# Patient Record
Sex: MALE | Race: WHITE | Employment: OTHER | URBAN - METROPOLITAN AREA
[De-identification: names, ages, dates, MRNs, and addresses within clinical notes are randomized per-mention and may not be internally consistent; named-entity substitution may affect disease eponyms.]

---

## 2017-07-18 PROBLEM — R06.02 SOB (SHORTNESS OF BREATH): Status: ACTIVE | Noted: 2017-03-09

## 2018-02-01 ENCOUNTER — HOSPITAL ENCOUNTER (INPATIENT)
Age: 83
LOS: 4 days | Discharge: NURSING, REHAB OR LONG-TERM CARE | DRG: 481 | End: 2018-02-05
Attending: STUDENT IN AN ORGANIZED HEALTH CARE EDUCATION/TRAINING PROGRAM | Admitting: FAMILY MEDICINE
Payer: MEDICARE

## 2018-02-01 ENCOUNTER — APPOINTMENT (OUTPATIENT)
Dept: GENERAL RADIOLOGY | Age: 83
DRG: 481 | End: 2018-02-01
Attending: STUDENT IN AN ORGANIZED HEALTH CARE EDUCATION/TRAINING PROGRAM
Payer: MEDICARE

## 2018-02-01 ENCOUNTER — APPOINTMENT (OUTPATIENT)
Dept: CT IMAGING | Age: 83
DRG: 481 | End: 2018-02-01
Attending: STUDENT IN AN ORGANIZED HEALTH CARE EDUCATION/TRAINING PROGRAM
Payer: MEDICARE

## 2018-02-01 DIAGNOSIS — S72.002A CLOSED FRACTURE OF LEFT HIP, INITIAL ENCOUNTER (HCC): Primary | ICD-10-CM

## 2018-02-01 PROBLEM — S72.009A HIP FRACTURE (HCC): Status: ACTIVE | Noted: 2018-02-01

## 2018-02-01 PROBLEM — R06.02 SOB (SHORTNESS OF BREATH): Status: RESOLVED | Noted: 2017-03-09 | Resolved: 2018-02-01

## 2018-02-01 PROBLEM — J81.1 PULMONARY EDEMA: Status: ACTIVE | Noted: 2018-02-01

## 2018-02-01 LAB
ALBUMIN SERPL-MCNC: 3.7 G/DL (ref 3.2–4.6)
ALBUMIN/GLOB SERPL: 1.2 {RATIO} (ref 1.2–3.5)
ALP SERPL-CCNC: 72 U/L (ref 50–136)
ALT SERPL-CCNC: 27 U/L (ref 12–65)
ANION GAP SERPL CALC-SCNC: 10 MMOL/L (ref 7–16)
AST SERPL-CCNC: 27 U/L (ref 15–37)
BASOPHILS # BLD: 0 K/UL (ref 0–0.2)
BASOPHILS NFR BLD: 0 % (ref 0–2)
BILIRUB SERPL-MCNC: 0.7 MG/DL (ref 0.2–1.1)
BUN SERPL-MCNC: 19 MG/DL (ref 8–23)
CALCIUM SERPL-MCNC: 7.9 MG/DL (ref 8.3–10.4)
CALCIUM SERPL-MCNC: 9.4 MG/DL (ref 8.3–10.4)
CHLORIDE SERPL-SCNC: 103 MMOL/L (ref 98–107)
CO2 SERPL-SCNC: 25 MMOL/L (ref 21–32)
CREAT SERPL-MCNC: 0.78 MG/DL (ref 0.8–1.5)
DIFFERENTIAL METHOD BLD: ABNORMAL
EOSINOPHIL # BLD: 0.2 K/UL (ref 0–0.8)
EOSINOPHIL NFR BLD: 2 % (ref 0.5–7.8)
ERYTHROCYTE [DISTWIDTH] IN BLOOD BY AUTOMATED COUNT: 13.1 % (ref 11.9–14.6)
GLOBULIN SER CALC-MCNC: 3 G/DL (ref 2.3–3.5)
GLUCOSE SERPL-MCNC: 97 MG/DL (ref 65–100)
HCT VFR BLD AUTO: 42.8 % (ref 41.1–50.3)
HGB BLD-MCNC: 14 G/DL (ref 13.6–17.2)
IMM GRANULOCYTES # BLD: 0 K/UL (ref 0–0.5)
IMM GRANULOCYTES NFR BLD AUTO: 0 % (ref 0–5)
LYMPHOCYTES # BLD: 1.6 K/UL (ref 0.5–4.6)
LYMPHOCYTES NFR BLD: 17 % (ref 13–44)
MCH RBC QN AUTO: 32.9 PG (ref 26.1–32.9)
MCHC RBC AUTO-ENTMCNC: 32.7 G/DL (ref 31.4–35)
MCV RBC AUTO: 100.5 FL (ref 79.6–97.8)
MONOCYTES # BLD: 0.7 K/UL (ref 0.1–1.3)
MONOCYTES NFR BLD: 7 % (ref 4–12)
NEUTS SEG # BLD: 6.6 K/UL (ref 1.7–8.2)
NEUTS SEG NFR BLD: 74 % (ref 43–78)
PLATELET # BLD AUTO: 140 K/UL (ref 150–450)
PMV BLD AUTO: 10.6 FL (ref 10.8–14.1)
POTASSIUM SERPL-SCNC: 4.1 MMOL/L (ref 3.5–5.1)
PREALB SERPL-MCNC: 19.2 MG/DL (ref 18–35.7)
PROT SERPL-MCNC: 6.7 G/DL (ref 6.3–8.2)
PTH-INTACT SERPL-MCNC: 35 PG/ML (ref 14–72)
RBC # BLD AUTO: 4.26 M/UL (ref 4.23–5.67)
SODIUM SERPL-SCNC: 138 MMOL/L (ref 136–145)
WBC # BLD AUTO: 9.1 K/UL (ref 4.3–11.1)

## 2018-02-01 PROCEDURE — 80053 COMPREHEN METABOLIC PANEL: CPT | Performed by: STUDENT IN AN ORGANIZED HEALTH CARE EDUCATION/TRAINING PROGRAM

## 2018-02-01 PROCEDURE — 70450 CT HEAD/BRAIN W/O DYE: CPT

## 2018-02-01 PROCEDURE — 83970 ASSAY OF PARATHORMONE: CPT | Performed by: NURSE PRACTITIONER

## 2018-02-01 PROCEDURE — 84134 ASSAY OF PREALBUMIN: CPT | Performed by: STUDENT IN AN ORGANIZED HEALTH CARE EDUCATION/TRAINING PROGRAM

## 2018-02-01 PROCEDURE — 74011250636 HC RX REV CODE- 250/636: Performed by: NURSE PRACTITIONER

## 2018-02-01 PROCEDURE — 87641 MR-STAPH DNA AMP PROBE: CPT | Performed by: NURSE PRACTITIONER

## 2018-02-01 PROCEDURE — 82306 VITAMIN D 25 HYDROXY: CPT | Performed by: STUDENT IN AN ORGANIZED HEALTH CARE EDUCATION/TRAINING PROGRAM

## 2018-02-01 PROCEDURE — 65270000029 HC RM PRIVATE

## 2018-02-01 PROCEDURE — 86900 BLOOD TYPING SEROLOGIC ABO: CPT | Performed by: STUDENT IN AN ORGANIZED HEALTH CARE EDUCATION/TRAINING PROGRAM

## 2018-02-01 PROCEDURE — 93005 ELECTROCARDIOGRAM TRACING: CPT | Performed by: STUDENT IN AN ORGANIZED HEALTH CARE EDUCATION/TRAINING PROGRAM

## 2018-02-01 PROCEDURE — 85025 COMPLETE CBC W/AUTO DIFF WBC: CPT | Performed by: STUDENT IN AN ORGANIZED HEALTH CARE EDUCATION/TRAINING PROGRAM

## 2018-02-01 PROCEDURE — 71045 X-RAY EXAM CHEST 1 VIEW: CPT

## 2018-02-01 PROCEDURE — 74011250636 HC RX REV CODE- 250/636: Performed by: FAMILY MEDICINE

## 2018-02-01 PROCEDURE — 72125 CT NECK SPINE W/O DYE: CPT

## 2018-02-01 PROCEDURE — 73502 X-RAY EXAM HIP UNI 2-3 VIEWS: CPT

## 2018-02-01 PROCEDURE — 81003 URINALYSIS AUTO W/O SCOPE: CPT | Performed by: ORTHOPAEDIC SURGERY

## 2018-02-01 PROCEDURE — 99284 EMERGENCY DEPT VISIT MOD MDM: CPT | Performed by: STUDENT IN AN ORGANIZED HEALTH CARE EDUCATION/TRAINING PROGRAM

## 2018-02-01 PROCEDURE — 73552 X-RAY EXAM OF FEMUR 2/>: CPT

## 2018-02-01 RX ORDER — MORPHINE SULFATE 4 MG/ML
1 INJECTION, SOLUTION INTRAMUSCULAR; INTRAVENOUS
Status: DISCONTINUED | OUTPATIENT
Start: 2018-02-01 | End: 2018-02-03 | Stop reason: ALTCHOICE

## 2018-02-01 RX ORDER — OXYCODONE HYDROCHLORIDE 5 MG/1
5 TABLET ORAL
Status: DISCONTINUED | OUTPATIENT
Start: 2018-02-01 | End: 2018-02-02 | Stop reason: SDUPTHER

## 2018-02-01 RX ORDER — GUAIFENESIN 600 MG/1
1200 TABLET, EXTENDED RELEASE ORAL DAILY
Status: DISCONTINUED | OUTPATIENT
Start: 2018-02-02 | End: 2018-02-05 | Stop reason: HOSPADM

## 2018-02-01 RX ORDER — HYDROCODONE BITARTRATE AND ACETAMINOPHEN 5; 325 MG/1; MG/1
1 TABLET ORAL
Status: DISCONTINUED | OUTPATIENT
Start: 2018-02-01 | End: 2018-02-03

## 2018-02-01 RX ORDER — TAMSULOSIN HYDROCHLORIDE 0.4 MG/1
0.4 CAPSULE ORAL DAILY
Status: DISCONTINUED | OUTPATIENT
Start: 2018-02-02 | End: 2018-02-05 | Stop reason: HOSPADM

## 2018-02-01 RX ORDER — ONDANSETRON 2 MG/ML
4 INJECTION INTRAMUSCULAR; INTRAVENOUS
Status: DISCONTINUED | OUTPATIENT
Start: 2018-02-01 | End: 2018-02-02 | Stop reason: SDUPTHER

## 2018-02-01 RX ORDER — SODIUM CHLORIDE 0.9 % (FLUSH) 0.9 %
5-10 SYRINGE (ML) INJECTION EVERY 8 HOURS
Status: DISCONTINUED | OUTPATIENT
Start: 2018-02-01 | End: 2018-02-02 | Stop reason: SDUPTHER

## 2018-02-01 RX ORDER — ACETAMINOPHEN 325 MG/1
650 TABLET ORAL EVERY 8 HOURS
Status: DISCONTINUED | OUTPATIENT
Start: 2018-02-01 | End: 2018-02-01

## 2018-02-01 RX ORDER — ENOXAPARIN SODIUM 100 MG/ML
40 INJECTION SUBCUTANEOUS EVERY 24 HOURS
Status: DISCONTINUED | OUTPATIENT
Start: 2018-02-01 | End: 2018-02-01 | Stop reason: SDUPTHER

## 2018-02-01 RX ORDER — SODIUM CHLORIDE 0.9 % (FLUSH) 0.9 %
5-10 SYRINGE (ML) INJECTION AS NEEDED
Status: DISCONTINUED | OUTPATIENT
Start: 2018-02-01 | End: 2018-02-02 | Stop reason: SDUPTHER

## 2018-02-01 RX ORDER — MELATONIN
1000 DAILY
Status: DISCONTINUED | OUTPATIENT
Start: 2018-02-02 | End: 2018-02-05 | Stop reason: HOSPADM

## 2018-02-01 RX ORDER — CEFAZOLIN SODIUM/WATER 2 G/20 ML
2 SYRINGE (ML) INTRAVENOUS
Status: COMPLETED | OUTPATIENT
Start: 2018-02-02 | End: 2018-02-02

## 2018-02-01 RX ORDER — LISINOPRIL 5 MG/1
5 TABLET ORAL DAILY
Status: DISCONTINUED | OUTPATIENT
Start: 2018-02-02 | End: 2018-02-05 | Stop reason: HOSPADM

## 2018-02-01 RX ORDER — ACETAMINOPHEN 325 MG/1
650 TABLET ORAL
Status: DISCONTINUED | OUTPATIENT
Start: 2018-02-01 | End: 2018-02-05 | Stop reason: HOSPADM

## 2018-02-01 RX ORDER — ASPIRIN 81 MG/1
81 TABLET ORAL DAILY
Status: DISCONTINUED | OUTPATIENT
Start: 2018-02-02 | End: 2018-02-05 | Stop reason: HOSPADM

## 2018-02-01 RX ORDER — SODIUM CHLORIDE, SODIUM LACTATE, POTASSIUM CHLORIDE, CALCIUM CHLORIDE 600; 310; 30; 20 MG/100ML; MG/100ML; MG/100ML; MG/100ML
75 INJECTION, SOLUTION INTRAVENOUS
Status: DISPENSED | OUTPATIENT
Start: 2018-02-01 | End: 2018-02-02

## 2018-02-01 RX ORDER — CALCIUM CARBONATE/VITAMIN D3 250-3.125
2 TABLET ORAL
Status: DISCONTINUED | OUTPATIENT
Start: 2018-02-02 | End: 2018-02-02 | Stop reason: SDUPTHER

## 2018-02-01 RX ORDER — MONTELUKAST SODIUM 10 MG/1
10 TABLET ORAL EVERY EVENING
Status: DISCONTINUED | OUTPATIENT
Start: 2018-02-01 | End: 2018-02-05 | Stop reason: HOSPADM

## 2018-02-01 RX ORDER — TRAMADOL HYDROCHLORIDE 50 MG/1
50 TABLET ORAL
Status: DISCONTINUED | OUTPATIENT
Start: 2018-02-01 | End: 2018-02-05 | Stop reason: HOSPADM

## 2018-02-01 RX ORDER — HYDROMORPHONE HYDROCHLORIDE 2 MG/ML
0.5 INJECTION, SOLUTION INTRAMUSCULAR; INTRAVENOUS; SUBCUTANEOUS
Status: DISCONTINUED | OUTPATIENT
Start: 2018-02-01 | End: 2018-02-03

## 2018-02-01 RX ORDER — ADHESIVE BANDAGE
30 BANDAGE TOPICAL DAILY PRN
Status: DISCONTINUED | OUTPATIENT
Start: 2018-02-01 | End: 2018-02-05 | Stop reason: HOSPADM

## 2018-02-01 RX ORDER — SODIUM CHLORIDE 9 MG/ML
2000 INJECTION, SOLUTION INTRAVENOUS CONTINUOUS
Status: DISCONTINUED | OUTPATIENT
Start: 2018-02-01 | End: 2018-02-04

## 2018-02-01 RX ORDER — ENOXAPARIN SODIUM 100 MG/ML
40 INJECTION SUBCUTANEOUS EVERY 24 HOURS
Status: DISCONTINUED | OUTPATIENT
Start: 2018-02-01 | End: 2018-02-02 | Stop reason: SDUPTHER

## 2018-02-01 RX ORDER — FLUTICASONE PROPIONATE 50 MCG
2 SPRAY, SUSPENSION (ML) NASAL DAILY
Status: DISCONTINUED | OUTPATIENT
Start: 2018-02-02 | End: 2018-02-05 | Stop reason: HOSPADM

## 2018-02-01 RX ADMIN — ONDANSETRON 4 MG: 2 INJECTION INTRAMUSCULAR; INTRAVENOUS at 21:48

## 2018-02-01 RX ADMIN — HYDROMORPHONE HYDROCHLORIDE 0.5 MG: 2 INJECTION, SOLUTION INTRAMUSCULAR; INTRAVENOUS; SUBCUTANEOUS at 21:48

## 2018-02-01 NOTE — ED TRIAGE NOTES
Pt arrives to ER via EMS after tripping on a shopping cart at [address] and landing on left hip. Pt will not straighten out leg, unable to tell if shortened or rotated  /100 then 156/80, HR 80, O2 91% room air, 4L Nc up to 95%. Pt given 4 of zofran and 5 of morphine. Pt denies LOC, pt did hit the back of his head but is not complaining of head pain at this time.  Pt denies taking any blood thinners

## 2018-02-02 ENCOUNTER — ANESTHESIA EVENT (OUTPATIENT)
Dept: SURGERY | Age: 83
DRG: 481 | End: 2018-02-02
Payer: MEDICARE

## 2018-02-02 ENCOUNTER — APPOINTMENT (OUTPATIENT)
Dept: GENERAL RADIOLOGY | Age: 83
DRG: 481 | End: 2018-02-02
Attending: ORTHOPAEDIC SURGERY
Payer: MEDICARE

## 2018-02-02 ENCOUNTER — ANESTHESIA (OUTPATIENT)
Dept: SURGERY | Age: 83
DRG: 481 | End: 2018-02-02
Payer: MEDICARE

## 2018-02-02 LAB
ABO + RH BLD: NORMAL
ANION GAP SERPL CALC-SCNC: 8 MMOL/L (ref 7–16)
APPEARANCE UR: CLEAR
APTT PPP: 37.5 SEC (ref 23.2–35.3)
ATRIAL RATE: 75 BPM
BACTERIA SPEC CULT: ABNORMAL
BACTERIA URNS QL MICRO: 0 /HPF
BASOPHILS # BLD: 0 K/UL (ref 0–0.2)
BASOPHILS NFR BLD: 0 % (ref 0–2)
BILIRUB UR QL: NEGATIVE
BLOOD GROUP ANTIBODIES SERPL: NORMAL
BNP SERPL-MCNC: 173 PG/ML
BUN SERPL-MCNC: 18 MG/DL (ref 8–23)
CALCIUM SERPL-MCNC: 8.3 MG/DL (ref 8.3–10.4)
CALCULATED P AXIS, ECG09: 72 DEGREES
CALCULATED R AXIS, ECG10: 28 DEGREES
CALCULATED T AXIS, ECG11: 69 DEGREES
CHLORIDE SERPL-SCNC: 103 MMOL/L (ref 98–107)
CO2 SERPL-SCNC: 28 MMOL/L (ref 21–32)
COLOR UR: YELLOW
CREAT SERPL-MCNC: 0.8 MG/DL (ref 0.8–1.5)
DIAGNOSIS, 93000: NORMAL
DIFFERENTIAL METHOD BLD: ABNORMAL
EOSINOPHIL # BLD: 0.1 K/UL (ref 0–0.8)
EOSINOPHIL NFR BLD: 1 % (ref 0.5–7.8)
EPI CELLS #/AREA URNS HPF: ABNORMAL /HPF
ERYTHROCYTE [DISTWIDTH] IN BLOOD BY AUTOMATED COUNT: 13.2 % (ref 11.9–14.6)
GLUCOSE SERPL-MCNC: 115 MG/DL (ref 65–100)
GLUCOSE UR STRIP.AUTO-MCNC: NEGATIVE MG/DL
HCT VFR BLD AUTO: 38.9 % (ref 41.1–50.3)
HGB BLD-MCNC: 12.4 G/DL (ref 13.6–17.2)
HGB UR QL STRIP: ABNORMAL
IMM GRANULOCYTES # BLD: 0 K/UL (ref 0–0.5)
IMM GRANULOCYTES NFR BLD AUTO: 0 % (ref 0–5)
INR PPP: 1.2
KETONES UR QL STRIP.AUTO: ABNORMAL MG/DL
LEUKOCYTE ESTERASE UR QL STRIP.AUTO: NEGATIVE
LYMPHOCYTES # BLD: 1.7 K/UL (ref 0.5–4.6)
LYMPHOCYTES NFR BLD: 21 % (ref 13–44)
MCH RBC QN AUTO: 31.7 PG (ref 26.1–32.9)
MCHC RBC AUTO-ENTMCNC: 31.9 G/DL (ref 31.4–35)
MCV RBC AUTO: 99.5 FL (ref 79.6–97.8)
MONOCYTES # BLD: 0.6 K/UL (ref 0.1–1.3)
MONOCYTES NFR BLD: 7 % (ref 4–12)
NEUTS SEG # BLD: 5.8 K/UL (ref 1.7–8.2)
NEUTS SEG NFR BLD: 71 % (ref 43–78)
NITRITE UR QL STRIP.AUTO: NEGATIVE
P-R INTERVAL, ECG05: 178 MS
PH UR STRIP: 6.5 [PH] (ref 5–9)
PLATELET # BLD AUTO: 124 K/UL (ref 150–450)
PMV BLD AUTO: 10.8 FL (ref 10.8–14.1)
POTASSIUM SERPL-SCNC: 4.3 MMOL/L (ref 3.5–5.1)
PROT UR STRIP-MCNC: NEGATIVE MG/DL
PROTHROMBIN TIME: 14.8 SEC (ref 11.5–14.5)
Q-T INTERVAL, ECG07: 374 MS
QRS DURATION, ECG06: 88 MS
QTC CALCULATION (BEZET), ECG08: 417 MS
RBC # BLD AUTO: 3.91 M/UL (ref 4.23–5.67)
RBC #/AREA URNS HPF: ABNORMAL /HPF
SERVICE CMNT-IMP: ABNORMAL
SODIUM SERPL-SCNC: 139 MMOL/L (ref 136–145)
SP GR UR REFRACTOMETRY: 1.02 (ref 1–1.02)
SPECIMEN EXP DATE BLD: NORMAL
UROBILINOGEN UR QL STRIP.AUTO: 1 EU/DL (ref 0.2–1)
VENTRICULAR RATE, ECG03: 75 BPM
WBC # BLD AUTO: 8.3 K/UL (ref 4.3–11.1)
WBC URNS QL MICRO: ABNORMAL /HPF

## 2018-02-02 PROCEDURE — 77030020143 HC AIRWY LARYN INTUB CGAS -A: Performed by: ANESTHESIOLOGY

## 2018-02-02 PROCEDURE — 85610 PROTHROMBIN TIME: CPT | Performed by: ORTHOPAEDIC SURGERY

## 2018-02-02 PROCEDURE — 74011000250 HC RX REV CODE- 250: Performed by: ORTHOPAEDIC SURGERY

## 2018-02-02 PROCEDURE — 80048 BASIC METABOLIC PNL TOTAL CA: CPT | Performed by: FAMILY MEDICINE

## 2018-02-02 PROCEDURE — 77030014405 HC GD ROD RMR SYNT -C: Performed by: ORTHOPAEDIC SURGERY

## 2018-02-02 PROCEDURE — 85025 COMPLETE CBC W/AUTO DIFF WBC: CPT | Performed by: FAMILY MEDICINE

## 2018-02-02 PROCEDURE — 76210000017 HC OR PH I REC 1.5 TO 2 HR: Performed by: ORTHOPAEDIC SURGERY

## 2018-02-02 PROCEDURE — 83880 ASSAY OF NATRIURETIC PEPTIDE: CPT | Performed by: NURSE PRACTITIONER

## 2018-02-02 PROCEDURE — 74011250637 HC RX REV CODE- 250/637: Performed by: FAMILY MEDICINE

## 2018-02-02 PROCEDURE — 77030008467 HC STPLR SKN COVD -B: Performed by: ORTHOPAEDIC SURGERY

## 2018-02-02 PROCEDURE — 65660000000 HC RM CCU STEPDOWN

## 2018-02-02 PROCEDURE — 73552 X-RAY EXAM OF FEMUR 2/>: CPT

## 2018-02-02 PROCEDURE — 36415 COLL VENOUS BLD VENIPUNCTURE: CPT | Performed by: FAMILY MEDICINE

## 2018-02-02 PROCEDURE — C1769 GUIDE WIRE: HCPCS | Performed by: ORTHOPAEDIC SURGERY

## 2018-02-02 PROCEDURE — 77030035168: Performed by: ORTHOPAEDIC SURGERY

## 2018-02-02 PROCEDURE — 0QS736Z REPOSITION LEFT UPPER FEMUR WITH INTRAMEDULLARY INTERNAL FIXATION DEVICE, PERCUTANEOUS APPROACH: ICD-10-PCS | Performed by: ORTHOPAEDIC SURGERY

## 2018-02-02 PROCEDURE — 74011250637 HC RX REV CODE- 250/637: Performed by: ORTHOPAEDIC SURGERY

## 2018-02-02 PROCEDURE — 74011250636 HC RX REV CODE- 250/636

## 2018-02-02 PROCEDURE — 85730 THROMBOPLASTIN TIME PARTIAL: CPT | Performed by: ORTHOPAEDIC SURGERY

## 2018-02-02 PROCEDURE — 74011000302 HC RX REV CODE- 302: Performed by: ORTHOPAEDIC SURGERY

## 2018-02-02 PROCEDURE — 74011250636 HC RX REV CODE- 250/636: Performed by: NURSE PRACTITIONER

## 2018-02-02 PROCEDURE — 74011250636 HC RX REV CODE- 250/636: Performed by: ANESTHESIOLOGY

## 2018-02-02 PROCEDURE — 74011000250 HC RX REV CODE- 250: Performed by: FAMILY MEDICINE

## 2018-02-02 PROCEDURE — 86580 TB INTRADERMAL TEST: CPT | Performed by: ORTHOPAEDIC SURGERY

## 2018-02-02 PROCEDURE — 74011250637 HC RX REV CODE- 250/637: Performed by: NURSE PRACTITIONER

## 2018-02-02 PROCEDURE — 74011000250 HC RX REV CODE- 250

## 2018-02-02 PROCEDURE — 76060000032 HC ANESTHESIA 0.5 TO 1 HR: Performed by: ORTHOPAEDIC SURGERY

## 2018-02-02 PROCEDURE — 74011250636 HC RX REV CODE- 250/636: Performed by: FAMILY MEDICINE

## 2018-02-02 PROCEDURE — 77030018836 HC SOL IRR NACL ICUM -A: Performed by: ORTHOPAEDIC SURGERY

## 2018-02-02 PROCEDURE — 76010000160 HC OR TIME 0.5 TO 1 HR INTENSV-TIER 1: Performed by: ORTHOPAEDIC SURGERY

## 2018-02-02 PROCEDURE — 77030011640 HC PAD GRND REM COVD -A: Performed by: ORTHOPAEDIC SURGERY

## 2018-02-02 PROCEDURE — 74011250636 HC RX REV CODE- 250/636: Performed by: ORTHOPAEDIC SURGERY

## 2018-02-02 PROCEDURE — C1713 ANCHOR/SCREW BN/BN,TIS/BN: HCPCS | Performed by: ORTHOPAEDIC SURGERY

## 2018-02-02 PROCEDURE — 77030002933 HC SUT MCRYL J&J -A: Performed by: ORTHOPAEDIC SURGERY

## 2018-02-02 PROCEDURE — C8929 TTE W OR WO FOL WCON,DOPPLER: HCPCS

## 2018-02-02 DEVICE — IMPLANTABLE DEVICE: Type: IMPLANTABLE DEVICE | Site: FEMUR | Status: FUNCTIONAL

## 2018-02-02 RX ORDER — SODIUM CHLORIDE 9 MG/ML
75 INJECTION, SOLUTION INTRAVENOUS CONTINUOUS
Status: DISCONTINUED | OUTPATIENT
Start: 2018-02-02 | End: 2018-02-04

## 2018-02-02 RX ORDER — SODIUM CHLORIDE, SODIUM LACTATE, POTASSIUM CHLORIDE, CALCIUM CHLORIDE 600; 310; 30; 20 MG/100ML; MG/100ML; MG/100ML; MG/100ML
75 INJECTION, SOLUTION INTRAVENOUS CONTINUOUS
Status: DISCONTINUED | OUTPATIENT
Start: 2018-02-02 | End: 2018-02-04

## 2018-02-02 RX ORDER — ENOXAPARIN SODIUM 100 MG/ML
30 INJECTION SUBCUTANEOUS EVERY 24 HOURS
Status: DISCONTINUED | OUTPATIENT
Start: 2018-02-03 | End: 2018-02-05 | Stop reason: HOSPADM

## 2018-02-02 RX ORDER — MUPIROCIN 20 MG/G
OINTMENT TOPICAL 2 TIMES DAILY
Status: DISCONTINUED | OUTPATIENT
Start: 2018-02-02 | End: 2018-02-05 | Stop reason: HOSPADM

## 2018-02-02 RX ORDER — LABETALOL HYDROCHLORIDE 5 MG/ML
INJECTION, SOLUTION INTRAVENOUS AS NEEDED
Status: DISCONTINUED | OUTPATIENT
Start: 2018-02-02 | End: 2018-02-02 | Stop reason: HOSPADM

## 2018-02-02 RX ORDER — SODIUM CHLORIDE 0.9 % (FLUSH) 0.9 %
5-10 SYRINGE (ML) INJECTION AS NEEDED
Status: DISCONTINUED | OUTPATIENT
Start: 2018-02-02 | End: 2018-02-05 | Stop reason: HOSPADM

## 2018-02-02 RX ORDER — MAG HYDROX/ALUMINUM HYD/SIMETH 200-200-20
30 SUSPENSION, ORAL (FINAL DOSE FORM) ORAL
Status: DISCONTINUED | OUTPATIENT
Start: 2018-02-02 | End: 2018-02-05 | Stop reason: HOSPADM

## 2018-02-02 RX ORDER — SODIUM CHLORIDE, SODIUM LACTATE, POTASSIUM CHLORIDE, CALCIUM CHLORIDE 600; 310; 30; 20 MG/100ML; MG/100ML; MG/100ML; MG/100ML
75 INJECTION, SOLUTION INTRAVENOUS CONTINUOUS
Status: DISCONTINUED | OUTPATIENT
Start: 2018-02-02 | End: 2018-02-02 | Stop reason: HOSPADM

## 2018-02-02 RX ORDER — PROPOFOL 10 MG/ML
INJECTION, EMULSION INTRAVENOUS AS NEEDED
Status: DISCONTINUED | OUTPATIENT
Start: 2018-02-02 | End: 2018-02-02 | Stop reason: HOSPADM

## 2018-02-02 RX ORDER — SODIUM CHLORIDE 0.9 % (FLUSH) 0.9 %
5-10 SYRINGE (ML) INJECTION EVERY 8 HOURS
Status: DISCONTINUED | OUTPATIENT
Start: 2018-02-02 | End: 2018-02-05 | Stop reason: HOSPADM

## 2018-02-02 RX ORDER — ONDANSETRON 2 MG/ML
INJECTION INTRAMUSCULAR; INTRAVENOUS AS NEEDED
Status: DISCONTINUED | OUTPATIENT
Start: 2018-02-02 | End: 2018-02-02 | Stop reason: HOSPADM

## 2018-02-02 RX ORDER — LIDOCAINE HYDROCHLORIDE 20 MG/ML
INJECTION, SOLUTION EPIDURAL; INFILTRATION; INTRACAUDAL; PERINEURAL AS NEEDED
Status: DISCONTINUED | OUTPATIENT
Start: 2018-02-02 | End: 2018-02-02 | Stop reason: HOSPADM

## 2018-02-02 RX ORDER — DEXAMETHASONE SODIUM PHOSPHATE 4 MG/ML
INJECTION, SOLUTION INTRA-ARTICULAR; INTRALESIONAL; INTRAMUSCULAR; INTRAVENOUS; SOFT TISSUE AS NEEDED
Status: DISCONTINUED | OUTPATIENT
Start: 2018-02-02 | End: 2018-02-02 | Stop reason: HOSPADM

## 2018-02-02 RX ORDER — OXYCODONE HYDROCHLORIDE 5 MG/1
5 TABLET ORAL
Status: DISCONTINUED | OUTPATIENT
Start: 2018-02-02 | End: 2018-02-05 | Stop reason: HOSPADM

## 2018-02-02 RX ORDER — ONDANSETRON 2 MG/ML
4 INJECTION INTRAMUSCULAR; INTRAVENOUS
Status: DISCONTINUED | OUTPATIENT
Start: 2018-02-02 | End: 2018-02-05 | Stop reason: HOSPADM

## 2018-02-02 RX ORDER — FERROUS SULFATE, DRIED 160(50) MG
1 TABLET, EXTENDED RELEASE ORAL
Status: DISCONTINUED | OUTPATIENT
Start: 2018-02-02 | End: 2018-02-05 | Stop reason: HOSPADM

## 2018-02-02 RX ORDER — ACETAMINOPHEN 325 MG/1
650 TABLET ORAL EVERY 8 HOURS
Status: DISCONTINUED | OUTPATIENT
Start: 2018-02-02 | End: 2018-02-05 | Stop reason: HOSPADM

## 2018-02-02 RX ADMIN — Medication 2 G: at 14:14

## 2018-02-02 RX ADMIN — SODIUM CHLORIDE 75 ML/HR: 900 INJECTION, SOLUTION INTRAVENOUS at 01:00

## 2018-02-02 RX ADMIN — TRAMADOL HYDROCHLORIDE 50 MG: 50 TABLET, FILM COATED ORAL at 00:06

## 2018-02-02 RX ADMIN — MONTELUKAST SODIUM 10 MG: 10 TABLET, FILM COATED ORAL at 00:06

## 2018-02-02 RX ADMIN — ACETAMINOPHEN 650 MG: 325 TABLET, FILM COATED ORAL at 17:24

## 2018-02-02 RX ADMIN — MONTELUKAST SODIUM 10 MG: 10 TABLET, FILM COATED ORAL at 17:24

## 2018-02-02 RX ADMIN — WATER 1 G: 1 INJECTION INTRAMUSCULAR; INTRAVENOUS; SUBCUTANEOUS at 21:26

## 2018-02-02 RX ADMIN — SODIUM CHLORIDE, SODIUM LACTATE, POTASSIUM CHLORIDE, AND CALCIUM CHLORIDE 75 ML/HR: 600; 310; 30; 20 INJECTION, SOLUTION INTRAVENOUS at 12:03

## 2018-02-02 RX ADMIN — TRAMADOL HYDROCHLORIDE 50 MG: 50 TABLET, FILM COATED ORAL at 08:01

## 2018-02-02 RX ADMIN — GUAIFENESIN 1200 MG: 600 TABLET, EXTENDED RELEASE ORAL at 08:01

## 2018-02-02 RX ADMIN — ASPIRIN 81 MG: 81 TABLET, COATED ORAL at 08:01

## 2018-02-02 RX ADMIN — LIDOCAINE HYDROCHLORIDE 100 MG: 20 INJECTION, SOLUTION EPIDURAL; INFILTRATION; INTRACAUDAL; PERINEURAL at 14:11

## 2018-02-02 RX ADMIN — CALCIUM CARBONATE-CHOLECALCIFEROL TAB 250 MG-125 UNIT 2 TABLET: 250-125 TAB at 08:01

## 2018-02-02 RX ADMIN — TUBERCULIN PURIFIED PROTEIN DERIVATIVE 5 UNITS: 5 INJECTION INTRADERMAL at 02:57

## 2018-02-02 RX ADMIN — CHOLECALCIFEROL TAB 25 MCG (1000 UNIT) 1000 UNITS: 25 TAB at 08:01

## 2018-02-02 RX ADMIN — TAMSULOSIN HYDROCHLORIDE 0.4 MG: 0.4 CAPSULE ORAL at 08:01

## 2018-02-02 RX ADMIN — FLUTICASONE PROPIONATE 2 SPRAY: 50 SPRAY, METERED NASAL at 08:03

## 2018-02-02 RX ADMIN — CALCIUM CARBONATE 500 MG (1,250 MG)-VITAMIN D3 200 UNIT TABLET 1 TABLET: at 17:24

## 2018-02-02 RX ADMIN — DEXAMETHASONE SODIUM PHOSPHATE 10 MG: 4 INJECTION, SOLUTION INTRA-ARTICULAR; INTRALESIONAL; INTRAMUSCULAR; INTRAVENOUS; SOFT TISSUE at 14:16

## 2018-02-02 RX ADMIN — ACETAMINOPHEN 650 MG: 325 TABLET, FILM COATED ORAL at 21:25

## 2018-02-02 RX ADMIN — MUPIROCIN: 20 OINTMENT TOPICAL at 17:25

## 2018-02-02 RX ADMIN — PROPOFOL 200 MG: 10 INJECTION, EMULSION INTRAVENOUS at 14:11

## 2018-02-02 RX ADMIN — PERFLUTREN 1 ML: 6.52 INJECTION, SUSPENSION INTRAVENOUS at 10:47

## 2018-02-02 RX ADMIN — ONDANSETRON 4 MG: 2 INJECTION INTRAMUSCULAR; INTRAVENOUS at 14:40

## 2018-02-02 RX ADMIN — OXYCODONE HYDROCHLORIDE 5 MG: 5 TABLET ORAL at 21:26

## 2018-02-02 RX ADMIN — TRAMADOL HYDROCHLORIDE 50 MG: 50 TABLET, FILM COATED ORAL at 17:24

## 2018-02-02 RX ADMIN — Medication 5 ML: at 21:26

## 2018-02-02 RX ADMIN — MUPIROCIN: 20 OINTMENT TOPICAL at 08:03

## 2018-02-02 RX ADMIN — LABETALOL HYDROCHLORIDE 7.5 MG: 5 INJECTION, SOLUTION INTRAVENOUS at 14:19

## 2018-02-02 RX ADMIN — SODIUM CHLORIDE, SODIUM LACTATE, POTASSIUM CHLORIDE, AND CALCIUM CHLORIDE 75 ML/HR: 600; 310; 30; 20 INJECTION, SOLUTION INTRAVENOUS at 16:37

## 2018-02-02 NOTE — PROGRESS NOTES
TRANSFER - IN REPORT:    Verbal report received from [facility]) [facility]  being received from ED(unit) for routine progression of care      Report consisted of patients Situation, Background, Assessment and   Recommendations(SBAR). Information from the following report(s) ED Summary, MAR and Recent Results was reviewed with the receiving nurse. Opportunity for questions and clarification was provided. Assessment completed upon patients arrival to unit(rm726) and care assumed.

## 2018-02-02 NOTE — PROGRESS NOTES
Hospitalist Progress Note    2018  Admit Date: 2018  6:57 PM   NAME: Joaquim Armendariz   :  1932   MRN:  [id number]   Attending: Alaina Sultana MD  PCP:  Elle Hernanedz MD    SUBJECTIVE:     Pt is a 81 yo male with pmh COPD on 2 L NC at night, HTN who was admitted  after nonsyncopal fall with left hip pain. X Ray confirms left hip fx. Pt reports he has been \"in good health\". He participates in pulmonary rehab at [facility], follows with [facility] pulm and was actually taken off his Spiriva 2017. He rarely uses his PRN Albuterol. Chest x ray in ER with question of pulm edema. He was given Lasix x 1 with 1600 cc urine output overnight. He has no edema and again denies SOB and functional limitations. He does endorse some PATE but this is chronic, not changed from his baseline. EKG with PVCs with hx of PVCs at [facility]. He has no cardiac history except AAA (Non ruptured, not repaired). This morning pt is sitting up in bed. Pain well controlled. Denies SOB, CP. Hemodynamically stable. Review of Systems negative with exception of pertinent positives noted above  PHYSICAL EXAM     Visit Vitals    /73 (BP 1 Location: Left arm, BP Patient Position: Supine)    Pulse 65    Temp 98.3 °F (36.8 °C)    Resp 18    Ht 5' 9\" (1.753 m)    Wt 82.1 kg (181 lb)    SpO2 90%    BMI 26.73 kg/m2      Temp (24hrs), Av.2 °F (36.8 °C), Min:98.1 °F (36.7 °C), Max:98.4 °F (36.9 °C)    Oxygen Therapy  O2 Sat (%): 90 % (18 0718)  Pulse via Oximetry: 72 beats per minute (18 185)  O2 Device: Nasal cannula (18)  O2 Flow Rate (L/min): 3 l/min (18)    Intake/Output Summary (Last 24 hours) at 18 0848  Last data filed at 18 2149   Gross per 24 hour   Intake                0 ml   Output             1600 ml   Net            -1600 ml      General: No acute distress    Lungs:  CTA Bilaterally.    Heart:  Regular rate and rhythm,  No murmur, rub, or gallop  Abdomen: Soft, Non distended, Non tender, Positive bowel sounds  Extremities: No cyanosis, clubbing or edema  Neurologic:  No focal deficits    ASSESSMENT      Active Hospital Problems    Diagnosis Date Noted    Hip fracture ([facility] 02/01/2018    Pulmonary edema 02/01/2018    Chronic respiratory failure with hypoxia ([facility] 03/29/2016     Last Assessment & Plan:   Stable. 1. 6mwt ordered and reviewed  2. Requires 2L home O2 with ambulation and with exercise. Last Assessment & Plan:   Stable. 1. 6mwt ordered and reviewed  2. Requires 2L home O2 with ambulation and with exercise.  COPD (chronic obstructive pulmonary disease) ([facility] 12/10/2015    Essential hypertension, benign 12/10/2015     A/P:    Left closed hip fx- Ortho with plans for repair today. Pt moderate risk due to age and hx COPD. He has been stable clinically (no functional limitations, no c/o CP or SOB). Hip fx protocol. Pain management and DVT prophylaxis per ortho. Questionable pulm edema- Per imaging. Pt stable clinically (no SOB, no recent functional limitations, change in inhaler or 02 use). Given one dose Lasix overnight with 1600 cc urine output. No BNP obtained last night. Caution for fluid overload during surgery. Obtain echo but this does not necessary need to be done pre op. Place on remote tele. COPD- Clinically stable. 2 L 02 at night    DC planning- SW following for STR placement. Ppd placed.      DVT Prophylaxis: Lovenox post op     Signed By: Lorenzo Cohen NP     February 2, 2018

## 2018-02-02 NOTE — ANESTHESIA PREPROCEDURE EVALUATION
Anesthetic History   No history of anesthetic complications            Review of Systems / Medical History  Patient summary reviewed and pertinent labs reviewed    Pulmonary    COPD (O2 qhs): moderate               Neuro/Psych              Cardiovascular    Hypertension              Exercise tolerance[de-identified] Around 4 METS     GI/Hepatic/Renal                Endo/Other        Arthritis     Other Findings              Physical Exam    Airway  Mallampati: II  TM Distance: 4 - 6 cm  Neck ROM: normal range of motion   Mouth opening: Normal     Cardiovascular    Rhythm: regular  Rate: normal         Dental         Pulmonary  Breath sounds clear to auscultation               Abdominal         Other Findings            Anesthetic Plan    ASA: 3  Anesthesia type: general          Induction: Intravenous  Anesthetic plan and risks discussed with: Patient and Family

## 2018-02-02 NOTE — PERIOP NOTES
TRANSFER - OUT REPORT:    Verbal report given to JANINA Israel on Vaishnavi Jiang  being transferred to [facility] for routine post - op       Report consisted of patients Situation, Background, Assessment and   Recommendations(SBAR). Information from the following report(s) Procedure Summary and Intake/Output was reviewed with the receiving nurse. Lines:   Peripheral IV 02/01/18 Right Hand (Active)   Site Assessment Clean, dry, & intact 2/2/2018  3:44 PM   Phlebitis Assessment 0 2/2/2018  3:44 PM   Infiltration Assessment 0 2/2/2018  3:44 PM   Dressing Status Clean, dry, & intact 2/2/2018  3:44 PM   Dressing Type Tape;Transparent 2/2/2018  3:44 PM   Hub Color/Line Status Green; Infusing 2/2/2018  3:44 PM       Peripheral IV 02/01/18 Right Antecubital (Active)   Site Assessment Dry; Intact;Drainage (comment) 2/2/2018  3:44 PM   Phlebitis Assessment 0 2/2/2018  3:44 PM   Infiltration Assessment 0 2/2/2018  3:44 PM   Dressing Status Dry; Intact 2/2/2018  3:44 PM   Dressing Type Tape;Trach dressing 2/2/2018  3:44 PM   Hub Color/Line Status Green;Capped 2/2/2018  3:44 PM        Opportunity for questions and clarification was provided. Patient transported with:   O2 @ 3 liters  Tech    VTE prophylaxis orders have been written for Vaishnavi Jiang. Patient and family given floor number and nurses name. Family updated re: pt status after security code verified.

## 2018-02-02 NOTE — ED NOTES
Pt given pain medication and nausea medication before leaving ED. Rojas drained before leaving ED as well. ~1600 ccs urine output. Charted in I&Os.

## 2018-02-02 NOTE — PROGRESS NOTES
Dual skin assessment performed by primary RN and Costa Aguero. Skin warm, dry, intact. Scar to face and chest.  Sacrum clear.

## 2018-02-02 NOTE — PROGRESS NOTES
TRANSFER - IN REPORT:    Verbal report received from [facility] on [facility]  being received from PACU for routine progression of care      Report consisted of patients Situation, Background, Assessment and   Recommendations(SBAR). Information from the following report(s) SBAR, Kardex, OR Summary, Intake/Output, MAR and Recent Results was reviewed with the receiving nurse. Opportunity for questions and clarification was provided. Assessment completed upon patients arrival to unit and care assumed.

## 2018-02-02 NOTE — PROGRESS NOTES
TRANSFER - OUT REPORT:    Verbal report given to [employer] on [facility]  being transferred to [facility]  for routine progression of care       Report consisted of patients Situation, Background, Assessment and   Recommendations(SBAR). Information from the following report(s) SBAR, Kardex, Intake/Output and MAR was reviewed with the receiving nurse. Lines:   Peripheral IV 02/01/18 Right Hand (Active)   Site Assessment Clean, dry, & intact 2/2/2018  7:58 AM   Phlebitis Assessment 0 2/2/2018  7:58 AM   Infiltration Assessment 0 2/2/2018  7:58 AM   Dressing Status Clean, dry, & intact 2/2/2018  7:58 AM   Dressing Type Tape;Transparent 2/2/2018  7:58 AM   Hub Color/Line Status Capped;Flushed 2/2/2018  7:58 AM       Peripheral IV 02/01/18 Right Antecubital (Active)   Site Assessment Clean, dry, & intact 2/2/2018  7:58 AM   Phlebitis Assessment 0 2/2/2018  7:58 AM   Infiltration Assessment 0 2/2/2018  7:58 AM   Dressing Status Clean, dry, & intact 2/2/2018  7:58 AM   Dressing Type Transparent 2/2/2018  7:58 AM   Hub Color/Line Status Infusing 2/2/2018  7:58 AM        Opportunity for questions and clarification was provided.       Patient transported with:   O2 @ 2 liters

## 2018-02-02 NOTE — ANESTHESIA POSTPROCEDURE EVALUATION
Post-Anesthesia Evaluation and Assessment    Patient: Rik Song MRN: [id number]  SSN: [id number]    YOB: 1932  Age: 80 y.o. Sex: male       Cardiovascular Function/Vital Signs  Visit Vitals    /78    Pulse 62    Temp 36.6 °C (97.9 °F)    Resp 14    Ht 5' 9\" (1.753 m)    Wt 82.1 kg (181 lb)    SpO2 92%    BMI 26.73 kg/m2       Patient is status post general anesthesia for Procedure(s):  LEFT FEMUR INSERTION INTRA MEDULLARY NAIL. Nausea/Vomiting: None    Postoperative hydration reviewed and adequate. Pain:  Pain Scale 1: Numeric (0 - 10) (02/02/18 1544)  Pain Intensity 1: 0 (02/02/18 1544)   Managed    Neurological Status:   Neuro (WDL): Within Defined Limits (02/02/18 1544)  Neuro  Neurologic State: Eyes open to voice;Drowsy (02/02/18 1457)  Orientation Level: Oriented to person;Oriented to place (02/02/18 1457)  Cognition: Follows commands (02/02/18 1457)  Speech: Clear (02/02/18 1457)  LUE Motor Response: Purposeful (02/02/18 1457)  LLE Motor Response: Purposeful (02/02/18 1457)  RUE Motor Response: Purposeful (02/02/18 1457)  RLE Motor Response: Purposeful (02/02/18 1457)   At baseline    Mental Status and Level of Consciousness: Arousable    Pulmonary Status:   O2 Device: Nasal cannula (02/02/18 1606)   Adequate oxygenation and airway patent    Complications related to anesthesia: None    Post-anesthesia assessment completed.  No concerns    Signed By: Husam Martins MD     February 2, 2018

## 2018-02-02 NOTE — ED NOTES
TRANSFER - OUT REPORT:    Verbal report given to [address] [place] [address] (name) on Cal Shaw  being transferred to 7th floor(unit) for routine progression of care       Report consisted of patients Situation, Background, Assessment and   Recommendations(SBAR). Information from the following report(s) ED Summary was reviewed with the receiving nurse. Lines:   Peripheral IV 02/01/18 Right Hand (Active)   Site Assessment Clean, dry, & intact 2/1/2018  7:04 PM   Phlebitis Assessment 0 2/1/2018  7:04 PM   Infiltration Assessment 0 2/1/2018  7:04 PM   Dressing Status Clean, dry, & intact 2/1/2018  7:04 PM       Peripheral IV 02/01/18 Right Antecubital (Active)   Site Assessment Clean, dry, & intact 2/1/2018  9:18 PM   Phlebitis Assessment 0 2/1/2018  9:18 PM   Infiltration Assessment 0 2/1/2018  9:18 PM   Dressing Status Clean, dry, & intact 2/1/2018  9:18 PM        Opportunity for questions and clarification was provided.       Patient transported with:   O2 @ 3 liters, paperwork, belongings, family member

## 2018-02-02 NOTE — ED PROVIDER NOTES
HPI Comments: 80-year-old male patient presents via EMS with reports of left-sided hip pain after a mechanical fall while at grocery store earlier today. Patient states his foot became tangled in a shopping car causing him to fall backwards onto his left hip. He states he hit his head during the event. He states he remembers the entire sequence of events and does not believe he lost consciousness. He denies any significant headache at this time. Patient had severe pain in the left hip on scene per EMS he was given 5 mg of morphine in route. His pain is improved at this time but is unable to move the left hip and upper leg. Patient denies any other symptoms at this time. Patient is a 80 y.o. male presenting with hip pain. The history is provided by the patient. Hip Pain    This is a new problem. The current episode started 1 to 2 hours ago. The problem occurs constantly. The problem has not changed since onset. The pain is present in the left hip. The quality of the pain is described as aching. The pain is moderate. Associated symptoms include limited range of motion. Pertinent negatives include no numbness, no stiffness, no tingling, no itching, no back pain and no neck pain. The symptoms are aggravated by movement, contact and palpation. Treatments tried: morphine 5 mg. The treatment provided significant relief. There has been a history of trauma.         Past Medical History:   Diagnosis Date    Abdominal aortic aneurysm ([address] 12/10/2015    In 2005    Abdominal aortic aneurysm without rupture ([address]     Allergic rhinitis 12/10/2015    Asthma     Benign neoplasm     Benign prostatic hyperplasia 12/10/2015    BPH without urinary obstruction     Cardiovascular disease 12/10/2015    Chronic obstructive asthma with exacerbation ([address] 12/10/2015    COPD (chronic obstructive pulmonary disease) ([address] 12/10/2015    Cough with hemoptysis     Erectile dysfunction 12/10/2015    Essential hypertension, benign 12/10/2015    Fracture 10/2014    of left hand and ribs after fall on concrete    History of colon polyps     Low testosterone 12/10/2015    Lumbago     Memory loss     Overflow incontinence     Raynaud's syndrome 12/10/2015    Rotator cuff tendinitis     Thrombocytopenia (HCC)     Urinary frequency        Past Surgical History:   Procedure Laterality Date    HX CATARACT REMOVAL  6/2016-right    HX COLONOSCOPY      HX FRACTURE TX  10/2014    of left hand and ribs are fall on concrete    HX HERNIA REPAIR  1980         Family History:   Problem Relation Age of Onset    Dementia Mother     Cancer Father      lung cancer    Heart Attack Father        Social History     Social History    Marital status:      Spouse name: N/A    Number of children: N/A    Years of education: N/A     Occupational History    Not on file. Social History Main Topics    Smoking status: Former Smoker    Smokeless tobacco: Never Used    Alcohol use Yes      Comment: occasional    Drug use: Not on file    Sexual activity: Not on file     Other Topics Concern    Not on file     Social History Narrative         ALLERGIES: Review of patient's allergies indicates no known allergies. Review of Systems   Constitutional: Negative for chills, diaphoresis and fever. HENT: Negative for congestion, sneezing and sore throat. Eyes: Negative for visual disturbance. Respiratory: Negative for cough, chest tightness, shortness of breath and wheezing. Cardiovascular: Negative for chest pain and leg swelling. Gastrointestinal: Negative for abdominal pain, blood in stool, diarrhea, nausea and vomiting. Endocrine: Negative for polyuria. Genitourinary: Negative for difficulty urinating, dysuria, flank pain, hematuria and urgency. Musculoskeletal: Negative for back pain, myalgias, neck pain, neck stiffness and stiffness. Skin: Negative for color change, itching and rash.    Neurological: Negative for dizziness, tingling, syncope, speech difficulty, weakness, light-headedness, numbness and headaches. Psychiatric/Behavioral: Negative for behavioral problems. All other systems reviewed and are negative. Vitals:    02/01/18 1900   BP: 161/70   Pulse: 72   Resp: 18   Temp: 98.4 °F (36.9 °C)   SpO2: (!) 77%   Weight: 82.1 kg (181 lb)   Height: 5' 9\" (1.753 m)            Physical Exam   Constitutional: He is oriented to person, place, and time. He appears well-developed and well-nourished. No distress. Alert and oriented to person, place and time. No acute distress. Speaks in clear, fluent sentences. HENT:   Head: Normocephalic and atraumatic. Nose: Nose normal.   Eyes: Conjunctivae and EOM are normal. Pupils are equal, round, and reactive to light. Neck: Normal range of motion. Neck supple. No JVD present. No tracheal deviation present. Cardiovascular: Normal rate, regular rhythm, S1 normal, S2 normal, normal heart sounds and intact distal pulses. Exam reveals no gallop, no distant heart sounds and no friction rub. No murmur heard. Pulmonary/Chest: Effort normal and breath sounds normal. No accessory muscle usage or stridor. No tachypnea and no bradypnea. No respiratory distress. He has no decreased breath sounds. He has no wheezes. He has no rhonchi. He has no rales. He exhibits no tenderness. Abdominal: Soft. Normal appearance. He exhibits no distension and no mass. There is no hepatosplenomegaly, splenomegaly or hepatomegaly. There is no tenderness. There is no rigidity, no rebound, no guarding, no CVA tenderness, no tenderness at McBurney's point and negative Cochran's sign. No acute findings   Musculoskeletal: He exhibits no edema, tenderness or deformity. Patient is supine on the stretcher with his hips flexed bilaterally in a pillow under his  Knees. There is pain with any movement of his left lower extremity. This is most pronounced over the proximal thigh.    No significant discomfort with lateral compression of the pelvis. Neurological: He is alert and oriented to person, place, and time. No cranial nerve deficit. Skin: Skin is warm and dry. No rash noted. He is not diaphoretic. Psychiatric: He has a normal mood and affect. His behavior is normal.   Nursing note and vitals reviewed.        [facility]      ED Course       Procedures

## 2018-02-02 NOTE — PROGRESS NOTES
Problem: Falls - Risk of  Goal: *Absence of Falls  Document Senthil Fall Risk and appropriate interventions in the flowsheet.    Outcome: Progressing Towards Goal  Fall Risk Interventions:  Mobility Interventions: Bed/chair exit alarm, Communicate number of staff needed for ambulation/transfer, PT Consult for mobility concerns         Medication Interventions: Bed/chair exit alarm, Evaluate medications/consider consulting pharmacy, Patient to call before getting OOB    Elimination Interventions: Bed/chair exit alarm, Call light in reach, Toileting schedule/hourly rounds    History of Falls Interventions: Bed/chair exit alarm, Consult care management for discharge planning, Door open when patient unattended

## 2018-02-02 NOTE — PROGRESS NOTES
ORTHO:    PATIENT TO BE ADMITTED BY HOSPITALIST FOR LEFT HIP FRACTURE. SURGERY PLANNED FOR TOMORROW WITH DR. SOMMERS. PLEASE KEEP NPO AFTER MIDNIGHT.

## 2018-02-02 NOTE — H&P
HOSPITALIST INITIAL HISTORY AND PHYSICAL    NAME:  Kle Garnett   Age:  80 y.o.  :   1932   MRN:   [id number]  PCP: Martha Zarate MD  Consulting MD:  Treatment Team: Attending Provider: Silvia Laguerre DO; Primary Nurse: Angelia Foote RN    CHIEF COMPLAINT: left hip pain    HISTORY OF PRESENT ILLNESS:   Kel Garnett is a 80y.o. year-old male with a past medical history of HTN and COPD who presents to ER after a fall earlier today in which he tripped over the wheel of a shopping cart and landed on his side and head with immediate pain in his left hip. He denies any lightheadedness, dizziness, shortness of breath, chest pain, nausea, vomiting, diarrhea, constipation before or after his fall. REVIEW OF SYSTEMS: Comprehensive ROS performed and negative except as stated in HPI.     Past Medical History:   Diagnosis Date    Abdominal aortic aneurysm ([address] 12/10/2015    In     Abdominal aortic aneurysm without rupture ([address]     Allergic rhinitis 12/10/2015    Asthma     Benign neoplasm     Benign prostatic hyperplasia 12/10/2015    BPH without urinary obstruction     Cardiovascular disease 12/10/2015    Chronic obstructive asthma with exacerbation ([address] 12/10/2015    COPD (chronic obstructive pulmonary disease) ([address] 12/10/2015    Cough with hemoptysis     Erectile dysfunction 12/10/2015    Essential hypertension, benign 12/10/2015    Fracture 10/2014    of left hand and ribs after fall on concrete    History of colon polyps     Low testosterone 12/10/2015    Lumbago     Memory loss     Overflow incontinence     Raynaud's syndrome 12/10/2015    Rotator cuff tendinitis     Thrombocytopenia ([address]     Urinary frequency         Past Surgical History:   Procedure Laterality Date    HX CATARACT REMOVAL  2016-right    HX COLONOSCOPY      HX FRACTURE TX  10/2014    of left hand and ribs are fall on concrete    [facility]       Prior to Admission Medications   Prescriptions Last Dose Informant Patient Reported? Taking? Biotin 2,500 mcg cap   Yes No   Sig: Take  by mouth. aspirin delayed-release 81 mg tablet   Yes No   Sig: Take  by mouth daily. calcium citrate-vitamin d3 (CITRACAL+D) 315-200 mg-unit tab   Yes No   Sig: Take 2 Tabs by mouth daily (with breakfast). cholecalciferol (VITAMIN D3) 1,000 unit cap   Yes No   Sig: Take  by mouth. fluticasone (FLONASE) 50 mcg/actuation nasal spray   No No   Si Sprays by Both Nostrils route daily. guaiFENesin ER (MUCINEX) 600 mg ER tablet   Yes No   Sig: Take 1,200 mg by mouth daily. lisinopril (PRINIVIL, ZESTRIL) 5 mg tablet   Yes No   Sig: Take 5 mg by mouth daily. montelukast (SINGULAIR) 10 mg tablet   No No   Sig: Take 1 Tab by mouth daily. tamsulosin (FLOMAX) 0.4 mg capsule   No No   Sig: Take 1 Cap by mouth daily. Facility-Administered Medications: None       No Known Allergies    FAMILY HISTORY: Reviewed. Negative except   Family History   Problem Relation Age of Onset    Dementia Mother     Cancer Father      lung cancer    Heart Attack Father        Social History   Substance Use Topics    Smoking status: Former Smoker    Smokeless tobacco: Never Used    Alcohol use Yes      Comment: occasional         Objective:     Visit Vitals    /70 (BP 1 Location: Left arm, BP Patient Position: At rest)    Pulse 72    Temp 98.4 °F (36.9 °C)    Resp 18    Ht 5' 9\" (1.753 m)    Wt 82.1 kg (181 lb)    SpO2 (!) 77%    BMI 26.73 kg/m2      Temp (24hrs), Av.4 °F (36.9 °C), Min:98.4 °F (36.9 °C), Max:98.4 °F (36.9 °C)    Oxygen Therapy  O2 Sat (%): (!) 77 % (18)  Pulse via Oximetry: 72 beats per minute (18)  O2 Device: Room air (18)  Physical Exam:  General:    The patient is a very pleasant elderly male in no acute distress. Head:   Normocephalic/atraumatic. Eyes:  No palpebral pallor or scleral icterus.   ENT:  External auricular and nasal exam within normal limits. Mucous membranes are moist.  Neck:  Supple, non-tender, no JVD. Lungs:   Clear to auscultation bilaterally without wheezes or crackles. No respiratory distress or accessory muscle use. Heart:   Regular rate and rhythm, without murmurs, rubs, or gallops. Abdomen:   Soft, non-tender, non-distended with normoactive bowel sounds. Genitourinary: No tenderness over the bladder or bilateral CVAs. Extremities: Without clubbing, cyanosis, or edema. Unable to move LLE 2/2 pain. Skin:     Normal color, texture, and turgor. No rashes, lesions, or jaundice. Pulses: Radial and dorsalis pedis pulses present 2+ bilaterally. Capillary refill <2s. Neurologic: CN II-XII grossly intact and symmetrical.     Moving all four extremities well with no focal deficits. Psychiatric: Pleasant demeanor, appropriate affect. Alert and oriented x 3    Data Review:   No results found for this or any previous visit (from the past 24 hour(s)). Imaging Jacqlyn Nataliocy /Studies:  Xr Chest Sngl V    Result Date: 2/1/2018  IMPRESSION:  FINDINGS SUSPICIOUS FOR MILD CONGESTIVE HEART FAILURE (OR LESS LIKELY ATYPICAL PNEUMONIA) SUPERIMPOSED UPON EMPHYSEMA. CLINICAL CORRELATION IS RECOMMENDED. Xr Hip Lt W Or Wo Pelv 2-3 Vws    Result Date: 2/1/2018  IMPRESSION:  Intertrochanteric fracture of the left hip. Xr Femur Lt 2 V    Result Date: 2/1/2018  IMPRESSION:  Intertrochanteric fracture of the left hip. Ct Head Wo Cont    Result Date: 2/1/2018  IMPRESSION:     1.  NO ACUTE INTRACRANIAL ABNORMALITY OR CALVARIAL FRACTURE IDENTIFIED. 2.  EXTENSIVE SINUSITIS WITH NASAL SEPTAL DEVIATION. Ct Spine Cerv Wo Cont    Result Date: 2/1/2018  IMPRESSION:  Mild dextroconvex torticollis and multilevel spondylosis with no acute bony abnormality identified. Assessment and Plan:      Active Hospital Problems    Diagnosis Date Noted    Hip fracture ([address] 02/01/2018    Pulmonary edema 02/01/2018    Chronic respiratory failure with hypoxia ([facility] 03/29/2016         COPD (chronic obstructive pulmonary disease) ([facility] 12/10/2015    Essential hypertension, benign 12/10/2015       - Left intertrochanteric fracture. Orthopedic surgery to manage. - ? Pulmonary edema. No crackles present on exam, questionable findings on CXR. Will give dose of Lasix IV 40 and check echocardiogram.    - COPD. Stable. - Chronic hypoxic respiratory failure. Uses O2 at night. Stable. - HTN. Stable. Continue home meds. - DVT Prophylaxis: Lovenox. - Code Status: FULL CODE    - Disposition: Admit to surgical floor for evaluation and treatment as per above.     - Anticipated discharge: 2-3 days     Signed By: Michael Jimenez MD     February 1, 2018

## 2018-02-03 LAB
25(OH)D3+25(OH)D2 SERPL-MCNC: 34.9 NG/ML (ref 30–100)
ANION GAP SERPL CALC-SCNC: 6 MMOL/L (ref 7–16)
BASOPHILS # BLD: 0 K/UL (ref 0–0.2)
BASOPHILS NFR BLD: 0 % (ref 0–2)
BUN SERPL-MCNC: 13 MG/DL (ref 8–23)
CALCIUM SERPL-MCNC: 8.6 MG/DL (ref 8.3–10.4)
CHLORIDE SERPL-SCNC: 105 MMOL/L (ref 98–107)
CO2 SERPL-SCNC: 28 MMOL/L (ref 21–32)
CREAT SERPL-MCNC: 0.86 MG/DL (ref 0.8–1.5)
DIFFERENTIAL METHOD BLD: ABNORMAL
EOSINOPHIL # BLD: 0.1 K/UL (ref 0–0.8)
EOSINOPHIL NFR BLD: 1 % (ref 0.5–7.8)
ERYTHROCYTE [DISTWIDTH] IN BLOOD BY AUTOMATED COUNT: 13.1 % (ref 11.9–14.6)
GLUCOSE SERPL-MCNC: 115 MG/DL (ref 65–100)
HCT VFR BLD AUTO: 37.8 % (ref 41.1–50.3)
HGB BLD-MCNC: 12.4 G/DL (ref 13.6–17.2)
IMM GRANULOCYTES # BLD: 0 K/UL (ref 0–0.5)
IMM GRANULOCYTES NFR BLD AUTO: 0 % (ref 0–5)
LYMPHOCYTES # BLD: 1.3 K/UL (ref 0.5–4.6)
LYMPHOCYTES NFR BLD: 11 % (ref 13–44)
MCH RBC QN AUTO: 32.6 PG (ref 26.1–32.9)
MCHC RBC AUTO-ENTMCNC: 32.8 G/DL (ref 31.4–35)
MCV RBC AUTO: 99.5 FL (ref 79.6–97.8)
MM INDURATION POC: 0 MM (ref 0–5)
MONOCYTES # BLD: 1.2 K/UL (ref 0.1–1.3)
MONOCYTES NFR BLD: 10 % (ref 4–12)
NEUTS SEG # BLD: 9.5 K/UL (ref 1.7–8.2)
NEUTS SEG NFR BLD: 78 % (ref 43–78)
PLATELET # BLD AUTO: 111 K/UL (ref 150–450)
PMV BLD AUTO: 10.7 FL (ref 10.8–14.1)
POTASSIUM SERPL-SCNC: 4.1 MMOL/L (ref 3.5–5.1)
PPD POC: NEGATIVE NEGATIVE
RBC # BLD AUTO: 3.8 M/UL (ref 4.23–5.67)
SODIUM SERPL-SCNC: 139 MMOL/L (ref 136–145)
WBC # BLD AUTO: 12.2 K/UL (ref 4.3–11.1)

## 2018-02-03 PROCEDURE — 65660000000 HC RM CCU STEPDOWN

## 2018-02-03 PROCEDURE — 74011250637 HC RX REV CODE- 250/637: Performed by: NURSE PRACTITIONER

## 2018-02-03 PROCEDURE — 74011250637 HC RX REV CODE- 250/637: Performed by: FAMILY MEDICINE

## 2018-02-03 PROCEDURE — 74011250637 HC RX REV CODE- 250/637: Performed by: ORTHOPAEDIC SURGERY

## 2018-02-03 PROCEDURE — 36415 COLL VENOUS BLD VENIPUNCTURE: CPT | Performed by: FAMILY MEDICINE

## 2018-02-03 PROCEDURE — 97161 PT EVAL LOW COMPLEX 20 MIN: CPT

## 2018-02-03 PROCEDURE — 51798 US URINE CAPACITY MEASURE: CPT

## 2018-02-03 PROCEDURE — 80048 BASIC METABOLIC PNL TOTAL CA: CPT | Performed by: FAMILY MEDICINE

## 2018-02-03 PROCEDURE — 74011000250 HC RX REV CODE- 250: Performed by: ORTHOPAEDIC SURGERY

## 2018-02-03 PROCEDURE — 85025 COMPLETE CBC W/AUTO DIFF WBC: CPT | Performed by: FAMILY MEDICINE

## 2018-02-03 PROCEDURE — 97165 OT EVAL LOW COMPLEX 30 MIN: CPT

## 2018-02-03 PROCEDURE — 74011250636 HC RX REV CODE- 250/636: Performed by: ORTHOPAEDIC SURGERY

## 2018-02-03 PROCEDURE — 97530 THERAPEUTIC ACTIVITIES: CPT

## 2018-02-03 RX ADMIN — ENOXAPARIN SODIUM 30 MG: 30 INJECTION SUBCUTANEOUS at 18:17

## 2018-02-03 RX ADMIN — ASPIRIN 81 MG: 81 TABLET, COATED ORAL at 09:48

## 2018-02-03 RX ADMIN — CALCIUM CARBONATE 500 MG (1,250 MG)-VITAMIN D3 200 UNIT TABLET 1 TABLET: at 09:48

## 2018-02-03 RX ADMIN — TRAMADOL HYDROCHLORIDE 50 MG: 50 TABLET, FILM COATED ORAL at 05:19

## 2018-02-03 RX ADMIN — CHOLECALCIFEROL TAB 25 MCG (1000 UNIT) 1000 UNITS: 25 TAB at 09:48

## 2018-02-03 RX ADMIN — MONTELUKAST SODIUM 10 MG: 10 TABLET, FILM COATED ORAL at 18:16

## 2018-02-03 RX ADMIN — OXYCODONE HYDROCHLORIDE 5 MG: 5 TABLET ORAL at 12:42

## 2018-02-03 RX ADMIN — WATER 1 G: 1 INJECTION INTRAMUSCULAR; INTRAVENOUS; SUBCUTANEOUS at 21:18

## 2018-02-03 RX ADMIN — TAMSULOSIN HYDROCHLORIDE 0.4 MG: 0.4 CAPSULE ORAL at 09:48

## 2018-02-03 RX ADMIN — ACETAMINOPHEN 650 MG: 325 TABLET, FILM COATED ORAL at 21:18

## 2018-02-03 RX ADMIN — OXYCODONE HYDROCHLORIDE 5 MG: 5 TABLET ORAL at 21:18

## 2018-02-03 RX ADMIN — Medication 10 ML: at 21:19

## 2018-02-03 RX ADMIN — GUAIFENESIN 1200 MG: 600 TABLET, EXTENDED RELEASE ORAL at 09:48

## 2018-02-03 RX ADMIN — CALCIUM CARBONATE 500 MG (1,250 MG)-VITAMIN D3 200 UNIT TABLET 1 TABLET: at 18:16

## 2018-02-03 RX ADMIN — Medication 10 ML: at 05:19

## 2018-02-03 RX ADMIN — ACETAMINOPHEN 650 MG: 325 TABLET, FILM COATED ORAL at 05:19

## 2018-02-03 RX ADMIN — Medication 5 ML: at 18:29

## 2018-02-03 RX ADMIN — WATER 1 G: 1 INJECTION INTRAMUSCULAR; INTRAVENOUS; SUBCUTANEOUS at 18:17

## 2018-02-03 RX ADMIN — MUPIROCIN: 20 OINTMENT TOPICAL at 09:00

## 2018-02-03 RX ADMIN — LISINOPRIL 5 MG: 5 TABLET ORAL at 09:48

## 2018-02-03 RX ADMIN — ACETAMINOPHEN 650 MG: 325 TABLET, FILM COATED ORAL at 18:16

## 2018-02-03 RX ADMIN — MUPIROCIN: 20 OINTMENT TOPICAL at 18:00

## 2018-02-03 RX ADMIN — FLUTICASONE PROPIONATE 2 SPRAY: 50 SPRAY, METERED NASAL at 09:00

## 2018-02-03 RX ADMIN — WATER 1 G: 1 INJECTION INTRAMUSCULAR; INTRAVENOUS; SUBCUTANEOUS at 05:19

## 2018-02-03 NOTE — PROGRESS NOTES
Problem: Self Care Deficits Care Plan (Adult)  Goal: *Acute Goals and Plan of Care (Insert Text)  1. Patient will complete functional transfers with minimal assistance while maintaining WBAT status in LLE and with adaptive equipment as needed. 2. Patient will complete lower body bathing and dressing with minimal assistance and adaptive equipment as needed. 3. Patient will complete toileting and toilet transfer with minimal assistance. 4. Patient will tolerate 20 minutes of OT treatment with no rest breaks to increase activity tolerance for ADLs. 5. Patient will participate in at least 20 minutes of BUE therapeutic exercises to strengthen BUE for functional transfers. Timeframe: 7 visits       OCCUPATIONAL THERAPY: Initial Assessment 2/3/2018  INPATIENT: Hospital Day: 3  [place] / [place] / Product Type: Medicare /      NAME/AGE/GENDER: Marianela Patient is a 80 y.o. male   PRIMARY DIAGNOSIS:  Hip fracture ([facility]  left hip fracture Hip fracture (HCC) Hip fracture (HCC)  Procedure(s) (LRB):  LEFT FEMUR INSERTION INTRA MEDULLARY NAIL (Left)  1 Day Post-Op  ICD-10: Treatment Diagnosis:    · Pain in left hip (M25.552)  · Stiffness of Left Hip, Not elsewhere classified (M25.652)  · History of falling (Z91.81)   Precautions/Allergies:    WBAT LLE   Fall Precautions   Review of patient's allergies indicates no known allergies. ASSESSMENT:     Mr. Annamaria Lyn is an 80year old male admitted after fall at [facility], now s/p above procedure and WBAT in LLE. Patient lives with wife at baseline. Typically independent with all ADLs, ambulation, and driving. Patient agreeable to OT evaluation. Alert and oriented. Reports pain 6/10 in hip area (RN aware). On 4L O2 via NC. Demonstrates ability to complete bed mobility with minimal assistance and additional time. Required moderate assistance to come to standing. He is fearful of bearing weight through his LLE due to pain.  Educated patient on WBAT status. He is currently functioning below his independent baseline and would benefit from continued occupational therapy to increase independence and safety. Will follow. This section established at most recent assessment   PROBLEM LIST (Impairments causing functional limitations):  1. Decreased ADL/Functional Activities  2. Decreased Transfer Abilities  3. Decreased Ambulation Ability/Technique  4. Decreased Balance  5. Increased Pain  6. Decreased Activity Tolerance  7. Decreased Flexibility/Joint Mobility  8. Decreased Knowledge of Precautions   INTERVENTIONS PLANNED: (Benefits and precautions of occupational therapy have been discussed with the patient.)  1. Activities of daily living training  2. Adaptive equipment training  3. Donning&doffing training  4. Group therapy  5. Therapeutic activity  6. Therapeutic exercise     TREATMENT PLAN: Frequency/Duration: Follow patient 6x/ week to address above goals. Rehabilitation Potential For Stated Goals: Good     RECOMMENDED REHABILITATION/EQUIPMENT: (at time of discharge pending progress): Due to the probability of continued deficits (see above) this patient will likely need continued skilled occupational therapy after discharge. Equipment:    to be determined              OCCUPATIONAL PROFILE AND HISTORY:   History of Present Injury/Illness (Reason for Referral):  Per H&P, \"Octavio Harry is a 80y.o. year-old male with a past medical history of HTN and COPD who presents to ER after a fall earlier today in which he tripped over the wheel of a shopping cart and landed on his side and head with immediate pain in his left hip. He denies any lightheadedness, dizziness, shortness of breath, chest pain, nausea, vomiting, diarrhea, constipation before or after his fall. \"  Past Medical History/Comorbidities:   Mr. Mary Jackson  has a past medical history of Abdominal aortic aneurysm ([facility] (12/10/2015); Abdominal aortic aneurysm without rupture ([address]  Allergic rhinitis (12/10/2015); Asthma; Benign neoplasm; Benign prostatic hyperplasia (12/10/2015); BPH without urinary obstruction; Cardiovascular disease (12/10/2015); Chronic obstructive asthma with exacerbation ([facility] (12/10/2015); COPD (chronic obstructive pulmonary disease) ([facility] (12/10/2015); Cough with hemoptysis; Erectile dysfunction (12/10/2015); Essential hypertension, benign (12/10/2015); Fracture (10/2014); History of colon polyps; Low testosterone (12/10/2015); Lumbago; Memory loss; Overflow incontinence; Raynaud's syndrome (12/10/2015); Rotator cuff tendinitis; Thrombocytopenia ([facility] and Urinary frequency. Mr. Stefany Chowdhury  has a past surgical history that includes hx hernia repair (1980); hx colonoscopy; hx fracture tx (10/2014); and hx cataract removal (6/2016-right). Social History/Living Environment:   Home Environment: Private residence  # Steps to Enter: 0  Rails to Enter: No  Wheelchair Ramp: Yes  One/Two Story Residence: One story  Living Alone: No  Support Systems: Spouse/Significant Other/Partner  Patient Expects to be Discharged to[de-identified] Rehabilitation facility  Current DME Used/Available at Home: None  Tub or Shower Type: Shower  Prior Level of Function/Work/Activity:  Patient lives with wife. Typically independent with ADLs and driving. No recent falls. Does not use the cane taht he has. Number of Personal Factors/Comorbidities that affect the Plan of Care: Brief history (0):  LOW COMPLEXITY   ASSESSMENT OF OCCUPATIONAL PERFORMANCE[de-identified]   Activities of Daily Living:           Basic ADLs (From Assessment) Complex ADLs (From Assessment)   Basic ADL  Feeding: Setup  Oral Facial Hygiene/Grooming: Setup  Bathing: Maximum assistance  Upper Body Dressing: Setup  Lower Body Dressing: Maximum assistance  Toileting: Maximum assistance Instrumental ADL  Meal Preparation: Total assistance  Homemaking:  Total assistance  Medication Management: Independent  Financial Management: Independent   Grooming/Bathing/Dressing Activities of Daily Living     Cognitive Retraining  Safety/Judgement: Awareness of environment                       Bed/Mat Mobility  Supine to Sit: Minimum assistance  Sit to Stand: Minimum assistance  Bed to Chair: Moderate assistance;Assist x2  Scooting: Minimum assistance       Most Recent Physical Functioning:   Gross Assessment:  Strength: Within functional limits (BUE)  Coordination: Within functional limits (BUE)               Posture:  Posture (WDL): Exceptions to WDL  Posture Assessment: Forward head, Rounded shoulders  Balance:  Sitting: Impaired  Sitting - Static: Good (unsupported)  Sitting - Dynamic: Fair (occasional)  Standing: Impaired  Standing - Static: Fair (with rolling walker)  Standing - Dynamic : Fair (with rolling walker) Bed Mobility:  Supine to Sit: Minimum assistance  Scooting: Minimum assistance  Interventions: Safety awareness training;Verbal cues  Wheelchair Mobility:     Transfers:  Sit to Stand: Minimum assistance  Stand to Sit: Minimum assistance  Bed to Chair: Moderate assistance;Assist x2  Interventions: Safety awareness training;Verbal cues                Patient Vitals for the past 6 hrs:   BP BP Patient Position SpO2 Pulse   18 0735 159/70 At rest 98 % 81   18 1120 160/81 Sitting 94 % 64       Mental Status  Neurologic State: Alert  Orientation Level: Oriented X4  Cognition: Follows commands  Perception: Appears intact  Perseveration: No perseveration noted  Safety/Judgement: Awareness of environment                          Physical Skills Involved:  1. Range of Motion  2. Balance  3. Strength  4. Activity Tolerance  5. Pain (acute) Cognitive Skills Affected (resulting in the inability to perform in a timely and safe manner):  1. None Psychosocial Skills Affected:  1. Habits/Routines  2.  Environmental Adaptation   Number of elements that affect the Plan of Care: 5+:  HIGH COMPLEXITY   CLINICAL DECISION MAKIN [facility] [address] [address] AM-PAC 6 Clicks   Daily Activity Inpatient Short Form  How much help from another person does the patient currently need. .. Total A Lot A Little None   1. Putting on and taking off regular lower body clothing? [] 1   [x] 2   [] 3   [] 4   2. Bathing (including washing, rinsing, drying)? [] 1   [x] 2   [] 3   [] 4   3. Toileting, which includes using toilet, bedpan or urinal?   [] 1   [x] 2   [] 3   [] 4   4. Putting on and taking off regular upper body clothing? [] 1   [] 2   [x] 3   [] 4   5. Taking care of personal grooming such as brushing teeth? [] 1   [] 2   [x] 3   [] 4   6. Eating meals? [] 1   [] 2   [x] 3   [] 4   © 2007, Trustees of [address] [place] [id number], under license to [employer]. All rights reserved      Score:  Initial: 15 Most Recent: X (Date: -- )    Interpretation of Tool:  Represents activities that are increasingly more difficult (i.e. Bed mobility, Transfers, Gait). Score 24 23 22-20 19-15 14-10 9-7 6     Modifier CH CI CJ CK CL CM CN      ? Self Care:     - CURRENT STATUS: CK - 40%-59% impaired, limited or restricted    - GOAL STATUS: CJ - 20%-39% impaired, limited or restricted    - D/C STATUS:  ---------------To be determined---------------  [place] / [place] / Product Type: Medicare /      Medical Necessity:     · Patient demonstrates good rehab potential due to higher previous functional level. Reason for Services/Other Comments:  · Patient continues to require present interventions due to patient's inability to care for self at baseline level of function.    Use of outcome tool(s) and clinical judgement create a POC that gives a: MODERATE COMPLEXITY         TREATMENT:   (In addition to Assessment/Re-Assessment sessions the following treatments were rendered)     Pre-treatment Symptoms/Complaints:  None   Pain: Initial:   Pain Intensity 1: 6  Pain Intervention(s) 1: Nurse notified  Post Session:  same     Assessment/Reassessment only, no treatment provided today    Braces/Orthotics/Lines/Etc:   · IV  · O2 Device: Nasal cannula  Treatment/Session Assessment:    · Response to Treatment:  Tolerated well   · Interdisciplinary Collaboration:   o Occupational Therapist  o Registered Nurse  · After treatment position/precautions:   o left wtih son and PT to begin PT evaluation   · Compliance with Program/Exercises: compliant all of the time. · Recommendations/Intent for next treatment session: \"Next visit will focus on advancements to more challenging activities and reduction in assistance provided\".   Total Treatment Duration:  OT Patient Time In/Time Out  Time In: 0855  Time Out: 0900     DAKOTAH Farah/L

## 2018-02-03 NOTE — PROGRESS NOTES
Hospitalist Progress Note    Subjective:   Daily Progress Note: 2/3/2018 11:24 AM    Mr. Iván Kline is an 81 yo WM, with a pmh of chronic respiratory failure due to 2 liter NC dependent COPD, who presented 2/1 after a fall where he sustained a left hip fracture. He is POD 1. No nausea but has pain in his groin. Rojas removed this morning and hasn't urinated as of yet. Hg stable from yesterday. Multiple family members at bedside.     Current Facility-Administered Medications   Medication Dose Route Frequency    0.9% sodium chloride infusion  75 mL/hr IntraVENous CONTINUOUS    mupirocin (BACTROBAN) 2 % ointment   Topical BID    lactated Ringers infusion  75 mL/hr IntraVENous CONTINUOUS    sodium chloride (NS) flush 5-10 mL  5-10 mL IntraVENous Q8H    sodium chloride (NS) flush 5-10 mL  5-10 mL IntraVENous PRN    ceFAZolin (ANCEF) 1 g in sterile water (preservative free) 10 mL IV syringe  1 g IntraVENous Q8H    ondansetron (ZOFRAN) injection 4 mg  4 mg IntraVENous Q4H PRN    alum-mag hydroxide-simeth (MYLANTA) oral suspension 30 mL  30 mL Oral Q4H PRN    calcium-vitamin D (OS-RACHELE) 500 mg-200 unit tablet  1 Tab Oral TID WITH MEALS    acetaminophen (TYLENOL) tablet 650 mg  650 mg Oral Q8H    oxyCODONE IR (ROXICODONE) tablet 5 mg  5 mg Oral Q4H PRN    enoxaparin (LOVENOX) injection 30 mg  30 mg SubCUTAneous Q24H    traMADol (ULTRAM) tablet 50 mg  50 mg Oral Q6H PRN    0.9% sodium chloride infusion 2,000 mL  2,000 mL IntraVENous CONTINUOUS    aspirin delayed-release tablet 81 mg  81 mg Oral DAILY    cholecalciferol (VITAMIN D3) tablet 1,000 Units  1,000 Units Oral DAILY    fluticasone (FLONASE) 50 mcg/actuation nasal spray 2 Spray  2 Spray Both Nostrils DAILY    tamsulosin (FLOMAX) capsule 0.4 mg  0.4 mg Oral DAILY    lisinopril (PRINIVIL, ZESTRIL) tablet 5 mg  5 mg Oral DAILY    montelukast (SINGULAIR) tablet 10 mg  10 mg Oral QPM    guaiFENesin ER (MUCINEX) tablet 1,200 mg  1,200 mg Oral DAILY    acetaminophen (TYLENOL) tablet 650 mg  650 mg Oral Q4H PRN    magnesium hydroxide (MILK OF MAGNESIA) 400 mg/5 mL oral suspension 30 mL  30 mL Oral DAILY PRN        Review of Systems  A comprehensive review of systems was negative except for that written in the HPI.     Objective:     Visit Vitals    /70 (BP 1 Location: Right arm, BP Patient Position: At rest)    Pulse 81    Temp 97.7 °F (36.5 °C)    Resp 17    Ht 5' 9\" (1.753 m)    Wt 82.1 kg (181 lb)    SpO2 98%    BMI 26.73 kg/m2    O2 Flow Rate (L/min): 3 l/min O2 Device: Nasal cannula    Temp (24hrs), Av.1 °F (36.7 °C), Min:97.7 °F (36.5 °C), Max:98.7 °F (37.1 °C)          1901 -  0700  In: 600 [I.V.:600]  Out: 4825 [Urine:4725]    General: awake, alert, oriented  Eyes; non icteric, EOMI  Neck; supple  CV: RRR  Pulm; course, no rhonchi nor wheezing  Ext: palpable pedal pulses    Additional comments:I reviewed the patient's new clinical lab test results. j    Data Review    Recent Results (from the past 24 hour(s))   PLEASE READ & DOCUMENT PPD TEST IN 24 HRS    Collection Time: 18  2:57 AM   Result Value Ref Range    PPD Negative Negative    mm Induration 0 mm   METABOLIC PANEL, BASIC    Collection Time: 18  6:49 AM   Result Value Ref Range    Sodium 139 136 - 145 mmol/L    Potassium 4.1 3.5 - 5.1 mmol/L    Chloride 105 98 - 107 mmol/L    CO2 28 21 - 32 mmol/L    Anion gap 6 (L) 7 - 16 mmol/L    Glucose 115 (H) 65 - 100 mg/dL    BUN 13 8 - 23 MG/DL    Creatinine 0.86 0.8 - 1.5 MG/DL    GFR est AA >60 >60 ml/min/1.73m2    GFR est non-AA >60 >60 ml/min/1.73m2    Calcium 8.6 8.3 - 10.4 MG/DL   CBC WITH AUTOMATED DIFF    Collection Time: 18  6:49 AM   Result Value Ref Range    WBC 12.2 (H) 4.3 - 11.1 K/uL    RBC 3.80 (L) 4.23 - 5.67 M/uL    HGB 12.4 (L) 13.6 - 17.2 g/dL    HCT 37.8 (L) 41.1 - 50.3 %    MCV 99.5 (H) 79.6 - 97.8 FL    MCH 32.6 26.1 - 32.9 PG    MCHC 32.8 31.4 - 35.0 g/dL    RDW 13.1 11.9 - 14.6 % PLATELET 413 (L) 874 - 450 K/uL    MPV 10.7 (L) 10.8 - 14.1 FL    DF AUTOMATED      NEUTROPHILS 78 43 - 78 %    LYMPHOCYTES 11 (L) 13 - 44 %    MONOCYTES 10 4.0 - 12.0 %    EOSINOPHILS 1 0.5 - 7.8 %    BASOPHILS 0 0.0 - 2.0 %    IMMATURE GRANULOCYTES 0 0.0 - 5.0 %    ABS. NEUTROPHILS 9.5 (H) 1.7 - 8.2 K/UL    ABS. LYMPHOCYTES 1.3 0.5 - 4.6 K/UL    ABS. MONOCYTES 1.2 0.1 - 1.3 K/UL    ABS. EOSINOPHILS 0.1 0.0 - 0.8 K/UL    ABS. BASOPHILS 0.0 0.0 - 0.2 K/UL    ABS. IMM. GRANS. 0.0 0.0 - 0.5 K/UL         Assessment/Plan:     Principal Problem:    Hip fracture ([facility] (2/1/2018)    Active Problems:    COPD (chronic obstructive pulmonary disease) ([facility] (12/10/2015)      Essential hypertension, benign (12/10/2015)      Chronic respiratory failure with hypoxia ([facility] (3/29/2016)      Overview: Last Assessment & Plan:       Stable. 1. 6mwt ordered and reviewed      2. Requires 2L home O2 with ambulation and with exercise. Last Assessment & Plan:       Stable. 1. 6mwt ordered and reviewed      2. Requires 2L home O2 with ambulation and with exercise. Pulmonary edema (2/1/2018)      Continue PT/OT  Trend hg. To have bladder scan at 2pm if unable to urinate by then. Hopefully to STR at [facility] tomorrow?     Care Plan discussed with: Patient/Family      Signed By: Emile Mullen MD     February 3, 2018

## 2018-02-03 NOTE — PROGRESS NOTES
Problem: Falls - Risk of  Goal: *Absence of Falls  Document Senthil Fall Risk and appropriate interventions in the flowsheet.    Outcome: Progressing Towards Goal  Fall Risk Interventions:  Mobility Interventions: Communicate number of staff needed for ambulation/transfer, Bed/chair exit alarm, OT consult for ADLs, PT Consult for mobility concerns, PT Consult for assist device competence         Medication Interventions: Bed/chair exit alarm, Evaluate medications/consider consulting pharmacy, Patient to call before getting OOB    Elimination Interventions: Bed/chair exit alarm, Call light in reach, Patient to call for help with toileting needs    History of Falls Interventions: Bed/chair exit alarm, Consult care management for discharge planning, Door open when patient unattended

## 2018-02-03 NOTE — PROGRESS NOTES
Problem: Mobility Impaired (Adult and Pediatric)  Goal: *Acute Goals and Plan of Care (Insert Text)  LTG:  (1.)Mr. Lorena Ndiaye will move from supine to sit and sit to supine , scoot up and down and roll side to side in bed with SUPERVISION within 7 day(s). (2.)Mr. Lorena Ndiaye will transfer from bed to chair and chair to bed with CONTACT GUARD ASSIST using the least restrictive device within 7 day(s). (3.)Mr. Lorena Ndiaye will ambulate with MINIMAL ASSIST for >or=75 feet with the least restrictive device, appropriate gait pattern and good dynamic standing balance within 7 day(s). (4.)Mr. Lorena Ndaiye will perform B LE therapeutic exercises x 20 reps with INDEPENDENCE within 7 days to increase strength for improved safety and independence in transfers and gait.    ________________________________________________________________________________________________      PHYSICAL THERAPY: Treatment Day: Day of Assessment, PM 2/3/2018  INPATIENT: Hospital Day: 3  [place] / [place] / Product Type: Medicare /      NAME/AGE/GENDER: Abdon Zapata is a 80 y.o. male   PRIMARY DIAGNOSIS: Hip fracture ([address]  left hip fracture Hip fracture (HCC) Hip fracture (HCC)  Procedure(s) (LRB):  LEFT FEMUR INSERTION INTRA MEDULLARY NAIL (Left)  1 Day Post-Op  ICD-10: Treatment Diagnosis:   · Generalized Muscle Weakness (M62.81)  · Difficulty in walking, Not elsewhere classified (R26.2)   Precaution/Allergies:  Review of patient's allergies indicates no known allergies. ASSESSMENT:     Mr. Lorena Ndiaye admitted following fall in [employer] parking lot when he tripped over wheel of cart. Pt was independent with all functional mobility prior to admission and lives with wife. Pt now presents standing at edge of bed at conclusion of OT treatment. Pt awake and alert and agreeable to continuing with transfer to chair. Pt educated on WBAT status for L LE and verbalized understanding.  Pt demonstrated fair static standing balance with rolling walker. Pt educated on gait sequence with walker, however had difficulty initiating step sequence as he did not want to put any weight on L LE secondary to pain. Additional time required for transfer to chair and pt required mod assist  X 2 with frequent verbal cues. Pt positioned for comfort in chair with deroyal alarm pad in place. PM Treatment: Pt still up in chair and requesting to return to bed. Pain 2/10 at rest, but increased with movement. Pt remains fearful of weightbearing through L LE. Pt tenses up at just the thought of having to move L LE. Required max verbal cues and encouragement in gait sequence with walker. Pt required mod assist x 2 for transfer chair to bed and max assist x 2 to return to supine. Pt positioned for comfort in bed, bed alarm turned ON and family at bedside. Call bell in reach. Progress is slow. Expect pt will need continued therapy upon discharge. This section established at most recent assessment   PROBLEM LIST (Impairments causing functional limitations):  1. Decreased Strength  2. Decreased ADL/Functional Activities  3. Decreased Transfer Abilities  4. Decreased Ambulation Ability/Technique  5. Decreased Balance  6. Increased Pain  7. Decreased Activity Tolerance  8. Decreased Blessing with Home Exercise Program   INTERVENTIONS PLANNED: (Benefits and precautions of physical therapy have been discussed with the patient.)  1. Balance Exercise  2. Bed Mobility  3. Gait Training  4. Home Exercise Program (HEP)  5. Therapeutic Activites  6. Therapeutic Exercise/Strengthening  7. Transfer Training  8.  Group Therapy     TREATMENT PLAN: Frequency/Duration: twice daily for duration of hospital stay  Rehabilitation Potential For Stated Goals: Good     RECOMMENDED REHABILITATION/EQUIPMENT: (at time of discharge pending progress): Due to the probability of continued deficits (see above) this patient will likely need continued skilled physical therapy after discharge. Equipment:    planning to go to rehab and can be assessed for equipment needs at that time. HISTORY:   History of Present Injury/Illness (Reason for Referral):  Per chart: Abdon Zapata is a 80y.o. year-old male with a past medical history of HTN and COPD who presents to ER after a fall earlier today in which he tripped over the wheel of a shopping cart and landed on his side and head with immediate pain in his left hip. He denies any lightheadedness, dizziness, shortness of breath, chest pain, nausea, vomiting, diarrhea, constipation before or after his fall.     Past Medical History/Comorbidities:   Mr. Lorena Ndiaye  has a past medical history of Abdominal aortic aneurysm ([facility] (12/10/2015); Abdominal aortic aneurysm without rupture ([facility] Allergic rhinitis (12/10/2015); Asthma; Benign neoplasm; Benign prostatic hyperplasia (12/10/2015); BPH without urinary obstruction; Cardiovascular disease (12/10/2015); Chronic obstructive asthma with exacerbation ([facility] (12/10/2015); COPD (chronic obstructive pulmonary disease) ([facility] (12/10/2015); Cough with hemoptysis; Erectile dysfunction (12/10/2015); Essential hypertension, benign (12/10/2015); Fracture (10/2014); History of colon polyps; Low testosterone (12/10/2015); Lumbago; Memory loss; Overflow incontinence; Raynaud's syndrome (12/10/2015); Rotator cuff tendinitis; Thrombocytopenia ([address] and Urinary frequency. Mr. Lorena Ndiaye  has a past surgical history that includes hx hernia repair (1980); hx colonoscopy; hx fracture tx (10/2014); and hx cataract removal (6/2016-right).   Social History/Living Environment:   Home Environment: Private residence  # Steps to Enter: 0  Rails to Enter: No  Wheelchair Ramp: Yes  One/Two Story Residence: One story  Living Alone: No  Support Systems: Spouse/Significant Other/Partner  Patient Expects to be Discharged to[de-identified] Rehabilitation facility  Current DME Used/Available at Home: None  Tub or Shower Type: Shower  Prior Level of Function/Work/Activity:  Pt was independent with all functional mobility and gait without assistive device. No other recent falls. Drives. Lives with wife who is blind but can assist as able. Has a straight cane at home but does not use. No other medical equipment. Son lives close by and can assist.      Number of Personal Factors/Comorbidities that affect the Plan of Care: 1-2: MODERATE COMPLEXITY   EXAMINATION:   Most Recent Physical Functioning:   Gross Assessment:  Strength: Generally decreased, functional  Coordination: Generally decreased, functional  Sensation: Intact               Posture:  Posture (WDL): Exceptions to WDL  Posture Assessment: Forward head, Rounded shoulders  Balance:  Sitting: Impaired  Sitting - Static: Good (unsupported)  Sitting - Dynamic: Fair (occasional)  Standing: Impaired  Standing - Static: Fair (with rolling walker)  Standing - Dynamic : Fair (with rolling walker) Bed Mobility:  Supine to Sit: Minimum assistance  Sit to Supine: Maximum assistance;Assist x2  Scooting: Maximum assistance;Assist x2  Interventions: Safety awareness training;Verbal cues  Wheelchair Mobility:     Transfers:  Sit to Stand: Moderate assistance  Stand to Sit: Minimum assistance  Bed to Chair: Moderate assistance;Assist x2  Interventions: Safety awareness training;Verbal cues  Gait:  Left Side Weight Bearing: As tolerated  Base of Support: Shift to right  Speed/Marifer: Slow  Step Length: Right shortened;Left shortened  Swing Pattern: Left asymmetrical  Stance: Left decreased;Weight shift  Gait Abnormalities: Antalgic;Decreased step clearance  Distance (ft): 3 Feet (ft)  Assistive Device: Walker, rolling  Ambulation - Level of Assistance: Moderate assistance;Assist x2      Body Structures Involved:  1. Bones  2. Joints  3. Muscles Body Functions Affected:  1. Neuromusculoskeletal  2. Movement Related Activities and Participation Affected:  1. General Tasks and Demands  2. Mobility  3.  Self Care Number of elements that affect the Plan of Care: 4+: HIGH COMPLEXITY   CLINICAL PRESENTATION:   Presentation: Evolving clinical presentation with changing clinical characteristics: MODERATE COMPLEXITY   CLINICAL DECISION MAKIN [facility] Mobility Inpatient Short Form  How much difficulty does the patient currently have. .. Unable A Lot A Little None   1. Turning over in bed (including adjusting bedclothes, sheets and blankets)? [] 1   [] 2   [x] 3   [] 4   2. Sitting down on and standing up from a chair with arms ( e.g., wheelchair, bedside commode, etc.)   [] 1   [] 2   [x] 3   [] 4   3. Moving from lying on back to sitting on the side of the bed? [] 1   [] 2   [x] 3   [] 4   How much help from another person does the patient currently need. .. Total A Lot A Little None   4. Moving to and from a bed to a chair (including a wheelchair)? [] 1   [x] 2   [] 3   [] 4   5. Need to walk in hospital room? [] 1   [x] 2   [] 3   [] 4   6. Climbing 3-5 steps with a railing? [x] 1   [] 2   [] 3   [] 4   © , Trustees of [address] [place] [address], under license to [employer]. All rights reserved      Score:  Initial: 14 Most Recent: X (Date: -- )    Interpretation of Tool:  Represents activities that are increasingly more difficult (i.e. Bed mobility, Transfers, Gait). Score 24 23 22-20 19-15 14-10 9-7 6     Modifier CH CI CJ CK CL CM CN      ? Mobility - Walking and Moving Around:     - CURRENT STATUS: CL - 60%-79% impaired, limited or restricted    - GOAL STATUS: CL - 60%-79% impaired, limited or restricted    - D/C STATUS:  ---------------To be determined---------------  [place] / [place] / Product Type: Medicare /      Medical Necessity:     · Patient demonstrates good rehab potential due to higher previous functional level.   Reason for Services/Other Comments:  · Patient continues to require present interventions due to patient's inability to function at baseline. Use of outcome tool(s) and clinical judgement create a POC that gives a: Questionable prediction of patient's progress: MODERATE COMPLEXITY            TREATMENT:   (In addition to Assessment/Re-Assessment sessions the following treatments were rendered)   Pre-treatment Symptoms/Complaints: \"It is my inner thigh that hurts. \"  Pain: Initial:   Pain Intensity 1: 2 (at rest)  Pain Location 1: Hip  Pain Orientation 1: Left  Pain Intervention(s) 1: Nurse notified, Emotional support, Ambulation/Increased Activity, Repositioned  Post Session:  Unchanged 2/10. Therapeutic Activity: (    18 minutes): Therapeutic activities including Bed transfers, Chair transfers and Ambulation on level ground to improve mobility, strength, balance and safety. Required moderate to maximum verbal/tactile cues   to maximize safety and independence. Braces/Orthotics/Lines/Etc:   · O2 Device: Nasal cannula  Treatment/Session Assessment:    · Response to Treatment:  Tolerated with some pain, difficulty weightbearing on L LE. · Interdisciplinary Collaboration:   o Physical Therapist  o Certified Occupational Therapy Assistant  o Registered Nurse  · After treatment position/precautions:   o Supine in bed  o Bed alarm/tab alert on  o Bed/Chair-wheels locked  o Bed in low position  o Call light within reach  o RN notified  o Family at bedside   · Compliance with Program/Exercises: Will assess as treatment progresses. · Recommendations/Intent for next treatment session: \"Next visit will focus on advancements to more challenging activities and reduction in assistance provided\".   Total Treatment Duration:  PT Patient Time In/Time Out  Time In: 1530  Time Out: [address], PT

## 2018-02-03 NOTE — PROGRESS NOTES
ORTHO PROGRESS NOTE    February 3, 2018    Admit Date: 2018  Admit Diagnosis: Hip fracture ([facility]  left hip fracture  Post Op day: 1 Day Post-Op      Subjective:     Samuel Fuchs is a patient who is now 1 Day Post-Op  and has no complaints. Sitting up in chair. Objective:     PT/OT: slow to progress       Vital Signs:    Patient Vitals for the past 8 hrs:   BP Temp Pulse Resp SpO2   18 0735 159/70 97.7 °F (36.5 °C) 81 17 98 %   18 0406 144/73 98.1 °F (36.7 °C) 61 16 90 %     Temp (24hrs), Av.1 °F (36.7 °C), Min:97.7 °F (36.5 °C), Max:98.7 °F (37.1 °C)      LAB:    Recent Labs      18   0649   HGB  12.4*   WBC  12.2*   PLT  111*       I/O:      1901 -  0700  In: 600 [I.V.:600]  Out: 8343 [Urine:4725]    Physical Exam:    Awake and in no acute distress. Mood and affect appropriate. Respirations unlabored and no evidence cyanosis. Calves nontender. Abdomen soft and nontender. Dressing clean/dry  No new neurologic deficit.     Assessment:      Patient Active Problem List   Diagnosis Code    Abdominal aortic aneurysm ([facility]) I71.4    Benign prostatic hyperplasia N40.0    Cardiovascular disease I25.10    COPD (chronic obstructive pulmonary disease) ([facility] J44.9    Low testosterone E34.9    Essential hypertension, benign I10    Allergic rhinitis J30.9    Raynaud's syndrome I73.00    Erectile dysfunction N52.9    Chronic respiratory failure with hypoxia ([facility]) J96.11    Hip fracture ([facility]) S72.009A    Pulmonary edema J81.1       1 Day Post-Op STATUS POST Procedure(s):  LEFT FEMUR INSERTION INTRA MEDULLARY NAIL      Plan:     Continue PT/OT/Rehab  Anticipate discharge to: SNF      Signed By: Jd Arredondo NP

## 2018-02-03 NOTE — PROGRESS NOTES
Problem: Mobility Impaired (Adult and Pediatric)  Goal: *Acute Goals and Plan of Care (Insert Text)  LTG:  (1.)Mr. Mac Bañuelos will move from supine to sit and sit to supine , scoot up and down and roll side to side in bed with SUPERVISION within 7 day(s). (2.)Mr. Mac Bañuelos will transfer from bed to chair and chair to bed with CONTACT GUARD ASSIST using the least restrictive device within 7 day(s). (3.)Mr. Mac Bañuelos will ambulate with MINIMAL ASSIST for >or=75 feet with the least restrictive device, appropriate gait pattern and good dynamic standing balance within 7 day(s). (4.)Mr. Mac Bañuelos will perform B LE therapeutic exercises x 20 reps with INDEPENDENCE within 7 days to increase strength for improved safety and independence in transfers and gait.    ________________________________________________________________________________________________      PHYSICAL THERAPY: Initial Assessment 2/3/2018  INPATIENT: Hospital Day: 3  [place] / [place] / Product Type: Medicare /      NAME/AGE/GENDER: Samuel Fuchs is a 80 y.o. male   PRIMARY DIAGNOSIS: Hip fracture ([address]  left hip fracture Hip fracture (HCC) Hip fracture (HCC)  Procedure(s) (LRB):  LEFT FEMUR INSERTION INTRA MEDULLARY NAIL (Left)  1 Day Post-Op  ICD-10: Treatment Diagnosis:   · Generalized Muscle Weakness (M62.81)  · Difficulty in walking, Not elsewhere classified (R26.2)   Precaution/Allergies:  Review of patient's allergies indicates no known allergies. ASSESSMENT:     Mr. Mac Bañuelos admitted following fall in [employer] parking lot when he tripped over wheel of cart. Pt was independent with all functional mobility prior to admission and lives with wife. Pt now presents standing at edge of bed at conclusion of OT treatment. Pt awake and alert and agreeable to continuing with transfer to chair. Pt educated on WBAT status for L LE and verbalized understanding. Pt demonstrated fair static standing balance with rolling walker.  Pt educated on gait sequence with walker, however had difficulty initiating step sequence as he did not want to put any weight on L LE secondary to pain. Additional time required for transfer to chair and pt required mod assist  X 2 with frequent verbal cues. Pt positioned for comfort in chair with deroyal alarm pad in place. This section established at most recent assessment   PROBLEM LIST (Impairments causing functional limitations):  1. Decreased Strength  2. Decreased ADL/Functional Activities  3. Decreased Transfer Abilities  4. Decreased Ambulation Ability/Technique  5. Decreased Balance  6. Increased Pain  7. Decreased Activity Tolerance  8. Decreased [place] with Home Exercise Program   INTERVENTIONS PLANNED: (Benefits and precautions of physical therapy have been discussed with the patient.)  1. Balance Exercise  2. Bed Mobility  3. Gait Training  4. Home Exercise Program (HEP)  5. Therapeutic Activites  6. Therapeutic Exercise/Strengthening  7. Transfer Training  8. Group Therapy     TREATMENT PLAN: Frequency/Duration: twice daily for duration of hospital stay  Rehabilitation Potential For Stated Goals: Good     RECOMMENDED REHABILITATION/EQUIPMENT: (at time of discharge pending progress): Due to the probability of continued deficits (see above) this patient will likely need continued skilled physical therapy after discharge. Equipment:    planning to go to rehab and can be assessed for equipment needs at that time. HISTORY:   History of Present Injury/Illness (Reason for Referral):  Per chart: Khalif Mancia is a 80y.o. year-old male with a past medical history of HTN and COPD who presents to ER after a fall earlier today in which he tripped over the wheel of a shopping cart and landed on his side and head with immediate pain in his left hip.  He denies any lightheadedness, dizziness, shortness of breath, chest pain, nausea, vomiting, diarrhea, constipation before or after his fall.     Past Medical History/Comorbidities:   Mr. Kole Mcgrath  has a past medical history of Abdominal aortic aneurysm ([facility] (12/10/2015); Abdominal aortic aneurysm without rupture ([facility] Allergic rhinitis (12/10/2015); Asthma; Benign neoplasm; Benign prostatic hyperplasia (12/10/2015); BPH without urinary obstruction; Cardiovascular disease (12/10/2015); Chronic obstructive asthma with exacerbation ([facility] (12/10/2015); COPD (chronic obstructive pulmonary disease) ([facility] (12/10/2015); Cough with hemoptysis; Erectile dysfunction (12/10/2015); Essential hypertension, benign (12/10/2015); Fracture (10/2014); History of colon polyps; Low testosterone (12/10/2015); Lumbago; Memory loss; Overflow incontinence; Raynaud's syndrome (12/10/2015); Rotator cuff tendinitis; Thrombocytopenia ([facility] and Urinary frequency. Mr. Kole Mcgrath  has a past surgical history that includes hx hernia repair (1980); hx colonoscopy; hx fracture tx (10/2014); and hx cataract removal (6/2016-right). Social History/Living Environment:   Home Environment: Private residence  # Steps to Enter: 0  Rails to Enter: No  Wheelchair Ramp: Yes  One/Two Story Residence: One story  Living Alone: No  Support Systems: Spouse/Significant Other/Partner  Patient Expects to be Discharged to[de-identified] Rehabilitation facility  Current DME Used/Available at Home: None  Tub or Shower Type: Shower  Prior Level of Function/Work/Activity:  Pt was independent with all functional mobility and gait without assistive device. No other recent falls. Drives. Lives with wife who is blind but can assist as able. Has a straight cane at home but does not use. No other medical equipment.  Son lives close by and can assist.      Number of Personal Factors/Comorbidities that affect the Plan of Care: 1-2: MODERATE COMPLEXITY   EXAMINATION:   Most Recent Physical Functioning:   Gross Assessment:  Strength: Generally decreased, functional  Coordination: Generally decreased, functional  Sensation: Intact               Posture:  Posture (WDL): Exceptions to WDL  Posture Assessment: Forward head, Rounded shoulders  Balance:  Sitting: Impaired  Sitting - Static: Good (unsupported)  Sitting - Dynamic: Fair (occasional)  Standing: Impaired  Standing - Static: Fair (with rolling walker)  Standing - Dynamic : Fair (with rolling walker) Bed Mobility:  Supine to Sit: Minimum assistance  Scooting: Minimum assistance  Interventions: Safety awareness training;Verbal cues  Wheelchair Mobility:     Transfers:  Sit to Stand: Minimum assistance  Stand to Sit: Minimum assistance  Bed to Chair: Moderate assistance;Assist x2  Interventions: Safety awareness training;Verbal cues  Gait:  Left Side Weight Bearing: As tolerated  Base of Support: Shift to right  Speed/Marifer: Slow  Step Length: Right shortened;Left shortened  Swing Pattern: Left asymmetrical  Stance: Left decreased;Weight shift  Gait Abnormalities: Antalgic;Decreased step clearance  Distance (ft): 3 Feet (ft)  Assistive Device: Walker, rolling  Ambulation - Level of Assistance: Moderate assistance;Assist x2      Body Structures Involved:  1. Bones  2. Joints  3. Muscles Body Functions Affected:  1. Neuromusculoskeletal  2. Movement Related Activities and Participation Affected:  1. General Tasks and Demands  2. Mobility  3. Self Care   Number of elements that affect the Plan of Care: 4+: HIGH COMPLEXITY   CLINICAL PRESENTATION:   Presentation: Evolving clinical presentation with changing clinical characteristics: MODERATE COMPLEXITY   CLINICAL DECISION MAKIN [facility] Inpatient Short Form  How much difficulty does the patient currently have. .. Unable A Lot A Little None   1. Turning over in bed (including adjusting bedclothes, sheets and blankets)? [] 1   [] 2   [x] 3   [] 4   2. Sitting down on and standing up from a chair with arms ( e.g., wheelchair, bedside commode, etc.)   [] 1   [] 2   [x] 3   [] 4   3. Moving from lying on back to sitting on the side of the bed? [] 1   [] 2   [x] 3   [] 4   How much help from another person does the patient currently need. .. Total A Lot A Little None   4. Moving to and from a bed to a chair (including a wheelchair)? [] 1   [x] 2   [] 3   [] 4   5. Need to walk in hospital room? [] 1   [x] 2   [] 3   [] 4   6. Climbing 3-5 steps with a railing? [x] 1   [] 2   [] 3   [] 4   © 2007, Trustees of [address] [place] [address] [address] [address], under license to [employer] All rights reserved      Score:  Initial: 14 Most Recent: X (Date: -- )    Interpretation of Tool:  Represents activities that are increasingly more difficult (i.e. Bed mobility, Transfers, Gait). Score 24 23 22-20 19-15 14-10 9-7 6     Modifier CH CI CJ CK CL CM CN      ? Mobility - Walking and Moving Around:     - CURRENT STATUS: CL - 60%-79% impaired, limited or restricted    - GOAL STATUS: CL - 60%-79% impaired, limited or restricted    - D/C STATUS:  ---------------To be determined---------------  [place] / [place] / Product Type: Medicare /      Medical Necessity:     · Patient demonstrates good rehab potential due to higher previous functional level. Reason for Services/Other Comments:  · Patient continues to require present interventions due to patient's inability to function at baseline. Use of outcome tool(s) and clinical judgement create a POC that gives a: Questionable prediction of patient's progress: MODERATE COMPLEXITY            TREATMENT:   (In addition to Assessment/Re-Assessment sessions the following treatments were rendered)   Pre-treatment Symptoms/Complaints: \"This was much easier than it would have been yesterday morning. \"  Pain: Initial:   Pain Intensity 1: 6  Pain Location 1: Hip  Pain Orientation 1: Left  Pain Intervention(s) 1: Nurse notified, Emotional support, Ambulation/Increased Activity, Repositioned  Post Session:  Unchanged 6/10.  RN notified on pt's request for pain medication. Assessment/Reassessment only, no treatment provided today    Braces/Orthotics/Lines/Etc:   · IV  · O2 Device: Nasal cannula  Treatment/Session Assessment:    · Response to Treatment:  Tolerated with some pain, difficulty weightbearing on L LE. · Interdisciplinary Collaboration:   o Physical Therapist  o Occupational Therapist  o Registered Nurse  · After treatment position/precautions:   o Up in chair  o Bed/Chair-wheels locked  o Bed in low position  o Call light within reach  o RN notified  o Family at bedside  o deroyal chair alarm in place and ON   · Compliance with Program/Exercises: Will assess as treatment progresses. · Recommendations/Intent for next treatment session: \"Next visit will focus on advancements to more challenging activities and reduction in assistance provided\".   Total Treatment Duration:  PT Patient Time In/Time Out  Time In: 0901  Time Out: [address], PT

## 2018-02-04 LAB
ANION GAP SERPL CALC-SCNC: 7 MMOL/L (ref 7–16)
ATRIAL RATE: 83 BPM
BASOPHILS # BLD: 0 K/UL (ref 0–0.2)
BASOPHILS NFR BLD: 0 % (ref 0–2)
BNP SERPL-MCNC: 171 PG/ML
BUN SERPL-MCNC: 16 MG/DL (ref 8–23)
CALCIUM SERPL-MCNC: 8 MG/DL (ref 8.3–10.4)
CALCULATED P AXIS, ECG09: 55 DEGREES
CALCULATED R AXIS, ECG10: 13 DEGREES
CALCULATED T AXIS, ECG11: 68 DEGREES
CHLORIDE SERPL-SCNC: 103 MMOL/L (ref 98–107)
CO2 SERPL-SCNC: 29 MMOL/L (ref 21–32)
CREAT SERPL-MCNC: 0.87 MG/DL (ref 0.8–1.5)
DIAGNOSIS, 93000: NORMAL
DIFFERENTIAL METHOD BLD: ABNORMAL
EOSINOPHIL # BLD: 0.9 K/UL (ref 0–0.8)
EOSINOPHIL NFR BLD: 7 % (ref 0.5–7.8)
ERYTHROCYTE [DISTWIDTH] IN BLOOD BY AUTOMATED COUNT: 13.2 % (ref 11.9–14.6)
ERYTHROCYTE [DISTWIDTH] IN BLOOD BY AUTOMATED COUNT: 13.3 % (ref 11.9–14.6)
GLUCOSE SERPL-MCNC: 108 MG/DL (ref 65–100)
HCT VFR BLD AUTO: 36.5 % (ref 41.1–50.3)
HCT VFR BLD AUTO: 36.9 % (ref 41.1–50.3)
HGB BLD-MCNC: 11.9 G/DL (ref 13.6–17.2)
HGB BLD-MCNC: 12.2 G/DL (ref 13.6–17.2)
IMM GRANULOCYTES # BLD: 0 K/UL (ref 0–0.5)
IMM GRANULOCYTES NFR BLD AUTO: 0 % (ref 0–5)
LYMPHOCYTES # BLD: 1.4 K/UL (ref 0.5–4.6)
LYMPHOCYTES NFR BLD: 12 % (ref 13–44)
MAGNESIUM SERPL-MCNC: 1.9 MG/DL (ref 1.8–2.4)
MCH RBC QN AUTO: 32.3 PG (ref 26.1–32.9)
MCH RBC QN AUTO: 33 PG (ref 26.1–32.9)
MCHC RBC AUTO-ENTMCNC: 32.6 G/DL (ref 31.4–35)
MCHC RBC AUTO-ENTMCNC: 33.1 G/DL (ref 31.4–35)
MCV RBC AUTO: 99.2 FL (ref 79.6–97.8)
MCV RBC AUTO: 99.7 FL (ref 79.6–97.8)
MM INDURATION POC: 0 MM (ref 0–5)
MONOCYTES # BLD: 1.3 K/UL (ref 0.1–1.3)
MONOCYTES NFR BLD: 11 % (ref 4–12)
NEUTS SEG # BLD: 8.4 K/UL (ref 1.7–8.2)
NEUTS SEG NFR BLD: 70 % (ref 43–78)
P-R INTERVAL, ECG05: 168 MS
PLATELET # BLD AUTO: 107 K/UL (ref 150–450)
PLATELET # BLD AUTO: 115 K/UL (ref 150–450)
PMV BLD AUTO: 11 FL (ref 10.8–14.1)
PMV BLD AUTO: 11.1 FL (ref 10.8–14.1)
POTASSIUM SERPL-SCNC: 4.1 MMOL/L (ref 3.5–5.1)
PPD POC: NEGATIVE NEGATIVE
Q-T INTERVAL, ECG07: 396 MS
QRS DURATION, ECG06: 106 MS
QTC CALCULATION (BEZET), ECG08: 465 MS
RBC # BLD AUTO: 3.68 M/UL (ref 4.23–5.67)
RBC # BLD AUTO: 3.7 M/UL (ref 4.23–5.67)
SODIUM SERPL-SCNC: 139 MMOL/L (ref 136–145)
TROPONIN I SERPL-MCNC: 0.05 NG/ML (ref 0.02–0.05)
TSH SERPL DL<=0.005 MIU/L-ACNC: 4.27 UIU/ML (ref 0.36–3.74)
VENTRICULAR RATE, ECG03: 83 BPM
WBC # BLD AUTO: 11.2 K/UL (ref 4.3–11.1)
WBC # BLD AUTO: 12.1 K/UL (ref 4.3–11.1)

## 2018-02-04 PROCEDURE — 74011250637 HC RX REV CODE- 250/637: Performed by: ORTHOPAEDIC SURGERY

## 2018-02-04 PROCEDURE — 97530 THERAPEUTIC ACTIVITIES: CPT

## 2018-02-04 PROCEDURE — 36415 COLL VENOUS BLD VENIPUNCTURE: CPT | Performed by: ORTHOPAEDIC SURGERY

## 2018-02-04 PROCEDURE — 84443 ASSAY THYROID STIM HORMONE: CPT | Performed by: HOSPITALIST

## 2018-02-04 PROCEDURE — 85025 COMPLETE CBC W/AUTO DIFF WBC: CPT | Performed by: ORTHOPAEDIC SURGERY

## 2018-02-04 PROCEDURE — 80048 BASIC METABOLIC PNL TOTAL CA: CPT | Performed by: HOSPITALIST

## 2018-02-04 PROCEDURE — 74011250637 HC RX REV CODE- 250/637: Performed by: NURSE PRACTITIONER

## 2018-02-04 PROCEDURE — 83880 ASSAY OF NATRIURETIC PEPTIDE: CPT | Performed by: HOSPITALIST

## 2018-02-04 PROCEDURE — 83735 ASSAY OF MAGNESIUM: CPT | Performed by: HOSPITALIST

## 2018-02-04 PROCEDURE — 77010033678 HC OXYGEN DAILY

## 2018-02-04 PROCEDURE — 74011000250 HC RX REV CODE- 250: Performed by: ORTHOPAEDIC SURGERY

## 2018-02-04 PROCEDURE — 85027 COMPLETE CBC AUTOMATED: CPT | Performed by: HOSPITALIST

## 2018-02-04 PROCEDURE — 74011250636 HC RX REV CODE- 250/636: Performed by: ORTHOPAEDIC SURGERY

## 2018-02-04 PROCEDURE — 65660000000 HC RM CCU STEPDOWN

## 2018-02-04 PROCEDURE — 94760 N-INVAS EAR/PLS OXIMETRY 1: CPT

## 2018-02-04 PROCEDURE — 84484 ASSAY OF TROPONIN QUANT: CPT | Performed by: HOSPITALIST

## 2018-02-04 PROCEDURE — 74011250637 HC RX REV CODE- 250/637: Performed by: FAMILY MEDICINE

## 2018-02-04 PROCEDURE — 93005 ELECTROCARDIOGRAM TRACING: CPT | Performed by: FAMILY MEDICINE

## 2018-02-04 PROCEDURE — 77030027138 HC INCENT SPIROMETER -A

## 2018-02-04 RX ORDER — IPRATROPIUM BROMIDE AND ALBUTEROL SULFATE 2.5; .5 MG/3ML; MG/3ML
3 SOLUTION RESPIRATORY (INHALATION)
Status: DISCONTINUED | OUTPATIENT
Start: 2018-02-04 | End: 2018-02-05 | Stop reason: HOSPADM

## 2018-02-04 RX ADMIN — Medication 5 ML: at 15:35

## 2018-02-04 RX ADMIN — GUAIFENESIN 1200 MG: 600 TABLET, EXTENDED RELEASE ORAL at 08:58

## 2018-02-04 RX ADMIN — CHOLECALCIFEROL TAB 25 MCG (1000 UNIT) 1000 UNITS: 25 TAB at 08:58

## 2018-02-04 RX ADMIN — WATER 1 G: 1 INJECTION INTRAMUSCULAR; INTRAVENOUS; SUBCUTANEOUS at 05:19

## 2018-02-04 RX ADMIN — TRAMADOL HYDROCHLORIDE 50 MG: 50 TABLET, FILM COATED ORAL at 15:34

## 2018-02-04 RX ADMIN — CALCIUM CARBONATE 500 MG (1,250 MG)-VITAMIN D3 200 UNIT TABLET 1 TABLET: at 15:34

## 2018-02-04 RX ADMIN — ASPIRIN 81 MG: 81 TABLET, COATED ORAL at 08:58

## 2018-02-04 RX ADMIN — ENOXAPARIN SODIUM 30 MG: 30 INJECTION SUBCUTANEOUS at 15:34

## 2018-02-04 RX ADMIN — CALCIUM CARBONATE 500 MG (1,250 MG)-VITAMIN D3 200 UNIT TABLET 1 TABLET: at 18:12

## 2018-02-04 RX ADMIN — MUPIROCIN: 20 OINTMENT TOPICAL at 18:10

## 2018-02-04 RX ADMIN — OXYCODONE HYDROCHLORIDE 5 MG: 5 TABLET ORAL at 08:58

## 2018-02-04 RX ADMIN — CALCIUM CARBONATE 500 MG (1,250 MG)-VITAMIN D3 200 UNIT TABLET 1 TABLET: at 08:58

## 2018-02-04 RX ADMIN — LISINOPRIL 5 MG: 5 TABLET ORAL at 08:58

## 2018-02-04 RX ADMIN — MUPIROCIN: 20 OINTMENT TOPICAL at 09:03

## 2018-02-04 RX ADMIN — Medication 10 ML: at 05:19

## 2018-02-04 RX ADMIN — TAMSULOSIN HYDROCHLORIDE 0.4 MG: 0.4 CAPSULE ORAL at 08:58

## 2018-02-04 RX ADMIN — ACETAMINOPHEN 650 MG: 325 TABLET, FILM COATED ORAL at 05:18

## 2018-02-04 RX ADMIN — Medication 10 ML: at 20:55

## 2018-02-04 RX ADMIN — FLUTICASONE PROPIONATE 2 SPRAY: 50 SPRAY, METERED NASAL at 09:03

## 2018-02-04 RX ADMIN — ACETAMINOPHEN 650 MG: 325 TABLET, FILM COATED ORAL at 20:55

## 2018-02-04 RX ADMIN — MONTELUKAST SODIUM 10 MG: 10 TABLET, FILM COATED ORAL at 18:06

## 2018-02-04 RX ADMIN — ACETAMINOPHEN 650 MG: 325 TABLET, FILM COATED ORAL at 15:34

## 2018-02-04 NOTE — PROGRESS NOTES
Problem: Falls - Risk of  Goal: *Absence of Falls  Document Senthil Fall Risk and appropriate interventions in the flowsheet.    Outcome: Progressing Towards Goal  Fall Risk Interventions:  Mobility Interventions: Bed/chair exit alarm, Communicate number of staff needed for ambulation/transfer, OT consult for ADLs, Patient to call before getting OOB, PT Consult for mobility concerns, PT Consult for assist device competence         Medication Interventions: Bed/chair exit alarm, Evaluate medications/consider consulting pharmacy, Patient to call before getting OOB    Elimination Interventions: Bed/chair exit alarm, Call light in reach, Patient to call for help with toileting needs, Toileting schedule/hourly rounds    History of Falls Interventions: Bed/chair exit alarm, Consult care management for discharge planning, Door open when patient unattended

## 2018-02-04 NOTE — PROGRESS NOTES
Hospitalist Progress Note    Subjective:   Daily Progress Note: 2/4/2018 11:24 AM    Mr. Cass Rashid is an 81 yo WM, with a pmh of chronic respiratory failure due to 2 liter NC dependent COPD, who presented 2/1 after a fall where he sustained a left hip fracture. He is POD 1. No nausea but has pain in his groin. Rojas removed this morning and hasn't urinated as of yet. Hg stable from yesterday. Multiple family members at bedside.     Today, no ac events, no significant pain, no SOB or cough    Current Facility-Administered Medications   Medication Dose Route Frequency    albuterol-ipratropium (DUO-NEB) 2.5 MG-0.5 MG/3 ML  3 mL Nebulization Q4H PRN    mupirocin (BACTROBAN) 2 % ointment   Topical BID    sodium chloride (NS) flush 5-10 mL  5-10 mL IntraVENous Q8H    sodium chloride (NS) flush 5-10 mL  5-10 mL IntraVENous PRN    ondansetron (ZOFRAN) injection 4 mg  4 mg IntraVENous Q4H PRN    alum-mag hydroxide-simeth (MYLANTA) oral suspension 30 mL  30 mL Oral Q4H PRN    calcium-vitamin D (OS-RACHELE) 500 mg-200 unit tablet  1 Tab Oral TID WITH MEALS    acetaminophen (TYLENOL) tablet 650 mg  650 mg Oral Q8H    oxyCODONE IR (ROXICODONE) tablet 5 mg  5 mg Oral Q4H PRN    enoxaparin (LOVENOX) injection 30 mg  30 mg SubCUTAneous Q24H    traMADol (ULTRAM) tablet 50 mg  50 mg Oral Q6H PRN    aspirin delayed-release tablet 81 mg  81 mg Oral DAILY    cholecalciferol (VITAMIN D3) tablet 1,000 Units  1,000 Units Oral DAILY    fluticasone (FLONASE) 50 mcg/actuation nasal spray 2 Spray  2 Spray Both Nostrils DAILY    tamsulosin (FLOMAX) capsule 0.4 mg  0.4 mg Oral DAILY    lisinopril (PRINIVIL, ZESTRIL) tablet 5 mg  5 mg Oral DAILY    montelukast (SINGULAIR) tablet 10 mg  10 mg Oral QPM    guaiFENesin ER (MUCINEX) tablet 1,200 mg  1,200 mg Oral DAILY    acetaminophen (TYLENOL) tablet 650 mg  650 mg Oral Q4H PRN    magnesium hydroxide (MILK OF MAGNESIA) 400 mg/5 mL oral suspension 30 mL  30 mL Oral DAILY PRN Objective:     Visit Vitals    /65 (BP 1 Location: Right arm, BP Patient Position: Sitting)    Pulse 65    Temp 98.1 °F (36.7 °C)    Resp 17    Ht 5' 9\" (1.753 m)    Wt 82.1 kg (181 lb)    SpO2 94%    BMI 26.73 kg/m2    O2 Flow Rate (L/min): 3 l/min O2 Device: Nasal cannula    Temp (24hrs), Av.5 °F (36.9 °C), Min:98.1 °F (36.7 °C), Max:99 °F (37.2 °C)          1901 -  0700  In: -   Out: 2200 [Urine:2200]    General: awake, alert, oriented  Eyes; non icteric, EOMI  Neck; supple  CV: RRR  Pulm; course, no rhonchi nor wheezing  Ext: palpable pedal pulses  Neuro grossly intact    Data Review    Recent Results (from the past 24 hour(s))   EKG, 12 LEAD, INITIAL    Collection Time: 18 12:43 AM   Result Value Ref Range    Ventricular Rate 83 BPM    Atrial Rate 83 BPM    P-R Interval 168 ms    QRS Duration 106 ms    Q-T Interval 396 ms    QTC Calculation (Bezet) 465 ms    Calculated P Axis 55 degrees    Calculated R Axis 13 degrees    Calculated T Axis 68 degrees    Diagnosis       Sinus rhythm with Premature atrial complexes  Otherwise normal ECG  When compared with ECG of 2018 21:08,  Premature ventricular complexes are no longer Present  Premature atrial complexes are now Present  Criteria for Septal infarct are no longer Present  Confirmed by ST SHIRA CAMACHO MD (), GERARDO BURKS ([id number]) on 2018 1:39:74 AM     METABOLIC PANEL, BASIC    Collection Time: 18  1:04 AM   Result Value Ref Range    Sodium 139 136 - 145 mmol/L    Potassium 4.1 3.5 - 5.1 mmol/L    Chloride 103 98 - 107 mmol/L    CO2 29 21 - 32 mmol/L    Anion gap 7 7 - 16 mmol/L    Glucose 108 (H) 65 - 100 mg/dL    BUN 16 8 - 23 MG/DL    Creatinine 0.87 0.8 - 1.5 MG/DL    GFR est AA >60 >60 ml/min/1.73m2    GFR est non-AA >60 >60 ml/min/1.73m2    Calcium 8.0 (L) 8.3 - 10.4 MG/DL   MAGNESIUM    Collection Time: 18  1:04 AM   Result Value Ref Range    Magnesium 1.9 1.8 - 2.4 mg/dL   TROPONIN I    Collection Time: 02/04/18  1:04 AM   Result Value Ref Range    Troponin-I, Qt. 0.05 0.02 - 0.05 NG/ML   BNP    Collection Time: 02/04/18  1:04 AM   Result Value Ref Range     pg/mL   CBC W/O DIFF    Collection Time: 02/04/18  1:04 AM   Result Value Ref Range    WBC 11.2 (H) 4.3 - 11.1 K/uL    RBC 3.70 (L) 4.23 - 5.67 M/uL    HGB 12.2 (L) 13.6 - 17.2 g/dL    HCT 36.9 (L) 41.1 - 50.3 %    MCV 99.7 (H) 79.6 - 97.8 FL    MCH 33.0 (H) 26.1 - 32.9 PG    MCHC 33.1 31.4 - 35.0 g/dL    RDW 13.2 11.9 - 14.6 %    PLATELET 579 (L) 639 - 450 K/uL    MPV 11.1 10.8 - 14.1 FL   TSH 3RD GENERATION    Collection Time: 02/04/18  1:04 AM   Result Value Ref Range    TSH 4.270 (H) 0.358 - 3.740 uIU/mL   PLEASE READ & DOCUMENT PPD TEST IN 48 HRS    Collection Time: 02/04/18  2:57 AM   Result Value Ref Range    PPD negative Negative    mm Induration 0 mm   CBC WITH AUTOMATED DIFF    Collection Time: 02/04/18  6:17 AM   Result Value Ref Range    WBC 12.1 (H) 4.3 - 11.1 K/uL    RBC 3.68 (L) 4.23 - 5.67 M/uL    HGB 11.9 (L) 13.6 - 17.2 g/dL    HCT 36.5 (L) 41.1 - 50.3 %    MCV 99.2 (H) 79.6 - 97.8 FL    MCH 32.3 26.1 - 32.9 PG    MCHC 32.6 31.4 - 35.0 g/dL    RDW 13.3 11.9 - 14.6 %    PLATELET 250 (L) 968 - 450 K/uL    MPV 11.0 10.8 - 14.1 FL    DF AUTOMATED      NEUTROPHILS 70 43 - 78 %    LYMPHOCYTES 12 (L) 13 - 44 %    MONOCYTES 11 4.0 - 12.0 %    EOSINOPHILS 7 0.5 - 7.8 %    BASOPHILS 0 0.0 - 2.0 %    IMMATURE GRANULOCYTES 0 0.0 - 5.0 %    ABS. NEUTROPHILS 8.4 (H) 1.7 - 8.2 K/UL    ABS. LYMPHOCYTES 1.4 0.5 - 4.6 K/UL    ABS. MONOCYTES 1.3 0.1 - 1.3 K/UL    ABS. EOSINOPHILS 0.9 (H) 0.0 - 0.8 K/UL    ABS. BASOPHILS 0.0 0.0 - 0.2 K/UL    ABS. IMM.  GRANS. 0.0 0.0 - 0.5 K/UL         Assessment/Plan:     Principal Problem:    Hip fracture ([facility] (2/1/2018)    Active Problems:    COPD (chronic obstructive pulmonary disease) ([facility] (12/10/2015)      Essential hypertension, benign (12/10/2015)      Chronic respiratory failure with hypoxia ([facility] (3/29/2016) Overview: Last Assessment & Plan:       Stable. 1. 6mwt ordered and reviewed      2. Requires 2L home O2 with ambulation and with exercise. Last Assessment & Plan:       Stable. 1. 6mwt ordered and reviewed      2. Requires 2L home O2 with ambulation and with exercise.       Pulmonary edema (2/1/2018)    cont suportive Rx, PT/OT, transfer to rehab when available    Care Plan discussed with: Patient/Family      Signed By: Maximo Orozco MD     February 4, 2018

## 2018-02-04 NOTE — PROGRESS NOTES
Problem: Mobility Impaired (Adult and Pediatric)  Goal: *Acute Goals and Plan of Care (Insert Text)  LTG:  (1.)Mr. Melo Garrett will move from supine to sit and sit to supine , scoot up and down and roll side to side in bed with SUPERVISION within 7 day(s). (2.)Mr. Melo Garrett will transfer from bed to chair and chair to bed with CONTACT GUARD ASSIST using the least restrictive device within 7 day(s). (3.)Mr. Melo Garrett will ambulate with MINIMAL ASSIST for >or=75 feet with the least restrictive device, appropriate gait pattern and good dynamic standing balance within 7 day(s). (4.)Mr. Melo Garrett will perform B LE therapeutic exercises x 20 reps with INDEPENDENCE within 7 days to increase strength for improved safety and independence in transfers and gait.    ________________________________________________________________________________________________      PHYSICAL THERAPY: Daily Note, Treatment Day: 1st, PM 2/4/2018  INPATIENT: Hospital Day: 4  [place] / [place] / Product Type: Medicare /      NAME/AGE/GENDER: Evon Jackson is a 80 y.o. male   PRIMARY DIAGNOSIS: Hip fracture ([address]  left hip fracture Hip fracture (HCC) Hip fracture (HCC)  Procedure(s) (LRB):  LEFT FEMUR INSERTION INTRA MEDULLARY NAIL (Left)  2 Days Post-Op  ICD-10: Treatment Diagnosis:   · Generalized Muscle Weakness (M62.81)  · Difficulty in walking, Not elsewhere classified (R26.2)   Precaution/Allergies:  Review of patient's allergies indicates no known allergies. ASSESSMENT:     Mr. Melo Garrett admitted following fall in [id number] parking lot when he tripped over wheel of cart. Pt was independent with all functional mobility prior to admission and lives with wife. Pt now presents supine in bed, son at bedside. Pt awake and alert and requesting to use the bedside commode. Pt reminded of WBAT status for L LE and verbalized understanding.  Pt educated on gait sequence with walker, however pt continued to be hesitant to put any weight on L LE secondary to pain/fear. Pt avoiding any weight on L hips in sitting and standing. Assisted pt to bedside commode for voiding and then to bedside chair. Additional time required for transfers and pt required mod assist  X 2 with frequent verbal/tactile cues for weight shifting and gait sequence. Pt positioned for comfort in chair with deroyal alarm pad in place. PM treatment: Pt appeared much more fatigued this afternoon. A little slower to follow commands. Great difficulty advancing R LE even with constant verbal/tactile cues for weight shifting and for proper use of UEs on walker. Attempted transfer x 2 with mod assist x 2. Finally had to perform just a stand pivot transfer as pt was unable to complete any steps and pt was fatiguing quickly. Strongly recommend continuation of PT services following discharge. Slow progress. Pt is working towards goals. Needs to increase gait distance. Also may want to try and coordinate treatment with pain medicine. Pt does not report that anything is actually hurting during treatment, but rates his pain as a 7. Try pre-medicating tomorrow. This section established at most recent assessment   PROBLEM LIST (Impairments causing functional limitations):  1. Decreased Strength  2. Decreased ADL/Functional Activities  3. Decreased Transfer Abilities  4. Decreased Ambulation Ability/Technique  5. Decreased Balance  6. Increased Pain  7. Decreased Activity Tolerance  8. Decreased Jerome with Home Exercise Program   INTERVENTIONS PLANNED: (Benefits and precautions of physical therapy have been discussed with the patient.)  1. Balance Exercise  2. Bed Mobility  3. Gait Training  4. Home Exercise Program (HEP)  5. Therapeutic Activites  6. Therapeutic Exercise/Strengthening  7. Transfer Training  8.  Group Therapy     TREATMENT PLAN: Frequency/Duration: twice daily for duration of hospital stay  Rehabilitation Potential For Stated Goals: Good     RECOMMENDED REHABILITATION/EQUIPMENT: (at time of discharge pending progress): Due to the probability of continued deficits (see above) this patient will likely need continued skilled physical therapy after discharge. Equipment:    planning to go to rehab and can be assessed for equipment needs at that time. HISTORY:   History of Present Injury/Illness (Reason for Referral):  Per chart: Tommy Young is a 80y.o. year-old male with a past medical history of HTN and COPD who presents to ER after a fall earlier today in which he tripped over the wheel of a shopping cart and landed on his side and head with immediate pain in his left hip. He denies any lightheadedness, dizziness, shortness of breath, chest pain, nausea, vomiting, diarrhea, constipation before or after his fall.     Past Medical History/Comorbidities:   Mr. Mitra Bhatia  has a past medical history of Abdominal aortic aneurysm ([address] (12/10/2015); Abdominal aortic aneurysm without rupture ([address] Allergic rhinitis (12/10/2015); Asthma; Benign neoplasm; Benign prostatic hyperplasia (12/10/2015); BPH without urinary obstruction; Cardiovascular disease (12/10/2015); Chronic obstructive asthma with exacerbation ([address] (12/10/2015); COPD (chronic obstructive pulmonary disease) ([address] (12/10/2015); Cough with hemoptysis; Erectile dysfunction (12/10/2015); Essential hypertension, benign (12/10/2015); Fracture (10/2014); History of colon polyps; Low testosterone (12/10/2015); Lumbago; Memory loss; Overflow incontinence; Raynaud's syndrome (12/10/2015); Rotator cuff tendinitis; Thrombocytopenia ([address] and Urinary frequency. Mr. Mitra Bhatia  has a past surgical history that includes hx hernia repair (1980); hx colonoscopy; hx fracture tx (10/2014); and hx cataract removal (6/2016-right).   Social History/Living Environment:   Home Environment: Private residence  # Steps to Enter: 0  Rails to Enter: No  Wheelchair Ramp: Yes  One/Two Story Residence: One story  Living Alone: No  Support Systems: Spouse/Significant Other/Partner  Patient Expects to be Discharged to[de-identified] Rehabilitation facility  Current DME Used/Available at Home: None  Tub or Shower Type: Shower  Prior Level of Function/Work/Activity:  Pt was independent with all functional mobility and gait without assistive device. No other recent falls. Drives. Lives with wife who is blind but can assist as able. Has a straight cane at home but does not use. No other medical equipment. Son lives close by and can assist.      Number of Personal Factors/Comorbidities that affect the Plan of Care: 1-2: MODERATE COMPLEXITY   EXAMINATION:   Most Recent Physical Functioning:   Gross Assessment:  Strength: Generally decreased, functional  Coordination: Generally decreased, functional  Sensation: Intact               Posture:  Posture (WDL): Exceptions to WDL  Posture Assessment: Forward head, Rounded shoulders  Balance:  Sitting: Impaired  Sitting - Static: Fair (occasional)  Sitting - Dynamic: Fair (occasional)  Standing: Impaired  Standing - Static: Fair  Standing - Dynamic : Fair Bed Mobility:  Rolling: Moderate assistance  Supine to Sit: Minimum assistance  Sit to Supine: Maximum assistance;Assist x2  Scooting: Total assistance  Interventions: Safety awareness training;Verbal cues  Wheelchair Mobility:     Transfers:  Sit to Stand: Moderate assistance  Bed to Chair: Moderate assistance;Assist x2  Interventions: Safety awareness training;Verbal cues  Gait:  Left Side Weight Bearing: As tolerated  Base of Support: Shift to right  Speed/Marifer: Slow  Step Length: Right shortened  Swing Pattern: Left asymmetrical  Stance: Left decreased;Weight shift  Gait Abnormalities: Antalgic; Step to gait  Distance (ft): 3 Feet (ft)  Assistive Device: Walker, rolling  Ambulation - Level of Assistance: Moderate assistance;Assist x2      Body Structures Involved:  1. Bones  2. Joints  3. Muscles Body Functions Affected:  1. Neuromusculoskeletal  2.  Movement Related Activities and Participation Affected:  1. General Tasks and Demands  2. Mobility  3. Self Care   Number of elements that affect the Plan of Care: 4+: HIGH COMPLEXITY   CLINICAL PRESENTATION:   Presentation: Evolving clinical presentation with changing clinical characteristics: MODERATE COMPLEXITY   CLINICAL DECISION MAKIN [facility] Mobility Inpatient Short Form  How much difficulty does the patient currently have. .. Unable A Lot A Little None   1. Turning over in bed (including adjusting bedclothes, sheets and blankets)? [] 1   [] 2   [x] 3   [] 4   2. Sitting down on and standing up from a chair with arms ( e.g., wheelchair, bedside commode, etc.)   [] 1   [] 2   [x] 3   [] 4   3. Moving from lying on back to sitting on the side of the bed? [] 1   [] 2   [x] 3   [] 4   How much help from another person does the patient currently need. .. Total A Lot A Little None   4. Moving to and from a bed to a chair (including a wheelchair)? [] 1   [x] 2   [] 3   [] 4   5. Need to walk in hospital room? [] 1   [x] 2   [] 3   [] 4   6. Climbing 3-5 steps with a railing? [x] 1   [] 2   [] 3   [] 4   © , Trustees of [address] [place] [address], under license to [employer]. All rights reserved      Score:  Initial: 14 Most Recent: X (Date: -- )    Interpretation of Tool:  Represents activities that are increasingly more difficult (i.e. Bed mobility, Transfers, Gait). Score 24 23 22-20 19-15 14-10 9-7 6     Modifier CH CI CJ CK CL CM CN      ?  Mobility - Walking and Moving Around:     - CURRENT STATUS: CL - 60%-79% impaired, limited or restricted    - GOAL STATUS: CL - 60%-79% impaired, limited or restricted    - D/C STATUS:  ---------------To be determined---------------  [place] / [place] / Product Type: Medicare /      Medical Necessity:     · Patient demonstrates good rehab potential due to higher previous functional level.  Reason for Services/Other Comments:  · Patient continues to require present interventions due to patient's inability to function at baseline. Use of outcome tool(s) and clinical judgement create a POC that gives a: Questionable prediction of patient's progress: MODERATE COMPLEXITY            TREATMENT:   (In addition to Assessment/Re-Assessment sessions the following treatments were rendered)   Pre-treatment Symptoms/Complaints:  \"I have to go to the bathroom. \"  Pain: Initial:   Pain Intensity 1: 7  Pain Location 1: Hip  Pain Orientation 1: Left  Pain Intervention(s) 1: Ambulation/Increased Activity, Repositioned, Nurse notified  Post Session:  Unchanged 2/10. Therapeutic Activity: (    33 minutes): Therapeutic activities including Bed transfers, Chair transfers, toilet transfers and Ambulation on level ground to improve mobility, strength, balance and safety. Required moderate to maximum verbal/tactile cues   to maximize safety and independence, to assist with weight shifting and LE advancement. Braces/Orthotics/Lines/Etc:   · O2 Device: Nasal cannula  Treatment/Session Assessment:    · Response to Treatment:  Tolerated with some pain, difficulty weightbearing on L LE. · Interdisciplinary Collaboration:   o Physical Therapist  o Registered Nurse  o Certified Nursing Assistant/Patient Care Technician  · After treatment position/precautions:   o Supine in bed  o Bed alarm/tab alert on  o Bed/Chair-wheels locked  o Bed in low position  o Call light within reach  o RN notified  o Family at bedside   · Compliance with Program/Exercises: Will assess as treatment progresses. · Recommendations/Intent for next treatment session: \"Next visit will focus on advancements to more challenging activities and reduction in assistance provided\".   Total Treatment Duration:  PT Patient Time In/Time Out  Time In: 1414  Time Out: 2321 Gregory Marks, PT

## 2018-02-04 NOTE — PROGRESS NOTES
Orthopedic Joint Progress Note    2018  Admit Date: 2018  Admit Diagnosis: Hip fracture ([address]  left hip fracture    2 Days Post-Op    Subjective:     Souleymane Hopkins doing well, c/o of some mild groin pain      Objective:     PT/OT:     PATIENT MOBILITY    Bed Mobility  Supine to Sit: Minimum assistance  Sit to Supine: Maximum assistance, Assist x2  Scooting: Maximum assistance, Assist x2  Transfers  Sit to Stand: Moderate assistance  Stand to Sit: Minimum assistance  Bed to Chair: Moderate assistance, Assist x2      Gait  Base of Support: Shift to right  Speed/Marifer: Slow  Step Length: Right shortened  Swing Pattern: Left asymmetrical  Stance: Left decreased, Weight shift  Gait Abnormalities: Antalgic, Step to gait  Ambulation - Level of Assistance: Moderate assistance, Assist x2  Distance (ft): 3 Feet (ft)  Assistive Device: Walker, rolling   Weight Bearing Status  Left Side Weight Bearing: As tolerated        Vital Signs:    Blood pressure 121/78, pulse 73, temperature 98.1 °F (36.7 °C), resp. rate 16, height 5' 9\" (1.753 m), weight 82.1 kg (181 lb), SpO2 91 %.   Temp (24hrs), Av.7 °F (37.1 °C), Min:98.1 °F (36.7 °C), Max:99 °F (37.2 °C)      Pain Control:   Pain Assessment  Pain Scale 1: FACES  Pain Intensity 1: 4 (with weightbearing)  Pain Onset 1: post op  Pain Location 1: Hip  Pain Orientation 1: Left  Pain Description 1: Aching  Pain Intervention(s) 1: Medication (see MAR)    Meds:  Current Facility-Administered Medications   Medication Dose Route Frequency    albuterol-ipratropium (DUO-NEB) 2.5 MG-0.5 MG/3 ML  3 mL Nebulization Q4H PRN    mupirocin (BACTROBAN) 2 % ointment   Topical BID    sodium chloride (NS) flush 5-10 mL  5-10 mL IntraVENous Q8H    sodium chloride (NS) flush 5-10 mL  5-10 mL IntraVENous PRN    ondansetron (ZOFRAN) injection 4 mg  4 mg IntraVENous Q4H PRN    alum-mag hydroxide-simeth (MYLANTA) oral suspension 30 mL  30 mL Oral Q4H PRN    calcium-vitamin D (OS-RACHELE) 500 mg-200 unit tablet  1 Tab Oral TID WITH MEALS    acetaminophen (TYLENOL) tablet 650 mg  650 mg Oral Q8H    oxyCODONE IR (ROXICODONE) tablet 5 mg  5 mg Oral Q4H PRN    enoxaparin (LOVENOX) injection 30 mg  30 mg SubCUTAneous Q24H    traMADol (ULTRAM) tablet 50 mg  50 mg Oral Q6H PRN    aspirin delayed-release tablet 81 mg  81 mg Oral DAILY    cholecalciferol (VITAMIN D3) tablet 1,000 Units  1,000 Units Oral DAILY    fluticasone (FLONASE) 50 mcg/actuation nasal spray 2 Spray  2 Spray Both Nostrils DAILY    tamsulosin (FLOMAX) capsule 0.4 mg  0.4 mg Oral DAILY    lisinopril (PRINIVIL, ZESTRIL) tablet 5 mg  5 mg Oral DAILY    montelukast (SINGULAIR) tablet 10 mg  10 mg Oral QPM    guaiFENesin ER (MUCINEX) tablet 1,200 mg  1,200 mg Oral DAILY    acetaminophen (TYLENOL) tablet 650 mg  650 mg Oral Q4H PRN    magnesium hydroxide (MILK OF MAGNESIA) 400 mg/5 mL oral suspension 30 mL  30 mL Oral DAILY PRN        LAB:    Lab Results   Component Value Date/Time    INR 1.2 02/02/2018 06:34 AM    INR 1.1 04/13/2009 10:59 AM     Lab Results   Component Value Date/Time    HGB 11.9 02/04/2018 06:17 AM    HGB 12.2 02/04/2018 01:04 AM    HGB 12.4 02/03/2018 06:49 AM       Wound Hip Left (Active)   DRESSING STATUS Clean, dry, and intact 2/3/2018  9:51 PM   DRESSING TYPE 4 x 4;Transparent film 2/3/2018  9:51 PM   Number of days:2             Physical Exam:  No significant changes  nvi distally  Calves supple    Assessment:      Principal Problem:    Hip fracture (HCC) (2/1/2018)    Active Problems:    COPD (chronic obstructive pulmonary disease) (HCC) (12/10/2015)      Essential hypertension, benign (12/10/2015)      Chronic respiratory failure with hypoxia ([facility]) (3/29/2016)      Overview: Last Assessment & Plan:       Stable. 1. 6mwt ordered and reviewed      2. Requires 2L home O2 with ambulation and with exercise. Last Assessment & Plan:       Stable. 1. 6mwt ordered and reviewed      2. Requires 2L home O2 with ambulation and with exercise.       Pulmonary edema (2/1/2018)         Plan:     Continue PT/OT/Rehab  dispo planning    Patient Expects to be Discharged to[de-identified] Rehabilitation facility     Signed By: Lydia Rucker MD

## 2018-02-04 NOTE — PROGRESS NOTES
Visit with patient to build rapport with . Patient is calm. Receptive to  presence. Encouraged and assured patient of our continued care.     Britney Whatley,  Staff   C: [phone]  /  [email or web address]

## 2018-02-04 NOTE — PROGRESS NOTES
Problem: Mobility Impaired (Adult and Pediatric)  Goal: *Acute Goals and Plan of Care (Insert Text)  LTG:  (1.)Mr. Mitra Bhatai will move from supine to sit and sit to supine , scoot up and down and roll side to side in bed with SUPERVISION within 7 day(s). (2.)Mr. Mitra Bhatia will transfer from bed to chair and chair to bed with CONTACT GUARD ASSIST using the least restrictive device within 7 day(s). (3.)Mr. Mitra Bhatia will ambulate with MINIMAL ASSIST for >or=75 feet with the least restrictive device, appropriate gait pattern and good dynamic standing balance within 7 day(s). (4.)Mr. Mitra Bhatia will perform B LE therapeutic exercises x 20 reps with INDEPENDENCE within 7 days to increase strength for improved safety and independence in transfers and gait.    ________________________________________________________________________________________________      PHYSICAL THERAPY: Daily Note, Treatment Day: 1st, AM 2/4/2018  INPATIENT: Hospital Day: 4  [place] / Product Type: Medicare /      NAME/AGE/GENDER: Tommy Young is a 80 y.o. male   PRIMARY DIAGNOSIS: Hip fracture ([address]  left hip fracture Hip fracture (HCC) Hip fracture (HCC)  Procedure(s) (LRB):  LEFT FEMUR INSERTION INTRA MEDULLARY NAIL (Left)  2 Days Post-Op  ICD-10: Treatment Diagnosis:   · Generalized Muscle Weakness (M62.81)  · Difficulty in walking, Not elsewhere classified (R26.2)   Precaution/Allergies:  Review of patient's allergies indicates no known allergies. ASSESSMENT:     Mr. Mitra Bhatia admitted following fall in [employer] parking lot when he tripped over wheel of cart. Pt was independent with all functional mobility prior to admission and lives with wife. Pt now presents supine in bed, son at bedside. Pt awake and alert and requesting to use the bedside commode. Pt reminded of WBAT status for L LE and verbalized understanding.  Pt educated on gait sequence with walker, however pt continued to be hesitant to put any weight on L LE secondary to pain/fear. Pt avoiding any weight on L hips in sitting and standing. Assisted pt to bedside commode for voiding and then to bedside chair. Additional time required for transfers and pt required mod assist  X 2 with frequent verbal/tactile cues for weight shifting and gait sequence. Pt positioned for comfort in chair with deroyal alarm pad in place. This section established at most recent assessment   PROBLEM LIST (Impairments causing functional limitations):  1. Decreased Strength  2. Decreased ADL/Functional Activities  3. Decreased Transfer Abilities  4. Decreased Ambulation Ability/Technique  5. Decreased Balance  6. Increased Pain  7. Decreased Activity Tolerance  8. Decreased [place] with Home Exercise Program   INTERVENTIONS PLANNED: (Benefits and precautions of physical therapy have been discussed with the patient.)  1. Balance Exercise  2. Bed Mobility  3. Gait Training  4. Home Exercise Program (HEP)  5. Therapeutic Activites  6. Therapeutic Exercise/Strengthening  7. Transfer Training  8. Group Therapy     TREATMENT PLAN: Frequency/Duration: twice daily for duration of hospital stay  Rehabilitation Potential For Stated Goals: Good     RECOMMENDED REHABILITATION/EQUIPMENT: (at time of discharge pending progress): Due to the probability of continued deficits (see above) this patient will likely need continued skilled physical therapy after discharge. Equipment:    planning to go to rehab and can be assessed for equipment needs at that time. HISTORY:   History of Present Injury/Illness (Reason for Referral):  Per chart: Gary Mckinley is a 80y.o. year-old male with a past medical history of HTN and COPD who presents to ER after a fall earlier today in which he tripped over the wheel of a shopping cart and landed on his side and head with immediate pain in his left hip.  He denies any lightheadedness, dizziness, shortness of breath, chest pain, nausea, vomiting, diarrhea, constipation before or after his fall.     Past Medical History/Comorbidities:   Mr. Earnestine Anne  has a past medical history of Abdominal aortic aneurysm ([facility] (12/10/2015); Abdominal aortic aneurysm without rupture ([facility] Allergic rhinitis (12/10/2015); Asthma; Benign neoplasm; Benign prostatic hyperplasia (12/10/2015); BPH without urinary obstruction; Cardiovascular disease (12/10/2015); Chronic obstructive asthma with exacerbation ([facility] [address] (12/10/2015); COPD (chronic obstructive pulmonary disease) ([facility] [address] (12/10/2015); Cough with hemoptysis; Erectile dysfunction (12/10/2015); Essential hypertension, benign (12/10/2015); Fracture (10/2014); History of colon polyps; Low testosterone (12/10/2015); Lumbago; Memory loss; Overflow incontinence; Raynaud's syndrome (12/10/2015); Rotator cuff tendinitis; Thrombocytopenia ([facility] [address] and Urinary frequency. Mr. Earnestine Anne  has a past surgical history that includes hx hernia repair (1980); hx colonoscopy; hx fracture tx (10/2014); and hx cataract removal (6/2016-right). Social History/Living Environment:   Home Environment: Private residence  # Steps to Enter: 0  Rails to Enter: No  Wheelchair Ramp: Yes  One/Two Story Residence: One story  Living Alone: No  Support Systems: Spouse/Significant Other/Partner  Patient Expects to be Discharged to[de-identified] Rehabilitation facility  Current DME Used/Available at Home: None  Tub or Shower Type: Shower  Prior Level of Function/Work/Activity:  Pt was independent with all functional mobility and gait without assistive device. No other recent falls. Drives. Lives with wife who is blind but can assist as able. Has a straight cane at home but does not use. No other medical equipment.  Son lives close by and can assist.      Number of Personal Factors/Comorbidities that affect the Plan of Care: 1-2: MODERATE COMPLEXITY   EXAMINATION:   Most Recent Physical Functioning:   Gross Assessment:  Strength: Generally decreased, functional  Coordination: Generally decreased, functional  Sensation: Intact               Posture:  Posture (WDL): Exceptions to WDL  Posture Assessment: Forward head, Rounded shoulders  Balance:  Sitting: Impaired  Sitting - Static: Good (unsupported)  Sitting - Dynamic: Fair (occasional)  Standing: Impaired  Standing - Static: Fair  Standing - Dynamic : Fair Bed Mobility:  Supine to Sit: Minimum assistance  Interventions: Safety awareness training;Verbal cues  Wheelchair Mobility:     Transfers:  Sit to Stand: Moderate assistance  Bed to Chair: Moderate assistance;Assist x2  Interventions: Safety awareness training;Verbal cues  Gait:  Left Side Weight Bearing: As tolerated  Base of Support: Shift to right  Speed/Marifer: Slow  Step Length: Right shortened  Swing Pattern: Left asymmetrical  Stance: Left decreased;Weight shift  Gait Abnormalities: Antalgic; Step to gait  Distance (ft): 3 Feet (ft)  Assistive Device: Walker, rolling  Ambulation - Level of Assistance: Moderate assistance;Assist x2      Body Structures Involved:  1. Bones  2. Joints  3. Muscles Body Functions Affected:  1. Neuromusculoskeletal  2. Movement Related Activities and Participation Affected:  1. General Tasks and Demands  2. Mobility  3. Self Care   Number of elements that affect the Plan of Care: 4+: HIGH COMPLEXITY   CLINICAL PRESENTATION:   Presentation: Evolving clinical presentation with changing clinical characteristics: MODERATE COMPLEXITY   CLINICAL DECISION MAKIN [facility] Inpatient Short Form  How much difficulty does the patient currently have. .. Unable A Lot A Little None   1. Turning over in bed (including adjusting bedclothes, sheets and blankets)? [] 1   [] 2   [x] 3   [] 4   2. Sitting down on and standing up from a chair with arms ( e.g., wheelchair, bedside commode, etc.)   [] 1   [] 2   [x] 3   [] 4   3. Moving from lying on back to sitting on the side of the bed?    [] 1 [] 2   [x] 3   [] 4   How much help from another person does the patient currently need. .. Total A Lot A Little None   4. Moving to and from a bed to a chair (including a wheelchair)? [] 1   [x] 2   [] 3   [] 4   5. Need to walk in hospital room? [] 1   [x] 2   [] 3   [] 4   6. Climbing 3-5 steps with a railing? [x] 1   [] 2   [] 3   [] 4   © 2007, Trustees of [address] [place] [address] [address] [id number], under license to [employer]. All rights reserved      Score:  Initial: 14 Most Recent: X (Date: -- )    Interpretation of Tool:  Represents activities that are increasingly more difficult (i.e. Bed mobility, Transfers, Gait). Score 24 23 22-20 19-15 14-10 9-7 6     Modifier CH CI CJ CK CL CM CN      ? Mobility - Walking and Moving Around:     - CURRENT STATUS: CL - 60%-79% impaired, limited or restricted    - GOAL STATUS: CL - 60%-79% impaired, limited or restricted    - D/C STATUS:  ---------------To be determined---------------  [place] / Product Type: Medicare /      Medical Necessity:     · Patient demonstrates good rehab potential due to higher previous functional level. Reason for Services/Other Comments:  · Patient continues to require present interventions due to patient's inability to function at baseline. Use of outcome tool(s) and clinical judgement create a POC that gives a: Questionable prediction of patient's progress: MODERATE COMPLEXITY            TREATMENT:   (In addition to Assessment/Re-Assessment sessions the following treatments were rendered)   Pre-treatment Symptoms/Complaints:  \"I have to go to the bathroom. \"  Pain: Initial:   Pain Intensity 1: 4 (with weightbearing)  Pain Location 1: Hip  Pain Orientation 1: Left  Pain Intervention(s) 1: Nurse notified, Emotional support, Ambulation/Increased Activity, Repositioned  Post Session:  Unchanged 2/10. Therapeutic Activity: (    27 minutes):   Therapeutic activities including Bed transfers, Chair transfers, toilet transfers and Ambulation on level ground to improve mobility, strength, balance and safety. Required moderate to maximum verbal/tactile cues   to maximize safety and independence, to assist with weight shifting and LE advancement. Braces/Orthotics/Lines/Etc:   · O2 Device: Nasal cannula  Treatment/Session Assessment:    · Response to Treatment:  Tolerated with some pain, difficulty weightbearing on L LE. · Interdisciplinary Collaboration:   o Physical Therapist  o Registered Nurse  · After treatment position/precautions:   o Up in chair  o Bed/Chair-wheels locked  o Bed in low position  o Call light within reach  o RN notified  o Family at bedside  o deroyal chair pad in place and alarm on   · Compliance with Program/Exercises: Will assess as treatment progresses. · Recommendations/Intent for next treatment session: \"Next visit will focus on advancements to more challenging activities and reduction in assistance provided\".   Total Treatment Duration:  PT Patient Time In/Time Out  Time In: 0916  Time Out: Benedict Chappell PT

## 2018-02-05 VITALS
HEART RATE: 52 BPM | HEIGHT: 69 IN | SYSTOLIC BLOOD PRESSURE: 118 MMHG | RESPIRATION RATE: 16 BRPM | BODY MASS INDEX: 26.81 KG/M2 | OXYGEN SATURATION: 93 % | DIASTOLIC BLOOD PRESSURE: 76 MMHG | TEMPERATURE: 97.5 F | WEIGHT: 181 LBS

## 2018-02-05 LAB — HGB BLD-MCNC: 11.5 G/DL (ref 13.6–17.2)

## 2018-02-05 PROCEDURE — 85018 HEMOGLOBIN: CPT | Performed by: HOSPITALIST

## 2018-02-05 PROCEDURE — 97530 THERAPEUTIC ACTIVITIES: CPT

## 2018-02-05 PROCEDURE — 97116 GAIT TRAINING THERAPY: CPT

## 2018-02-05 PROCEDURE — 97150 GROUP THERAPEUTIC PROCEDURES: CPT

## 2018-02-05 PROCEDURE — 74011250637 HC RX REV CODE- 250/637: Performed by: ORTHOPAEDIC SURGERY

## 2018-02-05 PROCEDURE — 36415 COLL VENOUS BLD VENIPUNCTURE: CPT | Performed by: HOSPITALIST

## 2018-02-05 PROCEDURE — 77010033678 HC OXYGEN DAILY

## 2018-02-05 PROCEDURE — 94760 N-INVAS EAR/PLS OXIMETRY 1: CPT

## 2018-02-05 PROCEDURE — 74011250637 HC RX REV CODE- 250/637: Performed by: FAMILY MEDICINE

## 2018-02-05 RX ORDER — ENOXAPARIN SODIUM 100 MG/ML
30 INJECTION SUBCUTANEOUS EVERY 24 HOURS
Qty: 8.4 ML | Refills: 0 | Status: SHIPPED | OUTPATIENT
Start: 2018-02-05 | End: 2018-03-05

## 2018-02-05 RX ORDER — TRAMADOL HYDROCHLORIDE 50 MG/1
50 TABLET ORAL
Qty: 10 TAB | Refills: 0 | Status: SHIPPED | OUTPATIENT
Start: 2018-02-05 | End: 2018-03-16

## 2018-02-05 RX ADMIN — MUPIROCIN: 20 OINTMENT TOPICAL at 09:00

## 2018-02-05 RX ADMIN — OXYCODONE HYDROCHLORIDE 5 MG: 5 TABLET ORAL at 09:17

## 2018-02-05 RX ADMIN — GUAIFENESIN 1200 MG: 600 TABLET, EXTENDED RELEASE ORAL at 09:17

## 2018-02-05 RX ADMIN — CHOLECALCIFEROL TAB 25 MCG (1000 UNIT) 1000 UNITS: 25 TAB at 09:18

## 2018-02-05 RX ADMIN — ASPIRIN 81 MG: 81 TABLET, COATED ORAL at 09:18

## 2018-02-05 RX ADMIN — LISINOPRIL 5 MG: 5 TABLET ORAL at 09:18

## 2018-02-05 RX ADMIN — ACETAMINOPHEN 650 MG: 325 TABLET, FILM COATED ORAL at 05:29

## 2018-02-05 RX ADMIN — CALCIUM CARBONATE 500 MG (1,250 MG)-VITAMIN D3 200 UNIT TABLET 1 TABLET: at 09:18

## 2018-02-05 RX ADMIN — TAMSULOSIN HYDROCHLORIDE 0.4 MG: 0.4 CAPSULE ORAL at 09:18

## 2018-02-05 RX ADMIN — FLUTICASONE PROPIONATE 2 SPRAY: 50 SPRAY, METERED NASAL at 09:00

## 2018-02-05 RX ADMIN — Medication 10 ML: at 05:30

## 2018-02-05 NOTE — PROGRESS NOTES
Problem: Falls - Risk of  Goal: *Absence of Falls  Document Senthil Fall Risk and appropriate interventions in the flowsheet.    Outcome: Progressing Towards Goal  Fall Risk Interventions:  Mobility Interventions: Bed/chair exit alarm         Medication Interventions: Bed/chair exit alarm    Elimination Interventions: Bed/chair exit alarm    History of Falls Interventions: Bed/chair exit alarm

## 2018-02-05 NOTE — PROGRESS NOTES
Problem: Mobility Impaired (Adult and Pediatric)  Goal: *Acute Goals and Plan of Care (Insert Text)  LTG:  (1.)Mr. Veryl Nyhan will move from supine to sit and sit to supine , scoot up and down and roll side to side in bed with SUPERVISION within 7 day(s). (2.)Mr. Veryl Nyhan will transfer from bed to chair and chair to bed with CONTACT GUARD ASSIST using the least restrictive device within 7 day(s). (3.)Mr. Veryl Nyhan will ambulate with MINIMAL ASSIST for >or=75 feet with the least restrictive device, appropriate gait pattern and good dynamic standing balance within 7 day(s). (4.)Mr. Veryl Nyhan will perform B LE therapeutic exercises x 20 reps with INDEPENDENCE within 7 days to increase strength for improved safety and independence in transfers and gait.    ______________________________________________________________________________________________    PHYSICAL THERAPY: Daily Note, Treatment Day: 2nd, PM 2/5/2018  INPATIENT: Hospital Day: 5  [place] / Product Type: Medicare /      NAME/AGE/GENDER: Cyn Saenz is a 80 y.o. male   PRIMARY DIAGNOSIS: Hip fracture ([address]  left hip fracture Hip fracture (HCC) Hip fracture (HCC)  Procedure(s) (LRB):  LEFT FEMUR INSERTION INTRA MEDULLARY NAIL (Left)  3 Days Post-Op  ICD-10: Treatment Diagnosis:   · Generalized Muscle Weakness (M62.81)  · Difficulty in walking, Not elsewhere classified (R26.2)   Precaution/Allergies:  Review of patient's allergies indicates no known allergies. ASSESSMENT:     Mr. Veryl Nyhan was sitting up in recliner chair upon contact and agreeable to PT. Patient able to perform transfer to standing with min assist, additional time, and cues for improved/proper technique. Once standing patient able to perform gait training 8' then an additional 2', with use of rolling walker, CGA-min assist and below cues in gait section. Patient returns to EOB and to supine with mod assist/cues for technique.  Overall slow, steady progress towards physical therapy goals. No goals have been met thus far. Will continue efforts. This section established at most recent assessment   PROBLEM LIST (Impairments causing functional limitations):  1. Decreased Strength  2. Decreased ADL/Functional Activities  3. Decreased Transfer Abilities  4. Decreased Ambulation Ability/Technique  5. Decreased Balance  6. Increased Pain  7. Decreased Activity Tolerance  8. Decreased [place] with Home Exercise Program   INTERVENTIONS PLANNED: (Benefits and precautions of physical therapy have been discussed with the patient.)  1. Balance Exercise  2. Bed Mobility  3. Gait Training  4. Home Exercise Program (HEP)  5. Therapeutic Activites  6. Therapeutic Exercise/Strengthening  7. Transfer Training  8. Group Therapy     TREATMENT PLAN: Frequency/Duration: twice daily for duration of hospital stay  Rehabilitation Potential For Stated Goals: Good     RECOMMENDED REHABILITATION/EQUIPMENT: (at time of discharge pending progress): Due to the probability of continued deficits (see above) this patient will likely need continued skilled physical therapy after discharge. Equipment:    planning to go to rehab and can be assessed for equipment needs at that time. HISTORY:   History of Present Injury/Illness (Reason for Referral):  Per chart: Evon Jackson is a 80y.o. year-old male with a past medical history of HTN and COPD who presents to ER after a fall earlier today in which he tripped over the wheel of a shopping cart and landed on his side and head with immediate pain in his left hip. He denies any lightheadedness, dizziness, shortness of breath, chest pain, nausea, vomiting, diarrhea, constipation before or after his fall.     Past Medical History/Comorbidities:   Mr. Melo Garrett  has a past medical history of Abdominal aortic aneurysm ([address] (12/10/2015); Abdominal aortic aneurysm without rupture ([facility] [address] Allergic rhinitis (12/10/2015);  Asthma; Benign neoplasm; Benign prostatic hyperplasia (12/10/2015); BPH without urinary obstruction; Cardiovascular disease (12/10/2015); Chronic obstructive asthma with exacerbation ([facility] (12/10/2015); COPD (chronic obstructive pulmonary disease) ([facility] [address] [facility] (12/10/2015); Cough with hemoptysis; Erectile dysfunction (12/10/2015); Essential hypertension, benign (12/10/2015); Fracture (10/2014); History of colon polyps; Low testosterone (12/10/2015); Lumbago; Memory loss; Overflow incontinence; Raynaud's syndrome (12/10/2015); Rotator cuff tendinitis; Thrombocytopenia ([facility] [address] and Urinary frequency. Mr. Veryl Nyhan  has a past surgical history that includes hx hernia repair (1980); hx colonoscopy; hx fracture tx (10/2014); and hx cataract removal (6/2016-right). Social History/Living Environment:   Home Environment: Private residence  # Steps to Enter: 0  Rails to Enter: No  Wheelchair Ramp: Yes  One/Two Story Residence: One story  Living Alone: No  Support Systems: Spouse/Significant Other/Partner  Patient Expects to be Discharged to[de-identified] Rehabilitation facility  Current DME Used/Available at Home: None  Tub or Shower Type: Shower  Prior Level of Function/Work/Activity:  Pt was independent with all functional mobility and gait without assistive device. No other recent falls. Drives. Lives with wife who is blind but can assist as able. Has a straight cane at home but does not use. No other medical equipment. Son lives close by and can assist.      Number of Personal Factors/Comorbidities that affect the Plan of Care: 1-2: MODERATE COMPLEXITY   EXAMINATION:   Most Recent Physical Functioning:   Gross Assessment:                  Posture:     Balance:  Sitting: Impaired  Sitting - Static: Good (unsupported)  Sitting - Dynamic: Fair (occasional)  Standing: Impaired  Standing - Static: Fair  Standing - Dynamic : Fair Bed Mobility:  Supine to Sit: Minimum assistance  Sit to Supine:  Moderate assistance  Wheelchair Mobility:     Transfers:  Sit to Stand: Minimum assistance  Stand to Sit: Minimum assistance  Gait:  Left Side Weight Bearing: As tolerated  Base of Support: Shift to right  Speed/Marifer: Slow  Step Length: Right shortened;Left shortened  Gait Abnormalities: Decreased step clearance;Shuffling gait; Step to gait  Distance (ft):  (8' then an additional 2')  Assistive Device: Walker, rolling  Ambulation - Level of Assistance: Minimal assistance;Contact guard assistance  Interventions: Safety awareness training;Verbal cues; Tactile cues      Body Structures Involved:  1. Bones  2. Joints  3. Muscles Body Functions Affected:  1. Neuromusculoskeletal  2. Movement Related Activities and Participation Affected:  1. General Tasks and Demands  2. Mobility  3. Self Care   Number of elements that affect the Plan of Care: 4+: HIGH COMPLEXITY   CLINICAL PRESENTATION:   Presentation: Evolving clinical presentation with changing clinical characteristics: MODERATE COMPLEXITY   CLINICAL DECISION MAKIN [facility] Mobility Inpatient Short Form  How much difficulty does the patient currently have. .. Unable A Lot A Little None   1. Turning over in bed (including adjusting bedclothes, sheets and blankets)? [] 1   [] 2   [x] 3   [] 4   2. Sitting down on and standing up from a chair with arms ( e.g., wheelchair, bedside commode, etc.)   [] 1   [] 2   [x] 3   [] 4   3. Moving from lying on back to sitting on the side of the bed? [] 1   [] 2   [x] 3   [] 4   How much help from another person does the patient currently need. .. Total A Lot A Little None   4. Moving to and from a bed to a chair (including a wheelchair)? [] 1   [x] 2   [] 3   [] 4   5. Need to walk in hospital room? [] 1   [x] 2   [] 3   [] 4   6. Climbing 3-5 steps with a railing? [x] 1   [] 2   [] 3   [] 4   © , Trustees of [address] [place] [address] [address] [address], under license to [employer].  All rights reserved      Score:  Initial: 14 Most Recent: X (Date: -- ) Interpretation of Tool:  Represents activities that are increasingly more difficult (i.e. Bed mobility, Transfers, Gait). Score 24 23 22-20 19-15 14-10 9-7 6     Modifier CH CI CJ CK CL CM CN      ? Mobility - Walking and Moving Around:     - CURRENT STATUS: CL - 60%-79% impaired, limited or restricted    - GOAL STATUS: CL - 60%-79% impaired, limited or restricted    - D/C STATUS:  ---------------To be determined---------------  [place] / Product Type: Medicare /      Medical Necessity:     · Patient demonstrates good rehab potential due to higher previous functional level. Reason for Services/Other Comments:  · Patient continues to require present interventions due to patient's inability to function at baseline. Use of outcome tool(s) and clinical judgement create a POC that gives a: Questionable prediction of patient's progress: MODERATE COMPLEXITY            TREATMENT:   (In addition to Assessment/Re-Assessment sessions the following treatments were rendered)   Pre-treatment Symptoms/Complaints: None  Pain: Initial:   Pain Intensity 1: 0  Post Session:  None     Gait Training (  15 Minutes):  Gait training to improve and/or restore physical functioning as related to mobility, strength and balance. Ambulated  (8' then an additional 2') with Minimal assistance;Contact guard assistance using a Walker, rolling and moderate Safety awareness training;Verbal cues; Tactile cues related to their sequencing, walker manipulation, UE support on rolling walker, step length, posture, and weight shifts to promote proper body alignment, promote proper body posture and promote proper body mechanics. Instruction in performance of the above deficits to correct overall gait quality and functional mobility. Therapeutic Activity: (    10 Minutes):   Therapeutic activities including bed mobility training, transfer training from various surface heights, static/dynamic standing balance activities, posture training, instruction in sequencing with rolling walker, scooting, and patient education  to improve mobility, strength, balance and coordination. Required moderate Safety awareness training;Verbal cues; Tactile cues to promote static and dynamic balance in standing and promote coordination of bilateral, lower extremity(s). Therapeutic Exercise: (   Minutes):  Exercises per grid below to improve mobility, strength, balance and stiffness/soreness. Required moderate visual, verbal, manual and tactile cues to promote proper body alignment, promote proper body posture and promote proper body mechanics. Progressed range, repetitions and complexity of movement as indicated. Date:  2/5/18 Date:   Date:     ACTIVITY/EXERCISE AM PM AM PM AM PM   Ambulation:           Distance  Device  Duration         Seated Heel Raises x15B A        Seated Toe Raises x15B A        Seated Long Arc Quads x15B   AA-L, A-R        Seated Marching x15B  AA-L, A-R        Seated Hip Abduction x15B  AA-L, A-R                 B = bilateral; AA = active assistive; A = active; P = passive          Braces/Orthotics/Lines/Etc:   · O2 Device: Nasal cannula  Treatment/Session Assessment:    · Response to Treatment:  See above  · Interdisciplinary Collaboration:   o Physical Therapy Assistant  o Registered Nurse  · After treatment position/precautions:   o Supine in bed  o Bed alarm/tab alert on  o Bed/Chair-wheels locked  o Bed in low position  o Call light within reach  o RN notified  o Family at bedside   · Compliance with Program/Exercises: Will assess as treatment progresses. · Recommendations/Intent for next treatment session: \"Next visit will focus on advancements to more challenging activities and reduction in assistance provided\".   Total Treatment Duration:  PT Patient Time In/Time Out  Time In: 1303  Time Out: [place], PTA

## 2018-02-05 NOTE — DISCHARGE SUMMARY
Discharge Summary     Patient: Khalif Mancia MRN: [id number]  SSN: [id number]    YOB: 1932  Age: 80 y.o. Sex: male       Admit Date: 2/1/2018    Discharge Date: 2/5/2018      Admission Diagnoses: Hip fracture [facility])  left hip fracture    Discharge Diagnoses:   Problem List as of 2/5/2018  Date Reviewed: 1/23/2018          Codes Class Noted - Resolved    * (Principal)Hip fracture ([facility] ICD-10-CM: S72.009A  ICD-9-CM: 820.8  2/1/2018 - Present        Pulmonary edema ICD-10-CM: J81.1  ICD-9-CM: 279  2/1/2018 - Present        Chronic respiratory failure with hypoxia ([facility] ICD-10-CM: J96.11  ICD-9-CM: 518.83, 799.02  3/29/2016 - Present    Overview Addendum 1/23/2018  9:12 AM by Jazlyn Wiley LPN     Last Assessment & Plan:   Stable. 1. 6mwt ordered and reviewed  2. Requires 2L home O2 with ambulation and with exercise. Last Assessment & Plan:   Stable. 1. 6mwt ordered and reviewed  2. Requires 2L home O2 with ambulation and with exercise.              Abdominal aortic aneurysm [facility]) ICD-10-CM: I71.4  ICD-9-CM: 441.4  12/10/2015 - Present    Overview Signed 12/10/2015  9:33 AM by Momo Aguilar     In 2005             Benign prostatic hyperplasia ICD-10-CM: N40.0  ICD-9-CM: 600.00  12/10/2015 - Present        Cardiovascular disease ICD-10-CM: I25.10  ICD-9-CM: 429.2  12/10/2015 - Present        COPD (chronic obstructive pulmonary disease) ([facility] ICD-10-CM: J44.9  ICD-9-CM: 386  12/10/2015 - Present        Low testosterone ICD-10-CM: E34.9  ICD-9-CM: 259.9  12/10/2015 - Present        Essential hypertension, benign ICD-10-CM: I10  ICD-9-CM: 401.1  12/10/2015 - Present        Allergic rhinitis ICD-10-CM: J30.9  ICD-9-CM: 477.9  12/10/2015 - Present        Raynaud's syndrome ICD-10-CM: I73.00  ICD-9-CM: 443.0  12/10/2015 - Present        Erectile dysfunction ICD-10-CM: N52.9  ICD-9-CM: 607.84  12/10/2015 - Present        RESOLVED: SOB (shortness of breath) ICD-10-CM: R06.02  ICD-9-CM: 786.05  3/9/2017 - 2/1/2018    Overview Signed 7/18/2017  2:17 PM by Hang Almonte LPN     Last Assessment & Plan:   Stable, 6mwt ordered and reviewed. Management per copd, hypoxia. 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 HRCT             RESOLVED: [place] ICD-10-CM: I49.9  ICD-9-CM: 427.89  3/28/2016 - 2/1/2018    Overview Signed 1/23/2018  9:12 AM by Hang Almonte LPN     Overview:   Reported by LIN Heller Billing physician. Last Assessment & Plan:   EKG not done. I placed the patient on telemetry today and is having fairly frequent PVCs. We'll check BMP and magnesium. RESOLVED: Chronic obstructive asthma with exacerbation [facility]) ICD-10-CM: J44.1, J45.901  ICD-9-CM: 493.22  12/10/2015 - 2/1/2018               Discharge Condition: Stable    Hospital Course:   Abdon Zapata is a 80y.o. year-old male with a past medical history of HTN and COPD who presents to ER after a fall in which he tripped over the wheel of a shopping cart and landed on his side and head with immediate pain in his left hip. He was found to have left femur fracture. He had left femur intramedullary nail insertion on 2/2/2018. He recovered well post-operatively. Physical Exam :  General: awake, alert, oriented, sitting in chair, pleasant. Eyes; non icteric, EOMI  Neck; supple, no lymphadenopathy. CV: RRR, no murrmur, no gallop. Pulm; course, no rhonchi nor wheezing  Abdomen : soft, not tender, no guarding. Ext: palpable pedal pulses, some limitation of movement due to pain at the left hip. Neuro: no localized weakness.          Consults: Orthopedics    Significant Diagnostic Studies:   .   Recent Results (from the past 24 hour(s))   HEMOGLOBIN    Collection Time: 02/05/18  5:54 AM   Result Value Ref Range    HGB 11.5 (L) 13.6 - 17.2 g/dL           Disposition: acute rehab facility    Discharge Medications:   Current Discharge Medication List      START taking these medications    Details   enoxaparin (LOVENOX) 30 mg/0.3 mL injection 0.3 mL by SubCUTAneous route every twenty-four (24) hours for 28 days. Indications: PULMONARY THROMBOEMBOLISM PREVENTION, post hip surgery  Qty: 8.4 mL, Refills: 0      traMADol (ULTRAM) 50 mg tablet Take 1 Tab by mouth every six (6) hours as needed. Max Daily Amount: 200 mg. Indications: Pain  Qty: 10 Tab, Refills: 0    Associated Diagnoses: Closed fracture of left hip, initial encounter ([facility]         CONTINUE these medications which have NOT CHANGED    Details   calcium citrate-vitamin d3 (CITRACAL+D) 315-200 mg-unit tab Take 2 Tabs by mouth daily (with breakfast). aspirin delayed-release 81 mg tablet Take  by mouth daily. lisinopril (PRINIVIL, ZESTRIL) 5 mg tablet Take 5 mg by mouth daily. Associated Diagnoses: Essential hypertension, benign      tamsulosin (FLOMAX) 0.4 mg capsule Take 1 Cap by mouth daily. Qty: 90 Cap, Refills: 4      montelukast (SINGULAIR) 10 mg tablet Take 1 Tab by mouth daily. Qty: 90 Tab, Refills: 4    Associated Diagnoses: Mixed simple and mucopurulent chronic bronchitis (HCC)      fluticasone (FLONASE) 50 mcg/actuation nasal spray 2 Sprays by Both Nostrils route daily. Qty: 48 g, Refills: 4    Associated Diagnoses: Mixed simple and mucopurulent chronic bronchitis (HCC)      cholecalciferol (VITAMIN D3) 1,000 unit cap Take  by mouth.      guaiFENesin ER (MUCINEX) 600 mg ER tablet Take 1,200 mg by mouth daily. Biotin 2,500 mcg cap Take  by mouth.          STOP taking these medications       cyanocobalamin (VITAMIN B12) 1,000 mcg/mL injection Comments:   Reason for Stopping:         Oxygen Comments:   Reason for Stopping:         albuterol (PROAIR HFA) 90 mcg/actuation inhaler Comments:   Reason for Stopping:         multivitamin tablet Comments:   Reason for Stopping: Activity: Activity as tolerated  Diet: Cardiac Diet  Wound Care: Keep wound clean and dry    Follow-up Appointments   Procedures    FOLLOW UP VISIT Appointment in: 3 - 5 Days With primary physician. Patient will be evaluated by physician at the rehab facility as well. With primary physician. Patient will be evaluated by physician at the rehab facility as well. Standing Status:   Standing     Number of Occurrences:   1     Order Specific Question:   Appointment in     Answer:   3 - 5 Days     Time spent on discharge is 37 minutes. I have discussed the plan of care with patient and son at the bedside.      Signed By: Rut Figueroa MD     February 5, 2018

## 2018-02-05 NOTE — PROGRESS NOTES
Problem: Mobility Impaired (Adult and Pediatric)  Goal: *Acute Goals and Plan of Care (Insert Text)  LTG:  (1.)Mr. Blanche Cardenas will move from supine to sit and sit to supine , scoot up and down and roll side to side in bed with SUPERVISION within 7 day(s). (2.)Mr. Blanche Cardenas will transfer from bed to chair and chair to bed with CONTACT GUARD ASSIST using the least restrictive device within 7 day(s). (3.)Mr. Blanche Cardenas will ambulate with MINIMAL ASSIST for >or=75 feet with the least restrictive device, appropriate gait pattern and good dynamic standing balance within 7 day(s). (4.)Mr. Blanche Cardenas will perform B LE therapeutic exercises x 20 reps with INDEPENDENCE within 7 days to increase strength for improved safety and independence in transfers and gait.    ______________________________________________________________________________________________    PHYSICAL THERAPY: Daily Note, Treatment Day: 2nd, AM 2/5/2018  INPATIENT: Hospital Day: 5  [place] / Product Type: Medicare /      NAME/AGE/GENDER: Joaquim Armendariz is a 80 y.o. male   PRIMARY DIAGNOSIS: Hip fracture ([facility]  left hip fracture Hip fracture (HCC) Hip fracture (HCC)  Procedure(s) (LRB):  LEFT FEMUR INSERTION INTRA MEDULLARY NAIL (Left)  3 Days Post-Op  ICD-10: Treatment Diagnosis:   · Generalized Muscle Weakness (M62.81)  · Difficulty in walking, Not elsewhere classified (R26.2)   Precaution/Allergies:  Review of patient's allergies indicates no known allergies. ASSESSMENT:     Mr. Blanche Cardenas was supine upon contact and agreeable to PT. Patient able to perform supine to sit and transfer to standing with min assist, additional time, and cues for improved/proper technique. Once standing patient able to perform gait training 4' x 2 (to [facility] then to recliner chair), with use of rolling walker, CGA-min assist and below cues in gait section. Patient demonstrates fair static/dynamic standing balance during standing ADL activity. Patient was later rolled to therapy gym to participate in group therapeutic strengthening exercises to improve functional strength for transfers, gait and overall mobility. Patient requires cues and assistance to perform exercises correctly. Overall slow, steady progress towards physical therapy goals. No goals have been met thus far. Will continue efforts. This section established at most recent assessment   PROBLEM LIST (Impairments causing functional limitations):  1. Decreased Strength  2. Decreased ADL/Functional Activities  3. Decreased Transfer Abilities  4. Decreased Ambulation Ability/Technique  5. Decreased Balance  6. Increased Pain  7. Decreased Activity Tolerance  8. Decreased [place] with Home Exercise Program   INTERVENTIONS PLANNED: (Benefits and precautions of physical therapy have been discussed with the patient.)  1. Balance Exercise  2. Bed Mobility  3. Gait Training  4. Home Exercise Program (HEP)  5. Therapeutic Activites  6. Therapeutic Exercise/Strengthening  7. Transfer Training  8. Group Therapy     TREATMENT PLAN: Frequency/Duration: twice daily for duration of hospital stay  Rehabilitation Potential For Stated Goals: Good     RECOMMENDED REHABILITATION/EQUIPMENT: (at time of discharge pending progress): Due to the probability of continued deficits (see above) this patient will likely need continued skilled physical therapy after discharge. Equipment:    planning to go to rehab and can be assessed for equipment needs at that time. HISTORY:   History of Present Injury/Illness (Reason for Referral):  Per chart: Rik Song is a 80y.o. year-old male with a past medical history of HTN and COPD who presents to ER after a fall earlier today in which he tripped over the wheel of a shopping cart and landed on his side and head with immediate pain in his left hip.  He denies any lightheadedness, dizziness, shortness of breath, chest pain, nausea, vomiting, diarrhea, constipation before or after his fall.     Past Medical History/Comorbidities:   Mr. Iván Kline  has a past medical history of Abdominal aortic aneurysm ([facility] (12/10/2015); Abdominal aortic aneurysm without rupture ([facility] Allergic rhinitis (12/10/2015); Asthma; Benign neoplasm; Benign prostatic hyperplasia (12/10/2015); BPH without urinary obstruction; Cardiovascular disease (12/10/2015); Chronic obstructive asthma with exacerbation ([facility] (12/10/2015); COPD (chronic obstructive pulmonary disease) ([facility] (12/10/2015); Cough with hemoptysis; Erectile dysfunction (12/10/2015); Essential hypertension, benign (12/10/2015); Fracture (10/2014); History of colon polyps; Low testosterone (12/10/2015); Lumbago; Memory loss; Overflow incontinence; Raynaud's syndrome (12/10/2015); Rotator cuff tendinitis; Thrombocytopenia ([facility] and Urinary frequency. Mr. Iván Kline  has a past surgical history that includes hx hernia repair (1980); hx colonoscopy; hx fracture tx (10/2014); and hx cataract removal (6/2016-right). Social History/Living Environment:   Home Environment: Private residence  # Steps to Enter: 0  Rails to Enter: No  Wheelchair Ramp: Yes  One/Two Story Residence: One story  Living Alone: No  Support Systems: Spouse/Significant Other/Partner  Patient Expects to be Discharged to[de-identified] Rehabilitation facility  Current DME Used/Available at Home: None  Tub or Shower Type: Shower  Prior Level of Function/Work/Activity:  Pt was independent with all functional mobility and gait without assistive device. No other recent falls. Drives. Lives with wife who is blind but can assist as able. Has a straight cane at home but does not use. No other medical equipment.  Son lives close by and can assist.      Number of Personal Factors/Comorbidities that affect the Plan of Care: 1-2: MODERATE COMPLEXITY   EXAMINATION:   Most Recent Physical Functioning:   Gross Assessment:                  Posture:     Balance:  Sitting: Impaired  Sitting - Static: Good (unsupported)  Sitting - Dynamic: Fair (occasional)  Standing: Impaired  Standing - Static: Fair  Standing - Dynamic : Fair Bed Mobility:  Supine to Sit: Minimum assistance  Sit to Supine:  (NT)  Wheelchair Mobility:     Transfers:  Sit to Stand: Minimum assistance  Stand to Sit: Minimum assistance  Gait:  Left Side Weight Bearing: As tolerated  Base of Support: Shift to right  Speed/Marifer: Slow  Step Length: Right shortened;Left shortened  Gait Abnormalities: Decreased step clearance;Shuffling gait; Step to gait  Distance (ft):  (4' x 2)  Assistive Device: Walker, rolling  Ambulation - Level of Assistance: Minimal assistance;Contact guard assistance  Interventions: Safety awareness training;Verbal cues; Tactile cues      Body Structures Involved:  1. Bones  2. Joints  3. Muscles Body Functions Affected:  1. Neuromusculoskeletal  2. Movement Related Activities and Participation Affected:  1. General Tasks and Demands  2. Mobility  3. Self Care   Number of elements that affect the Plan of Care: 4+: HIGH COMPLEXITY   CLINICAL PRESENTATION:   Presentation: Evolving clinical presentation with changing clinical characteristics: MODERATE COMPLEXITY   CLINICAL DECISION MAKIN [facility] Inpatient Short Form  How much difficulty does the patient currently have. .. Unable A Lot A Little None   1. Turning over in bed (including adjusting bedclothes, sheets and blankets)? [] 1   [] 2   [x] 3   [] 4   2. Sitting down on and standing up from a chair with arms ( e.g., wheelchair, bedside commode, etc.)   [] 1   [] 2   [x] 3   [] 4   3. Moving from lying on back to sitting on the side of the bed? [] 1   [] 2   [x] 3   [] 4   How much help from another person does the patient currently need. .. Total A Lot A Little None   4. Moving to and from a bed to a chair (including a wheelchair)? [] 1   [x] 2   [] 3   [] 4   5. Need to walk in hospital room?    [] 1   [x] 2   [] 3 [] 4   6. Climbing 3-5 steps with a railing? [x] 1   [] 2   [] 3   [] 4   © 2007, Trustees of [facility], under license to [employer]. All rights reserved      Score:  Initial: 14 Most Recent: X (Date: -- )    Interpretation of Tool:  Represents activities that are increasingly more difficult (i.e. Bed mobility, Transfers, Gait). Score 24 23 22-20 19-15 14-10 9-7 6     Modifier CH CI CJ CK CL CM CN      ? Mobility - Walking and Moving Around:     - CURRENT STATUS: CL - 60%-79% impaired, limited or restricted    - GOAL STATUS: CL - 60%-79% impaired, limited or restricted    - D/C STATUS:  ---------------To be determined---------------  [place] / [place] / Product Type: Medicare /      Medical Necessity:     · Patient demonstrates good rehab potential due to higher previous functional level. Reason for Services/Other Comments:  · Patient continues to require present interventions due to patient's inability to function at baseline. Use of outcome tool(s) and clinical judgement create a POC that gives a: Questionable prediction of patient's progress: MODERATE COMPLEXITY            TREATMENT:   (In addition to Assessment/Re-Assessment sessions the following treatments were rendered)   Pre-treatment Symptoms/Complaints: None  Pain: Initial:   Pain Intensity 1: 0  Post Session:  None     Gait Training (  15 Minutes):  Gait training to improve and/or restore physical functioning as related to mobility, strength and balance. Ambulated  (4' x 2) with Minimal assistance;Contact guard assistance using a Walker, rolling and moderate Safety awareness training;Verbal cues; Tactile cues related to their sequencing, walker manipulation, UE support on rolling walker, step length, posture, and weight shifts to promote proper body alignment, promote proper body posture and promote proper body mechanics.   Instruction in performance of the above deficits to correct overall gait quality and functional mobility. Therapeutic Activity: (    10 Minutes): Therapeutic activities including bed mobility training, transfer training from various surface heights, static/dynamic standing balance activities, posture training, instruction in sequencing with rolling walker, scooting, and patient education  to improve mobility, strength, balance and coordination. Required moderate Safety awareness training;Verbal cues; Tactile cues to promote static and dynamic balance in standing and promote coordination of bilateral, lower extremity(s). Group Therapeutic Exercise: (  10 Minutes):  Exercises per grid below to improve mobility, strength, balance and stiffness/soreness. Required moderate visual, verbal, manual and tactile cues to promote proper body alignment, promote proper body posture and promote proper body mechanics. Progressed range, repetitions and complexity of movement as indicated. Date:  2/5/18 Date:   Date:     ACTIVITY/EXERCISE AM PM AM PM AM PM   Ambulation:           Distance  Device  Duration         Seated Heel Raises x15B A        Seated Toe Raises x15B A        Seated Long Arc Quads x15B   AA-L, A-R        Seated Marching x15B  AA-L, A-R        Seated Hip Abduction x15B  AA-L, A-R                 B = bilateral; AA = active assistive; A = active; P = passive          Braces/Orthotics/Lines/Etc:   · O2 Device: Nasal cannula  Treatment/Session Assessment:    · Response to Treatment:  See above  · Interdisciplinary Collaboration:   o Physical Therapy Assistant  o Registered Nurse  · After treatment position/precautions:   o Up in chair  o Bed alarm/tab alert on  o Bed/Chair-wheels locked  o Call light within reach  o RN notified  o Family at bedside   · Compliance with Program/Exercises: Will assess as treatment progresses. · Recommendations/Intent for next treatment session:   \"Next visit will focus on advancements to more challenging activities and reduction in assistance provided\".   Total Treatment Duration:  PT Patient Time In/Time Out  Time In: 0835 (1135)  Time Out: 0900 (1145)    Dontrell Esposito PTA

## 2018-02-05 NOTE — PROGRESS NOTES
TRANSFER - OUT REPORT:    Verbal report given to Regina(name) on Franco[facility] Winston  [facility]       Report consisted of patients Situation, Background, Assessment and   Recommendations(SBAR). Information from the following report(s) SBAR, MAR and Recent Results was reviewed with the receiving nurse. Lines:       Opportunity for questions and clarification was provided.       Patient transported with:   O2 @ 4 liters

## 2018-02-05 NOTE — BRIEF OP NOTE
BRIEF OPERATIVE NOTE    Date of Procedure: 2/2/2018   Preoperative Diagnosis: left hip fracture  Postoperative Diagnosis: Left intertrochanteric femur fracture    Procedure(s):  LEFT FEMUR INSERTION INTRA MEDULLARY NAIL  Surgeon(s) and Role:     * Beata Paulson MD - Primary         Assistant Staff: None      Surgical Staff:  Circ-1: Jose Vickers RN  Scrub Tech-1: Tilford Cogan Pyhala  Scrub Tech-2: Marnie Decker  Scrub Tech-3: Patrick Mahmood  Event Time In   Incision Start 1426   Incision Close 1447     Anesthesia: Spinal   Estimated Blood Loss: 100 cc  Specimens: * No specimens in log *   Findings: none   Complications: none  Implants:   Implant Name Type Inv.  Item Serial No.  Lot No. LRB No. Used Action   NAIL VINAYAK 130D [id number] LT -- TFNA STRL - [id number]  NAIL VINAYAK 130D [id number] LT -- [employer] STRL  [employer] [place] [id number] Left 1 Implanted   BLADE HELCL ADV TFNA 100MM -- STRL - [id number]   BLADE HELCL ADV TFNA 100MM -- [address] [facility] [id number] Left 1 Implanted

## 2018-02-05 NOTE — OP NOTES
Operative Report     Patient: Miriam Wood MRN: [id number]  SSN: [id number]    YOB: 1932  Age: 80 y.o. Sex: male      Indications: Miriam Wood was admitted to the hospital with a brief history of left intertrochanteric hip fracture. The patient now presents for ORIF. Date of Surgery: 2/2/2018     Preoperative Diagnosis:  left hip fracture    Postoperative Diagnosis: Left intertrochanteric femur fracture    Surgeon(s) and Role:     * Bob Esposito MD - Primary     Anesthesia: Spinal    Procedures: Procedure(s):  LEFT FEMUR INSERTION INTRA MEDULLARY NAIL       Procedure in Detail:  The patient was brought to the operative suite and placed in supine position. After adequate anesthesia was achieved, the patient was placed upon the fracture table. Peroneal post and foot holders were well padded. The patient underwent a closed reduction of the left intertrochanteric hip fracture. This was achieved by longitudinal traction, internal rotation, and adduction. AP and lateral fluoroscopic images confirmed the fracture was now reduced anatomically. left hip was then prepped and draped in the usual sterile fashion. A 3 cm incision was made over the tip of the greater trochanter. Hemostasis was achieved with Bovie cautery. Guide pin for a Synthes trochanteric fixation nail was inserted through the tip of the trochanter, across the fracture site, and into the canal of the femur. Its position was confirmed by fluoroscopy. The proximal reamer was then passed by hand over this guide pin in order to open up a proximal portal.  Guide pin and reamer were removed a ball-tip guide wire was inserted through this portal, across the fracture site, and down to the epiphyseal scar of the knee. The position of the guide wire was confirmed by AP and lateral fluoroscopic images. The 11.5 mm reamer was passed as a sound, and reaming was only performed by power if necessary.   The Synthes trochanteric fixation nail was then passed over the guide wire without difficulty. The guide wire was removed and the targeting guide was inserted through a stab wound in the lateral aspect of the proximal femur. Guide pin was then inserted through the lateral cortex into the neck and head. It stopped just short of the subchondral bone. AP and lateral fluoroscopic images confirmed that the position of the guide pin was centered in the head. Depth gauge was used to determine the appropriate length helical blade. The reamers were passed over the guide pin in order to open up the lateral cortex neck and head. The appropriate length helical blade was then passed over the guide wire without difficulty. The set screw was passed from above with a spring wrench. Traction was removed. The position of the helical blade was confirmed by fluoroscopy. The fracture was then compressed to a stable position, using the proximal targeting guide. At this point, the targeting guides were removed. AP and lateral fluoroscopic images confirmed the fracture was reduced anatomically. Wounds were then irrigated with normal saline and closed in layers. Sterile, compressive dressings were applied. The patient was then removed from the fracture table and transferred to the postanesthesia care unit, alert and oriented, under the care of anesthesia. Estimated Blood Loss: 100 CC    Specimens: * No specimens in log *      Implants:   Implant Name Type Inv.  Item Serial No.  Lot No. LRB No. Used Action   NAIL VINAYAK 130D [id number] LT -- TFNA STRL - [id number]  NAIL VINAYAK 130D [id number] LT -- TFNA STRL  [employer] [place] [id number] Left 1 Implanted   BLADE HELCL ADV TFNA 100MM -- STRL - [id number]   BLADE HELCL ADV TFNA 100MM -- STRL   [employer] [place] [id number] Left 1 Implanted       Tourniquet Time: * No tourniquets in log *     Closure: Primary    Complications: None     Signed By: Alissa Conteh MD     February 5, 2018

## 2018-02-05 NOTE — PROGRESS NOTES
Problem: Self Care Deficits Care Plan (Adult)  Goal: *Acute Goals and Plan of Care (Insert Text)  1. Patient will complete functional transfers with minimal assistance while maintaining WBAT status in LLE and with adaptive equipment as needed. 2. Patient will complete lower body bathing and dressing with minimal assistance and adaptive equipment as needed. 3. Patient will complete toileting and toilet transfer with minimal assistance. 4. Patient will tolerate 20 minutes of OT treatment with no rest breaks to increase activity tolerance for ADLs. 5. Patient will participate in at least 20 minutes of BUE therapeutic exercises to strengthen BUE for functional transfers. Timeframe: 7 visits       OCCUPATIONAL THERAPY: Daily Note, Treatment Day: 1st and AM 2/5/2018  INPATIENT: Hospital Day: 5  [place] / [place] / Product Type: Medicare /      NAME/AGE/GENDER: Kel Garnett is a 80 y.o. male   PRIMARY DIAGNOSIS:  Hip fracture ([address]  left hip fracture Hip fracture (HCC) Hip fracture (HCC)  Procedure(s) (LRB):  LEFT FEMUR INSERTION INTRA MEDULLARY NAIL (Left)  3 Days Post-Op  ICD-10: Treatment Diagnosis:    · Pain in left hip (M25.552)  · Stiffness of Left Hip, Not elsewhere classified (M25.652)  · History of falling (Z91.81)   Precautions/Allergies:    WBAT LLE   Fall Precautions   Review of patient's allergies indicates no known allergies. ASSESSMENT:   Mr. Tomeka Sawyer was admitted for the above diagnosis. Pt was brought to therapy gym to participate in group exercises (in grid below) to increase UE strength and activity tolerance to perform mobility and ADLs. Pt required visual, verbal, and tactile cueing to complete exercises. Pt tolerated session well. Returned to room by recliner. Pt is to be discharged to SNF today. This section established at most recent assessment   PROBLEM LIST (Impairments causing functional limitations):  1.  Decreased ADL/Functional Activities  2. Decreased Transfer Abilities  3. Decreased Ambulation Ability/Technique  4. Decreased Balance  5. Increased Pain  6. Decreased Activity Tolerance  7. Decreased Flexibility/Joint Mobility  8. Decreased Knowledge of Precautions   INTERVENTIONS PLANNED: (Benefits and precautions of occupational therapy have been discussed with the patient.)  1. Activities of daily living training  2. Adaptive equipment training  3. Donning&doffing training  4. Group therapy  5. Therapeutic activity  6. Therapeutic exercise     TREATMENT PLAN: Frequency/Duration: Follow patient 6x/ week to address above goals. Rehabilitation Potential For Stated Goals: Good     RECOMMENDED REHABILITATION/EQUIPMENT: (at time of discharge pending progress): Due to the probability of continued deficits (see above) this patient will likely need continued skilled occupational therapy after discharge. Equipment:    to be determined              OCCUPATIONAL PROFILE AND HISTORY:   History of Present Injury/Illness (Reason for Referral):  Per H&P, \"Octavio Lopez is a 80y.o. year-old male with a past medical history of HTN and COPD who presents to ER after a fall earlier today in which he tripped over the wheel of a shopping cart and landed on his side and head with immediate pain in his left hip. He denies any lightheadedness, dizziness, shortness of breath, chest pain, nausea, vomiting, diarrhea, constipation before or after his fall. \"  Past Medical History/Comorbidities:   Mr. Claudene Pitt  has a past medical history of Abdominal aortic aneurysm ([facility] (12/10/2015); Abdominal aortic aneurysm without rupture ([address] Allergic rhinitis (12/10/2015); Asthma; Benign neoplasm; Benign prostatic hyperplasia (12/10/2015); BPH without urinary obstruction; Cardiovascular disease (12/10/2015); Chronic obstructive asthma with exacerbation ([address] (12/10/2015); COPD (chronic obstructive pulmonary disease) ([address] (12/10/2015); Cough with hemoptysis;  Erectile dysfunction (12/10/2015); Essential hypertension, benign (12/10/2015); Fracture (10/2014); History of colon polyps; Low testosterone (12/10/2015); Lumbago; Memory loss; Overflow incontinence; Raynaud's syndrome (12/10/2015); Rotator cuff tendinitis; Thrombocytopenia ([facility] and Urinary frequency. Mr. Melanie Reyes  has a past surgical history that includes hx hernia repair (1980); hx colonoscopy; hx fracture tx (10/2014); and hx cataract removal (6/2016-right). Social History/Living Environment:   Home Environment: Private residence  # Steps to Enter: 0  Rails to Enter: No  Wheelchair Ramp: Yes  One/Two Story Residence: One story  Living Alone: No  Support Systems: Spouse/Significant Other/Partner  Patient Expects to be Discharged to[de-identified] Rehabilitation facility  Current DME Used/Available at Home: None  Tub or Shower Type: Shower  Prior Level of Function/Work/Activity:  Patient lives with wife. Typically independent with ADLs and driving. No recent falls. Does not use the cane taht he has. Number of Personal Factors/Comorbidities that affect the Plan of Care: Brief history (0):  LOW COMPLEXITY   ASSESSMENT OF OCCUPATIONAL PERFORMANCE[de-identified]   Activities of Daily Living:           Basic ADLs (From Assessment) Complex ADLs (From Assessment)   Basic ADL  Feeding: Setup  Oral Facial Hygiene/Grooming: Setup  Bathing: Maximum assistance  Upper Body Dressing: Setup  Lower Body Dressing: Maximum assistance  Toileting: Maximum assistance Instrumental ADL  Meal Preparation: Total assistance  Homemaking: Total assistance  Medication Management: Independent  Financial Management: Independent   Grooming/Bathing/Dressing Activities of Daily Living     Cognitive Retraining  Safety/Judgement: Awareness of environment                               Most Recent Physical Functioning:   Gross Assessment:                  Posture:  Posture (WDL): Exceptions to WDL  Posture Assessment:  Forward head, Rounded shoulders  Balance:  Sitting: Impaired  Sitting - Static: Good (unsupported)  Sitting - Dynamic: Fair (occasional) Bed Mobility:     Wheelchair Mobility:     Transfers:                   Patient Vitals for the past 6 hrs:   BP BP Patient Position SpO2 O2 Flow Rate (L/min) Pulse   18 0734 136/79 At rest 96 % - 73   18 0746 - - 97 % 3 l/min -   18 1206 118/76 At rest 93 % - (!) 52       Mental Status  Neurologic State: Alert, Appropriate for age  Orientation Level: Oriented X4  Cognition: Follows commands  Perception: Appears intact  Perseveration: No perseveration noted  Safety/Judgement: Awareness of environment                          Physical Skills Involved:  1. Range of Motion  2. Balance  3. Strength  4. Activity Tolerance  5. Pain (acute) Cognitive Skills Affected (resulting in the inability to perform in a timely and safe manner):  1. None Psychosocial Skills Affected:  1. Habits/Routines  2. Environmental Adaptation   Number of elements that affect the Plan of Care: 5+:  HIGH COMPLEXITY   CLINICAL DECISION MAKIN[address] [place] [id number] AM-PAC 6 Clicks   Daily Activity Inpatient Short Form  How much help from another person does the patient currently need. .. Total A Lot A Little None   1. Putting on and taking off regular lower body clothing? [] 1   [x] 2   [] 3   [] 4   2. Bathing (including washing, rinsing, drying)? [] 1   [x] 2   [] 3   [] 4   3. Toileting, which includes using toilet, bedpan or urinal?   [] 1   [x] 2   [] 3   [] 4   4. Putting on and taking off regular upper body clothing? [] 1   [] 2   [x] 3   [] 4   5. Taking care of personal grooming such as brushing teeth? [] 1   [] 2   [x] 3   [] 4   6. Eating meals? [] 1   [] 2   [x] 3   [] 4   © , Trustees of [address] [place] [address], under license to [employer].  All rights reserved      Score:  Initial: 15 Most Recent: X (Date: -- )    Interpretation of Tool:  Represents activities that are increasingly more difficult (i.e. Bed mobility, Transfers, Gait). Score 24 23 22-20 19-15 14-10 9-7 6     Modifier CH CI CJ CK CL CM CN      ? Self Care:     - CURRENT STATUS: CK - 40%-59% impaired, limited or restricted    - GOAL STATUS: CJ - 20%-39% impaired, limited or restricted    - D/C STATUS:  ---------------To be determined---------------  [place] / [place] / Product Type: Medicare /      Medical Necessity:     · Patient demonstrates good rehab potential due to higher previous functional level. Reason for Services/Other Comments:  · Patient continues to require present interventions due to patient's inability to care for self at baseline level of function. Use of outcome tool(s) and clinical judgement create a POC that gives a: MODERATE COMPLEXITY         TREATMENT:   (In addition to Assessment/Re-Assessment sessions the following treatments were rendered)     Pre-treatment Symptoms/Complaints:  None   Pain: Initial:   Pain Intensity 1: 0  Post Session:  same     Group Therapeutic Exercise: (10 minutes):  Exercises per grid below to improve mobility, strength and activity tolerance. Required minimal visual, verbal and tactile cues to promote proper body alignment and promote proper body mechanics. Progressed resistance and repetitions as indicated.     UE Exercises (with red theraband) Date:  2/5/2018 Date:   Date:     Activity/Exercise Parameters Parameters Parameters   Shoulder Abd/Adduction 20 reps with band     Shoulder Flexion 20 reps AROM      Arm Circles (forward and backwards) 10 reps each way     Punches 15 reps with theraband                             Braces/Orthotics/Lines/Etc:   · IV  · O2 Device: Nasal cannula  Treatment/Session Assessment:    · Response to Treatment:  Tolerated well   · Interdisciplinary Collaboration:   o Certified Occupational Therapy Assistant  o Registered Nurse  · After treatment position/precautions:   o Up in chair  o Bed alarm/tab alert on  o Bed/Chair-wheels locked  o Call light within reach  o Family at bedside   · Compliance with Program/Exercises: compliant all of the time. · Recommendations/Intent for next treatment session: \"Next visit will focus on advancements to more challenging activities and reduction in assistance provided\".   Total Treatment Duration:  OT Patient Time In/Time Out  Time In: 1115  Time Out: Federico Valera

## 2018-02-05 NOTE — PROGRESS NOTES
Pt states he \"did fine\" with anesthesia care and has no complaint at this time. Appears calm and comfortable.

## 2018-02-05 NOTE — PROGRESS NOTES
Bed offer accepted to [facility] for today, MD aware, will see pt and possible DC today

## 2018-02-05 NOTE — PROGRESS NOTES
Pt will DC today to [facility] to room 113 D report number [phone], transport time arranged with [facility] for 1430 today

## 2018-02-06 ENCOUNTER — PATIENT OUTREACH (OUTPATIENT)
Dept: CASE MANAGEMENT | Age: 83
End: 2018-02-06

## 2018-02-06 NOTE — PROGRESS NOTES
Please note this patient was IP d/c to [facility] and will be followed by Stef Albarado RN until d/c. Lisa Martins, LPN/ Care Coordinator  [facility] [address]  [email or web address]. [facility] Centerhis note will not be viewable in [address]

## 2018-02-06 NOTE — PROGRESS NOTES
Spoke with wife and patient is currently in rehab facility for several weeks and will call a few days prior to discharge from rehab facility and schedule f/u/TD

## 2018-03-20 ENCOUNTER — HOSPITAL ENCOUNTER (OUTPATIENT)
Dept: PHYSICAL THERAPY | Age: 83
Discharge: HOME | End: 2018-03-20
Payer: MEDICARE

## 2018-03-20 PROCEDURE — 97161 PT EVAL LOW COMPLEX 20 MIN: CPT

## 2018-03-20 PROCEDURE — G8979 MOBILITY GOAL STATUS: HCPCS

## 2018-03-20 PROCEDURE — G8978 MOBILITY CURRENT STATUS: HCPCS

## 2018-03-20 NOTE — THERAPY EVALUATION
Tommy Young  : 1932  [place]  Secondary:  [address]  at [facility] IMMANUEL BALDWIN  [address] [facility], [address], [address] [facility]  [phone]   [phone]        OUTPATIENT PHYSICAL THERAPY:Initial Assessment 3/20/2018    ICD-10: Treatment Diagnosis: Displaced intertrochanteric fracture of left femur, sequela (S72.142S); Unsteadiness on feet (R26.81); Pain in left hip (M25.552)  Precautions/Allergies:   Review of patient's allergies indicates no known allergies. Fall Risk Score: 7 (? 5 = High Risk)  MD Orders: hip abductor, quad strengthening, gait training WBAT MEDICAL/REFERRING DIAGNOSIS:  s/p ORIF LT IT    DATE OF ONSET: Injury 18; surgery 18  REFERRING PHYSICIAN: Allyson Ch MD  RETURN PHYSICIAN APPOINTMENT: 18     INITIAL ASSESSMENT:  Mr. Mitra Bhatia presents s/p ORIF of the left hip after a fall at [facility]. He presents with decreased LE strength, decreased mobility, decreased gait and balance. Prior to the fall he was able to ambulate community distances with no assistive device. He is currently using a rolling walker for ambulation. He is at risk for falls based on TUG time. He will benefit from skilled physical therapy to increase functional mobility and decrease risk for falls. PROBLEM LIST (Impacting functional limitations):  1. Decreased Strength  2. Decreased ADL/Functional Activities  3. Decreased Ambulation Ability/Technique  4. Decreased Balance  5. Increased Pain INTERVENTIONS PLANNED:  1. Gait Training  2. Manual Therapy  3. Therapeutic Activites  4. Therapeutic Exercise/Strengthening   5. Modalities as needed for pain   TREATMENT PLAN:  Effective Dates: 3-20-18 TO 2018 (90 days). Frequency/Duration: 2-3 times a week for 90 Days  GOALS: (Goals have been discussed and agreed upon with patient.)  Short-Term Functional Goals: Time Frame: 4 weeks  1.  Pt will increase L hip abductor strength to 4/5 for improved gait.  2. Pt will improve TUG time to 15 sec for improved mobility. 3. Pt will be independent with HEP. 4. Pt will report pain 2/10 with daily activities. Discharge Goals: Time Frame: 12 weeks  1. Pt will increase L LE strength to 5/5 for improved gait and mobility. 2. Pt will improve TUG time to 12 sec for decreased risk for falls. 3. Pt will report pain 1/10 with daily activities. 4. Pt will be able to balance on L single leg for 5 sec for gait with no assistive device. 5. Pt will be able to ambulate with least restrictive assistive device over various terrain with supervision. Rehabilitation Potential For Stated Goals: Good  Regarding Alban Jung's therapy, I certify that the treatment plan above will be carried out by a therapist or under their direction. Thank you for this referral,    Mehdi Peacock PT         Referring Physician Signature: Miguel Myers MD              Date                      HISTORY:   History of Present Injury/Illness (Reason for Referral): He was pushing a small cart at [address] [place] [address] and his foot caught the wheel when he turned and he feel down. Injury happened feb 1st. They called EMS and took him to [address] [place] [address]. He had hip surgery on 2-2-18. Then he went to [facility] after his hip surgery. He did have home health come out to the house for a couple of visits. He now presents for outpatient therapy. Past Medical History/Comorbidities:   Mr. Blanche Cardenas  has a past medical history of Abdominal aortic aneurysm ([facility] [address] (12/10/2015); Abdominal aortic aneurysm without rupture ([address] Allergic rhinitis (12/10/2015); Asthma; Benign neoplasm; Benign prostatic hyperplasia (12/10/2015); BPH without urinary obstruction; Cardiovascular disease (12/10/2015); Chronic obstructive asthma with exacerbation ([address] (12/10/2015); COPD (chronic obstructive pulmonary disease) ([address] (12/10/2015); Cough with hemoptysis; Erectile dysfunction (12/10/2015);  Essential hypertension, benign (12/10/2015); Fracture (10/2014); History of colon polyps; Low testosterone (12/10/2015); Lumbago; Memory loss; Overflow incontinence; Raynaud's syndrome (12/10/2015); Rotator cuff tendinitis; Thrombocytopenia ([facility] [address] and Urinary frequency. Mr. Julieta Nieto  has a past surgical history that includes hx hernia repair (1980); hx colonoscopy; hx fracture tx (10/2014); hx cataract removal (6/2016-right); and hx orthopaedic. Social History/Living Environment:    Lives in a 1 story house with wife. 1 small step to get inside. They do have a ramp to get inside also. Prior Level of Function/Work/Activity:  Ambulated with on assistive device. He did go out of the house with his wife and perform light shopping. Goal is to be able to walk normal.    Current Medications:       Current Outpatient Prescriptions:     lisinopril (PRINIVIL, ZESTRIL) 5 mg tablet, Take 5 mg by mouth daily. , Disp: , Rfl:     tamsulosin (FLOMAX) 0.4 mg capsule, Take 1 Cap by mouth daily. , Disp: 90 Cap, Rfl: 4    montelukast (SINGULAIR) 10 mg tablet, Take 1 Tab by mouth daily. , Disp: 90 Tab, Rfl: 4    fluticasone (FLONASE) 50 mcg/actuation nasal spray, 2 Sprays by Both Nostrils route daily. , Disp: 48 g, Rfl: 4    calcium citrate-vitamin d3 (CITRACAL+D) 315-200 mg-unit tab, Take 2 Tabs by mouth daily (with breakfast). , Disp: , Rfl:     cholecalciferol (VITAMIN D3) 1,000 unit cap, Take  by mouth., Disp: , Rfl:     guaiFENesin ER (MUCINEX) 600 mg ER tablet, Take 1,200 mg by mouth daily. , Disp: , Rfl:     Biotin 2,500 mcg cap, Take  by mouth., Disp: , Rfl:     aspirin delayed-release 81 mg tablet, Take  by mouth daily. , Disp: , Rfl:    Date Last Reviewed:  3-20-18   Number of Personal Factors/Comorbidities that affect the Plan of Care: 1-2: MODERATE COMPLEXITY   EXAMINATION:   Observation/Orthostatic Postural Assessment:          Pt arrived to physical therapy with rolling walker.  He had slight difficulty getting from sit to stand from a standard chair. Palpation:          No tenderness to palpation around the left hip. ROM:          Hip flexion ROM WNLs. Strength:      LLE Strength  L Hip Flexion: 4+  L Hip ABduction: 3+  L Hip Internal Rotation: 4+  L Hip External Rotation: 4+  L Knee Flexion: 4+  L Knee Extension: 4+    RLE Strength  R Hip Flexion: 5  R Hip Internal Rotation: 5  R Hip External Rotation: 5  R Knee Flexion: 5  R Knee Extension: 5        Special Tests: tightness in hamstrings bilateral  Functional Mobility:         Gait/Ambulation:  Pt ambulates with rolling walker with equal step length with slight trunk flexion posture        Transfers:  Independent with transfers        Bed Mobility:  Min assist with supine to sit with L LE and with rolling from supine to right side   Body Structures Involved:  1. Bones  2. Joints  3. Muscles Body Functions Affected:  1. Neuromusculoskeletal Activities and Participation Affected:  1. Community, Social and [place]   Number of elements (examined above) that affect the Plan of Care: 3: MODERATE COMPLEXITY   CLINICAL PRESENTATION:   Presentation: Stable and uncomplicated: LOW COMPLEXITY   CLINICAL DECISION MAKING:   Outcome Measure: Tool Used: Timed Up and Go (TUG)  Score:  Initial: 19 seconds Most Recent: X seconds (Date: -- )   Interpretation of Score: The test measures, in seconds, the time taken by an individual to stand up from a standard arm chair (seat height 46 cm [18 in], arm height 65 cm [25.6 in]), walk a distance of 3 meters (118 in, approx 10 ft), turn, walk back to the chair and sit down. If the individual takes longer than 14 seconds to complete TUG, this indicates risk for falls. Score 7 7.5-10.5 11-14 14.5-17.5 18-21 21.5-24.5 25+   Modifier CH CI CJ CK CL CM CN     ?  Mobility - Walking and Moving Around:     - CURRENT STATUS: CL - 60%-79% impaired, limited or restricted    - GOAL STATUS: CJ - 20%-39% impaired, limited or restricted    - D/C STATUS:  ---------------To be determined---------------    Medical Necessity:   · Patient is expected to demonstrate progress in strength, range of motion and balance to improve safety during walking and mobility. Reason for Services/Other Comments:  · Patient continues to require skilled intervention due to decreased LE strength, decreased gait and balance. Use of outcome tool(s) and clinical judgement create a POC that gives a: Clear prediction of patient's progress: LOW COMPLEXITY            TREATMENT:   (In addition to Assessment/Re-Assessment sessions the following treatments were rendered)  Pre-treatment Symptoms/Complaints:  Pt reports no pain in his hip and more pain in his low back. He low back didn't start bothering him until he got home from rehab and he thinks he just strained it. Pain: Initial:     back 3-4/10 Post Session:  3-4/10     Therapeutic Exercise: ( 5 minutes): Instructed in HEP with seated long arc quad with green t-band, seated hamstring curls green t-band, hip abduction with knee extended seated green t-band, hip ER with band around knees and seated hamstring stretch. Pt demonstrated each exercise and issued green t-band for home. HEP: issued handout for patient with exercises. Pt verbalizes understanding. Treatment/Session Assessment:    · Response to Treatment:  Good return demonstration of HEP. · Compliance with Program/Exercises: Will assess as treatment progresses. · Recommendations/Intent for next treatment session: \"Next visit will focus on LE strengthening, balance, gait\".   Total Treatment Duration: 45 minutes  PT Patient Time In/Time Out  Time In: 0935  Time Out: [address]

## 2018-03-20 NOTE — PROGRESS NOTES
Ambulatory/Rehab Services H2 Model Falls Risk Assessment    Risk Factor Pts. ·   Confusion/Disorientation/Impulsivity  []    4 ·   Symptomatic Depression  []   2 ·   Altered Elimination  []   1 ·   Dizziness/Vertigo  []   1 ·   Gender (Male)  [x]   1 ·   Any administered antiepileptics (anticonvulsants):  []   2 ·   Any administered benzodiazepines:  []   1 ·   Visual Impairment (specify):  []   1 ·   Portable Oxygen Use  []   1 ·   Orthostatic ? BP  []   1 ·   History of Recent Falls (within 3 mos.)  [x]   5     Ability to Rise from Chair (choose one) Pts. ·   Ability to rise in a single movement  []   0 ·   Pushes up, successful in one attempt  [x]   1 ·   Multiple attempts, but successful  []   3 ·   Unable to rise without assistance  []   4   Total: (5 or greater = High Risk) 7     Falls Prevention Plan:   [x]                Physical Limitations to Exercise (specify): supervision with standing exercises   [x]                Mobility Assistance Device (type): walker   []                Exercise/Equipment Adaptation (specify):    ©2010 [facility] [address] [place] [address] Patent #[id number].  Federal Law prohibits the replication, distribution or use without written permission from [facility] [employer]

## 2018-03-22 ENCOUNTER — HOSPITAL ENCOUNTER (OUTPATIENT)
Dept: PHYSICAL THERAPY | Age: 83
Discharge: HOME | End: 2018-03-22
Payer: MEDICARE

## 2018-03-22 PROCEDURE — 97110 THERAPEUTIC EXERCISES: CPT

## 2018-03-22 NOTE — PROGRESS NOTES
Almaz Li  : 1932  [place]  Secondary:  [address]  at [facility] IMMANUEL BALDWIN  [address] [facility], [address], [place] [address].  [phone]   [phone]        OUTPATIENT PHYSICAL THERAPY:Daily Note 3/22/2018    ICD-10: Treatment Diagnosis: Displaced intertrochanteric fracture of left femur, sequela (S72.142S); Unsteadiness on feet (R26.81); Pain in left hip (M25.552)  Precautions/Allergies:   Review of patient's allergies indicates no known allergies. Fall Risk Score: 7 (? 5 = High Risk)  MD Orders: hip abductor, quad strengthening, gait training WBAT MEDICAL/REFERRING DIAGNOSIS:  s/p ORIF LT IT    DATE OF ONSET: Injury 18; surgery 18  REFERRING PHYSICIAN: Mc Pasacl MD  RETURN PHYSICIAN APPOINTMENT: 18     INITIAL ASSESSMENT:  Mr. Alireza Morales presents s/p ORIF of the left hip after a fall at [facility]. He presents with decreased LE strength, decreased mobility, decreased gait and balance. Prior to the fall he was able to ambulate community distances with no assistive device. He is currently using a rolling walker for ambulation. He is at risk for falls based on TUG time. He will benefit from skilled physical therapy to increase functional mobility and decrease risk for falls. PROBLEM LIST (Impacting functional limitations):  1. Decreased Strength  2. Decreased ADL/Functional Activities  3. Decreased Ambulation Ability/Technique  4. Decreased Balance  5. Increased Pain INTERVENTIONS PLANNED:  1. Gait Training  2. Manual Therapy  3. Therapeutic Activites  4. Therapeutic Exercise/Strengthening   5. Modalities as needed for pain   TREATMENT PLAN:  Effective Dates: 3-20-18 TO 2018 (90 days). Frequency/Duration: 2-3 times a week for 90 Days  GOALS: (Goals have been discussed and agreed upon with patient.)  Short-Term Functional Goals: Time Frame: 4 weeks  1. Pt will increase L hip abductor strength to 4/5 for improved gait.   2. Pt will improve TUG time to 15 sec for improved mobility. 3. Pt will be independent with HEP. 4. Pt will report pain 2/10 with daily activities. Discharge Goals: Time Frame: 12 weeks  1. Pt will increase L LE strength to 5/5 for improved gait and mobility. 2. Pt will improve TUG time to 12 sec for decreased risk for falls. 3. Pt will report pain 1/10 with daily activities. 4. Pt will be able to balance on L single leg for 5 sec for gait with no assistive device. 5. Pt will be able to ambulate with least restrictive assistive device over various terrain with supervision. Rehabilitation Potential For Stated Goals: Good  Regarding Anuja Jung's therapy, I certify that the treatment plan above will be carried out by a therapist or under their direction. Thank you for this referral,    Pepper Cruz, PT                 HISTORY:   History of Present Injury/Illness (Reason for Referral): He was pushing a small cart at [facility] and his foot caught the wheel when he turned and he feel down. Injury happened feb 1st. They called EMS and took him to [facility]. He had hip surgery on 2-2-18. Then he went to [facility] after his hip surgery. He did have home health come out to the house for a couple of visits. He now presents for outpatient therapy. Past Medical History/Comorbidities:   Mr. Dao Terry  has a past medical history of Abdominal aortic aneurysm ([facility] [address] (12/10/2015); Abdominal aortic aneurysm without rupture ([facility] [address] Allergic rhinitis (12/10/2015); Asthma; Benign neoplasm; Benign prostatic hyperplasia (12/10/2015); BPH without urinary obstruction; Cardiovascular disease (12/10/2015); Chronic obstructive asthma with exacerbation ([address] (12/10/2015); COPD (chronic obstructive pulmonary disease) ([address] (12/10/2015); Cough with hemoptysis; Erectile dysfunction (12/10/2015); Essential hypertension, benign (12/10/2015); Fracture (10/2014); History of colon polyps;  Low testosterone (12/10/2015); Lumbago; Memory loss; Overflow incontinence; Raynaud's syndrome (12/10/2015); Rotator cuff tendinitis; Thrombocytopenia ([address] and Urinary frequency. Mr. Lorena Ndiaye  has a past surgical history that includes hx hernia repair (1980); hx colonoscopy; hx fracture tx (10/2014); hx cataract removal (6/2016-right); and hx orthopaedic. Social History/Living Environment:    Lives in a 1 story house with wife. 1 small step to get inside. They do have a ramp to get inside also. Prior Level of Function/Work/Activity:  Ambulated with on assistive device. He did go out of the house with his wife and perform light shopping. Goal is to be able to walk normal.    Current Medications:       Current Outpatient Prescriptions:     lisinopril (PRINIVIL, ZESTRIL) 5 mg tablet, Take 5 mg by mouth daily. , Disp: , Rfl:     tamsulosin (FLOMAX) 0.4 mg capsule, Take 1 Cap by mouth daily. , Disp: 90 Cap, Rfl: 4    montelukast (SINGULAIR) 10 mg tablet, Take 1 Tab by mouth daily. , Disp: 90 Tab, Rfl: 4    fluticasone (FLONASE) 50 mcg/actuation nasal spray, 2 Sprays by Both Nostrils route daily. , Disp: 48 g, Rfl: 4    calcium citrate-vitamin d3 (CITRACAL+D) 315-200 mg-unit tab, Take 2 Tabs by mouth daily (with breakfast). , Disp: , Rfl:     cholecalciferol (VITAMIN D3) 1,000 unit cap, Take  by mouth., Disp: , Rfl:     guaiFENesin ER (MUCINEX) 600 mg ER tablet, Take 1,200 mg by mouth daily. , Disp: , Rfl:     Biotin 2,500 mcg cap, Take  by mouth., Disp: , Rfl:     aspirin delayed-release 81 mg tablet, Take  by mouth daily. , Disp: , Rfl:    Date Last Reviewed:  3-20-18   Number of Personal Factors/Comorbidities that affect the Plan of Care: 1-2: MODERATE COMPLEXITY   EXAMINATION:   Observation/Orthostatic Postural Assessment:          Pt arrived to physical therapy with rolling walker.    Palpation:          n/t  ROM:         n/t  Strength:                    Special Tests: n/t  Functional Mobility: n/t         Body Structures Involved:  1. Bones  2. Joints  3. Muscles Body Functions Affected:  1. Neuromusculoskeletal Activities and Participation Affected:  1. Community, Social and [place]   Number of elements (examined above) that affect the Plan of Care: 3: MODERATE COMPLEXITY   CLINICAL PRESENTATION:   Presentation: Stable and uncomplicated: LOW COMPLEXITY   CLINICAL DECISION MAKING:   Outcome Measure: Tool Used: Timed Up and Go (TUG)  Score:  Initial: 19 seconds Most Recent: X seconds (Date: -- )   Interpretation of Score: The test measures, in seconds, the time taken by an individual to stand up from a standard arm chair (seat height 46 cm [18 in], arm height 65 cm [25.6 in]), walk a distance of 3 meters (118 in, approx 10 ft), turn, walk back to the chair and sit down. If the individual takes longer than 14 seconds to complete TUG, this indicates risk for falls. Score 7 7.5-10.5 11-14 14.5-17.5 18-21 21.5-24.5 25+   Modifier CH CI CJ CK CL CM CN     ? Mobility - Walking and Moving Around:     - CURRENT STATUS: CL - 60%-79% impaired, limited or restricted    - GOAL STATUS: CJ - 20%-39% impaired, limited or restricted    - D/C STATUS:  ---------------To be determined---------------    Medical Necessity:   · Patient is expected to demonstrate progress in strength, range of motion and balance to improve safety during walking and mobility. Reason for Services/Other Comments:  · Patient continues to require skilled intervention due to decreased LE strength, decreased gait and balance. Use of outcome tool(s) and clinical judgement create a POC that gives a: Clear prediction of patient's progress: LOW COMPLEXITY            TREATMENT:   (In addition to Assessment/Re-Assessment sessions the following treatments were rendered)  Pre-treatment Symptoms/Complaints:  Pt reports his back was sore after trying the hamstring stretch but seems better this morning.    Pain: Initial:     back 3-4/10 Post Session:  3-4/10 Therapeutic Exercise: (40 Minutes): Exercises for LE strengthening, endurance, and balance. CGA with standing exercises. Monitored O2% during exercises and ranged from 91-93%. Date:  3-22-18 Date:   Date:     Activity/Exercise Parameters Parameters Parameters   Long arc quad 2# 2x10B     Seated hamstring curls Green 2x10B     SLR 2x10B     Side lying hip abduction Place and hold 5\"x5     Side lying clam 2x10     Sit to stand 2x10     Heel raises 2x10      balance Semi tandem 20\"x3; toe taps on 4\" step 10x     Stepping forward/backward no UE assist 2x5 B     Walking with walker for endurance ~200 ft     Steps ups 4\" 10xB             HEP: issued handout for patient with exercises. Pt verbalizes understanding. Treatment/Session Assessment:    · Response to Treatment:  He had difficulty with putting full weight on L LE to move the R LE with toe taps and stepping forward and backward. · Compliance with Program/Exercises: Will assess as treatment progresses. · Recommendations/Intent for next treatment session: \"Next visit will focus on LE strengthening, balance, gait\".   Total Treatment Duration: 40 minutes  PT Patient Time In/Time Out  Time In: 1015  Time Out: Scarlet Iqbal

## 2018-03-27 ENCOUNTER — HOSPITAL ENCOUNTER (OUTPATIENT)
Dept: PHYSICAL THERAPY | Age: 83
Discharge: HOME | End: 2018-03-27
Payer: MEDICARE

## 2018-03-27 PROCEDURE — 97110 THERAPEUTIC EXERCISES: CPT

## 2018-03-27 NOTE — PROGRESS NOTES
Souleymane Hopkins  : 1932  [place]  Secondary:  [address]  at [facility] IMMANUEL BALDWIN  [address], [address], [place] [address].  [phone]   [phone]        OUTPATIENT PHYSICAL THERAPY:Daily Note 3/27/2018    ICD-10: Treatment Diagnosis: Displaced intertrochanteric fracture of left femur, sequela (S72.142S); Unsteadiness on feet (R26.81); Pain in left hip (M25.552)  Precautions/Allergies:   Review of patient's allergies indicates no known allergies. Fall Risk Score: 7 (? 5 = High Risk)  MD Orders: hip abductor, quad strengthening, gait training WBAT MEDICAL/REFERRING DIAGNOSIS:  s/p ORIF LT IT    DATE OF ONSET: Injury 18; surgery 18  REFERRING PHYSICIAN: Cata Gayle MD  RETURN PHYSICIAN APPOINTMENT: 18     INITIAL ASSESSMENT:  Mr. Elisabeth Beard presents s/p ORIF of the left hip after a fall at [facility]. He presents with decreased LE strength, decreased mobility, decreased gait and balance. Prior to the fall he was able to ambulate community distances with no assistive device. He is currently using a rolling walker for ambulation. He is at risk for falls based on TUG time. He will benefit from skilled physical therapy to increase functional mobility and decrease risk for falls. PROBLEM LIST (Impacting functional limitations):  1. Decreased Strength  2. Decreased ADL/Functional Activities  3. Decreased Ambulation Ability/Technique  4. Decreased Balance  5. Increased Pain INTERVENTIONS PLANNED:  1. Gait Training  2. Manual Therapy  3. Therapeutic Activites  4. Therapeutic Exercise/Strengthening   5. Modalities as needed for pain   TREATMENT PLAN:  Effective Dates: 3-20-18 TO 2018 (90 days). Frequency/Duration: 2-3 times a week for 90 Days  GOALS: (Goals have been discussed and agreed upon with patient.)  Short-Term Functional Goals: Time Frame: 4 weeks  1. Pt will increase L hip abductor strength to 4/5 for improved gait.   2. Pt will improve TUG time to 15 sec for improved mobility. 3. Pt will be independent with HEP. 4. Pt will report pain 2/10 with daily activities. Discharge Goals: Time Frame: 12 weeks  1. Pt will increase L LE strength to 5/5 for improved gait and mobility. 2. Pt will improve TUG time to 12 sec for decreased risk for falls. 3. Pt will report pain 1/10 with daily activities. 4. Pt will be able to balance on L single leg for 5 sec for gait with no assistive device. 5. Pt will be able to ambulate with least restrictive assistive device over various terrain with supervision. Rehabilitation Potential For Stated Goals: Good  Regarding Adolfo Jung's therapy, I certify that the treatment plan above will be carried out by a therapist or under their direction. Thank you for this referral,    Pepper Puga, PT                 HISTORY:   History of Present Injury/Illness (Reason for Referral): He was pushing a small cart at [employer] and his foot caught the wheel when he turned and he feel down. Injury happened feb 1st. They called EMS and took him to [facility]. He had hip surgery on 2-2-18. Then he went to [facility] after his hip surgery. He did have home health come out to the house for a couple of visits. He now presents for outpatient therapy. Past Medical History/Comorbidities:   Mr. Susan Barcenas  has a past medical history of Abdominal aortic aneurysm ([facility] [address] (12/10/2015); Abdominal aortic aneurysm without rupture ([address] Allergic rhinitis (12/10/2015); Asthma; Benign neoplasm; Benign prostatic hyperplasia (12/10/2015); BPH without urinary obstruction; Cardiovascular disease (12/10/2015); Chronic obstructive asthma with exacerbation ([address] (12/10/2015); COPD (chronic obstructive pulmonary disease) ([address] (12/10/2015); Cough with hemoptysis; Erectile dysfunction (12/10/2015); Essential hypertension, benign (12/10/2015); Fracture (10/2014); History of colon polyps;  Low testosterone (12/10/2015); Lumbago; Memory loss; Overflow incontinence; Raynaud's syndrome (12/10/2015); Rotator cuff tendinitis; Thrombocytopenia ([address] and Urinary frequency. Mr. Camron John  has a past surgical history that includes hx hernia repair (1980); hx colonoscopy; hx fracture tx (10/2014); hx cataract removal (6/2016-right); and hx orthopaedic. Social History/Living Environment:    Lives in a 1 story house with wife. 1 small step to get inside. They do have a ramp to get inside also. Prior Level of Function/Work/Activity:  Ambulated with on assistive device. He did go out of the house with his wife and perform light shopping. Goal is to be able to walk normal.    Current Medications:       Current Outpatient Prescriptions:     lisinopril (PRINIVIL, ZESTRIL) 5 mg tablet, Take 5 mg by mouth daily. , Disp: , Rfl:     tamsulosin (FLOMAX) 0.4 mg capsule, Take 1 Cap by mouth daily. , Disp: 90 Cap, Rfl: 4    montelukast (SINGULAIR) 10 mg tablet, Take 1 Tab by mouth daily. , Disp: 90 Tab, Rfl: 4    fluticasone (FLONASE) 50 mcg/actuation nasal spray, 2 Sprays by Both Nostrils route daily. , Disp: 48 g, Rfl: 4    calcium citrate-vitamin d3 (CITRACAL+D) 315-200 mg-unit tab, Take 2 Tabs by mouth daily (with breakfast). , Disp: , Rfl:     cholecalciferol (VITAMIN D3) 1,000 unit cap, Take  by mouth., Disp: , Rfl:     guaiFENesin ER (MUCINEX) 600 mg ER tablet, Take 1,200 mg by mouth daily. , Disp: , Rfl:     Biotin 2,500 mcg cap, Take  by mouth., Disp: , Rfl:     aspirin delayed-release 81 mg tablet, Take  by mouth daily. , Disp: , Rfl:    Date Last Reviewed:  3-27-18   Number of Personal Factors/Comorbidities that affect the Plan of Care: 1-2: MODERATE COMPLEXITY   EXAMINATION:   Observation/Orthostatic Postural Assessment:          Pt arrived to physical therapy with rolling walker.    Palpation:          n/t  ROM:         n/t  Strength:                    Special Tests: n/t  Functional Mobility: n/t         Body Structures Involved:  1. Bones  2. Joints  3. Muscles Body Functions Affected:  1. Neuromusculoskeletal Activities and Participation Affected:  1. Community, Social and [place]   Number of elements (examined above) that affect the Plan of Care: 3: MODERATE COMPLEXITY   CLINICAL PRESENTATION:   Presentation: Stable and uncomplicated: LOW COMPLEXITY   CLINICAL DECISION MAKING:   Outcome Measure: Tool Used: Timed Up and Go (TUG)  Score:  Initial: 19 seconds Most Recent: X seconds (Date: -- )   Interpretation of Score: The test measures, in seconds, the time taken by an individual to stand up from a standard arm chair (seat height 46 cm [18 in], arm height 65 cm [25.6 in]), walk a distance of 3 meters (118 in, approx 10 ft), turn, walk back to the chair and sit down. If the individual takes longer than 14 seconds to complete TUG, this indicates risk for falls. Score 7 7.5-10.5 11-14 14.5-17.5 18-21 21.5-24.5 25+   Modifier CH CI CJ CK CL CM CN     ? Mobility - Walking and Moving Around:     - CURRENT STATUS: CL - 60%-79% impaired, limited or restricted    - GOAL STATUS: CJ - 20%-39% impaired, limited or restricted    - D/C STATUS:  ---------------To be determined---------------    Medical Necessity:   · Patient is expected to demonstrate progress in strength, range of motion and balance to improve safety during walking and mobility. Reason for Services/Other Comments:  · Patient continues to require skilled intervention due to decreased LE strength, decreased gait and balance. Use of outcome tool(s) and clinical judgement create a POC that gives a: Clear prediction of patient's progress: LOW COMPLEXITY            TREATMENT:   (In addition to Assessment/Re-Assessment sessions the following treatments were rendered)  Pre-treatment Symptoms/Complaints:  Pt reports his back was bothering him last night but seems better this morning.    Pain: Initial:     back 3-4/10 Post Session:  3-4/10     Therapeutic Exercise: (40 Minutes): Exercises for LE strengthening, endurance, and balance. CGA with standing exercises. Monitored O2% during exercises and ranged from 91-94%. Date:  3-22-18 Date:  3-27-18 Date:     Activity/Exercise Parameters Parameters Parameters   Long arc quad 2# 2x10B 2# 3x10 B    Seated hamstring curls Green 2x10B Green 2x15    SLR 2x10B 2x10B    Side lying hip abduction Place and hold 5\"x5 AA 2x8    Side lying clam 2x10 3x10    Sit to stand 2x10 2x10    Heel raises 2x10  2x10    balance Semi tandem 20\"x3; toe taps on 4\" step 10x Semi tandem 20\"x3    Stepping forward/backward no UE assist 2x5 B 2x5 B    Walking with walker for endurance ~200 ft ~250 ft     Steps ups 4\" 10xB             HEP: issued handout for patient with exercises. Pt verbalizes understanding. Treatment/Session Assessment:    · Response to Treatment:  His back was more sore with exercises today so discussed HEP and not doing any exercises that make his back hurt. He had back pain with sit to stand without holding on to walker and had less pain with small assist from UEs from sit to stand. · Compliance with Program/Exercises: Will assess as treatment progresses. · Recommendations/Intent for next treatment session: \"Next visit will focus on LE strengthening, balance, gait\".   Total Treatment Duration: 40 minutes  PT Patient Time In/Time Out  Time In: 0945  Time Out: [address] [place], [place]

## 2018-03-29 ENCOUNTER — HOSPITAL ENCOUNTER (OUTPATIENT)
Dept: PHYSICAL THERAPY | Age: 83
Discharge: HOME | End: 2018-03-29
Payer: MEDICARE

## 2018-03-29 PROCEDURE — 97110 THERAPEUTIC EXERCISES: CPT

## 2018-03-29 NOTE — PROGRESS NOTES
Huy Fairfield  : 1932  [place]  Secondary:  [address]  at [facility] IMMANUEL BALDWIN  [address], [address], [address] [facility] [address]  [phone]   [phone]        OUTPATIENT PHYSICAL THERAPY:Daily Note 3/29/2018    ICD-10: Treatment Diagnosis: Displaced intertrochanteric fracture of left femur, sequela (S72.142S); Unsteadiness on feet (R26.81); Pain in left hip (M25.552)  Precautions/Allergies:   Review of patient's allergies indicates no known allergies. Fall Risk Score: 7 (? 5 = High Risk)  MD Orders: hip abductor, quad strengthening, gait training WBAT MEDICAL/REFERRING DIAGNOSIS:  s/p ORIF LT IT    DATE OF ONSET: Injury 18; surgery 18  REFERRING PHYSICIAN: Olivia Crenshaw MD  RETURN PHYSICIAN APPOINTMENT: 18     INITIAL ASSESSMENT:  Mr. Emanuel Dominguez presents s/p ORIF of the left hip after a fall at [facility]. He presents with decreased LE strength, decreased mobility, decreased gait and balance. Prior to the fall he was able to ambulate community distances with no assistive device. He is currently using a rolling walker for ambulation. He is at risk for falls based on TUG time. He will benefit from skilled physical therapy to increase functional mobility and decrease risk for falls. PROBLEM LIST (Impacting functional limitations):  1. Decreased Strength  2. Decreased ADL/Functional Activities  3. Decreased Ambulation Ability/Technique  4. Decreased Balance  5. Increased Pain INTERVENTIONS PLANNED:  1. Gait Training  2. Manual Therapy  3. Therapeutic Activites  4. Therapeutic Exercise/Strengthening   5. Modalities as needed for pain   TREATMENT PLAN:  Effective Dates: 3-20-18 TO 2018 (90 days). Frequency/Duration: 2-3 times a week for 90 Days  GOALS: (Goals have been discussed and agreed upon with patient.)  Short-Term Functional Goals: Time Frame: 4 weeks  1. Pt will increase L hip abductor strength to 4/5 for improved gait.   2. Pt will improve TUG time to 15 sec for improved mobility. 3. Pt will be independent with HEP. 4. Pt will report pain 2/10 with daily activities. Discharge Goals: Time Frame: 12 weeks  1. Pt will increase L LE strength to 5/5 for improved gait and mobility. 2. Pt will improve TUG time to 12 sec for decreased risk for falls. 3. Pt will report pain 1/10 with daily activities. 4. Pt will be able to balance on L single leg for 5 sec for gait with no assistive device. 5. Pt will be able to ambulate with least restrictive assistive device over various terrain with supervision. Rehabilitation Potential For Stated Goals: Good  Regarding Yvon Jung's therapy, I certify that the treatment plan above will be carried out by a therapist or under their direction. Thank you for this referral,    Pepper Hoff, PT                 HISTORY:   History of Present Injury/Illness (Reason for Referral): He was pushing a small cart at [facility] and his foot caught the wheel when he turned and he feel down. Injury happened feb 1st. They called EMS and took him to [facility]. He had hip surgery on 2-2-18. Then he went to [facility] after his hip surgery. He did have home health come out to the house for a couple of visits. He now presents for outpatient therapy. Past Medical History/Comorbidities:   Mr. Kary Skiff  has a past medical history of Abdominal aortic aneurysm ([facility] [address] (12/10/2015); Abdominal aortic aneurysm without rupture ([facility] [address] Allergic rhinitis (12/10/2015); Asthma; Benign neoplasm; Benign prostatic hyperplasia (12/10/2015); BPH without urinary obstruction; Cardiovascular disease (12/10/2015); Chronic obstructive asthma with exacerbation ([address] (12/10/2015); COPD (chronic obstructive pulmonary disease) ([address] (12/10/2015); Cough with hemoptysis; Erectile dysfunction (12/10/2015); Essential hypertension, benign (12/10/2015); Fracture (10/2014); History of colon polyps;  Low testosterone (12/10/2015); Lumbago; Memory loss; Overflow incontinence; Raynaud's syndrome (12/10/2015); Rotator cuff tendinitis; Thrombocytopenia ([address] and Urinary frequency. Mr. Leah Haq  has a past surgical history that includes hx hernia repair (1980); hx colonoscopy; hx fracture tx (10/2014); hx cataract removal (6/2016-right); and hx orthopaedic. Social History/Living Environment:    Lives in a 1 story house with wife. 1 small step to get inside. They do have a ramp to get inside also. Prior Level of Function/Work/Activity:  Ambulated with on assistive device. He did go out of the house with his wife and perform light shopping. Goal is to be able to walk normal.    Current Medications:       Current Outpatient Prescriptions:     lisinopril (PRINIVIL, ZESTRIL) 5 mg tablet, Take 5 mg by mouth daily. , Disp: , Rfl:     tamsulosin (FLOMAX) 0.4 mg capsule, Take 1 Cap by mouth daily. , Disp: 90 Cap, Rfl: 4    montelukast (SINGULAIR) 10 mg tablet, Take 1 Tab by mouth daily. , Disp: 90 Tab, Rfl: 4    fluticasone (FLONASE) 50 mcg/actuation nasal spray, 2 Sprays by Both Nostrils route daily. , Disp: 48 g, Rfl: 4    calcium citrate-vitamin d3 (CITRACAL+D) 315-200 mg-unit tab, Take 2 Tabs by mouth daily (with breakfast). , Disp: , Rfl:     cholecalciferol (VITAMIN D3) 1,000 unit cap, Take  by mouth., Disp: , Rfl:     guaiFENesin ER (MUCINEX) 600 mg ER tablet, Take 1,200 mg by mouth daily. , Disp: , Rfl:     Biotin 2,500 mcg cap, Take  by mouth., Disp: , Rfl:     aspirin delayed-release 81 mg tablet, Take  by mouth daily. , Disp: , Rfl:    Date Last Reviewed:  3-27-18   Number of Personal Factors/Comorbidities that affect the Plan of Care: 1-2: MODERATE COMPLEXITY   EXAMINATION:   Observation/Orthostatic Postural Assessment:          Pt arrived to physical therapy with rolling walker.    Palpation:          n/t  ROM:         n/t  Strength:                    Special Tests: n/t  Functional Mobility: n/t         Body Structures Involved:  1. Bones  2. Joints  3. Muscles Body Functions Affected:  1. Neuromusculoskeletal Activities and Participation Affected:  1. Community, Social and [place]   Number of elements (examined above) that affect the Plan of Care: 3: MODERATE COMPLEXITY   CLINICAL PRESENTATION:   Presentation: Stable and uncomplicated: LOW COMPLEXITY   CLINICAL DECISION MAKING:   Outcome Measure: Tool Used: Timed Up and Go (TUG)  Score:  Initial: 19 seconds Most Recent: X seconds (Date: -- )   Interpretation of Score: The test measures, in seconds, the time taken by an individual to stand up from a standard arm chair (seat height 46 cm [18 in], arm height 65 cm [25.6 in]), walk a distance of 3 meters (118 in, approx 10 ft), turn, walk back to the chair and sit down. If the individual takes longer than 14 seconds to complete TUG, this indicates risk for falls. Score 7 7.5-10.5 11-14 14.5-17.5 18-21 21.5-24.5 25+   Modifier CH CI CJ CK CL CM CN     ? Mobility - Walking and Moving Around:     - CURRENT STATUS: CL - 60%-79% impaired, limited or restricted    - GOAL STATUS: CJ - 20%-39% impaired, limited or restricted    - D/C STATUS:  ---------------To be determined---------------    Medical Necessity:   · Patient is expected to demonstrate progress in strength, range of motion and balance to improve safety during walking and mobility. Reason for Services/Other Comments:  · Patient continues to require skilled intervention due to decreased LE strength, decreased gait and balance. Use of outcome tool(s) and clinical judgement create a POC that gives a: Clear prediction of patient's progress: LOW COMPLEXITY            TREATMENT:   (In addition to Assessment/Re-Assessment sessions the following treatments were rendered)  Pre-treatment Symptoms/Complaints:  Pt reports he thinks his back is a little better.   Pain: Initial:     back 3/10 Post Session:  3/10     Therapeutic Exercise: (40 Minutes): Exercises for LE strengthening, endurance, and balance. CGA with standing exercises. Monitored O2% during exercises and ranged from 90-94%. Date:  3-22-18 Date:  3-27-18 Date:  3-29-18   Activity/Exercise Parameters Parameters Parameters   Long arc quad 2# 2x10B 2# 3x10 B 3#2x10 B   Seated hamstring curls Green 2x10B Green 2x15 Green 2x15   SLR 2x10B 2x10B 2x10 B   Side lying hip abduction Place and hold 5\"x5 AA 2x8 AA 2x10   Side lying clam 2x10 3x10 2x15   Sit to stand 2x10 2x10 2x10   Heel raises 2x10  2x10 2x10   balance Semi tandem 20\"x3; toe taps on 4\" step 10x Semi tandem 20\"x3 Semi tandem 20\"x3; toe taps on 4\" step 10xB   Stepping forward/backward no UE assist 2x5 B 2x5 B -   Walking with walker for endurance ~200 ft ~250 ft  ~250 ft   Steps ups 4\" 10xB  4\" 10xB           HEP: issued handout for patient with exercises. Pt verbalizes understanding. Treatment/Session Assessment:    · Response to Treatment:  Only complaints of back pain with sit to supine today. He is progressing with hip abductor strength with side lying hip abductor exercise. · Compliance with Program/Exercises: Will assess as treatment progresses. · Recommendations/Intent for next treatment session: \"Next visit will focus on LE strengthening, balance, gait\".   Total Treatment Duration: 40 minutes  PT Patient Time In/Time Out  Time In: 1015  Time Out: [address] [facility]

## 2018-04-03 ENCOUNTER — APPOINTMENT (OUTPATIENT)
Dept: PHYSICAL THERAPY | Age: 83
End: 2018-04-03
Payer: MEDICARE

## 2018-04-05 ENCOUNTER — HOSPITAL ENCOUNTER (OUTPATIENT)
Dept: PHYSICAL THERAPY | Age: 83
Discharge: HOME | End: 2018-04-05
Payer: MEDICARE

## 2018-04-05 PROCEDURE — 97110 THERAPEUTIC EXERCISES: CPT

## 2018-04-05 NOTE — PROGRESS NOTES
Carlos Pa  : 1932  [place]  Secondary:  [address]  at [facility] IMMANUEL BALDWIN  [address] [facility], [address], [facility]. .  [phone]   [phone]        OUTPATIENT PHYSICAL THERAPY:Daily Note 2018    ICD-10: Treatment Diagnosis: Displaced intertrochanteric fracture of left femur, sequela (S72.142S); Unsteadiness on feet (R26.81); Pain in left hip (M25.552)  Precautions/Allergies:   Review of patient's allergies indicates no known allergies. Fall Risk Score: 7 (? 5 = High Risk)  MD Orders: hip abductor, quad strengthening, gait training WBAT MEDICAL/REFERRING DIAGNOSIS:  s/p ORIF LT IT    DATE OF ONSET: Injury 18; surgery 18  REFERRING PHYSICIAN: Tiera Alfaro MD  RETURN PHYSICIAN APPOINTMENT: 18     INITIAL ASSESSMENT:  Mr. Jocelyn Horn presents s/p ORIF of the left hip after a fall at [facility]. He presents with decreased LE strength, decreased mobility, decreased gait and balance. Prior to the fall he was able to ambulate community distances with no assistive device. He is currently using a rolling walker for ambulation. He is at risk for falls based on TUG time. He will benefit from skilled physical therapy to increase functional mobility and decrease risk for falls. PROBLEM LIST (Impacting functional limitations):  1. Decreased Strength  2. Decreased ADL/Functional Activities  3. Decreased Ambulation Ability/Technique  4. Decreased Balance  5. Increased Pain INTERVENTIONS PLANNED:  1. Gait Training  2. Manual Therapy  3. Therapeutic Activites  4. Therapeutic Exercise/Strengthening   5. Modalities as needed for pain   TREATMENT PLAN:  Effective Dates: 3-20-18 TO 2018 (90 days). Frequency/Duration: 2-3 times a week for 90 Days  GOALS: (Goals have been discussed and agreed upon with patient.)  Short-Term Functional Goals: Time Frame: 4 weeks  1. Pt will increase L hip abductor strength to 4/5 for improved gait.   2. Pt will improve TUG time to 15 sec for improved mobility. 3. Pt will be independent with HEP. 4. Pt will report pain 2/10 with daily activities. Discharge Goals: Time Frame: 12 weeks  1. Pt will increase L LE strength to 5/5 for improved gait and mobility. 2. Pt will improve TUG time to 12 sec for decreased risk for falls. 3. Pt will report pain 1/10 with daily activities. 4. Pt will be able to balance on L single leg for 5 sec for gait with no assistive device. 5. Pt will be able to ambulate with least restrictive assistive device over various terrain with supervision. Rehabilitation Potential For Stated Goals: Good  Regarding Prince Esposito GABRIELLA AyalaErich's therapy, I certify that the treatment plan above will be carried out by a therapist or under their direction. Thank you for this referral,    Pepper King, PT                 HISTORY:   History of Present Injury/Illness (Reason for Referral): He was pushing a small cart at [employer] and his foot caught the wheel when he turned and he feel down. Injury happened feb 1st. They called EMS and took him to [address] [place] [address]. He had hip surgery on 2-2-18. Then he went to [facility] after his hip surgery. He did have home health come out to the house for a couple of visits. He now presents for outpatient therapy. Past Medical History/Comorbidities:   Mr. Suzanne Baldwin  has a past medical history of Abdominal aortic aneurysm ([facility] [address] (12/10/2015); Abdominal aortic aneurysm without rupture ([facility] [address] Allergic rhinitis (12/10/2015); Asthma; Benign neoplasm; Benign prostatic hyperplasia (12/10/2015); BPH without urinary obstruction; Cardiovascular disease (12/10/2015); Chronic obstructive asthma with exacerbation ([address] (12/10/2015); COPD (chronic obstructive pulmonary disease) ([address] (12/10/2015); Cough with hemoptysis; Erectile dysfunction (12/10/2015); Essential hypertension, benign (12/10/2015); Fracture (10/2014); History of colon polyps;  Low testosterone (12/10/2015); Lumbago; Memory loss; Overflow incontinence; Raynaud's syndrome (12/10/2015); Rotator cuff tendinitis; Thrombocytopenia ([address] and Urinary frequency. Mr. Xena Davalos  has a past surgical history that includes hx hernia repair (1980); hx colonoscopy; hx fracture tx (10/2014); hx cataract removal (6/2016-right); and hx orthopaedic. Social History/Living Environment:    Lives in a 1 story house with wife. 1 small step to get inside. They do have a ramp to get inside also. Prior Level of Function/Work/Activity:  Ambulated with on assistive device. He did go out of the house with his wife and perform light shopping. Goal is to be able to walk normal.    Current Medications:       Current Outpatient Prescriptions:     lisinopril (PRINIVIL, ZESTRIL) 5 mg tablet, Take 5 mg by mouth daily. , Disp: , Rfl:     tamsulosin (FLOMAX) 0.4 mg capsule, Take 1 Cap by mouth daily. , Disp: 90 Cap, Rfl: 4    montelukast (SINGULAIR) 10 mg tablet, Take 1 Tab by mouth daily. , Disp: 90 Tab, Rfl: 4    fluticasone (FLONASE) 50 mcg/actuation nasal spray, 2 Sprays by Both Nostrils route daily. , Disp: 48 g, Rfl: 4    calcium citrate-vitamin d3 (CITRACAL+D) 315-200 mg-unit tab, Take 2 Tabs by mouth daily (with breakfast). , Disp: , Rfl:     cholecalciferol (VITAMIN D3) 1,000 unit cap, Take  by mouth., Disp: , Rfl:     guaiFENesin ER (MUCINEX) 600 mg ER tablet, Take 1,200 mg by mouth daily. , Disp: , Rfl:     Biotin 2,500 mcg cap, Take  by mouth., Disp: , Rfl:     aspirin delayed-release 81 mg tablet, Take  by mouth daily. , Disp: , Rfl:    Date Last Reviewed:  4-5-18   Number of Personal Factors/Comorbidities that affect the Plan of Care: 1-2: MODERATE COMPLEXITY   EXAMINATION:   Observation/Orthostatic Postural Assessment:          Pt arrived to physical therapy with rolling walker.    Palpation:          n/t  ROM:         n/t  Strength:                    Special Tests: n/t  Functional Mobility: n/t         Body Structures Involved:  1. Bones  2. Joints  3. Muscles Body Functions Affected:  1. Neuromusculoskeletal Activities and Participation Affected:  1. Community, Social and [place]   Number of elements (examined above) that affect the Plan of Care: 3: MODERATE COMPLEXITY   CLINICAL PRESENTATION:   Presentation: Stable and uncomplicated: LOW COMPLEXITY   CLINICAL DECISION MAKING:   Outcome Measure: Tool Used: Timed Up and Go (TUG)  Score:  Initial: 19 seconds Most Recent: X seconds (Date: -- )   Interpretation of Score: The test measures, in seconds, the time taken by an individual to stand up from a standard arm chair (seat height 46 cm [18 in], arm height 65 cm [25.6 in]), walk a distance of 3 meters (118 in, approx 10 ft), turn, walk back to the chair and sit down. If the individual takes longer than 14 seconds to complete TUG, this indicates risk for falls. Score 7 7.5-10.5 11-14 14.5-17.5 18-21 21.5-24.5 25+   Modifier CH CI CJ CK CL CM CN     ? Mobility - Walking and Moving Around:     - CURRENT STATUS: CL - 60%-79% impaired, limited or restricted    - GOAL STATUS: CJ - 20%-39% impaired, limited or restricted    - D/C STATUS:  ---------------To be determined---------------    Medical Necessity:   · Patient is expected to demonstrate progress in strength, range of motion and balance to improve safety during walking and mobility. Reason for Services/Other Comments:  · Patient continues to require skilled intervention due to decreased LE strength, decreased gait and balance. Use of outcome tool(s) and clinical judgement create a POC that gives a: Clear prediction of patient's progress: LOW COMPLEXITY            TREATMENT:   (In addition to Assessment/Re-Assessment sessions the following treatments were rendered)  Pre-treatment Symptoms/Complaints:  Pt reports he knows his back is there but does not hurt as bad.    Pain: Initial:     back 3/10 Post Session:  3/10     Therapeutic Exercise: (42 Minutes): Exercises for LE strengthening, endurance, and balance. CGA with standing exercises. Monitored O2% during exercises and ranged from 88-94%. 82% after walking but did sit and rest and came up to 92% afte 1-2 minutes. Date:  3-22-18 Date:  3-27-18 Date:  3-29-18 Date  4-5-18   Activity/Exercise Parameters Parameters Parameters parameters   Long arc quad 2# 2x10B 2# 3x10 B 3#2x10 B 3# 3x10   Seated hamstring curls Green 2x10B Green 2x15 Green 2x15 Green 2x15   SLR 2x10B 2x10B 2x10 B 2x10   Side lying hip abduction Place and hold 5\"x5 AA 2x8 AA 2x10 AA 2x10   Side lying clam 2x10 3x10 2x15 Yellow 2x10   Sit to stand 2x10 2x10 2x10 2x10   Heel raises 2x10  2x10 2x10 2x15   balance Semi tandem 20\"x3; toe taps on 4\" step 10x Semi tandem 20\"x3 Semi tandem 20\"x3; toe taps on 4\" step 10xB Semi tandem 20\"x3; toe taps on 4\" step 10xB   Stepping forward/backward no UE assist 2x5 B 2x5 B -    Walking with walker for endurance ~200 ft ~250 ft  ~250 ft ~265 ft   Steps ups 4\" 10xB  4\" 10xB 4\" 12xB           HEP: issued handout for patient with exercises. Pt verbalizes understanding. Treatment/Session Assessment:    · Response to Treatment:  O2 saturation was a little lower today after standing exercises and he is to monitor more closely at home with walking and standing. · Compliance with Program/Exercises: complaint   · Recommendations/Intent for next treatment session: \"Next visit will focus on LE strengthening, balance, gait\".   Total Treatment Duration: 42 minutes  PT Patient Time In/Time Out  Time In: 1035  Time Out: [address]

## 2018-04-10 ENCOUNTER — HOSPITAL ENCOUNTER (OUTPATIENT)
Dept: PHYSICAL THERAPY | Age: 83
Discharge: HOME | End: 2018-04-10
Payer: MEDICARE

## 2018-04-10 PROCEDURE — 97110 THERAPEUTIC EXERCISES: CPT

## 2018-04-10 NOTE — PROGRESS NOTES
Carlos Pa  : 1932  [place]  Secondary:  [address]  at [facility] IMMANUEL BALDWIN  [address] [facility] [place] [address], [place] [address].  [phone]   [phone]        OUTPATIENT PHYSICAL THERAPY:Daily Note 4/10/2018    ICD-10: Treatment Diagnosis: Displaced intertrochanteric fracture of left femur, sequela (S72.142S); Unsteadiness on feet (R26.81); Pain in left hip (M25.552)  Precautions/Allergies:   Review of patient's allergies indicates no known allergies. Fall Risk Score: 7 (? 5 = High Risk)  MD Orders: hip abductor, quad strengthening, gait training WBAT MEDICAL/REFERRING DIAGNOSIS:  s/p ORIF LT IT    DATE OF ONSET: Injury 18; surgery 18  REFERRING PHYSICIAN: Tiera Alfaro MD  RETURN PHYSICIAN APPOINTMENT: 18     INITIAL ASSESSMENT:  Mr. Jocelyn Horn presents s/p ORIF of the left hip after a fall at [facility]. He presents with decreased LE strength, decreased mobility, decreased gait and balance. Prior to the fall he was able to ambulate community distances with no assistive device. He is currently using a rolling walker for ambulation. He is at risk for falls based on TUG time. He will benefit from skilled physical therapy to increase functional mobility and decrease risk for falls. PROBLEM LIST (Impacting functional limitations):  1. Decreased Strength  2. Decreased ADL/Functional Activities  3. Decreased Ambulation Ability/Technique  4. Decreased Balance  5. Increased Pain INTERVENTIONS PLANNED:  1. Gait Training  2. Manual Therapy  3. Therapeutic Activites  4. Therapeutic Exercise/Strengthening   5. Modalities as needed for pain   TREATMENT PLAN:  Effective Dates: 3-20-18 TO 2018 (90 days). Frequency/Duration: 2-3 times a week for 90 Days  GOALS: (Goals have been discussed and agreed upon with patient.)  Short-Term Functional Goals: Time Frame: 4 weeks  1. Pt will increase L hip abductor strength to 4/5 for improved gait.   2. Pt will improve TUG time to 15 sec for improved mobility. 3. Pt will be independent with HEP. 4. Pt will report pain 2/10 with daily activities. Discharge Goals: Time Frame: 12 weeks  1. Pt will increase L LE strength to 5/5 for improved gait and mobility. 2. Pt will improve TUG time to 12 sec for decreased risk for falls. 3. Pt will report pain 1/10 with daily activities. 4. Pt will be able to balance on L single leg for 5 sec for gait with no assistive device. 5. Pt will be able to ambulate with least restrictive assistive device over various terrain with supervision. Rehabilitation Potential For Stated Goals: Good  Regarding Omar Jung's therapy, I certify that the treatment plan above will be carried out by a therapist or under their direction. Thank you for this referral,    Pepper Holbrook, PT                 HISTORY:   History of Present Injury/Illness (Reason for Referral): He was pushing a small cart at [employer] and his foot caught the wheel when he turned and he feel down. Injury happened feb 1st. They called EMS and took him to [facility]. He had hip surgery on 2-2-18. Then he went to [facility] after his hip surgery. He did have home health come out to the house for a couple of visits. He now presents for outpatient therapy. Past Medical History/Comorbidities:   Mr. Gayle Zarate  has a past medical history of Abdominal aortic aneurysm ([facility] [address] (12/10/2015); Abdominal aortic aneurysm without rupture ([facility] [address] Allergic rhinitis (12/10/2015); Asthma; Benign neoplasm; Benign prostatic hyperplasia (12/10/2015); BPH without urinary obstruction; Cardiovascular disease (12/10/2015); Chronic obstructive asthma with exacerbation ([address] (12/10/2015); COPD (chronic obstructive pulmonary disease) ([address] (12/10/2015); Cough with hemoptysis; Erectile dysfunction (12/10/2015); Essential hypertension, benign (12/10/2015); Fracture (10/2014); History of colon polyps;  Low testosterone (12/10/2015); Lumbago; Memory loss; Overflow incontinence; Raynaud's syndrome (12/10/2015); Rotator cuff tendinitis; Thrombocytopenia ([address] and Urinary frequency. Mr. Lian Stone  has a past surgical history that includes hx hernia repair (1980); hx colonoscopy; hx fracture tx (10/2014); hx cataract removal (6/2016-right); and hx orthopaedic. Social History/Living Environment:    Lives in a 1 story house with wife. 1 small step to get inside. They do have a ramp to get inside also. Prior Level of Function/Work/Activity:  Ambulated with on assistive device. He did go out of the house with his wife and perform light shopping. Goal is to be able to walk normal.    Current Medications:       Current Outpatient Prescriptions:     lisinopril (PRINIVIL, ZESTRIL) 5 mg tablet, Take 5 mg by mouth daily. , Disp: , Rfl:     tamsulosin (FLOMAX) 0.4 mg capsule, Take 1 Cap by mouth daily. , Disp: 90 Cap, Rfl: 4    montelukast (SINGULAIR) 10 mg tablet, Take 1 Tab by mouth daily. , Disp: 90 Tab, Rfl: 4    fluticasone (FLONASE) 50 mcg/actuation nasal spray, 2 Sprays by Both Nostrils route daily. , Disp: 48 g, Rfl: 4    calcium citrate-vitamin d3 (CITRACAL+D) 315-200 mg-unit tab, Take 2 Tabs by mouth daily (with breakfast). , Disp: , Rfl:     cholecalciferol (VITAMIN D3) 1,000 unit cap, Take  by mouth., Disp: , Rfl:     guaiFENesin ER (MUCINEX) 600 mg ER tablet, Take 1,200 mg by mouth daily. , Disp: , Rfl:     Biotin 2,500 mcg cap, Take  by mouth., Disp: , Rfl:     aspirin delayed-release 81 mg tablet, Take  by mouth daily. , Disp: , Rfl:    Date Last Reviewed:  4-10-18   Number of Personal Factors/Comorbidities that affect the Plan of Care: 1-2: MODERATE COMPLEXITY   EXAMINATION:   Observation/Orthostatic Postural Assessment:          Pt arrived to physical therapy with rolling walker.    Palpation:          n/t  ROM:         n/t  Strength:                    Special Tests: n/t  Functional Mobility: n/t         Body Structures Involved:  1. Bones  2. Joints  3. Muscles Body Functions Affected:  1. Neuromusculoskeletal Activities and Participation Affected:  1. Community, Social and [place]   Number of elements (examined above) that affect the Plan of Care: 3: MODERATE COMPLEXITY   CLINICAL PRESENTATION:   Presentation: Stable and uncomplicated: LOW COMPLEXITY   CLINICAL DECISION MAKING:   Outcome Measure: Tool Used: Timed Up and Go (TUG)  Score:  Initial: 19 seconds Most Recent: X seconds (Date: -- )   Interpretation of Score: The test measures, in seconds, the time taken by an individual to stand up from a standard arm chair (seat height 46 cm [18 in], arm height 65 cm [25.6 in]), walk a distance of 3 meters (118 in, approx 10 ft), turn, walk back to the chair and sit down. If the individual takes longer than 14 seconds to complete TUG, this indicates risk for falls. Score 7 7.5-10.5 11-14 14.5-17.5 18-21 21.5-24.5 25+   Modifier CH CI CJ CK CL CM CN     ? Mobility - Walking and Moving Around:     - CURRENT STATUS: CL - 60%-79% impaired, limited or restricted    - GOAL STATUS: CJ - 20%-39% impaired, limited or restricted    - D/C STATUS:  ---------------To be determined---------------    Medical Necessity:   · Patient is expected to demonstrate progress in strength, range of motion and balance to improve safety during walking and mobility. Reason for Services/Other Comments:  · Patient continues to require skilled intervention due to decreased LE strength, decreased gait and balance. Use of outcome tool(s) and clinical judgement create a POC that gives a: Clear prediction of patient's progress: LOW COMPLEXITY            TREATMENT:   (In addition to Assessment/Re-Assessment sessions the following treatments were rendered)  Pre-treatment Symptoms/Complaints:  Pt reports he knows his back is getting better. He does continue to put biofreeze on it to help with pain.    Pain: Initial:     back 3/10 Post Session: 3/10     Therapeutic Exercise: (40 Minutes): Exercises for LE strengthening, endurance, and balance. CGA with standing exercises. Monitored O2% during exercises and ranged from 91-93%. Date:  3-22-18 Date:  3-27-18 Date:  3-29-18 Date  4-5-18 Date  4-10-18   Activity/Exercise Parameters Parameters Parameters parameters parameters   Long arc quad 2# 2x10B 2# 3x10 B 3#2x10 B 3# 3x10 4# 2x10   Seated hamstring curls Green 2x10B Green 2x15 Green 2x15 Green 2x15 Blue 2x10   SLR 2x10B 2x10B 2x10 B 2x10 2x12 B   Side lying hip abduction Place and hold 5\"x5 AA 2x8 AA 2x10 AA 2x10 Active 2x10   Side lying clam 2x10 3x10 2x15 Yellow 2x10 Yellow 2x10   Sit to stand 2x10 2x10 2x10 2x10 2x12   Heel raises 2x10  2x10 2x10 2x15 2x15   balance Semi tandem 20\"x3; toe taps on 4\" step 10x Semi tandem 20\"x3 Semi tandem 20\"x3; toe taps on 4\" step 10xB Semi tandem 20\"x3; toe taps on 4\" step 10xB Semi tandem 30\"x3   Stepping forward/backward no UE assist 2x5 B 2x5 B -     Walking with walker for endurance ~200 ft ~250 ft  ~250 ft ~265 ft    Steps ups 4\" 10xB  4\" 10xB 4\" 12xB 4\" 12xB   Walking with no assistive device - - - - 65 ft x2 with CGA           HEP: issued handout for patient with exercises. Pt verbalizes understanding. Treatment/Session Assessment:    · Response to Treatment:  Progressed exercises today and he did well with no complaints of pain. He was able to walk with no assistive device today with CGA but he is to continue with walker at home. · Compliance with Program/Exercises: complaint   · Recommendations/Intent for next treatment session: \"Next visit will focus on LE strengthening, balance, gait\".   Total Treatment Duration: 40 minutes  PT Patient Time In/Time Out  Time In: 0935  Time Out: [address]

## 2018-04-13 ENCOUNTER — HOSPITAL ENCOUNTER (OUTPATIENT)
Dept: PHYSICAL THERAPY | Age: 83
Discharge: HOME | End: 2018-04-13
Payer: MEDICARE

## 2018-04-13 PROCEDURE — 97110 THERAPEUTIC EXERCISES: CPT

## 2018-04-13 NOTE — PROGRESS NOTES
Nola Luis  : 1932  [place]  Secondary:  [address]  at [facility] IMMANUEL BALDWIN  [address] [facility], [address], [address] [facility]  [phone]   [phone]        OUTPATIENT PHYSICAL THERAPY:Daily Note 2018    ICD-10: Treatment Diagnosis: Displaced intertrochanteric fracture of left femur, sequela (S72.142S); Unsteadiness on feet (R26.81); Pain in left hip (M25.552)  Precautions/Allergies:   Review of patient's allergies indicates no known allergies. Fall Risk Score: 7 (? 5 = High Risk)  MD Orders: hip abductor, quad strengthening, gait training WBAT MEDICAL/REFERRING DIAGNOSIS:  s/p ORIF LT IT    DATE OF ONSET: Injury 18; surgery 18  REFERRING PHYSICIAN: Gerardo Mosqueda MD  RETURN PHYSICIAN APPOINTMENT: 18     INITIAL ASSESSMENT:  Mr. Sameera Bradley presents s/p ORIF of the left hip after a fall at [facility] [place]. He presents with decreased LE strength, decreased mobility, decreased gait and balance. Prior to the fall he was able to ambulate community distances with no assistive device. He is currently using a rolling walker for ambulation. He is at risk for falls based on TUG time. He will benefit from skilled physical therapy to increase functional mobility and decrease risk for falls. PROBLEM LIST (Impacting functional limitations):  1. Decreased Strength  2. Decreased ADL/Functional Activities  3. Decreased Ambulation Ability/Technique  4. Decreased Balance  5. Increased Pain INTERVENTIONS PLANNED:  1. Gait Training  2. Manual Therapy  3. Therapeutic Activites  4. Therapeutic Exercise/Strengthening   5. Modalities as needed for pain   TREATMENT PLAN:  Effective Dates: 3-20-18 TO 2018 (90 days). Frequency/Duration: 2-3 times a week for 90 Days  GOALS: (Goals have been discussed and agreed upon with patient.)  Short-Term Functional Goals: Time Frame: 4 weeks  1. Pt will increase L hip abductor strength to 4/5 for improved gait.   2. Pt will improve TUG time to 15 sec for improved mobility. 3. Pt will be independent with HEP. 4. Pt will report pain 2/10 with daily activities. Discharge Goals: Time Frame: 12 weeks  1. Pt will increase L LE strength to 5/5 for improved gait and mobility. 2. Pt will improve TUG time to 12 sec for decreased risk for falls. 3. Pt will report pain 1/10 with daily activities. 4. Pt will be able to balance on L single leg for 5 sec for gait with no assistive device. 5. Pt will be able to ambulate with least restrictive assistive device over various terrain with supervision. Rehabilitation Potential For Stated Goals: Good  Regarding Anabella Jung's therapy, I certify that the treatment plan above will be carried out by a therapist or under their direction. Thank you for this referral,    Pepper Sue, PT                 HISTORY:   History of Present Injury/Illness (Reason for Referral): He was pushing a small cart at [employer] and his foot caught the wheel when he turned and he feel down. Injury happened feb 1st. They called EMS and took him to [address] [place] [address]. He had hip surgery on 2-2-18. Then he went to [facility] after his hip surgery. He did have home health come out to the house for a couple of visits. He now presents for outpatient therapy. Past Medical History/Comorbidities:   Mr. Yodit Lafleur  has a past medical history of Abdominal aortic aneurysm ([address] (12/10/2015); Abdominal aortic aneurysm without rupture ([address] Allergic rhinitis (12/10/2015); Asthma; Benign neoplasm; Benign prostatic hyperplasia (12/10/2015); BPH without urinary obstruction; Cardiovascular disease (12/10/2015); Chronic obstructive asthma with exacerbation ([address] (12/10/2015); COPD (chronic obstructive pulmonary disease) ([address] (12/10/2015); Cough with hemoptysis; Erectile dysfunction (12/10/2015); Essential hypertension, benign (12/10/2015); Fracture (10/2014); History of colon polyps;  Low testosterone (12/10/2015); Lumbago; Memory loss; Overflow incontinence; Raynaud's syndrome (12/10/2015); Rotator cuff tendinitis; Thrombocytopenia ([address] and Urinary frequency. Mr. Torey Penny  has a past surgical history that includes hx hernia repair (1980); hx colonoscopy; hx fracture tx (10/2014); hx cataract removal (6/2016-right); and hx orthopaedic. Social History/Living Environment:    Lives in a 1 story house with wife. 1 small step to get inside. They do have a ramp to get inside also. Prior Level of Function/Work/Activity:  Ambulated with on assistive device. He did go out of the house with his wife and perform light shopping. Goal is to be able to walk normal.    Current Medications:       Current Outpatient Prescriptions:     lisinopril (PRINIVIL, ZESTRIL) 5 mg tablet, Take 5 mg by mouth daily. , Disp: , Rfl:     tamsulosin (FLOMAX) 0.4 mg capsule, Take 1 Cap by mouth daily. , Disp: 90 Cap, Rfl: 4    montelukast (SINGULAIR) 10 mg tablet, Take 1 Tab by mouth daily. , Disp: 90 Tab, Rfl: 4    fluticasone (FLONASE) 50 mcg/actuation nasal spray, 2 Sprays by Both Nostrils route daily. , Disp: 48 g, Rfl: 4    calcium citrate-vitamin d3 (CITRACAL+D) 315-200 mg-unit tab, Take 2 Tabs by mouth daily (with breakfast). , Disp: , Rfl:     cholecalciferol (VITAMIN D3) 1,000 unit cap, Take  by mouth., Disp: , Rfl:     guaiFENesin ER (MUCINEX) 600 mg ER tablet, Take 1,200 mg by mouth daily. , Disp: , Rfl:     Biotin 2,500 mcg cap, Take  by mouth., Disp: , Rfl:     aspirin delayed-release 81 mg tablet, Take  by mouth daily. , Disp: , Rfl:    Date Last Reviewed:  4-10-18   Number of Personal Factors/Comorbidities that affect the Plan of Care: 1-2: MODERATE COMPLEXITY   EXAMINATION:   Observation/Orthostatic Postural Assessment:          Pt arrived to physical therapy with rolling walker.    Palpation:          n/t  ROM:         n/t  Strength:                    Special Tests: n/t  Functional Mobility: n/t         Body Structures Involved:  1. Bones  2. Joints  3. Muscles Body Functions Affected:  1. Neuromusculoskeletal Activities and Participation Affected:  1. Community, Social and [address] [place]   Number of elements (examined above) that affect the Plan of Care: 3: MODERATE COMPLEXITY   CLINICAL PRESENTATION:   Presentation: Stable and uncomplicated: LOW COMPLEXITY   CLINICAL DECISION MAKING:   Outcome Measure: Tool Used: Timed Up and Go (TUG)  Score:  Initial: 19 seconds Most Recent: X seconds (Date: -- )   Interpretation of Score: The test measures, in seconds, the time taken by an individual to stand up from a standard arm chair (seat height 46 cm [18 in], arm height 65 cm [25.6 in]), walk a distance of 3 meters (118 in, approx 10 ft), turn, walk back to the chair and sit down. If the individual takes longer than 14 seconds to complete TUG, this indicates risk for falls. Score 7 7.5-10.5 11-14 14.5-17.5 18-21 21.5-24.5 25+   Modifier CH CI CJ CK CL CM CN     ? Mobility - Walking and Moving Around:     - CURRENT STATUS: CL - 60%-79% impaired, limited or restricted    - GOAL STATUS: CJ - 20%-39% impaired, limited or restricted    - D/C STATUS:  ---------------To be determined---------------    Medical Necessity:   · Patient is expected to demonstrate progress in strength, range of motion and balance to improve safety during walking and mobility. Reason for Services/Other Comments:  · Patient continues to require skilled intervention due to decreased LE strength, decreased gait and balance. Use of outcome tool(s) and clinical judgement create a POC that gives a: Clear prediction of patient's progress: LOW COMPLEXITY            TREATMENT:   (In addition to Assessment/Re-Assessment sessions the following treatments were rendered)  Pre-treatment Symptoms/Complaints:  Pt reports he back is still sore.    Pain: Initial:     back 3/10 Post Session:  3/10     Therapeutic Exercise: (43 Minutes): Exercises for LE strengthening, endurance, and balance. CGA with standing exercises. Monitored O2% during exercises and ranged from 91-93%. Date:  3-22-18 Date:  3-27-18 Date:  3-29-18 Date  4-5-18 Date  4-10-18 Date  4-13-18   Activity/Exercise Parameters Parameters Parameters parameters parameters parameters   Long arc quad 2# 2x10B 2# 3x10 B 3#2x10 B 3# 3x10 4# 2x10 4# 3x10   Seated hamstring curls Green 2x10B Green 2x15 Green 2x15 Green 2x15 Blue 2x10 Blue 2x15   SLR 2x10B 2x10B 2x10 B 2x10 2x12 B 2x12 B   Side lying hip abduction Place and hold 5\"x5 AA 2x8 AA 2x10 AA 2x10 Active 2x10 Active 3x10   Side lying clam 2x10 3x10 2x15 Yellow 2x10 Yellow 2x10 Yellow 3x10   Sit to stand 2x10 2x10 2x10 2x10 2x12 2x12   Heel raises 2x10  2x10 2x10 2x15 2x15 2x15   balance Semi tandem 20\"x3; toe taps on 4\" step 10x Semi tandem 20\"x3 Semi tandem 20\"x3; toe taps on 4\" step 10xB Semi tandem 20\"x3; toe taps on 4\" step 10xB Semi tandem 30\"x3  semi tandem 30\"x3   Stepping forward/backward no UE assist 2x5 B 2x5 B -   -   Walking with walker for endurance ~200 ft ~250 ft  ~250 ft ~265 ft  -   Steps ups 4\" 10xB  4\" 10xB 4\" 12xB 4\" 12xB 4\" 15x B   Walking with no assistive device - - - - 65 ft x2 with CGA 65ft x3 with CGA           HEP: issued handout for patient with exercises. Pt verbalizes understanding. Treatment/Session Assessment:    · Response to Treatment:  He seems to be progressing with LE strength. · Compliance with Program/Exercises: complaint   · Recommendations/Intent for next treatment session: \"Next visit will focus on LE strengthening, balance, gait\".   Total Treatment Duration: 43 minutes  PT Patient Time In/Time Out  Time In: 0930  Time Out: [address]

## 2018-04-17 ENCOUNTER — HOSPITAL ENCOUNTER (OUTPATIENT)
Dept: PHYSICAL THERAPY | Age: 83
Discharge: HOME | End: 2018-04-17
Payer: MEDICARE

## 2018-04-17 PROCEDURE — 97110 THERAPEUTIC EXERCISES: CPT

## 2018-04-17 NOTE — PROGRESS NOTES
Isiah Barnett  : 1932  [place]  Secondary:  [address]  at [facility] IMMANUEL BALDWIN  [address] [facility], [address], [address] [facility]  [phone]   [phone]        OUTPATIENT PHYSICAL THERAPY:Daily Note 2018    ICD-10: Treatment Diagnosis: Displaced intertrochanteric fracture of left femur, sequela (S72.142S); Unsteadiness on feet (R26.81); Pain in left hip (M25.552)  Precautions/Allergies:   Review of patient's allergies indicates no known allergies. Fall Risk Score: 7 (? 5 = High Risk)  MD Orders: hip abductor, quad strengthening, gait training WBAT MEDICAL/REFERRING DIAGNOSIS:  s/p ORIF LT IT    DATE OF ONSET: Injury 18; surgery 18  REFERRING PHYSICIAN: Jeffry Lane MD  RETURN PHYSICIAN APPOINTMENT: 18     INITIAL ASSESSMENT:  Mr. Stephon Mcgill presents s/p ORIF of the left hip after a fall at [facility]. He presents with decreased LE strength, decreased mobility, decreased gait and balance. Prior to the fall he was able to ambulate community distances with no assistive device. He is currently using a rolling walker for ambulation. He is at risk for falls based on TUG time. He will benefit from skilled physical therapy to increase functional mobility and decrease risk for falls. PROBLEM LIST (Impacting functional limitations):  1. Decreased Strength  2. Decreased ADL/Functional Activities  3. Decreased Ambulation Ability/Technique  4. Decreased Balance  5. Increased Pain INTERVENTIONS PLANNED:  1. Gait Training  2. Manual Therapy  3. Therapeutic Activites  4. Therapeutic Exercise/Strengthening   5. Modalities as needed for pain   TREATMENT PLAN:  Effective Dates: 3-20-18 TO 2018 (90 days). Frequency/Duration: 2-3 times a week for 90 Days  GOALS: (Goals have been discussed and agreed upon with patient.)  Short-Term Functional Goals: Time Frame: 4 weeks  1. Pt will increase L hip abductor strength to 4/5 for improved gait.   2. Pt will improve TUG time to 15 sec for improved mobility. 3. Pt will be independent with HEP. 4. Pt will report pain 2/10 with daily activities. Discharge Goals: Time Frame: 12 weeks  1. Pt will increase L LE strength to 5/5 for improved gait and mobility. 2. Pt will improve TUG time to 12 sec for decreased risk for falls. 3. Pt will report pain 1/10 with daily activities. 4. Pt will be able to balance on L single leg for 5 sec for gait with no assistive device. 5. Pt will be able to ambulate with least restrictive assistive device over various terrain with supervision. Rehabilitation Potential For Stated Goals: Good  Regarding Estelle Jung's therapy, I certify that the treatment plan above will be carried out by a therapist or under their direction. Thank you for this referral,    Pepper Huang, PT                 HISTORY:   History of Present Injury/Illness (Reason for Referral): He was pushing a small cart at [employer] and his foot caught the wheel when he turned and he feel down. Injury happened feb 1st. They called EMS and took him to [address] [place] [address] Dr muhammad. He had hip surgery on 2-2-18. Then he went to [facility] after his hip surgery. He did have home health come out to the house for a couple of visits. He now presents for outpatient therapy. Past Medical History/Comorbidities:   Mr. Anna Braga  has a past medical history of Abdominal aortic aneurysm [address] (12/10/2015); Abdominal aortic aneurysm without rupture ([facility] Allergic rhinitis (12/10/2015); Asthma; Benign neoplasm; Benign prostatic hyperplasia (12/10/2015); BPH without urinary obstruction; Cardiovascular disease (12/10/2015); Chronic obstructive asthma with exacerbation ([address] (12/10/2015); COPD (chronic obstructive pulmonary disease) ([address] (12/10/2015); Cough with hemoptysis; Erectile dysfunction (12/10/2015); Essential hypertension, benign (12/10/2015); Fracture (10/2014); History of colon polyps;  Low testosterone (12/10/2015); Lumbago; Memory loss; Overflow incontinence; Raynaud's syndrome (12/10/2015); Rotator cuff tendinitis; Thrombocytopenia ([address] and Urinary frequency. Mr. Mary Gutierrez  has a past surgical history that includes hx hernia repair (1980); hx colonoscopy; hx fracture tx (10/2014); hx cataract removal (6/2016-right); and hx orthopaedic. Social History/Living Environment:    Lives in a 1 story house with wife. 1 small step to get inside. They do have a ramp to get inside also. Prior Level of Function/Work/Activity:  Ambulated with on assistive device. He did go out of the house with his wife and perform light shopping. Goal is to be able to walk normal.    Current Medications:       Current Outpatient Prescriptions:     lisinopril (PRINIVIL, ZESTRIL) 5 mg tablet, Take 5 mg by mouth daily. , Disp: , Rfl:     tamsulosin (FLOMAX) 0.4 mg capsule, Take 1 Cap by mouth daily. , Disp: 90 Cap, Rfl: 4    montelukast (SINGULAIR) 10 mg tablet, Take 1 Tab by mouth daily. , Disp: 90 Tab, Rfl: 4    fluticasone (FLONASE) 50 mcg/actuation nasal spray, 2 Sprays by Both Nostrils route daily. , Disp: 48 g, Rfl: 4    calcium citrate-vitamin d3 (CITRACAL+D) 315-200 mg-unit tab, Take 2 Tabs by mouth daily (with breakfast). , Disp: , Rfl:     cholecalciferol (VITAMIN D3) 1,000 unit cap, Take  by mouth., Disp: , Rfl:     guaiFENesin ER (MUCINEX) 600 mg ER tablet, Take 1,200 mg by mouth daily. , Disp: , Rfl:     Biotin 2,500 mcg cap, Take  by mouth., Disp: , Rfl:     aspirin delayed-release 81 mg tablet, Take  by mouth daily. , Disp: , Rfl:    Date Last Reviewed:  4-17-18   Number of Personal Factors/Comorbidities that affect the Plan of Care: 1-2: MODERATE COMPLEXITY   EXAMINATION:   Observation/Orthostatic Postural Assessment:          Pt arrived to physical therapy with rolling walker.    Palpation:          n/t  ROM:         n/t  Strength:                    Special Tests: n/t  Functional Mobility: n/t         Body Structures Involved:  1. Bones  2. Joints  3. Muscles Body Functions Affected:  1. Neuromusculoskeletal Activities and Participation Affected:  1. Community, Social and [place]   Number of elements (examined above) that affect the Plan of Care: 3: MODERATE COMPLEXITY   CLINICAL PRESENTATION:   Presentation: Stable and uncomplicated: LOW COMPLEXITY   CLINICAL DECISION MAKING:   Outcome Measure: Tool Used: Timed Up and Go (TUG)  Score:  Initial: 19 seconds Most Recent: X seconds (Date: -- )   Interpretation of Score: The test measures, in seconds, the time taken by an individual to stand up from a standard arm chair (seat height 46 cm [18 in], arm height 65 cm [25.6 in]), walk a distance of 3 meters (118 in, approx 10 ft), turn, walk back to the chair and sit down. If the individual takes longer than 14 seconds to complete TUG, this indicates risk for falls. Score 7 7.5-10.5 11-14 14.5-17.5 18-21 21.5-24.5 25+   Modifier CH CI CJ CK CL CM CN     ? Mobility - Walking and Moving Around:     - CURRENT STATUS: CL - 60%-79% impaired, limited or restricted    - GOAL STATUS: CJ - 20%-39% impaired, limited or restricted    - D/C STATUS:  ---------------To be determined---------------    Medical Necessity:   · Patient is expected to demonstrate progress in strength, range of motion and balance to improve safety during walking and mobility. Reason for Services/Other Comments:  · Patient continues to require skilled intervention due to decreased LE strength, decreased gait and balance. Use of outcome tool(s) and clinical judgement create a POC that gives a: Clear prediction of patient's progress: LOW COMPLEXITY            TREATMENT:   (In addition to Assessment/Re-Assessment sessions the following treatments were rendered)  Pre-treatment Symptoms/Complaints:  Pt reports he feels like he is getting better. He was able to drive himself to therapy today.    Pain: Initial:     back 3/10 Post Session:  3/10 Therapeutic Exercise: (42 Minutes): Exercises for LE strengthening, endurance, and balance. CGA with standing exercises. Monitored O2% during exercises and ranged from 93-95%. Date:  3-22-18 Date:  3-27-18 Date:  3-29-18 Date  4-5-18 Date  4-10-18 Date  4-13-18 Date  4-17-18   Activity/Exercise Parameters Parameters Parameters parameters parameters parameters parameters   Long arc quad 2# 2x10B 2# 3x10 B 3#2x10 B 3# 3x10 4# 2x10 4# 3x10 4# 4x10   Seated hamstring curls Green 2x10B Green 2x15 Green 2x15 Green 2x15 Blue 2x10 Blue 2x15 Blue 2x15   SLR 2x10B 2x10B 2x10 B 2x10 2x12 B 2x12 B 2x12B   Side lying hip abduction Place and hold 5\"x5 AA 2x8 AA 2x10 AA 2x10 Active 2x10 Active 3x10 Active 3x10   Side lying clam 2x10 3x10 2x15 Yellow 2x10 Yellow 2x10 Yellow 3x10 Red 2x10   Sit to stand 2x10 2x10 2x10 2x10 2x12 2x12 2x15   Heel raises 2x10  2x10 2x10 2x15 2x15 2x15 2x15   balance Semi tandem 20\"x3; toe taps on 4\" step 10x Semi tandem 20\"x3 Semi tandem 20\"x3; toe taps on 4\" step 10xB Semi tandem 20\"x3; toe taps on 4\" step 10xB Semi tandem 30\"x3  semi tandem 30\"x3 Semi tandem 30\"x3   Stepping forward/backward no UE assist 2x5 B 2x5 B -   - -   Walking with walker for endurance ~200 ft ~250 ft  ~250 ft ~265 ft  - -   Steps ups 4\" 10xB  4\" 10xB 4\" 12xB 4\" 12xB 4\" 15x B 4\" 15x B   Walking with no assistive device - - - - 65 ft x2 with CGA 65ft x3 with CGA 65 ft x with supervision           HEP: issued handout for patient with exercises. Pt verbalizes understanding. Treatment/Session Assessment:    · Response to Treatment:  Less assistance with walking with no assistive device today. · Compliance with Program/Exercises: complaint   · Recommendations/Intent for next treatment session: \"Next visit will focus on LE strengthening, balance, gait\".   Total Treatment Duration: 42 minutes  PT Patient Time In/Time Out  Time In: 0945  Time Out: [address] [place], PT

## 2018-04-24 ENCOUNTER — HOSPITAL ENCOUNTER (OUTPATIENT)
Dept: PHYSICAL THERAPY | Age: 83
Discharge: HOME | End: 2018-04-24
Payer: MEDICARE

## 2018-04-24 PROCEDURE — 97110 THERAPEUTIC EXERCISES: CPT

## 2018-04-24 PROCEDURE — G8978 MOBILITY CURRENT STATUS: HCPCS

## 2018-04-24 PROCEDURE — G8979 MOBILITY GOAL STATUS: HCPCS

## 2018-04-27 ENCOUNTER — HOSPITAL ENCOUNTER (OUTPATIENT)
Dept: PHYSICAL THERAPY | Age: 83
Discharge: HOME | End: 2018-04-27
Payer: MEDICARE

## 2018-04-27 PROCEDURE — 97110 THERAPEUTIC EXERCISES: CPT

## 2018-04-27 NOTE — PROGRESS NOTES
Carlos Pa  : 1932  [place]  Secondary:  [address]  at [facility] IMMANUEL BALDWIN  [address] [facility], [address], [facility] D[place].  [phone]   [phone]        OUTPATIENT PHYSICAL THERAPY:Daily Note 2018    ICD-10: Treatment Diagnosis: Displaced intertrochanteric fracture of left femur, sequela (S72.142S); Unsteadiness on feet (R26.81); Pain in left hip (M25.552)  Precautions/Allergies:   Review of patient's allergies indicates no known allergies. Fall Risk Score: 7 (? 5 = High Risk)  MD Orders: hip abductor, quad strengthening, gait training WBAT MEDICAL/REFERRING DIAGNOSIS:  s/p ORIF LT IT    DATE OF ONSET: Injury 18; surgery 18  REFERRING PHYSICIAN: Tiera Alfaro MD  RETURN PHYSICIAN APPOINTMENT: 18     ASSESSMENT:  Mr. Jocelyn Horn presents s/p ORIF of the left hip after a fall at [facility]. He has progressed with LE strength, decreased pain, and improved gait. He has improved with his TUG score. He is able to ambulate short distances with no assistive device with supervision. He will benefit from skilled physical therapy to increase functional mobility and decrease risk for falls. PROBLEM LIST (Impacting functional limitations):  1. Decreased Strength  2. Decreased ADL/Functional Activities  3. Decreased Ambulation Ability/Technique  4. Decreased Balance  5. Increased Pain INTERVENTIONS PLANNED:  1. Gait Training  2. Manual Therapy  3. Therapeutic Activites  4. Therapeutic Exercise/Strengthening   5. Modalities as needed for pain   TREATMENT PLAN:  Effective Dates: 3-20-18 TO 2018 (90 days). Frequency/Duration: 2-3 times a week for 90 Days  GOALS: (Goals have been discussed and agreed upon with patient.)  Short-Term Functional Goals: Time Frame: 4 weeks  1. Pt will increase L hip abductor strength to 4/5 for improved gait. GOAL MET  2. Pt will improve TUG time to 15 sec for improved mobility. GOAL MET  3.  Pt will be independent with HEP. GOAL MET  4. Pt will report pain 2/10 with daily activities. GOAL MET  Discharge Goals: Time Frame: 12 weeks progressing towards long term goals  1. Pt will increase L LE strength to 5/5 for improved gait and mobility. 2. Pt will improve TUG time to 12 sec for decreased risk for falls. 3. Pt will report pain 1/10 with daily activities. 4. Pt will be able to balance on L single leg for 5 sec for gait with no assistive device. 5. Pt will be able to ambulate with least restrictive assistive device over various terrain with supervision. Rehabilitation Potential For Stated Goals: Good  Regarding Conception Kalli Jung's therapy, I certify that the treatment plan above will be carried out by a therapist or under their direction. Thank you for this referral,    Pepper Griggs, PT                 HISTORY:   History of Present Injury/Illness (Reason for Referral): He was pushing a small cart at [facility] and his foot caught the wheel when he turned and he feel down. Injury happened feb 1st. They called EMS and took him to [facility]. He had hip surgery on 2-2-18. Then he went to [facility] after his hip surgery. He did have home health come out to the house for a couple of visits. He now presents for outpatient therapy. Past Medical History/Comorbidities:   Mr. Stephon Mcgill  has a past medical history of Abdominal aortic aneurysm ([address] (12/10/2015); Abdominal aortic aneurysm without rupture ([address] Allergic rhinitis (12/10/2015); Asthma; Benign neoplasm; Benign prostatic hyperplasia (12/10/2015); BPH without urinary obstruction; Cardiovascular disease (12/10/2015); Chronic obstructive asthma with exacerbation ([address] (12/10/2015); COPD (chronic obstructive pulmonary disease) ([address] (12/10/2015); Cough with hemoptysis; Erectile dysfunction (12/10/2015); Essential hypertension, benign (12/10/2015); Fracture (10/2014); History of colon polyps; Low testosterone (12/10/2015); Lumbago; Memory loss;  Overflow incontinence; Raynaud's syndrome (12/10/2015); Rotator cuff tendinitis; Thrombocytopenia ([address] and Urinary frequency. Mr. Michelle Glaser  has a past surgical history that includes hx hernia repair (1980); hx colonoscopy; hx fracture tx (10/2014); hx cataract removal (6/2016-right); and hx orthopaedic. Social History/Living Environment:    Lives in a 1 story house with wife. 1 small step to get inside. They do have a ramp to get inside also. Prior Level of Function/Work/Activity:  Ambulated with on assistive device. He did go out of the house with his wife and perform light shopping. Goal is to be able to walk normal.    Current Medications:       Current Outpatient Prescriptions:     lisinopril (PRINIVIL, ZESTRIL) 5 mg tablet, Take 5 mg by mouth daily. , Disp: , Rfl:     tamsulosin (FLOMAX) 0.4 mg capsule, Take 1 Cap by mouth daily. , Disp: 90 Cap, Rfl: 4    montelukast (SINGULAIR) 10 mg tablet, Take 1 Tab by mouth daily. , Disp: 90 Tab, Rfl: 4    fluticasone (FLONASE) 50 mcg/actuation nasal spray, 2 Sprays by Both Nostrils route daily. , Disp: 48 g, Rfl: 4    calcium citrate-vitamin d3 (CITRACAL+D) 315-200 mg-unit tab, Take 2 Tabs by mouth daily (with breakfast). , Disp: , Rfl:     cholecalciferol (VITAMIN D3) 1,000 unit cap, Take  by mouth., Disp: , Rfl:     guaiFENesin ER (MUCINEX) 600 mg ER tablet, Take 1,200 mg by mouth daily. , Disp: , Rfl:     Biotin 2,500 mcg cap, Take  by mouth., Disp: , Rfl:     aspirin delayed-release 81 mg tablet, Take  by mouth daily. , Disp: , Rfl:    Date Last Reviewed:  4-24-18   Number of Personal Factors/Comorbidities that affect the Plan of Care: 1-2: MODERATE COMPLEXITY   EXAMINATION:   Observation/Orthostatic Postural Assessment:          Pt arrived to physical therapy with rolling walker. Palpation:          n/t  ROM:        n/t  Strength:                    Functional Mobility: n/t         Body Structures Involved:  1. Bones  2. Joints  3.  Muscles Body Functions Affected:  1. Neuromusculoskeletal Activities and Participation Affected:  1. Community, Social and [place]   Number of elements (examined above) that affect the Plan of Care: 3: MODERATE COMPLEXITY   CLINICAL PRESENTATION:   Presentation: Stable and uncomplicated: LOW COMPLEXITY   CLINICAL DECISION MAKING:   Outcome Measure: Tool Used: Timed Up and Go (TUG)  Score:  Initial: 19 seconds Most Recent: 16 seconds, 12 sec with walker (Date: 4-24-18 )   Interpretation of Score: The test measures, in seconds, the time taken by an individual to stand up from a standard arm chair (seat height 46 cm [18 in], arm height 65 cm [25.6 in]), walk a distance of 3 meters (118 in, approx 10 ft), turn, walk back to the chair and sit down. If the individual takes longer than 14 seconds to complete TUG, this indicates risk for falls. Score 7 7.5-10.5 11-14 14.5-17.5 18-21 21.5-24.5 25+   Modifier CH CI CJ CK CL CM CN     ? Mobility - Walking and Moving Around:     - CURRENT STATUS: CJ - 20%-39% impaired, limited or restricted    - GOAL STATUS: CJ - 20%-39% impaired, limited or restricted    - D/C STATUS:  ---------------To be determined---------------    Medical Necessity:   · Patient is expected to demonstrate progress in strength, range of motion and balance to improve safety during walking and mobility. Reason for Services/Other Comments:  · Patient continues to require skilled intervention due to decreased LE strength, decreased gait and balance. Use of outcome tool(s) and clinical judgement create a POC that gives a: Clear prediction of patient's progress: LOW COMPLEXITY            TREATMENT:   (In addition to Assessment/Re-Assessment sessions the following treatments were rendered)  Pre-treatment Symptoms/Complaints:  Pt reports he returned to MD yesterday and he ordered more physical therapy.    Pain: Initial:     back 2/10 Post Session:  2/10     Therapeutic Exercise: (40 Minutes): Exercises for LE strengthening, endurance, and balance. CGA with standing exercises. Monitored O2% during exercises. 88% after sit to stand exercise (increased to 97% after 1-2 min of rest .  91-92% after heel raises. Walking with no assistive device with supervision. Walking forward and backwards 15 ft x2 ea. Walking with no assistive device 75 ft with supervision. Date:  3-29-18 Date  4-5-18 Date  4-10-18 Date  4-13-18 Date  4-17-18 Date  4-24-18 Date  4-27-18   Activity/Exercise Parameters parameters parameters parameters parameters parameters parameters   Long arc quad 3#2x10 B 3# 3x10 4# 2x10 4# 3x10 4# 4x10 5# 2x10 5#2x15   Seated hamstring curls Green 2x15 Green 2x15 Blue 2x10 Blue 2x15 Blue 2x15 Blue 2x15 Blue 2x15   SLR 2x10 B 2x10 2x12 B 2x12 B 2x12B 2x15 B 1# 2x8   Side lying hip abduction AA 2x10 AA 2x10 Active 2x10 Active 3x10 Active 3x10 Active 2x15 1#2x8   Side lying clam 2x15 Yellow 2x10 Yellow 2x10 Yellow 3x10 Red 2x10 Red 2x10 -   Sit to stand 2x10 2x10 2x12 2x12 2x15 - 20x   Heel raises 2x10 2x15 2x15 2x15 2x15 2x15 2x15   balance Semi tandem 20\"x3; toe taps on 4\" step 10xB Semi tandem 20\"x3; toe taps on 4\" step 10xB Semi tandem 30\"x3  semi tandem 30\"x3 Semi tandem 30\"x3 Tandem 30\"x3 Tandem 30\"x3   Stepping forward/backward no UE assist -   - -     Walking with walker for endurance ~250 ft ~265 ft  - -     Steps ups 4\" 10xB 4\" 12xB 4\" 12xB 4\" 15x B 4\" 15x B     Walking with no assistive device - - 65 ft x2 with CGA 65ft x3 with CGA 65 ft x with supervision 75# x2 with supervision See above           HEP: issued handout for patient with exercises. Pt verbalizes understanding. Treatment/Session Assessment:    · Response to Treatment: He seemed to get a little more tired today with exercises and required more rest breaks between standing exercises. He does well with gait drills with no assistive device.    · Compliance with Program/Exercises: complaint   · Recommendations/Intent for next treatment session: \"Next visit will focus on LE strengthening, balance, gait\".   Total Treatment Duration: 40 minutes  PT Patient Time In/Time Out  Time In: 0935  Time Out: [address]

## 2018-04-30 ENCOUNTER — HOSPITAL ENCOUNTER (OUTPATIENT)
Dept: PHYSICAL THERAPY | Age: 83
Discharge: HOME | End: 2018-04-30
Payer: MEDICARE

## 2018-04-30 PROCEDURE — 97110 THERAPEUTIC EXERCISES: CPT

## 2018-04-30 NOTE — PROGRESS NOTES
Kayleen Chris  : 1932  [place]  Secondary:  [address]  at [facility] IMMANUEL BALDWIN  [address] [facility], [address], [address] [facility]  [phone]   [phone]        OUTPATIENT PHYSICAL THERAPY:Daily Note 2018    ICD-10: Treatment Diagnosis: Displaced intertrochanteric fracture of left femur, sequela (S72.142S); Unsteadiness on feet (R26.81); Pain in left hip (M25.552)  Precautions/Allergies:   Review of patient's allergies indicates no known allergies. Fall Risk Score: 7 (? 5 = High Risk)  MD Orders: hip abductor, quad strengthening, gait training WBAT MEDICAL/REFERRING DIAGNOSIS:  s/p ORIF LT IT    DATE OF ONSET: Injury 18; surgery 18  REFERRING PHYSICIAN: Yohan Chew MD  RETURN PHYSICIAN APPOINTMENT: 18     ASSESSMENT:  Mr. Xena Davalos presents s/p ORIF of the left hip after a fall at [address] [place] [address]. He has progressed with LE strength, decreased pain, and improved gait. He has improved with his TUG score. He is able to ambulate short distances with no assistive device with supervision. He will benefit from skilled physical therapy to increase functional mobility and decrease risk for falls. PROBLEM LIST (Impacting functional limitations):  1. Decreased Strength  2. Decreased ADL/Functional Activities  3. Decreased Ambulation Ability/Technique  4. Decreased Balance  5. Increased Pain INTERVENTIONS PLANNED:  1. Gait Training  2. Manual Therapy  3. Therapeutic Activites  4. Therapeutic Exercise/Strengthening   5. Modalities as needed for pain   TREATMENT PLAN:  Effective Dates: 3-20-18 TO 2018 (90 days). Frequency/Duration: 2-3 times a week for 90 Days  GOALS: (Goals have been discussed and agreed upon with patient.)  Short-Term Functional Goals: Time Frame: 4 weeks  1. Pt will increase L hip abductor strength to 4/5 for improved gait. GOAL MET  2. Pt will improve TUG time to 15 sec for improved mobility. GOAL MET  3.  Pt will be independent with HEP. GOAL MET  4. Pt will report pain 2/10 with daily activities. GOAL MET  Discharge Goals: Time Frame: 12 weeks progressing towards long term goals  1. Pt will increase L LE strength to 5/5 for improved gait and mobility. 2. Pt will improve TUG time to 12 sec for decreased risk for falls. 3. Pt will report pain 1/10 with daily activities. 4. Pt will be able to balance on L single leg for 5 sec for gait with no assistive device. 5. Pt will be able to ambulate with least restrictive assistive device over various terrain with supervision. Rehabilitation Potential For Stated Goals: Good  Regarding Daina Jung's therapy, I certify that the treatment plan above will be carried out by a therapist or under their direction. Thank you for this referral,    Pepper Hughes, PT                 HISTORY:   History of Present Injury/Illness (Reason for Referral): He was pushing a small cart at [facility] and his foot caught the wheel when he turned and he feel down. Injury happened feb 1st. [facility] called EMS [facility] He had hip surgery on 2-2-18. Then he went to [facility] after his hip surgery. He did have home health come out to the house for a couple of visits. He now presents for outpatient therapy. Past Medical History/Comorbidities:   Mr. Yulissa Hilton  has a past medical history of Abdominal aortic aneurysm [address] [address] (12/10/2015); Abdominal aortic aneurysm without rupture ([facility] [address] Allergic rhinitis (12/10/2015); Asthma; Benign neoplasm; Benign prostatic hyperplasia (12/10/2015); BPH without urinary obstruction; Cardiovascular disease (12/10/2015); Chronic obstructive asthma with exacerbation ([address] (12/10/2015); COPD (chronic obstructive pulmonary disease) ([address] (12/10/2015); Cough with hemoptysis; Erectile dysfunction (12/10/2015); Essential hypertension, benign (12/10/2015); Fracture (10/2014); History of colon polyps; Low testosterone (12/10/2015); Lumbago; Memory loss;  Overflow incontinence; Raynaud's syndrome (12/10/2015); Rotator cuff tendinitis; Thrombocytopenia ([facility] [address] and Urinary frequency. Mr. Lian Stone  has a past surgical history that includes hx hernia repair (1980); hx colonoscopy; hx fracture tx (10/2014); hx cataract removal (6/2016-right); and hx orthopaedic. Social History/Living Environment:    Lives in a 1 story house with wife. 1 small step to get inside. They do have a ramp to get inside also. Prior Level of Function/Work/Activity:  Ambulated with on assistive device. He did go out of the house with his wife and perform light shopping. Goal is to be able to walk normal.    Current Medications:       Current Outpatient Prescriptions:     lisinopril (PRINIVIL, ZESTRIL) 5 mg tablet, Take 5 mg by mouth daily. , Disp: , Rfl:     tamsulosin (FLOMAX) 0.4 mg capsule, Take 1 Cap by mouth daily. , Disp: 90 Cap, Rfl: 4    montelukast (SINGULAIR) 10 mg tablet, Take 1 Tab by mouth daily. , Disp: 90 Tab, Rfl: 4    fluticasone (FLONASE) 50 mcg/actuation nasal spray, 2 Sprays by Both Nostrils route daily. , Disp: 48 g, Rfl: 4    calcium citrate-vitamin d3 (CITRACAL+D) 315-200 mg-unit tab, Take 2 Tabs by mouth daily (with breakfast). , Disp: , Rfl:     cholecalciferol (VITAMIN D3) 1,000 unit cap, Take  by mouth., Disp: , Rfl:     guaiFENesin ER (MUCINEX) 600 mg ER tablet, Take 1,200 mg by mouth daily. , Disp: , Rfl:     Biotin 2,500 mcg cap, Take  by mouth., Disp: , Rfl:     aspirin delayed-release 81 mg tablet, Take  by mouth daily. , Disp: , Rfl:    Date Last Reviewed:  4-30-18   Number of Personal Factors/Comorbidities that affect the Plan of Care: 1-2: MODERATE COMPLEXITY   EXAMINATION:   Observation/Orthostatic Postural Assessment:          Pt arrived to physical therapy with rolling walker. Palpation:          n/t  ROM:        n/t  Strength:                    Functional Mobility: n/t         Body Structures Involved:  1. Bones  2. Joints  3.  Muscles Body Functions Affected:  1. Neuromusculoskeletal Activities and Participation Affected:  1. Community, Social and [place]   Number of elements (examined above) that affect the Plan of Care: 3: MODERATE COMPLEXITY   CLINICAL PRESENTATION:   Presentation: Stable and uncomplicated: LOW COMPLEXITY   CLINICAL DECISION MAKING:   Outcome Measure: Tool Used: Timed Up and Go (TUG)  Score:  Initial: 19 seconds Most Recent: 16 seconds, 12 sec with walker (Date: 4-24-18 )   Interpretation of Score: The test measures, in seconds, the time taken by an individual to stand up from a standard arm chair (seat height 46 cm [18 in], arm height 65 cm [25.6 in]), walk a distance of 3 meters (118 in, approx 10 ft), turn, walk back to the chair and sit down. If the individual takes longer than 14 seconds to complete TUG, this indicates risk for falls. Score 7 7.5-10.5 11-14 14.5-17.5 18-21 21.5-24.5 25+   Modifier CH CI CJ CK CL CM CN     ? Mobility - Walking and Moving Around:     - CURRENT STATUS: CJ - 20%-39% impaired, limited or restricted    - GOAL STATUS: CJ - 20%-39% impaired, limited or restricted    - D/C STATUS:  ---------------To be determined---------------    Medical Necessity:   · Patient is expected to demonstrate progress in strength, range of motion and balance to improve safety during walking and mobility. Reason for Services/Other Comments:  · Patient continues to require skilled intervention due to decreased LE strength, decreased gait and balance. Use of outcome tool(s) and clinical judgement create a POC that gives a: Clear prediction of patient's progress: LOW COMPLEXITY            TREATMENT:   (In addition to Assessment/Re-Assessment sessions the following treatments were rendered)  Pre-treatment Symptoms/Complaints:  Pt reports no pain this morning. Pain: Initial:     0/10 Post Session:  2/10     Therapeutic Exercise: (40 Minutes): Exercises for LE strengthening, endurance, and balance.  CGA with standing exercises. Monitored O2% during exercises. 91-95%    Walking with no assistive device with supervision. Walking forward and backwards 15 ft x2 ea. Side stepping 10 ft x2. Walking with no assistive device 100 ft x2 with supervision. Date  4-13-18 Date  4-17-18 Date  4-24-18 Date  4-27-18 Date  4-30-18   Activity/Exercise parameters parameters parameters parameters parameters   Long arc quad 4# 3x10 4# 4x10 5# 2x10 5#2x15 5# 2x15   Seated hamstring curls Blue 2x15 Blue 2x15 Blue 2x15 Blue 2x15 Blue 2x15   SLR 2x12 B 2x12B 2x15 B 1# 2x8 1# 2x10    Side lying hip abduction Active 3x10 Active 3x10 Active 2x15 1#2x8 1# 2x10   Side lying clam Yellow 3x10 Red 2x10 Red 2x10 - Red 2x15   Sit to stand 2x12 2x15 - 20x 15   Heel raises 2x15 2x15 2x15 2x15 2x15   balance  semi tandem 30\"x3 Semi tandem 30\"x3 Tandem 30\"x3 Tandem 30\"x3    Stepping forward/backward no UE assist - -      Walking with walker for endurance - -      Steps ups 4\" 15x B 4\" 15x B      Walking with no assistive device 65ft x3 with CGA 65 ft x with supervision 75# x2 with supervision See above See above           HEP: issued handout for patient with exercises. Pt verbalizes understanding. Treatment/Session Assessment:    · Response to Treatment: He did well increasing his gait distance with no assistive device with good stability through L LE.   · Compliance with Program/Exercises: complaint   · Recommendations/Intent for next treatment session: \"Next visit will focus on LE strengthening, balance, gait\".   Total Treatment Duration: 40 minutes  PT Patient Time In/Time Out  Time In: 0935  Time Out: [address]

## 2018-05-02 ENCOUNTER — HOSPITAL ENCOUNTER (OUTPATIENT)
Dept: PHYSICAL THERAPY | Age: 83
Discharge: HOME | End: 2018-05-02
Payer: MEDICARE

## 2018-05-02 PROCEDURE — 97110 THERAPEUTIC EXERCISES: CPT

## 2018-05-02 NOTE — PROGRESS NOTES
Huy Avon  : 1932  [place]  Secondary:  [address]  at [facility] IMMANUEL [facility]DWIN  [address] [facility], [address], [facility] [address]  [phone]   [phone]        OUTPATIENT PHYSICAL THERAPY:Daily Note 2018    ICD-10: Treatment Diagnosis: Displaced intertrochanteric fracture of left femur, sequela (S72.142S); Unsteadiness on feet (R26.81); Pain in left hip (M25.552)  Precautions/Allergies:   Review of patient's allergies indicates no known allergies. Fall Risk Score: 7 (? 5 = High Risk)  MD Orders: hip abductor, quad strengthening, gait training WBAT MEDICAL/REFERRING DIAGNOSIS:  s/p ORIF LT IT    DATE OF ONSET: Injury 18; surgery 18  REFERRING PHYSICIAN: Olivia Crenshaw MD  RETURN PHYSICIAN APPOINTMENT: 18     ASSESSMENT:  Mr. Emanuel Dominguez presents s/p ORIF of the left hip after a fall at [facility]. He has progressed with LE strength, decreased pain, and improved gait. He has improved with his TUG score. He is able to ambulate short distances with no assistive device with supervision. He will benefit from skilled physical therapy to increase functional mobility and decrease risk for falls. PROBLEM LIST (Impacting functional limitations):  1. Decreased Strength  2. Decreased ADL/Functional Activities  3. Decreased Ambulation Ability/Technique  4. Decreased Balance  5. Increased Pain INTERVENTIONS PLANNED:  1. Gait Training  2. Manual Therapy  3. Therapeutic Activites  4. Therapeutic Exercise/Strengthening   5. Modalities as needed for pain   TREATMENT PLAN:  Effective Dates: 3-20-18 TO 2018 (90 days). Frequency/Duration: 2-3 times a week for 90 Days  GOALS: (Goals have been discussed and agreed upon with patient.)  Short-Term Functional Goals: Time Frame: 4 weeks  1. Pt will increase L hip abductor strength to 4/5 for improved gait. GOAL MET  2. Pt will improve TUG time to 15 sec for improved mobility. GOAL MET  3.  Pt will be independent with HEP. GOAL MET  4. Pt will report pain 2/10 with daily activities. GOAL MET  Discharge Goals: Time Frame: 12 weeks progressing towards long term goals  1. Pt will increase L LE strength to 5/5 for improved gait and mobility. 2. Pt will improve TUG time to 12 sec for decreased risk for falls. 3. Pt will report pain 1/10 with daily activities. 4. Pt will be able to balance on L single leg for 5 sec for gait with no assistive device. 5. Pt will be able to ambulate with least restrictive assistive device over various terrain with supervision. Rehabilitation Potential For Stated Goals: Good  Regarding Fred Jung's therapy, I certify that the treatment plan above will be carried out by a therapist or under their direction. Thank you for this referral,    Pepper Roman, PT                 HISTORY:   History of Present Injury/Illness (Reason for Referral): He was pushing a small cart at [facility] and his foot caught the wheel when he turned and he feel down. Injury happened feb 1st. They called EMS and took him to [address] [place] [address]. He had hip surgery on 2-2-18. Then he went to [facility] after his hip surgery. He did have home health come out to the house for a couple of visits. He now presents for outpatient therapy. Past Medical History/Comorbidities:   Mr. Jeffy Le  has a past medical history of Abdominal aortic aneurysm ([facility] [address] (12/10/2015); Abdominal aortic aneurysm without rupture ([facility] [address] Allergic rhinitis (12/10/2015); Asthma; Benign neoplasm; Benign prostatic hyperplasia (12/10/2015); BPH without urinary obstruction; Cardiovascular disease (12/10/2015); Chronic obstructive asthma with exacerbation ([address] (12/10/2015); COPD (chronic obstructive pulmonary disease) ([address] (12/10/2015); Cough with hemoptysis; Erectile dysfunction (12/10/2015); Essential hypertension, benign (12/10/2015); Fracture (10/2014); History of colon polyps; Low testosterone (12/10/2015); Lumbago; Memory loss;  Overflow incontinence; Raynaud's syndrome (12/10/2015); Rotator cuff tendinitis; Thrombocytopenia ([facility] [address] and Urinary frequency. Mr. Kyaw Magallanes  has a past surgical history that includes hx hernia repair (1980); hx colonoscopy; hx fracture tx (10/2014); hx cataract removal (6/2016-right); and hx orthopaedic. Social History/Living Environment:    Lives in a 1 story house with wife. 1 small step to get inside. They do have a ramp to get inside also. Prior Level of Function/Work/Activity:  Ambulated with on assistive device. He did go out of the house with his wife and perform light shopping. Goal is to be able to walk normal.    Current Medications:       Current Outpatient Prescriptions:     lisinopril (PRINIVIL, ZESTRIL) 5 mg tablet, Take 5 mg by mouth daily. , Disp: , Rfl:     tamsulosin (FLOMAX) 0.4 mg capsule, Take 1 Cap by mouth daily. , Disp: 90 Cap, Rfl: 4    montelukast (SINGULAIR) 10 mg tablet, Take 1 Tab by mouth daily. , Disp: 90 Tab, Rfl: 4    fluticasone (FLONASE) 50 mcg/actuation nasal spray, 2 Sprays by Both Nostrils route daily. , Disp: 48 g, Rfl: 4    calcium citrate-vitamin d3 (CITRACAL+D) 315-200 mg-unit tab, Take 2 Tabs by mouth daily (with breakfast). , Disp: , Rfl:     cholecalciferol (VITAMIN D3) 1,000 unit cap, Take  by mouth., Disp: , Rfl:     guaiFENesin ER (MUCINEX) 600 mg ER tablet, Take 1,200 mg by mouth daily. , Disp: , Rfl:     Biotin 2,500 mcg cap, Take  by mouth., Disp: , Rfl:     aspirin delayed-release 81 mg tablet, Take  by mouth daily. , Disp: , Rfl:    Date Last Reviewed:  4-30-18   Number of Personal Factors/Comorbidities that affect the Plan of Care: 1-2: MODERATE COMPLEXITY   EXAMINATION:   Observation/Orthostatic Postural Assessment:          Pt arrived to physical therapy with rolling walker. Palpation:          n/t  ROM:        n/t  Strength:                    Functional Mobility: n/t         Body Structures Involved:  1. Bones  2. Joints  3.  Muscles Body Functions Affected:  1. Neuromusculoskeletal Activities and Participation Affected:  1. Community, Social and [place]   Number of elements (examined above) that affect the Plan of Care: 3: MODERATE COMPLEXITY   CLINICAL PRESENTATION:   Presentation: Stable and uncomplicated: LOW COMPLEXITY   CLINICAL DECISION MAKING:   Outcome Measure: Tool Used: Timed Up and Go (TUG)  Score:  Initial: 19 seconds Most Recent: 16 seconds, 12 sec with walker (Date: 4-24-18 )   Interpretation of Score: The test measures, in seconds, the time taken by an individual to stand up from a standard arm chair (seat height 46 cm [18 in], arm height 65 cm [25.6 in]), walk a distance of 3 meters (118 in, approx 10 ft), turn, walk back to the chair and sit down. If the individual takes longer than 14 seconds to complete TUG, this indicates risk for falls. Score 7 7.5-10.5 11-14 14.5-17.5 18-21 21.5-24.5 25+   Modifier CH CI CJ CK CL CM CN     ? Mobility - Walking and Moving Around:     - CURRENT STATUS: CJ - 20%-39% impaired, limited or restricted    - GOAL STATUS: CJ - 20%-39% impaired, limited or restricted    - D/C STATUS:  ---------------To be determined---------------    Medical Necessity:   · Patient is expected to demonstrate progress in strength, range of motion and balance to improve safety during walking and mobility. Reason for Services/Other Comments:  · Patient continues to require skilled intervention due to decreased LE strength, decreased gait and balance. Use of outcome tool(s) and clinical judgement create a POC that gives a: Clear prediction of patient's progress: LOW COMPLEXITY            TREATMENT:   (In addition to Assessment/Re-Assessment sessions the following treatments were rendered)  Pre-treatment Symptoms/Complaints:  Pt reports he is feeling pretty good today. Pain: Initial:     0/10 Post Session:  2/10     Therapeutic Exercise: (40 Minutes): Exercises for LE strengthening, endurance, and balance.  CGA with standing exercises. Monitored O2% during exercises. 91-94%    Walking with no assistive device with supervision. Walking forward and backwards 10 ft x4 ea. Side stepping 15 ft x2 each way. Walking with no assistive device 100 ft x2 with supervision. Walking with looking R and L, walking looking up and down, marching 30 ft ea     Date  4-13-18 Date  4-17-18 Date  4-24-18 Date  4-27-18 Date  4-30-18 Date 5-2-18   Activity/Exercise parameters parameters parameters parameters parameters parameters   Long arc quad 4# 3x10 4# 4x10 5# 2x10 5#2x15 5# 2x15 5#2x15   Seated hamstring curls Blue 2x15 Blue 2x15 Blue 2x15 Blue 2x15 Blue 2x15 Blue 2x15   SLR 2x12 B 2x12B 2x15 B 1# 2x8 1# 2x10     Side lying hip abduction Active 3x10 Active 3x10 Active 2x15 1#2x8 1# 2x10    Side lying clam Yellow 3x10 Red 2x10 Red 2x10 - Red 2x15    Sit to stand 2x12 2x15 - 20x 15 2x15   Heel raises 2x15 2x15 2x15 2x15 2x15 2x15   balance  semi tandem 30\"x3 Semi tandem 30\"x3 Tandem 30\"x3 Tandem 30\"x3   Tandem 30\"x2 SLB 5\"x2   Steps ups 4\" 15x B 4\" 15x B       4\" 15 ea   Walking with no assistive device 65ft x3 with CGA 65 ft x with supervision 75# x2 with supervision See above See above See above           HEP: issued handout for patient with exercises. Pt verbalizes understanding. Treatment/Session Assessment:    · Response to Treatment: Progressed standing exercises today and he did well with dynamic gait drills. · Compliance with Program/Exercises: complaint   · Recommendations/Intent for next treatment session: \"Next visit will focus on LE strengthening, balance, gait\".   Total Treatment Duration: 40 minutes  PT Patient Time In/Time Out  Time In: 0900  Time Out: [address], PT

## 2018-05-08 ENCOUNTER — HOSPITAL ENCOUNTER (OUTPATIENT)
Dept: PHYSICAL THERAPY | Age: 83
Discharge: HOME | End: 2018-05-08
Payer: MEDICARE

## 2018-05-08 PROCEDURE — 97110 THERAPEUTIC EXERCISES: CPT

## 2018-05-08 NOTE — PROGRESS NOTES
Meliton Median  : 1932  [place]  Secondary:  [address]  [facility]  [address] [facility], [address], [facility] [place].  [phone]   [phone]        OUTPATIENT PHYSICAL THERAPY:Daily Note 2018    ICD-10: Treatment Diagnosis: Displaced intertrochanteric fracture of left femur, sequela (S72.142S); Unsteadiness on feet (R26.81); Pain in left hip (M25.552)  Precautions/Allergies:   Review of patient's allergies indicates no known allergies. Fall Risk Score: 7 (? 5 = High Risk)  MD Orders: hip abductor, quad strengthening, gait training WBAT MEDICAL/REFERRING DIAGNOSIS:  s/p ORIF LT IT    DATE OF ONSET: Injury 18; surgery 18  REFERRING PHYSICIAN: Gopi Dallas MD  RETURN PHYSICIAN APPOINTMENT: 18     ASSESSMENT:  Mr. Marc Junior presents s/p ORIF of the left hip after a fall at [facility]. He has progressed with LE strength, decreased pain, and improved gait. He has improved with his TUG score. He is able to ambulate short distances with no assistive device with supervision. He will benefit from skilled physical therapy to increase functional mobility and decrease risk for falls. PROBLEM LIST (Impacting functional limitations):  1. Decreased Strength  2. Decreased ADL/Functional Activities  3. Decreased Ambulation Ability/Technique  4. Decreased Balance  5. Increased Pain INTERVENTIONS PLANNED:  1. Gait Training  2. Manual Therapy  3. Therapeutic Activites  4. Therapeutic Exercise/Strengthening   5. Modalities as needed for pain   TREATMENT PLAN:  Effective Dates: 3-20-18 TO 2018 (90 days). Frequency/Duration: 2-3 times a week for 90 Days  GOALS: (Goals have been discussed and agreed upon with patient.)  Short-Term Functional Goals: Time Frame: 4 weeks  1. Pt will increase L hip abductor strength to 4/5 for improved gait. GOAL MET  2. Pt will improve TUG time to 15 sec for improved mobility. GOAL MET  3.  Pt will be independent with HEP. GOAL MET  4. Pt will report pain 2/10 with daily activities. GOAL MET  Discharge Goals: Time Frame: 12 weeks progressing towards long term goals  1. Pt will increase L LE strength to 5/5 for improved gait and mobility. 2. Pt will improve TUG time to 12 sec for decreased risk for falls. 3. Pt will report pain 1/10 with daily activities. 4. Pt will be able to balance on L single leg for 5 sec for gait with no assistive device. 5. Pt will be able to ambulate with least restrictive assistive device over various terrain with supervision. Rehabilitation Potential For Stated Goals: Good  Regarding Anthony Jung's therapy, I certify that the treatment plan above will be carried out by a therapist or under their direction. Thank you for this referral,    Pepper Gloria, PT                 HISTORY:   History of Present Injury/Illness (Reason for Referral): He was pushing a small cart at [facility] and his foot caught the wheel when he turned and he feel down. Injury happened feb 1st. They called EMS and took him to [address] [place] [address]. He had hip surgery on 2-2-18. Then he went to [facility] after his hip surgery. He did have home health come out to the house for a couple of visits. He now presents for outpatient therapy. Past Medical History/Comorbidities:   Mr. Andrews Hoffman  has a past medical history of Abdominal aortic aneurysm ([facility] [address] (12/10/2015); Abdominal aortic aneurysm without rupture ([facility] [address] Allergic rhinitis (12/10/2015); Asthma; Benign neoplasm; Benign prostatic hyperplasia (12/10/2015); BPH without urinary obstruction; Cardiovascular disease (12/10/2015); Chronic obstructive asthma with exacerbation ([address] (12/10/2015); COPD (chronic obstructive pulmonary disease) ([address] (12/10/2015); Cough with hemoptysis; Erectile dysfunction (12/10/2015); Essential hypertension, benign (12/10/2015); Fracture (10/2014); History of colon polyps; Low testosterone (12/10/2015); Lumbago; Memory loss;  Overflow incontinence; Raynaud's syndrome (12/10/2015); Rotator cuff tendinitis; Thrombocytopenia ([facility] [address] and Urinary frequency. Mr. Rody Barillas  has a past surgical history that includes hx hernia repair (1980); hx colonoscopy; hx fracture tx (10/2014); hx cataract removal (6/2016-right); and hx orthopaedic. Social History/Living Environment:    Lives in a 1 story house with wife. 1 small step to get inside. They do have a ramp to get inside also. Prior Level of Function/Work/Activity:  Ambulated with on assistive device. He did go out of the house with his wife and perform light shopping. Goal is to be able to walk normal.    Current Medications:       Current Outpatient Prescriptions:     lisinopril (PRINIVIL, ZESTRIL) 5 mg tablet, Take 5 mg by mouth daily. , Disp: , Rfl:     tamsulosin (FLOMAX) 0.4 mg capsule, Take 1 Cap by mouth daily. , Disp: 90 Cap, Rfl: 4    montelukast (SINGULAIR) 10 mg tablet, Take 1 Tab by mouth daily. , Disp: 90 Tab, Rfl: 4    fluticasone (FLONASE) 50 mcg/actuation nasal spray, 2 Sprays by Both Nostrils route daily. , Disp: 48 g, Rfl: 4    calcium citrate-vitamin d3 (CITRACAL+D) 315-200 mg-unit tab, Take 2 Tabs by mouth daily (with breakfast). , Disp: , Rfl:     cholecalciferol (VITAMIN D3) 1,000 unit cap, Take  by mouth., Disp: , Rfl:     guaiFENesin ER (MUCINEX) 600 mg ER tablet, Take 1,200 mg by mouth daily. , Disp: , Rfl:     Biotin 2,500 mcg cap, Take  by mouth., Disp: , Rfl:     aspirin delayed-release 81 mg tablet, Take  by mouth daily. , Disp: , Rfl:    Date Last Reviewed:  5-8-18   Number of Personal Factors/Comorbidities that affect the Plan of Care: 1-2: MODERATE COMPLEXITY   EXAMINATION:   Observation/Orthostatic Postural Assessment:          Pt arrived to physical therapy with rolling walker. Palpation:          n/t  ROM:        n/t  Strength:                    Functional Mobility: n/t         Body Structures Involved:  1. Bones  2. Joints  3.  Muscles Body Functions Affected:  1. Neuromusculoskeletal Activities and Participation Affected:  1. Community, Social and [place]   Number of elements (examined above) that affect the Plan of Care: 3: MODERATE COMPLEXITY   CLINICAL PRESENTATION:   Presentation: Stable and uncomplicated: LOW COMPLEXITY   CLINICAL DECISION MAKING:   Outcome Measure: Tool Used: Timed Up and Go (TUG)  Score:  Initial: 19 seconds Most Recent: 16 seconds, 12 sec with walker (Date: 4-24-18 )   Interpretation of Score: The test measures, in seconds, the time taken by an individual to stand up from a standard arm chair (seat height 46 cm [18 in], arm height 65 cm [25.6 in]), walk a distance of 3 meters (118 in, approx 10 ft), turn, walk back to the chair and sit down. If the individual takes longer than 14 seconds to complete TUG, this indicates risk for falls. Score 7 7.5-10.5 11-14 14.5-17.5 18-21 21.5-24.5 25+   Modifier CH CI CJ CK CL CM CN     ? Mobility - Walking and Moving Around:     - CURRENT STATUS: CJ - 20%-39% impaired, limited or restricted    - GOAL STATUS: CJ - 20%-39% impaired, limited or restricted    - D/C STATUS:  ---------------To be determined---------------    Medical Necessity:   · Patient is expected to demonstrate progress in strength, range of motion and balance to improve safety during walking and mobility. Reason for Services/Other Comments:  · Patient continues to require skilled intervention due to decreased LE strength, decreased gait and balance. Use of outcome tool(s) and clinical judgement create a POC that gives a: Clear prediction of patient's progress: LOW COMPLEXITY            TREATMENT:   (In addition to Assessment/Re-Assessment sessions the following treatments were rendered)  Pre-treatment Symptoms/Complaints:  Pt reports he is unable to sleep on the left side for longer than 15 min due to soreness.    Pain: Initial:     0/10 Post Session:  2/10     Therapeutic Exercise: (40 Minutes): Exercises for LE strengthening, endurance, and balance. CGA with standing exercises. Monitored O2% during exercises. 91-96%     Walking with no assistive device with supervision. Walking forward and backwards 10 ft x4 ea. Side stepping 15 ft x2 each way. Walking with no assistive device 150 ft with supervision. Date  4-13-18 Date  4-17-18 Date  4-24-18 Date  4-27-18 Date  4-30-18 Date 5-2-18 Date  5-8-18   Activity/Exercise parameters parameters parameters parameters parameters parameters parameters   Long arc quad 4# 3x10 4# 4x10 5# 2x10 5#2x15 5# 2x15 5#2x15 5#2x20   Seated hamstring curls Blue 2x15 Blue 2x15 Blue 2x15 Blue 2x15 Blue 2x15 Blue 2x15 Black 2x10   SLR 2x12 B 2x12B 2x15 B 1# 2x8 1# 2x10      Side lying hip abduction Active 3x10 Active 3x10 Active 2x15 1#2x8 1# 2x10     Side lying clam Yellow 3x10 Red 2x10 Red 2x10 - Red 2x15     Sit to stand 2x12 2x15 - 20x 15 2x15 2x20   Heel raises 2x15 2x15 2x15 2x15 2x15 2x15 2x20   balance  semi tandem 30\"x3 Semi tandem 30\"x3 Tandem 30\"x3 Tandem 30\"x3   Tandem 30\"x2 SLB 5\"x2 Tandem 30\"x2 SLB 5\"x2   Steps ups 4\" 15x B 4\" 15x B       4\" 15 ea 4\" 15 ea   Walking with no assistive device 65ft x3 with CGA 65 ft x with supervision 75# x2 with supervision See above See above See above See above   Shuttle press - - - - - - 75# 2x10           HEP: issued handout for patient with exercises. Pt verbalizes understanding. Treatment/Session Assessment:    · Response to Treatment: He reported discomfort in the left hip with single leg balance. He is doing well on level surfaces with no assistive device. · Compliance with Program/Exercises: complaint   · Recommendations/Intent for next treatment session: \"Next visit will focus on LE strengthening, balance, gait\".   Total Treatment Duration: 40 minutes  PT Patient Time In/Time Out  Time In: 0900  Time Out: [id number], PT

## 2018-05-10 ENCOUNTER — HOSPITAL ENCOUNTER (OUTPATIENT)
Dept: PHYSICAL THERAPY | Age: 83
Discharge: HOME | End: 2018-05-10
Payer: MEDICARE

## 2018-05-10 PROCEDURE — 97110 THERAPEUTIC EXERCISES: CPT

## 2018-05-10 NOTE — PROGRESS NOTES
Kristina Bernabe  : 1932  [place]  Secondary:  [address]  at [facility] IMMANUEL BALDWIN  [address], [address], [facility] [address] [place].  [phone]   [phone]        OUTPATIENT PHYSICAL THERAPY:Daily Note 5/10/2018    ICD-10: Treatment Diagnosis: Displaced intertrochanteric fracture of left femur, sequela (S72.142S); Unsteadiness on feet (R26.81); Pain in left hip (M25.552)  Precautions/Allergies:   Review of patient's allergies indicates no known allergies. Fall Risk Score: 7 (? 5 = High Risk)  MD Orders: hip abductor, quad strengthening, gait training WBAT MEDICAL/REFERRING DIAGNOSIS:  s/p ORIF LT IT    DATE OF ONSET: Injury 18; surgery 18  REFERRING PHYSICIAN: Bartolo Sharma MD  RETURN PHYSICIAN APPOINTMENT: 18     ASSESSMENT:  Mr. Easton Barahona presents s/p ORIF of the [facility] He has progressed with LE strength, decreased pain, and improved gait. He has improved with his TUG score. He is able to ambulate short distances with no assistive device with supervision. He will benefit from skilled physical therapy to increase functional mobility and decrease risk for falls. PROBLEM LIST (Impacting functional limitations):  1. Decreased Strength  2. Decreased ADL/Functional Activities  3. Decreased Ambulation Ability/Technique  4. Decreased Balance  5. Increased Pain INTERVENTIONS PLANNED:  1. Gait Training  2. Manual Therapy  3. Therapeutic Activites  4. Therapeutic Exercise/Strengthening   5. Modalities as needed for pain   TREATMENT PLAN:  Effective Dates: 3-20-18 TO 2018 (90 days). Frequency/Duration: 2-3 times a week for 90 Days  GOALS: (Goals have been discussed and agreed upon with patient.)  Short-Term Functional Goals: Time Frame: 4 weeks  1. Pt will increase L hip abductor strength to 4/5 for improved gait. GOAL MET  2. Pt will improve TUG time to 15 sec for improved mobility. GOAL MET  3.  Pt will be independent with HEP. GOAL MET  4. Pt will report pain 2/10 with daily activities. GOAL MET  Discharge Goals: Time Frame: 12 weeks progressing towards long term goals  1. Pt will increase L LE strength to 5/5 for improved gait and mobility. 2. Pt will improve TUG time to 12 sec for decreased risk for falls. 3. Pt will report pain 1/10 with daily activities. 4. Pt will be able to balance on L single leg for 5 sec for gait with no assistive device. 5. Pt will be able to ambulate with least restrictive assistive device over various terrain with supervision. Rehabilitation Potential For Stated Goals: Good  Regarding Cyndy Jung's therapy, I certify that the treatment plan above will be carried out by a therapist or under their direction. Thank you for this referral,    Pepper Aaron, PT                 HISTORY:   History of Present Injury/Illness (Reason for Referral): He was pushing a small cart at [employer] and his foot caught the wheel when he turned and he feel down. Injury happened feb 1st. They called EMS and took him to [address] [place] [address]. He had hip surgery on 2-2-18. Then he went to [facility] after his hip surgery. He did have home health come out to the house for a couple of visits. He now presents for outpatient therapy. Past Medical History/Comorbidities:   Mr. Antonio Aguilar  has a past medical history of Abdominal aortic aneurysm ([facility] [address] (12/10/2015); Abdominal aortic aneurysm without rupture ([facility] [address] Allergic rhinitis (12/10/2015); Asthma; Benign neoplasm; Benign prostatic hyperplasia (12/10/2015); BPH without urinary obstruction; Cardiovascular disease (12/10/2015); Chronic obstructive asthma with exacerbation ([address] (12/10/2015); COPD (chronic obstructive pulmonary disease) ([address] (12/10/2015); Cough with hemoptysis; Erectile dysfunction (12/10/2015); Essential hypertension, benign (12/10/2015); Fracture (10/2014); History of colon polyps; Low testosterone (12/10/2015); Lumbago; Memory loss;  Overflow incontinence; Raynaud's syndrome (12/10/2015); Rotator cuff tendinitis; Thrombocytopenia ([facility] [address] and Urinary frequency. Mr. Leah Haq  has a past surgical history that includes hx hernia repair (1980); hx colonoscopy; hx fracture tx (10/2014); hx cataract removal (6/2016-right); and hx orthopaedic. Social History/Living Environment:    Lives in a 1 story house with wife. 1 small step to get inside. They do have a ramp to get inside also. Prior Level of Function/Work/Activity:  Ambulated with on assistive device. He did go out of the house with his wife and perform light shopping. Goal is to be able to walk normal.    Current Medications:       Current Outpatient Prescriptions:     lisinopril (PRINIVIL, ZESTRIL) 5 mg tablet, Take 5 mg by mouth daily. , Disp: , Rfl:     tamsulosin (FLOMAX) 0.4 mg capsule, Take 1 Cap by mouth daily. , Disp: 90 Cap, Rfl: 4    montelukast (SINGULAIR) 10 mg tablet, Take 1 Tab by mouth daily. , Disp: 90 Tab, Rfl: 4    fluticasone (FLONASE) 50 mcg/actuation nasal spray, 2 Sprays by Both Nostrils route daily. , Disp: 48 g, Rfl: 4    calcium citrate-vitamin d3 (CITRACAL+D) 315-200 mg-unit tab, Take 2 Tabs by mouth daily (with breakfast). , Disp: , Rfl:     cholecalciferol (VITAMIN D3) 1,000 unit cap, Take  by mouth., Disp: , Rfl:     guaiFENesin ER (MUCINEX) 600 mg ER tablet, Take 1,200 mg by mouth daily. , Disp: , Rfl:     Biotin 2,500 mcg cap, Take  by mouth., Disp: , Rfl:     aspirin delayed-release 81 mg tablet, Take  by mouth daily. , Disp: , Rfl:    Date Last Reviewed:  5-8-18   Number of Personal Factors/Comorbidities that affect the Plan of Care: 1-2: MODERATE COMPLEXITY   EXAMINATION:   Observation/Orthostatic Postural Assessment:          Pt arrived to physical therapy with rolling walker. Palpation:          n/t  ROM:        n/t  Strength:                    Functional Mobility: n/t         Body Structures Involved:  1. Bones  2. Joints  3.  Muscles Body Functions Affected:  1. Neuromusculoskeletal Activities and Participation Affected:  1. Community, Social and [place]   Number of elements (examined above) that affect the Plan of Care: 3: MODERATE COMPLEXITY   CLINICAL PRESENTATION:   Presentation: Stable and uncomplicated: LOW COMPLEXITY   CLINICAL DECISION MAKING:   Outcome Measure: Tool Used: Timed Up and Go (TUG)  Score:  Initial: 19 seconds Most Recent: 16 seconds, 12 sec with walker (Date: 4-24-18 )   Interpretation of Score: The test measures, in seconds, the time taken by an individual to stand up from a standard arm chair (seat height 46 cm [18 in], arm height 65 cm [25.6 in]), walk a distance of 3 meters (118 in, approx 10 ft), turn, walk back to the chair and sit down. If the individual takes longer than 14 seconds to complete TUG, this indicates risk for falls. Score 7 7.5-10.5 11-14 14.5-17.5 18-21 21.5-24.5 25+   Modifier CH CI CJ CK CL CM CN     ? Mobility - Walking and Moving Around:     - CURRENT STATUS: CJ - 20%-39% impaired, limited or restricted    - GOAL STATUS: CJ - 20%-39% impaired, limited or restricted    - D/C STATUS:  ---------------To be determined---------------    Medical Necessity:   · Patient is expected to demonstrate progress in strength, range of motion and balance to improve safety during walking and mobility. Reason for Services/Other Comments:  · Patient continues to require skilled intervention due to decreased LE strength, decreased gait and balance. Use of outcome tool(s) and clinical judgement create a POC that gives a: Clear prediction of patient's progress: LOW COMPLEXITY            TREATMENT:   (In addition to Assessment/Re-Assessment sessions the following treatments were rendered)  Pre-treatment Symptoms/Complaints:  Pt reports he worked in the yard for 4 hours yesterday so he is tired. He reports no pain, soreness.    Pain: Initial:     0/10 Post Session:  0/10     Therapeutic Exercise: (40 Minutes): Exercises for LE strengthening, endurance, and balance. CGA with standing exercises. Monitored O2% during exercises. 90-95%  88% after step ups but raised to 95% with rest break. Walking with no assistive device with supervision. Walking forward and backwards 10 ft x4 ea. Side stepping 15 ft x2 each way. Date  4-13-18 Date  4-17-18 Date  4-24-18 Date  4-27-18 Date  4-30-18 Date 5-2-18 Date  5-8-18 Date  5-10-18   Activity/Exercise parameters parameters parameters parameters parameters parameters parameters parameters   Long arc quad 4# 3x10 4# 4x10 5# 2x10 5#2x15 5# 2x15 5#2x15 5#2x20 Machine 15# 2x15   Seated hamstring curls Blue 2x15 Blue 2x15 Blue 2x15 Blue 2x15 Blue 2x15 Blue 2x15 Black 2x10 Machine 35# 2x15   SLR 2x12 B 2x12B 2x15 B 1# 2x8 1# 2x10       Side lying hip abduction Active 3x10 Active 3x10 Active 2x15 1#2x8 1# 2x10      Side lying clam Yellow 3x10 Red 2x10 Red 2x10 - Red 2x15      Sit to stand 2x12 2x15 - 20x 15 2x15 2x20 From chair with 1 pillow 2x10   Heel raises 2x15 2x15 2x15 2x15 2x15 2x15 2x20    balance  semi tandem 30\"x3 Semi tandem 30\"x3 Tandem 30\"x3 Tandem 30\"x3   Tandem 30\"x2 SLB 5\"x2 Tandem 30\"x2 SLB 5\"x2 Tandem 30\"x2 SLB 5\"x2   Steps ups 4\" 15x B 4\" 15x B       4\" 15 ea 4\" 15 ea 4\" 2x10 B   Walking with no assistive device 65ft x3 with CGA 65 ft x with supervision 75# x2 with supervision See above See above See above See above See above   Shuttle press - - - - - - 75# 2x10 75# 2x10           HEP: issued handout for patient with exercises. Pt verbalizes understanding. Treatment/Session Assessment:    · Response to Treatment: Pt required more rest breaks today due to lower O2 saturation levels after exercises. · Compliance with Program/Exercises: complaint   · Recommendations/Intent for next treatment session: \"Next visit will focus on LE strengthening, balance, gait\".   Total Treatment Duration: 40 minutes  PT Patient Time In/Time Out  Time In: 3329  Time Out: 20 [employer]

## 2018-05-15 ENCOUNTER — HOSPITAL ENCOUNTER (OUTPATIENT)
Dept: PHYSICAL THERAPY | Age: 83
Discharge: HOME | End: 2018-05-15
Payer: MEDICARE

## 2018-05-15 PROCEDURE — 97110 THERAPEUTIC EXERCISES: CPT

## 2018-05-15 NOTE — PROGRESS NOTES
Bárbara Copeland  : 1932  [place]  Secondary:  [address]  at [facility] IMMANUEL BALDWIN  [address] [facility], [address], [place] [address]  [phone]   [phone]        OUTPATIENT PHYSICAL THERAPY:Daily Note 5/15/2018    ICD-10: Treatment Diagnosis: Displaced intertrochanteric fracture of left femur, sequela (S72.142S); Unsteadiness on feet (R26.81); Pain in left hip (M25.552)  Precautions/Allergies:   Review of patient's allergies indicates no known allergies. Fall Risk Score: 7 (? 5 = High Risk)  MD Orders: hip abductor, quad strengthening, gait training WBAT MEDICAL/REFERRING DIAGNOSIS:  s/p ORIF LT IT    DATE OF ONSET: Injury 18; surgery 18  REFERRING PHYSICIAN: Frank Torres MD  RETURN PHYSICIAN APPOINTMENT: 18     ASSESSMENT:  Mr. Corry Chapa presents s/p ORIF of the left hip after a fall at [facility]. He has progressed with LE strength, decreased pain, and improved gait. He has improved with his TUG score. He is able to ambulate short distances with no assistive device with supervision. He will benefit from skilled physical therapy to increase functional mobility and decrease risk for falls. PROBLEM LIST (Impacting functional limitations):  1. Decreased Strength  2. Decreased ADL/Functional Activities  3. Decreased Ambulation Ability/Technique  4. Decreased Balance  5. Increased Pain INTERVENTIONS PLANNED:  1. Gait Training  2. Manual Therapy  3. Therapeutic Activites  4. Therapeutic Exercise/Strengthening   5. Modalities as needed for pain   TREATMENT PLAN:  Effective Dates: 3-20-18 TO 2018 (90 days). Frequency/Duration: 2-3 times a week for 90 Days  GOALS: (Goals have been discussed and agreed upon with patient.)  Short-Term Functional Goals: Time Frame: 4 weeks  1. Pt will increase L hip abductor strength to 4/5 for improved gait. GOAL MET  2. Pt will improve TUG time to 15 sec for improved mobility. GOAL MET  3.  Pt will be independent with HEP. GOAL MET  4. Pt will report pain 2/10 with daily activities. GOAL MET  Discharge Goals: Time Frame: 12 weeks progressing towards long term goals  1. Pt will increase L LE strength to 5/5 for improved gait and mobility. 2. Pt will improve TUG time to 12 sec for decreased risk for falls. 3. Pt will report pain 1/10 with daily activities. 4. Pt will be able to balance on L single leg for 5 sec for gait with no assistive device. 5. Pt will be able to ambulate with least restrictive assistive device over various terrain with supervision. Rehabilitation Potential For Stated Goals: Good  Regarding Jose Jung's therapy, I certify that the treatment plan above will be carried out by a therapist or under their direction. Thank you for this referral,    Chauncey Mei, PT                 Number of Personal Factors/Comorbidities that affect the Plan of Care: Body Structures Involved:  1. Bones  2. Joints  3. Muscles Body Functions Affected:  1. Neuromusculoskeletal   Number of elements (examined above) that affect the Plan of Care:   Presentation:   Use of outcome tool(s) and clinical judgement create a POC that gives a:            TREATMENT:   (In addition to Assessment/Re-Assessment sessions the following treatments were rendered)  Pre-treatment Symptoms/Complaints:  Pt reports he feels great today. Pain: Initial:     0/10 Post Session:  0/10     Therapeutic Exercise: (40 Minutes): Exercises for LE strengthening, endurance, and balance. CGA with standing exercises. Monitored O2% during exercises. 91% after machines. 93% after sit to stand  Walking with no assistive device with supervision. Walking forward and backwards 10 ft x4 ea. Side stepping 15 ft x2 each way.       Date  4-13-18 Date  4-17-18 Date  4-24-18 Date  4-27-18 Date  4-30-18 Date 5-2-18 Date  5-8-18 Date  5-10-18 Date  5-15-18   Activity/Exercise parameters parameters parameters parameters parameters parameters parameters parameters parameters   Long arc quad 4# 3x10 4# 4x10 5# 2x10 5#2x15 5# 2x15 5#2x15 5#2x20 Machine 15# 2x15 Machine 20# 2x15   Seated hamstring curls Blue 2x15 Blue 2x15 Blue 2x15 Blue 2x15 Blue 2x15 Blue 2x15 Black 2x10 Machine 35# 2x15 Machine 35# 2x15   SLR 2x12 B 2x12B 2x15 B 1# 2x8 1# 2x10        Side lying hip abduction Active 3x10 Active 3x10 Active 2x15 1#2x8 1# 2x10       Side lying clam Yellow 3x10 Red 2x10 Red 2x10 - Red 2x15       Sit to stand 2x12 2x15 - 20x 15 2x15 2x20 From chair with 1 pillow 2x10 Chair with airex 2x8 with no UE support   Heel raises 2x15 2x15 2x15 2x15 2x15 2x15 2x20  30x   balance  semi tandem 30\"x3 Semi tandem 30\"x3 Tandem 30\"x3 Tandem 30\"x3   Tandem 30\"x2 SLB 5\"x2 Tandem 30\"x2 SLB 5\"x2 Tandem 30\"x2 SLB 5\"x2 Tandem 30\"x3 SLB 5\"x3   Steps ups 4\" 15x B 4\" 15x B       4\" 15 ea 4\" 15 ea 4\" 2x10 B 6\" 10x B   Walking with no assistive device 65ft x3 with CGA 65 ft x with supervision 75# x2 with supervision See above See above See above See above See above See above   Shuttle press - - - - - - 75# 2x10 75# 2x10 75# 3x15           HEP: issued handout for patient with exercises. Pt verbalizes understanding. Treatment/Session Assessment:    · Response to Treatment: No complaints of pain with exercises. He seems to be progressing with LE strength. · Compliance with Program/Exercises: complaint   · Recommendations/Intent for next treatment session: \"Next visit will focus on LE strengthening, balance, gait\".   Total Treatment Duration: 40 minutes  PT Patient Time In/Time Out  Time In: 0930  Time Out: 11 [facility] [address]

## 2018-05-17 ENCOUNTER — HOSPITAL ENCOUNTER (OUTPATIENT)
Dept: PHYSICAL THERAPY | Age: 83
Discharge: HOME | End: 2018-05-17
Payer: MEDICARE

## 2018-05-17 PROCEDURE — 97110 THERAPEUTIC EXERCISES: CPT

## 2018-05-17 NOTE — PROGRESS NOTES
Kim Martell  : 1932  [place]  Secondary:  [address]  [facility]  [address], [address], [place] [address].  [phone]   [phone]        OUTPATIENT PHYSICAL THERAPY:Daily Note 2018    ICD-10: Treatment Diagnosis: Displaced intertrochanteric fracture of left femur, sequela (S72.142S); Unsteadiness on feet (R26.81); Pain in left hip (M25.552)  Precautions/Allergies:   Review of patient's allergies indicates no known allergies. Fall Risk Score: 7 (? 5 = High Risk)  MD Orders: hip abductor, quad strengthening, gait training WBAT MEDICAL/REFERRING DIAGNOSIS:  s/p ORIF LT IT    DATE OF ONSET: Injury 18; surgery 18  REFERRING PHYSICIAN: Tenisha Orellana MD  RETURN PHYSICIAN APPOINTMENT: 18     ASSESSMENT:  Mr. Meredith Gaming presents s/p ORIF of the left hip after a fall at [facility]. He has progressed with LE strength, decreased pain, and improved gait. He has improved with his TUG score. He is able to ambulate short distances with no assistive device with supervision. He will benefit from skilled physical therapy to increase functional mobility and decrease risk for falls. PROBLEM LIST (Impacting functional limitations):  1. Decreased Strength  2. Decreased ADL/Functional Activities  3. Decreased Ambulation Ability/Technique  4. Decreased Balance  5. Increased Pain INTERVENTIONS PLANNED:  1. Gait Training  2. Manual Therapy  3. Therapeutic Activites  4. Therapeutic Exercise/Strengthening   5. Modalities as needed for pain   TREATMENT PLAN:  Effective Dates: 3-20-18 TO 2018 (90 days). Frequency/Duration: 2-3 times a week for 90 Days  GOALS: (Goals have been discussed and agreed upon with patient.)  Short-Term Functional Goals: Time Frame: 4 weeks  1. Pt will increase L hip abductor strength to 4/5 for improved gait. GOAL MET  2. Pt will improve TUG time to 15 sec for improved mobility. GOAL MET  3.  Pt will be independent with HEP. GOAL MET  4. Pt will report pain 2/10 with daily activities. GOAL MET  Discharge Goals: Time Frame: 12 weeks progressing towards long term goals  1. Pt will increase L LE strength to 5/5 for improved gait and mobility. 2. Pt will improve TUG time to 12 sec for decreased risk for falls. 3. Pt will report pain 1/10 with daily activities. 4. Pt will be able to balance on L single leg for 5 sec for gait with no assistive device. 5. Pt will be able to ambulate with least restrictive assistive device over various terrain with supervision. Rehabilitation Potential For Stated Goals: Good  Regarding Conception Kalli Jung's therapy, I certify that the treatment plan above will be carried out by a therapist or under their direction. Thank you for this referral,    Pepper Griggs, PT                 HISTORY:   History of Present Injury/Illness (Reason for Referral): He was pushing a small cart at [facility] and his foot caught the wheel when he turned and he feel down. Injury happened feb 1st. They called EMS and took him to [address] [place] [address]. He had hip surgery on 2-2-18. Then he went to [facility] after his hip surgery. He did have home health come out to the house for a couple of visits. He now presents for outpatient therapy. Past Medical History/Comorbidities:   Mr. Stephon Mcgill  has a past medical history of Abdominal aortic aneurysm ([facility] [address] (12/10/2015); Abdominal aortic aneurysm without rupture ([facility] [address] Allergic rhinitis (12/10/2015); Asthma; Benign neoplasm; Benign prostatic hyperplasia (12/10/2015); BPH without urinary obstruction; Cardiovascular disease (12/10/2015); Chronic obstructive asthma with exacerbation ([address] (12/10/2015); COPD (chronic obstructive pulmonary disease) ([address] (12/10/2015); Cough with hemoptysis; Erectile dysfunction (12/10/2015); Essential hypertension, benign (12/10/2015); Fracture (10/2014); History of colon polyps; Low testosterone (12/10/2015); Lumbago; Memory loss;  Overflow incontinence; Raynaud's syndrome (12/10/2015); Rotator cuff tendinitis; Thrombocytopenia ([facility] [address] and Urinary frequency. Mr. Yodit Lafleur  has a past surgical history that includes hx hernia repair (1980); hx colonoscopy; hx fracture tx (10/2014); hx cataract removal (6/2016-right); and hx orthopaedic. Social History/Living Environment:    Lives in a 1 story house with wife. 1 small step to get inside. They do have a ramp to get inside also. Prior Level of Function/Work/Activity:  Ambulated with on assistive device. He did go out of the house with his wife and perform light shopping. Goal is to be able to walk normal.    Current Medications:       Current Outpatient Prescriptions:     lisinopril (PRINIVIL, ZESTRIL) 5 mg tablet, Take 5 mg by mouth daily. , Disp: , Rfl:     tamsulosin (FLOMAX) 0.4 mg capsule, Take 1 Cap by mouth daily. , Disp: 90 Cap, Rfl: 4    montelukast (SINGULAIR) 10 mg tablet, Take 1 Tab by mouth daily. , Disp: 90 Tab, Rfl: 4    fluticasone (FLONASE) 50 mcg/actuation nasal spray, 2 Sprays by Both Nostrils route daily. , Disp: 48 g, Rfl: 4    calcium citrate-vitamin d3 (CITRACAL+D) 315-200 mg-unit tab, Take 2 Tabs by mouth daily (with breakfast). , Disp: , Rfl:     cholecalciferol (VITAMIN D3) 1,000 unit cap, Take  by mouth., Disp: , Rfl:     guaiFENesin ER (MUCINEX) 600 mg ER tablet, Take 1,200 mg by mouth daily. , Disp: , Rfl:     Biotin 2,500 mcg cap, Take  by mouth., Disp: , Rfl:     aspirin delayed-release 81 mg tablet, Take  by mouth daily. , Disp: , Rfl:    Date Last Reviewed:  5-17-18   Number of Personal Factors/Comorbidities that affect the Plan of Care: 1-2: MODERATE COMPLEXITY   EXAMINATION:   Observation/Orthostatic Postural Assessment:          Pt arrived to physical therapy with rolling walker. Palpation:          n/t  ROM:        n/t  Strength:                    Functional Mobility: n/t         Body Structures Involved:  1. Bones  2. Joints  3.  Muscles Body Functions Affected:  1. Neuromusculoskeletal Activities and Participation Affected:  1. Community, Social and [place]   Number of elements (examined above) that affect the Plan of Care: 3: MODERATE COMPLEXITY   CLINICAL PRESENTATION:   Presentation: Stable and uncomplicated: LOW COMPLEXITY   CLINICAL DECISION MAKING:   Outcome Measure: Tool Used: Timed Up and Go (TUG)  Score:  Initial: 19 seconds Most Recent: 16 seconds, 12 sec with walker (Date: 4-24-18 )   Interpretation of Score: The test measures, in seconds, the time taken by an individual to stand up from a standard arm chair (seat height 46 cm [18 in], arm height 65 cm [25.6 in]), walk a distance of 3 meters (118 in, approx 10 ft), turn, walk back to the chair and sit down. If the individual takes longer than 14 seconds to complete TUG, this indicates risk for falls. Score 7 7.5-10.5 11-14 14.5-17.5 18-21 21.5-24.5 25+   Modifier CH CI CJ CK CL CM CN     ? Mobility - Walking and Moving Around:     - CURRENT STATUS: CJ - 20%-39% impaired, limited or restricted    - GOAL STATUS: CJ - 20%-39% impaired, limited or restricted    - D/C STATUS:  ---------------To be determined---------------    Medical Necessity:   · Patient is expected to demonstrate progress in strength, range of motion and balance to improve safety during walking and mobility. Reason for Services/Other Comments:  · Patient continues to require skilled intervention due to decreased LE strength, decreased gait and balance. Use of outcome tool(s) and clinical judgement create a POC that gives a: Clear prediction of patient's progress: LOW COMPLEXITY            TREATMENT:   (In addition to Assessment/Re-Assessment sessions the following treatments were rendered)  Pre-treatment Symptoms/Complaints:  Pt reports soreness in achilles tendon this morning when he woke up.   Pain: Initial:     2/10 Post Session:  z/10     Therapeutic Exercise: (40 Minutes): Exercises for LE strengthening, endurance, and balance. CGA with standing exercises. Monitored O2% during exercises. 90-95%  88% after step ups but raised to 95% with rest break. Walking with no assistive device with supervision. Walking forward and backwards 20 ft x4 ea. Side stepping 20 ft x2 each way. Walking for endurance 200 ft with no assistive device with supervision. Date  4-13-18 Date  4-17-18 Date  4-24-18 Date  4-27-18 Date  4-30-18 Date 5-2-18 Date  5-8-18 Date  5-10-18 Date  5-17-18   Activity/Exercise parameters parameters parameters parameters parameters parameters parameters parameters parameters   Long arc quad 4# 3x10 4# 4x10 5# 2x10 5#2x15 5# 2x15 5#2x15 5#2x20 Machine 15# 2x15 Machine 15# 2x15   Seated hamstring curls Blue 2x15 Blue 2x15 Blue 2x15 Blue 2x15 Blue 2x15 Blue 2x15 Black 2x10 Machine 35# 2x15 Machine 35# 2x15   SLR 2x12 B 2x12B 2x15 B 1# 2x8 1# 2x10        Side lying hip abduction Active 3x10 Active 3x10 Active 2x15 1#2x8 1# 2x10       Side lying clam Yellow 3x10 Red 2x10 Red 2x10 - Red 2x15       Sit to stand 2x12 2x15 - 20x 15 2x15 2x20 From chair with 1 pillow 2x10 From chair with airex 2x10   Heel raises 2x15 2x15 2x15 2x15 2x15 2x15 2x20     balance  semi tandem 30\"x3 Semi tandem 30\"x3 Tandem 30\"x3 Tandem 30\"x3   Tandem 30\"x2 SLB 5\"x2 Tandem 30\"x2 SLB 5\"x2 Tandem 30\"x2 SLB 5\"x2 Tandem 30\"x3; SLB 5\"x3' toe taps on 6\" step 20x   Steps ups 4\" 15x B 4\" 15x B       4\" 15 ea 4\" 15 ea 4\" 2x10 B 6\" 10xB   Walking with no assistive device 65ft x3 with CGA 65 ft x with supervision 75# x2 with supervision See above See above See above See above See above See above   Shuttle press - - - - - - 75# 2x10 75# 2x10 75# 2x15           HEP: issued handout for patient with exercises. Pt verbalizes understanding. Treatment/Session Assessment:    · Response to Treatment: He seems to be progressing with LE strength.    · Compliance with Program/Exercises: complaint   · Recommendations/Intent for next treatment session: \"Next visit will focus on LE strengthening, balance, gait\".   Total Treatment Duration: 40 minutes  PT Patient Time In/Time Out  Time In: 0930  Time Out: Mj 23

## 2018-05-22 ENCOUNTER — HOSPITAL ENCOUNTER (OUTPATIENT)
Dept: PHYSICAL THERAPY | Age: 83
Discharge: HOME | End: 2018-05-22
Payer: MEDICARE

## 2018-05-22 PROCEDURE — 97110 THERAPEUTIC EXERCISES: CPT

## 2018-05-22 NOTE — PROGRESS NOTES
Jean Eduin  : 1932  [place]  Secondary:  [address]  at [facility] IMMANUEL BALDWIN  [address], [address], [facility] .  [phone]   [phone]        OUTPATIENT PHYSICAL THERAPY:Daily Note 2018    ICD-10: Treatment Diagnosis: Displaced intertrochanteric fracture of left femur, sequela (S72.142S); Unsteadiness on feet (R26.81); Pain in left hip (M25.552)  Precautions/Allergies:   Review of patient's allergies indicates no known allergies. Fall Risk Score: 7 (? 5 = High Risk)  MD Orders: hip abductor, quad strengthening, gait training WBAT MEDICAL/REFERRING DIAGNOSIS:  s/p ORIF LT IT    DATE OF ONSET: Injury 18; surgery 18  REFERRING PHYSICIAN: Laura Foley MD  RETURN PHYSICIAN APPOINTMENT: 18     ASSESSMENT:  Mr. La Shine presents s/p ORIF of the left hip after a fall at [facility]. He has progressed with LE strength, decreased pain, and improved gait. He has improved with his TUG score. He is able to ambulate short distances with no assistive device with supervision. He will benefit from skilled physical therapy to increase functional mobility and decrease risk for falls. PROBLEM LIST (Impacting functional limitations):  1. Decreased Strength  2. Decreased ADL/Functional Activities  3. Decreased Ambulation Ability/Technique  4. Decreased Balance  5. Increased Pain INTERVENTIONS PLANNED:  1. Gait Training  2. Manual Therapy  3. Therapeutic Activites  4. Therapeutic Exercise/Strengthening   5. Modalities as needed for pain   TREATMENT PLAN:  Effective Dates: 3-20-18 TO 2018 (90 days). Frequency/Duration: 2-3 times a week for 90 Days  GOALS: (Goals have been discussed and agreed upon with patient.)  Short-Term Functional Goals: Time Frame: 4 weeks  1. Pt will increase L hip abductor strength to 4/5 for improved gait. GOAL MET  2. Pt will improve TUG time to 15 sec for improved mobility. GOAL MET  3.  Pt will be independent with HEP. GOAL MET  4. Pt will report pain 2/10 with daily activities. GOAL MET  Discharge Goals: Time Frame: 12 weeks progressing towards long term goals  1. Pt will increase L LE strength to 5/5 for improved gait and mobility. 2. Pt will improve TUG time to 12 sec for decreased risk for falls. 3. Pt will report pain 1/10 with daily activities. 4. Pt will be able to balance on L single leg for 5 sec for gait with no assistive device. 5. Pt will be able to ambulate with least restrictive assistive device over various terrain with supervision. Rehabilitation Potential For Stated Goals: Good  Regarding Fred Jung's therapy, I certify that the treatment plan above will be carried out by a therapist or under their direction. Thank you for this referral,    Pepper Roman, PT                 HISTORY:   History of Present Injury/Illness (Reason for Referral): He was pushing a small cart at [facility] and his foot caught the wheel when he turned and he feel down. Injury happened feb 1st. They called EMS and took him to [facility]. He had hip surgery on 2-2-18. Then he went to [facility] after his hip surgery. He did have home health come out to the house for a couple of visits. He now presents for outpatient therapy. Past Medical History/Comorbidities:   Mr. Jeffy Le  has a past medical history of Abdominal aortic aneurysm ([facility] [address] (12/10/2015); Abdominal aortic aneurysm without rupture ([facility] [address] Allergic rhinitis (12/10/2015); Asthma; Benign neoplasm; Benign prostatic hyperplasia (12/10/2015); BPH without urinary obstruction; Cardiovascular disease (12/10/2015); Chronic obstructive asthma with exacerbation ([address] (12/10/2015); COPD (chronic obstructive pulmonary disease) ([address] (12/10/2015); Cough with hemoptysis; Erectile dysfunction (12/10/2015); Essential hypertension, benign (12/10/2015); Fracture (10/2014); History of colon polyps; Low testosterone (12/10/2015); Lumbago; Memory loss;  Overflow incontinence; Raynaud's syndrome (12/10/2015); Rotator cuff tendinitis; Thrombocytopenia ([facility] [address] and Urinary frequency. Mr. Sarah Murry  has a past surgical history that includes hx hernia repair (1980); hx colonoscopy; hx fracture tx (10/2014); hx cataract removal (6/2016-right); and hx orthopaedic. Social History/Living Environment:    Lives in a 1 story house with wife. 1 small step to get inside. They do have a ramp to get inside also. Prior Level of Function/Work/Activity:  Ambulated with on assistive device. He did go out of the house with his wife and perform light shopping. Goal is to be able to walk normal.    Current Medications:       Current Outpatient Prescriptions:     lisinopril (PRINIVIL, ZESTRIL) 5 mg tablet, Take 5 mg by mouth daily. , Disp: , Rfl:     tamsulosin (FLOMAX) 0.4 mg capsule, Take 1 Cap by mouth daily. , Disp: 90 Cap, Rfl: 4    montelukast (SINGULAIR) 10 mg tablet, Take 1 Tab by mouth daily. , Disp: 90 Tab, Rfl: 4    fluticasone (FLONASE) 50 mcg/actuation nasal spray, 2 Sprays by Both Nostrils route daily. , Disp: 48 g, Rfl: 4    calcium citrate-vitamin d3 (CITRACAL+D) 315-200 mg-unit tab, Take 2 Tabs by mouth daily (with breakfast). , Disp: , Rfl:     cholecalciferol (VITAMIN D3) 1,000 unit cap, Take  by mouth., Disp: , Rfl:     guaiFENesin ER (MUCINEX) 600 mg ER tablet, Take 1,200 mg by mouth daily. , Disp: , Rfl:     Biotin 2,500 mcg cap, Take  by mouth., Disp: , Rfl:     aspirin delayed-release 81 mg tablet, Take  by mouth daily. , Disp: , Rfl:    Date Last Reviewed:  5-22-18   Number of Personal Factors/Comorbidities that affect the Plan of Care: 1-2: MODERATE COMPLEXITY   EXAMINATION:   Observation/Orthostatic Postural Assessment:          Pt arrived to physical therapy with rolling walker. Palpation:          n/t  ROM:        n/t  Strength:                    Functional Mobility: n/t         Body Structures Involved:  1. Bones  2. Joints  3.  Muscles Body Functions Affected:  1. Neuromusculoskeletal Activities and Participation Affected:  1. Community, Social and [place]   Number of elements (examined above) that affect the Plan of Care: 3: MODERATE COMPLEXITY   CLINICAL PRESENTATION:   Presentation: Stable and uncomplicated: LOW COMPLEXITY   CLINICAL DECISION MAKING:   Outcome Measure: Tool Used: Timed Up and Go (TUG)  Score:  Initial: 19 seconds Most Recent: 16 seconds, 12 sec with walker (Date: 4-24-18 )   Interpretation of Score: The test measures, in seconds, the time taken by an individual to stand up from a standard arm chair (seat height 46 cm [18 in], arm height 65 cm [25.6 in]), walk a distance of 3 meters (118 in, approx 10 ft), turn, walk back to the chair and sit down. If the individual takes longer than 14 seconds to complete TUG, this indicates risk for falls. Score 7 7.5-10.5 11-14 14.5-17.5 18-21 21.5-24.5 25+   Modifier CH CI CJ CK CL CM CN     ? Mobility - Walking and Moving Around:     - CURRENT STATUS: CJ - 20%-39% impaired, limited or restricted    - GOAL STATUS: CJ - 20%-39% impaired, limited or restricted    - D/C STATUS:  ---------------To be determined---------------    Medical Necessity:   · Patient is expected to demonstrate progress in strength, range of motion and balance to improve safety during walking and mobility. Reason for Services/Other Comments:  · Patient continues to require skilled intervention due to decreased LE strength, decreased gait and balance. Use of outcome tool(s) and clinical judgement create a POC that gives a: Clear prediction of patient's progress: LOW COMPLEXITY            TREATMENT:   (In addition to Assessment/Re-Assessment sessions the following treatments were rendered)  Pre-treatment Symptoms/Complaints:  Pt reports he was leaning over in bed on Friday morning and had pain in his ribs but that is better today.    Pain: Initial:     2/10 Post Session:  z/10     Therapeutic Exercise: (40 Minutes): Exercises for LE strengthening, endurance, and balance. CGA with standing exercises. Monitored O2% during exercises. 94% after table exercises. 91-93% after walking. Walking with no assistive device with supervision. Walking forward and backwards 20 ft x5 ea. Side stepping 20 ft x3 each way. Walking for endurance 200 ftx2 with no assistive device with supervision. Date  4-27-18 Date  4-30-18 Date 5-2-18 Date  5-8-18 Date  5-10-18 Date  5-17-18 Date  5-22-18   Activity/Exercise parameters parameters parameters parameters parameters parameters parameters   Long arc quad 5#2x15 5# 2x15 5#2x15 5#2x20 Machine 15# 2x15 Machine 15# 2x15 Machine 20# 2x10   Seated hamstring curls Blue 2x15 Blue 2x15 Blue 2x15 Black 2x10 Machine 35# 2x15 Machine 35# 2x15 Machine 40# 2x10   SLR 1# 2x8 1# 2x10      1# 3x10   Side lying hip abduction 1#2x8 1# 2x10     1# 3x10   Side lying clam - Red 2x15        Sit to stand 20x 15 2x15 2x20 From chair with 1 pillow 2x10 From chair with airex 2x10 2x10 from high low table   Heel raises 2x15 2x15 2x15 2x20      balance Tandem 30\"x3   Tandem 30\"x2 SLB 5\"x2 Tandem 30\"x2 SLB 5\"x2 Tandem 30\"x2 SLB 5\"x2 Tandem 30\"x3; SLB 5\"x3' toe taps on 6\" step 20x Tandem 30\"x2 SLB 5\"x2   Steps ups     4\" 15 ea 4\" 15 ea 4\" 2x10 B 6\" 10xB    Walking with no assistive device See above See above See above See above See above See above See above   Shuttle press - - - 75# 2x10 75# 2x10 75# 2x15 75# 3x10           HEP: issued handout for patient with exercises. Pt verbalizes understanding. Treatment/Session Assessment:    · Response to Treatment: Progressed walking drills distance today and he did well with no loss of balance. · Compliance with Program/Exercises: complaint   · Recommendations/Intent for next treatment session: \"Next visit will focus on LE strengthening, balance, gait\".   Total Treatment Duration: 40 minutes  PT Patient Time In/Time Out  Time In: 0930  Time Out: [id number] [place], PT

## 2018-05-24 ENCOUNTER — HOSPITAL ENCOUNTER (OUTPATIENT)
Dept: PHYSICAL THERAPY | Age: 83
Discharge: HOME | End: 2018-05-24
Payer: MEDICARE

## 2018-05-24 PROCEDURE — 97110 THERAPEUTIC EXERCISES: CPT

## 2018-05-24 NOTE — PROGRESS NOTES
Miky Fuentes  : 1932  [place]  Secondary:  [address]  [address]  [address] [facility], [address], [place] [address].  [phone]   [phone]        OUTPATIENT PHYSICAL THERAPY:Daily Note 2018    ICD-10: Treatment Diagnosis: Displaced intertrochanteric fracture of left femur, sequela (S72.142S); Unsteadiness on feet (R26.81); Pain in left hip (M25.552)  Precautions/Allergies:   Review of patient's allergies indicates no known allergies. Fall Risk Score: 7 (? 5 = High Risk)  MD Orders: hip abductor, quad strengthening, gait training WBAT MEDICAL/REFERRING DIAGNOSIS:  s/p ORIF LT IT    DATE OF ONSET: Injury 18; surgery 18  REFERRING PHYSICIAN: Sofiya Rock MD  RETURN PHYSICIAN APPOINTMENT: 2018     ASSESSMENT:  Mr. Stefan Jeong presents s/p ORIF of the left hip after a fall at [facility]. He has progressed with LE strength, decreased pain, and improved gait. He has improved with his TUG score. He is able to ambulate short distances with no assistive device with supervision. He will benefit from skilled physical therapy to increase functional mobility and decrease risk for falls. PROBLEM LIST (Impacting functional limitations):  1. Decreased Strength  2. Decreased ADL/Functional Activities  3. Decreased Ambulation Ability/Technique  4. Decreased Balance  5. Increased Pain INTERVENTIONS PLANNED:  1. Gait Training  2. Manual Therapy  3. Therapeutic Activites  4. Therapeutic Exercise/Strengthening   5. Modalities as needed for pain   TREATMENT PLAN:  Effective Dates: 3-20-18 TO 2018 (90 days). Frequency/Duration: 2-3 times a week for 90 Days  GOALS: (Goals have been discussed and agreed upon with patient.)  Short-Term Functional Goals: Time Frame: 4 weeks  1. Pt will increase L hip abductor strength to 4/5 for improved gait. GOAL MET  2. Pt will improve TUG time to 15 sec for improved mobility. GOAL MET  3.  Pt will be independent with HEP. GOAL MET  4. Pt will report pain 2/10 with daily activities. GOAL MET  Discharge Goals: Time Frame: 12 weeks progressing towards long term goals  1. Pt will increase L LE strength to 5/5 for improved gait and mobility. 2. Pt will improve TUG time to 12 sec for decreased risk for falls. 3. Pt will report pain 1/10 with daily activities. 4. Pt will be able to balance on L single leg for 5 sec for gait with no assistive device. 5. Pt will be able to ambulate with least restrictive assistive device over various terrain with supervision. Rehabilitation Potential For Stated Goals: Good  Regarding Dary Jung's therapy, I certify that the treatment plan above will be carried out by a therapist or under their direction. Thank you for this referral,    Pepper Pardo, PT                 HISTORY:   History of Present Injury/Illness (Reason for Referral): He was pushing a small cart at [facility] and his foot caught the wheel when he turned and he feel down. Injury happened feb 1st. They called EMS and took him to [facility]. He had hip surgery on 2-2-18. Then he went to [employer] after his hip surgery. He did have home health come out to the house for a couple of visits. He now presents for outpatient therapy. Past Medical History/Comorbidities:   Mr. Haven Marks  has a past medical history of Abdominal aortic aneurysm ([facility] [address] (12/10/2015); Abdominal aortic aneurysm without rupture ([facility] [address] Allergic rhinitis (12/10/2015); Asthma; Benign neoplasm; Benign prostatic hyperplasia (12/10/2015); BPH without urinary obstruction; Cardiovascular disease (12/10/2015); Chronic obstructive asthma with exacerbation ([address] (12/10/2015); COPD (chronic obstructive pulmonary disease) ([facility] [address] (12/10/2015); Cough with hemoptysis; Erectile dysfunction (12/10/2015); Essential hypertension, benign (12/10/2015); Fracture (10/2014); History of colon polyps; Low testosterone (12/10/2015);  Lumbago; Memory loss; Overflow incontinence; Raynaud's syndrome (12/10/2015); Rotator cuff tendinitis; Thrombocytopenia ([facility] [address] and Urinary frequency. Mr. Suzanne Baldwin  has a past surgical history that includes hx hernia repair (1980); hx colonoscopy; hx fracture tx (10/2014); hx cataract removal (6/2016-right); and hx orthopaedic. Social History/Living Environment:    Lives in a 1 story house with wife. 1 small step to get inside. They do have a ramp to get inside also. Prior Level of Function/Work/Activity:  Ambulated with on assistive device. He did go out of the house with his wife and perform light shopping. Goal is to be able to walk normal.    Current Medications:       Current Outpatient Prescriptions:     lisinopril (PRINIVIL, ZESTRIL) 5 mg tablet, Take 5 mg by mouth daily. , Disp: , Rfl:     tamsulosin (FLOMAX) 0.4 mg capsule, Take 1 Cap by mouth daily. , Disp: 90 Cap, Rfl: 4    montelukast (SINGULAIR) 10 mg tablet, Take 1 Tab by mouth daily. , Disp: 90 Tab, Rfl: 4    fluticasone (FLONASE) 50 mcg/actuation nasal spray, 2 Sprays by Both Nostrils route daily. , Disp: 48 g, Rfl: 4    calcium citrate-vitamin d3 (CITRACAL+D) 315-200 mg-unit tab, Take 2 Tabs by mouth daily (with breakfast). , Disp: , Rfl:     cholecalciferol (VITAMIN D3) 1,000 unit cap, Take  by mouth., Disp: , Rfl:     guaiFENesin ER (MUCINEX) 600 mg ER tablet, Take 1,200 mg by mouth daily. , Disp: , Rfl:     Biotin 2,500 mcg cap, Take  by mouth., Disp: , Rfl:     aspirin delayed-release 81 mg tablet, Take  by mouth daily. , Disp: , Rfl:    Date Last Reviewed:  5-22-18   Number of Personal Factors/Comorbidities that affect the Plan of Care: 1-2: MODERATE COMPLEXITY   EXAMINATION:   Observation/Orthostatic Postural Assessment:          Pt arrived to physical therapy with rolling walker. Palpation:          n/t  ROM:        n/t  Strength:                    Functional Mobility: n/t         Body Structures Involved:  1. Bones  2. Joints  3.  Muscles Body Functions Affected:  1. Neuromusculoskeletal Activities and Participation Affected:  1. Community, Social and [place]   Number of elements (examined above) that affect the Plan of Care: 3: MODERATE COMPLEXITY   CLINICAL PRESENTATION:   Presentation: Stable and uncomplicated: LOW COMPLEXITY   CLINICAL DECISION MAKING:   Outcome Measure: Tool Used: Timed Up and Go (TUG)  Score:  Initial: 19 seconds Most Recent: 16 seconds, 12 sec with walker (Date: 4-24-18 )   Interpretation of Score: The test measures, in seconds, the time taken by an individual to stand up from a standard arm chair (seat height 46 cm [18 in], arm height 65 cm [25.6 in]), walk a distance of 3 meters (118 in, approx 10 ft), turn, walk back to the chair and sit down. If the individual takes longer than 14 seconds to complete TUG, this indicates risk for falls. Score 7 7.5-10.5 11-14 14.5-17.5 18-21 21.5-24.5 25+   Modifier CH CI CJ CK CL CM CN     ? Mobility - Walking and Moving Around:     - CURRENT STATUS: CJ - 20%-39% impaired, limited or restricted    - GOAL STATUS: CJ - 20%-39% impaired, limited or restricted    - D/C STATUS:  ---------------To be determined---------------    Medical Necessity:   · Patient is expected to demonstrate progress in strength, range of motion and balance to improve safety during walking and mobility. Reason for Services/Other Comments:  · Patient continues to require skilled intervention due to decreased LE strength, decreased gait and balance. Use of outcome tool(s) and clinical judgement create a POC that gives a: Clear prediction of patient's progress: LOW COMPLEXITY            TREATMENT:   (In addition to Assessment/Re-Assessment sessions the following treatments were rendered)  Pre-treatment Symptoms/Complaints:  Pt reports he doesn't feel ready to be done with physical therapy.    Pain: Initial:     2/10 Post Session:  z/10     Therapeutic Exercise: (40 Minutes): Exercises for LE strengthening, endurance, and balance. CGA with standing exercises. Monitored O2% during exercises. 92% after machines. 90% after sit to stands. 92-93% with step ups. 91-93% after walking. Walking with no assistive device with supervision. Walking forward and backwards 20 ft x5 ea. Side stepping 20 ft x3 each way. Walking for endurance 200 ftx2 with no assistive device with supervision. Walking with marching 20ftx2, walking with head turns R and L 20 ft x2 and walking looking up and down 20 ft x2. Date  4-27-18 Date  4-30-18 Date 5-2-18 Date  5-8-18 Date  5-10-18 Date  5-17-18 Date  5-22-18 Date  5-24-18   Activity/Exercise parameters parameters parameters parameters parameters parameters parameters parameters   Long arc quad 5#2x15 5# 2x15 5#2x15 5#2x20 Machine 15# 2x15 Machine 15# 2x15 Machine 20# 2x10 Machine 20# 2x10 B LE; 10# SL 10x   Seated hamstring curls Blue 2x15 Blue 2x15 Blue 2x15 Black 2x10 Machine 35# 2x15 Machine 35# 2x15 Machine 40# 2x10 Machine 40# 2x10B LE; 25# SL 10x   SLR 1# 2x8 1# 2x10      1# 3x10    Side lying hip abduction 1#2x8 1# 2x10     1# 3x10    Side lying clam - Red 2x15         Sit to stand 20x 15 2x15 2x20 From chair with 1 pillow 2x10 From chair with airex 2x10 2x10 from high low table Chair +1 pillow 2x8   Heel raises 2x15 2x15 2x15 2x20       balance Tandem 30\"x3   Tandem 30\"x2 SLB 5\"x2 Tandem 30\"x2 SLB 5\"x2 Tandem 30\"x2 SLB 5\"x2 Tandem 30\"x3; SLB 5\"x3' toe taps on 6\" step 20x Tandem 30\"x2 SLB 5\"x2 Tandem 30\"x2 SLB 5\"x2   Steps ups     4\" 15 ea 4\" 15 ea 4\" 2x10 B 6\" 10xB  6\" 10xB   Walking with no assistive device See above See above See above See above See above See above See above See above   Shuttle press - - - 75# 2x10 75# 2x10 75# 2x15 75# 3x10 75# 3x10  87# 10x           HEP: issued handout for patient with exercises. Pt verbalizes understanding.      Treatment/Session Assessment:    · Response to Treatment: He has minimal loss of balance with step ups with no UE support but was able to regain balance independently by grabbing onto walker. He had no loss of balance with dynamic gait drills. · Compliance with Program/Exercises: complaint   · Recommendations/Intent for next treatment session: \"Next visit will focus on LE strengthening, balance, gait\".   Total Treatment Duration: 40 minutes  PT Patient Time In/Time Out  Time In: 0930  Time Out: [address] KAM[address]

## 2018-05-29 ENCOUNTER — HOSPITAL ENCOUNTER (OUTPATIENT)
Dept: PHYSICAL THERAPY | Age: 83
Discharge: HOME | End: 2018-05-29
Payer: MEDICARE

## 2018-05-29 PROCEDURE — G8978 MOBILITY CURRENT STATUS: HCPCS

## 2018-05-29 PROCEDURE — G8979 MOBILITY GOAL STATUS: HCPCS

## 2018-05-29 PROCEDURE — 97110 THERAPEUTIC EXERCISES: CPT

## 2018-05-29 NOTE — PROGRESS NOTES
Sandy Olivas  : 1932  [place]  Secondary:  [address]  at [facility] IMMANUEL BALDWIN  [address] [facility], [address], [facility] [place].  [phone]   [phone]        OUTPATIENT PHYSICAL THERAPY:Daily Note and Progress Report 2018    ICD-10: Treatment Diagnosis: Displaced intertrochanteric fracture of left femur, sequela (S72.142S); Unsteadiness on feet (R26.81); Pain in left hip (M25.552)  Precautions/Allergies:   Review of patient's allergies indicates no known allergies. Fall Risk Score: 7 (? 5 = High Risk)  MD Orders: hip abductor, quad strengthening, gait training WBAT MEDICAL/REFERRING DIAGNOSIS:  s/p ORIF LT IT    DATE OF ONSET: Injury 18; surgery 18  REFERRING PHYSICIAN: Ruth Ann Mott MD  RETURN PHYSICIAN APPOINTMENT: 2018     ASSESSMENT:  Mr. Suzanne Baldwin presents s/p ORIF of the left hip after a fall at [facility]. He continues to progress with LE strength, decreased pain, and improved gait. He has improved with his TUG score with walking and with no assistive device. He is able to ambulate short distances with no assistive device with supervision. He will benefit from skilled physical therapy to increase functional mobility and decrease risk for falls. PROBLEM LIST (Impacting functional limitations):  1. Decreased Strength  2. Decreased ADL/Functional Activities  3. Decreased Ambulation Ability/Technique  4. Decreased Balance  5. Increased Pain INTERVENTIONS PLANNED:  1. Gait Training  2. Manual Therapy  3. Therapeutic Activites  4. Therapeutic Exercise/Strengthening   5. Modalities as needed for pain   TREATMENT PLAN:  Effective Dates: 3-20-18 TO 2018 (90 days). Frequency/Duration: 2-3 times a week for 90 Days  GOALS: (Goals have been discussed and agreed upon with patient.)  Short-Term Functional Goals: Time Frame: 4 weeks  1. Pt will increase L hip abductor strength to 4/5 for improved gait. GOAL MET  2.  Pt will improve TUG time to 15 sec for improved mobility. GOAL MET  3. Pt will be independent with HEP. GOAL MET  4. Pt will report pain 2/10 with daily activities. GOAL MET  Discharge Goals: Time Frame: 12 weeks   1. Pt will increase L LE strength to 5/5 for improved gait and mobility. Progressing towards  2. Pt will improve TUG time to 12 sec for decreased risk for falls. GOAL MET  3. Pt will report pain 1/10 with daily activities. GOAL MET  4. Pt will be able to balance on L single leg for 5 sec for gait with no assistive device. GOAL MET  5. Pt will be able to ambulate with least restrictive assistive device over various terrain with supervision. Progressing towards  Rehabilitation Potential For Stated Goals: Good  Regarding Dary Jung's therapy, I certify that the treatment plan above will be carried out by a therapist or under their direction. Thank you for this referral,    Pepper Pardo, PT                 HISTORY:   History of Present Injury/Illness (Reason for Referral): He was pushing a small cart at [facility] and his foot caught the wheel when he turned and he feel down. Injury happened feb 1st. They called EMS and took him to [facility]. He had hip surgery on 2-2-18. Then he went to [facility] after his hip surgery. He did have home health come out to the house for a couple of visits. He now presents for outpatient therapy. Past Medical History/Comorbidities:   Mr. Haven Marks  has a past medical history of Abdominal aortic aneurysm ([facility] [address] (12/10/2015); Abdominal aortic aneurysm without rupture ([address] Allergic rhinitis (12/10/2015); Asthma; Benign neoplasm; Benign prostatic hyperplasia (12/10/2015); BPH without urinary obstruction; Cardiovascular disease (12/10/2015); Chronic obstructive asthma with exacerbation ([address] (12/10/2015); COPD (chronic obstructive pulmonary disease) ([address] (12/10/2015); Cough with hemoptysis; Erectile dysfunction (12/10/2015);  Essential hypertension, benign (12/10/2015); Fracture (10/2014); History of colon polyps; Low testosterone (12/10/2015); Lumbago; Memory loss; Overflow incontinence; Raynaud's syndrome (12/10/2015); Rotator cuff tendinitis; Thrombocytopenia ([address] and Urinary frequency. Mr. Lian Stone  has a past surgical history that includes hx hernia repair (1980); hx colonoscopy; hx fracture tx (10/2014); hx cataract removal (6/2016-right); and hx orthopaedic. Social History/Living Environment:    Lives in a 1 story house with wife. 1 small step to get inside. They do have a ramp to get inside also. Prior Level of Function/Work/Activity:  Ambulated with on assistive device. He did go out of the house with his wife and perform light shopping. Goal is to be able to walk normal.    Current Medications:       Current Outpatient Prescriptions:     lisinopril (PRINIVIL, ZESTRIL) 5 mg tablet, Take 5 mg by mouth daily. , Disp: , Rfl:     tamsulosin (FLOMAX) 0.4 mg capsule, Take 1 Cap by mouth daily. , Disp: 90 Cap, Rfl: 4    montelukast (SINGULAIR) 10 mg tablet, Take 1 Tab by mouth daily. , Disp: 90 Tab, Rfl: 4    fluticasone (FLONASE) 50 mcg/actuation nasal spray, 2 Sprays by Both Nostrils route daily. , Disp: 48 g, Rfl: 4    calcium citrate-vitamin d3 (CITRACAL+D) 315-200 mg-unit tab, Take 2 Tabs by mouth daily (with breakfast). , Disp: , Rfl:     cholecalciferol (VITAMIN D3) 1,000 unit cap, Take  by mouth., Disp: , Rfl:     guaiFENesin ER (MUCINEX) 600 mg ER tablet, Take 1,200 mg by mouth daily. , Disp: , Rfl:     Biotin 2,500 mcg cap, Take  by mouth., Disp: , Rfl:     aspirin delayed-release 81 mg tablet, Take  by mouth daily. , Disp: , Rfl:    Date Last Reviewed:  5-29-18   Number of Personal Factors/Comorbidities that affect the Plan of Care: 1-2: MODERATE COMPLEXITY   EXAMINATION:   Observation/Orthostatic Postural Assessment:          Pt arrived to physical therapy with rolling walker.    Palpation:          n/t  ROM:        n/t  Strength:      LLE Strength  L Hip Flexion: 4+  L Hip ABduction: 4+  L Knee Flexion: 4+  L Knee Extension: 4+             Functional Mobility: Pt ambulates with no assistive device with decreased step length and wide base of support. No loss of balance with walking forward, backward, or side stepping. Balance: single leg balance L LE 5 sec         Body Structures Involved:  1. Bones  2. Joints  3. Muscles Body Functions Affected:  1. Neuromusculoskeletal Activities and Participation Affected:  1. Community, Social and [place]   Number of elements (examined above) that affect the Plan of Care: 3: MODERATE COMPLEXITY   CLINICAL PRESENTATION:   Presentation: Stable and uncomplicated: LOW COMPLEXITY   CLINICAL DECISION MAKING:   Outcome Measure: Tool Used: Timed Up and Go (TUG)  Score:  Initial: 19 seconds 16 seconds, 12 sec with walker (Date: 4-24-18 ) Most Recent: 12 sec with walker, 12 sec with walker, 10 sec with no walker  5-29-18   Interpretation of Score: The test measures, in seconds, the time taken by an individual to stand up from a standard arm chair (seat height 46 cm [18 in], arm height 65 cm [25.6 in]), walk a distance of 3 meters (118 in, approx 10 ft), turn, walk back to the chair and sit down. If the individual takes longer than 14 seconds to complete TUG, this indicates risk for falls. Score 7 7.5-10.5 11-14 14.5-17.5 18-21 21.5-24.5 25+   Modifier CH CI CJ CK CL CM CN     ? Mobility - Walking and Moving Around:     - CURRENT STATUS: CJ - 20%-39% impaired, limited or restricted    - GOAL STATUS: CJ - 20%-39% impaired, limited or restricted    - D/C STATUS:  ---------------To be determined---------------    Medical Necessity:   · Patient is expected to demonstrate progress in strength, range of motion and balance to improve safety during walking and mobility. Reason for Services/Other Comments:  · Patient continues to require skilled intervention due to decreased LE strength, decreased gait and balance.    Use of outcome tool(s) and clinical judgement create a POC that gives a: Clear prediction of patient's progress: LOW COMPLEXITY            TREATMENT:   (In addition to Assessment/Re-Assessment sessions the following treatments were rendered)  Pre-treatment Symptoms/Complaints:  Pt reports he was able [facility] with no assistive device. Unable to sleep on the left side. Pain: Initial:     1/10 Post Session:  1/10     Therapeutic Exercise: (40 Minutes): Exercises for LE strengthening, endurance, and balance. CGA with standing exercises. Monitored O2% during exercises. 93% after machines. 91% after sit to stands. 91% after walking drills. Walking with no assistive device with supervision. Walking forward and backwards 20 ft x2 ea. Side stepping 25 ft x2 each way. Walking for endurance 200 ftx2 with no assistive device with supervision.       Date  4-27-18 Date  4-30-18 Date 5-2-18 Date  5-8-18 Date  5-10-18 Date  5-17-18 Date  5-22-18 Date  5-24-18 Date  5-29-18   Activity/Exercise parameters parameters parameters parameters parameters parameters parameters parameters    Long arc quad 5#2x15 5# 2x15 5#2x15 5#2x20 Machine 15# 2x15 Machine 15# 2x15 Machine 20# 2x10 Machine 20# 2x10 B LE; 10# SL 10x Machine 25# 2x10, SL 15# 10x   Seated hamstring curls Blue 2x15 Blue 2x15 Blue 2x15 Black 2x10 Machine 35# 2x15 Machine 35# 2x15 Machine 40# 2x10 Machine 40# 2x10B LE; 25# SL 10x Machine 50# 2x10 B LE, SL 25# 10x   SLR 1# 2x8 1# 2x10      1# 3x10     Side lying hip abduction 1#2x8 1# 2x10     1# 3x10     Side lying clam - Red 2x15          Sit to stand 20x 15 2x15 2x20 From chair with 1 pillow 2x10 From chair with airex 2x10 2x10 from high low table Chair +1 pillow 2x8 Chair + airex 2x10   Heel raises 2x15 2x15 2x15 2x20        balance Tandem 30\"x3   Tandem 30\"x2 SLB 5\"x2 Tandem 30\"x2 SLB 5\"x2 Tandem 30\"x2 SLB 5\"x2 Tandem 30\"x3; SLB 5\"x3' toe taps on 6\" step 20x Tandem 30\"x2 SLB 5\"x2 Tandem 30\"x2 SLB 5\"x2 Tandem 30\"x3, SLB 5\"x2   Steps ups     4\" 15 ea 4\" 15 ea 4\" 2x10 B 6\" 10xB  6\" 10xB 6\" 10xB   Walking with no assistive device See above See above See above See above See above See above See above See above See above   Shuttle press - - - 75# 2x10 75# 2x10 75# 2x15 75# 3x10 75# 3x10  87# 10x 87# 3x10           HEP: issued handout for patient with exercises. Pt verbalizes understanding. Treatment/Session Assessment:    · Response to Treatment: Improved TUG score. He is progressing with LE strength. · Compliance with Program/Exercises: complaint   · Recommendations/Intent for next treatment session: \"Next visit will focus on LE strengthening, balance, gait\".   Total Treatment Duration: 40 minutes  PT Patient Time In/Time Out  Time In: 0933  Time Out: [address] PAULA Suther[place]

## 2018-05-31 ENCOUNTER — HOSPITAL ENCOUNTER (OUTPATIENT)
Dept: PHYSICAL THERAPY | Age: 83
Discharge: HOME | End: 2018-05-31
Payer: MEDICARE

## 2018-05-31 PROCEDURE — 97110 THERAPEUTIC EXERCISES: CPT

## 2018-05-31 NOTE — PROGRESS NOTES
Myra Lilly  : 1932  [place]  Secondary:  [address]  at [facility] IMMANUEL BALDWIN  [address], [address], [place] [address].  [phone]   [phone]        OUTPATIENT PHYSICAL THERAPY:Daily Note 2018    ICD-10: Treatment Diagnosis: Displaced intertrochanteric fracture of left femur, sequela (S72.142S); Unsteadiness on feet (R26.81); Pain in left hip (M25.552)  Precautions/Allergies:   Review of patient's allergies indicates no known allergies. Fall Risk Score: 7 (? 5 = High Risk)  MD Orders: hip abductor, quad strengthening, gait training WBAT MEDICAL/REFERRING DIAGNOSIS:  s/p ORIF LT IT    DATE OF ONSET: Injury 18; surgery 18  REFERRING PHYSICIAN: Piotr Brandt MD  RETURN PHYSICIAN APPOINTMENT: 2018     ASSESSMENT:  Mr. Cinda Diop presents s/p ORIF of the left hip after a fall at [facility]. He continues to progress with LE strength, decreased pain, and improved gait. He has improved with his TUG score with walking and with no assistive device. He is able to ambulate short distances with no assistive device with supervision. He will benefit from skilled physical therapy to increase functional mobility and decrease risk for falls. PROBLEM LIST (Impacting functional limitations):  1. Decreased Strength  2. Decreased ADL/Functional Activities  3. Decreased Ambulation Ability/Technique  4. Decreased Balance  5. Increased Pain INTERVENTIONS PLANNED:  1. Gait Training  2. Manual Therapy  3. Therapeutic Activites  4. Therapeutic Exercise/Strengthening   5. Modalities as needed for pain   TREATMENT PLAN:  Effective Dates: 3-20-18 TO 2018 (90 days). Frequency/Duration: 2-3 times a week for 90 Days  GOALS: (Goals have been discussed and agreed upon with patient.)  Short-Term Functional Goals: Time Frame: 4 weeks  1. Pt will increase L hip abductor strength to 4/5 for improved gait. GOAL MET  2.  Pt will improve TUG time to 15 sec for improved mobility. GOAL MET  3. Pt will be independent with HEP. GOAL MET  4. Pt will report pain 2/10 with daily activities. GOAL MET  Discharge Goals: Time Frame: 12 weeks   1. Pt will increase L LE strength to 5/5 for improved gait and mobility. Progressing towards  2. Pt will improve TUG time to 12 sec for decreased risk for falls. GOAL MET  3. Pt will report pain 1/10 with daily activities. GOAL MET  4. Pt will be able to balance on L single leg for 5 sec for gait with no assistive device. GOAL MET  5. Pt will be able to ambulate with least restrictive assistive device over various terrain with supervision. Progressing towards  Rehabilitation Potential For Stated Goals: Good  Regarding Fred Jung's therapy, I certify that the treatment plan above will be carried out by a therapist or under their direction. Thank you for this referral,    Pepper Roman, PT                 HISTORY:   History of Present Injury/Illness (Reason for Referral): He was pushing a small cart at [facility] and his foot caught the wheel when he turned and he feel down. Injury happened feb 1st. They called EMS and took him to [facility]. He had hip surgery on 2-2-18. Then he went to [facility] after his hip surgery. He did have home health come out to the house for a couple of visits. He now presents for outpatient therapy. Past Medical History/Comorbidities:   Mr. Jeffy Le  has a past medical history of Abdominal aortic aneurysm ([facility] [address] (12/10/2015); Abdominal aortic aneurysm without rupture ([address] Allergic rhinitis (12/10/2015); Asthma; Benign neoplasm; Benign prostatic hyperplasia (12/10/2015); BPH without urinary obstruction; Cardiovascular disease (12/10/2015); Chronic obstructive asthma with exacerbation ([address] (12/10/2015); COPD (chronic obstructive pulmonary disease) ([address] (12/10/2015); Cough with hemoptysis; Erectile dysfunction (12/10/2015); Essential hypertension, benign (12/10/2015);  Fracture (10/2014); History of colon polyps; Low testosterone (12/10/2015); Lumbago; Memory loss; Overflow incontinence; Raynaud's syndrome (12/10/2015); Rotator cuff tendinitis; Thrombocytopenia ([address] and Urinary frequency. Mr. Leah Haq  has a past surgical history that includes hx hernia repair (1980); hx colonoscopy; hx fracture tx (10/2014); hx cataract removal (6/2016-right); and hx orthopaedic. Social History/Living Environment:    Lives in a 1 story house with wife. 1 small step to get inside. They do have a ramp to get inside also. Prior Level of Function/Work/Activity:  Ambulated with on assistive device. He did go out of the house with his wife and perform light shopping. Goal is to be able to walk normal.    Current Medications:       Current Outpatient Prescriptions:     lisinopril (PRINIVIL, ZESTRIL) 5 mg tablet, Take 5 mg by mouth daily. , Disp: , Rfl:     tamsulosin (FLOMAX) 0.4 mg capsule, Take 1 Cap by mouth daily. , Disp: 90 Cap, Rfl: 4    montelukast (SINGULAIR) 10 mg tablet, Take 1 Tab by mouth daily. , Disp: 90 Tab, Rfl: 4    fluticasone (FLONASE) 50 mcg/actuation nasal spray, 2 Sprays by Both Nostrils route daily. , Disp: 48 g, Rfl: 4    calcium citrate-vitamin d3 (CITRACAL+D) 315-200 mg-unit tab, Take 2 Tabs by mouth daily (with breakfast). , Disp: , Rfl:     cholecalciferol (VITAMIN D3) 1,000 unit cap, Take  by mouth., Disp: , Rfl:     guaiFENesin ER (MUCINEX) 600 mg ER tablet, Take 1,200 mg by mouth daily. , Disp: , Rfl:     Biotin 2,500 mcg cap, Take  by mouth., Disp: , Rfl:     aspirin delayed-release 81 mg tablet, Take  by mouth daily. , Disp: , Rfl:    Date Last Reviewed:  5-29-18   Number of Personal Factors/Comorbidities that affect the Plan of Care: 1-2: MODERATE COMPLEXITY   EXAMINATION:   Observation/Orthostatic Postural Assessment:          Pt arrived to physical therapy with rolling walker.    Palpation:          n/t  ROM:        n/t  Strength:                    Functional Mobility: Pt ambulates with no assistive device with decreased step length and wide base of support. No loss of balance with walking forward, backward, or side stepping. Balance: single leg balance L LE 5 sec         Body Structures Involved:  1. Bones  2. Joints  3. Muscles Body Functions Affected:  1. Neuromusculoskeletal Activities and Participation Affected:  1. Community, Social and [place]   Number of elements (examined above) that affect the Plan of Care: 3: MODERATE COMPLEXITY   CLINICAL PRESENTATION:   Presentation: Stable and uncomplicated: LOW COMPLEXITY   CLINICAL DECISION MAKING:   Outcome Measure: Tool Used: Timed Up and Go (TUG)  Score:  Initial: 19 seconds 16 seconds, 12 sec with walker (Date: 4-24-18 ) Most Recent: 12 sec with walker, 12 sec with walker, 10 sec with no walker  5-29-18   Interpretation of Score: The test measures, in seconds, the time taken by an individual to stand up from a standard arm chair (seat height 46 cm [18 in], arm height 65 cm [25.6 in]), walk a distance of 3 meters (118 in, approx 10 ft), turn, walk back to the chair and sit down. If the individual takes longer than 14 seconds to complete TUG, this indicates risk for falls. Score 7 7.5-10.5 11-14 14.5-17.5 18-21 21.5-24.5 25+   Modifier CH CI CJ CK CL CM CN     ? Mobility - Walking and Moving Around:     - CURRENT STATUS: CJ - 20%-39% impaired, limited or restricted    - GOAL STATUS: CJ - 20%-39% impaired, limited or restricted    - D/C STATUS:  ---------------To be determined---------------    Medical Necessity:   · Patient is expected to demonstrate progress in strength, range of motion and balance to improve safety during walking and mobility. Reason for Services/Other Comments:  · Patient continues to require skilled intervention due to decreased LE strength, decreased gait and balance.    Use of outcome tool(s) and clinical judgement create a POC that gives a: Clear prediction of patient's progress: LOW COMPLEXITY            TREATMENT:   (In addition to Assessment/Re-Assessment sessions the following treatments were rendered)  Pre-treatment Symptoms/Complaints:  Pt reports he [facility]. Pain: Initial:     1/10 Post Session:  1/10     Therapeutic Exercise: (40 Minutes): Exercises for LE strengthening, endurance, and balance. CGA with standing exercises. Monitored O2% during exercises. 91% after supine exercises. 91% after stairs, 90% after walking drills. 94% after sit to stands    Walking with no assistive device with supervision. Walking forward and backwards 20 ft x2 ea. Side stepping 25 ft x2 each way.       Date 5-2-18 Date  5-8-18 Date  5-10-18 Date  5-17-18 Date  5-22-18 Date  5-24-18 Date  5-29-18 Date  5-31-18   Activity/Exercise parameters parameters parameters parameters parameters parameters  parameters   Long arc quad 5#2x15 5#2x20 Machine 15# 2x15 Machine 15# 2x15 Machine 20# 2x10 Machine 20# 2x10 B LE; 10# SL 10x Machine 25# 2x10, SL 15# 10x Machine 30# 2x10, SL 15#15x   Seated hamstring curls Blue 2x15 Black 2x10 Machine 35# 2x15 Machine 35# 2x15 Machine 40# 2x10 Machine 40# 2x10B LE; 25# SL 10x Machine 50# 2x10 B LE, SL 25# 10x Machine 50# 2x10 B LE, SL 25# 2x10   SLR     1# 3x10      Side lying hip abduction     1# 3x10   1# 3x10   Side lying clam        2#3x10   Sit to stand 2x15 2x20 From chair with 1 pillow 2x10 From chair with airex 2x10 2x10 from high low table Chair +1 pillow 2x8 Chair + airex 2x10 15x high lo table    Heel raises 2x15 2x20         balance Tandem 30\"x2 SLB 5\"x2 Tandem 30\"x2 SLB 5\"x2 Tandem 30\"x2 SLB 5\"x2 Tandem 30\"x3; SLB 5\"x3' toe taps on 6\" step 20x Tandem 30\"x2 SLB 5\"x2 Tandem 30\"x2 SLB 5\"x2 Tandem 30\"x3, SLB 5\"x2 Tandem 30\"x3; SLB 5\", tandem walking 10\"   Steps ups 4\" 15 ea 4\" 15 ea 4\" 2x10 B 6\" 10xB  6\" 10xB 6\" 10xB 1 flight of stairs   Walking with no assistive device See above See above See above See above See above See above See above See above   Shuttle press - 75# 2x10 75# 2x10 75# 2x15 75# 3x10 75# 3x10  87# 10x 87# 3x10 87# 3x10           HEP: issued handout for patient with exercises. Pt verbalizes understanding. Treatment/Session Assessment:    · Response to Treatment: He had difficulty with tandem walking. He did really well with negotiating stairs with step over step ascending and descending. · Compliance with Program/Exercises: complaint   · Recommendations/Intent for next treatment session: \"Next visit will focus on LE strengthening, balance, gait\".   Total Treatment Duration: 40 minutes  PT Patient Time In/Time Out  Time In: 0933  Time Out: [address] [facility] PAULA Suther[place]

## 2018-06-06 ENCOUNTER — HOSPITAL ENCOUNTER (OUTPATIENT)
Dept: PHYSICAL THERAPY | Age: 83
Discharge: HOME | End: 2018-06-06
Payer: MEDICARE

## 2018-06-06 PROCEDURE — 97110 THERAPEUTIC EXERCISES: CPT

## 2018-06-06 NOTE — PROGRESS NOTES
Bell Morales  : 1932  [place]  Secondary:  [address]  at [facility] IMMANUEL BALDWIN  [address], [address], [place] [address].  [phone]   [phone]        OUTPATIENT PHYSICAL THERAPY:Daily Note 2018    ICD-10: Treatment Diagnosis: Displaced intertrochanteric fracture of left femur, sequela (S72.142S); Unsteadiness on feet (R26.81); Pain in left hip (M25.552)  Precautions/Allergies:   Review of patient's allergies indicates no known allergies. Fall Risk Score: 7 (? 5 = High Risk)  MD Orders: hip abductor, quad strengthening, gait training WBAT MEDICAL/REFERRING DIAGNOSIS:  s/p ORIF LT IT    DATE OF ONSET: Injury 18; surgery 18  REFERRING PHYSICIAN: Gloria Green MD  RETURN PHYSICIAN APPOINTMENT: 2018     ASSESSMENT:  Mr. Rody Barillas presents s/p ORIF of the left hip after a fall at [place]. He continues to progress with LE strength, decreased pain, and improved gait. He has improved with his TUG score with walking and with no assistive device. He is able to ambulate short distances with no assistive device with supervision. He will benefit from skilled physical therapy to increase functional mobility and decrease risk for falls. PROBLEM LIST (Impacting functional limitations):  1. Decreased Strength  2. Decreased ADL/Functional Activities  3. Decreased Ambulation Ability/Technique  4. Decreased Balance  5. Increased Pain INTERVENTIONS PLANNED:  1. Gait Training  2. Manual Therapy  3. Therapeutic Activites  4. Therapeutic Exercise/Strengthening   5. Modalities as needed for pain   TREATMENT PLAN:  Effective Dates: 3-20-18 TO 2018 (90 days). Frequency/Duration: 2-3 times a week for 90 Days  GOALS: (Goals have been discussed and agreed upon with patient.)  Short-Term Functional Goals: Time Frame: 4 weeks  1. Pt will increase L hip abductor strength to 4/5 for improved gait. GOAL MET  2.  Pt will improve TUG time to 15 sec for improved mobility. GOAL MET  3. Pt will be independent with HEP. GOAL MET  4. Pt will report pain 2/10 with daily activities. GOAL MET  Discharge Goals: Time Frame: 12 weeks   1. Pt will increase L LE strength to 5/5 for improved gait and mobility. Progressing towards  2. Pt will improve TUG time to 12 sec for decreased risk for falls. GOAL MET  3. Pt will report pain 1/10 with daily activities. GOAL MET  4. Pt will be able to balance on L single leg for 5 sec for gait with no assistive device. GOAL MET  5. Pt will be able to ambulate with least restrictive assistive device over various terrain with supervision. Progressing towards  Rehabilitation Potential For Stated Goals: Good  Regarding Jessica QUIROZ Erich's therapy, I certify that the treatment plan above will be carried out by a therapist or under their direction. Thank you for this referral,    Pepper Carmona, PT                 HISTORY:   History of Present Injury/Illness (Reason for Referral): He was pushing a small cart at [facility] and his foot caught the wheel when he turned and he feel down. Injury happened feb 1st. They called EMS and took him to [address] [place] [address]. He had hip surgery on 2-2-18. Then he went to [facility] after his hip surgery. He did have home health come out to the house for a couple of visits. He now presents for outpatient therapy. Past Medical History/Comorbidities:   Mr. Leah Haq  has a past medical history of Abdominal aortic aneurysm ([facility] [address] (12/10/2015); Abdominal aortic aneurysm without rupture ([address] Allergic rhinitis (12/10/2015); Asthma; Benign neoplasm; Benign prostatic hyperplasia (12/10/2015); BPH without urinary obstruction; Cardiovascular disease (12/10/2015); Chronic obstructive asthma with exacerbation ([address] (12/10/2015); COPD (chronic obstructive pulmonary disease) ([address] (12/10/2015); Cough with hemoptysis; Erectile dysfunction (12/10/2015); Essential hypertension, benign (12/10/2015);  Fracture (10/2014); History of colon polyps; Low testosterone (12/10/2015); Lumbago; Memory loss; Overflow incontinence; Raynaud's syndrome (12/10/2015); Rotator cuff tendinitis; Thrombocytopenia ([address] and Urinary frequency. Mr. Kyaw Magallanes  has a past surgical history that includes hx hernia repair (1980); hx colonoscopy; hx fracture tx (10/2014); hx cataract removal (6/2016-right); and hx orthopaedic. Social History/Living Environment:    Lives in a 1 story house with wife. 1 small step to get inside. They do have a ramp to get inside also. Prior Level of Function/Work/Activity:  Ambulated with on assistive device. He did go out of the house with his wife and perform light shopping. Goal is to be able to walk normal.    Current Medications:       Current Outpatient Prescriptions:     lisinopril (PRINIVIL, ZESTRIL) 5 mg tablet, Take 5 mg by mouth daily. , Disp: , Rfl:     tamsulosin (FLOMAX) 0.4 mg capsule, Take 1 Cap by mouth daily. , Disp: 90 Cap, Rfl: 4    montelukast (SINGULAIR) 10 mg tablet, Take 1 Tab by mouth daily. , Disp: 90 Tab, Rfl: 4    fluticasone (FLONASE) 50 mcg/actuation nasal spray, 2 Sprays by Both Nostrils route daily. , Disp: 48 g, Rfl: 4    calcium citrate-vitamin d3 (CITRACAL+D) 315-200 mg-unit tab, Take 2 Tabs by mouth daily (with breakfast). , Disp: , Rfl:     cholecalciferol (VITAMIN D3) 1,000 unit cap, Take  by mouth., Disp: , Rfl:     guaiFENesin ER (MUCINEX) 600 mg ER tablet, Take 1,200 mg by mouth daily. , Disp: , Rfl:     Biotin 2,500 mcg cap, Take  by mouth., Disp: , Rfl:     aspirin delayed-release 81 mg tablet, Take  by mouth daily. , Disp: , Rfl:    Date Last Reviewed:  5-29-18   Number of Personal Factors/Comorbidities that affect the Plan of Care: 1-2: MODERATE COMPLEXITY   EXAMINATION:   Observation/Orthostatic Postural Assessment:          Pt arrived to physical therapy with rolling walker.    Palpation:          n/t  ROM:        n/t  Strength:                    Functional Mobility: Pt ambulates with no assistive device with decreased step length and wide base of support. No loss of balance with walking forward, backward, or side stepping. Balance: single leg balance L LE 5 sec         Body Structures Involved:  1. Bones  2. Joints  3. Muscles Body Functions Affected:  1. Neuromusculoskeletal Activities and Participation Affected:  1. Community, Social and [place]   Number of elements (examined above) that affect the Plan of Care: 3: MODERATE COMPLEXITY   CLINICAL PRESENTATION:   Presentation: Stable and uncomplicated: LOW COMPLEXITY   CLINICAL DECISION MAKING:   Outcome Measure: Tool Used: Timed Up and Go (TUG)  Score:  Initial: 19 seconds 16 seconds, 12 sec with walker (Date: 4-24-18 ) Most Recent: 12 sec with walker, 12 sec with walker, 10 sec with no walker  5-29-18   Interpretation of Score: The test measures, in seconds, the time taken by an individual to stand up from a standard arm chair (seat height 46 cm [18 in], arm height 65 cm [25.6 in]), walk a distance of 3 meters (118 in, approx 10 ft), turn, walk back to the chair and sit down. If the individual takes longer than 14 seconds to complete TUG, this indicates risk for falls. Score 7 7.5-10.5 11-14 14.5-17.5 18-21 21.5-24.5 25+   Modifier CH CI CJ CK CL CM CN     ? Mobility - Walking and Moving Around:     - CURRENT STATUS: CJ - 20%-39% impaired, limited or restricted    - GOAL STATUS: CJ - 20%-39% impaired, limited or restricted    - D/C STATUS:  ---------------To be determined---------------    Medical Necessity:   · Patient is expected to demonstrate progress in strength, range of motion and balance to improve safety during walking and mobility. Reason for Services/Other Comments:  · Patient continues to require skilled intervention due to decreased LE strength, decreased gait and balance.    Use of outcome tool(s) and clinical judgement create a POC that gives a: Clear prediction of patient's progress: LOW COMPLEXITY            TREATMENT:   (In addition to Assessment/Re-Assessment sessions the following treatments were rendered)  Pre-treatment Symptoms/Complaints:  Pt reports he has been working in the yard. Pain: Initial:     1/10 Post Session:  1/10     Therapeutic Exercise: (42 Minutes): Exercises for LE strengthening, endurance, and balance. CGA with standing exercises. Monitored O2% during exercises. 89-90% after walking outside. 95% after sitting exercises. Walking with no assistive device with supervision outside over various terrain. Walking forward and backwards 20 ft x2 ea.       Date 5-2-18 Date  5-8-18 Date  5-10-18 Date  5-17-18 Date  5-22-18 Date  5-24-18 Date  5-29-18 Date  5-31-18 Date  6-6-18   Activity/Exercise parameters parameters parameters parameters parameters parameters  parameters    Long arc quad 5#2x15 5#2x20 Machine 15# 2x15 Machine 15# 2x15 Machine 20# 2x10 Machine 20# 2x10 B LE; 10# SL 10x Machine 25# 2x10, SL 15# 10x Machine 30# 2x10, SL 15#15x Machine 30# 2x10   Seated hamstring curls Blue 2x15 Black 2x10 Machine 35# 2x15 Machine 35# 2x15 Machine 40# 2x10 Machine 40# 2x10B LE; 25# SL 10x Machine 50# 2x10 B LE, SL 25# 10x Machine 50# 2x10 B LE, SL 25# 2x10 Machine 50# 2x10   SLR     1# 3x10       Side lying hip abduction     1# 3x10   1# 3x10 1#3x10   Side lying clam        2#3x10 2# 3x10   Sit to stand 2x15 2x20 From chair with 1 pillow 2x10 From chair with airex 2x10 2x10 from high low table Chair +1 pillow 2x8 Chair + airex 2x10 15x high lo table  2x10 high low table   Heel raises 2x15 2x20          balance Tandem 30\"x2 SLB 5\"x2 Tandem 30\"x2 SLB 5\"x2 Tandem 30\"x2 SLB 5\"x2 Tandem 30\"x3; SLB 5\"x3' toe taps on 6\" step 20x Tandem 30\"x2 SLB 5\"x2 Tandem 30\"x2 SLB 5\"x2 Tandem 30\"x3, SLB 5\"x2 Tandem 30\"x3; SLB 5\", tandem walking 10\" Tandem 30\"x3; feet together on airex 30\"x2; tandem walking   Steps ups 4\" 15 ea 4\" 15 ea 4\" 2x10 B 6\" 10xB  6\" 10xB 6\" 10xB 1 flight of stairs    Walking with no assistive device See above See above See above See above See above See above See above See above See above   Shuttle press - 75# 2x10 75# 2x10 75# 2x15 75# 3x10 75# 3x10  87# 10x 87# 3x10 87# 3x10 87# 3x10           HEP: issued handout for patient with exercises. Pt verbalizes understanding. Treatment/Session Assessment:    · Response to Treatment: He did well ambulating outside with no loss of balance. · Compliance with Program/Exercises: complaint   · Recommendations/Intent for next treatment session: \"Next visit will focus on LE strengthening, balance, gait\".   Total Treatment Duration: 42 minutes  PT Patient Time In/Time Out  Time In: 0945  Time Out: [address] [place], PT

## 2018-06-08 ENCOUNTER — HOSPITAL ENCOUNTER (OUTPATIENT)
Dept: PHYSICAL THERAPY | Age: 83
Discharge: HOME | End: 2018-06-08
Payer: MEDICARE

## 2018-06-08 PROCEDURE — 97110 THERAPEUTIC EXERCISES: CPT

## 2018-06-08 NOTE — PROGRESS NOTES
Luann Alvarez  : 1932  [place]  Secondary:  [address]  at [facility] IMMANUEL BALDWIN  [address] [facility], [address], [address] [facility]  [phone]   [phone]        OUTPATIENT PHYSICAL THERAPY:Daily Note 2018    ICD-10: Treatment Diagnosis: Displaced intertrochanteric fracture of left femur, sequela (S72.142S); Unsteadiness on feet (R26.81); Pain in left hip (M25.552)  Precautions/Allergies:   Review of patient's allergies indicates no known allergies. Fall Risk Score: 7 (? 5 = High Risk)  MD Orders: hip abductor, quad strengthening, gait training WBAT MEDICAL/REFERRING DIAGNOSIS:  s/p ORIF LT IT    DATE OF ONSET: Injury 18; surgery 18  REFERRING PHYSICIAN: Reford Severin, MD  RETURN PHYSICIAN APPOINTMENT: 2018     ASSESSMENT:  Mr. Yodit Lafleur presents s/p ORIF of the left hip after a fall at [facility]. He continues to progress with LE strength, decreased pain, and improved gait. He has improved with his TUG score with walking and with no assistive device. He is able to ambulate short distances with no assistive device with supervision. He will benefit from skilled physical therapy to increase functional mobility and decrease risk for falls. PROBLEM LIST (Impacting functional limitations):  1. Decreased Strength  2. Decreased ADL/Functional Activities  3. Decreased Ambulation Ability/Technique  4. Decreased Balance  5. Increased Pain INTERVENTIONS PLANNED:  1. Gait Training  2. Manual Therapy  3. Therapeutic Activites  4. Therapeutic Exercise/Strengthening   5. Modalities as needed for pain   TREATMENT PLAN:  Effective Dates: 3-20-18 TO 2018 (90 days). Frequency/Duration: 2-3 times a week for 90 Days  GOALS: (Goals have been discussed and agreed upon with patient.)  Short-Term Functional Goals: Time Frame: 4 weeks  1. Pt will increase L hip abductor strength to 4/5 for improved gait. GOAL MET  2.  Pt will improve TUG time to 15 sec for improved mobility. GOAL MET  3. Pt will be independent with HEP. GOAL MET  4. Pt will report pain 2/10 with daily activities. GOAL MET  Discharge Goals: Time Frame: 12 weeks   1. Pt will increase L LE strength to 5/5 for improved gait and mobility. Progressing towards  2. Pt will improve TUG time to 12 sec for decreased risk for falls. GOAL MET  3. Pt will report pain 1/10 with daily activities. GOAL MET  4. Pt will be able to balance on L single leg for 5 sec for gait with no assistive device. GOAL MET  5. Pt will be able to ambulate with least restrictive assistive device over various terrain with supervision. Progressing towards  Rehabilitation Potential For Stated Goals: Good  Regarding Francisco Jung's therapy, I certify that the treatment plan above will be carried out by a therapist or under their direction. Thank you for this referral,    Pepper Limon, PT                 HISTORY:   History of Present Injury/Illness (Reason for Referral): He was pushing a small cart at [facility] and his foot caught the wheel when he turned and he feel down. Injury happened feb 1st. They called EMS and took him to [address] [place] [address]. He had hip surgery on 2-2-18. Then he went to [facility] after his hip surgery. He did have home health come out to the house for a couple of visits. He now presents for outpatient therapy. Past Medical History/Comorbidities:   Mr. Corry Chapa  has a past medical history of Abdominal aortic aneurysm ([facility] [address] (12/10/2015); Abdominal aortic aneurysm without rupture ([address] Allergic rhinitis (12/10/2015); Asthma; Benign neoplasm; Benign prostatic hyperplasia (12/10/2015); BPH without urinary obstruction; Cardiovascular disease (12/10/2015); Chronic obstructive asthma with exacerbation ([address] (12/10/2015); COPD (chronic obstructive pulmonary disease) ([address] (12/10/2015); Cough with hemoptysis; Erectile dysfunction (12/10/2015); Essential hypertension, benign (12/10/2015);  Fracture (10/2014); History of colon polyps; Low testosterone (12/10/2015); Lumbago; Memory loss; Overflow incontinence; Raynaud's syndrome (12/10/2015); Rotator cuff tendinitis; Thrombocytopenia ([address] and Urinary frequency. Mr. Sarah Arrington  has a past surgical history that includes hx hernia repair (1980); hx colonoscopy; hx fracture tx (10/2014); hx cataract removal (6/2016-right); and hx orthopaedic. Social History/Living Environment:    Lives in a 1 story house with wife. 1 small step to get inside. They do have a ramp to get inside also. Prior Level of Function/Work/Activity:  Ambulated with on assistive device. He did go out of the house with his wife and perform light shopping. Goal is to be able to walk normal.    Current Medications:       Current Outpatient Prescriptions:     lisinopril (PRINIVIL, ZESTRIL) 5 mg tablet, Take 5 mg by mouth daily. , Disp: , Rfl:     tamsulosin (FLOMAX) 0.4 mg capsule, Take 1 Cap by mouth daily. , Disp: 90 Cap, Rfl: 4    montelukast (SINGULAIR) 10 mg tablet, Take 1 Tab by mouth daily. , Disp: 90 Tab, Rfl: 4    fluticasone (FLONASE) 50 mcg/actuation nasal spray, 2 Sprays by Both Nostrils route daily. , Disp: 48 g, Rfl: 4    calcium citrate-vitamin d3 (CITRACAL+D) 315-200 mg-unit tab, Take 2 Tabs by mouth daily (with breakfast). , Disp: , Rfl:     cholecalciferol (VITAMIN D3) 1,000 unit cap, Take  by mouth., Disp: , Rfl:     guaiFENesin ER (MUCINEX) 600 mg ER tablet, Take 1,200 mg by mouth daily. , Disp: , Rfl:     Biotin 2,500 mcg cap, Take  by mouth., Disp: , Rfl:     aspirin delayed-release 81 mg tablet, Take  by mouth daily. , Disp: , Rfl:    Date Last Reviewed:  6-8-18   Number of Personal Factors/Comorbidities that affect the Plan of Care: 1-2: MODERATE COMPLEXITY   EXAMINATION:   Observation/Orthostatic Postural Assessment:          Pt arrived to physical therapy with rolling walker.    Palpation:          n/t  ROM:        n/t  Strength:                    Functional Mobility: Pt ambulates with no assistive device with decreased step length and wide base of support. No loss of balance with walking forward, backward, or side stepping. Balance: single leg balance L LE 5 sec         Body Structures Involved:  1. Bones  2. Joints  3. Muscles Body Functions Affected:  1. Neuromusculoskeletal Activities and Participation Affected:  1. Community, Social and [place]   Number of elements (examined above) that affect the Plan of Care: 3: MODERATE COMPLEXITY   CLINICAL PRESENTATION:   Presentation: Stable and uncomplicated: LOW COMPLEXITY   CLINICAL DECISION MAKING:   Outcome Measure: Tool Used: Timed Up and Go (TUG)  Score:  Initial: 19 seconds 16 seconds, 12 sec with walker (Date: 4-24-18 ) Most Recent: 12 sec with walker, 12 sec with walker, 10 sec with no walker  5-29-18   Interpretation of Score: The test measures, in seconds, the time taken by an individual to stand up from a standard arm chair (seat height 46 cm [18 in], arm height 65 cm [25.6 in]), walk a distance of 3 meters (118 in, approx 10 ft), turn, walk back to the chair and sit down. If the individual takes longer than 14 seconds to complete TUG, this indicates risk for falls. Score 7 7.5-10.5 11-14 14.5-17.5 18-21 21.5-24.5 25+   Modifier CH CI CJ CK CL CM CN     ? Mobility - Walking and Moving Around:     - CURRENT STATUS: CJ - 20%-39% impaired, limited or restricted    - GOAL STATUS: CJ - 20%-39% impaired, limited or restricted    - D/C STATUS:  ---------------To be determined---------------    Medical Necessity:   · Patient is expected to demonstrate progress in strength, range of motion and balance to improve safety during walking and mobility. Reason for Services/Other Comments:  · Patient continues to require skilled intervention due to decreased LE strength, decreased gait and balance.    Use of outcome tool(s) and clinical judgement create a POC that gives a: Clear prediction of patient's progress: LOW COMPLEXITY            TREATMENT:   (In addition to Assessment/Re-Assessment sessions the following treatments were rendered)  Pre-treatment Symptoms/Complaints:  Pt reports he almost fell on Wednesday because it foot got caught on a . He was able to stubble and prevent the fall. Pain: Initial:     1/10 Post Session:  1/10     Therapeutic Exercise: (40 Minutes): Exercises for LE strengthening, endurance, and balance. CGA with standing exercises. Monitored O2% during exercises. 91% after walking drills. 95% after supine and sitting exercises. Walking with no assistive device with supervision outside over various terrain. Walking forward and backwards 20 ft x2 ea.       Date  5-17-18 Date  5-22-18 Date  5-24-18 Date  5-29-18 Date  5-31-18 Date  6-6-18 Date  6-8-18   Activity/Exercise parameters parameters parameters  parameters  parameters   Long arc quad Machine 15# 2x15 Machine 20# 2x10 Machine 20# 2x10 B LE; 10# SL 10x Machine 25# 2x10, SL 15# 10x Machine 30# 2x10, SL 15#15x Machine 30# 2x10 Machine 30# 2x10   Seated hamstring curls Machine 35# 2x15 Machine 40# 2x10 Machine 40# 2x10B LE; 25# SL 10x Machine 50# 2x10 B LE, SL 25# 10x Machine 50# 2x10 B LE, SL 25# 2x10 Machine 50# 2x10 Machine 50# 2x10   SLR  1# 3x10        Side lying hip abduction  1# 3x10   1# 3x10 1#3x10 1# 3x10   Side lying clam     2#3x10 2# 3x10 2# 3x10   Sit to stand From chair with airex 2x10 2x10 from high low table Chair +1 pillow 2x8 Chair + airex 2x10 15x high lo table  2x10 high low table 2x10 on airex highlo table   Heel raises          balance Tandem 30\"x3; SLB 5\"x3' toe taps on 6\" step 20x Tandem 30\"x2 SLB 5\"x2 Tandem 30\"x2 SLB 5\"x2 Tandem 30\"x3, SLB 5\"x2 Tandem 30\"x3; SLB 5\", tandem walking 10\" Tandem 30\"x3; feet together on airex 30\"x2; tandem walking Semi tandem on airex 20\"x3; feet apart reaching diagonal 10 ea; standing on airex and putting 10x   Steps ups 6\" 10xB  6\" 10xB 6\" 10xB 1 flight of stairs     Walking with no assistive device See above See above See above See above See above See above See above   Shuttle press 75# 2x15 75# 3x10 75# 3x10  87# 10x 87# 3x10 87# 3x10 87# 3x10 87# 3x10           HEP: issued handout for patient with exercises. Pt verbalizes understanding. Treatment/Session Assessment:    · Response to Treatment: Progressed balance exercises today and he did well with no loss of balance. · Compliance with Program/Exercises: complaint   · Recommendations/Intent for next treatment session: \"Next visit will focus on LE strengthening, balance, gait\".   Total Treatment Duration: 40 minutes  PT Patient Time In/Time Out  Time In: 0945  Time Out: [address] [facility]

## 2018-06-11 PROBLEM — J81.1 PULMONARY EDEMA: Status: RESOLVED | Noted: 2018-02-01 | Resolved: 2018-06-11

## 2018-06-12 ENCOUNTER — HOSPITAL ENCOUNTER (OUTPATIENT)
Dept: PHYSICAL THERAPY | Age: 83
Discharge: HOME | End: 2018-06-12
Payer: MEDICARE

## 2018-06-12 PROCEDURE — 97110 THERAPEUTIC EXERCISES: CPT

## 2018-06-12 NOTE — PROGRESS NOTES
Adriana Olsen  : 1932  [place]  Secondary:  [address]  at [facility]UEL [facility]IN  [address] [facility], [address], [place] [address].  [phone]   [phone]        OUTPATIENT PHYSICAL THERAPY:Daily Note 2018    ICD-10: Treatment Diagnosis: Displaced intertrochanteric fracture of left femur, sequela (S72.142S); Unsteadiness on feet (R26.81); Pain in left hip (M25.552)  Precautions/Allergies:   Review of patient's allergies indicates no known allergies. Fall Risk Score: 7 (? 5 = High Risk)  MD Orders: hip abductor, quad strengthening, gait training WBAT MEDICAL/REFERRING DIAGNOSIS:  s/p ORIF LT IT    DATE OF ONSET: Injury 18; surgery 18  REFERRING PHYSICIAN: Crissy Pfeiffer MD  RETURN PHYSICIAN APPOINTMENT: 2018     ASSESSMENT:  Mr. Jessica Lo presents s/p ORIF of the left hip after a fall at [facility]. He continues to progress with LE strength, decreased pain, and improved gait. He has improved with his TUG score with walking and with no assistive device. He is able to ambulate short distances with no assistive device with supervision. He will benefit from skilled physical therapy to increase functional mobility and decrease risk for falls. PROBLEM LIST (Impacting functional limitations):  1. Decreased Strength  2. Decreased ADL/Functional Activities  3. Decreased Ambulation Ability/Technique  4. Decreased Balance  5. Increased Pain INTERVENTIONS PLANNED:  1. Gait Training  2. Manual Therapy  3. Therapeutic Activites  4. Therapeutic Exercise/Strengthening   5. Modalities as needed for pain   TREATMENT PLAN:  Effective Dates: 3-20-18 TO 2018 (90 days). Frequency/Duration: 2-3 times a week for 90 Days  GOALS: (Goals have been discussed and agreed upon with patient.)  Short-Term Functional Goals: Time Frame: 4 weeks  1. Pt will increase L hip abductor strength to 4/5 for improved gait. GOAL MET  2.  Pt will improve TUG time to 15 sec for improved mobility. GOAL MET  3. Pt will be independent with HEP. GOAL MET  4. Pt will report pain 2/10 with daily activities. GOAL MET  Discharge Goals: Time Frame: 12 weeks   1. Pt will increase L LE strength to 5/5 for improved gait and mobility. Progressing towards  2. Pt will improve TUG time to 12 sec for decreased risk for falls. GOAL MET  3. Pt will report pain 1/10 with daily activities. GOAL MET  4. Pt will be able to balance on L single leg for 5 sec for gait with no assistive device. GOAL MET  5. Pt will be able to ambulate with least restrictive assistive device over various terrain with supervision. Progressing towards  Rehabilitation Potential For Stated Goals: Good  Regarding Chino Jung's therapy, I certify that the treatment plan above will be carried out by a therapist or under their direction. Thank you for this referral,    Pepper Whatley, PT                 HISTORY:   History of Present Injury/Illness (Reason for Referral): He was pushing a small cart at [employer] and his foot caught the wheel when he turned and he feel down. Injury happened feb 1st. They called EMS and took him to [address] [place] [address]. He had hip surgery on 2-2-18. Then he went to [facility] after his hip surgery. He did have home health come out to the house for a couple of visits. He now presents for outpatient therapy. Past Medical History/Comorbidities:   Mr. Torey Penny  has a past medical history of Abdominal aortic aneurysm ([facility] [address] (12/10/2015); Abdominal aortic aneurysm without rupture ([address] Allergic rhinitis (12/10/2015); Asthma; Benign neoplasm; Benign prostatic hyperplasia (12/10/2015); BPH without urinary obstruction; Cardiovascular disease (12/10/2015); Chronic obstructive asthma with exacerbation ([address] (12/10/2015); COPD (chronic obstructive pulmonary disease) ([address] (12/10/2015); Cough with hemoptysis; Erectile dysfunction (12/10/2015); Essential hypertension, benign (12/10/2015);  Fracture (10/2014); History of colon polyps; Low testosterone (12/10/2015); Lumbago; Memory loss; Overflow incontinence; Raynaud's syndrome (12/10/2015); Rotator cuff tendinitis; Thrombocytopenia ([address] and Urinary frequency. Mr. Marc Junior  has a past surgical history that includes hx hernia repair (1980); hx colonoscopy; hx fracture tx (10/2014); hx cataract removal (6/2016-right); and hx orthopaedic. Social History/Living Environment:    Lives in a 1 story house with wife. 1 small step to get inside. They do have a ramp to get inside also. Prior Level of Function/Work/Activity:  Ambulated with on assistive device. He did go out of the house with his wife and perform light shopping. Goal is to be able to walk normal.    Current Medications:       Current Outpatient Prescriptions:     lisinopril (PRINIVIL, ZESTRIL) 5 mg tablet, Take 5 mg by mouth daily. , Disp: , Rfl:     tamsulosin (FLOMAX) 0.4 mg capsule, Take 1 Cap by mouth daily. , Disp: 90 Cap, Rfl: 4    montelukast (SINGULAIR) 10 mg tablet, Take 1 Tab by mouth daily. , Disp: 90 Tab, Rfl: 4    fluticasone (FLONASE) 50 mcg/actuation nasal spray, 2 Sprays by Both Nostrils route daily. , Disp: 48 g, Rfl: 4    calcium citrate-vitamin d3 (CITRACAL+D) 315-200 mg-unit tab, Take 2 Tabs by mouth daily (with breakfast). , Disp: , Rfl:     cholecalciferol (VITAMIN D3) 1,000 unit cap, Take  by mouth., Disp: , Rfl:     guaiFENesin ER (MUCINEX) 600 mg ER tablet, Take 1,200 mg by mouth daily. , Disp: , Rfl:     Biotin 2,500 mcg cap, Take  by mouth., Disp: , Rfl:     aspirin delayed-release 81 mg tablet, Take  by mouth daily. , Disp: , Rfl:    Date Last Reviewed:  6-8-18   Number of Personal Factors/Comorbidities that affect the Plan of Care: 1-2: MODERATE COMPLEXITY   EXAMINATION:   Observation/Orthostatic Postural Assessment:          Pt arrived to physical therapy with rolling walker.    Palpation:          n/t  ROM:        n/t  Strength:                    Functional Mobility: Pt ambulates with no assistive device with decreased step length and wide base of support. No loss of balance with walking forward, backward, or side stepping. Balance: single leg balance L LE 5 sec         Body Structures Involved:  1. Bones  2. Joints  3. Muscles Body Functions Affected:  1. Neuromusculoskeletal Activities and Participation Affected:  1. Community, Social and [place]   Number of elements (examined above) that affect the Plan of Care: 3: MODERATE COMPLEXITY   CLINICAL PRESENTATION:   Presentation: Stable and uncomplicated: LOW COMPLEXITY   CLINICAL DECISION MAKING:   Outcome Measure: Tool Used: Timed Up and Go (TUG)  Score:  Initial: 19 seconds 16 seconds, 12 sec with walker (Date: 4-24-18 ) Most Recent: 12 sec with walker, 12 sec with walker, 10 sec with no walker  5-29-18   Interpretation of Score: The test measures, in seconds, the time taken by an individual to stand up from a standard arm chair (seat height 46 cm [18 in], arm height 65 cm [25.6 in]), walk a distance of 3 meters (118 in, approx 10 ft), turn, walk back to the chair and sit down. If the individual takes longer than 14 seconds to complete TUG, this indicates risk for falls. Score 7 7.5-10.5 11-14 14.5-17.5 18-21 21.5-24.5 25+   Modifier CH CI CJ CK CL CM CN     ? Mobility - Walking and Moving Around:     - CURRENT STATUS: CJ - 20%-39% impaired, limited or restricted    - GOAL STATUS: CJ - 20%-39% impaired, limited or restricted    - D/C STATUS:  ---------------To be determined---------------    Medical Necessity:   · Patient is expected to demonstrate progress in strength, range of motion and balance to improve safety during walking and mobility. Reason for Services/Other Comments:  · Patient continues to require skilled intervention due to decreased LE strength, decreased gait and balance.    Use of outcome tool(s) and clinical judgement create a POC that gives a: Clear prediction of patient's progress: LOW COMPLEXITY            TREATMENT:   (In addition to Assessment/Re-Assessment sessions the following treatments were rendered)  Pre-treatment Symptoms/Complaints:  Pt reports pain in the right hip and he feels like he strained it. He is not sure how he did it. Pain: Initial:     4/10 Post Session:  1/10     Therapeutic Exercise: (40 Minutes): Exercises for LE strengthening, endurance, and balance. CGA with standing exercises. Monitored O2% during exercises. 88% after walking and machines; increased to 95% after resting     Walking forward and backwards 20 ft x2 ea. Date  5-17-18 Date  5-22-18 Date  5-24-18 Date  5-29-18 Date  5-31-18 Date  6-6-18 Date  6-8-18 Date  6-12-18   Activity/Exercise parameters parameters parameters  parameters  parameters parameters   Long arc quad Machine 15# 2x15 Machine 20# 2x10 Machine 20# 2x10 B LE; 10# SL 10x Machine 25# 2x10, SL 15# 10x Machine 30# 2x10, SL 15#15x Machine 30# 2x10 Machine 30# 2x10 Machine 30# 3x10   Seated hamstring curls Machine 35# 2x15 Machine 40# 2x10 Machine 40# 2x10B LE; 25# SL 10x Machine 50# 2x10 B LE, SL 25# 10x Machine 50# 2x10 B LE, SL 25# 2x10 Machine 50# 2x10 Machine 50# 2x10 Machine 50# 3x10   SLR  1# 3x10         Side lying hip abduction  1# 3x10   1# 3x10 1#3x10 1# 3x10 1#3x10   Side lying clam     2#3x10 2# 3x10 2# 3x10 2#3x10   Sit to stand From chair with airex 2x10 2x10 from high low table Chair +1 pillow 2x8 Chair + airex 2x10 15x high lo table  2x10 high low table 2x10 on airex highlo table 2x10 highlo table.     Heel raises           balance Tandem 30\"x3; SLB 5\"x3' toe taps on 6\" step 20x Tandem 30\"x2 SLB 5\"x2 Tandem 30\"x2 SLB 5\"x2 Tandem 30\"x3, SLB 5\"x2 Tandem 30\"x3; SLB 5\", tandem walking 10\" Tandem 30\"x3; feet together on airex 30\"x2; tandem walking Semi tandem on airex 20\"x3; feet apart reaching diagonal 10 ea; standing on airex and putting 10x Tandem 30\"x3;    Steps ups 6\" 10xB  6\" 10xB 6\" 10xB 1 flight of stairs      Walking with no assistive device See above See above See above See above See above See above See above See above   Shuttle press 75# 2x15 75# 3x10 75# 3x10  87# 10x 87# 3x10 87# 3x10 87# 3x10 87# 3x10 87# 3x10           HEP: issued handout for patient with exercises. Pt verbalizes understanding. Treatment/Session Assessment:    · Response to Treatment: He seems to be progressing with LE strength. No complaints of pain with exercises. · Compliance with Program/Exercises: complaint   · Recommendations/Intent for next treatment session: \"Next visit will focus on LE strengthening, balance, gait\".   Total Treatment Duration: 40 minutes  PT Patient Time In/Time Out  Time In: 0950  Time Out: [address]

## 2018-06-14 ENCOUNTER — HOSPITAL ENCOUNTER (OUTPATIENT)
Dept: PHYSICAL THERAPY | Age: 83
Discharge: HOME | End: 2018-06-14
Payer: MEDICARE

## 2018-06-14 PROCEDURE — 97110 THERAPEUTIC EXERCISES: CPT

## 2018-06-14 NOTE — PROGRESS NOTES
Kim Martell  : 1932  [place]  Secondary:  [address]  [facility]  [address] [place] [address] [address], [place] [address].  [phone]   [phone]        OUTPATIENT PHYSICAL THERAPY:Daily Note 2018    ICD-10: Treatment Diagnosis: Displaced intertrochanteric fracture of left femur, sequela (S72.142S); Unsteadiness on feet (R26.81); Pain in left hip (M25.552)  Precautions/Allergies:   Review of patient's allergies indicates no known allergies. Fall Risk Score: 7 (? 5 = High Risk)  MD Orders: hip abductor, quad strengthening, gait training WBAT MEDICAL/REFERRING DIAGNOSIS:  s/p ORIF LT IT    DATE OF ONSET: Injury 18; surgery 18  REFERRING PHYSICIAN: Tenisha Orellana MD  RETURN PHYSICIAN APPOINTMENT: 2018     ASSESSMENT:  Mr. Meredith Gaming presents s/p ORIF of the left hip after a fall at [facility]. He continues to progress with LE strength, decreased pain, and improved gait. He has improved with his TUG score with walking and with no assistive device. He is able to ambulate short distances with no assistive device with supervision. He will benefit from skilled physical therapy to increase functional mobility and decrease risk for falls. PROBLEM LIST (Impacting functional limitations):  1. Decreased Strength  2. Decreased ADL/Functional Activities  3. Decreased Ambulation Ability/Technique  4. Decreased Balance  5. Increased Pain INTERVENTIONS PLANNED:  1. Gait Training  2. Manual Therapy  3. Therapeutic Activites  4. Therapeutic Exercise/Strengthening   5. Modalities as needed for pain   TREATMENT PLAN:  Effective Dates: 3-20-18 TO 2018 (90 days). Frequency/Duration: 2-3 times a week for 90 Days  GOALS: (Goals have been discussed and agreed upon with patient.)  Short-Term Functional Goals: Time Frame: 4 weeks  1. Pt will increase L hip abductor strength to 4/5 for improved gait. GOAL MET  2.  Pt will improve TUG time to 15 sec for improved mobility. GOAL MET  3. Pt will be independent with HEP. GOAL MET  4. Pt will report pain 2/10 with daily activities. GOAL MET  Discharge Goals: Time Frame: 12 weeks   1. Pt will increase L LE strength to 5/5 for improved gait and mobility. Progressing towards  2. Pt will improve TUG time to 12 sec for decreased risk for falls. GOAL MET  3. Pt will report pain 1/10 with daily activities. GOAL MET  4. Pt will be able to balance on L single leg for 5 sec for gait with no assistive device. GOAL MET  5. Pt will be able to ambulate with least restrictive assistive device over various terrain with supervision. Progressing towards  Rehabilitation Potential For Stated Goals: Good  Regarding Cesar Jung's therapy, I certify that the treatment plan above will be carried out by a therapist or under their direction. Thank you for this referral,    Pepper Zendejas, PT                 HISTORY:   History of Present Injury/Illness (Reason for Referral): He was pushing a small cart at [facility] and his foot caught the wheel when he turned and he feel down. Injury happened feb 1st. They called EMS and took him to [address] [place] [address]. He had hip surgery on 2-2-18. Then he went to [facility] after his hip surgery. He did have home health come out to the house for a couple of visits. He now presents for outpatient therapy. Past Medical History/Comorbidities:   Mr. Stefan Jeong  has a past medical history of Abdominal aortic aneurysm ([address] (12/10/2015); Abdominal aortic aneurysm without rupture ([address] Allergic rhinitis (12/10/2015); Asthma; Benign neoplasm; Benign prostatic hyperplasia (12/10/2015); BPH without urinary obstruction; Cardiovascular disease (12/10/2015); Chronic obstructive asthma with exacerbation ([address] (12/10/2015); COPD (chronic obstructive pulmonary disease) ([address] (12/10/2015); Cough with hemoptysis; Erectile dysfunction (12/10/2015); Essential hypertension, benign (12/10/2015);  Fracture (10/2014); History of colon polyps; Low testosterone (12/10/2015); Lumbago; Memory loss; Overflow incontinence; Raynaud's syndrome (12/10/2015); Rotator cuff tendinitis; Thrombocytopenia ([address] and Urinary frequency. Mr. Jeffy Le  has a past surgical history that includes hx hernia repair (1980); hx colonoscopy; hx fracture tx (10/2014); hx cataract removal (6/2016-right); and hx orthopaedic. Social History/Living Environment:    Lives in a 1 story house with wife. 1 small step to get inside. They do have a ramp to get inside also. Prior Level of Function/Work/Activity:  Ambulated with on assistive device. He did go out of the house with his wife and perform light shopping. Goal is to be able to walk normal.    Current Medications:       Current Outpatient Prescriptions:     lisinopril (PRINIVIL, ZESTRIL) 5 mg tablet, Take 5 mg by mouth daily. , Disp: , Rfl:     tamsulosin (FLOMAX) 0.4 mg capsule, Take 1 Cap by mouth daily. , Disp: 90 Cap, Rfl: 4    montelukast (SINGULAIR) 10 mg tablet, Take 1 Tab by mouth daily. , Disp: 90 Tab, Rfl: 4    fluticasone (FLONASE) 50 mcg/actuation nasal spray, 2 Sprays by Both Nostrils route daily. , Disp: 48 g, Rfl: 4    calcium citrate-vitamin d3 (CITRACAL+D) 315-200 mg-unit tab, Take 2 Tabs by mouth daily (with breakfast). , Disp: , Rfl:     cholecalciferol (VITAMIN D3) 1,000 unit cap, Take  by mouth., Disp: , Rfl:     guaiFENesin ER (MUCINEX) 600 mg ER tablet, Take 1,200 mg by mouth daily. , Disp: , Rfl:     Biotin 2,500 mcg cap, Take  by mouth., Disp: , Rfl:     aspirin delayed-release 81 mg tablet, Take  by mouth daily. , Disp: , Rfl:    Date Last Reviewed:  6-8-18   Number of Personal Factors/Comorbidities that affect the Plan of Care: 1-2: MODERATE COMPLEXITY   EXAMINATION:   Observation/Orthostatic Postural Assessment:          Pt arrived to physical therapy with rolling walker.    Palpation:          n/t  ROM:        n/t  Strength:                    Functional Mobility: Pt ambulates with no assistive device with decreased step length and wide base of support. No loss of balance with walking forward, backward, or side stepping. Balance: single leg balance L LE 5 sec         Body Structures Involved:  1. Bones  2. Joints  3. Muscles Body Functions Affected:  1. Neuromusculoskeletal Activities and Participation Affected:  1. Community, Social and [place]   Number of elements (examined above) that affect the Plan of Care: 3: MODERATE COMPLEXITY   CLINICAL PRESENTATION:   Presentation: Stable and uncomplicated: LOW COMPLEXITY   CLINICAL DECISION MAKING:   Outcome Measure: Tool Used: Timed Up and Go (TUG)  Score:  Initial: 19 seconds 16 seconds, 12 sec with walker (Date: 4-24-18 ) Most Recent: 12 sec with walker, 12 sec with walker, 10 sec with no walker  5-29-18   Interpretation of Score: The test measures, in seconds, the time taken by an individual to stand up from a standard arm chair (seat height 46 cm [18 in], arm height 65 cm [25.6 in]), walk a distance of 3 meters (118 in, approx 10 ft), turn, walk back to the chair and sit down. If the individual takes longer than 14 seconds to complete TUG, this indicates risk for falls. Score 7 7.5-10.5 11-14 14.5-17.5 18-21 21.5-24.5 25+   Modifier CH CI CJ CK CL CM CN     ? Mobility - Walking and Moving Around:     - CURRENT STATUS: CJ - 20%-39% impaired, limited or restricted    - GOAL STATUS: CJ - 20%-39% impaired, limited or restricted    - D/C STATUS:  ---------------To be determined---------------    Medical Necessity:   · Patient is expected to demonstrate progress in strength, range of motion and balance to improve safety during walking and mobility. Reason for Services/Other Comments:  · Patient continues to require skilled intervention due to decreased LE strength, decreased gait and balance.    Use of outcome tool(s) and clinical judgement create a POC that gives a: Clear prediction of patient's progress: LOW COMPLEXITY            TREATMENT:   (In addition to Assessment/Re-Assessment sessions the following treatments were rendered)  Pre-treatment Symptoms/Complaints:  Pt reports the right hip is a little better today. Pain: Initial:     4/10 Post Session:  1/10     Therapeutic Exercise: (42 Minutes): Exercises for LE strengthening, endurance, and balance. CGA with standing exercises. Monitored O2% during exercises. 93% after sit to stand, 91% after balance exercises. Walking forward and backwards, walking with head turns R and L and up and down, side stepping 30 ft x2 ea. Date  5-24-18 Date  5-29-18 Date  5-31-18 Date  6-6-18 Date  6-8-18 Date  6-12-18 Date  6-14-18   Activity/Exercise parameters  parameters  parameters parameters parameters   Long arc quad Machine 20# 2x10 B LE; 10# SL 10x Machine 25# 2x10, SL 15# 10x Machine 30# 2x10, SL 15#15x Machine 30# 2x10 Machine 30# 2x10 Machine 30# 3x10 Machine 30# 3x10   Seated hamstring curls Machine 40# 2x10B LE; 25# SL 10x Machine 50# 2x10 B LE, SL 25# 10x Machine 50# 2x10 B LE, SL 25# 2x10 Machine 50# 2x10 Machine 50# 2x10 Machine 50# 3x10 Machine 50# 3x10   SLR          Side lying hip abduction   1# 3x10 1#3x10 1# 3x10 1#3x10 1#3x10   Side lying clam   2#3x10 2# 3x10 2# 3x10 2#3x10 2#3x10   Sit to stand Chair +1 pillow 2x8 Chair + airex 2x10 15x high lo table  2x10 high low table 2x10 on airex highlo table 2x10 highlo table.   2x10 on airex high low table    Heel raises          balance Tandem 30\"x2 SLB 5\"x2 Tandem 30\"x3, SLB 5\"x2 Tandem 30\"x3; SLB 5\", tandem walking 10\" Tandem 30\"x3; feet together on airex 30\"x2; tandem walking Semi tandem on airex 20\"x3; feet apart reaching diagonal 10 ea; standing on airex and putting 10x Tandem 30\"x3;  Stepping up and over 10x B; stepping over sideways 10xB; tandem 30\"x3     Steps ups 6\" 10xB 6\" 10xB 1 flight of stairs       Walking with no assistive device See above See above See above See above See above See above See above   Shuttle press 75# 3x10  87# 10x 87# 3x10 87# 3x10 87# 3x10 87# 3x10 87# 3x10 100# 2x10           HEP: issued handout for patient with exercises. Pt verbalizes understanding. Treatment/Session Assessment:    · Response to Treatment: He seems to be progressing with LE strength and balance. · Compliance with Program/Exercises: complaint   · Recommendations/Intent for next treatment session: \"Next visit will focus on LE strengthening, balance, gait\".   Total Treatment Duration: 42 minutes  PT Patient Time In/Time Out  Time In: 1030  Time Out: [address]

## 2018-06-19 ENCOUNTER — HOSPITAL ENCOUNTER (OUTPATIENT)
Dept: PHYSICAL THERAPY | Age: 83
Discharge: HOME | End: 2018-06-19
Payer: MEDICARE

## 2018-06-19 PROCEDURE — 97110 THERAPEUTIC EXERCISES: CPT

## 2018-06-19 NOTE — PROGRESS NOTES
Meliton Median  : 1932  [place]  Secondary:  [address]  at [facility] IMMANUEL BALDWIN  [address] [facility], [address], [place].  [phone]   [phone]        OUTPATIENT PHYSICAL THERAPY:Daily Note 2018    ICD-10: Treatment Diagnosis: Displaced intertrochanteric fracture of left femur, sequela (S72.142S); Unsteadiness on feet (R26.81); Pain in left hip (M25.552)  Precautions/Allergies:   Review of patient's allergies indicates no known allergies. Fall Risk Score: 7 (? 5 = High Risk)  MD Orders: hip abductor, quad strengthening, gait training WBAT MEDICAL/REFERRING DIAGNOSIS:  s/p ORIF LT IT    DATE OF ONSET: Injury 18; surgery 18  REFERRING PHYSICIAN: Gopi Dallas MD  RETURN PHYSICIAN APPOINTMENT: 2018     ASSESSMENT:  Mr. Marc Junior presents s/p ORIF of the left hip after a fall at [facility]. He continues to progress with LE strength, decreased pain, and improved gait. He has improved with his TUG score with walking and with no assistive device. He is able to ambulate short distances with no assistive device with supervision. He will benefit from skilled physical therapy to increase functional mobility and decrease risk for falls. PROBLEM LIST (Impacting functional limitations):  1. Decreased Strength  2. Decreased ADL/Functional Activities  3. Decreased Ambulation Ability/Technique  4. Decreased Balance  5. Increased Pain INTERVENTIONS PLANNED:  1. Gait Training  2. Manual Therapy  3. Therapeutic Activites  4. Therapeutic Exercise/Strengthening   5. Modalities as needed for pain   TREATMENT PLAN:  Effective Dates: 3-20-18 TO 2018 (90 days). Frequency/Duration: 2-3 times a week for 90 Days  GOALS: (Goals have been discussed and agreed upon with patient.)  Short-Term Functional Goals: Time Frame: 4 weeks  1. Pt will increase L hip abductor strength to 4/5 for improved gait. GOAL MET  2.  Pt will improve TUG time to 15 sec for improved mobility. GOAL MET  3. Pt will be independent with HEP. GOAL MET  4. Pt will report pain 2/10 with daily activities. GOAL MET  Discharge Goals: Time Frame: 12 weeks   1. Pt will increase L LE strength to 5/5 for improved gait and mobility. Progressing towards  2. Pt will improve TUG time to 12 sec for decreased risk for falls. GOAL MET  3. Pt will report pain 1/10 with daily activities. GOAL MET  4. Pt will be able to balance on L single leg for 5 sec for gait with no assistive device. GOAL MET  5. Pt will be able to ambulate with least restrictive assistive device over various terrain with supervision. Progressing towards  Rehabilitation Potential For Stated Goals: Good  Regarding Berny Jung's therapy, I certify that the treatment plan above will be carried out by a therapist or under their direction. Thank you for this referral,    Pepper Carreon, PT                 HISTORY:   History of Present Injury/Illness (Reason for Referral): He was pushing a small cart at [facility] and his foot caught the wheel when he turned and he feel down. Injury happened feb 1st. They called EMS and took him to [address] [place] [address]. He had hip surgery on 2-2-18. Then he went to [facility] after his hip surgery. He did have home health come out to the house for a couple of visits. He now presents for outpatient therapy. Past Medical History/Comorbidities:   Mr. Jocelyn Horn  has a past medical history of Abdominal aortic aneurysm ([facility] (12/10/2015); Abdominal aortic aneurysm without rupture ([address] Allergic rhinitis (12/10/2015); Asthma; Benign neoplasm; Benign prostatic hyperplasia (12/10/2015); BPH without urinary obstruction; Cardiovascular disease (12/10/2015); Chronic obstructive asthma with exacerbation ([address] (12/10/2015); COPD (chronic obstructive pulmonary disease) ([address] (12/10/2015); Cough with hemoptysis; Erectile dysfunction (12/10/2015); Essential hypertension, benign (12/10/2015);  Fracture (10/2014); History of colon polyps; Low testosterone (12/10/2015); Lumbago; Memory loss; Overflow incontinence; Raynaud's syndrome (12/10/2015); Rotator cuff tendinitis; Thrombocytopenia ([address] and Urinary frequency. Mr. Cinda Diop  has a past surgical history that includes hx hernia repair (1980); hx colonoscopy; hx fracture tx (10/2014); hx cataract removal (6/2016-right); and hx orthopaedic. Social History/Living Environment:    Lives in a 1 story house with wife. 1 small step to get inside. They do have a ramp to get inside also. Prior Level of Function/Work/Activity:  Ambulated with on assistive device. He did go out of the house with his wife and perform light shopping. Goal is to be able to walk normal.    Current Medications:       Current Outpatient Prescriptions:     lisinopril (PRINIVIL, ZESTRIL) 5 mg tablet, Take 5 mg by mouth daily. , Disp: , Rfl:     tamsulosin (FLOMAX) 0.4 mg capsule, Take 1 Cap by mouth daily. , Disp: 90 Cap, Rfl: 4    montelukast (SINGULAIR) 10 mg tablet, Take 1 Tab by mouth daily. , Disp: 90 Tab, Rfl: 4    fluticasone (FLONASE) 50 mcg/actuation nasal spray, 2 Sprays by Both Nostrils route daily. , Disp: 48 g, Rfl: 4    calcium citrate-vitamin d3 (CITRACAL+D) 315-200 mg-unit tab, Take 2 Tabs by mouth daily (with breakfast). , Disp: , Rfl:     cholecalciferol (VITAMIN D3) 1,000 unit cap, Take  by mouth., Disp: , Rfl:     guaiFENesin ER (MUCINEX) 600 mg ER tablet, Take 1,200 mg by mouth daily. , Disp: , Rfl:     Biotin 2,500 mcg cap, Take  by mouth., Disp: , Rfl:     aspirin delayed-release 81 mg tablet, Take  by mouth daily. , Disp: , Rfl:    Date Last Reviewed:  6-8-18   Number of Personal Factors/Comorbidities that affect the Plan of Care: 1-2: MODERATE COMPLEXITY   EXAMINATION:   Observation/Orthostatic Postural Assessment:          Pt arrived to physical therapy with rolling walker.    Palpation:          n/t  ROM:        n/t  Strength:                    Functional Mobility: Pt ambulates with no assistive device with decreased step length and wide base of support. No loss of balance with walking forward, backward, or side stepping. Balance: single leg balance L LE 5 sec         Body Structures Involved:  1. Bones  2. Joints  3. Muscles Body Functions Affected:  1. Neuromusculoskeletal Activities and Participation Affected:  1. Community, Social and [place]   Number of elements (examined above) that affect the Plan of Care: 3: MODERATE COMPLEXITY   CLINICAL PRESENTATION:   Presentation: Stable and uncomplicated: LOW COMPLEXITY   CLINICAL DECISION MAKING:   Outcome Measure: Tool Used: Timed Up and Go (TUG)  Score:  Initial: 19 seconds 16 seconds, 12 sec with walker (Date: 4-24-18 ) Most Recent: 12 sec with walker, 12 sec with walker, 10 sec with no walker  5-29-18   Interpretation of Score: The test measures, in seconds, the time taken by an individual to stand up from a standard arm chair (seat height 46 cm [18 in], arm height 65 cm [25.6 in]), walk a distance of 3 meters (118 in, approx 10 ft), turn, walk back to the chair and sit down. If the individual takes longer than 14 seconds to complete TUG, this indicates risk for falls. Score 7 7.5-10.5 11-14 14.5-17.5 18-21 21.5-24.5 25+   Modifier CH CI CJ CK CL CM CN     ? Mobility - Walking and Moving Around:     - CURRENT STATUS: CJ - 20%-39% impaired, limited or restricted    - GOAL STATUS: CJ - 20%-39% impaired, limited or restricted    - D/C STATUS:  ---------------To be determined---------------    Medical Necessity:   · Patient is expected to demonstrate progress in strength, range of motion and balance to improve safety during walking and mobility. Reason for Services/Other Comments:  · Patient continues to require skilled intervention due to decreased LE strength, decreased gait and balance.    Use of outcome tool(s) and clinical judgement create a POC that gives a: Clear prediction of patient's progress: LOW COMPLEXITY            TREATMENT:   (In addition to Assessment/Re-Assessment sessions the following treatments were rendered)  Pre-treatment Symptoms/Complaints:  Pt reports he got out of breath this morning trying to plant some plants outside. Pain: Initial:     4/10 Post Session:  1/10     Therapeutic Exercise: (40 Minutes): Exercises for LE strengthening, endurance, and balance. CGA with standing exercises. Monitored O2% during exercises. 91% after stepping drills and stairs. 93% after machine. Walking forward and backwards, walking with head turns R and L and up and down, side stepping 30 ft x2 ea. Date  5-24-18 Date  5-29-18 Date  5-31-18 Date  6-6-18 Date  6-8-18 Date  6-12-18 Date  6-14-18 Date  6-19-18   Activity/Exercise parameters  parameters  parameters parameters parameters parameters   Long arc quad Machine 20# 2x10 B LE; 10# SL 10x Machine 25# 2x10, SL 15# 10x Machine 30# 2x10, SL 15#15x Machine 30# 2x10 Machine 30# 2x10 Machine 30# 3x10 Machine 30# 3x10 Machine 30# 3x10   Seated hamstring curls Machine 40# 2x10B LE; 25# SL 10x Machine 50# 2x10 B LE, SL 25# 10x Machine 50# 2x10 B LE, SL 25# 2x10 Machine 50# 2x10 Machine 50# 2x10 Machine 50# 3x10 Machine 50# 3x10 Machine 50# 3x10   SLR           Side lying hip abduction   1# 3x10 1#3x10 1# 3x10 1#3x10 1#3x10    Side lying clam   2#3x10 2# 3x10 2# 3x10 2#3x10 2#3x10    Sit to stand Chair +1 pillow 2x8 Chair + airex 2x10 15x high lo table  2x10 high low table 2x10 on airex highlo table 2x10 highlo table.   2x10 on airex high low table  2x10 on airex high low table   Heel raises        3x10   balance Tandem 30\"x2 SLB 5\"x2 Tandem 30\"x3, SLB 5\"x2 Tandem 30\"x3; SLB 5\", tandem walking 10\" Tandem 30\"x3; feet together on airex 30\"x2; tandem walking Semi tandem on airex 20\"x3; feet apart reaching diagonal 10 ea; standing on airex and putting 10x Tandem 30\"x3;  Stepping up and over 10x B; stepping over sideways 10xB; tandem 30\"x3   Stepping up and over 10x B; stepping over sideways 10xB; tandem 30\"x3     Steps ups 6\" 10xB 6\" 10xB 1 flight of stairs     1 flight of stiars   Walking with no assistive device See above See above See above See above See above See above See above See above   Shuttle press 75# 3x10  87# 10x 87# 3x10 87# 3x10 87# 3x10 87# 3x10 87# 3x10 100# 2x10 100# 3x10           HEP: issued handout for patient with exercises. Pt verbalizes understanding. Treatment/Session Assessment:    · Response to Treatment: He did well with dynamic balance activities today. · Compliance with Program/Exercises: complaint   · Recommendations/Intent for next treatment session: \"Next visit will focus on LE strengthening, balance, gait\".   Total Treatment Duration: 40 minutes  PT Patient Time In/Time Out  Time In: 0930  Time Out: [address]

## 2018-06-21 ENCOUNTER — HOSPITAL ENCOUNTER (OUTPATIENT)
Dept: PHYSICAL THERAPY | Age: 83
Discharge: HOME | End: 2018-06-21
Payer: MEDICARE

## 2018-06-21 PROCEDURE — G8979 MOBILITY GOAL STATUS: HCPCS

## 2018-06-21 PROCEDURE — 97110 THERAPEUTIC EXERCISES: CPT

## 2018-06-21 PROCEDURE — G8978 MOBILITY CURRENT STATUS: HCPCS

## 2018-06-21 NOTE — THERAPY RECERTIFICATION
Bárbara Copeland  : 1932  [place]  Secondary:  [address]  at [facility] IMMANUEL BALDWIN  [address] [facility], [address], [place] [address].  [phone]   [phone]        OUTPATIENT PHYSICAL THERAPY:Daily Note and Progress Report 2018    ICD-10: Treatment Diagnosis: Displaced intertrochanteric fracture of left femur, sequela (S72.142S); Unsteadiness on feet (R26.81); Pain in left hip (M25.552)  Precautions/Allergies:   Review of patient's allergies indicates no known allergies. Fall Risk Score: 7 (? 5 = High Risk)  MD Orders: hip abductor, quad strengthening, gait training WBAT MEDICAL/REFERRING DIAGNOSIS:  s/p ORIF LT IT    DATE OF ONSET: Injury 18; surgery 18  REFERRING PHYSICIAN: Frank Torres MD  RETURN PHYSICIAN APPOINTMENT: 2018     ASSESSMENT:  Mr. Corry Chapa presents s/p ORIF of the left hip after a fall at [facility]. He continues to progress with LE strength, decreased pain, and improved gait. He has improved with his TUG score with walking and with no assistive device. He is able to ambulate short to medium distances with no assistive device with supervision. He will benefit from skilled physical therapy to increase functional mobility and decrease risk for falls. PROBLEM LIST (Impacting functional limitations):  1. Decreased Strength  2. Decreased ADL/Functional Activities  3. Decreased Ambulation Ability/Technique  4. Decreased Balance  5. Increased Pain INTERVENTIONS PLANNED:  1. Gait Training  2. Manual Therapy  3. Therapeutic Activites  4. Therapeutic Exercise/Strengthening   5. Modalities as needed for pain   TREATMENT PLAN:  Effective Dates:  18 to 8-3-18 . Frequency/Duration: 2-3 times a week for 4-6 more weeks. GOALS: (Goals have been discussed and agreed upon with patient.)  Short-Term Functional Goals: Time Frame: 4 weeks  1.  Pt will increase L hip abductor strength to 4/5 for improved gait. GOAL MET  2. Pt will improve TUG time to 15 sec for improved mobility. GOAL MET  3. Pt will be independent with HEP. GOAL MET  4. Pt will report pain 2/10 with daily activities. GOAL MET  Discharge Goals: Time Frame: 12 weeks   1. Pt will increase L LE strength to 5/5 for improved gait and mobility. Progressing towards  2. Pt will improve TUG time to 12 sec for decreased risk for falls. GOAL MET  3. Pt will report pain 1/10 with daily activities. GOAL MET  4. Pt will be able to balance on L single leg for 5 sec for gait with no assistive device. GOAL MET  5. Pt will be able to ambulate with least restrictive assistive device over various terrain with supervision. Progressing towards  NEW GOAL:   6. Pt will be able to balance in tandem stance for 30 sec for improved balance with gait. Rehabilitation Potential For Stated Goals: Good  Regarding Cesar Jung's therapy, I certify that the treatment plan above will be carried out by a therapist or under their direction. Thank you for this referral,    Giovanni Nicole, PT       Referring Physician Signature: Sofiya Rock MD          Date                        HISTORY:   History of Present Injury/Illness (Reason for Referral): He was pushing a small cart at [facility] and his foot caught the wheel when he turned and he feel down. Injury happened feb 1st. They called EMS and took him to [address] [place] [address] [place]. He had hip surgery on 2-2-18. Then he went to [facility] after his hip surgery. He did have home health come out to the house for a couple of visits. He now presents for outpatient therapy. Past Medical History/Comorbidities:   Mr. Stefan Jeong  has a past medical history of Abdominal aortic aneurysm ([facility] (12/10/2015); Abdominal aortic aneurysm without rupture ([facility] Allergic rhinitis (12/10/2015); Asthma; Benign neoplasm; Benign prostatic hyperplasia (12/10/2015); BPH without urinary obstruction; Cardiovascular disease (12/10/2015);  Chronic obstructive asthma with exacerbation ([facility] (12/10/2015); COPD (chronic obstructive pulmonary disease) ([facility] (12/10/2015); Cough with hemoptysis; Erectile dysfunction (12/10/2015); Essential hypertension, benign (12/10/2015); Fracture (10/2014); History of colon polyps; Low testosterone (12/10/2015); Lumbago; Memory loss; Overflow incontinence; Raynaud's syndrome (12/10/2015); Rotator cuff tendinitis; Thrombocytopenia ([facility] and Urinary frequency. Mr. Marycruz Jacques  has a past surgical history that includes hx hernia repair (1980); hx colonoscopy; hx fracture tx (10/2014); hx cataract removal (6/2016-right); and hx orthopaedic. Social History/Living Environment:    Lives in a 1 story house with wife. 1 small step to get inside. They do have a ramp to get inside also. Prior Level of Function/Work/Activity:  Ambulated with on assistive device. He did go out of the house with his wife and perform light shopping. Goal is to be able to walk normal.    Current Medications:       Current Outpatient Prescriptions:     lisinopril (PRINIVIL, ZESTRIL) 5 mg tablet, Take 5 mg by mouth daily. , Disp: , Rfl:     tamsulosin (FLOMAX) 0.4 mg capsule, Take 1 Cap by mouth daily. , Disp: 90 Cap, Rfl: 4    montelukast (SINGULAIR) 10 mg tablet, Take 1 Tab by mouth daily. , Disp: 90 Tab, Rfl: 4    fluticasone (FLONASE) 50 mcg/actuation nasal spray, 2 Sprays by Both Nostrils route daily. , Disp: 48 g, Rfl: 4    calcium citrate-vitamin d3 (CITRACAL+D) 315-200 mg-unit tab, Take 2 Tabs by mouth daily (with breakfast). , Disp: , Rfl:     cholecalciferol (VITAMIN D3) 1,000 unit cap, Take  by mouth., Disp: , Rfl:     guaiFENesin ER (MUCINEX) 600 mg ER tablet, Take 1,200 mg by mouth daily. , Disp: , Rfl:     Biotin 2,500 mcg cap, Take  by mouth., Disp: , Rfl:     aspirin delayed-release 81 mg tablet, Take  by mouth daily. , Disp: , Rfl:    Date Last Reviewed:  6-21-18   Number of Personal Factors/Comorbidities that affect the Plan of Care: 1-2: MODERATE COMPLEXITY   EXAMINATION:   Observation/Orthostatic Postural Assessment:          Pt arrived to physical therapy with rolling walker. Palpation:          n/t  ROM:        n/t  Strength:      LLE Strength  L Hip Flexion: 5  L Hip ABduction: 4+  L Knee Flexion: 5  L Knee Extension: 5             Functional Mobility: Pt ambulates with no assistive device with decreased step length and wide base of support. No loss of balance with walking forward, backward, or side stepping. Balance: single leg balance L LE 5 sec; tandem balance 20\"          Body Structures Involved:  1. Bones  2. Joints  3. Muscles Body Functions Affected:  1. Neuromusculoskeletal Activities and Participation Affected:  1. Community, Social and [place]   Number of elements (examined above) that affect the Plan of Care: 3: MODERATE COMPLEXITY   CLINICAL PRESENTATION:   Presentation: Stable and uncomplicated: LOW COMPLEXITY   CLINICAL DECISION MAKING:   Outcome Measure: Tool Used: Timed Up and Go (TUG)  Score:  Initial: 19 seconds 16 seconds, 12 sec with walker (Date: 4-24-18 ) Most Recent: 12 sec with walker, 12 sec with walker, 10 sec with no walker  5-29-18   Interpretation of Score: The test measures, in seconds, the time taken by an individual to stand up from a standard arm chair (seat height 46 cm [18 in], arm height 65 cm [25.6 in]), walk a distance of 3 meters (118 in, approx 10 ft), turn, walk back to the chair and sit down. If the individual takes longer than 14 seconds to complete TUG, this indicates risk for falls. Score 7 7.5-10.5 11-14 14.5-17.5 18-21 21.5-24.5 25+   Modifier CH CI CJ CK CL CM CN     ?  Mobility - Walking and Moving Around:     - CURRENT STATUS: CJ - 20%-39% impaired, limited or restricted    - GOAL STATUS: CJ - 20%-39% impaired, limited or restricted    - D/C STATUS:  ---------------To be determined---------------    Medical Necessity:   · Patient is expected to demonstrate progress in strength, range of motion and balance to improve safety during walking and mobility. Reason for Services/Other Comments:  · Patient continues to require skilled intervention due to decreased LE strength, decreased gait and balance. Use of outcome tool(s) and clinical judgement create a POC that gives a: Clear prediction of patient's progress: LOW COMPLEXITY            TREATMENT:   (In addition to Assessment/Re-Assessment sessions the following treatments were rendered)  Pre-treatment Symptoms/Complaints:  Pt reports he saw his primary doctor on Tuesday and he needs to gain 10 lbs in the next month. Pain: Initial:     2/10 Post Session:  1/10     Therapeutic Exercise: (40 Minutes): Exercises for LE strengthening, endurance, and balance. CGA with standing exercises. Monitored O2% during exercises. 90% after machines. Walking forward and backwards, walking with head turns R and L and up and down, side stepping 30 ft x2 ea. Date  5-31-18 Date  6-6-18 Date  6-8-18 Date  6-12-18 Date  6-14-18 Date  6-19-18 Date  6-21-18   Activity/Exercise parameters  parameters parameters parameters parameters parameters   Long arc quad Machine 30# 2x10, SL 15#15x Machine 30# 2x10 Machine 30# 2x10 Machine 30# 3x10 Machine 30# 3x10 Machine 30# 3x10 Machine 35# 3x10   Seated hamstring curls Machine 50# 2x10 B LE, SL 25# 2x10 Machine 50# 2x10 Machine 50# 2x10 Machine 50# 3x10 Machine 50# 3x10 Machine 50# 3x10 Machine 50# 3x10   SLR          Side lying hip abduction 1# 3x10 1#3x10 1# 3x10 1#3x10 1#3x10     Side lying clam 2#3x10 2# 3x10 2# 3x10 2#3x10 2#3x10     Sit to stand 15x high lo table  2x10 high low table 2x10 on airex highlo table 2x10 highlo table.   2x10 on airex high low table  2x10 on airex high low table 2x15   Heel raises      3x10 3x10    balance Tandem 30\"x3; SLB 5\", tandem walking 10\" Tandem 30\"x3; feet together on airex 30\"x2; tandem walking Semi tandem on airex 20\"x3; feet apart reaching diagonal 10 ea; standing on airex and putting 10x Tandem 30\"x3;  Stepping up and over 10x B; stepping over sideways 10xB; tandem 30\"x3   Stepping up and over 10x B; stepping over sideways 10xB; tandem 30\"x3   Tandem 30\"x3; Stepping up and over 10x B; stepping over sideways 10xB; marching on foam 10x   Steps ups 1 flight of stairs     1 flight of stiars -   Walking with no assistive device See above See above See above See above See above See above See above   Shuttle press 87# 3x10 87# 3x10 87# 3x10 87# 3x10 100# 2x10 100# 3x10 100# 3x10           HEP: issued handout for patient with exercises. Pt verbalizes understanding. Treatment/Session Assessment:    · Response to Treatment: He is progressing with LE strength. · Compliance with Program/Exercises: complaint   · Recommendations/Intent for next treatment session: \"Next visit will focus on LE strengthening, balance, gait\".   Total Treatment Duration: 40 minutes  PT Patient Time In/Time Out  Time In: 0935  Time Out: [address]

## 2018-06-26 ENCOUNTER — HOSPITAL ENCOUNTER (OUTPATIENT)
Dept: PHYSICAL THERAPY | Age: 83
Discharge: HOME | End: 2018-06-26
Payer: MEDICARE

## 2018-06-26 PROCEDURE — 97110 THERAPEUTIC EXERCISES: CPT

## 2018-06-26 NOTE — PROGRESS NOTES
Isiah Barnett  : 1932  [place]  Secondary:  [address]  at [facility] IMMANUEL BALDWIN  [address] [facility], [address], [place] [address].  [phone]   [phone]        OUTPATIENT PHYSICAL THERAPY:Daily Note 2018    ICD-10: Treatment Diagnosis: Displaced intertrochanteric fracture of left femur, sequela (S72.142S); Unsteadiness on feet (R26.81); Pain in left hip (M25.552)  Precautions/Allergies:   Review of patient's allergies indicates no known allergies. Fall Risk Score: 7 (? 5 = High Risk)  MD Orders: hip abductor, quad strengthening, gait training WBAT MEDICAL/REFERRING DIAGNOSIS:  s/p ORIF LT IT    DATE OF ONSET: Injury 18; surgery 18  REFERRING PHYSICIAN: Jeffry Lane MD  RETURN PHYSICIAN APPOINTMENT: 2018     ASSESSMENT:  Mr. Stephon Mcgill presents s/p ORIF of the left hip after a fall at [facility]. He continues to progress with LE strength, decreased pain, and improved gait. He has improved with his TUG score with walking and with no assistive device. He is able to ambulate short to medium distances with no assistive device with supervision. He will benefit from skilled physical therapy to increase functional mobility and decrease risk for falls. PROBLEM LIST (Impacting functional limitations):  1. Decreased Strength  2. Decreased ADL/Functional Activities  3. Decreased Ambulation Ability/Technique  4. Decreased Balance  5. Increased Pain INTERVENTIONS PLANNED:  1. Gait Training  2. Manual Therapy  3. Therapeutic Activites  4. Therapeutic Exercise/Strengthening   5. Modalities as needed for pain   TREATMENT PLAN:  Effective Dates:  18 to 8-3-18 . Frequency/Duration: 2-3 times a week for 4-6 more weeks. GOALS: (Goals have been discussed and agreed upon with patient.)  Short-Term Functional Goals: Time Frame: 4 weeks  1. Pt will increase L hip abductor strength to 4/5 for improved gait. GOAL MET  2.  Pt will improve TUG time to 15 sec for improved mobility. GOAL MET  3. Pt will be independent with HEP. GOAL MET  4. Pt will report pain 2/10 with daily activities. GOAL MET  Discharge Goals: Time Frame: 12 weeks   1. Pt will increase L LE strength to 5/5 for improved gait and mobility. Progressing towards  2. Pt will improve TUG time to 12 sec for decreased risk for falls. GOAL MET  3. Pt will report pain 1/10 with daily activities. GOAL MET  4. Pt will be able to balance on L single leg for 5 sec for gait with no assistive device. GOAL MET  5. Pt will be able to ambulate with least restrictive assistive device over various terrain with supervision. Progressing towards  NEW GOAL:   6. Pt will be able to balance in tandem stance for 30 sec for improved balance with gait. Rehabilitation Potential For Stated Goals: Good  Regarding Laron Jung's therapy, I certify that the treatment plan above will be carried out by a therapist or under their direction. Thank you for this referral,    Pepper Garcia, PT                   HISTORY:   History of Present Injury/Illness (Reason for Referral): He was pushing a small cart at [address] [place] [address] and his foot caught the wheel when he turned and he feel down. Injury happened feb 1st. They called EMS and took him to [facility]. He had hip surgery on 2-2-18. Then he went to [facility] after his hip surgery. He did have home health come out to the house for a couple of visits. He now presents for outpatient therapy. Past Medical History/Comorbidities:   Mr. Magdaleno Cardenas  has a past medical history of Abdominal aortic aneurysm ([address] (12/10/2015); Abdominal aortic aneurysm without rupture ([address] Allergic rhinitis (12/10/2015); Asthma; Benign neoplasm; Benign prostatic hyperplasia (12/10/2015); BPH without urinary obstruction; Cardiovascular disease (12/10/2015);  Chronic obstructive asthma with exacerbation ([address] (12/10/2015); COPD (chronic obstructive pulmonary disease) ([facility] (12/10/2015); Cough with hemoptysis; Erectile dysfunction (12/10/2015); Essential hypertension, benign (12/10/2015); Fracture (10/2014); History of colon polyps; Low testosterone (12/10/2015); Lumbago; Memory loss; Overflow incontinence; Raynaud's syndrome (12/10/2015); Rotator cuff tendinitis; Thrombocytopenia ([facility] and Urinary frequency. Mr. Stephon Mcgill  has a past surgical history that includes hx hernia repair (1980); hx colonoscopy; hx fracture tx (10/2014); hx cataract removal (6/2016-right); and hx orthopaedic. Social History/Living Environment:    Lives in a 1 story house with wife. 1 small step to get inside. They do have a ramp to get inside also. Prior Level of Function/Work/Activity:  Ambulated with on assistive device. He did go out of the house with his wife and perform light shopping. Goal is to be able to walk normal.    Current Medications:       Current Outpatient Prescriptions:     lisinopril (PRINIVIL, ZESTRIL) 5 mg tablet, Take 5 mg by mouth daily. , Disp: , Rfl:     tamsulosin (FLOMAX) 0.4 mg capsule, Take 1 Cap by mouth daily. , Disp: 90 Cap, Rfl: 4    montelukast (SINGULAIR) 10 mg tablet, Take 1 Tab by mouth daily. , Disp: 90 Tab, Rfl: 4    fluticasone (FLONASE) 50 mcg/actuation nasal spray, 2 Sprays by Both Nostrils route daily. , Disp: 48 g, Rfl: 4    calcium citrate-vitamin d3 (CITRACAL+D) 315-200 mg-unit tab, Take 2 Tabs by mouth daily (with breakfast). , Disp: , Rfl:     cholecalciferol (VITAMIN D3) 1,000 unit cap, Take  by mouth., Disp: , Rfl:     guaiFENesin ER (MUCINEX) 600 mg ER tablet, Take 1,200 mg by mouth daily. , Disp: , Rfl:     Biotin 2,500 mcg cap, Take  by mouth., Disp: , Rfl:     aspirin delayed-release 81 mg tablet, Take  by mouth daily. , Disp: , Rfl:    Date Last Reviewed:  6-21-18   Number of Personal Factors/Comorbidities that affect the Plan of Care: 1-2: MODERATE COMPLEXITY   EXAMINATION:   Observation/Orthostatic Postural Assessment:          Pt arrived to physical therapy with rolling walker. Palpation:          n/t  ROM:        n/t  Strength:                    Functional Mobility: Pt ambulates with no assistive device with decreased step length and wide base of support. No loss of balance with walking forward, backward, or side stepping. Balance: single leg balance L LE 5 sec; tandem balance 20\"          Body Structures Involved:  1. Bones  2. Joints  3. Muscles Body Functions Affected:  1. Neuromusculoskeletal Activities and Participation Affected:  1. Community, Social and [place]   Number of elements (examined above) that affect the Plan of Care: 3: MODERATE COMPLEXITY   CLINICAL PRESENTATION:   Presentation: Stable and uncomplicated: LOW COMPLEXITY   CLINICAL DECISION MAKING:   Outcome Measure: Tool Used: Timed Up and Go (TUG)  Score:  Initial: 19 seconds 16 seconds, 12 sec with walker (Date: 4-24-18 ) Most Recent: 12 sec with walker, 12 sec with walker, 10 sec with no walker  5-29-18   Interpretation of Score: The test measures, in seconds, the time taken by an individual to stand up from a standard arm chair (seat height 46 cm [18 in], arm height 65 cm [25.6 in]), walk a distance of 3 meters (118 in, approx 10 ft), turn, walk back to the chair and sit down. If the individual takes longer than 14 seconds to complete TUG, this indicates risk for falls. Score 7 7.5-10.5 11-14 14.5-17.5 18-21 21.5-24.5 25+   Modifier CH CI CJ CK CL CM CN     ? Mobility - Walking and Moving Around:     - CURRENT STATUS: CJ - 20%-39% impaired, limited or restricted    - GOAL STATUS: CJ - 20%-39% impaired, limited or restricted    - D/C STATUS:  ---------------To be determined---------------    Medical Necessity:   · Patient is expected to demonstrate progress in strength, range of motion and balance to improve safety during walking and mobility.   Reason for Services/Other Comments:  · Patient continues to require skilled intervention due to decreased LE strength, decreased gait and balance. Use of outcome tool(s) and clinical judgement create a POC that gives a: Clear prediction of patient's progress: LOW COMPLEXITY            TREATMENT:   (In addition to Assessment/Re-Assessment sessions the following treatments were rendered)  Pre-treatment Symptoms/Complaints:  Pt reports he had a small accident on the riding  where he hit a pole but was not injured. Pain: Initial:     2/10 Post Session:  1/10     Therapeutic Exercise: (40 Minutes): Exercises for LE strengthening, endurance, and balance. CGA with standing exercises. Monitored O2% during exercises. 93% after sit to stand and heel raises, 92% after balance activities, 87% after 1 flight of stairs (with rest returned to 92% after a 1 min) , 91% after walking drills    Walking forward and backwards, walking with head turns R and L and up and down, side stepping 30 ft x2 ea. Date  5-31-18 Date  6-6-18 Date  6-8-18 Date  6-12-18 Date  6-14-18 Date  6-19-18 Date  6-21-18 Date  6-26-18   Activity/Exercise parameters  parameters parameters parameters parameters parameters    Long arc quad Machine 30# 2x10, SL 15#15x Machine 30# 2x10 Machine 30# 2x10 Machine 30# 3x10 Machine 30# 3x10 Machine 30# 3x10 Machine 35# 3x10 Machine 35# 3x10   Seated hamstring curls Machine 50# 2x10 B LE, SL 25# 2x10 Machine 50# 2x10 Machine 50# 2x10 Machine 50# 3x10 Machine 50# 3x10 Machine 50# 3x10 Machine 50# 3x10 Machine 50# 3x10   SLR           Side lying hip abduction 1# 3x10 1#3x10 1# 3x10 1#3x10 1#3x10      Side lying clam 2#3x10 2# 3x10 2# 3x10 2#3x10 2#3x10      Sit to stand 15x high lo table  2x10 high low table 2x10 on airex highlo table 2x10 highlo table.   2x10 on airex high low table  2x10 on airex high low table 2x15 On airex 2x10   Heel raises      3x10 3x10  2x15   balance Tandem 30\"x3; SLB 5\", tandem walking 10\" Tandem 30\"x3; feet together on airex 30\"x2; tandem walking Semi tandem on airex 20\"x3; feet apart reaching diagonal 10 ea; standing on airex and putting 10x Tandem 30\"x3;  Stepping up and over 10x B; stepping over sideways 10xB; tandem 30\"x3   Stepping up and over 10x B; stepping over sideways 10xB; tandem 30\"x3   Tandem 30\"x3; Stepping up and over 10x B; stepping over sideways 10xB; marching on foam 10x Stepping up and over 10x B; stepping over sideways 10xB; tandem 30\"x3; marching on foam 10x; trunk twists on foam 10x   Steps ups 1 flight of stairs     1 flight of stiars - 1 flight of stairs    Walking with no assistive device See above See above See above See above See above See above See above See above   Shuttle press 87# 3x10 87# 3x10 87# 3x10 87# 3x10 100# 2x10 100# 3x10 100# 3x10 100# 3x10           HEP: issued handout for patient with exercises. Pt verbalizes understanding. Treatment/Session Assessment:    · Response to Treatment: He fatigued with negotiating 1 flight of stairs but did well with step over step and good control. · Compliance with Program/Exercises: complaint   · Recommendations/Intent for next treatment session: \"Next visit will focus on LE strengthening, balance, gait\".   Total Treatment Duration: 40 minutes  PT Patient Time In/Time Out  Time In: 0930  Time Out: [address]

## 2018-06-28 ENCOUNTER — HOSPITAL ENCOUNTER (OUTPATIENT)
Dept: PHYSICAL THERAPY | Age: 83
Discharge: HOME | End: 2018-06-28
Payer: MEDICARE

## 2018-06-28 PROCEDURE — G8979 MOBILITY GOAL STATUS: HCPCS

## 2018-06-28 PROCEDURE — 97110 THERAPEUTIC EXERCISES: CPT

## 2018-06-28 PROCEDURE — G8978 MOBILITY CURRENT STATUS: HCPCS

## 2018-06-28 NOTE — PROGRESS NOTES
Pamela Gonzalez  : 1932  [place]  Secondary:  [address]  at [facility] IMMANUEL BALDWIN  [address], [address], [address] [facility]  [phone]   [phone]        OUTPATIENT PHYSICAL THERAPY:Daily Note and Progress Report 2018      ICD-10: Treatment Diagnosis: Displaced intertrochanteric fracture of left femur, sequela (S72.142S); Unsteadiness on feet (R26.81); Pain in left hip (M25.552)  Precautions/Allergies:   Review of patient's allergies indicates no known allergies. Fall Risk Score: 7 (? 5 = High Risk)  MD Orders: hip abductor, quad strengthening, gait training WBAT MEDICAL/REFERRING DIAGNOSIS:  s/p ORIF LT IT    DATE OF ONSET: Injury 18; surgery 18  REFERRING PHYSICIAN: Matt Junior MD  RETURN PHYSICIAN APPOINTMENT: 2018     PROGRESS ASSESSMENT (18):  Mr. Tsering Valente is now nearly 5 months s/p ORIF of the left hip after a fall at [facility]. He appears to be making good progress. He has good hip ROM. He has good hip strength with manual testing and functionally. He has balance deficit in static positions, but good dynamic balance with walking on level ground. He is has good TUG score, and has nearly met all his goals as written. He continues to have balance impairment and is still at risk for falls. He will benefit from continued PT to further progress balance for full activity independence and safety. PROBLEM LIST (Impacting functional limitations):  1. Decreased Strength  2. Decreased ADL/Functional Activities  3. Decreased Ambulation Ability/Technique  4. Decreased Balance  5. Increased Pain INTERVENTIONS PLANNED:  1. Gait Training  2. Manual Therapy  3. Therapeutic Activites  4. Therapeutic Exercise/Strengthening   5. Modalities as needed for pain   TREATMENT PLAN:  Effective Dates:  18 to 8-3-18 . Frequency/Duration: 2-3 times a week for 4-6 more weeks.                                 GOALS: (Goals have been discussed and agreed upon with patient.)  Short-Term Functional Goals: Time Frame: 4 weeks  1. Pt will increase L hip abductor strength to 4/5 for improved gait. GOAL MET  2. Pt will improve TUG time to 15 sec for improved mobility. GOAL MET  3. Pt will be independent with HEP. GOAL MET  4. Pt will report pain 2/10 with daily activities. GOAL MET  Discharge Goals: Time Frame: 12 weeks   1. Pt will increase L LE strength to 5/5 for improved gait and mobility. Met  2. Pt will improve TUG time to 12 sec for decreased risk for falls. GOAL MET  3. Pt will report pain 1/10 with daily activities. GOAL MET  4. Pt will be able to balance on L single leg for 5 sec for gait with no assistive device. GOAL MET  5. Pt will be able to ambulate with least restrictive assistive device over various terrain with supervision. Progressing towards  NEW GOAL:   6. Pt will be able to balance in tandem stance for 30 sec for improved balance with gait. Nearly Met  Rehabilitation Potential For Stated Goals: Good                   HISTORY:   History of Present Injury/Illness (Reason for Referral): He was pushing a small cart at [facility] and his foot caught the wheel when he turned and he feel down. Injury happened feb 1st. They called EMS and took him to [facility] downw. He had hip surgery on 2-2-18. Then he went to [facility] after his hip surgery. He did have home health come out to the house for a couple of visits. He now presents for outpatient therapy. Past Medical History/Comorbidities:   Mr. Chrissy Guthrie  has a past medical history of Abdominal aortic aneurysm ([facility] [address] (12/10/2015); Abdominal aortic aneurysm without rupture ([facility] [address] Allergic rhinitis (12/10/2015); Asthma; Benign neoplasm; Benign prostatic hyperplasia (12/10/2015); BPH without urinary obstruction; Cardiovascular disease (12/10/2015); Chronic obstructive asthma with exacerbation ([address] (12/10/2015); COPD (chronic obstructive pulmonary disease) ([facility] [address] (12/10/2015);  Cough with hemoptysis; Erectile dysfunction (12/10/2015); Essential hypertension, benign (12/10/2015); Fracture (10/2014); History of colon polyps; Low testosterone (12/10/2015); Lumbago; Memory loss; Overflow incontinence; Raynaud's syndrome (12/10/2015); Rotator cuff tendinitis; Thrombocytopenia ([address] and Urinary frequency. Mr. Erin Galaviz  has a past surgical history that includes hx hernia repair (1980); hx colonoscopy; hx fracture tx (10/2014); hx cataract removal (6/2016-right); and hx orthopaedic. Social History/Living Environment:    Lives in a 1 story house with wife. 1 small step to get inside. They do have a ramp to get inside also. Prior Level of Function/Work/Activity:  Ambulated with on assistive device. He did go out of the house with his wife and perform light shopping. Goal is to be able to walk normal.    Current Medications:       Current Outpatient Prescriptions:     lisinopril (PRINIVIL, ZESTRIL) 5 mg tablet, Take 5 mg by mouth daily. , Disp: , Rfl:     tamsulosin (FLOMAX) 0.4 mg capsule, Take 1 Cap by mouth daily. , Disp: 90 Cap, Rfl: 4    montelukast (SINGULAIR) 10 mg tablet, Take 1 Tab by mouth daily. , Disp: 90 Tab, Rfl: 4    fluticasone (FLONASE) 50 mcg/actuation nasal spray, 2 Sprays by Both Nostrils route daily. , Disp: 48 g, Rfl: 4    calcium citrate-vitamin d3 (CITRACAL+D) 315-200 mg-unit tab, Take 2 Tabs by mouth daily (with breakfast). , Disp: , Rfl:     cholecalciferol (VITAMIN D3) 1,000 unit cap, Take  by mouth., Disp: , Rfl:     guaiFENesin ER (MUCINEX) 600 mg ER tablet, Take 1,200 mg by mouth daily. , Disp: , Rfl:     Biotin 2,500 mcg cap, Take  by mouth., Disp: , Rfl:     aspirin delayed-release 81 mg tablet, Take  by mouth daily. , Disp: , Rfl:    Date Last Reviewed:  6-28-18   Number of Personal Factors/Comorbidities that affect the Plan of Care: 1-2: MODERATE COMPLEXITY   EXAMINATION:   6/28/2018  Observation/Orthostatic Postural Assessment: Walks in with a front wheeled walker.  No distress. Palpation: Unremarkable. ROM: R Hip ROM is grossly WFL's. Strength:         R Hip strength is grossly 4+ to 5/5 with manual testing. Functional Mobility: Sit to/from Stand: compensatory independent with use of B UE. Bed Mobility: independent, slow to change positions. Walking on level ground: walking without walker with good dynamic stability, shortened step length. Stair walking: up/down with reciprocal pattern, use of one rail. Balance: Static Balance in Narrow stance firm/eyes open >30\" good balance, eyes closed 30\" good balance, eyes open/internal perturbation 30\" good balance; Tandem stance firm surface/eyes open L and R 20-30\" with difficulty to assume position; Single-leg stand: firm surface/eyes open L and R brief. Body Structures Involved:  1. Bones  2. Joints  3. Muscles Body Functions Affected:  1. Neuromusculoskeletal Activities and Participation Affected:  1. Community, Social and [place]   Number of elements (examined above) that affect the Plan of Care: 3: MODERATE COMPLEXITY   CLINICAL PRESENTATION:   Presentation: Stable and uncomplicated: LOW COMPLEXITY   CLINICAL DECISION MAKING:   Outcome Measure: Tool Used: Timed Up and Go (TUG)  Score:  Initial: 19 seconds 16 seconds, 12 sec with walker (Date: 4-24-18 ) 12 sec with walker, 12 sec with walker, 10 sec with no walker  5-29-18 Most Recent: 10\" without walker (6-28-18)   Interpretation of Score: The test measures, in seconds, the time taken by an individual to stand up from a standard arm chair (seat height 46 cm [18 in], arm height 65 cm [25.6 in]), walk a distance of 3 meters (118 in, approx 10 ft), turn, walk back to the chair and sit down. If the individual takes longer than 14 seconds to complete TUG, this indicates risk for falls. Score 7 7.5-10.5 11-14 14.5-17.5 18-21 21.5-24.5 25+   Modifier CH CI CJ CK CL CM CN ?    Mobility - Walking and Moving Around:     - CURRENT STATUS:CI - 1%-19% impaired, limited or restricted (6-28-18)    - GOAL STATUS: CJ - 20%-39% impaired, limited or restricted    - D/C STATUS: ---------------To be determined---------------    Medical Necessity:   · Making good progress, but still with balance deficit, fall risk. Reason for Services/Other Comments:  · Patient continues to require skilled intervention due to decreased LE strength, decreased gait and balance. Use of outcome tool(s) and clinical judgement create a POC that gives a: Clear prediction of patient's progress: LOW COMPLEXITY            TREATMENT:   (In addition to Assessment/Re-Assessment sessions the following treatments were rendered)  6/28/2018  Pre-treatment Symptoms/Complaints: No pain to report to L hip or LE. Says he was a little dizzy on getting up out of bed this morning. This is new. O2 sats and BP were checked at home and good readouts. Feels he is getting around well at home. Able to walk without his walker. Has been exercising at [employer] 3 days per week. He is supposed to go back to Pulmonary Rehab program at [facility] when he is cleared by Dr. Arleth Up. Pain: Initial:   0/10 Post Session:  1/10     Vitals: AI=655/70; 02 Sats: 95   Therapeutic Exercise: (30 Minutes): Exercise for functional strength with sit to stand and stair walking; static balance in narrow stance with eyes closed and eyes open/internal perturbation trunk rotations--x30\" each--modified tandem stance with assist to assume x20-30\" each, and single leg stance attempted x3 each. Walking drills without assistive device with quick walk x30-ft x4 with average of about 10\"; walk with head turns and head nods 2x30-ft each; walk with stop, turns, turn-around's on command, and backward stepping; and up and go x1 with walker, x4 without walker. All with SBA for balance safety. HEP: He is to continue with HEP previously provided. He verbalizes understanding.  .     Treatment/Session Assessment:    · Response to Treatment: He is nearly 5 months post op. Good hip ROM. Strength appears to be improving. Good functional strength. Improving walking skills and stability. He does continue to have balance deficits, especially with static balance drills. Continue to be at risk for falls. · Compliance with Program/Exercises: complaint   · Recommendations/Intent for next treatment session: Will continue with static and dynamic training for improved walking stability and activity independence, and decreased risk for falls.    Total Treatment Duration: 30 minutes  PT Patient Time In/Time Out  Time In: 0910  Time Out: [address], PT, MSPT, OCS

## 2018-07-05 ENCOUNTER — HOSPITAL ENCOUNTER (OUTPATIENT)
Dept: PHYSICAL THERAPY | Age: 83
Discharge: HOME | End: 2018-07-05
Payer: MEDICARE

## 2018-07-05 PROCEDURE — 97110 THERAPEUTIC EXERCISES: CPT

## 2018-07-05 NOTE — PROGRESS NOTES
Roe Collet  : 1932  [place]  Secondary:  [address]  at [facility] IMMANUEL BALDWIN  [address], [address], [place] [address].  [phone]   [phone]        OUTPATIENT PHYSICAL THERAPY:Daily Note 2018      ICD-10: Treatment Diagnosis: Displaced intertrochanteric fracture of left femur, sequela (S72.142S); Unsteadiness on feet (R26.81); Pain in left hip (M25.552)  Precautions/Allergies:   Review of patient's allergies indicates no known allergies. Fall Risk Score: 7 (? 5 = High Risk)  MD Orders: hip abductor, quad strengthening, gait training WBAT MEDICAL/REFERRING DIAGNOSIS:  s/p ORIF LT IT    DATE OF ONSET: Injury 18; surgery 18  REFERRING PHYSICIAN: Daniel Gan MD  RETURN PHYSICIAN APPOINTMENT: 2018     PROGRESS ASSESSMENT (18):  Mr. Lamont Adrian is now nearly 5 months s/p ORIF of the left hip after a fall at [address] [facility]. He appears to be making good progress. He has good hip ROM. He has good hip strength with manual testing and functionally. He has balance deficit in static positions, but good dynamic balance with walking on level ground. He is has good TUG score, and has nearly met all his goals as written. He continues to have balance impairment and is still at risk for falls. He will benefit from continued PT to further progress balance for full activity independence and safety. PROBLEM LIST (Impacting functional limitations):  1. Decreased Strength  2. Decreased ADL/Functional Activities  3. Decreased Ambulation Ability/Technique  4. Decreased Balance  5. Increased Pain INTERVENTIONS PLANNED:  1. Gait Training  2. Manual Therapy  3. Therapeutic Activites  4. Therapeutic Exercise/Strengthening   5. Modalities as needed for pain   TREATMENT PLAN:  Effective Dates:  18 to 8-3-18 . Frequency/Duration: 2-3 times a week for 4-6 more weeks.                                 GOALS: (Goals have been discussed and agreed upon with patient.)  Short-Term Functional Goals: Time Frame: 4 weeks  1. Pt will increase L hip abductor strength to 4/5 for improved gait. GOAL MET  2. Pt will improve TUG time to 15 sec for improved mobility. GOAL MET  3. Pt will be independent with HEP. GOAL MET  4. Pt will report pain 2/10 with daily activities. GOAL MET  Discharge Goals: Time Frame: 12 weeks   1. Pt will increase L LE strength to 5/5 for improved gait and mobility. Met  2. Pt will improve TUG time to 12 sec for decreased risk for falls. GOAL MET  3. Pt will report pain 1/10 with daily activities. GOAL MET  4. Pt will be able to balance on L single leg for 5 sec for gait with no assistive device. GOAL MET  5. Pt will be able to ambulate with least restrictive assistive device over various terrain with supervision. Progressing towards  NEW GOAL:   6. Pt will be able to balance in tandem stance for 30 sec for improved balance with gait. Nearly Met  Rehabilitation Potential For Stated Goals: Good                   HISTORY:   History of Present Injury/Illness (Reason for Referral): He was pushing a small cart at [facility] and his foot caught the wheel when he turned and he feel down. Injury happened feb 1st. [place] called EMS and [place] [address]. He had hip surgery on 2-2-18. Then he went to [facility] after his hip surgery. He did have home health come out to the house for a couple of visits. He now presents for outpatient therapy. Past Medical History/Comorbidities:   Mr. Paty Linda  has a past medical history of Abdominal aortic aneurysm ([address] (12/10/2015); Abdominal aortic aneurysm without rupture ([address] Allergic rhinitis (12/10/2015); Asthma; Benign neoplasm; Benign prostatic hyperplasia (12/10/2015); BPH without urinary obstruction; Cardiovascular disease (12/10/2015); Chronic obstructive asthma with exacerbation ([address] (12/10/2015); COPD (chronic obstructive pulmonary disease) ([address] (12/10/2015); Cough with hemoptysis;  Erectile dysfunction (12/10/2015); Essential hypertension, benign (12/10/2015); Fracture (10/2014); History of colon polyps; Low testosterone (12/10/2015); Lumbago; Memory loss; Overflow incontinence; Raynaud's syndrome (12/10/2015); Rotator cuff tendinitis; Thrombocytopenia ([facility] [address] and Urinary frequency. Mr. Durga Cheng  has a past surgical history that includes hx hernia repair (1980); hx colonoscopy; hx fracture tx (10/2014); hx cataract removal (6/2016-right); and hx orthopaedic. Social History/Living Environment:    Lives in a 1 story house with wife. 1 small step to get inside. They do have a ramp to get inside also. Prior Level of Function/Work/Activity:  Ambulated with on assistive device. He did go out of the house with his wife and perform light shopping. Goal is to be able to walk normal.    Current Medications:  From EMR     Current Outpatient Prescriptions:     lisinopril (PRINIVIL, ZESTRIL) 5 mg tablet, Take 5 mg by mouth daily. , Disp: , Rfl:     tamsulosin (FLOMAX) 0.4 mg capsule, Take 1 Cap by mouth daily. , Disp: 90 Cap, Rfl: 4    montelukast (SINGULAIR) 10 mg tablet, Take 1 Tab by mouth daily. , Disp: 90 Tab, Rfl: 4    fluticasone (FLONASE) 50 mcg/actuation nasal spray, 2 Sprays by Both Nostrils route daily. , Disp: 48 g, Rfl: 4    calcium citrate-vitamin d3 (CITRACAL+D) 315-200 mg-unit tab, Take 2 Tabs by mouth daily (with breakfast). , Disp: , Rfl:     cholecalciferol (VITAMIN D3) 1,000 unit cap, Take  by mouth., Disp: , Rfl:     guaiFENesin ER (MUCINEX) 600 mg ER tablet, Take 1,200 mg by mouth daily. , Disp: , Rfl:     Biotin 2,500 mcg cap, Take  by mouth., Disp: , Rfl:     aspirin delayed-release 81 mg tablet, Take  by mouth daily. , Disp: , Rfl:    Date Last Reviewed:  7/5/18   Number of Personal Factors/Comorbidities that affect the Plan of Care: 1-2: MODERATE COMPLEXITY   EXAMINATION:   7/5/2018  Observation/Orthostatic Postural Assessment: Walks in with a front wheeled walker. No distress. Palpation: Unremarkable. ROM: R Hip ROM is grossly WFL's. Strength:         R Hip strength is grossly 4+ to 5/5 with manual testing. Functional Mobility: Sit to/from Stand: compensatory independent with use of B UE. Bed Mobility: independent, slow to change positions. Walking on level ground: walking without walker with good dynamic stability, shortened step length. Stair walking: up/down with reciprocal pattern, use of one rail. Balance: Static Balance in Narrow stance firm/eyes open >30\" good balance, eyes closed 30\" good balance, eyes open/internal perturbation 30\" good balance; Tandem stance firm surface/eyes open L and R 20-30\" with difficulty to assume position; Single-leg stand: firm surface/eyes open L and R brief. Body Structures Involved:  1. Bones  2. Joints  3. Muscles Body Functions Affected:  1. Neuromusculoskeletal Activities and Participation Affected:  1. Community, Social and [place]   Number of elements (examined above) that affect the Plan of Care: 3: MODERATE COMPLEXITY   CLINICAL PRESENTATION:   Presentation: Stable and uncomplicated: LOW COMPLEXITY   CLINICAL DECISION MAKING:   Outcome Measure: Tool Used: Timed Up and Go (TUG)  Score:  Initial: 19 seconds 16 seconds, 12 sec with walker (Date: 4-24-18 ) 12 sec with walker, 12 sec with walker, 10 sec with no walker  5-29-18 Most Recent: 10\" without walker (6-28-18)   Interpretation of Score: The test measures, in seconds, the time taken by an individual to stand up from a standard arm chair (seat height 46 cm [18 in], arm height 65 cm [25.6 in]), walk a distance of 3 meters (118 in, approx 10 ft), turn, walk back to the chair and sit down. If the individual takes longer than 14 seconds to complete TUG, this indicates risk for falls. Score 7 7.5-10.5 11-14 14.5-17.5 18-21 21.5-24.5 25+   Modifier CH CI CJ CK CL CM CN ?    Mobility - Walking and Moving Around:     - CURRENT STATUS:CI - 1%-19% impaired, limited or restricted (6-28-18)    - GOAL STATUS: CJ - 20%-39% impaired, limited or restricted    - D/C STATUS: ---------------To be determined---------------    Medical Necessity:   · Making good progress, but still with balance deficit, fall risk. Reason for Services/Other Comments:  · Patient continues to require skilled intervention due to decreased LE strength, decreased gait and balance. Use of outcome tool(s) and clinical judgement create a POC that gives a: Clear prediction of patient's progress: LOW COMPLEXITY            TREATMENT:   (In addition to Assessment/Re-Assessment sessions the following treatments were rendered)  7/5/2018  Pre-treatment Symptoms/Complaints: No pain to report to L hip or LE. Says he is doing well. Walks without walker at home sometimes, but still using it when going out. Pain: Initial: Pain Intensity 1: 0   Post Session:  No change in pain reported by patient. Therapeutic Exercise: (30 Minutes): Exercise for functional strength with sit to stand and stair walking; Sit to stand on foam 2x10. Standing EO/EC x 3 each (about 10 sec), modified tandem stance with assist to assume 2 x20-30\" each,  SLS 3-5 secs each, B x3. Standing hip abduction B 2x10. Discussed importance of SLS as an indicator of when he will be ready to walk without walker. HEP: He is to continue with HEP previously provided and add SLS practice near stable surface such as a counter top. .     Treatment/Session Assessment:    · Response to Treatment: patient works hard at exercises. Needs to continue to work on balance if he wishes to get off the walker. · Compliance with Program/Exercises: complaint   · Recommendations/Intent for next treatment session: Will continue with static and dynamic training for improved walking stability and activity independence.    Total Treatment Duration: 30 minutes  PT Patient Time In/Time Out  Time In: 0186  Time Out: Gabriel [address], PT

## 2018-07-10 ENCOUNTER — HOSPITAL ENCOUNTER (OUTPATIENT)
Dept: PHYSICAL THERAPY | Age: 83
Discharge: HOME | End: 2018-07-10
Payer: MEDICARE

## 2018-07-10 PROCEDURE — 97110 THERAPEUTIC EXERCISES: CPT

## 2018-07-10 NOTE — PROGRESS NOTES
Mariam Rodriguez  : 1932  [place]  Secondary:  [address] [place]  [address]  [facility], [address], [place].  [phone]   [phone]        OUTPATIENT PHYSICAL THERAPY:Daily Note 7/10/2018      ICD-10: Treatment Diagnosis: Displaced intertrochanteric fracture of left femur, sequela (S72.142S); Unsteadiness on feet (R26.81); Pain in left hip (M25.552)  Precautions/Allergies:   Review of patient's allergies indicates no known allergies. Fall Risk Score: 7 (? 5 = High Risk)  MD Orders: hip abductor, quad strengthening, gait training WBAT MEDICAL/REFERRING DIAGNOSIS:  s/p ORIF LT IT    DATE OF ONSET: Injury 18; surgery 18  REFERRING PHYSICIAN: Justin Hudson MD  RETURN PHYSICIAN APPOINTMENT: 2018     PROGRESS ASSESSMENT (18):  Mr. Amanda Giordano is now nearly 5 months s/p ORIF of the left hip after a fall at [facility]. He appears to be making good progress. He has good hip ROM. He has good hip strength with manual testing and functionally. He has balance deficit in static positions, but good dynamic balance with walking on level ground. He is has good TUG score, and has nearly met all his goals as written. He continues to have balance impairment and is still at risk for falls. He will benefit from continued PT to further progress balance for full activity independence and safety. PROBLEM LIST (Impacting functional limitations):  1. Decreased Strength  2. Decreased ADL/Functional Activities  3. Decreased Ambulation Ability/Technique  4. Decreased Balance  5. Increased Pain INTERVENTIONS PLANNED:  1. Gait Training  2. Manual Therapy  3. Therapeutic Activites  4. Therapeutic Exercise/Strengthening   5. Modalities as needed for pain   TREATMENT PLAN:  Effective Dates:  18 to 8-3-18 . Frequency/Duration: 2-3 times a week for 4-6 more weeks.                                 GOALS: (Goals have been discussed and agreed upon with patient.)  Short-Term Functional Goals: Time Frame: 4 weeks  1. Pt will increase L hip abductor strength to 4/5 for improved gait. GOAL MET  2. Pt will improve TUG time to 15 sec for improved mobility. GOAL MET  3. Pt will be independent with HEP. GOAL MET  4. Pt will report pain 2/10 with daily activities. GOAL MET  Discharge Goals: Time Frame: 12 weeks   1. Pt will increase L LE strength to 5/5 for improved gait and mobility. Met  2. Pt will improve TUG time to 12 sec for decreased risk for falls. GOAL MET  3. Pt will report pain 1/10 with daily activities. GOAL MET  4. Pt will be able to balance on L single leg for 5 sec for gait with no assistive device. GOAL MET  5. Pt will be able to ambulate with least restrictive assistive device over various terrain with supervision. Progressing towards  NEW GOAL:   6. Pt will be able to balance in tandem stance for 30 sec for improved balance with gait. Nearly Met  Rehabilitation Potential For Stated Goals: Good                   HISTORY:   History of Present Injury/Illness (Reason for Referral): He was pushing a small cart at [employer] and his foot caught the wheel when he turned and he feel down. Injury happened feb 1st. They called EMS and took him to [address] [place] [address]. He had hip surgery on 2-2-18. Then he went to [facility] after his hip surgery. He did have home health come out to the house for a couple of visits. He now presents for outpatient therapy. Past Medical History/Comorbidities:   Mr. Zhen Johnson  has a past medical history of Abdominal aortic aneurysm ([facility] [address] (12/10/2015); Abdominal aortic aneurysm without rupture ([facility] [address] Allergic rhinitis (12/10/2015); Asthma; Benign neoplasm; Benign prostatic hyperplasia (12/10/2015); BPH without urinary obstruction; Cardiovascular disease (12/10/2015); Chronic obstructive asthma with exacerbation ([facility] [address] (12/10/2015); COPD (chronic obstructive pulmonary disease) ([facility] [address] (12/10/2015); Cough with hemoptysis;  Erectile dysfunction (12/10/2015); Essential hypertension, benign (12/10/2015); Fracture (10/2014); History of colon polyps; Low testosterone (12/10/2015); Lumbago; Memory loss; Overflow incontinence; Raynaud's syndrome (12/10/2015); Rotator cuff tendinitis; Thrombocytopenia ([address] and Urinary frequency. Mr. Compa Limon  has a past surgical history that includes hx hernia repair (1980); hx colonoscopy; hx fracture tx (10/2014); hx cataract removal (6/2016-right); and hx orthopaedic. Social History/Living Environment:    Lives in a 1 story house with wife. 1 small step to get inside. They do have a ramp to get inside also. Prior Level of Function/Work/Activity:  Ambulated with on assistive device. He did go out of the house with his wife and perform light shopping. Goal is to be able to walk normal.    Current Medications:        lisinopril (PRINIVIL, ZESTRIL)      tamsulosin (FLOMAX)     montelukast (SINGULAIR)      fluticasone (FLONASE)      calcium citrate-vitamin d3 (CITRACAL+D)       guaiFENesin ER (MUCINEX)       aspirin delayed-release 81 mg tablet,    Date Last Reviewed:  7/10/18   EXAMINATION:   7/10/2018  Observation/Orthostatic Postural Assessment: Walks in with a front wheeled walker. No distress. Palpation: Unremarkable. ROM: R Hip ROM is grossly WFL's. Strength:         R Hip strength is grossly 4+ to 5/5 with manual testing. Functional Mobility: Sit to/from Stand: compensatory independent with use of B UE. Bed Mobility: independent, slow to change positions. Walking on level ground: walking without walker with good dynamic stability, shortened step length. Stair walking: up/down with reciprocal pattern, use of one rail. Balance: Static Balance in Narrow stance firm/eyes open >30\" good balance, eyes closed 30\" good balance, eyes open/internal perturbation 30\" good balance;  Tandem stance firm surface/eyes open L and R 20-30\" with difficulty to assume position; Single-leg stand: firm surface/eyes open L and R brief. Body Structures Involved:  1. Bones  2. Joints  3. Muscles Body Functions Affected:  1. Neuromusculoskeletal Activities and Participation Affected:  1. Community, Social and Civic Life   CLINICAL DECISION MAKING:   Outcome Measure: Tool Used: Timed Up and Go (TUG)  Score:  Initial: 19 seconds 16 seconds, 12 sec with walker (Date: 4-24-18 ) 12 sec with walker, 12 sec with walker, 10 sec with no walker  5-29-18 Most Recent: 10\" without walker (6-28-18)   Interpretation of Score: The test measures, in seconds, the time taken by an individual to stand up from a standard arm chair (seat height 46 cm [18 in], arm height 65 cm [25.6 in]), walk a distance of 3 meters (118 in, approx 10 ft), turn, walk back to the chair and sit down. If the individual takes longer than 14 seconds to complete TUG, this indicates risk for falls. Score 7 7.5-10.5 11-14 14.5-17.5 18-21 21.5-24.5 25+   Modifier CH CI CJ CK CL CM CN ? Mobility - Walking and Moving Around:     - CURRENT STATUS:CI - 1%-19% impaired, limited or restricted (6-28-18)    - GOAL STATUS: CJ - 20%-39% impaired, limited or restricted    - D/C STATUS: ---------------To be determined---------------    Medical Necessity:   · Making good progress, but still with balance deficit, fall risk. Reason for Services/Other Comments:  · Patient continues to require skilled intervention due to decreased LE strength, decreased gait and balance. TREATMENT:   (In addition to Assessment/Re-Assessment sessions the following treatments were rendered)  7/10/2018  Pre-treatment Symptoms/Complaints: No pain to report to L hip or LE. Says he is doing well. No new problems. Pain: Initial: Pain Intensity 1: 0   Post Session:  No change in pain reported by patient. Therapeutic Exercise: (40 Minutes): Exercise for functional strength and balance:   Sit to stand on foam 2x10.  Standing EO/EC on foam x 3 each (about 10 sec), modified tandem stance with assist to assume 3 x20-30\" each,  SLS 3-5 secs each, B x3. Standing hip abduction 2# B 2x10, forward step ups 4\" B 2x10. PAtient ambulated 150' without assistive device with SBA. No LOB noted. HEP: He is to continue with current HEP    Treatment/Session Assessment:    · Response to Treatment: patient works hard at exercises. He did well walking without assistive device today. · Compliance with Program/Exercises: compliant   · Recommendations/Intent for next treatment session: Will continue with gait and balance activity, and exercises for strength.    Total Treatment Duration: 40 minutes  PT Patient Time In/Time Out  Time In: 1032  Time Out: Jaquan Sawyer

## 2018-07-12 ENCOUNTER — APPOINTMENT (OUTPATIENT)
Dept: PHYSICAL THERAPY | Age: 83
End: 2018-07-12
Payer: MEDICARE

## 2018-07-17 ENCOUNTER — HOSPITAL ENCOUNTER (OUTPATIENT)
Dept: PHYSICAL THERAPY | Age: 83
Discharge: HOME | End: 2018-07-17
Payer: MEDICARE

## 2018-07-17 PROCEDURE — 97110 THERAPEUTIC EXERCISES: CPT

## 2018-07-17 NOTE — PROGRESS NOTES
Pamela Gonzalez  : 1932  [place]  Secondary:  [address]  at [facility] IMMANUEL BALDWIN  [facility], [address], [place] [address].  [phone]   [phone]        OUTPATIENT PHYSICAL THERAPY:Daily Note 2018      ICD-10: Treatment Diagnosis: Displaced intertrochanteric fracture of left femur, sequela (S72.142S); Unsteadiness on feet (R26.81); Pain in left hip (M25.552)  Precautions/Allergies:   Review of patient's allergies indicates no known allergies. Fall Risk Score: 7 (? 5 = High Risk)  MD Orders: hip abductor, quad strengthening, gait training WBAT MEDICAL/REFERRING DIAGNOSIS:  s/p ORIF LT IT    DATE OF ONSET: Injury 18; surgery 18  REFERRING PHYSICIAN: Matt Junior MD  RETURN PHYSICIAN APPOINTMENT: 2018     PROGRESS ASSESSMENT (18):  Mr. Tsering Valente is now nearly 5 months s/p ORIF of the left hip after a fall at [facility]. He appears to be making good progress. He has good hip ROM. He has good hip strength with manual testing and functionally. He has balance deficit in static positions, but good dynamic balance with walking on level ground. He is has good TUG score, and has nearly met all his goals as written. He continues to have balance impairment and is still at risk for falls. He will benefit from continued PT to further progress balance for full activity independence and safety. PROBLEM LIST (Impacting functional limitations):  1. Decreased Strength  2. Decreased ADL/Functional Activities  3. Decreased Ambulation Ability/Technique  4. Decreased Balance  5. Increased Pain INTERVENTIONS PLANNED:  1. Gait Training  2. Manual Therapy  3. Therapeutic Activites  4. Therapeutic Exercise/Strengthening   5. Modalities as needed for pain   TREATMENT PLAN:  Effective Dates:  18 to 8-3-18 . Frequency/Duration: 2-3 times a week for 4-6 more weeks.                                 GOALS: (Goals have been discussed and agreed upon with patient.)  Short-Term Functional Goals: Time Frame: 4 weeks  1. Pt will increase L hip abductor strength to 4/5 for improved gait. GOAL MET  2. Pt will improve TUG time to 15 sec for improved mobility. GOAL MET  3. Pt will be independent with HEP. GOAL MET  4. Pt will report pain 2/10 with daily activities. GOAL MET  Discharge Goals: Time Frame: 12 weeks   1. Pt will increase L LE strength to 5/5 for improved gait and mobility. Met  2. Pt will improve TUG time to 12 sec for decreased risk for falls. GOAL MET  3. Pt will report pain 1/10 with daily activities. GOAL MET  4. Pt will be able to balance on L single leg for 5 sec for gait with no assistive device. GOAL MET  5. Pt will be able to ambulate with least restrictive assistive device over various terrain with supervision. Progressing towards  NEW GOAL:   6. Pt will be able to balance in tandem stance for 30 sec for improved balance with gait. Nearly Met  Rehabilitation Potential For Stated Goals: Good                   HISTORY:   History of Present Injury/Illness (Reason for Referral): He was pushing a small cart at [employer] and his foot caught the wheel when he turned and he feel down. Injury happened feb 1st. They called EMS and took him to [facility]. He had hip surgery on 2-2-18. Then he went to [facility] after his hip surgery. He did have home health come out to the house for a couple of visits. He now presents for outpatient therapy. Past Medical History/Comorbidities:   Mr. Giovanni Sánchez  has a past medical history of Abdominal aortic aneurysm ([facility] [address] (12/10/2015); Abdominal aortic aneurysm without rupture ([facility] [address] Allergic rhinitis (12/10/2015); Asthma; Benign neoplasm; Benign prostatic hyperplasia (12/10/2015); BPH without urinary obstruction; Cardiovascular disease (12/10/2015); Chronic obstructive asthma with exacerbation ([address] (12/10/2015); COPD (chronic obstructive pulmonary disease) ([facility] [address] (12/10/2015); Cough with hemoptysis;  Erectile dysfunction (12/10/2015); Essential hypertension, benign (12/10/2015); Fracture (10/2014); History of colon polyps; Low testosterone (12/10/2015); Lumbago; Memory loss; Overflow incontinence; Raynaud's syndrome (12/10/2015); Rotator cuff tendinitis; Thrombocytopenia ([address] and Urinary frequency. Mr. Tsering Valente  has a past surgical history that includes hx hernia repair (1980); hx colonoscopy; hx fracture tx (10/2014); hx cataract removal (6/2016-right); and hx orthopaedic. Social History/Living Environment:    Lives in a 1 story house with wife. 1 small step to get inside. They do have a ramp to get inside also. Prior Level of Function/Work/Activity:  Ambulated with on assistive device. He did go out of the house with his wife and perform light shopping. Goal is to be able to walk normal.    Current Medications:        lisinopril (PRINIVIL, ZESTRIL)      tamsulosin (FLOMAX)     montelukast (SINGULAIR)      fluticasone (FLONASE)      calcium citrate-vitamin d3 (CITRACAL+D)       guaiFENesin ER (MUCINEX)       aspirin delayed-release 81 mg tablet,    Date Last Reviewed:  7/10/18   EXAMINATION:   7/17/2018  Observation/Orthostatic Postural Assessment: Walks in with a front wheeled walker. No distress. Palpation: Unremarkable. ROM: R Hip ROM is grossly WFL's. Strength:         R Hip strength is grossly 4+ to 5/5 with manual testing. Functional Mobility: Sit to/from Stand: compensatory independent with use of B UE. Bed Mobility: independent, slow to change positions. Walking on level ground: walking without walker with good dynamic stability, shortened step length. Stair walking: up/down with reciprocal pattern, use of one rail. Balance: Static Balance in Narrow stance firm/eyes open >30\" good balance, eyes closed 30\" good balance, eyes open/internal perturbation 30\" good balance;  Tandem stance firm surface/eyes open L and R 20-30\" with difficulty to assume position; Single-leg stand: firm surface/eyes open L and R brief. Body Structures Involved:  1. Bones  2. Joints  3. Muscles Body Functions Affected:  1. Neuromusculoskeletal Activities and Participation Affected:  1. Community, Social and Civic Life   CLINICAL DECISION MAKING:   Outcome Measure: Tool Used: Timed Up and Go (TUG)  Score:  Initial: 19 seconds 16 seconds, 12 sec with walker (Date: 4-24-18 ) 12 sec with walker, 12 sec with walker, 10 sec with no walker  5-29-18 Most Recent: 10\" without walker (6-28-18)   Interpretation of Score: The test measures, in seconds, the time taken by an individual to stand up from a standard arm chair (seat height 46 cm [18 in], arm height 65 cm [25.6 in]), walk a distance of 3 meters (118 in, approx 10 ft), turn, walk back to the chair and sit down. If the individual takes longer than 14 seconds to complete TUG, this indicates risk for falls. Score 7 7.5-10.5 11-14 14.5-17.5 18-21 21.5-24.5 25+   Modifier CH CI CJ CK CL CM CN ? Mobility - Walking and Moving Around:     - CURRENT STATUS:CI - 1%-19% impaired, limited or restricted (6-28-18)    - GOAL STATUS: CJ - 20%-39% impaired, limited or restricted    - D/C STATUS: ---------------To be determined---------------    Medical Necessity:   · Making good progress, but still with balance deficit, fall risk. Reason for Services/Other Comments:  · Patient continues to require skilled intervention due to decreased LE strength, decreased gait and balance. TREATMENT:   (In addition to Assessment/Re-Assessment sessions the following treatments were rendered)  7/17/2018  Pre-treatment Symptoms/Complaints: Pt states I am doing good for what I am coming here for but I have hurt my back. Pt states he was vacuuming and felt pain in his low back and some pain into his R hip and groin. He states he is having back spasms when he tries to get out of bed at night.   Pain: Initial:    6/10 Post Session:  No change in pain reported by patient. Therapeutic Exercise: ( ): attempted sit to stand but pt was unable to do it without using UE on walker due to pain. Held on balance and strengthening ex today. Discussed sitting, lying and walking positions to decrease flexion in low back. Pt performed 2 sets of 10 trunk ext in standing and had resolution of leg pain. Attempted prone lying and while prone lying he had no pain just an awareness of the pain in his R hip. Upon returning to stand and walking pt stated his pain was about the same. HEP: He is to continue with current HEP    Treatment/Session Assessment:    · Response to Treatment: patient having symptoms from back incident therefore held on ex for today and addressed back from positionally. If not better on Thursday will suggest pt have back seen by MD.   · Compliance with Program/Exercises: compliant   · Recommendations/Intent for next treatment session: Will continue with gait and balance activity, and exercises for strength as tolerated.    Total Treatment Duration: 30 minutes  PT Patient Time In/Time Out  Time In: 1115  Time Out: 0625 Joselito Jones, PT

## 2018-07-19 ENCOUNTER — HOSPITAL ENCOUNTER (OUTPATIENT)
Dept: PHYSICAL THERAPY | Age: 83
Discharge: HOME | End: 2018-07-19
Payer: MEDICARE

## 2018-07-19 PROCEDURE — 97110 THERAPEUTIC EXERCISES: CPT

## 2018-07-19 NOTE — PROGRESS NOTES
Kenisha Friday  : 1932  [place]  Secondary:  [address]  at [facility] IMMANUEL BALDWIN  [address] [facility], [address], [address] [facility]  [phone]   [phone]        OUTPATIENT PHYSICAL THERAPY:Daily Note 2018      ICD-10: Treatment Diagnosis: Displaced intertrochanteric fracture of left femur, sequela (S72.142S); Unsteadiness on feet (R26.81); Pain in left hip (M25.552)  Precautions/Allergies:   Review of patient's allergies indicates no known allergies. Fall Risk Score: 7 (? 5 = High Risk)  MD Orders: hip abductor, quad strengthening, gait training WBAT MEDICAL/REFERRING DIAGNOSIS:  s/p ORIF LT IT    DATE OF ONSET: Injury 18; surgery 18  REFERRING PHYSICIAN: Amy Back MD  RETURN PHYSICIAN APPOINTMENT: 2018     PROGRESS ASSESSMENT (18):  Mr. Ashish Salinas is now nearly 5 months s/p ORIF of the left hip after a fall at [place]. He appears to be making good progress. He has good hip ROM. He has good hip strength with manual testing and functionally. He has balance deficit in static positions, but good dynamic balance with walking on level ground. He is has good TUG score, and has nearly met all his goals as written. He continues to have balance impairment and is still at risk for falls. He will benefit from continued PT to further progress balance for full activity independence and safety. PROBLEM LIST (Impacting functional limitations):  1. Decreased Strength  2. Decreased ADL/Functional Activities  3. Decreased Ambulation Ability/Technique  4. Decreased Balance  5. Increased Pain INTERVENTIONS PLANNED:  1. Gait Training  2. Manual Therapy  3. Therapeutic Activites  4. Therapeutic Exercise/Strengthening   5. Modalities as needed for pain   TREATMENT PLAN:  Effective Dates:  18 to 8-3-18 . Frequency/Duration: 2-3 times a week for 4-6 more weeks.                                 GOALS: (Goals have been discussed and agreed upon with patient.)  Short-Term Functional Goals: Time Frame: 4 weeks  1. Pt will increase L hip abductor strength to 4/5 for improved gait. GOAL MET  2. Pt will improve TUG time to 15 sec for improved mobility. GOAL MET  3. Pt will be independent with HEP. GOAL MET  4. Pt will report pain 2/10 with daily activities. GOAL MET  Discharge Goals: Time Frame: 12 weeks   1. Pt will increase L LE strength to 5/5 for improved gait and mobility. Met  2. Pt will improve TUG time to 12 sec for decreased risk for falls. GOAL MET  3. Pt will report pain 1/10 with daily activities. GOAL MET  4. Pt will be able to balance on L single leg for 5 sec for gait with no assistive device. GOAL MET  5. Pt will be able to ambulate with least restrictive assistive device over various terrain with supervision. Progressing towards  NEW GOAL:   6. Pt will be able to balance in tandem stance for 30 sec for improved balance with gait. Nearly Met  Rehabilitation Potential For Stated Goals: Good                   HISTORY:   History of Present Injury/Illness (Reason for Referral): He was pushing a small cart at [address] [place] [address] and his foot caught the wheel when he turned and he feel down. Injury happened feb 1st. They called EMS and took him to [facility] downw. He had hip surgery on 2-2-18. Then he went to [facility] after his hip surgery. He did have home health come out to the house for a couple of visits. He now presents for outpatient therapy. Past Medical History/Comorbidities:   Mr. Graham Saunders  has a past medical history of Abdominal aortic aneurysm ([facility] [address] (12/10/2015); Abdominal aortic aneurysm without rupture ([facility] [address] [facility] Allergic rhinitis (12/10/2015); Asthma; Benign neoplasm; Benign prostatic hyperplasia (12/10/2015); BPH without urinary obstruction; Cardiovascular disease (12/10/2015); Chronic obstructive asthma with exacerbation ([facility] (12/10/2015); COPD (chronic obstructive pulmonary disease) ([facility] [address] [facility] (12/10/2015); Cough with hemoptysis;  Erectile dysfunction (12/10/2015); Essential hypertension, benign (12/10/2015); Fracture (10/2014); History of colon polyps; Low testosterone (12/10/2015); Lumbago; Memory loss; Overflow incontinence; Raynaud's syndrome (12/10/2015); Rotator cuff tendinitis; Thrombocytopenia ([address] and Urinary frequency. Mr. Dileep Vázquez  has a past surgical history that includes hx hernia repair (1980); hx colonoscopy; hx fracture tx (10/2014); hx cataract removal (6/2016-right); and hx orthopaedic. Social History/Living Environment:    Lives in a 1 story house with wife. 1 small step to get inside. They do have a ramp to get inside also. Prior Level of Function/Work/Activity:  Ambulated with on assistive device. He did go out of the house with his wife and perform light shopping. Goal is to be able to walk normal.    Current Medications:        lisinopril (PRINIVIL, ZESTRIL)      tamsulosin (FLOMAX)     montelukast (SINGULAIR)      fluticasone (FLONASE)      calcium citrate-vitamin d3 (CITRACAL+D)       guaiFENesin ER (MUCINEX)       aspirin delayed-release 81 mg tablet,    Date Last Reviewed:  7/10/18   EXAMINATION:   7/19/2018  Observation/Orthostatic Postural Assessment: Walks in with a front wheeled walker. No distress. Palpation: Unremarkable. ROM: R Hip ROM is grossly WFL's. Strength:         R Hip strength is grossly 4+ to 5/5 with manual testing. Functional Mobility: Sit to/from Stand: compensatory independent with use of B UE. Bed Mobility: independent, slow to change positions. Walking on level ground: walking without walker with good dynamic stability, shortened step length. Stair walking: up/down with reciprocal pattern, use of one rail. Balance: Static Balance in Narrow stance firm/eyes open >30\" good balance, eyes closed 30\" good balance, eyes open/internal perturbation 30\" good balance;  Tandem stance firm surface/eyes open L and R 20-30\" with difficulty to assume position; Single-leg stand: firm surface/eyes open L and R brief. Body Structures Involved:  1. Bones  2. Joints  3. Muscles Body Functions Affected:  1. Neuromusculoskeletal Activities and Participation Affected:  1. Community, Social and Civic Life   CLINICAL DECISION MAKING:   Outcome Measure: Tool Used: Timed Up and Go (TUG)  Score:  Initial: 19 seconds 16 seconds, 12 sec with walker (Date: 4-24-18 ) 12 sec with walker, 12 sec with walker, 10 sec with no walker  5-29-18 Most Recent: 10\" without walker (6-28-18)   Interpretation of Score: The test measures, in seconds, the time taken by an individual to stand up from a standard arm chair (seat height 46 cm [18 in], arm height 65 cm [25.6 in]), walk a distance of 3 meters (118 in, approx 10 ft), turn, walk back to the chair and sit down. If the individual takes longer than 14 seconds to complete TUG, this indicates risk for falls. Score 7 7.5-10.5 11-14 14.5-17.5 18-21 21.5-24.5 25+   Modifier CH CI CJ CK CL CM CN ? Mobility - Walking and Moving Around:     - CURRENT STATUS:CI - 1%-19% impaired, limited or restricted (6-28-18)    - GOAL STATUS: CJ - 20%-39% impaired, limited or restricted    - D/C STATUS: ---------------To be determined---------------    Medical Necessity:   · Making good progress, but still with balance deficit, fall risk. Reason for Services/Other Comments:  · Patient continues to require skilled intervention due to decreased LE strength, decreased gait and balance. TREATMENT:   (In addition to Assessment/Re-Assessment sessions the following treatments were rendered)  7/19/2018  Pre-treatment Symptoms/Complaints: Pt states that his back feels better today, but that he is having difficulty lifting his R leg due to groin pain. Pain: Initial:    4-5/10 Post Session:  No change after PT        Therapeutic Exercise: ( 40 minutes) for improved LE function, mobility and strength per grid below. Manual assist required to lift R LE in sitting with seated marches. Date:  18 Date:   Date:     Activity/Exercise Parameters Parameters Parameters   Sit to stands W/hands x 8  W/one hand 2 x 4 each     Gait 300' x 2 w/RW  300' x 5 w/o AD     Step ups  4\" 2 x 10 ea LE w/UE support on walker     Gait drills Lateral walkin x each direction    Tandem walking  30' w/o AD x 4    Tapping to 4\" step 2 x 10 ea LE    Standing with manual perturbations    Standing with feet together, eyes closed     Seated marching 2 x 10 R LE                        HEP: He is to continue with current HEP    Treatment/Session Assessment:    · Response to Treatment: Patient ambulated well without assistive device. Patient less limited by back pain and R groin pain today. Had difficulty lifting R LE due to pain in R groin. · Compliance with Program/Exercises: compliant   · Recommendations/Intent for next treatment session: Will continue with gait and balance activity, and exercises for strength as tolerated.    Total Treatment Duration: 40 minutes  PT Patient Time In/Time Out  Time In: 0950  Time Out: [address],

## 2018-07-23 ENCOUNTER — HOSPITAL ENCOUNTER (OUTPATIENT)
Dept: PHYSICAL THERAPY | Age: 83
Discharge: HOME | End: 2018-07-23
Payer: MEDICARE

## 2018-07-23 PROCEDURE — 97110 THERAPEUTIC EXERCISES: CPT

## 2018-07-23 NOTE — PROGRESS NOTES
Erma Zarco  : 1932  [place]  Secondary:  [address]  at [facility] IMMANUEL BALDWIN  [address] [facility], [address], [place] [address].  [phone]   [phone]        OUTPATIENT PHYSICAL THERAPY:Daily Note 2018      ICD-10: Treatment Diagnosis: Displaced intertrochanteric fracture of left femur, sequela (S72.142S); Unsteadiness on feet (R26.81); Pain in left hip (M25.552)  Precautions/Allergies:   Review of patient's allergies indicates no known allergies. Fall Risk Score: 7 (? 5 = High Risk)  MD Orders: hip abductor, quad strengthening, gait training WBAT MEDICAL/REFERRING DIAGNOSIS:  s/p ORIF LT IT    DATE OF ONSET: Injury 18; surgery 18  REFERRING PHYSICIAN: Rimma Bell MD  RETURN PHYSICIAN APPOINTMENT: 2018     PROGRESS ASSESSMENT (18):  Mr. Jaimee Dobbs is now nearly 5 months s/p ORIF of the left hip after a fall at [place]. He appears to be making good progress. He has good hip ROM. He has good hip strength with manual testing and functionally. He has balance deficit in static positions, but good dynamic balance with walking on level ground. He is has good TUG score, and has nearly met all his goals as written. He continues to have balance impairment and is still at risk for falls. He will benefit from continued PT to further progress balance for full activity independence and safety. PROBLEM LIST (Impacting functional limitations):  1. Decreased Strength  2. Decreased ADL/Functional Activities  3. Decreased Ambulation Ability/Technique  4. Decreased Balance  5. Increased Pain INTERVENTIONS PLANNED:  1. Gait Training  2. Manual Therapy  3. Therapeutic Activites  4. Therapeutic Exercise/Strengthening   5. Modalities as needed for pain   TREATMENT PLAN:  Effective Dates:  18 to 8-3-18 . Frequency/Duration: 2-3 times a week for 4-6 more weeks.                                 GOALS: (Goals have been discussed and agreed upon with patient.)  Short-Term Functional Goals: Time Frame: 4 weeks  1. Pt will increase L hip abductor strength to 4/5 for improved gait. GOAL MET  2. Pt will improve TUG time to 15 sec for improved mobility. GOAL MET  3. Pt will be independent with HEP. GOAL MET  4. Pt will report pain 2/10 with daily activities. GOAL MET  Discharge Goals: Time Frame: 12 weeks   1. Pt will increase L LE strength to 5/5 for improved gait and mobility. Met  2. Pt will improve TUG time to 12 sec for decreased risk for falls. GOAL MET  3. Pt will report pain 1/10 with daily activities. GOAL MET  4. Pt will be able to balance on L single leg for 5 sec for gait with no assistive device. GOAL MET  5. Pt will be able to ambulate with least restrictive assistive device over various terrain with supervision. Progressing towards  NEW GOAL:   6. Pt will be able to balance in tandem stance for 30 sec for improved balance with gait. Nearly Met  Rehabilitation Potential For Stated Goals: Good                   HISTORY:   History of Present Injury/Illness (Reason for Referral): He was pushing a small cart at [employer] and his foot caught the wheel when he turned and he feel down. Injury happened feb 1st. They called EMS and took him to [address] [place] [address]. He had hip surgery on 2-2-18. Then he went to [facility] after his hip surgery. He did have home health come out to the house for a couple of visits. He now presents for outpatient therapy. Past Medical History/Comorbidities:   Mr. Ashish Salinas  has a past medical history of Abdominal aortic aneurysm ([facility] [address] (12/10/2015); Abdominal aortic aneurysm without rupture ([facility] [address] Allergic rhinitis (12/10/2015); Asthma; Benign neoplasm; Benign prostatic hyperplasia (12/10/2015); BPH without urinary obstruction; Cardiovascular disease (12/10/2015); Chronic obstructive asthma with exacerbation ([facility] [address] (12/10/2015); COPD (chronic obstructive pulmonary disease) ([facility] [address] (12/10/2015); Cough with hemoptysis;  Erectile dysfunction (12/10/2015); Essential hypertension, benign (12/10/2015); Fracture (10/2014); History of colon polyps; Low testosterone (12/10/2015); Lumbago; Memory loss; Overflow incontinence; Raynaud's syndrome (12/10/2015); Rotator cuff tendinitis; Thrombocytopenia ([address] and Urinary frequency. Mr. Loi Nunez  has a past surgical history that includes hx hernia repair (1980); hx colonoscopy; hx fracture tx (10/2014); hx cataract removal (6/2016-right); and hx orthopaedic. Social History/Living Environment:    Lives in a 1 story house with wife. 1 small step to get inside. They do have a ramp to get inside also. Prior Level of Function/Work/Activity:  Ambulated with on assistive device. He did go out of the house with his wife and perform light shopping. Goal is to be able to walk normal.    Current Medications:        lisinopril (PRINIVIL, ZESTRIL)      tamsulosin (FLOMAX)     montelukast (SINGULAIR)      fluticasone (FLONASE)      calcium citrate-vitamin d3 (CITRACAL+D)       guaiFENesin ER (MUCINEX)       aspirin delayed-release 81 mg tablet,    Date Last Reviewed:  7/23/18   EXAMINATION:   7/23/2018  Observation/Orthostatic Postural Assessment: Walks in with a front wheeled walker, stooped posture. Supine alignment: LE's and pelvic landmarks appear symmetric. Palpation: Tender to R PSIS, lumbosacral spine. .  ROM: L and R Hip PROM grossly WFL's. Strength: Not tested. Functional Mobility: Not tested. Balance: Not tested. Body Structures Involved:  1. Bones  2. Joints  3. Muscles Body Functions Affected:  1. Neuromusculoskeletal Activities and Participation Affected:  1. Community, Social and Civic Life   CLINICAL DECISION MAKING:   Outcome Measure:    Tool Used: Timed Up and Go (TUG)  Score:  Initial: 19 seconds 16 seconds, 12 sec with walker (Date: 4-24-18 ) 12 sec with walker, 12 sec with walker, 10 sec with no walker  5-29-18 Most Recent: 10\" without walker (6-28-18) Interpretation of Score: The test measures, in seconds, the time taken by an individual to stand up from a standard arm chair (seat height 46 cm [18 in], arm height 65 cm [25.6 in]), walk a distance of 3 meters (118 in, approx 10 ft), turn, walk back to the chair and sit down. If the individual takes longer than 14 seconds to complete TUG, this indicates risk for falls. Score 7 7.5-10.5 11-14 14.5-17.5 18-21 21.5-24.5 25+   Modifier CH CI CJ CK CL CM CN ? Mobility - Walking and Moving Around:     - CURRENT STATUS:CI - 1%-19% impaired, limited or restricted (6-28-18)    - GOAL STATUS: CJ - 20%-39% impaired, limited or restricted    - D/C STATUS: ---------------To be determined---------------    Medical Necessity:   · Making good progress, but still with balance deficit, fall risk. Reason for Services/Other Comments:  · Patient continues to require skilled intervention due to decreased LE strength, decreased gait and balance. TREATMENT:   (In addition to Assessment/Re-Assessment sessions the following treatments were rendered)  7/23/2018  Pre-treatment Symptoms/Complaints: Says he continues to have R sided lower back pain after vacuuming about 2 weeks ago. Pain mostly to R lumbosacral region and R groin, and present when moving from sit to/from stand. It was across low back yesterday. Was having trouble lifting R leg, but can do it now. L hip and LE are doing well. No pain to report. Feels he is walking \"all right\". Pain: Initial:   3/10 Post Session:  No change after PT     Therapeutic Exercise: (35 Minutes): Instruction and performance in lumbopelvic mobility exercise with hook-lying and seated pelvic tilts and pelvic shifts in comfortable ranges. Verbal and tactile cues for corrected movement patterns.  Assisted Active Isolated Stretching to L and R gastroc., posterior hip, hip adductors with knee extended and knee flexed, lateral hip, hamstrings in 90/90 and SLR, and quads/hip flexors in side-lying, 3\"x10 each; reciprocal inhibition stretch to hip flexors in side-lying 10\"x5 each. HEP: He is to work on these active lumbopelvic mobility exercises in comfortable ranges in hook-lying daily and seated. He can continue with current HEP as pain allows. He verbalizes understanding. Date:  18 Date:  18 Date:     Activity/Exercise Parameters Parameters Parameters   Sit to stands W/hands x 8  W/one hand 2 x 4 each -    Gait 300' x 2 w/RW  300' x 5 w/o AD -    Step ups  4\" 2 x 10 ea LE w/UE support on walker -    Gait drills Lateral walkin x each direction    Tandem walking  30' w/o AD x 4    Tapping to 4\" step 2 x 10 ea LE    Standing with manual perturbations    Standing with feet together, eyes closed -    Seated marching 2 x 10 R LE -      Treatment/Session Assessment:    · Response to Treatment: Addressed lumbar/pelvic/hip mobility on L and R for pain and stiffness to the R side. Good hip motions. Very short hamstrings on R>L. · Compliance with Program/Exercises: compliant   · Recommendations/Intent for next treatment session: Will continue with overall lower quarter mobility, functional strength, gait and balance, and lower back pain allows. Will need to address this problem as needed.    Total Treatment Duration: 35 minutes  PT Patient Time In/Time Out  Time In: 1130  Time Out: [address] [facility], PT, [facility], OCS

## 2018-07-25 ENCOUNTER — HOSPITAL ENCOUNTER (OUTPATIENT)
Dept: PHYSICAL THERAPY | Age: 83
Discharge: HOME | End: 2018-07-25
Payer: MEDICARE

## 2018-07-25 PROCEDURE — 97110 THERAPEUTIC EXERCISES: CPT

## 2018-07-25 NOTE — PROGRESS NOTES
Gina   : 1932  [place]  Secondary:  [address]  at [facility] IMMANUEL BALDWIN  [address], [address], [facility] [address]  [phone]   [phone]        OUTPATIENT PHYSICAL THERAPY:Daily Note 2018      ICD-10: Treatment Diagnosis: Displaced intertrochanteric fracture of left femur, sequela (S72.142S); Unsteadiness on feet (R26.81); Pain in left hip (M25.552)  Precautions/Allergies:   Review of patient's allergies indicates no known allergies. Fall Risk Score: 7 (? 5 = High Risk)  MD Orders: hip abductor, quad strengthening, gait training WBAT MEDICAL/REFERRING DIAGNOSIS:  s/p ORIF LT IT    DATE OF ONSET: Injury 18; surgery 18  REFERRING PHYSICIAN: Ama Quijano MD  RETURN PHYSICIAN APPOINTMENT: 2018     PROGRESS ASSESSMENT (18):  Mr. Naya Ozuna is now nearly 5 months s/p ORIF of the left hip after a fall at [facility]. He appears to be making good progress. He has good hip ROM. He has good hip strength with manual testing and functionally. He has balance deficit in static positions, but good dynamic balance with walking on level ground. He is has good TUG score, and has nearly met all his goals as written. He continues to have balance impairment and is still at risk for falls. He will benefit from continued PT to further progress balance for full activity independence and safety. PROBLEM LIST (Impacting functional limitations):  1. Decreased Strength  2. Decreased ADL/Functional Activities  3. Decreased Ambulation Ability/Technique  4. Decreased Balance  5. Increased Pain INTERVENTIONS PLANNED:  1. Gait Training  2. Manual Therapy  3. Therapeutic Activites  4. Therapeutic Exercise/Strengthening   5. Modalities as needed for pain   TREATMENT PLAN:  Effective Dates:  18 to 8-3-18 . Frequency/Duration: 2-3 times a week for 4-6 more weeks.                                 GOALS: (Goals have been discussed and agreed upon with patient.)  Short-Term Functional Goals: Time Frame: 4 weeks  1. Pt will increase L hip abductor strength to 4/5 for improved gait. GOAL MET  2. Pt will improve TUG time to 15 sec for improved mobility. GOAL MET  3. Pt will be independent with HEP. GOAL MET  4. Pt will report pain 2/10 with daily activities. GOAL MET  Discharge Goals: Time Frame: 12 weeks   1. Pt will increase L LE strength to 5/5 for improved gait and mobility. Met  2. Pt will improve TUG time to 12 sec for decreased risk for falls. GOAL MET  3. Pt will report pain 1/10 with daily activities. GOAL MET  4. Pt will be able to balance on L single leg for 5 sec for gait with no assistive device. GOAL MET  5. Pt will be able to ambulate with least restrictive assistive device over various terrain with supervision. Progressing towards  NEW GOAL:   6. Pt will be able to balance in tandem stance for 30 sec for improved balance with gait. Nearly Met  Rehabilitation Potential For Stated Goals: Good                   HISTORY:   History of Present Injury/Illness (Reason for Referral): He was pushing a small cart at [facility] and his foot caught the wheel when he turned and he feel down. Injury happened feb 1st. They called EMS and [place] [address]. He had hip surgery on 2-2-18. Then he went to [facility] after his hip surgery. He did have home health come out to the house for a couple of visits. He now presents for outpatient therapy. Past Medical History/Comorbidities:   Mr. Zhen Johnson  has a past medical history of Abdominal aortic aneurysm ([facility] [address] (12/10/2015); Abdominal aortic aneurysm without rupture ([address] Allergic rhinitis (12/10/2015); Asthma; Benign neoplasm; Benign prostatic hyperplasia (12/10/2015); BPH without urinary obstruction; Cardiovascular disease (12/10/2015); Chronic obstructive asthma with exacerbation ([address] (12/10/2015); COPD (chronic obstructive pulmonary disease) ([address] (12/10/2015); Cough with hemoptysis;  Erectile dysfunction (12/10/2015); Essential hypertension, benign (12/10/2015); Fracture (10/2014); History of colon polyps; Low testosterone (12/10/2015); Lumbago; Memory loss; Overflow incontinence; Raynaud's syndrome (12/10/2015); Rotator cuff tendinitis; Thrombocytopenia ([address] and Urinary frequency. Mr. Erin Galaviz  has a past surgical history that includes hx hernia repair (1980); hx colonoscopy; hx fracture tx (10/2014); hx cataract removal (6/2016-right); and hx orthopaedic. Social History/Living Environment:    Lives in a 1 story house with wife. 1 small step to get inside. They do have a ramp to get inside also. Prior Level of Function/Work/Activity:  Ambulated with on assistive device. He did go out of the house with his wife and perform light shopping. Goal is to be able to walk normal.    Current Medications:        lisinopril (PRINIVIL, ZESTRIL)      tamsulosin (FLOMAX)     montelukast (SINGULAIR)      fluticasone (FLONASE)      calcium citrate-vitamin d3 (CITRACAL+D)       guaiFENesin ER (MUCINEX)       aspirin delayed-release 81 mg tablet,    Date Last Reviewed:  7/23/18   EXAMINATION:   7/25/2018  Observation/Orthostatic Postural Assessment: Walks in with a front wheeled walker, stooped posture. Supine alignment: LE's and pelvic landmarks appear symmetric. Palpation: Tender to R PSIS, lumbosacral spine. .  ROM: L and R Hip PROM grossly WFL's. Strength: Not tested. Functional Mobility: Not tested. Balance: Not tested. Body Structures Involved:  1. Bones  2. Joints  3. Muscles Body Functions Affected:  1. Neuromusculoskeletal Activities and Participation Affected:  1. Community, Social and Civic Life   CLINICAL DECISION MAKING:   Outcome Measure:    Tool Used: Timed Up and Go (TUG)  Score:  Initial: 19 seconds 16 seconds, 12 sec with walker (Date: 4-24-18 ) 12 sec with walker, 12 sec with walker, 10 sec with no walker  5-29-18 Most Recent: 10\" without walker (6-28-18) Interpretation of Score: The test measures, in seconds, the time taken by an individual to stand up from a standard arm chair (seat height 46 cm [18 in], arm height 65 cm [25.6 in]), walk a distance of 3 meters (118 in, approx 10 ft), turn, walk back to the chair and sit down. If the individual takes longer than 14 seconds to complete TUG, this indicates risk for falls. Score 7 7.5-10.5 11-14 14.5-17.5 18-21 21.5-24.5 25+   Modifier CH CI CJ CK CL CM CN ? Mobility - Walking and Moving Around:     - CURRENT STATUS:CI - 1%-19% impaired, limited or restricted (18)    - GOAL STATUS: CJ - 20%-39% impaired, limited or restricted    - D/C STATUS: ---------------To be determined---------------    Medical Necessity:   · Making good progress, but still with balance deficit, fall risk. Reason for Services/Other Comments:  · Patient continues to require skilled intervention due to decreased LE strength, decreased gait and balance. TREATMENT:   (In addition to Assessment/Re-Assessment sessions the following treatments were rendered)  2018  Pre-treatment Symptoms/Complaints: Says he continues to have pain in back. No new problems   Pain: Initial: Pain Intensity 1: 0 \"Just soreness\" Post Session:  No change after PT     Therapeutic Exercise: (40 Minutes): For back pain and balance. Progressed as indicated. HEP: He can continue with current HEP as pain allows. He verbalizes understanding.    Date:  18 Date:  18 Date:  18   Activity/Exercise Parameters Parameters Parameters   Supine pelvic tilt   1x10   LTR   1x10   bridging   unable   Seated forward bend   2x10   Sit to stands W/hands x 8  W/one hand 2 x 4 each - 2x10   Gait 300' x 2 w/RW  300' x 5 w/o AD - -   Step ups  4\" 2 x 10 ea LE w/UE support on walker - 4\" 2x15 ea in walker   Gait drills Lateral walkin x each direction    Tandem walking  30' w/o AD x 4    Tapping to 4\" step 2 x 10 ea LE    Standing with manual perturbations    Standing with feet together, eyes closed -    Seated marching 2 x 10 R LE - Painful on R   Seated back extn   2x10     Treatment/Session Assessment:    · Response to Treatment: A patient with pain in anterior R hip when he lifts R leg. Did well with other exercises. Patient also had many questions about what gym equipment he could be using so this was discussed. · Compliance with Program/Exercises: compliant   · Recommendations/Intent for next treatment session: Will continue with overall strengthening, balance, and back pain treatment.    Total Treatment Duration: 40 minutes  PT Patient Time In/Time Out  Time In: 7438  Time Out: [address] [facility]

## 2018-07-30 ENCOUNTER — HOSPITAL ENCOUNTER (OUTPATIENT)
Dept: PHYSICAL THERAPY | Age: 83
Discharge: HOME | End: 2018-07-30
Payer: MEDICARE

## 2018-07-30 PROCEDURE — 97110 THERAPEUTIC EXERCISES: CPT

## 2018-07-30 NOTE — PROGRESS NOTES
Roe Collet  : 1932  [place]  Secondary:  [address]  at [facility] [address]DWIN  [address], [address], [place] [address].  [phone]   [phone]        OUTPATIENT PHYSICAL THERAPY:Daily Note 2018      ICD-10: Treatment Diagnosis: Displaced intertrochanteric fracture of left femur, sequela (S72.142S); Unsteadiness on feet (R26.81); Pain in left hip (M25.552)  Precautions/Allergies:   Review of patient's allergies indicates no known allergies. Fall Risk Score: 7 (? 5 = High Risk)  MD Orders: hip abductor, quad strengthening, gait training WBAT MEDICAL/REFERRING DIAGNOSIS:  s/p ORIF LT IT    DATE OF ONSET: Injury 18; surgery 18  REFERRING PHYSICIAN: Daniel Gan MD  RETURN PHYSICIAN APPOINTMENT: 2018     PROGRESS ASSESSMENT (18):  Mr. Lamont Adrian is now nearly 5 months s/p ORIF of the left hip after a fall at [facility]. He appears to be making good progress. He has good hip ROM. He has good hip strength with manual testing and functionally. He has balance deficit in static positions, but good dynamic balance with walking on level ground. He is has good TUG score, and has nearly met all his goals as written. He continues to have balance impairment and is still at risk for falls. He will benefit from continued PT to further progress balance for full activity independence and safety. PROBLEM LIST (Impacting functional limitations):  1. Decreased Strength  2. Decreased ADL/Functional Activities  3. Decreased Ambulation Ability/Technique  4. Decreased Balance  5. Increased Pain INTERVENTIONS PLANNED:  1. Gait Training  2. Manual Therapy  3. Therapeutic Activites  4. Therapeutic Exercise/Strengthening   5. Modalities as needed for pain   TREATMENT PLAN:  Effective Dates:  18 to 8-3-18 . Frequency/Duration: 2-3 times a week for 4-6 more weeks.                                 GOALS: (Goals have been discussed and agreed upon with patient.)  Short-Term Functional Goals: Time Frame: 4 weeks  1. Pt will increase L hip abductor strength to 4/5 for improved gait. GOAL MET  2. Pt will improve TUG time to 15 sec for improved mobility. GOAL MET  3. Pt will be independent with HEP. GOAL MET  4. Pt will report pain 2/10 with daily activities. GOAL MET  Discharge Goals: Time Frame: 12 weeks   1. Pt will increase L LE strength to 5/5 for improved gait and mobility. Met  2. Pt will improve TUG time to 12 sec for decreased risk for falls. GOAL MET  3. Pt will report pain 1/10 with daily activities. GOAL MET  4. Pt will be able to balance on L single leg for 5 sec for gait with no assistive device. GOAL MET  5. Pt will be able to ambulate with least restrictive assistive device over various terrain with supervision. Progressing towards  NEW GOAL:   6. Pt will be able to balance in tandem stance for 30 sec for improved balance with gait. Nearly Met  Rehabilitation Potential For Stated Goals: Good                   HISTORY:   History of Present Injury/Illness (Reason for Referral): He was pushing a small cart at [employer] and his foot caught the wheel when he turned and he feel down. Injury happened feb 1st. They called EMS and took him to [facility]. He had hip surgery on 2-2-18. Then he went to [facility] after his hip surgery. He did have home health come out to the house for a couple of visits. He now presents for outpatient therapy. Past Medical History/Comorbidities:   Mr. Thalia Fonseca  has a past medical history of Abdominal aortic aneurysm ([facility] [address] (12/10/2015); Abdominal aortic aneurysm without rupture ([facility] [address] Allergic rhinitis (12/10/2015); Asthma; Benign neoplasm; Benign prostatic hyperplasia (12/10/2015); BPH without urinary obstruction; Cardiovascular disease (12/10/2015); Chronic obstructive asthma with exacerbation ([address] (12/10/2015); COPD (chronic obstructive pulmonary disease) ([facility] [address] (12/10/2015); Cough with hemoptysis;  Erectile dysfunction (12/10/2015); Essential hypertension, benign (12/10/2015); Fracture (10/2014); History of colon polyps; Low testosterone (12/10/2015); Lumbago; Memory loss; Overflow incontinence; Raynaud's syndrome (12/10/2015); Rotator cuff tendinitis; Thrombocytopenia ([address] and Urinary frequency. Mr. Nick Moon  has a past surgical history that includes hx hernia repair (1980); hx colonoscopy; hx fracture tx (10/2014); hx cataract removal (6/2016-right); and hx orthopaedic. Social History/Living Environment:    Lives in a 1 story house with wife. 1 small step to get inside. They do have a ramp to get inside also. Prior Level of Function/Work/Activity:  Ambulated with on assistive device. He did go out of the house with his wife and perform light shopping. Goal is to be able to walk normal.    Current Medications:        lisinopril (PRINIVIL, ZESTRIL)      tamsulosin (FLOMAX)     montelukast (SINGULAIR)      fluticasone (FLONASE)      calcium citrate-vitamin d3 (CITRACAL+D)       guaiFENesin ER (MUCINEX)       aspirin delayed-release 81 mg tablet,    Date Last Reviewed:  7/30/18   EXAMINATION:   7/30/2018  Observation/Orthostatic Postural Assessment: Walks in with a front wheeled walker, stooped posture. S  ROM: L and R Hip PROM grossly WFL's. Strength: Not tested. Functional Mobility: Not tested. Balance: Not tested. Body Structures Involved:  1. Bones  2. Joints  3. Muscles Body Functions Affected:  1. Neuromusculoskeletal Activities and Participation Affected:  1. Community, Social and Civic Life   CLINICAL DECISION MAKING:   Outcome Measure: Tool Used: Timed Up and Go (TUG)  Score:  Initial: 19 seconds 16 seconds, 12 sec with walker (Date: 4-24-18 ) 12 sec with walker, 12 sec with walker, 10 sec with no walker  5-29-18 Most Recent: 10\" without walker (6-28-18)   Interpretation of Score:  The test measures, in seconds, the time taken by an individual to stand up from a standard arm chair (seat height 46 cm [18 in], arm height 65 cm [25.6 in]), walk a distance of 3 meters (118 in, approx 10 ft), turn, walk back to the chair and sit down. If the individual takes longer than 14 seconds to complete TUG, this indicates risk for falls. Score 7 7.5-10.5 11-14 14.5-17.5 18-21 21.5-24.5 25+   Modifier CH CI CJ CK CL CM CN ? Mobility - Walking and Moving Around:     - CURRENT STATUS:CI - 1%-19% impaired, limited or restricted (6-28-18)    - GOAL STATUS: CJ - 20%-39% impaired, limited or restricted    - D/C STATUS: ---------------To be determined---------------    Medical Necessity:   · Making good progress, but still with balance deficit, fall risk. Reason for Services/Other Comments:  · Patient continues to require skilled intervention due to decreased LE strength, decreased gait and balance. TREATMENT:   (In addition to Assessment/Re-Assessment sessions the following treatments were rendered)  7/30/2018  Pre-treatment Symptoms/Complaints: Says he feels his back is doing better. Still with soreness across the back, but not as much pain. Bending forward better, but has to use his hands to help move down and back up Hip and LE is \"fine\"--no complaints here. Pain: Initial:   No more than soreness to back reported. Post Session:  No change after PT     Therapeutic Exercise: (50 Minutes): Exercises for lumbar mobility, then asssisted Active Isolated Stretching to L and R gastroc., posterior hip, hip adductors with knee extended and bent knee, hamstrings in 90/90 and SLR, and quads in side-lying, 3\"x10 each. Exercise for LQ strength and muscle activation done as per grid below. Dynamic balance with step-to 4-inch step, close SBA for balance safety. Exercises modified as as indicated. Verbal and tactile cues for assist and correct exercise performance. HEP: He can continue with current HEP as pain allows. He verbalizes understanding.     Date:  7-19-18 Date:  18 Date:  18 Date  18   Activity/Exercise Parameters Parameters Parameters    Supine pelvic tilt   1x10 Hook-lying  X10;  Pelvic shifts x10   LTR   1x10 With hip shift x10    bridging   unable 3x3   HIp abd    Side-lying  Bent knee toward knee 1x10 ea;  Straight leg abd  1x15 ea   Seated forward bend   2x10 -   Sit to stands W/hands x 8  W/one hand 2 x 4 each - 2x10 With hip shift/contralat uE reach 1x5 to L and 1x5 to R   Gait 300' x 2 w/RW  300' x 5 w/o AD - - -   Step ups  4\" 2 x 10 ea LE w/UE support on walker - 4\" 2x15 ea in walker dyanamic balance step-to  4-in step  3x5 each   Gait drills Lateral walkin x each direction    Tandem walking  30' w/o AD x 4    Tapping to 4\" step 2 x 10 ea LE    Standing with manual perturbations    Standing with feet together, eyes closed -  -   Seated marching 2 x 10 R LE - Painful on R Seated with hip shift 3x10 ea L and R   Seated back extn   2x10 -   Hip/LE flexibility    Assisted stretching as above     Treatment/Session Assessment:    · Response to Treatment:  Good effort with the exercises done. Quite weak to core and hips, especially with bridging move. Stiff to lumbar spine. .    · Compliance with Program/Exercises: compliant   · Recommendations/Intent for next treatment session: Will continue with overall strengthening, balance, and back pain treatment.   Total Treatment Duration: 50 minutes  PT Patient Time In/Time Out  Time In: 904  Time Out: John Dueñas, PT, MSPT, OCS

## 2018-08-01 ENCOUNTER — HOSPITAL ENCOUNTER (OUTPATIENT)
Dept: PHYSICAL THERAPY | Age: 83
Discharge: HOME | End: 2018-08-01
Payer: MEDICARE

## 2018-08-01 PROCEDURE — 97110 THERAPEUTIC EXERCISES: CPT

## 2018-08-01 NOTE — PROGRESS NOTES
Alex Rodas  : 1932  [place]  Secondary:  [address]  at [facility] IMMANUEL BALDWIN  [address] [facility], [address], [address] [facility]  [phone]   [phone]        OUTPATIENT PHYSICAL THERAPY:Daily Note 2018      ICD-10: Treatment Diagnosis: Displaced intertrochanteric fracture of left femur, sequela (S72.142S); Unsteadiness on feet (R26.81); Pain in left hip (M25.552)  Precautions/Allergies:   Review of patient's allergies indicates no known allergies. Fall Risk Score: 7 (? 5 = High Risk)  MD Orders: hip abductor, quad strengthening, gait training WBAT MEDICAL/REFERRING DIAGNOSIS:  s/p ORIF LT IT    DATE OF ONSET: Injury 18; surgery 18  REFERRING PHYSICIAN: Kj Hernández MD  RETURN PHYSICIAN APPOINTMENT: 2018     PROGRESS ASSESSMENT (18):  Mr. Sina Rangel is now nearly 5 months s/p ORIF of the left hip after a fall at [facility]. He appears to be making good progress. He has good hip ROM. He has good hip strength with manual testing and functionally. He has balance deficit in static positions, but good dynamic balance with walking on level ground. He is has good TUG score, and has nearly met all his goals as written. He continues to have balance impairment and is still at risk for falls. He will benefit from continued PT to further progress balance for full activity independence and safety. PROBLEM LIST (Impacting functional limitations):  1. Decreased Strength  2. Decreased ADL/Functional Activities  3. Decreased Ambulation Ability/Technique  4. Decreased Balance  5. Increased Pain INTERVENTIONS PLANNED:  1. Gait Training  2. Manual Therapy  3. Therapeutic Activites  4. Therapeutic Exercise/Strengthening   5. Modalities as needed for pain   TREATMENT PLAN:  Effective Dates:  18 to 8-3-18 . Frequency/Duration: 2-3 times a week for 4-6 more weeks.                                 GOALS: (Goals have been discussed and agreed upon with patient.)  Short-Term Functional Goals: Time Frame: 4 weeks  1. Pt will increase L hip abductor strength to 4/5 for improved gait. GOAL MET  2. Pt will improve TUG time to 15 sec for improved mobility. GOAL MET  3. Pt will be independent with HEP. GOAL MET  4. Pt will report pain 2/10 with daily activities. GOAL MET  Discharge Goals: Time Frame: 12 weeks   1. Pt will increase L LE strength to 5/5 for improved gait and mobility. Met  2. Pt will improve TUG time to 12 sec for decreased risk for falls. GOAL MET  3. Pt will report pain 1/10 with daily activities. GOAL MET  4. Pt will be able to balance on L single leg for 5 sec for gait with no assistive device. GOAL MET  5. Pt will be able to ambulate with least restrictive assistive device over various terrain with supervision. Progressing towards  NEW GOAL:   6. Pt will be able to balance in tandem stance for 30 sec for improved balance with gait. Nearly Met  Rehabilitation Potential For Stated Goals: Good                   HISTORY:   History of Present Injury/Illness (Reason for Referral): He was pushing a small cart at [facility] and his foot caught the wheel when he turned and he feel down. Injury happened feb 1st. They called EMS and took him to [facility]. He had hip surgery on 2-2-18. Then he went to [facility] after his hip surgery. He did have home health come out to the house for a couple of visits. He now presents for outpatient therapy. Past Medical History/Comorbidities:   Mr. Sindi Plummer  has a past medical history of Abdominal aortic aneurysm ([facility] [address] (12/10/2015); Abdominal aortic aneurysm without rupture ([address] Allergic rhinitis (12/10/2015); Asthma; Benign neoplasm; Benign prostatic hyperplasia (12/10/2015); BPH without urinary obstruction; Cardiovascular disease (12/10/2015); Chronic obstructive asthma with exacerbation ([address] (12/10/2015); COPD (chronic obstructive pulmonary disease) ([facility] [address] (12/10/2015); Cough with hemoptysis;  Erectile dysfunction (12/10/2015); Essential hypertension, benign (12/10/2015); Fracture (10/2014); History of colon polyps; Low testosterone (12/10/2015); Lumbago; Memory loss; Overflow incontinence; Raynaud's syndrome (12/10/2015); Rotator cuff tendinitis; Thrombocytopenia ([facility] [address] and Urinary frequency. Mr. Jumana rGimm  has a past surgical history that includes hx hernia repair (1980); hx colonoscopy; hx fracture tx (10/2014); hx cataract removal (6/2016-right); and hx orthopaedic. Social History/Living Environment:    Lives in a 1 story house with wife. 1 small step to get inside. They do have a ramp to get inside also. Prior Level of Function/Work/Activity:  Ambulated with on assistive device. He did go out of the house with his wife and perform light shopping. Goal is to be able to walk normal.    Current Medications:        lisinopril (PRINIVIL, ZESTRIL)      tamsulosin (FLOMAX)     montelukast (SINGULAIR)      fluticasone (FLONASE)      calcium citrate-vitamin d3 (CITRACAL+D)       guaiFENesin ER (MUCINEX)       aspirin delayed-release 81 mg tablet,    Date Last Reviewed:  7/30/18   EXAMINATION:   Previously:  Observation/Orthostatic Postural Assessment: Walks in with a front wheeled walker, stooped posture. ROM: L and R Hip PROM grossly WFL's. Strength: Not tested. Functional Mobility: Not tested. Balance: Not tested. Body Structures Involved:  1. Bones  2. Joints  3. Muscles Body Functions Affected:  1. Neuromusculoskeletal Activities and Participation Affected:  1. Community, Social and Civic Life   CLINICAL DECISION MAKING:   Outcome Measure: Tool Used: Timed Up and Go (TUG)  Score:  Initial: 19 seconds 16 seconds, 12 sec with walker (Date: 4-24-18 ) 12 sec with walker, 12 sec with walker, 10 sec with no walker  5-29-18 Most Recent: 10\" without walker (6-28-18)   Interpretation of Score:  The test measures, in seconds, the time taken by an individual to stand up from a standard arm chair (seat height 46 cm [18 in], arm height 65 cm [25.6 in]), walk a distance of 3 meters (118 in, approx 10 ft), turn, walk back to the chair and sit down. If the individual takes longer than 14 seconds to complete TUG, this indicates risk for falls. Score 7 7.5-10.5 11-14 14.5-17.5 18-21 21.5-24.5 25+   Modifier CH CI CJ CK CL CM CN ? Mobility - Walking and Moving Around:     - CURRENT STATUS:CI - 1%-19% impaired, limited or restricted (6-28-18)    - GOAL STATUS: CJ - 20%-39% impaired, limited or restricted    - D/C STATUS: ---------------To be determined---------------    Medical Necessity:   · Making good progress, but still with balance deficit, fall risk. Reason for Services/Other Comments:  · Patient continues to require skilled intervention due to decreased LE strength, decreased gait and balance. TREATMENT:   (In addition to Assessment/Re-Assessment sessions the following treatments were rendered)  8/1/2018  Pre-treatment Symptoms/Complaints: States he is \"just about over everything\" that bothered him. This is his last PT appointment. He returns to [facility] next week. Pain: Initial: Pain Intensity 1: 0 N  Post Session:  No increased pain after PT     Therapeutic Exercise: (30 Minutes): Exercises for strength and balance. Progressed as indicated. Ozzy Lopez HEP: He can continue with current HEP as pain allows.      Date:  7-19-18 Date:  7-23-18 Date:  7/25/18 Date  7-30-18 Date  8/1/18   Activity/Exercise Parameters Parameters Parameters     Supine pelvic tilt   1x10 Hook-lying  X10;  Pelvic shifts x10 -   LTR   1x10 With hip shift x10  -   bridging   unable 3x3 On ball 2x10   HIp abd    Side-lying  Bent knee toward knee 1x10 ea;  Straight leg abd  1x15 ea Side lying L 2x10   Seated forward bend   2x10 - -   Sit to stands W/hands x 8  W/one hand 2 x 4 each - 2x10 With hip shift/contralat uE reach 1x5 to L and 1x5 to R 2x10, no hand use   Gait 300' x 2 w/RW  300' x 5 w/o AD - - - 250' SBA without device   Step ups  4\" 2 x 10 ea LE w/UE support on walker - 4\" 2x15 ea in walker dyanamic balance step-to  4-in step  3x5 each 4\" B 2x10   Gait drills Lateral walkin x each direction    Tandem walking  30' w/o AD x 4    Tapping to 4\" step 2 x 10 ea LE    Standing with manual perturbations    Standing with feet together, eyes closed -  - -   Seated marching 2 x 10 R LE - Painful on R Seated with hip shift 3x10 ea L and R -   Seated back extn   2x10 - -   Hip/LE flexibility    Assisted stretching as above -   Standing on foam     EO/EC   Tandem stance practice     B - yes     Treatment/Session Assessment:    · Response to Treatment:  Patient reports no further questions. He did well with all activities today and reports he is almost over all his back pain. · Compliance with Program/Exercises: compliant   · Recommendations/Intent for next treatment session: Patient to see MD and then inform PT of plans after that.    Total Treatment Duration: 30 minutes  PT Patient Time In/Time Out  Time In: 1030  Time Out: 710  [address]

## 2018-09-21 ENCOUNTER — HOSPITAL ENCOUNTER (OUTPATIENT)
Dept: GENERAL RADIOLOGY | Age: 83
Discharge: HOME | End: 2018-09-21
Payer: MEDICARE

## 2018-09-21 DIAGNOSIS — G89.29 CHRONIC BILATERAL LOW BACK PAIN WITHOUT SCIATICA: ICD-10-CM

## 2018-09-21 DIAGNOSIS — M54.50 CHRONIC BILATERAL LOW BACK PAIN WITHOUT SCIATICA: ICD-10-CM

## 2018-09-21 PROCEDURE — 72110 X-RAY EXAM L-2 SPINE 4/>VWS: CPT

## 2018-09-21 NOTE — PROGRESS NOTES
Patient notified of x-ray results and order placed for bone density and then asked is there anything else he needs to do? /TD

## 2018-09-21 NOTE — PROGRESS NOTES
Tell has compression fractures of L 2,3 ,4 and possibly 5--he needs to get dexa done and may need to start forteo shots or at least prolia but will need to see him after the dexa

## 2018-09-25 NOTE — THERAPY DISCHARGE
Pamela Gonzalez  : 1932  [place]  Secondary:  [address]  [address]  [address] [facility], [address], [facility] DIONTE Luis.  [phone]   [phone]        OUTPATIENT PHYSICAL THERAPY:Discontinuation Summary 2018       ICD-10: Treatment Diagnosis: Displaced intertrochanteric fracture of left femur, sequela (S72.142S); Unsteadiness on feet (R26.81); Pain in left hip (M25.552)  Precautions/Allergies:   Review of patient's allergies indicates no known allergies. Fall Risk Score: 7 (? 5 = High Risk)  MD Orders: hip abductor, quad strengthening, gait training WBAT  Patient attended 35 PT sessions from 3/20/18 to 18 with no missed sessions. MEDICAL/REFERRING DIAGNOSIS:  s/p ORIF LT IT    DATE OF ONSET: Injury 18; surgery 18  REFERRING PHYSICIAN: Matt Junior MD  RETURN PHYSICIAN APPOINTMENT: 2018     ASSESSMENT :  Mr. Tsering Valente is now nearly 8 months s/p ORIF of the left hip after a fall at [facility]. He appeared to be making good progress. He had good hip ROM. He had good hip strength with manual testing and functionally. He had balance deficit in static positions, but good dynamic balance with walking on level ground. He had good TUG score, and had nearly met all his goals as written. He continued to have balance impairment and was still at risk for falls. He was to return to [facility] in August and no further orders for PT were received so he will now be discontinued from PT. TREATMENT PLAN: Discontinue PT. Unable to assess final progress toward goals. GOALS: (Goals have been discussed and agreed upon with patient.)  Short-Term Functional Goals: Time Frame: 4 weeks  1. Pt will increase L hip abductor strength to 4/5 for improved gait. GOAL MET  2. Pt will improve TUG time to 15 sec for improved mobility. GOAL MET  3. Pt will be independent with HEP. GOAL MET  4.  Pt will report pain 2/10 with daily activities. GOAL MET  Discharge Goals: Time Frame: 12 weeks   1. Pt will increase L LE strength to 5/5 for improved gait and mobility. Met  2. Pt will improve TUG time to 12 sec for decreased risk for falls. GOAL MET  3. Pt will report pain 1/10 with daily activities. GOAL MET  4. Pt will be able to balance on L single leg for 5 sec for gait with no assistive device. GOAL MET  5. Pt will be able to ambulate with least restrictive assistive device over various terrain with supervision. Progressing towards  NEW GOAL:   6. Pt will be able to balance in tandem stance for 30 sec for improved balance with gait. Nearly Met  Rehabilitation Potential For Stated Goals: Good                   HISTORY:   History of Present Injury/Illness (Reason for Referral): He was pushing a small cart at [facility] and his foot caught the wheel when he turned and he feel down. Injury happened feb 1st. They called EMS and took him to [facility]. He had hip surgery on 2-2-18. Then he went to [facility] after his hip surgery. He did have home health come out to the house for a couple of visits. He now presents for outpatient therapy. Past Medical History/Comorbidities:   Mr. Loi Nunez  has a past medical history of Abdominal aortic aneurysm ([address] (12/10/2015); Abdominal aortic aneurysm without rupture ([address] Allergic rhinitis (12/10/2015); Asthma; Benign neoplasm; Benign prostatic hyperplasia (12/10/2015); BPH without urinary obstruction; Cardiovascular disease (12/10/2015); Chronic obstructive asthma with exacerbation ([address] (12/10/2015); COPD (chronic obstructive pulmonary disease) ([address] (12/10/2015); Cough with hemoptysis; Erectile dysfunction (12/10/2015); Essential hypertension, benign (12/10/2015); Fracture (10/2014); History of colon polyps; Low testosterone (12/10/2015); Lumbago; Memory loss; Overflow incontinence; Raynaud's syndrome (12/10/2015); Rotator cuff tendinitis; Thrombocytopenia ([address] and Urinary frequency.    Dileep Vázquez  has a past surgical history that includes hx hernia repair (1980); hx colonoscopy; hx fracture tx (10/2014); hx cataract removal (6/2016-right); and hx orthopaedic (03/2018). Social History/Living Environment:    Lives in a 1 story house with wife. 1 small step to get inside. They do have a ramp to get inside also. Prior Level of Function/Work/Activity:  Ambulated with on assistive device. He did go out of the house with his wife and perform light shopping. Goal is to be able to walk normal.    Current Medications:        lisinopril (PRINIVIL, ZESTRIL)      tamsulosin (FLOMAX)     montelukast (SINGULAIR)      fluticasone (FLONASE)      calcium citrate-vitamin d3 (CITRACAL+D)       guaiFENesin ER (MUCINEX)       aspirin delayed-release 81 mg tablet,    Date Last Reviewed:  7/30/18   EXAMINATION:   Previously:  Observation/Orthostatic Postural Assessment: Walks in with a front wheeled walker, stooped posture. ROM: L and R Hip PROM grossly WFL's. Strength: Not tested. Functional Mobility: Not tested. Balance: Not tested. Body Structures Involved:  1. Bones  2. Joints  3. Muscles Body Functions Affected:  1. Neuromusculoskeletal Activities and Participation Affected:  1. Community, Social and Civic Life   CLINICAL DECISION MAKING:   Outcome Measure: Tool Used: Timed Up and Go (TUG)  Score:  Initial: 19 seconds 16 seconds, 12 sec with walker (Date: 4-24-18 ) 12 sec with walker, 12 sec with walker, 10 sec with no walker  5-29-18 Most Recent: 10\" without walker (6-28-18)   Interpretation of Score: The test measures, in seconds, the time taken by an individual to stand up from a standard arm chair (seat height 46 cm [18 in], arm height 65 cm [25.6 in]), walk a distance of 3 meters (118 in, approx 10 ft), turn, walk back to the chair and sit down. If the individual takes longer than 14 seconds to complete TUG, this indicates risk for falls.   Score 7 7.5-10.5 11-14 14.5-17.5 18-21 21.5-24.5 25+   Modifier CH CI CJ CK CL CM CN ?    Mobility - Walking and Moving Around:     - CURRENT STATUS:CI - 1%-19% impaired, limited or restricted (6-28-18)    - GOAL STATUS: CJ - 20%-39% impaired, limited or restricted    - D/C STATUS: ---------------To be determined---------------

## 2018-10-04 ENCOUNTER — HOSPITAL ENCOUNTER (OUTPATIENT)
Dept: MAMMOGRAPHY | Age: 83
Discharge: HOME | End: 2018-10-04
Attending: INTERNAL MEDICINE
Payer: MEDICARE

## 2018-10-04 DIAGNOSIS — I10 ESSENTIAL HYPERTENSION, BENIGN: ICD-10-CM

## 2018-10-04 DIAGNOSIS — S32.020S CLOSED COMPRESSION FRACTURE OF SECOND LUMBAR VERTEBRA, SEQUELA: ICD-10-CM

## 2018-10-04 DIAGNOSIS — M54.50 ACUTE LOW BACK PAIN WITHOUT SCIATICA, UNSPECIFIED BACK PAIN LATERALITY: ICD-10-CM

## 2018-10-04 DIAGNOSIS — S72.002S CLOSED FRACTURE OF LEFT HIP, SEQUELA: ICD-10-CM

## 2018-10-04 DIAGNOSIS — R79.89 LOW TESTOSTERONE: ICD-10-CM

## 2018-10-04 DIAGNOSIS — M94.9 DISORDER OF BONE AND CARTILAGE: ICD-10-CM

## 2018-10-04 DIAGNOSIS — N40.0 BENIGN PROSTATIC HYPERPLASIA WITHOUT LOWER URINARY TRACT SYMPTOMS: ICD-10-CM

## 2018-10-04 DIAGNOSIS — M89.9 DISORDER OF BONE AND CARTILAGE: ICD-10-CM

## 2018-10-04 PROCEDURE — 77080 DXA BONE DENSITY AXIAL: CPT

## 2018-10-05 NOTE — PROGRESS NOTES
Bone density has osteoporosis in hip--think treatment good idea--use generic fosamax at 70 mg weekly-take on empty stomach with only water

## 2018-12-11 PROBLEM — S32.000A CLOSED COMPRESSION FRACTURE OF LUMBOSACRAL SPINE (HCC): Status: ACTIVE | Noted: 2018-12-11

## 2018-12-22 ENCOUNTER — HOSPITAL ENCOUNTER (OUTPATIENT)
Dept: INFUSION THERAPY | Age: 83
Discharge: HOME | End: 2018-12-22
Payer: MEDICARE

## 2018-12-22 VITALS
DIASTOLIC BLOOD PRESSURE: 77 MMHG | HEART RATE: 58 BPM | SYSTOLIC BLOOD PRESSURE: 139 MMHG | HEIGHT: 68 IN | OXYGEN SATURATION: 97 % | RESPIRATION RATE: 18 BRPM | WEIGHT: 143 LBS | BODY MASS INDEX: 21.67 KG/M2 | TEMPERATURE: 97.6 F

## 2018-12-22 PROCEDURE — 96365 THER/PROPH/DIAG IV INF INIT: CPT

## 2018-12-22 PROCEDURE — 74011250636 HC RX REV CODE- 250/636: Performed by: INTERNAL MEDICINE

## 2018-12-22 RX ORDER — SODIUM CHLORIDE 0.9 % (FLUSH) 0.9 %
10 SYRINGE (ML) INJECTION AS NEEDED
Status: ACTIVE | OUTPATIENT
Start: 2018-12-22 | End: 2018-12-22

## 2018-12-22 RX ORDER — ZOLEDRONIC ACID 5 MG/100ML
5 INJECTION, SOLUTION INTRAVENOUS ONCE
Status: COMPLETED | OUTPATIENT
Start: 2018-12-22 | End: 2018-12-22

## 2018-12-22 RX ADMIN — ZOLEDRONIC ACID 5 MG: 5 INJECTION, SOLUTION INTRAVENOUS at 09:33

## 2018-12-22 RX ADMIN — Medication 10 ML: at 09:50

## 2018-12-22 NOTE — PROGRESS NOTES
Pt arrived ambulatory today at 0849, to receive IV reclast.  Pt tolerated without difficulty. Patient discharged via ambulatory accompanied by spouse. Instructed to notify physician of any problems, questions or concerns. Allowed opportunity for patient/family to ask questions. Verbalized understanding. Next appointment is Feb 11 at [phone] with Dr. Curtis Alfaro.

## 2019-01-10 ENCOUNTER — HOSPITAL ENCOUNTER (OUTPATIENT)
Dept: PHYSICAL THERAPY | Age: 84
Discharge: HOME | End: 2019-01-10
Payer: MEDICARE

## 2019-01-10 PROCEDURE — 97161 PT EVAL LOW COMPLEX 20 MIN: CPT

## 2019-01-10 NOTE — THERAPY EVALUATION
Miky Fuentes  : 1932  [place]  Secondary: [place] [employer] [address] [facility]  [address] [facility], [address] [place] [address] [address], [address] [facility]  [phone]   [phone]        OUTPATIENT PHYSICAL THERAPY:Initial Assessment and Discharge Summary 1/10/2019   ICD-10: Treatment Diagnosis: Difficulty in walking, not elsewhere classified (R26.2)  Precautions/Allergies:   Patient has no known allergies. MD Orders: Balance Master testing  MEDICAL/REFERRING DIAGNOSIS:  s/p orif lt it   DATE OF ONSET: 2018  REFERRING PHYSICIAN: Sofiya Rock MD  RETURN PHYSICIAN APPOINTMENT: unknown      INITIAL ASSESSMENT:  Mr. Stefan Jeong comes into the clinic today for Balance Master testing s/p ORIF left hip. The Adaptation Test objectively quantifies a patient's ability to adapt or develop a balance response strategy to irregular or varying support surfaces. This ability would be required for effective motor learning and safe community level function. Patient lost his balance once on toes-up perturbation. The Motor Control Test isolates and quantifies abnormalities in the automatic motor system related to the ability to quickly recover following an unexpected external disturbance. All performance attributes were in the normal range. The Sensory Organization Test protocol isolates and quantifies abnormalities in the patient's use of the three sensory systems that contribute to postural control (somatosensory, visual, and vestibular), as well as the brain's central integration of these inputs. The vestibular score was in the abnormal range. The vestibular score reflects the patient's ability to effectively use input cues from the vestibular system to maintain balance. The patient's average balance control (composite score) across all six sensory conditions is below normative performance range at 46 (normative response 64).   Patient lost his balance three times on conditions 5 and 6 of this test.  Patient scored 44/56 on Bosch Balance Scale. Any score less than or equal to 45/56 is considered higher fall risk. Based on the above findings, as well as patient's history of lumbar compression fractures, I recommend patient use his rolling walker at all times to improve safety and decrease risk of falls. No problems or goals stated. Patient discharged from physical therapy. Thank you for this referral.      PROBLEM LIST (Impacting functional limitations):  1. No problems stated. INTERVENTIONS PLANNED: (Treatment may consist of any combination of the following)  1. No interventions stated. TREATMENT PLAN:  Effective Dates: 1/10/2019. Frequency/Duration: One time appointment for Balance Mastering testing. Patient discharged from physical therapy. GOALS: (Goals have been discussed and agreed upon with patient.)  Short-Term Functional Goals: Time Frame:   1. No short term goals stated   Discharge Goals: Time Frame:   1. No discharge goals stated. Regarding Annie Jung's therapy, I certify that the treatment plan above will be carried out by a therapist or under their direction. Thank you for this referral,  Los Tracy PT     Referring Physician Signature: Piotr Brandt MD              Date                    The information in this section was collected on 1/10/2019 (except where otherwise noted). HISTORY:   History of Present Injury/Illness (Reason for Referral):  Patient s/p left hip ORIF due to a fall at [employer] on February 1, 2018. Patient rates pain level as 0/10 and dizziness as 0/10. Patient has had no other falls. Patient has a rolling walker for ambulation. In October, patient picked up heavy bowls that resulted in three lumber compression fractures.   Past Medical History/Comorbidities:   Mr. Cinda Diop  has a past medical history of Abdominal aortic aneurysm [facility]), Abdominal aortic aneurysm without rupture ([facility] Allergic rhinitis, Asthma, Benign neoplasm, Benign prostatic hyperplasia, BPH without urinary obstruction, Cardiovascular disease, Chronic obstructive asthma with exacerbation (HCC), COPD (chronic obstructive pulmonary disease) ([address] Cough with hemoptysis, Erectile dysfunction, Essential hypertension, benign, Fracture, History of colon polyps, Low testosterone, Lumbago, Memory loss, Overflow incontinence, Raynaud's syndrome, Rotator cuff tendinitis, Thrombocytopenia ([address] and Urinary frequency. Mr. Jocelyn Horn  has a past surgical history that includes hx hernia repair (1980); hx colonoscopy; hx fracture tx (10/2014); hx cataract removal (6/2016-right); hx orthopaedic (03/2018); and LEFT FEMUR INSERTION INTRA MEDULLARY NAIL (Left, 2/2/2018). Social History/Living Environment:     Lives with wife in a one story home with a ramp to enter. Prior Level of Function/Work/Activity:  Independent. Retired. Dominant Side:         RIGHT  Personal Factors:          Sex:  male        Age:  80 y.o. Ambulatory/Rehab Services H2 Model Falls Risk Assessment    Risk Factors:       (1)  Gender [Male] Ability to Rise from Chair:       (1)  Pushes up, successful in one attempt    Falls Prevention Plan:       No modifications necessary   Total: (5 or greater = High Risk): 2    ©2010 [facility] [employer]. All Rights Reserved. [facility] Patent #[id number]. Federal Law prohibits the replication, distribution or use without written permission from [facility] [employer]     Current Medications:       Current Outpatient Medications:     tamsulosin (FLOMAX) 0.4 mg capsule, Take 1 Cap by mouth daily. , Disp: 90 Cap, Rfl: 4    lisinopril (PRINIVIL, ZESTRIL) 5 mg tablet, Take 1 Tab by mouth daily. , Disp: 90 Tab, Rfl: 4    montelukast (SINGULAIR) 10 mg tablet, Take 1 Tab by mouth daily. , Disp: 90 Tab, Rfl: 4    umeclidinium-vilanterol (ANORO ELLIPTA) 62.5-25 mcg/actuation inhaler, Take 1 Puff by inhalation daily. , Disp: , Rfl:   amoxicillin (AMOXIL) 875 mg tablet, Take 1 Tab by mouth two (2) times a day., Disp: 20 Tab, Rfl: 0    Oxygen, Use as instructed. Use as instructed; faxed to [facility], Disp: , Rfl:     psyllium husk (KONSYL) 6 gram pwpk packet, Take 2 Caps by mouth., Disp: , Rfl:     cyanocobalamin (VITAMIN B12) 1,000 mcg/mL injection, INJECT 1ML INTRAMUSCULARLY ONCE FOR 1 DOSE, Disp: , Rfl: 12    fluticasone (FLONASE) 50 mcg/actuation nasal spray, 2 Sprays by Both Nostrils route daily. , Disp: 48 g, Rfl: 4    calcium citrate-vitamin d3 (CITRACAL+D) 315-200 mg-unit tab, Take 2 Tabs by mouth daily (with breakfast). , Disp: , Rfl:     cholecalciferol (VITAMIN D3) 1,000 unit cap, Take  by mouth., Disp: , Rfl:     guaiFENesin ER (MUCINEX) 600 mg ER tablet, Take 1,200 mg by mouth daily. , Disp: , Rfl:     Biotin 2,500 mcg cap, Take  by mouth., Disp: , Rfl:     aspirin delayed-release 81 mg tablet, Take  by mouth daily. , Disp: , Rfl:    Date Last Reviewed:  1/10/2019   Number of Personal Factors/Comorbidities that affect the Plan of Care: 0: LOW COMPLEXITY   EXAMINATION:   Postural Control & Balance:  · Bosch Balance Scale:  44/56.   (A score less than 45/56 indicates high risk of falls)     · Dynamic Gait Index:  Not tested. · [employer] Sensory Organization Test:  Objective findings indicate Normal use of somatosensory, Normal use of visual, & Decreased use of vestibular inputs to balance. Center of gravity is normal.  See scanned report. Patient unable to maintain his balance on conditions 5 and 6 of this test.    · Other Smart [employer] Testing: The Adaptation Test objectively quantifies a patient's ability to adapt or develop a balance response strategy to irregular or varying support surfaces. This ability would be required for effective motor learning and safe community level function. Patient lost his balance once on toes-up perturbation.   The Motor Control Test isolates and quantifies abnormalities in the automatic motor system related to the ability to quickly recover following an unexpected external disturbance. All performance attributes were in the normal range. Body Structures Involved:  1. None Body Functions Affected:  1. None Activities and Participation Affected:  1. None   Number of elements (examined above) that affect the Plan of Care: 1-2: LOW COMPLEXITY   CLINICAL PRESENTATION:   Presentation: Stable and uncomplicated: LOW COMPLEXITY   CLINICAL DECISION MAKING:   Outcome Measure: Tool Used: Bosch Balance Scale  Score:  Initial: 44/56 Most Recent: X/56 (Date: -- )   Interpretation of Score: Each section is scored on a 0-4 scale, 0 representing the patients inability to perform the task and 4 representing independence. The scores of each section are added together for a total score of 56. The higher the patients score, the more independent the patient is. Any score below 45 indicates increased risk for falls. ·    Use of outcome tool(s) and clinical judgement create a POC that gives a: Clear prediction of patient's progress: LOW COMPLEXITY            TREATMENT:   (In addition to Assessment/Re-Assessment sessions the following treatments were rendered)  Pre-treatment Symptoms/Complaints:  Balance Master testing   Pain: Initial:   Pain Intensity 1: 0  Post Session:  0     Initial Evaluation: 55 minutes    [employer]  Treatment/Session Assessment:    · Response to Treatment:  Tolerated without complaints. · Recommendations/Intent for next treatment session: Patient discharged from physical therapy.   Total Treatment Duration:  PT Patient Time In/Time Out  Time In: 1020  Time Out: [address], PT    Future Appointments   Date Time Provider Verena Siddiqi   1/17/2019  8:30 AM SFE US GE LOGIC E9 ROOM 2 SFERUS SFE   1/17/2019  9:00 AM SFE XR RF ROOM 2 SFERAD SFE   2/11/2019  1:15 PM Ramona Ceron MD [facility]

## 2019-01-17 ENCOUNTER — HOSPITAL ENCOUNTER (OUTPATIENT)
Dept: GENERAL RADIOLOGY | Age: 84
Discharge: HOME | End: 2019-01-17
Attending: INTERNAL MEDICINE
Payer: MEDICARE

## 2019-01-17 ENCOUNTER — HOSPITAL ENCOUNTER (OUTPATIENT)
Dept: ULTRASOUND IMAGING | Age: 84
Discharge: HOME | End: 2019-01-17
Attending: INTERNAL MEDICINE
Payer: MEDICARE

## 2019-01-17 DIAGNOSIS — R63.4 ABNORMAL WEIGHT LOSS: ICD-10-CM

## 2019-01-17 DIAGNOSIS — R68.81 EARLY SATIETY: ICD-10-CM

## 2019-01-17 PROCEDURE — 74247 XR UPPER GI W KUB AIR CONT: CPT

## 2019-01-17 PROCEDURE — 74011000250 HC RX REV CODE- 250: Performed by: INTERNAL MEDICINE

## 2019-01-17 PROCEDURE — 76700 US EXAM ABDOM COMPLETE: CPT

## 2019-01-17 PROCEDURE — 74011000255 HC RX REV CODE- 255: Performed by: INTERNAL MEDICINE

## 2019-01-17 RX ADMIN — ANTACID/ANTIFLATULENT 4 G: 380; 550; 10; 10 GRANULE, EFFERVESCENT ORAL at 09:53

## 2019-01-17 RX ADMIN — BARIUM SULFATE 135 ML: 980 POWDER, FOR SUSPENSION ORAL at 09:54

## 2019-05-06 ENCOUNTER — APPOINTMENT (OUTPATIENT)
Dept: GENERAL RADIOLOGY | Age: 84
DRG: 163 | End: 2019-05-06
Attending: INTERNAL MEDICINE
Payer: MEDICARE

## 2019-05-06 ENCOUNTER — APPOINTMENT (OUTPATIENT)
Dept: GENERAL RADIOLOGY | Age: 84
DRG: 163 | End: 2019-05-06
Attending: EMERGENCY MEDICINE
Payer: MEDICARE

## 2019-05-06 ENCOUNTER — HOSPITAL ENCOUNTER (INPATIENT)
Age: 84
LOS: 19 days | Discharge: NURSING, REHAB OR LONG-TERM CARE | DRG: 163 | End: 2019-05-25
Attending: EMERGENCY MEDICINE | Admitting: INTERNAL MEDICINE
Payer: MEDICARE

## 2019-05-06 DIAGNOSIS — I10 ESSENTIAL HYPERTENSION, BENIGN: ICD-10-CM

## 2019-05-06 DIAGNOSIS — T79.7XXD SUBCUTANEOUS EMPHYSEMA, SUBSEQUENT ENCOUNTER: ICD-10-CM

## 2019-05-06 DIAGNOSIS — J43.2 CENTRILOBULAR EMPHYSEMA (HCC): ICD-10-CM

## 2019-05-06 DIAGNOSIS — J93.0 TENSION PNEUMOTHORAX: Primary | ICD-10-CM

## 2019-05-06 DIAGNOSIS — J96.21 ACUTE ON CHRONIC RESPIRATORY FAILURE WITH HYPOXIA (HCC): ICD-10-CM

## 2019-05-06 DIAGNOSIS — I47.20 VENTRICULAR TACHYARRHYTHMIA: ICD-10-CM

## 2019-05-06 DIAGNOSIS — R06.02 SOB (SHORTNESS OF BREATH): ICD-10-CM

## 2019-05-06 DIAGNOSIS — R09.89 ABNORMAL FINDING OF LUNG: ICD-10-CM

## 2019-05-06 DIAGNOSIS — J44.1 COPD EXACERBATION (HCC): ICD-10-CM

## 2019-05-06 DIAGNOSIS — E87.1 HYPONATREMIA: ICD-10-CM

## 2019-05-06 DIAGNOSIS — J93.9 PNEUMOTHORAX ON RIGHT: ICD-10-CM

## 2019-05-06 DIAGNOSIS — J96.11 CHRONIC RESPIRATORY FAILURE WITH HYPOXIA (HCC): Chronic | ICD-10-CM

## 2019-05-06 DIAGNOSIS — R91.8 PULMONARY INFILTRATES: ICD-10-CM

## 2019-05-06 DIAGNOSIS — I47.20 VT (VENTRICULAR TACHYCARDIA): ICD-10-CM

## 2019-05-06 LAB
ALBUMIN SERPL-MCNC: 3.6 G/DL (ref 3.2–4.6)
ALBUMIN/GLOB SERPL: 1.1 {RATIO} (ref 1.2–3.5)
ALP SERPL-CCNC: 79 U/L (ref 50–136)
ALT SERPL-CCNC: 27 U/L (ref 12–65)
ANION GAP SERPL CALC-SCNC: 5 MMOL/L (ref 7–16)
ARTERIAL PATENCY WRIST A: YES
ARTERIAL PATENCY WRIST A: YES
AST SERPL-CCNC: 25 U/L (ref 15–37)
ATRIAL RATE: 208 BPM
BASE DEFICIT BLD-SCNC: 4 MMOL/L
BASE DEFICIT BLD-SCNC: 6 MMOL/L
BDY SITE: ABNORMAL
BDY SITE: ABNORMAL
BILIRUB SERPL-MCNC: 0.9 MG/DL (ref 0.2–1.1)
BNP SERPL-MCNC: 131 PG/ML
BNP SERPL-MCNC: 176 PG/ML
BODY TEMPERATURE: 97.7
BODY TEMPERATURE: 98.6
BUN SERPL-MCNC: 15 MG/DL (ref 8–23)
CALCIUM SERPL-MCNC: 8.7 MG/DL (ref 8.3–10.4)
CALCULATED R AXIS, ECG10: 86 DEGREES
CALCULATED T AXIS, ECG11: 46 DEGREES
CHLORIDE SERPL-SCNC: 100 MMOL/L (ref 98–107)
CO2 BLD-SCNC: 19 MMOL/L
CO2 BLD-SCNC: 20 MMOL/L
CO2 SERPL-SCNC: 27 MMOL/L (ref 21–32)
COLLECT TIME,HTIME: 1207
COLLECT TIME,HTIME: 942
CREAT SERPL-MCNC: 0.6 MG/DL (ref 0.8–1.5)
DIAGNOSIS, 93000: NORMAL
FLOW RATE ISTAT,IFRATE: 6 L/MIN
GAS FLOW.O2 O2 DELIVERY SYS: ABNORMAL L/MIN
GAS FLOW.O2 O2 DELIVERY SYS: ABNORMAL L/MIN
GLOBULIN SER CALC-MCNC: 3.2 G/DL (ref 2.3–3.5)
GLUCOSE SERPL-MCNC: 104 MG/DL (ref 65–100)
HCO3 BLD-SCNC: 18.3 MMOL/L (ref 22–26)
HCO3 BLD-SCNC: 19.4 MMOL/L (ref 22–26)
LACTATE BLD-SCNC: 2.06 MMOL/L (ref 0.5–1.9)
MAGNESIUM SERPL-MCNC: 2.1 MG/DL (ref 1.8–2.4)
MAGNESIUM SERPL-MCNC: 2.1 MG/DL (ref 1.8–2.4)
O2/TOTAL GAS SETTING VFR VENT: 100 %
PCO2 BLD: 31.6 MMHG (ref 35–45)
PCO2 BLD: 32.6 MMHG (ref 35–45)
PH BLD: 7.36 [PH] (ref 7.35–7.45)
PH BLD: 7.4 [PH] (ref 7.35–7.45)
PHOSPHATE SERPL-MCNC: 3.1 MG/DL (ref 2.3–3.7)
PO2 BLD: 53 MMHG (ref 75–100)
PO2 BLD: 91 MMHG (ref 75–100)
POTASSIUM SERPL-SCNC: 4.4 MMOL/L (ref 3.5–5.1)
PROCALCITONIN SERPL-MCNC: <0.1 NG/ML
PROT SERPL-MCNC: 6.8 G/DL (ref 6.3–8.2)
Q-T INTERVAL, ECG07: 344 MS
QRS DURATION, ECG06: 108 MS
QTC CALCULATION (BEZET), ECG08: 423 MS
SAO2 % BLD: 87 % (ref 95–98)
SAO2 % BLD: 97 % (ref 95–98)
SERVICE CMNT-IMP: ABNORMAL
SODIUM SERPL-SCNC: 132 MMOL/L (ref 136–145)
SPECIMEN TYPE: ABNORMAL
SPECIMEN TYPE: ABNORMAL
TROPONIN I BLD-MCNC: 0.08 NG/ML (ref 0.02–0.05)
VENTRICULAR RATE, ECG03: 91 BPM

## 2019-05-06 PROCEDURE — 36600 WITHDRAWAL OF ARTERIAL BLOOD: CPT

## 2019-05-06 PROCEDURE — C1729 CATH, DRAINAGE: HCPCS

## 2019-05-06 PROCEDURE — 74011000258 HC RX REV CODE- 258: Performed by: INTERNAL MEDICINE

## 2019-05-06 PROCEDURE — 94640 AIRWAY INHALATION TREATMENT: CPT

## 2019-05-06 PROCEDURE — 74011250636 HC RX REV CODE- 250/636: Performed by: INTERNAL MEDICINE

## 2019-05-06 PROCEDURE — 0W9930Z DRAINAGE OF RIGHT PLEURAL CAVITY WITH DRAINAGE DEVICE, PERCUTANEOUS APPROACH: ICD-10-PCS | Performed by: INTERNAL MEDICINE

## 2019-05-06 PROCEDURE — 77030014147 HC TY THORCENT PARA TELE -B

## 2019-05-06 PROCEDURE — 93005 ELECTROCARDIOGRAM TRACING: CPT | Performed by: EMERGENCY MEDICINE

## 2019-05-06 PROCEDURE — 32551 INSERTION OF CHEST TUBE: CPT | Performed by: INTERNAL MEDICINE

## 2019-05-06 PROCEDURE — 84100 ASSAY OF PHOSPHORUS: CPT

## 2019-05-06 PROCEDURE — C8929 TTE W OR WO FOL WCON,DOPPLER: HCPCS

## 2019-05-06 PROCEDURE — 84484 ASSAY OF TROPONIN QUANT: CPT

## 2019-05-06 PROCEDURE — 83880 ASSAY OF NATRIURETIC PEPTIDE: CPT

## 2019-05-06 PROCEDURE — 71045 X-RAY EXAM CHEST 1 VIEW: CPT

## 2019-05-06 PROCEDURE — 74011000302 HC RX REV CODE- 302: Performed by: INTERNAL MEDICINE

## 2019-05-06 PROCEDURE — 77010033678 HC OXYGEN DAILY

## 2019-05-06 PROCEDURE — 83605 ASSAY OF LACTIC ACID: CPT

## 2019-05-06 PROCEDURE — 99285 EMERGENCY DEPT VISIT HI MDM: CPT | Performed by: EMERGENCY MEDICINE

## 2019-05-06 PROCEDURE — 76040000019: Performed by: INTERNAL MEDICINE

## 2019-05-06 PROCEDURE — 77030019605

## 2019-05-06 PROCEDURE — 82803 BLOOD GASES ANY COMBINATION: CPT

## 2019-05-06 PROCEDURE — 96365 THER/PROPH/DIAG IV INF INIT: CPT | Performed by: EMERGENCY MEDICINE

## 2019-05-06 PROCEDURE — 75810000165 HC THORACENTESIS: Performed by: EMERGENCY MEDICINE

## 2019-05-06 PROCEDURE — 87040 BLOOD CULTURE FOR BACTERIA: CPT

## 2019-05-06 PROCEDURE — 86580 TB INTRADERMAL TEST: CPT | Performed by: INTERNAL MEDICINE

## 2019-05-06 PROCEDURE — 74011000258 HC RX REV CODE- 258: Performed by: EMERGENCY MEDICINE

## 2019-05-06 PROCEDURE — 74011000250 HC RX REV CODE- 250: Performed by: INTERNAL MEDICINE

## 2019-05-06 PROCEDURE — 83735 ASSAY OF MAGNESIUM: CPT

## 2019-05-06 PROCEDURE — 65610000001 HC ROOM ICU GENERAL

## 2019-05-06 PROCEDURE — C1729 CATH, DRAINAGE: HCPCS | Performed by: INTERNAL MEDICINE

## 2019-05-06 PROCEDURE — 74011250636 HC RX REV CODE- 250/636: Performed by: EMERGENCY MEDICINE

## 2019-05-06 PROCEDURE — 74011250637 HC RX REV CODE- 250/637: Performed by: INTERNAL MEDICINE

## 2019-05-06 PROCEDURE — 74011250637 HC RX REV CODE- 250/637: Performed by: NURSE PRACTITIONER

## 2019-05-06 PROCEDURE — 84145 PROCALCITONIN (PCT): CPT

## 2019-05-06 PROCEDURE — 36415 COLL VENOUS BLD VENIPUNCTURE: CPT

## 2019-05-06 PROCEDURE — 80053 COMPREHEN METABOLIC PANEL: CPT

## 2019-05-06 PROCEDURE — 96374 THER/PROPH/DIAG INJ IV PUSH: CPT | Performed by: EMERGENCY MEDICINE

## 2019-05-06 PROCEDURE — 99223 1ST HOSP IP/OBS HIGH 75: CPT | Performed by: INTERNAL MEDICINE

## 2019-05-06 PROCEDURE — 74011000250 HC RX REV CODE- 250: Performed by: EMERGENCY MEDICINE

## 2019-05-06 RX ORDER — TAMSULOSIN HYDROCHLORIDE 0.4 MG/1
0.4 CAPSULE ORAL DAILY
Status: DISCONTINUED | OUTPATIENT
Start: 2019-05-06 | End: 2019-05-25 | Stop reason: HOSPADM

## 2019-05-06 RX ORDER — LIDOCAINE HYDROCHLORIDE AND EPINEPHRINE 10; 10 MG/ML; UG/ML
10 INJECTION, SOLUTION INFILTRATION; PERINEURAL
Status: COMPLETED | OUTPATIENT
Start: 2019-05-06 | End: 2019-05-06

## 2019-05-06 RX ORDER — ALBUTEROL SULFATE 0.83 MG/ML
2.5 SOLUTION RESPIRATORY (INHALATION)
Status: DISCONTINUED | OUTPATIENT
Start: 2019-05-06 | End: 2019-05-25 | Stop reason: HOSPADM

## 2019-05-06 RX ORDER — IPRATROPIUM BROMIDE AND ALBUTEROL SULFATE 2.5; .5 MG/3ML; MG/3ML
3 SOLUTION RESPIRATORY (INHALATION)
Status: DISCONTINUED | OUTPATIENT
Start: 2019-05-06 | End: 2019-05-06

## 2019-05-06 RX ORDER — AZITHROMYCIN 250 MG/1
500 TABLET, FILM COATED ORAL EVERY 24 HOURS
Status: COMPLETED | OUTPATIENT
Start: 2019-05-06 | End: 2019-05-10

## 2019-05-06 RX ORDER — GUAIFENESIN 600 MG/1
1200 TABLET, EXTENDED RELEASE ORAL DAILY
Status: DISCONTINUED | OUTPATIENT
Start: 2019-05-06 | End: 2019-05-24

## 2019-05-06 RX ORDER — SODIUM CHLORIDE 0.9 % (FLUSH) 0.9 %
5-40 SYRINGE (ML) INJECTION EVERY 8 HOURS
Status: DISCONTINUED | OUTPATIENT
Start: 2019-05-06 | End: 2019-05-25 | Stop reason: HOSPADM

## 2019-05-06 RX ORDER — MIDAZOLAM HYDROCHLORIDE 1 MG/ML
1 INJECTION, SOLUTION INTRAMUSCULAR; INTRAVENOUS ONCE
Status: DISCONTINUED | OUTPATIENT
Start: 2019-05-06 | End: 2019-05-06

## 2019-05-06 RX ORDER — MONTELUKAST SODIUM 10 MG/1
10 TABLET ORAL DAILY
Status: DISCONTINUED | OUTPATIENT
Start: 2019-05-06 | End: 2019-05-25 | Stop reason: HOSPADM

## 2019-05-06 RX ORDER — SODIUM CHLORIDE 0.9 % (FLUSH) 0.9 %
5-40 SYRINGE (ML) INJECTION AS NEEDED
Status: DISCONTINUED | OUTPATIENT
Start: 2019-05-06 | End: 2019-05-25 | Stop reason: HOSPADM

## 2019-05-06 RX ORDER — LISINOPRIL 5 MG/1
5 TABLET ORAL DAILY
Status: DISCONTINUED | OUTPATIENT
Start: 2019-05-06 | End: 2019-05-08

## 2019-05-06 RX ORDER — CARVEDILOL 3.12 MG/1
3.12 TABLET ORAL 2 TIMES DAILY WITH MEALS
Status: DISCONTINUED | OUTPATIENT
Start: 2019-05-06 | End: 2019-05-08

## 2019-05-06 RX ORDER — ENOXAPARIN SODIUM 100 MG/ML
40 INJECTION SUBCUTANEOUS EVERY 24 HOURS
Status: DISCONTINUED | OUTPATIENT
Start: 2019-05-06 | End: 2019-05-25 | Stop reason: HOSPADM

## 2019-05-06 RX ORDER — ASPIRIN 81 MG/1
81 TABLET ORAL DAILY
Status: DISCONTINUED | OUTPATIENT
Start: 2019-05-06 | End: 2019-05-25 | Stop reason: HOSPADM

## 2019-05-06 RX ORDER — CARVEDILOL 3.12 MG/1
3.12 TABLET ORAL 2 TIMES DAILY WITH MEALS
Status: DISCONTINUED | OUTPATIENT
Start: 2019-05-06 | End: 2019-05-06

## 2019-05-06 RX ORDER — KETAMINE HYDROCHLORIDE 50 MG/ML
1 INJECTION, SOLUTION INTRAMUSCULAR; INTRAVENOUS ONCE
Status: DISCONTINUED | OUTPATIENT
Start: 2019-05-06 | End: 2019-05-06

## 2019-05-06 RX ADMIN — ALBUTEROL SULFATE 2.5 MG: 2.5 SOLUTION RESPIRATORY (INHALATION) at 16:56

## 2019-05-06 RX ADMIN — LISINOPRIL 5 MG: 5 TABLET ORAL at 12:28

## 2019-05-06 RX ADMIN — TAMSULOSIN HYDROCHLORIDE 0.4 MG: 0.4 CAPSULE ORAL at 12:28

## 2019-05-06 RX ADMIN — METHYLPREDNISOLONE SODIUM SUCCINATE 40 MG: 40 INJECTION, POWDER, FOR SOLUTION INTRAMUSCULAR; INTRAVENOUS at 16:02

## 2019-05-06 RX ADMIN — GUAIFENESIN 1200 MG: 600 TABLET, EXTENDED RELEASE ORAL at 12:28

## 2019-05-06 RX ADMIN — CARVEDILOL 3.12 MG: 3.12 TABLET, FILM COATED ORAL at 18:09

## 2019-05-06 RX ADMIN — AMIODARONE HYDROCHLORIDE 1 MG/MIN: 50 INJECTION, SOLUTION INTRAVENOUS at 10:08

## 2019-05-06 RX ADMIN — TUBERCULIN PURIFIED PROTEIN DERIVATIVE 5 UNITS: 5 INJECTION, SOLUTION INTRADERMAL at 20:56

## 2019-05-06 RX ADMIN — AZITHROMYCIN 500 MG: 250 TABLET, FILM COATED ORAL at 12:28

## 2019-05-06 RX ADMIN — Medication 10 ML: at 21:00

## 2019-05-06 RX ADMIN — CARVEDILOL 3.12 MG: 3.12 TABLET, FILM COATED ORAL at 11:05

## 2019-05-06 RX ADMIN — Medication 10 ML: at 16:02

## 2019-05-06 RX ADMIN — METHYLPREDNISOLONE SODIUM SUCCINATE 40 MG: 40 INJECTION, POWDER, FOR SOLUTION INTRAMUSCULAR; INTRAVENOUS at 21:45

## 2019-05-06 RX ADMIN — AMIODARONE HYDROCHLORIDE 0.5 MG/MIN: 50 INJECTION, SOLUTION INTRAVENOUS at 18:10

## 2019-05-06 RX ADMIN — MONTELUKAST SODIUM 10 MG: 10 TABLET, FILM COATED ORAL at 12:28

## 2019-05-06 RX ADMIN — LIDOCAINE HYDROCHLORIDE,EPINEPHRINE BITARTRATE 100 MG: 10; .01 INJECTION, SOLUTION INFILTRATION; PERINEURAL at 10:12

## 2019-05-06 RX ADMIN — CEFTRIAXONE SODIUM 1 G: 1 INJECTION, POWDER, FOR SOLUTION INTRAMUSCULAR; INTRAVENOUS at 21:44

## 2019-05-06 RX ADMIN — ALBUTEROL SULFATE 2.5 MG: 2.5 SOLUTION RESPIRATORY (INHALATION) at 20:30

## 2019-05-06 RX ADMIN — ENOXAPARIN SODIUM 40 MG: 40 INJECTION SUBCUTANEOUS at 12:28

## 2019-05-06 RX ADMIN — ASPIRIN 81 MG: 81 TABLET, COATED ORAL at 12:28

## 2019-05-06 RX ADMIN — PERFLUTREN 1 ML: 6.52 INJECTION, SUSPENSION INTRAVENOUS at 15:19

## 2019-05-06 NOTE — PROGRESS NOTES
Bedside report received from Allyson Sifuentes, [facility].

## 2019-05-06 NOTE — ED TRIAGE NOTES
Pt arrives to the ED vis ems c/o of SHOB that started this morning. Pt got SHOB trying to sHave this AM. Pt reports coughing up yellow phlim. EMS gave rocephin, as well as 150mg of amiodarone

## 2019-05-06 NOTE — ED NOTES
TRANSFER - OUT REPORT: 
 
Verbal report given to Darnell Vasquez on Divya Rosenberg  being transferred to ICU for routine progression of care Report consisted of patients Situation, Background, Assessment and  
Recommendations(SBAR). Information from the following report(s) SBAR and MAR was reviewed with the receiving nurse. Lines:  
Peripheral IV 05/06/19 Left Forearm (Active) Site Assessment Clean, dry, & intact 5/6/2019  9:32 AM  
Phlebitis Assessment 0 5/6/2019  9:32 AM  
Infiltration Assessment 0 5/6/2019  9:32 AM  
Dressing Status Clean, dry, & intact 5/6/2019  9:32 AM  
Dressing Type Transparent 5/6/2019  9:32 AM  
   
Peripheral IV 05/06/19 Right Antecubital (Active) Site Assessment Clean, dry, & intact 5/6/2019  9:44 AM  
Phlebitis Assessment 0 5/6/2019  9:44 AM  
Infiltration Assessment 0 5/6/2019  9:44 AM  
Dressing Status Clean, dry, & intact 5/6/2019  9:44 AM  
Dressing Type Transparent 5/6/2019  9:44 AM  
  
 
Opportunity for questions and clarification was provided. Patient transported with: 
 O2 @ 2 liters

## 2019-05-06 NOTE — PROCEDURES
The pt was placed with head of bed at 45 degrees  The R upper chest was prepped a;n drapped  1% lidocaine was injected locally  A 14 gauge uresil was placed in the second intercostal space midclavicular line  500 cc of air was aspirated then the tube was connected to suction  cxr post tube placement shows improvement

## 2019-05-06 NOTE — H&P
HISTORY AND PHYSICAL Patrice Edwards 5/6/2019 Date of Admission:  5/6/2019 The patient's chart is reviewed and the patient is discussed with the staff. Subjective:  
 
Patient is a 80 y.o.  male presents via EMS with shortness of breath when got up this morning. Had a productive cough with yellow sputum and received Rocephin. In the ER CXR with large right pneumothorax and we were called for chest tub e placement and admission. Had a fall in February and seen by Dr. Jackie Prieto with compression L2,L3, L4. Chronic medical:  HTN, Raynaud's syndrome, memory loss, COPD, asthma, AAA, BPH, hip fracture. Home DME company: None. Review of Systems Constitutional: negative for fevers and chills Eyes: negative for visual disturbance Ears, nose, mouth, throat, and face: positive for hearing loss Cardiovascular: positive for dyspnea, palpitations Gastrointestinal: negative for nausea and vomiting Genitourinary:negative for frequency and dysuria Musculoskeletal:negative for arthralgias Neurological: negative for headaches Patient Active Problem List  
Diagnosis Code  Abdominal aortic aneurysm without rupture ([facility]) I71.4  Benign prostatic hyperplasia N40.0  Cardiovascular disease I25.10  COPD (chronic obstructive pulmonary disease) ([facility]) J44.9  Low testosterone R79.89  
 Essential hypertension, benign I10  
 Allergic rhinitis J30.9  Raynaud's syndrome I73.00  Erectile dysfunction N52.9  Chronic respiratory failure with hypoxia ([facility]) J96.11  
 SOB (shortness of breath) R06.02  
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) S32.000A  Pneumothorax on right J93.9  Shortness of breath R06.02  
 Acute on chronic respiratory failure with hypoxia ([facility]) J96.21  
  COPD exacerbation ([facility] J44.1  Ventricular tachyarrhythmia (HCC) I47.2  Pulmonary infiltrates R91.8 Prior to Admission Medications Prescriptions Last Dose Informant Patient Reported? Taking? Biotin 2,500 mcg cap   Yes No  
Sig: Take  by mouth. Oxygen   Yes No  
Sig: Use as instructed. Use as instructed; faxed to [employer]  
aspirin delayed-release 81 mg tablet   Yes No  
Sig: Take  by mouth daily. calcium citrate-vitamin d3 (CITRACAL+D) 315-200 mg-unit tab   Yes No  
Sig: Take 2 Tabs by mouth daily (with breakfast). cholecalciferol (VITAMIN D3) 1,000 unit cap   Yes No  
Sig: Take  by mouth.  
cyanocobalamin (VITAMIN B12) 1,000 mcg/mL injection   Yes No  
Sig: INJECT 1ML INTRAMUSCULARLY ONCE FOR 1 DOSE  
fluticasone (FLONASE) 50 mcg/actuation nasal spray   No No  
Si Sprays by Both Nostrils route daily. guaiFENesin ER (MUCINEX) 600 mg ER tablet   Yes No  
Sig: Take 1,200 mg by mouth daily. lisinopril (PRINIVIL, ZESTRIL) 5 mg tablet   No No  
Sig: Take 1 Tab by mouth daily. montelukast (SINGULAIR) 10 mg tablet   No No  
Sig: Take 1 Tab by mouth daily. tamsulosin (FLOMAX) 0.4 mg capsule   No No  
Sig: Take 1 Cap by mouth daily. umeclidinium-vilanterol (ANORO ELLIPTA) 62.5-25 mcg/actuation inhaler   Yes No  
Sig: Take 1 Puff by inhalation daily. Facility-Administered Medications: None Past Medical History:  
Diagnosis Date  Abdominal aortic aneurysm ([facility] 12/10/2015 In 2005  [facility]  Allergic rhinitis 12/10/2015  Asthma  Benign neoplasm  Benign prostatic hyperplasia 12/10/2015  BPH without urinary obstruction  Cardiovascular disease 12/10/2015  Chronic obstructive asthma with exacerbation ([facility] [facility] 12/10/2015  COPD (chronic obstructive pulmonary disease) ([facility] 12/10/2015  Cough with hemoptysis  Erectile dysfunction 12/10/2015  Essential hypertension, benign 12/10/2015  Fracture 10/2014 of left hand and ribs after fall on concrete  History of colon polyps  Low testosterone 12/10/2015  Lumbago  Memory loss  Overflow incontinence  Raynaud's syndrome 12/10/2015  Rotator cuff tendinitis  Thrombocytopenia ([facility]  Urinary frequency Past Surgical History:  
Procedure Laterality Date  HX CATARACT REMOVAL  6/2016-right  HX COLONOSCOPY    
 HX FRACTURE TX  10/2014  
 of left hand and ribs are fall on concrete [address]  HX ORTHOPAEDIC  03/2018  
 hip fracture Social History Socioeconomic History  Marital status:  Spouse name: Not on file  Number of children: Not on file  Years of education: Not on file  Highest education level: Not on file Occupational History  Not on file Social Needs  Financial resource strain: Not on file  Food insecurity:  
  Worry: Not on file Inability: Not on file  Transportation needs:  
  Medical: Not on file Non-medical: Not on file Tobacco Use  Smoking status: Former Smoker  Smokeless tobacco: Never Used Substance and Sexual Activity  Alcohol use: Yes Comment: occasional  
 Drug use: Not on file  Sexual activity: Not on file Lifestyle  Physical activity:  
  Days per week: Not on file Minutes per session: Not on file  Stress: Not on file Relationships  Social connections:  
  Talks on phone: Not on file Gets together: Not on file Attends Orthodoxy service: Not on file Active member of club or organization: Not on file Attends meetings of clubs or organizations: Not on file Relationship status: Not on file  Intimate partner violence:  
  Fear of current or ex partner: Not on file Emotionally abused: Not on file Physically abused: Not on file Forced sexual activity: Not on file Other Topics Concern  Not on file Social History Narrative  Not on file Family History Problem Relation Age of Onset  Dementia Mother  Cancer Father   
     lung cancer  Heart Attack Father No Known Allergies Current Facility-Administered Medications Medication Dose Route Frequency  amiodarone (CORDARONE) 450 mg in dextrose 5% 250 mL infusion  1 mg/min IntraVENous CONTINUOUS Objective:  
 
Vitals:  
 05/06/19 9982 BP: 114/86 Pulse: 90 Resp: 16 Temp: 97.7 °F (36.5 °C) SpO2: (!) 80% Weight: 146 lb (66.2 kg) Height: 5' 8\" (1.727 m) PHYSICAL EXAM  
 
Constitutional:  the patient is well developed and in no acute distress EENMT:  Sclera clear, pupils equal, oral mucosa moist 
Respiratory: decreased breath sounds on the R Cardiovascular:  RRR without M,G,R 
Gastrointestinal: soft and non-tender; with positive bowel sounds. Musculoskeletal: warm without cyanosis. There is no lower extremity edema. Skin:  no jaundice or rashes, no wounds Neurologic: no gross neuro deficits Psychiatric:  alert and oriented x 3 CXR: 
5/6/19: 
 
 
No results for input(s): WBC, HGB, HCT, PLT, INR, HGBEXT, HCTEXT, PLTEXT, HGBEXT, HCTEXT, PLTEXT in the last 72 hours. No lab exists for component: INREXT, INREXT Recent Labs 05/06/19 
1396 MG 2.1 No results for input(s): PH, PCO2, PO2, HCO3 in the last 72 hours. No results for input(s): LCAD, LAC in the last 72 hours. Assessment:  (Medical Decision Making) Hospital Problems  Date Reviewed: 5/6/2019 Codes Class Noted POA Pneumothorax on right ICD-10-CM: J93.9 ICD-9-CM: 512.89  5/6/2019 Unknown * (Principal) Shortness of breath ICD-10-CM: R06.02 
ICD-9-CM: 786.05  5/6/2019 Yes Acute on chronic respiratory failure with hypoxia [facility]) ICD-10-CM: J96.21 
ICD-9-CM: 518.84, 799.02  5/6/2019 Unknown [facility] exacerbation [facility] ICD-10-CM: J44.1 ICD-9-CM: 491.21  5/6/2019 Unknown Vent[facility] tachyarrhythmia ([facility] ICD-10-CM: I47.2 ICD-9-CM: 427.1  5/6/2019 Unknown Pulmonary infiltrates ICD-10-CM: R91.8 ICD-9-CM: 793.19  5/6/2019 Unknown Chronic respiratory failure with hypoxia (HCC) (Chronic) ICD-10-CM: J96.11 
ICD-9-CM: 518.83, 799.02  3/29/2016 Yes Overview Addendum 5/6/2019 10:14 AM by Juanita Mayorga, NP Continue to wear 2 L nasal cannula at night. May use 2 L supplemental oxygen to maintain sats greater than 90%. Essential hypertension, benign ICD-10-CM: I10 
ICD-9-CM: 401.1  12/10/2015 Yes Plan:  (Medical Decision Making) --Will admit for further medical management to icu 
--Supplemental O2 as required --Right chest tube placement for large right pneumothorax --Respiratory nebulizer treatments as needed --Blood cultures drawn in ER Cards consult - amiodarone Iv steroids 
  antibx More than 50% of the time documented was spent in face-to-face contact with the patient and in the care of the patient on the floor/unit where the patient is located.  
 
Angie Looney MD

## 2019-05-06 NOTE — PROGRESS NOTES
TRANSFER - IN REPORT: 
 
Verbal report received from JANINA Galaviz(name) on Pamela Gonzalez  being received from ED(unit) for routine progression of care Report consisted of patients Situation, Background, Assessment and  
Recommendations(SBAR). Information from the following report(s) SBAR, Kardex and ED Summary was reviewed with the receiving nurse. Opportunity for questions and clarification was provided. Assessment completed upon patients arrival to unit and care assumed. Dual skin assessment preformed with Hilary Jones RN. Patient noted to have blanchable redness to sacrum and uresil chest tube to R upper chest. No other skin issues noted, allevyn placed at this time.

## 2019-05-06 NOTE — ED PROVIDER NOTES
Patient arrived by home with complaints of sudden onset of dyspnea and cough productive of yellow sputum. He has no fever. Does have a history of COPD and uses oxygen at night. He denies any chest pain fever or chills and review of systems otherwise negative. EMS reports in route the patient was noted to have a run of V. Tach and was given a dose of amiodarone. The history is provided by the patient, the EMS personnel and medical records. Respiratory Distress This is a new problem. The average episode lasts 2 hours. The current episode started 1 to 2 hours ago. The problem has not changed since onset. Associated symptoms include rhinorrhea, cough, sputum production, orthopnea and chest pain. Pertinent negatives include no fever, no sore throat, no ear pain, no neck pain, no hemoptysis, no wheezing, no PND, no syncope, no vomiting, no abdominal pain, no rash, no leg pain, no leg swelling and no claudication. Treatments tried: 100% oxygen by nonrebreather mask administered by EMS. The treatment provided no relief. He has had no prior hospitalizations. He has had no prior ED visits. He has had no prior ICU admissions. Associated medical issues include COPD. Past Medical History:  
Diagnosis Date  Abdominal aortic aneurysm ([address] 12/10/2015 In 2005  Abdominal aortic aneurysm without rupture ([address]  Allergic rhinitis 12/10/2015  Asthma  Benign neoplasm  Benign prostatic hyperplasia 12/10/2015  BPH without urinary obstruction  Cardiovascular disease 12/10/2015  Chronic obstructive asthma with exacerbation ([address] 12/10/2015  COPD (chronic obstructive pulmonary disease) ([facility] 12/10/2015  Cough with hemoptysis  Erectile dysfunction 12/10/2015  Essential hypertension, benign 12/10/2015  Fracture 10/2014  
 of left hand and ribs after fall on concrete  History of colon polyps  Low testosterone 12/10/2015  Lumbago  Memory loss  Overflow incontinence  Raynaud's syndrome 12/10/2015  Rotator cuff tendinitis  Thrombocytopenia ([facility]  Urinary frequency Past Surgical History:  
Procedure Laterality Date  HX CATARACT REMOVAL  6/2016-right  HX COLONOSCOPY    
 HX FRACTURE [place]  10/2014  
 of left hand and ribs are fall on concrete [address]  HX ORTHOPAEDIC  03/2018  
 hip fracture Family History:  
Problem Relation Age of Onset  Dementia Mother  Cancer Father   
     lung cancer  Heart Attack Father Social History Socioeconomic History  Marital status:  Spouse name: Not on file  Number of children: Not on file  Years of education: Not on file  Highest education level: Not on file Occupational History  Not on file Social Needs  Financial resource strain: Not on file  Food insecurity:  
  Worry: Not on file Inability: Not on file  Transportation needs:  
  Medical: Not on file Non-medical: Not on file Tobacco Use  Smoking status: Former Smoker  Smokeless tobacco: Never Used Substance and Sexual Activity  Alcohol use: Yes Comment: occasional  
 Drug use: Not on file  Sexual activity: Not on file Lifestyle  Physical activity:  
  Days per week: Not on file Minutes per session: Not on file  Stress: Not on file Relationships  Social connections:  
  Talks on phone: Not on file Gets together: Not on file Attends Taoism service: Not on file Active member of club or organization: Not on file Attends meetings of clubs or organizations: Not on file Relationship status: Not on file  Intimate partner violence:  
  Fear of current or ex partner: Not on file Emotionally abused: Not on file Physically abused: Not on file Forced sexual activity: Not on file Other Topics Concern  Not on file Social History Narrative  Not on file ALLERGIES: Patient has no known allergies. Review of Systems Constitutional: Negative for fever. HENT: Positive for rhinorrhea. Negative for ear pain and sore throat. Respiratory: Positive for cough and sputum production. Negative for hemoptysis and wheezing. Cardiovascular: Positive for chest pain and orthopnea. Negative for claudication, leg swelling, syncope and PND. Gastrointestinal: Negative for abdominal pain and vomiting. Musculoskeletal: Negative for neck pain. Skin: Negative for rash. All other systems reviewed and are negative. Vitals:  
 05/06/19 3532 BP: 114/86 Pulse: 90 Resp: 16 Temp: 97.7 °F (36.5 °C) SpO2: (!) 80% Weight: 66.2 kg (146 lb) Height: 5' 8\" (1.727 m) Physical Exam  
Constitutional: He is oriented to person, place, and time. He appears well-developed and well-nourished. No distress. HENT:  
Head: Normocephalic and atraumatic. Eyes: Pupils are equal, round, and reactive to light. Conjunctivae and EOM are normal.  
Neck: Normal range of motion. Neck supple. No JVD present. Cardiovascular: Normal rate, regular rhythm and normal heart sounds. Pulmonary/Chest: Effort normal.  
Breath sounds are absent on the right. Trachea is in the midline and there is no JVD Abdominal: Soft. Bowel sounds are normal.  
Musculoskeletal: Normal range of motion. Neurological: He is alert and oriented to person, place, and time. Skin: Skin is warm and dry. Capillary refill takes less than 2 seconds. He is not diaphoretic. Psychiatric: He has a normal mood and affect. His behavior is normal.  
Nursing note and vitals reviewed. MDM Number of Diagnoses or Management Options Tension pneumothorax:  
Ventricular tachyarrhythmia [facility]):  
Diagnosis management comments: Chest x-ray demonstrates complete pneumothorax with some mild tension component the patient is in no distress.   He also had several repeated runs of ventricular tachycardia and was started on amiodarone drip. Intensivist was consulted and presented probably to the emergency room and place a chest tube. Amount and/or Complexity of Data Reviewed Clinical lab tests: ordered and reviewed Tests in the radiology section of CPT®: ordered and reviewed Independent visualization of images, tracings, or specimens: yes Risk of Complications, Morbidity, and/or Mortality Presenting problems: high Diagnostic procedures: moderate Management options: moderate General comments: =================================================================== This patient is critically ill and there is a high probability of of imminent or life threatening deterioration in the patient's condition without immediate management. The nature of the patient's clinical problem is: tension pneumothorax; v-tach I have spent 35 minutes in direct patient care, documentation, review of labs/xrays/old records, discussion with Family, Colleague, Nursing . The time involved in the performance of separately reportable procedures was not counted toward critical care time. Shiloh Jefferson DO; 5/6/2019 @10:48 AM 
=================================================================== 
 
 
 
Critical Care Total time providing critical care: 30-74 minutes Patient Progress Patient progress: stable Procedures

## 2019-05-06 NOTE — PROGRESS NOTES
Pt seen in ICU s/p admission for Pneumothorax on right. Alert and oriented currently. Confirms demographics. States he drives self, wife is blind. Son here is assisting with wife's care. Discussed possible need of HH vs STR. PPD for any potential rehab and PT/OT per MD when medically stable. CM to follow for any assist and d/c POC. Care Management Interventions PCP Verified by CM: Yes Mode of Transport at Discharge: Other (see comment) Transition of Care Consult (CM Consult): Discharge Planning Discharge Durable Medical Equipment: (walker, oxygen concentrator with [employer],) Current Support Network: Lives with Spouse, Own Home(lives with spouse, Zelalem Flood [phone], (who is blind), 2 children, one local and the other in [place]) Confirm Follow Up Transport: Self Plan discussed with Pt/Family/Caregiver: Yes Freedom of Choice Offered: Yes [employer] Information Provided?: (confirms MCR/[employer] supplemental - able to get rx) Discharge Location Discharge Placement: Unable to determine at this time

## 2019-05-06 NOTE — PROGRESS NOTES
Assisted Dr. Ashish Lafleur with Chest Tube insertion on right side chest wall (see MD note). Consent obtained and timeout performed. No sedation medications given by ENDO RN. See flow sheet for Vital Signs. Patient tolerated procedure well and stated he can already \"breathe better. \"

## 2019-05-06 NOTE — PROGRESS NOTES
Initial visit to assess pt's spiritual needs. Feeling today? Pt had surgery this morning, still feeling anesthesia Receiving good care? Yes, but is hungry and hopes someone will feed him tonight Needs from Spiritual Care:  prayer Ministry of presence & prayer to demonstrate caring & concern, convey emotional & spiritual support.  
 
 Linnette Fischer MDiv,ThM,PhD

## 2019-05-06 NOTE — CONSULTS
[facility] Cardiology Consult                Date of  Admission: 5/6/2019  9:25 AM     Primary Care Physician: Dr. Lara November  Primary Cardiologist: BEVERLY  Referring Physician: ED  Consulting Physician: Dr. Alicia Rojas    CC/Reason for consult: HOPE De Friday is a 80 y.o. male with prior h/o HTN, Raynaud's syndrome, memory loss, COPD, asthma, AAA, BPH, hip fracture, and recent fall Feb 2019 compression L2,L3, L4. Patient presented to ED at [facility] via EMS with shortness of breath when got up this morning. Had a productive cough with yellow sputum and received Rocephin. In the ER CXR with large right pneumothorax and chest tube was placed by pulmonary. Bedside tele monitor showing runs of NSVT and IV amiodarone was started and defib pads placed. He denies any chest pain just SOB.                  Diagnosis    Abdominal aortic aneurysm without rupture (HCC)    Benign prostatic hyperplasia    Cardiovascular disease    COPD (chronic obstructive pulmonary disease) (HCC)    Low testosterone    Essential hypertension, benign    Allergic rhinitis    Raynaud's syndrome    Erectile dysfunction    Chronic respiratory failure with hypoxia (HCC)    SOB (shortness of breath)    Abnormal finding of lung    Bigeminy    Hip fracture (HCC)    COPD (chronic obstructive pulmonary disease) with emphysema (HCC)    Closed compression fracture of lumbosacral spine (HCC)    Pneumothorax on right    Shortness of breath       Past Medical History:   Diagnosis Date    Abdominal aortic aneurysm ([address] 12/10/2015    In 2005    Abdominal aortic aneurysm without rupture ([address]     Allergic rhinitis 12/10/2015    Asthma     Benign neoplasm     Benign prostatic hyperplasia 12/10/2015    BPH without urinary obstruction     Cardiovascular disease 12/10/2015    Chronic obstructive asthma with exacerbation ([address] 12/10/2015    COPD (chronic obstructive pulmonary disease) (HCC) 12/10/2015    Cough with hemoptysis     Erectile dysfunction 12/10/2015    Essential hypertension, benign 12/10/2015    Fracture 10/2014    of left hand and ribs after fall on concrete    History of colon polyps     Low testosterone 12/10/2015    Lumbago     Memory loss     Overflow incontinence     Raynaud's syndrome 12/10/2015    Rotator cuff tendinitis     Thrombocytopenia ([facility]     Urinary frequency       Past Surgical History:   Procedure Laterality Date    HX CATARACT REMOVAL  6/2016-right    HX COLONOSCOPY      HX FRACTURE TX  10/2014    of left hand and ribs are fall on concrete    [facility]    HX ORTHOPAEDIC  03/2018    hip fracture     No Known Allergies   Family History   Problem Relation Age of Onset    Dementia Mother     Cancer Father         lung cancer    Heart Attack Father         Current Facility-Administered Medications   Medication Dose Route Frequency    amiodarone (CORDARONE) 450 mg in dextrose 5% 250 mL infusion  1 mg/min IntraVENous CONTINUOUS       Review of Systems   Constitution: Negative for diaphoresis and malaise/fatigue. HENT: Negative for congestion. Cardiovascular: Positive for dyspnea on exertion. Negative for chest pain, claudication, cyanosis, irregular heartbeat, leg swelling, near-syncope, orthopnea, palpitations, paroxysmal nocturnal dyspnea and syncope. Respiratory: Positive for cough and shortness of breath. Negative for wheezing. Endocrine: Negative for cold intolerance and heat intolerance. Hematologic/Lymphatic: Does not bruise/bleed easily. Skin: Negative for nail changes. Neurological: Negative for dizziness, headaches and weakness.         Physical Exam  Vitals:    05/06/19 0927   BP: 114/86   Pulse: 90   Resp: 16   Temp: 97.7 °F (36.5 °C)   SpO2: (!) 80%   Weight: 66.2 kg (146 lb)   Height: 5' 8\" (1.727 m)       Physical Exam:  General: Well Developed, Well Nourished, No Acute Distress  HEENT: pupils equal and round, no abnormalities noted  Neck: supple, no JVD, no carotid bruits  Heart: S1S2 with RRR without murmurs or gallops  Lungs: Clear throughout auscultation bilaterally without adventitious sounds  Abd: soft, nontender, nondistended, with good bowel sounds  Ext: warm, no edema, calves supple/nontender, pulses 2+ bilaterally  Skin: warm and dry  Psychiatric: Normal mood and affect  Neurologic: Alert and oriented X 3    Cardiographics    Telemetry: SR with runs NSVT  ECG: SR   Echocardiogram: last echo 2/18 showed -  Left ventricle: The ventricle was mildly dilated. Systolic function was normal. Ejection fraction was estimated in the range of 60 % to 65 %. There were no regional wall motion abnormalities. Doppler parameters were consistent with mild diastolic dysfunction (grade 1). -  Right ventricle: The ventricle was mildly dilated. -  Left atrium: The atrium was moderately dilated. -  Right atrium: The atrium was moderately dilated. -  Aortic valve: SVi=55.5 and Di=0.57. There was mild stenosis. The aortic  valve area by the continuity equation was 1.8 cm2.-  Tricuspid valve: There was mild regurgitation. -  Pericardium: A trivial pericardial effusion was identified. Labs:   Recent Labs     05/06/19  0937   MG 2.1        Assessment/Plan:     Assessment:      Principal Problem:    Pneumothorax on right (5/6/2019)- per pulmonary     Active Problems:    Essential hypertension, benign (12/10/2015)      Chronic respiratory failure with hypoxia (HCC) (3/29/2016)      Overview: Continue to wear 2 L nasal cannula at night. May use 2 L supplemental       oxygen to maintain sats greater than 90%. Shortness of breath (5/6/2019)- see above. Acute on chronic respiratory failure with hypoxia (HCC) (5/6/2019)      COPD exacerbation ([address] (5/6/2019)      Ventricular tachyarrhythmia ([address] (5/6/2019)- VT- patient having short runs NSVT likely secondary to large pneumothorax. VT has improved since starting IV amiodarone. Will continue IV amiodarone. Order echo.  B/P is borderline at present. Will consider adding BB if B/P improves. Keep K+~4 and Mg+ ~2. Daily labs. Pulmonary infiltrates (5/6/2019)      VT (ventricular tachycardia) ([facility] (5/6/2019)- see above. Thank you very much for this referral. We appreciate the opportunity to participate in this patient's care. We will follow along with above stated plan.     Suzanne Jauregui NP  Consulting MD: Dr. Lana Hernandez

## 2019-05-07 ENCOUNTER — APPOINTMENT (OUTPATIENT)
Dept: GENERAL RADIOLOGY | Age: 84
DRG: 163 | End: 2019-05-07
Attending: INTERNAL MEDICINE
Payer: MEDICARE

## 2019-05-07 LAB
ANION GAP SERPL CALC-SCNC: 9 MMOL/L (ref 7–16)
ATRIAL RATE: 67 BPM
BUN SERPL-MCNC: 12 MG/DL (ref 8–23)
CALCIUM SERPL-MCNC: 8.7 MG/DL (ref 8.3–10.4)
CALCULATED P AXIS, ECG09: 93 DEGREES
CALCULATED R AXIS, ECG10: -9 DEGREES
CALCULATED T AXIS, ECG11: 34 DEGREES
CHLORIDE SERPL-SCNC: 102 MMOL/L (ref 98–107)
CO2 SERPL-SCNC: 24 MMOL/L (ref 21–32)
CREAT SERPL-MCNC: 0.56 MG/DL (ref 0.8–1.5)
DIAGNOSIS, 93000: NORMAL
ERYTHROCYTE [DISTWIDTH] IN BLOOD BY AUTOMATED COUNT: 13.4 % (ref 11.9–14.6)
GLUCOSE SERPL-MCNC: 133 MG/DL (ref 65–100)
HCT VFR BLD AUTO: 42.1 % (ref 41.1–50.3)
HGB BLD-MCNC: 13.6 G/DL (ref 13.6–17.2)
MAGNESIUM SERPL-MCNC: 2.1 MG/DL (ref 1.8–2.4)
MCH RBC QN AUTO: 31.9 PG (ref 26.1–32.9)
MCHC RBC AUTO-ENTMCNC: 32.3 G/DL (ref 31.4–35)
MCV RBC AUTO: 98.8 FL (ref 79.6–97.8)
MM INDURATION POC: 0 MM (ref 0–5)
NRBC # BLD: 0 K/UL (ref 0–0.2)
P-R INTERVAL, ECG05: 170 MS
PLATELET # BLD AUTO: 139 K/UL (ref 150–450)
PMV BLD AUTO: 10.6 FL (ref 9.4–12.3)
POTASSIUM SERPL-SCNC: 4 MMOL/L (ref 3.5–5.1)
PPD POC: NEGATIVE NEGATIVE
Q-T INTERVAL, ECG07: 444 MS
QRS DURATION, ECG06: 102 MS
QTC CALCULATION (BEZET), ECG08: 469 MS
RBC # BLD AUTO: 4.26 M/UL (ref 4.23–5.6)
SODIUM SERPL-SCNC: 135 MMOL/L (ref 136–145)
VENTRICULAR RATE, ECG03: 67 BPM
WBC # BLD AUTO: 6 K/UL (ref 4.3–11.1)

## 2019-05-07 PROCEDURE — 74011250636 HC RX REV CODE- 250/636

## 2019-05-07 PROCEDURE — 80048 BASIC METABOLIC PNL TOTAL CA: CPT

## 2019-05-07 PROCEDURE — 74011250637 HC RX REV CODE- 250/637: Performed by: INTERNAL MEDICINE

## 2019-05-07 PROCEDURE — 71045 X-RAY EXAM CHEST 1 VIEW: CPT

## 2019-05-07 PROCEDURE — C1729 CATH, DRAINAGE: HCPCS

## 2019-05-07 PROCEDURE — 76040000019: Performed by: INTERNAL MEDICINE

## 2019-05-07 PROCEDURE — 74011250636 HC RX REV CODE- 250/636: Performed by: INTERNAL MEDICINE

## 2019-05-07 PROCEDURE — 65610000001 HC ROOM ICU GENERAL

## 2019-05-07 PROCEDURE — 36415 COLL VENOUS BLD VENIPUNCTURE: CPT

## 2019-05-07 PROCEDURE — 74011000258 HC RX REV CODE- 258: Performed by: INTERNAL MEDICINE

## 2019-05-07 PROCEDURE — 94640 AIRWAY INHALATION TREATMENT: CPT

## 2019-05-07 PROCEDURE — 74011250637 HC RX REV CODE- 250/637: Performed by: NURSE PRACTITIONER

## 2019-05-07 PROCEDURE — 83735 ASSAY OF MAGNESIUM: CPT

## 2019-05-07 PROCEDURE — 0W9930Z DRAINAGE OF RIGHT PLEURAL CAVITY WITH DRAINAGE DEVICE, PERCUTANEOUS APPROACH: ICD-10-PCS | Performed by: INTERNAL MEDICINE

## 2019-05-07 PROCEDURE — 85027 COMPLETE CBC AUTOMATED: CPT

## 2019-05-07 PROCEDURE — 93005 ELECTROCARDIOGRAM TRACING: CPT | Performed by: INTERNAL MEDICINE

## 2019-05-07 PROCEDURE — 74011000250 HC RX REV CODE- 250: Performed by: INTERNAL MEDICINE

## 2019-05-07 PROCEDURE — 99232 SBSQ HOSP IP/OBS MODERATE 35: CPT | Performed by: INTERNAL MEDICINE

## 2019-05-07 PROCEDURE — 77030012390 HC DRN CHST BTL GTNG -B

## 2019-05-07 PROCEDURE — 32551 INSERTION OF CHEST TUBE: CPT | Performed by: INTERNAL MEDICINE

## 2019-05-07 RX ORDER — MIDAZOLAM HYDROCHLORIDE 1 MG/ML
INJECTION, SOLUTION INTRAMUSCULAR; INTRAVENOUS
Status: COMPLETED
Start: 2019-05-07 | End: 2019-05-07

## 2019-05-07 RX ORDER — MIDAZOLAM HYDROCHLORIDE 1 MG/ML
2 INJECTION, SOLUTION INTRAMUSCULAR; INTRAVENOUS ONCE
Status: COMPLETED | OUTPATIENT
Start: 2019-05-07 | End: 2019-05-07

## 2019-05-07 RX ORDER — LISINOPRIL 5 MG/1
5 TABLET ORAL ONCE
Status: COMPLETED | OUTPATIENT
Start: 2019-05-07 | End: 2019-05-07

## 2019-05-07 RX ORDER — HYDRALAZINE HYDROCHLORIDE 20 MG/ML
10 INJECTION INTRAMUSCULAR; INTRAVENOUS
Status: DISCONTINUED | OUTPATIENT
Start: 2019-05-07 | End: 2019-05-25 | Stop reason: HOSPADM

## 2019-05-07 RX ORDER — FENTANYL CITRATE 50 UG/ML
INJECTION, SOLUTION INTRAMUSCULAR; INTRAVENOUS
Status: COMPLETED
Start: 2019-05-07 | End: 2019-05-07

## 2019-05-07 RX ORDER — HYDRALAZINE HYDROCHLORIDE 20 MG/ML
20 INJECTION INTRAMUSCULAR; INTRAVENOUS ONCE
Status: COMPLETED | OUTPATIENT
Start: 2019-05-07 | End: 2019-05-07

## 2019-05-07 RX ORDER — FENTANYL CITRATE 50 UG/ML
100 INJECTION, SOLUTION INTRAMUSCULAR; INTRAVENOUS ONCE
Status: COMPLETED | OUTPATIENT
Start: 2019-05-07 | End: 2019-05-07

## 2019-05-07 RX ORDER — AMIODARONE HYDROCHLORIDE 200 MG/1
200 TABLET ORAL 2 TIMES DAILY
Status: DISCONTINUED | OUTPATIENT
Start: 2019-05-07 | End: 2019-05-08

## 2019-05-07 RX ADMIN — Medication 10 ML: at 21:46

## 2019-05-07 RX ADMIN — AMIODARONE HYDROCHLORIDE 200 MG: 200 TABLET ORAL at 18:32

## 2019-05-07 RX ADMIN — ENOXAPARIN SODIUM 40 MG: 40 INJECTION SUBCUTANEOUS at 15:10

## 2019-05-07 RX ADMIN — ALBUTEROL SULFATE 2.5 MG: 2.5 SOLUTION RESPIRATORY (INHALATION) at 19:36

## 2019-05-07 RX ADMIN — METHYLPREDNISOLONE SODIUM SUCCINATE 40 MG: 40 INJECTION, POWDER, FOR SOLUTION INTRAMUSCULAR; INTRAVENOUS at 15:15

## 2019-05-07 RX ADMIN — CARVEDILOL 3.12 MG: 3.12 TABLET, FILM COATED ORAL at 09:14

## 2019-05-07 RX ADMIN — MIDAZOLAM HYDROCHLORIDE 2 MG: 1 INJECTION, SOLUTION INTRAMUSCULAR; INTRAVENOUS at 12:00

## 2019-05-07 RX ADMIN — CEFTRIAXONE SODIUM 1 G: 1 INJECTION, POWDER, FOR SOLUTION INTRAMUSCULAR; INTRAVENOUS at 21:46

## 2019-05-07 RX ADMIN — FENTANYL CITRATE 100 MCG: 50 INJECTION, SOLUTION INTRAMUSCULAR; INTRAVENOUS at 12:00

## 2019-05-07 RX ADMIN — TAMSULOSIN HYDROCHLORIDE 0.4 MG: 0.4 CAPSULE ORAL at 09:14

## 2019-05-07 RX ADMIN — ALBUTEROL SULFATE 2.5 MG: 2.5 SOLUTION RESPIRATORY (INHALATION) at 16:13

## 2019-05-07 RX ADMIN — LISINOPRIL 5 MG: 5 TABLET ORAL at 18:33

## 2019-05-07 RX ADMIN — HYDRALAZINE HYDROCHLORIDE 10 MG: 20 INJECTION INTRAMUSCULAR; INTRAVENOUS at 21:43

## 2019-05-07 RX ADMIN — GUAIFENESIN 1200 MG: 600 TABLET, EXTENDED RELEASE ORAL at 09:14

## 2019-05-07 RX ADMIN — Medication 10 ML: at 16:59

## 2019-05-07 RX ADMIN — HYDRALAZINE HYDROCHLORIDE 20 MG: 20 INJECTION INTRAMUSCULAR; INTRAVENOUS at 06:25

## 2019-05-07 RX ADMIN — MONTELUKAST SODIUM 10 MG: 10 TABLET, FILM COATED ORAL at 09:14

## 2019-05-07 RX ADMIN — ASPIRIN 81 MG: 81 TABLET, COATED ORAL at 09:14

## 2019-05-07 RX ADMIN — METHYLPREDNISOLONE SODIUM SUCCINATE 40 MG: 40 INJECTION, POWDER, FOR SOLUTION INTRAMUSCULAR; INTRAVENOUS at 21:46

## 2019-05-07 RX ADMIN — LISINOPRIL 5 MG: 5 TABLET ORAL at 09:14

## 2019-05-07 RX ADMIN — FENTANYL CITRATE 100 MCG: 50 INJECTION INTRAMUSCULAR; INTRAVENOUS at 12:00

## 2019-05-07 RX ADMIN — AZITHROMYCIN 500 MG: 250 TABLET, FILM COATED ORAL at 16:58

## 2019-05-07 RX ADMIN — METHYLPREDNISOLONE SODIUM SUCCINATE 40 MG: 40 INJECTION, POWDER, FOR SOLUTION INTRAMUSCULAR; INTRAVENOUS at 05:49

## 2019-05-07 RX ADMIN — ALBUTEROL SULFATE 2.5 MG: 2.5 SOLUTION RESPIRATORY (INHALATION) at 07:36

## 2019-05-07 RX ADMIN — Medication 10 ML: at 05:49

## 2019-05-07 NOTE — PROGRESS NOTES
Critical Care Daily Progress Note: 5/7/2019 Nnamdi Morris Admission Date: 5/6/2019 The patient's chart is reviewed and the patient is discussed with the staff. 80 y.o.  male presents via EMS with shortness of breath when got up this morning. Had a productive cough with yellow sputum and received Rocephin. In the ER CXR with large right pneumothorax and we were called for chest tub e placement and admission. Had a fall in February and seen by Dr. Allie Bates with compression L2,L3, L4 Subjective:  
Feels better but still some sob, large air leak and has some sub cut air,  No further v tach Current Facility-Administered Medications Medication Dose Route Frequency  amiodarone (CORDARONE) 450 mg in dextrose 5% 250 mL infusion  0.5-1 mg/min IntraVENous CONTINUOUS  
 aspirin delayed-release tablet 81 mg  81 mg Oral DAILY  guaiFENesin ER (MUCINEX) tablet 1,200 mg  1,200 mg Oral DAILY  lisinopril (PRINIVIL, ZESTRIL) tablet 5 mg  5 mg Oral DAILY  montelukast (SINGULAIR) tablet 10 mg  10 mg Oral DAILY  tamsulosin (FLOMAX) capsule 0.4 mg  0.4 mg Oral DAILY  sodium chloride (NS) flush 5-40 mL  5-40 mL IntraVENous Q8H  
 sodium chloride (NS) flush 5-40 mL  5-40 mL IntraVENous PRN  
 albuterol (PROVENTIL VENTOLIN) nebulizer solution 2.5 mg  2.5 mg Nebulization Q6H RT  
 methylPREDNISolone (PF) (Solu-MEDROL) injection 40 mg  40 mg IntraVENous Q8H  
 enoxaparin (LOVENOX) injection 40 mg  40 mg SubCUTAneous Q24H  
 azithromycin (ZITHROMAX) tablet 500 mg  500 mg Oral Q24H  cefTRIAXone (ROCEPHIN) 1 g in 0.9% sodium chloride (MBP/ADV) 50 mL  1 g IntraVENous Q24H  carvedilol (COREG) tablet 3.125 mg  3.125 mg Oral BID WITH MEALS  tuberculin injection 5 Units  5 Units IntraDERMal ONCE Review of Systems Constitutional:  negative for fever, chills, sweats Cardiovascular:  negative for chest pain, palpitations, syncope, edema Gastrointestinal:  negative for dysphagia, reflux, vomiting, diarrhea, abdominal pain, or melena Neurologic:  negative for focal weakness, numbness, headache Objective:  
 
Vitals:  
 05/07/19 0604 05/07/19 0730 05/07/19 0745 05/07/19 0800 BP: 181/76  163/70 149/70 Pulse: 63 63 63 65 Resp: (!) 36 22 23 18 Temp:   98 °F (36.7 °C) SpO2: 90% 91% 95% 94% Weight:      
Height:      
 
 
Intake and Output:  
05/05 1901 - 05/07 0700 In: 427 [I.V.:427] Out: 2985 [Urine:2950] No intake/output data recorded. Physical Exam:         
Constitutional:  the patient is well developed and in no acute distress EENMT:  Sclera clear, pupils equal, oral mucosa moist 
Respiratory: basilar crackles Cardiovascular:  RRR without M,G,R 
Gastrointestinal: soft and non-tender; with positive bowel sounds. Musculoskeletal: warm without cyanosis. There is no lower extremity edema. Skin:  no jaundice or rashes, no wounds Neurologic: no gross neuro deficits Psychiatric:  alert and oriented x 3 LINES: 
peripheral 
 
DRIPS: 
none CXR:  
 
 
LAB No results for input(s): GLUCPOC in the last 72 hours. No lab exists for component: [place] Recent Labs 05/07/19 
0407 WBC 6.0 HGB 13.6 HCT 42.1 * Recent Labs 05/07/19 
0407 05/06/19 
1253 05/06/19 
1633 * 132*  --   
K 4.0 4.4  --   
 100  --   
CO2 24 27  --   
* 104*  --   
BUN 12 15  --   
CREA 0.56* 0.60*  --   
MG 2.1 2.1 2.1 PHOS  --  3.1  --   
CA 8.7 8.7  --   
ALB  --  3.6  --   
TBILI  --  0.9  --   
ALT  --  27  --   
SGOT  --  25  --   
 
Recent Labs 05/06/19 
1209 05/06/19 
5147 PHI 7.396 7. 32 [facility] PCO2I 31.6* 32.6*  
PO2I 91 53* HCO3I 19.4* 18.3* No results for input(s): LCAD, LAC in the last 72 hours. No results for input(s): PH, PCO2, PO2, HCO3 in the last 72 hours. Assessment:  (Medical Decision Making) Hospital Problems  Date Reviewed: 5/6/2019 Codes Class Noted POA * (Principal) Pneumothorax on right ICD-10-CM: J93.9 ICD-9-CM: 512.89  5/6/2019 Unknown Increased in size Shortness of breath ICD-10-CM: R06.02 
ICD-9-CM: 786.05  5/6/2019 Yes Acute on chronic respiratory failure with hypoxia [facility]) ICD-10-CM: J96.21 
ICD-9-CM: 518.84, 799.02  5/6/2019 Unknown On bipap COPD exacerbation ([facility] ICD-10-CM: J44.1 ICD-9-CM: 491.21  5/6/2019 Unknown Ventricular tachyarrhythmia ([facility] ICD-10-CM: I47.2 ICD-9-CM: 427.1  5/6/2019 Unknown Pulmonary infiltrates ICD-10-CM: R91.8 ICD-9-CM: 793.19  5/6/2019 Unknown VT (ventricular tachycardia) (HCC) ICD-10-CM: I47.2 ICD-9-CM: 427.1  5/6/2019 Unknown Chronic respiratory failure with hypoxia (HCC) (Chronic) ICD-10-CM: J96.11 
ICD-9-CM: 518.83, 799.02  3/29/2016 Yes Overview Addendum 5/6/2019 10:14 AM by Louie Page, NP Continue to wear 2 L nasal cannula at night. May use 2 L supplemental oxygen to maintain sats greater than 90%. Essential hypertension, benign ICD-10-CM: I10 
ICD-9-CM: 401.1  12/10/2015 Yes Plan:  (Medical Decision Making) 1   Large air leak with increased ptx- will need larger chest tube 2   Amiodarone for v tach 
3   O2- 6 L 
-- 
 
More than 50% of the time documented was spent in face-to-face contact with the patient and in the care of the patient on the floor/unit where the patient is located.  
 
Priya Galaviz MD

## 2019-05-07 NOTE — PROGRESS NOTES
[facility] CARDIOLOGY PROGRESS NOTE 
      
 
5/7/2019 7:27 AM 
 
Admit Date: 5/6/2019 Subjective:  
Needing another CT today. On IV amio gtt. No CP. No known CV disease in the past.  Resting well now. ROS: 
GEN:  No fever or chills Cardiovascular:  As noted above Pulmonary:  As noted above Neuro:  No new focal motor or sensory loss Objective:  
  
Vitals:  
 05/07/19 0500 05/07/19 0534 05/07/19 0602 05/07/19 2086 BP: 166/84 175/82 (!) 195/94 181/76 Pulse: (!) 57 62 65 63 Resp: 20 (!) 46 (!) 32 (!) 36 Temp:      
SpO2: 95% 90% (!) 89% 90% Weight:      
Height:      
 
 
Physical Exam: 
General-A and O x 3 Neck- supple, no JVD 
CV- regular rate and rhythm no MRG Lung- clear bilaterally with dec BS in the bases, CT in place in R chest 
Abd- soft, nontender, nondistended Ext- no edema bilaterally. Skin- warm and dry Psychiatric:  Normal mood and affect. Neurologic:  Alert and oriented X 3 Data Review:  
Recent Labs 05/07/19 
0407 05/06/19 
1253 * 132* K 4.0 4.4 MG 2.1 2.1 BUN 12 15 CREA 0.56* 0.60* * 104* WBC 6.0  --   
HGB 13.6  --   
HCT 42.1  --   
*  --   
 
 
TELEMETRY:  sinus Assessment/Plan:  
 
Principal Problem: 
  Pneumothorax on right (5/6/2019) Per pulmonary, CXR reviewed, see notes from Dr. Feng Kamara. Active Problems: 
  Essential hypertension, benign (12/10/2015) Follow, remain on Ace and BB Chronic respiratory failure with hypoxia ([address] (3/29/2016) Follow, CT in place for PTX. Shortness of breath (5/6/2019) Better with CT. Acute on chronic respiratory failure with hypoxia ([address] (5/6/2019) As above. BNP noted, may need some gentle diuresis soon. COPD exacerbation ([address] (5/6/2019) Continue meds Ventricular tachyarrhythmia ([address] (5/6/2019) Remain on IV amio gtt for today at 0.5 with new chest tube and expanding PTX.  Echo reviewed, EF normal.  K and Mg ok. Check EKG today, eval QTc. MR moderate, follow. Follow for Afib as well. Not candidate for [facility] regardless. Follow daily labs. Pulmonary infiltrates (5/6/2019) ON abx. VT (ventricular tachycardia) ([facility] (5/6/2019) As above We will follow in the ICU.   
 
 
 
 
Astrid Greer DO 
5/7/2019 7:27 AM

## 2019-05-07 NOTE — PROGRESS NOTES
Called for worsening pneumothorax on R and Ure-Bindu without much air-leak. Checked connections and reconnected tubing, adjusted patient positioning and large air leak noted. Secured positioning on chest with additional silk tape and will re-check CXR shortly to see if new chest tube will be required.  
Yasmani Sheriff MD

## 2019-05-07 NOTE — PROCEDURES
The pt was placed in the left lateral decubitus position  The R lateral chest was prepped and drapped  1% lidocaine was injected locally  A 1.5 cm incision was made mid-axillary line- near nipple level  Blunt dissection to the pleura was done with Claude Dimmer clamps  The pleura was penetrated with rush of air and a 24 [place] chest tube was placed to the 14 - 16 cm erum the was sutured with 0 silk and dressed. cxr pending- + air leak.

## 2019-05-07 NOTE — PROGRESS NOTES
Assisted Dr. Pascual Balderas and ICU RN (Kasi Christianson) with bedside chest tube insertion on RIGHT side. No sedation medications given by ENDO RN.

## 2019-05-07 NOTE — H&P
Date of Surgery Update: 
Bright Salgado was seen and examined. History and physical has been reviewed. The patient has been examined.  There have been no significant clinical changes since the completion of the originally dated History and Physical. 
 
Signed By: Mary Gilliam MD   
 May 7, 2019 12:18 PM

## 2019-05-07 NOTE — INTERDISCIPLINARY ROUNDS
Interdisciplinary team rounds were held 5/7/2019 with the following team members:Care Management, Nursing, Nurse Practitioner, Nutrition, Palliative Care, Pastoral Care, Pharmacy, Physician, Respiratory Therapy, Speech Therapy and Clinical Coordinator and the patient. Plan of care discussed. See clinical pathway and/or care plan for interventions and desired outcomes.

## 2019-05-07 NOTE — PROGRESS NOTES
5142: Dr Nahid Blue called with critical chest xray results. 0600: Dr. Nahid Blue at bedside to evaluate chest tube. After some adjustments, chest tube had return of air leak and seems to be working well. Repeat chest xray ordered for 0630. Hydralazine ordered for hypertensive. Will continue to monitor.

## 2019-05-08 ENCOUNTER — APPOINTMENT (OUTPATIENT)
Dept: GENERAL RADIOLOGY | Age: 84
DRG: 163 | End: 2019-05-08
Attending: INTERNAL MEDICINE
Payer: MEDICARE

## 2019-05-08 ENCOUNTER — APPOINTMENT (OUTPATIENT)
Dept: CT IMAGING | Age: 84
DRG: 163 | End: 2019-05-08
Attending: INTERNAL MEDICINE
Payer: MEDICARE

## 2019-05-08 LAB
ALBUMIN SERPL-MCNC: 3.3 G/DL (ref 3.2–4.6)
ALBUMIN/GLOB SERPL: 1.1 {RATIO} (ref 1.2–3.5)
ALP SERPL-CCNC: 77 U/L (ref 50–136)
ALT SERPL-CCNC: 21 U/L (ref 12–65)
ANION GAP SERPL CALC-SCNC: 9 MMOL/L (ref 7–16)
AST SERPL-CCNC: 25 U/L (ref 15–37)
BILIRUB SERPL-MCNC: 0.7 MG/DL (ref 0.2–1.1)
BUN SERPL-MCNC: 17 MG/DL (ref 8–23)
CALCIUM SERPL-MCNC: 8.4 MG/DL (ref 8.3–10.4)
CHLORIDE SERPL-SCNC: 102 MMOL/L (ref 98–107)
CO2 SERPL-SCNC: 23 MMOL/L (ref 21–32)
CREAT SERPL-MCNC: 0.59 MG/DL (ref 0.8–1.5)
GLOBULIN SER CALC-MCNC: 3 G/DL (ref 2.3–3.5)
GLUCOSE SERPL-MCNC: 126 MG/DL (ref 65–100)
MAGNESIUM SERPL-MCNC: 2.3 MG/DL (ref 1.8–2.4)
MM INDURATION POC: 0 MM (ref 0–5)
POTASSIUM SERPL-SCNC: 4.4 MMOL/L (ref 3.5–5.1)
PPD POC: NEGATIVE NEGATIVE
PROT SERPL-MCNC: 6.3 G/DL (ref 6.3–8.2)
SODIUM SERPL-SCNC: 134 MMOL/L (ref 136–145)
TSH SERPL DL<=0.005 MIU/L-ACNC: 0.78 UIU/ML (ref 0.36–3.74)

## 2019-05-08 PROCEDURE — 94760 N-INVAS EAR/PLS OXIMETRY 1: CPT

## 2019-05-08 PROCEDURE — 84443 ASSAY THYROID STIM HORMONE: CPT

## 2019-05-08 PROCEDURE — 74011250636 HC RX REV CODE- 250/636: Performed by: INTERNAL MEDICINE

## 2019-05-08 PROCEDURE — 74011250637 HC RX REV CODE- 250/637: Performed by: INTERNAL MEDICINE

## 2019-05-08 PROCEDURE — 94640 AIRWAY INHALATION TREATMENT: CPT

## 2019-05-08 PROCEDURE — 77010033678 HC OXYGEN DAILY

## 2019-05-08 PROCEDURE — 83735 ASSAY OF MAGNESIUM: CPT

## 2019-05-08 PROCEDURE — 71045 X-RAY EXAM CHEST 1 VIEW: CPT

## 2019-05-08 PROCEDURE — 74011000258 HC RX REV CODE- 258: Performed by: INTERNAL MEDICINE

## 2019-05-08 PROCEDURE — 74011000250 HC RX REV CODE- 250: Performed by: INTERNAL MEDICINE

## 2019-05-08 PROCEDURE — 80053 COMPREHEN METABOLIC PANEL: CPT

## 2019-05-08 PROCEDURE — 71250 CT THORAX DX C-: CPT

## 2019-05-08 PROCEDURE — 99232 SBSQ HOSP IP/OBS MODERATE 35: CPT | Performed by: INTERNAL MEDICINE

## 2019-05-08 PROCEDURE — 65610000001 HC ROOM ICU GENERAL

## 2019-05-08 PROCEDURE — 36415 COLL VENOUS BLD VENIPUNCTURE: CPT

## 2019-05-08 RX ORDER — AMIODARONE HYDROCHLORIDE 200 MG/1
400 TABLET ORAL 2 TIMES DAILY
Status: DISCONTINUED | OUTPATIENT
Start: 2019-05-08 | End: 2019-05-10

## 2019-05-08 RX ORDER — METOPROLOL SUCCINATE 25 MG/1
25 TABLET, EXTENDED RELEASE ORAL 2 TIMES DAILY
Status: DISCONTINUED | OUTPATIENT
Start: 2019-05-08 | End: 2019-05-10

## 2019-05-08 RX ORDER — LISINOPRIL 5 MG/1
10 TABLET ORAL DAILY
Status: DISCONTINUED | OUTPATIENT
Start: 2019-05-08 | End: 2019-05-08

## 2019-05-08 RX ORDER — CALCIUM CARBONATE 200(500)MG
200 TABLET,CHEWABLE ORAL
Status: DISCONTINUED | OUTPATIENT
Start: 2019-05-08 | End: 2019-05-25 | Stop reason: HOSPADM

## 2019-05-08 RX ORDER — CARVEDILOL 3.12 MG/1
3.12 TABLET ORAL 2 TIMES DAILY WITH MEALS
Status: DISCONTINUED | OUTPATIENT
Start: 2019-05-08 | End: 2019-05-08

## 2019-05-08 RX ADMIN — ALBUTEROL SULFATE 2.5 MG: 2.5 SOLUTION RESPIRATORY (INHALATION) at 08:06

## 2019-05-08 RX ADMIN — TAMSULOSIN HYDROCHLORIDE 0.4 MG: 0.4 CAPSULE ORAL at 08:43

## 2019-05-08 RX ADMIN — HYDRALAZINE HYDROCHLORIDE 10 MG: 20 INJECTION INTRAMUSCULAR; INTRAVENOUS at 04:36

## 2019-05-08 RX ADMIN — METOPROLOL SUCCINATE 25 MG: 25 TABLET, EXTENDED RELEASE ORAL at 09:03

## 2019-05-08 RX ADMIN — ALBUTEROL SULFATE 2.5 MG: 2.5 SOLUTION RESPIRATORY (INHALATION) at 01:34

## 2019-05-08 RX ADMIN — AMIODARONE HYDROCHLORIDE 400 MG: 200 TABLET ORAL at 17:48

## 2019-05-08 RX ADMIN — ASPIRIN 81 MG: 81 TABLET, COATED ORAL at 08:42

## 2019-05-08 RX ADMIN — ALBUTEROL SULFATE 2.5 MG: 2.5 SOLUTION RESPIRATORY (INHALATION) at 20:33

## 2019-05-08 RX ADMIN — METHYLPREDNISOLONE SODIUM SUCCINATE 40 MG: 40 INJECTION, POWDER, FOR SOLUTION INTRAMUSCULAR; INTRAVENOUS at 21:32

## 2019-05-08 RX ADMIN — MONTELUKAST SODIUM 10 MG: 10 TABLET, FILM COATED ORAL at 08:42

## 2019-05-08 RX ADMIN — METOPROLOL SUCCINATE 25 MG: 25 TABLET, EXTENDED RELEASE ORAL at 17:48

## 2019-05-08 RX ADMIN — ENOXAPARIN SODIUM 40 MG: 40 INJECTION SUBCUTANEOUS at 11:30

## 2019-05-08 RX ADMIN — Medication 10 ML: at 21:33

## 2019-05-08 RX ADMIN — CEFTRIAXONE SODIUM 1 G: 1 INJECTION, POWDER, FOR SOLUTION INTRAMUSCULAR; INTRAVENOUS at 21:32

## 2019-05-08 RX ADMIN — METHYLPREDNISOLONE SODIUM SUCCINATE 40 MG: 40 INJECTION, POWDER, FOR SOLUTION INTRAMUSCULAR; INTRAVENOUS at 17:02

## 2019-05-08 RX ADMIN — AMIODARONE HYDROCHLORIDE 400 MG: 200 TABLET ORAL at 09:03

## 2019-05-08 RX ADMIN — Medication 10 ML: at 05:45

## 2019-05-08 RX ADMIN — CALCIUM CARBONATE (ANTACID) CHEW TAB 500 MG 200 MG: 500 CHEW TAB at 21:47

## 2019-05-08 RX ADMIN — AZITHROMYCIN 500 MG: 250 TABLET, FILM COATED ORAL at 11:30

## 2019-05-08 RX ADMIN — ALBUTEROL SULFATE 2.5 MG: 2.5 SOLUTION RESPIRATORY (INHALATION) at 13:53

## 2019-05-08 RX ADMIN — GUAIFENESIN 1200 MG: 600 TABLET, EXTENDED RELEASE ORAL at 08:41

## 2019-05-08 RX ADMIN — Medication 10 ML: at 17:02

## 2019-05-08 RX ADMIN — METHYLPREDNISOLONE SODIUM SUCCINATE 40 MG: 40 INJECTION, POWDER, FOR SOLUTION INTRAMUSCULAR; INTRAVENOUS at 05:45

## 2019-05-08 NOTE — PROGRESS NOTES
Called this pm due to sudden increase of sub cut air,  cxr shows enlarged ptx-  Increase suction on larger chest tube and have unclamped ureseil Repeat cxr ijn 2 hours

## 2019-05-08 NOTE — PROGRESS NOTES
Radiology called, spoke with Dr Dharmesh Rai. Dr. Barroso Arms in to see pt. Increased suction to 30cm on right lateral CT. Uresil to remain at 20cm. airleak in both chest tubes. vss on monitor. sats 94% on 6lnc.

## 2019-05-08 NOTE — PROGRESS NOTES
Luisito Gallardo Admission Date: 5/6/2019 Daily Progress Note: 5/8/2019 The patient's chart is reviewed and the patient is discussed with the staff. 87 y.o.  male presents via EMS with shortness of breath when got up this morning.  Had a productive cough with yellow sputum and received Rocephin.  In the ER CXR with large right pneumothorax and we were called for chest tub e placement and admission. Cathie Martinsyer a fall in February and seen by Dr. Salvador Harrison with compression L2,L3, L4 
 uresil placed on admission 5/6 then 24 Spanish chest tube placed 5/7 Subjective:  
Currently doing ok on 6L nc, air leak via large chest tube seen only with cough Current Facility-Administered Medications Medication Dose Route Frequency  hydrALAZINE (APRESOLINE) 20 mg/mL injection 10 mg  10 mg IntraVENous Q6H PRN  
 amiodarone (CORDARONE) tablet 200 mg  200 mg Oral BID  
 amiodarone (CORDARONE) 450 mg in dextrose 5% 250 mL infusion  0.5-1 mg/min IntraVENous CONTINUOUS  
 aspirin delayed-release tablet 81 mg  81 mg Oral DAILY  guaiFENesin ER (MUCINEX) tablet 1,200 mg  1,200 mg Oral DAILY  lisinopril (PRINIVIL, ZESTRIL) tablet 5 mg  5 mg Oral DAILY  montelukast (SINGULAIR) tablet 10 mg  10 mg Oral DAILY  tamsulosin (FLOMAX) capsule 0.4 mg  0.4 mg Oral DAILY  sodium chloride (NS) flush 5-40 mL  5-40 mL IntraVENous Q8H  
 sodium chloride (NS) flush 5-40 mL  5-40 mL IntraVENous PRN  
 albuterol (PROVENTIL VENTOLIN) nebulizer solution 2.5 mg  2.5 mg Nebulization Q6H RT  
 methylPREDNISolone (PF) (Solu-MEDROL) injection 40 mg  40 mg IntraVENous Q8H  
 enoxaparin (LOVENOX) injection 40 mg  40 mg SubCUTAneous Q24H  
 azithromycin (ZITHROMAX) tablet 500 mg  500 mg Oral Q24H  cefTRIAXone (ROCEPHIN) 1 g in 0.9% sodium chloride (MBP/ADV) 50 mL  1 g IntraVENous Q24H  carvedilol (COREG) tablet 3.125 mg  3.125 mg Oral BID WITH MEALS Review of Systems Constitutional: negative for fever, chills, sweats Cardiovascular: negative for chest pain, palpitations, syncope, edema Gastrointestinal:  negative for dysphagia, reflux, vomiting, diarrhea, abdominal pain, or melena Neurologic:  negative for focal weakness, numbness, headache Objective:  
 
Vitals:  
 05/08/19 0530 05/08/19 0600 05/08/19 0615 05/08/19 0715 BP: 169/83 148/72 151/71 166/80 Pulse: 72 66 66 62 Resp: 15 15 18 16 Temp:      
SpO2: 98% 99% 99% 100% Weight:      
Height:      
 
Intake and Output:  
05/06 1901 - 05/08 0700 In: 287 [I.V.:287] Out: 3641 [LMQMO:0245] No intake/output data recorded. Physical Exam:  
Constitution:  the patient is well developed and in no acute distress- some sub cut air EENMT:  Sclera clear, pupils equal, oral mucosa moist 
Respiratory: clear Cardiovascular:  RRR without M,G,R 
Gastrointestinal: soft and non-tender; with positive bowel sounds. Musculoskeletal: warm without cyanosis. There is no lower extremity edema. Skin:  no jaundice or rashes, no wounds Neurologic: no gross neuro deficits Psychiatric:  alert and oriented x 3 CXR:  
 
 
LAB No results for input(s): GLUCPOC in the last 72 hours. No lab exists for component: [place] Recent Labs 05/07/19 
0407 WBC 6.0 HGB 13.6 HCT 42.1 * Recent Labs 05/08/19 
0410 05/07/19 
0407 05/06/19 
1253 * 135* 132* K 4.4 4.0 4.4  102 100 CO2 23 24 27 * 133* 104* BUN 17 12 15 CREA 0.59* 0.56* 0.60* MG 2.3 2.1 2.1 CA 8.4 8.7 8.7 PHOS  --   --  3.1 ALB 3.3  --  3.6 TBILI 0.7  --  0.9 ALT 21  --  27 SGOT 25  --  25 Recent Labs 05/06/19 
1209 05/06/19 
2471 PHI 7.396 7. [address] PCO2I 31.6* 32.6*  
PO2I 91 53* HCO3I 19.4* 18.3* No results for input(s): LCAD, LAC in the last 72 hours. Assessment:  (Medical Decision Making) Hospital Problems  Date Reviewed: 5/6/2019 Codes Class Noted POA * (Principal) Pneumothorax on right ICD-10-CM: J93.9 ICD-9-CM: 512.89  5/6/2019 Unknown Shortness of breath ICD-10-CM: R06.02 
ICD-9-CM: 786.05  5/6/2019 Yes Acute on chronic respiratory failure with hypoxia [facility]) ICD-10-CM: J96.21 
ICD-9-CM: 518.84, 799.02  5/6/2019 Unknown COPD exacerbation ([facility] [address] ICD-10-CM: J44.1 ICD-9-CM: 491.21  5/6/2019 Unknown Ventricular tachyarrhythmia ([facility] ICD-10-CM: I47.2 ICD-9-CM: 427.1  5/6/2019 Unknown Pulmonary infiltrates ICD-10-CM: R91.8 ICD-9-CM: 793.19  5/6/2019 Unknown VT (ventricular tachycardia) (HCC) ICD-10-CM: I47.2 ICD-9-CM: 427.1  5/6/2019 Unknown Chronic respiratory failure with hypoxia (HCC) (Chronic) ICD-10-CM: J96.11 
ICD-9-CM: 518.83, 799.02  3/29/2016 Yes Overview Addendum 5/6/2019 10:14 AM by Bimal Pena, NP Continue to wear 2 L nasal cannula at night. May use 2 L supplemental oxygen to maintain sats greater than 90%. Essential hypertension, benign ICD-10-CM: I10 
ICD-9-CM: 401.1  12/10/2015 Yes Plan:  (Medical Decision Making) 1   Clamp uresil- remove if cxr is stable 2   Can move to the floor -- 
 
More than 50% of the time documented was spent in face-to-face contact with the patient and in the care of the patient on the floor/unit where the patient is located.  
 
Susanne Sorenson MD

## 2019-05-08 NOTE — PROGRESS NOTES
A follow up visit was made to the patient. Emotional support, spiritual presence and  
prayer were provided. Patient shared some of his history of his breathing difficulties. Eric Carrera

## 2019-05-08 NOTE — PROGRESS NOTES
Bedside report given to Manuel Hunt RN 
vss on monitor CT x 2 with airleak, to suction.  
 
Dr. Yenifer Choe notified of CT

## 2019-05-08 NOTE — PROGRESS NOTES
[facility] CARDIOLOGY PROGRESS NOTE 
      
 
5/8/2019 7:27 AM 
 
Admit Date: 5/6/2019 Subjective:  
Resting, off amio gtt, no more VT. Another chest tube placed yesterday. No CP. BP increases with anxiety per nursing. ROS: 
GEN:  No fever or chills Cardiovascular:  As noted above Pulmonary:  As noted above Neuro:  No new focal motor or sensory loss Objective:  
  
Vitals:  
 05/08/19 0515 05/08/19 0530 05/08/19 0600 05/08/19 0615 BP: 147/83 169/83 148/72 151/71 Pulse: 70 72 66 66 Resp: 21 15 15 18 Temp:      
SpO2: 96% 98% 99% 99% Weight:      
Height:      
 
 
Physical Exam: 
General-A and O x 3 Neck- supple, no JVD 
CV- regular rate and rhythm no MRG Lung- clear bilaterally with dec BS in the bases, CT in place in R chest 
Abd- soft, nontender, nondistended Ext- no edema bilaterally. Skin- warm and dry Psychiatric:  Normal mood and affect. Neurologic:  Alert and oriented X 3 Data Review:  
Recent Labs 05/08/19 
0410 05/07/19 
0407 * 135* K 4.4 4.0  
MG 2.3 2.1 BUN 17 12 CREA 0.59* 0.56* * 133* WBC  --  6.0 HGB  --  13.6 HCT  --  42.1 PLT  --  139* TELEMETRY:  sinus Assessment/Plan:  
 
Principal Problem: 
  Pneumothorax on right (5/6/2019) Per pulmonary Active Problems: 
  Essential hypertension, benign (12/10/2015) Follow, remain on Ace and BB, focus on anxiety mgmt. Can increase Ace as needed. EF normal. 
 
  Chronic respiratory failure with hypoxia ([address] (3/29/2016) Follow, CT in place for PTX. Shortness of breath (5/6/2019) Better with CT. Acute on chronic respiratory failure with hypoxia ([address] (5/6/2019) Per pulm. BNP noted, may need some gentle diuresis soon in coming days, follow for now. COPD exacerbation ([address] (5/6/2019) Continue meds Ventricular tachyarrhythmia ([address] (5/6/2019) Off IV amio, on PO at this time. Echo reviewed, EF normal.  K and Mg ok. QTc ok. MR moderate, follow. Follow for Afib as well. Not candidate for [address] regardless. Follow daily labs. Will need to consider outpatient ischemic evaluation once better from this. Pulmonary infiltrates (5/6/2019) On abx. VT (ventricular tachycardia) ([facility] [address] (5/6/2019) As above HypoK/Mg:  Follow AM labs. We will follow in the ICU.   
 
 
 
 
Tico Chapa DO 
5/8/2019 7:33 AM

## 2019-05-08 NOTE — PROGRESS NOTES
Bedside report from [address] [place] [address], RN 
Pt arouses to voice, oriented x 4 Denies pain or needs at this time. Right anterior CT and right lateral CT, no air leak noted. Right upper chest wall, right neck and cheek crepitus noted. vss on monitor. Will continue care and assessment.

## 2019-05-08 NOTE — PROGRESS NOTES
Prominent swelling, more crepitus noted to right shoulder,face, neck and right upper chest wall at uresil site. Right lateral CT continues at 20cm suction with airleak noted, right uresil remains clamped. sats 96% on 6lnc, vss on monitor. Dr Pimentel in to see pt. Uresil unclamped per verbal orders. Pt denies discomfort. Stat cxr ordered.

## 2019-05-08 NOTE — PROGRESS NOTES
Occupational Therapy Note: 
 
Participated in interdisciplinary rounds in ICU/CCU and chart reviewed. Patient is experiencing decrease in function from baseline. Patient would benefit from skilled acute therapy to increase independence with self care/ADLs, strength, endurance, and functional mobility. Recommend OT/PT consult when medically stable and MD agrees. Thank you for your consideration, Key Gonzales, OT

## 2019-05-08 NOTE — PROGRESS NOTES
Interdisciplinary team rounds were held 5/8/2019 with the following team members:Care Management, Nursing, Nurse Practitioner, Nutrition, Occupational Therapy, Palliative Care, Pastoral Care, Pharmacy, Physical Therapy, Respiratory Therapy and Clinical Coordinator and the patient. Plan of care discussed. See clinical pathway and/or care plan for interventions and desired outcomes.

## 2019-05-08 NOTE — PROGRESS NOTES
Pt sitting up in bed eating breakfast, denies discomfort, sob, cp. Frequent pvc's on monitor with multi runs of svt, bp 145/96, sats 97% on 4lnc. Strips reviewed with Dr. Zena Win, am meds held until d/w Dr. Zena Win New med orders placed and reviewed with pharmacist.

## 2019-05-08 NOTE — PROGRESS NOTES
Chest ct obtained due to increased subcut air and increased ptx 
 
 
uresil no longer in pleural cavity,  Larger chest tube extends all the way to mediastinum Dressing taken down and tube had been pushed in to 18 to 20 cm erum Tube was pulled back to 14 cm and resutured. And taped. uresil removed as tip was not in pleural cavity.  
Repeat cxr this pm

## 2019-05-09 ENCOUNTER — APPOINTMENT (OUTPATIENT)
Dept: GENERAL RADIOLOGY | Age: 84
DRG: 163 | End: 2019-05-09
Attending: INTERNAL MEDICINE
Payer: MEDICARE

## 2019-05-09 PROBLEM — T79.7XXA SUBCUTANEOUS EMPHYSEMA (HCC): Status: ACTIVE | Noted: 2019-05-09

## 2019-05-09 LAB
ANION GAP SERPL CALC-SCNC: 7 MMOL/L (ref 7–16)
BUN SERPL-MCNC: 25 MG/DL (ref 8–23)
CALCIUM SERPL-MCNC: 8.3 MG/DL (ref 8.3–10.4)
CHLORIDE SERPL-SCNC: 102 MMOL/L (ref 98–107)
CO2 SERPL-SCNC: 24 MMOL/L (ref 21–32)
CREAT SERPL-MCNC: 0.61 MG/DL (ref 0.8–1.5)
GLUCOSE SERPL-MCNC: 121 MG/DL (ref 65–100)
MAGNESIUM SERPL-MCNC: 2.2 MG/DL (ref 1.8–2.4)
POTASSIUM SERPL-SCNC: 4.5 MMOL/L (ref 3.5–5.1)
SODIUM SERPL-SCNC: 133 MMOL/L (ref 136–145)

## 2019-05-09 PROCEDURE — 83735 ASSAY OF MAGNESIUM: CPT

## 2019-05-09 PROCEDURE — 80048 BASIC METABOLIC PNL TOTAL CA: CPT

## 2019-05-09 PROCEDURE — 77010033678 HC OXYGEN DAILY

## 2019-05-09 PROCEDURE — 71045 X-RAY EXAM CHEST 1 VIEW: CPT

## 2019-05-09 PROCEDURE — 74011250636 HC RX REV CODE- 250/636: Performed by: INTERNAL MEDICINE

## 2019-05-09 PROCEDURE — 74011000250 HC RX REV CODE- 250: Performed by: INTERNAL MEDICINE

## 2019-05-09 PROCEDURE — 94760 N-INVAS EAR/PLS OXIMETRY 1: CPT

## 2019-05-09 PROCEDURE — 94640 AIRWAY INHALATION TREATMENT: CPT

## 2019-05-09 PROCEDURE — 99232 SBSQ HOSP IP/OBS MODERATE 35: CPT | Performed by: INTERNAL MEDICINE

## 2019-05-09 PROCEDURE — 36415 COLL VENOUS BLD VENIPUNCTURE: CPT

## 2019-05-09 PROCEDURE — 74011250637 HC RX REV CODE- 250/637: Performed by: INTERNAL MEDICINE

## 2019-05-09 PROCEDURE — 65610000001 HC ROOM ICU GENERAL

## 2019-05-09 PROCEDURE — 74011000258 HC RX REV CODE- 258: Performed by: INTERNAL MEDICINE

## 2019-05-09 RX ADMIN — CEFTRIAXONE SODIUM 1 G: 1 INJECTION, POWDER, FOR SOLUTION INTRAMUSCULAR; INTRAVENOUS at 21:40

## 2019-05-09 RX ADMIN — ASPIRIN 81 MG: 81 TABLET, COATED ORAL at 10:00

## 2019-05-09 RX ADMIN — MONTELUKAST SODIUM 10 MG: 10 TABLET, FILM COATED ORAL at 10:01

## 2019-05-09 RX ADMIN — METHYLPREDNISOLONE SODIUM SUCCINATE 40 MG: 40 INJECTION, POWDER, FOR SOLUTION INTRAMUSCULAR; INTRAVENOUS at 21:40

## 2019-05-09 RX ADMIN — ALBUTEROL SULFATE 2.5 MG: 2.5 SOLUTION RESPIRATORY (INHALATION) at 09:06

## 2019-05-09 RX ADMIN — TAMSULOSIN HYDROCHLORIDE 0.4 MG: 0.4 CAPSULE ORAL at 10:00

## 2019-05-09 RX ADMIN — METHYLPREDNISOLONE SODIUM SUCCINATE 40 MG: 40 INJECTION, POWDER, FOR SOLUTION INTRAMUSCULAR; INTRAVENOUS at 05:04

## 2019-05-09 RX ADMIN — ALBUTEROL SULFATE 2.5 MG: 2.5 SOLUTION RESPIRATORY (INHALATION) at 15:27

## 2019-05-09 RX ADMIN — ALBUTEROL SULFATE 2.5 MG: 2.5 SOLUTION RESPIRATORY (INHALATION) at 01:36

## 2019-05-09 RX ADMIN — Medication 10 ML: at 13:25

## 2019-05-09 RX ADMIN — AMIODARONE HYDROCHLORIDE 400 MG: 200 TABLET ORAL at 10:01

## 2019-05-09 RX ADMIN — AMIODARONE HYDROCHLORIDE 400 MG: 200 TABLET ORAL at 18:30

## 2019-05-09 RX ADMIN — HYDRALAZINE HYDROCHLORIDE 10 MG: 20 INJECTION INTRAMUSCULAR; INTRAVENOUS at 01:41

## 2019-05-09 RX ADMIN — ALBUTEROL SULFATE 2.5 MG: 2.5 SOLUTION RESPIRATORY (INHALATION) at 20:04

## 2019-05-09 RX ADMIN — Medication 10 ML: at 05:05

## 2019-05-09 RX ADMIN — CALCIUM CARBONATE (ANTACID) CHEW TAB 500 MG 200 MG: 500 CHEW TAB at 14:36

## 2019-05-09 RX ADMIN — ENOXAPARIN SODIUM 40 MG: 40 INJECTION SUBCUTANEOUS at 13:23

## 2019-05-09 RX ADMIN — GUAIFENESIN 1200 MG: 600 TABLET, EXTENDED RELEASE ORAL at 10:00

## 2019-05-09 RX ADMIN — AZITHROMYCIN 500 MG: 250 TABLET, FILM COATED ORAL at 14:36

## 2019-05-09 RX ADMIN — METHYLPREDNISOLONE SODIUM SUCCINATE 40 MG: 40 INJECTION, POWDER, FOR SOLUTION INTRAMUSCULAR; INTRAVENOUS at 13:23

## 2019-05-09 RX ADMIN — Medication 10 ML: at 21:40

## 2019-05-09 NOTE — PROGRESS NOTES
Chart reviewed and pt discussed in am IDR. Remains in ICU currently. Per MD notes hopefully tx out to remote tele soon. CM continue to follow for any assist and d/c POC.

## 2019-05-09 NOTE — PROGRESS NOTES
A follow up visit was made to the patient. Emotional support, spiritual presence and  
prayer were provided. The patient requested that the  pray for him and his wife. Gus Carrera

## 2019-05-09 NOTE — PROGRESS NOTES
Bedside and Verbal shift change report given to Ada Willingham (oncoming nurse) by Jamilah Barboza (offgoing nurse).  Report included the following information SBAR, Kardex, Procedure Summary, Intake/Output, MAR, Recent Results, Med Rec Status and Cardiac Rhythm SR.

## 2019-05-09 NOTE — PROGRESS NOTES
Physical Therapy Note: 
 
Participated in interdisciplinary rounds in ICU/CCU and chart reviewed. Patient is experiencing decrease in function from baseline. Patient would benefit from skilled acute therapy to increase independence with self care/ADLs, strength, endurance, and functional mobility. Recommend PT/OT consult when medically stable and MD agrees. Thank you for your consideration, Claudia Cartagena DPT

## 2019-05-09 NOTE — PROGRESS NOTES
Critical Care Daily Progress Note: 5/9/2019 Divya Rosenberg Admission Date: 5/6/2019 The patient's chart is reviewed and the patient is discussed with the staff. 87 y.o. CM presents via EMS with shortness of breath when got up this morning.  Had a productive cough with yellow sputum and received Rocephin.  In the ER CXR with large right pneumothorax and pulmonary consulted for chest tube placement and admission. Binta Melgarde a fall in February and seen by Dr. Violeta Boles with compression L2,L3, L4. In the ER had VT and cardiology was consulted and added Amiodarone. 
 Right Ure-ashutosh placed on admission 5/6 then 24 [place] chest tube placed 5/7. Was continued on NC 6L for nitrogen wash out. On 5/8 clamped Ure-ashutosh but developed significant subc air and was unclamped. Chest CT obtained showing Ure-ashutosh no long in pleural cavity and was removed. Larger chest tube was pulled back  With follow up CXR much better with only tiny apical pneumothorax. Subjective:  
 
Sitting up in bed watching TV. States breathing is much improved. Has occasional productive cough and remains on NC 6L. Current Facility-Administered Medications Medication Dose Route Frequency  metoprolol succinate (TOPROL-XL) XL tablet 25 mg  25 mg Oral BID  
 amiodarone (CORDARONE) tablet 400 mg  400 mg Oral BID  calcium carbonate (TUMS) chewable tablet 200 mg [elemental]  200 mg Oral TID PRN  
 hydrALAZINE (APRESOLINE) 20 mg/mL injection 10 mg  10 mg IntraVENous Q6H PRN  
 aspirin delayed-release tablet 81 mg  81 mg Oral DAILY  guaiFENesin ER (MUCINEX) tablet 1,200 mg  1,200 mg Oral DAILY  montelukast (SINGULAIR) tablet 10 mg  10 mg Oral DAILY  tamsulosin (FLOMAX) capsule 0.4 mg  0.4 mg Oral DAILY  sodium chloride (NS) flush 5-40 mL  5-40 mL IntraVENous Q8H  
 sodium chloride (NS) flush 5-40 mL  5-40 mL IntraVENous PRN  
 albuterol (PROVENTIL VENTOLIN) nebulizer solution 2.5 mg  2.5 mg Nebulization Q6H RT  
  methylPREDNISolone (PF) (Solu-MEDROL) injection 40 mg  40 mg IntraVENous Q8H  
 enoxaparin (LOVENOX) injection 40 mg  40 mg SubCUTAneous Q24H  
 azithromycin (ZITHROMAX) tablet 500 mg  500 mg Oral Q24H  cefTRIAXone (ROCEPHIN) 1 g in 0.9% sodium chloride (MBP/ADV) 50 mL  1 g IntraVENous Q24H Review of Systems Constitutional:  negative for fever, chills, sweats Cardiovascular:  negative for chest pain, palpitations, syncope, edema Respiratory:  Right chest tube, significant subc air Gastrointestinal:  negative for dysphagia, reflux, vomiting, diarrhea, abdominal pain, or melena Neurologic:  negative for focal weakness, numbness, headache Objective:  
 
Vitals:  
 05/09/19 0600 05/09/19 0631 05/09/19 0701 05/09/19 2997 BP: 158/72 144/68 165/78 Pulse: (!) 52 (!) 50 (!) 53 Resp: 13 12 21 Temp:   97.6 °F (36.4 °C) SpO2: 100% 100% 100% 100% Weight:      
Height:      
 
 
Intake and Output:  
05/07 1901 - 05/09 0700 In: 36 [P.O.:720; I.V.:100] Out: 1712 [SHQPO:4898] No intake/output data recorded. Physical Exam:         
Constitutional:  the patient is well developed and in no acute distress, NC 6L sat 100% EENMT:  Sclera clear, pupils equal, oral mucosa moist 
Respiratory: crepitus over entire chest, right chest tube with no air leak observed, productive cough with yellow sputum Cardiovascular:  RRR without M,G,R 
Gastrointestinal: soft and non-tender; with positive bowel sounds, appetite good. Musculoskeletal: warm without cyanosis. There is no lower extremity edema. Skin:  no jaundice or rashes, right anterior chest wound, right chest tube Neurologic: no gross neuro deficits Psychiatric:  alert and oriented x 3 LINES:   
PIV sites Right chest tube 5/7/19 DRIPS:   None CXR:  
5/9/19: Stable tiny right apical pneumothorax and right chest tube. Extensive 
subcutaneous emphysema.     
 
 
Chest CT 5/8/19: 
 
 
LAB 
 No results for input(s): GLUCPOC in the last 72 hours. No lab exists for component: [place] Recent Labs 05/07/19 
0407 WBC 6.0 HGB 13.6 HCT 42.1 * Recent Labs 05/09/19 
8806 05/08/19 
0410 05/07/19 
0407 05/06/19 
1253 * 134* 135* 132* K 4.5 4.4 4.0 4.4  102 102 100 CO2 24 23 24 27 * 126* 133* 104* BUN 25* 17 12 15 CREA 0.61* 0.59* 0.56* 0.60* MG 2.2 2.3 2.1 2.1 PHOS  --   --   --  3.1 CA 8.3 8.4 8.7 8.7 ALB  --  3.3  --  3.6 TBILI  --  0.7  --  0.9 ALT  --  21  --  27 SGOT  --  25  --  25 No results for input(s): PH, PCO2, PO2, HCO3 in the last 72 hours. No results for input(s): LCAD, LAC in the last 72 hours. Assessment:  (Medical Decision Making) Patient Active Problem List  
Diagnosis Code  Abdominal aortic aneurysm without rupture ([facility]) I71.4  Benign prostatic hyperplasia N40.0  Cardiovascular disease I25.10  COPD (chronic obstructive pulmonary disease) ([facility]) J44.9  Low testosterone R79.89  
 Essential hypertension, benign I10  
 Allergic rhinitis J30.9  Raynaud's syndrome I73.00  Erectile dysfunction N52.9  Chronic respiratory failure with hypoxia ([facility]) J96.11  
 SOB (shortness of breath) R06.02  
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  
 Acute on chronic respiratory failure with hypoxia ([facility]) J96.21  
 COPD exacerbation ([facility]) J44.1  Ventricular tachyarrhythmia ([facility]) I47.2  Pulmonary infiltrates R91.8  VT (ventricular tachycardia) ([facility]) I47.2  Subcutaneous emphysema (HCC) T79. 7XXA Plan:  (Medical Decision Making) Hospital Problems  Date Reviewed: 5/9/2019 Codes Class Noted POA Shortness of breath ICD-10-CM: R06.02 
ICD-9-CM: 786.05  5/6/2019 Yes Less, on NC 6L for nitrogen wash out * (Principal) Pneumothorax on right ICD-10-CM: J93.9 ICD-9-CM: 512.89  5/6/2019 Yes Tiny right apical, right chest tube to suction Chronic respiratory failure with hypoxia (HCC) (Chronic) ICD-10-CM: J96.11 
ICD-9-CM: 518.83, 799.02  3/29/2016 Yes Overview Addendum 5/6/2019 10:14 AM by Luis Felipe Rice, NP Continue to wear 2 L nasal cannula at night. May use 2 L supplemental oxygen to maintain sats greater than 90%. chronic Essential hypertension, benign ICD-10-CM: I10 
ICD-9-CM: 401.1  12/10/2015 Yes Chronic---160s Subcutaneous emphysema ([facility] ICD-10-CM: T79. 7XXA ICD-9-CM: 958.7  5/9/2019 No  
 significant Acute on chronic respiratory failure with hypoxia [facility]) ICD-10-CM: J96.21 
ICD-9-CM: 518.84, 799.02  5/6/2019 Yes  
 remains on NC   
 COPD exacerbation ([facility] ICD-10-CM: J44.1 ICD-9-CM: 491.21  5/6/2019 Yes Continue current--no wheezing Ventricular tachyarrhythmia ([facility] ICD-10-CM: I47.2 ICD-9-CM: 427.1  5/6/2019 Yes Controlled on [place] Pulmonary infiltrates ICD-10-CM: R91.8 ICD-9-CM: 793.19  5/6/2019 Unknown Continue antibiotics VT (ventricular tachycardia) (HCC) ICD-10-CM: I47.2 ICD-9-CM: 427.1  5/6/2019 Yes Per cardiology --Albuterol, Mucinex, Singulair 
--Zithromax, Rocephin day 4 
--WBC 6.0 
--Blood cultures:  No growth 2 days-pending  
--Cardiology following--on Amiodarone 
--Continue right chest tube to suction 
--May be able to move to remote telemetry soon More than 50% of the time documented was spent in face-to-face contact with the patient and in the care of the patient on the floor/unit where the patient is located. Leny Galloway NP Lungs: crepitus on anterior chest, intermittent air leak Heart:  RRR with no Murmur/Rubs/Gallops Additional Comments:  Events are noted.  Keep on HFNC O2 for N2 wash out and can get OOB I have spoken with and examined the patient. I agree with the above assessment and plan as documented.  
 
Ju Licona MD

## 2019-05-10 ENCOUNTER — APPOINTMENT (OUTPATIENT)
Dept: GENERAL RADIOLOGY | Age: 84
DRG: 163 | End: 2019-05-10
Attending: INTERNAL MEDICINE
Payer: MEDICARE

## 2019-05-10 LAB
ANION GAP SERPL CALC-SCNC: 8 MMOL/L (ref 7–16)
BUN SERPL-MCNC: 26 MG/DL (ref 8–23)
CALCIUM SERPL-MCNC: 8.1 MG/DL (ref 8.3–10.4)
CHLORIDE SERPL-SCNC: 101 MMOL/L (ref 98–107)
CO2 SERPL-SCNC: 24 MMOL/L (ref 21–32)
CREAT SERPL-MCNC: 0.59 MG/DL (ref 0.8–1.5)
GLUCOSE SERPL-MCNC: 119 MG/DL (ref 65–100)
MAGNESIUM SERPL-MCNC: 2.2 MG/DL (ref 1.8–2.4)
POTASSIUM SERPL-SCNC: 4.1 MMOL/L (ref 3.5–5.1)
SODIUM SERPL-SCNC: 133 MMOL/L (ref 136–145)

## 2019-05-10 PROCEDURE — 65660000000 HC RM CCU STEPDOWN

## 2019-05-10 PROCEDURE — 94640 AIRWAY INHALATION TREATMENT: CPT

## 2019-05-10 PROCEDURE — 99232 SBSQ HOSP IP/OBS MODERATE 35: CPT | Performed by: INTERNAL MEDICINE

## 2019-05-10 PROCEDURE — 74011250636 HC RX REV CODE- 250/636: Performed by: INTERNAL MEDICINE

## 2019-05-10 PROCEDURE — 94760 N-INVAS EAR/PLS OXIMETRY 1: CPT

## 2019-05-10 PROCEDURE — 74011250637 HC RX REV CODE- 250/637: Performed by: INTERNAL MEDICINE

## 2019-05-10 PROCEDURE — 36415 COLL VENOUS BLD VENIPUNCTURE: CPT

## 2019-05-10 PROCEDURE — 83735 ASSAY OF MAGNESIUM: CPT

## 2019-05-10 PROCEDURE — 74011000258 HC RX REV CODE- 258: Performed by: INTERNAL MEDICINE

## 2019-05-10 PROCEDURE — 77010033678 HC OXYGEN DAILY

## 2019-05-10 PROCEDURE — 80048 BASIC METABOLIC PNL TOTAL CA: CPT

## 2019-05-10 PROCEDURE — 74011000250 HC RX REV CODE- 250: Performed by: INTERNAL MEDICINE

## 2019-05-10 PROCEDURE — 71045 X-RAY EXAM CHEST 1 VIEW: CPT

## 2019-05-10 RX ADMIN — ALBUTEROL SULFATE 2.5 MG: 2.5 SOLUTION RESPIRATORY (INHALATION) at 19:49

## 2019-05-10 RX ADMIN — ALBUTEROL SULFATE 2.5 MG: 2.5 SOLUTION RESPIRATORY (INHALATION) at 01:28

## 2019-05-10 RX ADMIN — HYDRALAZINE HYDROCHLORIDE 10 MG: 20 INJECTION INTRAMUSCULAR; INTRAVENOUS at 03:07

## 2019-05-10 RX ADMIN — GUAIFENESIN 1200 MG: 600 TABLET, EXTENDED RELEASE ORAL at 08:47

## 2019-05-10 RX ADMIN — METHYLPREDNISOLONE SODIUM SUCCINATE 40 MG: 40 INJECTION, POWDER, FOR SOLUTION INTRAMUSCULAR; INTRAVENOUS at 21:09

## 2019-05-10 RX ADMIN — AZITHROMYCIN 500 MG: 250 TABLET, FILM COATED ORAL at 11:39

## 2019-05-10 RX ADMIN — ENOXAPARIN SODIUM 40 MG: 40 INJECTION SUBCUTANEOUS at 11:39

## 2019-05-10 RX ADMIN — ALBUTEROL SULFATE 2.5 MG: 2.5 SOLUTION RESPIRATORY (INHALATION) at 13:36

## 2019-05-10 RX ADMIN — METHYLPREDNISOLONE SODIUM SUCCINATE 40 MG: 40 INJECTION, POWDER, FOR SOLUTION INTRAMUSCULAR; INTRAVENOUS at 14:48

## 2019-05-10 RX ADMIN — MONTELUKAST SODIUM 10 MG: 10 TABLET, FILM COATED ORAL at 08:47

## 2019-05-10 RX ADMIN — Medication 10 ML: at 06:16

## 2019-05-10 RX ADMIN — Medication 5 ML: at 14:48

## 2019-05-10 RX ADMIN — TAMSULOSIN HYDROCHLORIDE 0.4 MG: 0.4 CAPSULE ORAL at 08:47

## 2019-05-10 RX ADMIN — ASPIRIN 81 MG: 81 TABLET, COATED ORAL at 08:47

## 2019-05-10 RX ADMIN — CALCIUM CARBONATE (ANTACID) CHEW TAB 500 MG 200 MG: 500 CHEW TAB at 13:51

## 2019-05-10 RX ADMIN — METHYLPREDNISOLONE SODIUM SUCCINATE 40 MG: 40 INJECTION, POWDER, FOR SOLUTION INTRAMUSCULAR; INTRAVENOUS at 06:16

## 2019-05-10 RX ADMIN — Medication 10 ML: at 21:10

## 2019-05-10 RX ADMIN — CEFTRIAXONE SODIUM 1 G: 1 INJECTION, POWDER, FOR SOLUTION INTRAMUSCULAR; INTRAVENOUS at 21:12

## 2019-05-10 NOTE — PROGRESS NOTES
Critical Care Daily Progress Note: 5/10/2019 Daiana Parker Admission Date: 5/6/2019 The patient's chart is reviewed and the patient is discussed with the staff. 87 y.o. CM presents via EMS with shortness of breath when got up this morning.  Had a productive cough with yellow sputum and received Rocephin.  In the ER CXR with large right pneumothorax and pulmonary consulted for chest tube placement and admission. Amilcar Armentacil a fall in February and seen by Dr. Giselle Carlos with compression L2,L3, L4. In the ER had VT and cardiology was consulted and added Amiodarone. 
 Right Ure-ashutosh placed on admission 5/6 then 24 Austrian chest tube placed 5/7. Was continued on NC 6L for nitrogen wash out. On 5/8 clamped Ure-ashutosh but developed significant subc air and was unclamped. Chest CT obtained showing Ure-ashutosh no long in pleural cavity and was removed. Larger chest tube was pulled back  With follow up CXR much better with only tiny apical pneumothorax. Subjective:  
 
Sitting in chair. Remains  on NC 6L/min Son is at bed side Current Facility-Administered Medications Medication Dose Route Frequency  calcium carbonate (TUMS) chewable tablet 200 mg [elemental]  200 mg Oral TID PRN  
 hydrALAZINE (APRESOLINE) 20 mg/mL injection 10 mg  10 mg IntraVENous Q6H PRN  
 aspirin delayed-release tablet 81 mg  81 mg Oral DAILY  guaiFENesin ER (MUCINEX) tablet 1,200 mg  1,200 mg Oral DAILY  montelukast (SINGULAIR) tablet 10 mg  10 mg Oral DAILY  tamsulosin (FLOMAX) capsule 0.4 mg  0.4 mg Oral DAILY  sodium chloride (NS) flush 5-40 mL  5-40 mL IntraVENous Q8H  
 sodium chloride (NS) flush 5-40 mL  5-40 mL IntraVENous PRN  
 albuterol (PROVENTIL VENTOLIN) nebulizer solution 2.5 mg  2.5 mg Nebulization Q6H RT  
 methylPREDNISolone (PF) (Solu-MEDROL) injection 40 mg  40 mg IntraVENous Q8H  
 enoxaparin (LOVENOX) injection 40 mg  40 mg SubCUTAneous Q24H  azithromycin (ZITHROMAX) tablet 500 mg  500 mg Oral Q24H  cefTRIAXone (ROCEPHIN) 1 g in 0.9% sodium chloride (MBP/ADV) 50 mL  1 g IntraVENous Q24H Review of Systems Constitutional:  negative for fever, chills, sweats Cardiovascular:  negative for chest pain, palpitations, syncope, edema Respiratory:  Right chest tube, significant subc air Gastrointestinal:  negative for dysphagia, reflux, vomiting, diarrhea, abdominal pain, or melena Neurologic:  negative for focal weakness, numbness, headache Objective:  
 
Vitals:  
 05/10/19 8850 05/10/19 0401 05/10/19 0507 05/10/19 6385 BP: 168/80 148/71 149/73 153/82 Pulse: 60 61 60 (!) 55 Resp: 24 10 24 22 Temp: 97.6 °F (36.4 °C) SpO2: 98% 100% 99% 99% Weight:      
Height:      
 
 
Intake and Output:  
05/08 1901 - 05/10 0700 In: 65 [P.O.:480; I.V.:50] Out: 1490 [HGYYZ:1798] No intake/output data recorded. Physical Exam:         
Constitutional:  the patient is well developed and in no acute distress, NC 6L sat 100% EENMT:  Sclera clear, pupils equal, oral mucosa moist 
Respiratory: crepitus over entire chest, right chest tube with no air leak observed, productive cough with yellow sputum Cardiovascular:  RRR without M,G,R 
Gastrointestinal: soft and non-tender; with positive bowel sounds, appetite good. Musculoskeletal: warm without cyanosis. There is no lower extremity edema. Skin:  no jaundice or rashes, right anterior chest wound, right chest tube Neurologic: no gross neuro deficits Psychiatric:  alert and oriented x 3 LINES:   
PIV sites Right chest tube 5/7/19 DRIPS:   None CXR:  
5/9/19: Stable tiny right apical pneumothorax and right chest tube. Extensive 
subcutaneous emphysema. 5/10/19 Chest CT 5/8/19: 
 
 
LAB No results for input(s): GLUCPOC in the last 72 hours. No lab exists for component: [place] No results for input(s): WBC, HGB, HCT, PLT, INR, HGBEXT, HCTEXT, PLTEXT, HGBEXT, HCTEXT, PLTEXT in the last 72 hours. No lab exists for component: INREXT, INREXT Recent Labs 05/10/19 
8392 05/09/19 
6763 05/08/19 
0410 * 133* 134* K 4.1 4.5 4.4  
 102 102 CO2 24 24 23 * 121* 126* BUN 26* 25* 17  
CREA 0.59* 0.61* 0.59* MG 2.2 2.2 2.3 CA 8.1* 8.3 8.4 ALB  --   --  3.3 TBILI  --   --  0.7 ALT  --   --  21 SGOT  --   --  25 No results for input(s): PH, PCO2, PO2, HCO3 in the last 72 hours. No results for input(s): LCAD, LAC in the last 72 hours. Assessment:  (Medical Decision Making) Patient Active Problem List  
Diagnosis Code  Abdominal aortic aneurysm without rupture ([facility]) I71.4  Benign prostatic hyperplasia N40.0  Cardiovascular disease I25.10  COPD (chronic obstructive pulmonary disease) ([facility]) J44.9  Low testosterone R79.89  
 Essential hypertension, benign I10  
 Allergic rhinitis J30.9  Raynaud's syndrome I73.00  Erectile dysfunction N52.9  Chronic respiratory failure with hypoxia ([facility]) J96.11  
 SOB (shortness of breath) R06.02  
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  
 Acute on chronic respiratory failure with hypoxia ([facility]) J96.21  
 COPD exacerbation ([facility]) J44.1  Ventricular tachyarrhythmia ([facility]) I47.2  Pulmonary infiltrates R91.8  VT (ventricular tachycardia) ([facility]) I47.2  Subcutaneous emphysema ([facility]) T79. 7XXA Plan:  (Medical Decision Making) Hospital Problems  Date Reviewed: 5/9/2019 Codes Class Noted POA Shortness of breath ICD-10-CM: R06.02 
ICD-9-CM: 786.05  5/6/2019 Yes Less, on NC 6L for nitrogen wash out * (Principal) Pneumothorax on right ICD-10-CM: J93.9 ICD-9-CM: 512.89  5/6/2019 Yes Tiny right apical, right chest tube to suction Chronic respiratory failure with hypoxia (HCC) (Chronic) ICD-10-CM: J96.11 
ICD-9-CM: 518.83, 799.02  3/29/2016 Yes Overview Addendum 5/6/2019 10:14 AM by Jim Neil, NP Continue to wear 2 L nasal cannula at night. May use 2 L supplemental oxygen to maintain sats greater than 90%. chronic Essential hypertension, benign ICD-10-CM: I10 
ICD-9-CM: 401.1  12/10/2015 Yes Chronic---160s Subcutaneous emphysema ([facility] ICD-10-CM: T79. 7XXA ICD-9-CM: 958.7  5/9/2019 No  
 significant Acute on chronic respiratory failure with hypoxia [facility]) ICD-10-CM: J96.21 
ICD-9-CM: 518.84, 799.02  5/6/2019 Yes  
 remains on NC   
 COPD exacerbation ([facility] ICD-10-CM: J44.1 ICD-9-CM: 491.21  5/6/2019 Yes Continue current--no wheezing Ventricular tachyarrhythmia ([facility] ICD-10-CM: I47.2 ICD-9-CM: 427.1  5/6/2019 Yes Controlled on Amio Pulmonary infiltrates ICD-10-CM: R91.8 ICD-9-CM: 793.19  5/6/2019 Unknown Continue antibiotics VT (ventricular tachycardia) (HCC) ICD-10-CM: I47.2 ICD-9-CM: 427.1  5/6/2019 Yes Per cardiology --Albuterol, Mucinex, Singulair 
--Zithromax, Rocephin day 5 
--Blood cultures:  No growth 3 days-pending  
--Cardiology following--on Amiodarone 
--Continue right chest tube to suction 
--May be able to move to remote telemetry if ok with cardiology More than 50% of the time documented was spent in face-to-face contact with the patient and in the care of the patient on the floor/unit where the patient is located.  
 
Carlos Perkins MD

## 2019-05-10 NOTE — PROGRESS NOTES
Physical Therapy Note: 
 
Participated in interdisciplinary rounds in ICU/CCU and chart reviewed. Patient is experiencing decrease in function from baseline. Patient would benefit from skilled acute therapy to increase independence with self care/ADLs, strength, endurance, and functional mobility. Recommend PT/OT consult when medically stable and MD agrees. Thank you for your consideration, Anay Hoffman DPT

## 2019-05-10 NOTE — PROGRESS NOTES
TRANSFER - OUT REPORT: 
 
Verbal report given to JANINA Varela on Livia Poe  being transferred to [address] [place] [address] for routine progression of care Report consisted of patients Situation, Background, Assessment and  
Recommendations(SBAR). Information from the following report(s) SBAR, Kardex, Procedure Summary, Intake/Output, MAR, Recent Results, Cardiac Rhythm SB with pvcs, hx of svt and Alarm Parameters  was reviewed with the receiving nurse. Lines:  
Peripheral IV 05/06/19 Right Antecubital (Active) Site Assessment Clean, dry, & intact 5/10/2019  7:02 AM  
Phlebitis Assessment 0 5/10/2019  7:02 AM  
Infiltration Assessment 0 5/10/2019  7:02 AM  
Dressing Status Clean, dry, & intact 5/10/2019  7:02 AM  
Dressing Type Transparent;Tape 5/10/2019  7:02 AM  
Hub Color/Line Status Pink;Flushed;Patent 5/10/2019  7:02 AM  
Alcohol Cap Used No 5/10/2019  7:02 AM  
   
Peripheral IV 05/07/19 Anterior;Right Forearm (Active) Site Assessment Clean, dry, & intact 5/10/2019  7:02 AM  
Phlebitis Assessment 0 5/10/2019  7:02 AM  
Infiltration Assessment 0 5/10/2019  7:02 AM  
Dressing Status Clean, dry, & intact 5/10/2019  7:02 AM  
Dressing Type Transparent;Tape 5/10/2019  7:02 AM  
Hub Color/Line Status Pink;Flushed;Patent 5/10/2019  7:02 AM  
Alcohol Cap Used No 5/10/2019  7:02 AM  
  
 
Opportunity for questions and clarification was provided. Patient transported with: 
 Monitor O2 @ 6 liters Cell phone and  Glasses, personal clothing, compact radio.

## 2019-05-10 NOTE — PROGRESS NOTES
[facility] CARDIOLOGY PROGRESS NOTE 
      
 
5/10/2019 6:34 AM 
 
Admit Date: 5/6/2019 Subjective: No angina. No palp or inc sob Objective:  
  
Vitals:  
 05/10/19 4985 05/10/19 0401 05/10/19 0507 05/10/19 5817 BP: 168/80 148/71 149/73 153/82 Pulse: 60 61 60 (!) 55 Resp: 24 10 24 22 Temp: 97.6 °F (36.4 °C) SpO2: 98% 100% 99% 99% Weight:      
Height:      
 
Rhythm: NSR Physical Exam: 
General-No Acute Distress Data Review:  
Recent Labs 05/10/19 
2571 05/09/19 
6723 * 133* K 4.1 4.5 MG 2.2 2.2 BUN 26* 25* CREA 0.59* 0.61* * 121* Assessment/Plan:  
 
NSVT in the setting of COPD and a severe pneumothorax 
 
//// 
 
I have stopped his po amiodarone and metoprolol since the crisis is resolved. Brittney Po If htn an issue then resume acei vd ccb. On standby Arley Opitz, MD 
5/10/2019 6:34 AM

## 2019-05-11 ENCOUNTER — APPOINTMENT (OUTPATIENT)
Dept: GENERAL RADIOLOGY | Age: 84
DRG: 163 | End: 2019-05-11
Attending: INTERNAL MEDICINE
Payer: MEDICARE

## 2019-05-11 LAB
ANION GAP SERPL CALC-SCNC: 8 MMOL/L (ref 7–16)
BACTERIA SPEC CULT: NORMAL
BACTERIA SPEC CULT: NORMAL
BUN SERPL-MCNC: 17 MG/DL (ref 8–23)
CALCIUM SERPL-MCNC: 7.9 MG/DL (ref 8.3–10.4)
CHLORIDE SERPL-SCNC: 100 MMOL/L (ref 98–107)
CO2 SERPL-SCNC: 25 MMOL/L (ref 21–32)
CREAT SERPL-MCNC: 0.57 MG/DL (ref 0.8–1.5)
GLUCOSE SERPL-MCNC: 106 MG/DL (ref 65–100)
MAGNESIUM SERPL-MCNC: 2.4 MG/DL (ref 1.8–2.4)
POTASSIUM SERPL-SCNC: 4.5 MMOL/L (ref 3.5–5.1)
SERVICE CMNT-IMP: NORMAL
SERVICE CMNT-IMP: NORMAL
SODIUM SERPL-SCNC: 133 MMOL/L (ref 136–145)

## 2019-05-11 PROCEDURE — 99233 SBSQ HOSP IP/OBS HIGH 50: CPT | Performed by: INTERNAL MEDICINE

## 2019-05-11 PROCEDURE — 74011250637 HC RX REV CODE- 250/637: Performed by: INTERNAL MEDICINE

## 2019-05-11 PROCEDURE — 93005 ELECTROCARDIOGRAM TRACING: CPT | Performed by: INTERNAL MEDICINE

## 2019-05-11 PROCEDURE — 36415 COLL VENOUS BLD VENIPUNCTURE: CPT

## 2019-05-11 PROCEDURE — 80048 BASIC METABOLIC PNL TOTAL CA: CPT

## 2019-05-11 PROCEDURE — 94640 AIRWAY INHALATION TREATMENT: CPT

## 2019-05-11 PROCEDURE — 77010033678 HC OXYGEN DAILY

## 2019-05-11 PROCEDURE — 83735 ASSAY OF MAGNESIUM: CPT

## 2019-05-11 PROCEDURE — 94760 N-INVAS EAR/PLS OXIMETRY 1: CPT

## 2019-05-11 PROCEDURE — 74011250637 HC RX REV CODE- 250/637: Performed by: FAMILY MEDICINE

## 2019-05-11 PROCEDURE — 74011000258 HC RX REV CODE- 258: Performed by: INTERNAL MEDICINE

## 2019-05-11 PROCEDURE — 65660000000 HC RM CCU STEPDOWN

## 2019-05-11 PROCEDURE — 74011250636 HC RX REV CODE- 250/636: Performed by: INTERNAL MEDICINE

## 2019-05-11 PROCEDURE — 74011000250 HC RX REV CODE- 250: Performed by: INTERNAL MEDICINE

## 2019-05-11 PROCEDURE — 71045 X-RAY EXAM CHEST 1 VIEW: CPT

## 2019-05-11 RX ORDER — CARVEDILOL 3.12 MG/1
3.12 TABLET ORAL 2 TIMES DAILY WITH MEALS
Status: DISCONTINUED | OUTPATIENT
Start: 2019-05-11 | End: 2019-05-25 | Stop reason: HOSPADM

## 2019-05-11 RX ORDER — ACETAMINOPHEN 325 MG/1
650 TABLET ORAL
Status: DISCONTINUED | OUTPATIENT
Start: 2019-05-11 | End: 2019-05-13

## 2019-05-11 RX ORDER — AMIODARONE HYDROCHLORIDE 200 MG/1
400 TABLET ORAL EVERY 12 HOURS
Status: DISCONTINUED | OUTPATIENT
Start: 2019-05-11 | End: 2019-05-25 | Stop reason: HOSPADM

## 2019-05-11 RX ORDER — LISINOPRIL 5 MG/1
5 TABLET ORAL DAILY
Status: DISCONTINUED | OUTPATIENT
Start: 2019-05-11 | End: 2019-05-12

## 2019-05-11 RX ORDER — FLUTICASONE PROPIONATE 50 MCG
2 SPRAY, SUSPENSION (ML) NASAL DAILY
Status: DISCONTINUED | OUTPATIENT
Start: 2019-05-11 | End: 2019-05-25 | Stop reason: HOSPADM

## 2019-05-11 RX ADMIN — METHYLPREDNISOLONE SODIUM SUCCINATE 40 MG: 40 INJECTION, POWDER, FOR SOLUTION INTRAMUSCULAR; INTRAVENOUS at 21:30

## 2019-05-11 RX ADMIN — GUAIFENESIN 1200 MG: 600 TABLET, EXTENDED RELEASE ORAL at 09:53

## 2019-05-11 RX ADMIN — Medication 5 ML: at 21:44

## 2019-05-11 RX ADMIN — Medication 5 ML: at 13:27

## 2019-05-11 RX ADMIN — CARVEDILOL 3.12 MG: 3.12 TABLET, FILM COATED ORAL at 09:51

## 2019-05-11 RX ADMIN — ACETAMINOPHEN 650 MG: 325 TABLET, FILM COATED ORAL at 03:14

## 2019-05-11 RX ADMIN — CARVEDILOL 3.12 MG: 3.12 TABLET, FILM COATED ORAL at 17:04

## 2019-05-11 RX ADMIN — AMIODARONE HYDROCHLORIDE 400 MG: 200 TABLET ORAL at 21:29

## 2019-05-11 RX ADMIN — TAMSULOSIN HYDROCHLORIDE 0.4 MG: 0.4 CAPSULE ORAL at 09:54

## 2019-05-11 RX ADMIN — AMIODARONE HYDROCHLORIDE 400 MG: 200 TABLET ORAL at 09:51

## 2019-05-11 RX ADMIN — FLUTICASONE PROPIONATE 2 SPRAY: 50 SPRAY, METERED NASAL at 09:53

## 2019-05-11 RX ADMIN — CEFTRIAXONE SODIUM 1 G: 1 INJECTION, POWDER, FOR SOLUTION INTRAMUSCULAR; INTRAVENOUS at 21:42

## 2019-05-11 RX ADMIN — Medication 5 ML: at 05:38

## 2019-05-11 RX ADMIN — ACETAMINOPHEN 650 MG: 325 TABLET, FILM COATED ORAL at 21:49

## 2019-05-11 RX ADMIN — ASPIRIN 81 MG: 81 TABLET, COATED ORAL at 09:53

## 2019-05-11 RX ADMIN — METHYLPREDNISOLONE SODIUM SUCCINATE 40 MG: 40 INJECTION, POWDER, FOR SOLUTION INTRAMUSCULAR; INTRAVENOUS at 05:38

## 2019-05-11 RX ADMIN — ACETAMINOPHEN 650 MG: 325 TABLET, FILM COATED ORAL at 11:29

## 2019-05-11 RX ADMIN — ALBUTEROL SULFATE 2.5 MG: 2.5 SOLUTION RESPIRATORY (INHALATION) at 08:19

## 2019-05-11 RX ADMIN — ALBUTEROL SULFATE 2.5 MG: 2.5 SOLUTION RESPIRATORY (INHALATION) at 20:51

## 2019-05-11 RX ADMIN — METHYLPREDNISOLONE SODIUM SUCCINATE 40 MG: 40 INJECTION, POWDER, FOR SOLUTION INTRAMUSCULAR; INTRAVENOUS at 13:27

## 2019-05-11 RX ADMIN — LISINOPRIL 5 MG: 5 TABLET ORAL at 09:51

## 2019-05-11 RX ADMIN — ALBUTEROL SULFATE 2.5 MG: 2.5 SOLUTION RESPIRATORY (INHALATION) at 14:14

## 2019-05-11 RX ADMIN — MONTELUKAST SODIUM 10 MG: 10 TABLET, FILM COATED ORAL at 09:54

## 2019-05-11 RX ADMIN — CALCIUM CARBONATE (ANTACID) CHEW TAB 500 MG 200 MG: 500 CHEW TAB at 11:30

## 2019-05-11 RX ADMIN — ENOXAPARIN SODIUM 40 MG: 40 INJECTION SUBCUTANEOUS at 11:29

## 2019-05-11 NOTE — PROGRESS NOTES
HR elevated in 140's. Will get EKG and was also on Coreg few days ago and will restart 3.125 mg BID Nilda Vela MD

## 2019-05-11 NOTE — PROGRESS NOTES
Pulmonary Daily Progress Note: 5/11/2019 Nnamdi Morris Admission Date: 5/6/2019 The patient's chart is reviewed and the patient is discussed with the staff. 87 y.o. CM presents via EMS with shortness of breath when got up this morning.  Had a productive cough with yellow sputum and received Rocephin.  In the ER CXR with large right pneumothorax and pulmonary consulted for chest tube placement and admission. May Addy a fall in February and seen by Dr. Allie Bates with compression L2,L3, L4. In the ER had VT and cardiology was consulted and added Amiodarone. 
 Right Ure-ashutosh placed on admission 5/6 then 24 Danish chest tube placed 5/7. Was continued on NC 6L for nitrogen wash out. On 5/8 clamped Ure-ashutosh but developed significant subc air and was unclamped. Chest CT obtained showing Ure-ashutosh no long in pleural cavity and was removed. Larger chest tube was pulled back  With follow up CXR much better with only tiny apical pneumothorax. Subjective:  
 
Patient in bed on 7 LPM and chest tube with marked air leak. He reports he slept last night for first time in 5 days. Feels more alert and refreshed now. NO pain since took pain meds today. Moving bowels. Current Facility-Administered Medications Medication Dose Route Frequency  acetaminophen (TYLENOL) tablet 650 mg  650 mg Oral Q4H PRN  
 calcium carbonate (TUMS) chewable tablet 200 mg [elemental]  200 mg Oral TID PRN  
 hydrALAZINE (APRESOLINE) 20 mg/mL injection 10 mg  10 mg IntraVENous Q6H PRN  
 aspirin delayed-release tablet 81 mg  81 mg Oral DAILY  guaiFENesin ER (MUCINEX) tablet 1,200 mg  1,200 mg Oral DAILY  montelukast (SINGULAIR) tablet 10 mg  10 mg Oral DAILY  tamsulosin (FLOMAX) capsule 0.4 mg  0.4 mg Oral DAILY  sodium chloride (NS) flush 5-40 mL  5-40 mL IntraVENous Q8H  
 sodium chloride (NS) flush 5-40 mL  5-40 mL IntraVENous PRN  
 albuterol (PROVENTIL VENTOLIN) nebulizer solution 2.5 mg  2.5 mg Nebulization Q6H RT  
 methylPREDNISolone (PF) (Solu-MEDROL) injection 40 mg  40 mg IntraVENous Q8H  
 enoxaparin (LOVENOX) injection 40 mg  40 mg SubCUTAneous Q24H  cefTRIAXone (ROCEPHIN) 1 g in 0.9% sodium chloride (MBP/ADV) 50 mL  1 g IntraVENous Q24H Review of Systems Constitutional:  negative for fever, chills, sweats HEENT: +sinsus congestion and has seasonal allergies Cardiovascular:  negative for chest pain, palpitations, syncope, edema Respiratory:  Right chest tube, significant subc air Gastrointestinal:  negative for dysphagia, reflux, vomiting, diarrhea, abdominal pain, or melena Neurologic:  negative for focal weakness, numbness, headache Objective:  
 
Vitals:  
 05/10/19 2010 05/10/19 2351 05/11/19 4670 05/11/19 1070 BP: 160/76 169/83 167/81 193/88 Pulse: 75 70 62 61 Resp: 18 20 19 18 Temp: 97.4 °F (36.3 °C) 97.3 °F (36.3 °C) 97.8 °F (36.6 °C) 97.4 °F (36.3 °C) SpO2: 95% 96% 96% 93% Weight:      
Height:      
 
 
Intake and Output:  
05/09 1901 - 05/11 0700 In: 480 [P.O.:480] Out: 1391 [[id number]] 05/11 0701 - 05/11 1900 In: 0 Out: 235 [Urine:225] Physical Exam:         
Constitutional:  the patient is well developed and in no acute distress, NC 7L sat 93% EENMT:  Sclera clear, pupils equal, oral mucosa moist 
Respiratory: crepitus over entire chest, right chest tube with marked air leak Cardiovascular:  RRR without M,G,R 
Gastrointestinal: soft and non-tender; with positive bowel sounds, appetite good. Musculoskeletal: warm without cyanosis. There is no lower extremity edema. Skin:  no jaundice or rashes, right anterior chest wound, right chest tube Neurologic: no gross neuro deficits Psychiatric:  alert and oriented x 3 LINES:   
PIV sites Right chest tube 5/7/19 DRIPS:   None CXR:  
5/11/19 Small Right sided apical PTX with noted extensive subcutaneous emphysema. 5/10/19 Increase in Right sided PTX with rigth chest tube. Still with extensive subcutaneous emphysema 5/9/19: Stable tiny right apical pneumothorax and right chest tube. Extensive 
subcutaneous emphysema. 5/10/19 Chest CT 5/8/19: 
 
 
LAB No results for input(s): GLUCPOC in the last 72 hours. No lab exists for component: [place] No results for input(s): WBC, HGB, HCT, PLT, INR, HGBEXT, HCTEXT, PLTEXT, HGBEXT, HCTEXT, PLTEXT in the last 72 hours. No lab exists for component: INREXT, INREXT Recent Labs 05/11/19 
4532 05/10/19 
0162 05/09/19 
8100 NA  --  133* 133* K  --  4.1 4.5  
CL  --  101 102 CO2  --  24 24 GLU  --  119* 121* BUN  --  26* 25* CREA  --  0.59* 0.61* MG 2.4 2.2 2.2 CA  --  8.1* 8.3 No results for input(s): PH, PCO2, PO2, HCO3 in the last 72 hours. No results for input(s): LCAD, LAC in the last 72 hours. Assessment:  (Medical Decision Making) Hospital Problems  Date Reviewed: 5/9/2019 Codes Class Noted POA Shortness of breath ICD-10-CM: R06.02 
ICD-9-CM: 786.05  5/6/2019 Yes Less, on NC 6L for nitrogen wash out * (Principal) Pneumothorax on right ICD-10-CM: J93.9 ICD-9-CM: 512.89  5/6/2019 Yes Tiny right apical, right chest tube to suction Chronic respiratory failure with hypoxia (HCC) (Chronic) ICD-10-CM: J96.11 
ICD-9-CM: 518.83, 799.02  3/29/2016 Yes Overview Addendum 5/6/2019 10:14 AM by Jerald Edmond, NP Continue to wear 2 L nasal cannula at night. May use 2 L supplemental oxygen to maintain sats greater than 90%. chronic Essential hypertension, benign ICD-10-CM: I10 
ICD-9-CM: 401.1  12/10/2015 Yes Elevated today. See below Subcutaneous emphysema ([address] ICD-10-CM: T79. 7XXA ICD-9-CM: 958.7  5/9/2019 No  
 significant  Acute on chronic respiratory failure with hypoxia [facility]) ICD-10-CM: J96.21 
ICD-9-CM: 518.84, 799.02  5/6/2019 Yes  
 remains on NC   
 COPD exacerbation ([facility] ICD-10-CM: J44.1 ICD-9-CM: 491.21  5/6/2019 Yes Continue current--no wheezing Ventricular tachyarrhythmia ([facility] ICD-10-CM: I47.2 ICD-9-CM: 427.1  5/6/2019 Yes Controlled on [place] Pulmonary infiltrates ICD-10-CM: R91.8 ICD-9-CM: 793.19  5/6/2019 Unknown Continue antibiotics VT (ventricular tachycardia) ([facility]) ICD-10-CM: I47.2 ICD-9-CM: 427.1  5/6/2019 Yes Per cardiology Plan:  (Medical Decision Making) --keep chest tube to -30. cxr noted and small ptx today. Marked air leak. Aware if does not improve will need surgery to see if can stop air leak. --keep on 6 LPM oxygen for nitrogen washout for now 
--Albuterol, Mucinex, Singulair 
--Zithromax, Rocephin day 6/7 --Blood cultures:  No growth 4 days-pending  
--Cardiology signed off and stopped amiodarone --BP elevated and will add back ace as per cardiology and monitor. HR is 60's 
--nares congested and restart his home flonase. --continue remaining treatment. Time spent with care looking over chart/x-ray/talking to patient/etc was 35 minutes today. More than 50% of the time documented was spent in face-to-face contact with the patient and in the care of the patient on the floor/unit where the patient is located.  
 
Ximena Richards MD

## 2019-05-11 NOTE — PROGRESS NOTES
Patient is lying in bed with head of bed elevated. Respirations are even and unlabored. O2 at 6lpm NC. Patient  denies any pain. Right chest tube in place with suction to 30cm Bed is low ,locked and call light within reach.

## 2019-05-11 NOTE — PROGRESS NOTES
Patient is sitting up in chair. Respirations are even and unlabored. O2 at 6lpm NC nitrogen wash out. chest tube in place with suction to 30 cm . Patient is encouraged to call for assistance.

## 2019-05-11 NOTE — PROGRESS NOTES
Remote tele reports patient in sustained v tach  Patient is eating and talking on phone  Denies any new symptoms or complaints  Dr Aguila Longo notified  Hermilo Burnett shown to Dr [place] Mcmillan received to consult cardiology and restart amiodarone

## 2019-05-11 NOTE — PROGRESS NOTES
Patient having runs of V-tachy again. Will restart his Amiodarone that Dr. Joni Christensen discontinued yesterday and will get cardiology to see him again today. May need to [facility] Telemetry.  
 
Gabby Chawla MD

## 2019-05-11 NOTE — PROGRESS NOTES
[facility] OUTREACH NURSE PROGRESS REPORT SUBJECTIVE: Called to assess patient secondary to outreach program.   
 
MEWS Score: 1 (05/11/19 0723) Vitals:  
 05/11/19 3168 05/11/19 0723 05/11/19 0819 05/11/19 1135 BP: 167/81 193/88  151/79 Pulse: 62 61  (!) 58 Resp: 19 18  20 Temp: 97.8 °F (36.6 °C) 97.4 °F (36.3 °C)  97.5 °F (36.4 °C) SpO2: 96% 93% 93% 99% Weight:      
Height:      
  
EKG: normal EKG, normal sinus rhythm, occasional PVC noted, unifocal.  
 
LAB DATA: 
 
Recent Labs 05/11/19 
6552 05/10/19 
1806 05/09/19 
8810 * 133* 133* K 4.5 4.1 4.5  
 101 102 CO2 25 24 24 AGAP 8 8 7 * 119* 121* BUN 17 26* 25* CREA 0.57* 0.59* 0.61* GFRAA >60 >60 >60 GFRNA >60 >60 >60  
CA 7.9* 8.1* 8.3 MG 2.4 2.2 2.2 No results for input(s): WBC, HGB, HCT, PLT, HGBEXT, HCTEXT, PLTEXT in the last 72 hours. OBJECTIVE: On arrival to room, I found patient to be sitting up in recliner. Pain Assessment Pain Intensity 1: 2 (05/11/19 1130) Pain Location 1: Shoulder Pain Intervention(s) 1: Medication (see MAR) Patient Stated Pain Goal: 0 
 
ASSESSMENT:  Patient is sitting up in recliner in no distress and denies pain at this time. Patient is on 6L NC and states he usually wears 2L NC only at HS while at home. Patient discussed that Cardiologist was to see him today R/T his heart \"doing a little dance. \" Patient is currently in NSR with occasional PVCs. Patient denies any feelings of heart racing or chest tightness and that he \"couldn't tell a difference\" in how he felt when his HR was elevated earlier in day. Lungs auscultated; lung sounds coarse and diminished.  Patient educated on [employer]. 
 
PLAN:  To follow per Marco Leon.

## 2019-05-11 NOTE — PROGRESS NOTES
In bed  Alert  Oriented x4  Oxygen at 6l via cannula  Right chest tube intact with intact dressing to 30cm continuous suction  Draining serosanguinous drainage  Patient with crepitus right side of face and neck  Patient feels it is less than yesterday  Assisted to reposition in bed  Heels off loaded  Call light in reach  Bed pad alarm activated

## 2019-05-11 NOTE — PROGRESS NOTES
Remains in recliner  Does not want to go to bed   Chest tube intact to suction  Draining red fluid  Less leak  No increase in crepitus  Remote tele reports patient is normal sinus rate 72

## 2019-05-12 ENCOUNTER — APPOINTMENT (OUTPATIENT)
Dept: GENERAL RADIOLOGY | Age: 84
DRG: 163 | End: 2019-05-12
Attending: INTERNAL MEDICINE
Payer: MEDICARE

## 2019-05-12 LAB
ATRIAL RATE: 88 BPM
CALCULATED P AXIS, ECG09: 49 DEGREES
CALCULATED R AXIS, ECG10: -19 DEGREES
CALCULATED T AXIS, ECG11: 63 DEGREES
DIAGNOSIS, 93000: NORMAL
MAGNESIUM SERPL-MCNC: 2.3 MG/DL (ref 1.8–2.4)
P-R INTERVAL, ECG05: 166 MS
Q-T INTERVAL, ECG07: 364 MS
QRS DURATION, ECG06: 98 MS
QTC CALCULATION (BEZET), ECG08: 485 MS
VENTRICULAR RATE, ECG03: 107 BPM

## 2019-05-12 PROCEDURE — 74011250637 HC RX REV CODE- 250/637: Performed by: INTERNAL MEDICINE

## 2019-05-12 PROCEDURE — 74011250636 HC RX REV CODE- 250/636: Performed by: INTERNAL MEDICINE

## 2019-05-12 PROCEDURE — 94640 AIRWAY INHALATION TREATMENT: CPT

## 2019-05-12 PROCEDURE — 83735 ASSAY OF MAGNESIUM: CPT

## 2019-05-12 PROCEDURE — 65660000000 HC RM CCU STEPDOWN

## 2019-05-12 PROCEDURE — 94760 N-INVAS EAR/PLS OXIMETRY 1: CPT

## 2019-05-12 PROCEDURE — 77010033678 HC OXYGEN DAILY

## 2019-05-12 PROCEDURE — 99232 SBSQ HOSP IP/OBS MODERATE 35: CPT | Performed by: INTERNAL MEDICINE

## 2019-05-12 PROCEDURE — 36415 COLL VENOUS BLD VENIPUNCTURE: CPT

## 2019-05-12 PROCEDURE — 74011250637 HC RX REV CODE- 250/637: Performed by: FAMILY MEDICINE

## 2019-05-12 PROCEDURE — 74011000258 HC RX REV CODE- 258: Performed by: INTERNAL MEDICINE

## 2019-05-12 PROCEDURE — 74011000250 HC RX REV CODE- 250: Performed by: INTERNAL MEDICINE

## 2019-05-12 PROCEDURE — 71045 X-RAY EXAM CHEST 1 VIEW: CPT

## 2019-05-12 RX ORDER — LISINOPRIL 5 MG/1
10 TABLET ORAL DAILY
Status: DISCONTINUED | OUTPATIENT
Start: 2019-05-12 | End: 2019-05-25 | Stop reason: HOSPADM

## 2019-05-12 RX ADMIN — FLUTICASONE PROPIONATE 2 SPRAY: 50 SPRAY, METERED NASAL at 09:03

## 2019-05-12 RX ADMIN — ALBUTEROL SULFATE 2.5 MG: 2.5 SOLUTION RESPIRATORY (INHALATION) at 14:25

## 2019-05-12 RX ADMIN — CARVEDILOL 3.12 MG: 3.12 TABLET, FILM COATED ORAL at 09:02

## 2019-05-12 RX ADMIN — ACETAMINOPHEN 650 MG: 325 TABLET, FILM COATED ORAL at 09:01

## 2019-05-12 RX ADMIN — METHYLPREDNISOLONE SODIUM SUCCINATE 40 MG: 40 INJECTION, POWDER, FOR SOLUTION INTRAMUSCULAR; INTRAVENOUS at 21:35

## 2019-05-12 RX ADMIN — CEFTRIAXONE SODIUM 1 G: 1 INJECTION, POWDER, FOR SOLUTION INTRAMUSCULAR; INTRAVENOUS at 21:38

## 2019-05-12 RX ADMIN — CARVEDILOL 3.12 MG: 3.12 TABLET, FILM COATED ORAL at 16:43

## 2019-05-12 RX ADMIN — GUAIFENESIN 1200 MG: 600 TABLET, EXTENDED RELEASE ORAL at 09:02

## 2019-05-12 RX ADMIN — HYDRALAZINE HYDROCHLORIDE 10 MG: 20 INJECTION INTRAMUSCULAR; INTRAVENOUS at 03:37

## 2019-05-12 RX ADMIN — METHYLPREDNISOLONE SODIUM SUCCINATE 40 MG: 40 INJECTION, POWDER, FOR SOLUTION INTRAMUSCULAR; INTRAVENOUS at 05:15

## 2019-05-12 RX ADMIN — AMIODARONE HYDROCHLORIDE 400 MG: 200 TABLET ORAL at 09:02

## 2019-05-12 RX ADMIN — Medication 5 ML: at 14:18

## 2019-05-12 RX ADMIN — LISINOPRIL 10 MG: 5 TABLET ORAL at 09:02

## 2019-05-12 RX ADMIN — ALBUTEROL SULFATE 2.5 MG: 2.5 SOLUTION RESPIRATORY (INHALATION) at 18:24

## 2019-05-12 RX ADMIN — AMIODARONE HYDROCHLORIDE 400 MG: 200 TABLET ORAL at 21:36

## 2019-05-12 RX ADMIN — MONTELUKAST SODIUM 10 MG: 10 TABLET, FILM COATED ORAL at 09:03

## 2019-05-12 RX ADMIN — TAMSULOSIN HYDROCHLORIDE 0.4 MG: 0.4 CAPSULE ORAL at 09:02

## 2019-05-12 RX ADMIN — Medication 5 ML: at 05:16

## 2019-05-12 RX ADMIN — ALBUTEROL SULFATE 2.5 MG: 2.5 SOLUTION RESPIRATORY (INHALATION) at 07:38

## 2019-05-12 RX ADMIN — METHYLPREDNISOLONE SODIUM SUCCINATE 40 MG: 40 INJECTION, POWDER, FOR SOLUTION INTRAMUSCULAR; INTRAVENOUS at 14:11

## 2019-05-12 RX ADMIN — ASPIRIN 81 MG: 81 TABLET, COATED ORAL at 09:02

## 2019-05-12 RX ADMIN — ACETAMINOPHEN 650 MG: 325 TABLET, FILM COATED ORAL at 14:31

## 2019-05-12 RX ADMIN — ACETAMINOPHEN 650 MG: 325 TABLET, FILM COATED ORAL at 21:40

## 2019-05-12 RX ADMIN — Medication 20 ML: at 21:37

## 2019-05-12 RX ADMIN — ENOXAPARIN SODIUM 40 MG: 40 INJECTION SUBCUTANEOUS at 14:11

## 2019-05-12 NOTE — PROGRESS NOTES
Alert and oriented  Oxygen at 6l via cannula  Right side chest tube intact to 30cm suction and draining serosanguinous fluid  Patient with intervals of discomfort at chest tube site

## 2019-05-12 NOTE — PROGRESS NOTES
Visit with patient to build rapport with . Patient is calm. Receptive to  presence. Encouraged and assured patient of our continued care. Thomes Boast,  Staff  C: [phone]  /  [email or web address]

## 2019-05-12 NOTE — PROGRESS NOTES
[facility] CARDIOLOGY PROGRESS NOTE 
      
 
5/11/2019 10:13 PM 
 
Admit Date: 5/6/2019 Subjective:  
 
Issues with NSVT noted; on 7L NC; chest tube in place ROS: 
Cardiovascular:  As noted above Objective:  
  
Vitals:  
 05/11/19 1415 05/11/19 1526 05/11/19 1940 05/11/19 2051 BP:  107/72 119/67 Pulse:  67 73 Resp:  20 20 Temp:  98.2 °F (36.8 °C) 97.6 °F (36.4 °C) SpO2: 99% 100% 98% 98% Weight:      
Height:      
 
 
Physical Exam: 
General-No Acute Distress Neck- supple, no JVD 
CV- regular rate and rhythm no MRG Lung- clear bilaterally Abd- soft, nontender, nondistended Ext- no edema bilaterally. Skin- warm and dry Data Review:  
Recent Labs 05/11/19 
0691 05/10/19 
4117 * 133* K 4.5 4.1 MG 2.4 2.2 BUN 17 26* CREA 0.57* 0.59* * 119* Assessment/Plan:  
 
Principal Problem: 
  Pneumothorax on right (5/6/2019) Active Problems: 
  Essential hypertension, benign (12/10/2015) Chronic respiratory failure with hypoxia ([address] (3/29/2016) Shortness of breath (5/6/2019) Acute on chronic respiratory failure with hypoxia ([address] (5/6/2019) COPD exacerbation ([address] (5/6/2019) Ventricular tachyarrhythmia ([address] (5/6/2019) Pulmonary infiltrates (5/6/2019) VT (ventricular tachycardia) ([address] (5/6/2019) Subcutaneous emphysema ([address] (5/9/2019) Noted NSVT and likely secondary to acute issues with pneumo/lung dz; lower risk in the setting of preserved EF. Has been started on amiodarone and continue for now. Would expect ectopy/NSVT to improve as pneumo/O2 requirements improve Ramona Penny MD 
5/11/2019 10:13 PM

## 2019-05-12 NOTE — PROGRESS NOTES
In chair   Chest tube to 30cm suction  Still with air leak  Oxygen at 6l via cannula  Denies pain at present

## 2019-05-12 NOTE — PROGRESS NOTES
[facility] CARDIOLOGY PROGRESS NOTE 
      
 
5/12/2019 2:13 PM 
 
Admit Date: 5/6/2019 Subjective:  
 
Issues with NSVT noted but none since seen last night. On 7L NC; chest tube in place with persistent air leak ROS: 
Cardiovascular:  As noted above Objective:  
  
Vitals:  
 05/12/19 0730 05/12/19 0738 05/12/19 1133 05/12/19 1425 BP: 174/85  138/73 Pulse: 66  71 Resp: 18  19 Temp: 97.5 °F (36.4 °C)  97.4 °F (36.3 °C) SpO2: 95% 93% 95% 96% Weight:      
Height:      
 
 
Physical Exam: 
General-No Acute Distress Neck- supple, no JVD 
CV- regular rate and rhythm no MRG Lung- clear bilaterally Abd- soft, nontender, nondistended Ext- no edema bilaterally. Skin- warm and dry Data Review:  
Recent Labs 05/12/19 
5584 05/11/19 
8982 05/10/19 
7699 NA  --  133* 133* K  --  4.5 4.1 MG 2.3 2.4 2.2 BUN  --  17 26* CREA  --  0.57* 0.59* GLU  --  106* 119* Assessment/Plan:  
 
Principal Problem: 
  Pneumothorax on right (5/6/2019) Active Problems: 
  Essential hypertension, benign (12/10/2015) Chronic respiratory failure with hypoxia ([address] (3/29/2016) Shortness of breath (5/6/2019) Acute on chronic respiratory failure with hypoxia ([address] (5/6/2019) COPD exacerbation ([address] (5/6/2019) Ventricular tachyarrhythmia ([address] (5/6/2019) Pulmonary infiltrates (5/6/2019) VT (ventricular tachycardia) ([address] (5/6/2019) Subcutaneous emphysema ([address] (5/9/2019) Noted NSVT and likely secondary to acute issues with pneumo/lung dz; lower risk in the setting of preserved EF. Has been started on amiodarone and continue for now; unlikely to need long term. Would expect ectopy/NSVT to improve as pneumo/O2 requirements improve Isaura Norman MD 
5/12/2019 2:13 PM

## 2019-05-12 NOTE — PROGRESS NOTES
Pulmonary Daily Progress Note: 5/12/2019 Sheryle Sorrow Admission Date: 5/6/2019 The patient's chart is reviewed and the patient is discussed with the staff. 87 y.o. CM presents via EMS with shortness of breath when got up this morning.  Had a productive cough with yellow sputum and received Rocephin.  In the ER CXR with large right pneumothorax and pulmonary consulted for chest tube placement and admission. Anuja Reyesh a fall in February and seen by Dr. Giuliana Phillips with compression L2,L3, L4. In the ER had VT and cardiology was consulted and added Amiodarone. 
 Right Ure-ashutosh placed on admission 5/6 then 24 [place] chest tube placed 5/7. Was continued on NC 6L for nitrogen wash out. On 5/8 clamped Ure-ashutosh but developed significant subc air and was unclamped. Chest CT obtained showing Ure-ashutosh no long in pleural cavity and was removed. Larger chest tube was pulled back  With follow up CXR much better with only tiny apical pneumothorax. Chest tube still with large leaking. Having V-tachy again and back on amiodarone/coreg. Subjective:  
 
Chest tube still with large leaking. Having V-tachy yesterday and back on amiodarone/coreg. Seen by cardiology last night. On BP elevated this AM 
 
Current Facility-Administered Medications Medication Dose Route Frequency  acetaminophen (TYLENOL) tablet 650 mg  650 mg Oral Q4H PRN  
 lisinopril (PRINIVIL, ZESTRIL) tablet 5 mg  5 mg Oral DAILY  fluticasone propionate (FLONASE) 50 mcg/actuation nasal spray 2 Spray  2 Spray Both Nostrils DAILY  amiodarone (CORDARONE) tablet 400 mg  400 mg Oral Q12H  carvedilol (COREG) tablet 3.125 mg  3.125 mg Oral BID WITH MEALS  calcium carbonate (TUMS) chewable tablet 200 mg [elemental]  200 mg Oral TID PRN  
 hydrALAZINE (APRESOLINE) 20 mg/mL injection 10 mg  10 mg IntraVENous Q6H PRN  
 aspirin delayed-release tablet 81 mg  81 mg Oral DAILY  guaiFENesin ER (MUCINEX) tablet 1,200 mg  1,200 mg Oral DAILY  montelukast (SINGULAIR) tablet 10 mg  10 mg Oral DAILY  tamsulosin (FLOMAX) capsule 0.4 mg  0.4 mg Oral DAILY  sodium chloride (NS) flush 5-40 mL  5-40 mL IntraVENous Q8H  
 sodium chloride (NS) flush 5-40 mL  5-40 mL IntraVENous PRN  
 albuterol (PROVENTIL VENTOLIN) nebulizer solution 2.5 mg  2.5 mg Nebulization Q6H RT  
 methylPREDNISolone (PF) (Solu-MEDROL) injection 40 mg  40 mg IntraVENous Q8H  
 enoxaparin (LOVENOX) injection 40 mg  40 mg SubCUTAneous Q24H  cefTRIAXone (ROCEPHIN) 1 g in 0.9% sodium chloride (MBP/ADV) 50 mL  1 g IntraVENous Q24H Review of Systems Constitutional:  negative for fever, chills, sweats HEENT: +sinsus congestion and has seasonal allergies Cardiovascular:  negative for chest pain, palpitations, syncope, edema Respiratory:  Right chest tube, significant subc air Gastrointestinal:  negative for dysphagia, reflux, vomiting, diarrhea, abdominal pain, or melena Neurologic:  negative for focal weakness, numbness, headache Objective:  
 
Vitals:  
 05/11/19 2051 05/11/19 2309 05/12/19 0330 05/12/19 0403 BP:  155/77 (!) 207/101 176/83 Pulse:  68 68 74 Resp:  18 18 Temp:  97.5 °F (36.4 °C) 97.5 °F (36.4 °C) SpO2: 98% 95% 94% Weight:      
Height:      
 
 
Intake and Output:  
05/10 1901 - 05/12 0700 In: 1400 [P.O.:1400] Out: 6185 [[id number]] No intake/output data recorded. Physical Exam:         
Constitutional:  the patient is well developed and in no acute distress, NC 7L sat 93% EENMT:  Sclera clear, pupils equal, oral mucosa moist 
Respiratory: crepitus over entire chest, right chest tube with marked air leak still Cardiovascular:  RRR without M,G,R   
Gastrointestinal: soft and non-tender; with positive bowel sounds, appetite good. Musculoskeletal: warm without cyanosis. There is no lower extremity edema. Skin:  no jaundice or rashes, right anterior chest wound, right chest tube Neurologic: no gross neuro deficits Psychiatric:  alert and oriented x 3 LINES:   
PIV sites Right chest tube 5/7/19 DRIPS:   None CXR: 5/12/19 noted no PTX on right and still with extensive subcutaneous emphysema which may be slightly less on lower right side today 5/11/19 Small Right sided apical PTX with noted extensive subcutaneous emphysema. 5/10/19 Increase in Right sided PTX with rigth chest tube. Still with extensive subcutaneous emphysema 5/9/19: Stable tiny right apical pneumothorax and right chest tube. Extensive 
subcutaneous emphysema. 5/10/19 Chest CT 5/8/19: 
 
 
LAB No results for input(s): GLUCPOC in the last 72 hours. No lab exists for component: [place] No results for input(s): WBC, HGB, HCT, PLT, INR, HGBEXT, HCTEXT, PLTEXT, HGBEXT, HCTEXT, PLTEXT in the last 72 hours. No lab exists for component: INREXT, INREXT Recent Labs 05/12/19 
6718 05/11/19 
9000 05/10/19 
4096 NA  --  133* 133* K  --  4.5 4.1 CL  --  100 101 CO2  --  25 24 GLU  --  106* 119* BUN  --  17 26* CREA  --  0.57* 0.59* MG 2.3 2.4 2.2 CA  --  7.9* 8.1* No results for input(s): PH, PCO2, PO2, HCO3 in the last 72 hours. No results for input(s): LCAD, LAC in the last 72 hours. Assessment:  (Medical Decision Making) Hospital Problems  Date Reviewed: 5/9/2019 Codes Class Noted POA Shortness of breath ICD-10-CM: R06.02 
ICD-9-CM: 786.05  5/6/2019 Yes Less, on NC 6L for nitrogen wash out * (Principal) Pneumothorax on right ICD-10-CM: J93.9 ICD-9-CM: 512.89  5/6/2019 Yes Tiny right apical, right chest tube to suction Chronic respiratory failure with hypoxia (HCC) (Chronic) ICD-10-CM: J96.11 
ICD-9-CM: 518.83, 799.02  3/29/2016 Yes  Overview Addendum 5/6/2019 10:14 AM by Iwona Ventura NP  
 Continue to wear 2 L nasal cannula at night. May use 2 L supplemental oxygen to maintain sats greater than 90%. chronic Essential hypertension, benign ICD-10-CM: I10 
ICD-9-CM: 401.1  12/10/2015 Yes Elevated today. See below Subcutaneous emphysema ([facility] ICD-10-CM: T79. 7XXA ICD-9-CM: 958.7  5/9/2019 No  
 significant Acute on chronic respiratory failure with hypoxia [facility]) ICD-10-CM: J96.21 
ICD-9-CM: 518.84, 799.02  5/6/2019 Yes  
 remains on NC   
 COPD exacerbation ([facility] ICD-10-CM: J44.1 ICD-9-CM: 491.21  5/6/2019 Yes Continue current--no wheezing Ventricular tachyarrhythmia ([facility] ICD-10-CM: I47.2 ICD-9-CM: 427.1  5/6/2019 Yes Controlled on [place] Pulmonary infiltrates ICD-10-CM: R91.8 ICD-9-CM: 793.19  5/6/2019 Unknown Continue antibiotics VT (ventricular tachycardia) ([facility]) ICD-10-CM: I47.2 ICD-9-CM: 427.1  5/6/2019 Yes Per cardiology Plan:  (Medical Decision Making) --keep chest tube to -30. cxr noted and No PTX today. Marked air leak. Tubing checked and fine. Aware if does not improve will need surgery to see if can stop air leak. Will engage them tomorrow. --keep on 6 LPM oxygen for nitrogen washout for now 
--Albuterol, Mucinex, Singulair 
--Zithromax, Rocephin day 7/7 and cutlures are negative 
--Cardiology called again for V-tach and agree with Amiodarone is back on for now and cardiology feels from lung issue. Coreg also restarted yesterday. --BP today is elevated, will go up on lisinopril to 10. On coreg 3.125 BID as well. .  
--on flonase. --check labs tomorrow --continue remaining treatment. More than 50% of the time documented was spent in face-to-face contact with the patient and in the care of the patient on the floor/unit where the patient is located.  
 
Hollis Cooper MD

## 2019-05-13 ENCOUNTER — APPOINTMENT (OUTPATIENT)
Dept: GENERAL RADIOLOGY | Age: 84
DRG: 163 | End: 2019-05-13
Attending: INTERNAL MEDICINE
Payer: MEDICARE

## 2019-05-13 LAB
ANION GAP SERPL CALC-SCNC: 7 MMOL/L (ref 7–16)
BASOPHILS # BLD: 0 K/UL (ref 0–0.2)
BASOPHILS NFR BLD: 0 % (ref 0–2)
BUN SERPL-MCNC: 21 MG/DL (ref 8–23)
CALCIUM SERPL-MCNC: 7.7 MG/DL (ref 8.3–10.4)
CHLORIDE SERPL-SCNC: 97 MMOL/L (ref 98–107)
CO2 SERPL-SCNC: 26 MMOL/L (ref 21–32)
CREAT SERPL-MCNC: 0.51 MG/DL (ref 0.8–1.5)
DIFFERENTIAL METHOD BLD: ABNORMAL
EOSINOPHIL # BLD: 0 K/UL (ref 0–0.8)
EOSINOPHIL NFR BLD: 0 % (ref 0.5–7.8)
ERYTHROCYTE [DISTWIDTH] IN BLOOD BY AUTOMATED COUNT: 12.9 % (ref 11.9–14.6)
GLUCOSE SERPL-MCNC: 105 MG/DL (ref 65–100)
HCT VFR BLD AUTO: 39.8 % (ref 41.1–50.3)
HGB BLD-MCNC: 13.3 G/DL (ref 13.6–17.2)
IMM GRANULOCYTES # BLD AUTO: 0.1 K/UL (ref 0–0.5)
IMM GRANULOCYTES NFR BLD AUTO: 1 % (ref 0–5)
LYMPHOCYTES # BLD: 1 K/UL (ref 0.5–4.6)
LYMPHOCYTES NFR BLD: 9 % (ref 13–44)
MAGNESIUM SERPL-MCNC: 2.2 MG/DL (ref 1.8–2.4)
MCH RBC QN AUTO: 32.2 PG (ref 26.1–32.9)
MCHC RBC AUTO-ENTMCNC: 33.4 G/DL (ref 31.4–35)
MCV RBC AUTO: 96.4 FL (ref 79.6–97.8)
MONOCYTES # BLD: 1.3 K/UL (ref 0.1–1.3)
MONOCYTES NFR BLD: 11 % (ref 4–12)
NEUTS SEG # BLD: 9.3 K/UL (ref 1.7–8.2)
NEUTS SEG NFR BLD: 79 % (ref 43–78)
NRBC # BLD: 0 K/UL (ref 0–0.2)
PHOSPHATE SERPL-MCNC: 2 MG/DL (ref 2.3–3.7)
PLATELET # BLD AUTO: 193 K/UL (ref 150–450)
PMV BLD AUTO: 9.9 FL (ref 9.4–12.3)
POTASSIUM SERPL-SCNC: 4.1 MMOL/L (ref 3.5–5.1)
RBC # BLD AUTO: 4.13 M/UL (ref 4.23–5.6)
SODIUM SERPL-SCNC: 130 MMOL/L (ref 136–145)
WBC # BLD AUTO: 11.7 K/UL (ref 4.3–11.1)

## 2019-05-13 PROCEDURE — 74011250637 HC RX REV CODE- 250/637: Performed by: FAMILY MEDICINE

## 2019-05-13 PROCEDURE — 36415 COLL VENOUS BLD VENIPUNCTURE: CPT

## 2019-05-13 PROCEDURE — 74011250636 HC RX REV CODE- 250/636: Performed by: INTERNAL MEDICINE

## 2019-05-13 PROCEDURE — 77010033678 HC OXYGEN DAILY

## 2019-05-13 PROCEDURE — 94640 AIRWAY INHALATION TREATMENT: CPT

## 2019-05-13 PROCEDURE — 80048 BASIC METABOLIC PNL TOTAL CA: CPT

## 2019-05-13 PROCEDURE — 71045 X-RAY EXAM CHEST 1 VIEW: CPT

## 2019-05-13 PROCEDURE — 84100 ASSAY OF PHOSPHORUS: CPT

## 2019-05-13 PROCEDURE — 74011250637 HC RX REV CODE- 250/637: Performed by: INTERNAL MEDICINE

## 2019-05-13 PROCEDURE — 83735 ASSAY OF MAGNESIUM: CPT

## 2019-05-13 PROCEDURE — 65660000000 HC RM CCU STEPDOWN

## 2019-05-13 PROCEDURE — 74011000250 HC RX REV CODE- 250: Performed by: INTERNAL MEDICINE

## 2019-05-13 PROCEDURE — 85025 COMPLETE CBC W/AUTO DIFF WBC: CPT

## 2019-05-13 PROCEDURE — 94760 N-INVAS EAR/PLS OXIMETRY 1: CPT

## 2019-05-13 PROCEDURE — 99232 SBSQ HOSP IP/OBS MODERATE 35: CPT | Performed by: INTERNAL MEDICINE

## 2019-05-13 RX ORDER — SODIUM,POTASSIUM PHOSPHATES 280-250MG
2 POWDER IN PACKET (EA) ORAL 2 TIMES DAILY
Status: DISPENSED | OUTPATIENT
Start: 2019-05-13 | End: 2019-05-15

## 2019-05-13 RX ORDER — ACETAMINOPHEN 325 MG/1
650 TABLET ORAL
Status: DISCONTINUED | OUTPATIENT
Start: 2019-05-13 | End: 2019-05-25 | Stop reason: HOSPADM

## 2019-05-13 RX ORDER — OXYCODONE HYDROCHLORIDE 5 MG/1
5 TABLET ORAL
Status: DISCONTINUED | OUTPATIENT
Start: 2019-05-13 | End: 2019-05-18

## 2019-05-13 RX ORDER — DOCUSATE SODIUM 100 MG/1
100 CAPSULE, LIQUID FILLED ORAL 2 TIMES DAILY
Status: DISCONTINUED | OUTPATIENT
Start: 2019-05-13 | End: 2019-05-21

## 2019-05-13 RX ORDER — SENNOSIDES 8.6 MG/1
2 TABLET ORAL
Status: DISCONTINUED | OUTPATIENT
Start: 2019-05-13 | End: 2019-05-21

## 2019-05-13 RX ORDER — ACETAMINOPHEN 500 MG
500 TABLET ORAL EVERY 8 HOURS
Status: DISCONTINUED | OUTPATIENT
Start: 2019-05-13 | End: 2019-05-25 | Stop reason: HOSPADM

## 2019-05-13 RX ADMIN — AMIODARONE HYDROCHLORIDE 400 MG: 200 TABLET ORAL at 20:53

## 2019-05-13 RX ADMIN — METHYLPREDNISOLONE SODIUM SUCCINATE 40 MG: 40 INJECTION, POWDER, FOR SOLUTION INTRAMUSCULAR; INTRAVENOUS at 06:02

## 2019-05-13 RX ADMIN — POTASSIUM & SODIUM PHOSPHATES POWDER PACK 280-160-250 MG 2 PACKET: 280-160-250 PACK at 12:27

## 2019-05-13 RX ADMIN — METHYLPREDNISOLONE SODIUM SUCCINATE 40 MG: 40 INJECTION, POWDER, FOR SOLUTION INTRAMUSCULAR; INTRAVENOUS at 14:56

## 2019-05-13 RX ADMIN — ENOXAPARIN SODIUM 40 MG: 40 INJECTION SUBCUTANEOUS at 14:57

## 2019-05-13 RX ADMIN — FLUTICASONE PROPIONATE 2 SPRAY: 50 SPRAY, METERED NASAL at 09:38

## 2019-05-13 RX ADMIN — METHYLPREDNISOLONE SODIUM SUCCINATE 40 MG: 40 INJECTION, POWDER, FOR SOLUTION INTRAMUSCULAR; INTRAVENOUS at 20:54

## 2019-05-13 RX ADMIN — ASPIRIN 81 MG: 81 TABLET, COATED ORAL at 09:38

## 2019-05-13 RX ADMIN — ALBUTEROL SULFATE 2.5 MG: 2.5 SOLUTION RESPIRATORY (INHALATION) at 19:49

## 2019-05-13 RX ADMIN — Medication 10 ML: at 14:57

## 2019-05-13 RX ADMIN — ALBUTEROL SULFATE 2.5 MG: 2.5 SOLUTION RESPIRATORY (INHALATION) at 14:01

## 2019-05-13 RX ADMIN — Medication 10 ML: at 20:59

## 2019-05-13 RX ADMIN — ALBUTEROL SULFATE 2.5 MG: 2.5 SOLUTION RESPIRATORY (INHALATION) at 01:34

## 2019-05-13 RX ADMIN — GUAIFENESIN 1200 MG: 600 TABLET, EXTENDED RELEASE ORAL at 09:38

## 2019-05-13 RX ADMIN — AMIODARONE HYDROCHLORIDE 400 MG: 200 TABLET ORAL at 09:38

## 2019-05-13 RX ADMIN — MONTELUKAST SODIUM 10 MG: 10 TABLET, FILM COATED ORAL at 09:37

## 2019-05-13 RX ADMIN — LISINOPRIL 10 MG: 5 TABLET ORAL at 09:37

## 2019-05-13 RX ADMIN — POTASSIUM & SODIUM PHOSPHATES POWDER PACK 280-160-250 MG 2 PACKET: 280-160-250 PACK at 18:47

## 2019-05-13 RX ADMIN — OXYCODONE HYDROCHLORIDE 5 MG: 5 TABLET ORAL at 14:54

## 2019-05-13 RX ADMIN — ACETAMINOPHEN 500 MG: 500 TABLET, FILM COATED ORAL at 12:26

## 2019-05-13 RX ADMIN — ACETAMINOPHEN 650 MG: 325 TABLET, FILM COATED ORAL at 06:02

## 2019-05-13 RX ADMIN — CARVEDILOL 3.12 MG: 3.12 TABLET, FILM COATED ORAL at 09:38

## 2019-05-13 RX ADMIN — ALBUTEROL SULFATE 2.5 MG: 2.5 SOLUTION RESPIRATORY (INHALATION) at 07:54

## 2019-05-13 RX ADMIN — Medication 10 ML: at 06:00

## 2019-05-13 RX ADMIN — ACETAMINOPHEN 500 MG: 500 TABLET, FILM COATED ORAL at 20:57

## 2019-05-13 RX ADMIN — CARVEDILOL 3.12 MG: 3.12 TABLET, FILM COATED ORAL at 18:43

## 2019-05-13 RX ADMIN — TAMSULOSIN HYDROCHLORIDE 0.4 MG: 0.4 CAPSULE ORAL at 09:38

## 2019-05-13 NOTE — PROGRESS NOTES
Pt resting in bed call light within reach, even respirations noted,  Pt assist to assist,  unlabored  breathing noted. Haresh Rossi

## 2019-05-13 NOTE — PROGRESS NOTES
Pt received schedule medications and prn pain med Tylenol  650mg,call light within reach , bed on low position and locked, pt able to make needs known , no discomfort noted.

## 2019-05-13 NOTE — PROGRESS NOTES
Nutrition LOS Note: Day 7 Assessment Diet order(s): Regular Food,Nutrition, and Pertinent History: The patient is noted to have a h/o HTN and COPD. He is currently admitted with a pneumothorax and awaiting a VATS with pleurodesis. His appetite is wonderful and he is eating almost all of his meals. He has no questions or concerns regarding food or nutrition at this time. Anthropometrics: Height: 5' 8\" (172.7 cm),  , Weight: 66.2 kg (146 lb), Body mass index is 22.2 kg/m². BMI class of normal weight. Macronutrient Needs: 
· EER:  3030-5521 kcal /day (25-30 kcal/kg listed BW of 66.2 kg) · EPR:  66-83 grams protein/day (1-1.25 grams/kg listed BW) Intake/Comparative Standards:  Average intake for past 7 day(s)/14 recorded meal(s): 90%. This potentially meets ~100% of kcal and ~100% of protein needs Nutrition Diagnosis: No nutrition diagnosis at this time Intervention:  
Meals and snacks: Continue current diet. Discharge nutrition plan: No discharge needs identified [address] [place] [address],   
[phone]

## 2019-05-13 NOTE — PROGRESS NOTES
Alert and oriented  Right side chest tube to 30cm suction and draining serosanguinous fluid  Still with air leak  Dressing to chest tube changed with xeroform gauze and gauze and wide silk tape  Dressing anterior chest changed with xeroform and gauze  Oxygen at 6l via cannula

## 2019-05-13 NOTE — PROGRESS NOTES
Pt received schedule medications and prn tylenol 650mg for pain, call light within reach , bed on low position and locked, pt able to make needs known,no distress noted

## 2019-05-13 NOTE — PROGRESS NOTES
Pulmonary Daily Progress Note: 5/13/2019 Halle Clubs Admission Date: 5/6/2019 The patient's chart is reviewed and the patient is discussed with the staff. 87 y.o. CM presents via EMS with shortness of breath when got up this morning.  Had a productive cough with yellow sputum and received Rocephin.  In the ER CXR with large right pneumothorax and pulmonary consulted for chest tube placement and admission. Maria Isabel Mooring a fall in February and seen by Dr. Alejandra Chowdhury with compression L2,L3, L4. In the ER had VT and cardiology was consulted and added Amiodarone. 
 Right Ure-ashutosh placed on admission 5/6 then 24 [place] chest tube placed 5/7. Was continued on NC 6L for nitrogen wash out. On 5/8 clamped Ure-ashutosh but developed significant subc air and was unclamped. Chest CT obtained showing Ure-ashutosh no long in pleural cavity and was removed. Larger chest tube was pulled back  With follow up CXR much better with only tiny apical pneumothorax. Chest tube still with large leaking. Having V-tachy again and back on amiodarone/coreg. Subjective:  
 
Large air leak, chest tube working and patient stable but there is persistent pneumo on CXR. Surgical team has started evaluation. Son and wife at bedside. Describes pain in right chest as 7/10 Current Facility-Administered Medications Medication Dose Route Frequency  lisinopril (PRINIVIL, ZESTRIL) tablet 10 mg  10 mg Oral DAILY  acetaminophen (TYLENOL) tablet 650 mg  650 mg Oral Q4H PRN  
 fluticasone propionate (FLONASE) 50 mcg/actuation nasal spray 2 Spray  2 Spray Both Nostrils DAILY  amiodarone (CORDARONE) tablet 400 mg  400 mg Oral Q12H  carvedilol (COREG) tablet 3.125 mg  3.125 mg Oral BID WITH MEALS  calcium carbonate (TUMS) chewable tablet 200 mg [elemental]  200 mg Oral TID PRN  
 hydrALAZINE (APRESOLINE) 20 mg/mL injection 10 mg  10 mg IntraVENous Q6H PRN  
 aspirin delayed-release tablet 81 mg  81 mg Oral DAILY  guaiFENesin ER (MUCINEX) tablet 1,200 mg  1,200 mg Oral DAILY  montelukast (SINGULAIR) tablet 10 mg  10 mg Oral DAILY  tamsulosin (FLOMAX) capsule 0.4 mg  0.4 mg Oral DAILY  sodium chloride (NS) flush 5-40 mL  5-40 mL IntraVENous Q8H  
 sodium chloride (NS) flush 5-40 mL  5-40 mL IntraVENous PRN  
 albuterol (PROVENTIL VENTOLIN) nebulizer solution 2.5 mg  2.5 mg Nebulization Q6H RT  
 methylPREDNISolone (PF) (Solu-MEDROL) injection 40 mg  40 mg IntraVENous Q8H  
 enoxaparin (LOVENOX) injection 40 mg  40 mg SubCUTAneous Q24H Review of Systems Constitutional:  negative for fever, chills, sweats HEENT: +sinsus congestion and has seasonal allergies Cardiovascular:  negative for chest pain, palpitations, syncope, edema Respiratory:  Right chest tube, significant subc air Gastrointestinal:  negative for dysphagia, reflux, vomiting, diarrhea, abdominal pain, or melena Neurologic:  negative for focal weakness, numbness, headache Objective:  
 
Vitals:  
 05/13/19 0308 05/13/19 8031 05/13/19 0708 05/13/19 6984 BP: (!) 189/91 165/82 176/88 Pulse: 67 66 (!) 57 Resp: 18  18 Temp: 97.8 °F (36.6 °C)  98 °F (36.7 °C) SpO2: 97%  97% 99% Weight:      
Height:      
 
 
Intake and Output:  
05/11 1901 - 05/13 0700 In: 1160 [P.O.:1160] Out: 2000 [[id number]] 05/13 0701 - 05/13 1900 In: 240 [P.O.:240] Out: 100 [Urine:100] Physical Exam:         
Constitutional:  the patient is well developed and in no acute distress, NC 7L sat 93% EENMT:  Sclera clear, pupils equal, oral mucosa moist 
Respiratory: crepitus over entire chest, right chest tube with marked air leak still Cardiovascular:  RRR without M,G,R   
Gastrointestinal: soft and non-tender; with positive bowel sounds, appetite good. Musculoskeletal: warm without cyanosis. There is no lower extremity edema. Skin:  no jaundice or rashes, right anterior chest wound, right chest tube Neurologic: no gross neuro deficits Psychiatric:  alert and oriented x 3 LINES:   
PIV sites Right chest tube 5/7/19 DRIPS:   None CXR  5/13 CXR: 5/12/19 noted no PTX on right and still with extensive subcutaneous emphysema which may be slightly less on lower right side today 5/11/19 Small Right sided apical PTX with noted extensive subcutaneous emphysema. 5/10/19 Increase in Right sided PTX with rigth chest tube. Still with extensive subcutaneous emphysema 5/9/19: Stable tiny right apical pneumothorax and right chest tube. Extensive 
subcutaneous emphysema. 5/10/19 Chest CT 5/8/19: 
 
 
LAB No results for input(s): GLUCPOC in the last 72 hours. No lab exists for component: [place] Recent Labs 05/13/19 
0720 WBC 11.7* HGB 13.3* HCT 39.8*  
 Recent Labs 05/13/19 
0720 05/12/19 
3893 05/11/19 
3746 *  --  133* K 4.1  --  4.5  
CL 97*  --  100 CO2 26  --  25 *  --  106* BUN 21  --  17  
CREA 0.51*  --  0.57* MG 2.2 2.3 2.4 PHOS 2.0*  --   --   
CA 7.7*  --  7.9* No results for input(s): PH, PCO2, PO2, HCO3 in the last 72 hours. No results for input(s): LCAD, LAC in the last 72 hours. Assessment:  (Medical Decision Making) Hospital Problems  Date Reviewed: 5/9/2019 Codes Class Noted POA Shortness of breath ICD-10-CM: R06.02 
ICD-9-CM: 786.05  5/6/2019 Yes Less, on NC 6L for nitrogen wash out * (Principal) Pneumothorax on right ICD-10-CM: J93.9 ICD-9-CM: 512.89  5/6/2019 Yes Tiny right apical, right chest tube to suction Chronic respiratory failure with hypoxia (HCC) (Chronic) ICD-10-CM: J96.11 
ICD-9-CM: 518.83, 799.02  3/29/2016 Yes Overview Addendum 5/6/2019 10:14 AM by Maikol Javier, NP Continue to wear 2 L nasal cannula at night. May use 2 L supplemental oxygen to maintain sats greater than 90%. chronic Essential hypertension, benign ICD-10-CM: I10 
ICD-9-CM: 401.1  12/10/2015 Yes Elevated today. See below Subcutaneous emphysema ([facility] ICD-10-CM: T79. 7XXA ICD-9-CM: 958.7  5/9/2019 No  
 significant Acute on chronic respiratory failure with hypoxia [facility]) ICD-10-CM: J96.21 
ICD-9-CM: 518.84, 799.02  5/6/2019 Yes  
 remains on NC   
 COPD exacerbation ([facility] ICD-10-CM: J44.1 ICD-9-CM: 491.21  5/6/2019 Yes Continue current--no wheezing Ventricular tachyarrhythmia ([facility] ICD-10-CM: I47.2 ICD-9-CM: 427.1  5/6/2019 Yes Controlled on [place] Pulmonary infiltrates ICD-10-CM: R91.8 ICD-9-CM: 793.19  5/6/2019 Unknown Continue antibiotics VT (ventricular tachycardia) ([facility]) ICD-10-CM: I47.2 ICD-9-CM: 427.1  5/6/2019 Yes Per cardiology Persistent air leak: Chest tube inserted 5/7 with large air leak since. Plan:  (Medical Decision Making) --keep chest tube to -30. cxr noted with large air leak. Surgery team assessing today. --keep on 6 LPM oxygen for nitrogen washout for now 
--Albuterol, Mucinex, Singulair --completed antibiotics 
--Cardiology called again for V-tach and agree with Amiodarone is back on for now and cardiology feels from lung issue. Coreg also restarted yesterday. --pain control:  Scheduled tylenol. Prn oxycodone available. Bowel regimen started. More than 50% of the time documented was spent in face-to-face contact with the patient and in the care of the patient on the floor/unit where the patient is located.  
 
Matias Villaseñor MD

## 2019-05-13 NOTE — CONSULTS
H&P/Consult Note/Progress Note/Office Note:   Al Samuels  MRN: [id number]  WCR:1/42/1339  Age:87 y.o.    HPI: Al Samuels is a 80 y.o. male who has PMHx of AAA, COPD, BPH, and HTN who presented to the ED via EMS on 5/6/19 with sudden onset of SOB, PATE, and cough. While en route via EMS pt had a run of VT. He had no relief with NRB. Sats were in the 80s in the ED. CXR in ED demonstrated complete PTX on the right. He was started on an Amio gtt for repeated runs of VT. Pulmonary placed a Uresil in the ED. This eventually developed subcutaneous emphysema and the Uresil became ineffective. A 24Fr chest tube was placed on 5/7/19. Since then, pt has had a persistent air leak. CXR demonstrates right apical PTX. Surgery was consulted for consideration of pleurodesis. 5/6/19 CXR:  IMPRESSION:     1. Large right-sided tension pneumothorax. This was discussed with emergency  department by Dr. Tootie Fletcher at 9:58 AM on 5/6/2019.     2. Unchanged enlargement of the cardiac silhouette.     3. Chronic interstitial changes throughout the left lung. 5/13/19 CXR:  FINDINGS: A portable AP radiograph of the chest was obtained at 0426 hours. Right apical pneumothorax slightly increased in size. . Chronic diffuse increased  interstitial markings unchanged. . The cardiac and mediastinal contours and  pulmonary vascularity are normal.  Stable subcutaneous emphysema. .      IMPRESSION  IMPRESSION: Slight increase in size right apical pneumothorax. 5/8/19 CT Chest:  IMPRESSION:   1. Moderate size right pneumothorax with pneumomediastinum and extensive  subcutaneous air.   2.  Tip of the right chest tube passes into the posterior mediastinum    Past Medical History:   Diagnosis Date    Abdominal aortic aneurysm ([address] 12/10/2015    In 2005    Abdominal aortic aneurysm without rupture ([address]     Allergic rhinitis 12/10/2015    Asthma     Benign neoplasm     Benign prostatic hyperplasia 12/10/2015    BPH without urinary obstruction     Cardiovascular disease 12/10/2015    Chronic obstructive asthma with exacerbation ([address] 12/10/2015    COPD (chronic obstructive pulmonary disease) (HCC) 12/10/2015    Cough with hemoptysis     Erectile dysfunction 12/10/2015    Essential hypertension, benign 12/10/2015    Fracture 10/2014    of left hand and ribs after fall on concrete    History of colon polyps     Low testosterone 12/10/2015    Lumbago     Memory loss     Overflow incontinence     Raynaud's syndrome 12/10/2015    Rotator cuff tendinitis     Thrombocytopenia (HCC)     Urinary frequency      Past Surgical History:   Procedure Laterality Date    HX CATARACT REMOVAL  6/2016-right    HX COLONOSCOPY      HX FRACTURE TX  10/2014    of left hand and ribs are fall on concrete    HX Meg[place]ide    HX ORTHOPAEDIC  03/2018    hip fracture     Current Facility-Administered Medications   Medication Dose Route Frequency    lisinopril (PRINIVIL, ZESTRIL) tablet 10 mg  10 mg Oral DAILY    acetaminophen (TYLENOL) tablet 650 mg  650 mg Oral Q4H PRN    fluticasone propionate (FLONASE) 50 mcg/actuation nasal spray 2 Spray  2 Spray Both Nostrils DAILY    amiodarone (CORDARONE) tablet 400 mg  400 mg Oral Q12H    carvedilol (COREG) tablet 3.125 mg  3.125 mg Oral BID WITH MEALS    calcium carbonate (TUMS) chewable tablet 200 mg [elemental]  200 mg Oral TID PRN    hydrALAZINE (APRESOLINE) 20 mg/mL injection 10 mg  10 mg IntraVENous Q6H PRN    aspirin delayed-release tablet 81 mg  81 mg Oral DAILY    guaiFENesin ER (MUCINEX) tablet 1,200 mg  1,200 mg Oral DAILY    montelukast (SINGULAIR) tablet 10 mg  10 mg Oral DAILY    tamsulosin (FLOMAX) capsule 0.4 mg  0.4 mg Oral DAILY    sodium chloride (NS) flush 5-40 mL  5-40 mL IntraVENous Q8H    sodium chloride (NS) flush 5-40 mL  5-40 mL IntraVENous PRN    albuterol (PROVENTIL VENTOLIN) nebulizer solution 2.5 mg  2.5 mg Nebulization Q6H RT    methylPREDNISolone (PF) (Solu-MEDROL) injection 40 mg  40 mg IntraVENous Q8H    enoxaparin (LOVENOX) injection 40 mg  40 mg SubCUTAneous Q24H     Patient has no known allergies. Social History     Socioeconomic History    Marital status:      Spouse name: Not on file    Number of children: Not on file    Years of education: Not on file    Highest education level: Not on file   Tobacco Use    Smoking status: Former Smoker    Smokeless tobacco: Never Used   Substance and Sexual Activity    Alcohol use: Yes     Comment: occasional     Social History     Tobacco Use   Smoking Status Former Smoker   Smokeless Tobacco Never Used     Family History   Problem Relation Age of Onset    Dementia Mother     Cancer Father         lung cancer    Heart Attack Father      ROS: The patient has no difficulty with chest pain or shortness of breath. No fever or chills. Comprehensive review of systems was otherwise unremarkable except as noted above. Physical Exam:   Visit Vitals  /88   Pulse (!) 57   Temp 98 °F (36.7 °C)   Resp 18   Ht 5' 8\" (1.727 m)   Wt 146 lb (66.2 kg)   SpO2 99%   BMI 22.20 kg/m²     Constitutional: Alert, oriented, cooperative patient in no acute distress; appears stated age    Eyes:Sclera are clear. EOMs intact  ENMT: no external lesions gross hearing normal; no obvious neck masses, no ear or lip lesions, nares normal  CV: irregular; Normal perfusion  Resp: No JVD. Breathing is  non-labored; crackles in the right base, + rhonchi mid and upper lung fields. Chest tube intact, on suction with air leak. Appropriately tender chest.    GI: soft and non-distended     Musculoskeletal: unremarkable with normal function. No embolic signs or cyanosis.    Neuro:  Oriented; moves all 4; no focal deficits  Psychiatric: normal affect and mood, no memory impairment    Recent vitals (if inpt):  Patient Vitals for the past 24 hrs:   BP Temp Pulse Resp SpO2   05/13/19 0754     99 %   05/13/19 0708 176/88 98 °F (36.7 °C) (!) 57 18 97 %   05/13/19 0358 165/82  66     05/13/19 0348 (!) 189/91 97.8 °F (36.6 °C) 67 18 97 %   05/13/19 0134     98 %   05/12/19 2328 168/85 98.2 °F (36.8 °C) 69 20 91 %   05/12/19 2003 108/65 97.5 °F (36.4 °C) 74 18 95 %   05/12/19 1824     97 %   05/12/19 1518 117/62 97.3 °F (36.3 °C) 74 18 97 %   05/12/19 1425     96 %   05/12/19 1133 138/73 97.4 °F (36.3 °C) 71 19 95 %       Labs:  Recent Labs     05/13/19  0720   WBC 11.7*   HGB 13.3*      *   K 4.1   CL 97*   CO2 26   BUN 21   CREA 0.51*   *       Lab Results   Component Value Date/Time    WBC 11.7 (H) 05/13/2019 07:20 AM    HGB 13.3 (L) 05/13/2019 07:20 AM    PLATELET 411 93/13/3874 07:20 AM    Sodium 130 (L) 05/13/2019 07:20 AM    Potassium 4.1 05/13/2019 07:20 AM    Chloride 97 (L) 05/13/2019 07:20 AM    CO2 26 05/13/2019 07:20 AM    BUN 21 05/13/2019 07:20 AM    Creatinine 0.51 (L) 05/13/2019 07:20 AM    Glucose 105 (H) 05/13/2019 07:20 AM    INR 1.2 02/02/2018 06:34 AM    aPTT 37.5 (H) 02/02/2018 06:34 AM    Bilirubin, total 0.7 05/08/2019 04:10 AM    Bilirubin, direct 0.2 09/21/2010 02:10 PM    AST (SGOT) 25 05/08/2019 04:10 AM    ALT (SGPT) 21 05/08/2019 04:10 AM    Alk. phosphatase 77 05/08/2019 04:10 AM    Troponin-I, Qt. 0.05 02/04/2018 01:04 AM       I reviewed recent labs and recent radiologic studies. I independently reviewed radiology images for studies I described above or studies I have ordered. Admission date (for inpatients): 5/6/2019   6 Days Post-Op  Procedure(s):  CHEST TUBES INSERTION    ASSESSMENT/PLAN:  Problem List  Date Reviewed: 5/9/2019          Codes Class Noted    Subcutaneous emphysema ([facility] ICD-10-CM: T79. 7XXA  ICD-9-CM: 958.7  5/9/2019        * (Principal) Pneumothorax on right ICD-10-CM: J93.9  ICD-9-CM: 512.89  5/6/2019        Shortness of breath ICD-10-CM: R06.02  ICD-9-CM: 786.05  5/6/2019        Acute on chronic respiratory failure with hypoxia ([facility] ICD-10-CM: J96.21  ICD-9-CM: 518.84, 799.02  5/6/2019        COPD exacerbation ([facility] ICD-10-CM: J44.1  ICD-9-CM: 491.21  5/6/2019        Ventricular tachyarrhythmia ([facility] ICD-10-CM: I47.2  ICD-9-CM: 427.1  5/6/2019        Pulmonary infiltrates ICD-10-CM: R91.8  ICD-9-CM: 793.19  5/6/2019        VT (ventricular tachycardia) ([facility] ICD-10-CM: I47.2  ICD-9-CM: 427.1  5/6/2019        Closed compression fracture of lumbosacral spine ([facility] ICD-10-CM: S32.000A  ICD-9-CM: 805.4  12/11/2018        Hip fracture ([facility] ICD-10-CM: S72.009A  ICD-9-CM: 820.8  2/1/2018        SOB (shortness of breath) ICD-10-CM: R06.02  ICD-9-CM: 786.05  3/9/2017    Overview Addendum 12/11/2018 10:09 AM by Leila Bustamante LPN     Last Assessment & Plan:   Stable, 6mwt ordered and reviewed. Management per copd, hypoxia. Last Assessment & Plan:   He presents today with shortness of breath that has worsened over the past 2 weeks which he relates to shallow breathing due to back pain after recent lumbar fractures. His shortness of breath appears to be multifactorial.  He does not appear infected today and I do not believe this is a COPD exacerbation. I have recommended breathing exercises to combat his I will breathing. He does have an incentive spirometer from his previous hospitalization which I have encouraged him to use daily as a reminder to take deep breaths and to prevent pneumonia. We reviewed how to use this device. He expresses great concern about his 30 pound weight loss and dyspnea. His chest x-ray today was negative with no signs of infection or cancer. However, a CT is recommended to rule out malignancy. I have explained that cancer is unlikely due to his hip fracture with sudden loss of appetite then gradual weight loss due to minimal p.o. intake immediately after however will proceed with CT scan.              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 HRCT             COPD (chronic obstructive pulmonary disease) with emphysema ([facility] ICD-10-CM: J43.9  ICD-9-CM: 492.8  10/17/2016    Overview Signed 12/11/2018 10:09 AM by Marj Mckeon LPN     Last Assessment & Plan:   Not currently on any maintence medication regimen for COPD as he felt them to be unhelpful in the past.  I recommended a trial of a ANoro Ellipta which he should take 1 inhalation daily. Demonstration was completed on the correct method of administration and he was able to return demonstration independently in the office today and administer his first dose. I have given him a 2-week sample which he will use daily and monitor if he notes any improvement in his breathing. He may also try pretreating himself with Ventolin before exertion to see if this offers any relief. Chronic respiratory failure with hypoxia (HCC) (Chronic) ICD-10-CM: J96.11  ICD-9-CM: 518.83, 799.02  3/29/2016    Overview Addendum 5/6/2019 10:14 AM by Clari Berrios, NP     Continue to wear 2 L nasal cannula at night. May use 2 L supplemental oxygen to maintain sats greater than 90%. [place] ICD-10-CM: I49.9  ICD-9-CM: 427.89  3/28/2016    Overview Addendum 12/11/2018 10:09 AM by Marj Mckeon LPN     Overview:   Reported by M.D. 5 physician. Last Assessment & Plan:   EKG not done. I placed the patient on telemetry today and is having fairly frequent PVCs. We'll check BMP and magnesium. Overview:   Reported by M.D. 5 physician. Last Assessment & Plan:   EKG not done. I placed the patient on telemetry today and is having fairly frequent PVCs. We'll check BMP and magnesium.              Abdominal aortic aneurysm without rupture ([facility] ICD-10-CM: I71.4  ICD-9-CM: 441.4  12/10/2015    Overview Addendum 12/11/2018 10:09 AM by Wayne Kirkland LPN     In 4844             Benign prostatic hyperplasia ICD-10-CM: N40.0  ICD-9-CM: 600.00  12/10/2015        Cardiovascular disease ICD-10-CM: I25.10  ICD-9-CM: 429.2  12/10/2015        COPD (chronic obstructive pulmonary disease) ([facility] ICD-10-CM: J44.9  ICD-9-CM: 496  12/10/2015        Low testosterone ICD-10-CM: R79.89  ICD-9-CM: 790.99  12/10/2015        Essential hypertension, benign ICD-10-CM: I10  ICD-9-CM: 401.1  12/10/2015        Allergic rhinitis ICD-10-CM: J30.9  ICD-9-CM: 477.9  12/10/2015        Raynaud's syndrome ICD-10-CM: I73.00  ICD-9-CM: 443.0  12/10/2015        Erectile dysfunction ICD-10-CM: N52.9  ICD-9-CM: 607.84  12/10/2015            Principal Problem:    Pneumothorax on right (5/6/2019)    Active Problems:    Essential hypertension, benign (12/10/2015)      Chronic respiratory failure with hypoxia ([address] (3/29/2016)      Overview: Continue to wear 2 L nasal cannula at night. May use 2 L supplemental       oxygen to maintain sats greater than 90%.       Shortness of breath (5/6/2019)      Acute on chronic respiratory failure with hypoxia (HCC) (5/6/2019)      COPD exacerbation ([address] (5/6/2019)      Ventricular tachyarrhythmia ([address] (5/6/2019)      Pulmonary infiltrates (5/6/2019)      VT (ventricular tachycardia) ([address] (5/6/2019)      Subcutaneous emphysema ([address] (5/9/2019)       Plan:  Agreeable to VATS with pleurodesis  Will look for OR time  Will need to hold ASA/Lovenox prior to surgery  Continue chest tube to suction  Continue current care    Signed:  CHRISTIANO Aguilera

## 2019-05-13 NOTE — PROGRESS NOTES
Pt bedside report received from Jina García, pt resting in bed  watching Tv, assessment completed ,  pt AxOx4 bed on low and locked position with floor free of objects,  call light within reach, pt on oxygen  @6L NC, respirations even and non labored, pt verbalized understanding to call for needs,  denies pain, no distress noted.

## 2019-05-14 ENCOUNTER — ANESTHESIA EVENT (OUTPATIENT)
Dept: SURGERY | Age: 84
DRG: 163 | End: 2019-05-14
Payer: MEDICARE

## 2019-05-14 ENCOUNTER — APPOINTMENT (OUTPATIENT)
Dept: GENERAL RADIOLOGY | Age: 84
DRG: 163 | End: 2019-05-14
Attending: INTERNAL MEDICINE
Payer: MEDICARE

## 2019-05-14 PROCEDURE — 74011000250 HC RX REV CODE- 250: Performed by: INTERNAL MEDICINE

## 2019-05-14 PROCEDURE — 74011250637 HC RX REV CODE- 250/637: Performed by: INTERNAL MEDICINE

## 2019-05-14 PROCEDURE — 97535 SELF CARE MNGMENT TRAINING: CPT

## 2019-05-14 PROCEDURE — 97110 THERAPEUTIC EXERCISES: CPT

## 2019-05-14 PROCEDURE — 71045 X-RAY EXAM CHEST 1 VIEW: CPT

## 2019-05-14 PROCEDURE — 99231 SBSQ HOSP IP/OBS SF/LOW 25: CPT | Performed by: INTERNAL MEDICINE

## 2019-05-14 PROCEDURE — 65660000000 HC RM CCU STEPDOWN

## 2019-05-14 PROCEDURE — 94760 N-INVAS EAR/PLS OXIMETRY 1: CPT

## 2019-05-14 PROCEDURE — 97166 OT EVAL MOD COMPLEX 45 MIN: CPT

## 2019-05-14 PROCEDURE — 77010033678 HC OXYGEN DAILY

## 2019-05-14 PROCEDURE — 74011250636 HC RX REV CODE- 250/636: Performed by: INTERNAL MEDICINE

## 2019-05-14 PROCEDURE — 97161 PT EVAL LOW COMPLEX 20 MIN: CPT

## 2019-05-14 PROCEDURE — 94640 AIRWAY INHALATION TREATMENT: CPT

## 2019-05-14 RX ADMIN — SENNOSIDES 17.2 MG: 8.6 TABLET, FILM COATED ORAL at 21:14

## 2019-05-14 RX ADMIN — Medication 5 ML: at 21:17

## 2019-05-14 RX ADMIN — GUAIFENESIN 1200 MG: 600 TABLET, EXTENDED RELEASE ORAL at 08:38

## 2019-05-14 RX ADMIN — MONTELUKAST SODIUM 10 MG: 10 TABLET, FILM COATED ORAL at 08:39

## 2019-05-14 RX ADMIN — TAMSULOSIN HYDROCHLORIDE 0.4 MG: 0.4 CAPSULE ORAL at 08:38

## 2019-05-14 RX ADMIN — METHYLPREDNISOLONE SODIUM SUCCINATE 40 MG: 40 INJECTION, POWDER, FOR SOLUTION INTRAMUSCULAR; INTRAVENOUS at 21:17

## 2019-05-14 RX ADMIN — ALBUTEROL SULFATE 2.5 MG: 2.5 SOLUTION RESPIRATORY (INHALATION) at 13:36

## 2019-05-14 RX ADMIN — ALBUTEROL SULFATE 2.5 MG: 2.5 SOLUTION RESPIRATORY (INHALATION) at 20:08

## 2019-05-14 RX ADMIN — Medication 10 ML: at 05:39

## 2019-05-14 RX ADMIN — ACETAMINOPHEN 500 MG: 500 TABLET, FILM COATED ORAL at 05:38

## 2019-05-14 RX ADMIN — ACETAMINOPHEN 500 MG: 500 TABLET, FILM COATED ORAL at 21:14

## 2019-05-14 RX ADMIN — CARVEDILOL 3.12 MG: 3.12 TABLET, FILM COATED ORAL at 17:57

## 2019-05-14 RX ADMIN — ENOXAPARIN SODIUM 40 MG: 40 INJECTION SUBCUTANEOUS at 12:14

## 2019-05-14 RX ADMIN — AMIODARONE HYDROCHLORIDE 400 MG: 200 TABLET ORAL at 21:14

## 2019-05-14 RX ADMIN — ACETAMINOPHEN 500 MG: 500 TABLET, FILM COATED ORAL at 14:45

## 2019-05-14 RX ADMIN — AMIODARONE HYDROCHLORIDE 400 MG: 200 TABLET ORAL at 08:38

## 2019-05-14 RX ADMIN — METHYLPREDNISOLONE SODIUM SUCCINATE 40 MG: 40 INJECTION, POWDER, FOR SOLUTION INTRAMUSCULAR; INTRAVENOUS at 05:38

## 2019-05-14 RX ADMIN — CARVEDILOL 3.12 MG: 3.12 TABLET, FILM COATED ORAL at 08:39

## 2019-05-14 RX ADMIN — Medication 10 ML: at 14:46

## 2019-05-14 RX ADMIN — METHYLPREDNISOLONE SODIUM SUCCINATE 40 MG: 40 INJECTION, POWDER, FOR SOLUTION INTRAMUSCULAR; INTRAVENOUS at 14:45

## 2019-05-14 RX ADMIN — ASPIRIN 81 MG: 81 TABLET, COATED ORAL at 08:38

## 2019-05-14 RX ADMIN — ALBUTEROL SULFATE 2.5 MG: 2.5 SOLUTION RESPIRATORY (INHALATION) at 08:55

## 2019-05-14 RX ADMIN — ALBUTEROL SULFATE 2.5 MG: 2.5 SOLUTION RESPIRATORY (INHALATION) at 01:51

## 2019-05-14 RX ADMIN — LISINOPRIL 10 MG: 5 TABLET ORAL at 08:38

## 2019-05-14 RX ADMIN — FLUTICASONE PROPIONATE 2 SPRAY: 50 SPRAY, METERED NASAL at 08:39

## 2019-05-14 RX ADMIN — POTASSIUM & SODIUM PHOSPHATES POWDER PACK 280-160-250 MG 2 PACKET: 280-160-250 PACK at 08:38

## 2019-05-14 NOTE — PROGRESS NOTES
End of shift report given oncoming nurse , pt resting in bed, no c/o pain , call light within reach, bed locked and low position, pt respirations even and unlabored, no distress noted.

## 2019-05-14 NOTE — PROGRESS NOTES
Pulmonary Daily Progress Note: 5/14/2019 Vinod Fleet Admission Date: 5/6/2019 The patient's chart is reviewed and the patient is discussed with the staff. 87 y.o. CM presents via EMS with shortness of breath when got up this morning.  Had a productive cough with yellow sputum and received Rocephin.  In the ER CXR with large right pneumothorax and pulmonary consulted for chest tube placement and admission. Haley Marquezn a fall in February and seen by Dr. Los Gregory with compression L2,L3, L4. In the ER had VT and cardiology was consulted and added Amiodarone. 
 Right Ure-ashutosh placed on admission 5/6 then 24 Turkish chest tube placed 5/7. Was continued on NC 6L for nitrogen wash out. On 5/8 clamped Ure-ashutosh but developed significant subc air and was unclamped. Chest CT obtained showing Ure-ashutosh no long in pleural cavity and was removed. Larger chest tube was pulled back  With follow up CXR much better with only tiny apical pneumothorax. Chest tube still with large leaking. Having V-tachy again and back on amiodarone/coreg. Subjective:  
 
Patient more comfortable but still has considerable air leak. Surgery planned Thursday. Patient reluctant to consider rehab post op but is motivated to do PT when able Current Facility-Administered Medications Medication Dose Route Frequency  potassium, sodium phosphates (NEUTRA-PHOS) packet 2 Packet  2 Packet Oral BID  acetaminophen (TYLENOL) tablet 500 mg  500 mg Oral Q8H  
 acetaminophen (TYLENOL) tablet 650 mg  650 mg Oral Q4H PRN  
 oxyCODONE IR (ROXICODONE) tablet 5 mg  5 mg Oral Q4H PRN  
 docusate sodium (COLACE) capsule 100 mg  100 mg Oral BID  senna (SENOKOT) tablet 17.2 mg  2 Tab Oral QHS  lisinopril (PRINIVIL, ZESTRIL) tablet 10 mg  10 mg Oral DAILY  fluticasone propionate (FLONASE) 50 mcg/actuation nasal spray 2 Spray  2 Spray Both Nostrils DAILY  amiodarone (CORDARONE) tablet 400 mg  400 mg Oral Q12H  carvedilol (COREG) tablet 3.125 mg  3.125 mg Oral BID WITH MEALS  calcium carbonate (TUMS) chewable tablet 200 mg [elemental]  200 mg Oral TID PRN  
 hydrALAZINE (APRESOLINE) 20 mg/mL injection 10 mg  10 mg IntraVENous Q6H PRN  
 aspirin delayed-release tablet 81 mg  81 mg Oral DAILY  guaiFENesin ER (MUCINEX) tablet 1,200 mg  1,200 mg Oral DAILY  montelukast (SINGULAIR) tablet 10 mg  10 mg Oral DAILY  tamsulosin (FLOMAX) capsule 0.4 mg  0.4 mg Oral DAILY  sodium chloride (NS) flush 5-40 mL  5-40 mL IntraVENous Q8H  
 sodium chloride (NS) flush 5-40 mL  5-40 mL IntraVENous PRN  
 albuterol (PROVENTIL VENTOLIN) nebulizer solution 2.5 mg  2.5 mg Nebulization Q6H RT  
 methylPREDNISolone (PF) (Solu-MEDROL) injection 40 mg  40 mg IntraVENous Q8H  
 enoxaparin (LOVENOX) injection 40 mg  40 mg SubCUTAneous Q24H Review of Systems Constitutional:  negative for fever, chills, sweats HEENT: +sinsus congestion and has seasonal allergies Cardiovascular:  negative for chest pain, palpitations, syncope, edema Respiratory:  Right chest tube, significant subc air Gastrointestinal:  negative for dysphagia, reflux, vomiting, diarrhea, abdominal pain, or melena Neurologic:  negative for focal weakness, numbness, headache Objective:  
 
Vitals:  
 05/14/19 0151 05/14/19 8867 05/14/19 5541 05/14/19 8730 BP:  171/76 161/77 Pulse:  61 61 Resp:  20 18 Temp:  98 °F (36.7 °C) 98.6 °F (37 °C) SpO2: 93% 97% 98% 94% Weight:      
Height:      
 
 
Intake and Output:  
05/12 1901 - 05/14 0700 In: 840 [P.O.:840] Out: 0547 Epifanio Skains No intake/output data recorded. Physical Exam:         
Constitutional:  the patient is well developed and in no acute distress, NC  6lpm 
EENMT:  Sclera clear, pupils equal, oral mucosa moist 
Respiratory: crepitus over entire chest, right chest tube with marked air leak still Cardiovascular:  RRR without M,G,R   
 Gastrointestinal: soft and non-tender; with positive bowel sounds, appetite good. Musculoskeletal: warm without cyanosis. There is no lower extremity edema. Skin:  no jaundice or rashes, right anterior chest wound, right chest tube Neurologic: no gross neuro deficits Psychiatric:  alert and oriented x 3 LINES:   
PIV sites Right chest tube 5/7/19 DRIPS:   None CXR  5/14 5/10/19 Chest CT 5/8/19: 
 
 
LAB No results for input(s): GLUCPOC in the last 72 hours. No lab exists for component: [place] Recent Labs 05/13/19 
0720 WBC 11.7* HGB 13.3* HCT 39.8*  
 Recent Labs 05/13/19 
0720 05/12/19 
9946 *  --   
K 4.1  --   
CL 97*  --   
CO2 26  --   
*  --   
BUN 21  --   
CREA 0.51*  --   
MG 2.2 2.3 PHOS 2.0*  --   
CA 7.7*  -- No results for input(s): PH, PCO2, PO2, HCO3 in the last 72 hours. No results for input(s): LCAD, LAC in the last 72 hours. Assessment:  (Medical Decision Making) Hospital Problems  Date Reviewed: 5/9/2019 Codes Class Noted POA Shortness of breath ICD-10-CM: R06.02 
ICD-9-CM: 786.05  5/6/2019 Yes Less, on NC 6L for nitrogen wash out * (Principal) Pneumothorax on right ICD-10-CM: J93.9 ICD-9-CM: 512.89  5/6/2019 Yes Tiny right apical, right chest tube to suction Chronic respiratory failure with hypoxia (HCC) (Chronic) ICD-10-CM: J96.11 
ICD-9-CM: 518.83, 799.02  3/29/2016 Yes Overview Addendum 5/6/2019 10:14 AM by Margarito Salgado, NP Continue to wear 2 L nasal cannula at night. May use 2 L supplemental oxygen to maintain sats greater than 90%. chronic Essential hypertension, benign ICD-10-CM: I10 
ICD-9-CM: 401.1  12/10/2015 Yes Elevated today. See below Subcutaneous emphysema ([address] ICD-10-CM: T79. 7XXA ICD-9-CM: 958.7  5/9/2019 No  
 significant Acute on chronic respiratory failure with hypoxia [facility]) ICD-10-CM: J96.21 
ICD-9-CM: 518.84, 799.02  5/6/2019 Yes remains on NC   
 COPD exacerbation ([facility] ICD-10-CM: J44.1 ICD-9-CM: 491.21  5/6/2019 Yes Continue current--no wheezing Ventricular tachyarrhythmia ([facility] ICD-10-CM: I47.2 ICD-9-CM: 427.1  5/6/2019 Yes Controlled on Amio Pulmonary infiltrates ICD-10-CM: R91.8 ICD-9-CM: 793.19  5/6/2019 Unknown Continue antibiotics VT (ventricular tachycardia) ([facility]) ICD-10-CM: I47.2 ICD-9-CM: 427.1  5/6/2019 Yes Per cardiology Persistent air leak: Chest tube inserted 5/7 with large air leak since. Plan:  (Medical Decision Making) --planning for surgery Thursday 
--continue tylenol and prn narcotic for pain control 
--monitor respiratory status closely. More than 50% of the time documented was spent in face-to-face contact with the patient and in the care of the patient on the floor/unit where the patient is located.  
 
Brandy Membreno MD

## 2019-05-14 NOTE — PROGRESS NOTES
H&P/Consult Note/Progress Note/Office Note:  
Roe Collet  MRN: [id number]  GWQ:[birth year]  Age:87 y.o. 
 
HPI: Roe Collet is a 80 y.o. male who has PMHx of AAA, COPD, BPH, and HTN who presented to the ED via EMS on 5/6/19 with sudden onset of SOB, PATE, and cough. While en route via EMS pt had a run of VT. He had no relief with NRB. Sats were in the 80s in the ED. CXR in ED demonstrated complete PTX on the right. He was started on an Amio gtt for repeated runs of VT. Pulmonary placed a Uresil in the ED. This eventually developed subcutaneous emphysema and the Uresil became ineffective. A 24Fr chest tube was placed on 5/7/19. Since then, pt has had a persistent air leak. CXR demonstrates right apical PTX. Surgery was consulted for consideration of pleurodesis. 5/14/19: Awake in bed, no complaints. CT with +air leak, 150mL/24h serosang output. CXR improved this AM. Past Medical History:  
Diagnosis Date  Abdominal aortic aneurysm ([address] 12/10/2015 In 2005  Abdominal aortic aneurysm without rupture ([address]  Allergic rhinitis 12/10/2015  Asthma  Benign neoplasm  Benign prostatic hyperplasia 12/10/2015  BPH without urinary obstruction  Cardiovascular disease 12/10/2015  Chronic obstructive asthma with exacerbation ([address] 12/10/2015  COPD (chronic obstructive pulmonary disease) ([address] 12/10/2015  Cough with hemoptysis  Erectile dysfunction 12/10/2015  Essential hypertension, benign 12/10/2015  Fracture 10/2014  
 of left hand and ribs after fall on concrete  History of colon polyps  Low testosterone 12/10/2015  Lumbago  Memory loss  Overflow incontinence  Raynaud's syndrome 12/10/2015  Rotator cuff tendinitis  Thrombocytopenia ([address]  Urinary frequency Past Surgical History:  
Procedure Laterality Date  HX CATARACT REMOVAL  6/2016-right  HX COLONOSCOPY    
 HX FRACTURE TX  10/2014 of left hand and ribs are fall on concrete [address]  HX ORTHOPAEDIC  03/2018  
 hip fracture Current Facility-Administered Medications Medication Dose Route Frequency  potassium, sodium phosphates (NEUTRA-PHOS) packet 2 Packet  2 Packet Oral BID  acetaminophen (TYLENOL) tablet 500 mg  500 mg Oral Q8H  
 acetaminophen (TYLENOL) tablet 650 mg  650 mg Oral Q4H PRN  
 oxyCODONE IR (ROXICODONE) tablet 5 mg  5 mg Oral Q4H PRN  
 docusate sodium (COLACE) capsule 100 mg  100 mg Oral BID  senna (SENOKOT) tablet 17.2 mg  2 Tab Oral QHS  lisinopril (PRINIVIL, ZESTRIL) tablet 10 mg  10 mg Oral DAILY  fluticasone propionate (FLONASE) 50 mcg/actuation nasal spray 2 Spray  2 Spray Both Nostrils DAILY  amiodarone (CORDARONE) tablet 400 mg  400 mg Oral Q12H  carvedilol (COREG) tablet 3.125 mg  3.125 mg Oral BID WITH MEALS  calcium carbonate (TUMS) chewable tablet 200 mg [elemental]  200 mg Oral TID PRN  
 hydrALAZINE (APRESOLINE) 20 mg/mL injection 10 mg  10 mg IntraVENous Q6H PRN  
 aspirin delayed-release tablet 81 mg  81 mg Oral DAILY  guaiFENesin ER (MUCINEX) tablet 1,200 mg  1,200 mg Oral DAILY  montelukast (SINGULAIR) tablet 10 mg  10 mg Oral DAILY  tamsulosin (FLOMAX) capsule 0.4 mg  0.4 mg Oral DAILY  sodium chloride (NS) flush 5-40 mL  5-40 mL IntraVENous Q8H  
 sodium chloride (NS) flush 5-40 mL  5-40 mL IntraVENous PRN  
 albuterol (PROVENTIL VENTOLIN) nebulizer solution 2.5 mg  2.5 mg Nebulization Q6H RT  
 methylPREDNISolone (PF) (Solu-MEDROL) injection 40 mg  40 mg IntraVENous Q8H  
 enoxaparin (LOVENOX) injection 40 mg  40 mg SubCUTAneous Q24H Patient has no known allergies. Social History Socioeconomic History  Marital status:  Spouse name: Not on file  Number of children: Not on file  Years of education: Not on file  Highest education level: Not on file Tobacco Use  Smoking status: Former Smoker  Smokeless tobacco: Never Used Substance and Sexual Activity  Alcohol use: Yes Comment: occasional  
 
Social History Tobacco Use Smoking Status Former Smoker Smokeless Tobacco Never Used Family History Problem Relation Age of Onset  Dementia Mother  Cancer Father   
     lung cancer  Heart Attack Father ROS: The patient has no difficulty with chest pain or shortness of breath. No fever or chills. Comprehensive review of systems was otherwise unremarkable except as noted above. Physical Exam:  
Visit Vitals /77 Pulse 61 Temp 98.6 °F (37 °C) Resp 18 Ht 5' 8\" (1.727 m) Wt 146 lb (66.2 kg) SpO2 94% BMI 22.20 kg/m² Constitutional: Alert, oriented, cooperative patient in no acute distress; appears stated age Eyes:Sclera are clear. EOMs intact ENMT: no external lesions gross hearing normal; no obvious neck masses, no ear or lip lesions, nares normal 
CV: irregular; Normal perfusion Resp: No JVD. Breathing is  non-labored; crackles in the right base, + rhonchi mid and upper lung fields. Chest tube intact, on suction with air leak. Appropriately tender chest.   
GI: soft and non-distended Musculoskeletal: unremarkable with normal function. No embolic signs or cyanosis. Neuro:  Oriented; moves all 4; no focal deficits Psychiatric: normal affect and mood, no memory impairment Recent vitals (if inpt): 
Patient Vitals for the past 24 hrs: 
 BP Temp Pulse Resp SpO2  
05/14/19 0855     94 % 05/14/19 0744 161/77 98.6 °F (37 °C) 61 18 98 % 05/14/19 0328 171/76 98 °F (36.7 °C) 61 20 97 % 05/14/19 0151     93 % 05/13/19 2321 155/75 98.3 °F (36.8 °C) 63 18 99 % 05/13/19 1949     97 % 05/1934 156/69 97.6 °F (36.4 °C) 69 18 97 % 05/13/19 1536     96 % 05/13/19 1514 129/76 98 °F (36.7 °C) 61 18 95 % 05/13/19 1401     98 % 05/13/19 1138 151/84 98.1 °F (36.7 °C) 60 18 99 % Labs: 
Recent Labs 05/13/19 
0720 WBC 11.7* HGB 13.3*  
 *  
K 4.1 CL 97* CO2 26 BUN 21  
CREA 0.51* * Lab Results Component Value Date/Time WBC 11.7 (H) 05/13/2019 07:20 AM  
 HGB 13.3 (L) 05/13/2019 07:20 AM  
 PLATELET 074 86/93/7966 07:20 AM  
 Sodium 130 (L) 05/13/2019 07:20 AM  
 Potassium 4.1 05/13/2019 07:20 AM  
 Chloride 97 (L) 05/13/2019 07:20 AM  
 CO2 26 05/13/2019 07:20 AM  
 BUN 21 05/13/2019 07:20 AM  
 Creatinine 0.51 (L) 05/13/2019 07:20 AM  
 Glucose 105 (H) 05/13/2019 07:20 AM  
 INR 1.2 02/02/2018 06:34 AM  
 aPTT 37.5 (H) 02/02/2018 06:34 AM  
 Bilirubin, total 0.7 05/08/2019 04:10 AM  
 Bilirubin, direct 0.2 09/21/2010 02:10 PM  
 AST (SGOT) 25 05/08/2019 04:10 AM  
 ALT (SGPT) 21 05/08/2019 04:10 AM  
 Alk. phosphatase 77 05/08/2019 04:10 AM  
 Troponin-I, Qt. 0.05 02/04/2018 01:04 AM  
 
 
I reviewed recent labs and recent radiologic studies. 5/6/19 CXR: 
IMPRESSION: 
  
1. Large right-sided tension pneumothorax. This was discussed with emergency department by Dr. Marilee Eric at 9:58 AM on 5/6/2019. 
  
2. Unchanged enlargement of the cardiac silhouette. 
  
3. Chronic interstitial changes throughout the left lung. 5/13/19 CXR: 
FINDINGS: A portable AP radiograph of the chest was obtained at 0426 hours. Right apical pneumothorax slightly increased in size. . Chronic diffuse increased 
interstitial markings unchanged. . The cardiac and mediastinal contours and 
pulmonary vascularity are normal.  Stable subcutaneous emphysema. .  
  
IMPRESSION IMPRESSION: Slight increase in size right apical pneumothorax. 5/8/19 CT Chest: 
IMPRESSION:  
1. Moderate size right pneumothorax with pneumomediastinum and extensive 
subcutaneous air. 2.  Tip of the right chest tube passes into the posterior mediastinum XR Results (most recent): 
Results from Hospital Encounter encounter on 05/06/19 XR CHEST PORT Narrative EXAM: TEMPORARY INDICATION: Pneumothorax COMPARISON: 5/13/2019 FINDINGS: A portable AP radiograph of the chest was obtained at 0505 hours. Decrease in size of right apical pneumothorax. Diffuse increased interstitial 
markings unchanged. Subcutaneous emphysema unchanged. The cardiac and 
mediastinal contours and pulmonary vascularity are normal.  The bones and soft 
tissues are grossly within normal limits. Right chest tube remains in place. Impression IMPRESSION: Decreased size of right apical pneumothorax. I independently reviewed radiology images for studies I described above or studies I have ordered. Admission date (for inpatients): 5/6/2019  
6 Days Post-Op  Procedure(s): CHEST TUBES INSERTION 
 
ASSESSMENT/PLAN: 
Problem List  Date Reviewed: 5/9/2019 Codes Class Noted Subcutaneous emphysema ([facility] ICD-10-CM: T79. 7XXA ICD-9-CM: 958.7  5/9/2019 * (Principal) Pneumothorax on right ICD-10-CM: J93.9 ICD-9-CM: 512.89  5/6/2019 Shortness of breath ICD-10-CM: R06.02 
ICD-9-CM: 786.05  5/6/2019 Acute on chronic respiratory failure with hypoxia [facility]) ICD-10-CM: J96.21 
ICD-9-CM: 518.84, 799.02  5/6/2019 COPD exacerbation ([facility] ICD-10-CM: J44.1 ICD-9-CM: 491.21  5/6/2019 Ventricular tachyarrhythmia ([facility] ICD-10-CM: I47.2 ICD-9-CM: 427.1  5/6/2019 Pulmonary infiltrates ICD-10-CM: R91.8 ICD-9-CM: 793.19  5/6/2019 VT (ventricular tachycardia) (HCC) ICD-10-CM: I47.2 ICD-9-CM: 427.1  5/6/2019 Closed compression fracture of lumbosacral spine (HCC) ICD-10-CM: S32.000A ICD-9-CM: 805.4  12/11/2018 Hip fracture ([facility] ICD-10-CM: H16.642P ICD-9-CM: 820.8  2/1/2018 SOB (shortness of breath) ICD-10-CM: R06.02 
ICD-9-CM: 786.05  3/9/2017 Overview Addendum 12/11/2018 10:09 AM by Yoselin Back LPN Last Assessment & Plan:  
Stable, 6mwt ordered and reviewed. Management per copd, hypoxia. Last Assessment & Plan: He presents today with shortness of breath that has worsened over the past 2 weeks which he relates to shallow breathing due to back pain after recent lumbar fractures. His shortness of breath appears to be multifactorial.  He does not appear infected today and I do not believe this is a COPD exacerbation. I have recommended breathing exercises to combat his I will breathing. He does have an incentive spirometer from his previous hospitalization which I have encouraged him to use daily as a reminder to take deep breaths and to prevent pneumonia. We reviewed how to use this device. He expresses great concern about his 30 pound weight loss and dyspnea. His chest x-ray today was negative with no signs of infection or cancer. However, a CT is recommended to rule out malignancy. I have explained that cancer is unlikely due to his hip fracture with sudden loss of appetite then gradual weight loss due to minimal p.o. intake immediately after however will proceed with CT scan. 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 HRCT 
  
  
   
 COPD (chronic obstructive pulmonary disease) with emphysema (HCC) ICD-10-CM: J43.9 ICD-9-CM: 492.8  10/17/2016 Overview Signed 12/11/2018 10:09 AM by Laurie Ratliff LPN Last Assessment & Plan:  
Not currently on any maintence medication regimen for COPD as he felt them to be unhelpful in the past.  I recommended a trial of a ANoro Ellipta which he should take 1 inhalation daily.   Demonstration was completed on the correct method of administration and he was able to return demonstration independently in the office today and administer his first dose.  I have given him a 2-week sample which he will use daily and monitor if he notes any improvement in his breathing. He may also try pretreating himself with Ventolin before exertion to see if this offers any relief. Chronic respiratory failure with hypoxia (HCC) (Chronic) ICD-10-CM: J96.11 
ICD-9-CM: 518.83, 799.02  3/29/2016 Overview Addendum 5/6/2019 10:14 AM by Reino Gilford, NP Continue to wear 2 L nasal cannula at night. May use 2 L supplemental oxygen to maintain sats greater than 90%. [place] ICD-10-CM: I49.9 ICD-9-CM: 427.89  3/28/2016 Overview Addendum 12/11/2018 10:09 AM by Dandy Amor LPN Overview:  
Reported by LIN [place] physician. Last Assessment & Plan:  
EKG not done. I placed the patient on telemetry today and is having fairly frequent PVCs. We'll check BMP and magnesium. Overview:  
Reported by LIN Gutierrez physician. Last Assessment & Plan:  
EKG not done. I placed the patient on telemetry today and is having fairly frequent PVCs. We'll check BMP and magnesium. Abdominal aortic aneurysm without rupture (HCC) ICD-10-CM: I71.4 ICD-9-CM: 441.4  12/10/2015 Overview Addendum 12/11/2018 10:09 AM by Dandy Amor LPN In 2005 Benign prostatic hyperplasia ICD-10-CM: N40.0 ICD-9-CM: 600.00  12/10/2015 Cardiovascular disease ICD-10-CM: I25.10 ICD-9-CM: 429.2  12/10/2015 COPD (chronic obstructive pulmonary disease) (HCC) ICD-10-CM: J44.9 ICD-9-CM: 201  12/10/2015 Low testosterone ICD-10-CM: R79.89 ICD-9-CM: 790.99  12/10/2015 Essential hypertension, benign ICD-10-CM: I10 
ICD-9-CM: 401.1  12/10/2015 Allergic rhinitis ICD-10-CM: J30.9 ICD-9-CM: 477.9  12/10/2015 Raynaud's syndrome ICD-10-CM: I73.00 ICD-9-CM: 443.0  12/10/2015 Erectile dysfunction ICD-10-CM: N52.9 ICD-9-CM: 607.84  12/10/2015 Principal Problem: 
  Pneumothorax on right (5/6/2019) Active Problems: 
  Essential hypertension, benign (12/10/2015) Chronic respiratory failure with hypoxia ([address] (3/29/2016) Overview: Continue to wear 2 L nasal cannula at night. May use 2 L supplemental  
    oxygen to maintain sats greater than 90%. Shortness of breath (5/6/2019) Acute on chronic respiratory failure with hypoxia ([address] (5/6/2019) COPD exacerbation ([address] (5/6/2019) Ventricular tachyarrhythmia ([address] (5/6/2019) Pulmonary infiltrates (5/6/2019) VT (ventricular tachycardia) ([address] (5/6/2019) Subcutaneous emphysema ([address] (5/9/2019) Plan: 
Agreeable to VATS with pleurodesis--scheduled for Thursday Will need to hold ASA/Lovenox prior to surgery Continue chest tube to suction Continue current care Signed:  Radhika Branch NP

## 2019-05-14 NOTE — PROGRESS NOTES
Pt bedside report received from Shai Dawkins, pt resting in bed  watching Tv, assessment completed ,  pt AxOx4 bed on low and locked position with floor free of objects,  call light within reach, pt on oxygen  @6L NC, respirations even and non labored, pt verbalized understanding to call for needs,  Denies pain, no distress noted.

## 2019-05-14 NOTE — PROGRESS NOTES
Quiet in bed  Alert  Pain controlled  Chest tube to 30cm suction  Dressing intact  Oxygen at 6l via cannula

## 2019-05-14 NOTE — PROGRESS NOTES
Problem: Self Care Deficits Care Plan (Adult) Goal: *Acute Goals and Plan of Care (Insert Text) Description 1. Pt will toilet with SBA 2. Pt will complete functional mobility for ADLs with SBA 3. Pt will complete lower body dressing with SBA using AE as needed 4. Pt will complete grooming and hygiene at sink with SBA 5. Pt will demonstrate independence with HEP to promote increased BUE strength and functional use for ADLs 6. Pt will tolerate 23 minutes functional activity with min or fewer rest breaks to promote increased endurance for ADLs 7. Pt will complete UB bathing and dressing with set up Timeframe: 7 days Outcome: Progressing Towards Goal 
  
OCCUPATIONAL THERAPY: Initial Assessment and Daily Note 5/14/2019 INPATIENT: OT Visit Days: 1 [place] / [place] / Product Type: Medicare /  
  
NAME/AGE/GENDER: Cha Gautam is a 80 y.o. male PRIMARY DIAGNOSIS:  Pneumothorax on right [J93.9] Pneumothorax on right Pneumothorax on right Procedure(s) (LRB): 
CHEST TUBES INSERTION (Right) 7 Days Post-Op ICD-10: Treatment Diagnosis:  
 · Generalized Muscle Weakness (M62.81) Precautions/Allergies: 
  Chest tube to suction Patient has no known allergies. ASSESSMENT:  
Mr. Amaury Rivera was admitted with R side pneumothorax, now has a R side chest tube to suction. Pt lives with his wife (who is blind) and is fully independent and active at baseline, drives, reports that he mowed the lawn just last week. Pt uses a RW for mobility, endorses one fall which he stated might be more than 6 months ago. This session, pt presented generally weak with deficits in strength, endurance, and mobility impacting ADLs. Pt completed bed mobility with CGA, stood and transferred from the bed to the chair with CGA using RW.  Pt completed hygiene with set up, donned doffed socks with CGA with cues to use cross leg technique for ease of task. Pt endorsed mild pain at chest tube site, moved gingerly to avoid increasing pain. SpO2 monitored and remained 93% or above throughout session on 6L O2. BUE strength is generally decreased, grossly 4/5. Pt is below his functional baseline and would benefit from skilled OT services to address deficits, recommend d/c home w/ [facility] services. This section established at most recent assessment PROBLEM LIST (Impairments causing functional limitations): 1. Decreased Strength 2. Decreased ADL/Functional Activities 3. Decreased Transfer Abilities 4. Decreased Balance 5. Decreased Activity Tolerance INTERVENTIONS PLANNED: (Benefits and precautions of occupational therapy have been discussed with the patient.) 1. Activities of daily living training 2. Adaptive equipment training 3. Balance training 4. Therapeutic activity 5. Therapeutic exercise TREATMENT PLAN: Frequency/Duration: Follow patient 3 times/ week to address above goals. Rehabilitation Potential For Stated Goals: Good REHAB RECOMMENDATIONS (at time of discharge pending progress):   
Placement: It is my opinion, based on this patient's performance to date, that Mr. Sindi Plummer may benefit from [address] after discharge due to the functional deficits listed above that are likely to improve with skilled rehabilitation because he/she has multiple medical issues that affect his/her functional mobility in the community. Equipment:  
? None at this time OCCUPATIONAL PROFILE AND HISTORY:  
History of Present Injury/Illness (Reason for Referral): 
See H&P Past Medical History/Comorbidities:  
Mr. Sindi Plummer  has a past medical history of Abdominal aortic aneurysm ([facility] [address] (12/10/2015), Abdominal aortic aneurysm without rupture ([facility] [address] Allergic rhinitis (12/10/2015), Asthma, Benign neoplasm, Benign prostatic hyperplasia (12/10/2015), BPH without urinary obstruction, Cardiovascular disease (12/10/2015), Chronic obstructive asthma with exacerbation ([facility] [address] (12/10/2015), COPD (chronic obstructive pulmonary disease) ([facility] [address] (12/10/2015), Cough with hemoptysis, Erectile dysfunction (12/10/2015), Essential hypertension, benign (12/10/2015), Fracture (10/2014), History of colon polyps, Low testosterone (12/10/2015), Lumbago, Memory loss, Overflow incontinence, Raynaud's syndrome (12/10/2015), Rotator cuff tendinitis, Thrombocytopenia ([facility] [address] and Urinary frequency. Mr. Ashish Salinas  has a past surgical history that includes hx hernia repair (1980); hx colonoscopy; hx fracture tx (10/2014); hx cataract removal (6/2016-right); and hx orthopaedic (03/2018). Social History/Living Environment:  
Home Environment: Private residence # Steps to Enter: 1 One/Two Story Residence: One story Living Alone: No 
Support Systems: Spouse/Significant Other/Partner, Child(tremayne) Patient Expects to be Discharged to[de-identified] Private residence Current DME Used/Available at Home: Grab bars, Shower chair, Raised toilet seat, Cane, straight, Walker, rolling Tub or Shower Type: Shower Prior Level of Function/Work/Activity: 
Independent, active, lives w/ wife, son is able to assist, RW for mobility Number of Personal Factors/Comorbidities that affect the Plan of Care: Expanded review of therapy/medical records (1-2):  MODERATE COMPLEXITY ASSESSMENT OF OCCUPATIONAL PERFORMANCE[de-identified]  
Activities of Daily Living:  
Basic ADLs (From Assessment) Complex ADLs (From Assessment) Feeding: Independent Oral Facial Hygiene/Grooming: Contact guard assistance Bathing: Minimum assistance Upper Body Dressing: Minimum assistance Lower Body Dressing: Contact guard assistance Toileting: Contact guard assistance Instrumental ADL Meal Preparation: Moderate assistance Homemaking: Moderate assistance Medication Management: Setup Financial Management: Setup Grooming/Bathing/Dressing Activities of Daily Living Grooming Grooming Assistance: Set-up Cognitive Retraining Safety/Judgement: Awareness of environment; Fall prevention Lower Body Dressing Assistance Socks: Contact guard assistance Bed/Mat Mobility Supine to Sit: Contact guard assistance Sit to Stand: Contact guard assistance Stand to Sit: Contact guard assistance Bed to Chair: Contact guard assistance Scooting: Contact guard assistance Most Recent Physical Functioning:  
Gross Assessment: 
AROM: Within functional limits Strength: Generally decreased, functional 
Coordination: Within functional limits Posture: 
  
Balance: 
Sitting: Intact Standing: Impaired Standing - Static: Fair;Constant support Standing - Dynamic : Fair Bed Mobility: 
Supine to Sit: Contact guard assistance Scooting: Contact guard assistance Wheelchair Mobility: 
  
Transfers: 
Sit to Stand: Contact guard assistance Stand to Sit: Contact guard assistance Bed to Chair: Contact guard assistance Patient Vitals for the past 6 hrs: 
 BP SpO2 O2 Flow Rate (L/min) Pulse 19 0744 161/77 98 %  61  
19 0855  94 % 6 l/min  Mental Status Neurologic State: Alert Orientation Level: Oriented X4 Cognition: Appropriate decision making, Appropriate for age attention/concentration, Appropriate safety awareness, Follows commands Perception: Appears intact Perseveration: No perseveration noted Safety/Judgement: Awareness of environment, Fall prevention Physical Skills Involved: 
1. Balance 2. Strength 3. Activity Tolerance Cognitive Skills Affected (resulting in the inability to perform in a timely and safe manner): 1. none Psychosocial Skills Affected: 1. Habits/Routines Number of elements that affect the Plan of Care: 3-5:  MODERATE COMPLEXITY CLINICAL DECISION MAKIN [facility] [address] [address] AM-PAC 6 Clicks Daily Activity Inpatient Short Form How much help from another person does the patient currently need. .. Total A Lot A Little None 1. Putting on and taking off regular lower body clothing? ? 1   ? 2   ? 3   ? 4  
2. Bathing (including washing, rinsing, drying)? ? 1   ? 2   ? 3   ? 4  
3. Toileting, which includes using toilet, bedpan or urinal?   ? 1   ? 2   ? 3   ? 4  
4. Putting on and taking off regular upper body clothing? ? 1   ? 2   ? 3   ? 4  
5. Taking care of personal grooming such as brushing teeth? ? 1   ? 2   ? 3   ? 4  
6. Eating meals? ? 1   ? 2   ? 3   ? 4  
© 2007, Trustees of [address] [place] [address] [address] [address], under license to [employer]. All rights reserved Score:  Initial: 21 Most Recent: X (Date: -- ) Interpretation of Tool:  Represents activities that are increasingly more difficult (i.e. Bed mobility, Transfers, Gait). Medical Necessity:    
· Patient demonstrates good rehab potential due to higher previous functional level. Reason for Services/Other Comments: 
· Patient continues to require present interventions due to patient's inability to complete ADLs. Use of outcome tool(s) and clinical judgement create a POC that gives a: MODERATE COMPLEXITY  
 
 
 
TREATMENT:  
(In addition to Assessment/Re-Assessment sessions the following treatments were rendered) Pre-treatment Symptoms/Complaints:   
Pain: Initial:  
Pain Intensity 1: 1 Pain Location 1: Flank(CT site) Pain Orientation 1: Right Pain Intervention(s) 1: Declines  Post Session:  2 Self Care: (10 min): Procedure(s) (per grid) utilized to improve and/or restore self-care/home management as related to dressing and grooming. Required minimal visual and verbal cueing to facilitate activities of daily living skills and compensatory activities. Braces/Orthotics/Lines/Etc:  
· chest tube · O2 Device: Nasal cannula Treatment/Session Assessment:   
· Response to Treatment:  No adverse reactino · Interdisciplinary Collaboration: o Occupational Therapist 
o Registered Nurse · After treatment position/precautions:  
o Up in chair 
o Bed/Chair-wheels locked 
o Call light within reach 
o RN notified · Compliance with Program/Exercises: Compliant all of the time. · Recommendations/Intent for next treatment session: \"Next visit will focus on advancements to more challenging activities and reduction in assistance provided\". Total Treatment Duration: OT Patient Time In/Time Out Time In: 1034 Time Out: 1059 Anshu Ovalle, OT

## 2019-05-14 NOTE — PROGRESS NOTES
Pt received schedule medications, call light within reach , bed on low position and locked, pt able to make needs known, denies pain , no discomfort noted.

## 2019-05-14 NOTE — PROGRESS NOTES
Pt received schedule medications, call light within reach , bed on low position and locked, pt able to make needs known,denies pain , no discomfort noted.

## 2019-05-14 NOTE — PROGRESS NOTES
[facility] CARDIOLOGY PROGRESS NOTE 
      
 
5/14/2019 12:50 PM 
 
Admit Date: 5/6/2019 Subjective:  
Patient with persistent dyspnea. Plans for VATs on Thursday. No arrythmias over last 24 hours. BP mildly elevated. ROS: 
Cardiovascular:  As noted above Objective:  
  
Vitals:  
 05/14/19 5788 05/14/19 4266 05/14/19 0855 05/14/19 1205 BP: 171/76 161/77  152/82 Pulse: 61 61  (!) 57 Resp: 20 18 19 Temp: 98 °F (36.7 °C) 98.6 °F (37 °C)  97.7 °F (36.5 °C) SpO2: 97% 98% 94% 97% Weight:      
Height:      
 
 
Physical Exam: 
General-No Acute Distress Neck- supple, no JVD 
CV- regular rate and rhythm no MRG Lung-carse BS on right. Abd- soft, nontender, nondistended Ext- no edema bilaterally. Skin- warm and dry Data Review:  
Recent Labs 05/13/19 
0720 05/12/19 
8733 *  --   
K 4.1  --   
MG 2.2 2.3 BUN 21  --   
CREA 0.51*  --   
*  --   
WBC 11.7*  --   
HGB 13.3*  --   
HCT 39.8*  --   
  -- No results found for: REYNA Prasad Echo (5/6/19):  -  Left ventricle: Systolic function was normal. Ejection fraction was estimated in the range of 55 % to 60 %. There were no regional wall motion abnormalities. -  Right ventricle: Systolic function was mildly reduced. There was mild pulmonary artery hypertension. 
-  Left atrium: The atrium was moderately dilated. -  Right atrium: The atrium was moderately to markedly dilated. -  Inferior vena cava, hepatic veins: The inferior vena cava was dilated. The respirophasic change in diameter was less than 50%. -  Aortic valve: The findings were most consistent with mild aortic stenosis. -  Mitral valve: There was moderate regurgitation. -  Tricuspid valve: There was moderate regurgitation. Assessment/Plan:  
 
Principal Problem: 
  Pneumothorax on right (5/6/2019) Plan for VATS and pleurodesis on Thursday. Active Problems: Essential hypertension, benign (12/10/2015) BP elevated. ON steroids. Start hydralazine so can wean as steroids decreased. Chronic respiratory failure with hypoxia ([facility] [address] (3/29/2016) ON oxygen. Acute on chronic respiratory failure with hypoxia ([address] (5/6/2019) Continue oxygen supplemetation. COPD exacerbation ([facility] [address] (5/6/2019) Per pulmonary. VT (ventricular tachycardia) ([facility] [address] (5/6/2019) Continue amiodarone and telemetry monitoring.   
 
 
 
 
 
Ольга Dumont MD 
5/14/2019 12:50 PM

## 2019-05-14 NOTE — PROGRESS NOTES
Problem: Mobility Impaired (Adult and Pediatric) Goal: *Acute Goals and Plan of Care (Insert Text) Description STG: 
(1.)Mr. Paty Linda will move from supine to sit and sit to supine  with STAND BY ASSIST within 3 treatment day(s). (2.)Mr. Paty Linda will transfer from bed to chair and chair to bed with STAND BY ASSIST using the least restrictive device within 3 treatment day(s). (3.)Mr. Paty Linda will ambulate with CONTACT GUARD ASSIST for 100 feet with the least restrictive device within 3 treatment day(s). LTG: 
(1.)Mr. Paty Linda will move from supine to sit and sit to supine  in bed with INDEPENDENT within 7 treatment day(s). (2.)Mr. Paty Linda will transfer from bed to chair and chair to bed with SUPERVISION using the least restrictive device within 7 treatment day(s). (3.)Mr. Paty Linda will ambulate with STAND BY ASSIST for 250+ feet with the least restrictive device within 7 treatment day(s). ________________________________________________________________________________________________ Outcome: Progressing Towards Goal 
 
 
PHYSICAL THERAPY: Initial Assessment and AM 5/14/2019 INPATIENT: PT Visit Days : 1 [place] / Product Type: Medicare /   
  
NAME/AGE/GENDER: Christofer Lane is a 80 y.o. male PRIMARY DIAGNOSIS: Pneumothorax on right [J93.9] Pneumothorax on right Pneumothorax on right Procedure(s) (LRB): 
CHEST TUBES INSERTION (Right) 7 Days Post-Op ICD-10: Treatment Diagnosis:  
 · Generalized Muscle Weakness (M62.81) · Difficulty in walking, Not elsewhere classified (R26.2) · History of falling (Z91.81) Precaution/Allergies: 
Patient has no known allergies. ** Check with nursing if pt can come off suction for mobility with chest tube** ASSESSMENT:  
Mr. Paty Linda is sitting up in bedside chair upon contact and had just finished working with OT.  Pt agreeable to PT evaluation and treatment this morning. Pt currently with R chest tube and needing to stay on suction at this time. Pt with reports of 1-2/10 pain at upper chest where other chest tube was removed. Pt lives with his wife in 1 story home with 1 step to enter. Pt states his wife is blind and his son lives near by and is able to assist as needed. Pt is ambulatory with use of walker, independent with ADLs, drives, and reports 1 fall. Pt  requires supplemental O2 at night only at baseline. Pt performed STS transfers with CGA. Pt distance is limited at this time due to chest tube remaining on suction. Pt performed exercises in chair requiring VC for technique. Pt left sitting up with all needs met and within reach. Pt states he is scheduled for surgery on Thursday 5/16/19. Will need re-evaluation post op. Sheryle Sorrow will benefit from skilled PT (medically necessary) to address decreased strength, decreased balance, decreased functional tolerance, decreased cardiopulmonary endurance affecting participation in basic ADLs and functional tasks. This section established at most recent assessment PROBLEM LIST (Impairments causing functional limitations): 1. Decreased Strength 2. Decreased ADL/Functional Activities 3. Decreased Transfer Abilities 4. Decreased Ambulation Ability/Technique 5. Decreased Balance 6. Decreased Activity Tolerance 7. Decreased Pacing Skills 8. Increased Fatigue 9. Increased Shortness of Breath 10. Decreased [place] with Home Exercise Program 
 INTERVENTIONS PLANNED: (Benefits and precautions of physical therapy have been discussed with the patient.) 1. Balance Exercise 2. Bed Mobility 3. Family Education 4. Gait Training 5. Home Exercise Program (HEP) 6. Neuromuscular Re-education/Strengthening 7. Therapeutic Activites 8. Therapeutic Exercise/Strengthening 9. Transfer Training TREATMENT PLAN: Frequency/Duration: 3 times a week for duration of hospital stay Rehabilitation Potential For Stated Goals: Good REHAB RECOMMENDATIONS (at time of discharge pending progress):   
Placement: It is my opinion, based on this patient's performance to date, that Mr. Robert Magallanes may benefit from [address] after discharge due to the functional deficits listed above that are likely to improve with skilled rehabilitation because he/she has multiple medical issues that affect his/her functional mobility in the community. [facility] pending progress following surgery Equipment:  
? None at this time HISTORY:  
History of Present Injury/Illness (Reason for Referral): 
See H&P below Patient is a 80 y.o.  male presents via EMS with shortness of breath when got up this morning. Had a productive cough with yellow sputum and received Rocephin. In the ER CXR with large right pneumothorax and we were called for chest tub e placement and admission. Had a fall in February and seen by Dr. Yoli Brower with compression L2,L3, L4. Past Medical History/Comorbidities:  
Mr. oRbert Magallanes  has a past medical history of Abdominal aortic aneurysm ([address] (12/10/2015), Abdominal aortic aneurysm without rupture ([address] Allergic rhinitis (12/10/2015), Asthma, Benign neoplasm, Benign prostatic hyperplasia (12/10/2015), BPH without urinary obstruction, Cardiovascular disease (12/10/2015), Chronic obstructive asthma with exacerbation ([address] (12/10/2015), COPD (chronic obstructive pulmonary disease) ([address] (12/10/2015), Cough with hemoptysis, Erectile dysfunction (12/10/2015), Essential hypertension, benign (12/10/2015), Fracture (10/2014), History of colon polyps, Low testosterone (12/10/2015), Lumbago, Memory loss, Overflow incontinence, Raynaud's syndrome (12/10/2015), Rotator cuff tendinitis, Thrombocytopenia ([address] and Urinary frequency.   Mr. Robert Magallanes  has a past surgical history that includes hx hernia repair (1980); hx colonoscopy; hx fracture tx (10/2014); hx cataract removal (6/2016-right); and hx orthopaedic (03/2018). Social History/Living Environment:  
Home Environment: Private residence # Steps to Enter: 1 One/Two Story Residence: One story Living Alone: No 
Support Systems: Spouse/Significant Other/Partner, Child(tremayne) Patient Expects to be Discharged to[de-identified] Private residence Current DME Used/Available at Home: Grab bars, Shower chair, Raised toilet seat, Cane, straight, Walker, rolling Tub or Shower Type: Shower Prior Level of Function/Work/Activity: 
Use of walker for gait, indep with ADLs, drives, 1 fall Number of Personal Factors/Comorbidities that affect the Plan of Care: 3+: HIGH COMPLEXITY EXAMINATION:  
Most Recent Physical Functioning:  
Gross Assessment: 
AROM: Within functional limits Strength: Generally decreased, functional 
Coordination: Within functional limits Posture: 
  
Balance: 
Standing - Static: Fair;Constant support Bed Mobility: 
  
Wheelchair Mobility: 
  
Transfers: 
Sit to Stand: Contact guard assistance Stand to Sit: Contact guard assistance Gait: 
  
   
  
Body Structures Involved: 1. Lungs 2. Bones 3. Joints 4. Muscles 5. Ligaments Body Functions Affected: 1. Sensory/Pain 2. Cardio 3. Respiratory 4. Neuromusculoskeletal 
5. Movement Related Activities and Participation Affected: 1. General Tasks and Demands 2. Mobility 3. Self Care 4. Domestic Life 5. Interpersonal Interactions and Relationships 6. Community, Social and [place] Number of elements that affect the Plan of Care: 4+: HIGH COMPLEXITY CLINICAL PRESENTATION:  
Presentation: Evolving clinical presentation with changing clinical characteristics: MODERATE COMPLEXITY CLINICAL DECISION MAKING:  
[employer] AM-PAC 6 Clicks Basic Mobility Inpatient Short Form How much difficulty does the patient currently have. .. Unable A Lot A Little None 1.  Turning over in bed (including adjusting bedclothes, sheets and blankets)? ? 1   ? 2   ? 3   ? 4  
2. Sitting down on and standing up from a chair with arms ( e.g., wheelchair, bedside commode, etc.)   ? 1   ? 2   ? 3   ? 4  
3. Moving from lying on back to sitting on the side of the bed?   ? 1   ? 2   ? 3   ? 4 How much help from another person does the patient currently need. .. Total A Lot A Little None 4. Moving to and from a bed to a chair (including a wheelchair)? ? 1   ? 2   ? 3   ? 4  
5. Need to walk in hospital room? ? 1   ? 2   ? 3   ? 4  
6. Climbing 3-5 steps with a railing? ? 1   ? 2   ? 3   ? 4  
© 2007, Trustees of [facility], under license to [employer]. All rights reserved Score:  Initial: 17 Most Recent: X (Date: -- ) Interpretation of Tool:  Represents activities that are increasingly more difficult (i.e. Bed mobility, Transfers, Gait). Medical Necessity:    
· Patient is expected to demonstrate progress in strength, balance, coordination and functional technique ·  to decrease assistance required with gait, transfers, and functional mobility · . Reason for Services/Other Comments: 
· Patient continues to require skilled intervention due to decreased strength, decreased balance, decreased functional tolerance, decreased cardiopulmonary endurance affecting participation in basic ADLs and functional tasks · . Use of outcome tool(s) and clinical judgement create a POC that gives a: Clear prediction of patient's progress: LOW COMPLEXITY  
  
 
 
 
TREATMENT:  
(In addition to Assessment/Re-Assessment sessions the following treatments were rendered) Pre-treatment Symptoms/Complaints:  none Pain: Initial:  
  1-2/10 chest tube site Post Session:  1-2/10, unchanged with seated exercises Therapeutic Exercise: (8 Minutes):  Exercises per grid below to improve mobility and strength.   Required minimal visual, verbal and tactile cues to promote proper body alignment, promote proper body posture and promote proper body mechanics. Progressed range, repetitions and complexity of movement as indicated. Date: 
5/14/19 Date: 
 Date: Activity/Exercise Parameters Parameters Parameters Ankle pumps 20 X B Quad set 15 X B Glute set 15 X Heel slides 15 X B Hip abduction 15 X B Braces/Orthotics/Lines/Etc:  
· Chest tube · O2 Device: Nasal cannula Treatment/Session Assessment:   
· Response to Treatment:  CGA for transfers, limited by chest tube with suction · Interdisciplinary Collaboration:  
o Physical Therapist 
o Occupational Therapist 
o Registered Nurse 
o SPT · After treatment position/precautions:  
o Up in chair 
o Bed/Chair-wheels locked 
o Bed in low position 
o Call light within reach · Compliance with Program/Exercises: Will assess as treatment progresses · Recommendations/Intent for next treatment session: \"Next visit will focus on advancements to more challenging activities and reduction in assistance provided\". Total Treatment Duration: PT Patient Time In/Time Out Time In: 1100 Time Out: 1116 [address]

## 2019-05-15 ENCOUNTER — APPOINTMENT (OUTPATIENT)
Dept: GENERAL RADIOLOGY | Age: 84
DRG: 163 | End: 2019-05-15
Attending: INTERNAL MEDICINE
Payer: MEDICARE

## 2019-05-15 PROCEDURE — 74011250636 HC RX REV CODE- 250/636: Performed by: INTERNAL MEDICINE

## 2019-05-15 PROCEDURE — 74011000250 HC RX REV CODE- 250: Performed by: INTERNAL MEDICINE

## 2019-05-15 PROCEDURE — 71045 X-RAY EXAM CHEST 1 VIEW: CPT

## 2019-05-15 PROCEDURE — 65660000000 HC RM CCU STEPDOWN

## 2019-05-15 PROCEDURE — 94640 AIRWAY INHALATION TREATMENT: CPT

## 2019-05-15 PROCEDURE — 74011250637 HC RX REV CODE- 250/637: Performed by: INTERNAL MEDICINE

## 2019-05-15 PROCEDURE — 94760 N-INVAS EAR/PLS OXIMETRY 1: CPT

## 2019-05-15 PROCEDURE — 77010033678 HC OXYGEN DAILY

## 2019-05-15 PROCEDURE — 99232 SBSQ HOSP IP/OBS MODERATE 35: CPT | Performed by: INTERNAL MEDICINE

## 2019-05-15 RX ORDER — HYDRALAZINE HYDROCHLORIDE 25 MG/1
25 TABLET, FILM COATED ORAL 4 TIMES DAILY
Status: DISCONTINUED | OUTPATIENT
Start: 2019-05-15 | End: 2019-05-17

## 2019-05-15 RX ORDER — PREDNISONE 20 MG/1
40 TABLET ORAL
Status: DISCONTINUED | OUTPATIENT
Start: 2019-05-16 | End: 2019-05-18

## 2019-05-15 RX ADMIN — ASPIRIN 81 MG: 81 TABLET, COATED ORAL at 09:10

## 2019-05-15 RX ADMIN — ALBUTEROL SULFATE 2.5 MG: 2.5 SOLUTION RESPIRATORY (INHALATION) at 01:34

## 2019-05-15 RX ADMIN — HYDRALAZINE HYDROCHLORIDE 25 MG: 25 TABLET, FILM COATED ORAL at 16:50

## 2019-05-15 RX ADMIN — Medication 10 ML: at 21:58

## 2019-05-15 RX ADMIN — Medication 10 ML: at 05:16

## 2019-05-15 RX ADMIN — FLUTICASONE PROPIONATE 2 SPRAY: 50 SPRAY, METERED NASAL at 09:11

## 2019-05-15 RX ADMIN — LISINOPRIL 10 MG: 5 TABLET ORAL at 09:10

## 2019-05-15 RX ADMIN — TAMSULOSIN HYDROCHLORIDE 0.4 MG: 0.4 CAPSULE ORAL at 09:10

## 2019-05-15 RX ADMIN — ALBUTEROL SULFATE 2.5 MG: 2.5 SOLUTION RESPIRATORY (INHALATION) at 19:42

## 2019-05-15 RX ADMIN — ACETAMINOPHEN 500 MG: 500 TABLET, FILM COATED ORAL at 21:47

## 2019-05-15 RX ADMIN — Medication 10 ML: at 13:38

## 2019-05-15 RX ADMIN — HYDRALAZINE HYDROCHLORIDE 25 MG: 25 TABLET, FILM COATED ORAL at 21:47

## 2019-05-15 RX ADMIN — AMIODARONE HYDROCHLORIDE 400 MG: 200 TABLET ORAL at 21:47

## 2019-05-15 RX ADMIN — ALBUTEROL SULFATE 2.5 MG: 2.5 SOLUTION RESPIRATORY (INHALATION) at 08:48

## 2019-05-15 RX ADMIN — CARVEDILOL 3.12 MG: 3.12 TABLET, FILM COATED ORAL at 09:10

## 2019-05-15 RX ADMIN — METHYLPREDNISOLONE SODIUM SUCCINATE 40 MG: 40 INJECTION, POWDER, FOR SOLUTION INTRAMUSCULAR; INTRAVENOUS at 05:13

## 2019-05-15 RX ADMIN — AMIODARONE HYDROCHLORIDE 400 MG: 200 TABLET ORAL at 10:21

## 2019-05-15 RX ADMIN — MONTELUKAST SODIUM 10 MG: 10 TABLET, FILM COATED ORAL at 09:10

## 2019-05-15 RX ADMIN — HYDRALAZINE HYDROCHLORIDE 10 MG: 20 INJECTION INTRAMUSCULAR; INTRAVENOUS at 07:44

## 2019-05-15 RX ADMIN — ACETAMINOPHEN 500 MG: 500 TABLET, FILM COATED ORAL at 13:36

## 2019-05-15 RX ADMIN — ACETAMINOPHEN 500 MG: 500 TABLET, FILM COATED ORAL at 05:13

## 2019-05-15 RX ADMIN — SENNOSIDES 17.2 MG: 8.6 TABLET, FILM COATED ORAL at 21:47

## 2019-05-15 RX ADMIN — CARVEDILOL 3.12 MG: 3.12 TABLET, FILM COATED ORAL at 16:50

## 2019-05-15 RX ADMIN — GUAIFENESIN 1200 MG: 600 TABLET, EXTENDED RELEASE ORAL at 09:10

## 2019-05-15 RX ADMIN — ALBUTEROL SULFATE 2.5 MG: 2.5 SOLUTION RESPIRATORY (INHALATION) at 14:47

## 2019-05-15 NOTE — PROGRESS NOTES
[facility] CARDIOLOGY PROGRESS NOTE 
      
 
5/15/2019 12:50 PM 
 
Admit Date: 5/6/2019 Subjective:  
Patient with persistent dyspnea. Plans for VATs tomorrow. No arrythmias over last 48 hours. BP elevated. ROS: 
Cardiovascular:  As noted above Objective:  
  
Vitals:  
 05/15/19 3141 05/15/19 0818 05/15/19 0848 05/15/19 1018 BP: (!) 215/92 174/80 Pulse: (!) 59 62  70 Resp: 20 Temp: 97.3 °F (36.3 °C) SpO2: 98%  100% Weight:      
Height:      
 
 
Physical Exam: 
General-No Acute Distress Neck- supple, no JVD 
CV- regular rate and rhythm. Distant heart sounds. Lung-coarse BS on right. Abd- soft, nontender, nondistended Ext- no edema bilaterally. Skin- warm and dry Data Review:  
Recent Labs 05/13/19 
0720 *  
K 4.1 MG 2.2 BUN 21  
CREA 0.51* * WBC 11.7* HGB 13.3* HCT 39.8*  
 No results found for: REYNA Nicholas Echo (5/6/19):  -  Left ventricle: Systolic function was normal. Ejection fraction was estimated in the range of 55 % to 60 %. There were no regional wall motion abnormalities. -  Right ventricle: Systolic function was mildly reduced. There was mild pulmonary artery hypertension. 
-  Left atrium: The atrium was moderately dilated. -  Right atrium: The atrium was moderately to markedly dilated. -  Inferior vena cava, hepatic veins: The inferior vena cava was dilated. The respirophasic change in diameter was less than 50%. -  Aortic valve: The findings were most consistent with mild aortic stenosis. -  Mitral valve: There was moderate regurgitation. -  Tricuspid valve: There was moderate regurgitation. Assessment/Plan:  
 
Principal Problem: 
  Pneumothorax on right (5/6/2019) Plan for VATS and pleurodesis on Thursday. Active Problems: 
  Essential hypertension, benign (12/10/2015) BP elevated. Suspect related to  steroids.   Start hydralazine 25 mg PO QID so can wean as steroids decreased. Chronic respiratory failure with hypoxia ([facility] (3/29/2016) ON oxygen. Acute on chronic respiratory failure with hypoxia ([facility] (5/6/2019) Continue oxygen supplemetation. COPD exacerbation ([facility] (5/6/2019) Per pulmonary. VT (ventricular tachycardia) ([facility] (5/6/2019) Continue amiodarone and telemetry monitoring.   
 
 
 
 
 
Eunice Yen MD 
5/15/2019 12:50 PM

## 2019-05-15 NOTE — PROGRESS NOTES
Pt resting in bed. No distress noted at this time. CT remains to 30 cm suction. Air leak noted. Pt on 6  L NC at this time. no output noted for this shift. Pt aware of NPO after MN for procedure and aware of procedure tomorrow. Pt encouraged to call for assistance if needed call light in reach, door open will monitor.

## 2019-05-15 NOTE — PROGRESS NOTES
Patient is lying in bed with head of bed elevated. Respirations are even and unlabored. O2 at 6lpm NC nitrogen wash out. Patient  denies any pain. Right chest tube in place with suction to 30cm Bed is low ,locked and call light within reach.

## 2019-05-15 NOTE — PROGRESS NOTES
Pulmonary Daily Progress Note: 5/15/2019 Anay Galaviz Admission Date: 5/6/2019 The patient's chart is reviewed and the patient is discussed with the staff. 87 y.o. CM presents via EMS with shortness of breath when got up this morning.  Had a productive cough with yellow sputum and received Rocephin.  In the ER CXR with large right pneumothorax and pulmonary consulted for chest tube placement and admission. Lance Roughen a fall in February and seen by Dr. Miriam Toscano with compression L2,L3, L4. In the ER had VT and cardiology was consulted and added Amiodarone. 
 Right Ure-ashutosh placed on admission 5/6 then 24 Greek chest tube placed 5/7. Was continued on NC 6L for nitrogen wash out. On 5/8 clamped Ure-ashutosh but developed significant subc air and was unclamped. Chest CT obtained showing Ure-ashutosh no long in pleural cavity and was removed. Larger chest tube was pulled back and continues to have large air leak Subjective: No problems overnight. Continues to have large air leak. Surgery discussed surgery with family this morning and their questions have been answered. Has had hypertensive. Current Facility-Administered Medications Medication Dose Route Frequency  [START ON 5/16/2019] predniSONE (DELTASONE) tablet 40 mg  40 mg Oral DAILY WITH BREAKFAST  potassium, sodium phosphates (NEUTRA-PHOS) packet 2 Packet  2 Packet Oral BID  acetaminophen (TYLENOL) tablet 500 mg  500 mg Oral Q8H  
 acetaminophen (TYLENOL) tablet 650 mg  650 mg Oral Q4H PRN  
 oxyCODONE IR (ROXICODONE) tablet 5 mg  5 mg Oral Q4H PRN  
 docusate sodium (COLACE) capsule 100 mg  100 mg Oral BID  senna (SENOKOT) tablet 17.2 mg  2 Tab Oral QHS  lisinopril (PRINIVIL, ZESTRIL) tablet 10 mg  10 mg Oral DAILY  fluticasone propionate (FLONASE) 50 mcg/actuation nasal spray 2 Spray  2 Spray Both Nostrils DAILY  amiodarone (CORDARONE) tablet 400 mg  400 mg Oral Q12H  carvedilol (COREG) tablet 3.125 mg  3.125 mg Oral BID WITH MEALS  calcium carbonate (TUMS) chewable tablet 200 mg [elemental]  200 mg Oral TID PRN  
 hydrALAZINE (APRESOLINE) 20 mg/mL injection 10 mg  10 mg IntraVENous Q6H PRN  
 aspirin delayed-release tablet 81 mg  81 mg Oral DAILY  guaiFENesin ER (MUCINEX) tablet 1,200 mg  1,200 mg Oral DAILY  montelukast (SINGULAIR) tablet 10 mg  10 mg Oral DAILY  tamsulosin (FLOMAX) capsule 0.4 mg  0.4 mg Oral DAILY  sodium chloride (NS) flush 5-40 mL  5-40 mL IntraVENous Q8H  
 sodium chloride (NS) flush 5-40 mL  5-40 mL IntraVENous PRN  
 albuterol (PROVENTIL VENTOLIN) nebulizer solution 2.5 mg  2.5 mg Nebulization Q6H RT  
 enoxaparin (LOVENOX) injection 40 mg  40 mg SubCUTAneous Q24H Review of Systems Constitutional:  negative for fever, chills, sweats HEENT: +sinsus congestion and has seasonal allergies Cardiovascular:  negative for chest pain, palpitations, syncope, edema Respiratory:  Right chest tube, significant subc air Gastrointestinal:  negative for dysphagia, reflux, vomiting, diarrhea, abdominal pain, or melena Neurologic:  negative for focal weakness, numbness, headache Objective:  
 
Vitals:  
 05/15/19 6701 05/15/19 2995 05/15/19 9648 05/15/19 0818 BP: 172/89  (!) 215/92 174/80 Pulse: (!) 58  (!) 59 62 Resp: 20  20 Temp: 98.2 °F (36.8 °C)  97.3 °F (36.3 °C) SpO2: 99%  98% Weight:  148 lb 9.4 oz (67.4 kg) Height:      
 
 
Intake and Output:  
05/13 1901 - 05/15 0700 In: 1080 [P.O.:1080] Out: 4429 [Urine:1500] No intake/output data recorded.  
 
Physical Exam:         
Constitutional:  the patient is well developed and in no acute distress, NC  6lpm 
EENMT:  Sclera clear, pupils equal, oral mucosa moist 
Respiratory: crepitus over entire chest, right chest tube with marked air leak still,  
Cardiovascular:  RRR without M,G,R   
Gastrointestinal: soft and non-tender; with positive bowel sounds, appetite good. Musculoskeletal: warm without cyanosis. There is no lower extremity edema. Skin:  no jaundice or rashes, right anterior chest wound, right chest tube Neurologic: no gross neuro deficits Psychiatric:  alert and oriented x 3 LINES:   
PIV sites Right chest tube 5/7/19 DRIPS:   None CXR  5/14 5/10/19 Chest CT 5/8/19: 
 
 
LAB No results for input(s): GLUCPOC in the last 72 hours. No lab exists for component: [place] Recent Labs 05/13/19 
0720 WBC 11.7* HGB 13.3* HCT 39.8*  
 Recent Labs 05/13/19 
0720 *  
K 4.1 CL 97* CO2 26 * BUN 21  
CREA 0.51* MG 2.2 PHOS 2.0*  
CA 7.7* No results for input(s): PH, PCO2, PO2, HCO3 in the last 72 hours. No results for input(s): LCAD, LAC in the last 72 hours. Assessment:  (Medical Decision Making) Hospital Problems  Date Reviewed: 5/9/2019 Codes Class Noted POA Shortness of breath ICD-10-CM: R06.02 
ICD-9-CM: 786.05  5/6/2019 Yes Less, on NC 6L for nitrogen wash out * (Principal) Pneumothorax on right ICD-10-CM: J93.9 ICD-9-CM: 512.89  5/6/2019 Yes Large air leak Chronic respiratory failure with hypoxia (HCC) (Chronic) ICD-10-CM: J96.11 
ICD-9-CM: 518.83, 799.02  3/29/2016 Yes Overview Addendum 5/6/2019 10:14 AM by Marco Nam, NP Continue to wear 2 L nasal cannula at night. May use 2 L supplemental oxygen to maintain sats greater than 90%. chronic Essential hypertension, benign ICD-10-CM: I10 
ICD-9-CM: 401.1  12/10/2015 Yes Elevated today. See below Subcutaneous emphysema ([address] ICD-10-CM: T79. 7XXA ICD-9-CM: 958.7  5/9/2019 No  
 significant Acute on chronic respiratory failure with hypoxia [facility]) ICD-10-CM: J96.21 
ICD-9-CM: 518.84, 799.02  5/6/2019 Yes  
 remains on NC   
 COPD exacerbation ([facility] ICD-10-CM: J44.1 ICD-9-CM: 491.21  5/6/2019 Yes Taper steroids Ventricular tachyarrhythmia ([facility] ICD-10-CM: I47.2 ICD-9-CM: 427.1  5/6/2019 Yes Controlled on Amio Pulmonary infiltrates ICD-10-CM: R91.8 ICD-9-CM: 793.19  5/6/2019 Unknown Continue antibiotics VT (ventricular tachycardia) ([facility]) ICD-10-CM: I47.2 ICD-9-CM: 427.1  5/6/2019 Yes Per cardiology Persistent air leak: Chest tube inserted 5/7 with large air leak since. Plan:  (Medical Decision Making) --planning for surgery tomorrow 
--taper steroids because hypertension has been difficult to control 
--continue amiodarone, coreg, lisinopril. More than 50% of the time documented was spent in face-to-face contact with the patient and in the care of the patient on the floor/unit where the patient is located.  
 
Tsering Finn MD

## 2019-05-15 NOTE — PROGRESS NOTES
H&P/Consult Note/Progress Note/Office Note:  
Yanna Nicholas  MRN: [id number]  KPI:5/77/7936  Age:87 y.o. 
 
HPI: Yanna Nicholas is a 80 y.o. male who has PMHx of AAA, COPD, BPH, and HTN who presented to the ED via EMS on 5/6/19 with sudden onset of SOB, PATE, and cough. While en route via EMS pt had a run of VT. He had no relief with NRB. Sats were in the 80s in the ED. CXR in ED demonstrated complete PTX on the right. He was started on an Amio gtt for repeated runs of VT. Pulmonary placed a Uresil in the ED. This eventually developed subcutaneous emphysema and the Uresil became ineffective. A 24Fr chest tube was placed on 5/7/19. Since then, pt has had a persistent air leak. CXR demonstrates right apical PTX. Surgery was consulted for consideration of pleurodesis. 5/14/19: Awake in bed, no complaints. CT with +air leak, 150mL/24h serosang output. CXR improved this AM. 5/15/19: CT with 170mL/24h serosang output. CXR without PTX this AM. Past Medical History:  
Diagnosis Date  Abdominal aortic aneurysm ([address] 12/10/2015 In 2005  Abdominal aortic aneurysm without rupture ([address]  Allergic rhinitis 12/10/2015  Asthma  Benign neoplasm  Benign prostatic hyperplasia 12/10/2015  BPH without urinary obstruction  Cardiovascular disease 12/10/2015  Chronic obstructive asthma with exacerbation ([address] 12/10/2015  COPD (chronic obstructive pulmonary disease) ([address] 12/10/2015  Cough with hemoptysis  Erectile dysfunction 12/10/2015  Essential hypertension, benign 12/10/2015  Fracture 10/2014  
 of left hand and ribs after fall on concrete  History of colon polyps  Low testosterone 12/10/2015  Lumbago  Memory loss  Overflow incontinence  Raynaud's syndrome 12/10/2015  Rotator cuff tendinitis  Thrombocytopenia ([address]  Urinary frequency Past Surgical History:  
Procedure Laterality Date  HX CATARACT REMOVAL  6/2016-right  HX COLONOSCOPY    
 HX FRACTURE TX  10/2014  
 of left hand and ribs are fall on concrete [address]  HX ORTHOPAEDIC  03/2018  
 hip fracture Current Facility-Administered Medications Medication Dose Route Frequency  [START ON 5/16/2019] predniSONE (DELTASONE) tablet 40 mg  40 mg Oral DAILY WITH BREAKFAST  hydrALAZINE (APRESOLINE) tablet 25 mg  25 mg Oral QID  acetaminophen (TYLENOL) tablet 500 mg  500 mg Oral Q8H  
 acetaminophen (TYLENOL) tablet 650 mg  650 mg Oral Q4H PRN  
 oxyCODONE IR (ROXICODONE) tablet 5 mg  5 mg Oral Q4H PRN  
 docusate sodium (COLACE) capsule 100 mg  100 mg Oral BID  senna (SENOKOT) tablet 17.2 mg  2 Tab Oral QHS  lisinopril (PRINIVIL, ZESTRIL) tablet 10 mg  10 mg Oral DAILY  fluticasone propionate (FLONASE) 50 mcg/actuation nasal spray 2 Spray  2 Spray Both Nostrils DAILY  amiodarone (CORDARONE) tablet 400 mg  400 mg Oral Q12H  carvedilol (COREG) tablet 3.125 mg  3.125 mg Oral BID WITH MEALS  calcium carbonate (TUMS) chewable tablet 200 mg [elemental]  200 mg Oral TID PRN  
 hydrALAZINE (APRESOLINE) 20 mg/mL injection 10 mg  10 mg IntraVENous Q6H PRN  
 aspirin delayed-release tablet 81 mg  81 mg Oral DAILY  guaiFENesin ER (MUCINEX) tablet 1,200 mg  1,200 mg Oral DAILY  montelukast (SINGULAIR) tablet 10 mg  10 mg Oral DAILY  tamsulosin (FLOMAX) capsule 0.4 mg  0.4 mg Oral DAILY  sodium chloride (NS) flush 5-40 mL  5-40 mL IntraVENous Q8H  
 sodium chloride (NS) flush 5-40 mL  5-40 mL IntraVENous PRN  
 albuterol (PROVENTIL VENTOLIN) nebulizer solution 2.5 mg  2.5 mg Nebulization Q6H RT  
 enoxaparin (LOVENOX) injection 40 mg  40 mg SubCUTAneous Q24H Patient has no known allergies. Social History Socioeconomic History  Marital status:  Spouse name: Not on file  Number of children: Not on file  Years of education: Not on file  Highest education level: Not on file Tobacco Use  
  Smoking status: Former Smoker  Smokeless tobacco: Never Used Substance and Sexual Activity  Alcohol use: Yes Comment: occasional  
 
Social History Tobacco Use Smoking Status Former Smoker Smokeless Tobacco Never Used Family History Problem Relation Age of Onset  Dementia Mother  Cancer Father   
     lung cancer  Heart Attack Father ROS: The patient has no difficulty with chest pain or shortness of breath. No fever or chills. Comprehensive review of systems was otherwise unremarkable except as noted above. Physical Exam:  
Visit Vitals /56 Pulse 64 Temp 98 °F (36.7 °C) Resp 18 Ht 5' 8\" (1.727 m) Wt 148 lb 9.4 oz (67.4 kg) SpO2 96% BMI 22.59 kg/m² Constitutional: Alert, oriented, cooperative patient in no acute distress; appears stated age Eyes:Sclera are clear. EOMs intact ENMT: no external lesions gross hearing normal; no obvious neck masses, no ear or lip lesions, nares normal 
CV: irregular; Normal perfusion Resp: No JVD. Breathing is  non-labored; crackles in the right base, + rhonchi mid and upper lung fields. Chest tube intact, on suction with air leak. Appropriately tender chest.   
GI: soft and non-distended Musculoskeletal: unremarkable with normal function. No embolic signs or cyanosis. Neuro:  Oriented; moves all 4; no focal deficits Psychiatric: normal affect and mood, no memory impairment Recent vitals (if inpt): 
Patient Vitals for the past 24 hrs: 
 BP Temp Pulse Resp SpO2 Weight 05/15/19 1130 127/56 98 °F (36.7 °C) 64 18 96 %   
05/15/19 1018   70     
05/15/19 0848     100 %   
05/15/19 0818 174/80  62     
05/15/19 0743 (!) 215/92 97.3 °F (36.3 °C) (!) 59 20 98 %   
05/15/19 0549      148 lb 9.4 oz (67.4 kg) 05/15/19 0359 172/89 98.2 °F (36.8 °C) (!) 58 20 99 %   
05/15/19 0134     98 %   
05/14/19 2303 155/69 97.3 °F (36.3 °C) (!) 56 20 99 %   
05/14/19 2008     96 %   
 05/14/19 1952 161/81 98.5 °F (36.9 °C) 66 20 95 %   
05/14/19 1547 113/70 97.8 °F (36.6 °C) 61 20 100 %   
05/14/19 1336     97 %   
05/14/19 1205 152/82 97.7 °F (36.5 °C) (!) 57 19 97 %  Labs: 
Recent Labs 05/13/19 
0720 WBC 11.7* HGB 13.3*  
 *  
K 4.1 CL 97* CO2 26 BUN 21  
CREA 0.51* * Lab Results Component Value Date/Time WBC 11.7 (H) 05/13/2019 07:20 AM  
 HGB 13.3 (L) 05/13/2019 07:20 AM  
 PLATELET 346 06/38/4981 07:20 AM  
 Sodium 130 (L) 05/13/2019 07:20 AM  
 Potassium 4.1 05/13/2019 07:20 AM  
 Chloride 97 (L) 05/13/2019 07:20 AM  
 CO2 26 05/13/2019 07:20 AM  
 BUN 21 05/13/2019 07:20 AM  
 Creatinine 0.51 (L) 05/13/2019 07:20 AM  
 Glucose 105 (H) 05/13/2019 07:20 AM  
 INR 1.2 02/02/2018 06:34 AM  
 aPTT 37.5 (H) 02/02/2018 06:34 AM  
 Bilirubin, total 0.7 05/08/2019 04:10 AM  
 Bilirubin, direct 0.2 09/21/2010 02:10 PM  
 AST (SGOT) 25 05/08/2019 04:10 AM  
 ALT (SGPT) 21 05/08/2019 04:10 AM  
 Alk. phosphatase 77 05/08/2019 04:10 AM  
 Troponin-I, Qt. 0.05 02/04/2018 01:04 AM  
 
 
I reviewed recent labs and recent radiologic studies. 5/6/19 CXR: 
IMPRESSION: 
  
1. Large right-sided tension pneumothorax. This was discussed with emergency department by Dr. Chriss Lima at 9:58 AM on 5/6/2019. 
  
2. Unchanged enlargement of the cardiac silhouette. 
  
3. Chronic interstitial changes throughout the left lung. 5/13/19 CXR: 
FINDINGS: A portable AP radiograph of the chest was obtained at 0426 hours. Right apical pneumothorax slightly increased in size. . Chronic diffuse increased 
interstitial markings unchanged. . The cardiac and mediastinal contours and 
pulmonary vascularity are normal.  Stable subcutaneous emphysema. .  
  
IMPRESSION IMPRESSION: Slight increase in size right apical pneumothorax. 5/8/19 CT Chest: 
IMPRESSION:  
1. Moderate size right pneumothorax with pneumomediastinum and extensive 
subcutaneous air. 2.  Tip of the right chest tube passes into the posterior mediastinum XR Results (most recent): 
Results from Hospital Encounter encounter on 05/06/19 XR CHEST PORT Narrative EXAM: TEMPORARY INDICATION: Pneumothorax COMPARISON: 5/14/2019 FINDINGS: A portable AP radiograph of the chest was obtained at 0526 hours. Right apical pneumothorax resolved. Right chest tube is in place. . Stable 
interstitial fibrosis in subcutaneous emphysema. The cardiac and mediastinal 
contours and pulmonary vascularity are normal.  The bones and soft tissues are 
grossly within normal limits. Impression IMPRESSION: Right apical pneumothorax resolved. I independently reviewed radiology images for studies I described above or studies I have ordered. Admission date (for inpatients): 5/6/2019  
6 Days Post-Op  Procedure(s): CHEST TUBES INSERTION 
 
ASSESSMENT/PLAN: 
Problem List  Date Reviewed: 5/9/2019 Codes Class Noted Subcutaneous emphysema ([facility] ICD-10-CM: T79. 7XXA ICD-9-CM: 958.7  5/9/2019 * (Principal) Pneumothorax on right ICD-10-CM: J93.9 ICD-9-CM: 512.89  5/6/2019 Shortness of breath ICD-10-CM: R06.02 
ICD-9-CM: 786.05  5/6/2019 Acute on chronic respiratory failure with hypoxia [facility]) ICD-10-CM: J96.21 
ICD-9-CM: 518.84, 799.02  5/6/2019 COPD exacerbation ([facility] ICD-10-CM: J44.1 ICD-9-CM: 491.21  5/6/2019 Ventricular tachyarrhythmia ([facility] ICD-10-CM: I47.2 ICD-9-CM: 427.1  5/6/2019 Pulmonary infiltrates ICD-10-CM: R91.8 ICD-9-CM: 793.19  5/6/2019 VT (ventricular tachycardia) (HCC) ICD-10-CM: I47.2 ICD-9-CM: 427.1  5/6/2019 Closed compression fracture of lumbosacral spine (HCC) ICD-10-CM: S32.000A ICD-9-CM: 805.4  12/11/2018 Hip fracture ([facility] ICD-10-CM: F94.753V ICD-9-CM: 820.8  2/1/2018 SOB (shortness of breath) ICD-10-CM: R06.02 
ICD-9-CM: 786.05  3/9/2017 Overview Addendum 12/11/2018 10:09 AM by Everton Knox LPN Last Assessment & Plan:  
Stable, 6mwt ordered and reviewed. Management per copd, hypoxia. Last Assessment & Plan: He presents today with shortness of breath that has worsened over the past 2 weeks which he relates to shallow breathing due to back pain after recent lumbar fractures. His shortness of breath appears to be multifactorial.  He does not appear infected today and I do not believe this is a COPD exacerbation. I have recommended breathing exercises to combat his I will breathing. He does have an incentive spirometer from his previous hospitalization which I have encouraged him to use daily as a reminder to take deep breaths and to prevent pneumonia. We reviewed how to use this device. He expresses great concern about his 30 pound weight loss and dyspnea. His chest x-ray today was negative with no signs of infection or cancer. However, a CT is recommended to rule out malignancy. I have explained that cancer is unlikely due to his hip fracture with sudden loss of appetite then gradual weight loss due to minimal p.o. intake immediately after however will proceed with CT scan. 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 HRCT 
  
  
   
 COPD (chronic obstructive pulmonary disease) with emphysema (HCC) ICD-10-CM: J43.9 ICD-9-CM: 492.8  10/17/2016 Overview Signed 12/11/2018 10:09 AM by Everton Knox LPN Last Assessment & Plan:  
Not currently on any maintence medication regimen for COPD as he felt them to be unhelpful in the past.  I recommended a trial of a ANoro Ellipta which he should take 1 inhalation daily.   Demonstration was completed on the correct method of administration and he was able to return demonstration independently in the office today and administer his first dose. I have given him a 2-week sample which he will use daily and monitor if he notes any improvement in his breathing. He may also try pretreating himself with Ventolin before exertion to see if this offers any relief. Chronic respiratory failure with hypoxia (HCC) (Chronic) ICD-10-CM: J96.11 
ICD-9-CM: 518.83, 799.02  3/29/2016 Overview Addendum 5/6/2019 10:14 AM by Sussy Barlow, NP Continue to wear 2 L nasal cannula at night. May use 2 L supplemental oxygen to maintain sats greater than 90%. [place] ICD-10-CM: I49.9 ICD-9-CM: 427.89  3/28/2016 Overview Addendum 12/11/2018 10:09 AM by Laurie Ratliff LPN Overview:  
Reported by LIN Piña Sees physician. Last Assessment & Plan:  
EKG not done. I placed the patient on telemetry today and is having fairly frequent PVCs. We'll check BMP and magnesium. Overview:  
Reported by LIN Piña Sees physician. Last Assessment & Plan:  
EKG not done. I placed the patient on telemetry today and is having fairly frequent PVCs. We'll check BMP and magnesium. Abdominal aortic aneurysm without rupture (HCC) ICD-10-CM: I71.4 ICD-9-CM: 441.4  12/10/2015 Overview Addendum 12/11/2018 10:09 AM by Laurie Ratliff LPN In 2005 Benign prostatic hyperplasia ICD-10-CM: N40.0 ICD-9-CM: 600.00  12/10/2015 Cardiovascular disease ICD-10-CM: I25.10 ICD-9-CM: 429.2  12/10/2015 COPD (chronic obstructive pulmonary disease) (HCC) ICD-10-CM: J44.9 ICD-9-CM: 798  12/10/2015 Low testosterone ICD-10-CM: R79.89 ICD-9-CM: 790.99  12/10/2015 Essential hypertension, benign ICD-10-CM: I10 
ICD-9-CM: 401.1  12/10/2015 Allergic rhinitis ICD-10-CM: J30.9 ICD-9-CM: 477.9  12/10/2015 Raynaud's syndrome ICD-10-CM: I73.00 ICD-9-CM: 443.0  12/10/2015 Erectile dysfunction ICD-10-CM: N52.9 ICD-9-CM: 607.84  12/10/2015 Principal Problem: 
  Pneumothorax on right (5/6/2019) Active Problems: 
  Essential hypertension, benign (12/10/2015) Chronic respiratory failure with hypoxia ([address] (3/29/2016) Overview: Continue to wear 2 L nasal cannula at night. May use 2 L supplemental  
    oxygen to maintain sats greater than 90%. Shortness of breath (5/6/2019) Acute on chronic respiratory failure with hypoxia ([address] (5/6/2019) COPD exacerbation ([address] (5/6/2019) Ventricular tachyarrhythmia ([address] (5/6/2019) Pulmonary infiltrates (5/6/2019) VT (ventricular tachycardia) ([address] (5/6/2019) Subcutaneous emphysema ([address] (5/9/2019) Plan: 
Agreeable to VATS with pleurodesis--scheduled for Thursday Will need to hold ASA/Lovenox prior to surgery Continue chest tube to suction Continue current care NPO after MN 
Obtain consent To OR tomorrow Signed:  Beulah Mccabe NP

## 2019-05-15 NOTE — PROGRESS NOTES
Pt resting in bed with son at bedside. Pt alert oriented times 3 at this time. Pt has CT to right side to 30 cm suction. Pt on 6  L NC at this times. Dressing to CT clean, dry intact at this time. Air leak noted to CT. Pt denies pain or distress at this time. Pt and son with questions regarding surgery, with follow up with MD. Pt and son encouraged to call for assistance if needed call light in reach, door open will monitor.

## 2019-05-15 NOTE — PROGRESS NOTES
Pt resting in bed. No distress noted at this  Time. Pt aware of NPO and procedure tomorrow. CT remains to 30 cm suction. Pt encouraged to call for assistance if needed call light in reach, door open will monitor.

## 2019-05-15 NOTE — PROGRESS NOTES
Report from remote tele tech that patient had a run of sinus bradycardia 48. Nurse assess patient in room . HR 49. Patient is alert and oriented. Will monitor.

## 2019-05-15 NOTE — PROGRESS NOTES
Problem: Falls - Risk of 
Goal: *Absence of Falls Description Document Carson Calderón Fall Risk and appropriate interventions in the flowsheet. Outcome: Progressing Towards Goal 
  
Problem: Breathing Pattern - Ineffective Goal: *Absence of hypoxia Outcome: Progressing Towards Goal 
  
Problem: Arrhythmia Pathway (Adult) Goal: *Absence of arrhythmia Outcome: Progressing Towards Goal

## 2019-05-15 NOTE — ANESTHESIA PREPROCEDURE EVALUATION
Relevant Problems No relevant active problems Anesthetic History No history of anesthetic complications Review of Systems / Medical History Patient summary reviewed and pertinent labs reviewed Pulmonary COPD: moderate Asthma Neuro/Psych Cardiovascular Hypertension: well controlled Dysrhythmias Exercise tolerance: <4 METS Comments: V tach in EMS upon on admission - now on Amiodarone Echo 5/6 - normal EF, mild pulm HTN, mild AS, mod MR, mod TR  
GI/Hepatic/Renal 
Within defined limits Endo/Other Arthritis Other Findings Physical Exam 
 
Airway Mallampati: II 
TM Distance: > 6 cm Neck ROM: normal range of motion Mouth opening: Normal 
 
 Cardiovascular Rhythm: regular Rate: normal 
 
 
 
 Dental 
No notable dental hx Pulmonary Decreased breath sounds: right Abdominal 
 
 
 
 Other Findings Anesthetic Plan ASA: 3 Anesthesia type: general 
 
Monitoring Plan: Arterial line Anesthetic plan and risks discussed with: Patient

## 2019-05-16 ENCOUNTER — APPOINTMENT (OUTPATIENT)
Dept: GENERAL RADIOLOGY | Age: 84
DRG: 163 | End: 2019-05-16
Attending: SURGERY
Payer: MEDICARE

## 2019-05-16 ENCOUNTER — APPOINTMENT (OUTPATIENT)
Dept: GENERAL RADIOLOGY | Age: 84
DRG: 163 | End: 2019-05-16
Attending: INTERNAL MEDICINE
Payer: MEDICARE

## 2019-05-16 ENCOUNTER — ANESTHESIA (OUTPATIENT)
Dept: SURGERY | Age: 84
DRG: 163 | End: 2019-05-16
Payer: MEDICARE

## 2019-05-16 LAB
ABO + RH BLD: NORMAL
BLOOD GROUP ANTIBODIES SERPL: NORMAL
SPECIMEN EXP DATE BLD: NORMAL

## 2019-05-16 PROCEDURE — 74011636637 HC RX REV CODE- 636/637: Performed by: INTERNAL MEDICINE

## 2019-05-16 PROCEDURE — 74011250637 HC RX REV CODE- 250/637: Performed by: INTERNAL MEDICINE

## 2019-05-16 PROCEDURE — 76060000036 HC ANESTHESIA 2.5 TO 3 HR: Performed by: SURGERY

## 2019-05-16 PROCEDURE — 0BNN4ZZ RELEASE RIGHT PLEURA, PERCUTANEOUS ENDOSCOPIC APPROACH: ICD-10-PCS | Performed by: SURGERY

## 2019-05-16 PROCEDURE — 77030000038 HC TIP SCIS LAPSCP SURI -B: Performed by: SURGERY

## 2019-05-16 PROCEDURE — 88305 TISSUE EXAM BY PATHOLOGIST: CPT

## 2019-05-16 PROCEDURE — 77030009968 HC RELD STPLR ENDOSC J&J -D: Performed by: SURGERY

## 2019-05-16 PROCEDURE — 99232 SBSQ HOSP IP/OBS MODERATE 35: CPT | Performed by: INTERNAL MEDICINE

## 2019-05-16 PROCEDURE — 74011000250 HC RX REV CODE- 250: Performed by: SURGERY

## 2019-05-16 PROCEDURE — 77030035051: Performed by: SURGERY

## 2019-05-16 PROCEDURE — 77030002996 HC SUT SLK J&J -A: Performed by: SURGERY

## 2019-05-16 PROCEDURE — 77030009850 HC PCH ENDOSC SPEC COOK -B: Performed by: SURGERY

## 2019-05-16 PROCEDURE — 74011250637 HC RX REV CODE- 250/637: Performed by: ANESTHESIOLOGY

## 2019-05-16 PROCEDURE — 77030037088 HC TUBE ENDOTRACH ORAL NSL COVD-A: Performed by: ANESTHESIOLOGY

## 2019-05-16 PROCEDURE — 77030032490 HC SLV COMPR SCD KNE COVD -B

## 2019-05-16 PROCEDURE — 77030016570 HC BLNKT BAIR HGGR 3M -B: Performed by: ANESTHESIOLOGY

## 2019-05-16 PROCEDURE — 77030031139 HC SUT VCRL2 J&J -A: Performed by: SURGERY

## 2019-05-16 PROCEDURE — 71045 X-RAY EXAM CHEST 1 VIEW: CPT

## 2019-05-16 PROCEDURE — 77030027876 HC STPLR ENDOSC FLX PWR J&J -G1: Performed by: SURGERY

## 2019-05-16 PROCEDURE — 76210000006 HC OR PH I REC 0.5 TO 1 HR: Performed by: SURGERY

## 2019-05-16 PROCEDURE — 77030008542 HC TBNG MON PRSS EDWD -A: Performed by: ANESTHESIOLOGY

## 2019-05-16 PROCEDURE — 77030019908 HC STETH ESOPH SIMS -A: Performed by: ANESTHESIOLOGY

## 2019-05-16 PROCEDURE — 77030039425 HC BLD LARYNG TRULITE DISP TELE -A: Performed by: ANESTHESIOLOGY

## 2019-05-16 PROCEDURE — 74011000250 HC RX REV CODE- 250

## 2019-05-16 PROCEDURE — 77030034850: Performed by: SURGERY

## 2019-05-16 PROCEDURE — 65660000000 HC RM CCU STEPDOWN

## 2019-05-16 PROCEDURE — 86900 BLOOD TYPING SEROLOGIC ABO: CPT

## 2019-05-16 PROCEDURE — 77030013292 HC BOWL MX PRSM J&J -A: Performed by: ANESTHESIOLOGY

## 2019-05-16 PROCEDURE — 77030016153 HC RELD STPLR VASC J&J -B: Performed by: SURGERY

## 2019-05-16 PROCEDURE — 77030009973 HC RELD STPLR J&J -C: Performed by: SURGERY

## 2019-05-16 PROCEDURE — 74011250637 HC RX REV CODE- 250/637: Performed by: SURGERY

## 2019-05-16 PROCEDURE — 77030035045 HC TRCR ENDOSC VRSPRT BLDLSS COVD -B: Performed by: SURGERY

## 2019-05-16 PROCEDURE — 77030013794 HC KT TRNSDUC BLD EDWD -B: Performed by: ANESTHESIOLOGY

## 2019-05-16 PROCEDURE — 77030012390 HC DRN CHST BTL GTNG -B: Performed by: SURGERY

## 2019-05-16 PROCEDURE — 3E0L4GC INTRODUCTION OF OTHER THERAPEUTIC SUBSTANCE INTO PLEURAL CAVITY, PERCUTANEOUS ENDOSCOPIC APPROACH: ICD-10-PCS | Performed by: SURGERY

## 2019-05-16 PROCEDURE — C1729 CATH, DRAINAGE: HCPCS | Performed by: SURGERY

## 2019-05-16 PROCEDURE — 77030018836 HC SOL IRR NACL ICUM -A: Performed by: SURGERY

## 2019-05-16 PROCEDURE — 77030002966 HC SUT PDS J&J -A: Performed by: SURGERY

## 2019-05-16 PROCEDURE — 77030014028 HC TALC INTPLR SCLR BRYN -C: Performed by: SURGERY

## 2019-05-16 PROCEDURE — 74011250636 HC RX REV CODE- 250/636

## 2019-05-16 PROCEDURE — 77030035048 HC TRCR ENDOSC OPTCL COVD -B: Performed by: SURGERY

## 2019-05-16 PROCEDURE — 77030037378 HC BRONCHOSCOPE DISP TRAN -D: Performed by: ANESTHESIOLOGY

## 2019-05-16 PROCEDURE — 77030008756 HC TU IRR SUC STRY -B: Performed by: SURGERY

## 2019-05-16 PROCEDURE — 74011250636 HC RX REV CODE- 250/636: Performed by: ANESTHESIOLOGY

## 2019-05-16 PROCEDURE — 76010000171 HC OR TIME 2 TO 2.5 HR INTENSV-TIER 1: Performed by: SURGERY

## 2019-05-16 PROCEDURE — 74011000250 HC RX REV CODE- 250: Performed by: INTERNAL MEDICINE

## 2019-05-16 PROCEDURE — 0W9940Z DRAINAGE OF RIGHT PLEURAL CAVITY WITH DRAINAGE DEVICE, PERCUTANEOUS ENDOSCOPIC APPROACH: ICD-10-PCS | Performed by: SURGERY

## 2019-05-16 PROCEDURE — 94640 AIRWAY INHALATION TREATMENT: CPT

## 2019-05-16 PROCEDURE — 77030032490 HC SLV COMPR SCD KNE COVD -B: Performed by: SURGERY

## 2019-05-16 PROCEDURE — 77030040088 HC BLWR PWDR TALC BMPI -B: Performed by: SURGERY

## 2019-05-16 PROCEDURE — 94760 N-INVAS EAR/PLS OXIMETRY 1: CPT

## 2019-05-16 PROCEDURE — 77030008437 HC REINF STRP REINF SEMGD WLGO -C: Performed by: SURGERY

## 2019-05-16 PROCEDURE — 77030005401 HC CATH RAD ARRO -A: Performed by: ANESTHESIOLOGY

## 2019-05-16 PROCEDURE — 0BBK4ZZ EXCISION OF RIGHT LUNG, PERCUTANEOUS ENDOSCOPIC APPROACH: ICD-10-PCS | Performed by: SURGERY

## 2019-05-16 DEVICE — IMPLANTABLE DEVICE: Type: IMPLANTABLE DEVICE | Site: LUNG | Status: FUNCTIONAL

## 2019-05-16 RX ORDER — GLYCOPYRROLATE 0.2 MG/ML
INJECTION INTRAMUSCULAR; INTRAVENOUS AS NEEDED
Status: DISCONTINUED | OUTPATIENT
Start: 2019-05-16 | End: 2019-05-16 | Stop reason: HOSPADM

## 2019-05-16 RX ORDER — EPHEDRINE SULFATE 50 MG/ML
INJECTION, SOLUTION INTRAVENOUS AS NEEDED
Status: DISCONTINUED | OUTPATIENT
Start: 2019-05-16 | End: 2019-05-16 | Stop reason: HOSPADM

## 2019-05-16 RX ORDER — FENTANYL CITRATE 50 UG/ML
100 INJECTION, SOLUTION INTRAMUSCULAR; INTRAVENOUS ONCE
Status: DISCONTINUED | OUTPATIENT
Start: 2019-05-16 | End: 2019-05-16 | Stop reason: HOSPADM

## 2019-05-16 RX ORDER — OXYCODONE HYDROCHLORIDE 5 MG/1
5 TABLET ORAL
Status: DISCONTINUED | OUTPATIENT
Start: 2019-05-16 | End: 2019-05-16 | Stop reason: HOSPADM

## 2019-05-16 RX ORDER — LIDOCAINE HYDROCHLORIDE 20 MG/ML
INJECTION, SOLUTION EPIDURAL; INFILTRATION; INTRACAUDAL; PERINEURAL AS NEEDED
Status: DISCONTINUED | OUTPATIENT
Start: 2019-05-16 | End: 2019-05-16 | Stop reason: HOSPADM

## 2019-05-16 RX ORDER — SODIUM CHLORIDE, SODIUM LACTATE, POTASSIUM CHLORIDE, CALCIUM CHLORIDE 600; 310; 30; 20 MG/100ML; MG/100ML; MG/100ML; MG/100ML
100 INJECTION, SOLUTION INTRAVENOUS CONTINUOUS
Status: DISCONTINUED | OUTPATIENT
Start: 2019-05-16 | End: 2019-05-16 | Stop reason: HOSPADM

## 2019-05-16 RX ORDER — HYDROCODONE BITARTRATE AND ACETAMINOPHEN 7.5; 325 MG/1; MG/1
1 TABLET ORAL AS NEEDED
Status: DISCONTINUED | OUTPATIENT
Start: 2019-05-16 | End: 2019-05-16 | Stop reason: HOSPADM

## 2019-05-16 RX ORDER — FAMOTIDINE 20 MG/1
20 TABLET, FILM COATED ORAL ONCE
Status: COMPLETED | OUTPATIENT
Start: 2019-05-16 | End: 2019-05-16

## 2019-05-16 RX ORDER — HYDROMORPHONE HYDROCHLORIDE 2 MG/ML
0.5 INJECTION, SOLUTION INTRAMUSCULAR; INTRAVENOUS; SUBCUTANEOUS
Status: DISCONTINUED | OUTPATIENT
Start: 2019-05-16 | End: 2019-05-16 | Stop reason: HOSPADM

## 2019-05-16 RX ORDER — LIDOCAINE HYDROCHLORIDE 10 MG/ML
0.1 INJECTION INFILTRATION; PERINEURAL AS NEEDED
Status: DISCONTINUED | OUTPATIENT
Start: 2019-05-16 | End: 2019-05-16 | Stop reason: HOSPADM

## 2019-05-16 RX ORDER — FENTANYL CITRATE 50 UG/ML
INJECTION, SOLUTION INTRAMUSCULAR; INTRAVENOUS AS NEEDED
Status: DISCONTINUED | OUTPATIENT
Start: 2019-05-16 | End: 2019-05-16 | Stop reason: HOSPADM

## 2019-05-16 RX ORDER — MIDAZOLAM HYDROCHLORIDE 1 MG/ML
2 INJECTION, SOLUTION INTRAMUSCULAR; INTRAVENOUS
Status: DISCONTINUED | OUTPATIENT
Start: 2019-05-16 | End: 2019-05-16 | Stop reason: HOSPADM

## 2019-05-16 RX ORDER — SODIUM CHLORIDE, SODIUM LACTATE, POTASSIUM CHLORIDE, CALCIUM CHLORIDE 600; 310; 30; 20 MG/100ML; MG/100ML; MG/100ML; MG/100ML
INJECTION, SOLUTION INTRAVENOUS
Status: DISCONTINUED | OUTPATIENT
Start: 2019-05-16 | End: 2019-05-16 | Stop reason: HOSPADM

## 2019-05-16 RX ORDER — ONDANSETRON 2 MG/ML
INJECTION INTRAMUSCULAR; INTRAVENOUS AS NEEDED
Status: DISCONTINUED | OUTPATIENT
Start: 2019-05-16 | End: 2019-05-16 | Stop reason: HOSPADM

## 2019-05-16 RX ORDER — SODIUM CHLORIDE 0.9 % (FLUSH) 0.9 %
5-40 SYRINGE (ML) INJECTION AS NEEDED
Status: DISCONTINUED | OUTPATIENT
Start: 2019-05-16 | End: 2019-05-16 | Stop reason: HOSPADM

## 2019-05-16 RX ORDER — CEFAZOLIN SODIUM 1 G/3ML
INJECTION, POWDER, FOR SOLUTION INTRAMUSCULAR; INTRAVENOUS AS NEEDED
Status: DISCONTINUED | OUTPATIENT
Start: 2019-05-16 | End: 2019-05-16 | Stop reason: HOSPADM

## 2019-05-16 RX ORDER — SODIUM CHLORIDE 0.9 % (FLUSH) 0.9 %
5-40 SYRINGE (ML) INJECTION EVERY 8 HOURS
Status: DISCONTINUED | OUTPATIENT
Start: 2019-05-16 | End: 2019-05-16 | Stop reason: HOSPADM

## 2019-05-16 RX ORDER — BUPIVACAINE HYDROCHLORIDE AND EPINEPHRINE 5; 5 MG/ML; UG/ML
INJECTION, SOLUTION EPIDURAL; INTRACAUDAL; PERINEURAL AS NEEDED
Status: DISCONTINUED | OUTPATIENT
Start: 2019-05-16 | End: 2019-05-16 | Stop reason: HOSPADM

## 2019-05-16 RX ORDER — NEOSTIGMINE METHYLSULFATE 1 MG/ML
INJECTION INTRAVENOUS AS NEEDED
Status: DISCONTINUED | OUTPATIENT
Start: 2019-05-16 | End: 2019-05-16 | Stop reason: HOSPADM

## 2019-05-16 RX ORDER — PROPOFOL 10 MG/ML
INJECTION, EMULSION INTRAVENOUS AS NEEDED
Status: DISCONTINUED | OUTPATIENT
Start: 2019-05-16 | End: 2019-05-16 | Stop reason: HOSPADM

## 2019-05-16 RX ORDER — HYDROMORPHONE HYDROCHLORIDE 1 MG/ML
0.5 INJECTION, SOLUTION INTRAMUSCULAR; INTRAVENOUS; SUBCUTANEOUS
Status: DISCONTINUED | OUTPATIENT
Start: 2019-05-16 | End: 2019-05-18

## 2019-05-16 RX ORDER — SODIUM CHLORIDE 9 MG/ML
25 INJECTION, SOLUTION INTRAVENOUS CONTINUOUS
Status: DISCONTINUED | OUTPATIENT
Start: 2019-05-16 | End: 2019-05-16 | Stop reason: HOSPADM

## 2019-05-16 RX ORDER — ROCURONIUM BROMIDE 10 MG/ML
INJECTION, SOLUTION INTRAVENOUS AS NEEDED
Status: DISCONTINUED | OUTPATIENT
Start: 2019-05-16 | End: 2019-05-16 | Stop reason: HOSPADM

## 2019-05-16 RX ORDER — NALOXONE HYDROCHLORIDE 0.4 MG/ML
0.1 INJECTION, SOLUTION INTRAMUSCULAR; INTRAVENOUS; SUBCUTANEOUS AS NEEDED
Status: DISCONTINUED | OUTPATIENT
Start: 2019-05-16 | End: 2019-05-16 | Stop reason: HOSPADM

## 2019-05-16 RX ORDER — CEFAZOLIN SODIUM/WATER 2 G/20 ML
2 SYRINGE (ML) INTRAVENOUS ONCE
Status: DISCONTINUED | OUTPATIENT
Start: 2019-05-16 | End: 2019-05-16

## 2019-05-16 RX ADMIN — GLYCOPYRROLATE 0.2 MG: 0.2 INJECTION INTRAMUSCULAR; INTRAVENOUS at 09:41

## 2019-05-16 RX ADMIN — GUAIFENESIN 1200 MG: 600 TABLET, EXTENDED RELEASE ORAL at 12:19

## 2019-05-16 RX ADMIN — EPHEDRINE SULFATE 5 MG: 50 INJECTION, SOLUTION INTRAVENOUS at 09:26

## 2019-05-16 RX ADMIN — PROPOFOL 60 MG: 10 INJECTION, EMULSION INTRAVENOUS at 07:59

## 2019-05-16 RX ADMIN — Medication 10 ML: at 12:21

## 2019-05-16 RX ADMIN — MONTELUKAST SODIUM 10 MG: 10 TABLET, FILM COATED ORAL at 05:27

## 2019-05-16 RX ADMIN — LISINOPRIL 10 MG: 5 TABLET ORAL at 12:19

## 2019-05-16 RX ADMIN — SODIUM CHLORIDE, SODIUM LACTATE, POTASSIUM CHLORIDE, AND CALCIUM CHLORIDE 100 ML/HR: 600; 310; 30; 20 INJECTION, SOLUTION INTRAVENOUS at 06:23

## 2019-05-16 RX ADMIN — FENTANYL CITRATE 50 MCG: 50 INJECTION, SOLUTION INTRAMUSCULAR; INTRAVENOUS at 08:56

## 2019-05-16 RX ADMIN — PROPOFOL 60 MG: 10 INJECTION, EMULSION INTRAVENOUS at 09:24

## 2019-05-16 RX ADMIN — PREDNISONE 40 MG: 20 TABLET ORAL at 05:24

## 2019-05-16 RX ADMIN — FENTANYL CITRATE 25 MCG: 50 INJECTION, SOLUTION INTRAMUSCULAR; INTRAVENOUS at 07:43

## 2019-05-16 RX ADMIN — SODIUM CHLORIDE, SODIUM LACTATE, POTASSIUM CHLORIDE, CALCIUM CHLORIDE: 600; 310; 30; 20 INJECTION, SOLUTION INTRAVENOUS at 07:41

## 2019-05-16 RX ADMIN — FENTANYL CITRATE 25 MCG: 50 INJECTION, SOLUTION INTRAMUSCULAR; INTRAVENOUS at 09:46

## 2019-05-16 RX ADMIN — NEOSTIGMINE METHYLSULFATE 1 MG: 1 INJECTION INTRAVENOUS at 09:41

## 2019-05-16 RX ADMIN — ACETAMINOPHEN 500 MG: 500 TABLET, FILM COATED ORAL at 12:19

## 2019-05-16 RX ADMIN — EPHEDRINE SULFATE 5 MG: 50 INJECTION, SOLUTION INTRAVENOUS at 09:04

## 2019-05-16 RX ADMIN — CEFAZOLIN SODIUM 1 G: 1 INJECTION, POWDER, FOR SOLUTION INTRAMUSCULAR; INTRAVENOUS at 08:06

## 2019-05-16 RX ADMIN — AMIODARONE HYDROCHLORIDE 400 MG: 200 TABLET ORAL at 21:14

## 2019-05-16 RX ADMIN — Medication 10 ML: at 05:25

## 2019-05-16 RX ADMIN — ACETAMINOPHEN 500 MG: 500 TABLET, FILM COATED ORAL at 05:24

## 2019-05-16 RX ADMIN — CARVEDILOL 3.12 MG: 3.12 TABLET, FILM COATED ORAL at 05:26

## 2019-05-16 RX ADMIN — DOCUSATE SODIUM 100 MG: 100 CAPSULE, LIQUID FILLED ORAL at 17:05

## 2019-05-16 RX ADMIN — SENNOSIDES 17.2 MG: 8.6 TABLET, FILM COATED ORAL at 21:14

## 2019-05-16 RX ADMIN — LIDOCAINE HYDROCHLORIDE 60 MG: 20 INJECTION, SOLUTION EPIDURAL; INFILTRATION; INTRACAUDAL; PERINEURAL at 07:59

## 2019-05-16 RX ADMIN — FAMOTIDINE 20 MG: 20 TABLET ORAL at 06:33

## 2019-05-16 RX ADMIN — GLYCOPYRROLATE 0.2 MG: 0.2 INJECTION INTRAMUSCULAR; INTRAVENOUS at 09:43

## 2019-05-16 RX ADMIN — EPHEDRINE SULFATE 10 MG: 50 INJECTION, SOLUTION INTRAVENOUS at 08:44

## 2019-05-16 RX ADMIN — FENTANYL CITRATE 25 MCG: 50 INJECTION, SOLUTION INTRAMUSCULAR; INTRAVENOUS at 07:48

## 2019-05-16 RX ADMIN — HYDRALAZINE HYDROCHLORIDE 25 MG: 25 TABLET, FILM COATED ORAL at 05:25

## 2019-05-16 RX ADMIN — ALBUTEROL SULFATE 2.5 MG: 2.5 SOLUTION RESPIRATORY (INHALATION) at 01:51

## 2019-05-16 RX ADMIN — TAMSULOSIN HYDROCHLORIDE 0.4 MG: 0.4 CAPSULE ORAL at 12:20

## 2019-05-16 RX ADMIN — NEOSTIGMINE METHYLSULFATE 1 MG: 1 INJECTION INTRAVENOUS at 09:43

## 2019-05-16 RX ADMIN — ONDANSETRON 4 MG: 2 INJECTION INTRAMUSCULAR; INTRAVENOUS at 09:09

## 2019-05-16 RX ADMIN — ROCURONIUM BROMIDE 40 MG: 10 INJECTION, SOLUTION INTRAVENOUS at 07:59

## 2019-05-16 RX ADMIN — EPHEDRINE SULFATE 10 MG: 50 INJECTION, SOLUTION INTRAVENOUS at 08:05

## 2019-05-16 RX ADMIN — AMIODARONE HYDROCHLORIDE 400 MG: 200 TABLET ORAL at 05:27

## 2019-05-16 RX ADMIN — ALBUTEROL SULFATE 2.5 MG: 2.5 SOLUTION RESPIRATORY (INHALATION) at 20:12

## 2019-05-16 RX ADMIN — GLYCOPYRROLATE 0.2 MG: 0.2 INJECTION INTRAMUSCULAR; INTRAVENOUS at 09:31

## 2019-05-16 RX ADMIN — NEOSTIGMINE METHYLSULFATE 1 MG: 1 INJECTION INTRAVENOUS at 09:42

## 2019-05-16 RX ADMIN — GLYCOPYRROLATE 0.2 MG: 0.2 INJECTION INTRAMUSCULAR; INTRAVENOUS at 09:42

## 2019-05-16 RX ADMIN — CARVEDILOL 3.12 MG: 3.12 TABLET, FILM COATED ORAL at 17:05

## 2019-05-16 RX ADMIN — HYDRALAZINE HYDROCHLORIDE 25 MG: 25 TABLET, FILM COATED ORAL at 12:20

## 2019-05-16 RX ADMIN — EPHEDRINE SULFATE 10 MG: 50 INJECTION, SOLUTION INTRAVENOUS at 08:07

## 2019-05-16 RX ADMIN — Medication 5 ML: at 21:15

## 2019-05-16 RX ADMIN — CEFAZOLIN SODIUM 1 G: 1 INJECTION, POWDER, FOR SOLUTION INTRAMUSCULAR; INTRAVENOUS at 08:03

## 2019-05-16 RX ADMIN — FENTANYL CITRATE 25 MCG: 50 INJECTION, SOLUTION INTRAMUSCULAR; INTRAVENOUS at 09:49

## 2019-05-16 RX ADMIN — ACETAMINOPHEN 500 MG: 500 TABLET, FILM COATED ORAL at 21:14

## 2019-05-16 NOTE — PERIOP NOTES
TRANSFER - IN REPORT: 
 
Verbal report received from JANINA Cheung on [facility]  being received from 8th floor for ordered procedure Report consisted of patients Situation, Background, Assessment and  
Recommendations(SBAR). Information from the following report(s) SBAR was reviewed with the receiving nurse. Opportunity for questions and clarification was provided. Assessment to be completed upon patients arrival to unit and care to be assumed.

## 2019-05-16 NOTE — ANESTHESIA POSTPROCEDURE EVALUATION
Procedure(s): RIGHT VATS WITH TALC PLEURODESIS CHEST TUBE INSERTION X 2  APICAL BLEBECTOMY. general 
 
Anesthesia Post Evaluation Patient location during evaluation: PACU Patient participation: complete - patient participated Level of consciousness: awake and alert Pain management: adequate Airway patency: patent Anesthetic complications: no 
Cardiovascular status: acceptable Respiratory status: acceptable Hydration status: acceptable Post anesthesia nausea and vomiting:  none Vitals Value Taken Time /80 5/16/2019 11:06 AM  
Temp 36.7 °C (98.1 °F) 5/16/2019 11:01 AM  
Pulse 57 5/16/2019 11:07 AM  
Resp 20 5/16/2019 11:07 AM  
SpO2 98 % 5/16/2019 11:07 AM  
Vitals shown include unvalidated device data.

## 2019-05-16 NOTE — PERIOP NOTES
TRANSFER - OUT REPORT: 
 
Verbal report given to [facility] RN (name) on Roe Collet  being transferred to [address] [place] [address] (unit) for routine post - op Report consisted of patients Situation, Background, Assessment and  
Recommendations(SBAR). Information from the following report(s) SBAR, OR Summary and Procedure Summary was reviewed with the receiving nurse. Lines:  
Peripheral IV 05/14/19 Right;Posterior Forearm (Active) Site Assessment Clean, dry, & intact 5/16/2019 10:13 AM  
Phlebitis Assessment 0 5/16/2019 10:13 AM  
Infiltration Assessment 0 5/16/2019 10:13 AM  
Dressing Status Clean, dry, & intact 5/16/2019 10:13 AM  
Dressing Type Tape;Transparent 5/16/2019 10:13 AM  
Hub Color/Line Status Blue;Capped 5/16/2019 10:13 AM  
Alcohol Cap Used No 5/16/2019  2:07 AM  
   
Peripheral IV 05/16/19 Left;Posterior Hand (Active) Site Assessment Clean, dry, & intact 5/16/2019 10:13 AM  
Phlebitis Assessment 0 5/16/2019 10:13 AM  
Infiltration Assessment 0 5/16/2019 10:13 AM  
Dressing Status Clean, dry, & intact 5/16/2019 10:13 AM  
Dressing Type Tape;Transparent 5/16/2019 10:13 AM  
Hub Color/Line Status Green;Capped 5/16/2019 10:13 AM  
   
Peripheral IV 05/16/19 Right Arm (Active) Site Assessment Clean, dry, & intact 5/16/2019 10:13 AM  
Phlebitis Assessment 0 5/16/2019 10:13 AM  
Infiltration Assessment 0 5/16/2019 10:13 AM  
Dressing Status Clean, dry, & intact 5/16/2019 10:13 AM  
Dressing Type Tape;Transparent 5/16/2019 10:13 AM  
Hub Color/Line Status Green; Infusing 5/16/2019 10:13 AM  
   
Arterial Line 05/16/19 Right Radial artery (Active) Site Assessment Clean, dry, & intact 5/16/2019 10:13 AM  
Dressing Status Clean, dry, & intact 5/16/2019 10:13 AM  
Dressing Type Tape;Transparent 5/16/2019 10:13 AM  
Line Status Intact and in place 5/16/2019 10:13 AM  
Treatment Zeroed or re-zeroed 5/16/2019 10:13 AM  
Affected Extremity/Extremities Color distal to insertion site pink (or appropriate for race) 5/16/2019 10:13 AM  
   
Arterial Line 05/16/19 Right Radial artery (Active) Opportunity for questions and clarification was provided. Patient transported with: 
 O2 @ 2 liters VTE prophylaxis orders have been written for [facility]. Patient and family given floor number and nurses name. Family updated re: pt status after security code verified.

## 2019-05-16 NOTE — BRIEF OP NOTE
BRIEF OPERATIVE NOTE Date of Procedure: 5/16/2019 Preoperative Diagnosis: Pneumothorax on right [J93.9] Postoperative Diagnosis: Pneumothorax on right [J93.9] Procedure(s): RIGHT VATS WITH TALC PLEURODESIS CHEST TUBE INSERTION X 2  APICAL BLEBECTOMY Surgeon(s) and Role: Katie Paige MD - Primary Surgical Assistant: none Surgical Staff: 
Circ-1: Mando Giordano RN Scrub Tech-1: Shoshana Chambers Scrub Tech-2: David Macario Event Time In Time Out Incision Start 05/16/2019 4994 Incision Close 05/16/2019 0940 Anesthesia: General  
Estimated Blood Loss: minimal 
Specimens:  
ID Type Source Tests Collected by Time Destination 1 : APICAL BLEB RIGHT LUNG Preservative Lung  Bea Kerr MD 5/16/2019 4222 Pathology Findings: see op note Complications: none Implants:  
Implant Name Type Inv. Item Serial No.  Lot No. LRB No. Used [facility] FOR EC60A - [id number] Tissue Expander [facility] FOR EC60A  [employer] [id number] Right 1 Implanted

## 2019-05-16 NOTE — ANESTHESIA PROCEDURE NOTES
Arterial Line Placement    Start time: 5/16/2019 8:05 AM  End time: 5/16/2019 8:10 AM  Performed by: Garth Odell CRNA  Authorized by:  Edna Garcia MD     Pre-Procedure  Indications:  Arterial pressure monitoring and blood sampling  Preanesthetic Checklist: patient identified, risks and benefits discussed, anesthesia consent, site marked, patient being monitored, timeout performed and patient being monitored    Timeout Time: 08:05        Procedure:   Prep:  ChloraPrep  Seldinger Technique?: No    Orientation:  Right  Location:  Radial artery  Catheter size:  20 G  Number of attempts:  1  Cont Cardiac Output Sensor: No      Assessment:   Post-procedure:  Sterile dressing applied  Patient Tolerance:  Patient tolerated the procedure well with no immediate complications

## 2019-05-16 NOTE — PROGRESS NOTES
Pulmonary Daily Progress Note: 5/16/2019 Chai Machuca Admission Date: 5/6/2019 The patient's chart is reviewed and the patient is discussed with the staff. 87 y.o. CM presents via EMS with shortness of breath when got up this morning.  Had a productive cough with yellow sputum and received Rocephin.  In the ER CXR with large right pneumothorax and pulmonary consulted for chest tube placement and admission. Gladis Bentoneeter a fall in February and seen by Dr. Haley Watters with compression L2,L3, L4. In the ER had VT and cardiology was consulted and added Amiodarone. He has had a right pneumothorax with persistent air leak and underwent surgical pleurodesis on 5/16. Subjective:  
Awake, seen postop. Chest tubes x 2 without air leak and patient is comfortable on nasal cannula oxygen. Current Facility-Administered Medications Medication Dose Route Frequency  HYDROmorphone (PF) (DILAUDID) injection 0.5 mg  0.5 mg IntraVENous Q3H PRN  predniSONE (DELTASONE) tablet 40 mg  40 mg Oral DAILY WITH BREAKFAST  hydrALAZINE (APRESOLINE) tablet 25 mg  25 mg Oral QID  acetaminophen (TYLENOL) tablet 500 mg  500 mg Oral Q8H  
 acetaminophen (TYLENOL) tablet 650 mg  650 mg Oral Q4H PRN  
 oxyCODONE IR (ROXICODONE) tablet 5 mg  5 mg Oral Q4H PRN  
 docusate sodium (COLACE) capsule 100 mg  100 mg Oral BID  senna (SENOKOT) tablet 17.2 mg  2 Tab Oral QHS  lisinopril (PRINIVIL, ZESTRIL) tablet 10 mg  10 mg Oral DAILY  fluticasone propionate (FLONASE) 50 mcg/actuation nasal spray 2 Spray  2 Spray Both Nostrils DAILY  amiodarone (CORDARONE) tablet 400 mg  400 mg Oral Q12H  carvedilol (COREG) tablet 3.125 mg  3.125 mg Oral BID WITH MEALS  calcium carbonate (TUMS) chewable tablet 200 mg [elemental]  200 mg Oral TID PRN  
 hydrALAZINE (APRESOLINE) 20 mg/mL injection 10 mg  10 mg IntraVENous Q6H PRN  
 [Held by provider] aspirin delayed-release tablet 81 mg  81 mg Oral DAILY  guaiFENesin ER (MUCINEX) tablet 1,200 mg  1,200 mg Oral DAILY  montelukast (SINGULAIR) tablet 10 mg  10 mg Oral DAILY  tamsulosin (FLOMAX) capsule 0.4 mg  0.4 mg Oral DAILY  sodium chloride (NS) flush 5-40 mL  5-40 mL IntraVENous Q8H  
 sodium chloride (NS) flush 5-40 mL  5-40 mL IntraVENous PRN  
 albuterol (PROVENTIL VENTOLIN) nebulizer solution 2.5 mg  2.5 mg Nebulization Q6H RT  
 [Held by provider] enoxaparin (LOVENOX) injection 40 mg  40 mg SubCUTAneous Q24H Review of Systems 
+chest wall pain Constitutional:  negative for fever, chills, sweats HEENT: +sinsus congestion and has seasonal allergies Cardiovascular:  negative for chest pain, palpitations, syncope, edema Respiratory:  Right chest tube, significant subc air Gastrointestinal:  negative for dysphagia, reflux, vomiting, diarrhea, abdominal pain, or melena Neurologic:  negative for focal weakness, numbness, headache Objective:  
 
Vitals:  
 05/16/19 1051 05/16/19 1056 05/16/19 1101 05/16/19 1149 BP: 174/84 169/80 171/80 159/79 Pulse: (!) 56 (!) 57 (!) 58 (!) 59 Resp: 15 19 21 19 Temp:   98.1 °F (36.7 °C) 98.1 °F (36.7 °C) SpO2: 98% 97% 97% 97% Weight:      
Height:      
 
 
Intake and Output:  
05/14 1901 - 05/16 0700 In: 0200 [P.O.:760] Out: 1330 [[id number]] 05/16 0701 - 05/16 1900 In: 1100 [I.V.:1100] Out: John Sandoval [[id number]] Physical Exam:         
Constitutional:  the patient is well developed and in no acute distress, NC  6lpm 
EENMT:  Sclera clear, pupils equal, oral mucosa moist 
Respiratory: crepitus over entire chest, right chest tubes without air leak Cardiovascular:  RRR without M,G,R   
Gastrointestinal: soft and non-tender; with positive bowel sounds, appetite good. Musculoskeletal: warm without cyanosis. There is no lower extremity edema. Skin:  no jaundice or rashes, right anterior chest wound, right chest tube Neurologic: no gross neuro deficits Psychiatric:  alert and oriented x 3 LINES:   
PIV sites Right chest tube 5/7/19 DRIPS:   None CXR  5/14 5/10/19 Chest CT 5/8/19: 
 
 
LAB No results for input(s): GLUCPOC in the last 72 hours. No lab exists for component: [place] No results for input(s): WBC, HGB, HCT, PLT, INR, HGBEXT, HCTEXT, PLTEXT, HGBEXT, HCTEXT, PLTEXT in the last 72 hours. No lab exists for component: INREXT, INREXT No results for input(s): NA, K, CL, CO2, GLU, BUN, CREA, MG, PHOS, CA, TROIQ, ALB, TBIL, TBILI, GPT, ALT, SGOT, BNPP in the last 72 hours. No lab exists for component: TROIP No results for input(s): PH, PCO2, PO2, HCO3 in the last 72 hours. No results for input(s): LCAD, LAC in the last 72 hours. Assessment:  (Medical Decision Making) Hospital Problems  Date Reviewed: 5/9/2019 Codes Class Noted POA Shortness of breath ICD-10-CM: R06.02 
ICD-9-CM: 786.05  5/6/2019 Yes Wean O2 as able. * (Principal) Pneumothorax on right ICD-10-CM: J93.9 ICD-9-CM: 512.89  5/6/2019 Yes Pod#0 s/p pleurodesis Chronic respiratory failure with hypoxia (HCC) (Chronic) ICD-10-CM: J96.11 
ICD-9-CM: 518.83, 799.02  3/29/2016 Yes Overview Addendum 5/6/2019 10:14 AM by Reino Gilford, NP Continue to wear 2 L nasal cannula at night. May use 2 L supplemental oxygen to maintain sats greater than 90%. chronic Essential hypertension, benign ICD-10-CM: I10 
ICD-9-CM: 401.1  12/10/2015 Yes Improved today Subcutaneous emphysema ([facility] [address] ICD-10-CM: T79. 7XXA ICD-9-CM: 958.7  5/9/2019 No  
 significant Acute on chronic respiratory failure with hypoxia [facility]) ICD-10-CM: J96.21 
ICD-9-CM: 518.84, 799.02  5/6/2019 Yes  
 remains on NC   
 COPD exacerbation ([facility] [address] ICD-10-CM: J44.1 ICD-9-CM: 491.21  5/6/2019 Yes Taper steroids Ventricular tachyarrhythmia ([facility] [address] ICD-10-CM: I47.2 ICD-9-CM: 427.1  5/6/2019 Yes Controlled on [place] Pulmonary infiltrates ICD-10-CM: R91.8 ICD-9-CM: 793.19  5/6/2019 Unknown VT (ventricular tachycardia) (HCC) ICD-10-CM: I47.2 ICD-9-CM: 427.1  5/6/2019 Yes Per cardiology Persistent air leak: Chest tube inserted 5/7 with large air leak since. Plan:  (Medical Decision Making) --continue oral steroids and consider tapering tomorrow 
--pain control for right chest pain with oxycodone an d0.5mg dilaudid as needed. --Incentive spirometry 
--monitor chest tubes and f/u cxr in am 
  
More than 50% of the time documented was spent in face-to-face contact with the patient and in the care of the patient on the floor/unit where the patient is located.  
 
Yair Carroll MD

## 2019-05-16 NOTE — PROGRESS NOTES
Pt resting in bed with eyes closed. CT times 2 in place to 20 cm suction. No s/sx of pain or distress at this time. Pt tolerated clear liquids for lunch. Pt encouraged to call for assistance if needed call light in reach, door open will monitor.

## 2019-05-16 NOTE — PROGRESS NOTES
Spoke with Dr Shauna Lubin about patient heart rate in 50's and giving Amiodarone. MD states that I should give patient his amiodarone. Will monitor.

## 2019-05-16 NOTE — PROGRESS NOTES
Pt returns to room from surgery. Pt alert oriented times 3 at this time. Pt on 3  L NC at this time. O2 sat 97% at this time. Pt has 2 CT to right side. CT placed to 20 cm suction. CT 1. Dressing to CT clean, dry, intact at this time. Crepitus felt to right chest, Dr. Kinsey Hedz in room and aware. Pt denies pain or distress at this time. Pt encouraged to call for assistance if needed call light in reach, door open will monitor.

## 2019-05-16 NOTE — PROGRESS NOTES
Alert and oriented X4. Respirations are even and unlabored. O2 at 3lpm NC. Two chest tube to 20 cm suction. No air leak. Dressing on chest tube is dry and intact. SCDs in place. Bed is low, locked and call light within reach.

## 2019-05-16 NOTE — PROGRESS NOTES
Pt assisted sitting up in bed. No distress noted at this time. CT remains intact, dressing clean, dry, intact at this time. CT to 20 suction. Pt denies pain at this time. Call light in reach, door open will monitor.

## 2019-05-16 NOTE — OP NOTES
[address] [place] [address]  OPERATIVE REPORT    Name:  Rajesh Li  MR#:  [id number]  :  1932  ACCOUNT #:  [de-identified]  DATE OF SERVICE:  2019    PREOPERATIVE DIAGNOSIS:  Right spontaneous pneumothorax. POSTOPERATIVE DIAGNOSES:  1. Right spontaneous pneumothorax. 2.  Apical bleb. PROCEDURES PERFORMED:  Right video-assisted thoracic surgery with apical blebectomy, release of inferior pulmonary ligament and talc pleurodesis. SURGEON:  Naresh Marie. Roberto Swartz., MD    ASSISTANT:  None. ANESTHESIA:  General.    COMPLICATIONS:  None. SPECIMENS REMOVED:  Apical bleb. IMPLANTS:  None. ESTIMATED BLOOD LOSS:  Minimal.    COUNTS:  Correct. PROCEDURE:  After the patient was asleep, he was rolled onto his left side. Right chest was prepped and draped in a sterile fashion. Incision was made in the anterior axillary line approximately in the 7th and 8th interspace and 5-mm Optiview was used to gain entry into the chest cavity. The lung was collapsed. There was some bloody pleural effusion present on entry. This was probably secondary to his chest tube placement that he had prior. Under direct visualization in the posterior axillary line, a 12-mm trocar was placed in the 8th and 9th interspace and just below the scapula, another 5-mm trocar was placed. At this time, the lung was inspected. There was a fairly large apical bleb present. Endo YANY with blue staples and Seamguard were then utilized to staple across this. This bleb was then placed into a bag, brought out through the 12-mm trocar site and sent to pathology. The inferior pulmonary ligament was released with Bovie cautery. Once this was done, talc pleurodesis was carried out with excellent coverage of the chest wall and lung. At this time, through the 12-mm trocar and the anterior 5-mm trocar site, two 32 chest tubes were placed, 32 was placed posteriorly. This was a right angle.   The anterior was a straight, placed in the apex. These were secured to the skin with 2-0 GI silk. Staples were used to close the old chest tube site as well as the 5-mm trocar site. Sterile dressing was applied. The patient tolerated the procedure well. Jm Mares was present and scrubbed the entire case. She placed trocars and retracted lung during the procedure.       Yvonne Calvert MD      [id number]  D:  05/16/2019 9:51  T:  05/16/2019 15:25  JOB #:  [id number]

## 2019-05-16 NOTE — PROGRESS NOTES
TRANSFER - IN REPORT: 
 
Verbal report received from [facility] on Divya Rosenberg  being received from PACU for routine progression of care Report consisted of patients Situation, Background, Assessment and  
Recommendations(SBAR). Information from the following report(s) SBAR and Procedure Summary was reviewed with the receiving nurse. Opportunity for questions and clarification was provided. Assessment completed upon patients arrival to unit and care assumed.

## 2019-05-17 ENCOUNTER — APPOINTMENT (OUTPATIENT)
Dept: GENERAL RADIOLOGY | Age: 84
DRG: 163 | End: 2019-05-17
Attending: SURGERY
Payer: MEDICARE

## 2019-05-17 LAB
ERYTHROCYTE [DISTWIDTH] IN BLOOD BY AUTOMATED COUNT: 13 % (ref 11.9–14.6)
HCT VFR BLD AUTO: 37.3 % (ref 41.1–50.3)
HGB BLD-MCNC: 12.6 G/DL (ref 13.6–17.2)
MCH RBC QN AUTO: 32.5 PG (ref 26.1–32.9)
MCHC RBC AUTO-ENTMCNC: 33.8 G/DL (ref 31.4–35)
MCV RBC AUTO: 96.1 FL (ref 79.6–97.8)
NRBC # BLD: 0 K/UL (ref 0–0.2)
PLATELET # BLD AUTO: 175 K/UL (ref 150–450)
PMV BLD AUTO: 9.4 FL (ref 9.4–12.3)
RBC # BLD AUTO: 3.88 M/UL (ref 4.23–5.6)
WBC # BLD AUTO: 16.1 K/UL (ref 4.3–11.1)

## 2019-05-17 PROCEDURE — 74011250637 HC RX REV CODE- 250/637: Performed by: SURGERY

## 2019-05-17 PROCEDURE — 94640 AIRWAY INHALATION TREATMENT: CPT

## 2019-05-17 PROCEDURE — 71045 X-RAY EXAM CHEST 1 VIEW: CPT

## 2019-05-17 PROCEDURE — 65660000000 HC RM CCU STEPDOWN

## 2019-05-17 PROCEDURE — 97530 THERAPEUTIC ACTIVITIES: CPT

## 2019-05-17 PROCEDURE — 77030011943

## 2019-05-17 PROCEDURE — 85027 COMPLETE CBC AUTOMATED: CPT

## 2019-05-17 PROCEDURE — 74011636637 HC RX REV CODE- 636/637: Performed by: SURGERY

## 2019-05-17 PROCEDURE — 97110 THERAPEUTIC EXERCISES: CPT

## 2019-05-17 PROCEDURE — 77010033678 HC OXYGEN DAILY

## 2019-05-17 PROCEDURE — 99232 SBSQ HOSP IP/OBS MODERATE 35: CPT | Performed by: INTERNAL MEDICINE

## 2019-05-17 PROCEDURE — 36415 COLL VENOUS BLD VENIPUNCTURE: CPT

## 2019-05-17 PROCEDURE — 94760 N-INVAS EAR/PLS OXIMETRY 1: CPT

## 2019-05-17 PROCEDURE — 74011000250 HC RX REV CODE- 250: Performed by: SURGERY

## 2019-05-17 RX ADMIN — Medication 5 ML: at 05:44

## 2019-05-17 RX ADMIN — ALBUTEROL SULFATE 2.5 MG: 2.5 SOLUTION RESPIRATORY (INHALATION) at 14:42

## 2019-05-17 RX ADMIN — TAMSULOSIN HYDROCHLORIDE 0.4 MG: 0.4 CAPSULE ORAL at 09:28

## 2019-05-17 RX ADMIN — ACETAMINOPHEN 500 MG: 500 TABLET, FILM COATED ORAL at 05:41

## 2019-05-17 RX ADMIN — OXYCODONE HYDROCHLORIDE 5 MG: 5 TABLET ORAL at 17:04

## 2019-05-17 RX ADMIN — PREDNISONE 40 MG: 20 TABLET ORAL at 09:28

## 2019-05-17 RX ADMIN — ALBUTEROL SULFATE 2.5 MG: 2.5 SOLUTION RESPIRATORY (INHALATION) at 07:48

## 2019-05-17 RX ADMIN — MONTELUKAST SODIUM 10 MG: 10 TABLET, FILM COATED ORAL at 09:28

## 2019-05-17 RX ADMIN — DOCUSATE SODIUM 100 MG: 100 CAPSULE, LIQUID FILLED ORAL at 09:29

## 2019-05-17 RX ADMIN — AMIODARONE HYDROCHLORIDE 400 MG: 200 TABLET ORAL at 22:09

## 2019-05-17 RX ADMIN — ALBUTEROL SULFATE 2.5 MG: 2.5 SOLUTION RESPIRATORY (INHALATION) at 20:46

## 2019-05-17 RX ADMIN — ACETAMINOPHEN 500 MG: 500 TABLET, FILM COATED ORAL at 22:09

## 2019-05-17 RX ADMIN — Medication 10 ML: at 13:19

## 2019-05-17 RX ADMIN — ACETAMINOPHEN 500 MG: 500 TABLET, FILM COATED ORAL at 13:18

## 2019-05-17 RX ADMIN — FLUTICASONE PROPIONATE 2 SPRAY: 50 SPRAY, METERED NASAL at 09:33

## 2019-05-17 RX ADMIN — AMIODARONE HYDROCHLORIDE 400 MG: 200 TABLET ORAL at 09:27

## 2019-05-17 RX ADMIN — HYDRALAZINE HYDROCHLORIDE 25 MG: 25 TABLET, FILM COATED ORAL at 05:41

## 2019-05-17 RX ADMIN — SENNOSIDES 17.2 MG: 8.6 TABLET, FILM COATED ORAL at 22:09

## 2019-05-17 RX ADMIN — LISINOPRIL 10 MG: 5 TABLET ORAL at 12:01

## 2019-05-17 RX ADMIN — CARVEDILOL 3.12 MG: 3.12 TABLET, FILM COATED ORAL at 12:01

## 2019-05-17 RX ADMIN — GUAIFENESIN 1200 MG: 600 TABLET, EXTENDED RELEASE ORAL at 09:27

## 2019-05-17 RX ADMIN — Medication 5 ML: at 22:09

## 2019-05-17 RX ADMIN — DOCUSATE SODIUM 100 MG: 100 CAPSULE, LIQUID FILLED ORAL at 16:59

## 2019-05-17 NOTE — PROGRESS NOTES
Problem: Mobility Impaired (Adult and Pediatric) Goal: *Acute Goals and Plan of Care (Insert Text) Description STG: 
(1.)Mr. Aditya Hernández will move from supine to sit and sit to supine  with STAND BY ASSIST within 3 treatment day(s). (2.)Mr. Aditya Hernández will transfer from bed to chair and chair to bed with STAND BY ASSIST using the least restrictive device within 3 treatment day(s). (3.)Mr. Aditya Hernández will ambulate with CONTACT GUARD ASSIST for 100 feet with the least restrictive device within 3 treatment day(s). LTG: 
(1.)Mr. Aditya Hernández will move from supine to sit and sit to supine  in bed with INDEPENDENT within 7 treatment day(s). (2.)Mr. Aditya Hernández will transfer from bed to chair and chair to bed with SUPERVISION using the least restrictive device within 7 treatment day(s). (3.)Mr. Aditya Hernández will ambulate with STAND BY ASSIST for 250+ feet with the least restrictive device within 7 treatment day(s). ________________________________________________________________________________________________ Outcome: Progressing Towards Goal 
 
 
PHYSICAL THERAPY: Initial Assessment and AM 5/17/2019 INPATIENT: PT Visit Days : 2 [place] / Product Type: Medicare /   
  
NAME/AGE/GENDER: Yanna Nicholas is a 80 y.o. male PRIMARY DIAGNOSIS: Pneumothorax on right [J93.9] Pneumothorax on right Pneumothorax on right Procedure(s) (LRB): 
RIGHT VATS WITH TALC PLEURODESIS CHEST TUBE INSERTION X 2  APICAL BLEBECTOMY (Right) 1 Day Post-Op ICD-10: Treatment Diagnosis:  
 · Generalized Muscle Weakness (M62.81) · Difficulty in walking, Not elsewhere classified (R26.2) · History of falling (Z91.81) Precaution/Allergies: 
Patient has no known allergies. ** Check with nursing if pt can come off suction for mobility with chest tube** ASSESSMENT:  
Mr. Aditya Hernández presents supine in the bed with his son and wife at the bedside. He underwent a right VATS surgery yesterday and has 2 chest tubes at this time. He states his pain is 5/10 and nursing was notified about additional pain medication \"more than Tylenol\". He is very motivated for therapy and asked for assistance to get out of the bed. His BLE AROM is WFLs. Supine to sit is minimal assist with fair to good sitting balance using his UE for support. Sit to stand with use of slight bed elevation is minimal assist and once up in standing he used the r/walker for BUE support. He needed verbal cues for more upright standing posture and he was then able to take 8 steps and turn around to sit in the bedside chair with CGA/minimal assist.  He was seated in the bedside chair and was able to scoot back and get positioned better for prolonged sitting in the chair. He was made more comfortable and his CTs were secured to the floor. His O2 sats were at 95% on 3L after standing and transitioning. He was left with the call light within reach with his wife and son at the bedside. He was pleasant to work with and motivated for therapy. This section established at most recent assessment PROBLEM LIST (Impairments causing functional limitations): 1. Decreased Strength 2. Decreased ADL/Functional Activities 3. Decreased Transfer Abilities 4. Decreased Ambulation Ability/Technique 5. Decreased Balance 6. Decreased Activity Tolerance 7. Decreased Pacing Skills 8. Increased Fatigue 9. Increased Shortness of Breath 10. Decreased [place] with Home Exercise Program 
 INTERVENTIONS PLANNED: (Benefits and precautions of physical therapy have been discussed with the patient.) 1. Balance Exercise 2. Bed Mobility 3. Family Education 4. Gait Training 5. Home Exercise Program (HEP) 6. Neuromuscular Re-education/Strengthening 7. Therapeutic Activites 8. Therapeutic Exercise/Strengthening 9. Transfer Training TREATMENT PLAN: Frequency/Duration: 3 times a week for duration of hospital stay Rehabilitation Potential For Stated Goals: Good REHAB RECOMMENDATIONS (at time of discharge pending progress):   
Placement: It is my opinion, based on this patient's performance to date, that Mr. Melany Jones may benefit from [address] after discharge due to the functional deficits listed above that are likely to improve with skilled rehabilitation because he/she has multiple medical issues that affect his/her functional mobility in the community. [facility] pending progress following surgery Equipment:  
? None at this time HISTORY:  
History of Present Injury/Illness (Reason for Referral): 
See H&P below Patient is a 80 y.o.  male presents via EMS with shortness of breath when got up this morning. Had a productive cough with yellow sputum and received Rocephin. In the ER CXR with large right pneumothorax and we were called for chest tub e placement and admission. Had a fall in February and seen by Dr. Husam Wright with compression L2,L3, L4. Past Medical History/Comorbidities:  
Mr. Melany Jones  has a past medical history of Abdominal aortic aneurysm ([address] (12/10/2015), Abdominal aortic aneurysm without rupture ([address] Allergic rhinitis (12/10/2015), Asthma, Benign neoplasm, Benign prostatic hyperplasia (12/10/2015), BPH without urinary obstruction, Cardiovascular disease (12/10/2015), Chronic obstructive asthma with exacerbation ([address] (12/10/2015), COPD (chronic obstructive pulmonary disease) ([address] (12/10/2015), Cough with hemoptysis, Erectile dysfunction (12/10/2015), Essential hypertension, benign (12/10/2015), Fracture (10/2014), History of colon polyps, Low testosterone (12/10/2015), Lumbago, Memory loss, Overflow incontinence, Raynaud's syndrome (12/10/2015), Rotator cuff tendinitis, Thrombocytopenia ([address] and Urinary frequency.   Mr. Melany Jones has a past surgical history that includes hx hernia repair (1980); hx colonoscopy; hx fracture tx (10/2014); hx cataract removal (6/2016-right); and hx orthopaedic (03/2018). Social History/Living Environment:  
Home Environment: Private residence # Steps to Enter: 1 One/Two Story Residence: One story Living Alone: No 
Support Systems: Spouse/Significant Other/Partner, Child(tremayne) Patient Expects to be Discharged to[de-identified] Private residence Current DME Used/Available at Home: Grab bars, Shower chair, Raised toilet seat, Cane, straight, Walker, rolling Tub or Shower Type: Shower Prior Level of Function/Work/Activity: 
Use of walker for gait, indep with ADLs, drives, 1 fall Number of Personal Factors/Comorbidities that affect the Plan of Care: 3+: HIGH COMPLEXITY EXAMINATION:  
Most Recent Physical Functioning:  
Gross Assessment: 
  
         
  
Posture: 
  
Balance: 
Sitting: Intact(Painful on the right so he supports with his hands) Standing: Impaired Standing - Static: Fair;Constant support Standing - Dynamic : Fair Bed Mobility: 
Supine to Sit: Minimum assistance Scooting: Minimum assistance Wheelchair Mobility: 
  
Transfers: 
Sit to Stand: Minimum assistance Stand to Sit: Contact guard assistance Bed to Chair: Minimum assistance Gait: 
  
Speed/Marifer: Slow;Shuffled Step Length: Left shortened;Right shortened Gait Abnormalities: Decreased step clearance; Antalgic Distance (ft): 8 Feet (ft) Assistive Device: Walker, rolling(Has 2 CTs to manage with standing and transfers) Ambulation - Level of Assistance: Minimal assistance Interventions: Verbal cues; Safety awareness training;Manual cues Body Structures Involved: 1. Lungs 2. Bones 3. Joints 4. Muscles 5. Ligaments Body Functions Affected: 1. Sensory/Pain 2. Cardio 3. Respiratory 4. Neuromusculoskeletal 
5. Movement Related Activities and Participation Affected: 1. General Tasks and Demands 2. Mobility 3. Self Care 4. Domestic Life 5. Interpersonal Interactions and Relationships 6. Community, Social and [place] Number of elements that affect the Plan of Care: 4+: HIGH COMPLEXITY CLINICAL PRESENTATION:  
Presentation: Evolving clinical presentation with changing clinical characteristics: MODERATE COMPLEXITY CLINICAL DECISION MAKIN[address] [place] [id number] AM-PAC 6 Clicks Basic Mobility Inpatient Short Form How much difficulty does the patient currently have. .. Unable A Lot A Little None 1. Turning over in bed (including adjusting bedclothes, sheets and blankets)? ? 1   ? 2   ? 3   ? 4  
2. Sitting down on and standing up from a chair with arms ( e.g., wheelchair, bedside commode, etc.)   ? 1   ? 2   ? 3   ? 4  
3. Moving from lying on back to sitting on the side of the bed?   ? 1   ? 2   ? 3   ? 4 How much help from another person does the patient currently need. .. Total A Lot A Little None 4. Moving to and from a bed to a chair (including a wheelchair)? ? 1   ? 2   ? 3   ? 4  
5. Need to walk in hospital room? ? 1   ? 2   ? 3   ? 4  
6. Climbing 3-5 steps with a railing? ? 1   ? 2   ? 3   ? 4  
© 2007, Trustees of [address] [place] [id number], under license to [employer]. All rights reserved Score:  Initial: 17 Most Recent: X (Date: -- ) Interpretation of Tool:  Represents activities that are increasingly more difficult (i.e. Bed mobility, Transfers, Gait). Medical Necessity:    
· Patient is expected to demonstrate progress in strength, balance, coordination and functional technique ·  to decrease assistance required with gait, transfers, and functional mobility · . Reason for Services/Other Comments: 
· Patient continues to require skilled intervention due to decreased strength, decreased balance, decreased functional tolerance, decreased cardiopulmonary endurance affecting participation in basic ADLs and functional tasks · . Use of outcome tool(s) and clinical judgement create a POC that gives a: Clear prediction of patient's progress: LOW COMPLEXITY  
  
 
 
 
TREATMENT:  
(In addition to Assessment/Re-Assessment sessions the following treatments were rendered) Pre-treatment Symptoms/Complaints:  5/10 right rib cage pain from the CTs Pain: Initial:  
Pain Intensity 1: 5 Pain Location 1: Rib cage Pain Orientation 1: Right 1-2/10 chest tube site Post Session:   5/10 Therapeutic Exercise: (26 mins):  Exercises per grid below to improve mobility and strength. Required minimal visual, verbal and tactile cues to promote proper body alignment, promote proper body posture and promote proper body mechanics. Progressed range, repetitions and complexity of movement as indicated. Date: 
5/14/19 Date: 
5/17/19 Date: Activity/Exercise Parameters Parameters Parameters Ankle pumps 20 X B  2 x 10 Quad set 15 X B Glute set 15 X Heel slides 15 X B  2 x 10 Hip abduction 15 X B  2 x 10 Hip flexion/marching  2 x 10 Braces/Orthotics/Lines/Etc:  
· Chest tube - times 2 
· O2 Device: Nasal cannula 3L with O2 sats at 95% Treatment/Session Assessment:   
· Response to Treatment:  Patient participated and was very motivated for therapy today. · Interdisciplinary Collaboration:  
o Physical Therapist 
o Registered Nurse · After treatment position/precautions:  
o Up in chair 
o Bed/Chair-wheels locked 
o Bed in low position 
o Call light within reach · Compliance with Program/Exercises: Will assess as treatment progresses · Recommendations/Intent for next treatment session: \"Next visit will focus on advancements to more challenging activities and reduction in assistance provided\". Total Treatment Duration: PT Patient Time In/Time Out Time In: 4528 Time Out: 8[address], [place]

## 2019-05-17 NOTE — PROGRESS NOTES
Problem: Self Care Deficits Care Plan (Adult) Goal: *Acute Goals and Plan of Care (Insert Text) Description 1. Pt will toilet with SBA 2. Pt will complete functional mobility for ADLs with SBA 3. Pt will complete lower body dressing with SBA using AE as needed 4. Pt will complete grooming and hygiene at sink with SBA 5. Pt will demonstrate independence with HEP to promote increased BUE strength and functional use for ADLs 6. Pt will tolerate 23 minutes functional activity with min or fewer rest breaks to promote increased endurance for ADLs 7. Pt will complete UB bathing and dressing with set up Timeframe: 7 days Outcome: Progressing Towards Goal 
  
OCCUPATIONAL THERAPY: Daily Note 5/17/2019 INPATIENT: OT Visit Days: 2 [place] / Product Type: Medicare /  
  
NAME/AGE/GENDER: Livia Poe is a 80 y.o. male PRIMARY DIAGNOSIS:  Pneumothorax on right [J93.9] Pneumothorax on right Pneumothorax on right Procedure(s) (LRB): 
RIGHT VATS WITH TALC PLEURODESIS CHEST TUBE INSERTION X 2  APICAL BLEBECTOMY (Right) 1 Day Post-Op ICD-10: Treatment Diagnosis:  
 · Generalized Muscle Weakness (M62.81) Precautions/Allergies: 
  Chest tube to suction Patient has no known allergies. ASSESSMENT:  
Mr. Romana Kim was admitted with R side pneumothorax, now has a R side chest tube to suction. Pt lives with his wife (who is blind) and is fully independent and active at baseline, drives, reports that he mowed the lawn just last week. Pt uses a RW for mobility, endorses one fall which he stated might be more than 6 months ago. This session, pt greeted seated in chair, pleasant and agreeable to OT session. Pt issued green theraband and HEP. Pt demonstrated decreased cognition and required max cues for carryover to complete exercises correctly.  Pt stood with mod assistance, stood in place for several minutes with min A for standing balance w/ UE support from RW to promote increased endurance for ADLs. Pt demonstrates functional decline since initial evaluation, recommend d/c to SNF. This section established at most recent assessment PROBLEM LIST (Impairments causing functional limitations): 1. Decreased Strength 2. Decreased ADL/Functional Activities 3. Decreased Transfer Abilities 4. Decreased Balance 5. Decreased Activity Tolerance INTERVENTIONS PLANNED: (Benefits and precautions of occupational therapy have been discussed with the patient.) 1. Activities of daily living training 2. Adaptive equipment training 3. Balance training 4. Therapeutic activity 5. Therapeutic exercise TREATMENT PLAN: Frequency/Duration: Follow patient 3 times/ week to address above goals. Rehabilitation Potential For Stated Goals: Good REHAB RECOMMENDATIONS (at time of discharge pending progress):   
Placement: It is my opinion, based on this patient's performance to date, that Mr. Compa Limon may benefit from discharge to SNF d/t impaired mobility, balance, and ADLs making it unsafe for him to d/c home. Equipment:  
? None at this time OCCUPATIONAL PROFILE AND HISTORY:  
History of Present Injury/Illness (Reason for Referral): 
See H&P Past Medical History/Comorbidities:  
Mr. Compa Limon  has a past medical history of Abdominal aortic aneurysm ([address] (12/10/2015), Abdominal aortic aneurysm without rupture ([address] Allergic rhinitis (12/10/2015), Asthma, Benign neoplasm, Benign prostatic hyperplasia (12/10/2015), BPH without urinary obstruction, Cardiovascular disease (12/10/2015), Chronic obstructive asthma with exacerbation ([address] (12/10/2015), COPD (chronic obstructive pulmonary disease) ([address] (12/10/2015), Cough with hemoptysis, Erectile dysfunction (12/10/2015), Essential hypertension, benign (12/10/2015), Fracture (10/2014), History of colon polyps, Low testosterone (12/10/2015), Lumbago, Memory loss, Overflow incontinence, Raynaud's syndrome (12/10/2015), Rotator cuff tendinitis, Thrombocytopenia ([facility] and Urinary frequency. Mr. Veronica Cartwright  has a past surgical history that includes hx hernia repair (1980); hx colonoscopy; hx fracture tx (10/2014); hx cataract removal (6/2016-right); and hx orthopaedic (03/2018). Social History/Living Environment:  
Home Environment: Private residence # Steps to Enter: 1 One/Two Story Residence: One story Living Alone: No 
Support Systems: Spouse/Significant Other/Partner, Child(tremayne) Patient Expects to be Discharged to[de-identified] Private residence Current DME Used/Available at Home: Grab bars, Shower chair, Raised toilet seat, Cane, straight, Walker, rolling Tub or Shower Type: Shower Prior Level of Function/Work/Activity: 
Independent, active, lives w/ wife, son is able to assist, RW for mobility Number of Personal Factors/Comorbidities that affect the Plan of Care: Expanded review of therapy/medical records (1-2):  MODERATE COMPLEXITY ASSESSMENT OF OCCUPATIONAL PERFORMANCE[de-identified]  
Activities of Daily Living:  
Basic ADLs (From Assessment) Complex ADLs (From Assessment) Feeding: Independent Oral Facial Hygiene/Grooming: Contact guard assistance Bathing: Minimum assistance Upper Body Dressing: Minimum assistance Lower Body Dressing: Contact guard assistance Toileting: Contact guard assistance Instrumental ADL Meal Preparation: Moderate assistance Homemaking: Moderate assistance Medication Management: Setup Financial Management: Setup Grooming/Bathing/Dressing Activities of Daily Living Bed/Mat Mobility Supine to Sit: Minimum assistance Sit to Stand: Minimum assistance; Moderate assistance Stand to Sit: Minimum assistance Bed to Chair: Minimum assistance Scooting: Minimum assistance Most Recent Physical Functioning:  
Gross Assessment: AROM: Within functional limits Strength: Generally decreased, functional 
Coordination: Within functional limits Posture: 
  
Balance: 
Sitting: Intact(Painful on the right so he supports with his hands) Standing: Impaired Standing - Static: Fair;Constant support Standing - Dynamic : Fair(-) Bed Mobility: 
Supine to Sit: Minimum assistance Scooting: Minimum assistance Wheelchair Mobility: 
  
Transfers: 
Sit to Stand: Minimum assistance; Moderate assistance Stand to Sit: Minimum assistance Bed to Chair: Minimum assistance Patient Vitals for the past 6 hrs: 
 BP SpO2 O2 Flow Rate (L/min) Pulse 19 1143 123/68 95 %  62  
19 1442  94 % 3 l/min  Mental Status Neurologic State: Alert Orientation Level: Oriented X4 Cognition: Follows commands Perception: Appears intact Perseveration: No perseveration noted Safety/Judgement: Awareness of environment, Fall prevention Physical Skills Involved: 
1. Balance 2. Strength 3. Activity Tolerance Cognitive Skills Affected (resulting in the inability to perform in a timely and safe manner): 1. none Psychosocial Skills Affected: 1. Habits/Routines Number of elements that affect the Plan of Care: 3-5:  MODERATE COMPLEXITY CLINICAL DECISION MAKIN [facility] [address] [address] [facility] 6 Clicks Daily Activity Inpatient Short Form How much help from another person does the patient currently need. .. Total A Lot A Little None 1. Putting on and taking off regular lower body clothing? ? 1   ? 2   ? 3   ? 4  
2. Bathing (including washing, rinsing, drying)? ? 1   ? 2   ? 3   ? 4  
3. Toileting, which includes using toilet, bedpan or urinal?   ? 1   ? 2   ? 3   ? 4  
4. Putting on and taking off regular upper body clothing? ? 1   ? 2   ? 3   ? 4  
5. Taking care of personal grooming such as brushing teeth? ? 1   ? 2   ? 3   ? 4  
6. Eating meals?    ? 1   ? 2   ? 3   ? 4  
 © 2007, Trustees of [facility] [employer], under license to [employer]. All rights reserved Score:  Initial: 21 Most Recent: X (Date: -- ) Interpretation of Tool:  Represents activities that are increasingly more difficult (i.e. Bed mobility, Transfers, Gait). Medical Necessity:    
· Patient demonstrates good rehab potential due to higher previous functional level. Reason for Services/Other Comments: 
· Patient continues to require present interventions due to patient's inability to complete ADLs. Use of outcome tool(s) and clinical judgement create a POC that gives a: MODERATE COMPLEXITY  
 
 
 
TREATMENT:  
(In addition to Assessment/Re-Assessment sessions the following treatments were rendered) Pre-treatment Symptoms/Complaints:   
Pain: Initial:  
Pain Intensity 1: 0 Pain Location 1: Flank(CT site) Pain Orientation 1: Right Pain Intervention(s) 1: Declines  Post Session:  2 Therapeutic Activity: (    8 min): Therapeutic activities including Chair transfers  to improve mobility, strength and endurance for ADLs. Therapeutic Exercise: (  15 min):  Exercises per grid below to improve strength and for ADLs. Required maximal visual, verbal, manual and tactile cues to promote proper body alignment, promote proper body posture, promote proper body mechanics and promote proper body breathing techniques. Progressed resistance, range, repetitions and complexity of movement as indicated. Date: 
5/17/19 Date: 
 Date: Activity/Exercise Parameters Parameters Parameters Shoulder ab/adduction 10/1 Shoulder flexion 10/1 Elbow flexion 10/1 Elbow extension  10/1 Braces/Orthotics/Lines/Etc:  
· chest tube · O2 Device: Nasal cannula Treatment/Session Assessment:   
· Response to Treatment:  No adverse reactino · Interdisciplinary Collaboration:  
o Occupational Therapist 
o Registered Nurse · After treatment position/precautions:  
o Up in chair o Bed/Chair-wheels locked 
o Call light within reach 
o RN notified · Compliance with Program/Exercises: Compliant all of the time. · Recommendations/Intent for next treatment session: \"Next visit will focus on advancements to more challenging activities and reduction in assistance provided\". Total Treatment Duration: OT Patient Time In/Time Out Time In: 8658 Time Out: 1140 Nadege Potts, OT

## 2019-05-17 NOTE — PROGRESS NOTES
Pulmonary Daily Progress Note: 5/17/2019 Yanna Nicholas Admission Date: 5/6/2019 The patient's chart is reviewed and the patient is discussed with the staff. 87 y.o. CM presents via EMS with shortness of breath when got up this morning.  Had a productive cough with yellow sputum and received Rocephin.  In the ER CXR with large right pneumothorax and pulmonary consulted for chest tube placement and admission. Imagene Simple a fall in February and seen by Dr. Kael Lopez with compression L2,L3, L4. In the ER had VT and cardiology was consulted and added Amiodarone. He has had a right pneumothorax with persistent air leak and underwent surgical pleurodesis on 5/16. Subjective:  
Had hypertension last night to 171/106, now down to SBP in 90s at times. On 3lpm of oxygen with O2 sat of 95% Current Facility-Administered Medications Medication Dose Route Frequency  HYDROmorphone (PF) (DILAUDID) injection 0.5 mg  0.5 mg IntraVENous Q3H PRN  predniSONE (DELTASONE) tablet 40 mg  40 mg Oral DAILY WITH BREAKFAST  hydrALAZINE (APRESOLINE) tablet 25 mg  25 mg Oral QID  acetaminophen (TYLENOL) tablet 500 mg  500 mg Oral Q8H  
 acetaminophen (TYLENOL) tablet 650 mg  650 mg Oral Q4H PRN  
 oxyCODONE IR (ROXICODONE) tablet 5 mg  5 mg Oral Q4H PRN  
 docusate sodium (COLACE) capsule 100 mg  100 mg Oral BID  senna (SENOKOT) tablet 17.2 mg  2 Tab Oral QHS  lisinopril (PRINIVIL, ZESTRIL) tablet 10 mg  10 mg Oral DAILY  fluticasone propionate (FLONASE) 50 mcg/actuation nasal spray 2 Spray  2 Spray Both Nostrils DAILY  amiodarone (CORDARONE) tablet 400 mg  400 mg Oral Q12H  carvedilol (COREG) tablet 3.125 mg  3.125 mg Oral BID WITH MEALS  calcium carbonate (TUMS) chewable tablet 200 mg [elemental]  200 mg Oral TID PRN  
 hydrALAZINE (APRESOLINE) 20 mg/mL injection 10 mg  10 mg IntraVENous Q6H PRN  
 [Held by provider] aspirin delayed-release tablet 81 mg  81 mg Oral DAILY  guaiFENesin ER (MUCINEX) tablet 1,200 mg  1,200 mg Oral DAILY  montelukast (SINGULAIR) tablet 10 mg  10 mg Oral DAILY  tamsulosin (FLOMAX) capsule 0.4 mg  0.4 mg Oral DAILY  sodium chloride (NS) flush 5-40 mL  5-40 mL IntraVENous Q8H  
 sodium chloride (NS) flush 5-40 mL  5-40 mL IntraVENous PRN  
 albuterol (PROVENTIL VENTOLIN) nebulizer solution 2.5 mg  2.5 mg Nebulization Q6H RT  
 [Held by provider] enoxaparin (LOVENOX) injection 40 mg  40 mg SubCUTAneous Q24H Review of Systems 
+chest wall pain Constitutional:  negative for fever, chills, sweats HEENT: +sinsus congestion and has seasonal allergies Cardiovascular:  negative for chest pain, palpitations, syncope, edema Respiratory:  Right chest tube, significant subc air Gastrointestinal:  negative for dysphagia, reflux, vomiting, diarrhea, abdominal pain, or melena Neurologic:  negative for focal weakness, numbness, headache Objective:  
 
Vitals:  
 05/16/19 2023 05/16/19 2221 05/17/19 0310 05/17/19 7950 BP: 90/53 92/58 121/61 Pulse: 61 64 64 Resp: 18 18 18 Temp: 98.9 °F (37.2 °C) 98.6 °F (37 °C) 98 °F (36.7 °C) SpO2: 97% 96% 94% Weight:    146 lb 11.2 oz (66.5 kg) Height:      
 
 
Intake and Output:  
05/15 1901 - 05/17 0700 In: 1390 [P.O.:440; I.V.:1100] Out: 2800 [Urine:2670] No intake/output data recorded. Physical Exam:         
Constitutional:  the patient is well developed and in no acute distress, NC  6lpm 
EENMT:  Sclera clear, pupils equal, oral mucosa moist 
Respiratory: crepitus improved, CTAB Cardiovascular:  RRR without M,G,R   
Gastrointestinal: soft and non-tender; with positive bowel sounds, appetite good. Musculoskeletal: warm without cyanosis. There is no lower extremity edema. Skin:  no jaundice or rashes, right anterior chest wound, right chest tube Neurologic: no gross neuro deficits Psychiatric:  alert and oriented x 3 Right chest tube 1- 185ml Right chest tube 2 -210ml LINES:   
PIV sites DRIPS:   None CXR   
5/17 5/14 Chest CT 5/8/19: 
 
 
LAB No results for input(s): GLUCPOC in the last 72 hours. No lab exists for component: [place] No results for input(s): WBC, HGB, HCT, PLT, INR, HGBEXT, HCTEXT, PLTEXT, HGBEXT, HCTEXT, PLTEXT in the last 72 hours. No lab exists for component: INREXT, INREXT No results for input(s): NA, K, CL, CO2, GLU, BUN, CREA, MG, PHOS, CA, TROIQ, ALB, TBIL, TBILI, GPT, ALT, SGOT, BNPP in the last 72 hours. No lab exists for component: TROIP No results for input(s): PH, PCO2, PO2, HCO3 in the last 72 hours. No results for input(s): LCAD, LAC in the last 72 hours. Assessment:  (Medical Decision Making) Hospital Problems  Date Reviewed: 5/9/2019 Codes Class Noted POA Shortness of breath ICD-10-CM: R06.02 
ICD-9-CM: 786.05  5/6/2019 Yes Wean O2 as able. * (Principal) Pneumothorax on right ICD-10-CM: J93.9 ICD-9-CM: 512.89  5/6/2019 Yes Pod#1 s/p pleurodesis Chronic respiratory failure with hypoxia (HCC) (Chronic) ICD-10-CM: J96.11 
ICD-9-CM: 518.83, 799.02  3/29/2016 Yes Overview Addendum 5/6/2019 10:14 AM by Bimal Pena, NP Continue to wear 2 L nasal cannula at night. May use 2 L supplemental oxygen to maintain sats greater than 90%. chronic Essential hypertension, benign ICD-10-CM: I10 
ICD-9-CM: 401.1  12/10/2015 Yes Hold hydralazine Subcutaneous emphysema ([facility] ICD-10-CM: T79. 7XXA ICD-9-CM: 958.7  5/9/2019 No  
 significant Acute on chronic respiratory failure with hypoxia [facility]) ICD-10-CM: J96.21 
ICD-9-CM: 518.84, 799.02  5/6/2019 Yes  
 remains on NC   
 COPD exacerbation ([facility] ICD-10-CM: J44.1 ICD-9-CM: 491.21  5/6/2019 Yes Taper steroids Ventricular tachyarrhythmia ([facility] ICD-10-CM: I47.2 ICD-9-CM: 427.1  5/6/2019 Yes Controlled on Amio Pulmonary infiltrates ICD-10-CM: R91.8 ICD-9-CM: 793.19  5/6/2019 Unknown VT (ventricular tachycardia) ([facility]) ICD-10-CM: I47.2 ICD-9-CM: 427.1  5/6/2019 Yes Per cardiology Persistent air leak: Chest tube inserted 5/7 with large air leak since. Plan:  (Medical Decision Making) --stop hydralazine 
--continue oral steroids and consider tapering tomorrow if BP more stable. --pain control for right chest pain with oxycodone an d0.5mg dilaudid as needed. --Incentive spirometry 
--monitor chest tubes and f/u cxr in am 
  
More than 50% of the time documented was spent in face-to-face contact with the patient and in the care of the patient on the floor/unit where the patient is located.  
 
Blade Perkins MD

## 2019-05-17 NOTE — PROGRESS NOTES
Bladder scan done showing approximately 329 cc of urine in bladder at this time. Will monitor. Pt sitting up in bed eating dinner. Pt reports pain is better at this time. CT times 2  remain to 20 suction. Pt  Has no acute distress noted at this time. Call light in reach, door open will monitor.

## 2019-05-17 NOTE — PROGRESS NOTES
[facility] CARDIOLOGY PROGRESS NOTE 
      
 
5/17/2019 10:03 AM 
 
Admit Date: 5/6/2019 Subjective:  
Feeling a little better following VATS yesterday for PTX. Mild soreness at surgical site. Rhythm has remained stable. Echo normal LV function. ROS: 
Cardiovascular:  As noted above Objective:  
  
Vitals:  
 05/17/19 0310 05/17/19 5503 05/17/19 4580 05/17/19 3699 BP: 121/61  110/59 Pulse: 64  (!) 57 Resp: 18  18 Temp: 98 °F (36.7 °C)  97.6 °F (36.4 °C) SpO2: 94%  92% 93% Weight:  146 lb 11.2 oz (66.5 kg) Height:      
 
 
Physical Exam: 
General-No Acute Distress Neck- supple, no JVD 
CV- regular rate and rhythm 2/6 systolic flow murmur Lung- clear bilaterally, diminished right base Abd- soft, nontender, nondistended Ext- no edema bilaterally. Skin- warm and dry Data Review:  
Recent Labs 05/17/19 
8570 WBC 16.1* HGB 12.6* HCT 37.3*  
 Assessment/Plan:  
 
Principal Problem: 
  Pneumothorax on right (5/6/2019) Post VATS Active Problems: 
  Essential hypertension, benign (12/10/2015) Improved following VATS, continue to monitor, may need to reduce or stop hydralazine as he weans off steroids. Chronic respiratory failure with hypoxia ([address] (3/29/2016) Shortness of breath (5/6/2019) Acute on chronic respiratory failure with hypoxia ([address] (5/6/2019) COPD exacerbation ([address] (5/6/2019) Ventricular tachyarrhythmia ([address] (5/6/2019) Occurred in setting of acute illness with PTX. None further on amiodarone. Normal LFTs and TSH. Continue amiodarone at least until outpatient cardiology follow up which is to be arranged within 2 weeks post discharge. Normal LV function by echo, no ischemic symptoms. Will sign off for now, please call us back if we can be of further help. Pulmonary infiltrates (5/6/2019) VT (ventricular tachycardia) ([address] (5/6/2019) Subcutaneous emphysema ([facility] (5/9/2019) Tien Vazquez MD 
5/17/2019 10:03 AM

## 2019-05-17 NOTE — PROGRESS NOTES
Problem: Pressure Injury - Risk of 
Goal: *Prevention of pressure injury Description Document Brant Scale and appropriate interventions in the flowsheet. Outcome: Progressing Towards Goal 
  
Problem: Falls - Risk of 
Goal: *Absence of Falls Description Document Dorthea Raheem Fall Risk and appropriate interventions in the flowsheet.  
Outcome: Progressing Towards Goal

## 2019-05-17 NOTE — PROGRESS NOTES
H&P/Consult Note/Progress Note/Office Note:  
Livia Poe  MRN: [id number]  MYJ:[birth year]  Age:87 y.o. 
 
HPI: Livia Poe is a 80 y.o. male who has PMHx of AAA, COPD, BPH, and HTN who presented to the ED via EMS on 5/6/19 with sudden onset of SOB, PATE, and cough. While en route via EMS pt had a run of VT. He had no relief with NRB. Sats were in the 80s in the ED. CXR in ED demonstrated complete PTX on the right. He was started on an Amio gtt for repeated runs of VT. Pulmonary placed a Uresil in the ED. This eventually developed subcutaneous emphysema and the Uresil became ineffective. A 24Fr chest tube was placed on 5/7/19. Since then, pt has had a persistent air leak. CXR demonstrates right apical PTX. Surgery was consulted for consideration of pleurodesis. 5/14/19: Awake in bed, no complaints. CT with +air leak, 150mL/24h serosang output. CXR improved this AM. 5/15/19: CT with 170mL/24h serosang output. CXR without PTX this AM. 5/16/19: Day of surgery 5/17/19: POD#1 s/p right VATS with talc pleurodesis. AF, sariah, HTN on 3L NC.  2.5L UOP/24h. CXR with stable right PTX. CT #1 with 30mL output since surgery, CT #2 with 40mL out. Past Medical History:  
Diagnosis Date  Abdominal aortic aneurysm ([address] 12/10/2015 In 2005  Abdominal aortic aneurysm without rupture ([address]  Allergic rhinitis 12/10/2015  Asthma  Benign neoplasm  Benign prostatic hyperplasia 12/10/2015  BPH without urinary obstruction  Cardiovascular disease 12/10/2015  Chronic obstructive asthma with exacerbation ([address] 12/10/2015  COPD (chronic obstructive pulmonary disease) ([address] 12/10/2015  Cough with hemoptysis  Erectile dysfunction 12/10/2015  Essential hypertension, benign 12/10/2015  Fracture 10/2014  
 of left hand and ribs after fall on concrete  History of colon polyps  Low testosterone 12/10/2015  Lumbago  Memory loss  Overflow incontinence  Raynaud's syndrome 12/10/2015  Rotator cuff tendinitis  Thrombocytopenia ([address]  Urinary frequency Past Surgical History:  
Procedure Laterality Date  HX CATARACT REMOVAL  6/2016-right  HX COLONOSCOPY    
 HX FRACTURE TX  10/2014  
 of left hand and ribs are fall on concrete [address]  HX ORTHOPAEDIC  03/2018  
 hip fracture Current Facility-Administered Medications Medication Dose Route Frequency  HYDROmorphone (PF) (DILAUDID) injection 0.5 mg  0.5 mg IntraVENous Q3H PRN  predniSONE (DELTASONE) tablet 40 mg  40 mg Oral DAILY WITH BREAKFAST  acetaminophen (TYLENOL) tablet 500 mg  500 mg Oral Q8H  
 acetaminophen (TYLENOL) tablet 650 mg  650 mg Oral Q4H PRN  
 oxyCODONE IR (ROXICODONE) tablet 5 mg  5 mg Oral Q4H PRN  
 docusate sodium (COLACE) capsule 100 mg  100 mg Oral BID  senna (SENOKOT) tablet 17.2 mg  2 Tab Oral QHS  lisinopril (PRINIVIL, ZESTRIL) tablet 10 mg  10 mg Oral DAILY  fluticasone propionate (FLONASE) 50 mcg/actuation nasal spray 2 Spray  2 Spray Both Nostrils DAILY  amiodarone (CORDARONE) tablet 400 mg  400 mg Oral Q12H  carvedilol (COREG) tablet 3.125 mg  3.125 mg Oral BID WITH MEALS  calcium carbonate (TUMS) chewable tablet 200 mg [elemental]  200 mg Oral TID PRN  
 hydrALAZINE (APRESOLINE) 20 mg/mL injection 10 mg  10 mg IntraVENous Q6H PRN  
 [Held by provider] aspirin delayed-release tablet 81 mg  81 mg Oral DAILY  guaiFENesin ER (MUCINEX) tablet 1,200 mg  1,200 mg Oral DAILY  montelukast (SINGULAIR) tablet 10 mg  10 mg Oral DAILY  tamsulosin (FLOMAX) capsule 0.4 mg  0.4 mg Oral DAILY  sodium chloride (NS) flush 5-40 mL  5-40 mL IntraVENous Q8H  
 sodium chloride (NS) flush 5-40 mL  5-40 mL IntraVENous PRN  
 albuterol (PROVENTIL VENTOLIN) nebulizer solution 2.5 mg  2.5 mg Nebulization Q6H RT  
 [Held by provider] enoxaparin (LOVENOX) injection 40 mg  40 mg SubCUTAneous Q24H Patient has no known allergies. Social History Socioeconomic History  Marital status:  Spouse name: Not on file  Number of children: Not on file  Years of education: Not on file  Highest education level: Not on file Tobacco Use  Smoking status: Former Smoker  Smokeless tobacco: Never Used Substance and Sexual Activity  Alcohol use: Yes Comment: occasional  
 
Social History Tobacco Use Smoking Status Former Smoker Smokeless Tobacco Never Used Family History Problem Relation Age of Onset  Dementia Mother  Cancer Father   
     lung cancer  Heart Attack Father ROS: The patient has no difficulty with chest pain or shortness of breath. No fever or chills. Comprehensive review of systems was otherwise unremarkable except as noted above. Physical Exam:  
Visit Vitals /68 Pulse 62 Temp 98.4 °F (36.9 °C) Resp 18 Ht 5' 8\" (1.727 m) Wt 146 lb 11.2 oz (66.5 kg) SpO2 95% BMI 22.31 kg/m² Constitutional: Alert, oriented, cooperative patient in no acute distress; appears stated age Eyes: Sclera are clear. EOMs intact ENMT: no external lesions gross hearing normal; no obvious neck masses, no ear or lip lesions, nares normal 
CV: RRR; Normal perfusion Resp: No JVD. Breathing is non-labored; crackles in the right base, Chest tube intact, on suction without air leak, serosanguinous drainage. Incisions c/d/i. GI: soft and non-distended Musculoskeletal: unremarkable with normal function. No embolic signs or cyanosis. Neuro:  Oriented; moves all 4; no focal deficits Psychiatric: normal affect and mood, no memory impairment Recent vitals (if inpt): 
Patient Vitals for the past 24 hrs: 
 BP Temp Pulse Resp SpO2 Weight 05/17/19 1143 123/68 98.4 °F (36.9 °C) 62 18 95 %   
05/17/19 0748     93 %   
05/17/19 0747 110/59 97.6 °F (36.4 °C) (!) 57 18 92 %   
05/17/19 0644      146 lb 11.2 oz (66.5 kg) 05/17/19 0310 121/61 98 °F (36.7 °C) 64 18 94 %   
05/16/19 2221 92/58 98.6 °F (37 °C) 64 18 96 %   
05/16/19 2023 90/53 98.9 °F (37.2 °C) 61 18 97 %   
05/16/19 2013     95 %   
05/16/19 1453 103/48 98 °F (36.7 °C) 60 18 97 %  Labs: 
Recent Labs 05/17/19 
5615 WBC 16.1* HGB 12.6*  
 Lab Results Component Value Date/Time WBC 16.1 (H) 05/17/2019 08:03 AM  
 HGB 12.6 (L) 05/17/2019 08:03 AM  
 PLATELET 409 11/69/8333 08:03 AM  
 Sodium 130 (L) 05/13/2019 07:20 AM  
 Potassium 4.1 05/13/2019 07:20 AM  
 Chloride 97 (L) 05/13/2019 07:20 AM  
 CO2 26 05/13/2019 07:20 AM  
 BUN 21 05/13/2019 07:20 AM  
 Creatinine 0.51 (L) 05/13/2019 07:20 AM  
 Glucose 105 (H) 05/13/2019 07:20 AM  
 INR 1.2 02/02/2018 06:34 AM  
 aPTT 37.5 (H) 02/02/2018 06:34 AM  
 Bilirubin, total 0.7 05/08/2019 04:10 AM  
 Bilirubin, direct 0.2 09/21/2010 02:10 PM  
 AST (SGOT) 25 05/08/2019 04:10 AM  
 ALT (SGPT) 21 05/08/2019 04:10 AM  
 Alk. phosphatase 77 05/08/2019 04:10 AM  
 Troponin-I, Qt. 0.05 02/04/2018 01:04 AM  
 
 
I reviewed recent labs and recent radiologic studies. 5/6/19 CXR: 
IMPRESSION: 
  
1. Large right-sided tension pneumothorax. This was discussed with emergency department by Dr. Maylin White at 9:58 AM on 5/6/2019. 
  
2. Unchanged enlargement of the cardiac silhouette. 
  
3. Chronic interstitial changes throughout the left lung. 5/13/19 CXR: 
FINDINGS: A portable AP radiograph of the chest was obtained at 0426 hours. Right apical pneumothorax slightly increased in size. . Chronic diffuse increased 
interstitial markings unchanged. . The cardiac and mediastinal contours and 
pulmonary vascularity are normal.  Stable subcutaneous emphysema. .  
  
IMPRESSION IMPRESSION: Slight increase in size right apical pneumothorax. 5/8/19 CT Chest: 
IMPRESSION:  
1. Moderate size right pneumothorax with pneumomediastinum and extensive 
subcutaneous air. 2.  Tip of the right chest tube passes into the posterior mediastinum XR Results (most recent): 
Results from Hospital Encounter encounter on 05/06/19 XR CHEST SNGL V  
 Narrative Chest X-ray INDICATION: 49-year-old male status post VATS with apical blebectomy A portable AP view of the chest was obtained. FINDINGS: Portable AP semiupright view the chest. 
Patient slightly rotated to the right. Ectasia the descending aorta stable. Cardiac contour within normal limits. There is central vessel congestion and suggestion of generalized underlying 
chronic interstitial fibrotic changes. There is a chest tube right apical region as well as suggestion of right basilar 
chest tube in place. There is a right apical pneumothorax. The bony thorax is 
intact. There is generalized uptake is emphysema throughout the right hemithorax soft 
tissues Impression IMPRESSION:  
New right apical pneumothorax with presence of 2 right-sided chest tubes in 
place. Generalized central vessel congestion an underlying interstitial coarsening 
noted bilaterally. Subcutaneous emphysema throughout the right hemithorax soft tissues has shown 
interval increase. These results were called to the PACU on 5/16/2019 at 10:51 hours and discussed 
with Jagdish Beth RN. I independently reviewed radiology images for studies I described above or studies I have ordered. Admission date (for inpatients): 5/6/2019  
6 Days Post-Op  Procedure(s): RIGHT VATS WITH TALC PLEURODESIS CHEST TUBE INSERTION X 2  APICAL BLEBECTOMY ASSESSMENT/PLAN: 
Problem List  Date Reviewed: 5/9/2019 Codes Class Noted Subcutaneous emphysema ([facility] ICD-10-CM: T79. 7XXA ICD-9-CM: 958.7  5/9/2019 * (Principal) Pneumothorax on right ICD-10-CM: J93.9 ICD-9-CM: 512.89  5/6/2019 Shortness of breath ICD-10-CM: R06.02 
ICD-9-CM: 786.05  5/6/2019  Acute on chronic respiratory failure with hypoxia (HCC) ICD-10-CM: Q74.25 
 ICD-9-CM: 518.84, 799.02  5/6/2019 COPD exacerbation ([facility] ICD-10-CM: J44.1 ICD-9-CM: 491.21  5/6/2019 Ventricular tachyarrhythmia ([facility] ICD-10-CM: I47.2 ICD-9-CM: 427.1  5/6/2019 Pulmonary infiltrates ICD-10-CM: R91.8 ICD-9-CM: 793.19  5/6/2019 VT (ventricular tachycardia) ([facility]) ICD-10-CM: I47.2 ICD-9-CM: 427.1  5/6/2019 Closed compression fracture of lumbosacral spine (HCC) ICD-10-CM: S32.000A ICD-9-CM: 805.4  12/11/2018 Hip fracture ([facility] ICD-10-CM: B92.677B ICD-9-CM: 820.8  2/1/2018 SOB (shortness of breath) ICD-10-CM: R06.02 
ICD-9-CM: 786.05  3/9/2017 Overview Addendum 12/11/2018 10:09 AM by Leila Bustamante LPN Last Assessment & Plan:  
Stable, 6mwt ordered and reviewed. Management per copd, hypoxia. Last Assessment & Plan: He presents today with shortness of breath that has worsened over the past 2 weeks which he relates to shallow breathing due to back pain after recent lumbar fractures. His shortness of breath appears to be multifactorial.  He does not appear infected today and I do not believe this is a COPD exacerbation. I have recommended breathing exercises to combat his I will breathing. He does have an incentive spirometer from his previous hospitalization which I have encouraged him to use daily as a reminder to take deep breaths and to prevent pneumonia. We reviewed how to use this device. He expresses great concern about his 30 pound weight loss and dyspnea. His chest x-ray today was negative with no signs of infection or cancer. However, a CT is recommended to rule out malignancy. I have explained that cancer is unlikely due to his hip fracture with sudden loss of appetite then gradual weight loss due to minimal p.o. intake immediately after however will proceed with CT scan. 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 HRCT 
  
  
   
 COPD (chronic obstructive pulmonary disease) with emphysema (HCC) ICD-10-CM: J43.9 ICD-9-CM: 492.8  10/17/2016 Overview Signed 12/11/2018 10:09 AM by Catrina Johnson LPN Last Assessment & Plan:  
Not currently on any maintence medication regimen for COPD as he felt them to be unhelpful in the past.  I recommended a trial of a ANoro Ellipta which he should take 1 inhalation daily. Demonstration was completed on the correct method of administration and he was able to return demonstration independently in the office today and administer his first dose. I have given him a 2-week sample which he will use daily and monitor if he notes any improvement in his breathing. He may also try pretreating himself with Ventolin before exertion to see if this offers any relief. Chronic respiratory failure with hypoxia (HCC) (Chronic) ICD-10-CM: J96.11 
ICD-9-CM: 518.83, 799.02  3/29/2016 Overview Addendum 5/6/2019 10:14 AM by Zeyad Garcia, NP Continue to wear 2 L nasal cannula at night. May use 2 L supplemental oxygen to maintain sats greater than 90%. [place] ICD-10-CM: I49.9 ICD-9-CM: 427.89  3/28/2016 Overview Addendum 12/11/2018 10:09 AM by Catrina Johnson LPN Overview:  
Reported by LIN Gilliam physician. Last Assessment & Plan:  
EKG not done. I placed the patient on telemetry today and is having fairly frequent PVCs. We'll check BMP and magnesium. Overview:  
Reported by LIN Gilliam physician. Last Assessment & Plan:  
EKG not done. I placed the patient on telemetry today and is having fairly frequent PVCs. We'll check BMP and magnesium. Abdominal aortic aneurysm without rupture (HCC) ICD-10-CM: I71.4 ICD-9-CM: 441.4  12/10/2015 Overview Addendum 12/11/2018 10:09 AM by Belkis Bean LPN In 2005 Benign prostatic hyperplasia ICD-10-CM: N40.0 ICD-9-CM: 600.00  12/10/2015 Cardiovascular disease ICD-10-CM: I25.10 ICD-9-CM: 429.2  12/10/2015 COPD (chronic obstructive pulmonary disease) (HCC) ICD-10-CM: J44.9 ICD-9-CM: 783  12/10/2015 Low testosterone ICD-10-CM: R79.89 ICD-9-CM: 790.99  12/10/2015 Essential hypertension, benign ICD-10-CM: I10 
ICD-9-CM: 401.1  12/10/2015 Allergic rhinitis ICD-10-CM: J30.9 ICD-9-CM: 477.9  12/10/2015 Raynaud's syndrome ICD-10-CM: I73.00 ICD-9-CM: 443.0  12/10/2015 Erectile dysfunction ICD-10-CM: N52.9 ICD-9-CM: 607.84  12/10/2015 Principal Problem: 
  Pneumothorax on right (5/6/2019) Active Problems: 
  Essential hypertension, benign (12/10/2015) Chronic respiratory failure with hypoxia ([address] (3/29/2016) Overview: Continue to wear 2 L nasal cannula at night. May use 2 L supplemental  
    oxygen to maintain sats greater than 90%. Shortness of breath (5/6/2019) Acute on chronic respiratory failure with hypoxia ([address] (5/6/2019) COPD exacerbation ([address] (5/6/2019) Ventricular tachyarrhythmia ([address] (5/6/2019) Pulmonary infiltrates (5/6/2019) VT (ventricular tachycardia) ([address] (5/6/2019) Subcutaneous emphysema ([address] (5/9/2019) Plan: S/p VATS with pleurodesis Continue chest tube to suction for 4 days post op CXR in Am Ambulate Pain control PT/OT Continue current care Signed:  CHRISTIANO Lay

## 2019-05-17 NOTE — PROGRESS NOTES
Pt resting in bed with eyes closed. Pt awakens when spoken to. Pt oriented times 3 at this time. Pt complaints of right rib area pain 5/10 at this time. Pt has CT times 2 to right side. Pt has dressing in place with shadowing noted on dressing. CT are placed to 20 cm suction. No distress noted at this time. Pt on 3 L NC. crepitus noted to right upper chest, Dr. Marc Lebron aware. Pt encouraged to call for assistance if needed call light in reach, door open will monitor.

## 2019-05-17 NOTE — PROGRESS NOTES
No urine output for this shift. Bladder scan done showing approximately 800 cc of urine. Dr. Melida Contreras paged to make aware.

## 2019-05-17 NOTE — PROGRESS NOTES
In and out cath done with approximately 800 cc of angelito urine in return. Pt tolerated well will monitor.

## 2019-05-18 ENCOUNTER — APPOINTMENT (OUTPATIENT)
Dept: GENERAL RADIOLOGY | Age: 84
DRG: 163 | End: 2019-05-18
Attending: SURGERY
Payer: MEDICARE

## 2019-05-18 PROCEDURE — 65660000000 HC RM CCU STEPDOWN

## 2019-05-18 PROCEDURE — 74011250637 HC RX REV CODE- 250/637: Performed by: SURGERY

## 2019-05-18 PROCEDURE — 51798 US URINE CAPACITY MEASURE: CPT

## 2019-05-18 PROCEDURE — 71045 X-RAY EXAM CHEST 1 VIEW: CPT

## 2019-05-18 PROCEDURE — 94640 AIRWAY INHALATION TREATMENT: CPT

## 2019-05-18 PROCEDURE — 77010033678 HC OXYGEN DAILY

## 2019-05-18 PROCEDURE — 77030012390 HC DRN CHST BTL GTNG -B

## 2019-05-18 PROCEDURE — 77030034849

## 2019-05-18 PROCEDURE — 74011000250 HC RX REV CODE- 250: Performed by: SURGERY

## 2019-05-18 PROCEDURE — 74011636637 HC RX REV CODE- 636/637: Performed by: SURGERY

## 2019-05-18 PROCEDURE — 77030011943

## 2019-05-18 PROCEDURE — 99232 SBSQ HOSP IP/OBS MODERATE 35: CPT | Performed by: INTERNAL MEDICINE

## 2019-05-18 PROCEDURE — 94760 N-INVAS EAR/PLS OXIMETRY 1: CPT

## 2019-05-18 PROCEDURE — 0T9B70Z DRAINAGE OF BLADDER WITH DRAINAGE DEVICE, VIA NATURAL OR ARTIFICIAL OPENING: ICD-10-PCS | Performed by: INTERNAL MEDICINE

## 2019-05-18 PROCEDURE — BT40ZZZ ULTRASONOGRAPHY OF BLADDER: ICD-10-PCS | Performed by: INTERNAL MEDICINE

## 2019-05-18 RX ORDER — PREDNISONE 20 MG/1
20 TABLET ORAL
Status: DISCONTINUED | OUTPATIENT
Start: 2019-05-19 | End: 2019-05-19

## 2019-05-18 RX ADMIN — CARVEDILOL 3.12 MG: 3.12 TABLET, FILM COATED ORAL at 17:23

## 2019-05-18 RX ADMIN — ACETAMINOPHEN 500 MG: 500 TABLET, FILM COATED ORAL at 06:10

## 2019-05-18 RX ADMIN — TAMSULOSIN HYDROCHLORIDE 0.4 MG: 0.4 CAPSULE ORAL at 08:15

## 2019-05-18 RX ADMIN — MONTELUKAST SODIUM 10 MG: 10 TABLET, FILM COATED ORAL at 08:15

## 2019-05-18 RX ADMIN — DOCUSATE SODIUM 100 MG: 100 CAPSULE, LIQUID FILLED ORAL at 08:14

## 2019-05-18 RX ADMIN — ALBUTEROL SULFATE 2.5 MG: 2.5 SOLUTION RESPIRATORY (INHALATION) at 01:20

## 2019-05-18 RX ADMIN — ACETAMINOPHEN 500 MG: 500 TABLET, FILM COATED ORAL at 21:40

## 2019-05-18 RX ADMIN — Medication 5 ML: at 21:47

## 2019-05-18 RX ADMIN — GUAIFENESIN 1200 MG: 600 TABLET, EXTENDED RELEASE ORAL at 08:15

## 2019-05-18 RX ADMIN — Medication 5 ML: at 13:39

## 2019-05-18 RX ADMIN — AMIODARONE HYDROCHLORIDE 400 MG: 200 TABLET ORAL at 21:39

## 2019-05-18 RX ADMIN — ACETAMINOPHEN 500 MG: 500 TABLET, FILM COATED ORAL at 13:39

## 2019-05-18 RX ADMIN — DOCUSATE SODIUM 100 MG: 100 CAPSULE, LIQUID FILLED ORAL at 17:23

## 2019-05-18 RX ADMIN — Medication 5 ML: at 06:10

## 2019-05-18 RX ADMIN — AMIODARONE HYDROCHLORIDE 400 MG: 200 TABLET ORAL at 08:14

## 2019-05-18 RX ADMIN — ALBUTEROL SULFATE 2.5 MG: 2.5 SOLUTION RESPIRATORY (INHALATION) at 14:47

## 2019-05-18 RX ADMIN — ALBUTEROL SULFATE 2.5 MG: 2.5 SOLUTION RESPIRATORY (INHALATION) at 20:58

## 2019-05-18 RX ADMIN — CARVEDILOL 3.12 MG: 3.12 TABLET, FILM COATED ORAL at 08:15

## 2019-05-18 RX ADMIN — LISINOPRIL 10 MG: 5 TABLET ORAL at 08:14

## 2019-05-18 RX ADMIN — FLUTICASONE PROPIONATE 2 SPRAY: 50 SPRAY, METERED NASAL at 08:15

## 2019-05-18 RX ADMIN — SENNOSIDES 17.2 MG: 8.6 TABLET, FILM COATED ORAL at 21:40

## 2019-05-18 RX ADMIN — ALBUTEROL SULFATE 2.5 MG: 2.5 SOLUTION RESPIRATORY (INHALATION) at 08:23

## 2019-05-18 RX ADMIN — PREDNISONE 40 MG: 20 TABLET ORAL at 08:14

## 2019-05-18 NOTE — PROGRESS NOTES
Progress Note/Office Note:  
Luisito Gallardo  MRN: [id number]  CZL:[birth year]  Age:87 y.o. 
 
HPI: Luisito Gallardo is a 80 y.o. male who has PMHx of AAA, COPD, BPH, and HTN who presented to the ED via EMS on 5/6/19 with sudden onset of SOB, PATE, and cough. While en route via EMS pt had a run of VT. He had no relief with NRB. Sats were in the 80s in the ED. CXR in ED demonstrated complete PTX on the right. He was started on an Amio gtt for repeated runs of VT. Pulmonary placed a Uresil in the ED. This eventually developed subcutaneous emphysema and the Uresil became ineffective. A 24Fr chest tube was placed on 5/7/19. Since then, pt has had a persistent air leak. CXR demonstrates right apical PTX. Surgery was consulted for consideration of pleurodesis. 5/14/19: Awake in bed, no complaints. CT with +air leak, 150mL/24h serosang output. CXR improved this AM. 5/15/19: CT with 170mL/24h serosang output. CXR without PTX this AM. 5/16/19: Day of surgery 5/17/19: POD#1 s/p right VATS with talc pleurodesis. AF, sariah, HTN on 3L NC.  2.5L UOP/24h. CXR with stable right PTX. CT #1 with 30mL output since surgery, CT #2 with 40mL out. 
5/18/19: POD#2 s/p right VATS with talc pleurodesis. AF, sariah, HTN on 3L NC.  CXR with Decreased tiny right apical pneumothorax. CT #1 with 310mL output since surgery, CT #2 with 100mL out. Past Medical History:  
Diagnosis Date  Abdominal aortic aneurysm ([address] 12/10/2015 In 2005  Abdominal aortic aneurysm without rupture ([address]  Allergic rhinitis 12/10/2015  Asthma  Benign neoplasm  Benign prostatic hyperplasia 12/10/2015  BPH without urinary obstruction  Cardiovascular disease 12/10/2015  Chronic obstructive asthma with exacerbation ([address] 12/10/2015  COPD (chronic obstructive pulmonary disease) ([address] 12/10/2015  Cough with hemoptysis  Erectile dysfunction 12/10/2015  Essential hypertension, benign 12/10/2015  Fracture 10/2014  
 of left hand and ribs after fall on concrete  History of colon polyps  Low testosterone 12/10/2015  Lumbago  Memory loss  Overflow incontinence  Raynaud's syndrome 12/10/2015  Rotator cuff tendinitis  Thrombocytopenia ([facility]  Urinary frequency Past Surgical History:  
Procedure Laterality Date  HX CATARACT REMOVAL  6/2016-right  HX COLONOSCOPY    
 HX FRACTURE TX  10/2014  
 of left hand and ribs are fall on concrete [address]  HX ORTHOPAEDIC  03/2018  
 hip fracture Current Facility-Administered Medications Medication Dose Route Frequency  [START ON 5/19/2019] predniSONE (DELTASONE) tablet 20 mg  20 mg Oral DAILY WITH BREAKFAST  acetaminophen (TYLENOL) tablet 500 mg  500 mg Oral Q8H  
 acetaminophen (TYLENOL) tablet 650 mg  650 mg Oral Q4H PRN  
 docusate sodium (COLACE) capsule 100 mg  100 mg Oral BID  senna (SENOKOT) tablet 17.2 mg  2 Tab Oral QHS  lisinopril (PRINIVIL, ZESTRIL) tablet 10 mg  10 mg Oral DAILY  fluticasone propionate (FLONASE) 50 mcg/actuation nasal spray 2 Spray  2 Spray Both Nostrils DAILY  amiodarone (CORDARONE) tablet 400 mg  400 mg Oral Q12H  carvedilol (COREG) tablet 3.125 mg  3.125 mg Oral BID WITH MEALS  calcium carbonate (TUMS) chewable tablet 200 mg [elemental]  200 mg Oral TID PRN  
 hydrALAZINE (APRESOLINE) 20 mg/mL injection 10 mg  10 mg IntraVENous Q6H PRN  
 [Held by provider] aspirin delayed-release tablet 81 mg  81 mg Oral DAILY  guaiFENesin ER (MUCINEX) tablet 1,200 mg  1,200 mg Oral DAILY  montelukast (SINGULAIR) tablet 10 mg  10 mg Oral DAILY  tamsulosin (FLOMAX) capsule 0.4 mg  0.4 mg Oral DAILY  sodium chloride (NS) flush 5-40 mL  5-40 mL IntraVENous Q8H  
 sodium chloride (NS) flush 5-40 mL  5-40 mL IntraVENous PRN  
 albuterol (PROVENTIL VENTOLIN) nebulizer solution 2.5 mg  2.5 mg Nebulization Q6H RT  
  [Held by provider] enoxaparin (LOVENOX) injection 40 mg  40 mg SubCUTAneous Q24H Patient has no known allergies. Social History Socioeconomic History  Marital status:  Spouse name: Not on file  Number of children: Not on file  Years of education: Not on file  Highest education level: Not on file Tobacco Use  Smoking status: Former Smoker  Smokeless tobacco: Never Used Substance and Sexual Activity  Alcohol use: Yes Comment: occasional  
 
Social History Tobacco Use Smoking Status Former Smoker Smokeless Tobacco Never Used Family History Problem Relation Age of Onset  Dementia Mother  Cancer Father   
     lung cancer  Heart Attack Father ROS: The patient has no difficulty with chest pain or shortness of breath. No fever or chills. Comprehensive review of systems was otherwise unremarkable except as noted above. Physical Exam:  
Visit Vitals /75 Pulse (!) 57 Temp 98.4 °F (36.9 °C) Resp 20 Ht 5' 8\" (1.727 m) Wt 146 lb 11.2 oz (66.5 kg) SpO2 92% BMI 22.31 kg/m² Constitutional: Alert, oriented, cooperative patient in no acute distress; appears stated age Eyes: Sclera are clear. EOMs intact ENMT: no external lesions gross hearing normal; no obvious neck masses, no ear or lip lesions, nares normal 
CV: RRR; Normal perfusion Resp: No JVD. Breathing is non-labored; crackles in the right base, Chest tube intact, on suction without air leak, serosanguinous drainage. Incisions c/d/i. GI: soft and non-distended Musculoskeletal: unremarkable with normal function. No embolic signs or cyanosis. Neuro:  Oriented; moves all 4; no focal deficits Psychiatric: normal affect and mood, no memory impairment Recent vitals (if inpt): 
Patient Vitals for the past 24 hrs: 
 BP Temp Pulse Resp SpO2 Weight 05/18/19 1130 157/75 98.4 °F (36.9 °C) (!) 57 20 92 %   
05/18/19 0930 148/72      05/18/19 0830 (!) 218/90 98.1 °F (36.7 °C) (!) 58 20 100 %   
05/18/19 0823     96 %   
05/18/19 0630      146 lb 11.2 oz (66.5 kg) 05/18/19 0313 128/49 98.4 °F (36.9 °C) 66 20 99 %   
05/18/19 0121     90 %   
05/17/19 2330  98.6 °F (37 °C) 70 20 92 %   
05/17/19 2109   (!) 59     
05/17/19 2046     94 %   
05/17/19 1942 105/57 98 °F (36.7 °C) 63 19 94 %   
05/17/19 1629 99/48 98 °F (36.7 °C) 62 20 95 %   
05/17/19 1442     94 %  Labs: 
Recent Labs 05/17/19 
8155 WBC 16.1* HGB 12.6*  
 Lab Results Component Value Date/Time WBC 16.1 (H) 05/17/2019 08:03 AM  
 HGB 12.6 (L) 05/17/2019 08:03 AM  
 PLATELET 283 02/89/0811 08:03 AM  
 Sodium 130 (L) 05/13/2019 07:20 AM  
 Potassium 4.1 05/13/2019 07:20 AM  
 Chloride 97 (L) 05/13/2019 07:20 AM  
 CO2 26 05/13/2019 07:20 AM  
 BUN 21 05/13/2019 07:20 AM  
 Creatinine 0.51 (L) 05/13/2019 07:20 AM  
 Glucose 105 (H) 05/13/2019 07:20 AM  
 INR 1.2 02/02/2018 06:34 AM  
 aPTT 37.5 (H) 02/02/2018 06:34 AM  
 Bilirubin, total 0.7 05/08/2019 04:10 AM  
 Bilirubin, direct 0.2 09/21/2010 02:10 PM  
 AST (SGOT) 25 05/08/2019 04:10 AM  
 ALT (SGPT) 21 05/08/2019 04:10 AM  
 Alk. phosphatase 77 05/08/2019 04:10 AM  
 Troponin-I, Qt. 0.05 02/04/2018 01:04 AM  
 
 
I reviewed recent labs and recent radiologic studies. 5/6/19 CXR: 
IMPRESSION: 
  
1. Large right-sided tension pneumothorax. This was discussed with emergency department by Dr. Mil Kee at 9:58 AM on 5/6/2019. 
  
2. Unchanged enlargement of the cardiac silhouette. 
  
3. Chronic interstitial changes throughout the left lung. 5/13/19 CXR: 
FINDINGS: A portable AP radiograph of the chest was obtained at 0426 hours. Right apical pneumothorax slightly increased in size. . Chronic diffuse increased 
interstitial markings unchanged. . The cardiac and mediastinal contours and 
pulmonary vascularity are normal.  Stable subcutaneous emphysema. .  
  
 IMPRESSION: Slight increase in size right apical pneumothorax. 5/8/19 CT Chest: 
IMPRESSION:  
1. Moderate size right pneumothorax with pneumomediastinum and extensive 
subcutaneous air. 2.  Tip of the right chest tube passes into the posterior mediastinum XR Results (most recent): 
Results from Hospital Encounter encounter on 05/06/19 XR CHEST SNGL V  
 Narrative Chest X-ray INDICATION: 80-year-old male status post VATS with apical blebectomy A portable AP view of the chest was obtained. FINDINGS: Portable AP semiupright view the chest. 
Patient slightly rotated to the right. Ectasia the descending aorta stable. Cardiac contour within normal limits. There is central vessel congestion and suggestion of generalized underlying 
chronic interstitial fibrotic changes. There is a chest tube right apical region as well as suggestion of right basilar 
chest tube in place. There is a right apical pneumothorax. The bony thorax is 
intact. There is generalized uptake is emphysema throughout the right hemithorax soft 
tissues Impression IMPRESSION:  
New right apical pneumothorax with presence of 2 right-sided chest tubes in 
place. Generalized central vessel congestion an underlying interstitial coarsening 
noted bilaterally. Subcutaneous emphysema throughout the right hemithorax soft tissues has shown 
interval increase. These results were called to the PACU on 5/16/2019 at 10:51 hours and discussed 
with Imani Guo RN. I independently reviewed radiology images for studies I described above or studies I have ordered. Admission date (for inpatients): 5/6/2019  
6 Days Post-Op  Procedure(s): RIGHT VATS WITH TALC PLEURODESIS CHEST TUBE INSERTION X 2  APICAL BLEBECTOMY ASSESSMENT/PLAN: 
Problem List  Date Reviewed: 5/9/2019 Codes Class Noted Subcutaneous emph[facility]ma [facility] ICD-10-CM: T79. 7XXA ICD-9-CM: 958.7  5/9/2019 * (Principal) Pneumothorax on right ICD-10-CM: J93.9 ICD-9-CM: 512.89  5/6/2019 Shortness of breath ICD-10-CM: R06.02 
ICD-9-CM: 786.05  5/6/2019 Acute on chronic respiratory failure with hypoxia [facility]) ICD-10-CM: J96.21 
ICD-9-CM: 518.84, 799.02  5/6/2019 COPD exacerbation ([facility] ICD-10-CM: J44.1 ICD-9-CM: 491.21  5/6/2019 Ventricular tachyarrhythmia ([facility] ICD-10-CM: I47.2 ICD-9-CM: 427.1  5/6/2019 Pulmonary infiltrates ICD-10-CM: R91.8 ICD-9-CM: 793.19  5/6/2019 VT (ventricular tachycardia) ([facility]) ICD-10-CM: I47.2 ICD-9-CM: 427.1  5/6/2019 Closed compression fracture of lumbosacral spine (HCC) ICD-10-CM: S32.000A ICD-9-CM: 805.4  12/11/2018 Hip fracture ([facility] ICD-10-CM: J35.630R ICD-9-CM: 820.8  2/1/2018 SOB (shortness of breath) ICD-10-CM: R06.02 
ICD-9-CM: 786.05  3/9/2017 Overview Addendum 12/11/2018 10:09 AM by Lalita Landeros LPN Last Assessment & Plan:  
Stable, 6mwt ordered and reviewed. Management per copd, hypoxia. Last Assessment & Plan: He presents today with shortness of breath that has worsened over the past 2 weeks which he relates to shallow breathing due to back pain after recent lumbar fractures. His shortness of breath appears to be multifactorial.  He does not appear infected today and I do not believe this is a COPD exacerbation. I have recommended breathing exercises to combat his I will breathing. He does have an incentive spirometer from his previous hospitalization which I have encouraged him to use daily as a reminder to take deep breaths and to prevent pneumonia. We reviewed how to use this device. He expresses great concern about his 30 pound weight loss and dyspnea. His chest x-ray today was negative with no signs of infection or cancer. However, a CT is recommended to rule out malignancy.   I have explained that cancer is unlikely due to his hip fracture with sudden loss of appetite then gradual weight loss due to minimal p.o. intake immediately after however will proceed with CT scan. 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 HRCT 
  
  
   
 COPD (chronic obstructive pulmonary disease) with emphysema (HCC) ICD-10-CM: J43.9 ICD-9-CM: 492.8  10/17/2016 Overview Signed 12/11/2018 10:09 AM by Shara Wilks LPN Last Assessment & Plan:  
Not currently on any maintence medication regimen for COPD as he felt them to be unhelpful in the past.  I recommended a trial of a ANoro Ellipta which he should take 1 inhalation daily. Demonstration was completed on the correct method of administration and he was able to return demonstration independently in the office today and administer his first dose. I have given him a 2-week sample which he will use daily and monitor if he notes any improvement in his breathing. He may also try pretreating himself with Ventolin before exertion to see if this offers any relief. Chronic respiratory failure with hypoxia (HCC) (Chronic) ICD-10-CM: J96.11 
ICD-9-CM: 518.83, 799.02  3/29/2016 Overview Addendum 5/6/2019 10:14 AM by Louise Najera, NP Continue to wear 2 L nasal cannula at night. May use 2 L supplemental oxygen to maintain sats greater than 90%. [place] ICD-10-CM: I49.9 ICD-9-CM: 427.89  3/28/2016 Overview Addendum 12/11/2018 10:09 AM by Shara Wilks LPN Overview:  
Reported by LIN Medina physician. Last Assessment & Plan:  
EKG not done. I placed the patient on telemetry today and is having fairly frequent PVCs. We'll check BMP and magnesium. Overview:  
Reported by LIN Medina physician. Last Assessment & Plan: EKG not done. I placed the patient on telemetry today and is having fairly frequent PVCs. We'll check BMP and magnesium. Abdominal aortic aneurysm without rupture (HCC) ICD-10-CM: I71.4 ICD-9-CM: 441.4  12/10/2015 Overview Addendum 12/11/2018 10:09 AM by Loreto Jonas LPN In 2005 Benign prostatic hyperplasia ICD-10-CM: N40.0 ICD-9-CM: 600.00  12/10/2015 Cardiovascular disease ICD-10-CM: I25.10 ICD-9-CM: 429.2  12/10/2015 COPD (chronic obstructive pulmonary disease) (HCC) ICD-10-CM: J44.9 ICD-9-CM: 989  12/10/2015 Low testosterone ICD-10-CM: R79.89 ICD-9-CM: 790.99  12/10/2015 Essential hypertension, benign ICD-10-CM: I10 
ICD-9-CM: 401.1  12/10/2015 Allergic rhinitis ICD-10-CM: J30.9 ICD-9-CM: 477.9  12/10/2015 Raynaud's syndrome ICD-10-CM: I73.00 ICD-9-CM: 443.0  12/10/2015 Erectile dysfunction ICD-10-CM: N52.9 ICD-9-CM: 607.84  12/10/2015 Principal Problem: 
  Pneumothorax on right (5/6/2019) Active Problems: 
  Essential hypertension, benign (12/10/2015) Chronic respiratory failure with hypoxia ([address] (3/29/2016) Overview: Continue to wear 2 L nasal cannula at night. May use 2 L supplemental  
    oxygen to maintain sats greater than 90%. Shortness of breath (5/6/2019) Acute on chronic respiratory failure with hypoxia ([address] (5/6/2019) COPD exacerbation ([address] (5/6/2019) Ventricular tachyarrhythmia ([address] (5/6/2019) Pulmonary infiltrates (5/6/2019) VT (ventricular tachycardia) ([address] (5/6/2019) Subcutaneous emphysema ([address] (5/9/2019) Plan: S/p VATS with pleurodesis Continue chest tube to suction for 4 days post op CXR in Am Ambulate Pain control PT/OT Continue current care Signed:  Aniya Gomez NP

## 2019-05-18 NOTE — PROGRESS NOTES
Dr. Julius Klein notified of bladder scan 667 ml and unable to void. Patient has been straight catherized on two other occasions. Telephone order received to insert li at this time.

## 2019-05-18 NOTE — PROGRESS NOTES
Patient in up in chair at this time. Respirations even and unlabored. Oxygen at 2 L NC. Chest tube dressings dry and intact. Atriums connected to suction 20 mmhg. Rojas patient and draining to bedside bag. No complaints. No distress noted. Call light within reach.

## 2019-05-18 NOTE — PROGRESS NOTES
END OF SHIFT NOTE: 
 
Intake/Output 05/17 1901 - 05/18 0700 In: -  
Out: 660 [Urine:460] Voiding: NO 
Catheter: NO 
Drain:   
 
 
 
 
Stool:  0 occurrences. Stool Assessment Stool Color: Britney Rutledge (05/14/19 1658) Stool Appearance: Formed (05/15/19 2035) Stool Amount: Medium (05/14/19 1658) Stool Source/Status: Rectum (05/14/19 0744) Emesis:  0 occurrences. VITAL SIGNS Patient Vitals for the past 12 hrs: 
 Temp Pulse Resp BP SpO2  
05/18/19 0313 98.4 °F (36.9 °C) 66 20 128/49 99 % 05/18/19 0121     90 % 05/17/19 2330 98.6 °F (37 °C) 70 20  92 % 05/17/19 2109  (!) 59     
05/17/19 2046     94 % 05/17/19 1942 98 °F (36.7 °C) 63 19 105/57 94 % Pain Assessment Pain 1 Pain Scale 1: Numeric (0 - 10) (05/17/19 1952) Pain Intensity 1: 0 (05/17/19 1952) Patient Stated Pain Goal: 0 (05/17/19 1952) Pain Reassessment 1: Yes (05/17/19 1804) Pain Onset 1: post p (05/17/19 1300) Pain Location 1: Rib cage (05/17/19 1704) Pain Orientation 1: Right (05/17/19 1704) Pain Description 1: Aching (05/17/19 1704) Pain Intervention(s) 1: Medication (see MAR) (05/17/19 1704) Ambulating Yes with assistance Additional Information: patient has not voided since VAT. Straight cathed once last night with 460 in output. Bladder scan at 0630 showed 138 mL. Shift report to be given to oncoming nurse at the bedside.  
 
Genet Walters RN

## 2019-05-18 NOTE — PROGRESS NOTES
Patient in bed at this time. Respirations present. Patient on oxygen at 2 L NC. Chest tube #1 & #2 are clean dry and intact and draining to atriums at bedside. No distress or discomfort noted. No complaints. Call light within reach.

## 2019-05-18 NOTE — PROGRESS NOTES
Notified of patient blood pressure 218/90. Scheduled blood pressure medications given. Will re-check blood pressure in one hour.

## 2019-05-18 NOTE — PROGRESS NOTES
Pulmonary Daily Progress Note: 5/18/2019 [facility] Records Admission Date: 5/6/2019 The patient's chart is reviewed and the patient is discussed with the staff. 87 y.o. CM presents via EMS with shortness of breath when got up this morning.  Had a productive cough with yellow sputum and received Rocephin.  In the ER CXR with large right pneumothorax and pulmonary consulted for chest tube placement and admission. Henny Brookeel a fall in February and seen by Dr. Brambila Pac with compression L2,L3, L4. In the ER had VT and cardiology was consulted and added Amiodarone. He has had a right pneumothorax with persistent air leak and underwent surgical pleurodesis on 5/16. Subjective:  
Had hypertension last night to  218/90 but BP low yesterday off hydralazine. Still having issues with urinary retention and has developed hiccups. No air leak from chest tubes currently, tiny R pneumo on CXR Current Facility-Administered Medications Medication Dose Route Frequency  HYDROmorphone (PF) (DILAUDID) injection 0.5 mg  0.5 mg IntraVENous Q3H PRN  predniSONE (DELTASONE) tablet 40 mg  40 mg Oral DAILY WITH BREAKFAST  acetaminophen (TYLENOL) tablet 500 mg  500 mg Oral Q8H  
 acetaminophen (TYLENOL) tablet 650 mg  650 mg Oral Q4H PRN  
 oxyCODONE IR (ROXICODONE) tablet 5 mg  5 mg Oral Q4H PRN  
 docusate sodium (COLACE) capsule 100 mg  100 mg Oral BID  senna (SENOKOT) tablet 17.2 mg  2 Tab Oral QHS  lisinopril (PRINIVIL, ZESTRIL) tablet 10 mg  10 mg Oral DAILY  fluticasone propionate (FLONASE) 50 mcg/actuation nasal spray 2 Spray  2 Spray Both Nostrils DAILY  amiodarone (CORDARONE) tablet 400 mg  400 mg Oral Q12H  carvedilol (COREG) tablet 3.125 mg  3.125 mg Oral BID WITH MEALS  calcium carbonate (TUMS) chewable tablet 200 mg [elemental]  200 mg Oral TID PRN  
 hydrALAZINE (APRESOLINE) 20 mg/mL injection 10 mg  10 mg IntraVENous Q6H PRN  
  [Held by provider] aspirin delayed-release tablet 81 mg  81 mg Oral DAILY  guaiFENesin ER (MUCINEX) tablet 1,200 mg  1,200 mg Oral DAILY  montelukast (SINGULAIR) tablet 10 mg  10 mg Oral DAILY  tamsulosin (FLOMAX) capsule 0.4 mg  0.4 mg Oral DAILY  sodium chloride (NS) flush 5-40 mL  5-40 mL IntraVENous Q8H  
 sodium chloride (NS) flush 5-40 mL  5-40 mL IntraVENous PRN  
 albuterol (PROVENTIL VENTOLIN) nebulizer solution 2.5 mg  2.5 mg Nebulization Q6H RT  
 [Held by provider] enoxaparin (LOVENOX) injection 40 mg  40 mg SubCUTAneous Q24H Review of Systems 
+chest wall pain Constitutional:  negative for fever, chills, sweats HEENT: +sinsus congestion and has seasonal allergies Cardiovascular:  negative for chest pain, palpitations, syncope, edema Respiratory:  Right chest tube, significant subc air Gastrointestinal:  negative for dysphagia, reflux, vomiting, diarrhea, abdominal pain, or melena Neurologic:  negative for focal weakness, numbness, headache Objective:  
 
Vitals:  
 05/18/19 0121 05/18/19 0313 05/18/19 0630 05/18/19 0830 BP:  128/49  (!) 218/90 Pulse:  66  (!) 58 Resp:  20  20 Temp:  98.4 °F (36.9 °C)  98.1 °F (36.7 °C) SpO2: 90% 99%  100% Weight:   146 lb 11.2 oz (66.5 kg) Height:      
 
 
Intake and Output:  
05/16 1901 - 05/18 0700 In: 680 [P.O.:680] Out: 8268 [KSDXE:077] No intake/output data recorded. Physical Exam:         
Constitutional:  the patient is well developed and in no acute distress, NC  6lpm 
EENMT:  Sclera clear, pupils equal, oral mucosa moist 
Respiratory: crepitus improved, CTAB Cardiovascular:  RRR without M,G,R   
Gastrointestinal: soft and non-tender; with positive bowel sounds, appetite good. Musculoskeletal: warm without cyanosis. There is no lower extremity edema. Skin:  no jaundice or rashes, right anterior chest wound, right chest tube Neurologic: no gross neuro deficits Psychiatric:  alert and oriented x 3 
 
 
 CXR today Chest CT 5/8/19: 
 
 
LAB No results for input(s): GLUCPOC in the last 72 hours. No lab exists for component: [place] Recent Labs 05/17/19 
4915 WBC 16.1* HGB 12.6* HCT 37.3*  
 No results for input(s): NA, K, CL, CO2, GLU, BUN, CREA, MG, PHOS, CA, TROIQ, ALB, TBIL, TBILI, GPT, ALT, SGOT, BNPP in the last 72 hours. No lab exists for component: TROIP No results for input(s): PH, PCO2, PO2, HCO3 in the last 72 hours. No results for input(s): LCAD, LAC in the last 72 hours. Assessment:  (Medical Decision Making) Hospital Problems  Date Reviewed: 5/9/2019 Codes Class Noted POA Shortness of breath ICD-10-CM: R06.02 
ICD-9-CM: 786.05  5/6/2019 Yes Wean O2 as able. * (Principal) Pneumothorax on right ICD-10-CM: J93.9 ICD-9-CM: 512.89  5/6/2019 Yes Pod#2 s/p pleurodesis Chronic respiratory failure with hypoxia (HCC) (Chronic) ICD-10-CM: J96.11 
ICD-9-CM: 518.83, 799.02  3/29/2016 Yes Overview Addendum 5/6/2019 10:14 AM by Jillian Deutsch, NP Continue to wear 2 L nasal cannula at night. May use 2 L supplemental oxygen to maintain sats greater than 90%. chronic Essential hypertension, benign ICD-10-CM: I10 
ICD-9-CM: 401.1  12/10/2015 Yes Hold hydralazine; continue carvedilol, Subcutaneous emphysema ([facility] ICD-10-CM: T79. 7XXA ICD-9-CM: 958.7  5/9/2019 No  
 significant Acute on chronic respiratory failure with hypoxia [facility]) ICD-10-CM: J96.21 
ICD-9-CM: 518.84, 799.02  5/6/2019 Yes  
 remains on NC   
 COPD exacerbation ([facility] ICD-10-CM: J44.1 ICD-9-CM: 491.21  5/6/2019 Yes Taper steroids Ventricular tachyarrhythmia ([facility] ICD-10-CM: I47.2 ICD-9-CM: 427.1  5/6/2019 Yes Controlled on Amio Pulmonary infiltrates ICD-10-CM: R91.8 ICD-9-CM: 793.19  5/6/2019 Unknown VT (ventricular tachycardia) (HCC) ICD-10-CM: I47.2 ICD-9-CM: 427.1  5/6/2019 Yes Per cardiology, on amiodarone Persistent air leak: resolved s/p pleurodesis procedure Plan:  (Medical Decision Making) --taper steroids 
--pain control per surgery. Not requiring much IV medication 
--continue amiodarone per cardiology. --Monitor urinary retention. May require Rojas catheter. Continue Flomax and limit narcotics. He actually hasn't been requiring much pain medication. --chest tubes per surgery More than 50% of the time documented was spent in face-to-face contact with the patient and in the care of the patient on the floor/unit where the patient is located.  
 
Earnestine Bowie MD

## 2019-05-19 ENCOUNTER — APPOINTMENT (OUTPATIENT)
Dept: GENERAL RADIOLOGY | Age: 84
DRG: 163 | End: 2019-05-19
Attending: SURGERY
Payer: MEDICARE

## 2019-05-19 PROCEDURE — 74011636637 HC RX REV CODE- 636/637: Performed by: INTERNAL MEDICINE

## 2019-05-19 PROCEDURE — 65660000000 HC RM CCU STEPDOWN

## 2019-05-19 PROCEDURE — 74011000250 HC RX REV CODE- 250: Performed by: SURGERY

## 2019-05-19 PROCEDURE — 74011250636 HC RX REV CODE- 250/636: Performed by: SURGERY

## 2019-05-19 PROCEDURE — 94640 AIRWAY INHALATION TREATMENT: CPT

## 2019-05-19 PROCEDURE — 51798 US URINE CAPACITY MEASURE: CPT

## 2019-05-19 PROCEDURE — 74011250637 HC RX REV CODE- 250/637: Performed by: SURGERY

## 2019-05-19 PROCEDURE — 77010033678 HC OXYGEN DAILY

## 2019-05-19 PROCEDURE — 71045 X-RAY EXAM CHEST 1 VIEW: CPT

## 2019-05-19 PROCEDURE — 94760 N-INVAS EAR/PLS OXIMETRY 1: CPT

## 2019-05-19 PROCEDURE — 99232 SBSQ HOSP IP/OBS MODERATE 35: CPT | Performed by: INTERNAL MEDICINE

## 2019-05-19 RX ORDER — PREDNISONE 10 MG/1
10 TABLET ORAL
Status: DISCONTINUED | OUTPATIENT
Start: 2019-05-19 | End: 2019-05-20

## 2019-05-19 RX ADMIN — ALBUTEROL SULFATE 2.5 MG: 2.5 SOLUTION RESPIRATORY (INHALATION) at 14:58

## 2019-05-19 RX ADMIN — ALBUTEROL SULFATE 2.5 MG: 2.5 SOLUTION RESPIRATORY (INHALATION) at 19:55

## 2019-05-19 RX ADMIN — TAMSULOSIN HYDROCHLORIDE 0.4 MG: 0.4 CAPSULE ORAL at 08:18

## 2019-05-19 RX ADMIN — Medication 5 ML: at 06:09

## 2019-05-19 RX ADMIN — GUAIFENESIN 1200 MG: 600 TABLET, EXTENDED RELEASE ORAL at 08:18

## 2019-05-19 RX ADMIN — SENNOSIDES 17.2 MG: 8.6 TABLET, FILM COATED ORAL at 22:01

## 2019-05-19 RX ADMIN — DOCUSATE SODIUM 100 MG: 100 CAPSULE, LIQUID FILLED ORAL at 17:00

## 2019-05-19 RX ADMIN — CARVEDILOL 3.12 MG: 3.12 TABLET, FILM COATED ORAL at 08:18

## 2019-05-19 RX ADMIN — DOCUSATE SODIUM 100 MG: 100 CAPSULE, LIQUID FILLED ORAL at 08:17

## 2019-05-19 RX ADMIN — FLUTICASONE PROPIONATE 2 SPRAY: 50 SPRAY, METERED NASAL at 08:19

## 2019-05-19 RX ADMIN — HYDRALAZINE HYDROCHLORIDE 10 MG: 20 INJECTION INTRAMUSCULAR; INTRAVENOUS at 00:27

## 2019-05-19 RX ADMIN — ALBUTEROL SULFATE 2.5 MG: 2.5 SOLUTION RESPIRATORY (INHALATION) at 08:04

## 2019-05-19 RX ADMIN — AMIODARONE HYDROCHLORIDE 400 MG: 200 TABLET ORAL at 22:01

## 2019-05-19 RX ADMIN — ACETAMINOPHEN 500 MG: 500 TABLET, FILM COATED ORAL at 13:39

## 2019-05-19 RX ADMIN — Medication 5 ML: at 22:03

## 2019-05-19 RX ADMIN — LISINOPRIL 10 MG: 5 TABLET ORAL at 08:17

## 2019-05-19 RX ADMIN — AMIODARONE HYDROCHLORIDE 400 MG: 200 TABLET ORAL at 08:17

## 2019-05-19 RX ADMIN — CARVEDILOL 3.12 MG: 3.12 TABLET, FILM COATED ORAL at 16:59

## 2019-05-19 RX ADMIN — Medication 5 ML: at 13:48

## 2019-05-19 RX ADMIN — ACETAMINOPHEN 500 MG: 500 TABLET, FILM COATED ORAL at 06:09

## 2019-05-19 RX ADMIN — ACETAMINOPHEN 500 MG: 500 TABLET, FILM COATED ORAL at 22:01

## 2019-05-19 RX ADMIN — ALBUTEROL SULFATE 2.5 MG: 2.5 SOLUTION RESPIRATORY (INHALATION) at 02:51

## 2019-05-19 RX ADMIN — MONTELUKAST SODIUM 10 MG: 10 TABLET, FILM COATED ORAL at 08:18

## 2019-05-19 RX ADMIN — PREDNISONE 10 MG: 10 TABLET ORAL at 08:17

## 2019-05-19 NOTE — PROGRESS NOTES
Progress Note/Office Note:  
Cal Granados  MRN: [id number]  MVO:9/42/3989  Age:87 y.o. 
 
HPI: Cal Granados is a 80 y.o. male who has PMHx of AAA, COPD, BPH, and HTN who presented to the ED via EMS on 5/6/19 with sudden onset of SOB, PATE, and cough. While en route via EMS pt had a run of VT. He had no relief with NRB. Sats were in the 80s in the ED. CXR in ED demonstrated complete PTX on the right. He was started on an Amio gtt for repeated runs of VT. Pulmonary placed a Uresil in the ED. This eventually developed subcutaneous emphysema and the Uresil became ineffective. A 24Fr chest tube was placed on 5/7/19. Since then, pt has had a persistent air leak. CXR demonstrates right apical PTX. Surgery was consulted for consideration of pleurodesis. 5/14/19: Awake in bed, no complaints. CT with +air leak, 150mL/24h serosang output. CXR improved this AM. 5/15/19: CT with 170mL/24h serosang output. CXR without PTX this AM. 5/16/19: Day of surgery 5/17/19: POD#1 s/p right VATS with talc pleurodesis. AF, sariah, HTN on 3L NC.  2.5L UOP/24h. CXR with stable right PTX. CT #1 with 30mL output since surgery, CT #2 with 40mL out. 
5/18/19: POD#2 s/p right VATS with talc pleurodesis. AF, sariah, HTN on 3L NC.  CXR with Decreased tiny right apical pneumothorax. CT #1 with 310mL output since surgery, CT #2 with 100mL out. 
5/19/19: POD#3 s/p right VATS with talc pleurodesis. AF, sariah, HTN on 3L NC.  CXR with Unchanged tiny right apical pneumothorax and interstitial lung edema or pneumonitis. CT #1 with 100mL output since surgery, CT #2 with 30mL out. Past Medical History:  
Diagnosis Date  Abdominal aortic aneurysm ([address] 12/10/2015 In 2005  Abdominal aortic aneurysm without rupture ([address]  Allergic rhinitis 12/10/2015  Asthma  Benign neoplasm  Benign prostatic hyperplasia 12/10/2015  BPH without urinary obstruction  Cardiovascular disease 12/10/2015  Chronic obstructive asthma with exacerbation ([facility] 12/10/2015  COPD (chronic obstructive pulmonary disease) ([facility] 12/10/2015  Cough with hemoptysis  Erectile dysfunction 12/10/2015  Essential hypertension, benign 12/10/2015  Fracture 10/2014  
 of left hand and ribs after fall on concrete  History of colon polyps  Low testosterone 12/10/2015  Lumbago  Memory loss  Overflow incontinence  Raynaud's syndrome 12/10/2015  Rotator cuff tendinitis  Thrombocytopenia ([facility]  Urinary frequency Past Surgical History:  
Procedure Laterality Date  HX CATARACT REMOVAL  6/2016-right  HX COLONOSCOPY    
 HX FRACTURE TX  10/2014  
 of left hand and ribs are fall on concrete [address]  HX ORTHOPAEDIC  03/2018  
 hip fracture Current Facility-Administered Medications Medication Dose Route Frequency  predniSONE (DELTASONE) tablet 10 mg  10 mg Oral DAILY WITH BREAKFAST  acetaminophen (TYLENOL) tablet 500 mg  500 mg Oral Q8H  
 acetaminophen (TYLENOL) tablet 650 mg  650 mg Oral Q4H PRN  
 docusate sodium (COLACE) capsule 100 mg  100 mg Oral BID  senna (SENOKOT) tablet 17.2 mg  2 Tab Oral QHS  lisinopril (PRINIVIL, ZESTRIL) tablet 10 mg  10 mg Oral DAILY  fluticasone propionate (FLONASE) 50 mcg/actuation nasal spray 2 Spray  2 Spray Both Nostrils DAILY  amiodarone (CORDARONE) tablet 400 mg  400 mg Oral Q12H  carvedilol (COREG) tablet 3.125 mg  3.125 mg Oral BID WITH MEALS  calcium carbonate (TUMS) chewable tablet 200 mg [elemental]  200 mg Oral TID PRN  
 hydrALAZINE (APRESOLINE) 20 mg/mL injection 10 mg  10 mg IntraVENous Q6H PRN  
 [Held by provider] aspirin delayed-release tablet 81 mg  81 mg Oral DAILY  guaiFENesin ER (MUCINEX) tablet 1,200 mg  1,200 mg Oral DAILY  montelukast (SINGULAIR) tablet 10 mg  10 mg Oral DAILY  tamsulosin (FLOMAX) capsule 0.4 mg  0.4 mg Oral DAILY  sodium chloride (NS) flush 5-40 mL  5-40 mL IntraVENous Q8H  
 sodium chloride (NS) flush 5-40 mL  5-40 mL IntraVENous PRN  
 albuterol (PROVENTIL VENTOLIN) nebulizer solution 2.5 mg  2.5 mg Nebulization Q6H RT  
 [Held by provider] enoxaparin (LOVENOX) injection 40 mg  40 mg SubCUTAneous Q24H Patient has no known allergies. Social History Socioeconomic History  Marital status:  Spouse name: Not on file  Number of children: Not on file  Years of education: Not on file  Highest education level: Not on file Tobacco Use  Smoking status: Former Smoker  Smokeless tobacco: Never Used Substance and Sexual Activity  Alcohol use: Yes Comment: occasional  
 
Social History Tobacco Use Smoking Status Former Smoker Smokeless Tobacco Never Used Family History Problem Relation Age of Onset  Dementia Mother  Cancer Father   
     lung cancer  Heart Attack Father ROS: The patient has no difficulty with chest pain or shortness of breath. No fever or chills. Comprehensive review of systems was otherwise unremarkable except as noted above. Physical Exam:  
Visit Vitals /76 Pulse (!) 52 Temp 97.6 °F (36.4 °C) Resp 20 Ht 5' 8\" (1.727 m) Wt 146 lb 11.2 oz (66.5 kg) SpO2 96% BMI 22.31 kg/m² Constitutional: Alert, oriented, cooperative patient in no acute distress; appears stated age Eyes: Sclera are clear. EOMs intact ENMT: no external lesions gross hearing normal; no obvious neck masses, no ear or lip lesions, nares normal 
CV: RRR; Normal perfusion Resp: No JVD. Breathing is non-labored; crackles in the right base, Chest tube intact, on suction without air leak, serosanguinous drainage. Incisions c/d/i. GI: soft and non-distended Musculoskeletal: unremarkable with normal function. No embolic signs or cyanosis. Neuro:  Oriented; moves all 4; no focal deficits Psychiatric: normal affect and mood, no memory impairment Recent vitals (if inpt): 
Patient Vitals for the past 24 hrs: 
 BP Temp Pulse Resp SpO2 Weight 05/19/19 1458     96 %   
05/19/19 1155 167/76 97.6 °F (36.4 °C) (!) 52 20 96 %   
05/19/19 0804     96 %   
05/19/19 0741 167/75 98 °F (36.7 °C) 60 20 94 %   
05/19/19 0430 167/73 98.2 °F (36.8 °C) (!) 59 19 95 %   
05/19/19 0251     94 %   
05/18/19 2311 173/79 98 °F (36.7 °C) (!) 57 20 95 %   
05/18/19 2059 166/74  (!) 54  94 %   
05/18/19 2020 180/86 98.3 °F (36.8 °C) 70 20 94 %   
05/18/19 1928 109/78 98 °F (36.7 °C) (!) 59 19 95 %   
05/18/19 1652 151/64 97.9 °F (36.6 °C) 60 20 93 %  Labs: 
Recent Labs 05/17/19 
7952 WBC 16.1* HGB 12.6*  
 Lab Results Component Value Date/Time WBC 16.1 (H) 05/17/2019 08:03 AM  
 HGB 12.6 (L) 05/17/2019 08:03 AM  
 PLATELET 493 65/72/7865 08:03 AM  
 Sodium 130 (L) 05/13/2019 07:20 AM  
 Potassium 4.1 05/13/2019 07:20 AM  
 Chloride 97 (L) 05/13/2019 07:20 AM  
 CO2 26 05/13/2019 07:20 AM  
 BUN 21 05/13/2019 07:20 AM  
 Creatinine 0.51 (L) 05/13/2019 07:20 AM  
 Glucose 105 (H) 05/13/2019 07:20 AM  
 INR 1.2 02/02/2018 06:34 AM  
 aPTT 37.5 (H) 02/02/2018 06:34 AM  
 Bilirubin, total 0.7 05/08/2019 04:10 AM  
 Bilirubin, direct 0.2 09/21/2010 02:10 PM  
 AST (SGOT) 25 05/08/2019 04:10 AM  
 ALT (SGPT) 21 05/08/2019 04:10 AM  
 Alk. phosphatase 77 05/08/2019 04:10 AM  
 Troponin-I, Qt. 0.05 02/04/2018 01:04 AM  
 
 
I reviewed recent labs and recent radiologic studies. 5/6/19 CXR: 
IMPRESSION: 
  
1. Large right-sided tension pneumothorax. This was discussed with emergency department by Dr. Chriss Lima at 9:58 AM on 5/6/2019. 
  
2. Unchanged enlargement of the cardiac silhouette. 
  
3. Chronic interstitial changes throughout the left lung. 5/13/19 CXR: 
FINDINGS: A portable AP radiograph of the chest was obtained at 0426 hours. Right apical pneumothorax slightly increased in size. . Chronic diffuse increased 
interstitial markings unchanged. . The cardiac and mediastinal contours and 
pulmonary vascularity are normal.  Stable subcutaneous emphysema. .  
  
IMPRESSION: Slight increase in size right apical pneumothorax. 5/8/19 CT Chest: 
IMPRESSION:  
1. Moderate size right pneumothorax with pneumomediastinum and extensive 
subcutaneous air. 2.  Tip of the right chest tube passes into the posterior mediastinum XR Results (most recent): 
Results from Hospital Encounter encounter on 05/06/19 XR CHEST SNGL V  
 Narrative Chest X-ray INDICATION: 80-year-old male status post VATS with apical blebectomy A portable AP view of the chest was obtained. FINDINGS: Portable AP semiupright view the chest. 
Patient slightly rotated to the right. Ectasia the descending aorta stable. Cardiac contour within normal limits. There is central vessel congestion and suggestion of generalized underlying 
chronic interstitial fibrotic changes. There is a chest tube right apical region as well as suggestion of right basilar 
chest tube in place. There is a right apical pneumothorax. The bony thorax is 
intact. There is generalized uptake is emphysema throughout the right hemithorax soft 
tissues Impression IMPRESSION:  
New right apical pneumothorax with presence of 2 right-sided chest tubes in 
place. Generalized central vessel congestion an underlying interstitial coarsening 
noted bilaterally. Subcutaneous emphysema throughout the right hemithorax soft tissues has shown 
interval increase. These results were called to the PACU on 5/16/2019 at 10:51 hours and discussed 
with Isaac Alston RN. I independently reviewed radiology images for studies I described above or studies I have ordered. Admission date (for inpatients): 5/6/2019  
6 Days Post-Op  Procedure(s): RIGHT VATS WITH TALC PLEURODESIS CHEST TUBE INSERTION X 2  APICAL BLEBECTOMY ASSESSMENT/PLAN: 
Problem List  Date Reviewed: 5/9/2019 Codes Class Noted Subcutaneous emphysema ([facility] ICD-10-CM: T79. 7XXA ICD-9-CM: 958.7  5/9/2019 * (Principal) Pneumothorax on right ICD-10-CM: J93.9 ICD-9-CM: 512.89  5/6/2019 Shortness of breath ICD-10-CM: R06.02 
ICD-9-CM: 786.05  5/6/2019 Acute on chronic respiratory failure with hypoxia [facility]) ICD-10-CM: J96.21 
ICD-9-CM: 518.84, 799.02  5/6/2019 COPD exacerbation ([facility] ICD-10-CM: J44.1 ICD-9-CM: 491.21  5/6/2019 Ventricular tachyarrhythmia ([facility] ICD-10-CM: I47.2 ICD-9-CM: 427.1  5/6/2019 Pulmonary infiltrates ICD-10-CM: R91.8 ICD-9-CM: 793.19  5/6/2019 VT (ventricular tachycardia) ([facility]) ICD-10-CM: I47.2 ICD-9-CM: 427.1  5/6/2019 Closed compression fracture of lumbosacral spine (HCC) ICD-10-CM: S32.000A ICD-9-CM: 805.4  12/11/2018 Hip fracture ([facility] ICD-10-CM: R91.049W ICD-9-CM: 820.8  2/1/2018 SOB (shortness of breath) ICD-10-CM: R06.02 
ICD-9-CM: 786.05  3/9/2017 Overview Addendum 12/11/2018 10:09 AM by Jl Davis LPN Last Assessment & Plan:  
Stable, 6mwt ordered and reviewed. Management per copd, hypoxia. Last Assessment & Plan: He presents today with shortness of breath that has worsened over the past 2 weeks which he relates to shallow breathing due to back pain after recent lumbar fractures. His shortness of breath appears to be multifactorial.  He does not appear infected today and I do not believe this is a COPD exacerbation. I have recommended breathing exercises to combat his I will breathing. He does have an incentive spirometer from his previous hospitalization which I have encouraged him to use daily as a reminder to take deep breaths and to prevent pneumonia. We reviewed how to use this device. He expresses great concern about his 30 pound weight loss and dyspnea. His chest x-ray today was negative with no signs of infection or cancer. However, a CT is recommended to rule out malignancy. I have explained that cancer is unlikely due to his hip fracture with sudden loss of appetite then gradual weight loss due to minimal p.o. intake immediately after however will proceed with CT scan. 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 HRCT 
  
  
   
 COPD (chronic obstructive pulmonary disease) with emphysema (HCC) ICD-10-CM: J43.9 ICD-9-CM: 492.8  10/17/2016 Overview Signed 12/11/2018 10:09 AM by Isrrael Mendoza LPN Last Assessment & Plan:  
Not currently on any maintence medication regimen for COPD as he felt them to be unhelpful in the past.  I recommended a trial of a ANoro Ellipta which he should take 1 inhalation daily. Demonstration was completed on the correct method of administration and he was able to return demonstration independently in the office today and administer his first dose. I have given him a 2-week sample which he will use daily and monitor if he notes any improvement in his breathing. He may also try pretreating himself with Ventolin before exertion to see if this offers any relief. Chronic respiratory failure with hypoxia (HCC) (Chronic) ICD-10-CM: J96.11 
ICD-9-CM: 518.83, 799.02  3/29/2016 Overview Addendum 5/6/2019 10:14 AM by Christopher Tom, NP Continue to wear 2 L nasal cannula at night. May use 2 L supplemental oxygen to maintain sats greater than 90%. [place] ICD-10-CM: I49.9 ICD-9-CM: 427.89  3/28/2016 Overview Addendum 12/11/2018 10:09 AM by Isrrael Mendoza LPN Overview:  
Reported by LIN Dowd physician. Last Assessment & Plan:  
EKG not done. I placed the patient on telemetry today and is having fairly frequent PVCs. We'll check BMP and magnesium. Overview:  
Reported by LIN Humphries physician. Last Assessment & Plan:  
EKG not done. I placed the patient on telemetry today and is having fairly frequent PVCs. We'll check BMP and magnesium. Abdominal aortic aneurysm without rupture (HCC) ICD-10-CM: I71.4 ICD-9-CM: 441.4  12/10/2015 Overview Addendum 12/11/2018 10:09 AM by Bharat Jaeger LPN In 2005 Benign prostatic hyperplasia ICD-10-CM: N40.0 ICD-9-CM: 600.00  12/10/2015 Cardiovascular disease ICD-10-CM: I25.10 ICD-9-CM: 429.2  12/10/2015 COPD (chronic obstructive pulmonary disease) (HCC) ICD-10-CM: J44.9 ICD-9-CM: 470  12/10/2015 Low testosterone ICD-10-CM: R79.89 ICD-9-CM: 790.99  12/10/2015 Essential hypertension, benign ICD-10-CM: I10 
ICD-9-CM: 401.1  12/10/2015 Allergic rhinitis ICD-10-CM: J30.9 ICD-9-CM: 477.9  12/10/2015 Raynaud's syndrome ICD-10-CM: I73.00 ICD-9-CM: 443.0  12/10/2015 Erectile dysfunction ICD-10-CM: N52.9 ICD-9-CM: 607.84  12/10/2015 Principal Problem: 
  Pneumothorax on right (5/6/2019) Active Problems: 
  Essential hypertension, benign (12/10/2015) Chronic respiratory failure with hypoxia ([address] (3/29/2016) Overview: Continue to wear 2 L nasal cannula at night. May use 2 L supplemental  
    oxygen to maintain sats greater than 90%. Shortness of breath (5/6/2019) Acute on chronic respiratory failure with hypoxia ([address] (5/6/2019) COPD exacerbation ([address] (5/6/2019) Ventricular tachyarrhythmia ([address] (5/6/2019) Pulmonary infiltrates (5/6/2019) VT (ventricular tachycardia) ([facility] (5/6/2019) Subcutaneous emphysema ([facility] (5/9/2019) Plan: S/p VATS with pleurodesis Continue chest tube to suction for 4 days post op - Possibly out Monday CXR in Am Ambulate Pain control PT/OT Continue current care Signed:  Ernie Sanderson NP

## 2019-05-19 NOTE — PROGRESS NOTES
Received return phone call from Dr. Nahid Blue. Notified of patient not having urge to void and bladder scanner reading >206 ml. Telephone PRN order to Straight Cath patient if he feels the urge to urinate or feels fullness.

## 2019-05-19 NOTE — PROGRESS NOTES
Nurse found patient sitting on the floor against the chair. Patient states that he slide onto the floor that he did not fall and scrapped his elbow. Patient was assessed for injury. Vital signs was done immediately. Patient wife was notified. MD was notified.

## 2019-05-19 NOTE — PROGRESS NOTES
Report of patient found on floor. Only report of scrapped elbow. Stated slid and did not fall or hit head. Vital signs stable. Patient evaluated at bedside. Denies hitting head. No extremity pain or loss of function. Alert and responsive. Will monitor. Asked nursing to make sure bed alarm was on and fall precautions were all in place.  
 
Armando Rangel MD

## 2019-05-19 NOTE — PROGRESS NOTES
Patient has attempted to void several times since catheter removal. Patient states that he \"doesn't feel the urge to go\". Attempts at voiding have been unsuccessful. Bladder scanner reads > 206 ml. [facility] paged to notify.

## 2019-05-19 NOTE — PROGRESS NOTES
Pulmonary Daily Progress Note: 5/19/2019 [facility] Admission Date: 5/6/2019 The patient's chart is reviewed and the patient is discussed with the staff. 87 y.o. CM presents via EMS with shortness of breath when got up this morning.  Had a productive cough with yellow sputum and received Rocephin.  In the ER CXR with large right pneumothorax and pulmonary consulted for chest tube placement and admission. [place] a fall in February and seen by Dr. Amie Brunner with compression L2,L3, L4. In the ER had VT and cardiology was consulted and added Amiodarone. He has had a right pneumothorax with persistent air leak and underwent surgical pleurodesis on 5/16. Subjective:  
Slid from bed to floor last night. Weaned to 2lpm.  Had to have Rojas placed yesterday due to urinary retention. Current Facility-Administered Medications Medication Dose Route Frequency  predniSONE (DELTASONE) tablet 20 mg  20 mg Oral DAILY WITH BREAKFAST  acetaminophen (TYLENOL) tablet 500 mg  500 mg Oral Q8H  
 acetaminophen (TYLENOL) tablet 650 mg  650 mg Oral Q4H PRN  
 docusate sodium (COLACE) capsule 100 mg  100 mg Oral BID  senna (SENOKOT) tablet 17.2 mg  2 Tab Oral QHS  lisinopril (PRINIVIL, ZESTRIL) tablet 10 mg  10 mg Oral DAILY  fluticasone propionate (FLONASE) 50 mcg/actuation nasal spray 2 Spray  2 Spray Both Nostrils DAILY  amiodarone (CORDARONE) tablet 400 mg  400 mg Oral Q12H  carvedilol (COREG) tablet 3.125 mg  3.125 mg Oral BID WITH MEALS  calcium carbonate (TUMS) chewable tablet 200 mg [elemental]  200 mg Oral TID PRN  
 hydrALAZINE (APRESOLINE) 20 mg/mL injection 10 mg  10 mg IntraVENous Q6H PRN  
 [Held by provider] aspirin delayed-release tablet 81 mg  81 mg Oral DAILY  guaiFENesin ER (MUCINEX) tablet 1,200 mg  1,200 mg Oral DAILY  montelukast (SINGULAIR) tablet 10 mg  10 mg Oral DAILY  tamsulosin (FLOMAX) capsule 0.4 mg  0.4 mg Oral DAILY  sodium chloride (NS) flush 5-40 mL  5-40 mL IntraVENous Q8H  
 sodium chloride (NS) flush 5-40 mL  5-40 mL IntraVENous PRN  
 albuterol (PROVENTIL VENTOLIN) nebulizer solution 2.5 mg  2.5 mg Nebulization Q6H RT  
 [Held by provider] enoxaparin (LOVENOX) injection 40 mg  40 mg SubCUTAneous Q24H Review of Systems 
+chest wall pain Constitutional:  negative for fever, chills, sweats HEENT: runny nose Cardiovascular:  negative for chest pain, palpitations, syncope, edema Respiratory:  Right chest tube, significant subc air Gastrointestinal:  negative for dysphagia, reflux, vomiting, diarrhea, abdominal pain, or melena Neurologic:  negative for focal weakness, numbness, headache Objective:  
 
Vitals:  
 05/18/19 2059 05/18/19 2311 05/19/19 0251 05/19/19 0430 BP: 166/74 173/79  167/73 Pulse: (!) 54 (!) 57  (!) 59 Resp:  20 19 Temp:  98 °F (36.7 °C)  98.2 °F (36.8 °C) SpO2: 94% 95% 94% 95% Weight:      
Height:      
 
 
Intake and Output:  
05/17 1901 - 05/19 0700 In: 600 [P.O.:600] Out: 1990 [JALQU:0943] No intake/output data recorded. Physical Exam:         
Constitutional:  the patient is well developed and in no acute distress, NC  2lpm 
EENMT:  Sclera clear, pupils equal, oral mucosa moist 
Respiratory: crepitus improved, CTAB Cardiovascular:  RRR without M,G,R   
Gastrointestinal: soft and non-tender; with positive bowel sounds, appetite good. Musculoskeletal: warm without cyanosis. There is no lower extremity edema. Skin:  no jaundice or rashes, right anterior chest wound, right chest tube Neurologic: no gross neuro deficits Psychiatric:  alert and oriented x 3 CXR today Chest CT 5/8/19: 
 
 
LAB No results for input(s): GLUCPOC in the last 72 hours. No lab exists for component: [place] Recent Labs 05/17/19 
1525 WBC 16.1* HGB 12.6* HCT 37.3*  
 No results for input(s): NA, K, CL, CO2, GLU, BUN, CREA, MG, PHOS, CA, TROIQ, ALB, TBIL, TBILI, GPT, ALT, SGOT, BNPP in the last 72 hours. No lab exists for component: TROIP No results for input(s): PH, PCO2, PO2, HCO3 in the last 72 hours. No results for input(s): LCAD, LAC in the last 72 hours. Assessment:  (Medical Decision Making) Hospital Problems  Date Reviewed: 5/9/2019 Codes Class Noted POA Shortness of breath ICD-10-CM: R06.02 
ICD-9-CM: 786.05  5/6/2019 Yes Wean O2 as able. * (Principal) Pneumothorax on right ICD-10-CM: J93.9 ICD-9-CM: 512.89  5/6/2019 Yes Pod#3 s/p pleurodesis Chronic respiratory failure with hypoxia (HCC) (Chronic) ICD-10-CM: J96.11 
ICD-9-CM: 518.83, 799.02  3/29/2016 Yes Overview Addendum 5/6/2019 10:14 AM by Pollo Fine NP Continue to wear 2 L nasal cannula at night. May use 2 L supplemental oxygen to maintain sats greater than 90%. chronic Essential hypertension, benign ICD-10-CM: I10 
ICD-9-CM: 401.1  12/10/2015 Yes Hold hydralazine; continue carvedilol, Subcutaneous emph[facility]sema ([facility] ICD-10-CM: T79. 7XXA ICD-9-CM: 958.7  5/9/2019 No  
 significant Acute on chronic respiratory failure with hypoxia [facility]) ICD-10-CM: J96.21 
ICD-9-CM: 518.84, 799.02  5/6/2019 Yes  
 remains on NC   
 COPD exacerbation ([facility] ICD-10-CM: J44.1 ICD-9-CM: 491.21  5/6/2019 Yes Taper steroids Ventricular tachyarrhythmia ([facility] ICD-10-CM: I47.2 ICD-9-CM: 427.1  5/6/2019 Yes Controlled on [place] Pulmonary infiltrates ICD-10-CM: R91.8 ICD-9-CM: 793.19  5/6/2019 Unknown VT (ventricular tachycardia) (HCC) ICD-10-CM: I47.2 ICD-9-CM: 427.1  5/6/2019 Yes Per cardiology, on amiodarone Persistent air leak: resolved s/p pleurodesis procedure Urinary retention:  Now with Rojas in place on Flomax. No narcotics ordered and patient managing pain well Plan:  (Medical Decision Making) --taper steroids to 10mg today.   Can likely stop tomorrow or Tuesday. 
--PT, OOB to chair at least 
 --continue amiodarone per cardiology. --Bladder training and try to remove Rojas today. Not on any narcotics, on Flomax. --chest tubes per surgery. More than 50% of the time documented was spent in face-to-face contact with the patient and in the care of the patient on the floor/unit where the patient is located.  
 
Verito Ibarra MD

## 2019-05-19 NOTE — PROGRESS NOTES
Patient in bed at this time. Wife at bedside. Respirations even and unlabored. No distress or discomfort noted. Chest tube dressings clean, dry, and intact. Alert and oriented. Call light within reach. Door remains open when patient unattended.

## 2019-05-19 NOTE — PROGRESS NOTES
Patient in bed at this time. Respirations even and unlabored. Chest tube #1 & #2 are in place and connected to suction at 20 mmhg. Dressings are dry and intact. Rojas in place and draining to bed side bag. No complaints. No distress noted. Call light within reach. Door remains open when patient unattended. Will continue to monitor.

## 2019-05-20 ENCOUNTER — APPOINTMENT (OUTPATIENT)
Dept: GENERAL RADIOLOGY | Age: 84
DRG: 163 | End: 2019-05-20
Attending: SURGERY
Payer: MEDICARE

## 2019-05-20 PROBLEM — R33.9 URINARY RETENTION: Status: ACTIVE | Noted: 2019-05-20

## 2019-05-20 PROCEDURE — 65270000029 HC RM PRIVATE

## 2019-05-20 PROCEDURE — 94760 N-INVAS EAR/PLS OXIMETRY 1: CPT

## 2019-05-20 PROCEDURE — 77010033678 HC OXYGEN DAILY

## 2019-05-20 PROCEDURE — 94640 AIRWAY INHALATION TREATMENT: CPT

## 2019-05-20 PROCEDURE — 74011000250 HC RX REV CODE- 250: Performed by: SURGERY

## 2019-05-20 PROCEDURE — 99232 SBSQ HOSP IP/OBS MODERATE 35: CPT | Performed by: INTERNAL MEDICINE

## 2019-05-20 PROCEDURE — 74011250637 HC RX REV CODE- 250/637: Performed by: SURGERY

## 2019-05-20 PROCEDURE — 97110 THERAPEUTIC EXERCISES: CPT

## 2019-05-20 PROCEDURE — 74011636637 HC RX REV CODE- 636/637: Performed by: INTERNAL MEDICINE

## 2019-05-20 PROCEDURE — 97530 THERAPEUTIC ACTIVITIES: CPT

## 2019-05-20 PROCEDURE — 51798 US URINE CAPACITY MEASURE: CPT

## 2019-05-20 PROCEDURE — 71045 X-RAY EXAM CHEST 1 VIEW: CPT

## 2019-05-20 RX ADMIN — SENNOSIDES 17.2 MG: 8.6 TABLET, FILM COATED ORAL at 23:44

## 2019-05-20 RX ADMIN — MONTELUKAST SODIUM 10 MG: 10 TABLET, FILM COATED ORAL at 08:46

## 2019-05-20 RX ADMIN — ALBUTEROL SULFATE 2.5 MG: 2.5 SOLUTION RESPIRATORY (INHALATION) at 02:17

## 2019-05-20 RX ADMIN — LISINOPRIL 10 MG: 5 TABLET ORAL at 08:46

## 2019-05-20 RX ADMIN — ACETAMINOPHEN 500 MG: 500 TABLET, FILM COATED ORAL at 05:27

## 2019-05-20 RX ADMIN — AMIODARONE HYDROCHLORIDE 400 MG: 200 TABLET ORAL at 08:47

## 2019-05-20 RX ADMIN — PREDNISONE 10 MG: 10 TABLET ORAL at 08:46

## 2019-05-20 RX ADMIN — ACETAMINOPHEN 500 MG: 500 TABLET, FILM COATED ORAL at 23:44

## 2019-05-20 RX ADMIN — AMIODARONE HYDROCHLORIDE 400 MG: 200 TABLET ORAL at 23:45

## 2019-05-20 RX ADMIN — ALBUTEROL SULFATE 2.5 MG: 2.5 SOLUTION RESPIRATORY (INHALATION) at 21:49

## 2019-05-20 RX ADMIN — ALBUTEROL SULFATE 2.5 MG: 2.5 SOLUTION RESPIRATORY (INHALATION) at 09:17

## 2019-05-20 RX ADMIN — GUAIFENESIN 1200 MG: 600 TABLET, EXTENDED RELEASE ORAL at 08:46

## 2019-05-20 RX ADMIN — ALBUTEROL SULFATE 2.5 MG: 2.5 SOLUTION RESPIRATORY (INHALATION) at 15:12

## 2019-05-20 RX ADMIN — TAMSULOSIN HYDROCHLORIDE 0.4 MG: 0.4 CAPSULE ORAL at 08:47

## 2019-05-20 RX ADMIN — Medication 5 ML: at 23:44

## 2019-05-20 RX ADMIN — CARVEDILOL 3.12 MG: 3.12 TABLET, FILM COATED ORAL at 08:46

## 2019-05-20 RX ADMIN — FLUTICASONE PROPIONATE 2 SPRAY: 50 SPRAY, METERED NASAL at 08:53

## 2019-05-20 RX ADMIN — Medication 5 ML: at 15:28

## 2019-05-20 RX ADMIN — CARVEDILOL 3.12 MG: 3.12 TABLET, FILM COATED ORAL at 17:11

## 2019-05-20 NOTE — PROGRESS NOTES
Pulmonary Daily Progress Note: 5/20/2019 [facility] Admission Date: 5/6/2019 The patient's chart is reviewed and the patient is discussed with the staff. 87 y.o. CM presents via EMS with shortness of breath when got up this morning.  Had a productive cough with yellow sputum and received Rocephin.  In the ER CXR with large right pneumothorax and pulmonary consulted for chest tube placement and admission. [place] a fall in February and seen by Dr. Senora Bamberger with compression L2,L3, L4. In the ER had VT and cardiology was consulted and added Amiodarone. He has had a right pneumothorax with persistent air leak and underwent surgical talc pleurodesis on 5/16. Slid from bed to floor. Weaned to 2lpm.  Had to have Rojas placed for urinary retention. Subjective:  
 
Sitting up in bed, denies shortness of breath, right chest surgical soreness. Denies sputum production. States \"I'm real weak\". Current Facility-Administered Medications Medication Dose Route Frequency  predniSONE (DELTASONE) tablet 10 mg  10 mg Oral DAILY WITH BREAKFAST  acetaminophen (TYLENOL) tablet 500 mg  500 mg Oral Q8H  
 acetaminophen (TYLENOL) tablet 650 mg  650 mg Oral Q4H PRN  
 docusate sodium (COLACE) capsule 100 mg  100 mg Oral BID  senna (SENOKOT) tablet 17.2 mg  2 Tab Oral QHS  lisinopril (PRINIVIL, ZESTRIL) tablet 10 mg  10 mg Oral DAILY  fluticasone propionate (FLONASE) 50 mcg/actuation nasal spray 2 Spray  2 Spray Both Nostrils DAILY  amiodarone (CORDARONE) tablet 400 mg  400 mg Oral Q12H  carvedilol (COREG) tablet 3.125 mg  3.125 mg Oral BID WITH MEALS  calcium carbonate (TUMS) chewable tablet 200 mg [elemental]  200 mg Oral TID PRN  
 hydrALAZINE (APRESOLINE) 20 mg/mL injection 10 mg  10 mg IntraVENous Q6H PRN  
 [Held by provider] aspirin delayed-release tablet 81 mg  81 mg Oral DAILY  guaiFENesin ER (MUCINEX) tablet 1,200 mg  1,200 mg Oral DAILY  montelukast (SINGULAIR) tablet 10 mg  10 mg Oral DAILY  tamsulosin (FLOMAX) capsule 0.4 mg  0.4 mg Oral DAILY  sodium chloride (NS) flush 5-40 mL  5-40 mL IntraVENous Q8H  
 sodium chloride (NS) flush 5-40 mL  5-40 mL IntraVENous PRN  
 albuterol (PROVENTIL VENTOLIN) nebulizer solution 2.5 mg  2.5 mg Nebulization Q6H RT  
 [Held by provider] enoxaparin (LOVENOX) injection 40 mg  40 mg SubCUTAneous Q24H Review of Systems 
+chest wall pain Constitutional:  negative for fever, chills, sweats HEENT: runny nose Cardiovascular:  negative for chest pain, palpitations, syncope, edema Respiratory:  Right chest tube, significant subc air Gastrointestinal:  negative for dysphagia, reflux, vomiting, diarrhea, abdominal pain, or melena Neurologic:  negative for focal weakness, numbness, headache Objective:  
 
Vitals:  
 05/20/19 1482 05/20/19 0308 05/20/19 2529 05/20/19 7168 BP:  164/76 180/81 Pulse:  60 (!) 54 60 Resp:  19 17 Temp:  98.3 °F (36.8 °C) 98.1 °F (36.7 °C) SpO2: 93% 96% 96% Weight:      
Height:      
 
 
Intake and Output:  
05/18 1901 - 05/20 0700 In: 1160 [P.O.:1160] Out: 1526 [GXYAV:9086] 05/20 0701 - 05/20 1900 In: 160 [P.O.:160] Out: - Physical Exam:         
Constitutional:  the patient is thin and in no acute distress, NC  2lpm 
EENMT:  Sclera clear, pupils equal, oral mucosa moist 
Respiratory: crepitus, right chest tubes x2, #1 with 700ml; #2 with 175ml. Cardiovascular:  RRR with grade III/VI murmur Gastrointestinal: soft and non-tender; with positive bowel sounds, appetite good. Musculoskeletal: warm without cyanosis. There is no lower extremity edema, SCDs. Skin:  no jaundice or rashes, right anterior chest wound, right chest tubes Neurologic: no gross neuro deficits Psychiatric:  alert and oriented x 3 CXR  
5/20/19:   
 
 
 
Chest CT 5/8/19: 
 
 
LAB No results for input(s): GLUCPOC in the last 72 hours. No lab exists for component: [place] No results for input(s): WBC, HGB, HCT, PLT, INR, HGBEXT, HCTEXT, PLTEXT, HGBEXT, HCTEXT, PLTEXT in the last 72 hours. No lab exists for component: INREXT, INREXT No results for input(s): NA, K, CL, CO2, GLU, BUN, CREA, MG, PHOS, CA, TROIQ, ALB, TBIL, TBILI, GPT, ALT, SGOT, BNPP in the last 72 hours. No lab exists for component: TROIP No results for input(s): PH, PCO2, PO2, HCO3 in the last 72 hours. No results for input(s): LCAD, LAC in the last 72 hours. Assessment:  (Medical Decision Making) Hospital Problems  Date Reviewed: 5/20/2019 Codes Class Noted POA Shortness of breath ICD-10-CM: R06.02 
ICD-9-CM: 786.05  5/6/2019 Yes * (Principal) Pneumothorax on right ICD-10-CM: J93.9 ICD-9-CM: 512.89  5/6/2019 Yes Chronic respiratory failure with hypoxia (HCC) (Chronic) ICD-10-CM: J96.11 
ICD-9-CM: 518.83, 799.02  3/29/2016 Yes Overview Addendum 5/6/2019 10:14 AM by Iwona Ventura, NP Continue to wear 2 L nasal cannula at night. May use 2 L supplemental oxygen to maintain sats greater than 90%. Essential hypertension, benign ICD-10-CM: I10 
ICD-9-CM: 401.1  12/10/2015 Yes Urinary retention ICD-10-CM: R33.9 ICD-9-CM: 788.20  5/20/2019 Unknown Subcutaneous emphysema ([facility] ICD-10-CM: T79. 7XXA ICD-9-CM: 958.7  5/9/2019 No  
   
 Acute on chronic respiratory failure with hypoxia [facility]) ICD-10-CM: J96.21 
ICD-9-CM: 518.84, 799.02  5/6/2019 Yes COPD exacerbation ([facility] ICD-10-CM: J44.1 ICD-9-CM: 491.21  5/6/2019 Yes Ventricular tachyarrhythmia ([facility] ICD-10-CM: I47.2 ICD-9-CM: 427.1  5/6/2019 Yes Pulmonary infiltrates ICD-10-CM: R91.8 ICD-9-CM: 793.19  5/6/2019 Unknown VT (ventricular tachycardia) (HCC) ICD-10-CM: I47.2 ICD-9-CM: 427.1  5/6/2019 Yes Plan:  (Medical Decision Making) --Albuterol, Singulair, Mucinex--denies shortness of brearth --Prednisone 10mg daily. Will stop, no wheezing 
--PT, OOB to chair at least 
--Amiodarone per cardiology. --Bladder training, removed Rojas but had to in/out cath with 450ml removed. On Flomax. --Chest tubes per surgery. More than 50% of the time documented was spent in face-to-face contact with the patient and in the care of the patient on the floor/unit where the patient is located. Melanie Ferrell, NP Lungs: Audible subq air. Chest tubes x 2 in the right chest. Otherwise clear. Heart:  RRR with no Murmur/Rubs/Gallops Additional Comments:   
Patient with minimal tidal variation in 1/2 chest tubes in the right. Per surgery but may be able to remove one of them. On RA. Agree with discontinuing prednisone with no wheeze on exam currently. I have spoken with and examined the patient. I agree with the above assessment and plan as documented.  
 
Alicia Leos MD

## 2019-05-20 NOTE — PROGRESS NOTES
Nutrition Follow Up: 
Assessment Diet order(s): RegularFood,Nutrition, and Pertinent History: The patient continues to report that he has a good appetite. He denies any po difficulties at this time. He has no questions or concerns at this time. Anthropometrics: Height: 5' 8\" (172.7 cm), Weight Source: Standing scale (comment), Weight: (patient fell earlier. cant get wait. notified nurse). Macronutrient Needs: 
· EER:  9872-9257 kcal /day (25-30 kcal/kg listed BW of 66.2 kg) · EPR:  66-83 grams protein/day (1-1.25 grams/kg listed BW) Intake/Comparative Standards:  Average intake for past 7 day(s)/13 recorded meal(s): 80%. This potentially meets ~100% of kcal and ~100% of protein needs Intervention:  
Meals and snacks: Continue current diet. Discharge nutrition plan: No discharge needs identified Herb Guevara [address], [address] [place] [address],   
[phone]

## 2019-05-20 NOTE — PROGRESS NOTES
Quiet in bed  Has been up in chair  Refused colace today  Says stools have been loose   Dressing intact over previous chest tube site  Oxygen at 2l via cannula  Denies pain  Denies dyspnea

## 2019-05-20 NOTE — PROGRESS NOTES
Alert and oriented X4. Respirations are even and unlabored. O2 at 3lpm NC. Two chest tube to 20 cm suction. SCDs in place. Bed is low, locked and call light within reach.

## 2019-05-20 NOTE — PROGRESS NOTES
Patient unable to void . Bladder scanner reading 434ml  urine noted. Straight catheter patient and got 450 ml of urine.

## 2019-05-20 NOTE — PROGRESS NOTES
Progress Note/Office Note:  
Luisito Gallardo  MRN: [id number]  GOZ:0/14/1909  Age:87 y.o. 
 
HPI: Luisito Gallardo is a 80 y.o. male who has PMHx of AAA, COPD, BPH, and HTN who presented to the ED via EMS on 5/6/19 with sudden onset of SOB, PATE, and cough. While en route via EMS pt had a run of VT. He had no relief with NRB. Sats were in the 80s in the ED. CXR in ED demonstrated complete PTX on the right. He was started on an Amio gtt for repeated runs of VT. Pulmonary placed a Uresil in the ED. This eventually developed subcutaneous emphysema and the Uresil became ineffective. A 24Fr chest tube was placed on 5/7/19. Since then, pt has had a persistent air leak. CXR demonstrates right apical PTX. Surgery was consulted for consideration of pleurodesis. 5/14/19: Awake in bed, no complaints. CT with +air leak, 150mL/24h serosang output. CXR improved this AM. 5/15/19: CT with 170mL/24h serosang output. CXR without PTX this AM. 5/16/19: Day of surgery 5/17/19: POD#1 s/p right VATS with talc pleurodesis. AF, sariah, HTN on 3L NC.  2.5L UOP/24h. CXR with stable right PTX. CT #1 with 30mL output since surgery, CT #2 with 40mL out. 
5/18/19: POD#2 s/p right VATS with talc pleurodesis. AF, sariah, HTN on 3L NC.  CXR with Decreased tiny right apical pneumothorax. CT #1 with 310mL output since surgery, CT #2 with 100mL out. 
5/19/19: POD#3 s/p right VATS with talc pleurodesis. AF, sariah, HTN on 3L NC.  CXR with Unchanged tiny right apical pneumothorax and interstitial lung edema or pneumonitis. CT #1 with 100mL output since surgery, CT #2 with 30mL out. 
5/20/19: POD#4 s/p right VATS with talc pleurodesis. AF, sariah, HTN on 3L NC.  CXR with Unchanged tiny right apical pneumothorax and interstitial lung edema or pneumonitis. CT #1 with 100mL output/24h, CT #2 with 75mL out/24h. Past Medical History:  
Diagnosis Date  Abdominal aortic aneurysm ([facility] [address] 12/10/2015 In 2005  Abdominal aortic aneurysm without rupture ([facility] [address]  Allergic rhinitis 12/10/2015  Asthma  Benign neoplasm  Benign prostatic hyperplasia 12/10/2015  BPH without urinary obstruction  Cardiovascular disease 12/10/2015  Chronic obstructive asthma with exacerbation ([facility] [address] 12/10/2015  COPD (chronic obstructive pulmonary disease) ([facility] [address] 12/10/2015  Cough with hemoptysis  Erectile dysfunction 12/10/2015  Essential hypertension, benign 12/10/2015  Fracture 10/2014  
 of left hand and ribs after fall on concrete  History of colon polyps  Low testosterone 12/10/2015  Lumbago  Memory loss  Overflow incontinence  Raynaud's syndrome 12/10/2015  Rotator cuff tendinitis  Thrombocytopenia ([facility] [address]  Urinary frequency Past Surgical History:  
Procedure Laterality Date  HX CATARACT REMOVAL  6/2016-right  HX COLONOSCOPY    
 HX FRACTURE TX  10/2014  
 of left hand and ribs are fall on concrete [address]  HX ORTHOPAEDIC  03/2018  
 hip fracture Current Facility-Administered Medications Medication Dose Route Frequency  acetaminophen (TYLENOL) tablet 500 mg  500 mg Oral Q8H  
 acetaminophen (TYLENOL) tablet 650 mg  650 mg Oral Q4H PRN  
 docusate sodium (COLACE) capsule 100 mg  100 mg Oral BID  senna (SENOKOT) tablet 17.2 mg  2 Tab Oral QHS  lisinopril (PRINIVIL, ZESTRIL) tablet 10 mg  10 mg Oral DAILY  fluticasone propionate (FLONASE) 50 mcg/actuation nasal spray 2 Spray  2 Spray Both Nostrils DAILY  amiodarone (CORDARONE) tablet 400 mg  400 mg Oral Q12H  carvedilol (COREG) tablet 3.125 mg  3.125 mg Oral BID WITH MEALS  calcium carbonate (TUMS) chewable tablet 200 mg [elemental]  200 mg Oral TID PRN  
 hydrALAZINE (APRESOLINE) 20 mg/mL injection 10 mg  10 mg IntraVENous Q6H PRN  
 [Held by provider] aspirin delayed-release tablet 81 mg  81 mg Oral DAILY  guaiFENesin ER (MUCINEX) tablet 1,200 mg  1,200 mg Oral DAILY  montelukast (SINGULAIR) tablet 10 mg  10 mg Oral DAILY  tamsulosin (FLOMAX) capsule 0.4 mg  0.4 mg Oral DAILY  sodium chloride (NS) flush 5-40 mL  5-40 mL IntraVENous Q8H  
 sodium chloride (NS) flush 5-40 mL  5-40 mL IntraVENous PRN  
 albuterol (PROVENTIL VENTOLIN) nebulizer solution 2.5 mg  2.5 mg Nebulization Q6H RT  
 [Held by provider] enoxaparin (LOVENOX) injection 40 mg  40 mg SubCUTAneous Q24H Patient has no known allergies. Social History Socioeconomic History  Marital status:  Spouse name: Not on file  Number of children: Not on file  Years of education: Not on file  Highest education level: Not on file Tobacco Use  Smoking status: Former Smoker  Smokeless tobacco: Never Used Substance and Sexual Activity  Alcohol use: Yes Comment: occasional  
 
Social History Tobacco Use Smoking Status Former Smoker Smokeless Tobacco Never Used Family History Problem Relation Age of Onset  Dementia Mother  Cancer Father   
     lung cancer  Heart Attack Father ROS: The patient has no difficulty with chest pain or shortness of breath. No fever or chills. Comprehensive review of systems was otherwise unremarkable except as noted above. Physical Exam:  
Visit Vitals /73 Pulse (!) 52 Temp 98 °F (36.7 °C) Resp 17 Ht 5' 8\" (1.727 m) Wt 146 lb 11.2 oz (66.5 kg) SpO2 100% BMI 22.31 kg/m² Constitutional: Alert, oriented, cooperative patient in no acute distress; appears stated age Eyes: Sclera are clear. EOMs intact ENMT: no external lesions gross hearing normal; no obvious neck masses, no ear or lip lesions, nares normal 
CV: RRR; Normal perfusion Resp: No JVD. Breathing is non-labored; crackles in the right base, Chest tube intact, on suction without air leak, serosanguinous drainage. Incisions c/d/i. GI: soft and non-distended Musculoskeletal: unremarkable with normal function. No embolic signs or cyanosis. Neuro:  Oriented; moves all 4; no focal deficits Psychiatric: normal affect and mood, no memory impairment Recent vitals (if inpt): 
Patient Vitals for the past 24 hrs: 
 BP Temp Pulse Resp SpO2  
05/20/19 1116 143/73 98 °F (36.7 °C) (!) 52 17 100 % 05/20/19 0919     93 % 05/20/19 0846   60    
05/20/19 0747 180/81 98.1 °F (36.7 °C) (!) 54 17 96 % 05/20/19 0308 164/76 98.3 °F (36.8 °C) 60 19 96 % 05/20/19 0217     93 % 05/19/19 2306 163/84 97.7 °F (36.5 °C) (!) 55 19 94 % 05/19/19 2201   (!) 55    
05/19/19 1956     92 % 05/19/19 1852 139/67 97.8 °F (36.6 °C) (!) 53 19 96 % 05/19/19 1756 143/82  (!) 53  98 % 05/19/19 1706 196/74 98 °F (36.7 °C) 65 20 96 % 05/19/19 1458     96 % Labs: 
No results for input(s): WBC, HGB, PLT, NA, K, CL, CO2, BUN, CREA, GLU, PTP, INR, APTT, TBIL, TBILI, CBIL, SGOT, GPT, ALT, AP, AML, LPSE, LCAD, NH4, TROPT, TROIQ, PCO2, PO2, HCO3, HGBEXT, PLTEXT, HGBEXT, PLTEXT in the last 72 hours. No lab exists for component:  PH, INREXT, INREXT Lab Results Component Value Date/Time WBC 16.1 (H) 05/17/2019 08:03 AM  
 HGB 12.6 (L) 05/17/2019 08:03 AM  
 PLATELET 325 13/88/9820 08:03 AM  
 Sodium 130 (L) 05/13/2019 07:20 AM  
 Potassium 4.1 05/13/2019 07:20 AM  
 Chloride 97 (L) 05/13/2019 07:20 AM  
 CO2 26 05/13/2019 07:20 AM  
 BUN 21 05/13/2019 07:20 AM  
 Creatinine 0.51 (L) 05/13/2019 07:20 AM  
 Glucose 105 (H) 05/13/2019 07:20 AM  
 INR 1.2 02/02/2018 06:34 AM  
 aPTT 37.5 (H) 02/02/2018 06:34 AM  
 Bilirubin, total 0.7 05/08/2019 04:10 AM  
 Bilirubin, direct 0.2 09/21/2010 02:10 PM  
 AST (SGOT) 25 05/08/2019 04:10 AM  
 ALT (SGPT) 21 05/08/2019 04:10 AM  
 Alk. phosphatase 77 05/08/2019 04:10 AM  
 Troponin-I, Qt. 0.05 02/04/2018 01:04 AM  
 
 
I reviewed recent labs and recent radiologic studies.  
5/6/19 CXR: 
IMPRESSION: 
  
 1. Large right-sided tension pneumothorax. This was discussed with emergency department by Dr. Tiffanie Spain at 9:58 AM on 5/6/2019. 
  
2. Unchanged enlargement of the cardiac silhouette. 
  
3. Chronic interstitial changes throughout the left lung. 5/13/19 CXR: 
FINDINGS: A portable AP radiograph of the chest was obtained at 0426 hours. Right apical pneumothorax slightly increased in size. . Chronic diffuse increased 
interstitial markings unchanged. . The cardiac and mediastinal contours and 
pulmonary vascularity are normal.  Stable subcutaneous emphysema. .  
  
IMPRESSION: Slight increase in size right apical pneumothorax. 5/8/19 CT Chest: 
IMPRESSION:  
1. Moderate size right pneumothorax with pneumomediastinum and extensive 
subcutaneous air. 2.  Tip of the right chest tube passes into the posterior mediastinum XR Results (most recent): 
Results from Hospital Encounter encounter on 05/06/19 XR CHEST PORT Narrative EXAM: Chest x-ray. INDICATION: Dyspnea. COMPARISON: Yesterday's chest x-ray. TECHNIQUE: Frontal view chest x-ray. FINDINGS: Interstitial lung edema and a tiny right apical pneumothorax are 
unchanged. The cardiac size is stable. 2 right-sided chest tubes remain in 
place, with right chest wall subcutaneous emphysema. There is no pleural 
effusion. Impression IMPRESSION: Unchanged interstitial lung edema and tiny right apical 
pneumothorax. I independently reviewed radiology images for studies I described above or studies I have ordered. Admission date (for inpatients): 5/6/2019  
6 Days Post-Op  Procedure(s): RIGHT VATS WITH TALC PLEURODESIS CHEST TUBE INSERTION X 2  APICAL BLEBECTOMY ASSESSMENT/PLAN: 
Problem List  Date Reviewed: 5/20/2019 Codes Class Noted Urinary retention ICD-10-CM: R33.9 ICD-9-CM: 788.20  5/20/2019 Subcutaneous emphysema ([facility] [address] ICD-10-CM: T79. 7XXA ICD-9-CM: 958.7  5/9/2019 * (Principal) Pneumothorax on right ICD-10-CM: J93.9 ICD-9-CM: 512.89  5/6/2019 Shortness of breath ICD-10-CM: R06.02 
ICD-9-CM: 786.05  5/6/2019 Acute on chronic respiratory failure with hypoxia [facility]) ICD-10-CM: J96.21 
ICD-9-CM: 518.84, 799.02  5/6/2019 COPD exacerbation ([facility] [address] ICD-10-CM: J44.1 ICD-9-CM: 491.21  5/6/2019 [facility]ia ([facility] ICD-10-CM: I47.2 ICD-9-CM: 427.1  5/6/2019 Pulmonary infiltrates ICD-10-CM: R91.8 ICD-9-CM: 793.19  5/6/2019 VT (ventricular tachycardia) ([facility]) ICD-10-CM: I47.2 ICD-9-CM: 427.1  5/6/2019 Closed compression fracture of lumbosacral spine (HCC) ICD-10-CM: S32.000A ICD-9-CM: 805.4  12/11/2018 Hip fracture ([facility] [address] ICD-10-CM: F36.966M ICD-9-CM: 820.8  2/1/2018 SOB (shortness of breath) ICD-10-CM: R06.02 
ICD-9-CM: 786.05  3/9/2017 Overview Addendum 12/11/2018 10:09 AM by Dandy Amor LPN Last Assessment & Plan:  
Stable, 6mwt ordered and reviewed. Management per copd, hypoxia. Last Assessment & Plan: He presents today with shortness of breath that has worsened over the past 2 weeks which he relates to shallow breathing due to back pain after recent lumbar fractures. His shortness of breath appears to be multifactorial.  He does not appear infected today and I do not believe this is a COPD exacerbation. I have recommended breathing exercises to combat his I will breathing. He does have an incentive spirometer from his previous hospitalization which I have encouraged him to use daily as a reminder to take deep breaths and to prevent pneumonia. We reviewed how to use this device. He expresses great concern about his 30 pound weight loss and dyspnea. His chest x-ray today was negative with no signs of infection or cancer. However, a CT is recommended to rule out malignancy.   I have explained that cancer is unlikely due to his hip fracture with sudden loss of appetite then gradual weight loss due to minimal p.o. intake immediately after however will proceed with CT scan. 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 HRCT 
  
  
   
 COPD (chronic obstructive pulmonary disease) with emphysema (HCC) ICD-10-CM: J43.9 ICD-9-CM: 492.8  10/17/2016 Overview Signed 12/11/2018 10:09 AM by Annie Steele LPN Last Assessment & Plan:  
Not currently on any maintence medication regimen for COPD as he felt them to be unhelpful in the past.  I recommended a trial of a ANoro Ellipta which he should take 1 inhalation daily. Demonstration was completed on the correct method of administration and he was able to return demonstration independently in the office today and administer his first dose. I have given him a 2-week sample which he will use daily and monitor if he notes any improvement in his breathing. He may also try pretreating himself with Ventolin before exertion to see if this offers any relief. Chronic respiratory failure with hypoxia (HCC) (Chronic) ICD-10-CM: J96.11 
ICD-9-CM: 518.83, 799.02  3/29/2016 Overview Addendum 5/6/2019 10:14 AM by Pollo Fine, NP Continue to wear 2 L nasal cannula at night. May use 2 L supplemental oxygen to maintain sats greater than 90%. Alfie ICD-10-CM: I49.9 ICD-9-CM: 427.89  3/28/2016 Overview Addendum 12/11/2018 10:09 AM by Annie Steele LPN Overview:  
Reported by LIN Mast physician. Last Assessment & Plan:  
EKG not done. I placed the patient on telemetry today and is having fairly frequent PVCs. We'll check BMP and magnesium. Overview:  
Reported by LIN Mast physician. Last Assessment & Plan: EKG not done. I placed the patient on telemetry today and is having fairly frequent PVCs. We'll check BMP and magnesium. Abdominal aortic aneurysm without rupture (HCC) ICD-10-CM: I71.4 ICD-9-CM: 441.4  12/10/2015 Overview Addendum 12/11/2018 10:09 AM by Leila Bustamante LPN In 2005 Benign prostatic hyperplasia ICD-10-CM: N40.0 ICD-9-CM: 600.00  12/10/2015 Cardiovascular disease ICD-10-CM: I25.10 ICD-9-CM: 429.2  12/10/2015 COPD (chronic obstructive pulmonary disease) (HCC) ICD-10-CM: J44.9 ICD-9-CM: 608  12/10/2015 Low testosterone ICD-10-CM: R79.89 ICD-9-CM: 790.99  12/10/2015 Essential hypertension, benign ICD-10-CM: I10 
ICD-9-CM: 401.1  12/10/2015 Allergic rhinitis ICD-10-CM: J30.9 ICD-9-CM: 477.9  12/10/2015 Raynaud's syndrome ICD-10-CM: I73.00 ICD-9-CM: 443.0  12/10/2015 Erectile dysfunction ICD-10-CM: N52.9 ICD-9-CM: 607.84  12/10/2015 Principal Problem: 
  Pneumothorax on right (5/6/2019) Active Problems: 
  Essential hypertension, benign (12/10/2015) Chronic respiratory failure with hypoxia ([address] (3/29/2016) Overview: Continue to wear 2 L nasal cannula at night. May use 2 L supplemental  
    oxygen to maintain sats greater than 90%. Shortness of breath (5/6/2019) Acute on chronic respiratory failure with hypoxia ([address] (5/6/2019) COPD exacerbation ([address] (5/6/2019) Ventricular tachyarrhythmia ([address] (5/6/2019) Pulmonary infiltrates (5/6/2019) VT (ventricular tachycardia) ([address] (5/6/2019) Subcutaneous emphysema ([address] (5/9/2019) Urinary retention (5/20/2019) Plan: S/p VATS with pleurodesis Continue chest tube to suction for 4 days post op - Possibly out Tuesday CXR in Am Ambulate Pain control PT/OT Continue current care Signed:  Suzen Duane, NP

## 2019-05-20 NOTE — PROGRESS NOTES
Problem: Mobility Impaired (Adult and Pediatric) Goal: *Acute Goals and Plan of Care (Insert Text) Description STG: 
(1.)Mr. Chrissy Guthrie will move from supine to sit and sit to supine  with STAND BY ASSIST within 3 treatment day(s). (2.)Mr. Chrissy Guthrie will transfer from bed to chair and chair to bed with STAND BY ASSIST using the least restrictive device within 3 treatment day(s). (3.)Mr. Chrissy Guthrie will ambulate with CONTACT GUARD ASSIST for 100 feet with the least restrictive device within 3 treatment day(s). LTG: 
(1.)Mr. Chrissy Guthrie will move from supine to sit and sit to supine  in bed with INDEPENDENT within 7 treatment day(s). (2.)Mr. Chrissy Guthrie will transfer from bed to chair and chair to bed with SUPERVISION using the least restrictive device within 7 treatment day(s). (3.)Mr. Chrissy Guthrie will ambulate with STAND BY ASSIST for 250+ feet with the least restrictive device within 7 treatment day(s). ________________________________________________________________________________________________ Outcome: Progressing Towards Goal 
 
 
PHYSICAL THERAPY: Daily Note and AM 5/20/2019 INPATIENT: PT Visit Days : 3 [place] / [place] / Product Type: Medicare /   
  
NAME/AGE/GENDER: Divya Rosenberg is a 80 y.o. male PRIMARY DIAGNOSIS: Pneumothorax on right [J93.9] Pneumothorax on right Pneumothorax on right Procedure(s) (LRB): 
RIGHT VATS WITH TALC PLEURODESIS CHEST TUBE INSERTION X 2  APICAL BLEBECTOMY (Right) 4 Days Post-Op ICD-10: Treatment Diagnosis:  
 · Generalized Muscle Weakness (M62.81) · Difficulty in walking, Not elsewhere classified (R26.2) · History of falling (Z91.81) Precaution/Allergies: 
Patient has no known allergies. ** Check with nursing if pt can come off suction for mobility with chest tube** ASSESSMENT:  
Mr. Chrissy Guthrie presents supine in the bed and happy to participate.   He got himself to the EOB with extra time and no help from me. He stood with minimal assist into the walker and was able to walk around the room and over to the chair by the bathroom with minimal assist.  He performed seated exercises in the chair and needed moderate to maximal verbal cues for technique with exercises which was the only time I considered his cognition otherwise seemed oriented. He still has 2 chest tubes that I took off suction (per RN) for treatment. He could benefit from rehab unless he has a very supportive and available family. This section established at most recent assessment PROBLEM LIST (Impairments causing functional limitations): 1. Decreased Strength 2. Decreased ADL/Functional Activities 3. Decreased Transfer Abilities 4. Decreased Ambulation Ability/Technique 5. Decreased Balance 6. Decreased Activity Tolerance 7. Decreased Pacing Skills 8. Increased Fatigue 9. Increased Shortness of Breath 10. Decreased [place] with Home Exercise Program 
 INTERVENTIONS PLANNED: (Benefits and precautions of physical therapy have been discussed with the patient.) 1. Balance Exercise 2. Bed Mobility 3. Family Education 4. Gait Training 5. Home Exercise Program (HEP) 6. Neuromuscular Re-education/Strengthening 7. Therapeutic Activites 8. Therapeutic Exercise/Strengthening 9. Transfer Training TREATMENT PLAN: Frequency/Duration: 3 times a week for duration of hospital stay Rehabilitation Potential For Stated Goals: Good REHAB RECOMMENDATIONS (at time of discharge pending progress):   
Placement: It is my opinion, based on this patient's performance to date, that Mr. Durga Cheng may benefit from [address] after discharge due to the functional deficits listed above that are likely to improve with skilled rehabilitation because he/she has multiple medical issues that affect his/her functional mobility in the community.  [facility] pending progress following surgery Equipment:  
? None at this time HISTORY:  
History of Present Injury/Illness (Reason for Referral): 
See H&P below Patient is a 80 y.o.  male presents via EMS with shortness of breath when got up this morning. Had a productive cough with yellow sputum and received Rocephin. In the ER CXR with large right pneumothorax and we were called for chest tub e placement and admission. Had a fall in February and seen by Dr. Yamileth Ware with compression L2,L3, L4. Past Medical History/Comorbidities:  
Mr. Amaury Rivera  has a past medical history of Abdominal aortic aneurysm ([facility] (12/10/2015), Abdominal aortic aneurysm without rupture ([facility] [address] Allergic rhinitis (12/10/2015), Asthma, Benign neoplasm, Benign prostatic hyperplasia (12/10/2015), BPH without urinary obstruction, Cardiovascular disease (12/10/2015), Chronic obstructive asthma with exacerbation ([facility] [address] (12/10/2015), COPD (chronic obstructive pulmonary disease) ([facility] [address] (12/10/2015), Cough with hemoptysis, Erectile dysfunction (12/10/2015), Essential hypertension, benign (12/10/2015), Fracture (10/2014), History of colon polyps, Low testosterone (12/10/2015), Lumbago, Memory loss, Overflow incontinence, Raynaud's syndrome (12/10/2015), Rotator cuff tendinitis, Thrombocytopenia ([address] and Urinary frequency. Mr. Amaury Rivera  has a past surgical history that includes hx hernia repair (1980); hx colonoscopy; hx fracture tx (10/2014); hx cataract removal (6/2016-right); and hx orthopaedic (03/2018). Social History/Living Environment:  
Home Environment: Private residence # Steps to Enter: 1 One/Two Story Residence: One story Living Alone: No 
Support Systems: Spouse/Significant Other/Partner, Child(tremayne) Patient Expects to be Discharged to[de-identified] Private residence Current DME Used/Available at Home: Grab bars, Shower chair, Raised toilet seat, Cane, straight, Walker, rolling Tub or Shower Type: Shower Prior Level of Function/Work/Activity: 
Use of walker for gait, indep with ADLs, drives, 1 fall Number of Personal Factors/Comorbidities that affect the Plan of Care: 3+: HIGH COMPLEXITY EXAMINATION:  
Most Recent Physical Functioning:  
Gross Assessment: 
  
         
  
Posture: 
  
Balance: 
  Bed Mobility: 
Supine to Sit: Modified independent; Additional time Scooting: Modified independent; Additional time Wheelchair Mobility: 
  
Transfers: 
Sit to Stand: Minimum assistance Stand to Sit: Minimum assistance Gait: 
  
Speed/Marifer: Shuffled; Slow Step Length: Left shortened;Right shortened Gait Abnormalities: Decreased step clearance;Shuffling gait; Steppage gait Distance (ft): 15 Feet (ft) Assistive Device: Walker, rolling Ambulation - Level of Assistance: Minimal assistance Body Structures Involved: 1. Lungs 2. Bones 3. Joints 4. Muscles 5. Ligaments Body Functions Affected: 1. Sensory/Pain 2. Cardio 3. Respiratory 4. Neuromusculoskeletal 
5. Movement Related Activities and Participation Affected: 1. General Tasks and Demands 2. Mobility 3. Self Care 4. Domestic Life 5. Interpersonal Interactions and Relationships 6. Community, Social and [place] Number of elements that affect the Plan of Care: 4+: HIGH COMPLEXITY CLINICAL PRESENTATION:  
Presentation: Evolving clinical presentation with changing clinical characteristics: MODERATE COMPLEXITY CLINICAL DECISION MAKIN [facility] [address] [id number] AM-PAC 6 Clicks Basic Mobility Inpatient Short Form How much difficulty does the patient currently have. .. Unable A Lot A Little None 1. Turning over in bed (including adjusting bedclothes, sheets and blankets)? ? 1   ? 2   ? 3   ? 4  
2. Sitting down on and standing up from a chair with arms ( e.g., wheelchair, bedside commode, etc.)   ? 1   ? 2   ? 3   ? 4  
3.   Moving from lying on back to sitting on the side of the bed?   ? 1   ? 2   ? 3   ? 4  
 How much help from another person does the patient currently need. .. Total A Lot A Little None 4. Moving to and from a bed to a chair (including a wheelchair)? ? 1   ? 2   ? 3   ? 4  
5. Need to walk in hospital room? ? 1   ? 2   ? 3   ? 4  
6. Climbing 3-5 steps with a railing? ? 1   ? 2   ? 3   ? 4  
© 2007, Trustees of [address] [place] [address] [address] [id number], under license to [employer]. All rights reserved Score:  Initial: 17 Most Recent: X (Date: -- ) Interpretation of Tool:  Represents activities that are increasingly more difficult (i.e. Bed mobility, Transfers, Gait). Medical Necessity:    
· Patient is expected to demonstrate progress in strength, balance, coordination and functional technique ·  to decrease assistance required with gait, transfers, and functional mobility · . Reason for Services/Other Comments: 
· Patient continues to require skilled intervention due to decreased strength, decreased balance, decreased functional tolerance, decreased cardiopulmonary endurance affecting participation in basic ADLs and functional tasks · . Use of outcome tool(s) and clinical judgement create a POC that gives a: Clear prediction of patient's progress: LOW COMPLEXITY  
  
 
 
 
TREATMENT:  
(In addition to Assessment/Re-Assessment sessions the following treatments were rendered) Pre-treatment Symptoms/Complaints:  No complaints Pain: Initial:  
Pain Intensity 1: 0 1 Post Session:   None noted Therapeutic Activity: (    20 minutes): Therapeutic activities including Bed transfers, Chair transfers and Ambulation on level ground to improve mobility, strength, balance and endurance. Required minimal   to promote static and dynamic balance in standing. Therapeutic Exercise: (10 Minutes):  Exercises per grid below to improve mobility, strength and coordination. Required maximal visual and verbal cues to promote proper body mechanics. Progressed complexity of movement as indicated. Date: 5/14/19 Date: 
5/17/19 Date: 
5/20/19 Activity/Exercise Parameters Parameters Parameters Ankle pumps 20 X B  2 x 10 20x B Quad set 15 X B Glute set 15 X Heel slides 15 X B  2 x 10 Hip abduction 15 X B  2 x 10 Seated 20x B Hip flexion/marching  2 x 10 Seated 20x B TKE   20x B Braces/Orthotics/Lines/Etc:  
· Chest tube - times 2 
· O2 Device: Nasal cannula 3L with O2 sats at 95% Treatment/Session Assessment:   
· Response to Treatment:  Patient participated and was very motivated for therapy today. · Interdisciplinary Collaboration:  
o Physical Therapist assistant 
o Registered Nurse · After treatment position/precautions:  
o Up in chair 
o Bed alarm/tab alert on 
o Bed/Chair-wheels locked 
o Bed in low position 
o Call light within reach 
o RN notified · Compliance with Program/Exercises: Compliant all of the time · Recommendations/Intent for next treatment session: \"Next visit will focus on advancements to more challenging activities and reduction in assistance provided\". Total Treatment Duration: PT Patient Time In/Time Out Time In: 6358 Time Out: 1020 Gamal Sauceda PTA

## 2019-05-20 NOTE — PROGRESS NOTES
Chest tube times 2 removed at end inspiration. Dressing applied. Patient tolerated well. CXR ordered for AM. Cassius Casarez NP

## 2019-05-21 ENCOUNTER — APPOINTMENT (OUTPATIENT)
Dept: GENERAL RADIOLOGY | Age: 84
DRG: 163 | End: 2019-05-21
Attending: SURGERY
Payer: MEDICARE

## 2019-05-21 LAB
APPEARANCE UR: ABNORMAL
BACTERIA URNS QL MICRO: 0 /HPF
BILIRUB UR QL: NEGATIVE
COLOR UR: ABNORMAL
EPI CELLS #/AREA URNS HPF: ABNORMAL /HPF
GLUCOSE UR STRIP.AUTO-MCNC: NEGATIVE MG/DL
HGB UR QL STRIP: ABNORMAL
KETONES UR QL STRIP.AUTO: ABNORMAL MG/DL
LEUKOCYTE ESTERASE UR QL STRIP.AUTO: ABNORMAL
NITRITE UR QL STRIP.AUTO: NEGATIVE
OTHER OBSERVATIONS,UCOM: ABNORMAL
PH UR STRIP: 6.5 [PH] (ref 5–9)
PROT UR STRIP-MCNC: 30 MG/DL
RBC #/AREA URNS HPF: ABNORMAL /HPF
SP GR UR REFRACTOMETRY: 1.03 (ref 1–1.02)
UROBILINOGEN UR QL STRIP.AUTO: 1 EU/DL (ref 0.2–1)
WBC URNS QL MICRO: ABNORMAL /HPF

## 2019-05-21 PROCEDURE — 77030034849

## 2019-05-21 PROCEDURE — 94760 N-INVAS EAR/PLS OXIMETRY 1: CPT

## 2019-05-21 PROCEDURE — 77010033678 HC OXYGEN DAILY

## 2019-05-21 PROCEDURE — 97530 THERAPEUTIC ACTIVITIES: CPT

## 2019-05-21 PROCEDURE — 74011000250 HC RX REV CODE- 250: Performed by: SURGERY

## 2019-05-21 PROCEDURE — 87086 URINE CULTURE/COLONY COUNT: CPT

## 2019-05-21 PROCEDURE — 97110 THERAPEUTIC EXERCISES: CPT

## 2019-05-21 PROCEDURE — 99232 SBSQ HOSP IP/OBS MODERATE 35: CPT | Performed by: INTERNAL MEDICINE

## 2019-05-21 PROCEDURE — 74011250637 HC RX REV CODE- 250/637: Performed by: SURGERY

## 2019-05-21 PROCEDURE — 81001 URINALYSIS AUTO W/SCOPE: CPT

## 2019-05-21 PROCEDURE — 71045 X-RAY EXAM CHEST 1 VIEW: CPT

## 2019-05-21 PROCEDURE — 94640 AIRWAY INHALATION TREATMENT: CPT

## 2019-05-21 PROCEDURE — 65270000029 HC RM PRIVATE

## 2019-05-21 PROCEDURE — 77030021907 HC KT URIN FOL O&M -A

## 2019-05-21 RX ADMIN — AMIODARONE HYDROCHLORIDE 400 MG: 200 TABLET ORAL at 08:51

## 2019-05-21 RX ADMIN — ALBUTEROL SULFATE 2.5 MG: 2.5 SOLUTION RESPIRATORY (INHALATION) at 09:10

## 2019-05-21 RX ADMIN — ALBUTEROL SULFATE 2.5 MG: 2.5 SOLUTION RESPIRATORY (INHALATION) at 13:49

## 2019-05-21 RX ADMIN — MONTELUKAST SODIUM 10 MG: 10 TABLET, FILM COATED ORAL at 08:51

## 2019-05-21 RX ADMIN — TAMSULOSIN HYDROCHLORIDE 0.4 MG: 0.4 CAPSULE ORAL at 08:51

## 2019-05-21 RX ADMIN — GUAIFENESIN 1200 MG: 600 TABLET, EXTENDED RELEASE ORAL at 08:51

## 2019-05-21 RX ADMIN — CARVEDILOL 3.12 MG: 3.12 TABLET, FILM COATED ORAL at 17:19

## 2019-05-21 RX ADMIN — LISINOPRIL 10 MG: 5 TABLET ORAL at 08:51

## 2019-05-21 RX ADMIN — FLUTICASONE PROPIONATE 2 SPRAY: 50 SPRAY, METERED NASAL at 08:52

## 2019-05-21 RX ADMIN — ACETAMINOPHEN 500 MG: 500 TABLET, FILM COATED ORAL at 21:14

## 2019-05-21 RX ADMIN — AMIODARONE HYDROCHLORIDE 400 MG: 200 TABLET ORAL at 21:14

## 2019-05-21 RX ADMIN — Medication 10 ML: at 14:23

## 2019-05-21 RX ADMIN — Medication 5 ML: at 06:04

## 2019-05-21 RX ADMIN — ACETAMINOPHEN 500 MG: 500 TABLET, FILM COATED ORAL at 06:04

## 2019-05-21 RX ADMIN — Medication 5 ML: at 21:15

## 2019-05-21 RX ADMIN — ALBUTEROL SULFATE 2.5 MG: 2.5 SOLUTION RESPIRATORY (INHALATION) at 21:19

## 2019-05-21 NOTE — PROGRESS NOTES
Approximately 100  Cc of urine output noted throughout the night. Bladder scan done showing approximately 700/800 cc of urine. Spoke with Kamila Lucero NP on floor and made aware. Will place li and consult Urology.

## 2019-05-21 NOTE — PROGRESS NOTES
Pt resting in bed with eyes closed and pt snoring. Pt awakens when spoken to. Pt oriented times 3 at this time. Pt on 3  L NC At this time. Pt has dressing to right rib area, clean, dry, intact at this time. Pt denies pain or distress at this time. Pt encouraged to call for assistance if needed call light in reach, door open will monitor.

## 2019-05-21 NOTE — CONSULTS
Urology Consult    Subjective:     Date of Consultation:  May 21, 2019    Referring Physician: Vaibhav Barboza NP    Reason for Consultation:  Urinary retention with li catheter in place    History of Present Illness:     Karon Baeza is a 80 y.o.  male who is being seen for urinary retention with li in place. He was admitted to the hospital for Pneumothorax on right [J93.9]. He was admitted on 5/6/19 with large right pneumothorax to which pulmonary was consulted and placed a chest tube. Cardiology consulted for VT. He had persistent air leak with chest tube and underwent a surgical talc pleurodesis on 5/16. Li catheter placed for urinary retention and this was later removed. Pt again had urinary retention with 800 ml on scan, li replaced with 1 L output. Flomax started. He is known to Dr. Thor Cox with hx of BPH and low testosterone. Last seen on January of 2017 and he denied voiding symptoms, not on any medications.          Past Medical History:   Diagnosis Date    Abdominal aortic aneurysm ([address] 12/10/2015    In 2005    Abdominal aortic aneurysm without rupture ([address]     Allergic rhinitis 12/10/2015    Asthma     Benign neoplasm     Benign prostatic hyperplasia 12/10/2015    BPH without urinary obstruction     Cardiovascular disease 12/10/2015    Chronic obstructive asthma with exacerbation ([address] 12/10/2015    COPD (chronic obstructive pulmonary disease) ([facility] [address] 12/10/2015    Cough with hemoptysis     Erectile dysfunction 12/10/2015    Essential hypertension, benign 12/10/2015    Fracture 10/2014    of left hand and ribs after fall on concrete    History of colon polyps     Low testosterone 12/10/2015    Lumbago     Memory loss     Overflow incontinence     Raynaud's syndrome 12/10/2015    Rotator cuff tendinitis     Thrombocytopenia (HCC)     Urinary frequency       Past Surgical History:   Procedure Laterality Date    HX CATARACT REMOVAL  6/2016-right    HX COLONOSCOPY  HX FRACTURE TX  10/2014    of left hand and ribs are fall on concrete    [facility]    HX ORTHOPAEDIC  03/2018    hip fracture      Family History   Problem Relation Age of Onset    Dementia Mother     Cancer Father         lung cancer    Heart Attack Father       Social History     Tobacco Use    Smoking status: Former Smoker    Smokeless tobacco: Never Used   Substance Use Topics    Alcohol use: Yes     Comment: occasional     No Known Allergies   Prior to Admission medications    Medication Sig Start Date End Date Taking? Authorizing Provider   tamsulosin (FLOMAX) 0.4 mg capsule Take 1 Cap by mouth daily. 12/18/18   Vanna Eaton MD   lisinopril (PRINIVIL, ZESTRIL) 5 mg tablet Take 1 Tab by mouth daily. 12/18/18   Vanna Eaton MD   montelukast (SINGULAIR) 10 mg tablet Take 1 Tab by mouth daily. 12/18/18   Vanna Eaton MD   umeclidinium-vilanterol (ANORO ELLIPTA) 62.5-25 mcg/actuation inhaler Take 1 Puff by inhalation daily. 11/12/18   Provider, Historical   Oxygen Use as instructed. Use as instructed; faxed to [facility] 6/19/18   Provider, Historical   cyanocobalamin (VITAMIN B12) 1,000 mcg/mL injection INJECT 1ML INTRAMUSCULARLY ONCE FOR 1 DOSE 8/30/18   Provider, Historical   fluticasone (FLONASE) 50 mcg/actuation nasal spray 2 Sprays by Both Nostrils route daily. 9/21/18   Vanna Eaton MD   calcium citrate-vitamin d3 (CITRACAL+D) 315-200 mg-unit tab Take 2 Tabs by mouth daily (with breakfast). Provider, Historical   cholecalciferol (VITAMIN D3) 1,000 unit cap Take  by mouth. Provider, Historical   guaiFENesin ER (MUCINEX) 600 mg ER tablet Take 1,200 mg by mouth daily. Provider, Historical   Biotin 2,500 mcg cap Take  by mouth. Provider, Historical   aspirin delayed-release 81 mg tablet Take  by mouth daily. Provider, Historical         Review of Systems:  A comprehensive review of systems was negative except for that written in the HPI.     Objective: Patient Vitals for the past 8 hrs:   BP Temp Pulse Resp SpO2   19 1349     95 %   19 1121 115/61 97.7 °F (36.5 °C) (!) 58 20 97 %   19 0910     95 %   19 0751 167/76 97.6 °F (36.4 °C) (!) 57 20 97 %     Temp (24hrs), Av.8 °F (36.6 °C), Min:97.6 °F (36.4 °C), Max:98.1 °F (36.7 °C)      Intake and Output:    1901 -  0700  In: 680 [P.O.:680]  Out: 550 [Urine:550]    Physical Exam:            General:    currently sleeping, frail                     Skin:  no rash or abnormalities                HEENT:  PERRLA        Throat/Neck:  neck supple                     Lungs:  clear to auscultation bilaterally      Cardiovascular:  RRR, S1 S2             Abdomen[de-identified]  Soft, non-tender             Genitalia:  li in place, with angelito urine OP          Extremities:  peripheral pulses 2+ and symmetric       Assessment:     Principal Problem:    Pneumothorax on right (2019)    Active Problems:    Essential hypertension, benign (12/10/2015)      Chronic respiratory failure with hypoxia (HCC) (3/29/2016)      Overview: Continue to wear 2 L nasal cannula at night. May use 2 L supplemental       oxygen to maintain sats greater than 90%. Shortness of breath (2019)      Acute on chronic respiratory failure with hypoxia (HCC) (2019)      COPD exacerbation (HCC) (2019)      Ventricular tachyarrhythmia ([address] (2019)      Pulmonary infiltrates (2019)      VT (ventricular tachycardia) ([address] (2019)      Subcutaneous emphysema ([address] (2019)      Urinary retention (2019)      Urinary retention. Plan:     Send urine for UA/culture to r/o infection. Continue flomax daily. Continue li catheter until closer to d/c. If he fails another TOV, he will need to have li replaced and he will need early morning appt with us for li removal and voiding trial in the office.          Signed By: DENNIS Sanchez now.  Drake Hill MD

## 2019-05-21 NOTE — PROGRESS NOTES
Progress Note/Office Note:   Pamela Gonzalez  MRN: [id number]  FID:[id number]  Age:87 y.o.    HPI: Pamela Gonzalez is a 80 y.o. male who has PMHx of AAA, COPD, BPH, and HTN who presented to the ED via EMS on 5/6/19 with sudden onset of SOB, PATE, and cough. While en route via EMS pt had a run of VT. He had no relief with NRB. Sats were in the 80s in the ED. CXR in ED demonstrated complete PTX on the right. He was started on an Amio gtt for repeated runs of VT. Pulmonary placed a Uresil in the ED. This eventually developed subcutaneous emphysema and the Uresil became ineffective. A 24Fr chest tube was placed on 5/7/19. Since then, pt has had a persistent air leak. CXR demonstrates right apical PTX. Surgery was consulted for consideration of pleurodesis. 5/14/19: Awake in bed, no complaints. CT with +air leak, 150mL/24h serosang output. CXR improved this AM.  5/15/19: CT with 170mL/24h serosang output. CXR without PTX this AM.  5/16/19: Day of surgery  5/17/19: POD#1 s/p right VATS with talc pleurodesis. AF, sariah, HTN on 3L NC.  2.5L UOP/24h. CXR with stable right PTX. CT #1 with 30mL output since surgery, CT #2 with 40mL out.  5/18/19: POD#2 s/p right VATS with talc pleurodesis. AF, sariah, HTN on 3L NC.  CXR with Decreased tiny right apical pneumothorax. CT #1 with 310mL output since surgery, CT #2 with 100mL out.  5/19/19: POD#3 s/p right VATS with talc pleurodesis. AF, sariah, HTN on 3L NC.  CXR with Unchanged tiny right apical pneumothorax and interstitial lung edema or pneumonitis. CT #1 with 100mL output since surgery, CT #2 with 30mL out.  5/20/19: POD#4 s/p right VATS with talc pleurodesis. AF, sariah, HTN on 3L NC.  CXR with Unchanged tiny right apical pneumothorax and interstitial lung edema or pneumonitis. CT #1 with 100mL output/24h, CT #2 with 75mL out/24h.  5/21/19: POD#5 s/p right VATS with talc pleurodesis.   AF, sariah, HTN on 2L NC.  CXR with Unchanged interstitial lung edema and small right apical pneumothorax, following removal of two right-sided chest tubes. Denies SOB.      Past Medical History:   Diagnosis Date    Abdominal aortic aneurysm ([facility] 12/10/2015    In 2005    Abdominal aortic aneurysm without rupture ([facility]     Allergic rhinitis 12/10/2015    Asthma     Benign neoplasm     Benign prostatic hyperplasia 12/10/2015    BPH without urinary obstruction     Cardiovascular disease 12/10/2015    Chronic obstructive asthma with exacerbation (HCC) 12/10/2015    COPD (chronic obstructive pulmonary disease) ([facility] 12/10/2015    Cough with hemoptysis     Erectile dysfunction 12/10/2015    Essential hypertension, benign 12/10/2015    Fracture 10/2014    of left hand and ribs after fall on concrete    History of colon polyps     Low testosterone 12/10/2015    Lumbago     Memory loss     Overflow incontinence     Raynaud's syndrome 12/10/2015    Rotator cuff tendinitis     Thrombocytopenia (HCC)     Urinary frequency      Past Surgical History:   Procedure Laterality Date    HX CATARACT REMOVAL  6/2016-right    HX COLONOSCOPY      HX FRACTURE TX  10/2014    of left hand and ribs are fall on concrete    [facility]    HX ORTHOPAEDIC  03/2018    hip fracture     Current Facility-Administered Medications   Medication Dose Route Frequency    acetaminophen (TYLENOL) tablet 500 mg  500 mg Oral Q8H    acetaminophen (TYLENOL) tablet 650 mg  650 mg Oral Q4H PRN    docusate sodium (COLACE) capsule 100 mg  100 mg Oral BID    senna (SENOKOT) tablet 17.2 mg  2 Tab Oral QHS    lisinopril (PRINIVIL, ZESTRIL) tablet 10 mg  10 mg Oral DAILY    fluticasone propionate (FLONASE) 50 mcg/actuation nasal spray 2 Spray  2 Spray Both Nostrils DAILY    amiodarone (CORDARONE) tablet 400 mg  400 mg Oral Q12H    carvedilol (COREG) tablet 3.125 mg  3.125 mg Oral BID WITH MEALS    calcium carbonate (TUMS) chewable tablet 200 mg [elemental]  200 mg Oral TID PRN    hydrALAZINE (APRESOLINE) 20 mg/mL injection 10 mg  10 mg IntraVENous Q6H PRN    [Held by provider] aspirin delayed-release tablet 81 mg  81 mg Oral DAILY    guaiFENesin ER (MUCINEX) tablet 1,200 mg  1,200 mg Oral DAILY    montelukast (SINGULAIR) tablet 10 mg  10 mg Oral DAILY    tamsulosin (FLOMAX) capsule 0.4 mg  0.4 mg Oral DAILY    sodium chloride (NS) flush 5-40 mL  5-40 mL IntraVENous Q8H    sodium chloride (NS) flush 5-40 mL  5-40 mL IntraVENous PRN    albuterol (PROVENTIL VENTOLIN) nebulizer solution 2.5 mg  2.5 mg Nebulization Q6H RT    [Held by provider] enoxaparin (LOVENOX) injection 40 mg  40 mg SubCUTAneous Q24H     Patient has no known allergies. Social History     Socioeconomic History    Marital status:      Spouse name: Not on file    Number of children: Not on file    Years of education: Not on file    Highest education level: Not on file   Tobacco Use    Smoking status: Former Smoker    Smokeless tobacco: Never Used   Substance and Sexual Activity    Alcohol use: Yes     Comment: occasional     Social History     Tobacco Use   Smoking Status Former Smoker   Smokeless Tobacco Never Used     Family History   Problem Relation Age of Onset    Dementia Mother     Cancer Father         lung cancer    Heart Attack Father      ROS: The patient has no difficulty with chest pain or shortness of breath. No fever or chills. Comprehensive review of systems was otherwise unremarkable except as noted above. Physical Exam:   Visit Vitals  /76   Pulse (!) 57   Temp 97.6 °F (36.4 °C)   Resp 20   Ht 5' 8\" (1.727 m)   Wt 141 lb 12.1 oz (64.3 kg)   SpO2 95%   BMI 21.55 kg/m²     Constitutional: Alert, oriented, cooperative patient in no acute distress; appears stated age    Eyes: Sclera are clear. EOMs intact  ENMT: no external lesions gross hearing normal; no obvious neck masses, no ear or lip lesions, nares normal  CV: RRR; Normal perfusion  Resp: No JVD.   Breathing is non-labored; crackles in the right base, Chest tube intact, on suction without air leak, serosanguinous drainage. Incisions c/d/i.   GI: soft and non-distended     Musculoskeletal: unremarkable with normal function. No embolic signs or cyanosis. Neuro:  Oriented; moves all 4; no focal deficits  Psychiatric: normal affect and mood, no memory impairment    Recent vitals (if inpt):  Patient Vitals for the past 24 hrs:   BP Temp Pulse Resp SpO2 Weight   05/21/19 0910     95 %    05/21/19 0751 167/76 97.6 °F (36.4 °C) (!) 57 20 97 %    05/21/19 0523      141 lb 12.1 oz (64.3 kg)   05/21/19 0319 150/80 97.7 °F (36.5 °C) 76 18 98 %    05/20/19 2316 159/83 97.8 °F (36.6 °C) (!) 57 20 94 %    05/20/19 2149     94 %    05/20/19 1947 154/77 98.1 °F (36.7 °C) (!) 55 18 95 %    05/20/19 1711   (!) 57      05/20/19 1529 125/67 98 °F (36.7 °C) (!) 53 18 97 %    05/20/19 1513     94 %    05/20/19 1116 143/73 98 °F (36.7 °C) (!) 52 17 100 %        Labs:  No results for input(s): WBC, HGB, PLT, NA, K, CL, CO2, BUN, CREA, GLU, PTP, INR, APTT, TBIL, TBILI, CBIL, SGOT, GPT, ALT, AP, AML, LPSE, LCAD, NH4, TROPT, TROIQ, PCO2, PO2, HCO3, HGBEXT, PLTEXT, HGBEXT, PLTEXT in the last 72 hours. No lab exists for component:  PH, INREXT, INREXT    Lab Results   Component Value Date/Time    WBC 16.1 (H) 05/17/2019 08:03 AM    HGB 12.6 (L) 05/17/2019 08:03 AM    PLATELET 318 95/53/1403 08:03 AM    Sodium 130 (L) 05/13/2019 07:20 AM    Potassium 4.1 05/13/2019 07:20 AM    Chloride 97 (L) 05/13/2019 07:20 AM    CO2 26 05/13/2019 07:20 AM    BUN 21 05/13/2019 07:20 AM    Creatinine 0.51 (L) 05/13/2019 07:20 AM    Glucose 105 (H) 05/13/2019 07:20 AM    INR 1.2 02/02/2018 06:34 AM    aPTT 37.5 (H) 02/02/2018 06:34 AM    Bilirubin, total 0.7 05/08/2019 04:10 AM    Bilirubin, direct 0.2 09/21/2010 02:10 PM    AST (SGOT) 25 05/08/2019 04:10 AM    ALT (SGPT) 21 05/08/2019 04:10 AM    Alk.  phosphatase 77 05/08/2019 04:10 AM    Troponin-I, Qt. 0.05 02/04/2018 01:04 AM       I reviewed recent labs and recent radiologic studies. 5/6/19 CXR:  IMPRESSION:     1. Large right-sided tension pneumothorax. This was discussed with emergency  department by Dr. Evelyn Romero at 9:58 AM on 5/6/2019.     2. Unchanged enlargement of the cardiac silhouette.     3. Chronic interstitial changes throughout the left lung. 5/13/19 CXR:  FINDINGS: A portable AP radiograph of the chest was obtained at 0426 hours. Right apical pneumothorax slightly increased in size. . Chronic diffuse increased  interstitial markings unchanged. . The cardiac and mediastinal contours and  pulmonary vascularity are normal.  Stable subcutaneous emphysema. .      IMPRESSION: Slight increase in size right apical pneumothorax. 5/8/19 CT Chest:  IMPRESSION:   1. Moderate size right pneumothorax with pneumomediastinum and extensive  subcutaneous air. 2.  Tip of the right chest tube passes into the posterior mediastinum    XR Results (most recent):  Results from Hospital Encounter encounter on 05/06/19   XR CHEST PORT    Narrative EXAM: Chest x-ray. INDICATION: Dyspnea. COMPARISON: Yesterday's chest x-ray. TECHNIQUE: Frontal view chest x-ray. FINDINGS: Two right-sided chest tubes have been removed. A small right apical  pneumothorax and bilateral lung interstitial edema are unchanged. The cardiac  size is stable. No pleural effusion or left-sided pneumothorax is identified. There is persistent right chest wall subcutaneous emphysema. Impression IMPRESSION: Unchanged interstitial lung edema and small right apical  pneumothorax, following removal of two right-sided chest tubes. I independently reviewed radiology images for studies I described above or studies I have ordered.    Admission date (for inpatients): 5/6/2019   6 Days Post-Op  Procedure(s):  RIGHT VATS WITH TALC PLEURODESIS CHEST TUBE INSERTION X 2  APICAL BLEBECTOMY    ASSESSMENT/PLAN:  Problem List  Date Reviewed: 5/21/2019          Codes Class Noted    Urinary retention ICD-10-CM: R33.9  ICD-9-CM: 788.20  5/20/2019        Subcutaneous emphysema ([facility] ICD-10-CM: T79. 7XXA  ICD-9-CM: 958.7  5/9/2019        * (Principal) Pneumothorax on right ICD-10-CM: J93.9  ICD-9-CM: 512.89  5/6/2019        Shortness of breath ICD-10-CM: R06.02  ICD-9-CM: 786.05  5/6/2019        Acute on chronic respiratory failure with hypoxia [facility]) ICD-10-CM: J96.21  ICD-9-CM: 518.84, 799.02  5/6/2019        COPD exacerbation (HCC) ICD-10-CM: J44.1  ICD-9-CM: 491.21  5/6/2019        Ventricular tachyarrhythmia ([facility] ICD-10-CM: I47.2  ICD-9-CM: 427.1  5/6/2019        Pulmonary infiltrates ICD-10-CM: R91.8  ICD-9-CM: 793.19  5/6/2019        VT (ventricular tachycardia) ([facility] ICD-10-CM: I47.2  ICD-9-CM: 427.1  5/6/2019        Closed compression fracture of lumbosacral spine ([facility] ICD-10-CM: S32.000A  ICD-9-CM: 805.4  12/11/2018        Hip fracture ([facility] ICD-10-CM: S72.009A  ICD-9-CM: 820.8  2/1/2018        SOB (shortness of breath) ICD-10-CM: R06.02  ICD-9-CM: 786.05  3/9/2017    Overview Addendum 12/11/2018 10:09 AM by Everton Knox LPN     Last Assessment & Plan:   Stable, 6mwt ordered and reviewed. Management per copd, hypoxia. Last Assessment & Plan:   He presents today with shortness of breath that has worsened over the past 2 weeks which he relates to shallow breathing due to back pain after recent lumbar fractures. His shortness of breath appears to be multifactorial.  He does not appear infected today and I do not believe this is a COPD exacerbation. I have recommended breathing exercises to combat his I will breathing. He does have an incentive spirometer from his previous hospitalization which I have encouraged him to use daily as a reminder to take deep breaths and to prevent pneumonia. We reviewed how to use this device. He expresses great concern about his 30 pound weight loss and dyspnea.   His chest x-ray today was negative with no signs of infection or cancer. However, a CT is recommended to rule out malignancy. I have explained that cancer is unlikely due to his hip fracture with sudden loss of appetite then gradual weight loss due to minimal p.o. intake immediately after however will proceed with CT scan. 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 HRCT             COPD (chronic obstructive pulmonary disease) [facility] ([facility] ICD-10-CM: J43.9  ICD-9-CM: 492.8  10/17/2016    Overview Signed 12/11/2018 10:09 AM by Khanh Ramos LPN     Last Assessment & Plan:   Not currently on any maintence medication regimen for COPD as he felt them to be unhelpful in the past.  I recommended a trial of a ANoro Ellipta which he should take 1 inhalation daily. Demonstration was completed on the correct method of administration and he was able to return demonstration independently in the office today and administer his first dose. I have given him a 2-week sample which he will use daily and monitor if he notes any improvement in his breathing. He may also try pretreating himself with Ventolin before exertion to see if this offers any relief. Chronic respiratory failure with hypoxia (HCC) (Chronic) ICD-10-CM: J96.11  ICD-9-CM: 518.83, 799.02  3/29/2016    Overview Addendum 5/6/2019 10:14 AM by Bimal Pena, NP     Continue to wear 2 L nasal cannula at night. May use 2 L supplemental oxygen to maintain sats greater than 90%. [place] ICD-10-CM: I49.9  ICD-9-CM: 427.89  3/28/2016    Overview Addendum 12/11/2018 10:09 AM by Khanh Ramos LPN     Overview:   Reported by M.D. 5 physician. Last Assessment & Plan:   EKG not done.  I placed the patient on telemetry today and is having fairly frequent PVCs. We'll check BMP and magnesium. Overview:   Reported by LIN Garcia physician. Last Assessment & Plan:   EKG not done. I placed the patient on telemetry today and is having fairly frequent PVCs. We'll check BMP and magnesium. [facility] ICD-10-CM: I71.4  ICD-9-CM: 441.4  12/10/2015    Overview Addendum 12/11/2018 10:09 AM by Ilene King LPN     In [id number]             Benign prostatic hyperplasia ICD-10-CM: N40.0  ICD-9-CM: 600.00  12/10/2015        Cardiovascular disease ICD-10-CM: I25.10  ICD-9-CM: 429.2  12/10/2015        COPD (chronic obstructive pulmonary disease) ([facility] ICD-10-CM: J44.9  ICD-9-CM: 496  12/10/2015        Low testosterone ICD-10-CM: R79.89  ICD-9-CM: 790.99  12/10/2015        Essential hypertension, benign ICD-10-CM: I10  ICD-9-CM: 401.1  12/10/2015        Allergic rhinitis ICD-10-CM: J30.9  ICD-9-CM: 477.9  12/10/2015        Raynaud's syndrome ICD-10-CM: I73.00  ICD-9-CM: 443.0  12/10/2015        Erectile dysfunction ICD-10-CM: N52.9  ICD-9-CM: 607.84  12/10/2015            Principal Problem:    Pneumothorax on right (5/6/2019)    Active Problems:    Essential hypertension, benign (12/10/2015)      Chronic respiratory failure with hypoxia ([facility] (3/29/2016)      Overview: Continue to wear 2 L nasal cannula at night. May use 2 L supplemental       oxygen to maintain sats greater than 90%.       Shortness of breath (5/6/2019)      Acute on chronic respiratory failure with hypoxia (HCC) (5/6/2019)      COPD exacerbation (HCC) (5/6/2019)      Ventricular tachyarrhythmia ([facility] (5/6/2019)      Pulmonary infiltrates (5/6/2019)      VT (ventricular tachycardia) ([facility] (5/6/2019)      Subcutaneous emphysema ([facility] (5/9/2019)      Urinary retention (5/20/2019)       Plan:  S/p VATS with pleurodesis  Doing well s/p CT removal  Patient is surgically optimized at this point  Follow up with Dr. Chinyere Thomas in 1 week after discharge    Signed:  Gus Rodgers NP

## 2019-05-21 NOTE — PROGRESS NOTES
BSR received patient is A/Ox4 resting in bed respirations are even unlabored on 2L NC there are no signs of distress or discomfort  Will continue to monitor

## 2019-05-21 NOTE — PROGRESS NOTES
Problem: Mobility Impaired (Adult and Pediatric)  Goal: *Acute Goals and Plan of Care (Insert Text)  Description  STG:  (1.)Mr. Compa Limon will move from supine to sit and sit to supine  with STAND BY ASSIST within 3 treatment day(s). (2.)Mr. Compa Limon will transfer from bed to chair and chair to bed with STAND BY ASSIST using the least restrictive device within 3 treatment day(s). (3.)Mr. Compa Limon will ambulate with CONTACT GUARD ASSIST for 100 feet with the least restrictive device within 3 treatment day(s). LTG:  (1.)Mr. Compa Limon will move from supine to sit and sit to supine  in bed with INDEPENDENT within 7 treatment day(s). (2.)Mr. Compa Limon will transfer from bed to chair and chair to bed with SUPERVISION using the least restrictive device within 7 treatment day(s). (3.)Mr. Compa Limon will ambulate with STAND BY ASSIST for 250+ feet with the least restrictive device within 7 treatment day(s). ________________________________________________________________________________________________     Outcome: Progressing Towards Goal      PHYSICAL THERAPY: Daily Note and PM 5/21/2019  INPATIENT: PT Visit Days : 4  [place] / Product Type: Medicare /       NAME/AGE/GENDER: Vikash Carlisle is a 80 y.o. male   PRIMARY DIAGNOSIS: Pneumothorax on right [J93.9] Pneumothorax on right   Pneumothorax on right    Procedure(s) (LRB):  RIGHT VATS WITH TALC PLEURODESIS CHEST TUBE INSERTION X 2  APICAL BLEBECTOMY (Right)  5 Days Post-Op  ICD-10: Treatment Diagnosis:    · Generalized Muscle Weakness (M62.81)  · Difficulty in walking, Not elsewhere classified (R26.2)  · History of falling (Z91.81)   Precaution/Allergies:  Patient has no known allergies. ** Check with nursing if pt can come off suction for mobility with chest tube**     ASSESSMENT:     Mr. Compa Limon presents sitting up in the recliner asleep but needing to use the bathroom.   He was able to stand from the chair with mostly CGA.  He walked to the bathroom with Minimal assist and constant cues to straighten his knees while walking. He needed TA to clean himself and kristan a pull up then walked over to the bed. He needed min assist to lay down in bed then performed below supine exercises. No real progress made. Still very weak. This section established at most recent assessment   PROBLEM LIST (Impairments causing functional limitations):  1. Decreased Strength  2. Decreased ADL/Functional Activities  3. Decreased Transfer Abilities  4. Decreased Ambulation Ability/Technique  5. Decreased Balance  6. Decreased Activity Tolerance  7. Decreased Pacing Skills  8. Increased Fatigue  9. Increased Shortness of Breath  10. Decreased [place] with Home Exercise Program   INTERVENTIONS PLANNED: (Benefits and precautions of physical therapy have been discussed with the patient.)  1. Balance Exercise  2. Bed Mobility  3. Family Education  4. Gait Training  5. Home Exercise Program (HEP)  6. Neuromuscular Re-education/Strengthening  7. Therapeutic Activites  8. Therapeutic Exercise/Strengthening  9. Transfer Training     TREATMENT PLAN: Frequency/Duration: 3 times a week for duration of hospital stay  Rehabilitation Potential For Stated Goals: Good     REHAB RECOMMENDATIONS (at time of discharge pending progress):    Placement: It is my opinion, based on this patient's performance to date, that Mr. Chrissy Guthrie may benefit from [address] after discharge due to the functional deficits listed above that are likely to improve with skilled rehabilitation because he/she has multiple medical issues that affect his/her functional mobility in the community. [facility] pending progress following surgery  Equipment:    None at this time              HISTORY:   History of Present Injury/Illness (Reason for Referral):  See H&P below  Patient is a 80 y.o.   male presents via EMS with shortness of breath when got up this morning. Had a productive cough with yellow sputum and received Rocephin. In the ER CXR with large right pneumothorax and we were called for chest tub e placement and admission. Had a fall in February and seen by Dr. Lara November with compression L2,L3, L4. Past Medical History/Comorbidities:   Mr. Ashish Salinas  has a past medical history of Abdominal aortic aneurysm ([facility] [address] (12/10/2015), Abdominal aortic aneurysm without rupture ([facility] [address] Allergic rhinitis (12/10/2015), Asthma, Benign neoplasm, Benign prostatic hyperplasia (12/10/2015), BPH without urinary obstruction, Cardiovascular disease (12/10/2015), Chronic obstructive asthma with exacerbation ([facility] [address] (12/10/2015), COPD (chronic obstructive pulmonary disease) ([facility] [address] (12/10/2015), Cough with hemoptysis, Erectile dysfunction (12/10/2015), Essential hypertension, benign (12/10/2015), Fracture (10/2014), History of colon polyps, Low testosterone (12/10/2015), Lumbago, Memory loss, Overflow incontinence, Raynaud's syndrome (12/10/2015), Rotator cuff tendinitis, Thrombocytopenia ([facility] [address] and Urinary frequency. Mr. Ashish Salinas  has a past surgical history that includes hx hernia repair (1980); hx colonoscopy; hx fracture tx (10/2014); hx cataract removal (6/2016-right); and hx orthopaedic (03/2018).   Social History/Living Environment:   Home Environment: Private residence  # Steps to Enter: 1  One/Two Story Residence: One story  Living Alone: No  Support Systems: Spouse/Significant Other/Partner, Child(tremayne)  Patient Expects to be Discharged to[de-identified] Private residence  Current DME Used/Available at Home: Grab bars, Shower chair, Raised toilet seat, Cane, straight, Walker, rolling  Tub or Shower Type: Shower  Prior Level of Function/Work/Activity:  Use of walker for gait, indep with ADLs, drives, 1 fall   Number of Personal Factors/Comorbidities that affect the Plan of Care: 3+: HIGH COMPLEXITY   EXAMINATION:   Most Recent Physical Functioning:   Gross Assessment:                  Posture: Balance:    Bed Mobility:  Sit to Supine: Minimum assistance  Wheelchair Mobility:     Transfers:  Sit to Stand: Minimum assistance  Stand to Sit: Minimum assistance  Gait:     Speed/Marifer: Shuffled; Slow  Gait Abnormalities: Decreased step clearance;Shuffling gait; Steppage gait  Distance (ft): 5 Feet (ft)(x2)  Assistive Device: Walker, rolling  Ambulation - Level of Assistance: Minimal assistance      Body Structures Involved:  1. Lungs  2. Bones  3. Joints  4. Muscles  5. Ligaments Body Functions Affected:  1. Sensory/Pain  2. Cardio  3. Respiratory  4. Neuromusculoskeletal  5. Movement Related Activities and Participation Affected:  1. General Tasks and Demands  2. Mobility  3. Self Care  4. Domestic Life  5. Interpersonal Interactions and Relationships  6. Community, Social and [place]   Number of elements that affect the Plan of Care: 4+: HIGH COMPLEXITY   CLINICAL PRESENTATION:   Presentation: Evolving clinical presentation with changing clinical characteristics: MODERATE COMPLEXITY   CLINICAL DECISION MAKIN [facility] Inpatient Short Form  How much difficulty does the patient currently have. .. Unable A Lot A Little None   1. Turning over in bed (including adjusting bedclothes, sheets and blankets)? ? 1   ? 2   ? 3   ? 4   2. Sitting down on and standing up from a chair with arms ( e.g., wheelchair, bedside commode, etc.)   ? 1   ? 2   ? 3   ? 4   3. Moving from lying on back to sitting on the side of the bed?   ? 1   ? 2   ? 3   ? 4   How much help from another person does the patient currently need. .. Total A Lot A Little None   4. Moving to and from a bed to a chair (including a wheelchair)? ? 1   ? 2   ? 3   ? 4   5. Need to walk in hospital room? ? 1   ? 2   ? 3   ? 4   6. Climbing 3-5 steps with a railing? ? 1   ? 2   ? 3   ? 4   © 2007, [facility] of [address] [place] [address] [address] [id number], under license to [employer].  All rights reserved      Score: Initial: 17 Most Recent: X (Date: -- )    Interpretation of Tool:  Represents activities that are increasingly more difficult (i.e. Bed mobility, Transfers, Gait). Medical Necessity:     · Patient is expected to demonstrate progress in strength, balance, coordination and functional technique  ·  to decrease assistance required with gait, transfers, and functional mobility   · . Reason for Services/Other Comments:  · Patient continues to require skilled intervention due to decreased strength, decreased balance, decreased functional tolerance, decreased cardiopulmonary endurance affecting participation in basic ADLs and functional tasks   · . Use of outcome tool(s) and clinical judgement create a POC that gives a: Clear prediction of patient's progress: LOW COMPLEXITY            TREATMENT:   (In addition to Assessment/Re-Assessment sessions the following treatments were rendered)   Pre-treatment Symptoms/Complaints:  No complaints  Pain: Initial:   Pain Intensity 1: 0 1 Post Session:   None noted     Therapeutic Activity: (    20 minutes): Therapeutic activities including Bed transfers, Chair transfers and Ambulation on level ground to improve mobility, strength, balance and endurance. Required minimal   to promote static and dynamic balance in standing. Therapeutic Exercise: (10 Minutes):  Exercises per grid below to improve mobility, strength and coordination. Required maximal visual and verbal cues to promote proper body mechanics. Progressed complexity of movement as indicated.        Date:  5/14/19 Date:  5/17/19 Date:  5/20/19 Date  5/21/19    Activity/Exercise Parameters Parameters Parameters     Ankle pumps 20 X B  2 x 10 20x B 10x B    Quad set 15 X B        Glute set 15 X        Heel slides 15 X B  2 x 10  10x B    Hip abduction 15 X B  2 x 10 Seated 20x B 10x B    Hip flexion/marching  2 x 10 Seated 20x B     TKE   20x B     SAQ    10x B    bridging    10x      Braces/Orthotics/Lines/Etc: · NC  Treatment/Session Assessment:    · Response to Treatment:  Patient participated well  · Interdisciplinary Collaboration:   o Physical Therapist assistant  o Registered Nurse  · After treatment position/precautions:   o Supine in bed  o Bed alarm/tab alert on  o Bed/Chair-wheels locked  o Bed in low position  o Call light within reach  o RN notified   · Compliance with Program/Exercises: Compliant all of the time  · Recommendations/Intent for next treatment session: \"Next visit will focus on advancements to more challenging activities and reduction in assistance provided\".   Total Treatment Duration:  PT Patient Time In/Time Out  Time In: 1255  Time Out: 1325    Neeru Rodriguez, PTA

## 2019-05-21 NOTE — PROGRESS NOTES
Problem: Self Care Deficits Care Plan (Adult)  Goal: *Acute Goals and Plan of Care (Insert Text)  Description  1. Pt will toilet with SBA   2. Pt will complete functional mobility for ADLs with SBA  3. Pt will complete lower body dressing with SBA using AE as needed  4. Pt will complete grooming and hygiene at sink with SBA  5. Pt will demonstrate independence with HEP to promote increased BUE strength and functional use for ADLs  6. Pt will tolerate 23 minutes functional activity with min or fewer rest breaks to promote increased endurance for ADLs  7. Pt will complete UB bathing and dressing with set up     Timeframe: 7 days     Outcome: Progressing Towards Goal     OCCUPATIONAL THERAPY: Daily Note and AM    5/21/2019  INPATIENT: OT Visit Days: 3  [place] / Product Type: Medicare /      NAME/AGE/GENDER: Karon Baeza is a 80 y.o. male   PRIMARY DIAGNOSIS:  Pneumothorax on right [J93.9] Pneumothorax on right   Pneumothorax on right    Procedure(s) (LRB):  RIGHT VATS WITH TALC PLEURODESIS CHEST TUBE INSERTION X 2  APICAL BLEBECTOMY (Right)  5 Days Post-Op  ICD-10: Treatment Diagnosis:    · Generalized Muscle Weakness (M62.81)   Precautions/Allergies:    Chest tube to suction Patient has no known allergies. ASSESSMENT:     Mr. Jumana Grimm was admitted with R side pneumothorax, now has a R side chest tube to suction. Pt lives with his wife (who is blind) and is fully independent and active at baseline, drives, reports that he mowed the lawn just last week. Pt uses a RW for mobility, endorses one fall which he stated might be more than 6 months ago.   5/21/2019 Pt presents in supine upon arrival. Pt agreeable to treatment and completed supine to sit transfer with SBA. Pt completed sit to stand with CGA/min a and a rolling walker and completed functional mobility in the room and into the bathroom with CGA/min a and toilet transfer with min a.  Pt sat on the toilet for approximately 10 minutes trying to have a bowel movement unsuccessfully. Pt was assisted off the toilet with min a and pt was a little wobbly at first. Therapist asked pt if he felt okay and pt responded \"I think so. \" Pt walked to the recliner chair with CGA/min a and was positioned for comfort and then pt gestured to therapist and stated \"I'm going to vomit. \" Therapist gave pt trash can due to no emesis bags in the room and then saw the RN in the hallway to let her know that the pt was actively vomiting and we needed some emesis bags. Assisted pt with cleaning his hands and mouth and left pt up with RN in the room. Good effort despite getting sick. Continue OT POC. This section established at most recent assessment   PROBLEM LIST (Impairments causing functional limitations):  1. Decreased Strength  2. Decreased ADL/Functional Activities  3. Decreased Transfer Abilities  4. Decreased Balance  5. Decreased Activity Tolerance   INTERVENTIONS PLANNED: (Benefits and precautions of occupational therapy have been discussed with the patient.)  1. Activities of daily living training  2. Adaptive equipment training  3. Balance training  4. Therapeutic activity  5. Therapeutic exercise     TREATMENT PLAN: Frequency/Duration: Follow patient 3 times/ week to address above goals. Rehabilitation Potential For Stated Goals: Good     REHAB RECOMMENDATIONS (at time of discharge pending progress):    Placement: It is my opinion, based on this patient's performance to date, that Mr. Ashish Salinas may benefit from discharge to SNF d/t impaired mobility, balance, and ADLs making it unsafe for him to d/c home.    Equipment:    None at this time              OCCUPATIONAL PROFILE AND HISTORY:   History of Present Injury/Illness (Reason for Referral):  See H&P  Past Medical History/Comorbidities:   Mr. Ashish Salinas  has a past medical history of Abdominal aortic aneurysm ([address] (12/10/2015), Abdominal aortic aneurysm without rupture ([address] Allergic rhinitis (12/10/2015), Asthma, Benign neoplasm, Benign prostatic hyperplasia (12/10/2015), BPH without urinary obstruction, Cardiovascular disease (12/10/2015), Chronic obstructive asthma with exacerbation ([facility] [address] (12/10/2015), COPD (chronic obstructive pulmonary disease) ([facility] [address] (12/10/2015), Cough with hemoptysis, Erectile dysfunction (12/10/2015), Essential hypertension, benign (12/10/2015), Fracture (10/2014), History of colon polyps, Low testosterone (12/10/2015), Lumbago, Memory loss, Overflow incontinence, Raynaud's syndrome (12/10/2015), Rotator cuff tendinitis, Thrombocytopenia ([facility] [address] and Urinary frequency. Mr. Ashish Salinas  has a past surgical history that includes hx hernia repair (1980); hx colonoscopy; hx fracture tx (10/2014); hx cataract removal (6/2016-right); and hx orthopaedic (03/2018). Social History/Living Environment:   Home Environment: Private residence  # Steps to Enter: 1  One/Two Story Residence: One story  Living Alone: No  Support Systems: Spouse/Significant Other/Partner, Child(tremayne)  Patient Expects to be Discharged to[de-identified] Private residence  Current DME Used/Available at Home: Grab bars, Shower chair, Raised toilet seat, Cane, straight, Walker, rolling  Tub or Shower Type: Shower  Prior Level of Function/Work/Activity:  Independent, active, lives w/ wife, son is able to assist, RW for mobility     Number of Personal Factors/Comorbidities that affect the Plan of Care: Expanded review of therapy/medical records (1-2):  MODERATE COMPLEXITY   ASSESSMENT OF OCCUPATIONAL PERFORMANCE[de-identified]   Activities of Daily Living:   Basic ADLs (From Assessment) Complex ADLs (From Assessment)   Feeding: Independent  Oral Facial Hygiene/Grooming: Contact guard assistance  Bathing: Minimum assistance  Upper Body Dressing: Minimum assistance  Lower Body Dressing: Contact guard assistance  Toileting: Contact guard assistance Instrumental ADL  Meal Preparation: Moderate assistance  Homemaking:  Moderate assistance  Medication Management: Setup  Financial Management: Setup   Grooming/Bathing/Dressing Activities of Daily Living                       Functional Transfers  Bathroom Mobility: Minimum assistance  Toilet Transfer : Minimum assistance     Bed/Mat Mobility  Supine to Sit: Modified independent  Sit to Stand: Minimum assistance  Stand to Sit: Minimum assistance       Most Recent Physical Functioning:   Gross Assessment:                  Posture:     Balance:    Bed Mobility:  Supine to Sit: Modified independent  Wheelchair Mobility:     Transfers:  Sit to Stand: Minimum assistance  Stand to Sit: Minimum assistance            Patient Vitals for the past 6 hrs:   BP SpO2 O2 Flow Rate (L/min) Pulse   19 0751 167/76 97 %  (!) 57   19 0910  95 % 2 l/min    19 1121 115/61 97 %  (!) 58       Mental Status  Neurologic State: Alert, Eyes open spontaneously  Orientation Level: Oriented X4  Cognition: Appropriate decision making, Appropriate for age attention/concentration, Appropriate safety awareness, Follows commands  Perception: Appears intact  Perseveration: No perseveration noted  Safety/Judgement: Awareness of environment, Fall prevention                          Physical Skills Involved:  1. Balance  2. Strength  3. Activity Tolerance Cognitive Skills Affected (resulting in the inability to perform in a timely and safe manner): 1. none Psychosocial Skills Affected:  1. Habits/Routines   Number of elements that affect the Plan of Care: 3-5:  MODERATE COMPLEXITY   CLINICAL DECISION MAKIN [facility] [address] [address] AM-PAC 6 Clicks   Daily Activity Inpatient Short Form  How much help from another person does the patient currently need. .. Total A Lot A Little None   1. Putting on and taking off regular lower body clothing? ? 1   ? 2   ? 3   ? 4   2. Bathing (including washing, rinsing, drying)? ? 1   ? 2   ? 3   ? 4   3. Toileting, which includes using toilet, bedpan or urinal?   ? 1   ? 2   ? 3   ? 4   4. Putting on and taking off regular upper body clothing? ? 1   ? 2   ? 3   ? 4   5. Taking care of personal grooming such as brushing teeth? ? 1   ? 2   ? 3   ? 4   6. Eating meals? ? 1   ? 2   ? 3   ? 4   © 2007, Trustees of [facility] [employer], under license to [employer]. All rights reserved      Score:  Initial: 21 Most Recent: X (Date: -- )    Interpretation of Tool:  Represents activities that are increasingly more difficult (i.e. Bed mobility, Transfers, Gait). Medical Necessity:     · Patient demonstrates good rehab potential due to higher previous functional level. Reason for Services/Other Comments:  · Patient continues to require present interventions due to patient's inability to complete ADLs. Use of outcome tool(s) and clinical judgement create a POC that gives a: MODERATE COMPLEXITY         TREATMENT:   (In addition to Assessment/Re-Assessment sessions the following treatments were rendered)     Pre-treatment Symptoms/Complaints:    Pain: Initial:   Pain Intensity 1: 0  Post Session:  2     Self Care: (10 minutes): Procedure(s) (per grid) utilized to improve and/or restore self-care/home management as related to toileting and hygiene. Required minimal visual, verbal, manual and   cueing to facilitate activities of daily living skills. Therapeutic Activity: (20 minutes): Therapeutic activities including Bed transfers, Chair transfers, Toilet transfers and static/dynamic standing to improve mobility, strength and balance. Required minimal cues to promote static and dynamic balance in standing.               Date:  5/17/19 Date:   Date:     Activity/Exercise Parameters Parameters Parameters   Shoulder ab/adduction 10/1     Shoulder flexion 10/1     Elbow flexion 10/1     Elbow extension  10/1                               Braces/Orthotics/Lines/Etc:   · O2 Device: Nasal cannula  Treatment/Session Assessment:    · Response to Treatment:  Good effort  · Interdisciplinary Collaboration: o Certified Occupational Therapy Assistant  o Registered Nurse  · After treatment position/precautions:   o Up in chair  o Bed alarm/tab alert on  o Bed/Chair-wheels locked  o Call light within reach  o Nurse at bedside   · Compliance with Program/Exercises: Compliant all of the time. · Recommendations/Intent for next treatment session: \"Next visit will focus on advancements to more challenging activities and reduction in assistance provided\".   Total Treatment Duration:  OT Patient Time In/Time Out  Time In: 1020  Time Out: One Fabian Mcgrath

## 2019-05-21 NOTE — PROGRESS NOTES
Pulmonary Daily Progress Note: 5/21/2019    Kenisha Friday   Admission Date: 5/6/2019         The patient's chart is reviewed and the patient is discussed with the staff. 87 y.o. CM presents via EMS with shortness of breath when got up this morning.  Had a productive cough with yellow sputum and received Rocephin.  In the ER CXR with large right pneumothorax and pulmonary consulted for chest tube placement and admission. Toni Caruso a fall in February and seen by Dr. Lara November with compression L2,L3, L4. In the ER had VT and cardiology was consulted and added Amiodarone. He has had a right pneumothorax with persistent air leak and underwent surgical talc pleurodesis on 5/16. Slid from bed to floor. Weaned to 2lpm.  Had to have Rojas placed for urinary retention. Rojas removed but with retention again and 700-800ml on sca. Rojas replaced and urology consulted. Subjective:     Urine output 100 ml during the night, bladder scan with [id number] placed. Sitting up in bed, states is \"tired\", denies shortness of breath, chest tubes removed per surgery. Right chest surgical soreness improved. Wet cough but no sputum production.     Current Facility-Administered Medications   Medication Dose Route Frequency    acetaminophen (TYLENOL) tablet 500 mg  500 mg Oral Q8H    acetaminophen (TYLENOL) tablet 650 mg  650 mg Oral Q4H PRN    docusate sodium (COLACE) capsule 100 mg  100 mg Oral BID    senna (SENOKOT) tablet 17.2 mg  2 Tab Oral QHS    lisinopril (PRINIVIL, ZESTRIL) tablet 10 mg  10 mg Oral DAILY    fluticasone propionate (FLONASE) 50 mcg/actuation nasal spray 2 Spray  2 Spray Both Nostrils DAILY    amiodarone (CORDARONE) tablet 400 mg  400 mg Oral Q12H    carvedilol (COREG) tablet 3.125 mg  3.125 mg Oral BID WITH MEALS    calcium carbonate (TUMS) chewable tablet 200 mg [elemental]  200 mg Oral TID PRN    hydrALAZINE (APRESOLINE) 20 mg/mL injection 10 mg  10 mg IntraVENous Q6H PRN    [Held by provider] aspirin delayed-release tablet 81 mg  81 mg Oral DAILY    guaiFENesin ER (MUCINEX) tablet 1,200 mg  1,200 mg Oral DAILY    montelukast (SINGULAIR) tablet 10 mg  10 mg Oral DAILY    tamsulosin (FLOMAX) capsule 0.4 mg  0.4 mg Oral DAILY    sodium chloride (NS) flush 5-40 mL  5-40 mL IntraVENous Q8H    sodium chloride (NS) flush 5-40 mL  5-40 mL IntraVENous PRN    albuterol (PROVENTIL VENTOLIN) nebulizer solution 2.5 mg  2.5 mg Nebulization Q6H RT    [Held by provider] enoxaparin (LOVENOX) injection 40 mg  40 mg SubCUTAneous Q24H     Review of Systems  Constitutional:  negative for fever, chills, sweats  Cardiovascular:  negative for chest pain, palpitations, syncope, edema  Respiratory:  crepitus  Gastrointestinal:  negative for dysphagia, reflux, vomiting, diarrhea, abdominal pain, or melena  Neurologic:  negative for focal weakness, numbness, headache      Objective:     Vitals:    05/20/19 2316 05/21/19 0319 05/21/19 0523 05/21/19 0751   BP: 159/83 150/80  167/76   Pulse: (!) 57 76  (!) 57   Resp: 20 18  20   Temp: 97.8 °F (36.6 °C) 97.7 °F (36.5 °C)  97.6 °F (36.4 °C)   SpO2: 94% 98%  97%   Weight:   141 lb 12.1 oz (64.3 kg)    Height:           Intake and Output:   05/19 1901 - 05/21 0700  In: 680 [P.O.:680]  Out: 550 [Urine:550]  No intake/output data recorded. Physical Exam:          Constitutional:  the patient is thin and in no acute distress, NC  2lpm  EENMT:  Sclera clear, pupils equal, oral mucosa moist  Respiratory: crepitus, chest tubes out, right dressing moist with yellow fluid, wet cough   Cardiovascular:  RRR with grade III/VI murmur   Gastrointestinal: soft and non-tender; with positive bowel sounds, appetite good. Musculoskeletal: warm without cyanosis. There is no lower extremity edema, SCDs.   Skin:  no jaundice or rashes, right chest incision, secure staples  Neurologic: no gross neuro deficits     Psychiatric:  alert and oriented x 3    CXR   5/21/19:  Unchanged interstitial lung edema and small right apical  pneumothorax, following removal of two right-sided chest tubes. 5/20/19:          Chest CT 5/8/19:      LAB  No results for input(s): GLUCPOC in the last 72 hours. No lab exists for component: GLPOC   No results for input(s): WBC, HGB, HCT, PLT, INR, HGBEXT, HCTEXT, PLTEXT, HGBEXT, HCTEXT, PLTEXT in the last 72 hours. No lab exists for component: INREXT, INREXT  No results for input(s): NA, K, CL, CO2, GLU, BUN, CREA, MG, PHOS, CA, TROIQ, ALB, TBIL, TBILI, GPT, ALT, SGOT, BNPP in the last 72 hours. No lab exists for component: TROIP  No results for input(s): PH, PCO2, PO2, HCO3 in the last 72 hours. No results for input(s): LCAD, LAC in the last 72 hours. Assessment:  (Medical Decision Making)     Hospital Problems  Date Reviewed: 5/21/2019          Codes Class Noted POA    Shortness of breath ICD-10-CM: R06.02  ICD-9-CM: 786.05  5/6/2019 Yes    denies    * (Principal) Pneumothorax on right ICD-10-CM: J93.9  ICD-9-CM: 512.89  5/6/2019 Yes    Small right apical--chest tube removed 5/20    Chronic respiratory failure with hypoxia (HCC) (Chronic) ICD-10-CM: J96.11  ICD-9-CM: 518.83, 799.02  3/29/2016 Yes    Overview Addendum 5/6/2019 10:14 AM by Amos Borja, NP     Continue to wear 2 L nasal cannula at night. May use 2 L supplemental oxygen to maintain sats greater than 90%. Chronic--on NC 2L    Essential hypertension, benign ICD-10-CM: I10  ICD-9-CM: 401.1  12/10/2015 Yes    Chronic--SBP 150s    Urinary retention ICD-10-CM: R33.9  ICD-9-CM: 788.20  5/20/2019 Unknown    Rojas replaced--1000 ml removed    Subcutaneous emphysema (HCC) ICD-10-CM: T79. 7XXA  ICD-9-CM: 958.7  5/9/2019 No    Less--still with crepitus    Acute on chronic respiratory failure with hypoxia [facility]) ICD-10-CM: J96.21  ICD-9-CM: 518.84, 799.02  5/6/2019 Yes    On home O2 flow    COPD exacerbation ([facility] ICD-10-CM: J44.1  ICD-9-CM: 491.21  5/6/2019 Yes    No wheezing--off steroids Ventricular tachyarrhythmia ([facility] [address] ICD-10-CM: I47.2  ICD-9-CM: 427.1  5/6/2019 Yes    Per cardiology on Amiodarone    Pulmonary infiltrates ICD-10-CM: R91.8  ICD-9-CM: 793.19  5/6/2019 Unknown        VT (ventricular tachycardia) (HCC) ICD-10-CM: I47.2  ICD-9-CM: 427.1  5/6/2019 Yes    controlled          Plan:  (Medical Decision Making)     --Albuterol, Singulair, Mucinex--denies shortness of breath  --PT, OOB to chair at least  --Amiodarone per cardiology. --Chest tubes removed per surgery. --Rojas placed with 1000 ml obtained. Consult urology for urinary retention. --Weaned to home O2 flow  --PT for mobility     More than 50% of the time documented was spent in face-to-face contact with the patient and in the care of the patient on the floor/unit where the patient is located. Rolena Lights, NP        Lungs:  Mild subq air on the right. Otherwise clear. On 2L o2. Heart:  RRR with no Murmur/Rubs/Gallops    Additional Comments:    Patient tolerating having chest tubes out. CXR clear with only small apical PTX. Will need to repeat cbc and bmp in the am with last labs revealing low Na and Cl levels. I have spoken with and examined the patient. I agree with the above assessment and plan as documented.     Boubacar Marcial MD

## 2019-05-21 NOTE — PROGRESS NOTES
Patient has slept well throughout the night with no complaints O2 at 2L NC there are no signs of distress  Will give BSR to oncoming RN

## 2019-05-21 NOTE — PROGRESS NOTES
Attempted to discuss rehab options with patient twice today but he was sleeping. CM will follow up tomorrow.

## 2019-05-21 NOTE — PROGRESS NOTES
Problem: Pressure Injury - Risk of  Goal: *Prevention of pressure injury  Description  Document Brant Scale and appropriate interventions in the flowsheet. Outcome: Progressing Towards Goal     Problem: Falls - Risk of  Goal: *Absence of Falls  Description  Document Clide Failing Fall Risk and appropriate interventions in the flowsheet.   Outcome: Progressing Towards Goal

## 2019-05-21 NOTE — PROGRESS NOTES
Problem: Breathing Pattern - Ineffective  Goal: *Absence of hypoxia  Outcome: Progressing Towards Goal

## 2019-05-22 ENCOUNTER — APPOINTMENT (OUTPATIENT)
Dept: GENERAL RADIOLOGY | Age: 84
DRG: 163 | End: 2019-05-22
Attending: SURGERY
Payer: MEDICARE

## 2019-05-22 PROBLEM — E87.1 HYPONATREMIA: Status: ACTIVE | Noted: 2019-05-22

## 2019-05-22 LAB
ANION GAP SERPL CALC-SCNC: 7 MMOL/L (ref 7–16)
ANION GAP SERPL CALC-SCNC: 9 MMOL/L (ref 7–16)
BUN SERPL-MCNC: 16 MG/DL (ref 8–23)
BUN SERPL-MCNC: 16 MG/DL (ref 8–23)
CALCIUM SERPL-MCNC: 7.1 MG/DL (ref 8.3–10.4)
CALCIUM SERPL-MCNC: 7.2 MG/DL (ref 8.3–10.4)
CHLORIDE SERPL-SCNC: 86 MMOL/L (ref 98–107)
CHLORIDE SERPL-SCNC: 86 MMOL/L (ref 98–107)
CHLORIDE UR-SCNC: 88 MMOL/L
CO2 SERPL-SCNC: 27 MMOL/L (ref 21–32)
CO2 SERPL-SCNC: 28 MMOL/L (ref 21–32)
CREAT SERPL-MCNC: 0.46 MG/DL (ref 0.8–1.5)
CREAT SERPL-MCNC: 0.49 MG/DL (ref 0.8–1.5)
CREAT UR-MCNC: 120 MG/DL
ERYTHROCYTE [DISTWIDTH] IN BLOOD BY AUTOMATED COUNT: 12.5 % (ref 11.9–14.6)
GLUCOSE SERPL-MCNC: 80 MG/DL (ref 65–100)
GLUCOSE SERPL-MCNC: 96 MG/DL (ref 65–100)
HCT VFR BLD AUTO: 29 % (ref 41.1–50.3)
HGB BLD-MCNC: 10 G/DL (ref 13.6–17.2)
MCH RBC QN AUTO: 32.1 PG (ref 26.1–32.9)
MCHC RBC AUTO-ENTMCNC: 34.5 G/DL (ref 31.4–35)
MCV RBC AUTO: 92.9 FL (ref 79.6–97.8)
NRBC # BLD: 0 K/UL (ref 0–0.2)
OSMOLALITY SERPL: 244 MOSM/KG H2O (ref 280–301)
OSMOLALITY UR: 734 MOSM/KG H2O (ref 50–1400)
PLATELET # BLD AUTO: 131 K/UL (ref 150–450)
PMV BLD AUTO: 9.5 FL (ref 9.4–12.3)
POTASSIUM SERPL-SCNC: 3.4 MMOL/L (ref 3.5–5.1)
POTASSIUM SERPL-SCNC: 3.6 MMOL/L (ref 3.5–5.1)
RBC # BLD AUTO: 3.12 M/UL (ref 4.23–5.6)
SODIUM SERPL-SCNC: 121 MMOL/L (ref 136–145)
SODIUM SERPL-SCNC: 122 MMOL/L (ref 136–145)
SODIUM UR-SCNC: 21 MMOL/L
WBC # BLD AUTO: 13.1 K/UL (ref 4.3–11.1)

## 2019-05-22 PROCEDURE — 82570 ASSAY OF URINE CREATININE: CPT

## 2019-05-22 PROCEDURE — 74011250637 HC RX REV CODE- 250/637: Performed by: SURGERY

## 2019-05-22 PROCEDURE — 74011000250 HC RX REV CODE- 250: Performed by: SURGERY

## 2019-05-22 PROCEDURE — 80048 BASIC METABOLIC PNL TOTAL CA: CPT

## 2019-05-22 PROCEDURE — 36415 COLL VENOUS BLD VENIPUNCTURE: CPT

## 2019-05-22 PROCEDURE — 74011250637 HC RX REV CODE- 250/637: Performed by: INTERNAL MEDICINE

## 2019-05-22 PROCEDURE — 65270000029 HC RM PRIVATE

## 2019-05-22 PROCEDURE — 84300 ASSAY OF URINE SODIUM: CPT

## 2019-05-22 PROCEDURE — 83935 ASSAY OF URINE OSMOLALITY: CPT

## 2019-05-22 PROCEDURE — 99232 SBSQ HOSP IP/OBS MODERATE 35: CPT | Performed by: INTERNAL MEDICINE

## 2019-05-22 PROCEDURE — 94640 AIRWAY INHALATION TREATMENT: CPT

## 2019-05-22 PROCEDURE — 77010033678 HC OXYGEN DAILY

## 2019-05-22 PROCEDURE — 94760 N-INVAS EAR/PLS OXIMETRY 1: CPT

## 2019-05-22 PROCEDURE — 71045 X-RAY EXAM CHEST 1 VIEW: CPT

## 2019-05-22 PROCEDURE — 74011250636 HC RX REV CODE- 250/636: Performed by: INTERNAL MEDICINE

## 2019-05-22 PROCEDURE — 97530 THERAPEUTIC ACTIVITIES: CPT

## 2019-05-22 PROCEDURE — 83930 ASSAY OF BLOOD OSMOLALITY: CPT

## 2019-05-22 PROCEDURE — 82436 ASSAY OF URINE CHLORIDE: CPT

## 2019-05-22 PROCEDURE — 85027 COMPLETE CBC AUTOMATED: CPT

## 2019-05-22 RX ORDER — SODIUM CHLORIDE 9 MG/ML
100 INJECTION, SOLUTION INTRAVENOUS CONTINUOUS
Status: DISCONTINUED | OUTPATIENT
Start: 2019-05-22 | End: 2019-05-22

## 2019-05-22 RX ORDER — POTASSIUM CHLORIDE 20 MEQ/1
20 TABLET, EXTENDED RELEASE ORAL ONCE
Status: COMPLETED | OUTPATIENT
Start: 2019-05-22 | End: 2019-05-22

## 2019-05-22 RX ADMIN — SODIUM CHLORIDE 100 ML/HR: 900 INJECTION, SOLUTION INTRAVENOUS at 04:42

## 2019-05-22 RX ADMIN — ACETAMINOPHEN 500 MG: 500 TABLET, FILM COATED ORAL at 05:08

## 2019-05-22 RX ADMIN — TAMSULOSIN HYDROCHLORIDE 0.4 MG: 0.4 CAPSULE ORAL at 09:19

## 2019-05-22 RX ADMIN — MONTELUKAST SODIUM 10 MG: 10 TABLET, FILM COATED ORAL at 09:19

## 2019-05-22 RX ADMIN — Medication 5 ML: at 14:39

## 2019-05-22 RX ADMIN — ALBUTEROL SULFATE 2.5 MG: 2.5 SOLUTION RESPIRATORY (INHALATION) at 14:00

## 2019-05-22 RX ADMIN — Medication 5 ML: at 05:08

## 2019-05-22 RX ADMIN — ALBUTEROL SULFATE 2.5 MG: 2.5 SOLUTION RESPIRATORY (INHALATION) at 07:53

## 2019-05-22 RX ADMIN — CARVEDILOL 3.12 MG: 3.12 TABLET, FILM COATED ORAL at 16:51

## 2019-05-22 RX ADMIN — ALBUTEROL SULFATE 2.5 MG: 2.5 SOLUTION RESPIRATORY (INHALATION) at 01:43

## 2019-05-22 RX ADMIN — FLUTICASONE PROPIONATE 2 SPRAY: 50 SPRAY, METERED NASAL at 09:23

## 2019-05-22 RX ADMIN — AMIODARONE HYDROCHLORIDE 400 MG: 200 TABLET ORAL at 21:30

## 2019-05-22 RX ADMIN — ALBUTEROL SULFATE 2.5 MG: 2.5 SOLUTION RESPIRATORY (INHALATION) at 19:14

## 2019-05-22 RX ADMIN — AMIODARONE HYDROCHLORIDE 400 MG: 200 TABLET ORAL at 09:19

## 2019-05-22 RX ADMIN — Medication 10 ML: at 22:00

## 2019-05-22 RX ADMIN — GUAIFENESIN 1200 MG: 600 TABLET, EXTENDED RELEASE ORAL at 09:19

## 2019-05-22 RX ADMIN — POTASSIUM CHLORIDE 20 MEQ: 20 TABLET, EXTENDED RELEASE ORAL at 15:50

## 2019-05-22 RX ADMIN — LISINOPRIL 10 MG: 5 TABLET ORAL at 09:19

## 2019-05-22 RX ADMIN — CARVEDILOL 3.12 MG: 3.12 TABLET, FILM COATED ORAL at 09:19

## 2019-05-22 RX ADMIN — ACETAMINOPHEN 500 MG: 500 TABLET, FILM COATED ORAL at 21:34

## 2019-05-22 RX ADMIN — ACETAMINOPHEN 500 MG: 500 TABLET, FILM COATED ORAL at 14:36

## 2019-05-22 NOTE — PROGRESS NOTES
Pt. Resting in bed eating dinner. Breathing even and unlabored. Breath sounds coarse. O2 NC 2L. Pt has been up in chair much of day. Denies pain or distress. Call light within reach. Bed low and locked.

## 2019-05-22 NOTE — PROGRESS NOTES
Dressing changed for leakage from chest tube site. Vaseline guaze, guaze, dressing, and tape applied over site. No signs of infection or redness at drainage site.

## 2019-05-22 NOTE — PROGRESS NOTES
Met with patient. He's not interested in discharging to rehab. Patient is feeling much better today and wants to go home with [facility] services. CM following.

## 2019-05-22 NOTE — PROGRESS NOTES
Pt. Resting in bed. Breathing even and unlabored. Breath sounds coarse. O2 NC 2L. Rojas draining. Call light within reach and bed low and locked.

## 2019-05-22 NOTE — CONSULTS
[facility] CONSULT NOTE    Admission Date:  5/6/2019    Admission Diagnosis:  Pneumothorax on right [J93.9]    Consulting physician: DR Hellen Solorzano   Reason for consult: Hyponatremia     81 y/o male with PMH of  HTN/ COPD /BPH is admitted for sob found to have pneumothorax s/p chest tube - resolved now . Nephrology consulted for hyponatremia     Last Na on 5/13 was 130 ,n o labs done after that and today's na is 121 .    Normal renal function   On li for urinary retention from BPH         Subjective:   History of Present Illness:    Past Medical History:   Diagnosis Date    Abdominal aortic aneurysm ([facility] [address] 12/10/2015    In 2005    Abdominal aortic aneurysm without rupture (HCC)     Allergic rhinitis 12/10/2015    Asthma     Benign neoplasm     Benign prostatic hyperplasia 12/10/2015    BPH without urinary obstruction     Cardiovascular disease 12/10/2015    Chronic obstructive asthma with exacerbation ([facility] [address] 12/10/2015    COPD (chronic obstructive pulmonary disease) ([facility] [address] 12/10/2015    Cough with hemoptysis     Erectile dysfunction 12/10/2015    Essential hypertension, benign 12/10/2015    Fracture 10/2014    of left hand and ribs after fall on concrete    History of colon polyps     Low testosterone 12/10/2015    Lumbago     Memory loss     Overflow incontinence     Raynaud's syndrome 12/10/2015    Rotator cuff tendinitis     Thrombocytopenia (HCC)     Urinary frequency       Past Surgical History:   Procedure Laterality Date    HX CATARACT REMOVAL  6/2016-right    HX COLONOSCOPY      HX FRACTURE TX  10/2014    of left hand and ribs are fall on concrete    HX HERNIA REPAIR  1980    HX ORTHOPAEDIC  03/2018    hip fracture      Current Facility-Administered Medications   Medication Dose Route Frequency    0.9% sodium chloride infusion  100 mL/hr IntraVENous CONTINUOUS    acetaminophen (TYLENOL) tablet 500 mg  500 mg Oral Q8H    acetaminophen (TYLENOL) tablet 650 mg  650 mg Oral Q4H PRN    lisinopril (PRINIVIL, ZESTRIL) tablet 10 mg  10 mg Oral DAILY    fluticasone propionate (FLONASE) 50 mcg/actuation nasal spray 2 Spray  2 Spray Both Nostrils DAILY    amiodarone (CORDARONE) tablet 400 mg  400 mg Oral Q12H    carvedilol (COREG) tablet 3.125 mg  3.125 mg Oral BID WITH MEALS    calcium carbonate (TUMS) chewable tablet 200 mg [elemental]  200 mg Oral TID PRN    hydrALAZINE (APRESOLINE) 20 mg/mL injection 10 mg  10 mg IntraVENous Q6H PRN    [Held by provider] aspirin delayed-release tablet 81 mg  81 mg Oral DAILY    guaiFENesin ER (MUCINEX) tablet 1,200 mg  1,200 mg Oral DAILY    montelukast (SINGULAIR) tablet 10 mg  10 mg Oral DAILY    tamsulosin (FLOMAX) capsule 0.4 mg  0.4 mg Oral DAILY    sodium chloride (NS) flush 5-40 mL  5-40 mL IntraVENous Q8H    sodium chloride (NS) flush 5-40 mL  5-40 mL IntraVENous PRN    albuterol (PROVENTIL VENTOLIN) nebulizer solution 2.5 mg  2.5 mg Nebulization Q6H RT    [Held by provider] enoxaparin (LOVENOX) injection 40 mg  40 mg SubCUTAneous Q24H     No Known Allergies   Social History     Tobacco Use    Smoking status: Former Smoker    Smokeless tobacco: Never Used   Substance Use Topics    Alcohol use: Yes     Comment: occasional      Family History   Problem Relation Age of Onset    Dementia Mother     Cancer Father         lung cancer    Heart Attack Father         Review of Systems  Gen - no fever, no chills, appetite okay  HEENT - no sore throat, no decreased vision, no hearing loss  Neck - no neck mass  CV - no chest pain, no palpitation, no orthopnea  Lung - no shortness of breath, no cough, no hemoptysis  Abd - no tenderness, no nausea/vomiting, no bloody stool  Ext - no edema, no clubbing, no cyanosis  Musculoskeletal - no joint pain, no back pain  Neurologic - no headaches, no dizziness, no seizures  Psychiatric - no anxiety, no depression  Skin - no rashes, no pupura  Genitourinary - no decreased urine output, no hematuria, no foamy urine    Objective:   Vitals:    05/22/19 0753 05/22/19 0816 05/22/19 1133 05/22/19 1401   BP:  164/69 115/67    Pulse:  (!) 58 (!) 51    Resp:  20 18    Temp:  97.7 °F (36.5 °C) 98.2 °F (36.8 °C)    SpO2: 95% 96% 99% 95%   Weight:       Height:           Intake/Output Summary (Last 24 hours) at 5/22/2019 1438  Last data filed at 5/22/2019 1306  Gross per 24 hour   Intake 600 ml   Output 400 ml   Net 200 ml       Physical Exam  GEN :in no distress, alert and oriented  HEENT: anicteric sclerae, eomi. Oropharynx without lesions. Mucous membranes are moist.  Neck - supple without JVD, no thyromegaly. No lymphadenopathy. CV - regular rate and rhythm, no murmur, no rub  Lung - clear bilaterally, lungs expand symmetrically  Chest wall - normal appearance  Abd - soft, nontender, bowel sounds present, no hepatosplenomegaly  Ext - no clubbing, no cyanosis, no edema  Neurologic - nonfocal  Genitourinary - bladder nonpalpable  Skin - no rashes, no purpura, no ecchymoses  Psychiatric: Normal mood and affect. Data Review:   Recent Labs     05/22/19  0304   WBC 13.1*   HGB 10.0*   HCT 29.0*   *     Recent Labs     05/22/19  0304   *   K 3.4*   CL 86*   CO2 28   BUN 16   CREA 0.46*   GLU 80   CA 7.1*     No results for input(s): PH, PCO2, PO2, PCO2 in the last 72 hours. Problem List:         Assessment and Plan:    1. Hyponatremia  Unclear acute versus chronic   Last na on 5/13 130   Will check TSH and cortisol   Will check Urine chemistries and urine  osmolality    Off ivf now - agree with that   Limit free water   Normal salt diet   Looks euvolumic and has good mental status - no seizures or encephalopathy  - if needed will use lasix / tolvaptan     2. Hypokalemia - placed on replacement of small dose     2.  PTX - resolved       Leigh Ann Jiménez MD

## 2019-05-22 NOTE — PROGRESS NOTES
Pulmonary Daily Progress Note: 5/22/2019    Sheryle Sorrow   Admission Date: 5/6/2019         The patient's chart is reviewed and the patient is discussed with the staff. 87 y.o. CM presents via EMS with shortness of breath when got up this morning.  Had a productive cough with yellow sputum and received Rocephin.  In the ER CXR with large right pneumothorax and pulmonary consulted for chest tube placement and admission. Anuja Reyesh a fall in February and seen by Dr. Giuliana Phillips with compression L2,L3, L4. In the ER had VT and cardiology was consulted and added Amiodarone. He has had a right pneumothorax with persistent air leak and underwent surgical talc pleurodesis on 5/16. Slid from bed to floor. Weaned to 2lpm.  Had to have Li placed for urinary retention. Li removed but with retention again and 700-800ml on sca. Li replaced and urology consulted. Subjective:     Patient's respiratory status is doing well today. However his Na is down to 121. Was seen by urology for urinary retention yesterday with plan to leave li until closer to discharge.      Current Facility-Administered Medications   Medication Dose Route Frequency    0.9% sodium chloride infusion  100 mL/hr IntraVENous CONTINUOUS    acetaminophen (TYLENOL) tablet 500 mg  500 mg Oral Q8H    acetaminophen (TYLENOL) tablet 650 mg  650 mg Oral Q4H PRN    lisinopril (PRINIVIL, ZESTRIL) tablet 10 mg  10 mg Oral DAILY    fluticasone propionate (FLONASE) 50 mcg/actuation nasal spray 2 Spray  2 Spray Both Nostrils DAILY    amiodarone (CORDARONE) tablet 400 mg  400 mg Oral Q12H    carvedilol (COREG) tablet 3.125 mg  3.125 mg Oral BID WITH MEALS    calcium carbonate (TUMS) chewable tablet 200 mg [elemental]  200 mg Oral TID PRN    hydrALAZINE (APRESOLINE) 20 mg/mL injection 10 mg  10 mg IntraVENous Q6H PRN    [Held by provider] aspirin delayed-release tablet 81 mg  81 mg Oral DAILY    guaiFENesin ER (MUCINEX) tablet 1,200 mg  1,200 mg Oral DAILY    montelukast (SINGULAIR) tablet 10 mg  10 mg Oral DAILY    tamsulosin (FLOMAX) capsule 0.4 mg  0.4 mg Oral DAILY    sodium chloride (NS) flush 5-40 mL  5-40 mL IntraVENous Q8H    sodium chloride (NS) flush 5-40 mL  5-40 mL IntraVENous PRN    albuterol (PROVENTIL VENTOLIN) nebulizer solution 2.5 mg  2.5 mg Nebulization Q6H RT    [Held by provider] enoxaparin (LOVENOX) injection 40 mg  40 mg SubCUTAneous Q24H     Review of Systems  Constitutional:  negative for fever, chills, sweats  Cardiovascular:  negative for chest pain, palpitations, syncope, edema  Respiratory:  crepitus  Gastrointestinal:  negative for dysphagia, reflux, vomiting, diarrhea, abdominal pain, or melena  Neurologic:  negative for focal weakness, numbness, headache      Objective:     Vitals:    05/22/19 0300 05/22/19 0601 05/22/19 0753 05/22/19 0816   BP: 111/63   164/69   Pulse: (!) 53   (!) 58   Resp: 18   20   Temp: 98.1 °F (36.7 °C)   97.7 °F (36.5 °C)   SpO2: 97%  95% 96%   Weight:  143 lb 3.2 oz (65 kg)     Height:           Intake and Output:   05/20 1901 - 05/22 0700  In: 360 [P.O.:360]  Out: 1700 [Urine:1700]  No intake/output data recorded. Physical Exam:          Constitutional:  the patient is thin and in no acute distress, NC  2lpm  EENMT:  Sclera clear, pupils equal, oral mucosa moist  Respiratory: crepitus, chest tubes out, right dressing moist with yellow fluid, wet cough   Cardiovascular:  RRR with grade III/VI murmur   Gastrointestinal: soft and non-tender; with positive bowel sounds, appetite good. Musculoskeletal: warm without cyanosis. There is no lower extremity edema, SCDs.   Skin:  no jaundice or rashes, right chest incision, secure staples  Neurologic: no gross neuro deficits     Psychiatric:  alert and oriented x 3    CXR   5/22/19  IMPRESSION  Unchanged interstitial lung edema and tiny right apical pneumothorax.      5/21/19:  Unchanged interstitial lung edema and small right apical  pneumothorax, following removal of two right-sided chest tubes. Chest CT 5/8/19:      LAB  No results for input(s): GLUCPOC in the last 72 hours. No lab exists for component: GLPOC   Recent Labs     05/22/19  0304   WBC 13.1*   HGB 10.0*   HCT 29.0*   *     Recent Labs     05/22/19  0304   *   K 3.4*   CL 86*   CO2 28   GLU 80   BUN 16   CREA 0.46*   CA 7.1*     No results for input(s): PH, PCO2, PO2, HCO3 in the last 72 hours. No results for input(s): LCAD, LAC in the last 72 hours. Assessment:  (Medical Decision Making)     Hospital Problems  Date Reviewed: 5/21/2019          Codes Class Noted POA    Hyponatremia ICD-10-CM: E87.1  ICD-9-CM: 276.1  5/22/2019 Unknown        Urinary retention ICD-10-CM: R33.9  ICD-9-CM: 788.20  5/20/2019 Unknown        Subcutaneous emphysema ([facility] ICD-10-CM: T79. 7XXA  ICD-9-CM: 958.7  5/9/2019 No        * (Principal) Pneumothorax on right ICD-10-CM: J93.9  ICD-9-CM: 512.89  5/6/2019 Yes        Shortness of breath ICD-10-CM: R06.02  ICD-9-CM: 786.05  5/6/2019 Yes        Acute on chronic respiratory failure with hypoxia [facility]) ICD-10-CM: J96.21  ICD-9-CM: 518.84, 799.02  5/6/2019 Yes        COPD exacerbation ([facility] ICD-10-CM: J44.1  ICD-9-CM: 491.21  5/6/2019 Yes        Ventricular tachyarrhythmia ([facility] ICD-10-CM: I47.2  ICD-9-CM: 427.1  5/6/2019 Yes        Pulmonary infiltrates ICD-10-CM: R91.8  ICD-9-CM: 793.19  5/6/2019 Unknown        VT (ventricular tachycardia) ([facility] ICD-10-CM: I47.2  ICD-9-CM: 427.1  5/6/2019 Yes        Chronic respiratory failure with hypoxia (HCC) (Chronic) ICD-10-CM: J96.11  ICD-9-CM: 518.83, 799.02  3/29/2016 Yes    Overview Addendum 5/6/2019 10:14 AM by Kj Carrera, NP     Continue to wear 2 L nasal cannula at night. May use 2 L supplemental oxygen to maintain sats greater than 90%. Essential hypertension, benign ICD-10-CM: I10  ICD-9-CM: 401.1  12/10/2015 Yes            Pt's respiratory status is doing well.  PTX stable after removal of his chest tubes. However current issues are worsening hyponatremia of unclear cause and urinary retention. Plan:  (Medical Decision Making)     -will check urine Na, chloride, creatinine, osm,  And serum osms  -consult nephrology to help with hyponatremia. -has been getting NS at 100/hr. Do not think this should be the cause of his Na but will d/c.   -has been drinking but PO intake does not seem excessive. -per urology leave li until close to d/c and if he fails removal \" he will need early morning appt with us for li removal and voiding trial in the office. \"  -per surgery: Follow up with Dr. Crissy Easton in 1 week after discharge  --Albuterol, Singulair, Mucinex--denies shortness of breath  --PT, OOB to chair at least  --Amiodarone per cardiology. --Weaned to home O2 flow    Was admitted by the pulmonary team to the floor due to PTX. That has now resolve and he has other non pulmonary issues. If it looks like he will be here much longer would seem reasonable to see if hospitalists would be willing to assume primary care. More than 50% of the time documented was spent in face-to-face contact with the patient and in the care of the patient on the floor/unit where the patient is located.     Giovana Betts MD

## 2019-05-22 NOTE — PROGRESS NOTES
Pt resting in bed. Respirations even and unlabored on 2.5L NC. Rojas draining. Fluids infusing at 100ml/hr. Call light within reach, bed low and locked.

## 2019-05-22 NOTE — PROGRESS NOTES
Problem: Self Care Deficits Care Plan (Adult)  Goal: *Acute Goals and Plan of Care (Insert Text)  Description  1. Pt will toilet with SBA   2. Pt will complete functional mobility for ADLs with SBA  3. Pt will complete lower body dressing with SBA using AE as needed  4. Pt will complete grooming and hygiene at sink with SBA  5. Pt will demonstrate independence with HEP to promote increased BUE strength and functional use for ADLs  6. Pt will tolerate 23 minutes functional activity with min or fewer rest breaks to promote increased endurance for ADLs  7. Pt will complete UB bathing and dressing with set up     Timeframe: 7 days     Outcome: Progressing Towards Goal     OCCUPATIONAL THERAPY: Daily Note and PM    5/22/2019  INPATIENT: OT Visit Days: 4  [place] / [place] / Product Type: Medicare /      NAME/AGE/GENDER: Gina Esparza is a 80 y.o. male   PRIMARY DIAGNOSIS:  Pneumothorax on right [J93.9] Pneumothorax on right   Pneumothorax on right    Procedure(s) (LRB):  RIGHT VATS WITH TALC PLEURODESIS CHEST TUBE INSERTION X 2  APICAL BLEBECTOMY (Right)  6 Days Post-Op  ICD-10: Treatment Diagnosis:    · Generalized Muscle Weakness (M62.81)   Precautions/Allergies:    Chest tube to suction Patient has no known allergies. ASSESSMENT:     Mr. Naya Ozuna was admitted with R side pneumothorax, now has a R side chest tube to suction. Pt lives with his wife (who is blind) and is fully independent and active at baseline, drives, reports that he mowed the lawn just last week. Pt uses a RW for mobility, endorses one fall which he stated might be more than 6 months ago.   5/22/2019 Pt presents up in the chair upon arrival and agreeable to treatment. Pt completed several reps of sit to stand with a rolling walker and min a. Attempted to take steps several times, but pt was leaning backwards and not safe without a chair follow and no one was available to do so.  Several interruptions occurred during treatment including pt's bandage and gown revealing to be heavily soiled. RN called into room to assist with bandage change and given clean gown. Discontinued therapy session due to above. Continue OT POC. This section established at most recent assessment   PROBLEM LIST (Impairments causing functional limitations):  1. Decreased Strength  2. Decreased ADL/Functional Activities  3. Decreased Transfer Abilities  4. Decreased Balance  5. Decreased Activity Tolerance   INTERVENTIONS PLANNED: (Benefits and precautions of occupational therapy have been discussed with the patient.)  1. Activities of daily living training  2. Adaptive equipment training  3. Balance training  4. Therapeutic activity  5. Therapeutic exercise     TREATMENT PLAN: Frequency/Duration: Follow patient 3 times/ week to address above goals. Rehabilitation Potential For Stated Goals: Good     REHAB RECOMMENDATIONS (at time of discharge pending progress):    Placement: It is my opinion, based on this patient's performance to date, that Mr. Jumana Grimm may benefit from discharge to SNF d/t impaired mobility, balance, and ADLs making it unsafe for him to d/c home.    Equipment:    None at this time              OCCUPATIONAL PROFILE AND HISTORY:   History of Present Injury/Illness (Reason for Referral):  See H&P  Past Medical History/Comorbidities:   Mr. Jumana Grimm  has a past medical history of Abdominal aortic aneurysm ([address] (12/10/2015), Abdominal aortic aneurysm without rupture ([address] Allergic rhinitis (12/10/2015), Asthma, Benign neoplasm, Benign prostatic hyperplasia (12/10/2015), BPH without urinary obstruction, Cardiovascular disease (12/10/2015), Chronic obstructive asthma with exacerbation ([address] (12/10/2015), COPD (chronic obstructive pulmonary disease) ([address] (12/10/2015), Cough with hemoptysis, Erectile dysfunction (12/10/2015), Essential hypertension, benign (12/10/2015), Fracture (10/2014), History of colon polyps, Low testosterone (12/10/2015), Lumbago, Memory loss, Overflow incontinence, Raynaud's syndrome (12/10/2015), Rotator cuff tendinitis, Thrombocytopenia ([address] and Urinary frequency. Mr. Tsering Valente  has a past surgical history that includes hx hernia repair (1980); hx colonoscopy; hx fracture tx (10/2014); hx cataract removal (6/2016-right); and hx orthopaedic (03/2018). Social History/Living Environment:   Home Environment: Private residence  # Steps to Enter: 1  One/Two Story Residence: One story  Living Alone: No  Support Systems: Spouse/Significant Other/Partner, Child(tremayne)  Patient Expects to be Discharged to[de-identified] Private residence  Current DME Used/Available at Home: Grab bars, Shower chair, Raised toilet seat, Cane, straight, Walker, rolling  Tub or Shower Type: Shower  Prior Level of Function/Work/Activity:  Independent, active, lives w/ wife, son is able to assist, RW for mobility     Number of Personal Factors/Comorbidities that affect the Plan of Care: Expanded review of therapy/medical records (1-2):  MODERATE COMPLEXITY   ASSESSMENT OF OCCUPATIONAL PERFORMANCE[de-identified]   Activities of Daily Living:   Basic ADLs (From Assessment) Complex ADLs (From Assessment)   Feeding: Independent  Oral Facial Hygiene/Grooming: Contact guard assistance  Bathing: Minimum assistance  Upper Body Dressing: Minimum assistance  Lower Body Dressing: Contact guard assistance  Toileting: Contact guard assistance Instrumental ADL  Meal Preparation: Moderate assistance  Homemaking:  Moderate assistance  Medication Management: Setup  Financial Management: Setup   Grooming/Bathing/Dressing Activities of Daily Living                             Bed/Mat Mobility  Sit to Stand: Minimum assistance  Stand to Sit: Minimum assistance       Most Recent Physical Functioning:   Gross Assessment:                  Posture:     Balance:    Bed Mobility:     Wheelchair Mobility:     Transfers:  Sit to Stand: Minimum assistance  Stand to Sit: Minimum assistance Patient Vitals for the past 6 hrs:   BP SpO2 O2 Flow Rate (L/min) Pulse   19 1133 115/67 99 %  (!) 51   19 1401  95 % 2 l/min        Mental Status  Neurologic State: Alert  Orientation Level: Oriented X4  Cognition: Appropriate decision making  Perception: Appears intact  Perseveration: No perseveration noted  Safety/Judgement: Awareness of environment, Fall prevention                          Physical Skills Involved:  1. Balance  2. Strength  3. Activity Tolerance Cognitive Skills Affected (resulting in the inability to perform in a timely and safe manner): 1. none Psychosocial Skills Affected:  1. Habits/Routines   Number of elements that affect the Plan of Care: 3-5:  MODERATE COMPLEXITY   CLINICAL DECISION MAKIN[address] [place], [place] [id number] AM-PAC 6 Clicks   Daily Activity Inpatient Short Form  How much help from another person does the patient currently need. .. Total A Lot A Little None   1. Putting on and taking off regular lower body clothing? ? 1   ? 2   ? 3   ? 4   2. Bathing (including washing, rinsing, drying)? ? 1   ? 2   ? 3   ? 4   3. Toileting, which includes using toilet, bedpan or urinal?   ? 1   ? 2   ? 3   ? 4   4. Putting on and taking off regular upper body clothing? ? 1   ? 2   ? 3   ? 4   5. Taking care of personal grooming such as brushing teeth? ? 1   ? 2   ? 3   ? 4   6. Eating meals? ? 1   ? 2   ? 3   ? 4   © , Trustees of [address] [place] [address], under license to [employer]. All rights reserved      Score:  Initial: 21 Most Recent: X (Date: -- )    Interpretation of Tool:  Represents activities that are increasingly more difficult (i.e. Bed mobility, Transfers, Gait). Medical Necessity:     · Patient demonstrates good rehab potential due to higher previous functional level. Reason for Services/Other Comments:  · Patient continues to require present interventions due to patient's inability to complete ADLs.    Use of outcome tool(s) and clinical judgement create a POC that gives a: MODERATE COMPLEXITY         TREATMENT:   (In addition to Assessment/Re-Assessment sessions the following treatments were rendered)     Pre-treatment Symptoms/Complaints:    Pain: Initial:   Pain Intensity 1: 0  Post Session:  2     Therapeutic Activity: (23 minutes): Therapeutic activities including sit to stand transfers and static standing  to improve mobility, strength and balance. Required moderate assist to promote static and dynamic balance in standing. Date:  5/17/19 Date:   Date:     Activity/Exercise Parameters Parameters Parameters   Shoulder ab/adduction 10/1     Shoulder flexion 10/1     Elbow flexion 10/1     Elbow extension  10/1                               Braces/Orthotics/Lines/Etc:   · O2 Device: Nasal cannula  Treatment/Session Assessment:    · Response to Treatment:  Good effort  · Interdisciplinary Collaboration:   o Certified Occupational Therapy Assistant  o Registered Nurse  · After treatment position/precautions:   o Up in chair  o Bed alarm/tab alert on  o Bed/Chair-wheels locked  o Call light within reach  o Nurse at bedside   · Compliance with Program/Exercises: Compliant all of the time. · Recommendations/Intent for next treatment session: \"Next visit will focus on advancements to more challenging activities and reduction in assistance provided\".   Total Treatment Duration:  OT Patient Time In/Time Out  Time In: 4253  Time Out: Lucio [address]

## 2019-05-22 NOTE — PROGRESS NOTES
BSR received. Pt resting in bed. Respirations even and unlabored on 2.5L NC. Rojas is draining. Pt denies pain at this time. Call light within reach, bed low and locked, door open.

## 2019-05-23 ENCOUNTER — APPOINTMENT (OUTPATIENT)
Dept: GENERAL RADIOLOGY | Age: 84
DRG: 163 | End: 2019-05-23
Attending: SURGERY
Payer: MEDICARE

## 2019-05-23 LAB
CORTIS AM PEAK SERPL-MCNC: 24.5 UG/DL (ref 7–25)
SODIUM SERPL-SCNC: 123 MMOL/L (ref 136–145)
SODIUM SERPL-SCNC: 124 MMOL/L (ref 136–145)
SODIUM SERPL-SCNC: 128 MMOL/L (ref 136–145)

## 2019-05-23 PROCEDURE — 84295 ASSAY OF SERUM SODIUM: CPT

## 2019-05-23 PROCEDURE — 94640 AIRWAY INHALATION TREATMENT: CPT

## 2019-05-23 PROCEDURE — 71045 X-RAY EXAM CHEST 1 VIEW: CPT

## 2019-05-23 PROCEDURE — 74011250637 HC RX REV CODE- 250/637: Performed by: SURGERY

## 2019-05-23 PROCEDURE — 82533 TOTAL CORTISOL: CPT

## 2019-05-23 PROCEDURE — 74011250637 HC RX REV CODE- 250/637: Performed by: INTERNAL MEDICINE

## 2019-05-23 PROCEDURE — 97530 THERAPEUTIC ACTIVITIES: CPT

## 2019-05-23 PROCEDURE — 36415 COLL VENOUS BLD VENIPUNCTURE: CPT

## 2019-05-23 PROCEDURE — 99233 SBSQ HOSP IP/OBS HIGH 50: CPT | Performed by: INTERNAL MEDICINE

## 2019-05-23 PROCEDURE — 65270000029 HC RM PRIVATE

## 2019-05-23 PROCEDURE — 74011000250 HC RX REV CODE- 250: Performed by: SURGERY

## 2019-05-23 PROCEDURE — 97110 THERAPEUTIC EXERCISES: CPT

## 2019-05-23 PROCEDURE — 77010033678 HC OXYGEN DAILY

## 2019-05-23 PROCEDURE — 94760 N-INVAS EAR/PLS OXIMETRY 1: CPT

## 2019-05-23 RX ORDER — TOLVAPTAN 15 MG/1
15 TABLET ORAL ONCE
Status: COMPLETED | OUTPATIENT
Start: 2019-05-23 | End: 2019-05-23

## 2019-05-23 RX ADMIN — GUAIFENESIN 1200 MG: 600 TABLET, EXTENDED RELEASE ORAL at 10:30

## 2019-05-23 RX ADMIN — ALBUTEROL SULFATE 2.5 MG: 2.5 SOLUTION RESPIRATORY (INHALATION) at 13:58

## 2019-05-23 RX ADMIN — AMIODARONE HYDROCHLORIDE 400 MG: 200 TABLET ORAL at 22:06

## 2019-05-23 RX ADMIN — CARVEDILOL 3.12 MG: 3.12 TABLET, FILM COATED ORAL at 10:30

## 2019-05-23 RX ADMIN — ALBUTEROL SULFATE 2.5 MG: 2.5 SOLUTION RESPIRATORY (INHALATION) at 01:26

## 2019-05-23 RX ADMIN — TOLVAPTAN 15 MG: 15 TABLET ORAL at 11:23

## 2019-05-23 RX ADMIN — MONTELUKAST SODIUM 10 MG: 10 TABLET, FILM COATED ORAL at 10:30

## 2019-05-23 RX ADMIN — AMIODARONE HYDROCHLORIDE 400 MG: 200 TABLET ORAL at 10:29

## 2019-05-23 RX ADMIN — LISINOPRIL 10 MG: 5 TABLET ORAL at 10:30

## 2019-05-23 RX ADMIN — CARVEDILOL 3.12 MG: 3.12 TABLET, FILM COATED ORAL at 18:23

## 2019-05-23 RX ADMIN — ACETAMINOPHEN 500 MG: 500 TABLET, FILM COATED ORAL at 05:19

## 2019-05-23 RX ADMIN — Medication 10 ML: at 22:00

## 2019-05-23 RX ADMIN — ALBUTEROL SULFATE 2.5 MG: 2.5 SOLUTION RESPIRATORY (INHALATION) at 08:06

## 2019-05-23 RX ADMIN — TAMSULOSIN HYDROCHLORIDE 0.4 MG: 0.4 CAPSULE ORAL at 10:29

## 2019-05-23 RX ADMIN — ACETAMINOPHEN 500 MG: 500 TABLET, FILM COATED ORAL at 22:06

## 2019-05-23 RX ADMIN — Medication 20 ML: at 05:20

## 2019-05-23 RX ADMIN — ALBUTEROL SULFATE 2.5 MG: 2.5 SOLUTION RESPIRATORY (INHALATION) at 21:17

## 2019-05-23 RX ADMIN — FLUTICASONE PROPIONATE 2 SPRAY: 50 SPRAY, METERED NASAL at 10:31

## 2019-05-23 RX ADMIN — ACETAMINOPHEN 500 MG: 500 TABLET, FILM COATED ORAL at 18:23

## 2019-05-23 RX ADMIN — Medication 5 ML: at 18:26

## 2019-05-23 NOTE — PROGRESS NOTES
Pt received schedule medication, call light within reach , bed on low position and locked, pt able to make needs known, denies pain , no discomfort noted.

## 2019-05-23 NOTE — PROGRESS NOTES
Lab called to report critical Na lab value of 123. This RN attempted to page hospitalist. Doctor did not return page. Lab value posted in flowsheet.

## 2019-05-23 NOTE — PROGRESS NOTES
Pt resting in the recliner with family present. Pt is stable. Pt is axo. Respirations are even and unlabored on room air. Pt denies pain or discomfort. No needs expressed. Door left open when pt unattended. Safety measures are in place. Will continue to monitor.

## 2019-05-23 NOTE — PROGRESS NOTES
[facility]  Admission Date: 5/6/2019             Renal Daily Progress Note: 5/23/2019    The patient's chart is reviewed and the patient is discussed with the staff. Follow up Hyponatremia  Subjective:   Patient seen and examined on rounds reports limiting free water intake drinking Gatorade, denies thirst, SOB, CP, N/VD, no edema. Appetite ok.      Current Facility-Administered Medications   Medication Dose Route Frequency    acetaminophen (TYLENOL) tablet 500 mg  500 mg Oral Q8H    acetaminophen (TYLENOL) tablet 650 mg  650 mg Oral Q4H PRN    lisinopril (PRINIVIL, ZESTRIL) tablet 10 mg  10 mg Oral DAILY    fluticasone propionate (FLONASE) 50 mcg/actuation nasal spray 2 Spray  2 Spray Both Nostrils DAILY    amiodarone (CORDARONE) tablet 400 mg  400 mg Oral Q12H    carvedilol (COREG) tablet 3.125 mg  3.125 mg Oral BID WITH MEALS    calcium carbonate (TUMS) chewable tablet 200 mg [elemental]  200 mg Oral TID PRN    hydrALAZINE (APRESOLINE) 20 mg/mL injection 10 mg  10 mg IntraVENous Q6H PRN    [Held by provider] aspirin delayed-release tablet 81 mg  81 mg Oral DAILY    guaiFENesin ER (MUCINEX) tablet 1,200 mg  1,200 mg Oral DAILY    montelukast (SINGULAIR) tablet 10 mg  10 mg Oral DAILY    tamsulosin (FLOMAX) capsule 0.4 mg  0.4 mg Oral DAILY    sodium chloride (NS) flush 5-40 mL  5-40 mL IntraVENous Q8H    sodium chloride (NS) flush 5-40 mL  5-40 mL IntraVENous PRN    albuterol (PROVENTIL VENTOLIN) nebulizer solution 2.5 mg  2.5 mg Nebulization Q6H RT    [Held by provider] enoxaparin (LOVENOX) injection 40 mg  40 mg SubCUTAneous Q24H         Objective:     Vitals:    05/22/19 2323 05/23/19 0126 05/23/19 0353 05/23/19 0740   BP: 126/55  154/72 146/68   Pulse: (!) 52  (!) 57 (!) 54   Resp: 18  18 18   Temp: 97.7 °F (36.5 °C)  97.7 °F (36.5 °C) 98.1 °F (36.7 °C)   SpO2: 99% 99% 92% 99%   Weight:       Height:         Intake and Output:   05/21 1901 - 05/23 0700  In: 920 [P.O.:920]  Out: 1000 [Urine:1000]  No intake/output data recorded. Physical Exam:   Constitutional:  the patient is well developed and in no acute distress  HEENT:  Sclera clear, pupils equal, oral mucosa moist  Lungs: clear bilaterally  Cardiovascular:  RRR without Rub  Abd/GI: soft and non-tender; with positive bowel sounds. Ext: warm without cyanosis. There is no lower leg edema. Skin:  no jaundice or rashes  Neuro: no gross neuro deficits   Psychiatric: Calm. LAB  Recent Labs     05/22/19  0304   WBC 13.1*   HGB 10.0*   HCT 29.0*   *     Recent Labs     05/22/19  1628 05/22/19  0304   * 121*   K 3.6 3.4*   CL 86* 86*   CO2 27 28   GLU 96 80   BUN 16 16   CREA 0.49* 0.46*     No results for input(s): PH, PCO2, PO2, HCO3 in the last 72 hours. Assessment:  (Medical Decision Making)     Hospital Problems  Date Reviewed: 5/21/2019          Codes Class Noted POA    Hyponatremia ICD-10-CM: E87.1  ICD-9-CM: 276.1  5/22/2019 Unknown        Urinary retention ICD-10-CM: R33.9  ICD-9-CM: 788.20  5/20/2019 Unknown        Subcutaneous emphysema ([facility] ICD-10-CM: T79. 7XXA  ICD-9-CM: 958.7  5/9/2019 No        * (Principal) Pneumothorax on right ICD-10-CM: J93.9  ICD-9-CM: 512.89  5/6/2019 Yes        Shortness of breath ICD-10-CM: R06.02  ICD-9-CM: 786.05  5/6/2019 Yes        Acute on chronic respiratory failure with hypoxia [facility]) ICD-10-CM: J96.21  ICD-9-CM: 518.84, 799.02  5/6/2019 Yes        COPD exacerbation ([facility] ICD-10-CM: J44.1  ICD-9-CM: 491.21  5/6/2019 Yes        Ventricular tachyarrhythmia ([facility] ICD-10-CM: I47.2  ICD-9-CM: 427.1  5/6/2019 Yes        Pulmonary infiltrates ICD-10-CM: R91.8  ICD-9-CM: 793.19  5/6/2019 Unknown        VT (ventricular tachycardia) ([facility] ICD-10-CM: I47.2  ICD-9-CM: 427.1  5/6/2019 Yes        Chronic respiratory failure with hypoxia (HCC) (Chronic) ICD-10-CM: J96.11  ICD-9-CM: 518.83, 799.02  3/29/2016 Yes    Overview Addendum 5/6/2019 10:14 AM by Louise Najera, NP     Continue to wear 2 L nasal cannula at night. May use 2 L supplemental oxygen to maintain sats greater than 90%. Essential hypertension, benign ICD-10-CM: I10  ICD-9-CM: 401.1  12/10/2015 Yes              Plan:  (Medical Decision Making)   1. Hyponatremia [address]34, [place] [address], [place] 244, TSH 0.78, glucose 96. cortisol pending continue free water restriction. NA up from 121 to 122 today, no neurologic symptoms. Consider dose of Tolvaptan if no Na improvement. 2. Hypokalemia- improved s/p repletion.      3. Pneumothorax-resolved  -s/p Chest tube     Meldon Oh, NP

## 2019-05-23 NOTE — PROGRESS NOTES
Pulmonary Daily Progress Note: 5/23/2019    [facility]   Admission Date: 5/6/2019         The patient's chart is reviewed and the patient is discussed with the staff. 87 y.o. CM presents via EMS with shortness of breath when got up this morning.  Had a productive cough with yellow sputum and received Rocephin.  In the ER CXR with large right pneumothorax and pulmonary consulted for chest tube placement and admission. Trent Mayenon a fall in February and seen by Dr. Kenneth Quevedo with compression L2,L3, L4. In the ER had VT and cardiology was consulted and added Amiodarone. He has had a right pneumothorax with persistent air leak and underwent surgical talc pleurodesis on 5/16. Slid from bed to floor. Weaned to 2lpm.  Had to have Rojas placed for urinary retention. Rojas removed but with retention again and 700-800ml on sca. Rojas replaced and urology consulted.       Subjective:   No events overnight, comfortable up in chair on O2 at 2L    Current Facility-Administered Medications   Medication Dose Route Frequency    acetaminophen (TYLENOL) tablet 500 mg  500 mg Oral Q8H    acetaminophen (TYLENOL) tablet 650 mg  650 mg Oral Q4H PRN    lisinopril (PRINIVIL, ZESTRIL) tablet 10 mg  10 mg Oral DAILY    fluticasone propionate (FLONASE) 50 mcg/actuation nasal spray 2 Spray  2 Spray Both Nostrils DAILY    amiodarone (CORDARONE) tablet 400 mg  400 mg Oral Q12H    carvedilol (COREG) tablet 3.125 mg  3.125 mg Oral BID WITH MEALS    calcium carbonate (TUMS) chewable tablet 200 mg [elemental]  200 mg Oral TID PRN    hydrALAZINE (APRESOLINE) 20 mg/mL injection 10 mg  10 mg IntraVENous Q6H PRN    [Held by provider] aspirin delayed-release tablet 81 mg  81 mg Oral DAILY    guaiFENesin ER (MUCINEX) tablet 1,200 mg  1,200 mg Oral DAILY    montelukast (SINGULAIR) tablet 10 mg  10 mg Oral DAILY    tamsulosin (FLOMAX) capsule 0.4 mg  0.4 mg Oral DAILY    sodium chloride (NS) flush 5-40 mL  5-40 mL IntraVENous Q8H    sodium chloride (NS) flush 5-40 mL  5-40 mL IntraVENous PRN    albuterol (PROVENTIL VENTOLIN) nebulizer solution 2.5 mg  2.5 mg Nebulization Q6H RT    [Held by provider] enoxaparin (LOVENOX) injection 40 mg  40 mg SubCUTAneous Q24H     Review of Systems  Constitutional:  negative for fever, chills, sweats  Cardiovascular:  negative for chest pain, palpitations, syncope, edema  Respiratory:  crepitus  Gastrointestinal:  negative for dysphagia, reflux, vomiting, diarrhea, abdominal pain, or melena  Neurologic:  negative for focal weakness, numbness, headache      Objective:     Vitals:    05/23/19 0126 05/23/19 0353 05/23/19 0740 05/23/19 0806   BP:  154/72 146/68    Pulse:  (!) 57 (!) 54    Resp:  18 18    Temp:  97.7 °F (36.5 °C) 98.1 °F (36.7 °C)    SpO2: 99% 92% 99% 98%   Weight:       Height:           Intake and Output:   05/21 1901 - 05/23 0700  In: 920 [P.O.:920]  Out: 1000 [Urine:1000]  05/23 0701 - 05/23 1900  In: 240 [P.O.:240]  Out: -     Physical Exam:          Constitutional:  the patient is thin and in no acute distress, NC  2lpm  EENMT:  Sclera clear, pupils equal, oral mucosa moist  Respiratory: crepitus, chest tubes out, CTA  Cardiovascular:  RRR with grade III/VI murmur   Gastrointestinal: soft and non-tender; with positive bowel sounds, appetite good. Musculoskeletal: warm without cyanosis. There is no lower extremity edema, SCDs. Skin:  no jaundice or rashes, right chest incision, secure staples  Neurologic: no gross neuro deficits     Psychiatric:  alert and oriented x 3    CXR   5/22/19  IMPRESSION  Unchanged interstitial lung edema and tiny right apical pneumothorax.      5/21/19:  Unchanged interstitial lung edema and small right apical  pneumothorax, following removal of two right-sided chest tubes. Chest CT 5/8/19:      LAB  No results for input(s): GLUCPOC in the last 72 hours.     No lab exists for component: GLPOC   Recent Labs     05/22/19  0304   WBC 13.1*   HGB 10.0*   HCT 29.0*   *     Recent Labs     05/22/19  1628 05/22/19  0304   * 121*   K 3.6 3.4*   CL 86* 86*   CO2 27 28   GLU 96 80   BUN 16 16   CREA 0.49* 0.46*   CA 7.2* 7.1*   Results for Ashish Avila (MRN [id number]) as of 5/23/2019 09:53   Ref. Range 5/22/2019 11:45   Creatinine, urine Latest Units: mg/dL 120.00   Sodium,urine random Latest Units: MMOL/L 21   Chloride,urine random Latest Units: MMOL/L 88   Osmolality,urine Latest Ref Range: 50 - 1,400 MOSM/kg H2O 734       Assessment:  (Medical Decision Making)     Hospital Problems  Date Reviewed: 5/21/2019          Codes Class Noted POA    Hyponatremia ICD-10-CM: E87.1  ICD-9-CM: 276.1  5/22/2019 Unknown        Urinary retention ICD-10-CM: R33.9  ICD-9-CM: 788.20  5/20/2019 Unknown        Subcutaneous emphysema ([facility] ICD-10-CM: T79. 7XXA  ICD-9-CM: 958.7  5/9/2019 No        * (Principal) Pneumothorax on right ICD-10-CM: J93.9  ICD-9-CM: 512.89  5/6/2019 Yes        Shortness of breath ICD-10-CM: R06.02  ICD-9-CM: 786.05  5/6/2019 Yes        Acute on chronic respiratory failure with hypoxia [facility]) ICD-10-CM: J96.21  ICD-9-CM: 518.84, 799.02  5/6/2019 Yes        COPD exacerbation ([facility] ICD-10-CM: J44.1  ICD-9-CM: 491.21  5/6/2019 Yes        Ventricular tachyarrhythmia ([facility] ICD-10-CM: I47.2  ICD-9-CM: 427.1  5/6/2019 Yes        Pulmonary infiltrates ICD-10-CM: R91.8  ICD-9-CM: 793.19  5/6/2019 Unknown        VT (ventricular tachycardia) ([facility] ICD-10-CM: I47.2  ICD-9-CM: 427.1  5/6/2019 Yes        Chronic respiratory failure with hypoxia (HCC) (Chronic) ICD-10-CM: J96.11  ICD-9-CM: 518.83, 799.02  3/29/2016 Yes    Overview Addendum 5/6/2019 10:14 AM by Margarito Salgado, NP     Continue to wear 2 L nasal cannula at night. May use 2 L supplemental oxygen to maintain sats greater than 90%. Essential hypertension, benign ICD-10-CM: I10  ICD-9-CM: 401.1  12/10/2015 Yes            Pt's respiratory status is doing well.  PTX stable after removal of his chest tubes. However current issues are worsening hyponatremia of unclear cause and urinary retention. 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 \"  -per surgery: Follow up with Dr. Clement Comes in 1 week after discharge  --Albuterol, Singulair, Mucinex--denies shortness of breath  --PT, OOB to chair at least  --Amiodarone per cardiology. --Weaned to home O2 flow  --likely will stay in house over the weekend- may need tolvaptan for hyponatremia- will consult hospitalists to assume care. Once care assumed by hospitalist services will sign off and be available as needed. Discussed with Dr Jacob Kenyon. More than 50% of the time documented was spent in face-to-face contact with the patient and in the care of the patient on the floor/unit where the patient is located.     Elke Guajardo MD

## 2019-05-23 NOTE — PROGRESS NOTES
Pt bedside report received from [facility], pt resting in bed  watching Tv, assessment completed ,  pt AxOx4 bed on low and locked position with floor free of objects,  call light within reach, pt on oxygen  @2L NC, respirations even and non labored, pt verbalized understanding to call for needs,  Denies  pain, no distress noted.

## 2019-05-23 NOTE — PROGRESS NOTES
Pt received schedule medications, call light within reach , bed on low position and locked, pt able to make needs known, no c/o pain , no discomfort noted.

## 2019-05-23 NOTE — PROGRESS NOTES
End of shift report given to oncoming nurse, pt resting in bed, no c/o pain , call light within reach, bed locked and low position, pt respirations even and unlabored, no distress noted.

## 2019-05-23 NOTE — PROGRESS NOTES
Problem: Mobility Impaired (Adult and Pediatric)  Goal: *Acute Goals and Plan of Care (Insert Text)  Description  STG:  (1.)Mr. Dileep Vázquez will move from supine to sit and sit to supine  with STAND BY ASSIST within 3 treatment day(s). (2.)Mr. Dileep Vázquez will transfer from bed to chair and chair to bed with STAND BY ASSIST using the least restrictive device within 3 treatment day(s). (3.)Mr. Dileep Vázquez will ambulate with CONTACT GUARD ASSIST for 100 feet with the least restrictive device within 3 treatment day(s). LTG:  (1.)Mr. Dileep Vázquez will move from supine to sit and sit to supine  in bed with INDEPENDENT within 7 treatment day(s). (2.)Mr. Dileep Vázquez will transfer from bed to chair and chair to bed with SUPERVISION using the least restrictive device within 7 treatment day(s). (3.)Mr. Dileep Vázquez will ambulate with STAND BY ASSIST for 250+ feet with the least restrictive device within 7 treatment day(s). ________________________________________________________________________________________________     Outcome: Progressing Towards Goal      PHYSICAL THERAPY: Daily Note and AM 5/23/2019  INPATIENT: PT Visit Days : 5  [place] / [place] / Product Type: Medicare /       NAME/AGE/GENDER: Shirley Roberto is a 80 y.o. male   PRIMARY DIAGNOSIS: Pneumothorax on right [J93.9] Pneumothorax on right   Pneumothorax on right    Procedure(s) (LRB):  RIGHT VATS WITH TALC PLEURODESIS CHEST TUBE INSERTION X 2  APICAL BLEBECTOMY (Right)  7 Days Post-Op  ICD-10: Treatment Diagnosis:    · Generalized Muscle Weakness (M62.81)  · Difficulty in walking, Not elsewhere classified (R26.2)  · History of falling (Z91.81)   Precaution/Allergies:  Patient has no known allergies. ASSESSMENT:     Mr. Dileep Solders presents supine and ready to participate. He performed supine and seated exercises below. He sat up from an almost flat bed without my help but heavy use of the railing.   He stood into the walker with CGA and walked around the bed to the chair with CGA and cues for walker position and to keep legs straight. He did a little better today than Tuesday but could still benefit from rehab. His son entered room as we were starting that conversation. They will discuss with his wife. This section established at most recent assessment   PROBLEM LIST (Impairments causing functional limitations):  1. Decreased Strength  2. Decreased ADL/Functional Activities  3. Decreased Transfer Abilities  4. Decreased Ambulation Ability/Technique  5. Decreased Balance  6. Decreased Activity Tolerance  7. Decreased Pacing Skills  8. Increased Fatigue  9. Increased Shortness of Breath  10. Decreased [place] with Home Exercise Program   INTERVENTIONS PLANNED: (Benefits and precautions of physical therapy have been discussed with the patient.)  1. Balance Exercise  2. Bed Mobility  3. Family Education  4. Gait Training  5. Home Exercise Program (HEP)  6. Neuromuscular Re-education/Strengthening  7. Therapeutic Activites  8. Therapeutic Exercise/Strengthening  9. Transfer Training     TREATMENT PLAN: Frequency/Duration: 3 times a week for duration of hospital stay  Rehabilitation Potential For Stated Goals: Good     REHAB RECOMMENDATIONS (at time of discharge pending progress):    Placement: It is my opinion, based on this patient's performance to date, that Mr. Teresa Alcazar may benefit from intensive therapy at a [address] [place] [address] after discharge due to the functional deficits listed above that are likely to improve with skilled rehabilitation and concerns that he/she may be unsafe to be unsupervised at home due to general weakness. Equipment:    None at this time              HISTORY:   History of Present Injury/Illness (Reason for Referral):  See H&P below  Patient is a 80 y.o.  male presents via EMS with shortness of breath when got up this morning. Had a productive cough with yellow sputum and received Rocephin.   In the ER CXR with large right pneumothorax and we were called for chest tub e placement and admission. Had a fall in February and seen by Dr. Carine Doan with compression L2,L3, L4. Past Medical History/Comorbidities:   Mr. Nikolas Lau  has a past medical history of Abdominal aortic aneurysm ([facility] (12/10/2015), Abdominal aortic aneurysm without rupture ([facility] Allergic rhinitis (12/10/2015), Asthma, Benign neoplasm, Benign prostatic hyperplasia (12/10/2015), BPH without urinary obstruction, Cardiovascular disease (12/10/2015), Chronic obstructive asthma with exacerbation ([facility] (12/10/2015), COPD (chronic obstructive pulmonary disease) ([facility] (12/10/2015), Cough with hemoptysis, Erectile dysfunction (12/10/2015), Essential hypertension, benign (12/10/2015), Fracture (10/2014), History of colon polyps, Low testosterone (12/10/2015), Lumbago, Memory loss, Overflow incontinence, Raynaud's syndrome (12/10/2015), Rotator cuff tendinitis, Thrombocytopenia ([facility] and Urinary frequency. Mr. Nikolas Lau  has a past surgical history that includes hx hernia repair (1980); hx colonoscopy; hx fracture tx (10/2014); hx cataract removal (6/2016-right); and hx orthopaedic (03/2018).   Social History/Living Environment:   Home Environment: Private residence  # Steps to Enter: 1  One/Two Story Residence: One story  Living Alone: No  Support Systems: Spouse/Significant Other/Partner, Child(tremayne)  Patient Expects to be Discharged to[de-identified] Private residence  Current DME Used/Available at Home: Grab bars, Shower chair, Raised toilet seat, Cane, straight, Walker, rolling  Tub or Shower Type: Shower  Prior Level of Function/Work/Activity:  Use of walker for gait, indep with ADLs, drives, 1 fall   Number of Personal Factors/Comorbidities that affect the Plan of Care: 3+: HIGH COMPLEXITY   EXAMINATION:   Most Recent Physical Functioning:   Gross Assessment:                  Posture:     Balance:    Bed Mobility:  Supine to Sit: Modified independent(flat bed heavy use of the rail)  Wheelchair Mobility:     Transfers:  Sit to Stand: Contact guard assistance;Minimum assistance  Stand to Sit: Stand-by assistance  Gait:     Speed/Marifer: Shuffled; Slow  Step Length: Left shortened;Right shortened  Gait Abnormalities: Decreased step clearance;Shuffling gait; Steppage gait  Distance (ft): 10 Feet (ft)  Assistive Device: Walker, rolling  Ambulation - Level of Assistance: Contact guard assistance      Body Structures Involved:  1. Lungs  2. Bones  3. Joints  4. Muscles  5. Ligaments Body Functions Affected:  1. Sensory/Pain  2. Cardio  3. Respiratory  4. Neuromusculoskeletal  5. Movement Related Activities and Participation Affected:  1. General Tasks and Demands  2. Mobility  3. Self Care  4. Domestic Life  5. Interpersonal Interactions and Relationships  6. Community, Social and [place]   Number of elements that affect the Plan of Care: 4+: HIGH COMPLEXITY   CLINICAL PRESENTATION:   Presentation: Evolving clinical presentation with changing clinical characteristics: MODERATE COMPLEXITY   CLINICAL DECISION MAKIN [facility] Mobility Inpatient Short Form  How much difficulty does the patient currently have. .. Unable A Lot A Little None   1. Turning over in bed (including adjusting bedclothes, sheets and blankets)? ? 1   ? 2   ? 3   ? 4   2. Sitting down on and standing up from a chair with arms ( e.g., wheelchair, bedside commode, etc.)   ? 1   ? 2   ? 3   ? 4   3. Moving from lying on back to sitting on the side of the bed?   ? 1   ? 2   ? 3   ? 4   How much help from another person does the patient currently need. .. Total A Lot A Little None   4. Moving to and from a bed to a chair (including a wheelchair)? ? 1   ? 2   ? 3   ? 4   5. Need to walk in hospital room? ? 1   ? 2   ? 3   ? 4   6. Climbing 3-5 steps with a railing? ? 1   ? 2   ? 3   ? 4   © , Trustees of [facility] [employer], under license to [employer].  All rights reserved      Score:  Initial: 17 Most Recent: X (Date: -- )    Interpretation of Tool:  Represents activities that are increasingly more difficult (i.e. Bed mobility, Transfers, Gait). Medical Necessity:     · Patient is expected to demonstrate progress in strength, balance, coordination and functional technique  ·  to decrease assistance required with gait, transfers, and functional mobility   · . Reason for Services/Other Comments:  · Patient continues to require skilled intervention due to decreased strength, decreased balance, decreased functional tolerance, decreased cardiopulmonary endurance affecting participation in basic ADLs and functional tasks   · . Use of outcome tool(s) and clinical judgement create a POC that gives a: Clear prediction of patient's progress: LOW COMPLEXITY            TREATMENT:   (In addition to Assessment/Re-Assessment sessions the following treatments were rendered)   Pre-treatment Symptoms/Complaints:  No complaints  Pain: Initial:   Pain Intensity 1: 0 1 Post Session:   None noted     Therapeutic Activity: (    15 minutes): Therapeutic activities including Bed transfers, Chair transfers and Ambulation on level ground to improve mobility, strength, balance and endurance. Required minimal   to promote static and dynamic balance in standing. Therapeutic Exercise: (10 Minutes):  Exercises per grid below to improve mobility, strength and coordination. Required maximal visual and verbal cues to promote proper body mechanics. Progressed complexity of movement as indicated.        Date:  5/14/19 Date:  5/17/19 Date:  5/20/19 Date  5/21/19 Date  5/23/19   Activity/Exercise Parameters Parameters Parameters     Ankle pumps 20 X B  2 x 10 20x B 10x B 15x B   Quad set 15 X B        Glute set 15 X        Heel slides 15 X B  2 x 10  10x B 10x B   Hip abduction 15 X B  2 x 10 Seated 20x B 10x B 10x B   Hip flexion/marching  2 x 10 Seated 20x B     TKE   20x B  10x B   SAQ 10x B    bridging    10x 10x B     Braces/Orthotics/Lines/Etc:   · NC  Treatment/Session Assessment:    · Response to Treatment:  Patient participated well  · Interdisciplinary Collaboration:   o Physical Therapist assistant  o Registered Nurse  · After treatment position/precautions:   o Up in chair  o Bed/Chair-wheels locked  o Bed in low position  o Call light within reach  o RN notified  o Family at bedside   · Compliance with Program/Exercises: Compliant all of the time  · Recommendations/Intent for next treatment session: \"Next visit will focus on advancements to more challenging activities and reduction in assistance provided\".   Total Treatment Duration:  PT Patient Time In/Time Out  Time In: 0925  Time Out: 0950    Jodi Grant, PTA

## 2019-05-23 NOTE — PROGRESS NOTES
Patient seen and examined by me. On exam: fairly clear lungs, AAO3, Rojas in place  Lab ordered and reviewed. Dx:  1- R pneumothorax and pulmonary edema, s/p chest tube placement and pleurodesis, now on 4L. Hx of COPD, on 2L O2 at home. Improved. 2- Hyponatremia due to SIADH, improving, seen by nephrology.   3- Acute urinary retention, seen by urology  4- HTN, controlled    Rx:  Continue current rx    Dispo: TBD    Signed By: Wang Del Angel MD     May 23, 2019

## 2019-05-24 ENCOUNTER — APPOINTMENT (OUTPATIENT)
Dept: GENERAL RADIOLOGY | Age: 84
DRG: 163 | End: 2019-05-24
Attending: SURGERY
Payer: MEDICARE

## 2019-05-24 LAB
ANION GAP SERPL CALC-SCNC: 6 MMOL/L (ref 7–16)
BACTERIA SPEC CULT: NORMAL
BUN SERPL-MCNC: 9 MG/DL (ref 8–23)
CALCIUM SERPL-MCNC: 7.8 MG/DL (ref 8.3–10.4)
CHLORIDE SERPL-SCNC: 99 MMOL/L (ref 98–107)
CO2 SERPL-SCNC: 29 MMOL/L (ref 21–32)
CREAT SERPL-MCNC: 0.62 MG/DL (ref 0.8–1.5)
ERYTHROCYTE [DISTWIDTH] IN BLOOD BY AUTOMATED COUNT: 13.1 % (ref 11.9–14.6)
GLUCOSE SERPL-MCNC: 89 MG/DL (ref 65–100)
HCT VFR BLD AUTO: 34.2 % (ref 41.1–50.3)
HGB BLD-MCNC: 11.6 G/DL (ref 13.6–17.2)
MCH RBC QN AUTO: 32.7 PG (ref 26.1–32.9)
MCHC RBC AUTO-ENTMCNC: 33.9 G/DL (ref 31.4–35)
MCV RBC AUTO: 96.3 FL (ref 79.6–97.8)
NRBC # BLD: 0 K/UL (ref 0–0.2)
PLATELET # BLD AUTO: 94 K/UL (ref 150–450)
PMV BLD AUTO: 9.4 FL (ref 9.4–12.3)
POTASSIUM SERPL-SCNC: 3.4 MMOL/L (ref 3.5–5.1)
RBC # BLD AUTO: 3.55 M/UL (ref 4.23–5.6)
SERVICE CMNT-IMP: NORMAL
SODIUM SERPL-SCNC: 134 MMOL/L (ref 136–145)
WBC # BLD AUTO: 9.2 K/UL (ref 4.3–11.1)

## 2019-05-24 PROCEDURE — 74011250636 HC RX REV CODE- 250/636: Performed by: INTERNAL MEDICINE

## 2019-05-24 PROCEDURE — 71045 X-RAY EXAM CHEST 1 VIEW: CPT

## 2019-05-24 PROCEDURE — 85027 COMPLETE CBC AUTOMATED: CPT

## 2019-05-24 PROCEDURE — 36415 COLL VENOUS BLD VENIPUNCTURE: CPT

## 2019-05-24 PROCEDURE — 74011250637 HC RX REV CODE- 250/637: Performed by: SURGERY

## 2019-05-24 PROCEDURE — 97530 THERAPEUTIC ACTIVITIES: CPT

## 2019-05-24 PROCEDURE — 94760 N-INVAS EAR/PLS OXIMETRY 1: CPT

## 2019-05-24 PROCEDURE — 74011250637 HC RX REV CODE- 250/637: Performed by: HOSPITALIST

## 2019-05-24 PROCEDURE — 94640 AIRWAY INHALATION TREATMENT: CPT

## 2019-05-24 PROCEDURE — 74011000250 HC RX REV CODE- 250: Performed by: SURGERY

## 2019-05-24 PROCEDURE — 77010033678 HC OXYGEN DAILY

## 2019-05-24 PROCEDURE — 80048 BASIC METABOLIC PNL TOTAL CA: CPT

## 2019-05-24 PROCEDURE — 65270000029 HC RM PRIVATE

## 2019-05-24 PROCEDURE — 97110 THERAPEUTIC EXERCISES: CPT

## 2019-05-24 RX ORDER — CLONIDINE HYDROCHLORIDE 0.2 MG/1
0.2 TABLET ORAL
Status: DISCONTINUED | OUTPATIENT
Start: 2019-05-24 | End: 2019-05-25 | Stop reason: HOSPADM

## 2019-05-24 RX ORDER — GUAIFENESIN 600 MG/1
1200 TABLET, EXTENDED RELEASE ORAL
Status: DISCONTINUED | OUTPATIENT
Start: 2019-05-24 | End: 2019-05-25 | Stop reason: HOSPADM

## 2019-05-24 RX ORDER — CIPROFLOXACIN 500 MG/1
500 TABLET ORAL EVERY 12 HOURS
Status: DISCONTINUED | OUTPATIENT
Start: 2019-05-24 | End: 2019-05-25 | Stop reason: HOSPADM

## 2019-05-24 RX ADMIN — LISINOPRIL 10 MG: 5 TABLET ORAL at 08:36

## 2019-05-24 RX ADMIN — CARVEDILOL 3.12 MG: 3.12 TABLET, FILM COATED ORAL at 17:04

## 2019-05-24 RX ADMIN — ACETAMINOPHEN 500 MG: 500 TABLET, FILM COATED ORAL at 05:20

## 2019-05-24 RX ADMIN — AMIODARONE HYDROCHLORIDE 400 MG: 200 TABLET ORAL at 21:09

## 2019-05-24 RX ADMIN — ALBUTEROL SULFATE 2.5 MG: 2.5 SOLUTION RESPIRATORY (INHALATION) at 02:12

## 2019-05-24 RX ADMIN — ENOXAPARIN SODIUM 40 MG: 40 INJECTION SUBCUTANEOUS at 12:32

## 2019-05-24 RX ADMIN — ACETAMINOPHEN 500 MG: 500 TABLET, FILM COATED ORAL at 14:34

## 2019-05-24 RX ADMIN — CARVEDILOL 3.12 MG: 3.12 TABLET, FILM COATED ORAL at 08:37

## 2019-05-24 RX ADMIN — ALBUTEROL SULFATE 2.5 MG: 2.5 SOLUTION RESPIRATORY (INHALATION) at 09:04

## 2019-05-24 RX ADMIN — Medication 10 ML: at 06:00

## 2019-05-24 RX ADMIN — CIPROFLOXACIN HYDROCHLORIDE 500 MG: 500 TABLET, FILM COATED ORAL at 10:05

## 2019-05-24 RX ADMIN — CIPROFLOXACIN HYDROCHLORIDE 500 MG: 500 TABLET, FILM COATED ORAL at 21:09

## 2019-05-24 RX ADMIN — AMIODARONE HYDROCHLORIDE 400 MG: 200 TABLET ORAL at 08:36

## 2019-05-24 RX ADMIN — Medication 10 ML: at 12:33

## 2019-05-24 RX ADMIN — FLUTICASONE PROPIONATE 2 SPRAY: 50 SPRAY, METERED NASAL at 08:37

## 2019-05-24 RX ADMIN — TAMSULOSIN HYDROCHLORIDE 0.4 MG: 0.4 CAPSULE ORAL at 08:36

## 2019-05-24 RX ADMIN — GUAIFENESIN 1200 MG: 600 TABLET, EXTENDED RELEASE ORAL at 08:36

## 2019-05-24 RX ADMIN — ACETAMINOPHEN 500 MG: 500 TABLET, FILM COATED ORAL at 21:10

## 2019-05-24 RX ADMIN — ALBUTEROL SULFATE 2.5 MG: 2.5 SOLUTION RESPIRATORY (INHALATION) at 14:42

## 2019-05-24 RX ADMIN — MONTELUKAST SODIUM 10 MG: 10 TABLET, FILM COATED ORAL at 08:36

## 2019-05-24 RX ADMIN — ALBUTEROL SULFATE 2.5 MG: 2.5 SOLUTION RESPIRATORY (INHALATION) at 20:57

## 2019-05-24 RX ADMIN — Medication 10 ML: at 21:10

## 2019-05-24 NOTE — PROGRESS NOTES
Pt bedside report received from [address] [place] [address], pt resting in bed  watching Tv, assessment completed ,  pt A/Ox4 bed on low and locked position with floor free of objects,  call light within reach, pt on oxygen  @2L NC, respirations even and non labored, pt verbalized understanding to call for needs,  denies pain, no distress noted.

## 2019-05-24 NOTE — PROGRESS NOTES
Hospitalist    Daily Progress Note: 5/24/2019    Daiana Parker   Admission Date: 5/6/2019         87 y.o. CM presents via EMS with shortness of breath when got up this morning.  Had a productive cough with yellow sputum and received Rocephin.  In the ER CXR with large right pneumothorax and pulmonary consulted for chest tube placement and admission. Amilcar Armentacil a fall in February and seen by Dr. Giselle Carlos with compression L2,L3, L4. In the ER had VT and cardiology was consulted and added Amiodarone. He has had a right pneumothorax with persistent air leak and underwent surgical talc pleurodesis on 5/16. Slid from bed to floor. Weaned to 2lpm.  Had to have Rojas placed for urinary retention. Rojas removed but with retention again and 700-800ml on sca. Rojas replaced and urology consulted.     Today, no significant resp spx, good UO in Rojas      Subjective:   No events overnight, comfortable up in chair on O2 at 2L    Current Facility-Administered Medications   Medication Dose Route Frequency    guaiFENesin ER (MUCINEX) tablet 1,200 mg  1,200 mg Oral BID PRN    cloNIDine HCl (CATAPRES) tablet 0.2 mg  0.2 mg Oral BID PRN    ciprofloxacin HCl (CIPRO) tablet 500 mg  500 mg Oral Q12H    acetaminophen (TYLENOL) tablet 500 mg  500 mg Oral Q8H    acetaminophen (TYLENOL) tablet 650 mg  650 mg Oral Q4H PRN    lisinopril (PRINIVIL, ZESTRIL) tablet 10 mg  10 mg Oral DAILY    fluticasone propionate (FLONASE) 50 mcg/actuation nasal spray 2 Spray  2 Spray Both Nostrils DAILY    amiodarone (CORDARONE) tablet 400 mg  400 mg Oral Q12H    carvedilol (COREG) tablet 3.125 mg  3.125 mg Oral BID WITH MEALS    calcium carbonate (TUMS) chewable tablet 200 mg [elemental]  200 mg Oral TID PRN    hydrALAZINE (APRESOLINE) 20 mg/mL injection 10 mg  10 mg IntraVENous Q6H PRN    [Held by provider] aspirin delayed-release tablet 81 mg  81 mg Oral DAILY    montelukast (SINGULAIR) tablet 10 mg  10 mg Oral DAILY    tamsulosin (FLOMAX) capsule 0.4 mg  0.4 mg Oral DAILY    sodium chloride (NS) flush 5-40 mL  5-40 mL IntraVENous Q8H    sodium chloride (NS) flush 5-40 mL  5-40 mL IntraVENous PRN    albuterol (PROVENTIL VENTOLIN) nebulizer solution 2.5 mg  2.5 mg Nebulization Q6H RT    enoxaparin (LOVENOX) injection 40 mg  40 mg SubCUTAneous Q24H     Review of Systems  Constitutional:  negative for fever, chills, sweats  Cardiovascular:  negative for chest pain, palpitations, syncope, edema  Respiratory:  crepitus  Gastrointestinal:  negative for dysphagia, reflux, vomiting, diarrhea, abdominal pain, or melena  Neurologic:  negative for focal weakness, numbness, headache      Objective:     Vitals:    05/24/19 0425 05/24/19 0736 05/24/19 0904 05/24/19 1117   BP:  136/62  120/59   Pulse:  63  62   Resp:  18  18   Temp:  98.1 °F (36.7 °C)  97.3 °F (36.3 °C)   SpO2:  97% 98% 98%   Weight: 64.4 kg (141 lb 14.4 oz)      Height:           Intake and Output:   05/22 1901 - 05/24 0700  In: 920 [P.O.:920]  Out: 4250 [Urine:4250]  05/24 0701 - 05/24 1900  In: 118 [P.O.:118]  Out: -     Physical Exam:          Constitutional:  the patient is pale thin and in moderate acute distress, NC  2lpm  Respiratory: crepitus, chest tubes out, CTA  Cardiovascular:  RRR with grade III/VI murmur   Gastrointestinal: soft and non-tender; with positive bowel sounds, appetite good. Musculoskeletal: warm without cyanosis. There is no lower extremity edema, SCDs. Skin:  no jaundice or rashes, right chest incision, secure staples  Neurologic: no gross neuro deficits     Psychiatric:  alert and oriented x 3    CXR   5/22/19  IMPRESSION  Unchanged interstitial lung edema and tiny right apical pneumothorax.      5/21/19:  Unchanged interstitial lung edema and small right apical  pneumothorax, following removal of two right-sided chest tubes. Chest CT 5/8/19:      LAB  No results for input(s): GLUCPOC in the last 72 hours.     No lab exists for component: [address] 05/24/19  0536 05/22/19  0304   WBC 9.2 13.1*   HGB 11.6* 10.0*   HCT 34.2* 29.0*   PLT 94* 131*     Recent Labs     05/24/19  0536 05/23/19  2154 05/23/19  1630  05/22/19  1628 05/22/19  0304   * 128* 123*   < > 122* 121*   K 3.4*  --   --   --  3.6 3.4*   CL 99  --   --   --  86* 86*   CO2 29  --   --   --  27 28   GLU 89  --   --   --  96 80   BUN 9  --   --   --  16 16   CREA 0.62*  --   --   --  0.49* 0.46*   CA 7.8*  --   --   --  7.2* 7.1*    < > = values in this interval not displayed. Results for Joaquin Bella (MRN [id number]) as of 5/23/2019 09:53   Ref. Range 5/22/2019 11:45   Creatinine, urine Latest Units: mg/dL 120.00   Sodium,urine random Latest Units: MMOL/L 21   Chloride,urine random Latest Units: MMOL/L 88   Osmolality,urine Latest Ref Range: 50 - 1,400 MOSM/kg H2O 734       Assessment:  (Medical Decision Making)     Hospital Problems  Date Reviewed: 5/21/2019          Codes Class Noted POA    Hyponatremia ICD-10-CM: E87.1  ICD-9-CM: 276.1  5/22/2019 Unknown        Urinary retention ICD-10-CM: R33.9  ICD-9-CM: 788.20  5/20/2019 Unknown        Subcutaneous emphysema ([facility] ICD-10-CM: T79. 7XXA  ICD-9-CM: 958.7  5/9/2019 No        * (Principal) Pneumothorax on right ICD-10-CM: J93.9  ICD-9-CM: 512.89  5/6/2019 Yes        Shortness of breath ICD-10-CM: R06.02  ICD-9-CM: 786.05  5/6/2019 Yes        Acute on chronic respiratory failure with hypoxia [facility]) ICD-10-CM: J96.21  ICD-9-CM: 518.84, 799.02  5/6/2019 Yes        COPD exacerbation ([facility] ICD-10-CM: J44.1  ICD-9-CM: 491.21  5/6/2019 Yes        Ventricular tachyarrhythmia ([facility] ICD-10-CM: I47.2  ICD-9-CM: 427.1  5/6/2019 Yes        Pulmonary infiltrates ICD-10-CM: R91.8  ICD-9-CM: 793.19  5/6/2019 Unknown        VT (ventricular tachycardia) (HCC) ICD-10-CM: I47.2  ICD-9-CM: 427.1  5/6/2019 Yes        Chronic respiratory failure with hypoxia (HCC) (Chronic) ICD-10-CM: J96.11  ICD-9-CM: 518.83, 799.02  3/29/2016 Yes    Overview Addendum 5/6/2019 10:14 AM by Christopher Tom, NP     Continue to wear 2 L nasal cannula at night. May use 2 L supplemental oxygen to maintain sats greater than 90%. Essential hypertension, benign ICD-10-CM: I10  ICD-9-CM: 401.1  12/10/2015 Yes            Pt's respiratory status is doing well. PTX stable after removal of his chest tubes. However current issues are worsening hyponatremia of unclear cause and urinary retention. Plan:  (Medical Decision Making)     Dx:  1- R pneumothorax and pulmonary edema, s/p chest tube placement and pleurodesis, now on 4L. Hx of COPD, on 2L O2 at home. Improved. 2- Hyponatremia due to SIADH, improving, seen by nephrology. Resolved   3- Acute urinary retention, hx of BPH, seen by urology  4- HTN, controlled     Rx:  Li cath to be removed  Monitor UO    Dispo: rehab tomorrow    -Hyponatremia likely due to SIADH- although urine Na on the lower side- on fluid restriction 1000cc/24h- nephrology followinig  -per urology leave li until close to d/c and if he fails removal \" he will need early morning appt with us for li removal and voiding trial in the office. \"  -per surgery: Follow up with Dr. Afshin Begum in 1 week after discharge  --Albuterol, Singulair, Mucinex--denies shortness of breath  --PT, OOB to chair at least  --Amiodarone per cardiology. --Weaned to home O2 flow  --likely will stay in house over the weekend- may need tolvaptan for hyponatremia- will consult hospitalists to assume care. Once care assumed by hospitalist services will sign off and be available as needed. Discussed with Dr Adebayo Gonzalez. More than 50% of the time documented was spent in face-to-face contact with the patient and in the care of the patient on the floor/unit where the patient is located.     Harlan Thakkar MD

## 2019-05-24 NOTE — PROGRESS NOTES
Problem: Self Care Deficits Care Plan (Adult)  Goal: *Acute Goals and Plan of Care (Insert Text)  Description  1. Pt will toilet with SBA   2. Pt will complete functional mobility for ADLs with SBA  3. Pt will complete lower body dressing with SBA using AE as needed  4. Pt will complete grooming and hygiene at sink with SBA  5. Pt will demonstrate independence with HEP to promote increased BUE strength and functional use for ADLs  6. Pt will tolerate 23 minutes functional activity with min or fewer rest breaks to promote increased endurance for ADLs  7. Pt will complete UB bathing and dressing with set up     Timeframe: 7 days     Outcome: Progressing Towards Goal     OCCUPATIONAL THERAPY: Daily Note and PM    5/24/2019  INPATIENT: OT Visit Days: 4  [place] / [place] / Product Type: Medicare /      NAME/AGE/GENDER: Ramila Matthews is a 80 y.o. male   PRIMARY DIAGNOSIS:  Pneumothorax on right [J93.9] Pneumothorax on right   Pneumothorax on right    Procedure(s) (LRB):  RIGHT VATS WITH TALC PLEURODESIS CHEST TUBE INSERTION X 2  APICAL BLEBECTOMY (Right)  8 Days Post-Op  ICD-10: Treatment Diagnosis:    · Generalized Muscle Weakness (M62.81)   Precautions/Allergies:    Chest tube to suction Patient has no known allergies. ASSESSMENT:     Mr. Melany Jones was admitted with R side pneumothorax, now has a R side chest tube to suction. Pt lives with his wife (who is blind) and is fully independent and active at baseline, drives, reports that he mowed the lawn just last week. Pt uses a RW for mobility, endorses one fall which he stated might be more than 6 months ago.   5/24/2019 Pt presents in supine upon arrival. Pt transferred to sitting with mod I and additional time. Pt stood with CGA and a rolling walker and completed functional mobility in the room with a rolling walker and min/mod a for walker navigation.  Pt was positioned up in the chair and participated in exercises listed in the grid below. Good effort. Continue POC. This section established at most recent assessment   PROBLEM LIST (Impairments causing functional limitations):  1. Decreased Strength  2. Decreased ADL/Functional Activities  3. Decreased Transfer Abilities  4. Decreased Balance  5. Decreased Activity Tolerance   INTERVENTIONS PLANNED: (Benefits and precautions of occupational therapy have been discussed with the patient.)  1. Activities of daily living training  2. Adaptive equipment training  3. Balance training  4. Therapeutic activity  5. Therapeutic exercise     TREATMENT PLAN: Frequency/Duration: Follow patient 3 times/ week to address above goals. Rehabilitation Potential For Stated Goals: Good     REHAB RECOMMENDATIONS (at time of discharge pending progress):    Placement: It is my opinion, based on this patient's performance to date, that Mr. Ijeoma Edmond may benefit from discharge to SNF d/t impaired mobility, balance, and ADLs making it unsafe for him to d/c home. Equipment:    None at this time              OCCUPATIONAL PROFILE AND HISTORY:   History of Present Injury/Illness (Reason for Referral):  See H&P  Past Medical History/Comorbidities:   Mr. Ijeoma Edmond  has a past medical history of Abdominal aortic aneurysm ([address] (12/10/2015), Abdominal aortic aneurysm without rupture ([address] Allergic rhinitis (12/10/2015), Asthma, Benign neoplasm, Benign prostatic hyperplasia (12/10/2015), BPH without urinary obstruction, Cardiovascular disease (12/10/2015), Chronic obstructive asthma with exacerbation ([address] (12/10/2015), COPD (chronic obstructive pulmonary disease) ([address] (12/10/2015), Cough with hemoptysis, Erectile dysfunction (12/10/2015), Essential hypertension, benign (12/10/2015), Fracture (10/2014), History of colon polyps, Low testosterone (12/10/2015), Lumbago, Memory loss, Overflow incontinence, Raynaud's syndrome (12/10/2015), Rotator cuff tendinitis, Thrombocytopenia ([address] and Urinary frequency.   Mr. Ijeoma Edmond  has a past surgical history that includes hx hernia repair (1980); hx colonoscopy; hx fracture tx (10/2014); hx cataract removal (6/2016-right); and hx orthopaedic (03/2018). Social History/Living Environment:   Home Environment: Private residence  # Steps to Enter: 1  One/Two Story Residence: One story  Living Alone: No  Support Systems: Spouse/Significant Other/Partner, Child(tremayne)  Patient Expects to be Discharged to[de-identified] Private residence  Current DME Used/Available at Home: Grab bars, Shower chair, Raised toilet seat, Cane, straight, Walker, rolling  Tub or Shower Type: Shower  Prior Level of Function/Work/Activity:  Independent, active, lives w/ wife, son is able to assist, RW for mobility     Number of Personal Factors/Comorbidities that affect the Plan of Care: Expanded review of therapy/medical records (1-2):  MODERATE COMPLEXITY   ASSESSMENT OF OCCUPATIONAL PERFORMANCE[de-identified]   Activities of Daily Living:   Basic ADLs (From Assessment) Complex ADLs (From Assessment)   Feeding: Independent  Oral Facial Hygiene/Grooming: Contact guard assistance  Bathing: Minimum assistance  Upper Body Dressing: Minimum assistance  Lower Body Dressing: Contact guard assistance  Toileting: Contact guard assistance Instrumental ADL  Meal Preparation: Moderate assistance  Homemaking: Moderate assistance  Medication Management: Setup  Financial Management: Setup   Grooming/Bathing/Dressing Activities of Daily Living                             Bed/Mat Mobility  Supine to Sit: Modified independent  Sit to Stand: Contact guard assistance  Stand to Sit: Stand-by assistance       Most Recent Physical Functioning:   Gross Assessment:                  Posture:     Balance:  Sitting: Intact  Standing: Impaired  Standing - Static: Constant support; Fair  Standing - Dynamic : Fair;Poor Bed Mobility:  Supine to Sit: Modified independent  Wheelchair Mobility:     Transfers:  Sit to Stand: Contact guard assistance  Stand to Sit: Stand-by assistance Patient Vitals for the past 6 hrs:   BP SpO2 O2 Flow Rate (L/min) Pulse   19 1117 120/59 98 %  62   19 1442  96 % 2 l/min    19 1507 101/57 98 %  69       Mental Status  Neurologic State: Alert  Orientation Level: Oriented X4  Cognition: Follows commands  Perception: Appears intact  Perseveration: No perseveration noted  Safety/Judgement: Awareness of environment, Fall prevention                          Physical Skills Involved:  1. Balance  2. Strength  3. Activity Tolerance Cognitive Skills Affected (resulting in the inability to perform in a timely and safe manner): 1. none Psychosocial Skills Affected:  1. Habits/Routines   Number of elements that affect the Plan of Care: 3-5:  MODERATE COMPLEXITY   CLINICAL DECISION MAKIN[address] [place], [place] [id number] AM-PAC 6 Clicks   Daily Activity Inpatient Short Form  How much help from another person does the patient currently need. .. Total A Lot A Little None   1. Putting on and taking off regular lower body clothing? ? 1   ? 2   ? 3   ? 4   2. Bathing (including washing, rinsing, drying)? ? 1   ? 2   ? 3   ? 4   3. Toileting, which includes using toilet, bedpan or urinal?   ? 1   ? 2   ? 3   ? 4   4. Putting on and taking off regular upper body clothing? ? 1   ? 2   ? 3   ? 4   5. Taking care of personal grooming such as brushing teeth? ? 1   ? 2   ? 3   ? 4   6. Eating meals? ? 1   ? 2   ? 3   ? 4   © , Trustees of [address] [place] [address], under license to [employer]. All rights reserved      Score:  Initial: 21 Most Recent: X (Date: -- )    Interpretation of Tool:  Represents activities that are increasingly more difficult (i.e. Bed mobility, Transfers, Gait). Medical Necessity:     · Patient demonstrates good rehab potential due to higher previous functional level. Reason for Services/Other Comments:  · Patient continues to require present interventions due to patient's inability to complete ADLs.    Use of outcome tool(s) and clinical judgement create a POC that gives a: MODERATE COMPLEXITY         TREATMENT:   (In addition to Assessment/Re-Assessment sessions the following treatments were rendered)     Pre-treatment Symptoms/Complaints:    Pain: Initial:   Pain Intensity 1: 0  Post Session:  2     Therapeutic Activity: (14 minutes): Therapeutic activities including Bed transfers, Chair transfers and static/dynamic standing  to improve mobility, strength and balance. Required minimal/moderate assist  to promote dynamic balance in standing. Therapeutic Exercise: (10 minutes):  Exercises per grid below to improve mobility, strength and dynamic movement of arm - bilateral to improve functional bending, lifting and reaching. Required minimal visual, verbal and tactile cues to promote proper body posture and promote proper body mechanics. Progressed resistance, range and repetitions as indicated. Date:  5/24/19 Date:   Date:     Activity/Exercise Parameters Parameters Parameters   Shoulder flexion/extension 2 sets of 10 reps with green theraband      Shoulder horizontal add/abb 2 sets of 10 reps with green theraband      Punches 2 sets of 10 reps with green theraband      Elbow flexion/extension 2 sets of 10 reps with green theraband                                                  Braces/Orthotics/Lines/Etc:   · O2 Device: Nasal cannula  Treatment/Session Assessment:    · Response to Treatment:  Good effort  · Interdisciplinary Collaboration:   o Certified Occupational Therapy Assistant  o Registered Nurse  · After treatment position/precautions:   o Up in chair  o Bed alarm/tab alert on  o Bed/Chair-wheels locked  o Call light within reach  o Family at bedside   · Compliance with Program/Exercises: Compliant all of the time. · Recommendations/Intent for next treatment session: \"Next visit will focus on advancements to more challenging activities and reduction in assistance provided\".   Total Treatment Duration:  OT Patient Time In/Time Out  Time In: 1408  Time Out: [address] [facility]

## 2019-05-24 NOTE — PROGRESS NOTES
Pt sitting up in chair after working with PT. Pt tolerating well. No distress noted at this time. Pt on 2 L NC At this time. Door open will monitor.

## 2019-05-24 NOTE — PROGRESS NOTES
Pt resting in bed with eyes closed. Pt awakens when spoken to. Pt oriented times 3 at this time. Pt on 2 L NC At this time. Pt denies pain or distress at this time. Dressing to right rib area clean, dry, intact at this time. Rojas draining yellow urine. SCDs in place. Pt aware of 1000 ml fluid restriction. Pt encouraged to call for assistance if needed call light in reach, door open will monitor.

## 2019-05-24 NOTE — PROGRESS NOTES
Pt assisted back to bed after sitting up for several hours. Pt tolerated well. Pt has redness and burning to sacrum. Antifungal cream applied. allevyn in place. Pt encouraged to call for assistance if needed call light in reach, door open will monitor.

## 2019-05-24 NOTE — PROGRESS NOTES
[facility] Records  Admission Date: 5/6/2019             Renal Daily Progress Note: 5/24/2019    The patient's chart is reviewed and the patient is discussed with the staff. Follow up Hyponatremia  Subjective:   Patient seen and examined on rounds reports good appetite, no new complaints.     ROS:  General: no fever/ chills  Lung: no SOB  CV: No CP  GI: no N/V/D  : li draining yellow urine  Ext: no edema      Current Facility-Administered Medications   Medication Dose Route Frequency    guaiFENesin ER (MUCINEX) tablet 1,200 mg  1,200 mg Oral BID PRN    cloNIDine HCl (CATAPRES) tablet 0.2 mg  0.2 mg Oral BID PRN    ciprofloxacin HCl (CIPRO) tablet 500 mg  500 mg Oral Q12H    acetaminophen (TYLENOL) tablet 500 mg  500 mg Oral Q8H    acetaminophen (TYLENOL) tablet 650 mg  650 mg Oral Q4H PRN    lisinopril (PRINIVIL, ZESTRIL) tablet 10 mg  10 mg Oral DAILY    fluticasone propionate (FLONASE) 50 mcg/actuation nasal spray 2 Spray  2 Spray Both Nostrils DAILY    amiodarone (CORDARONE) tablet 400 mg  400 mg Oral Q12H    carvedilol (COREG) tablet 3.125 mg  3.125 mg Oral BID WITH MEALS    calcium carbonate (TUMS) chewable tablet 200 mg [elemental]  200 mg Oral TID PRN    hydrALAZINE (APRESOLINE) 20 mg/mL injection 10 mg  10 mg IntraVENous Q6H PRN    [Held by provider] aspirin delayed-release tablet 81 mg  81 mg Oral DAILY    montelukast (SINGULAIR) tablet 10 mg  10 mg Oral DAILY    tamsulosin (FLOMAX) capsule 0.4 mg  0.4 mg Oral DAILY    sodium chloride (NS) flush 5-40 mL  5-40 mL IntraVENous Q8H    sodium chloride (NS) flush 5-40 mL  5-40 mL IntraVENous PRN    albuterol (PROVENTIL VENTOLIN) nebulizer solution 2.5 mg  2.5 mg Nebulization Q6H RT    enoxaparin (LOVENOX) injection 40 mg  40 mg SubCUTAneous Q24H         Objective:     Vitals:    05/24/19 0425 05/24/19 0736 05/24/19 0904 05/24/19 1117   BP:  136/62  120/59   Pulse:  63  62   Resp:  18  18   Temp:  98.1 °F (36.7 °C)  97.3 °F (36.3 °C)   SpO2:  97% 98% 98%   Weight: 64.4 kg (141 lb 14.4 oz)      Height:         Intake and Output:   05/22 1901 - 05/24 0700  In: 316 [P.O.:920]  Out: 4250 [Urine:4250]  05/24 0701 - 05/24 1900  In: 118 [P.O.:118]  Out: -     Physical Exam:   Constitutional:  the patient is well developed and in no acute distress  HEENT:  Sclera clear, pupils equal, oral mucosa moist  Lungs: clear bilaterally  Cardiovascular:  RRR without Rub  Abd/GI: soft and non-tender; with positive bowel sounds. Ext: warm without cyanosis. There is no lower leg edema. Skin:  no jaundice or rashes  Neuro: no gross neuro deficits   Psychiatric: Calm. LAB  Recent Labs     05/24/19  0536 05/22/19  0304   WBC 9.2 13.1*   HGB 11.6* 10.0*   HCT 34.2* 29.0*   PLT 94* 131*     Recent Labs     05/24/19  0536 05/23/19  2154 05/23/19  1630  05/22/19  1628 05/22/19  0304   * 128* 123*   < > 122* 121*   K 3.4*  --   --   --  3.6 3.4*   CL 99  --   --   --  86* 86*   CO2 29  --   --   --  27 28   GLU 89  --   --   --  96 80   BUN 9  --   --   --  16 16   CREA 0.62*  --   --   --  0.49* 0.46*    < > = values in this interval not displayed. No results for input(s): PH, PCO2, PO2, HCO3 in the last 72 hours. Assessment:  (Medical Decision Making)     Hospital Problems  Date Reviewed: 5/21/2019          Codes Class Noted POA    Hyponatremia ICD-10-CM: E87.1  ICD-9-CM: 276.1  5/22/2019 Unknown        Urinary retention ICD-10-CM: R33.9  ICD-9-CM: 788.20  5/20/2019 Unknown        Subcutaneous emphysema ([facility] ICD-10-CM: T79. 7XXA  ICD-9-CM: 958.7  5/9/2019 No        * (Principal) Pneumothorax on right ICD-10-CM: J93.9  ICD-9-CM: 512.89  5/6/2019 Yes        Shortness of breath ICD-10-CM: R06.02  ICD-9-CM: 786.05  5/6/2019 Yes        Acute on chronic respiratory failure with hypoxia [facility]) ICD-10-CM: J96.21  ICD-9-CM: 518.84, 799.02  5/6/2019 Yes        COPD exacerbation ([facility] ICD-10-CM: J44.1  ICD-9-CM: 491.21  5/6/2019 Yes        Ventricular tachyarrhythmia ([facility] ICD-10-CM: I47.2  ICD-9-CM: 427.1  5/6/2019 Yes        Pulmonary infiltrates ICD-10-CM: R91.8  ICD-9-CM: 793.19  5/6/2019 Unknown        VT (ventricular tachycardia) ([facility] ICD-10-CM: I47.2  ICD-9-CM: 427.1  5/6/2019 Yes        Chronic respiratory failure with hypoxia ([facility]) (Chronic) ICD-10-CM: J96.11  ICD-9-CM: 518.83, 799.02  3/29/2016 Yes    Overview Addendum 5/6/2019 10:14 AM by Josue Callaway, NP     Continue to wear 2 L nasal cannula at night. May use 2 L supplemental oxygen to maintain sats greater than 90%. Essential hypertension, benign ICD-10-CM: I10  ICD-9-CM: 401.1  12/10/2015 Yes              Plan:  (Medical Decision Making)   1. Hyponatremia likely [address], [place] [address], TSH 0.78, glucose 96. cortisol 24.5. S/p 1 dose tolvaptan 5/23  - Na up from 123>128->134, hold tolvaptan today continue to trend Na. 2. Hypokalemia- improved s/p repletion.      3. Pneumothorax-resolved  -s/p Chest tube     Felicitas Jorgensen NP

## 2019-05-24 NOTE — PROGRESS NOTES
[address] [facility] has a rehab bed available when patient is medically stable.

## 2019-05-25 ENCOUNTER — APPOINTMENT (OUTPATIENT)
Dept: GENERAL RADIOLOGY | Age: 84
DRG: 163 | End: 2019-05-25
Attending: SURGERY
Payer: MEDICARE

## 2019-05-25 VITALS
HEART RATE: 60 BPM | RESPIRATION RATE: 18 BRPM | WEIGHT: 135 LBS | TEMPERATURE: 97.5 F | OXYGEN SATURATION: 92 % | DIASTOLIC BLOOD PRESSURE: 57 MMHG | SYSTOLIC BLOOD PRESSURE: 95 MMHG | BODY MASS INDEX: 20.46 KG/M2 | HEIGHT: 68 IN

## 2019-05-25 LAB
ANION GAP SERPL CALC-SCNC: 8 MMOL/L (ref 7–16)
BUN SERPL-MCNC: 14 MG/DL (ref 8–23)
CALCIUM SERPL-MCNC: 7.4 MG/DL (ref 8.3–10.4)
CHLORIDE SERPL-SCNC: 98 MMOL/L (ref 98–107)
CO2 SERPL-SCNC: 29 MMOL/L (ref 21–32)
CREAT SERPL-MCNC: 0.58 MG/DL (ref 0.8–1.5)
GLUCOSE SERPL-MCNC: 107 MG/DL (ref 65–100)
MAGNESIUM SERPL-MCNC: 2.1 MG/DL (ref 1.8–2.4)
POTASSIUM SERPL-SCNC: 3 MMOL/L (ref 3.5–5.1)
SODIUM SERPL-SCNC: 135 MMOL/L (ref 136–145)

## 2019-05-25 PROCEDURE — 36415 COLL VENOUS BLD VENIPUNCTURE: CPT

## 2019-05-25 PROCEDURE — 94760 N-INVAS EAR/PLS OXIMETRY 1: CPT

## 2019-05-25 PROCEDURE — 74011250637 HC RX REV CODE- 250/637: Performed by: HOSPITALIST

## 2019-05-25 PROCEDURE — 71045 X-RAY EXAM CHEST 1 VIEW: CPT

## 2019-05-25 PROCEDURE — 74011250637 HC RX REV CODE- 250/637: Performed by: SURGERY

## 2019-05-25 PROCEDURE — 74011000250 HC RX REV CODE- 250: Performed by: SURGERY

## 2019-05-25 PROCEDURE — 77010033678 HC OXYGEN DAILY

## 2019-05-25 PROCEDURE — 74011250636 HC RX REV CODE- 250/636: Performed by: INTERNAL MEDICINE

## 2019-05-25 PROCEDURE — 94640 AIRWAY INHALATION TREATMENT: CPT

## 2019-05-25 PROCEDURE — 80048 BASIC METABOLIC PNL TOTAL CA: CPT

## 2019-05-25 PROCEDURE — 77030011943

## 2019-05-25 PROCEDURE — 83735 ASSAY OF MAGNESIUM: CPT

## 2019-05-25 RX ORDER — CARVEDILOL 3.12 MG/1
3.12 TABLET ORAL 2 TIMES DAILY WITH MEALS
Qty: 180 TAB | Refills: 0 | Status: SHIPPED | OUTPATIENT
Start: 2019-05-25 | End: 2019-06-16

## 2019-05-25 RX ORDER — IPRATROPIUM BROMIDE AND ALBUTEROL SULFATE 2.5; .5 MG/3ML; MG/3ML
3 SOLUTION RESPIRATORY (INHALATION)
Qty: 30 NEBULE | Refills: 3 | Status: SHIPPED
Start: 2019-05-25 | End: 2019-12-11

## 2019-05-25 RX ORDER — POTASSIUM CHLORIDE 20 MEQ/1
40 TABLET, EXTENDED RELEASE ORAL DAILY
Qty: 3 TAB | Refills: 0 | Status: SHIPPED
Start: 2019-05-25 | End: 2021-02-10

## 2019-05-25 RX ORDER — AMIODARONE HYDROCHLORIDE 200 MG/1
400 TABLET ORAL EVERY 12 HOURS
Qty: 180 TAB | Refills: 0 | Status: SHIPPED | OUTPATIENT
Start: 2019-05-25 | End: 2019-06-26 | Stop reason: ALTCHOICE

## 2019-05-25 RX ORDER — POTASSIUM CHLORIDE 20 MEQ/1
40 TABLET, EXTENDED RELEASE ORAL 2 TIMES DAILY
Status: DISCONTINUED | OUTPATIENT
Start: 2019-05-25 | End: 2019-05-25 | Stop reason: HOSPADM

## 2019-05-25 RX ORDER — CIPROFLOXACIN 500 MG/1
500 TABLET ORAL EVERY 12 HOURS
Qty: 8 TAB | Refills: 0 | Status: SHIPPED
Start: 2019-05-25 | End: 2019-06-16

## 2019-05-25 RX ORDER — LISINOPRIL 10 MG/1
10 TABLET ORAL DAILY
Qty: 90 TAB | Refills: 0 | Status: SHIPPED | OUTPATIENT
Start: 2019-05-25 | End: 2019-06-16

## 2019-05-25 RX ADMIN — MONTELUKAST SODIUM 10 MG: 10 TABLET, FILM COATED ORAL at 09:38

## 2019-05-25 RX ADMIN — CARVEDILOL 3.12 MG: 3.12 TABLET, FILM COATED ORAL at 09:38

## 2019-05-25 RX ADMIN — FLUTICASONE PROPIONATE 2 SPRAY: 50 SPRAY, METERED NASAL at 09:38

## 2019-05-25 RX ADMIN — LISINOPRIL 10 MG: 5 TABLET ORAL at 09:38

## 2019-05-25 RX ADMIN — AMIODARONE HYDROCHLORIDE 400 MG: 200 TABLET ORAL at 09:37

## 2019-05-25 RX ADMIN — ALBUTEROL SULFATE 2.5 MG: 2.5 SOLUTION RESPIRATORY (INHALATION) at 08:00

## 2019-05-25 RX ADMIN — ALBUTEROL SULFATE 2.5 MG: 2.5 SOLUTION RESPIRATORY (INHALATION) at 14:11

## 2019-05-25 RX ADMIN — Medication 5 ML: at 06:00

## 2019-05-25 RX ADMIN — POTASSIUM CHLORIDE 40 MEQ: 20 TABLET, EXTENDED RELEASE ORAL at 11:30

## 2019-05-25 RX ADMIN — TAMSULOSIN HYDROCHLORIDE 0.4 MG: 0.4 CAPSULE ORAL at 09:39

## 2019-05-25 RX ADMIN — ACETAMINOPHEN 500 MG: 500 TABLET, FILM COATED ORAL at 05:21

## 2019-05-25 RX ADMIN — CIPROFLOXACIN HYDROCHLORIDE 500 MG: 500 TABLET, FILM COATED ORAL at 09:38

## 2019-05-25 RX ADMIN — ENOXAPARIN SODIUM 40 MG: 40 INJECTION SUBCUTANEOUS at 11:30

## 2019-05-25 NOTE — PROGRESS NOTES
End of shift report given Jaquelin Bui RN , pt resting in bed, no c/o pain , call light within reach, bed locked and low position, pt respirations even and unlabored, no distress noted.

## 2019-05-25 NOTE — PROGRESS NOTES
Pt resting in the bed watching tv with no complaints. Door left open when unattended. Will continue to monitor.

## 2019-05-25 NOTE — PROGRESS NOTES
Shirley Roberto  Admission Date: 5/6/2019             Renal Daily Progress Note: 5/25/2019    The patient's chart is reviewed and the patient is discussed with the staff. Follow up Hyponatremia  Subjective:   Patient seen and examined on rounds reports good appetite, no new complaints.     ROS:  General: no fever/ chills  Lung: no SOB  CV: No CP  GI: no N/V/D  : li draining yellow urine  Ext: no edema      Current Facility-Administered Medications   Medication Dose Route Frequency    potassium chloride (K-DUR, KLOR-CON) SR tablet 40 mEq  40 mEq Oral BID    guaiFENesin ER (MUCINEX) tablet 1,200 mg  1,200 mg Oral BID PRN    cloNIDine HCl (CATAPRES) tablet 0.2 mg  0.2 mg Oral BID PRN    ciprofloxacin HCl (CIPRO) tablet 500 mg  500 mg Oral Q12H    acetaminophen (TYLENOL) tablet 500 mg  500 mg Oral Q8H    acetaminophen (TYLENOL) tablet 650 mg  650 mg Oral Q4H PRN    lisinopril (PRINIVIL, ZESTRIL) tablet 10 mg  10 mg Oral DAILY    fluticasone propionate (FLONASE) 50 mcg/actuation nasal spray 2 Spray  2 Spray Both Nostrils DAILY    amiodarone (CORDARONE) tablet 400 mg  400 mg Oral Q12H    carvedilol (COREG) tablet 3.125 mg  3.125 mg Oral BID WITH MEALS    calcium carbonate (TUMS) chewable tablet 200 mg [elemental]  200 mg Oral TID PRN    hydrALAZINE (APRESOLINE) 20 mg/mL injection 10 mg  10 mg IntraVENous Q6H PRN    [Held by provider] aspirin delayed-release tablet 81 mg  81 mg Oral DAILY    montelukast (SINGULAIR) tablet 10 mg  10 mg Oral DAILY    tamsulosin (FLOMAX) capsule 0.4 mg  0.4 mg Oral DAILY    sodium chloride (NS) flush 5-40 mL  5-40 mL IntraVENous Q8H    sodium chloride (NS) flush 5-40 mL  5-40 mL IntraVENous PRN    albuterol (PROVENTIL VENTOLIN) nebulizer solution 2.5 mg  2.5 mg Nebulization Q6H RT    enoxaparin (LOVENOX) injection 40 mg  40 mg SubCUTAneous Q24H         Objective:     Vitals:    05/25/19 0643 05/25/19 0729 05/25/19 0800 05/25/19 1152   BP:  122/71 95/57   Pulse:  (!) 56  60   Resp:  17  18   Temp:  97.5 °F (36.4 °C)  97.5 °F (36.4 °C)   SpO2:  97% 95% 97%   Weight: 61.2 kg (135 lb)      Height:         Intake and Output:   05/23 1901 - 05/25 0700  In: 904 [P.O.:904]  Out: 2692 [Urine:3425]  05/25 0701 - 05/25 1900  In: 118 [P.O.:118]  Out: -     Physical Exam:   Constitutional:  the patient is well developed and in no acute distress  HEENT:  Sclera clear, pupils equal, oral mucosa moist  Lungs: clear bilaterally  Cardiovascular:  RRR without Rub  Abd/GI: soft and non-tender; with positive bowel sounds. Ext: warm without cyanosis. There is no lower leg edema. Skin:  no jaundice or rashes  Neuro: no gross neuro deficits   Psychiatric: Calm. LAB  Recent Labs     05/24/19  0536   WBC 9.2   HGB 11.6*   HCT 34.2*   PLT 94*     Recent Labs     05/25/19  0545 05/24/19  0536 05/23/19  2154  05/22/19  1628   * 134* 128*   < > 122*   K 3.0* 3.4*  --   --  3.6   CL 98 99  --   --  86*   CO2 29 29  --   --  27   * 89  --   --  96   BUN 14 9  --   --  16   CREA 0.58* 0.62*  --   --  0.49*   MG 2.1  --   --   --   --     < > = values in this interval not displayed. No results for input(s): PH, PCO2, PO2, HCO3 in the last 72 hours. Assessment:  (Medical Decision Making)     Hospital Problems  Date Reviewed: 5/21/2019          Codes Class Noted POA    Hyponatremia ICD-10-CM: E87.1  ICD-9-CM: 276.1  5/22/2019 Unknown        Urinary retention ICD-10-CM: R33.9  ICD-9-CM: 788.20  5/20/2019 Unknown        Subcutaneous emphysema ([facility] ICD-10-CM: T79. 7XXA  ICD-9-CM: 958.7  5/9/2019 No        * (Principal) Pneumothorax on right ICD-10-CM: J93.9  ICD-9-CM: 512.89  5/6/2019 Yes        Shortness of breath ICD-10-CM: R06.02  ICD-9-CM: 786.05  5/6/2019 Yes        Acute on chronic respiratory failure with hypoxia [facility]) ICD-10-CM: J96.21  ICD-9-CM: 518.84, 799.02  5/6/2019 Yes        COPD exacerbation ([email or web address] ICD-10-CM: J44.1  ICD-9-CM: 491.21  5/6/2019 Yes Ventricular tachyarrhythmia ([facility] ICD-10-CM: I47.2  ICD-9-CM: 427.1  5/6/2019 Yes        Pulmonary infiltrates ICD-10-CM: R91.8  ICD-9-CM: 793.19  5/6/2019 Unknown        VT (ventricular tachycardia) ([facility] ICD-10-CM: I47.2  ICD-9-CM: 427.1  5/6/2019 Yes        Chronic respiratory failure with hypoxia (HCC) (Chronic) ICD-10-CM: J96.11  ICD-9-CM: 518.83, 799.02  3/29/2016 Yes    Overview Addendum 5/6/2019 10:14 AM by Shaheed Roblero, NP     Continue to wear 2 L nasal cannula at night. May use 2 L supplemental oxygen to maintain sats greater than 90%. Essential hypertension, benign ICD-10-CM: I10  ICD-9-CM: 401.1  12/10/2015 Yes              Plan:  (Medical Decision Making)   1. Hyponatremia likely [address], [address], [place] [address], TSH 0.78, glucose 96. cortisol 24.5. S/p 1 dose tolvaptan 5/23  - Na up from 123>128->135,    2. Hypokalemia- improved s/p repletion.      3. Pneumothorax-resolved  -s/p Chest tube     Discharge planned for today     Keri Mccann MD

## 2019-05-25 NOTE — PROGRESS NOTES
Patient has been d/c to [facility]. [employer] [facility] will transport patient and family is agreeable. Patient has met all treatment goals / milestones. CM will continue to monitor and remain available for any concerns or needs that may occur. Care Management Interventions  PCP Verified by CM: Yes(Pita Ceron,)  Mode of Transport at Discharge:  Other (see comment)([facility] )  Transition of Care Consult (CM Consult): Discharge Planning, SNF(Patient has been accepted at [address])  Discharge Durable Medical Equipment: No(walker, oxygen concentrator with [employer],)  Physical Therapy Consult: Yes  Occupational Therapy Consult: Yes  Speech Therapy Consult: No  Current Support Network: Lives with Spouse, Own Home(lives with spouse, Shital Rashid [phone], (who is blind), 2 children, one local and the other in [place])  Confirm Follow Up Transport: Other (see comment)([facility] )  Plan discussed with Pt/Family/Caregiver: Yes  Freedom of Choice Offered: Yes  [employer] Information Provided?: No(confirms MCR/[employer] supplemental - able to get rx)  Discharge Location  Discharge Placement: Skilled nursing facility(Patient has been accepted at [address])

## 2019-05-25 NOTE — DISCHARGE SUMMARY
Physician Discharge Summary     Patient ID:  Lyly Jordan  [id number]  17 y.o.  4/18/1932    Admit date: 5/6/2019    Discharge date: 5-    Diagnosis:  1- Pneumothorax, s/p chest tube placement and pleurodesis. Hx of COPD. Resolved. 2- Pulmonary edema, resolved. 3- Acute on chronic hypoxic respiratory failure due to #1/2, uses 2L oxygen at baseline. 4- Sinus bradycardia, mild, asymptomatic. 5- Hyponatremia due to SIADH, resolved with fluid restriction. Seen by Nephrology. 6- Urinary retention due to BPH, seen by Urology. Rojas cathter removed on day before discharge and patient si requiring in and out cathter as needed. Watch urine output and scan bladder as needed, use in and out urinary cath tid prn for 1-2 days. If continued urinary retention, need Rojas cathter and appointment with Dr Smallwood/ Urology in 1 week. 7- Hypertension, controlled. Hx of L2-4 compression fracture, transferred to rehab.  8- Low testosterone, stable. CT CHEST:  INDICATION: Pneumothorax  Multiple axial images were obtained through the chest without IV contrast.   Radiation dose reduction techniques were used for this study: All CT scans  performed at this facility use one or all of the following: Automated exposure  control, adjustment of the mA and/or kVp according to patient's size, iterative  reconstruction. COMPARISON: Earlier chest x-ray  FINDINGS:  -LUNGS: There is a moderate size right pneumothorax with partial collapse of the  right lung. Right chest tube is in place. The tip of the chest tube passes  into the posterior mediastinum and crosses the midline, probably posterior to  the esophagus. There is bullous change in both lungs. There is no significant  pleural effusion. -MEDIASTINUM/AXILLA: There is moderate pneumomediastinum. -HEART/VESSELS: Mild vascular calcification and cardiomegaly. -CHEST WALL/BONES: Extensive subcutaneous air in the chest wall bilaterally,  right greater than left. Multiple lower thoracic compression fractures. -UPPER ABDOMEN: No acute findings. IMPRESSION:   1. Moderate size right pneumothorax with pneumomediastinum and extensive  subcutaneous air. 2.  Tip of the right chest tube passes into the posterior mediastinum    Hospital course:   80 y.o. CM presents via EMS with shortness of breath when got up this morning.  Had a productive cough with yellow sputum and received Rocephin.  In the ER CXR with large right pneumothorax and pulmonary consulted for chest tube placement and admission. Binta Melgarde a fall in February and seen by Dr. Violeta Boles with compression L2,L3, L4. In the ER had VT and cardiology was consulted and added Amiodarone. He has had a right pneumothorax with persistent air leak and underwent surgical talc pleurodesis on 5/16. Slid from bed to floor. Weaned to 2lpm.  Had to have Rojas placed for urinary retention. Rojas removed but with retention again and 700-800ml on sca. Rojas replaced and urology consulted. Patient admitted and treated as above. On day of discharge, patient exam showed diminished bs bibasilar and resp spx were minimal.    PCP: Tiffanie Perdomo MD    Patient Instructions:   Current Discharge Medication List      START taking these medications    Details   carvedilol (COREG) 3.125 mg tablet Take 1 Tab by mouth two (2) times daily (with meals). Qty: 180 Tab, Refills: 0      ciprofloxacin HCl (CIPRO) 500 mg tablet Take 1 Tab by mouth every twelve (12) hours. Qty: 8 Tab, Refills: 0      amiodarone (CORDARONE) 200 mg tablet Take 2 Tabs by mouth every twelve (12) hours. Qty: 180 Tab, Refills: 0      albuterol-ipratropium (DUO-NEB) 2.5 mg-0.5 mg/3 ml nebu    Potassium chloride tab 40 meq/ day for 3 days. 3 mL by Nebulization route every four (4) hours as needed for Other (dyspnea).   Qty: 30 Nebule, Refills: 3         CONTINUE these medications which have CHANGED    Details   lisinopril (PRINIVIL, ZESTRIL) 10 mg tablet Take 1 Tab by mouth daily.  Qty: 90 Tab, Refills: 0    Associated Diagnoses: Essential hypertension, benign         CONTINUE these medications which have NOT CHANGED    Details   tamsulosin (FLOMAX) 0.4 mg capsule Take 1 Cap by mouth daily. Qty: 90 Cap, Refills: 4      montelukast (SINGULAIR) 10 mg tablet Take 1 Tab by mouth daily. Qty: 90 Tab, Refills: 4    Associated Diagnoses: Mixed simple and mucopurulent chronic bronchitis (HCC)      umeclidinium-vilanterol (ANORO ELLIPTA) 62.5-25 mcg/actuation inhaler Take 1 Puff by inhalation daily. Oxygen Use as instructed. Use as instructed; faxed to [facility]      cyanocobalamin (VITAMIN B12) 1,000 mcg/mL injection INJECT 1ML INTRAMUSCULARLY ONCE FOR 1 DOSE  Refills: 12      fluticasone (FLONASE) 50 mcg/actuation nasal spray 2 Sprays by Both Nostrils route daily. Qty: 48 g, Refills: 4    Associated Diagnoses: Allergic rhinitis due to pollen, unspecified seasonality      calcium citrate-vitamin d3 (CITRACAL+D) 315-200 mg-unit tab Take 2 Tabs by mouth daily (with breakfast). cholecalciferol (VITAMIN D3) 1,000 unit cap Take  by mouth.      guaiFENesin ER (MUCINEX) 600 mg ER tablet Take 1,200 mg by mouth daily. Biotin 2,500 mcg cap Take  by mouth. aspirin delayed-release 81 mg tablet Take  by mouth daily. Instructions:     Rehab MD in 1-3 days. Watch urine output and scan bladder as needed, use in and out urinary cath tid prn for 1-2 days. If continued urinary retention, need Rojas cathter and appointment with Dr Smallwood/ Urology in 1 week, cardiac diet, activity as tolerated. Oxygen 2L as needed. Check CBC, BMP and Mg in 10 days. Condition: Stable  Time 35 min    Please send copy to primary physician.     Signed:  Leti Sandhu MD  5/25/2019  11:00 AM

## 2019-05-25 NOTE — PROGRESS NOTES
Pt received schedule medications, call light within reach , bed on low position and locked, pt able to make needs known,Pt not voiding for 8hrs after li catheter removed, Bladder scan shows 108cc, will continue to monitor, denies pain  no discomfort noted.

## 2019-05-25 NOTE — PROGRESS NOTES
Pt resting in the bed watching tv. Pt is stable. Pt is axo. Respirations are even and unlabored with nasal cannula, 2 L/min. No needs expressed. Safety measures are in place. Will continue to monitor.

## 2019-06-07 ENCOUNTER — PATIENT OUTREACH (OUTPATIENT)
Dept: CASE MANAGEMENT | Age: 84
End: 2019-06-07

## 2019-06-07 NOTE — PROGRESS NOTES
Community Care Team documentation for patient in [facility]    The information below provided by:[facility]  PT Update: CGA transfers and Amb 30 ft RWwith WC follow for O2; Mod A LB ADL's Min A UB; Mild oropharyngeal dysphagia. Increased endurance and carryover. Nursing Update:Pt. continues with c/o dizziness and hypotension(90/60) pt. with weight gain, 2000cc fluid restriction and 12.5 mg HCTZ qod r/t hypotesion and dizziness.  Consult for cardiologist and pulmonologist.      Discharge Date:TBD      Assign to Elissa Hdz 32

## 2019-06-10 ENCOUNTER — HOSPITAL ENCOUNTER (INPATIENT)
Age: 84
LOS: 6 days | Discharge: NURSING, REHAB OR LONG-TERM CARE | DRG: 186 | End: 2019-06-16
Attending: EMERGENCY MEDICINE | Admitting: FAMILY MEDICINE
Payer: MEDICARE

## 2019-06-10 ENCOUNTER — APPOINTMENT (OUTPATIENT)
Dept: GENERAL RADIOLOGY | Age: 84
DRG: 186 | End: 2019-06-10
Attending: EMERGENCY MEDICINE
Payer: MEDICARE

## 2019-06-10 ENCOUNTER — APPOINTMENT (OUTPATIENT)
Dept: CT IMAGING | Age: 84
DRG: 186 | End: 2019-06-10
Attending: EMERGENCY MEDICINE
Payer: MEDICARE

## 2019-06-10 DIAGNOSIS — J43.9 PULMONARY EMPHYSEMA, UNSPECIFIED EMPHYSEMA TYPE (HCC): ICD-10-CM

## 2019-06-10 DIAGNOSIS — R91.8 INFILTRATE OF UPPER LOBE OF LEFT LUNG PRESENT ON IMAGING STUDY: ICD-10-CM

## 2019-06-10 DIAGNOSIS — J90 PLEURAL EFFUSION: ICD-10-CM

## 2019-06-10 DIAGNOSIS — R91.8 INFILTRATE OF UPPER LOBE OF RIGHT LUNG PRESENT ON IMAGING STUDY: ICD-10-CM

## 2019-06-10 DIAGNOSIS — I47.20 VENTRICULAR TACHYARRHYTHMIA: ICD-10-CM

## 2019-06-10 DIAGNOSIS — J43.2 CENTRILOBULAR EMPHYSEMA (HCC): ICD-10-CM

## 2019-06-10 DIAGNOSIS — J41.8 MIXED SIMPLE AND MUCOPURULENT CHRONIC BRONCHITIS (HCC): ICD-10-CM

## 2019-06-10 DIAGNOSIS — R09.02 HYPOXIA: Primary | ICD-10-CM

## 2019-06-10 DIAGNOSIS — J90 BILATERAL PLEURAL EFFUSION: ICD-10-CM

## 2019-06-10 DIAGNOSIS — I10 ESSENTIAL HYPERTENSION, BENIGN: ICD-10-CM

## 2019-06-10 DIAGNOSIS — J96.11 CHRONIC RESPIRATORY FAILURE WITH HYPOXIA (HCC): Chronic | ICD-10-CM

## 2019-06-10 DIAGNOSIS — R09.89 ABNORMAL FINDING OF LUNG: ICD-10-CM

## 2019-06-10 DIAGNOSIS — J44.9 CHRONIC OBSTRUCTIVE PULMONARY DISEASE, UNSPECIFIED COPD TYPE (HCC): ICD-10-CM

## 2019-06-10 DIAGNOSIS — R91.8 PULMONARY INFILTRATES: ICD-10-CM

## 2019-06-10 DIAGNOSIS — J96.21 ACUTE ON CHRONIC RESPIRATORY FAILURE WITH HYPOXIA (HCC): ICD-10-CM

## 2019-06-10 LAB
ALBUMIN SERPL-MCNC: 1.7 G/DL (ref 3.2–4.6)
ALBUMIN/GLOB SERPL: 0.7 {RATIO} (ref 1.2–3.5)
ALP SERPL-CCNC: 102 U/L (ref 50–136)
ALT SERPL-CCNC: 36 U/L (ref 12–65)
ANION GAP SERPL CALC-SCNC: 6 MMOL/L (ref 7–16)
APPEARANCE FLD: NORMAL
ARTERIAL PATENCY WRIST A: YES
AST SERPL-CCNC: 25 U/L (ref 15–37)
ATRIAL RATE: 52 BPM
BASE DEFICIT BLD-SCNC: 11 MMOL/L
BDY SITE: ABNORMAL
BILIRUB SERPL-MCNC: 1.4 MG/DL (ref 0.2–1.1)
BNP SERPL-MCNC: 243 PG/ML
BODY TEMPERATURE: 98.6
BUN SERPL-MCNC: 20 MG/DL (ref 8–23)
CALCIUM SERPL-MCNC: 7.1 MG/DL (ref 8.3–10.4)
CALCULATED P AXIS, ECG09: 62 DEGREES
CALCULATED R AXIS, ECG10: 24 DEGREES
CALCULATED T AXIS, ECG11: 4 DEGREES
CHLORIDE SERPL-SCNC: 108 MMOL/L (ref 98–107)
CO2 BLD-SCNC: 16 MMOL/L
CO2 SERPL-SCNC: 28 MMOL/L (ref 21–32)
COLLECT TIME,HTIME: 1220
COLOR FLD: YELLOW
CREAT SERPL-MCNC: 0.71 MG/DL (ref 0.8–1.5)
DIAGNOSIS, 93000: NORMAL
ERYTHROCYTE [DISTWIDTH] IN BLOOD BY AUTOMATED COUNT: 17.6 % (ref 11.9–14.6)
FLOW RATE ISTAT,IFRATE: 5 L/MIN
FLUID COMMENTS, FCOM: NORMAL
GAS FLOW.O2 O2 DELIVERY SYS: ABNORMAL L/MIN
GLOBULIN SER CALC-MCNC: 2.5 G/DL (ref 2.3–3.5)
GLUCOSE SERPL-MCNC: 91 MG/DL (ref 65–100)
HCO3 BLD-SCNC: 15 MMOL/L (ref 22–26)
HCT VFR BLD AUTO: 29.1 % (ref 41.1–50.3)
HEMOCCULT STL QL: NEGATIVE
HGB BLD-MCNC: 9.3 G/DL (ref 13.6–17.2)
LYMPHOCYTES NFR BRONCH MANUAL: 11 %
MCH RBC QN AUTO: 31.8 PG (ref 26.1–32.9)
MCHC RBC AUTO-ENTMCNC: 32 G/DL (ref 31.4–35)
MCV RBC AUTO: 99.7 FL (ref 79.6–97.8)
NEUTROPHILS NFR BRONCH MANUAL: 89 %
NRBC # BLD: 0 K/UL (ref 0–0.2)
NUC CELL # FLD: 1456 /CU MM
OTHER CELLS NFR BLD MANUAL: 1 %
P-R INTERVAL, ECG05: 206 MS
PCO2 BLD: 34 MMHG (ref 35–45)
PH BLD: 7.25 [PH] (ref 7.35–7.45)
PLATELET # BLD AUTO: 171 K/UL (ref 150–450)
PMV BLD AUTO: 10.3 FL (ref 9.4–12.3)
PO2 BLD: 48 MMHG (ref 75–100)
POTASSIUM SERPL-SCNC: 3.4 MMOL/L (ref 3.5–5.1)
PROCALCITONIN SERPL-MCNC: 0.1 NG/ML
PROT SERPL-MCNC: 4.2 G/DL (ref 6.3–8.2)
Q-T INTERVAL, ECG07: 450 MS
QRS DURATION, ECG06: 90 MS
QTC CALCULATION (BEZET), ECG08: 418 MS
RBC # BLD AUTO: 2.92 M/UL (ref 4.23–5.6)
RBC # FLD: 1000 /CU MM
SAO2 % BLD: 77 % (ref 95–98)
SERVICE CMNT-IMP: 1
SERVICE CMNT-IMP: ABNORMAL
SERVICE CMNT-IMP: ABNORMAL
SODIUM SERPL-SCNC: 142 MMOL/L (ref 136–145)
SPECIMEN SOURCE FLD: NORMAL
SPECIMEN TYPE: ABNORMAL
TROPONIN I BLD-MCNC: 0.01 NG/ML (ref 0.02–0.05)
VENTRICULAR RATE, ECG03: 52 BPM
WBC # BLD AUTO: 6.5 K/UL (ref 4.3–11.1)

## 2019-06-10 PROCEDURE — 74011250637 HC RX REV CODE- 250/637: Performed by: FAMILY MEDICINE

## 2019-06-10 PROCEDURE — 93005 ELECTROCARDIOGRAM TRACING: CPT | Performed by: EMERGENCY MEDICINE

## 2019-06-10 PROCEDURE — 36415 COLL VENOUS BLD VENIPUNCTURE: CPT

## 2019-06-10 PROCEDURE — 86580 TB INTRADERMAL TEST: CPT | Performed by: FAMILY MEDICINE

## 2019-06-10 PROCEDURE — 65270000029 HC RM PRIVATE

## 2019-06-10 PROCEDURE — 77010033678 HC OXYGEN DAILY

## 2019-06-10 PROCEDURE — 83880 ASSAY OF NATRIURETIC PEPTIDE: CPT

## 2019-06-10 PROCEDURE — 87040 BLOOD CULTURE FOR BACTERIA: CPT

## 2019-06-10 PROCEDURE — 84145 PROCALCITONIN (PCT): CPT

## 2019-06-10 PROCEDURE — 84484 ASSAY OF TROPONIN QUANT: CPT

## 2019-06-10 PROCEDURE — 32555 ASPIRATE PLEURA W/ IMAGING: CPT

## 2019-06-10 PROCEDURE — 74011250636 HC RX REV CODE- 250/636: Performed by: FAMILY MEDICINE

## 2019-06-10 PROCEDURE — 94760 N-INVAS EAR/PLS OXIMETRY 1: CPT

## 2019-06-10 PROCEDURE — 77030019605

## 2019-06-10 PROCEDURE — 87205 SMEAR GRAM STAIN: CPT

## 2019-06-10 PROCEDURE — 74011000250 HC RX REV CODE- 250: Performed by: EMERGENCY MEDICINE

## 2019-06-10 PROCEDURE — 96365 THER/PROPH/DIAG IV INF INIT: CPT | Performed by: EMERGENCY MEDICINE

## 2019-06-10 PROCEDURE — 74011636320 HC RX REV CODE- 636/320: Performed by: EMERGENCY MEDICINE

## 2019-06-10 PROCEDURE — 94762 N-INVAS EAR/PLS OXIMTRY CONT: CPT | Performed by: EMERGENCY MEDICINE

## 2019-06-10 PROCEDURE — 75810000165 HC THORACENTESIS

## 2019-06-10 PROCEDURE — 74011250636 HC RX REV CODE- 250/636: Performed by: EMERGENCY MEDICINE

## 2019-06-10 PROCEDURE — 74011000258 HC RX REV CODE- 258: Performed by: EMERGENCY MEDICINE

## 2019-06-10 PROCEDURE — 82945 GLUCOSE OTHER FLUID: CPT

## 2019-06-10 PROCEDURE — 80053 COMPREHEN METABOLIC PANEL: CPT

## 2019-06-10 PROCEDURE — 82803 BLOOD GASES ANY COMBINATION: CPT

## 2019-06-10 PROCEDURE — 77030014147 HC TY THORCENT PARA TELE -B

## 2019-06-10 PROCEDURE — 89050 BODY FLUID CELL COUNT: CPT

## 2019-06-10 PROCEDURE — 85027 COMPLETE CBC AUTOMATED: CPT

## 2019-06-10 PROCEDURE — 83615 LACTATE (LD) (LDH) ENZYME: CPT

## 2019-06-10 PROCEDURE — 84157 ASSAY OF PROTEIN OTHER: CPT

## 2019-06-10 PROCEDURE — 99285 EMERGENCY DEPT VISIT HI MDM: CPT | Performed by: EMERGENCY MEDICINE

## 2019-06-10 PROCEDURE — 0W993ZZ DRAINAGE OF RIGHT PLEURAL CAVITY, PERCUTANEOUS APPROACH: ICD-10-PCS | Performed by: INTERNAL MEDICINE

## 2019-06-10 PROCEDURE — 99223 1ST HOSP IP/OBS HIGH 75: CPT | Performed by: INTERNAL MEDICINE

## 2019-06-10 PROCEDURE — 94640 AIRWAY INHALATION TREATMENT: CPT

## 2019-06-10 PROCEDURE — 32555 ASPIRATE PLEURA W/ IMAGING: CPT | Performed by: INTERNAL MEDICINE

## 2019-06-10 PROCEDURE — 71260 CT THORAX DX C+: CPT

## 2019-06-10 PROCEDURE — 36600 WITHDRAWAL OF ARTERIAL BLOOD: CPT

## 2019-06-10 PROCEDURE — 74011000302 HC RX REV CODE- 302: Performed by: FAMILY MEDICINE

## 2019-06-10 PROCEDURE — 74011000258 HC RX REV CODE- 258: Performed by: FAMILY MEDICINE

## 2019-06-10 PROCEDURE — 96375 TX/PRO/DX INJ NEW DRUG ADDON: CPT | Performed by: EMERGENCY MEDICINE

## 2019-06-10 PROCEDURE — 76604 US EXAM CHEST: CPT | Performed by: INTERNAL MEDICINE

## 2019-06-10 PROCEDURE — 82270 OCCULT BLOOD FECES: CPT

## 2019-06-10 PROCEDURE — 71045 X-RAY EXAM CHEST 1 VIEW: CPT

## 2019-06-10 RX ORDER — AMIODARONE HYDROCHLORIDE 200 MG/1
400 TABLET ORAL EVERY 12 HOURS
Status: DISCONTINUED | OUTPATIENT
Start: 2019-06-10 | End: 2019-06-16 | Stop reason: HOSPADM

## 2019-06-10 RX ORDER — SODIUM CHLORIDE 0.9 % (FLUSH) 0.9 %
5-40 SYRINGE (ML) INJECTION AS NEEDED
Status: DISCONTINUED | OUTPATIENT
Start: 2019-06-10 | End: 2019-06-16 | Stop reason: HOSPADM

## 2019-06-10 RX ORDER — FLUTICASONE PROPIONATE 50 MCG
2 SPRAY, SUSPENSION (ML) NASAL DAILY
Status: DISCONTINUED | OUTPATIENT
Start: 2019-06-11 | End: 2019-06-16 | Stop reason: HOSPADM

## 2019-06-10 RX ORDER — SODIUM CHLORIDE 0.9 % (FLUSH) 0.9 %
5-40 SYRINGE (ML) INJECTION EVERY 8 HOURS
Status: DISCONTINUED | OUTPATIENT
Start: 2019-06-10 | End: 2019-06-16 | Stop reason: HOSPADM

## 2019-06-10 RX ORDER — FUROSEMIDE 10 MG/ML
40 INJECTION INTRAMUSCULAR; INTRAVENOUS
Status: COMPLETED | OUTPATIENT
Start: 2019-06-10 | End: 2019-06-10

## 2019-06-10 RX ORDER — VANCOMYCIN 1.75 GRAM/500 ML IN 0.9 % SODIUM CHLORIDE INTRAVENOUS
1750 ONCE
Status: COMPLETED | OUTPATIENT
Start: 2019-06-10 | End: 2019-06-10

## 2019-06-10 RX ORDER — SODIUM CHLORIDE 0.9 % (FLUSH) 0.9 %
10 SYRINGE (ML) INJECTION
Status: COMPLETED | OUTPATIENT
Start: 2019-06-10 | End: 2019-06-10

## 2019-06-10 RX ORDER — FUROSEMIDE 10 MG/ML
20 INJECTION INTRAMUSCULAR; INTRAVENOUS EVERY 12 HOURS
Status: DISCONTINUED | OUTPATIENT
Start: 2019-06-11 | End: 2019-06-12

## 2019-06-10 RX ORDER — ACETAMINOPHEN 325 MG/1
650 TABLET ORAL
Status: DISCONTINUED | OUTPATIENT
Start: 2019-06-10 | End: 2019-06-16 | Stop reason: HOSPADM

## 2019-06-10 RX ORDER — HEPARIN SODIUM 5000 [USP'U]/ML
5000 INJECTION, SOLUTION INTRAVENOUS; SUBCUTANEOUS EVERY 8 HOURS
Status: DISCONTINUED | OUTPATIENT
Start: 2019-06-10 | End: 2019-06-16 | Stop reason: HOSPADM

## 2019-06-10 RX ORDER — POTASSIUM CHLORIDE 20 MEQ/1
40 TABLET, EXTENDED RELEASE ORAL
Status: COMPLETED | OUTPATIENT
Start: 2019-06-10 | End: 2019-06-10

## 2019-06-10 RX ORDER — BISACODYL 5 MG
5 TABLET, DELAYED RELEASE (ENTERIC COATED) ORAL DAILY PRN
Status: DISCONTINUED | OUTPATIENT
Start: 2019-06-10 | End: 2019-06-16 | Stop reason: HOSPADM

## 2019-06-10 RX ORDER — MONTELUKAST SODIUM 10 MG/1
10 TABLET ORAL DAILY
Status: DISCONTINUED | OUTPATIENT
Start: 2019-06-11 | End: 2019-06-16 | Stop reason: HOSPADM

## 2019-06-10 RX ORDER — FUROSEMIDE 10 MG/ML
20 INJECTION INTRAMUSCULAR; INTRAVENOUS ONCE
Status: COMPLETED | OUTPATIENT
Start: 2019-06-10 | End: 2019-06-10

## 2019-06-10 RX ORDER — TAMSULOSIN HYDROCHLORIDE 0.4 MG/1
0.4 CAPSULE ORAL DAILY
Status: DISCONTINUED | OUTPATIENT
Start: 2019-06-11 | End: 2019-06-16 | Stop reason: HOSPADM

## 2019-06-10 RX ORDER — IPRATROPIUM BROMIDE AND ALBUTEROL SULFATE 2.5; .5 MG/3ML; MG/3ML
3 SOLUTION RESPIRATORY (INHALATION)
Status: COMPLETED | OUTPATIENT
Start: 2019-06-10 | End: 2019-06-10

## 2019-06-10 RX ORDER — GUAIFENESIN 600 MG/1
1200 TABLET, EXTENDED RELEASE ORAL DAILY
Status: DISCONTINUED | OUTPATIENT
Start: 2019-06-11 | End: 2019-06-16 | Stop reason: HOSPADM

## 2019-06-10 RX ORDER — ONDANSETRON 4 MG/1
4 TABLET, ORALLY DISINTEGRATING ORAL
Status: DISCONTINUED | OUTPATIENT
Start: 2019-06-10 | End: 2019-06-16 | Stop reason: HOSPADM

## 2019-06-10 RX ORDER — IPRATROPIUM BROMIDE AND ALBUTEROL SULFATE 2.5; .5 MG/3ML; MG/3ML
3 SOLUTION RESPIRATORY (INHALATION)
Status: DISCONTINUED | OUTPATIENT
Start: 2019-06-10 | End: 2019-06-12

## 2019-06-10 RX ADMIN — TOBRAMYCIN SULFATE 480 MG: 40 INJECTION, SOLUTION INTRAMUSCULAR; INTRAVENOUS at 16:36

## 2019-06-10 RX ADMIN — HEPARIN SODIUM 5000 UNITS: 5000 INJECTION INTRAVENOUS; SUBCUTANEOUS at 22:19

## 2019-06-10 RX ADMIN — Medication 10 ML: at 22:22

## 2019-06-10 RX ADMIN — FUROSEMIDE 20 MG: 10 INJECTION, SOLUTION INTRAMUSCULAR; INTRAVENOUS at 22:20

## 2019-06-10 RX ADMIN — FUROSEMIDE 40 MG: 10 INJECTION, SOLUTION INTRAMUSCULAR; INTRAVENOUS at 13:31

## 2019-06-10 RX ADMIN — Medication 10 ML: at 14:04

## 2019-06-10 RX ADMIN — VANCOMYCIN HYDROCHLORIDE 1750 MG: 10 INJECTION, POWDER, LYOPHILIZED, FOR SOLUTION INTRAVENOUS at 18:40

## 2019-06-10 RX ADMIN — METHYLPREDNISOLONE SODIUM SUCCINATE 125 MG: 125 INJECTION, POWDER, FOR SOLUTION INTRAMUSCULAR; INTRAVENOUS at 12:21

## 2019-06-10 RX ADMIN — IOPAMIDOL 100 ML: 755 INJECTION, SOLUTION INTRAVENOUS at 14:04

## 2019-06-10 RX ADMIN — PIPERACILLIN, TAZOBACTAM 4.5 G: 4; .5 INJECTION, POWDER, LYOPHILIZED, FOR SOLUTION INTRAVENOUS at 23:58

## 2019-06-10 RX ADMIN — POTASSIUM CHLORIDE 40 MEQ: 20 TABLET, EXTENDED RELEASE ORAL at 22:20

## 2019-06-10 RX ADMIN — PIPERACILLIN, TAZOBACTAM 4.5 G: 4; .5 INJECTION, POWDER, LYOPHILIZED, FOR SOLUTION INTRAVENOUS at 15:54

## 2019-06-10 RX ADMIN — AMIODARONE HYDROCHLORIDE 400 MG: 200 TABLET ORAL at 22:19

## 2019-06-10 RX ADMIN — SODIUM CHLORIDE 100 ML: 900 INJECTION, SOLUTION INTRAVENOUS at 14:04

## 2019-06-10 RX ADMIN — IPRATROPIUM BROMIDE AND ALBUTEROL SULFATE 3 ML: .5; 3 SOLUTION RESPIRATORY (INHALATION) at 12:21

## 2019-06-10 RX ADMIN — TUBERCULIN PURIFIED PROTEIN DERIVATIVE 5 UNITS: 5 INJECTION, SOLUTION INTRADERMAL at 22:20

## 2019-06-10 NOTE — ED NOTES
[facility] brought patient from [address] [place] [address]. Last month, patient admitted for large right-sided pneumothorax. Patient had chest tube placed and removed prior to discharge. Patient was on 2-3 L NC prior to going to rehab. Today, patient's O2 kept dropping 86% on patient's normal 2-3L via NC. Patient's systolic BP can normally drop into 80s or 90s. Patient has li from facility. EMS placed 18 gauge to left hand.  and EKG sinus sariah.

## 2019-06-10 NOTE — PROGRESS NOTES
Vancomycin Consult    MD ordering: Dr. Manfred Abad  ID following? no  Indication: Possible HAP  DOT:  7 days  Goal level(s): 15-20    Ht Readings from Last 1 Encounters:   06/10/19 5' 8\" (1.727 m)      Wt Readings from Last 1 Encounters:   06/10/19 68 kg (150 lb)         Temp (24hrs), Av.4 °F (36.9 °C), Min:98.4 °F (36.9 °C), Max:98.4 °F (36.9 °C)    Dosing weight: 68 kg  87 y.o. Recent Labs     06/10/19  1201   BUN 20   CREA 0.71*   WBC 6.5     Estimated Creatinine Clearance: 70.5 mL/min (A) (based on SCr of 0.71 mg/dL (L)). Adjusted SCr 1.0= CrCl 50.1 ml/min. Day 1 Assessment and Plan:  Started patient on Vancomycin 1750 mg IV for one dose followed by Vancomycin 1000mg IV q18. Pharmacy will follow and adjust as necessary. Thank you,   MOIRA Oh, PharmD

## 2019-06-10 NOTE — PROGRESS NOTES
Skin assessment completed with Robby Hoover RN. Pt has a scab to right forearm. Skin tear to left buttock. Allevyn and barrier cream applied. No other signs of skin issues noted.

## 2019-06-10 NOTE — H&P
HOSPITALIST H&P/CONSULT  NAME:  Yanna Wynn   Age:  80 y.o.  :   1932   MRN:   [id number]  PCP: Jon Betancourt MD  Consulting MD:  Treatment Team: Attending Provider: Isidro Bose MD; Primary Nurse: Jose E Gary RN  HPI:   Patient is an 79yo M with hx emphysema who was recently admitted for pneumothorax, s/p pleurodesis on  and presents to the ER from SNF with increased shortness of breath. Was discharged on 2L oxygen. Also with some labile and low BP readings but not consistently. Accompanied by son and wife. Pt reports feeling much better since nonrebreather. Denies fever, cough, abdominal pain. Son notes waxing and waning AMS for several days, now much more clear, suspects it was hypoxia. Since discharge , had li replaced for persistent retention. Was on 2L NC.       Complete ROS done and is as stated in HPI or otherwise negative  Past Medical History:   Diagnosis Date    Abdominal aortic aneurysm ([address] 12/10/2015    In 2005    Abdominal aortic aneurysm without rupture ([address]     Allergic rhinitis 12/10/2015    Asthma     Benign neoplasm     Benign prostatic hyperplasia 12/10/2015    BPH without urinary obstruction     Cardiovascular disease 12/10/2015    Chronic obstructive asthma with exacerbation ([address] 12/10/2015    COPD (chronic obstructive pulmonary disease) ([address] 12/10/2015    Cough with hemoptysis     Erectile dysfunction 12/10/2015    Essential hypertension, benign 12/10/2015    Fracture 10/2014    of left hand and ribs after fall on concrete    History of colon polyps     Low testosterone 12/10/2015    Lumbago     Memory loss     Overflow incontinence     Raynaud's syndrome 12/10/2015    Rotator cuff tendinitis     Thrombocytopenia ([address]     Urinary frequency       Past Surgical History:   Procedure Laterality Date    HX CATARACT REMOVAL  2016-right    HX COLONOSCOPY      HX FRACTURE TX  10/2014    of left hand and ribs are fall on [address] ORTHOPAEDIC  2018    hip fracture    reviewed  Prior to Admission Medications   Prescriptions Last Dose Informant Patient Reported? Taking? Biotin 2,500 mcg cap   Yes No   Sig: Take  by mouth. Oxygen   Yes No   Sig: Use as instructed. Use as instructed; faxed to [facility]   albuterol-ipratropium (DUO-NEB) 2.5 mg-0.5 mg/3 ml nebu   No No   Sig: 3 mL by Nebulization route every four (4) hours as needed for Other (dyspnea). amiodarone (CORDARONE) 200 mg tablet   No No   Sig: Take 2 Tabs by mouth every twelve (12) hours. aspirin delayed-release 81 mg tablet   Yes No   Sig: Take  by mouth daily. calcium citrate-vitamin d3 (CITRACAL+D) 315-200 mg-unit tab   Yes No   Sig: Take 2 Tabs by mouth daily (with breakfast). carvedilol (COREG) 3.125 mg tablet   No No   Sig: Take 1 Tab by mouth two (2) times daily (with meals). cholecalciferol (VITAMIN D3) 1,000 unit cap   Yes No   Sig: Take  by mouth. ciprofloxacin HCl (CIPRO) 500 mg tablet   No No   Sig: Take 1 Tab by mouth every twelve (12) hours. cyanocobalamin (VITAMIN B12) 1,000 mcg/mL injection   Yes No   Sig: INJECT 1ML INTRAMUSCULARLY ONCE FOR 1 DOSE   fluticasone (FLONASE) 50 mcg/actuation nasal spray   No No   Si Sprays by Both Nostrils route daily. guaiFENesin ER (MUCINEX) 600 mg ER tablet   Yes No   Sig: Take 1,200 mg by mouth daily. lisinopril (PRINIVIL, ZESTRIL) 10 mg tablet   No No   Sig: Take 1 Tab by mouth daily. montelukast (SINGULAIR) 10 mg tablet   No No   Sig: Take 1 Tab by mouth daily. potassium chloride (K-DUR, KLOR-CON) 20 mEq tablet   No No   Sig: Take 2 Tabs by mouth daily. tamsulosin (FLOMAX) 0.4 mg capsule   No No   Sig: Take 1 Cap by mouth daily. umeclidinium-vilanterol (ANORO ELLIPTA) 62.5-25 mcg/actuation inhaler   Yes No   Sig: Take 1 Puff by inhalation daily.       Facility-Administered Medications: None     No Known Allergies   Social History     Tobacco Use    Smoking status: Former Smoker    Smokeless tobacco: Never Used   Substance Use Topics    Alcohol use: Yes     Comment: occasional      Family History   Problem Relation Age of Onset    Dementia Mother     Cancer Father         lung cancer    Heart Attack Father     reviewed  Objective:     Visit Vitals  /67   Pulse (!) 54   Temp 98.4 °F (36.9 °C)   Resp 20   Ht 5' 8\" (1.727 m)   Wt 68 kg (150 lb)   SpO2 91%   BMI 22.81 kg/m²      Temp (24hrs), Av.4 °F (36.9 °C), Min:98.4 °F (36.9 °C), Max:98.4 °F (36.9 °C)    Oxygen Therapy  O2 Sat (%): 91 % (06/10/19 1233)  Pulse via Oximetry: 65 beats per minute (06/10/19 1233)  O2 Device: Hi flow nasal cannula (06/10/19 1235)  O2 Flow Rate (L/min): 10 l/min (06/10/19 1235)  Physical Exam:  General:    Elderly, alert, NAD on NRB  Head:   Normocephalic, without obvious abnormality, atraumatic. Nose:  Nares normal. No drainage or sinus tenderness. Lungs:   Rales throughout, no distress, no accessory muscle use  Heart:   [place], regular  Abdomen:   Soft, non-tender. Not distended. Bowel sounds normal.   Extremities: 2+ edema b/l le  Skin:     Texture, turgor normal. No rashes or lesions. Not Jaundiced  Neurologic: nonfocal  Data Review:   Recent Results (from the past 24 hour(s))   EKG, 12 LEAD, INITIAL    Collection Time: 06/10/19 11:49 AM   Result Value Ref Range    Ventricular Rate 52 BPM    Atrial Rate 52 BPM    P-R Interval 206 ms    QRS Duration 90 ms    Q-T Interval 450 ms    QTC Calculation (Bezet) 418 ms    Calculated P Axis 62 degrees    Calculated R Axis 24 degrees    Calculated T Axis 4 degrees    Diagnosis       !! AGE AND GENDER SPECIFIC ECG ANALYSIS !!   Sinus bradycardia  Anteroseptal infarct (cited on or before 2018)  Nonspecific ST abnormality  When compared with ECG of 11-MAY-2019 09:03,  Significant changes have occurred  Confirmed by [facility]  MD ()LAISHA ([id number]) on 6/10/2019 1:01:55 PM     CBC W/O DIFF    Collection Time: 06/10/19 12:01 PM   Result Value Ref Range    WBC 6.5 4.3 - 11.1 K/uL    RBC 2.92 (L) 4.23 - 5.6 M/uL    HGB 9.3 (L) 13.6 - 17.2 g/dL    HCT 29.1 (L) 41.1 - 50.3 %    MCV 99.7 (H) 79.6 - 97.8 FL    MCH 31.8 26.1 - 32.9 PG    MCHC 32.0 31.4 - 35.0 g/dL    RDW 17.6 (H) 11.9 - 14.6 %    PLATELET 612 263 - 351 K/uL    MPV 10.3 9.4 - 12.3 FL    ABSOLUTE NRBC 0.00 0.0 - 0.2 K/uL   METABOLIC PANEL, COMPREHENSIVE    Collection Time: 06/10/19 12:01 PM   Result Value Ref Range    Sodium 142 136 - 145 mmol/L    Potassium 3.4 (L) 3.5 - 5.1 mmol/L    Chloride 108 (H) 98 - 107 mmol/L    CO2 28 21 - 32 mmol/L    Anion gap 6 (L) 7 - 16 mmol/L    Glucose 91 65 - 100 mg/dL    BUN 20 8 - 23 MG/DL    Creatinine 0.71 (L) 0.8 - 1.5 MG/DL    GFR est AA >60 >60 ml/min/1.73m2    GFR est non-AA >60 >60 ml/min/1.73m2    Calcium 7.1 (L) 8.3 - 10.4 MG/DL    Bilirubin, total 1.4 (H) 0.2 - 1.1 MG/DL    ALT (SGPT) 36 12 - 65 U/L    AST (SGOT) 25 15 - 37 U/L    Alk. phosphatase 102 50 - 136 U/L    Protein, total 4.2 (L) 6.3 - 8.2 g/dL    Albumin 1.7 (L) 3.2 - 4.6 g/dL    Globulin 2.5 2.3 - 3.5 g/dL    A-G Ratio 0.7 (L) 1.2 - 3.5     BNP    Collection Time: 06/10/19 12:01 PM   Result Value Ref Range     (H) 0 pg/mL   POC TROPONIN-I    Collection Time: 06/10/19 12:03 PM   Result Value Ref Range    Troponin-I (POC) 0.01 (L) 0.02 - 0.05 ng/ml   POC G3    Collection Time: 06/10/19 12:29 PM   Result Value Ref Range    Device: NASAL CANNULA      pH (POC) 7.253 (L) 7.35 - 7.45      pCO2 (POC) 34.0 (L) 35 - 45 MMHG    pO2 (POC) 48 (L) 75 - 100 MMHG    HCO3 (POC) 15.0 (L) 22 - 26 MMOL/L    sO2 (POC) 77 (L) 95 - 98 %    Base deficit (POC) 11 mmol/L    Allens test (POC) YES      Site LEFT RADIAL      Patient temp.  98.6      Specimen type (POC) ARTERIAL      Performed by [facility]     CO2, POC 16 MMOL/L    Flow rate (POC) 5.000 L/min    Critical value read back 12:28     Respiratory comment: 1      COLLECT TIME 1,220     POC FECAL OCCULT BLOOD    Collection Time: 06/10/19 12:35 PM   Result Value Ref Range    Occult blood, stool (POC) NEGATIVE  NEG       Imaging /Procedures /Studies   CT CHEST W CONT   Final Result   IMPRESSION: Bilateral pleural effusions as described      XR CHEST PORT   Final Result   IMPRESSION: Coarsening of interstitial markings in the lungs most prominent in   the lower lung zones and small right pleural effusion          Assessment and Plan: Active Hospital Problems    Diagnosis Date Noted    Pleural effusion 06/10/2019    Urinary retention 05/20/2019    Ventricular tachyarrhythmia ([facility] [address] [facility] 05/06/2019    Acute on chronic respiratory failure with hypoxia (HCC) 05/06/2019    Essential hypertension, benign 12/10/2015    COPD (chronic obstructive pulmonary disease) ([facility] [address] 12/10/2015       PLAN:  Acute on chronic hypoxic respiratory failure - 2/2 Pleural effusions:  S/p pleurodesis on 5/16 for pneumothorax - consult pulm for tap  Started on vanc/tobra/zosyn for possible hcap, but no overt infectious signs. Continue for now, pending pct and potential tap. bnp elevated and leg swelling for 1w also  IV lasix, replace K  Hypoxia improved on 6L NC but put on rebreather due to mouth breathing  Recently started on amiodarone but effusions more likely related to VATS and cardiac disease - continue for now    Urinary retention - li in place, monitor output. Flomax. Was to have uro f/u after last hospitalization.      HTN: hold antihypertensives, bb in mild bradycardia and low BP    Hx Vtach: continue amiodarone for now - consider toxicity as above    DVT PPx: HSQ  Code Status: Full    Anticipated discharge: 3-4d, back to SNF    Signed By: Jazmin Barone MD     Ariela 10, 2019

## 2019-06-10 NOTE — ED PROVIDER NOTES
55-year-old male presents from [facility] with return of hypoxia earlier this afternoon. Patient was recently in the hospital in May for respiratory failure COPD and a right-sided pneumothorax. He has been on 2 L nasal cannula in the facility but then required nonrebreather and mask oxygen to maintain sats in the 90s. He dropped down into the mid 80s and EMS was called for evaluation. For EMS the patient's sats were 88% on 6 L and they paste placed him on nonrebreather mask. Patient denies chest pain or chest discomfort. He has had some dyspnea today. Lower extremity edema is unchanged. Complains of chronic mildly productive cough but no change in it. Denies any aggravating or alleviating factors but when asked me indicate that it is slightly worse when he lays back more. He has a history of congestive heart failure as well.            Past Medical History:   Diagnosis Date    Abdominal aortic aneurysm ([address] 12/10/2015    In 2005    Abdominal aortic aneurysm without rupture ([address]     Allergic rhinitis 12/10/2015    Asthma     Benign neoplasm     Benign prostatic hyperplasia 12/10/2015    BPH without urinary obstruction     Cardiovascular disease 12/10/2015    Chronic obstructive asthma with exacerbation ([address] 12/10/2015    COPD (chronic obstructive pulmonary disease) ([address] 12/10/2015    Cough with hemoptysis     Erectile dysfunction 12/10/2015    Essential hypertension, benign 12/10/2015    Fracture 10/2014    of left hand and ribs after fall on concrete    History of colon polyps     Low testosterone 12/10/2015    Lumbago     Memory loss     Overflow incontinence     Raynaud's syndrome 12/10/2015    Rotator cuff tendinitis     Thrombocytopenia (HCC)     Urinary frequency        Past Surgical History:   Procedure Laterality Date    HX CATARACT REMOVAL  6/2016-right    HX COLONOSCOPY      HX FRACTURE TX  10/2014    of left hand and ribs are fall on concrete    HX HERNIA REPAIR  1980    HX ORTHOPAEDIC  03/2018    hip fracture         Family History:   Problem Relation Age of Onset    Dementia Mother     Cancer Father         lung cancer    Heart Attack Father        Social History     Socioeconomic History    Marital status:      Spouse name: Not on file    Number of children: Not on file    Years of education: Not on file    Highest education level: Not on file   Occupational History    Not on file   Social Needs    Financial resource strain: Not on file    Food insecurity:     Worry: Not on file     Inability: Not on file    Transportation needs:     Medical: Not on file     Non-medical: Not on file   Tobacco Use    Smoking status: Former Smoker    Smokeless tobacco: Never Used   Substance and Sexual Activity    Alcohol use: Yes     Comment: occasional    Drug use: Not on file    Sexual activity: Not on file   Lifestyle    Physical activity:     Days per week: Not on file     Minutes per session: Not on file    Stress: Not on file   Relationships    Social connections:     Talks on phone: Not on file     Gets together: Not on file     Attends Shinto service: Not on file     Active member of club or organization: Not on file     Attends meetings of clubs or organizations: Not on file     Relationship status: Not on file    Intimate partner violence:     Fear of current or ex partner: Not on file     Emotionally abused: Not on file     Physically abused: Not on file     Forced sexual activity: Not on file   Other Topics Concern    Not on file   Social History Narrative    Not on file         ALLERGIES: Patient has no known allergies. Review of Systems   Constitutional: Negative for fever. HENT: Negative for congestion and rhinorrhea. Respiratory: Positive for cough and shortness of breath. Cardiovascular: Positive for leg swelling (Unchanged). Negative for chest pain.    Gastrointestinal: Negative for abdominal pain, diarrhea, nausea and vomiting. Endocrine: Negative for polyuria. Genitourinary: Negative for dysuria. Musculoskeletal: Negative for back pain and neck pain. Skin: Negative for color change and rash. Neurological: Negative for weakness and numbness. There were no vitals filed for this visit. Physical Exam   Constitutional: He is oriented to person, place, and time. No distress. Elderly and chronically ill-appearing on nonrebreather facemask but without respiratory distress   HENT:   Mouth/Throat: Oropharynx is clear and moist. No oropharyngeal exudate. Eyes: Pupils are equal, round, and reactive to light. Conjunctivae are normal.   Neck: Neck supple. Cardiovascular: Normal rate, regular rhythm and normal heart sounds. Pulmonary/Chest: Effort normal. He has no wheezes. He has rales (Right lower lobe rales). Diminished breath sounds bilaterally. Symmetric breath sounds however   Abdominal: Soft. Bowel sounds are normal. He exhibits no distension. There is no tenderness. There is no rebound and no guarding. Musculoskeletal: Normal range of motion. He exhibits no edema or tenderness. Lymphadenopathy:     He has no cervical adenopathy. Neurological: He is alert and oriented to person, place, and time. Skin: Skin is warm and dry. Capillary refill takes less than 2 seconds. He is not diaphoretic. Nursing note and vitals reviewed. MDM  Number of Diagnoses or Management Options  Diagnosis management comments: COPD and/or CHF exacerbation. Clinically no sign of pneumothorax or tension pneumothorax. Chest x-ray to confirm. Check labs for acute coronary syndrome, MI, congestive heart failure exacerbation or recurrence of previous SIADH. Blood gas and DuoNeb ordered. Solu-Medrol for COPD. Treat heart failure should it become evident is the issue. Pneumonia possible, But no fevers reported. Cough unchanged as well. 12:42 PM  Profound hypoxia via blood gas. Oxygen increased.   CT for PE and to further evaluate lung process ordered. Lasix ordered. BNP pending.        Amount and/or Complexity of Data Reviewed  Clinical lab tests: ordered and reviewed  Tests in the radiology section of CPT®: ordered and reviewed  Review and summarize past medical records: yes           Procedures

## 2019-06-10 NOTE — PROGRESS NOTES
Patient with recent admit and discharge from [facility] (5-6-19 to 5-25-19) with a transfer to [facility]. Patient coming from [facility]. PCP listed as Dr. Aliya Stinson.

## 2019-06-10 NOTE — ROUTINE PROCESS
TRANSFER - OUT REPORT: 
 
Verbal report given to Carrie Judd on Vanessa Wheeler  being transferred to room 97 [id number] for routine progression of care Report consisted of patients Situation, Background, Assessment and  
Recommendations(SBAR). Information from the following report(s) ED Summary was reviewed with the receiving nurse. Lines:  
Peripheral IV 39/28/40 Right Basilic (Active) Site Assessment Clean, dry, & intact 6/10/2019  3:32 PM  
Phlebitis Assessment 0 6/10/2019  3:32 PM  
Infiltration Assessment 0 6/10/2019  3:32 PM  
Dressing Status Clean, dry, & intact 6/10/2019  3:32 PM  
Dressing Type Transparent;Tape 6/10/2019  3:32 PM  
Hub Color/Line Status Pink 6/10/2019  3:32 PM  
Action Taken Blood drawn 6/10/2019  3:32 PM  
   
Peripheral IV 06/10/19 Left Hand (Active) Site Assessment Clean, dry, & intact 6/10/2019  3:32 PM  
Phlebitis Assessment 0 6/10/2019  3:32 PM  
Infiltration Assessment 0 6/10/2019  3:32 PM  
Dressing Status Clean, dry, & intact 6/10/2019  3:32 PM  
Dressing Type Transparent;Tape 6/10/2019  3:32 PM  
Hub Color/Line Status Green;Flushed 6/10/2019  3:32 PM  
  
 
Opportunity for questions and clarification was provided. Patient transported with: 
 O2 @ 10 liters (non-rebreather)

## 2019-06-10 NOTE — PROGRESS NOTES
Pt arrived from ER. Report given by Keke Chan. Pt is stable, no s/s of distress. IV started with Vancomycin. VSS. Pt is a/o x4. Pt has skin tear to left buttock. Barrier cream and allevyn applied. Will continue to monitor/assess.

## 2019-06-11 ENCOUNTER — APPOINTMENT (OUTPATIENT)
Dept: GENERAL RADIOLOGY | Age: 84
DRG: 186 | End: 2019-06-11
Attending: INTERNAL MEDICINE
Payer: MEDICARE

## 2019-06-11 LAB
AMORPH CRY URNS QL MICRO: ABNORMAL
ANION GAP SERPL CALC-SCNC: 8 MMOL/L (ref 7–16)
APPEARANCE UR: ABNORMAL
BACTERIA URNS QL MICRO: ABNORMAL /HPF
BILIRUB UR QL: NEGATIVE
BUN SERPL-MCNC: 23 MG/DL (ref 8–23)
CALCIUM SERPL-MCNC: 6.7 MG/DL (ref 8.3–10.4)
CASTS URNS QL MICRO: ABNORMAL /LPF
CHLORIDE SERPL-SCNC: 107 MMOL/L (ref 98–107)
CO2 SERPL-SCNC: 27 MMOL/L (ref 21–32)
COLOR UR: YELLOW
CREAT SERPL-MCNC: 0.64 MG/DL (ref 0.8–1.5)
EPI CELLS #/AREA URNS HPF: ABNORMAL /HPF
ERYTHROCYTE [DISTWIDTH] IN BLOOD BY AUTOMATED COUNT: 18 % (ref 11.9–14.6)
GLUCOSE FLD-MCNC: 91 MG/DL
GLUCOSE SERPL-MCNC: 96 MG/DL (ref 65–100)
GLUCOSE UR STRIP.AUTO-MCNC: NEGATIVE MG/DL
HCT VFR BLD AUTO: 30.9 % (ref 41.1–50.3)
HGB BLD-MCNC: 9.6 G/DL (ref 13.6–17.2)
HGB UR QL STRIP: ABNORMAL
KETONES UR QL STRIP.AUTO: NEGATIVE MG/DL
LDH FLD L TO P-CCNC: 483 U/L
LEUKOCYTE ESTERASE UR QL STRIP.AUTO: NEGATIVE
MAGNESIUM SERPL-MCNC: 2.7 MG/DL (ref 1.8–2.4)
MCH RBC QN AUTO: 31.8 PG (ref 26.1–32.9)
MCHC RBC AUTO-ENTMCNC: 31.1 G/DL (ref 31.4–35)
MCV RBC AUTO: 102.3 FL (ref 79.6–97.8)
MM INDURATION POC: 0 MM (ref 0–5)
MUCOUS THREADS URNS QL MICRO: ABNORMAL /LPF
NITRITE UR QL STRIP.AUTO: NEGATIVE
NRBC # BLD: 0 K/UL (ref 0–0.2)
PH UR STRIP: 6 [PH] (ref 5–9)
PLATELET # BLD AUTO: 173 K/UL (ref 150–450)
PMV BLD AUTO: 10.3 FL (ref 9.4–12.3)
POTASSIUM SERPL-SCNC: 3.8 MMOL/L (ref 3.5–5.1)
PPD POC: NEGATIVE NEGATIVE
PROT FLD-MCNC: 1.6 G/DL
PROT UR STRIP-MCNC: NEGATIVE MG/DL
RBC # BLD AUTO: 3.02 M/UL (ref 4.23–5.6)
RBC #/AREA URNS HPF: ABNORMAL /HPF
SODIUM SERPL-SCNC: 142 MMOL/L (ref 136–145)
SP GR UR REFRACTOMETRY: 1.01 (ref 1–1.02)
SPECIMEN SOURCE FLD: NORMAL
UROBILINOGEN UR QL STRIP.AUTO: 0.2 EU/DL (ref 0.2–1)
WBC # BLD AUTO: 5.4 K/UL (ref 4.3–11.1)
WBC URNS QL MICRO: ABNORMAL /HPF

## 2019-06-11 PROCEDURE — 74011250637 HC RX REV CODE- 250/637: Performed by: FAMILY MEDICINE

## 2019-06-11 PROCEDURE — 85027 COMPLETE CBC AUTOMATED: CPT

## 2019-06-11 PROCEDURE — 77030021907 HC KT URIN FOL O&M -A

## 2019-06-11 PROCEDURE — 74011250636 HC RX REV CODE- 250/636: Performed by: FAMILY MEDICINE

## 2019-06-11 PROCEDURE — 87088 URINE BACTERIA CULTURE: CPT

## 2019-06-11 PROCEDURE — 87086 URINE CULTURE/COLONY COUNT: CPT

## 2019-06-11 PROCEDURE — 99232 SBSQ HOSP IP/OBS MODERATE 35: CPT | Performed by: INTERNAL MEDICINE

## 2019-06-11 PROCEDURE — 92610 EVALUATE SWALLOWING FUNCTION: CPT

## 2019-06-11 PROCEDURE — 80048 BASIC METABOLIC PNL TOTAL CA: CPT

## 2019-06-11 PROCEDURE — 83735 ASSAY OF MAGNESIUM: CPT

## 2019-06-11 PROCEDURE — 74011000258 HC RX REV CODE- 258: Performed by: FAMILY MEDICINE

## 2019-06-11 PROCEDURE — 97166 OT EVAL MOD COMPLEX 45 MIN: CPT

## 2019-06-11 PROCEDURE — 36415 COLL VENOUS BLD VENIPUNCTURE: CPT

## 2019-06-11 PROCEDURE — 97161 PT EVAL LOW COMPLEX 20 MIN: CPT

## 2019-06-11 PROCEDURE — 65270000029 HC RM PRIVATE

## 2019-06-11 PROCEDURE — 71045 X-RAY EXAM CHEST 1 VIEW: CPT

## 2019-06-11 PROCEDURE — 81001 URINALYSIS AUTO W/SCOPE: CPT

## 2019-06-11 PROCEDURE — 87186 SC STD MICRODIL/AGAR DIL: CPT

## 2019-06-11 PROCEDURE — 77030034849

## 2019-06-11 PROCEDURE — 74011250636 HC RX REV CODE- 250/636: Performed by: INTERNAL MEDICINE

## 2019-06-11 PROCEDURE — 97530 THERAPEUTIC ACTIVITIES: CPT

## 2019-06-11 RX ADMIN — FUROSEMIDE 20 MG: 10 INJECTION, SOLUTION INTRAMUSCULAR; INTRAVENOUS at 21:01

## 2019-06-11 RX ADMIN — Medication 10 ML: at 05:28

## 2019-06-11 RX ADMIN — TAMSULOSIN HYDROCHLORIDE 0.4 MG: 0.4 CAPSULE ORAL at 08:24

## 2019-06-11 RX ADMIN — FUROSEMIDE 20 MG: 10 INJECTION, SOLUTION INTRAMUSCULAR; INTRAVENOUS at 08:45

## 2019-06-11 RX ADMIN — HEPARIN SODIUM 5000 UNITS: 5000 INJECTION INTRAVENOUS; SUBCUTANEOUS at 03:59

## 2019-06-11 RX ADMIN — VANCOMYCIN HYDROCHLORIDE 1000 MG: 1 INJECTION, POWDER, LYOPHILIZED, FOR SOLUTION INTRAVENOUS at 12:13

## 2019-06-11 RX ADMIN — Medication 10 ML: at 21:02

## 2019-06-11 RX ADMIN — Medication 10 ML: at 13:59

## 2019-06-11 RX ADMIN — PIPERACILLIN, TAZOBACTAM 4.5 G: 4; .5 INJECTION, POWDER, LYOPHILIZED, FOR SOLUTION INTRAVENOUS at 08:24

## 2019-06-11 RX ADMIN — HEPARIN SODIUM 5000 UNITS: 5000 INJECTION INTRAVENOUS; SUBCUTANEOUS at 21:02

## 2019-06-11 RX ADMIN — GUAIFENESIN 1200 MG: 600 TABLET, EXTENDED RELEASE ORAL at 08:25

## 2019-06-11 RX ADMIN — Medication 10 ML: at 05:27

## 2019-06-11 RX ADMIN — AMIODARONE HYDROCHLORIDE 400 MG: 200 TABLET ORAL at 21:01

## 2019-06-11 RX ADMIN — Medication 10 ML: at 21:03

## 2019-06-11 RX ADMIN — PIPERACILLIN, TAZOBACTAM 4.5 G: 4; .5 INJECTION, POWDER, LYOPHILIZED, FOR SOLUTION INTRAVENOUS at 16:10

## 2019-06-11 RX ADMIN — FLUTICASONE PROPIONATE 2 SPRAY: 50 SPRAY, METERED NASAL at 08:45

## 2019-06-11 RX ADMIN — AMIODARONE HYDROCHLORIDE 400 MG: 200 TABLET ORAL at 08:25

## 2019-06-11 RX ADMIN — MONTELUKAST SODIUM 10 MG: 10 TABLET, FILM COATED ORAL at 08:25

## 2019-06-11 RX ADMIN — HEPARIN SODIUM 5000 UNITS: 5000 INJECTION INTRAVENOUS; SUBCUTANEOUS at 12:10

## 2019-06-11 NOTE — PROGRESS NOTES
SPEECH PATHOLOGY NOTE:    Dysphagia evaluation completed. Patient exhibited over s/sx of airway compromise with thin liquids via cup sip. Improved tolerance with nectar thick liquids. Recommend regular diet/nectar thick liquids. Medications with thickened liquids. Will benefit from modified barium swallow study to objectively assess swallow function. Plan to completed on 6/12/19 AM    Full report to follow.      Marlo Sorenson, [facility], [facility], CCC-SLP

## 2019-06-11 NOTE — PROGRESS NOTES
Problem: Mobility Impaired (Adult and Pediatric)  Goal: *Acute Goals and Plan of Care (Insert Text)  Description  LTG:  (1.)Mr. Osman Davison will move from supine to sit and sit to supine, scoot up and down and roll side to side with SUPERVISION within 7 treatment day(s). (2.)Mr. Osman Davison will transfer from bed to chair and chair to bed with MODIFIED INDEPENDENCE using the least restrictive device within 7 treatment day(s). (3.)Mr. Osman Davison will ambulate with CONTACT GUARD ASSIST for 50+ feet with the least restrictive device within 7 treatment day(s). (4.)Mr. Osman Davison will perform exercises per HEP for 10+ minutes to improve strength and mobility within 7 days. ________________________________________________________________________________________________   Outcome: Progressing Towards Goal     PHYSICAL THERAPY: Initial Assessment and PM 6/11/2019  INPATIENT: PT Visit Days : 1  [place] / [place] / Product Type: Medicare /       NAME/AGE/GENDER: Bright Salgado is a 80 y.o. male   PRIMARY DIAGNOSIS: Pleural effusion [J90] Pleural effusion   Pleural effusion          ICD-10: Treatment Diagnosis:    Generalized Muscle Weakness (M62.81)  Difficulty in walking, Not elsewhere classified (R26.2)  Other abnormalities of gait and mobility (R26.89)   Precaution/Allergies:  Patient has no known allergies. ASSESSMENT:     Mr. Osman Davison is a pleasant 80year old male admitted from [facility] rehab facility following prolonged hospital stay per wife. S/p thoracentesis last night and feels his breathing is much improved. At baseline pt is ambulatory with walker following fall/hip fracture in 2018. Pt presents sitting up in bed without specific complaints and is agreeable to mobility. Performs transfer to sitting with additional time, min cueing, and CGA using bed rails. Intact seated balance noted. Performs sit-stand transfer with CGA from edge of bed.  Fair to poor seated balance noted with some posterior trunk lean. Performed sit-stand x 3 reps working on functional strengthening and transfer technique. On last trial, pt able to take steps from bed to chair (4') with min-mod handheld assist. Demonstrates slow, shuffled gait with lower extremity weakness noted and forward flexed posture. Verbal cues for gait safety and body mechanics. Took break then performs below LE exercises with good participation. Positioned comfortably in chair with LEs elevated, needs in reach, and wife at bedside. Yanna Nicholas is functioning below baseline with strength, balance, and activity tolerance and will benefit from continued therapy during hospital stay to address deficits/maximize independence with mobility. Will likely need to return to [facility] for rehab at [place]. This section established at most recent assessment   PROBLEM LIST (Impairments causing functional limitations):  Decreased Strength  Decreased Transfer Abilities  Decreased Ambulation Ability/Technique  Decreased Balance  Decreased Activity Tolerance  Increased Shortness of Breath  Decreased Cognition   INTERVENTIONS PLANNED: (Benefits and precautions of physical therapy have been discussed with the patient.)  Balance Exercise  Bed Mobility  Gait Training  Home Exercise Program (HEP)  Therapeutic Activites  Therapeutic Exercise/Strengthening  Transfer Training     TREATMENT PLAN: Frequency/Duration: 3 times a week for duration of hospital stay  Rehabilitation Potential For Stated Goals: Good     REHAB RECOMMENDATIONS (at time of discharge pending progress):    Placement: It is my opinion, based on this patient's performance to date, that Mr. Aditya Hernández may benefit from intensive therapy at [address] after discharge due to the functional deficits listed above that are likely to improve with skilled rehabilitation and concerns that he/she may be unsafe to be unsupervised at home due to weakness, fall risk, debility .   Equipment:   tbd HISTORY:   History of Present Injury/Illness (Reason for Referral):  Per H&P, \"Patient is a 80 y.o.  male seen and evaluated at the request of Dr. Fani Wright. He has hx emphysema who was recently admitted for pneumothorax, s/p pleurodesis on 5/16 and presents to the ER from SNF with increased shortness of breath. Was discharged on 2L oxygen. Also with some labile and low BP readings but not consistently. Accompanied by son and wife. Pt reports feeling much better since nonrebreather. Denies fever, cough, abdominal pain. Son notes waxing and waning AMS for several days, now much more clear, suspects it was hypoxia. He has been on 2 L nasal cannula in the facility but then required nonrebreather and mask oxygen to maintain sats in the 90s. He dropped down into the mid 80s and EMS was called for evaluation. For EMS the patient's sats were 88% on 6 L and they paste placed him on nonrebreather mask. Patient denies chest pain or chest discomfort. He has had some dyspnea today. Lower extremity edema is unchanged. Complains of chronic mildly productive cough but no change in it. Denies any aggravating or alleviating factors but when asked me indicate that it is slightly worse when he lays back more. He has a history of congestive heart failure as well. Since discharge 5/25, had li replaced for persistent retention. Was on 2L NC. [facility] had a fever this morning and then had to increase his oxygen and then sent him to the ED. Son described him being confused for the past several days, but after being on O2 being much more clear and coherent today. \"    Past Medical History/Comorbidities:   Mr. Thalia Fonseca  has a past medical history of Abdominal aortic aneurysm ([address] (12/10/2015), Abdominal aortic aneurysm without rupture ([address] Allergic rhinitis (12/10/2015), Asthma, Benign neoplasm, Benign prostatic hyperplasia (12/10/2015), BPH without urinary obstruction, Cardiovascular disease (12/10/2015), Chronic obstructive asthma with exacerbation ([facility] (12/10/2015), COPD (chronic obstructive pulmonary disease) ([facility] (12/10/2015), Cough with hemoptysis, Erectile dysfunction (12/10/2015), Essential hypertension, benign (12/10/2015), Fracture (10/2014), History of colon polyps, Low testosterone (12/10/2015), Lumbago, Memory loss, Overflow incontinence, Raynaud's syndrome (12/10/2015), Rotator cuff tendinitis, Thrombocytopenia ([facility] and Urinary frequency. Mr. Zhen Johnson  has a past surgical history that includes hx hernia repair (1980); hx colonoscopy; hx fracture tx (10/2014); hx cataract removal (6/2016-right); and hx orthopaedic (03/2018). Social History/Living Environment:   Home Environment: [address] [place] [address] Name: [facility]  One/Two Story Residence: One story  Living Alone: No  Support Systems: Family member(s), Spouse/Significant Other/Partner, Child(tremayne)  Patient Expects to be Discharged to[de-identified] Rehabilitation facility  Current DME Used/Available at Home: Walker, rolling  Tub or Shower Type: Shower  Prior Level of Function/Work/Activity:  Pt admitted from rehab following prolonged hospitalization. Prior to this he was modified independent using walker due to history of hip fracture. Number of Personal Factors/Comorbidities that affect the Plan of Care: 1-2: MODERATE COMPLEXITY   EXAMINATION:   Most Recent Physical Functioning:   Gross Assessment:  AROM: Within functional limits  Strength: Generally decreased, functional  Coordination: Within functional limits               Posture:  Posture (WDL): Exceptions to WDL  Posture Assessment: Forward head, Rounded shoulders  Balance:  Sitting: Intact  Standing: Impaired  Standing - Static: Fair  Standing - Dynamic : Poor Bed Mobility:  Supine to Sit: Contact guard assistance; Additional time  Scooting: Contact guard assistance; Additional time  Wheelchair Mobility:     Transfers:  Sit to Stand: Minimum assistance  Stand to Sit: Minimum assistance  Bed to Chair: Minimum assistance; Moderate assistance  Interventions: Verbal cues; Safety awareness training; Tactile cues  Duration: 14 Minutes  Gait:     Base of Support: Center of gravity altered  Speed/Marifer: Pace decreased (<100 feet/min); Slow  Step Length: Left shortened;Right shortened  Gait Abnormalities: Decreased step clearance  Distance (ft): 4 Feet (ft)  Assistive Device: Other (comment)(handheld assist)  Ambulation - Level of Assistance: Minimal assistance; Moderate assistance  Interventions: Verbal cues; Visual/Demos; Safety awareness training; Tactile cues;Manual cues      Body Structures Involved:  Lungs  Muscles Body Functions Affected:  Mental  Respiratory  Movement Related Activities and Participation Affected:  General Tasks and Demands  Mobility  Domestic Life  Community, Social and [place]   Number of elements that affect the Plan of Care: 4+: HIGH COMPLEXITY   CLINICAL PRESENTATION:   Presentation: Evolving clinical presentation with changing clinical characteristics: MODERATE COMPLEXITY   CLINICAL DECISION MAKIN [facility]  Ne? ?6 Clicks? Basic Mobility Inpatient Short Form  How much difficulty does the patient currently have. .. Unable A Lot A Little None   1. Turning over in bed (including adjusting bedclothes, sheets and blankets)? ? 1   ? 2   ? 3   ? 4   2. Sitting down on and standing up from a chair with arms ( e.g., wheelchair, bedside commode, etc.)   ? 1   ? 2   ? 3   ? 4   3. Moving from lying on back to sitting on the side of the bed?   ? 1   ? 2   ? 3   ? 4   How much help from another person does the patient currently need. .. Total A Lot A Little None   4. Moving to and from a bed to a chair (including a wheelchair)? ? 1   ? 2   ? 3   ? 4   5. Need to walk in hospital room? ? 1   ? 2   ? 3   ? 4   6. Climbing 3-5 steps with a railing? ? 1   ? 2   ? 3   ? 4   © 2007, [facility] of [address] [place] [address] [address] [address], under license to [employer].  All rights reserved      Score:  Initial: 16 Most Recent: X (Date: -- )    Interpretation of Tool:  Represents activities that are increasingly more difficult (i.e. Bed mobility, Transfers, Gait). Medical Necessity:     Patient demonstrates fair   rehab potential due to higher previous functional level. Reason for Services/Other Comments:  Patient continues to demonstrate capacity to improve strength, mobility, balance, transfers, activity tolerance which will increase independence and increase safety  . Use of outcome tool(s) and clinical judgement create a POC that gives a: Clear prediction of patient's progress: LOW COMPLEXITY            TREATMENT:   (In addition to Assessment/Re-Assessment sessions the following treatments were rendered)   Pre-treatment Symptoms/Complaints:  \"thank you\"  Pain: Initial:   Pain Intensity 1: 0  Post Session:  0/10     Therapeutic Activity: (  14 Minutes ):  Therapeutic activities including Bed transfers, Chair transfers, Ambulation on level ground and exercises  to improve mobility, strength, balance and activity tolerance . Required minimal Verbal cues; Visual/Demos; Safety awareness training; Tactile cues;Manual cues to promote static and dynamic balance in standing and promote motor control of bilateral, lower extremity(s). Date:  6/11/19 Date:   Date:     Activity/Exercise Parameters Parameters Parameters   LAQ 10x AB     Seated marching 10x AB     Ankle pumps 10x AB      1                           Braces/Orthotics/Lines/Etc:   IV  li catheter  O2 Device: Hi flow nasal cannula  Treatment/Session Assessment:    Response to Treatment:  pt performs mobility with min-mod A  Interdisciplinary Collaboration:   Physical Therapist  Registered Nurse  After treatment position/precautions:   Up in chair  Bed/Chair-wheels locked  Bed in low position  Call light within reach  Family at bedside   Compliance with Program/Exercises:  Will assess as treatment progresses  Recommendations/Intent for next treatment session: \"Next visit will focus on advancements to more challenging activities and reduction in assistance provided\".   Total Treatment Duration:  PT Patient Time In/Time Out  Time In: 1359  Time Out: [address] [facility],[address], DPT

## 2019-06-11 NOTE — PROGRESS NOTES
CONSULT NOTE    Mariam Michael    6/11/2019    Date of Admission:  6/10/2019    The patient's chart is reviewed and the patient is discussed with the staff. 80 y.o.  male seen and evaluated at the request of Dr. Enrike Blanco. He has hx emphysema who was recently admitted for pneumothorax, s/p pleurodesis on 5/16 and presents to the ER from SNF with increased shortness of breath. Was discharged on 2L oxygen. Also with some labile and low BP readings but not consistently. Accompanied by son and wife. Pt reports feeling much better since nonrebreather. Denies fever, cough, abdominal pain. Son notes waxing and waning AMS for several days, now much more clear, suspects it was hypoxia. He has been on 2 L nasal cannula in the facility but then required nonrebreather and mask oxygen to maintain sats in the 90s. He dropped down into the mid 80s and EMS was called for evaluation. For EMS the patient's sats were 88% on 6 L and they paste placed him on nonrebreather mask. Patient denies chest pain or chest discomfort. He has had some dyspnea today. Lower extremity edema is unchanged. Complains of chronic mildly productive cough but no change in it. Denies any aggravating or alleviating factors but when asked me indicate that it is slightly worse when he lays back more. He has a history of congestive heart failure as well. Since discharge 5/25, had li replaced for persistent retention. Was on 2L NC. Wife reports [facility] said he had a fever this morning and then had to increase his oxygen and then sent him to the ED. Son described him being confused for the past several days, but after being on O2 being much more clear and coherent today. Subjective:     Patient seen resting quietly in bed, states feeling \"so much better\" today following thoracentesis last night. Review of Systems  A comprehensive review of systems was negative.     Patient Active Problem List   Diagnosis Code    Abdominal aortic aneurysm without rupture ([facility]) I71.4    Benign prostatic hyperplasia N40.0    Cardiovascular disease I25.10    COPD (chronic obstructive pulmonary disease) ([facility]) J44.9    Low testosterone R79.89    Essential hypertension, benign I10    Allergic rhinitis J30.9    Raynaud's syndrome I73.00    Erectile dysfunction N52.9    Chronic respiratory failure with hypoxia ([facility]) J96.11    SOB (shortness of breath) R06.02    Abnormal finding of lung R09.89    Bigeminy I49.9    Hip fracture ([facility]) S72.009A    COPD (chronic obstructive pulmonary disease) with emphysema ([facility]) J43.9    Closed compression fracture of lumbosacral spine ([facility]) S32.000A    Pneumothorax on right J93.9    Shortness of breath R06.02    Acute on chronic respiratory failure with hypoxia ([facility]) J96.21    COPD exacerbation ([facility]) J44.1    Ventricular tachyarrhythmia ([facility]) I47.2    Pulmonary infiltrates R91.8    VT (ventricular tachycardia) ([facility]) I47.2    Subcutaneous emphysema ([facility]) T79. 7XXA    Urinary retention R33.9    Hyponatremia E87.1    Pleural effusion J90     Prior to Admission Medications   Prescriptions Last Dose Informant Patient Reported? Taking? Biotin 2,500 mcg cap   Yes No   Sig: Take  by mouth. Oxygen   Yes No   Sig: Use as instructed. Use as instructed; faxed to [employer]   albuterol-ipratropium (DUO-NEB) 2.5 mg-0.5 mg/3 ml nebu   No No   Sig: 3 mL by Nebulization route every four (4) hours as needed for Other (dyspnea). amiodarone (CORDARONE) 200 mg tablet   No No   Sig: Take 2 Tabs by mouth every twelve (12) hours. aspirin delayed-release 81 mg tablet   Yes No   Sig: Take  by mouth daily. calcium citrate-vitamin d3 (CITRACAL+D) 315-200 mg-unit tab   Yes No   Sig: Take 2 Tabs by mouth daily (with breakfast). carvedilol (COREG) 3.125 mg tablet   No No   Sig: Take 1 Tab by mouth two (2) times daily (with meals). cholecalciferol (VITAMIN D3) 1,000 unit cap   Yes No   Sig: Take  by mouth. ciprofloxacin HCl (CIPRO) 500 mg tablet   No No   Sig: Take 1 Tab by mouth every twelve (12) hours. cyanocobalamin (VITAMIN B12) 1,000 mcg/mL injection   Yes No   Sig: INJECT 1ML INTRAMUSCULARLY ONCE FOR 1 DOSE   fluticasone (FLONASE) 50 mcg/actuation nasal spray   No No   Si Sprays by Both Nostrils route daily. guaiFENesin ER (MUCINEX) 600 mg ER tablet   Yes No   Sig: Take 1,200 mg by mouth daily. lisinopril (PRINIVIL, ZESTRIL) 10 mg tablet   No No   Sig: Take 1 Tab by mouth daily. montelukast (SINGULAIR) 10 mg tablet   No No   Sig: Take 1 Tab by mouth daily. potassium chloride (K-DUR, KLOR-CON) 20 mEq tablet   No No   Sig: Take 2 Tabs by mouth daily. tamsulosin (FLOMAX) 0.4 mg capsule   No No   Sig: Take 1 Cap by mouth daily. umeclidinium-vilanterol (ANORO ELLIPTA) 62.5-25 mcg/actuation inhaler   Yes No   Sig: Take 1 Puff by inhalation daily.       Facility-Administered Medications: None     Past Medical History:   Diagnosis Date    Abdominal aortic aneurysm ([address] 12/10/2015    In 2005    Abdominal aortic aneurysm without rupture ([address]     Allergic rhinitis 12/10/2015    Asthma     Benign neoplasm     Benign prostatic hyperplasia 12/10/2015    BPH without urinary obstruction     Cardiovascular disease 12/10/2015    Chronic obstructive asthma with exacerbation ([address] 12/10/2015    COPD (chronic obstructive pulmonary disease) ([address] 12/10/2015    Cough with hemoptysis     Erectile dysfunction 12/10/2015    Essential hypertension, benign 12/10/2015    Fracture 10/2014    of left hand and ribs after fall on concrete    History of colon polyps     Low testosterone 12/10/2015    Lumbago     Memory loss     Overflow incontinence     Raynaud's syndrome 12/10/2015    Rotator cuff tendinitis     Thrombocytopenia ([address]     Urinary frequency      Past Surgical History:   Procedure Laterality Date    HX CATARACT REMOVAL  2016-right    HX COLONOSCOPY      HX FRACTURE TX  10/2014 of left hand and ribs are fall on concrete    [address] [place] HX ORTHOPAEDIC  03/2018    hip fracture     Social History     Socioeconomic History    Marital status:      Spouse name: Not on file    Number of children: Not on file    Years of education: Not on file    Highest education level: Not on file   Occupational History    Not on file   Social Needs    Financial resource strain: Not on file    Food insecurity:     Worry: Not on file     Inability: Not on file    Transportation needs:     Medical: Not on file     Non-medical: Not on file   Tobacco Use    Smoking status: Former Smoker    Smokeless tobacco: Never Used   Substance and Sexual Activity    Alcohol use: Yes     Comment: occasional    Drug use: Not on file    Sexual activity: Not on file   Lifestyle    Physical activity:     Days per week: Not on file     Minutes per session: Not on file    Stress: Not on file   Relationships    Social connections:     Talks on phone: Not on file     Gets together: Not on file     Attends Yazdanism service: Not on file     Active member of club or organization: Not on file     Attends meetings of clubs or organizations: Not on file     Relationship status: Not on file    Intimate partner violence:     Fear of current or ex partner: Not on file     Emotionally abused: Not on file     Physically abused: Not on file     Forced sexual activity: Not on file   Other Topics Concern    Not on file   Social History Narrative    Not on file     Family History   Problem Relation Age of Onset    Dementia Mother     Cancer Father         lung cancer    Heart Attack Father      No Known Allergies    Current Facility-Administered Medications   Medication Dose Route Frequency    sodium chloride (NS) flush 5-40 mL  5-40 mL IntraVENous Q8H    sodium chloride (NS) flush 5-40 mL  5-40 mL IntraVENous PRN    albuterol-ipratropium (DUO-NEB) 2.5 MG-0.5 MG/3 ML  3 mL Nebulization Q4H PRN    amiodarone (CORDARONE) tablet 400 mg  400 mg Oral Q12H    fluticasone propionate (FLONASE) 50 mcg/actuation nasal spray 2 Spray  2 Spray Both Nostrils DAILY    guaiFENesin ER (MUCINEX) tablet 1,200 mg  1,200 mg Oral DAILY    montelukast (SINGULAIR) tablet 10 mg  10 mg Oral DAILY    tamsulosin (FLOMAX) capsule 0.4 mg  0.4 mg Oral DAILY    sodium chloride (NS) flush 5-40 mL  5-40 mL IntraVENous Q8H    sodium chloride (NS) flush 5-40 mL  5-40 mL IntraVENous PRN    tuberculin injection 5 Units  5 Units IntraDERMal ONCE    acetaminophen (TYLENOL) tablet 650 mg  650 mg Oral Q4H PRN    ondansetron (ZOFRAN ODT) tablet 4 mg  4 mg Oral Q4H PRN    bisacodyl (DULCOLAX) tablet 5 mg  5 mg Oral DAILY PRN    heparin (porcine) injection 5,000 Units  5,000 Units SubCUTAneous Q8H    piperacillin-tazobactam (ZOSYN) 4.5 g in 0.9% sodium chloride (MBP/ADV) 100 mL  4.5 g IntraVENous Q8H    vancomycin (VANCOCIN) 1,000 mg in 0.9% sodium chloride (MBP/ADV) 250 mL  1,000 mg IntraVENous Q18H    furosemide (LASIX) injection 20 mg  20 mg IntraVENous Q12H     Objective:     Vitals:    06/10/19 2145 06/10/19 2338 06/11/19 0752 06/11/19 0839   BP:  147/72 92/62 107/62   Pulse:  (!) 59 70 70   Resp:  18 18    Temp:  98 °F (36.7 °C) 98.1 °F (36.7 °C)    SpO2: 93% 92% 98%    Weight:       Height:         PHYSICAL EXAM     Constitutional:  the patient is well developed and in no acute distress  EENMT:  Sclera clear, pupils equal, oral mucosa moist  Respiratory: rales, diminished in bases  Cardiovascular:  RRR without M,G,R  Gastrointestinal: soft and non-tender; with positive bowel sounds. Musculoskeletal: warm without cyanosis. There is 2+ lower extremity edema. Skin:  no jaundice or rashes, no wounds   Neurologic: no gross neuro deficits     Psychiatric:  alert and oriented x 3    CXR:  6/11/2019    IMPRESSION: Improving bilateral lung edema or infiltrates and tiny right pleural effusion.         CT Chest: Severe upper lobe emphysema, Moderate right pleural effusion, tiny left pleural effusion, basilar infiltrates, edema or atelectasis. Recent Labs     06/11/19  0550 06/10/19  1201   WBC 5.4 6.5   HGB 9.6* 9.3*   HCT 30.9* 29.1*    171     Recent Labs     06/11/19  0550 06/10/19  1201    142   K 3.8 3.4*    108*   GLU 96 91   CO2 27 28   BUN 23 20   CREA 0.64* 0.71*   MG 2.7*  --    CA 6.7* 7.1*   ALB  --  1.7*   TBILI  --  1.4*   ALT  --  36   SGOT  --  25     No results for input(s): PH, PCO2, PO2, HCO3 in the last 72 hours. No results for input(s): LCAD, LAC in the last 72 hours.  which is increased from prior    Assessment:  (Medical Decision Making)     Hospital Problems  Date Reviewed: 6/11/2019          Codes Class Noted POA    * (Principal) Pleural effusion ICD-10-CM: J90  ICD-9-CM: 511.9  6/10/2019 Yes    Right thoracentesis done yesterday, 800 cc removed from the right    Urinary retention ICD-10-CM: R33.9  ICD-9-CM: 788.20  5/20/2019 Yes        Acute on chronic respiratory failure with hypoxia [facility]) ICD-10-CM: J96.21  ICD-9-CM: 518.84, 799.02  5/6/2019 Yes    Improving, weaning O2 as tolerated    Ventricular tachyarrhythmia ([facility]) ICD-10-CM: I47.2  ICD-9-CM: 427.1  5/6/2019 Yes        COPD (chronic obstructive pulmonary disease) ([facility] ICD-10-CM: J44.9  ICD-9-CM: 496  12/10/2015 Yes    On Duoneb nebulizers    Essential hypertension, benign ICD-10-CM: I10  ICD-9-CM: 401.1  12/10/2015 Yes    BP stable currently        81 y/o male with severe emphysema presenting with acute on chronic hypoxemic respiratory failure with new right pleural effusion, elevated BNP, lower extremity edema not previously documented and normal WBC. Recently admitted for VATS pleurodesis secondary to spontaneous secondary pneumothorax.      Plan:  (Medical Decision Making)     --Blood cultures pending, Continue Vancomycin and Zosyn, day 2  --CXR improved today  --Wean O2 as tolerated      More than 50% of the time documented was spent in face-to-face contact with the patient and in the care of the patient on the floor/unit where the patient is located. Duke Brown NP     Lungs:  Fairly clear bilaterally  Heart:  RRR with no Murmur/Rubs/Gallops    Additional Comments:  Pleural fluid analysis as follows:    Results for Daksha Dickerson (MRN [id number]) as of 6/11/2019 12:08   Ref. Range 6/10/2019 21:39   BODY FLUID TYPE Latest Units:   PLEURAL FLUID   FLUID APPEARANCE Latest Units:   HAZY   FLUID COLOR Latest Units:   YELLOW   FLUID RBC CT. Latest Units: /cu mm 1,000   FLUID WBC COUNT Latest Units: /cu mm 1,456   FLUID COMMENT Unknown SEVERAL UNIDENTIF. .. [facility] [place]UTR[place]HIL Latest Units: % 89   BRCH LYMPHS Latest Units: % 11     Pleural fluid culture is negative so far. Need to monitor for + culture since highly neutrophilic. Feels much better. I have spoken with and examined the patient. I agree with the above assessment and plan as documented.     Ted De MD

## 2019-06-11 NOTE — PROGRESS NOTES
Problem: Self Care Deficits Care Plan (Adult)  Goal: *Acute Goals and Plan of Care (Insert Text)  Description  1. Pt will toilet with CGA   2. Pt will complete functional mobility for ADLs with CGA  3. Pt will complete lower body dressing with min assist using AE as needed  4. Pt will complete grooming and hygiene at sink with CGA  5. Pt will demonstrate independence with HEP to promote increased BUE strength and functional use for ADLs  6. Pt will tolerate 23 minutes functional activity with min or fewer rest breaks to promote increased endurance for ADLs  7. Pt will complete bed mobility with CGA in prep for ADLs    Timeframe: 7 days     Outcome: Progressing Towards Goal     OCCUPATIONAL THERAPY: Initial Assessment 6/11/2019  INPATIENT:    [place] / [place] / Product Type: Medicare /      NAME/AGE/GENDER: Bienvenido Edmondson is a 80 y.o. male   PRIMARY DIAGNOSIS:  Pleural effusion [J90] Pleural effusion   Pleural effusion          ICD-10: Treatment Diagnosis:    Generalized Muscle Weakness (M62.81)   Precautions/Allergies:    falls Patient has no known allergies. ASSESSMENT:     Mr. Nikolas Lau was admitted from SNF with SOB, diagnosis of pleural effusions now s/p thoracentesis. Pt is s/p recent admission for pneumothorax, is known to therapist from last hospitalization. Pt lives at home with his wife and is independent with use of RW at baseline, has required assistance for ADLs and mobility for ADLs at SNF and reports very limited mobility. This session, pt presented generally weak with deficits in strength, balance, and endurance impacting ADLs. Pt A&O to all but situation, somewhat confused and insisted that they did surgery on him on his bedside table. Pt required mod A to stand, stood ~1.5 minutes but did not take any steps d/t weakness and SOB. SpO2 < to 69% on 3L with activity and did not recover, slowly increased to 90% within ~5 minutes once O2 > to 6L.  Pt educated on PLB technique, required min cues for carryover. RN notified and contacted respiratory. BUE strength is generally decreased, grossly 4/5. Pt is below his functional baseline and would benefit from skilled OT services to address deficits. Recommend d/c to SNF. This section established at most recent assessment   PROBLEM LIST (Impairments causing functional limitations):  Decreased Strength  Decreased ADL/Functional Activities  Decreased Transfer Abilities  Decreased Balance  Decreased Activity Tolerance  Increased Fatigue  Increased Shortness of Breath   INTERVENTIONS PLANNED: (Benefits and precautions of occupational therapy have been discussed with the patient.)  Activities of daily living training  Adaptive equipment training  Balance training  Therapeutic activity  Therapeutic exercise     TREATMENT PLAN: Frequency/Duration: Follow patient 3 times/ week to address above goals. Rehabilitation Potential For Stated Goals: Good     REHAB RECOMMENDATIONS (at time of discharge pending progress):    Placement: It is my opinion, based on this patient's performance to date, that Mr. Jaimee Dobbs may benefit from intensive therapy at a [address] [place] [address] after discharge due to the functional deficits listed above that are likely to improve with skilled rehabilitation and inability to complete ADLs/ function at home .   Equipment:   None at this time              OCCUPATIONAL PROFILE AND HISTORY:   History of Present Injury/Illness (Reason for Referral):  See H&P  Past Medical History/Comorbidities:   Mr. Jaimee Dobbs  has a past medical history of Abdominal aortic aneurysm ([address] (12/10/2015), Abdominal aortic aneurysm without rupture ([address] Allergic rhinitis (12/10/2015), Asthma, Benign neoplasm, Benign prostatic hyperplasia (12/10/2015), BPH without urinary obstruction, Cardiovascular disease (12/10/2015), Chronic obstructive asthma with exacerbation ([address] (12/10/2015), COPD (chronic obstructive pulmonary disease) ([address] (12/10/2015), Cough with hemoptysis, Erectile dysfunction (12/10/2015), Essential hypertension, benign (12/10/2015), Fracture (10/2014), History of colon polyps, Low testosterone (12/10/2015), Lumbago, Memory loss, Overflow incontinence, Raynaud's syndrome (12/10/2015), Rotator cuff tendinitis, Thrombocytopenia ([address] and Urinary frequency. Mr. Luke Bobo  has a past surgical history that includes hx hernia repair (1980); hx colonoscopy; hx fracture tx (10/2014); hx cataract removal (6/2016-right); and hx orthopaedic (03/2018). Social History/Living Environment:   Home Environment: [address] [place] [address] Name: [facility]  One/Two Story Residence: One story  Living Alone: No  Support Systems: Skilled nursing facility  Patient Expects to be Discharged to[de-identified] Skilled nursing facility  Current DME Used/Available at Home: Rayetta Danker, rolling  Tub or Shower Type: Shower  Prior Level of Function/Work/Activity:  Independent at baseline, has required assistance for ADLs since d/c to SNF following last hospitalization     Number of Personal Factors/Comorbidities that affect the Plan of Care: Expanded review of therapy/medical records (1-2):  MODERATE COMPLEXITY   ASSESSMENT OF OCCUPATIONAL PERFORMANCE[de-identified]   Activities of Daily Living:   Basic ADLs (From Assessment) Complex ADLs (From Assessment)   Feeding: Setup  Oral Facial Hygiene/Grooming: Contact guard assistance  Bathing: Moderate assistance  Upper Body Dressing: Minimum assistance  Lower Body Dressing: Moderate assistance  Toileting: Moderate assistance Instrumental ADL  Meal Preparation: Maximum assistance  Homemaking: Maximum assistance  Medication Management: Moderate assistance   Grooming/Bathing/Dressing Activities of Daily Living     Cognitive Retraining  Safety/Judgement: Decreased insight into deficits                       Bed/Mat Mobility  Sit to Stand:  Moderate assistance  Stand to Sit: Contact guard assistance       Most Recent Physical Functioning:   Gross Assessment:  AROM: Within functional limits  Strength: Generally decreased, functional               Posture:     Balance:  Sitting: Intact  Standing: Impaired  Standing - Static: Fair  Standing - Dynamic : Fair Bed Mobility:     Wheelchair Mobility:     Transfers:  Sit to Stand: Moderate assistance  Stand to Sit: Contact guard assistance            Patient Vitals for the past 6 hrs:   BP SpO2 Pulse   19 0752 92/62 98 % 70   19 0839 107/62 -- 70       Mental Status  Neurologic State: Alert  Orientation Level: Oriented to person, Oriented to place, Oriented to time  Cognition: Follows commands  Perception: Appears intact  Perseveration: No perseveration noted  Safety/Judgement: Decreased insight into deficits                          Physical Skills Involved:  Balance  Strength  Activity Tolerance Cognitive Skills Affected (resulting in the inability to perform in a timely and safe manner):  Executive Function  Comprehension Psychosocial Skills Affected:  Habits/Routines  Environmental Adaptation   Number of elements that affect the Plan of Care: 3-5:  MODERATE COMPLEXITY   CLINICAL DECISION MAKIN [address]? ?6 Clicks? Daily Activity Inpatient Short Form  How much help from another person does the patient currently need. .. Total A Lot A Little None   1. Putting on and taking off regular lower body clothing? ? 1   ? 2   ? 3   ? 4   2. Bathing (including washing, rinsing, drying)? ? 1   ? 2   ? 3   ? 4   3. Toileting, which includes using toilet, bedpan or urinal?   ? 1   ? 2   ? 3   ? 4   4. Putting on and taking off regular upper body clothing? ? 1   ? 2   ? 3   ? 4   5. Taking care of personal grooming such as brushing teeth? ? 1   ? 2   ? 3   ? 4   6. Eating meals? ? 1   ? 2   ? 3   ? 4   © , Trustees of [facility] [place] [facility] [employer], under license to [employer].  All rights reserved      Score:  Initial: 16 Most Recent: X (Date: -- )    Interpretation of Tool:  Represents activities that are increasingly more difficult (i.e. Bed mobility, Transfers, Gait). Medical Necessity:     Patient demonstrates good   rehab potential due to higher previous functional level. Reason for Services/Other Comments:  Patient continues to require present interventions due to patient's inability to impaired ADLs and functional performance from baseline   . Use of outcome tool(s) and clinical judgement create a POC that gives a: MODERATE COMPLEXITY         TREATMENT:   (In addition to Assessment/Re-Assessment sessions the following treatments were rendered)     Pre-treatment Symptoms/Complaints:    Pain: Initial:   Pain Intensity 1: 0  Post Session:  0     Assessment/Reassessment only, no treatment provided today    Braces/Orthotics/Lines/Etc:   li catheter  O2 Device: Hi flow nasal cannula  Treatment/Session Assessment:    Response to Treatment:  SOB, desaturation  Interdisciplinary Collaboration:   Occupational Therapist  Registered Nurse  After treatment position/precautions:   Up in chair  Bed/Chair-wheels locked  Bed in low position  Call light within reach  RN notified   Compliance with Program/Exercises: Compliant most of the time. Recommendations/Intent for next treatment session: \"Next visit will focus on advancements to more challenging activities and reduction in assistance provided\".   Total Treatment Duration:  OT Patient Time In/Time Out  Time In: 0915  Time Out: 1000 [facility]

## 2019-06-11 NOTE — PROGRESS NOTES
Hourly rounds performed. All needs met. Pt is alert and oriented and happy that doctor performed thoracentesis. Pt says he can breathe better. Pt had no complaints thru night. Will continue to monitor and report to oncoming nurse.

## 2019-06-11 NOTE — PROCEDURES
PROCEDURE:  DIAGNOSTIC THORACENTESIS, THERAPEUTIC THORACENTESIS       PRE-OP DIAGNOSIS:  Right PLEURAL EFFUSION    POST-OP DIAGNOSIS:  Right PLEURAL EFFUSION    VOLUME REMOVED:    800cc    ANESTHESIA:    LOCAL ANESTHESIA WITH 1% LIDOCAINE 10 CC TOTAL. CHEST ULTRASOUND FINDINGS:    A Turbo-M, Sonosite ultrasound with a 5-16 mHz probe was used to image the chest and localize the pleural effusion on the bilateral  chest.    A Tiny anechoic space was seen on the left consistent with an uncomplicated pleural effusion. This space was too small and obliterated during the respiratory cycle. A moderate anechoic space was seen on the right consistent with an uncomplicated pleural effusion. DESCRIPTION OF PROCEDURE:    After obtaining informed consent and localizing the safest location for thoracentesis, the  8th intercostal space was marked with a blunt, plastic needle cap in the mid scapular line. An [employer] AK-0100 Pleral-Seal thoracentesis kit was used to perform the procedure. The skin was cleansed with the supplied chlorhexidine swab and then draped in the usual fashion. Using the previously marked location as a guide, a 22 G 1.5 inch needle was used to inject 1% lidocaine into the skin and subcutaneous tissue, as well as onto the underlying rib and inter-costal muscles. Pleural fluid was aspirated to assure proper location and additional lidocaine was injected into the pleural space prior to removing the anesthesia needle. A 3mm incision was then made with the supplied scalpel in the usual fashion to facilitate the insertion of the thoracentesis needle. The needle with an 8 Kiswahili thoracentesis catheter was then introduced into the chest through the previously made incision in the usual fashion, the rib localized with the needle, and the catheter then marched over the rib into the pleural space.     After aspirating fluid, the thoracentesis catheter was then placed into the chest using the needle itself as a trocar. The needle was then removed and the catheter was attached to the supplied tubing without complication. 800 cc of yellow fluid was aspirated and sent for analysis. Fluid was sent for the following tests:    LDH  Total Protein  Glucose  Cell count with differential  Routine culture and Gram stain  Cytology    Post procedure US confirmed complete drainage of the effusion and presence of lung sliding, ruling out pneumothorax.  ([id number])    EBL:     2 drops    COMPLICATIONS:    None      Elie Hall MD

## 2019-06-11 NOTE — PROGRESS NOTES
Patient admitted to hospital from [facility]. Referral sent to [facility] to see if patient can return once medically stable. Awaiting response.      Care Management Interventions  PCP Verified by CM: Yes(listed as Dr. Pricilla Friend)  Mode of Transport at Discharge: BLS  Transition of Care Consult (CM Consult): Discharge Planning, SNF  Discharge Durable Medical Equipment: No  Physical Therapy Consult: Yes  Occupational Therapy Consult: Yes  Current Support Network: Own Home  Confirm Follow Up Transport: Family  Plan discussed with Pt/Family/Caregiver: Yes  Freedom of Choice Offered: Yes  Discharge Location  Discharge Placement: Skilled nursing facility

## 2019-06-11 NOTE — PROGRESS NOTES
Problem: Dysphagia (Adult)  Goal: *Acute Goals and Plan of Care (Insert Text)  Description  LTG: Patient will tolerate least restrictive diet without overt signs or symptoms of airway compromise. STG: Patient will tolerate regular diet and nectar thick liquids without overt signs or symptoms of airway compromise. STG: Patient will participate in modified barium swallow study as clinically indicated. Outcome: Progressing Towards Goal   SPEECH LANGUAGE PATHOLOGY: BEDSIDE SWALLOW NOTE: Initial Assessment    NAME/AGE/GENDER: Lyly Jordan is a 80 y.o. male  DATE: 6/11/2019  PRIMARY DIAGNOSIS: Pleural effusion [J90]       ICD-10: Treatment Diagnosis: R13.12 Oropharyngeal Dysphagia. INTERDISCIPLINARY COLLABORATION: Registered Nurse  PRECAUTIONS/ALLERGIES: Patient has no known allergies. ASSESSMENT:   Based on the objective data described below, Mr. Ijeoma Edmond presents with overt s/sx of airway compromise with thin liquids via cup sip. He endorses occasional coughing with thin liquids which he attributes to \"drinking too fast\". He avoids straws due to frequent choking. Patient presented with thin via tsp, cup, and straw. Only small single straw sip consumed due to patient's concern with aspiration, but no overt s/sx observed. He initially tolerated thin by cup, but strong coughing noted when it was used as liquid wash after solid texture. Nectar thick liquids also presented and no s/sx airway compromise noted. Appropriate oral prep and swallow with chewable textures and puree. Recommend regular diet/nectar thick liquids. Medications with thickened liquids. He would benefit from modified barium swallow study to objectively assess swallow function and assessment for use of compensatory strategies. Plan for study on 6/12/19. Patient will benefit from skilled intervention to address the below impairments. ?????? ? ? This section established at most recent assessment??????????  PROBLEM LIST (Impairments causing functional limitations):  Oropharyngeal dysphagia  REHABILITATION POTENTIAL FOR STATED GOALS: Good  PLAN OF CARE:   Patient will benefit from skilled intervention to address the following impairments. RECOMMENDATIONS AND PLANNED INTERVENTIONS (Benefits and precautions of therapy have been discussed with the patient.):  PO:  Regular  Liquids:  nectar  MEDICATIONS:  With thickened liquids  ASPIRATION PRECAUTIONS:  Slow rate of intake  Small bites/sips  Upright at 90 degrees during meal  COMPENSATORY STRATEGIES/MODIFICATIONS INCLUDING:  None  OTHER RECOMMENDATIONS (including follow up treatment recommendations):   Family training/education  Patient education  RECOMMENDED DIET MODIFICATIONS DISCUSSED WITH:  Nursing  Patient  FREQUENCY/DURATION: Continue to follow patient 3 times a week for duration of hospital stay to address above goals. RECOMMENDED REHABILITATION/EQUIPMENT: (at time of discharge pending progress): Due to the probability of continued deficits (see above) this patient will likely need continued skilled speech therapy after discharge. SUBJECTIVE:   \"Thank God for yesterday! They saved my life. Dr. Marco Haas is wonderful\".  Tearful when discussing hospitalization  History of Present Injury/Illness: Mr. Jumana Grimm  has a past medical history of Abdominal aortic aneurysm ([facility] [address] (12/10/2015), Abdominal aortic aneurysm without rupture ([address] Allergic rhinitis (12/10/2015), Asthma, Benign neoplasm, Benign prostatic hyperplasia (12/10/2015), BPH without urinary obstruction, Cardiovascular disease (12/10/2015), Chronic obstructive asthma with exacerbation ([address] (12/10/2015), COPD (chronic obstructive pulmonary disease) ([facility] [address] (12/10/2015), Cough with hemoptysis, Erectile dysfunction (12/10/2015), Essential hypertension, benign (12/10/2015), Fracture (10/2014), History of colon polyps, Low testosterone (12/10/2015), Lumbago, Memory loss, Overflow incontinence, Raynaud's syndrome (12/10/2015), Rotator cuff tendinitis, Thrombocytopenia ([facility] [address] and Urinary frequency. Gurwinder Mohan He also  has a past surgical history that includes hx hernia repair (1980); hx colonoscopy; hx fracture tx (10/2014); hx cataract removal (6/2016-right); and hx orthopaedic (03/2018). Present Symptoms:    Pain Scale 1: Numeric (0 - 10)  Pain Intensity 1: 0  Current Medications:   No current facility-administered medications on file prior to encounter. Current Outpatient Medications on File Prior to Encounter   Medication Sig Dispense Refill    lisinopril (PRINIVIL, ZESTRIL) 10 mg tablet Take 1 Tab by mouth daily. 90 Tab 0    carvedilol (COREG) 3.125 mg tablet Take 1 Tab by mouth two (2) times daily (with meals). 180 Tab 0    ciprofloxacin HCl (CIPRO) 500 mg tablet Take 1 Tab by mouth every twelve (12) hours. 8 Tab 0    amiodarone (CORDARONE) 200 mg tablet Take 2 Tabs by mouth every twelve (12) hours. 180 Tab 0    albuterol-ipratropium (DUO-NEB) 2.5 mg-0.5 mg/3 ml nebu 3 mL by Nebulization route every four (4) hours as needed for Other (dyspnea). 30 Nebule 3    potassium chloride (K-DUR, KLOR-CON) 20 mEq tablet Take 2 Tabs by mouth daily. 3 Tab 0    tamsulosin (FLOMAX) 0.4 mg capsule Take 1 Cap by mouth daily. 90 Cap 4    montelukast (SINGULAIR) 10 mg tablet Take 1 Tab by mouth daily. 90 Tab 4    umeclidinium-vilanterol (ANORO ELLIPTA) 62.5-25 mcg/actuation inhaler Take 1 Puff by inhalation daily. Oxygen Use as instructed. Use as instructed; faxed to [facility]      cyanocobalamin (VITAMIN B12) 1,000 mcg/mL injection INJECT 1ML INTRAMUSCULARLY ONCE FOR 1 DOSE  12    fluticasone (FLONASE) 50 mcg/actuation nasal spray 2 Sprays by Both Nostrils route daily. 48 g 4    calcium citrate-vitamin d3 (CITRACAL+D) 315-200 mg-unit tab Take 2 Tabs by mouth daily (with breakfast). cholecalciferol (VITAMIN D3) 1,000 unit cap Take  by mouth.      guaiFENesin ER (MUCINEX) 600 mg ER tablet Take 1,200 mg by mouth daily.       Biotin 2,500 mcg cap Take  by mouth.      aspirin delayed-release 81 mg tablet Take  by mouth daily. Current Dietary Status:     Regular/thin  OBJECTIVE:   Respiratory Status:        Oral Motor Structure/Speech:  Oral-Motor Structure/Motor Speech  Labial: No impairment  Oral Hygiene: Adequate  Lingual: Decreased rate    Cognitive and Communication Status:  Neurologic State: Alert  Orientation Level: Oriented X4  Cognition: Follows commands  Perception: Appears intact  Perseveration: No perseveration noted  Safety/Judgement: Decreased insight into deficits    BEDSIDE SWALLOW EVALUATION  Oral Assessment:  Oral Assessment  Labial: No impairment  Oral Hygiene: Adequate  Lingual: Decreased rate  P.O. Trials:  Patient Position: Up in bedside chair    The patient was given tsp-small bite amounts of the following:   Consistency Presented: Thin liquid; Solid; Nectar thick liquid;Puree;Mechanical soft  How Presented: Self-fed/presented;Cup/sip;Straw;Successive swallows    ORAL PHASE:  Bolus Acceptance: No impairment  Bolus Formation/Control: Impaired  Propulsion: No impairment     Oral Residue: None    PHARYNGEAL PHASE:  Initiation of Swallow: No impairment  Laryngeal Elevation: Decreased  Aspiration Signs/Symptoms: Strong cough  Vocal Quality: No impairment     Effective Modifications: (Thickened liquids)     Pharyngeal Phase Characteristics: No impairment, issues, or problems     OTHER OBSERVATIONS:  Rate/bite size: WNL   Endurance:  Questionable   Comments: Tool Used: Dysphagia Outcome and Severity Scale (COREY)    Score Comments   Normal Diet  ? 7 With no strategies or extra time needed   Functional Swallow  ? 6 May have mild oral or pharyngeal delay       Mild Dysphagia    ? 5 Which may require one diet consistency restricted (those who demonstrate penetration which is entirely cleared on MBS would be included)   Mild-Moderate Dysphagia  ? 4 With 1-2 diet consistencies restricted       Moderate Dysphagia  ?  3 With 2 or more diet consistencies restricted       Moderately Severe Dysphagia  ? 2 With partial PO strategies (trials with ST only)       Severe Dysphagia  ? 1 With inability to tolerate any PO safely          Score:  Initial: 4 Most Recent: X (Date: --)   Interpretation of Tool: The Dysphagia Outcome and Severity Scale (COREY) is a simple, easy-to-use, 7-point scale developed to systematically rate the functional severity of dysphagia based on objective assessment and make recommendations for diet level, independence level, and type of nutrition. [place] / [place] / Product Type: Medicare /     TREATMENT:    (In addition to Assessment/Re-Assessment sessions the following treatments were rendered)  Assessment/Reassessment only, no treatment provided today  MODALITIES:                                                                    ORAL MOTOR  EXERCISES:                                                                                                                                                                      LARYNGEAL / PHARYNGEAL EXERCISES:                                                                                                                                     __________________________________________________________________________________________________  Safety:   After treatment position/precautions:  RN notified  Upright in Bed  . Progression/Medical Necessity:   Patient is expected to demonstrate progress in diet tolerance and swallow safety   to work toward diet advancement and decrease aspiration risk  . Compliance with Program/Exercises: Will assess as treatment progresses  Reason for Continuation of Services/Other Comments:  Patient continues to require skilled intervention due to dysphagia   . Recommendations/Intent for next treatment session: \"Treatment next visit will focus on modified barium swallow study \".     Total Treatment Duration:  Time In: 0900  Time Out: [address], MSP, CCC-SLP

## 2019-06-11 NOTE — H&P (VIEW-ONLY)
CONSULT NOTE Vinod Marmolejo 6/10/2019 Date of Admission:  6/10/2019 The patient's chart is reviewed and the patient is discussed with the staff. Subjective:  
 
Patient is a 80 y.o.  male seen and evaluated at the request of Dr. Cindi Sandra. He has hx emphysema who was recently admitted for pneumothorax, s/p pleurodesis on 5/16 and presents to the ER from SNF with increased shortness of breath. Was discharged on 2L oxygen. Also with some labile and low BP readings but not consistently. Accompanied by son and wife. Pt reports feeling much better since nonrebreather. Denies fever, cough, abdominal pain. Son notes waxing and waning AMS for several days, now much more clear, suspects it was hypoxia. He has been on 2 L nasal cannula in the facility but then required nonrebreather and mask oxygen to maintain sats in the 90s. He dropped down into the mid 80s and EMS was called for evaluation. For EMS the patient's sats were 88% on 6 L and they paste placed him on nonrebreather mask. Patient denies chest pain or chest discomfort. He has had some dyspnea today. Lower extremity edema is unchanged. Complains of chronic mildly productive cough but no change in it. Denies any aggravating or alleviating factors but when asked me indicate that it is slightly worse when he lays back more. He has a history of congestive heart failure as well. Since discharge 5/25, had li replaced for persistent retention. Was on 2L NC. [facility] and then had to increase his oxygen and then sent him to the ED. Son described him being confused for the past several days, but after being on O2 being much more clear and coherent today. Review of Systems A comprehensive review of systems was negative. Patient Active Problem List  
Diagnosis Code  Abdominal aortic aneurysm without rupture (HCC) I71.4  Benign prostatic hyperplasia N40.0  Cardiovascular disease I25.10  COPD (chronic obstructive pulmonary disease) ([facility]) J44.9  Low testosterone R79.89  
 Essential hypertension, benign I10  
 Allergic rhinitis J30.9  Raynaud's syndrome I73.00  Erectile dysfunction N52.9  Chronic respiratory failure with hypoxia ([facility]) J96.11  
 SOB (shortness of breath) R06.02  
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  
 Acute on chronic respiratory failure with hypoxia ([facility]) J96.21  
 COPD exacerbation ([facility]) J44.1  Ventricular tachyarrhythmia ([facility]) I47.2  Pulmonary infiltrates R91.8  VT (ventricular tachycardia) ([facility]) I47.2  Subcutaneous emphysema ([facility]) T79. 7XXA  Urinary retention R33.9  Hyponatremia E87.1  Pleural effusion J90 Prior to Admission Medications Prescriptions Last Dose Informant Patient Reported? Taking? Biotin 2,500 mcg cap   Yes No  
Sig: Take  by mouth. Oxygen   Yes No  
Sig: Use as instructed. Use as instructed; faxed to [employer]  
albuterol-ipratropium (DUO-NEB) 2.5 mg-0.5 mg/3 ml nebu   No No  
Sig: 3 mL by Nebulization route every four (4) hours as needed for Other (dyspnea). amiodarone (CORDARONE) 200 mg tablet   No No  
Sig: Take 2 Tabs by mouth every twelve (12) hours. aspirin delayed-release 81 mg tablet   Yes No  
Sig: Take  by mouth daily. calcium citrate-vitamin d3 (CITRACAL+D) 315-200 mg-unit tab   Yes No  
Sig: Take 2 Tabs by mouth daily (with breakfast). carvedilol (COREG) 3.125 mg tablet   No No  
Sig: Take 1 Tab by mouth two (2) times daily (with meals). cholecalciferol (VITAMIN D3) 1,000 unit cap   Yes No  
Sig: Take  by mouth.   
ciprofloxacin HCl (CIPRO) 500 mg tablet   No No  
 Sig: Take 1 Tab by mouth every twelve (12) hours. cyanocobalamin (VITAMIN B12) 1,000 mcg/mL injection   Yes No  
Sig: INJECT 1ML INTRAMUSCULARLY ONCE FOR 1 DOSE  
fluticasone (FLONASE) 50 mcg/actuation nasal spray   No No  
Si Sprays by Both Nostrils route daily. guaiFENesin ER (MUCINEX) 600 mg ER tablet   Yes No  
Sig: Take 1,200 mg by mouth daily. lisinopril (PRINIVIL, ZESTRIL) 10 mg tablet   No No  
Sig: Take 1 Tab by mouth daily. montelukast (SINGULAIR) 10 mg tablet   No No  
Sig: Take 1 Tab by mouth daily. potassium chloride (K-DUR, KLOR-CON) 20 mEq tablet   No No  
Sig: Take 2 Tabs by mouth daily. tamsulosin (FLOMAX) 0.4 mg capsule   No No  
Sig: Take 1 Cap by mouth daily. umeclidinium-vilanterol (ANORO ELLIPTA) 62.5-25 mcg/actuation inhaler   Yes No  
Sig: Take 1 Puff by inhalation daily. Facility-Administered Medications: None Past Medical History:  
Diagnosis Date  Abdominal aortic aneurysm ([address] 12/10/2015 In 2005  Abdominal aortic aneurysm without rupture ([address]  Allergic rhinitis 12/10/2015  Asthma  Benign neoplasm  Benign prostatic hyperplasia 12/10/2015  BPH without urinary obstruction  Cardiovascular disease 12/10/2015  Chronic obstructive asthma with exacerbation ([address] 12/10/2015  COPD (chronic obstructive pulmonary disease) ([address] 12/10/2015  Cough with hemoptysis  Erectile dysfunction 12/10/2015  Essential hypertension, benign 12/10/2015  Fracture 10/2014  
 of left hand and ribs after fall on concrete  History of colon polyps  Low testosterone 12/10/2015  Lumbago  Memory loss  Overflow incontinence  Raynaud's syndrome 12/10/2015  Rotator cuff tendinitis  Thrombocytopenia ([address]  Urinary frequency Past Surgical History:  
Procedure Laterality Date  HX CATARACT REMOVAL  2016-right  HX COLONOSCOPY    
 HX FRACTURE TX  10/2014  
 of left hand and ribs are fall on concrete [address]  HX ORTHOPAEDIC  03/2018  
 hip fracture Social History Socioeconomic History  Marital status:  Spouse name: Not on file  Number of children: Not on file  Years of education: Not on file  Highest education level: Not on file Occupational History  Not on file Social Needs  Financial resource strain: Not on file  Food insecurity:  
  Worry: Not on file Inability: Not on file  Transportation needs:  
  Medical: Not on file Non-medical: Not on file Tobacco Use  Smoking status: Former Smoker  Smokeless tobacco: Never Used Substance and Sexual Activity  Alcohol use: Yes Comment: occasional  
 Drug use: Not on file  Sexual activity: Not on file Lifestyle  Physical activity:  
  Days per week: Not on file Minutes per session: Not on file  Stress: Not on file Relationships  Social connections:  
  Talks on phone: Not on file Gets together: Not on file Attends Mandaeism service: Not on file Active member of club or organization: Not on file Attends meetings of clubs or organizations: Not on file Relationship status: Not on file  Intimate partner violence:  
  Fear of current or ex partner: Not on file Emotionally abused: Not on file Physically abused: Not on file Forced sexual activity: Not on file Other Topics Concern  Not on file Social History Narrative  Not on file Family History Problem Relation Age of Onset  Dementia Mother  Cancer Father   
     lung cancer  Heart Attack Father No Known Allergies Current Facility-Administered Medications Medication Dose Route Frequency  sodium chloride (NS) flush 5-40 mL  5-40 mL IntraVENous Q8H  
 sodium chloride (NS) flush 5-40 mL  5-40 mL IntraVENous PRN  
 vancomycin (VANCOCIN) 1750 mg in  ml infusion  1,750 mg IntraVENous ONCE  
  albuterol-ipratropium (DUO-NEB) 2.5 MG-0.5 MG/3 ML  3 mL Nebulization Q4H PRN  
 amiodarone (CORDARONE) tablet 400 mg  400 mg Oral Q12H  
 [START ON 6/11/2019] fluticasone propionate (FLONASE) 50 mcg/actuation nasal spray 2 Spray  2 Spray Both Nostrils DAILY  [START ON 6/11/2019] guaiFENesin ER (MUCINEX) tablet 1,200 mg  1,200 mg Oral DAILY  [START ON 6/11/2019] montelukast (SINGULAIR) tablet 10 mg  10 mg Oral DAILY  [START ON 6/11/2019] tamsulosin (FLOMAX) capsule 0.4 mg  0.4 mg Oral DAILY  sodium chloride (NS) flush 5-40 mL  5-40 mL IntraVENous Q8H  
 sodium chloride (NS) flush 5-40 mL  5-40 mL IntraVENous PRN  
 tuberculin injection 5 Units  5 Units IntraDERMal ONCE  
 acetaminophen (TYLENOL) tablet 650 mg  650 mg Oral Q4H PRN  
 furosemide (LASIX) injection 20 mg  20 mg IntraVENous ONCE  potassium chloride (K-DUR, KLOR-CON) SR tablet 40 mEq  40 mEq Oral NOW  ondansetron (ZOFRAN ODT) tablet 4 mg  4 mg Oral Q4H PRN  
 bisacodyl (DULCOLAX) tablet 5 mg  5 mg Oral DAILY PRN  
 heparin (porcine) injection 5,000 Units  5,000 Units SubCUTAneous Q8H  
 [START ON 6/11/2019] piperacillin-tazobactam (ZOSYN) 4.5 g in 0.9% sodium chloride (MBP/ADV) 100 mL  4.5 g IntraVENous Q8H  
 [START ON 6/11/2019] vancomycin (VANCOCIN) 1,000 mg in 0.9% sodium chloride (MBP/ADV) 250 mL  1,000 mg IntraVENous Q18H Objective:  
 
Vitals:  
 06/10/19 1608 06/10/19 1628 06/10/19 1648 06/10/19 1812 BP: 104/61 96/56 100/56 125/60 Pulse: (!) 52 62 (!) 53 (!) 56 Resp: 22 20  21 Temp:    98.4 °F (36.9 °C) SpO2: 97% 100% 96% 96% Weight:      
Height: PHYSICAL EXAM  
 
Constitutional:  the patient is well developed and in no acute distress EENMT:  Sclera clear, pupils equal, oral mucosa moist 
Respiratory: rales, diminished in bases Cardiovascular:  RRR without M,G,R 
Gastrointestinal: soft and non-tender; with positive bowel sounds. Musculoskeletal: warm without cyanosis. There is 2+ lower extremity edema. Skin:  no jaundice or rashes, no wounds Neurologic: no gross neuro deficits Psychiatric:  alert and oriented x 3 CXR:  Worsened interstitial markings and right pleural effusion CT Chest: Severe upper lobe emphysema, Moderate right pleural effusion, tiny left pleural effusion, basilar infiltrates, edema or atelectasis. Recent Labs  
  06/10/19 
1201 WBC 6.5 HGB 9.3* HCT 29.1*  
 Recent Labs  
  06/10/19 
1201   
K 3.4*  
* GLU 91  
CO2 28 BUN 20  
CREA 0.71* CA 7.1* ALB 1.7* TBILI 1.4* ALT 36 SGOT 25 No results for input(s): PH, PCO2, PO2, HCO3 in the last 72 hours. No results for input(s): LCAD, LAC in the last 72 hours.  which is increased from prior Assessment:  (Medical Decision Making) Hospital Problems  Date Reviewed: 5/21/2019 Codes Class Noted POA * (Principal) Pleural effusion ICD-10-CM: J90 ICD-9-CM: 511.9  6/10/2019 Yes Urinary retention ICD-10-CM: R33.9 ICD-9-CM: 788.20  5/20/2019 Yes Acute on chronic respiratory failure with hypoxia [facility]) ICD-10-CM: J96.21 
ICD-9-CM: 518.84, 799.02  5/6/2019 Yes Ventricular tachyarrhythmia ([facility] ICD-10-CM: I47.2 ICD-9-CM: 427.1  5/6/2019 Yes COPD (chronic obstructive pulmonary disease) (HCC) ICD-10-CM: J44.9 ICD-9-CM: 098  12/10/2015 Yes Essential hypertension, benign ICD-10-CM: I10 
ICD-9-CM: 401.1  12/10/2015 Yes 79 y/o male with severe emphysema presenting with acute on chronic hypoxemic respiratory failure with new right pleural effusion, elevated BNP, lower extremity edema not previously documented and normal WBC. Recently admitted for VATS pleurodesis secondary to spontaneous secondary pneumothorax.  He is oriented and gives a reasonable though limited history and I would like to consent with family for procedure as well to assure they understand full risks and benefits. He is not on any blood thinners. Plan:  (Medical Decision Making) --risks/benefits and indications for thoracentesis discussed with patient, wife and son at the patients request. All were agreeable to planned procedure. --agree with checking PCT and continuing antibiotics for now 
--would schedule diuresis, but closely monitor I/O and sodium given prior hyponatremia 
--wean O2 as tolerated More than 50% of the time documented was spent in face-to-face contact with the patient and in the care of the patient on the floor/unit where the patient is located. Thank you very much for this referral.  We appreciate the opportunity to participate in this patient's care. Will follow along with above stated plan.  
 
Deborah Shaikh MD

## 2019-06-11 NOTE — PROGRESS NOTES
Hospitalist Progress Note    2019  Admit Date: 6/10/2019 11:23 AM   NAME: Patrice Edwards   :  1932   MRN:  [id number]   Attending: Sun Romero MD  PCP:  Yennifer Durant MD    SUBJECTIVE:   Patient is an 81yo M with hx emphysema who was recently admitted for pneumothorax, s/p pleurodesis on  and presents to the ER from SNF with increased shortness of breath. Admitted with large bilateral pleural effusions. : tapped by pulm last night, 800ml out. Pt immediately felt much better. Down to 3L HFNC currently and comfortable. Leg swelling improved. No fevers. Pleural fluid studies pending. Review of Systems negative with exception of pertinent positives noted above  PHYSICAL EXAM     Visit Vitals  /72 (BP 1 Location: Left arm, BP Patient Position: At rest)   Pulse (!) 59   Temp 98 °F (36.7 °C)   Resp 18   Ht 5' 8\" (1.727 m)   Wt 68 kg (150 lb)   SpO2 92%   BMI 22.81 kg/m²      Temp (24hrs), Av.1 °F (36.7 °C), Min:97.6 °F (36.4 °C), Max:98.4 °F (36.9 °C)    Oxygen Therapy  O2 Sat (%): 92 % (06/10/19 2338)  Pulse via Oximetry: 56 beats per minute (06/10/19 2145)  O2 Device: Hi flow nasal cannula (06/10/19 2145)  O2 Flow Rate (L/min): 3 l/min (06/10/19 2145)    Intake/Output Summary (Last 24 hours) at 2019 0734  Last data filed at 6/10/2019 2347  Gross per 24 hour   Intake 50 ml   Output 1400 ml   Net -1350 ml      General: Elderly, NAD  Lungs:  Coarse throughout, no distress.    Heart:  Regular rate and rhythm,  No murmur, rub, or gallop  Abdomen: Soft, Non distended, Non tender, Positive bowel sounds  Extremities: 2+ pitting edema LE improved from yesterday  Neurologic:  No focal deficits    XR CHEST PORT   Final Result      CT CHEST W CONT   Final Result   IMPRESSION: Bilateral pleural effusions as described      XR CHEST PORT   Final Result   IMPRESSION: Coarsening of interstitial markings in the lungs most prominent in   the lower lung zones and small right pleural effusion ASSESSMENT      Active Hospital Problems    Diagnosis Date Noted    Pleural effusion 06/10/2019    Urinary retention 05/20/2019    Ventricular tachyarrhythmia ([facility] 05/06/2019    Acute on chronic respiratory failure with hypoxia (HCC) 05/06/2019    Essential hypertension, benign 12/10/2015    COPD (chronic obstructive pulmonary disease) ([facility] 12/10/2015     Plan:    Acute on chronic hypoxic respiratory failure - 2/2 Pleural effusions:  S/p pleurodesis on 5/16 for pneumothorax  Thoracentesis 6/10 with 800cc fluid. Studies pending  Started on vanc/tobra/zosyn for possible hcap, but no overt infectious signs. PCT negative. Discontinue if pleural fluid noninfectious appearing. bnp elevated and leg swelling for 1w also - Improving with lasix, continue     Urinary retention - li in place, monitor output. Flomax.  Was to have uro f/u after last hospitalization.      HTN: hold antihypertensives, bb in mild bradycardia and low BP     Hx Vtach: continue amiodarone for - presentation not consistent with toxicity     DVT Prophylaxis: HSQ  Dispo: back to [facility] when respiratory status improved    Signed By: Mau Swanson MD     June 11, 2019

## 2019-06-11 NOTE — CONSULTS
CONSULT NOTE    Shirley Decrerodolfo    6/10/2019    Date of Admission:  6/10/2019    The patient's chart is reviewed and the patient is discussed with the staff. Subjective:     Patient is a 80 y.o.  male seen and evaluated at the request of Dr. Shubham Mcneill. He has hx emphysema who was recently admitted for pneumothorax, s/p pleurodesis on 5/16 and presents to the ER from SNF with increased shortness of breath. Was discharged on 2L oxygen. Also with some labile and low BP readings but not consistently. Accompanied by son and wife. Pt reports feeling much better since nonrebreather. Denies fever, cough, abdominal pain. Son notes waxing and waning AMS for several days, now much more clear, suspects it was hypoxia. He has been on 2 L nasal cannula in the facility but then required nonrebreather and mask oxygen to maintain sats in the 90s. He dropped down into the mid 80s and EMS was called for evaluation. For EMS the patient's sats were 88% on 6 L and they paste placed him on nonrebreather mask. Patient denies chest pain or chest discomfort. He has had some dyspnea today. Lower extremity edema is unchanged. Complains of chronic mildly productive cough but no change in it. Denies any aggravating or alleviating factors but when asked me indicate that it is slightly worse when he lays back more. He has a history of congestive heart failure as well. Since discharge 5/25, had li replaced for persistent retention. Was on 2L [place]. Wife reports [facility] said he had a fever this morning and then had to increase his oxygen and then sent him to the ED. Son described him being confused for the past several days, but after being on O2 being much more clear and coherent today. Review of Systems  A comprehensive review of systems was negative.     Patient Active Problem List   Diagnosis Code    Abdominal aortic aneurysm without rupture (HCC) I71.4    Benign prostatic hyperplasia N40.0    Cardiovascular disease I25.10    COPD (chronic obstructive pulmonary disease) ([facility]) J44.9    Low testosterone R79.89    Essential hypertension, benign I10    Allergic rhinitis J30.9    Raynaud's syndrome I73.00    Erectile dysfunction N52.9    Chronic respiratory failure with hypoxia ([facility]) J96.11    SOB (shortness of breath) R06.02    Abnormal finding of lung R09.89    Bigeminy I49.9    Hip fracture ([facility]) S72.009A    COPD (chronic obstructive pulmonary disease) with emphysema ([facility]) J43.9    Closed compression fracture of lumbosacral spine ([facility]) S32.000A    Pneumothorax on right J93.9    Shortness of breath R06.02    Acute on chronic respiratory failure with hypoxia ([facility]) J96.21    COPD exacerbation ([facility]) J44.1    Ventricular tachyarrhythmia ([facility]) I47.2    Pulmonary infiltrates R91.8    VT (ventricular tachycardia) ([facility]) I47.2    Subcutaneous emphysema ([facility]) T79. 7XXA    Urinary retention R33.9    Hyponatremia E87.1    Pleural effusion J90     Prior to Admission Medications   Prescriptions Last Dose Informant Patient Reported? Taking? Biotin 2,500 mcg cap   Yes No   Sig: Take  by mouth. Oxygen   Yes No   Sig: Use as instructed. Use as instructed; faxed to [employer]   albuterol-ipratropium (DUO-NEB) 2.5 mg-0.5 mg/3 ml nebu   No No   Sig: 3 mL by Nebulization route every four (4) hours as needed for Other (dyspnea). amiodarone (CORDARONE) 200 mg tablet   No No   Sig: Take 2 Tabs by mouth every twelve (12) hours. aspirin delayed-release 81 mg tablet   Yes No   Sig: Take  by mouth daily. calcium citrate-vitamin d3 (CITRACAL+D) 315-200 mg-unit tab   Yes No   Sig: Take 2 Tabs by mouth daily (with breakfast). carvedilol (COREG) 3.125 mg tablet   No No   Sig: Take 1 Tab by mouth two (2) times daily (with meals). cholecalciferol (VITAMIN D3) 1,000 unit cap   Yes No   Sig: Take  by mouth. ciprofloxacin HCl (CIPRO) 500 mg tablet   No No   Sig: Take 1 Tab by mouth every twelve (12) hours. cyanocobalamin (VITAMIN B12) 1,000 mcg/mL injection   Yes No   Sig: INJECT 1ML INTRAMUSCULARLY ONCE FOR 1 DOSE   fluticasone (FLONASE) 50 mcg/actuation nasal spray   No No   Si Sprays by Both Nostrils route daily. guaiFENesin ER (MUCINEX) 600 mg ER tablet   Yes No   Sig: Take 1,200 mg by mouth daily. lisinopril (PRINIVIL, ZESTRIL) 10 mg tablet   No No   Sig: Take 1 Tab by mouth daily. montelukast (SINGULAIR) 10 mg tablet   No No   Sig: Take 1 Tab by mouth daily. potassium chloride (K-DUR, KLOR-CON) 20 mEq tablet   No No   Sig: Take 2 Tabs by mouth daily. tamsulosin (FLOMAX) 0.4 mg capsule   No No   Sig: Take 1 Cap by mouth daily. umeclidinium-vilanterol (ANORO ELLIPTA) 62.5-25 mcg/actuation inhaler   Yes No   Sig: Take 1 Puff by inhalation daily.       Facility-Administered Medications: None     Past Medical History:   Diagnosis Date    Abdominal aortic aneurysm ([address] 12/10/2015    In 2005    Abdominal aortic aneurysm without rupture ([address]     Allergic rhinitis 12/10/2015    Asthma     Benign neoplasm     Benign prostatic hyperplasia 12/10/2015    BPH without urinary obstruction     Cardiovascular disease 12/10/2015    Chronic obstructive asthma with exacerbation ([address] 12/10/2015    COPD (chronic obstructive pulmonary disease) ([address] 12/10/2015    Cough with hemoptysis     Erectile dysfunction 12/10/2015    Essential hypertension, benign 12/10/2015    Fracture 10/2014    of left hand and ribs after fall on concrete    History of colon polyps     Low testosterone 12/10/2015    Lumbago     Memory loss     Overflow incontinence     Raynaud's syndrome 12/10/2015    Rotator cuff tendinitis     Thrombocytopenia ([address]     Urinary frequency      Past Surgical History:   Procedure Laterality Date    HX CATARACT REMOVAL  2016-right    HX COLONOSCOPY      HX FRACTURE TX  10/2014    of left hand and ribs are fall on concrete    [address] [employer] HX ORTHOPAEDIC  2018 hip fracture     Social History     Socioeconomic History    Marital status:      Spouse name: Not on file    Number of children: Not on file    Years of education: Not on file    Highest education level: Not on file   Occupational History    Not on file   Social Needs    Financial resource strain: Not on file    Food insecurity:     Worry: Not on file     Inability: Not on file    Transportation needs:     Medical: Not on file     Non-medical: Not on file   Tobacco Use    Smoking status: Former Smoker    Smokeless tobacco: Never Used   Substance and Sexual Activity    Alcohol use: Yes     Comment: occasional    Drug use: Not on file    Sexual activity: Not on file   Lifestyle    Physical activity:     Days per week: Not on file     Minutes per session: Not on file    Stress: Not on file   Relationships    Social connections:     Talks on phone: Not on file     Gets together: Not on file     Attends Sikhism service: Not on file     Active member of club or organization: Not on file     Attends meetings of clubs or organizations: Not on file     Relationship status: Not on file    Intimate partner violence:     Fear of current or ex partner: Not on file     Emotionally abused: Not on file     Physically abused: Not on file     Forced sexual activity: Not on file   Other Topics Concern    Not on file   Social History Narrative    Not on file     Family History   Problem Relation Age of Onset    Dementia Mother     Cancer Father         lung cancer    Heart Attack Father      No Known Allergies    Current Facility-Administered Medications   Medication Dose Route Frequency    sodium chloride (NS) flush 5-40 mL  5-40 mL IntraVENous Q8H    sodium chloride (NS) flush 5-40 mL  5-40 mL IntraVENous PRN    vancomycin (VANCOCIN) 1750 mg in  ml infusion  1,750 mg IntraVENous ONCE    albuterol-ipratropium (DUO-NEB) 2.5 MG-0.5 MG/3 ML  3 mL Nebulization Q4H PRN    amiodarone (CORDARONE) tablet 400 mg  400 mg Oral Q12H    [START ON 6/11/2019] fluticasone propionate (FLONASE) 50 mcg/actuation nasal spray 2 Spray  2 Spray Both Nostrils DAILY    [START ON 6/11/2019] guaiFENesin ER (MUCINEX) tablet 1,200 mg  1,200 mg Oral DAILY    [START ON 6/11/2019] montelukast (SINGULAIR) tablet 10 mg  10 mg Oral DAILY    [START ON 6/11/2019] tamsulosin (FLOMAX) capsule 0.4 mg  0.4 mg Oral DAILY    sodium chloride (NS) flush 5-40 mL  5-40 mL IntraVENous Q8H    sodium chloride (NS) flush 5-40 mL  5-40 mL IntraVENous PRN    tuberculin injection 5 Units  5 Units IntraDERMal ONCE    acetaminophen (TYLENOL) tablet 650 mg  650 mg Oral Q4H PRN    furosemide (LASIX) injection 20 mg  20 mg IntraVENous ONCE    potassium chloride (K-DUR, KLOR-CON) SR tablet 40 mEq  40 mEq Oral NOW    ondansetron (ZOFRAN ODT) tablet 4 mg  4 mg Oral Q4H PRN    bisacodyl (DULCOLAX) tablet 5 mg  5 mg Oral DAILY PRN    heparin (porcine) injection 5,000 Units  5,000 Units SubCUTAneous Q8H    [START ON 6/11/2019] piperacillin-tazobactam (ZOSYN) 4.5 g in 0.9% sodium chloride (MBP/ADV) 100 mL  4.5 g IntraVENous Q8H    [START ON 6/11/2019] vancomycin (VANCOCIN) 1,000 mg in 0.9% sodium chloride (MBP/ADV) 250 mL  1,000 mg IntraVENous Q18H     Objective:     Vitals:    06/10/19 1608 06/10/19 1628 06/10/19 1648 06/10/19 1812   BP: 104/61 96/56 100/56 125/60   Pulse: (!) 52 62 (!) 53 (!) 56   Resp: 22 20  21   Temp:    98.4 °F (36.9 °C)   SpO2: 97% 100% 96% 96%   Weight:       Height:         PHYSICAL EXAM     Constitutional:  the patient is well developed and in no acute distress  EENMT:  Sclera clear, pupils equal, oral mucosa moist  Respiratory: rales, diminished in bases  Cardiovascular:  RRR without M,G,R  Gastrointestinal: soft and non-tender; with positive bowel sounds. Musculoskeletal: warm without cyanosis. There is 2+ lower extremity edema.   Skin:  no jaundice or rashes, no wounds   Neurologic: no gross neuro deficits Psychiatric:  alert and oriented x 3    CXR:  Worsened interstitial markings and right pleural effusion      CT Chest: Severe upper lobe emphysema, Moderate right pleural effusion, tiny left pleural effusion, basilar infiltrates, edema or atelectasis. Recent Labs     06/10/19  1201   WBC 6.5   HGB 9.3*   HCT 29.1*        Recent Labs     06/10/19  1201      K 3.4*   *   GLU 91   CO2 28   BUN 20   CREA 0.71*   CA 7.1*   ALB 1.7*   TBILI 1.4*   ALT 36   SGOT 25     No results for input(s): PH, PCO2, PO2, HCO3 in the last 72 hours. No results for input(s): LCAD, LAC in the last 72 hours.  which is increased from prior    Assessment:  (Medical Decision Making)     Hospital Problems  Date Reviewed: 5/21/2019          Codes Class Noted POA    * (Principal) Pleural effusion ICD-10-CM: J90  ICD-9-CM: 511.9  6/10/2019 Yes        Urinary retention ICD-10-CM: R33.9  ICD-9-CM: 788.20  5/20/2019 Yes        Acute on chronic respiratory failure with hypoxia (HCC) ICD-10-CM: J96.21  ICD-9-CM: 518.84, 799.02  5/6/2019 Yes        Ventricular tachyarrhythmia (HCC) ICD-10-CM: I47.2  ICD-9-CM: 427.1  5/6/2019 Yes        COPD (chronic obstructive pulmonary disease) (HCC) ICD-10-CM: J44.9  ICD-9-CM: 409  12/10/2015 Yes        Essential hypertension, benign ICD-10-CM: I10  ICD-9-CM: 401.1  12/10/2015 Yes            79 y/o male with severe emphysema presenting with acute on chronic hypoxemic respiratory failure with new right pleural effusion, elevated BNP, lower extremity edema not previously documented and normal WBC. Recently admitted for VATS pleurodesis secondary to spontaneous secondary pneumothorax. He is oriented and gives a reasonable though limited history and I would like to consent with family for procedure as well to assure they understand full risks and benefits. He is not on any blood thinners.   Plan:  (Medical Decision Making)     --risks/benefits and indications for thoracentesis discussed with patient, wife and son at the patients request. All were agreeable to planned procedure. --agree with checking PCT and continuing antibiotics for now  --would schedule diuresis, but closely monitor I/O and sodium given prior hyponatremia  --wean O2 as tolerated    More than 50% of the time documented was spent in face-to-face contact with the patient and in the care of the patient on the floor/unit where the patient is located. Thank you very much for this referral.  We appreciate the opportunity to participate in this patient's care. Will follow along with above stated plan.     Jennifer Romero MD

## 2019-06-11 NOTE — PROGRESS NOTES
Problem: Gas Exchange - Impaired  Goal: *Absence of hypoxia  Outcome: Progressing Towards Goal     Problem: Falls - Risk of  Goal: *Absence of Falls  Description  Document Edgard Dykes Fall Risk and appropriate interventions in the flowsheet. Outcome: Progressing Towards Goal     Problem: Pressure Injury - Risk of  Goal: *Prevention of pressure injury  Description  Document Brant Scale and appropriate interventions in the flowsheet.   Outcome: Progressing Towards Goal

## 2019-06-12 ENCOUNTER — APPOINTMENT (OUTPATIENT)
Dept: GENERAL RADIOLOGY | Age: 84
DRG: 186 | End: 2019-06-12
Attending: FAMILY MEDICINE
Payer: MEDICARE

## 2019-06-12 LAB
ANION GAP SERPL CALC-SCNC: 8 MMOL/L (ref 7–16)
BUN SERPL-MCNC: 17 MG/DL (ref 8–23)
CALCIUM SERPL-MCNC: 6.5 MG/DL (ref 8.3–10.4)
CHLORIDE SERPL-SCNC: 103 MMOL/L (ref 98–107)
CO2 SERPL-SCNC: 27 MMOL/L (ref 21–32)
CREAT SERPL-MCNC: 0.62 MG/DL (ref 0.8–1.5)
ERYTHROCYTE [DISTWIDTH] IN BLOOD BY AUTOMATED COUNT: 17.9 % (ref 11.9–14.6)
GLUCOSE SERPL-MCNC: 84 MG/DL (ref 65–100)
HCT VFR BLD AUTO: 28.5 % (ref 41.1–50.3)
HGB BLD-MCNC: 9 G/DL (ref 13.6–17.2)
MCH RBC QN AUTO: 31.4 PG (ref 26.1–32.9)
MCHC RBC AUTO-ENTMCNC: 31.6 G/DL (ref 31.4–35)
MCV RBC AUTO: 99.3 FL (ref 79.6–97.8)
MM INDURATION POC: NORMAL 0MM (ref 0–5)
NRBC # BLD: 0 K/UL (ref 0–0.2)
PLATELET # BLD AUTO: 164 K/UL (ref 150–450)
PMV BLD AUTO: 10.1 FL (ref 9.4–12.3)
POTASSIUM SERPL-SCNC: 3.3 MMOL/L (ref 3.5–5.1)
PPD POC: NORMAL NEGATIVE
RBC # BLD AUTO: 2.87 M/UL (ref 4.23–5.6)
SODIUM SERPL-SCNC: 138 MMOL/L (ref 136–145)
WBC # BLD AUTO: 4.7 K/UL (ref 4.3–11.1)

## 2019-06-12 PROCEDURE — 36415 COLL VENOUS BLD VENIPUNCTURE: CPT

## 2019-06-12 PROCEDURE — 85027 COMPLETE CBC AUTOMATED: CPT

## 2019-06-12 PROCEDURE — 74011250636 HC RX REV CODE- 250/636: Performed by: FAMILY MEDICINE

## 2019-06-12 PROCEDURE — 74011000255 HC RX REV CODE- 255: Performed by: FAMILY MEDICINE

## 2019-06-12 PROCEDURE — 65270000029 HC RM PRIVATE

## 2019-06-12 PROCEDURE — 94640 AIRWAY INHALATION TREATMENT: CPT

## 2019-06-12 PROCEDURE — 77010033711 HC HIGH FLOW OXYGEN

## 2019-06-12 PROCEDURE — 94760 N-INVAS EAR/PLS OXIMETRY 1: CPT

## 2019-06-12 PROCEDURE — 92611 MOTION FLUOROSCOPY/SWALLOW: CPT

## 2019-06-12 PROCEDURE — 74011000250 HC RX REV CODE- 250: Performed by: INTERNAL MEDICINE

## 2019-06-12 PROCEDURE — 77010033678 HC OXYGEN DAILY

## 2019-06-12 PROCEDURE — 99232 SBSQ HOSP IP/OBS MODERATE 35: CPT | Performed by: INTERNAL MEDICINE

## 2019-06-12 PROCEDURE — 80048 BASIC METABOLIC PNL TOTAL CA: CPT

## 2019-06-12 PROCEDURE — 74011000258 HC RX REV CODE- 258: Performed by: FAMILY MEDICINE

## 2019-06-12 PROCEDURE — 74011250637 HC RX REV CODE- 250/637: Performed by: FAMILY MEDICINE

## 2019-06-12 PROCEDURE — 74230 X-RAY XM SWLNG FUNCJ C+: CPT

## 2019-06-12 PROCEDURE — 74011250637 HC RX REV CODE- 250/637: Performed by: INTERNAL MEDICINE

## 2019-06-12 RX ORDER — FUROSEMIDE 10 MG/ML
40 INJECTION INTRAMUSCULAR; INTRAVENOUS EVERY 12 HOURS
Status: DISCONTINUED | OUTPATIENT
Start: 2019-06-12 | End: 2019-06-14

## 2019-06-12 RX ORDER — POTASSIUM CHLORIDE 20 MEQ/1
40 TABLET, EXTENDED RELEASE ORAL
Status: COMPLETED | OUTPATIENT
Start: 2019-06-12 | End: 2019-06-12

## 2019-06-12 RX ORDER — ALBUTEROL SULFATE 0.83 MG/ML
2.5 SOLUTION RESPIRATORY (INHALATION)
Status: DISCONTINUED | OUTPATIENT
Start: 2019-06-12 | End: 2019-06-16 | Stop reason: HOSPADM

## 2019-06-12 RX ORDER — POTASSIUM CHLORIDE 20 MEQ/1
20 TABLET, EXTENDED RELEASE ORAL ONCE
Status: COMPLETED | OUTPATIENT
Start: 2019-06-12 | End: 2019-06-12

## 2019-06-12 RX ADMIN — POTASSIUM CHLORIDE 40 MEQ: 20 TABLET, EXTENDED RELEASE ORAL at 08:29

## 2019-06-12 RX ADMIN — TAMSULOSIN HYDROCHLORIDE 0.4 MG: 0.4 CAPSULE ORAL at 08:30

## 2019-06-12 RX ADMIN — FLUTICASONE PROPIONATE 2 SPRAY: 50 SPRAY, METERED NASAL at 08:30

## 2019-06-12 RX ADMIN — Medication 10 ML: at 13:00

## 2019-06-12 RX ADMIN — Medication 10 ML: at 23:29

## 2019-06-12 RX ADMIN — HEPARIN SODIUM 5000 UNITS: 5000 INJECTION INTRAVENOUS; SUBCUTANEOUS at 23:29

## 2019-06-12 RX ADMIN — PIPERACILLIN, TAZOBACTAM 4.5 G: 4; .5 INJECTION, POWDER, LYOPHILIZED, FOR SOLUTION INTRAVENOUS at 01:20

## 2019-06-12 RX ADMIN — AMIODARONE HYDROCHLORIDE 400 MG: 200 TABLET ORAL at 08:29

## 2019-06-12 RX ADMIN — GUAIFENESIN 1200 MG: 600 TABLET, EXTENDED RELEASE ORAL at 08:29

## 2019-06-12 RX ADMIN — Medication 10 ML: at 05:37

## 2019-06-12 RX ADMIN — Medication 10 ML: at 23:30

## 2019-06-12 RX ADMIN — Medication 10 ML: at 05:36

## 2019-06-12 RX ADMIN — MONTELUKAST SODIUM 10 MG: 10 TABLET, FILM COATED ORAL at 08:30

## 2019-06-12 RX ADMIN — ALBUTEROL SULFATE 2.5 MG: 2.5 SOLUTION RESPIRATORY (INHALATION) at 14:58

## 2019-06-12 RX ADMIN — FUROSEMIDE 40 MG: 10 INJECTION, SOLUTION INTRAMUSCULAR; INTRAVENOUS at 23:27

## 2019-06-12 RX ADMIN — PIPERACILLIN, TAZOBACTAM 4.5 G: 4; .5 INJECTION, POWDER, LYOPHILIZED, FOR SOLUTION INTRAVENOUS at 08:28

## 2019-06-12 RX ADMIN — BARIUM SULFATE 20 ML: 400 PASTE ORAL at 11:59

## 2019-06-12 RX ADMIN — HEPARIN SODIUM 5000 UNITS: 5000 INJECTION INTRAVENOUS; SUBCUTANEOUS at 05:36

## 2019-06-12 RX ADMIN — POTASSIUM CHLORIDE 40 MEQ: 20 TABLET, EXTENDED RELEASE ORAL at 12:58

## 2019-06-12 RX ADMIN — BARIUM SULFATE 30 ML: 980 POWDER, FOR SUSPENSION ORAL at 11:58

## 2019-06-12 RX ADMIN — PIPERACILLIN, TAZOBACTAM 4.5 G: 4; .5 INJECTION, POWDER, LYOPHILIZED, FOR SOLUTION INTRAVENOUS at 23:30

## 2019-06-12 RX ADMIN — PIPERACILLIN, TAZOBACTAM 4.5 G: 4; .5 INJECTION, POWDER, LYOPHILIZED, FOR SOLUTION INTRAVENOUS at 16:08

## 2019-06-12 RX ADMIN — FUROSEMIDE 40 MG: 10 INJECTION, SOLUTION INTRAMUSCULAR; INTRAVENOUS at 08:28

## 2019-06-12 RX ADMIN — HEPARIN SODIUM 5000 UNITS: 5000 INJECTION INTRAVENOUS; SUBCUTANEOUS at 12:50

## 2019-06-12 RX ADMIN — ALBUTEROL SULFATE 2.5 MG: 2.5 SOLUTION RESPIRATORY (INHALATION) at 19:49

## 2019-06-12 RX ADMIN — VANCOMYCIN HYDROCHLORIDE 1000 MG: 1 INJECTION, POWDER, LYOPHILIZED, FOR SOLUTION INTRAVENOUS at 07:24

## 2019-06-12 RX ADMIN — POTASSIUM CHLORIDE 20 MEQ: 20 TABLET, EXTENDED RELEASE ORAL at 23:17

## 2019-06-12 RX ADMIN — AMIODARONE HYDROCHLORIDE 400 MG: 200 TABLET ORAL at 23:23

## 2019-06-12 NOTE — PROGRESS NOTES
SPEECH PATHOLOGY NOTE:    Modified Barium Swallow Study complete. Patient presents with normal oropharyngeal swallow function. No penetration or aspiration with any trialed consistencies. Patient with timely swallow initiation. Adequate hyolaryngeal elevation/excursion, laryngeal closure, epiglottic inversion and pharyngeal constriction. Patient with mildly reduced tongue base retraction, which resulted in mild-mod residue in the vallecula post swallow; however, effectively clears with subsequent swallow and liquid wash. Recommend regular consistency diet and thin liquids. Full report to follow.     Denilson TAVARES, CCC-SLP

## 2019-06-12 NOTE — PROGRESS NOTES
Problem: Dysphagia (Adult)  Goal: *Acute Goals and Plan of Care (Insert Text)  Description  LTG: Patient will tolerate least restrictive diet without overt signs or symptoms of airway compromise. Goal met 6/12/19. STG: Patient will tolerate regular diet and nectar thick liquids without overt signs or symptoms of airway compromise. Goal Discontinued 6/12/19. STG: Patient will tolerate regular diet and thin liquids without overt s/sx airway compromise. Goal added 6/12/19. Goal met 6/12/19. STG: Patient will participate in modified barium swallow study as clinically indicated. Goal met 6/12/19. Outcome: Progressing Towards Goal    Speech language pathology: modified barium swallow study: Initial Assessment and Discharge    NAME/AGE/GENDER: Delon Purvis is a 80 y.o. male  DATE: 6/12/2019  PRIMARY DIAGNOSIS: Pleural effusion [J90]      ICD-10: Treatment Diagnosis: R13.12 Oropharyngeal dysphagia  INTERDISCIPLINARY COLLABORATION: Speech Therapist and Registered Nurse  PRECAUTIONS/ALLERGIES: Patient has no known allergies. ASSESSMENT/PLAN OF CARE:Modified Barium Swallow Study complete. Mr. Luke Bobo presents with normal oropharyngeal swallow function. No penetration or aspiration with any trialed consistencies. Patient with timely swallow initiation with swallow initiated at base of tongue with all presentations of thin liquids and at the vallecula with pudding, mixed consistency, and regular consistency. Adequate hyolaryngeal elevation/excursion, laryngeal closure, epiglottic inversion and pharyngeal constriction. Patient with mildly reduced tongue base retraction, which resulted in mild-mod residue in the vallecula post swallow; however, effectively clears with subsequent swallow and liquid wash. Oral prep time mildly delayed with regular consistency; however, function. Recommend regular consistency diet and thin liquids. Medications can be taken whole with liquid.  No further speech therapy indicated at this time as swallow function is within normal limits. Please re-consult if new concerns arise. ?????? ? ? This section established at most recent assessment??????????  RECOMMENDATIONS AND PLANNED INTERVENTIONS (Benefits and precautions of therapy have been discussed with the patient.):  · PO:  Regular  · Liquids:  regular thin  MEDICATIONS:  · With liquid  COMPENSATORY STRATEGIES/MODIFICATIONS INCLUDING:  · Alternate liquids/solids  OTHER RECOMMENDATIONS (including follow up treatment recommendations): · Family training/education  · Patient education  FREQUENCY/DURATION: No further speech therapy indicated at this time as swallow function is within normal limits. Please re-consult if new concerns arise. RECOMMENDED REHABILITATION/EQUIPMENT: (at time of discharge pending progress): Due to the probability of continued deficits (see above) this patient will not likely need continued skilled speech therapy after discharge. SUBJECTIVE:   Cooperative and participatory throughout evaluation. History of Present Injury/Illness: Mr. Chrissy Guthrie  has a past medical history of Abdominal aortic aneurysm [facility] (12/10/2015), Abdominal aortic aneurysm without rupture ([address] Allergic rhinitis (12/10/2015), Asthma, Benign neoplasm, Benign prostatic hyperplasia (12/10/2015), BPH without urinary obstruction, Cardiovascular disease (12/10/2015), Chronic obstructive asthma with exacerbation ([address] (12/10/2015), COPD (chronic obstructive pulmonary disease) ([address] (12/10/2015), Cough with hemoptysis, Erectile dysfunction (12/10/2015), Essential hypertension, benign (12/10/2015), Fracture (10/2014), History of colon polyps, Low testosterone (12/10/2015), Lumbago, Memory loss, Overflow incontinence, Raynaud's syndrome (12/10/2015), Rotator cuff tendinitis, Thrombocytopenia ([address] and Urinary frequency. Shahzad Maxwell   He also  has a past surgical history that includes hx hernia repair (1980); hx colonoscopy; hx fracture tx (10/2014); hx cataract removal (6/2016-right); and hx orthopaedic (03/2018).      Pain Intensity 1: 0    Current Dietary Status:  Regular/Nectar thick   Radiologist: Dr. Marcelina Hunt  History of reflux:  no    Reflux medication:n/a  Social History/Home Situation:    Home Environment: [address] [place] [address] Name: [facility]  One/Two Story Residence: One story  Living Alone: No  Support Systems: Family member(s), Spouse/Significant Other/Partner, Child(tremayne)  Patient Expects to be Discharged to[de-identified] Rehabilitation facility  Current DME Used/Available at Home: Walker, rolling  Tub or Shower Type: Shower  OBJECTIVE:       Cognitive/Communication Status:  Mental Status  Neurologic State: Alert  Orientation Level: Disoriented X4  Cognition: Follows commands  Perception: Appears intact  Perseveration: No perseveration noted  Safety/Judgement: Not assessed    Oral Assessment:  Oral Assessment  Labial: No impairment  Oral Hygiene: adequate  Lingual: No impairment    Vocal Quality: WNL    Patient Viewed: Patient Position: upright in chair  Film Views: Lateral    Oral Prepatory:  The patient was given the following: Consistency Presented: Solid, Thin liquid, Pudding, Mixed consistency  How Presented: Self-fed/presented, SLP-fed/presented, Straw, Spoon, Successive swallows, Cup/sip    Oral Phase:  Bolus Acceptance: No impairment  Bolus Formation/Control: No impairment  Propulsion: No impairment     Oral Residue: None  Initiation of Swallow: No impairment, Triggered at vallecula  Oral Phase Severity: No impairment    Pharyngeal Phase:  Timing: No impairment  Decreased Tongue Base Retraction?: Yes  Laryngeal Elevation: WFL (within functional limits)  Penetration: None  Aspiration/Timing: No evidence of aspiration  Aspiration/Penetration Score: 1 (No penetration or aspiration-Contrast does not enter the airway)  Pharyngeal Symmetry: Not assessed  Pharyngeal Dysfunction: Decreased tongue base retraction  Pharyngeal Phase Severity: N/A  Pharyngeal-Esophageal Segment: No impairment    Assessment/Reassessment only, no treatment provided today    Tool Used: Dysphagia Outcome and Severity Scale (COREY)    Score Comments   Normal Diet  [x] 7 With no strategies or extra time needed       Functional Swallow  [] 6 May have mild oral or pharyngeal delay       Mild Dysphagia    [] 5 Which may require one diet consistency restricted (those who demonstrate penetration which is entirely cleared on MBS would be included)   Mild-Moderate Dysphagia  [] 4 With 1-2 diet consistencies restricted       Moderate Dysphagia  [] 3 With 2 or more diet consistencies restricted       Moderately Severe Dysphagia  [] 2 With partial PO strategies (trials with ST only)       Severe Dysphagia  [] 1 With inability to tolerate any PO safely          Score:  Initial: 7 Most Recent: X (Date: -- )   Interpretation of Tool: The Dysphagia Outcome and Severity Scale (COREY) is a simple, easy-to-use, 7-point scale developed to systematically rate the functional severity of dysphagia based on objective assessment and make recommendations for diet level, independence level, and type of nutrition. Score 7 6 5 4 3 2 1   Modifier CH CI CJ CK CL CM CN     [place] / [place] / Product Type: Medicare /   __________________________________________________________________________________________________  Safety:   After treatment position/precautions:  · In holding bay awaiting transport with staff present.   Recommendations for treatment: No futher skilled speech therapy clinically indicated due to oropharyngeal swallow function WNL  Total Treatment Duration:  Time In: 1130   Time Out: 2185 CONY TAVARES, CCC-SLP

## 2019-06-12 NOTE — PROGRESS NOTES
Interdisciplinary Rounds completed 06/12/19. Nursing, Case Management, Physician and PT present. Plan of care reviewed and updated.

## 2019-06-12 NOTE — PROGRESS NOTES
CONSULT NOTE    Ronni Myrick    6/12/2019    Date of Admission:  6/10/2019    The patient's chart is reviewed and the patient is discussed with the staff. 80 y.o.  male seen and evaluated at the request of Dr. Olga Kee. He has hx emphysema who was recently admitted for pneumothorax, s/p pleurodesis on 5/16 and presents to the ER from SNF with increased shortness of breath. Was discharged on 2L oxygen. Also with some labile and low BP readings but not consistently. Accompanied by son and wife. Pt reports feeling much better since nonrebreather. Denies fever, cough, abdominal pain. Son notes waxing and waning AMS for several days, now much more clear, suspects it was hypoxia. He has been on 2 L nasal cannula in the facility but then required nonrebreather and mask oxygen to maintain sats in the 90s. He dropped down into the mid 80s and EMS was called for evaluation. For EMS the patient's sats were 88% on 6 L and they paste placed him on nonrebreather mask. Patient denies chest pain or chest discomfort. He has had some dyspnea today. Lower extremity edema is unchanged. Complains of chronic mildly productive cough but no change in it. Denies any aggravating or alleviating factors but when asked me indicate that it is slightly worse when he lays back more. He has a history of congestive heart failure as well. Since discharge 5/25, had li replaced for persistent retention. Was on 2L NC. Wife reports [facility] said he had a fever this morning and then had to increase his oxygen and then sent him to the ED. Son described him being confused for the past several days, but after being on O2 being much more clear and coherent today. Subjective:     Patient seen resting quietly in bed, states feeling \"good\" today, no complaints of pain. Review of Systems  A comprehensive review of systems was negative.     Patient Active Problem List   Diagnosis Code    Abdominal aortic aneurysm without rupture ([facility]) I71.4    Benign prostatic hyperplasia N40.0    Cardiovascular disease I25.10    COPD (chronic obstructive pulmonary disease) ([facility]) J44.9    Low testosterone R79.89    Essential hypertension, benign I10    Allergic rhinitis J30.9    Raynaud's syndrome I73.00    Erectile dysfunction N52.9    Chronic respiratory failure with hypoxia ([facility]) J96.11    SOB (shortness of breath) R06.02    Abnormal finding of lung R09.89    Bigeminy I49.9    Hip fracture ([facility]) S72.009A    COPD (chronic obstructive pulmonary disease) with emphysema ([facility]) J43.9    Closed compression fracture of lumbosacral spine ([facility]) S32.000A    Pneumothorax on right J93.9    Shortness of breath R06.02    Acute on chronic respiratory failure with hypoxia ([facility]) J96.21    COPD exacerbation ([facility]) J44.1    Ventricular tachyarrhythmia ([facility]) I47.2    Pulmonary infiltrates R91.8    VT (ventricular tachycardia) ([facility]) I47.2    Subcutaneous emphysema ([facility]) T79. 7XXA    Urinary retention R33.9    Hyponatremia E87.1    Pleural effusion J90     Prior to Admission Medications   Prescriptions Last Dose Informant Patient Reported? Taking? Biotin 2,500 mcg cap   Yes No   Sig: Take  by mouth. Oxygen   Yes No   Sig: Use as instructed. Use as instructed; faxed to [facility]   albuterol-ipratropium (DUO-NEB) 2.5 mg-0.5 mg/3 ml nebu   No No   Sig: 3 mL by Nebulization route every four (4) hours as needed for Other (dyspnea). amiodarone (CORDARONE) 200 mg tablet   No No   Sig: Take 2 Tabs by mouth every twelve (12) hours. aspirin delayed-release 81 mg tablet   Yes No   Sig: Take  by mouth daily. calcium citrate-vitamin d3 (CITRACAL+D) 315-200 mg-unit tab   Yes No   Sig: Take 2 Tabs by mouth daily (with breakfast). carvedilol (COREG) 3.125 mg tablet   No No   Sig: Take 1 Tab by mouth two (2) times daily (with meals). cholecalciferol (VITAMIN D3) 1,000 unit cap   Yes No   Sig: Take  by mouth.    ciprofloxacin HCl (CIPRO) 500 mg tablet   No No   Sig: Take 1 Tab by mouth every twelve (12) hours. cyanocobalamin (VITAMIN B12) 1,000 mcg/mL injection   Yes No   Sig: INJECT 1ML INTRAMUSCULARLY ONCE FOR 1 DOSE   fluticasone (FLONASE) 50 mcg/actuation nasal spray   No No   Si Sprays by Both Nostrils route daily. guaiFENesin ER (MUCINEX) 600 mg ER tablet   Yes No   Sig: Take 1,200 mg by mouth daily. lisinopril (PRINIVIL, ZESTRIL) 10 mg tablet   No No   Sig: Take 1 Tab by mouth daily. montelukast (SINGULAIR) 10 mg tablet   No No   Sig: Take 1 Tab by mouth daily. potassium chloride (K-DUR, KLOR-CON) 20 mEq tablet   No No   Sig: Take 2 Tabs by mouth daily. tamsulosin (FLOMAX) 0.4 mg capsule   No No   Sig: Take 1 Cap by mouth daily. umeclidinium-vilanterol (ANORO ELLIPTA) 62.5-25 mcg/actuation inhaler   Yes No   Sig: Take 1 Puff by inhalation daily.       Facility-Administered Medications: None     Past Medical History:   Diagnosis Date    Abdominal aortic aneurysm ([address] 12/10/2015    In 2005    Abdominal aortic aneurysm without rupture ([address]     Allergic rhinitis 12/10/2015    Asthma     Benign neoplasm     Benign prostatic hyperplasia 12/10/2015    BPH without urinary obstruction     Cardiovascular disease 12/10/2015    Chronic obstructive asthma with exacerbation ([address] 12/10/2015    COPD (chronic obstructive pulmonary disease) ([facility] [address] 12/10/2015    Cough with hemoptysis     Erectile dysfunction 12/10/2015    Essential hypertension, benign 12/10/2015    Fracture 10/2014    of left hand and ribs after fall on concrete    History of colon polyps     Low testosterone 12/10/2015    Lumbago     Memory loss     Overflow incontinence     Raynaud's syndrome 12/10/2015    Rotator cuff tendinitis     Thrombocytopenia ([address]     Urinary frequency      Past Surgical History:   Procedure Laterality Date    HX CATARACT REMOVAL  2016-right    HX COLONOSCOPY      HX FRACTURE TX  10/2014    of left hand and ribs are fall on concrete    HX HERNIA REPAIR  1980    HX ORTHOPAEDIC  03/2018    hip fracture     Social History     Socioeconomic History    Marital status:      Spouse name: Not on file    Number of children: Not on file    Years of education: Not on file    Highest education level: Not on file   Occupational History    Not on file   Social Needs    Financial resource strain: Not on file    Food insecurity:     Worry: Not on file     Inability: Not on file    Transportation needs:     Medical: Not on file     Non-medical: Not on file   Tobacco Use    Smoking status: Former Smoker    Smokeless tobacco: Never Used   Substance and Sexual Activity    Alcohol use: Yes     Comment: occasional    Drug use: Not on file    Sexual activity: Not on file   Lifestyle    Physical activity:     Days per week: Not on file     Minutes per session: Not on file    Stress: Not on file   Relationships    Social connections:     Talks on phone: Not on file     Gets together: Not on file     Attends Yarsanism service: Not on file     Active member of club or organization: Not on file     Attends meetings of clubs or organizations: Not on file     Relationship status: Not on file    Intimate partner violence:     Fear of current or ex partner: Not on file     Emotionally abused: Not on file     Physically abused: Not on file     Forced sexual activity: Not on file   Other Topics Concern    Not on file   Social History Narrative    Not on file     Family History   Problem Relation Age of Onset    Dementia Mother     Cancer Father         lung cancer    Heart Attack Father      No Known Allergies    Current Facility-Administered Medications   Medication Dose Route Frequency    furosemide (LASIX) injection 40 mg  40 mg IntraVENous Q12H    sodium chloride (NS) flush 5-40 mL  5-40 mL IntraVENous Q8H    sodium chloride (NS) flush 5-40 mL  5-40 mL IntraVENous PRN    albuterol-ipratropium (DUO-NEB) 2.5 MG-0.5 MG/3 ML  3 mL Nebulization Q4H PRN    amiodarone (CORDARONE) tablet 400 mg  400 mg Oral Q12H    fluticasone propionate (FLONASE) 50 mcg/actuation nasal spray 2 Spray  2 Spray Both Nostrils DAILY    guaiFENesin ER (MUCINEX) tablet 1,200 mg  1,200 mg Oral DAILY    montelukast (SINGULAIR) tablet 10 mg  10 mg Oral DAILY    tamsulosin (FLOMAX) capsule 0.4 mg  0.4 mg Oral DAILY    sodium chloride (NS) flush 5-40 mL  5-40 mL IntraVENous Q8H    sodium chloride (NS) flush 5-40 mL  5-40 mL IntraVENous PRN    acetaminophen (TYLENOL) tablet 650 mg  650 mg Oral Q4H PRN    ondansetron (ZOFRAN ODT) tablet 4 mg  4 mg Oral Q4H PRN    bisacodyl (DULCOLAX) tablet 5 mg  5 mg Oral DAILY PRN    heparin (porcine) injection 5,000 Units  5,000 Units SubCUTAneous Q8H    piperacillin-tazobactam (ZOSYN) 4.5 g in 0.9% sodium chloride (MBP/ADV) 100 mL  4.5 g IntraVENous Q8H    vancomycin (VANCOCIN) 1,000 mg in 0.9% sodium chloride (MBP/ADV) 250 mL  1,000 mg IntraVENous Q18H     Objective:     Vitals:    06/11/19 2038 06/11/19 2348 06/12/19 0451 06/12/19 0737   BP: 120/71 152/71 115/52 135/69   Pulse: (!) 58 (!) 57 (!) 59 61   Resp: 18 18 18 20   Temp: 97.6 °F (36.4 °C) 97.7 °F (36.5 °C) 98 °F (36.7 °C) 97.9 °F (36.6 °C)   SpO2: 92% 93% 91% 91%   Weight:   148 lb 11.2 oz (67.4 kg)    Height:         PHYSICAL EXAM     Constitutional:  the patient is well developed and in no acute distress  EENMT:  Sclera clear, pupils equal, oral mucosa moist  Respiratory: rales at bases, diminished in bases, on 6L O2 NC  Cardiovascular:  RRR without M,G,R  Gastrointestinal: soft and non-tender; with positive bowel sounds. Musculoskeletal: warm without cyanosis. There is 1+ lower extremity edema. Skin:  no jaundice or rashes, no wounds   Neurologic: no gross neuro deficits     Psychiatric:  alert and FC    CXR:  6/11/2019    IMPRESSION: Improving bilateral lung edema or infiltrates and tiny right pleural effusion.         CT Chest: Severe upper lobe emphysema, Moderate right pleural effusion, tiny left pleural effusion, basilar infiltrates, edema or atelectasis. Recent Labs     06/12/19  0511 06/11/19  0550 06/10/19  1201   WBC 4.7 5.4 6.5   HGB 9.0* 9.6* 9.3*   HCT 28.5* 30.9* 29.1*    173 171     Recent Labs     06/12/19  0511 06/11/19  0550 06/10/19  1201    142 142   K 3.3* 3.8 3.4*    107 108*   GLU 84 96 91   CO2 27 27 28   BUN 17 23 20   CREA 0.62* 0.64* 0.71*   MG  --  2.7*  --    CA 6.5* 6.7* 7.1*   ALB  --   --  1.7*   TBILI  --   --  1.4*   ALT  --   --  36   SGOT  --   --  25     No results for input(s): PH, PCO2, PO2, HCO3 in the last 72 hours. No results for input(s): LCAD, LAC in the last 72 hours.  which is increased from prior    Assessment:  (Medical Decision Making)     Hospital Problems  Date Reviewed: 6/11/2019          Codes Class Noted POA    * (Principal) Pleural effusion ICD-10-CM: J90  ICD-9-CM: 511.9  6/10/2019 Yes    Right thoracentesis done 6/10/19, 800 cc removed from the right    Urinary retention ICD-10-CM: R33.9  ICD-9-CM: 788.20  5/20/2019 Yes        Acute on chronic respiratory failure with hypoxia [facility]) ICD-10-CM: J96.21  ICD-9-CM: 518.84, 799.02  5/6/2019 Yes    Improving, weaning O2 as tolerated    Ventricular tachyarrhythmia ([facility]) ICD-10-CM: I47.2  ICD-9-CM: 427.1  5/6/2019 Yes    Need to correct K     COPD (chronic obstructive pulmonary disease) ([facility] ICD-10-CM: J44.9  ICD-9-CM: 496  12/10/2015 Yes    On Duoneb nebulizers    Essential hypertension, benign ICD-10-CM: I10  ICD-9-CM: 401.1  12/10/2015 Yes    BP stable currently        81 y/o male with severe emphysema presenting with acute on chronic hypoxemic respiratory failure with new right pleural effusion, elevated BNP, lower extremity edema not previously documented and normal WBC. Recently admitted for VATS pleurodesis secondary to spontaneous secondary pneumothorax.      Plan:  (Medical Decision Making)     --Blood cultures pending, Continue Vancomycin and Zosyn, day 3  --CXR improved  --Wean O2 as tolerated  --Agree with diuresis. --Check BNP in AM  --Continue PT and OT  --Plans to return to [facility] at discharge      More than 50% of the time documented was spent in face-to-face contact with the patient and in the care of the patient on the floor/unit where the patient is located. Sonny Strauss NP     Lungs:  Decreased BS with basilar crackles  Heart:  RRR with no Murmur/Rubs/Gallops    Additional Comments:  Requiring more oxygen today. Exam with rales. Agree with diuresis and with add albuterol with EZ PAP to attempt to improve aeration and oxygenation. Need to correct K. Recheck BNP tomorrow. Isaura Cifuentes MD      I have spoken with and examined the patient. I agree with the above assessment and plan as documented.

## 2019-06-12 NOTE — PROGRESS NOTES
Hourly rounds performed. All needs met. Pt is resting quietly w/ no complaints. Will continue to monitor and report to oncoming nurse.

## 2019-06-12 NOTE — PROGRESS NOTES
Hospitalist Progress Note    2019  Admit Date: 6/10/2019 11:23 AM   NAME: Ronni Myrick   :  1932   MRN:  [id number]   Attending: Mikael Rizzo MD  PCP:  Chris Jonas MD    SUBJECTIVE:   Patient is an 81yo M with hx emphysema who was recently admitted for pneumothorax, s/p pleurodesis on  and presents to the ER from SNF with increased shortness of breath. Admitted with large bilateral pleural effusions. : Prabha Honor. Pleural fluid high neutrophils, await culture. Now on 6L HFNC up from yesterday. Leg swelling unchanged    Review of Systems negative with exception of pertinent positives noted above  PHYSICAL EXAM     Visit Vitals  /52 (BP 1 Location: Right arm, BP Patient Position: At rest)   Pulse (!) 59   Temp 98 °F (36.7 °C)   Resp 18   Ht 5' 8\" (1.727 m)   Wt 67.4 kg (148 lb 11.2 oz)   SpO2 91%   BMI 22.61 kg/m²      Temp (24hrs), Av.9 °F (36.6 °C), Min:97.6 °F (36.4 °C), Max:98.1 °F (36.7 °C)    Oxygen Therapy  O2 Sat (%): 91 % (19 0451)  Pulse via Oximetry: 56 beats per minute (06/10/19 2145)  O2 Device: Hi flow nasal cannula (19 1433)  O2 Flow Rate (L/min): 6 l/min (19 1433)    Intake/Output Summary (Last 24 hours) at 2019 0735  Last data filed at 2019 0503  Gross per 24 hour   Intake 2480 ml   Output 2001 ml   Net 479 ml      General: Elderly, NAD  Lungs:  Coarse throughout, no distress.    Heart:  Regular rate and rhythm,  No murmur, rub, or gallop  Abdomen: Soft, Non distended, Non tender, Positive bowel sounds  Extremities: 2+ pitting edema LE improved from yesterday  Neurologic:  No focal deficits    XR CHEST PORT   Final Result      CT CHEST W CONT   Final Result   IMPRESSION: Bilateral pleural effusions as described      XR CHEST PORT   Final Result   IMPRESSION: Coarsening of interstitial markings in the lungs most prominent in   the lower lung zones and small right pleural effusion      XR SWALLOW FUNC VIDEO    (Results Pending) ASSESSMENT      Active Hospital Problems    Diagnosis Date Noted    Pleural effusion 06/10/2019    Urinary retention 05/20/2019    Ventricular tachyarrhythmia ([facility] 05/06/2019    Acute on chronic respiratory failure with hypoxia ([facility]) 05/06/2019    Essential hypertension, benign 12/10/2015    COPD (chronic obstructive pulmonary disease) ([facility] 12/10/2015     Plan:    Acute on chronic hypoxic respiratory failure - 2/2 Pleural effusions:  recent pleurodesis on 5/16 for pneumothorax  Thoracentesis 6/10 with 800cc fluid. neutrophilic fluid, culture pending. Blood and urine cultures ngtd  Continue vanc/zosyn pending results. PCT negative. Lasix for pleural fluid and leg edema. 870ml negative. Increase lasix.     Urinary retention - li in place, monitor output. Flomax. Was to have uro f/u after last hospitalization.      HTN: hold antihypertensives, bb in mild bradycardia and low BP     Hx Vtach: continue amiodarone - presentation not consistent with toxicity     Hypokalemia: due to lasix.  Replace and monitor    DVT Prophylaxis: HSQ  Dispo: back to [facility] when respiratory status improved    Signed By: Val Norman MD     June 12, 2019

## 2019-06-12 NOTE — PROGRESS NOTES
's visit attempted. Mr. Chrissy Guthrie was not in his patient room.      Robert De La Cruz 68  Board Certified

## 2019-06-12 NOTE — PROGRESS NOTES
Patient oxygen saturation fluctuates from mid 80's to 90's depending on patient having apnea episodes. Breath sounds coarse. Currently on 6L HFNC.

## 2019-06-12 NOTE — PROGRESS NOTES
Problem: Falls - Risk of  Goal: *Absence of Falls  Description  Document Evelia Bose Fall Risk and appropriate interventions in the flowsheet.   Outcome: Progressing Towards Goal

## 2019-06-13 PROBLEM — E77.8 HYPOPROTEINEMIA (HCC): Status: ACTIVE | Noted: 2019-06-13

## 2019-06-13 PROBLEM — E83.51 HYPOCALCEMIA: Status: ACTIVE | Noted: 2019-06-13

## 2019-06-13 LAB
ANION GAP SERPL CALC-SCNC: 7 MMOL/L (ref 7–16)
BACTERIA SPEC CULT: NORMAL
BNP SERPL-MCNC: 121 PG/ML
BUN SERPL-MCNC: 13 MG/DL (ref 8–23)
CALCIUM SERPL-MCNC: 6.8 MG/DL (ref 8.3–10.4)
CHLORIDE SERPL-SCNC: 108 MMOL/L (ref 98–107)
CO2 SERPL-SCNC: 26 MMOL/L (ref 21–32)
CREAT SERPL-MCNC: 0.62 MG/DL (ref 0.8–1.5)
CRP SERPL HS-MCNC: 16 MG/L
ERYTHROCYTE [DISTWIDTH] IN BLOOD BY AUTOMATED COUNT: 17.8 % (ref 11.9–14.6)
GLUCOSE SERPL-MCNC: 84 MG/DL (ref 65–100)
GRAM STN SPEC: NORMAL
GRAM STN SPEC: NORMAL
HCT VFR BLD AUTO: 30.1 % (ref 41.1–50.3)
HGB BLD-MCNC: 9.3 G/DL (ref 13.6–17.2)
MCH RBC QN AUTO: 31.6 PG (ref 26.1–32.9)
MCHC RBC AUTO-ENTMCNC: 30.9 G/DL (ref 31.4–35)
MCV RBC AUTO: 102.4 FL (ref 79.6–97.8)
MM INDURATION POC: NORMAL MM (ref 0–5)
NRBC # BLD: 0 K/UL (ref 0–0.2)
PLATELET # BLD AUTO: 139 K/UL (ref 150–450)
PMV BLD AUTO: 10.2 FL (ref 9.4–12.3)
POTASSIUM SERPL-SCNC: 4 MMOL/L (ref 3.5–5.1)
PPD POC: NORMAL NEGATIVE
PROCALCITONIN SERPL-MCNC: 0.1 NG/ML
RBC # BLD AUTO: 2.94 M/UL (ref 4.23–5.6)
SERVICE CMNT-IMP: NORMAL
SODIUM SERPL-SCNC: 141 MMOL/L (ref 136–145)
VANCOMYCIN TROUGH SERPL-MCNC: 8.7 UG/ML (ref 5–20)
WBC # BLD AUTO: 4.7 K/UL (ref 4.3–11.1)

## 2019-06-13 PROCEDURE — 94760 N-INVAS EAR/PLS OXIMETRY 1: CPT

## 2019-06-13 PROCEDURE — 74011000258 HC RX REV CODE- 258: Performed by: FAMILY MEDICINE

## 2019-06-13 PROCEDURE — 77010033678 HC OXYGEN DAILY

## 2019-06-13 PROCEDURE — 97535 SELF CARE MNGMENT TRAINING: CPT

## 2019-06-13 PROCEDURE — 99232 SBSQ HOSP IP/OBS MODERATE 35: CPT | Performed by: INTERNAL MEDICINE

## 2019-06-13 PROCEDURE — 97110 THERAPEUTIC EXERCISES: CPT

## 2019-06-13 PROCEDURE — 80048 BASIC METABOLIC PNL TOTAL CA: CPT

## 2019-06-13 PROCEDURE — 86141 C-REACTIVE PROTEIN HS: CPT

## 2019-06-13 PROCEDURE — 97530 THERAPEUTIC ACTIVITIES: CPT

## 2019-06-13 PROCEDURE — 65270000029 HC RM PRIVATE

## 2019-06-13 PROCEDURE — 74011000250 HC RX REV CODE- 250: Performed by: INTERNAL MEDICINE

## 2019-06-13 PROCEDURE — 94640 AIRWAY INHALATION TREATMENT: CPT

## 2019-06-13 PROCEDURE — 36415 COLL VENOUS BLD VENIPUNCTURE: CPT

## 2019-06-13 PROCEDURE — 74011250637 HC RX REV CODE- 250/637: Performed by: FAMILY MEDICINE

## 2019-06-13 PROCEDURE — 74011250636 HC RX REV CODE- 250/636: Performed by: FAMILY MEDICINE

## 2019-06-13 PROCEDURE — 85027 COMPLETE CBC AUTOMATED: CPT

## 2019-06-13 PROCEDURE — 77030019605

## 2019-06-13 PROCEDURE — 80202 ASSAY OF VANCOMYCIN: CPT

## 2019-06-13 PROCEDURE — 84145 PROCALCITONIN (PCT): CPT

## 2019-06-13 PROCEDURE — 83880 ASSAY OF NATRIURETIC PEPTIDE: CPT

## 2019-06-13 RX ADMIN — Medication 10 ML: at 05:04

## 2019-06-13 RX ADMIN — HEPARIN SODIUM 5000 UNITS: 5000 INJECTION INTRAVENOUS; SUBCUTANEOUS at 05:03

## 2019-06-13 RX ADMIN — PIPERACILLIN, TAZOBACTAM 4.5 G: 4; .5 INJECTION, POWDER, LYOPHILIZED, FOR SOLUTION INTRAVENOUS at 08:25

## 2019-06-13 RX ADMIN — FUROSEMIDE 40 MG: 10 INJECTION, SOLUTION INTRAMUSCULAR; INTRAVENOUS at 08:25

## 2019-06-13 RX ADMIN — ALBUTEROL SULFATE 2.5 MG: 2.5 SOLUTION RESPIRATORY (INHALATION) at 11:53

## 2019-06-13 RX ADMIN — MONTELUKAST SODIUM 10 MG: 10 TABLET, FILM COATED ORAL at 08:26

## 2019-06-13 RX ADMIN — Medication 10 ML: at 21:58

## 2019-06-13 RX ADMIN — Medication 10 ML: at 21:59

## 2019-06-13 RX ADMIN — PIPERACILLIN, TAZOBACTAM 4.5 G: 4; .5 INJECTION, POWDER, LYOPHILIZED, FOR SOLUTION INTRAVENOUS at 17:19

## 2019-06-13 RX ADMIN — ALBUTEROL SULFATE 2.5 MG: 2.5 SOLUTION RESPIRATORY (INHALATION) at 20:23

## 2019-06-13 RX ADMIN — HEPARIN SODIUM 5000 UNITS: 5000 INJECTION INTRAVENOUS; SUBCUTANEOUS at 21:58

## 2019-06-13 RX ADMIN — VANCOMYCIN HYDROCHLORIDE 1000 MG: 1 INJECTION, POWDER, LYOPHILIZED, FOR SOLUTION INTRAVENOUS at 02:00

## 2019-06-13 RX ADMIN — ALBUTEROL SULFATE 2.5 MG: 2.5 SOLUTION RESPIRATORY (INHALATION) at 08:19

## 2019-06-13 RX ADMIN — TAMSULOSIN HYDROCHLORIDE 0.4 MG: 0.4 CAPSULE ORAL at 08:25

## 2019-06-13 RX ADMIN — FLUTICASONE PROPIONATE 2 SPRAY: 50 SPRAY, METERED NASAL at 08:34

## 2019-06-13 RX ADMIN — HEPARIN SODIUM 5000 UNITS: 5000 INJECTION INTRAVENOUS; SUBCUTANEOUS at 12:00

## 2019-06-13 RX ADMIN — ALBUTEROL SULFATE 2.5 MG: 2.5 SOLUTION RESPIRATORY (INHALATION) at 15:34

## 2019-06-13 RX ADMIN — GUAIFENESIN 1200 MG: 600 TABLET, EXTENDED RELEASE ORAL at 08:25

## 2019-06-13 RX ADMIN — AMIODARONE HYDROCHLORIDE 400 MG: 200 TABLET ORAL at 08:25

## 2019-06-13 RX ADMIN — VANCOMYCIN HYDROCHLORIDE 1000 MG: 1 INJECTION, POWDER, LYOPHILIZED, FOR SOLUTION INTRAVENOUS at 13:25

## 2019-06-13 NOTE — PROGRESS NOTES
Problem: Self Care Deficits Care Plan (Adult)  Goal: *Acute Goals and Plan of Care (Insert Text)  Description  1. Pt will toilet with CGA   2. Pt will complete functional mobility for ADLs with CGA  3. Pt will complete lower body dressing with min assist using AE as needed  4. Pt will complete grooming and hygiene at sink with CGA  5. Pt will demonstrate independence with HEP to promote increased BUE strength and functional use for ADLs  6. Pt will tolerate 23 minutes functional activity with min or fewer rest breaks to promote increased endurance for ADLs  7. Pt will complete bed mobility with CGA in prep for ADLs    Timeframe: 7 days     Outcome: Progressing Towards Goal     OCCUPATIONAL THERAPY: Daily Note and PM    6/13/2019  INPATIENT: OT Visit Days: 1  [place] / [place] / Product Type: Medicare /      NAME/AGE/GENDER: Ronni Myrick is a 80 y.o. male   PRIMARY DIAGNOSIS:  Pleural effusion [J90] Pleural effusion   Pleural effusion         ICD-10: Treatment Diagnosis:    · Generalized Muscle Weakness (M62.81)   Precautions/Allergies:    falls Patient has no known allergies. ASSESSMENT:     Mr. Manny Zarco was admitted from SNF with SOB, diagnosis of pleural effusions now s/p thoracentesis. Pt is s/p recent admission for pneumothorax, is known to therapist from last hospitalization. Pt lives at home with his wife and is independent with use of RW at baseline, has required assistance for ADLs and mobility for ADLs at SNF and reports very limited mobility. 6/13/2019 Pt presents in supine upon arrival sleeping with his lunch beside him untouched. Pt woke to therapists voice and let him know that his lunch is in the room and pt stated \"Good, I'm hungry. \" Pt transferred to sitting with min/mod a. Table tray was set up in front of the pt and pt immediately began removing lids from soup and sandwich and began eating.  Pt ate almost his entire lunch while seated edge of bed with fair balance. Toward the end when pt became tired his sitting balance of course was not as good so therapist offered min support while he finished his lunch and then pt was assisted back to supine with min a.  Good effort. Continue POC. This section established at most recent assessment   PROBLEM LIST (Impairments causing functional limitations):  1. Decreased Strength  2. Decreased ADL/Functional Activities  3. Decreased Transfer Abilities  4. Decreased Balance  5. Decreased Activity Tolerance  6. Increased Fatigue  7. Increased Shortness of Breath   INTERVENTIONS PLANNED: (Benefits and precautions of occupational therapy have been discussed with the patient.)  1. Activities of daily living training  2. Adaptive equipment training  3. Balance training  4. Therapeutic activity  5. Therapeutic exercise     TREATMENT PLAN: Frequency/Duration: Follow patient 3 times/ week to address above goals. Rehabilitation Potential For Stated Goals: Good     REHAB RECOMMENDATIONS (at time of discharge pending progress):    Placement: It is my opinion, based on this patient's performance to date, that Mr. Rianna Bentley may benefit from intensive therapy at a [address] [place] [address] after discharge due to the functional deficits listed above that are likely to improve with skilled rehabilitation and inability to complete ADLs/ function at home .   Equipment:    None at this time              OCCUPATIONAL PROFILE AND HISTORY:   History of Present Injury/Illness (Reason for Referral):  See H&P  Past Medical History/Comorbidities:   Mr. Rianna Bentley  has a past medical history of Abdominal aortic aneurysm ([address] (12/10/2015), Abdominal aortic aneurysm without rupture ([address] Allergic rhinitis (12/10/2015), Asthma, Benign neoplasm, Benign prostatic hyperplasia (12/10/2015), BPH without urinary obstruction, Cardiovascular disease (12/10/2015), Chronic obstructive asthma with exacerbation ([address] (12/10/2015), COPD (chronic obstructive pulmonary disease) ([facility] (12/10/2015), Cough with hemoptysis, Erectile dysfunction (12/10/2015), Essential hypertension, benign (12/10/2015), Fracture (10/2014), History of colon polyps, Low testosterone (12/10/2015), Lumbago, Memory loss, Overflow incontinence, Raynaud's syndrome (12/10/2015), Rotator cuff tendinitis, Thrombocytopenia ([facility] and Urinary frequency. Mr. Clemente Ojeda  has a past surgical history that includes hx hernia repair (1980); hx colonoscopy; hx fracture tx (10/2014); hx cataract removal (6/2016-right); and hx orthopaedic (03/2018). Social History/Living Environment:   Home Environment: [address] [place] [address] Name: [facility]  One/Two Story Residence: One story  Living Alone: No  Support Systems: Family member(s), Spouse/Significant Other/Partner, Child(tremayne)  Patient Expects to be Discharged to[de-identified] Rehabilitation facility  Current DME Used/Available at Home: Walker, rolling  Tub or Shower Type: Shower  Prior Level of Function/Work/Activity:  Independent at baseline, has required assistance for ADLs since d/c to SNF following last hospitalization     Number of Personal Factors/Comorbidities that affect the Plan of Care: Expanded review of therapy/medical records (1-2):  MODERATE COMPLEXITY   ASSESSMENT OF OCCUPATIONAL PERFORMANCE[de-identified]   Activities of Daily Living:   Basic ADLs (From Assessment) Complex ADLs (From Assessment)   Feeding: Setup  Oral Facial Hygiene/Grooming: Contact guard assistance  Bathing: Moderate assistance  Upper Body Dressing: Minimum assistance  Lower Body Dressing: Moderate assistance  Toileting:  Moderate assistance Instrumental ADL  Meal Preparation: Maximum assistance  Homemaking: Maximum assistance  Medication Management: Moderate assistance   Grooming/Bathing/Dressing Activities of Daily Living           Feeding  Feeding Assistance: Stand-by assistance  Container Management: Standing-by assistance  Utensil Management: Stand-by assistance  Food to Mouth: Stand-by assistance  Drink to Mouth: Stand-by assistance                 Bed/Mat Mobility  Supine to Sit: Minimum assistance; Moderate assistance  Sit to Supine: Minimum assistance  Sit to Stand: Minimum assistance  Stand to Sit: Minimum assistance       Most Recent Physical Functioning:   Gross Assessment:                  Posture:  Posture (WDL): Exceptions to WDL  Posture Assessment: Forward head, Rounded shoulders  Balance:  Sitting: Impaired  Sitting - Static: Fair (occasional)  Sitting - Dynamic: Fair (occasional) Bed Mobility:  Supine to Sit: Minimum assistance; Moderate assistance  Sit to Supine: Minimum assistance  Wheelchair Mobility:     Transfers:  Sit to Stand: Minimum assistance  Stand to Sit: Minimum assistance            Patient Vitals for the past 6 hrs:   BP SpO2 O2 Flow Rate (L/min) Pulse   06/13/19 1153  91 % 4 l/min    06/13/19 1204 138/78 94 %  (!) 56       Mental Status  Neurologic State: Alert, Confused  Orientation Level: Oriented to person  Cognition: Impaired decision making  Perception: Appears intact  Perseveration: No perseveration noted  Safety/Judgement: Not assessed                          Physical Skills Involved:  1. Balance  2. Strength  3. Activity Tolerance Cognitive Skills Affected (resulting in the inability to perform in a timely and safe manner):  1. Executive Function  2. Comprehension Psychosocial Skills Affected:  1. Habits/Routines  2. Environmental Adaptation   Number of elements that affect the Plan of Care: 3-5:  MODERATE COMPLEXITY   CLINICAL DECISION MAKING:   [employer] AM-PAC 6 Clicks   Daily Activity Inpatient Short Form  How much help from another person does the patient currently need. .. Total A Lot A Little None   1. Putting on and taking off regular lower body clothing? ? 1   ? 2   ? 3   ? 4   2. Bathing (including washing, rinsing, drying)? ? 1   ? 2   ? 3   ? 4   3.   Toileting, which includes using toilet, bedpan or urinal?   ? 1   ? 2   ? 3   ? 4 4.  Putting on and taking off regular upper body clothing? ? 1   ? 2   ? 3   ? 4   5. Taking care of personal grooming such as brushing teeth? ? 1   ? 2   ? 3   ? 4   6. Eating meals? ? 1   ? 2   ? 3   ? 4   © 2007, Trustees of [facility] [employer], under license to [employer]. All rights reserved      Score:  Initial: 16 Most Recent: X (Date: -- )    Interpretation of Tool:  Represents activities that are increasingly more difficult (i.e. Bed mobility, Transfers, Gait). Medical Necessity:     · Patient demonstrates good  ·  rehab potential due to higher previous functional level. Reason for Services/Other Comments:  · Patient continues to require present interventions due to patient's inability to impaired ADLs and functional performance from baseline   · . Use of outcome tool(s) and clinical judgement create a POC that gives a: MODERATE COMPLEXITY         TREATMENT:   (In addition to Assessment/Re-Assessment sessions the following treatments were rendered)     Pre-treatment Symptoms/Complaints:    Pain: Initial:   Pain Intensity 1: 0  Post Session:  0     Therapeutic Activity: (15 minutes): Therapeutic activities including Bed transfers and edge of bed sitting  to improve mobility, strength and balance. Required CGA/min a to promote static and dynamic balance in sitting. Self Care: (12 minutes): Procedure(s) (per grid) utilized to improve and/or restore self-care/home management as related to self feeding. Required minimal visual, verbal and   cueing to facilitate activities of daily living skills.         Braces/Orthotics/Lines/Etc:   · li catheter  · O2 Device: Hi flow nasal cannula  Treatment/Session Assessment:    · Response to Treatment:  Fatigue with prolonged sitting  · Interdisciplinary Collaboration:   o Certified Occupational Therapy Assistant  o Registered Nurse  · After treatment position/precautions:   o Supine in bed  o Bed/Chair-wheels locked  o Bed in low position  o Call light within reach  o RN notified  o Side rails x 3   · Compliance with Program/Exercises: Compliant most of the time. · Recommendations/Intent for next treatment session: \"Next visit will focus on advancements to more challenging activities and reduction in assistance provided\".   Total Treatment Duration:  OT Patient Time In/Time Out  Time In: 1318  Time Out: Adonis May

## 2019-06-13 NOTE — PROGRESS NOTES
Hourly rounds performed during this shift; all needs met at this time. Bed in low/locked position and call light, personal items within reach. Bedside shift report to oncoming day shift nurse.

## 2019-06-13 NOTE — PROGRESS NOTES
Hospitalist Progress Note    2019  Admit Date: 6/10/2019 11:23 AM   NAME: Ramila Matthews   :  1932   MRN:  [id number]   Attending: Gila Ogden MD  PCP:  Sonia Wheeler MD    SUBJECTIVE:   Patient is an 79yo M with hx emphysema who was recently admitted for pneumothorax, s/p pleurodesis on  and presents to the ER from SNF with increased shortness of breath. Admitted with large bilateral pleural effusions. :  Patient alert and oriented x3. NAD. Remains on high flow oxygen therapy with saturations 94%. Patient states using oxygen at night normally. Repeat procalcitonin 0.1, pulmonology following and PA/Lat chest ordered for the am. Plans for patient to go to rehab upon discharge. Labs baseline. Review of Systems negative with exception of pertinent positives noted above  PHYSICAL EXAM     Visit Vitals  /78   Pulse (!) 56   Temp 98.1 °F (36.7 °C)   Resp 18   Ht 5' 8\" (1.727 m)   Wt 65.8 kg (145 lb)   SpO2 94%   BMI 22.05 kg/m²      Temp (24hrs), Av.2 °F (36.8 °C), Min:97.8 °F (36.6 °C), Max:99 °F (37.2 °C)    Oxygen Therapy  O2 Sat (%): 94 % (19 1204)  Pulse via Oximetry: 58 beats per minute (19 1153)  O2 Device: Hi flow nasal cannula (19 1153)  O2 Flow Rate (L/min): 4 l/min (19 1153)    Intake/Output Summary (Last 24 hours) at 2019 1347  Last data filed at 2019 1004  Gross per 24 hour   Intake 125 ml   Output 3075 ml   Net -2950 ml      General: Elderly, NAD  Lungs:  Coarse throughout, no distress.    Heart:  Regular rate and rhythm,  No murmur, rub, or gallop  Abdomen: Soft, Non distended, Non tender, Positive bowel sounds  Extremities: 2+ pitting edema LE improved from yesterday  Neurologic:  No focal deficits    XR SWALLOW FUNC VIDEO   Final Result   IMPRESSION: Unremarkable modified barium swallow         XR CHEST PORT   Final Result      CT CHEST W CONT   Final Result   IMPRESSION: Bilateral pleural effusions as described      XR CHEST PORT Final Result   IMPRESSION: Coarsening of interstitial markings in the lungs most prominent in   the lower lung zones and small right pleural effusion      XR CHEST PA LAT    (Results Pending)         [address] Problems    Diagnosis Date Noted    Hypoproteinemia ([facility] 06/13/2019    Hypocalcemia 06/13/2019    Pleural effusion 06/10/2019    Urinary retention 05/20/2019    Ventricular tachyarrhythmia ([facility] 05/06/2019    Acute on chronic respiratory failure with hypoxia (HCC) 05/06/2019    Essential hypertension, benign 12/10/2015    COPD (chronic obstructive pulmonary disease) ([facility] 12/10/2015     Plan:    Acute on chronic hypoxic respiratory failure - 2/2 Pleural effusions:  recent pleurodesis on 5/16 for pneumothorax  Thoracentesis 6/10 with 800cc fluid. neutrophilic fluid, culture pending. Blood and urine cultures ngtd  Continue vanc/zosyn pending results. PCT negative. Lasix for pleural fluid and leg edema. 870ml negative. Increase lasix.     Urinary retention - li in place, monitor output. Flomax. Was to have uro f/u after last hospitalization.      HTN: hold antihypertensives, bb in mild bradycardia and low BP     Hx Vtach: continue amiodarone - presentation not consistent with toxicity     Hypokalemia: Resolved.  Continue to monitor    DVT Prophylaxis: HSQ  Dispo: back to [facility] when respiratory status improved    Signed By: Fady Chavarria NP     June 13, 2019

## 2019-06-13 NOTE — PROGRESS NOTES
Pharmacokinetic Consult to Pharmacist    [place] Felisha is a 80 y.o. male being treated for HAP with vancomycin and zosyn. Height: 5' 8\" (172.7 cm)  Weight: 65.8 kg (145 lb)  Lab Results   Component Value Date/Time    BUN 13 06/13/2019 04:35 AM    Creatinine 0.62 (L) 06/13/2019 04:35 AM    WBC 4.7 06/13/2019 04:35 AM    Procalcitonin 0.1 06/10/2019 08:01 PM    Lactic Acid (POC) 2.06 (H) 05/06/2019 09:42 AM      Estimated Creatinine Clearance: 78.1 mL/min (A) (based on SCr of 0.62 mg/dL (L)). Lab Results   Component Value Date/Time    Vancomycin,trough 8.7 06/13/2019 12:33 AM       Day 4 of vancomycin. Goal trough is 15-20. Dose adjusted to 1g q12h. Will continue to follow patient. Thank you,  Rose Hope, Pharm. D.   Clinical Pharmacist  [phone]

## 2019-06-13 NOTE — PROGRESS NOTES
Problem: Mobility Impaired (Adult and Pediatric)  Goal: *Acute Goals and Plan of Care (Insert Text)  Description  LTG:  (1.)Mr. Paty Linda will move from supine to sit and sit to supine, scoot up and down and roll side to side with SUPERVISION within 7 treatment day(s). (2.)Mr. Paty Linda will transfer from bed to chair and chair to bed with MODIFIED INDEPENDENCE using the least restrictive device within 7 treatment day(s). (3.)Mr. Paty Linda will ambulate with CONTACT GUARD ASSIST for 50+ feet with the least restrictive device within 7 treatment day(s). (4.)Mr. Paty Linda will perform exercises per HEP for 10+ minutes to improve strength and mobility within 7 days. ________________________________________________________________________________________________   Outcome: Progressing Towards Goal     PHYSICAL THERAPY: Daily Note and AM 6/13/2019  INPATIENT: PT Visit Days : 2  [place] / [place] / Product Type: Medicare /       NAME/AGE/GENDER: Christofer Lane is a 80 y.o. male   PRIMARY DIAGNOSIS: Pleural effusion [J90] Pleural effusion   Pleural effusion         ICD-10: Treatment Diagnosis:    · Generalized Muscle Weakness (M62.81)  · Difficulty in walking, Not elsewhere classified (R26.2)  · Other abnormalities of gait and mobility (R26.89)   Precaution/Allergies:  Patient has no known allergies. ASSESSMENT:     Mr. Paty Linda is a pleasant 80year old male [facility] hospital stay per wife. He is up in the recliner on 6L of O2 and on his computer trying to get onto solitaire. He is happy to try and walk. During entire session had a terrible time getting a reliable sat reading. He stood into the walker with min/mod assist and a posterior lean with bent knees. He was able to walk around the bed and sat in a chair to rest.  He was working around the bed back to the recliner and needed a seated rest on the way. He is very weak.   Attempted to do exercises in the chair and needed to abort due to tailbone getting to sore to tolerate sitting. Assisted him back to the bed with moderate assist.  He was asleep before I left the room. Goals unmet. This section established at most recent assessment   PROBLEM LIST (Impairments causing functional limitations):  1. Decreased Strength  2. Decreased Transfer Abilities  3. Decreased Ambulation Ability/Technique  4. Decreased Balance  5. Decreased Activity Tolerance  6. Increased Shortness of Breath  7. Decreased Cognition   INTERVENTIONS PLANNED: (Benefits and precautions of physical therapy have been discussed with the patient.)  1. Balance Exercise  2. Bed Mobility  3. Gait Training  4. Home Exercise Program (HEP)  5. Therapeutic Activites  6. Therapeutic Exercise/Strengthening  7. Transfer Training     TREATMENT PLAN: Frequency/Duration: 3 times a week for duration of hospital stay  Rehabilitation Potential For Stated Goals: Good     REHAB RECOMMENDATIONS (at time of discharge pending progress):    Placement: It is my opinion, based on this patient's performance to date, that Mr. Manny Zarco may benefit from intensive therapy at a [address] [place] [address] after discharge due to the functional deficits listed above that are likely to improve with skilled rehabilitation and concerns that he/she may be unsafe to be unsupervised at home due to weakness, fall risk, debility . Equipment:    tbd               HISTORY:   History of Present Injury/Illness (Reason for Referral):  Per H&P, \"Patient is a 80 y.o.  male seen and evaluated at the request of Dr. Olga Kee. He has hx emphysema who was recently admitted for pneumothorax, s/p pleurodesis on 5/16 and presents to the ER from SNF with increased shortness of breath. Was discharged on 2L oxygen. Also with some labile and low BP readings but not consistently. Accompanied by son and wife. Pt reports feeling much better since nonrebreather.   Denies fever, cough, abdominal pain. Son notes waxing and waning AMS for several days, now much more clear, suspects it was hypoxia. He has been on 2 L nasal cannula in the facility but then required nonrebreather and mask oxygen to maintain sats in the 90s. He dropped down into the mid 80s and EMS was called for evaluation. For EMS the patient's sats were 88% on 6 L and they paste placed him on nonrebreather mask. Patient denies chest pain or chest discomfort. He has had some dyspnea today. Lower extremity edema is unchanged. Complains of chronic mildly productive cough but no change in it. Denies any aggravating or alleviating factors but when asked me indicate that it is slightly worse when he lays back more. He has a history of congestive heart failure as well. Since discharge 5/25, had li replaced for persistent retention. Was on 2L NC. Wife reports [facility] said he had a fever this morning and then had to increase his oxygen and then sent him to the ED. Son described him being confused for the past several days, but after being on O2 being much more clear and coherent today. \"    Past Medical History/Comorbidities:   Mr. Lamont Adrian  has a past medical history of Abdominal aortic aneurysm ([address] (12/10/2015), Abdominal aortic aneurysm without rupture ([address] Allergic rhinitis (12/10/2015), Asthma, Benign neoplasm, Benign prostatic hyperplasia (12/10/2015), BPH without urinary obstruction, Cardiovascular disease (12/10/2015), Chronic obstructive asthma with exacerbation ([address] (12/10/2015), COPD (chronic obstructive pulmonary disease) ([address] (12/10/2015), Cough with hemoptysis, Erectile dysfunction (12/10/2015), Essential hypertension, benign (12/10/2015), Fracture (10/2014), History of colon polyps, Low testosterone (12/10/2015), Lumbago, Memory loss, Overflow incontinence, Raynaud's syndrome (12/10/2015), Rotator cuff tendinitis, Thrombocytopenia ([address] and Urinary frequency.   Mr. Lamont Adrian  has a past surgical history that includes hx hernia repair (); hx colonoscopy; hx fracture tx (10/2014); hx cataract removal (2016-right); and hx orthopaedic (2018). Social History/Living Environment:   Home Environment: [address] [place] [address] Name: [facility]  One/Two Story Residence: One story  Living Alone: No  Support Systems: Family member(s), Spouse/Significant Other/Partner, Child(tremayne)  Patient Expects to be Discharged to[de-identified] Rehabilitation facility  Current DME Used/Available at Home: Walker, rolling  Tub or Shower Type: Shower  Prior Level of Function/Work/Activity:  Pt admitted from rehab following prolonged hospitalization. Prior to this he was modified independent using walker due to history of hip fracture. Number of Personal Factors/Comorbidities that affect the Plan of Care: 1-2: MODERATE COMPLEXITY   EXAMINATION:   Most Recent Physical Functioning:   Gross Assessment:                  Posture:     Balance:    Bed Mobility:     Wheelchair Mobility:     Transfers:  Sit to Stand: Minimum assistance  Stand to Sit: Minimum assistance  Gait:     Speed/Marifer: Shuffled; Slow  Step Length: Left shortened;Right shortened  Gait Abnormalities: Decreased step clearance;Shuffling gait; Steppage gait  Distance (ft): 5 Feet (ft)(x2)  Assistive Device: Walker, rolling  Ambulation - Level of Assistance: Minimal assistance; Moderate assistance      Body Structures Involved:  1. Lungs  2. Muscles Body Functions Affected:  1. Mental  2. Respiratory  3. Movement Related Activities and Participation Affected:  1. General Tasks and Demands  2. Mobility  3. Domestic Life  4.  Community, Social and [place]   Number of elements that affect the Plan of Care: 4+: HIGH COMPLEXITY   CLINICAL PRESENTATION:   Presentation: Evolving clinical presentation with changing clinical characteristics: MODERATE COMPLEXITY   CLINICAL DECISION MAKIN [facility] Mobility Inpatient Short Form  How much difficulty does the patient currently have. .. Unable A Lot A Little None   1. Turning over in bed (including adjusting bedclothes, sheets and blankets)? ? 1   ? 2   ? 3   ? 4   2. Sitting down on and standing up from a chair with arms ( e.g., wheelchair, bedside commode, etc.)   ? 1   ? 2   ? 3   ? 4   3. Moving from lying on back to sitting on the side of the bed?   ? 1   ? 2   ? 3   ? 4   How much help from another person does the patient currently need. .. Total A Lot A Little None   4. Moving to and from a bed to a chair (including a wheelchair)? ? 1   ? 2   ? 3   ? 4   5. Need to walk in hospital room? ? 1   ? 2   ? 3   ? 4   6. Climbing 3-5 steps with a railing? ? 1   ? 2   ? 3   ? 4   © 2007, Trustees of [address] [place] [id number], under license to "[employer]. All rights reserved      Score:  Initial: 16 Most Recent: X (Date: -- )    Interpretation of Tool:  Represents activities that are increasingly more difficult (i.e. Bed mobility, Transfers, Gait). Medical Necessity:     · Patient demonstrates fair  ·  rehab potential due to higher previous functional level. Reason for Services/Other Comments:  · Patient continues to demonstrate capacity to improve strength, mobility, balance, transfers, activity tolerance which will increase independence and increase safety  · . Use of outcome tool(s) and clinical judgement create a POC that gives a: Clear prediction of patient's progress: LOW COMPLEXITY            TREATMENT:   (In addition to Assessment/Re-Assessment sessions the following treatments were rendered)   Pre-treatment Symptoms/Complaints:  \"ok\"  Pain: Initial:   Pain Intensity 1: 3  Pain Location 1: Buttocks  Post Session:  0/10     Therapeutic Activity: (35 minutes ):  Therapeutic activities including Bed transfers, Chair transfers, Ambulation on level ground and exercises  to improve mobility, strength, balance and activity tolerance .   Required minimal to moderate assist to promote static and dynamic balance in standing and promote motor control of bilateral, lower extremity(s). Date:  6/11/19 Date:  6/13/19 Date:     Activity/Exercise Parameters Parameters Parameters   LAQ 10x AB 10x2 B    Seated marching 10x AB     Ankle pumps 10x AB 10x B     1                           Braces/Orthotics/Lines/Etc:   · li catheter  · O2 Device: Hi flow nasal cannula  Treatment/Session Assessment:    · Response to Treatment:  pt performs mobility with min-mod A  · Interdisciplinary Collaboration:   o Physical Therapy Assistant  o Registered Nurse  · After treatment position/precautions:   o Supine in bed  o Bed/Chair-wheels locked  o Bed in low position  o Call light within reach  o RN notified   · Compliance with Program/Exercises: Compliant all of the time  · Recommendations/Intent for next treatment session: \"Next visit will focus on advancements to more challenging activities and reduction in assistance provided\".   Total Treatment Duration:  PT Patient Time In/Time Out  Time In: 1040  Time Out: 1115    Adonis Hunter, PTA

## 2019-06-13 NOTE — PROGRESS NOTES
CONSULT NOTE    [facility] Records    6/13/2019    Date of Admission:  6/10/2019    The patient's chart is reviewed and the patient is discussed with the staff. 80 y.o.  male seen and evaluated at the request of Dr. Jadon Marie. He has hx emphysema who was recently admitted for pneumothorax, s/p pleurodesis on 5/16 and presents to the ER from SNF with increased shortness of breath. Was discharged on 2L oxygen. Also with some labile and low BP readings but not consistently. Accompanied by son and wife. Pt reports feeling much better since nonrebreather. Denies fever, cough, abdominal pain. Son notes waxing and waning AMS for several days, now much more clear, suspects it was hypoxia. He has been on 2 L nasal cannula in the facility but then required nonrebreather and mask oxygen to maintain sats in the 90s. He dropped down into the mid 80s and EMS was called for evaluation. For EMS the patient's sats were 88% on 6 L and they paste placed him on nonrebreather mask. Patient denies chest pain or chest discomfort. He has had some dyspnea today. Lower extremity edema is unchanged. Complains of chronic mildly productive cough but no change in it. Denies any aggravating or alleviating factors but when asked me indicate that it is slightly worse when he lays back more. He has a history of congestive heart failure as well. Since discharge 5/25, had li replaced for persistent retention. Was on 2L NC. Wife reports [facility] said he had a fever this morning and then had to increase his oxygen and then sent him to the ED. Son described him being confused for the past several days, but after being on O2 being much more clear and coherent today. Subjective:     Remains on 6L NC. Has not had O2 weaned though sats have been > 93. Net 5L diuresis. BNP down to 121.        Review of Systems    Constitutional: negative for fever, chills, sweats  Cardiac: negative for chest pain, palpitations, syncope, edema  GI: negative for dysphagia, reflux, vomiting, diarrhea, abdominal pain, or melena  Neuro: negative for focal weakness, numbness, headache      Patient Active Problem List   Diagnosis Code    Abdominal aortic aneurysm without rupture ([facility]) I71.4    Benign prostatic hyperplasia N40.0    Cardiovascular disease I25.10    COPD (chronic obstructive pulmonary disease) ([facility] J44.9    Low testosterone R79.89    Essential hypertension, benign I10    Allergic rhinitis J30.9    Raynaud's syndrome I73.00    Erectile dysfunction N52.9    Chronic respiratory failure with hypoxia ([facility]) J96.11    SOB (shortness of breath) R06.02    Abnormal finding of lung R09.89    Bigeminy I49.9    Hip fracture ([facility] S72.009A    COPD (chronic obstructive pulmonary disease) with emphysema ([facility]) J43.9    Closed compression fracture of lumbosacral spine ([facility]) S32.000A    Pneumothorax on right J93.9    Shortness of breath R06.02    Acute on chronic respiratory failure with hypoxia ([facility]) J96.21    COPD exacerbation ([facility]) J44.1    Ventricular tachyarrhythmia ([facility]) I47.2    Pulmonary infiltrates R91.8    VT (ventricular tachycardia) ([facility]) I47.2    Subcutaneous emphysema ([facility]) T79. 7XXA    Urinary retention R33.9    Hyponatremia E87.1    Pleural effusion J90     Prior to Admission Medications   Prescriptions Last Dose Informant Patient Reported? Taking? Biotin 2,500 mcg cap   Yes No   Sig: Take  by mouth. Oxygen   Yes No   Sig: Use as instructed. Use as instructed; faxed to [facility]   albuterol-ipratropium (DUO-NEB) 2.5 mg-0.5 mg/3 ml nebu   No No   Sig: 3 mL by Nebulization route every four (4) hours as needed for Other (dyspnea). amiodarone (CORDARONE) 200 mg tablet   No No   Sig: Take 2 Tabs by mouth every twelve (12) hours. aspirin delayed-release 81 mg tablet   Yes No   Sig: Take  by mouth daily.    calcium citrate-vitamin d3 (CITRACAL+D) 315-200 mg-unit tab   Yes No   Sig: Take 2 Tabs by mouth daily (with breakfast). carvedilol (COREG) 3.125 mg tablet   No No   Sig: Take 1 Tab by mouth two (2) times daily (with meals). cholecalciferol (VITAMIN D3) 1,000 unit cap   Yes No   Sig: Take  by mouth. ciprofloxacin HCl (CIPRO) 500 mg tablet   No No   Sig: Take 1 Tab by mouth every twelve (12) hours. cyanocobalamin (VITAMIN B12) 1,000 mcg/mL injection   Yes No   Sig: INJECT 1ML INTRAMUSCULARLY ONCE FOR 1 DOSE   fluticasone (FLONASE) 50 mcg/actuation nasal spray   No No   Si Sprays by Both Nostrils route daily. guaiFENesin ER (MUCINEX) 600 mg ER tablet   Yes No   Sig: Take 1,200 mg by mouth daily. lisinopril (PRINIVIL, ZESTRIL) 10 mg tablet   No No   Sig: Take 1 Tab by mouth daily. montelukast (SINGULAIR) 10 mg tablet   No No   Sig: Take 1 Tab by mouth daily. potassium chloride (K-DUR, KLOR-CON) 20 mEq tablet   No No   Sig: Take 2 Tabs by mouth daily. tamsulosin (FLOMAX) 0.4 mg capsule   No No   Sig: Take 1 Cap by mouth daily. umeclidinium-vilanterol (ANORO ELLIPTA) 62.5-25 mcg/actuation inhaler   Yes No   Sig: Take 1 Puff by inhalation daily.       Facility-Administered Medications: None     Past Medical History:   Diagnosis Date    Abdominal aortic aneurysm ([facility] 12/10/2015    In 2005    Abdominal aortic aneurysm without rupture ([facility]     Allergic rhinitis 12/10/2015    Asthma     Benign neoplasm     Benign prostatic hyperplasia 12/10/2015    BPH without urinary obstruction     Cardiovascular disease 12/10/2015    Chronic obstructive asthma with exacerbation ([address] 12/10/2015    COPD (chronic obstructive pulmonary disease) ([facility] 12/10/2015    Cough with hemoptysis     Erectile dysfunction 12/10/2015    Essential hypertension, benign 12/10/2015    Fracture 10/2014    of left hand and ribs after fall on concrete    History of colon polyps     Low testosterone 12/10/2015    Lumbago     Memory loss     Overflow incontinence     Raynaud's syndrome 12/10/2015    Rotator cuff tendinitis  Thrombocytopenia (HCC)     Urinary frequency      Past Surgical History:   Procedure Laterality Date    HX CATARACT REMOVAL  6/2016-right    HX COLONOSCOPY      HX FRACTURE TX  10/2014    of left hand and ribs are fall on concrete    [facility]    HX ORTHOPAEDIC  03/2018    hip fracture     Social History     Socioeconomic History    Marital status:      Spouse name: Not on file    Number of children: Not on file    Years of education: Not on file    Highest education level: Not on file   Occupational History    Not on file   Social Needs    Financial resource strain: Not on file    Food insecurity:     Worry: Not on file     Inability: Not on file    Transportation needs:     Medical: Not on file     Non-medical: Not on file   Tobacco Use    Smoking status: Former Smoker    Smokeless tobacco: Never Used   Substance and Sexual Activity    Alcohol use: Yes     Comment: occasional    Drug use: Not on file    Sexual activity: Not on file   Lifestyle    Physical activity:     Days per week: Not on file     Minutes per session: Not on file    Stress: Not on file   Relationships    Social connections:     Talks on phone: Not on file     Gets together: Not on file     Attends Christianity service: Not on file     Active member of club or organization: Not on file     Attends meetings of clubs or organizations: Not on file     Relationship status: Not on file    Intimate partner violence:     Fear of current or ex partner: Not on file     Emotionally abused: Not on file     Physically abused: Not on file     Forced sexual activity: Not on file   Other Topics Concern    Not on file   Social History Narrative    Not on file     Family History   Problem Relation Age of Onset    Dementia Mother     Cancer Father         lung cancer    Heart Attack Father      No Known Allergies    Current Facility-Administered Medications   Medication Dose Route Frequency    vancomycin (VANCOCIN) 1,000 mg in 0.9% sodium chloride (MBP/ADV) 250 mL  1,000 mg IntraVENous Q12H    furosemide (LASIX) injection 40 mg  40 mg IntraVENous Q12H    albuterol (PROVENTIL VENTOLIN) nebulizer solution 2.5 mg  2.5 mg Nebulization QID RT    sodium chloride (NS) flush 5-40 mL  5-40 mL IntraVENous Q8H    sodium chloride (NS) flush 5-40 mL  5-40 mL IntraVENous PRN    amiodarone (CORDARONE) tablet 400 mg  400 mg Oral Q12H    fluticasone propionate (FLONASE) 50 mcg/actuation nasal spray 2 Spray  2 Spray Both Nostrils DAILY    guaiFENesin ER (MUCINEX) tablet 1,200 mg  1,200 mg Oral DAILY    montelukast (SINGULAIR) tablet 10 mg  10 mg Oral DAILY    tamsulosin (FLOMAX) capsule 0.4 mg  0.4 mg Oral DAILY    sodium chloride (NS) flush 5-40 mL  5-40 mL IntraVENous Q8H    sodium chloride (NS) flush 5-40 mL  5-40 mL IntraVENous PRN    acetaminophen (TYLENOL) tablet 650 mg  650 mg Oral Q4H PRN    ondansetron (ZOFRAN ODT) tablet 4 mg  4 mg Oral Q4H PRN    bisacodyl (DULCOLAX) tablet 5 mg  5 mg Oral DAILY PRN    heparin (porcine) injection 5,000 Units  5,000 Units SubCUTAneous Q8H    piperacillin-tazobactam (ZOSYN) 4.5 g in 0.9% sodium chloride (MBP/ADV) 100 mL  4.5 g IntraVENous Q8H     Objective:     Vitals:    06/13/19 0609 06/13/19 0620 06/13/19 0807 06/13/19 0822   BP: 151/73  142/76    Pulse: (!) 56  (!) 59    Resp: 18  19    Temp: 98.2 °F (36.8 °C)  98.1 °F (36.7 °C)    SpO2: 95%  98% 94%   Weight:  145 lb (65.8 kg)     Height:         PHYSICAL EXAM     Constitutional:  the patient is well developed and in no acute distress  EENMT:  Sclera clear, pupils equal, oral mucosa moist  Respiratory: decreased BS bilaterally, on 6L O2 NC  Cardiovascular:  RRR without M,G,R  Gastrointestinal: soft and non-tender; with positive bowel sounds. Musculoskeletal: warm without cyanosis. There is 1+ lower extremity edema.   Skin:  no jaundice or rashes, no wounds   Neurologic: no gross neuro deficits     Psychiatric:  alert and FC    CXR: 6/11/2019      IMPRESSION: Improving bilateral lung edema or infiltrates and tiny right pleural effusion. CT Chest: Severe upper lobe emphysema, Moderate right pleural effusion, tiny left pleural effusion, basilar infiltrates, edema or atelectasis. Recent Labs     06/13/19  0435 06/12/19  0511 06/11/19  0550 06/10/19  1201   WBC 4.7 4.7 5.4 6.5   HGB 9.3* 9.0* 9.6* 9.3*   HCT 30.1* 28.5* 30.9* 29.1*   * 164 173 171     Recent Labs     06/13/19  0435 06/12/19  0511 06/11/19  0550 06/10/19  1201    138 142 142   K 4.0 3.3* 3.8 3.4*   * 103 107 108*   GLU 84 84 96 91   CO2 26 27 27 28   BUN 13 17 23 20   CREA 0.62* 0.62* 0.64* 0.71*   MG  --   --  2.7*  --    CA 6.8* 6.5* 6.7* 7.1*   ALB  --   --   --  1.7*   TBILI  --   --   --  1.4*   ALT  --   --   --  36   SGOT  --   --   --  25     No results for input(s): PH, PCO2, PO2, HCO3 in the last 72 hours. No results for input(s): LCAD, LAC in the last 72 hours.  >> 121    Urine cx: staph    Assessment:  (Medical Decision Making)     Hospital Problems  Date Reviewed: 6/11/2019          Codes Class Noted POA    Hypoproteinemia ([facility] ICD-10-CM: E77.8  ICD-9-CM: 273.8  6/13/2019 Unknown    Severe    Hypocalcemia ICD-10-CM: E83.51  ICD-9-CM: 275.41  6/13/2019 Unknown    Likely due to low protein. * (Principal) Pleural effusion ICD-10-CM: J90  ICD-9-CM: 511.9  6/10/2019 Yes    Tapped. Urinary retention ICD-10-CM: R33.9  ICD-9-CM: 788.20  5/20/2019 Yes        Acute on chronic respiratory failure with hypoxia [facility]) ICD-10-CM: I46.02  ICD-9-CM: 518.84, 799.02  5/6/2019 Yes    Still on considerable oxygen. Try to wean. Ventricular tachyarrhythmia ([facility] ICD-10-CM: I47.2  ICD-9-CM: 427.1  5/6/2019 Yes    K corrected. COPD (chronic obstructive pulmonary disease) ([facility] ICD-10-CM: J44.9  ICD-9-CM: 251  12/10/2015 Yes    No wheezing at present.      Essential hypertension, benign ICD-10-CM: I10  ICD-9-CM: 401.1  12/10/2015 Yes Plan:  (Medical Decision Making)     Recheck PCT today. If still low stop IV abx. Wean oxygen as tolerated. Continue diuresis. PA/Lat CXR tomorrow  Continue PT and OT  Plans to return to [facility] at discharge    More than 50% of the time documented was spent in face-to-face contact with the patient and in the care of the patient on the floor/unit where the patient is located.     Elda Zapata MD

## 2019-06-14 ENCOUNTER — APPOINTMENT (OUTPATIENT)
Dept: GENERAL RADIOLOGY | Age: 84
DRG: 186 | End: 2019-06-14
Attending: INTERNAL MEDICINE
Payer: MEDICARE

## 2019-06-14 LAB
ANION GAP SERPL CALC-SCNC: 6 MMOL/L (ref 7–16)
BACTERIA SPEC CULT: ABNORMAL
BASOPHILS # BLD: 0 K/UL (ref 0–0.2)
BASOPHILS NFR BLD: 0 % (ref 0–2)
BUN SERPL-MCNC: 13 MG/DL (ref 8–23)
CALCIUM SERPL-MCNC: 6.7 MG/DL (ref 8.3–10.4)
CHLORIDE SERPL-SCNC: 109 MMOL/L (ref 98–107)
CO2 SERPL-SCNC: 26 MMOL/L (ref 21–32)
CREAT SERPL-MCNC: 0.62 MG/DL (ref 0.8–1.5)
DIFFERENTIAL METHOD BLD: ABNORMAL
EOSINOPHIL # BLD: 0.1 K/UL (ref 0–0.8)
EOSINOPHIL NFR BLD: 3 % (ref 0.5–7.8)
ERYTHROCYTE [DISTWIDTH] IN BLOOD BY AUTOMATED COUNT: 17.5 % (ref 11.9–14.6)
GLUCOSE SERPL-MCNC: 90 MG/DL (ref 65–100)
HCT VFR BLD AUTO: 27.3 % (ref 41.1–50.3)
HGB BLD-MCNC: 8.6 G/DL (ref 13.6–17.2)
IMM GRANULOCYTES # BLD AUTO: 0 K/UL (ref 0–0.5)
IMM GRANULOCYTES NFR BLD AUTO: 1 % (ref 0–5)
LYMPHOCYTES # BLD: 1.4 K/UL (ref 0.5–4.6)
LYMPHOCYTES NFR BLD: 36 % (ref 13–44)
MCH RBC QN AUTO: 32 PG (ref 26.1–32.9)
MCHC RBC AUTO-ENTMCNC: 31.5 G/DL (ref 31.4–35)
MCV RBC AUTO: 101.5 FL (ref 79.6–97.8)
MONOCYTES # BLD: 0.6 K/UL (ref 0.1–1.3)
MONOCYTES NFR BLD: 16 % (ref 4–12)
NEUTS SEG # BLD: 1.7 K/UL (ref 1.7–8.2)
NEUTS SEG NFR BLD: 44 % (ref 43–78)
NRBC # BLD: 0 K/UL (ref 0–0.2)
PLATELET # BLD AUTO: 130 K/UL (ref 150–450)
PMV BLD AUTO: 10.3 FL (ref 9.4–12.3)
POTASSIUM SERPL-SCNC: 3.6 MMOL/L (ref 3.5–5.1)
RBC # BLD AUTO: 2.69 M/UL (ref 4.23–5.6)
SERVICE CMNT-IMP: ABNORMAL
SODIUM SERPL-SCNC: 141 MMOL/L (ref 136–145)
WBC # BLD AUTO: 4 K/UL (ref 4.3–11.1)

## 2019-06-14 PROCEDURE — 74011250636 HC RX REV CODE- 250/636: Performed by: FAMILY MEDICINE

## 2019-06-14 PROCEDURE — 65270000029 HC RM PRIVATE

## 2019-06-14 PROCEDURE — 85025 COMPLETE CBC W/AUTO DIFF WBC: CPT

## 2019-06-14 PROCEDURE — 99232 SBSQ HOSP IP/OBS MODERATE 35: CPT | Performed by: INTERNAL MEDICINE

## 2019-06-14 PROCEDURE — 74011250637 HC RX REV CODE- 250/637: Performed by: NURSE PRACTITIONER

## 2019-06-14 PROCEDURE — 74011250637 HC RX REV CODE- 250/637: Performed by: FAMILY MEDICINE

## 2019-06-14 PROCEDURE — 74011000258 HC RX REV CODE- 258: Performed by: FAMILY MEDICINE

## 2019-06-14 PROCEDURE — 94640 AIRWAY INHALATION TREATMENT: CPT

## 2019-06-14 PROCEDURE — 80048 BASIC METABOLIC PNL TOTAL CA: CPT

## 2019-06-14 PROCEDURE — 71046 X-RAY EXAM CHEST 2 VIEWS: CPT

## 2019-06-14 PROCEDURE — 77010033678 HC OXYGEN DAILY

## 2019-06-14 PROCEDURE — 94760 N-INVAS EAR/PLS OXIMETRY 1: CPT

## 2019-06-14 PROCEDURE — 74011000250 HC RX REV CODE- 250: Performed by: INTERNAL MEDICINE

## 2019-06-14 PROCEDURE — 36415 COLL VENOUS BLD VENIPUNCTURE: CPT

## 2019-06-14 RX ORDER — FUROSEMIDE 20 MG/1
40 TABLET ORAL
Status: DISCONTINUED | OUTPATIENT
Start: 2019-06-14 | End: 2019-06-16 | Stop reason: HOSPADM

## 2019-06-14 RX ADMIN — FLUTICASONE PROPIONATE 2 SPRAY: 50 SPRAY, METERED NASAL at 08:25

## 2019-06-14 RX ADMIN — MONTELUKAST SODIUM 10 MG: 10 TABLET, FILM COATED ORAL at 08:25

## 2019-06-14 RX ADMIN — ALBUTEROL SULFATE 2.5 MG: 2.5 SOLUTION RESPIRATORY (INHALATION) at 19:55

## 2019-06-14 RX ADMIN — PIPERACILLIN, TAZOBACTAM 4.5 G: 4; .5 INJECTION, POWDER, LYOPHILIZED, FOR SOLUTION INTRAVENOUS at 08:25

## 2019-06-14 RX ADMIN — GUAIFENESIN 1200 MG: 600 TABLET, EXTENDED RELEASE ORAL at 08:25

## 2019-06-14 RX ADMIN — AMIODARONE HYDROCHLORIDE 400 MG: 200 TABLET ORAL at 08:25

## 2019-06-14 RX ADMIN — VANCOMYCIN HYDROCHLORIDE 1000 MG: 1 INJECTION, POWDER, LYOPHILIZED, FOR SOLUTION INTRAVENOUS at 04:29

## 2019-06-14 RX ADMIN — Medication 10 ML: at 22:59

## 2019-06-14 RX ADMIN — FUROSEMIDE 40 MG: 10 INJECTION, SOLUTION INTRAMUSCULAR; INTRAVENOUS at 08:25

## 2019-06-14 RX ADMIN — HEPARIN SODIUM 5000 UNITS: 5000 INJECTION INTRAVENOUS; SUBCUTANEOUS at 04:36

## 2019-06-14 RX ADMIN — TAMSULOSIN HYDROCHLORIDE 0.4 MG: 0.4 CAPSULE ORAL at 08:25

## 2019-06-14 RX ADMIN — PIPERACILLIN, TAZOBACTAM 4.5 G: 4; .5 INJECTION, POWDER, LYOPHILIZED, FOR SOLUTION INTRAVENOUS at 00:20

## 2019-06-14 RX ADMIN — HEPARIN SODIUM 5000 UNITS: 5000 INJECTION INTRAVENOUS; SUBCUTANEOUS at 12:00

## 2019-06-14 RX ADMIN — Medication 10 ML: at 05:48

## 2019-06-14 RX ADMIN — FUROSEMIDE 40 MG: 20 TABLET ORAL at 17:16

## 2019-06-14 RX ADMIN — HEPARIN SODIUM 5000 UNITS: 5000 INJECTION INTRAVENOUS; SUBCUTANEOUS at 20:07

## 2019-06-14 RX ADMIN — ALBUTEROL SULFATE 2.5 MG: 2.5 SOLUTION RESPIRATORY (INHALATION) at 07:55

## 2019-06-14 RX ADMIN — ALBUTEROL SULFATE 2.5 MG: 2.5 SOLUTION RESPIRATORY (INHALATION) at 15:25

## 2019-06-14 NOTE — PROGRESS NOTES
Livia Poe  Admission Date: 6/10/2019             Daily Progress Note: 6/14/2019    The patient's chart is reviewed and the patient is discussed with the staff. 80 y.o. CM evaluated at the request of Dr. Theron Mosher. Has emphysema, was recently admitted for pneumothorax, s/p pleurodesis on 5/16 and presents to the ER from SNF with increased SOB.  Was discharged on 2L O2 and had some labile and low BP readings but not consistently. Was placed on NRB and felt better. He has been on 2 L nasal cannula at the facility but O2 sats dropped into the mid 80s and EMS was called. Was changed from 6L to NRB for sat 88%.  Lower extremity edema is unchanged, has chronic mildly productive cough and hx CHF. Since discharge 5/25, had li replaced for persistent retention.   Son described him with waxing and waning AMS for several days, but more clear on NRB, suspects it was hypoxia. Subjective:     Siting up in bed. Feels breathing is better. Has slight productive cough with dark blood tinged sputum. Has been trying to exercise in bed--states \"my legs just collapse when I stand\". Wife at the bedside.     Current Facility-Administered Medications   Medication Dose Route Frequency    [START ON 6/15/2019] Vancomycin Trough Level Reminder   Other ONCE    vancomycin (VANCOCIN) 1,000 mg in 0.9% sodium chloride (MBP/ADV) 250 mL  1,000 mg IntraVENous Q12H    furosemide (LASIX) injection 40 mg  40 mg IntraVENous Q12H    albuterol (PROVENTIL VENTOLIN) nebulizer solution 2.5 mg  2.5 mg Nebulization QID RT    sodium chloride (NS) flush 5-40 mL  5-40 mL IntraVENous Q8H    sodium chloride (NS) flush 5-40 mL  5-40 mL IntraVENous PRN    amiodarone (CORDARONE) tablet 400 mg  400 mg Oral Q12H    fluticasone propionate (FLONASE) 50 mcg/actuation nasal spray 2 Spray  2 Spray Both Nostrils DAILY    guaiFENesin ER (MUCINEX) tablet 1,200 mg  1,200 mg Oral DAILY    montelukast (SINGULAIR) tablet 10 mg  10 mg Oral DAILY    tamsulosin (FLOMAX) capsule 0.4 mg  0.4 mg Oral DAILY    sodium chloride (NS) flush 5-40 mL  5-40 mL IntraVENous Q8H    sodium chloride (NS) flush 5-40 mL  5-40 mL IntraVENous PRN    acetaminophen (TYLENOL) tablet 650 mg  650 mg Oral Q4H PRN    ondansetron (ZOFRAN ODT) tablet 4 mg  4 mg Oral Q4H PRN    bisacodyl (DULCOLAX) tablet 5 mg  5 mg Oral DAILY PRN    heparin (porcine) injection 5,000 Units  5,000 Units SubCUTAneous Q8H    piperacillin-tazobactam (ZOSYN) 4.5 g in 0.9% sodium chloride (MBP/ADV) 100 mL  4.5 g IntraVENous Q8H       Review of Systems  Constitutional: negative for fever, chills, sweats  Cardiovascular: negative for chest pain, palpitations, syncope, edema  Gastrointestinal:  negative for dysphagia, reflux, vomiting, diarrhea, abdominal pain, or melena  Neurologic:  negative for focal weakness, numbness, headache    Objective:     Vitals:    06/14/19 0008 06/14/19 0432 06/14/19 0751 06/14/19 0755   BP: 98/53 90/50 162/76    Pulse: 73 60 61    Resp: 18 18 20    Temp: 98.8 °F (37.1 °C) 99.1 °F (37.3 °C) 98.4 °F (36.9 °C)    SpO2: 94% 95% 92% 92%   Weight:       Height:         Intake and Output:   06/12 1901 - 06/14 0700  In: 470 [P.O.:120; I.V.:350]  Out: 3101 [Urine:3100]  06/14 0701 - 06/14 1900  In: -   Out: 925 [Urine:925]    Physical Exam:   Constitution:  the patient is thin, elderly and in no acute distress, NC 4.5L sat 92%  EENMT:  Sclera clear, pupils equal, oral mucosa moist  Respiratory: bibasilar crackles, no wheezing  Cardiovascular:  RRR without M,G,R  Gastrointestinal: soft and non-tender; with positive bowel sounds. Musculoskeletal: warm without cyanosis. There is no lower extremity edema. Skin:  no jaundice or rashes, no wounds   Neurologic: no gross neuro deficits     Psychiatric:  alert and oriented x 3    CHEST XRAY:   6/14/19:    1.  Slightly increased diffuse lung opacities, most likely pulmonary edema  (atypical infection or inflammation could have a similar appearance). 2. Trace to small pleural effusions. LAB  No results for input(s): GLUCPOC in the last 72 hours. No lab exists for component: [place]   Recent Labs     06/14/19 0340 06/13/19  0435 06/12/19  0511   WBC 4.0* 4.7 4.7   HGB 8.6* 9.3* 9.0*   HCT 27.3* 30.1* 28.5*   * 139* 164     Recent Labs     06/14/19  0340 06/13/19  0435 06/12/19  0511    141 138   K 3.6 4.0 3.3*   * 108* 103   CO2 26 26 27   GLU 90 84 84   BUN 13 13 17   CREA 0.62* 0.62* 0.62*   CA 6.7* 6.8* 6.5*     No results for input(s): PH, PCO2, PO2, HCO3, PHI, PCO2I, PO2I, HCO3I in the last 72 hours. No results for input(s): LCAD, LAC in the last 72 hours. Assessment:  (Medical Decision Making)     Patient Active Problem List   Diagnosis Code    Abdominal aortic aneurysm without rupture ([facility]) I71.4    Benign prostatic hyperplasia N40.0    Cardiovascular disease I25.10    COPD (chronic obstructive pulmonary disease) ([facility] [address] J44.9    Low testosterone R79.89    Essential hypertension, benign I10    Allergic rhinitis J30.9    Raynaud's syndrome I73.00    Erectile dysfunction N52.9    Chronic respiratory failure with hypoxia ([facility]) J96.11    SOB (shortness of breath) R06.02    Abnormal finding of lung R09.89    Bigeminy I49.9    Hip fracture ([facility]) S72.009A    COPD (chronic obstructive pulmonary disease) with emphysema ([facility]) J43.9    Closed compression fracture of lumbosacral spine ([facility]) S32.000A    Pneumothorax on right J93.9    Shortness of breath R06.02    Acute on chronic respiratory failure with hypoxia ([facility]) J96.21    COPD exacerbation ([facility]) J44.1    Ventricular tachyarrhythmia ([facility]) I47.2    Pulmonary infiltrates R91.8    VT (ventricular tachycardia) ([facility]) I47.2    Subcutaneous emphysema ([facility]) T79. 7XXA    Urinary retention R33.9    Hyponatremia E87.1    Pleural effusion J90    Hypoproteinemia (HCC) E77.8    Hypocalcemia E83.51         Plan:  (Medical Decision Making) Hospital Problems  Date Reviewed: 6/14/2019          Codes Class Noted POA    Essential hypertension, benign ICD-10-CM: I10  ICD-9-CM: 401.1  12/10/2015 Yes    Chronic--labile    Hypoproteinemia ([facility] ICD-10-CM: E77.8  ICD-9-CM: 273.8  6/13/2019 Unknown    Low on admission 4.2    Hypocalcemia ICD-10-CM: E83.51  ICD-9-CM: 275.41  6/13/2019 Unknown    6.7 today    * (Principal) Pleural effusion ICD-10-CM: J90  ICD-9-CM: 511.9  6/10/2019 Yes    Overview Signed 6/14/2019 12:59 PM by Jim Neil NP     6/10/19 Right thoracentesis:  800 cc of yellow fluid              CXR with trace to small effusions    Urinary retention ICD-10-CM: R33.9  ICD-9-CM: 788.20  5/20/2019 Yes    Continue li    Acute on chronic respiratory failure with hypoxia (HCC) ICD-10-CM: J96.21  ICD-9-CM: 518.84, 799.02  5/6/2019 Yes    Wean O2 as tolerated--home flow 2L    Ventricular tachyarrhythmia (HCC) ICD-10-CM: I47.2  ICD-9-CM: 427.1  5/6/2019 Yes        COPD (chronic obstructive pulmonary disease) ([facility] ICD-10-CM: J44.9  ICD-9-CM: 496  12/10/2015 Yes    chronic          --Albuterol, Singulair, Mucinex  --Lasix 40mg IV q12h--urine output 2301 ml last 24 hrs. Change to PO.  --Zosyn, Vancomycin day 5--Repeat PCT 0.1 remains low. Stop abx.  --Urine culture:  staph epi  --Li--recent urinary retention  --Wean oxygen as tolerated--home flow 2L    --Continue PT and OT  --Plans to [facility] at discharge    More than 50% of the time documented was spent in face-to-face contact with the patient and in the care of the patient on the floor/unit where the patient is located. Ginger Husbands, NP   Lungs:  Basilar crackles  Heart:  RRR with no Murmur/Rubs/Gallops    Additional Comments:  Wean O2 -currently on 5L,stop antibx, change lasix to po    I have spoken with and examined the patient. I agree with the above assessment and plan as documented.     Miguel Angel Thao MD

## 2019-06-14 NOTE — PROGRESS NOTES
Hourly rounds performed. All needs meet. Rojas patent and draining angelito, clear. Bed low/locked. Call light within reach. Patient denies needs at this time.   Will continue to monitor and report to oncoming RN

## 2019-06-14 NOTE — PROGRESS NOTES
Hourly rounds completed. Patient alert and oriented. Eating well. Family at bedside much of day. No needs at this time. Will continue to monitor and report to oncoming RN.

## 2019-06-14 NOTE — PROGRESS NOTES
Interdisciplinary Rounds completed 06/14/19. Nursing, Case Management, Physician and PT present. Plan of care reviewed and updated. [facility] when stable for discharge.

## 2019-06-14 NOTE — PROGRESS NOTES
Hospitalist Progress Note    2019  Admit Date: 6/10/2019 11:23 AM   NAME: Vanessa Wheeler   :  1932   MRN:  [id number]   Attending: Haley Harding MD  PCP:  Toni El MD    SUBJECTIVE:   Patient Patient is an 81yo M with hx emphysema chronically on 2lnc at [facility] who was recently admitted for pneumothorax, s/p pleurodesis on  and presents to the ER from SNF with increased shortness of breath. Admitted with large bilateral pleural effusions. Started on IV lasix. Pulmonology consulted. S/p right thoracentesis on 6/10 with 800 cc removed.  - pt very conversational today. Sitting up in bedside chair. Review of Systems negative with exception of pertinent positives noted above  PHYSICAL EXAM     Visit Vitals  /76 (BP 1 Location: Left arm, BP Patient Position: Head of bed elevated (Comment degrees)) Comment (BP Patient Position): 40   Pulse 61   Temp 98.4 °F (36.9 °C)   Resp 20   Ht 5' 8\" (1.727 m)   Wt 65.8 kg (145 lb)   SpO2 94%   BMI 22.05 kg/m²      Temp (24hrs), Av.5 °F (36.9 °C), Min:98 °F (36.7 °C), Max:99.1 °F (37.3 °C)    Oxygen Therapy  O2 Sat (%): 94 % (19 1525)  Pulse via Oximetry: 85 beats per minute (19 1525)  O2 Device: Nasal cannula (19 1525)  O2 Flow Rate (L/min): 4 l/min (19 1525)    Intake/Output Summary (Last 24 hours) at 2019 1614  Last data filed at 2019 1027  Gross per 24 hour   Intake 470 ml   Output 1326 ml   Net -856 ml      General: No acute distress, thin and frail apperaing  Lungs:  CTA Bilaterally.    Heart:  Regular rate and rhythm,  No murmur, rub, or gallop  Abdomen: Soft, Non distended, Non tender, Positive bowel sounds  Extremities: No cyanosis, clubbing or edema  Neurologic:  No focal deficits    ASSESSMENT      Active Hospital Problems    Diagnosis Date Noted    Hypoproteinemia ([address] 2019    Hypocalcemia 2019    Pleural effusion 06/10/2019     6/10/19 Right thoracentesis:  800 cc of yellow fluid  Urinary retention 05/20/2019    Ventricular tachyarrhythmia ([facility] [address] 05/06/2019    Acute on chronic respiratory failure with hypoxia ([facility] [address] 05/06/2019    Essential hypertension, benign 12/10/2015    [facility] 12/10/2015     A/P  Acute on chronic hypoxic respiratory failure - 2/2 Pleural effusions:  recent pleurodesis on 5/16 for pneumothorax  R Thoracentesis 6/10 with 800cc fluid. Fluid and blood cx ngtd. Stop atbx today. pulm has changed IV lasix to oral  Wean oxygen     Urinary retention - il in place, monitor output. Flomax. Was to have uro f/u after last hospitalization.      HTN: hold antihypertensives, bb in mild bradycardia and low BP     Hx Vtach: continue amiodarone - presentation not consistent with toxicity      Hypokalemia: Resolved.  Continue to monitor     DVT Prophylaxis: hep sq    Dispo - likely back to [facility] in 1-2 days    Signed By: Stephanie Price DO     June 14, 2019

## 2019-06-14 NOTE — PROGRESS NOTES
Problem: Gas Exchange - Impaired  Goal: *Absence of hypoxia  Outcome: Progressing Towards Goal     Problem: Patient Education: Go to Patient Education Activity  Goal: Patient/Family Education  Outcome: Progressing Towards Goal     Problem: Falls - Risk of  Goal: *Absence of Falls  Description  Document Demetrius Mccray Fall Risk and appropriate interventions in the flowsheet. Outcome: Progressing Towards Goal     Problem: Patient Education: Go to Patient Education Activity  Goal: Patient/Family Education  Outcome: Progressing Towards Goal     Problem: Pressure Injury - Risk of  Goal: *Prevention of pressure injury  Description  Document Brant Scale and appropriate interventions in the flowsheet.   Outcome: Progressing Towards Goal     Problem: Patient Education: Go to Patient Education Activity  Goal: Patient/Family Education  Outcome: Progressing Towards Goal

## 2019-06-15 LAB
ANION GAP SERPL CALC-SCNC: 4 MMOL/L (ref 7–16)
BACTERIA SPEC CULT: NORMAL
BACTERIA SPEC CULT: NORMAL
BUN SERPL-MCNC: 13 MG/DL (ref 8–23)
CALCIUM SERPL-MCNC: 7.3 MG/DL (ref 8.3–10.4)
CHLORIDE SERPL-SCNC: 107 MMOL/L (ref 98–107)
CO2 SERPL-SCNC: 30 MMOL/L (ref 21–32)
CREAT SERPL-MCNC: 0.66 MG/DL (ref 0.8–1.5)
ERYTHROCYTE [DISTWIDTH] IN BLOOD BY AUTOMATED COUNT: 17.6 % (ref 11.9–14.6)
GLUCOSE SERPL-MCNC: 94 MG/DL (ref 65–100)
HCT VFR BLD AUTO: 28.2 % (ref 41.1–50.3)
HGB BLD-MCNC: 8.7 G/DL (ref 13.6–17.2)
MCH RBC QN AUTO: 31.5 PG (ref 26.1–32.9)
MCHC RBC AUTO-ENTMCNC: 30.9 G/DL (ref 31.4–35)
MCV RBC AUTO: 102.2 FL (ref 79.6–97.8)
NRBC # BLD: 0 K/UL (ref 0–0.2)
PLATELET # BLD AUTO: 126 K/UL (ref 150–450)
PMV BLD AUTO: 10.4 FL (ref 9.4–12.3)
POTASSIUM SERPL-SCNC: 3.7 MMOL/L (ref 3.5–5.1)
RBC # BLD AUTO: 2.76 M/UL (ref 4.23–5.6)
SERVICE CMNT-IMP: NORMAL
SERVICE CMNT-IMP: NORMAL
SODIUM SERPL-SCNC: 141 MMOL/L (ref 136–145)
WBC # BLD AUTO: 4.7 K/UL (ref 4.3–11.1)

## 2019-06-15 PROCEDURE — 77010033678 HC OXYGEN DAILY

## 2019-06-15 PROCEDURE — 65270000029 HC RM PRIVATE

## 2019-06-15 PROCEDURE — 99232 SBSQ HOSP IP/OBS MODERATE 35: CPT | Performed by: INTERNAL MEDICINE

## 2019-06-15 PROCEDURE — 85027 COMPLETE CBC AUTOMATED: CPT

## 2019-06-15 PROCEDURE — 80048 BASIC METABOLIC PNL TOTAL CA: CPT

## 2019-06-15 PROCEDURE — 36415 COLL VENOUS BLD VENIPUNCTURE: CPT

## 2019-06-15 PROCEDURE — 74011250637 HC RX REV CODE- 250/637: Performed by: NURSE PRACTITIONER

## 2019-06-15 PROCEDURE — 94640 AIRWAY INHALATION TREATMENT: CPT

## 2019-06-15 PROCEDURE — 74011000250 HC RX REV CODE- 250: Performed by: INTERNAL MEDICINE

## 2019-06-15 PROCEDURE — 94760 N-INVAS EAR/PLS OXIMETRY 1: CPT

## 2019-06-15 PROCEDURE — 77030019605

## 2019-06-15 PROCEDURE — 74011250636 HC RX REV CODE- 250/636: Performed by: FAMILY MEDICINE

## 2019-06-15 PROCEDURE — 74011250637 HC RX REV CODE- 250/637: Performed by: FAMILY MEDICINE

## 2019-06-15 RX ADMIN — ACETAMINOPHEN 650 MG: 325 TABLET, FILM COATED ORAL at 23:00

## 2019-06-15 RX ADMIN — HEPARIN SODIUM 5000 UNITS: 5000 INJECTION INTRAVENOUS; SUBCUTANEOUS at 12:30

## 2019-06-15 RX ADMIN — Medication 10 ML: at 14:23

## 2019-06-15 RX ADMIN — Medication 10 ML: at 21:22

## 2019-06-15 RX ADMIN — Medication 10 ML: at 21:21

## 2019-06-15 RX ADMIN — Medication 10 ML: at 05:30

## 2019-06-15 RX ADMIN — ALBUTEROL SULFATE 2.5 MG: 2.5 SOLUTION RESPIRATORY (INHALATION) at 07:48

## 2019-06-15 RX ADMIN — ALBUTEROL SULFATE 2.5 MG: 2.5 SOLUTION RESPIRATORY (INHALATION) at 19:54

## 2019-06-15 RX ADMIN — HEPARIN SODIUM 5000 UNITS: 5000 INJECTION INTRAVENOUS; SUBCUTANEOUS at 20:36

## 2019-06-15 RX ADMIN — FUROSEMIDE 40 MG: 20 TABLET ORAL at 17:01

## 2019-06-15 RX ADMIN — FUROSEMIDE 40 MG: 20 TABLET ORAL at 09:14

## 2019-06-15 RX ADMIN — FLUTICASONE PROPIONATE 2 SPRAY: 50 SPRAY, METERED NASAL at 09:10

## 2019-06-15 RX ADMIN — HEPARIN SODIUM 5000 UNITS: 5000 INJECTION INTRAVENOUS; SUBCUTANEOUS at 05:30

## 2019-06-15 RX ADMIN — TAMSULOSIN HYDROCHLORIDE 0.4 MG: 0.4 CAPSULE ORAL at 09:07

## 2019-06-15 RX ADMIN — GUAIFENESIN 1200 MG: 600 TABLET, EXTENDED RELEASE ORAL at 09:07

## 2019-06-15 RX ADMIN — AMIODARONE HYDROCHLORIDE 400 MG: 200 TABLET ORAL at 20:37

## 2019-06-15 RX ADMIN — ALBUTEROL SULFATE 2.5 MG: 2.5 SOLUTION RESPIRATORY (INHALATION) at 11:21

## 2019-06-15 RX ADMIN — MONTELUKAST SODIUM 10 MG: 10 TABLET, FILM COATED ORAL at 09:07

## 2019-06-15 RX ADMIN — ALBUTEROL SULFATE 2.5 MG: 2.5 SOLUTION RESPIRATORY (INHALATION) at 14:57

## 2019-06-15 RX ADMIN — AMIODARONE HYDROCHLORIDE 400 MG: 200 TABLET ORAL at 09:07

## 2019-06-15 NOTE — PROGRESS NOTES
Pt voicing concerns regarding NT liquids, pt stating, Zina Mora said with my swallow test I could have regular liquids. \" Per ST note pt may have thin liquids. Verbal order received from [facility], NP, may d/c NT liquids.

## 2019-06-15 NOTE — PROGRESS NOTES
Patient is calm  Sitting in chair    Used visit to build rapport with patient    Engaged in a lengthy conversation about his life and phong. Wonderful story of how he met his wife and the journey to adopt a child. Tremendous story of patience and waiting upon the Lord  He was blessed with two sons who grew up to be a colonel in the [employer] and a . Patient got very emotional as he reflected upon God's goodness and timing. His wife and children have been very supportive of his care.  visits are beneficial to his healing and health.     Prayer offered    Thomes Boast, staff [address], [address] [facility]  /   [email or web address]

## 2019-06-15 NOTE — PROGRESS NOTES
Problem: Falls - Risk of  Goal: *Absence of Falls  Description  Document Solomon Yen Fall Risk and appropriate interventions in the flowsheet.   Outcome: Progressing Towards Goal

## 2019-06-15 NOTE — PROGRESS NOTES
Hourly rounds performed. All needs meet. Bed low/locked. Call light within reach. Patient denies needs at this time.   Will continue to monitor and report to oncoming RN

## 2019-06-15 NOTE — PROGRESS NOTES
Hospitalist Progress Note    6/15/2019  Admit Date: 6/10/2019 11:23 AM   NAME: Vanessa Wheeler   :  1932   MRN:  [id number]   Attending: Haley Harding MD  PCP:  Toni El MD    SUBJECTIVE:   Patient Patient is an 81yo M with hx emphysema chronically on 2lnc at [facility] who was recently admitted for pneumothorax, s/p pleurodesis on  and presents to the ER from SNF with increased shortness of breath. Admitted with large bilateral pleural effusions. Started on IV lasix. Pulmonology consulted. S/p right thoracentesis on 6/10 with 800 cc removed.  - pt very conversational today. Sitting up in bedside chair. 6/15 - sleeping, awakens easily. No complaints. Review of Systems negative with exception of pertinent positives noted above  PHYSICAL EXAM     Visit Vitals  BP 95/49 (BP 1 Location: Left arm, BP Patient Position: At rest)   Pulse 67   Temp 97.4 °F (36.3 °C)   Resp 18   Ht 5' 8\" (1.727 m)   Wt 66.2 kg (146 lb)   SpO2 92%   BMI 22.20 kg/m²      Temp (24hrs), Av.1 °F (36.7 °C), Min:97.4 °F (36.3 °C), Max:98.8 °F (37.1 °C)    Oxygen Therapy  O2 Sat (%): 92 % (06/15/19 1627)  Pulse via Oximetry: 85 beats per minute (06/15/19 1455)  O2 Device: Hi flow nasal cannula (06/15/19 1455)  O2 Flow Rate (L/min): 5 l/min (06/15/19 1455)    Intake/Output Summary (Last 24 hours) at 6/15/2019 1656  Last data filed at 6/15/2019 0805  Gross per 24 hour   Intake 720 ml   Output 700 ml   Net 20 ml      General: No acute distress, thin and frail apperaing  Lungs:  CTA Bilaterally.    Heart:  Regular rate and rhythm,  No murmur, rub, or gallop  Abdomen: Soft, Non distended, Non tender, Positive bowel sounds  Extremities: No cyanosis, clubbing or edema  Neurologic:  No focal deficits    ASSESSMENT      Active Hospital Problems    Diagnosis Date Noted    Hypoproteinemia ([address] 2019    Hypocalcemia 2019    Pleural effusion 06/10/2019     6/10/19 Right thoracentesis:  800 cc of yellow fluid  Urinary retention 05/20/2019    Ventricular tachyarrhythmia ([facility] [address] 05/06/2019    Acute on chronic respiratory failure with hypoxia ([facility] [address] 05/06/2019    Essential hypertension, benign 12/10/2015    COPD (chronic obstructive pulmonary disease) ([facility] [address] 12/10/2015     A/P  Acute on chronic hypoxic respiratory failure - 2/2 Pleural effusions:  recent pleurodesis on 5/16 for pneumothorax  R Thoracentesis 6/10 with 800cc fluid. Fluid and blood cx ngtd. Stopped atbx 6/14  Changed to oral lasix  Wean oxygen     Urinary retention - li in place, monitor output. Flomax. Was to have uro f/u after last hospitalization.      HTN: hold antihypertensives, bb in mild bradycardia and low BP     Hx Vtach: continue amiodarone - presentation not consistent with toxicity      Hypokalemia: Resolved.  Continue to monitor     DVT Prophylaxis: hep sq    Dispo - likely back to [facility] in AM    Signed By: Júnior Anderson DO     Ariela 15, 2019

## 2019-06-15 NOTE — PROGRESS NOTES
Yanna Wynn  Admission Date: 6/10/2019             Daily Progress Note: 6/15/2019    The patient's chart is reviewed and the patient is discussed with the staff. 80 y.o. CM evaluated at the request of Dr. Kevyn Oglesby. Has emphysema, was recently admitted for pneumothorax, s/p pleurodesis on 5/16 and presents to the ER from SNF with increased SOB.  Was discharged on 2L O2 and had some labile and low BP readings but not consistently. Was placed on NRB and felt better. He has been on 2 L nasal cannula at the facility but O2 sats dropped into the mid 80s and EMS was called. Was changed from 6L to NRB for sat 88%.  Lower extremity edema is unchanged, has chronic mildly productive cough and hx CHF. Since discharge 5/25, had li replaced for persistent retention.   Son described him with waxing and waning AMS for several days, but more clear on NRB, suspects it was hypoxia. Subjective:     Siting up in chair, states he is able to get better air exchange with breathing today. Oxygen 5 L nc with oxygen saturations 92-95%. Was using 2 l  NC prior to admission. Lasix changed to po yesterday with 1625 total output previous days output was 2300. Antibiotic stopped yesterday remains afebrile.  Tolerating po intake      Current Facility-Administered Medications   Medication Dose Route Frequency    furosemide (LASIX) tablet 40 mg  40 mg Oral ACB&D    albuterol (PROVENTIL VENTOLIN) nebulizer solution 2.5 mg  2.5 mg Nebulization QID RT    sodium chloride (NS) flush 5-40 mL  5-40 mL IntraVENous Q8H    sodium chloride (NS) flush 5-40 mL  5-40 mL IntraVENous PRN    amiodarone (CORDARONE) tablet 400 mg  400 mg Oral Q12H    fluticasone propionate (FLONASE) 50 mcg/actuation nasal spray 2 Spray  2 Spray Both Nostrils DAILY    guaiFENesin ER (MUCINEX) tablet 1,200 mg  1,200 mg Oral DAILY    montelukast (SINGULAIR) tablet 10 mg  10 mg Oral DAILY    tamsulosin (FLOMAX) capsule 0.4 mg  0.4 mg Oral DAILY    sodium chloride (NS) flush 5-40 mL  5-40 mL IntraVENous Q8H    sodium chloride (NS) flush 5-40 mL  5-40 mL IntraVENous PRN    acetaminophen (TYLENOL) tablet 650 mg  650 mg Oral Q4H PRN    ondansetron (ZOFRAN ODT) tablet 4 mg  4 mg Oral Q4H PRN    bisacodyl (DULCOLAX) tablet 5 mg  5 mg Oral DAILY PRN    heparin (porcine) injection 5,000 Units  5,000 Units SubCUTAneous Q8H       Review of Systems  Constitutional: negative for fever, chills, sweats  Cardiovascular: negative for chest pain, palpitations, syncope, edema  Gastrointestinal:  negative for dysphagia, reflux, vomiting, diarrhea, abdominal pain, or melena  Neurologic:  negative for focal weakness, numbness, headache    Objective:     Vitals:    06/15/19 0608 06/15/19 0649 06/15/19 0748 06/15/19 0805   BP: 95/59   146/69   Pulse: (!) 52   66   Resp: 16   20   Temp: 98.8 °F (37.1 °C)   98.1 °F (36.7 °C)   SpO2: 94%  95% 92%   Weight:  146 lb (66.2 kg)     Height:         Intake and Output:   06/13 1901 - 06/15 0700  In: 9121 [P.O.:1200; I.V.:350]  Out: 2026 [Urine:2025]  06/15 0701 - 06/15 1900  In: 240 [P.O.:240]  Out: -     Physical Exam:   Constitution:  the patient is thin, elderly and in no acute distress, NC 5 L  EENMT:  Sclera clear, pupils equal, oral mucosa moist  Respiratory:  no wheezing  Cardiovascular:  RRR without M,G,R  Gastrointestinal: soft and non-tender; with positive bowel sounds. Musculoskeletal: warm without cyanosis. There is no lower extremity edema. Skin:  no jaundice or rashes, no wounds   Neurologic: no gross neuro deficits     Psychiatric:  alert and oriented x 3    CHEST XRAY:   6/14/19:    1. Slightly increased diffuse lung opacities, most likely pulmonary edema  (atypical infection or inflammation could have a similar appearance). 2. Trace to small pleural effusions. LAB  No results for input(s): GLUCPOC in the last 72 hours.     No lab exists for component: [place]   Recent Labs     06/15/19  6861 06/14/19  0340 06/13/19  0435   WBC 4.7 4.0* 4.7   HGB 8.7* 8.6* 9.3*   HCT 28.2* 27.3* 30.1*   * 130* 139*     Recent Labs     06/15/19  0405 06/14/19  0340 06/13/19  0435    141 141   K 3.7 3.6 4.0    109* 108*   CO2 30 26 26   GLU 94 90 84   BUN 13 13 13   CREA 0.66* 0.62* 0.62*   CA 7.3* 6.7* 6.8*     No results for input(s): PH, PCO2, PO2, HCO3, PHI, PCO2I, PO2I, HCO3I in the last 72 hours. No results for input(s): LCAD, LAC in the last 72 hours. Assessment:  (Medical Decision Making)     Patient Active Problem List   Diagnosis Code    Abdominal aortic aneurysm without rupture ([facility]) I71.4    Benign prostatic hyperplasia N40.0    Cardiovascular disease I25.10    COPD (chronic obstructive pulmonary disease) ([facility] [address] J44.9    Low testosterone R79.89    Essential hypertension, benign I10    Allergic rhinitis J30.9    Raynaud's syndrome I73.00    Erectile dysfunction N52.9    Chronic respiratory failure with hypoxia ([facility]) J96.11    SOB (shortness of breath) R06.02    Abnormal finding of lung R09.89    Bigeminy I49.9    Hip fracture ([facility]) S72.009A    COPD (chronic obstructive pulmonary disease) with emphysema ([facility]) J43.9    Closed compression fracture of lumbosacral spine ([facility]) S32.000A    Pneumothorax on right J93.9    Shortness of breath R06.02    Acute on chronic respiratory failure with hypoxia ([facility]) J96.21    COPD exacerbation ([facility]) J44.1    Ventricular tachyarrhythmia ([facility]) I47.2    Pulmonary infiltrates R91.8    VT (ventricular tachycardia) ([facility]) I47.2    Subcutaneous emphysema ([facility]) T79. 7XXA    Urinary retention R33.9    Hyponatremia E87.1    Pleural effusion J90    Hypoproteinemia ([facility]) E77.8    Hypocalcemia E83.51         Plan:  (Medical Decision Making)     Hospital Problems  Date Reviewed: 6/14/2019          Codes Class Noted POA    Essential hypertension, benign ICD-10-CM: I10  ICD-9-CM: 401.1  12/10/2015 Yes    Chronic--labile    Hypoproteinemia ([facility] [address] ICD-10-CM: E77.8  ICD-9-CM: 273.8  6/13/2019 Unknown    Low on admission 4.2    Hypocalcemia ICD-10-CM: E83.51  ICD-9-CM: 275.41  6/13/2019 Unknown    6.7 today    * (Principal) Pleural effusion ICD-10-CM: J90  ICD-9-CM: 511.9  6/10/2019 Yes    Overview Signed 6/14/2019 12:59 PM by Bandar Martell NP     6/10/19 Right thoracentesis:  800 cc of yellow fluid              CXR with trace to small effusions    Urinary retention ICD-10-CM: R33.9  ICD-9-CM: 788.20  5/20/2019 Yes    Continue li    Acute on chronic respiratory failure with hypoxia (HCC) ICD-10-CM: J96.21  ICD-9-CM: 518.84, 799.02  5/6/2019 Yes    Wean O2 as tolerated--home flow 2L    Ventricular tachyarrhythmia (HCC) ICD-10-CM: I47.2  ICD-9-CM: 427.1  5/6/2019 Yes        COPD (chronic obstructive pulmonary disease) ([facility] [address] ICD-10-CM: J44.9  ICD-9-CM: 496  12/10/2015 Yes    chronic          --Albuterol, Singulair, Mucinex  --Lasix 40mg po q12h  -- Wean oxygen as tolerated--home flow 2L    --Continue PT and OT  --Plans to return to [facility] at discharge      Clive Sanchez NP       Lungs:  clear  Heart:  RRR with no Murmur/Rubs/Gallops    Additional Comments:  Wean 02, going to [facility],continue lasix    I have spoken with and examined the patient. I agree with the above assessment and plan as documented.     Urbano Denton MD

## 2019-06-16 VITALS
HEART RATE: 79 BPM | TEMPERATURE: 97.5 F | DIASTOLIC BLOOD PRESSURE: 53 MMHG | BODY MASS INDEX: 21.92 KG/M2 | OXYGEN SATURATION: 94 % | SYSTOLIC BLOOD PRESSURE: 92 MMHG | WEIGHT: 144.6 LBS | RESPIRATION RATE: 20 BRPM | HEIGHT: 68 IN

## 2019-06-16 LAB
ANION GAP SERPL CALC-SCNC: 4 MMOL/L (ref 7–16)
BUN SERPL-MCNC: 16 MG/DL (ref 8–23)
CALCIUM SERPL-MCNC: 7.6 MG/DL (ref 8.3–10.4)
CHLORIDE SERPL-SCNC: 101 MMOL/L (ref 98–107)
CO2 SERPL-SCNC: 32 MMOL/L (ref 21–32)
CREAT SERPL-MCNC: 0.72 MG/DL (ref 0.8–1.5)
ERYTHROCYTE [DISTWIDTH] IN BLOOD BY AUTOMATED COUNT: 17.6 % (ref 11.9–14.6)
GLUCOSE SERPL-MCNC: 86 MG/DL (ref 65–100)
HCT VFR BLD AUTO: 29.1 % (ref 41.1–50.3)
HGB BLD-MCNC: 9.1 G/DL (ref 13.6–17.2)
MCH RBC QN AUTO: 31.8 PG (ref 26.1–32.9)
MCHC RBC AUTO-ENTMCNC: 31.3 G/DL (ref 31.4–35)
MCV RBC AUTO: 101.7 FL (ref 79.6–97.8)
NRBC # BLD: 0 K/UL (ref 0–0.2)
PLATELET # BLD AUTO: 134 K/UL (ref 150–450)
PMV BLD AUTO: 10.5 FL (ref 9.4–12.3)
POTASSIUM SERPL-SCNC: 3.4 MMOL/L (ref 3.5–5.1)
RBC # BLD AUTO: 2.86 M/UL (ref 4.23–5.6)
SODIUM SERPL-SCNC: 137 MMOL/L (ref 136–145)
WBC # BLD AUTO: 4.8 K/UL (ref 4.3–11.1)

## 2019-06-16 PROCEDURE — 74011000250 HC RX REV CODE- 250: Performed by: INTERNAL MEDICINE

## 2019-06-16 PROCEDURE — 36415 COLL VENOUS BLD VENIPUNCTURE: CPT

## 2019-06-16 PROCEDURE — 77010033678 HC OXYGEN DAILY

## 2019-06-16 PROCEDURE — 85027 COMPLETE CBC AUTOMATED: CPT

## 2019-06-16 PROCEDURE — 94760 N-INVAS EAR/PLS OXIMETRY 1: CPT

## 2019-06-16 PROCEDURE — 74011250636 HC RX REV CODE- 250/636: Performed by: FAMILY MEDICINE

## 2019-06-16 PROCEDURE — 80048 BASIC METABOLIC PNL TOTAL CA: CPT

## 2019-06-16 PROCEDURE — 74011250637 HC RX REV CODE- 250/637: Performed by: NURSE PRACTITIONER

## 2019-06-16 PROCEDURE — 94640 AIRWAY INHALATION TREATMENT: CPT

## 2019-06-16 PROCEDURE — 99232 SBSQ HOSP IP/OBS MODERATE 35: CPT | Performed by: INTERNAL MEDICINE

## 2019-06-16 PROCEDURE — 74011250637 HC RX REV CODE- 250/637: Performed by: FAMILY MEDICINE

## 2019-06-16 PROCEDURE — 74011250637 HC RX REV CODE- 250/637: Performed by: INTERNAL MEDICINE

## 2019-06-16 RX ORDER — FUROSEMIDE 40 MG/1
40 TABLET ORAL 2 TIMES DAILY
Qty: 60 TAB | Refills: 0 | Status: ON HOLD | OUTPATIENT
Start: 2019-06-16 | End: 2019-09-09 | Stop reason: SDUPTHER

## 2019-06-16 RX ORDER — POTASSIUM CHLORIDE 20 MEQ/1
40 TABLET, EXTENDED RELEASE ORAL
Status: COMPLETED | OUTPATIENT
Start: 2019-06-16 | End: 2019-06-16

## 2019-06-16 RX ADMIN — FUROSEMIDE 40 MG: 20 TABLET ORAL at 08:09

## 2019-06-16 RX ADMIN — AMIODARONE HYDROCHLORIDE 400 MG: 200 TABLET ORAL at 08:09

## 2019-06-16 RX ADMIN — GUAIFENESIN 1200 MG: 600 TABLET, EXTENDED RELEASE ORAL at 08:09

## 2019-06-16 RX ADMIN — Medication 10 ML: at 05:30

## 2019-06-16 RX ADMIN — MONTELUKAST SODIUM 10 MG: 10 TABLET, FILM COATED ORAL at 08:09

## 2019-06-16 RX ADMIN — ALBUTEROL SULFATE 2.5 MG: 2.5 SOLUTION RESPIRATORY (INHALATION) at 11:30

## 2019-06-16 RX ADMIN — FLUTICASONE PROPIONATE 2 SPRAY: 50 SPRAY, METERED NASAL at 08:12

## 2019-06-16 RX ADMIN — POTASSIUM CHLORIDE 40 MEQ: 20 TABLET, EXTENDED RELEASE ORAL at 11:27

## 2019-06-16 RX ADMIN — ALBUTEROL SULFATE 2.5 MG: 2.5 SOLUTION RESPIRATORY (INHALATION) at 07:41

## 2019-06-16 RX ADMIN — HEPARIN SODIUM 5000 UNITS: 5000 INJECTION INTRAVENOUS; SUBCUTANEOUS at 12:44

## 2019-06-16 RX ADMIN — TAMSULOSIN HYDROCHLORIDE 0.4 MG: 0.4 CAPSULE ORAL at 08:09

## 2019-06-16 RX ADMIN — HEPARIN SODIUM 5000 UNITS: 5000 INJECTION INTRAVENOUS; SUBCUTANEOUS at 03:56

## 2019-06-16 NOTE — PROGRESS NOTES
Dr. Daron Galdamez at [facility], informed am potassium 3.4, verbal order received potassium 40meq po now.

## 2019-06-16 NOTE — DISCHARGE SUMMARY
Hospitalist Discharge Summary     Patient ID:  Mariam Rodriguez  [id number]  89 y.o.  4/18/1932  Admit date: 6/10/2019 11:23 AM  Discharge date and time: 6/16/2019  Attending: Tereso Carrington MD  PCP:  Che Soto MD  Treatment Team: Attending Provider: Tereso Carrington MD; Consulting Provider: Keke Brar MD; Care Manager: Denisha Chisholm RN; Charge Nurse: Amos Owen; Utilization Review: Brandon Ford; Primary Nurse: Tania Ndiaye RN; Charge Nurse: Oliver GREENE    Principal Diagnosis Pleural effusion   Principal Problem:    Pleural effusion (6/10/2019)      Overview: 6/10/19 Right thoracentesis:  800 cc of yellow fluid                 Active Problems:    COPD (chronic obstructive pulmonary disease) ([address] (12/10/2015)      Essential hypertension, benign (12/10/2015)      Acute on chronic respiratory failure with hypoxia ([address] (5/6/2019)      Ventricular tachyarrhythmia ([address] (5/6/2019)      Urinary retention (5/20/2019)      Hypoproteinemia ([address] (6/13/2019)      Hypocalcemia (6/13/2019)             Hospital Course:  Please refer to the admission H&P for details of presentation. In summary, the patient is 79yo M with hx emphysema chronically on 2lnc at SNF who was recently admitted for pneumothorax, s/p pleurodesis on 5/16 and presents to the ER from SNF with increased shortness of breath. Admitted with large bilateral pleural effusions. Started on IV lasix. Pulmonology consulted. S/p right thoracentesis on 6/10 with 800 cc removed. Pleural fluid cultures negative. Repeat imaging improved. He is still requiring 5L NC - continue to wean 02 at [facility] as able. He is conversational w/o dyspnea and participating in PT - medically ready for discharge back to SNF. Of note pt with urinary retention requiring li catheter. Will need outpatient follow-up with urology. Urine cultures grew staph epi suspected as colonized from chronic li catheter.  He remains afebrile and asymptomatic w/o treatment. Significant Diagnostic Studies:   Final [99] 6/14/2019 11:01 6/14/2019 11:02   Study Result     Chest 2 view     CLINICAL INDICATION: Acute dyspnea, CHF, pleural effusion follow-up, hypoxia     COMPARISON: 6/11/2019 radiograph and 6/10/2019 CT     TECHNIQUE: Sitting upright AP and lateral views of the chest      FINDINGS: Lungs are hyperinflated. Mediastinal and hilar contours are stable. Again demonstrated are diffuse bilateral reticular opacities with small  peripheral areas of groundglass and patchy opacity. Overall these have slightly  worsened since the prior radiograph. Underlying chronic lung disease is again  evident. There are trace to small posterior layering pleural effusions in both  bases which are less prominent than on the prior CT. No evidence of a  pneumothorax. No evidence of a lobar consolidation.     Bone density appears somewhat low throughout. There are upper lumbar vertebral  compression deformities which are of unclear age but most likely chronic, not  entirely evaluated on this exam but not obviously changed from 5/8/2019 CT.        IMPRESSION  IMPRESSION:   1. Slightly increased diffuse lung opacities, most likely pulmonary edema  (atypical infection or inflammation could have a similar appearance). 2. Trace to small pleural effusions.        Signed Date/Time  Phone Pager   David Denton 6/12/2019 12:56 [phone]    Exam Information     Status Exam Begun  Exam Ended    Final [99] 6/12/2019 11:36 6/12/2019 11:57   Study Result     Modified Barium Swallow     INDICATION: Difficult to swallowing, hiatal hernia     Fluoro time: 1.3 minutes  Spot films:  0     Fluoroscopy was used to evaluate pharyngeal function while the patient was given  barium solutions and barium coated solids.     FINDINGS: Pharyngeal function appears normal.  There is no evidence of  aspiration.   There is no significant residual.     IMPRESSION  IMPRESSION: Unremarkable modified barium swallow Final [99] 6/11/2019 05:20 6/11/2019 05:23   Study Result     EXAM: Chest x-ray.     INDICATION: Dyspnea.     COMPARISON: Yesterday's chest x-ray.     TECHNIQUE: Frontal view chest x-ray.     FINDINGS: Bilateral lung edema or infiltrates have mildly improved, particularly  in the right lung base. Cardiac size is stable. The right pleural effusion has  decreased, with a tiny residual. No pneumothorax is identified.     IMPRESSION  IMPRESSION: Improving bilateral lung edema or infiltrates and tiny right pleural  effusion. Final [99] 6/10/2019 14:03 6/10/2019 14:04   Study Result     CT chest dated 6/10/2019     CLINICAL INFORMATION: Shortness of breath     Spiral images of the chest were obtained during intravenous injection of 100 cc  Isovue 370 intravenous contrast.     There is a moderately large right pleural effusion. This may be loculated in the  upper chest. There is a small left pleural effusion. Emphysematous changes are  present in the lungs with several bullae in the upper lobes bilaterally. Small  areas of infiltrate are noted both upper lobes are atelectatic change in the  lower lobes. Heart is enlarged. No mediastinal or hilar adenopathy.     IMPRESSION  IMPRESSION: Bilateral pleural effusions as described     Final [99] 6/10/2019 12:04 6/10/2019 12:08   Study Result     Portable AP upright chest dated 6/10/2019, 1204 hours     Prior chest x-ray 5/25/2019     CLINICAL INFORMATION: Shortness of breath today     Heart is upper limits normal in size and mediastinum unremarkable. Interstitial  markings are coarsened in the lungs. This is most prominent in the lower lung  zones. This is an interval change. Vascular congestion or infiltrate might be  considered.  There is a small right pleural effusion.     IMPRESSION  IMPRESSION: Coarsening of interstitial markings in the lungs most prominent in  the lower lung zones and small right pleural effusion         Labs: Results:       Chemistry Recent Labs 06/16/19  0705 06/15/19  0405 06/14/19  0340   GLU 86 94 90    141 141   K 3.4* 3.7 3.6    107 109*   CO2 32 30 26   BUN 16 13 13   CREA 0.72* 0.66* 0.62*   CA 7.6* 7.3* 6.7*   AGAP 4* 4* 6*      CBC w/Diff Recent Labs     06/16/19  0705 06/15/19  0405 06/14/19  0340   WBC 4.8 4.7 4.0*   RBC 2.86* 2.76* 2.69*   HGB 9.1* 8.7* 8.6*   HCT 29.1* 28.2* 27.3*   * 126* 130*   GRANS  --   --  44   LYMPH  --   --  36   EOS  --   --  3      Cardiac Enzymes No results for input(s): CPK, CKND1, PARTHA in the last 72 hours. No lab exists for component: CKRMB, TROIP   Coagulation No results for input(s): PTP, INR, APTT in the last 72 hours. No lab exists for component: INREXT    Lipid Panel No results found for: CHOL, CHOLPOCT, CHOLX, CHLST, CHOLV, 466336, HDL, LDL, LDLC, DLDLP, 515877, VLDLC, VLDL, TGLX, TRIGL, TRIGP, TGLPOCT, CHHD, CHHDX   BNP No results for input(s): BNPP in the last 72 hours. Liver Enzymes No results for input(s): TP, ALB, TBIL, AP, SGOT, GPT in the last 72 hours. No lab exists for component: DBIL   Thyroid Studies Lab Results   Component Value Date/Time    TSH 0.780 05/08/2019 04:10 AM            Discharge Exam:  Visit Vitals  /66 (BP 1 Location: Right arm, BP Patient Position: At rest)   Pulse 70   Temp 97.3 °F (36.3 °C)   Resp 20   Ht 5' 8\" (1.727 m)   Wt 65.6 kg (144 lb 9.6 oz)   SpO2 91%   BMI 21.99 kg/m²     General appearance: alert, cooperative, no distress, appears stated age  Lungs: clear w/o crackles or wheezing  Heart: regular rate and rhythm, S1, S2 normal, no murmur, click, rub or gallop  Abdomen: soft, non-tender. Bowel sounds normal. No masses,  no organomegaly  Extremities: no cyanosis or edema  Neurologic: Grossly normal    Disposition: [facility]  Discharge Condition: stable  Patient Instructions:   Current Discharge Medication List      START taking these medications    Details   furosemide (LASIX) 40 mg tablet Take 1 Tab by mouth two (2) times a day.   Qty: 60 Tab, Refills: 0         CONTINUE these medications which have NOT CHANGED    Details   amiodarone (CORDARONE) 200 mg tablet Take 2 Tabs by mouth every twelve (12) hours. Qty: 180 Tab, Refills: 0      albuterol-ipratropium (DUO-NEB) 2.5 mg-0.5 mg/3 ml nebu 3 mL by Nebulization route every four (4) hours as needed for Other (dyspnea). Qty: 30 Nebule, Refills: 3      potassium chloride (K-DUR, KLOR-CON) 20 mEq tablet Take 2 Tabs by mouth daily. Qty: 3 Tab, Refills: 0      tamsulosin (FLOMAX) 0.4 mg capsule Take 1 Cap by mouth daily. Qty: 90 Cap, Refills: 4      montelukast (SINGULAIR) 10 mg tablet Take 1 Tab by mouth daily. Qty: 90 Tab, Refills: 4    Associated Diagnoses: Mixed simple and mucopurulent chronic bronchitis (HCC)      umeclidinium-vilanterol (ANORO ELLIPTA) 62.5-25 mcg/actuation inhaler Take 1 Puff by inhalation daily. Oxygen Use as instructed. Use as instructed; faxed to [facility]      cyanocobalamin (VITAMIN B12) 1,000 mcg/mL injection INJECT 1ML INTRAMUSCULARLY ONCE FOR 1 DOSE  Refills: 12      fluticasone (FLONASE) 50 mcg/actuation nasal spray 2 Sprays by Both Nostrils route daily. Qty: 48 g, Refills: 4    Associated Diagnoses: Allergic rhinitis due to pollen, unspecified seasonality      calcium citrate-vitamin d3 (CITRACAL+D) 315-200 mg-unit tab Take 2 Tabs by mouth daily (with breakfast). cholecalciferol (VITAMIN D3) 1,000 unit cap Take  by mouth.      guaiFENesin ER (MUCINEX) 600 mg ER tablet Take 1,200 mg by mouth daily. Biotin 2,500 mcg cap Take  by mouth. aspirin delayed-release 81 mg tablet Take  by mouth daily.          STOP taking these medications       lisinopril (PRINIVIL, ZESTRIL) 10 mg tablet Comments:   Reason for Stopping:         carvedilol (COREG) 3.125 mg tablet Comments:   Reason for Stopping:         ciprofloxacin HCl (CIPRO) 500 mg tablet Comments:   Reason for Stopping:               Activity: as tolerated  Diet:cardiac      Follow-up  ·   PCP in 1 week, Pulm in 1-2 weeks.  Urology in 1-2 weeks  Time spent to discharge patient 35 minutes  Signed:  Alejandra Hanna DO  6/16/2019  11:28 AM

## 2019-06-16 NOTE — PROGRESS NOTES
Pt to return to [facility] this day. SNF packet complete. Transport arranged via [facility] for 2:00pm . Room # 218A & report # [id number]. This CM let pt know and left VM for pt's spouse, Pavithra Slaughter. No additional discharge needs at this time. Milestones met.      Care Management Interventions  PCP Verified by CM: Yes(listed as Dr. Susi Holstein)  Mode of Transport at Discharge: BLS  Transition of Care Consult (CM Consult): Discharge Planning, SNF  Discharge Durable Medical Equipment: No  Physical Therapy Consult: Yes  Occupational Therapy Consult: Yes  Current Support Network: Own Home  Confirm Follow Up Transport: Family  Plan discussed with Pt/Family/Caregiver: Yes  Freedom of Choice Offered: Yes  Discharge Location  Discharge Placement: Skilled nursing facility

## 2019-06-16 NOTE — PROGRESS NOTES
Hourly rounds performed through shift, pt denies needs at this time. Bed in low position and call light/ personal items within reach. Will continue to monitor and report to day shift nurse.

## 2019-06-16 NOTE — PROGRESS NOTES
Madhaviranjeet Her  Admission Date: 6/10/2019             Daily Progress Note: 6/16/2019    The patient's chart is reviewed and the patient is discussed with the staff. 80 y.o. CM evaluated at the request of Dr. Blu Marquez. Has emphysema, was recently admitted for pneumothorax, s/p pleurodesis on 5/16 and presents to the ER from SNF with increased SOB.  Was discharged on 2L O2 and had some labile and low BP readings but not consistently. Was placed on NRB and felt better. He has been on 2 L nasal cannula at the facility but O2 sats dropped into the mid 80s and EMS was called. Was changed from 6L to NRB for sat 88%.  Lower extremity edema is unchanged, has chronic mildly productive cough and hx CHF. Since discharge 5/25, had li replaced for persistent retention.   Son described him with waxing and waning AMS for several days, but more clear on NRB, suspects it was hypoxia. Subjective:      On 5L N C- on 2L at home  Feels breathing is fine        Current Facility-Administered Medications   Medication Dose Route Frequency    potassium chloride (K-DUR, KLOR-CON) SR tablet 40 mEq  40 mEq Oral NOW    furosemide (LASIX) tablet 40 mg  40 mg Oral ACB&D    albuterol (PROVENTIL VENTOLIN) nebulizer solution 2.5 mg  2.5 mg Nebulization QID RT    sodium chloride (NS) flush 5-40 mL  5-40 mL IntraVENous Q8H    sodium chloride (NS) flush 5-40 mL  5-40 mL IntraVENous PRN    amiodarone (CORDARONE) tablet 400 mg  400 mg Oral Q12H    fluticasone propionate (FLONASE) 50 mcg/actuation nasal spray 2 Spray  2 Spray Both Nostrils DAILY    guaiFENesin ER (MUCINEX) tablet 1,200 mg  1,200 mg Oral DAILY    montelukast (SINGULAIR) tablet 10 mg  10 mg Oral DAILY    tamsulosin (FLOMAX) capsule 0.4 mg  0.4 mg Oral DAILY    sodium chloride (NS) flush 5-40 mL  5-40 mL IntraVENous Q8H    sodium chloride (NS) flush 5-40 mL  5-40 mL IntraVENous PRN    acetaminophen (TYLENOL) tablet 650 mg  650 mg Oral Q4H PRN    ondansetron (ZOFRAN ODT) tablet 4 mg  4 mg Oral Q4H PRN    bisacodyl (DULCOLAX) tablet 5 mg  5 mg Oral DAILY PRN    heparin (porcine) injection 5,000 Units  5,000 Units SubCUTAneous Q8H       Review of Systems  Constitutional: negative for fever, chills, sweats  Cardiovascular: negative for chest pain, palpitations, syncope, edema  Gastrointestinal:  negative for dysphagia, reflux, vomiting, diarrhea, abdominal pain, or melena  Neurologic:  negative for focal weakness, numbness, headache    Objective:     Vitals:    06/16/19 0550 06/16/19 0703 06/16/19 0741 06/16/19 0904   BP: 152/73   126/66   Pulse: 60   70   Resp: 19   20   Temp: 97.9 °F (36.6 °C)   97.3 °F (36.3 °C)   SpO2: 94%  94% 91%   Weight:  144 lb 9.6 oz (65.6 kg)     Height:         Intake and Output:   06/14 1901 - 06/16 0700  In: 1920 [P.O.:1920]  Out: 4206 [Urine:2500]  06/16 0701 - 06/16 1900  In: 240 [P.O.:240]  Out: -     Physical Exam:   Constitution:  the patient is thin, elderly and in no acute distress, NC 5 L  EENMT:  Sclera clear, pupils equal, oral mucosa moist  Respiratory:  no wheezing  Cardiovascular:  RRR without M,G,R  Gastrointestinal: soft and non-tender; with positive bowel sounds. Musculoskeletal: warm without cyanosis. There is no lower extremity edema. Skin:  no jaundice or rashes, no wounds   Neurologic: no gross neuro deficits     Psychiatric:  alert and oriented x 3    CHEST XRAY:   6/14/19:    1. Slightly increased diffuse lung opacities, most likely pulmonary edema  (atypical infection or inflammation could have a similar appearance). 2. Trace to small pleural effusions. LAB  No results for input(s): GLUCPOC in the last 72 hours.     No lab exists for component: [address]     06/16/19  0705 06/15/19  0405 06/14/19  0340   WBC 4.8 4.7 4.0*   HGB 9.1* 8.7* 8.6*   HCT 29.1* 28.2* 27.3*   * 126* 130*     Recent Labs     06/16/19  0705 06/15/19  0405 06/14/19  0340    141 141   K 3.4* 3.7 3.6   CL 101 107 109*   CO2 32 30 26   GLU 86 94 90   BUN 16 13 13   CREA 0.72* 0.66* 0.62*   CA 7.6* 7.3* 6.7*     No results for input(s): PH, PCO2, PO2, HCO3, PHI, PCO2I, PO2I, HCO3I in the last 72 hours. No results for input(s): LCAD, LAC in the last 72 hours. Assessment:  (Medical Decision Making)     Patient Active Problem List   Diagnosis Code    Abdominal aortic aneurysm without rupture ([facility]) I71.4    Benign prostatic hyperplasia N40.0    Cardiovascular disease I25.10    COPD (chronic obstructive pulmonary disease) ([facility] J44.9    Low testosterone R79.89    Essential hypertension, benign I10    Allergic rhinitis J30.9    Raynaud's syndrome I73.00    Erectile dysfunction N52.9    Chronic respiratory failure with hypoxia ([facility]) J96.11    SOB (shortness of breath) R06.02    Abnormal finding of lung R09.89    Bigeminy I49.9    Hip fracture ([facility]) S72.009A    COPD (chronic obstructive pulmonary disease) with emphysema ([facility]) J43.9    Closed compression fracture of lumbosacral spine ([facility]) S32.000A    Pneumothorax on right J93.9    Shortness of breath R06.02    Acute on chronic respiratory failure with hypoxia ([facility]) J96.21    COPD exacerbation ([facility]) J44.1    Ventricular tachyarrhythmia ([facility]) I47.2    Pulmonary infiltrates R91.8    VT (ventricular tachycardia) ([facility]) I47.2    Subcutaneous emphysema ([facility]) T79. 7XXA    Urinary retention R33.9    Hyponatremia E87.1    Pleural effusion J90    Hypoproteinemia ([facility]) E77.8    Hypocalcemia E83.51         Plan:  (Medical Decision Making)     Hospital Problems  Date Reviewed: 6/14/2019          Codes Class Noted POA    Essential hypertension, benign ICD-10-CM: I10  ICD-9-CM: 401.1  12/10/2015 Yes    Chronic--labile    [facility] ICD-10-CM: E77.8  ICD-9-CM: 273.8  6/13/2019 Unknown    Low on admission 4.2    Hypocalcemia ICD-10-CM: E83.51  ICD-9-CM: 275.41  6/13/2019 Unknown    6.7 today    * (Principal) Pleural effusion ICD-10-CM: J90  ICD-9-CM: 511.9  6/10/2019 Yes    Overview Signed 6/14/2019 12:59 PM by Bimal Pena NP     6/10/19 Right thoracentesis:  800 cc of yellow fluid              CXR with trace to small effusions    Urinary retention ICD-10-CM: R33.9  ICD-9-CM: 788.20  5/20/2019 Yes    Continue li    Acute on chronic respiratory failure with hypoxia (HCC) ICD-10-CM: J96.21  ICD-9-CM: 518.84, 799.02  5/6/2019 Yes    Wean O2 as tolerated--home flow 2L    Ventricular tachyarrhythmia (HCC) ICD-10-CM: I47.2  ICD-9-CM: 427.1  5/6/2019 Yes        COPD (chronic obstructive pulmonary disease) ([facility] ICD-10-CM: J44.9  ICD-9-CM: 497  12/10/2015 Yes    chronic          --Ok to to go [facility] today   -continue to wean 02  \      Nesha Calvin MD

## 2019-06-16 NOTE — PROGRESS NOTES
Problem: Gas Exchange - Impaired  Goal: *Absence of hypoxia  Outcome: Progressing Towards Goal     Problem: Falls - Risk of  Goal: *Absence of Falls  Description  Document Issaelizabeth Villarreal Fall Risk and appropriate interventions in the flowsheet. Outcome: Progressing Towards Goal  Note:   Fall Risk Interventions:  Mobility Interventions: Bed/chair exit alarm, Communicate number of staff needed for ambulation/transfer, OT consult for ADLs, Patient to call before getting OOB, PT Consult for mobility concerns         Medication Interventions: Assess postural VS orthostatic hypotension, Evaluate medications/consider consulting pharmacy, Patient to call before getting OOB, Teach patient to arise slowly    Elimination Interventions: Call light in reach, Patient to call for help with toileting needs, Toilet paper/wipes in reach, Toileting schedule/hourly rounds              Problem: Pressure Injury - Risk of  Goal: *Prevention of pressure injury  Description  Document Brant Scale and appropriate interventions in the flowsheet.   Outcome: Progressing Towards Goal  Note:   Pressure Injury Interventions:       Moisture Interventions: Apply protective barrier, creams and emollients, Check for incontinence Q2 hours and as needed, Internal/External urinary devices, Minimize layers    Activity Interventions: Increase time out of bed, Pressure redistribution bed/mattress(bed type), Assess need for specialty bed    Mobility Interventions: Assess need for specialty bed, Pressure redistribution bed/mattress (bed type), PT/OT evaluation    Nutrition Interventions: Document food/fluid/supplement intake    Friction and Shear Interventions: Apply protective barrier, creams and emollients, Foam dressings/transparent film/skin sealants, Lift sheet, Minimize layers                Problem: Patient Education: Go to Patient Education Activity  Goal: Patient/Family Education  Outcome: Progressing Towards Goal

## 2019-06-16 NOTE — PROGRESS NOTES
TRANSFER - OUT REPORT:    Verbal report given to DESTINY Corona on Erma Zarco  being transferred to [facility] for routine progression of care       Report consisted of patients Situation, Background, Assessment and   Recommendations(SBAR). Information from the following report(s) SBAR was reviewed with the receiving nurse. Lines:       Opportunity for questions and clarification was provided.       Patient transported with:  [address] [place] [address] Service with oxygen

## 2019-06-16 NOTE — PROGRESS NOTES
Ronni Myrick Admission Date: 6/10/2019 Daily Progress Note: 6/16/2019 The patient's chart is reviewed and the patient is discussed with the staff. 80 y.o. CM evaluated at the request of Dr. Olga Kee. Has emphysema, was recently admitted for pneumothorax, s/p pleurodesis on 5/16 and presents to the ER from SNF with increased SOB.  Was discharged on 2L O2 and had some labile and low BP readings but not consistently. Was placed on NRB and felt better. He has been on 2 L nasal cannula at the facility but O2 sats dropped into the mid 80s and EMS was called. Was changed from 6L to NRB for sat 88%.  Lower extremity edema is unchanged, has chronic mildly productive cough and hx CHF. Since discharge 5/25, had li replaced for persistent retention.   Son described him with waxing and waning AMS for several days, but more clear on NRB, suspects it was hypoxia. Subjective:  
 
Awake in bed oxygen 5 L oxygen saturations 88-94%, not able to wean to his preadmission 2 L. Denies shortness of breath. Family in room and informed by PCP he is being discharged to [facility] today Current Facility-Administered Medications Medication Dose Route Frequency  potassium chloride (K-DUR, KLOR-CON) SR tablet 40 mEq  40 mEq Oral NOW  furosemide (LASIX) tablet 40 mg  40 mg Oral ACB&D  
 albuterol (PROVENTIL VENTOLIN) nebulizer solution 2.5 mg  2.5 mg Nebulization QID RT  
 sodium chloride (NS) flush 5-40 mL  5-40 mL IntraVENous Q8H  
 sodium chloride (NS) flush 5-40 mL  5-40 mL IntraVENous PRN  
 amiodarone (CORDARONE) tablet 400 mg  400 mg Oral Q12H  
 fluticasone propionate (FLONASE) 50 mcg/actuation nasal spray 2 Spray  2 Spray Both Nostrils DAILY  guaiFENesin ER (MUCINEX) tablet 1,200 mg  1,200 mg Oral DAILY  montelukast (SINGULAIR) tablet 10 mg  10 mg Oral DAILY  tamsulosin (FLOMAX) capsule 0.4 mg  0.4 mg Oral DAILY  sodium chloride (NS) flush 5-40 mL  5-40 mL IntraVENous Q8H  
 sodium chloride (NS) flush 5-40 mL  5-40 mL IntraVENous PRN  
 acetaminophen (TYLENOL) tablet 650 mg  650 mg Oral Q4H PRN  
 ondansetron (ZOFRAN ODT) tablet 4 mg  4 mg Oral Q4H PRN  
 bisacodyl (DULCOLAX) tablet 5 mg  5 mg Oral DAILY PRN  
 heparin (porcine) injection 5,000 Units  5,000 Units SubCUTAneous Q8H Review of Systems Constitutional: negative for fever, chills, sweats Cardiovascular: negative for chest pain, palpitations, syncope, edema Gastrointestinal:  negative for dysphagia, reflux, vomiting, diarrhea, abdominal pain, or melena Neurologic:  negative for focal weakness, numbness, headache Objective:  
 
Vitals:  
 06/16/19 0656 06/16/19 0703 06/16/19 1607 06/16/19 3462 BP: 152/73   126/66 Pulse: 60   70 Resp: 19   20 Temp: 97.9 °F (36.6 °C)   97.3 °F (36.3 °C) SpO2: 94%  94% 91% Weight:  144 lb 9.6 oz (65.6 kg) Height:      
 
Intake and Output:  
06/14 1901 - 06/16 0700 In: 1920 [P.O.:1920] Out: 2501 [Urine:2500] 06/16 0701 - 06/16 1900 In: 240 [P.O.:240] Out: - Physical Exam:  
Constitution:  the patient is thin, elderly and in no acute distress, NC 5 L 
EENMT:  Sclera clear, pupils equal, oral mucosa moist 
Respiratory:  no wheezing Cardiovascular:  RRR without M,G,R 
Gastrointestinal: soft and non-tender; with positive bowel sounds. Musculoskeletal: warm without cyanosis. There is no lower extremity edema. Skin:  no jaundice or rashes, no wounds Neurologic: no gross neuro deficits Psychiatric:  alert and oriented x 3 CHEST XRAY:  
6/14/19:  
 1. Slightly increased diffuse lung opacities, most likely pulmonary edema (atypical infection or inflammation could have a similar appearance). 2. Trace to small pleural effusions. LAB No results for input(s): GLUCPOC in the last 72 hours. No lab exists for component: [place] Recent Labs  
  06/16/19 
0705 06/15/19 
0405 06/14/19 0340  
WBC 4.8 4.7 4.0* HGB 9.1* 8.7* 8.6* HCT 29.1* 28.2* 27.3*  
* 126* 130* Recent Labs  
  06/16/19 
0705 06/15/19 
0405 06/14/19 
0340  141 141  
K 3.4* 3.7 3.6  107 109* CO2 32 30 26 GLU 86 94 90 BUN 16 13 13 CREA 0.72* 0.66* 0.62* CA 7.6* 7.3* 6.7* No results for input(s): PH, PCO2, PO2, HCO3, PHI, PCO2I, PO2I, HCO3I in the last 72 hours. No results for input(s): LCAD, LAC in the last 72 hours. Assessment:  (Medical Decision Making) Patient Active Problem List  
Diagnosis Code  Abdominal aortic aneurysm without rupture ([facility]) I71.4  Benign prostatic hyperplasia N40.0  Cardiovascular disease I25.10  COPD (chronic obstructive pulmonary disease) ([facility]) J44.9  Low testosterone R79.89  
 Essential hypertension, benign I10  
 Allergic rhinitis J30.9  Raynaud's syndrome I73.00  Erectile dysfunction N52.9  Chronic respiratory failure with hypoxia ([facility]) J96.11  
 SOB (shortness of breath) R06.02  
 Abnormal finding of lung R09.89  Bigeminy I49.9  Hip fracture ([facility] S72.009A  COPD (chronic obstructive pulmonary disease) with emphysema ([facility]) J43.9  Closed compression fracture of lumbosacral spine ([facility]) S32.000A  Pneumothorax on right J93.9  Shortness of breath R06.02  
 Acute on chronic respiratory failure with hypoxia ([facility]) J96.21  
 COPD exacerbation ([facility]) J44.1  Ventricular tachyarrhythmia ([facility]) I47.2  Pulmonary infiltrates R91.8  VT (ventricular tachycardia) ([facility]) I47.2  Subcutaneous emphysema ([facility]) T79. 7XXA  Urinary retention R33.9  Hyponatremia E87.1  Pleural effusion J90  
 Hypoproteinemia ([facility]) E77.8  Hypocalcemia E83.51 Plan:  (Medical Decision Making) Hospital Problems  Date Reviewed: 6/14/2019 Codes Class Noted POA Essential hypertension, benign ICD-10-CM: I10 
ICD-9-CM: 401.1  12/10/2015 Yes Chronic--labile Hypoproteinemia ([facility] ICD-10-CM: E77.8 ICD-9-CM: 273.8  6/13/2019 Unknown Low on admission 4.2 Hypocalcemia ICD-10-CM: E83.51 
ICD-9-CM: 275.41  6/13/2019 Unknown 6.7 today * (Principal) Pleural effusion ICD-10-CM: J90 ICD-9-CM: 511.9  6/10/2019 Yes Overview Signed 6/14/2019 12:59 PM by Bandar Martell NP  
  6/10/19 Right thoracentesis:  800 cc of yellow fluid CXR with trace to small effusions Urinary retention ICD-10-CM: R33.9 ICD-9-CM: 788.20  5/20/2019 Yes Continue li Acute on chronic respiratory failure with hypoxia [facility]) ICD-10-CM: J96.21 
ICD-9-CM: 518.84, 799.02  5/6/2019 Yes Wean O2 as tolerated--home flow 2L Ventricular tachyarrhythmia ([address] ICD-10-CM: I47.2 ICD-9-CM: 427.1  5/6/2019 Yes COPD (chronic obstructive pulmonary disease) (HCC) ICD-10-CM: J44.9 ICD-9-CM: 306  12/10/2015 Yes  
 chronic  
  
 
 
--Albuterol, Singulair, Mucinex 
--Lasix 40mg po q12h 
-- oxygen 5 L 
--discharge to [facility] 
-- 
 
 
Clive Sanchez NP

## 2019-06-17 ENCOUNTER — PATIENT OUTREACH (OUTPATIENT)
Dept: CASE MANAGEMENT | Age: 84
End: 2019-06-17

## 2019-06-17 NOTE — PROGRESS NOTES
COSME not indicated at this time. Patient transferred back to [facility] for [address] [place]. This note will not be viewable in [address]

## 2019-06-21 ENCOUNTER — PATIENT OUTREACH (OUTPATIENT)
Dept: CASE MANAGEMENT | Age: 84
End: 2019-06-21

## 2019-06-21 NOTE — PROGRESS NOTES
Community Care Team documentation for patient in [facility]    The information below provided by:[facility]    PT Update: Transfers with min assist, SBA bed mobility, amb for 4 ft with RW and min assist. Min-mod assist for self-care. Nursing Update:Pt. readmitted on 06/16, remains very weak.       Discharge Date:TBD      Assign to [facility] Manager:

## 2019-06-26 ENCOUNTER — HOSPITAL ENCOUNTER (OUTPATIENT)
Dept: LAB | Age: 84
Discharge: HOME | End: 2019-06-26
Payer: MEDICARE

## 2019-06-26 DIAGNOSIS — I47.20 VT (VENTRICULAR TACHYCARDIA): ICD-10-CM

## 2019-06-26 LAB
ANION GAP SERPL CALC-SCNC: 5 MMOL/L (ref 7–16)
BUN SERPL-MCNC: 25 MG/DL (ref 8–23)
CALCIUM SERPL-MCNC: 8.3 MG/DL (ref 8.3–10.4)
CHLORIDE SERPL-SCNC: 107 MMOL/L (ref 98–107)
CO2 SERPL-SCNC: 28 MMOL/L (ref 21–32)
CREAT SERPL-MCNC: 0.8 MG/DL (ref 0.8–1.5)
GLUCOSE SERPL-MCNC: 95 MG/DL (ref 65–100)
POTASSIUM SERPL-SCNC: 4.3 MMOL/L (ref 3.5–5.1)
SODIUM SERPL-SCNC: 140 MMOL/L (ref 136–145)

## 2019-06-26 PROCEDURE — 36415 COLL VENOUS BLD VENIPUNCTURE: CPT

## 2019-06-26 PROCEDURE — 80048 BASIC METABOLIC PNL TOTAL CA: CPT

## 2019-06-27 ENCOUNTER — PATIENT OUTREACH (OUTPATIENT)
Dept: CASE MANAGEMENT | Age: 84
End: 2019-06-27

## 2019-06-27 NOTE — PROGRESS NOTES
Community Care Team documentation for patient in [facility]    The information below provided by:[facility]    PT Update: Transfers with supervision, amb for 100 ft with RW and SBA (frequent hyoptension-unsafe to ambulate). UB care SBA, LB mod-max assist, decreased BP-fluctuating HR. Nursing Update:Hyponatremia resolved no other exacerbations at present.       Discharge Date:7/9/19 LTC [address] to [facility] Manager: N/A

## 2019-06-28 ENCOUNTER — PATIENT OUTREACH (OUTPATIENT)
Dept: CASE MANAGEMENT | Age: 84
End: 2019-06-28

## 2019-06-28 NOTE — PROGRESS NOTES
COSME not indicated. Patient will transition to LTC at [facility]. This note will not be viewable in [address]

## 2019-08-06 ENCOUNTER — HOSPITAL ENCOUNTER (OUTPATIENT)
Dept: LAB | Age: 84
Discharge: HOME | End: 2019-08-06
Payer: MEDICARE

## 2019-08-06 DIAGNOSIS — R97.0 ELEVATED CEA: ICD-10-CM

## 2019-08-06 LAB
ALBUMIN SERPL-MCNC: 3 G/DL (ref 3.2–4.6)
ALBUMIN/GLOB SERPL: 0.8 {RATIO} (ref 1.2–3.5)
ALP SERPL-CCNC: 113 U/L (ref 50–136)
ALT SERPL-CCNC: 58 U/L (ref 12–65)
ANION GAP SERPL CALC-SCNC: 5 MMOL/L (ref 7–16)
AST SERPL-CCNC: 42 U/L (ref 15–37)
BASOPHILS # BLD: 0 K/UL (ref 0–0.2)
BASOPHILS NFR BLD: 0 % (ref 0–2)
BILIRUB SERPL-MCNC: 0.9 MG/DL (ref 0.2–1.1)
BUN SERPL-MCNC: 26 MG/DL (ref 8–23)
CALCIUM SERPL-MCNC: 8.3 MG/DL (ref 8.3–10.4)
CEA SERPL-MCNC: 9.3 NG/ML (ref 0–3)
CHLORIDE SERPL-SCNC: 100 MMOL/L (ref 98–107)
CO2 SERPL-SCNC: 27 MMOL/L (ref 21–32)
CREAT SERPL-MCNC: 1.01 MG/DL (ref 0.8–1.5)
DIFFERENTIAL METHOD BLD: ABNORMAL
EOSINOPHIL # BLD: 0.1 K/UL (ref 0–0.8)
EOSINOPHIL NFR BLD: 1 % (ref 0.5–7.8)
ERYTHROCYTE [DISTWIDTH] IN BLOOD BY AUTOMATED COUNT: 14.4 % (ref 11.9–14.6)
GLOBULIN SER CALC-MCNC: 4 G/DL (ref 2.3–3.5)
GLUCOSE SERPL-MCNC: 99 MG/DL (ref 65–100)
HCT VFR BLD AUTO: 35.9 % (ref 41.1–50.3)
HGB BLD-MCNC: 11.4 G/DL (ref 13.6–17.2)
IMM GRANULOCYTES # BLD AUTO: 0.1 K/UL (ref 0–0.5)
IMM GRANULOCYTES NFR BLD AUTO: 1 % (ref 0–5)
LYMPHOCYTES # BLD: 2.3 K/UL (ref 0.5–4.6)
LYMPHOCYTES NFR BLD: 19 % (ref 13–44)
MCH RBC QN AUTO: 32.4 PG (ref 26.1–32.9)
MCHC RBC AUTO-ENTMCNC: 31.8 G/DL (ref 31.4–35)
MCV RBC AUTO: 102 FL (ref 79.6–97.8)
MONOCYTES # BLD: 1.2 K/UL (ref 0.1–1.3)
MONOCYTES NFR BLD: 10 % (ref 4–12)
NEUTS SEG # BLD: 8.3 K/UL (ref 1.7–8.2)
NEUTS SEG NFR BLD: 69 % (ref 43–78)
NRBC # BLD: 0 K/UL (ref 0–0.2)
PLATELET # BLD AUTO: 224 K/UL (ref 150–450)
PMV BLD AUTO: 9.4 FL (ref 9.4–12.3)
POTASSIUM SERPL-SCNC: 4.6 MMOL/L (ref 3.5–5.1)
PROT SERPL-MCNC: 7 G/DL (ref 6.3–8.2)
RBC # BLD AUTO: 3.52 M/UL (ref 4.23–5.67)
SODIUM SERPL-SCNC: 132 MMOL/L (ref 136–145)
WBC # BLD AUTO: 12.1 K/UL (ref 4.3–11.1)

## 2019-08-06 PROCEDURE — 85025 COMPLETE CBC W/AUTO DIFF WBC: CPT

## 2019-08-06 PROCEDURE — 82378 CARCINOEMBRYONIC ANTIGEN: CPT

## 2019-08-06 PROCEDURE — 80053 COMPREHEN METABOLIC PANEL: CPT

## 2019-08-06 PROCEDURE — 36415 COLL VENOUS BLD VENIPUNCTURE: CPT

## 2019-08-08 PROBLEM — Q64.70 ABNORMALITY OF URETHRAL MEATUS: Status: ACTIVE | Noted: 2019-08-08

## 2019-09-01 ENCOUNTER — APPOINTMENT (OUTPATIENT)
Dept: ULTRASOUND IMAGING | Age: 84
DRG: 193 | End: 2019-09-01
Attending: INTERNAL MEDICINE
Payer: MEDICARE

## 2019-09-01 ENCOUNTER — HOSPITAL ENCOUNTER (INPATIENT)
Age: 84
LOS: 8 days | Discharge: NURSING, REHAB OR LONG-TERM CARE | DRG: 193 | End: 2019-09-09
Attending: EMERGENCY MEDICINE | Admitting: INTERNAL MEDICINE
Payer: MEDICARE

## 2019-09-01 ENCOUNTER — APPOINTMENT (OUTPATIENT)
Dept: GENERAL RADIOLOGY | Age: 84
DRG: 193 | End: 2019-09-01
Attending: EMERGENCY MEDICINE
Payer: MEDICARE

## 2019-09-01 DIAGNOSIS — R09.02 HYPOXIA: ICD-10-CM

## 2019-09-01 DIAGNOSIS — J18.9 PNEUMONIA OF RIGHT MIDDLE LOBE DUE TO INFECTIOUS ORGANISM: Primary | ICD-10-CM

## 2019-09-01 LAB
ALBUMIN SERPL-MCNC: 2.7 G/DL (ref 3.2–4.6)
ALBUMIN/GLOB SERPL: 0.7 {RATIO} (ref 1.2–3.5)
ALP SERPL-CCNC: 108 U/L (ref 50–136)
ALT SERPL-CCNC: 84 U/L (ref 12–65)
ANION GAP SERPL CALC-SCNC: 6 MMOL/L (ref 7–16)
ARTERIAL PATENCY WRIST A: YES
AST SERPL-CCNC: 59 U/L (ref 15–37)
ATRIAL RATE: 66 BPM
BASE EXCESS BLD CALC-SCNC: 1 MMOL/L
BASOPHILS # BLD: 0.1 K/UL (ref 0–0.2)
BASOPHILS NFR BLD: 0 % (ref 0–2)
BDY SITE: ABNORMAL
BILIRUB SERPL-MCNC: 1 MG/DL (ref 0.2–1.1)
BODY TEMPERATURE: 98.6
BUN SERPL-MCNC: 27 MG/DL (ref 8–23)
CALCIUM SERPL-MCNC: 8.5 MG/DL (ref 8.3–10.4)
CALCULATED P AXIS, ECG09: 52 DEGREES
CALCULATED R AXIS, ECG10: -33 DEGREES
CALCULATED T AXIS, ECG11: 34 DEGREES
CHLORIDE SERPL-SCNC: 101 MMOL/L (ref 98–107)
CO2 BLD-SCNC: 27 MMOL/L
CO2 SERPL-SCNC: 30 MMOL/L (ref 21–32)
COLLECT TIME,HTIME: 652
CREAT SERPL-MCNC: 0.97 MG/DL (ref 0.8–1.5)
DIAGNOSIS, 93000: NORMAL
DIFFERENTIAL METHOD BLD: ABNORMAL
EOSINOPHIL # BLD: 0.1 K/UL (ref 0–0.8)
EOSINOPHIL NFR BLD: 0 % (ref 0.5–7.8)
ERYTHROCYTE [DISTWIDTH] IN BLOOD BY AUTOMATED COUNT: 15.2 % (ref 11.9–14.6)
FLOW RATE ISTAT,IFRATE: 3.5 L/MIN
GAS FLOW.O2 O2 DELIVERY SYS: ABNORMAL L/MIN
GLOBULIN SER CALC-MCNC: 3.7 G/DL (ref 2.3–3.5)
GLUCOSE SERPL-MCNC: 103 MG/DL (ref 65–100)
HCO3 BLD-SCNC: 26 MMOL/L (ref 22–26)
HCT VFR BLD AUTO: 38.4 % (ref 41.1–50.3)
HGB BLD-MCNC: 12.2 G/DL (ref 13.6–17.2)
IMM GRANULOCYTES # BLD AUTO: 0.1 K/UL (ref 0–0.5)
IMM GRANULOCYTES NFR BLD AUTO: 1 % (ref 0–5)
LACTATE BLD-SCNC: 1.65 MMOL/L (ref 0.5–1.9)
LYMPHOCYTES # BLD: 1 K/UL (ref 0.5–4.6)
LYMPHOCYTES NFR BLD: 7 % (ref 13–44)
MCH RBC QN AUTO: 32.4 PG (ref 26.1–32.9)
MCHC RBC AUTO-ENTMCNC: 31.8 G/DL (ref 31.4–35)
MCV RBC AUTO: 102.1 FL (ref 79.6–97.8)
MONOCYTES # BLD: 1.2 K/UL (ref 0.1–1.3)
MONOCYTES NFR BLD: 8 % (ref 4–12)
NEUTS SEG # BLD: 13.3 K/UL (ref 1.7–8.2)
NEUTS SEG NFR BLD: 85 % (ref 43–78)
NRBC # BLD: 0 K/UL (ref 0–0.2)
P-R INTERVAL, ECG05: 208 MS
PCO2 BLD: 41.6 MMHG (ref 35–45)
PH BLD: 7.4 [PH] (ref 7.35–7.45)
PLATELET # BLD AUTO: 230 K/UL (ref 150–450)
PMV BLD AUTO: 9.9 FL (ref 9.4–12.3)
PO2 BLD: 56 MMHG (ref 75–100)
POTASSIUM SERPL-SCNC: 4 MMOL/L (ref 3.5–5.1)
PROCALCITONIN SERPL-MCNC: 0.1 NG/ML
PROT SERPL-MCNC: 6.4 G/DL (ref 6.3–8.2)
Q-T INTERVAL, ECG07: 390 MS
QRS DURATION, ECG06: 104 MS
QTC CALCULATION (BEZET), ECG08: 408 MS
RBC # BLD AUTO: 3.76 M/UL (ref 4.23–5.6)
SAO2 % BLD: 89 % (ref 95–98)
SERVICE CMNT-IMP: ABNORMAL
SERVICE CMNT-IMP: ABNORMAL
SODIUM SERPL-SCNC: 137 MMOL/L (ref 136–145)
SPECIMEN TYPE: ABNORMAL
TROPONIN I SERPL-MCNC: <0.02 NG/ML (ref 0.02–0.05)
TROPONIN I SERPL-MCNC: <0.02 NG/ML (ref 0.02–0.05)
VENTRICULAR RATE, ECG03: 66 BPM
WBC # BLD AUTO: 15.7 K/UL (ref 4.3–11.1)

## 2019-09-01 PROCEDURE — 71101 X-RAY EXAM UNILAT RIBS/CHEST: CPT

## 2019-09-01 PROCEDURE — 96361 HYDRATE IV INFUSION ADD-ON: CPT | Performed by: EMERGENCY MEDICINE

## 2019-09-01 PROCEDURE — 74011250637 HC RX REV CODE- 250/637: Performed by: INTERNAL MEDICINE

## 2019-09-01 PROCEDURE — 84484 ASSAY OF TROPONIN QUANT: CPT

## 2019-09-01 PROCEDURE — 36415 COLL VENOUS BLD VENIPUNCTURE: CPT

## 2019-09-01 PROCEDURE — 80053 COMPREHEN METABOLIC PANEL: CPT

## 2019-09-01 PROCEDURE — 77030020263 HC SOL INJ SOD CL0.9% LFCR 1000ML

## 2019-09-01 PROCEDURE — 76705 ECHO EXAM OF ABDOMEN: CPT

## 2019-09-01 PROCEDURE — 96365 THER/PROPH/DIAG IV INF INIT: CPT | Performed by: EMERGENCY MEDICINE

## 2019-09-01 PROCEDURE — 84145 PROCALCITONIN (PCT): CPT

## 2019-09-01 PROCEDURE — 81003 URINALYSIS AUTO W/O SCOPE: CPT | Performed by: EMERGENCY MEDICINE

## 2019-09-01 PROCEDURE — 94760 N-INVAS EAR/PLS OXIMETRY 1: CPT

## 2019-09-01 PROCEDURE — 94640 AIRWAY INHALATION TREATMENT: CPT

## 2019-09-01 PROCEDURE — 93005 ELECTROCARDIOGRAM TRACING: CPT | Performed by: EMERGENCY MEDICINE

## 2019-09-01 PROCEDURE — 74011250636 HC RX REV CODE- 250/636: Performed by: EMERGENCY MEDICINE

## 2019-09-01 PROCEDURE — 36600 WITHDRAWAL OF ARTERIAL BLOOD: CPT

## 2019-09-01 PROCEDURE — 96375 TX/PRO/DX INJ NEW DRUG ADDON: CPT | Performed by: EMERGENCY MEDICINE

## 2019-09-01 PROCEDURE — 74011000258 HC RX REV CODE- 258: Performed by: INTERNAL MEDICINE

## 2019-09-01 PROCEDURE — 99285 EMERGENCY DEPT VISIT HI MDM: CPT | Performed by: EMERGENCY MEDICINE

## 2019-09-01 PROCEDURE — 74011000250 HC RX REV CODE- 250

## 2019-09-01 PROCEDURE — 74011250636 HC RX REV CODE- 250/636: Performed by: INTERNAL MEDICINE

## 2019-09-01 PROCEDURE — 80074 ACUTE HEPATITIS PANEL: CPT

## 2019-09-01 PROCEDURE — 74011000258 HC RX REV CODE- 258: Performed by: EMERGENCY MEDICINE

## 2019-09-01 PROCEDURE — 65270000029 HC RM PRIVATE

## 2019-09-01 PROCEDURE — 87040 BLOOD CULTURE FOR BACTERIA: CPT

## 2019-09-01 PROCEDURE — 74011000302 HC RX REV CODE- 302: Performed by: INTERNAL MEDICINE

## 2019-09-01 PROCEDURE — 74011000250 HC RX REV CODE- 250: Performed by: INTERNAL MEDICINE

## 2019-09-01 PROCEDURE — 74011000250 HC RX REV CODE- 250: Performed by: EMERGENCY MEDICINE

## 2019-09-01 PROCEDURE — 83605 ASSAY OF LACTIC ACID: CPT

## 2019-09-01 PROCEDURE — 82803 BLOOD GASES ANY COMBINATION: CPT

## 2019-09-01 PROCEDURE — 86580 TB INTRADERMAL TEST: CPT | Performed by: INTERNAL MEDICINE

## 2019-09-01 PROCEDURE — 96367 TX/PROPH/DG ADDL SEQ IV INF: CPT | Performed by: EMERGENCY MEDICINE

## 2019-09-01 PROCEDURE — 77030011943

## 2019-09-01 PROCEDURE — 85025 COMPLETE CBC W/AUTO DIFF WBC: CPT

## 2019-09-01 PROCEDURE — 51798 US URINE CAPACITY MEASURE: CPT

## 2019-09-01 RX ORDER — FUROSEMIDE 40 MG/1
40 TABLET ORAL 2 TIMES DAILY
Status: DISCONTINUED | OUTPATIENT
Start: 2019-09-01 | End: 2019-09-09 | Stop reason: HOSPADM

## 2019-09-01 RX ORDER — SODIUM CHLORIDE 9 MG/ML
75 INJECTION, SOLUTION INTRAVENOUS CONTINUOUS
Status: DISCONTINUED | OUTPATIENT
Start: 2019-09-01 | End: 2019-09-02

## 2019-09-01 RX ORDER — FINASTERIDE 5 MG/1
5 TABLET, FILM COATED ORAL DAILY
Status: DISCONTINUED | OUTPATIENT
Start: 2019-09-01 | End: 2019-09-09 | Stop reason: HOSPADM

## 2019-09-01 RX ORDER — GUAIFENESIN 600 MG/1
1200 TABLET, EXTENDED RELEASE ORAL DAILY
Status: DISCONTINUED | OUTPATIENT
Start: 2019-09-01 | End: 2019-09-09 | Stop reason: HOSPADM

## 2019-09-01 RX ORDER — ONDANSETRON 2 MG/ML
4 INJECTION INTRAMUSCULAR; INTRAVENOUS
Status: DISCONTINUED | OUTPATIENT
Start: 2019-09-01 | End: 2019-09-09 | Stop reason: HOSPADM

## 2019-09-01 RX ORDER — FLUTICASONE PROPIONATE 50 MCG
2 SPRAY, SUSPENSION (ML) NASAL DAILY
Status: DISCONTINUED | OUTPATIENT
Start: 2019-09-01 | End: 2019-09-09 | Stop reason: HOSPADM

## 2019-09-01 RX ORDER — ALBUTEROL SULFATE 0.83 MG/ML
SOLUTION RESPIRATORY (INHALATION)
Status: COMPLETED
Start: 2019-09-01 | End: 2019-09-01

## 2019-09-01 RX ORDER — FAMOTIDINE 20 MG/1
20 TABLET, FILM COATED ORAL 2 TIMES DAILY
Status: DISCONTINUED | OUTPATIENT
Start: 2019-09-01 | End: 2019-09-09 | Stop reason: HOSPADM

## 2019-09-01 RX ORDER — POTASSIUM CHLORIDE 20 MEQ/1
40 TABLET, EXTENDED RELEASE ORAL DAILY
Status: DISCONTINUED | OUTPATIENT
Start: 2019-09-01 | End: 2019-09-09 | Stop reason: HOSPADM

## 2019-09-01 RX ORDER — IPRATROPIUM BROMIDE AND ALBUTEROL SULFATE 2.5; .5 MG/3ML; MG/3ML
3 SOLUTION RESPIRATORY (INHALATION)
Status: COMPLETED | OUTPATIENT
Start: 2019-09-01 | End: 2019-09-01

## 2019-09-01 RX ORDER — BUDESONIDE 0.5 MG/2ML
500 INHALANT ORAL
Status: CANCELLED | OUTPATIENT
Start: 2019-09-01

## 2019-09-01 RX ORDER — ASPIRIN 81 MG/1
81 TABLET ORAL DAILY
Status: DISCONTINUED | OUTPATIENT
Start: 2019-09-01 | End: 2019-09-09 | Stop reason: HOSPADM

## 2019-09-01 RX ORDER — TAMSULOSIN HYDROCHLORIDE 0.4 MG/1
0.4 CAPSULE ORAL DAILY
Status: DISCONTINUED | OUTPATIENT
Start: 2019-09-01 | End: 2019-09-09 | Stop reason: HOSPADM

## 2019-09-01 RX ORDER — ACETAMINOPHEN 325 MG/1
650 TABLET ORAL
Status: DISCONTINUED | OUTPATIENT
Start: 2019-09-01 | End: 2019-09-09 | Stop reason: HOSPADM

## 2019-09-01 RX ORDER — MORPHINE SULFATE 2 MG/ML
4 INJECTION, SOLUTION INTRAMUSCULAR; INTRAVENOUS
Status: COMPLETED | OUTPATIENT
Start: 2019-09-01 | End: 2019-09-01

## 2019-09-01 RX ORDER — HEPARIN SODIUM 5000 [USP'U]/ML
5000 INJECTION, SOLUTION INTRAVENOUS; SUBCUTANEOUS EVERY 12 HOURS
Status: DISCONTINUED | OUTPATIENT
Start: 2019-09-01 | End: 2019-09-09 | Stop reason: HOSPADM

## 2019-09-01 RX ORDER — MONTELUKAST SODIUM 10 MG/1
10 TABLET ORAL DAILY
Status: DISCONTINUED | OUTPATIENT
Start: 2019-09-01 | End: 2019-09-09 | Stop reason: HOSPADM

## 2019-09-01 RX ORDER — IPRATROPIUM BROMIDE AND ALBUTEROL SULFATE 2.5; .5 MG/3ML; MG/3ML
3 SOLUTION RESPIRATORY (INHALATION)
Status: DISCONTINUED | OUTPATIENT
Start: 2019-09-01 | End: 2019-09-09 | Stop reason: HOSPADM

## 2019-09-01 RX ORDER — ALBUTEROL SULFATE 0.83 MG/ML
2.5 SOLUTION RESPIRATORY (INHALATION)
Status: COMPLETED | OUTPATIENT
Start: 2019-09-01 | End: 2019-09-01

## 2019-09-01 RX ADMIN — PIPERACILLIN, TAZOBACTAM 4.5 G: 4; .5 INJECTION, POWDER, LYOPHILIZED, FOR SOLUTION INTRAVENOUS at 07:30

## 2019-09-01 RX ADMIN — METHYLPREDNISOLONE SODIUM SUCCINATE 125 MG: 125 INJECTION, POWDER, FOR SOLUTION INTRAMUSCULAR; INTRAVENOUS at 06:47

## 2019-09-01 RX ADMIN — IPRATROPIUM BROMIDE AND ALBUTEROL SULFATE 3 ML: .5; 3 SOLUTION RESPIRATORY (INHALATION) at 20:56

## 2019-09-01 RX ADMIN — FAMOTIDINE 20 MG: 20 TABLET ORAL at 17:22

## 2019-09-01 RX ADMIN — ALBUTEROL SULFATE 2.5 MG: 2.5 SOLUTION RESPIRATORY (INHALATION) at 07:01

## 2019-09-01 RX ADMIN — FLUTICASONE PROPIONATE 2 SPRAY: 50 SPRAY, METERED NASAL at 12:58

## 2019-09-01 RX ADMIN — SODIUM CHLORIDE 75 ML/HR: 900 INJECTION, SOLUTION INTRAVENOUS at 17:24

## 2019-09-01 RX ADMIN — FUROSEMIDE 40 MG: 40 TABLET ORAL at 17:22

## 2019-09-01 RX ADMIN — MORPHINE SULFATE 4 MG: 2 INJECTION, SOLUTION INTRAMUSCULAR; INTRAVENOUS at 05:57

## 2019-09-01 RX ADMIN — ALBUTEROL SULFATE: 2.5 SOLUTION RESPIRATORY (INHALATION) at 07:03

## 2019-09-01 RX ADMIN — IPRATROPIUM BROMIDE AND ALBUTEROL SULFATE 3 ML: .5; 3 SOLUTION RESPIRATORY (INHALATION) at 13:59

## 2019-09-01 RX ADMIN — VANCOMYCIN HYDROCHLORIDE 1000 MG: 1 INJECTION, POWDER, LYOPHILIZED, FOR SOLUTION INTRAVENOUS at 07:58

## 2019-09-01 RX ADMIN — PIPERACILLIN SODIUM,TAZOBACTAM SODIUM 3.38 G: 3; .375 INJECTION, POWDER, FOR SOLUTION INTRAVENOUS at 12:56

## 2019-09-01 RX ADMIN — FUROSEMIDE 40 MG: 40 TABLET ORAL at 12:57

## 2019-09-01 RX ADMIN — TAMSULOSIN HYDROCHLORIDE 0.4 MG: 0.4 CAPSULE ORAL at 12:58

## 2019-09-01 RX ADMIN — TUBERCULIN PURIFIED PROTEIN DERIVATIVE 5 UNITS: 5 INJECTION, SOLUTION INTRADERMAL at 12:58

## 2019-09-01 RX ADMIN — IPRATROPIUM BROMIDE AND ALBUTEROL SULFATE 3 ML: .5; 3 SOLUTION RESPIRATORY (INHALATION) at 06:00

## 2019-09-01 RX ADMIN — PIPERACILLIN SODIUM,TAZOBACTAM SODIUM 3.38 G: 3; .375 INJECTION, POWDER, FOR SOLUTION INTRAVENOUS at 20:50

## 2019-09-01 RX ADMIN — FINASTERIDE 5 MG: 5 TABLET, FILM COATED ORAL at 12:57

## 2019-09-01 RX ADMIN — ASPIRIN 81 MG: 81 TABLET ORAL at 12:57

## 2019-09-01 RX ADMIN — GUAIFENESIN 1200 MG: 600 TABLET, EXTENDED RELEASE ORAL at 12:57

## 2019-09-01 RX ADMIN — POTASSIUM CHLORIDE 40 MEQ: 20 TABLET, EXTENDED RELEASE ORAL at 12:58

## 2019-09-01 RX ADMIN — HEPARIN SODIUM 5000 UNITS: 5000 INJECTION INTRAVENOUS; SUBCUTANEOUS at 12:57

## 2019-09-01 RX ADMIN — MONTELUKAST 10 MG: 10 TABLET, FILM COATED ORAL at 12:58

## 2019-09-01 RX ADMIN — FAMOTIDINE 20 MG: 20 TABLET ORAL at 12:57

## 2019-09-01 RX ADMIN — SODIUM CHLORIDE 1000 ML: 900 INJECTION, SOLUTION INTRAVENOUS at 06:25

## 2019-09-01 NOTE — PROGRESS NOTES
Pharmacokinetic Consult to Pharmacist    Alejandroe Schilder is a 80 y.o. male being treated for Pneumonia (HAP) with Zosyn and Vancomycin. Height: 5' 8\" (172.7 cm)  Weight: 58.5 kg (129 lb)  Lab Results   Component Value Date/Time    BUN 27 (H) 09/01/2019 05:45 AM    Creatinine 0.97 09/01/2019 05:45 AM    WBC 15.7 (H) 09/01/2019 05:45 AM    Procalcitonin 0.1 09/01/2019 05:43 AM    Lactic Acid (POC) 1.65 09/01/2019 07:31 AM      Estimated Creatinine Clearance: 44.4 mL/min (based on SCr of 0.97 mg/dL). Day 1 of vancomycin. Goal trough is 15-20. Patient given 1gm in ER today. Will order 1gm q24. .  Will continue to follow patient.       Thank you,  Mounika Miles, PharmD  Clinical StaffPharmacist  [phone]

## 2019-09-01 NOTE — PROGRESS NOTES
Pt resting in the bed watching tv. Pt is stable. No needs expressed. SBAR report given to the oncoming nurse.

## 2019-09-01 NOTE — H&P
History and Physical    Patient: Teri Orr MRN: [id number]  SSN: [id number]    YOB: 1932  Age: 80 y.o. Sex: male      Subjective: cc \" I have sob\"      Teri Orr is a 80 y.o. male who has a PMH of COPD on 2 liters home oxygen, HTN, BPH, elevated CEA levels seen by oncology, VT on amiodarone, recurrent pleural effusion requiring thoracentesis, sp pleurodesis on 5/16/19, who came from [facility] in view of progressive PATE, productive yellowish cough, weakness for the past 2 days. He has been compliant with oxygen, he stated he has required at some point 8 liters at night recently due to sob. He has had falls with no head trauma. He had a recent hospitalization from 6/10 - 6/16 due to bilateral pleural effusions. The patient denies fever, chills, nausea, vomiting, chest pain, diarrhea. His appetite is decreased. Upon arrival VS: HR 60 /63  O 90 % on 4 liters NC. He seemed in mild distress due to cough. Labs: wbc 15k; chemistry: ALT 84, AST 59, PCT 0.1; Troponin 0.02; abg: ph 7.43; PCO2 41, PO2 56  CXR: progressed RML infiltrate. Blood cultures taken. Iv vanc and zosyn started. Hospitalist contacted for admission.     PMH as above  Social hx: former smoker  Family hx: his mother had dementia and his father had lung cancer and MI    Past Medical History:   Diagnosis Date    Abdominal aortic aneurysm ([address] 12/10/2015    In 2005    Abdominal aortic aneurysm without rupture ([address]     Allergic rhinitis 12/10/2015    Asthma     Benign neoplasm     Benign prostatic hyperplasia 12/10/2015    BPH without urinary obstruction     Cardiovascular disease 12/10/2015    Chronic obstructive asthma with exacerbation ([address] 12/10/2015    COPD (chronic obstructive pulmonary disease) ([address] 12/10/2015    Cough with hemoptysis     Erectile dysfunction 12/10/2015    Essential hypertension, benign 12/10/2015    Fracture 10/2014    of left hand and ribs after fall on concrete    History of colon polyps     Low testosterone 12/10/2015    Lumbago     Memory loss     Overflow incontinence     Raynaud's syndrome 12/10/2015    Rotator cuff tendinitis     Thrombocytopenia ([address]     Urinary frequency      Past Surgical History:   Procedure Laterality Date    HX CATARACT REMOVAL  6/2016-right    HX COLONOSCOPY      HX FRACTURE TX  10/2014    of left hand and ribs are fall on concrete    [facility]    HX ORTHOPAEDIC  03/2018    hip fracture      Family History   Problem Relation Age of Onset    Dementia Mother     Cancer Father         lung cancer    Heart Attack Father      Social History     Tobacco Use    Smoking status: Former Smoker    Smokeless tobacco: Never Used   Substance Use Topics    Alcohol use: Yes     Comment: occasional      Prior to Admission medications    Medication Sig Start Date End Date Taking? Authorizing Provider   tamsulosin (FLOMAX) 0.4 mg capsule Take 1 Cap by mouth daily. 8/21/19   Merl LUTHER Kahn   finasteride (PROSCAR) 5 mg tablet Take 1 Tab by mouth daily. 8/21/19   Merl LUTHER Kahn   raNITIdine (ZANTAC) 75 mg tab Take  by mouth two (2) times a day. Provider, Historical   multivitamin,tx-iron-ca-min (THERA-M) 27-0.4 mg tab Take 1 Tab by mouth daily. Provider, Historical   acetaminophen (TYLENOL) 650 mg TbER Take 650 mg by mouth every eight (8) hours. Provider, Historical   amiodarone (PACERONE) 400 mg tablet  7/9/19   Provider, Historical   DULoxetine (CYMBALTA) 20 mg capsule  7/15/19   Provider, Historical   magnesium 250 mg tab Take  by mouth. Provider, Historical   furosemide (LASIX) 40 mg tablet Take 1 Tab by mouth two (2) times a day. 6/16/19   Leah Copeland, DO   albuterol-ipratropium (DUO-NEB) 2.5 mg-0.5 mg/3 ml nebu 3 mL by Nebulization route every four (4) hours as needed for Other (dyspnea).  5/25/19   Gianna Kim MD   potassium chloride (K-DUR, KLOR-CON) 20 mEq tablet Take 2 Tabs by mouth daily. Patient taking differently: Take 20 mEq by mouth daily. 5/25/19   Daria Tejeda MD   montelukast (SINGULAIR) 10 mg tablet Take 1 Tab by mouth daily. 12/18/18   Franky Ryan MD   umeclidinium-vilanterol (ANORO ELLIPTA) 62.5-25 mcg/actuation inhaler Take 1 Puff by inhalation daily. 11/12/18   Provider, Historical   Oxygen Use as instructed. Use as instructed; [facility]   Provider, Historical   cyanocobalamin (VITAMIN B12) 1,000 mcg/mL injection INJECT 1ML INTRAMUSCULARLY ONCE FOR 1 DOSE 8/30/18   Provider, Historical   fluticasone (FLONASE) 50 mcg/actuation nasal spray 2 Sprays by Both Nostrils route daily. 9/21/18   Franky Ryan MD   calcium citrate-vitamin d3 (CITRACAL+D) 315-200 mg-unit tab Take 2 Tabs by mouth daily (with breakfast). Provider, Historical   cholecalciferol (VITAMIN D3) 1,000 unit cap Take  by mouth. Provider, Historical   guaiFENesin ER (MUCINEX) 600 mg ER tablet Take 1,200 mg by mouth daily. Provider, Historical   Biotin 2,500 mcg cap Take  by mouth. Provider, Historical   aspirin delayed-release 81 mg tablet Take  by mouth daily. Provider, Historical        No Known Allergies    Review of Systems:  A comprehensive review of systems was negative except for that written in the History of Present Illness. Objective:     Vitals:    09/01/19 0649 09/01/19 0703 09/01/19 0829 09/01/19 0957   BP: 116/57  111/54 145/63   Pulse: 60  (!) 58 60   Resp: 19  13 16   Temp:    97.5 °F (36.4 °C)   SpO2: 92% 91%  91%   Weight:       Height:            Physical Exam:  GENERAL: alert, cooperative, no distress, appears stated age, able to speak in full sentences   EYE: negative  LYMPHATIC: Cervical, supraclavicular, and axillary nodes normal.   THROAT & NECK: normal and no erythema or exudates noted.    LUNG: rales over right lung field, no wheezing - 02 90% on 4 liters   HEART: regular rate and rhythm, S1, S2 normal, no murmur, click, rub or gallop  ABDOMEN: soft, non-tender. Bowel sounds normal. No masses,  no organomegaly  EXTREMITIES:  extremities normal, atraumatic, no cyanosis or edema  SKIN: Normal.  NEUROLOGIC: negative  PSYCHIATRIC: non focal    Assessment:     Hospital Problems  Date Reviewed: 8/21/2019          Codes Class Noted POA    * (Principal) HCAP (healthcare-associated pneumonia) ICD-10-CM: J18.9  ICD-9-CM: 486  9/1/2019 Unknown        Acute on chronic respiratory failure with hypoxia (HCC) ICD-10-CM: J96.21  ICD-9-CM: 518.84, 799.02  5/6/2019 Yes        VT (ventricular tachycardia) ([facility]) ICD-10-CM: I47.2  ICD-9-CM: 427.1  5/6/2019 Yes        COPD (chronic obstructive pulmonary disease) with emphysema ([address] ICD-10-CM: J43.9  ICD-9-CM: 492.8  10/17/2016 Yes    Overview Signed 12/11/2018 10:09 AM by Giselle Galdamez LPN     Last Assessment & Plan:   Not currently on any maintence medication regimen for COPD as he felt them to be unhelpful in the past.  I recommended a trial of a ANoro Ellipta which he should take 1 inhalation daily. Demonstration was completed on the correct method of administration and he was able to return demonstration independently in the office today and administer his first dose. I have given him a 2-week sample which he will use daily and monitor if he notes any improvement in his breathing. He may also try pretreating himself with Ventolin before exertion to see if this offers any relief.              Benign prostatic hyperplasia ICD-10-CM: N40.0  ICD-9-CM: 600.00  12/10/2015 Yes        Cardiovascular disease ICD-10-CM: I25.10  ICD-9-CM: 429.2  12/10/2015 Yes        Essential hypertension, benign ICD-10-CM: I10  ICD-9-CM: 401.1  12/10/2015 Yes              Plan:   -HCAP, right middle lobe pneumonia  -hx bilateral pleural effusions, thoracentesis  -Sp pleurodesis on 5/16/19  -HX COPD on 2 liters home oxygen, not in exacerbation     Admit as inpatient  Wean o2 prn  Continue iv vanc and zosyn  Continue duonebs, mucinex  No need for steroids, he is not wheezing   Check sputum culture  Monitor blood cultures    -Elevation of LFTs with CEA elevation:   -New liver lesion on Liver US done today. Concern for malignancy   Check CT scan abdomen/pelvis with contrast  Start pre and post contrast hydration with normal saline  F/u Hepatitis panel    -VT: continue amiodarone    -HTN: continue home meds    -BPH: resume home meds    -Falls: PT/OT    DVT ppx: heparin sq    Code status: full code.  Confirmed with the patient     Estimated LOS > 2MN  Risk: high  Estimated DC planning: back to [facility]  Goals of care discussed with the patient     Signed By: Catherine Powell MD     September 1, 2019

## 2019-09-01 NOTE — ED PROVIDER NOTES
Patient is an 87-year left-sided rib pain after a fall 2 days ago with increasing shortness of breath. The patient has a mild productive cough which is yellow sputum and he says this is been constant for several years due to his COPD. Patient is on oxygen 2 L 24 hours a day. When EMS arrived they noticed he was laying supine and sitting him up increased his oxygen saturation decreased his pain. The patient has sore throat or runny nose. The patient denies any abdominal pain nausea vomiting.            Past Medical History:   Diagnosis Date    Abdominal aortic aneurysm ([address] 12/10/2015    In 2005    Abdominal aortic aneurysm without rupture ([address]     Allergic rhinitis 12/10/2015    Asthma     Benign neoplasm     Benign prostatic hyperplasia 12/10/2015    BPH without urinary obstruction     Cardiovascular disease 12/10/2015    Chronic obstructive asthma with exacerbation ([address] 12/10/2015    COPD (chronic obstructive pulmonary disease) ([address] 12/10/2015    Cough with hemoptysis     Erectile dysfunction 12/10/2015    Essential hypertension, benign 12/10/2015    Fracture 10/2014    of left hand and ribs after fall on concrete    History of colon polyps     Low testosterone 12/10/2015    Lumbago     Memory loss     Overflow incontinence     Raynaud's syndrome 12/10/2015    Rotator cuff tendinitis     Thrombocytopenia (HCC)     Urinary frequency        Past Surgical History:   Procedure Laterality Date    HX CATARACT REMOVAL  6/2016-right    HX COLONOSCOPY      HX FRACTURE TX  10/2014    of left hand and ribs are fall on concrete    [facility]    HX ORTHOPAEDIC  03/2018    hip fracture         Family History:   Problem Relation Age of Onset    Dementia Mother     Cancer Father         lung cancer    Heart Attack Father        Social History     Socioeconomic History    Marital status:      Spouse name: Not on file    Number of children: Not on file    Years of education: Not on file    Highest education level: Not on file   Occupational History    Not on file   Social Needs    Financial resource strain: Not on file    Food insecurity:     Worry: Not on file     Inability: Not on file    Transportation needs:     Medical: Not on file     Non-medical: Not on file   Tobacco Use    Smoking status: Former Smoker    Smokeless tobacco: Never Used   Substance and Sexual Activity    Alcohol use: Yes     Comment: occasional    Drug use: Not on file    Sexual activity: Not on file   Lifestyle    Physical activity:     Days per week: Not on file     Minutes per session: Not on file    Stress: Not on file   Relationships    Social connections:     Talks on phone: Not on file     Gets together: Not on file     Attends Buddhism service: Not on file     Active member of club or organization: Not on file     Attends meetings of clubs or organizations: Not on file     Relationship status: Not on file    Intimate partner violence:     Fear of current or ex partner: Not on file     Emotionally abused: Not on file     Physically abused: Not on file     Forced sexual activity: Not on file   Other Topics Concern    Not on file   Social History Narrative    Not on file         ALLERGIES: Patient has no known allergies. Review of Systems   All other systems reviewed and are negative. Vitals:    09/01/19 0627 09/01/19 0643 09/01/19 0649 09/01/19 0703   BP: 94/53 131/64 116/57    Pulse:  64 60    Resp: 20 19 19    Temp:       SpO2:   92% 91%   Weight:       Height:                Physical Exam     GENERAL:The patient is well nourished, and well-hydrated. VITAL SIGNS: Heart rate, blood pressure, respiratory rate reviewed as recorded in  nurse's notes  EYES: Pupils reactive. Extraocular motion intact. No conjunctival redness or drainage. EARS: No external masses or lesions. NOSE: No nasal drainage or epistaxis. MOUTH/THROAT: Pharynx clear; airway patent.   NECK: Supple, no meningeal signs. Trachea midline. No masses or thyromegaly. LUNGS: Breath sounds clear and equal bilaterally no accessory muscle use  CHEST: No deformity or crepitus appreciated. Patient has reproducible tenderness to palpation along the mid clavicular line in the ninth through 11th ribs on the left anteriorly. CARDIOVASCULAR: Regular rate and rhythm  ABDOMEN: Soft without tenderness. No palpable masses or organomegaly. No  peritoneal signs. No rigidity. EXTREMITIES: No clubbing or cyanosis. No joint swelling. Normal muscle tone. No  restricted range of motion appreciated. NEUROLOGIC: Sensation is grossly intact. Cranial nerve exam reveals face is  symmetrical, tongue is midline speech is clear. SKIN: No rash or petechiae. Good skin turgor palpated. PSYCHIATRIC: Alert and oriented. Appropriate behavior and judgment. MDM  Number of Diagnoses or Management Options  Diagnosis management comments: CHF, COPD, pneumonia, PE,    MI, coronary artery disease, unstable angina, coronary artery disease,    Atrial fibrillation, cardiac arrhythmia, PVC, medication induced palpitations, heart block,  electrolyte induced palpitations,    Aortic dissection, aortic aneurysm,    GERD, musculoskeletal pain, costochondritis, rib fracture, pleurisy,         Amount and/or Complexity of Data Reviewed  Clinical lab tests: ordered and reviewed  Tests in the radiology section of CPT®: ordered and reviewed  Tests in the medicine section of CPT®: reviewed and ordered  Review and summarize past medical records: yes  Independent visualization of images, tracings, or specimens: yes      ED Course as of Sep 01 0738   Sun Sep 01, 2019   0652 IMPRESSION:   1. Progressed right midlung infiltrate, suspect for pneumonia although follow-up  is recommended to ensure resolution. 2. No displaced left rib fracture or pneumothorax.     XR RIBS LT W PA CXR MIN 3 V [KH]   0700 I spoke to the patient and his family about the findings in the emergency department. He was started on IV antibiotics and given fluids. The patient's blood pressure responded nicely to the fluid bolus in the ER. He is agreeable with being admitted to the hospital.    [address] Case was discussed with the hospitalist who will come and see the patient for admission.     [KH]      ED Course User Index  [KH] Andrew Gone, DO       Procedures

## 2019-09-01 NOTE — ED TRIAGE NOTES
Patient arrives via EMS from [address]. Called out for shortness of breath. EMS states they found patient in a more supine position and they moved him to stretcher and sat him up and he had some relief of SOB. Patient is normally on 2lpm nasal cannula. Patient had a fall a couple of days ago, but was never evaluated.

## 2019-09-01 NOTE — PROGRESS NOTES
TRANSFER - IN REPORT:    Verbal report received from JANINA Alvares (name) on Teri Flick  being received from ED (unit) for routine progression of care      Report consisted of patients Situation, Background, Assessment and   Recommendations(SBAR). Information from the following report(s) SBAR, Kardex and ED Summary was reviewed with the receiving nurse. Opportunity for questions and clarification was provided. Assessment completed upon patients arrival to unit and care assumed. SBAR given to primary receiving RN, Celi Hazel.

## 2019-09-01 NOTE — PROGRESS NOTES
Pt is stable. Pt is axo. Pt denies pain or discomfort. Pt denies SOB when at rest. Pt states that he is able to take deeper breaths today vs yesterday. Pt has productive cough with yellow sputum. Pt has skin tears on L hand and L elbow due to recent fall. No skin breakdown on backside to note. No needs expressed. Bed is in low and locked position. Call light is within reach. Pt instructed to ask for assistance if needed. Will continue to monitor.

## 2019-09-01 NOTE — PROGRESS NOTES
Pt made this nurse aware that he was used to being catheterized 3 times/day while at [facility]. MD notified of this. After reviewing pt records, the MD has given verbal instructions over the phone to continue to straight cath the pt every 8 hrs.

## 2019-09-01 NOTE — ED NOTES
TRANSFER - OUT REPORT:    Verbal report given to 4moms, on Walker Covert  being transferred to [phone] for routine progression of care       Report consisted of patients Situation, Background, Assessment and   Recommendations(SBAR). Information from the following report(s) SBAR was reviewed with the receiving nurse. Lines:   Peripheral IV 09/01/19 Left Antecubital (Active)   Site Assessment Clean, dry, & intact 9/1/2019  5:48 AM   Phlebitis Assessment 0 9/1/2019  5:48 AM   Infiltration Assessment 0 9/1/2019  5:48 AM   Dressing Status Clean, dry, & intact 9/1/2019  5:48 AM       Peripheral IV 09/01/19 Right Forearm (Active)        Opportunity for questions and clarification was provided.       Patient transported with:   O2 @ 4 liters

## 2019-09-02 ENCOUNTER — APPOINTMENT (OUTPATIENT)
Dept: CT IMAGING | Age: 84
DRG: 193 | End: 2019-09-02
Attending: INTERNAL MEDICINE
Payer: MEDICARE

## 2019-09-02 LAB
ANION GAP SERPL CALC-SCNC: 7 MMOL/L (ref 7–16)
BASOPHILS # BLD: 0 K/UL (ref 0–0.2)
BASOPHILS NFR BLD: 0 % (ref 0–2)
BUN SERPL-MCNC: 21 MG/DL (ref 8–23)
CALCIUM SERPL-MCNC: 8 MG/DL (ref 8.3–10.4)
CHLORIDE SERPL-SCNC: 107 MMOL/L (ref 98–107)
CO2 SERPL-SCNC: 24 MMOL/L (ref 21–32)
CREAT SERPL-MCNC: 0.82 MG/DL (ref 0.8–1.5)
DIFFERENTIAL METHOD BLD: ABNORMAL
EOSINOPHIL # BLD: 0 K/UL (ref 0–0.8)
EOSINOPHIL NFR BLD: 0 % (ref 0.5–7.8)
ERYTHROCYTE [DISTWIDTH] IN BLOOD BY AUTOMATED COUNT: 15.3 % (ref 11.9–14.6)
GLUCOSE SERPL-MCNC: 103 MG/DL (ref 65–100)
HCT VFR BLD AUTO: 32.1 % (ref 41.1–50.3)
HGB BLD-MCNC: 10 G/DL (ref 13.6–17.2)
IMM GRANULOCYTES # BLD AUTO: 0.1 K/UL (ref 0–0.5)
IMM GRANULOCYTES NFR BLD AUTO: 1 % (ref 0–5)
LYMPHOCYTES # BLD: 2 K/UL (ref 0.5–4.6)
LYMPHOCYTES NFR BLD: 17 % (ref 13–44)
MCH RBC QN AUTO: 32.4 PG (ref 26.1–32.9)
MCHC RBC AUTO-ENTMCNC: 31.2 G/DL (ref 31.4–35)
MCV RBC AUTO: 103.9 FL (ref 79.6–97.8)
MM INDURATION POC: 0 MM (ref 0–5)
MONOCYTES # BLD: 1.1 K/UL (ref 0.1–1.3)
MONOCYTES NFR BLD: 10 % (ref 4–12)
NEUTS SEG # BLD: 8.2 K/UL (ref 1.7–8.2)
NEUTS SEG NFR BLD: 72 % (ref 43–78)
NRBC # BLD: 0 K/UL (ref 0–0.2)
PLATELET # BLD AUTO: 209 K/UL (ref 150–450)
PMV BLD AUTO: 10.1 FL (ref 9.4–12.3)
POTASSIUM SERPL-SCNC: 4.3 MMOL/L (ref 3.5–5.1)
PPD POC: NORMAL
RBC # BLD AUTO: 3.09 M/UL (ref 4.23–5.6)
SODIUM SERPL-SCNC: 138 MMOL/L (ref 136–145)
WBC # BLD AUTO: 11.5 K/UL (ref 4.3–11.1)

## 2019-09-02 PROCEDURE — 74011636320 HC RX REV CODE- 636/320: Performed by: INTERNAL MEDICINE

## 2019-09-02 PROCEDURE — 65660000000 HC RM CCU STEPDOWN

## 2019-09-02 PROCEDURE — 77010033678 HC OXYGEN DAILY

## 2019-09-02 PROCEDURE — 85025 COMPLETE CBC W/AUTO DIFF WBC: CPT

## 2019-09-02 PROCEDURE — 97161 PT EVAL LOW COMPLEX 20 MIN: CPT

## 2019-09-02 PROCEDURE — 94640 AIRWAY INHALATION TREATMENT: CPT

## 2019-09-02 PROCEDURE — 77030011943

## 2019-09-02 PROCEDURE — 94760 N-INVAS EAR/PLS OXIMETRY 1: CPT

## 2019-09-02 PROCEDURE — 36415 COLL VENOUS BLD VENIPUNCTURE: CPT

## 2019-09-02 PROCEDURE — 74011000258 HC RX REV CODE- 258: Performed by: INTERNAL MEDICINE

## 2019-09-02 PROCEDURE — 74011250636 HC RX REV CODE- 250/636: Performed by: INTERNAL MEDICINE

## 2019-09-02 PROCEDURE — 80048 BASIC METABOLIC PNL TOTAL CA: CPT

## 2019-09-02 PROCEDURE — 74177 CT ABD & PELVIS W/CONTRAST: CPT

## 2019-09-02 PROCEDURE — 65270000029 HC RM PRIVATE

## 2019-09-02 PROCEDURE — 74011250637 HC RX REV CODE- 250/637: Performed by: INTERNAL MEDICINE

## 2019-09-02 PROCEDURE — 97530 THERAPEUTIC ACTIVITIES: CPT

## 2019-09-02 PROCEDURE — 74011000250 HC RX REV CODE- 250: Performed by: INTERNAL MEDICINE

## 2019-09-02 RX ORDER — SODIUM CHLORIDE 0.9 % (FLUSH) 0.9 %
10 SYRINGE (ML) INJECTION
Status: COMPLETED | OUTPATIENT
Start: 2019-09-02 | End: 2019-09-02

## 2019-09-02 RX ORDER — SODIUM CHLORIDE 9 MG/ML
75 INJECTION, SOLUTION INTRAVENOUS CONTINUOUS
Status: DISPENSED | OUTPATIENT
Start: 2019-09-02 | End: 2019-09-03

## 2019-09-02 RX ADMIN — FAMOTIDINE 20 MG: 20 TABLET ORAL at 16:55

## 2019-09-02 RX ADMIN — ACETAMINOPHEN 650 MG: 325 TABLET, FILM COATED ORAL at 13:41

## 2019-09-02 RX ADMIN — HEPARIN SODIUM 5000 UNITS: 5000 INJECTION INTRAVENOUS; SUBCUTANEOUS at 23:29

## 2019-09-02 RX ADMIN — FAMOTIDINE 20 MG: 20 TABLET ORAL at 08:25

## 2019-09-02 RX ADMIN — HEPARIN SODIUM 5000 UNITS: 5000 INJECTION INTRAVENOUS; SUBCUTANEOUS at 00:52

## 2019-09-02 RX ADMIN — TAMSULOSIN HYDROCHLORIDE 0.4 MG: 0.4 CAPSULE ORAL at 08:25

## 2019-09-02 RX ADMIN — PIPERACILLIN SODIUM,TAZOBACTAM SODIUM 3.38 G: 3; .375 INJECTION, POWDER, FOR SOLUTION INTRAVENOUS at 05:14

## 2019-09-02 RX ADMIN — GUAIFENESIN 1200 MG: 600 TABLET, EXTENDED RELEASE ORAL at 08:25

## 2019-09-02 RX ADMIN — ACETAMINOPHEN 650 MG: 325 TABLET, FILM COATED ORAL at 22:25

## 2019-09-02 RX ADMIN — IPRATROPIUM BROMIDE AND ALBUTEROL SULFATE 3 ML: .5; 3 SOLUTION RESPIRATORY (INHALATION) at 20:06

## 2019-09-02 RX ADMIN — IPRATROPIUM BROMIDE AND ALBUTEROL SULFATE 3 ML: .5; 3 SOLUTION RESPIRATORY (INHALATION) at 08:28

## 2019-09-02 RX ADMIN — SODIUM CHLORIDE 100 ML: 900 INJECTION, SOLUTION INTRAVENOUS at 12:54

## 2019-09-02 RX ADMIN — IPRATROPIUM BROMIDE AND ALBUTEROL SULFATE 3 ML: .5; 3 SOLUTION RESPIRATORY (INHALATION) at 01:07

## 2019-09-02 RX ADMIN — PIPERACILLIN SODIUM,TAZOBACTAM SODIUM 3.38 G: 3; .375 INJECTION, POWDER, FOR SOLUTION INTRAVENOUS at 21:04

## 2019-09-02 RX ADMIN — PIPERACILLIN SODIUM,TAZOBACTAM SODIUM 3.38 G: 3; .375 INJECTION, POWDER, FOR SOLUTION INTRAVENOUS at 13:40

## 2019-09-02 RX ADMIN — FUROSEMIDE 40 MG: 40 TABLET ORAL at 16:56

## 2019-09-02 RX ADMIN — Medication 10 ML: at 12:54

## 2019-09-02 RX ADMIN — FINASTERIDE 5 MG: 5 TABLET, FILM COATED ORAL at 08:25

## 2019-09-02 RX ADMIN — DIATRIZOATE MEGLUMINE AND DIATRIZOATE SODIUM 15 ML: 660; 100 LIQUID ORAL; RECTAL at 08:25

## 2019-09-02 RX ADMIN — MONTELUKAST 10 MG: 10 TABLET, FILM COATED ORAL at 08:25

## 2019-09-02 RX ADMIN — POTASSIUM CHLORIDE 40 MEQ: 20 TABLET, EXTENDED RELEASE ORAL at 08:25

## 2019-09-02 RX ADMIN — ASPIRIN 81 MG: 81 TABLET ORAL at 08:25

## 2019-09-02 RX ADMIN — IOPAMIDOL 100 ML: 755 INJECTION, SOLUTION INTRAVENOUS at 12:54

## 2019-09-02 RX ADMIN — FLUTICASONE PROPIONATE 2 SPRAY: 50 SPRAY, METERED NASAL at 08:28

## 2019-09-02 RX ADMIN — SODIUM CHLORIDE 75 ML/HR: 900 INJECTION, SOLUTION INTRAVENOUS at 05:19

## 2019-09-02 RX ADMIN — VANCOMYCIN HYDROCHLORIDE 1000 MG: 1 INJECTION, POWDER, LYOPHILIZED, FOR SOLUTION INTRAVENOUS at 08:25

## 2019-09-02 RX ADMIN — FUROSEMIDE 40 MG: 40 TABLET ORAL at 08:25

## 2019-09-02 RX ADMIN — IPRATROPIUM BROMIDE AND ALBUTEROL SULFATE 3 ML: .5; 3 SOLUTION RESPIRATORY (INHALATION) at 14:09

## 2019-09-02 RX ADMIN — HEPARIN SODIUM 5000 UNITS: 5000 INJECTION INTRAVENOUS; SUBCUTANEOUS at 13:41

## 2019-09-02 NOTE — PROGRESS NOTES
Pt resting in bed with eyes closed. Pt awakens when spoken to. Pt oriented times 3 at this time. Pt aggravated with being awoken so many times. Pt denies pain or distress at this time. Pt on 5 l NC AT this time. Pt made aware of CT scan for this am. Pt made aware of NPO till after scan. Pt encouraged to call for assistance if needed call light in reach, door open will monitor.

## 2019-09-02 NOTE — PROGRESS NOTES
Progress Note    Patient: Karuna Kiran MRN: [id number]  SSN: [id number]    YOB: 1932  Age: 80 y.o. Sex: male      Admit Date: 9/1/2019    LOS: 1 day     Karuna Kiran is a 80 y.o. male who has a PMH of COPD on 2 liters home oxygen, HTN, BPH, elevated CEA levels seen by oncology, VT on amiodarone, recurrent pleural effusion requiring thoracentesis, sp pleurodesis on 5/16/19, who came from [facility] in view of progressive PATE, productive yellowish cough, weakness for the past 2 days. He has been compliant with oxygen, he stated he has required at some point 8 liters at night recently due to sob. He was found with a RML pneumonia. Iv vanc and zosyn started. He also had elevated LFTs with high CEA levels. A liver us showed a liver nodule, suspicion for malignancy, but a ct scan with contrast was normal.  PT/OT suggested STR upon discharge.      Subjective: On my assessment he was found in no distress, he still has cough and sob. Objective:     Vitals:    09/01/19 2057 09/02/19 0014 09/02/19 0323 09/02/19 0700   BP:  113/55 120/66 152/73   Pulse:  67 64 63   Resp:  18 18 18   Temp:  98.6 °F (37 °C) 98.5 °F (36.9 °C) 98.1 °F (36.7 °C)   SpO2: 92% 90% 93% 90%   Weight:       Height:            Intake and Output:  Current Shift: No intake/output data recorded. Last three shifts: 08/31 1901 - 09/02 0700  In: 1147 [P.O.:240; I.V.:907]  Out: 1100 [Urine:1100]    Physical Exam:   GENERAL: alert, cooperative, no distress, appears stated age, able to speak in full sentences   EYE: negative  LYMPHATIC: Cervical, supraclavicular, and axillary nodes normal.   THROAT & NECK: normal and no erythema or exudates noted. LUNG: rales over right lung field, no wheezing - 02 92% on 4 liters   HEART: regular rate and rhythm, S1, S2 normal, no murmur, click, rub or gallop  ABDOMEN: soft, non-tender.  Bowel sounds normal. No masses,  no organomegaly  EXTREMITIES:  extremities normal, atraumatic, no cyanosis or edema  SKIN: Normal.  NEUROLOGIC: negative  PSYCHIATRIC: non focal    Lab/Data Review: All lab results for the last 24 hours reviewed. Assessment:     Principal Problem:    HCAP (healthcare-associated pneumonia) (9/1/2019)    Active Problems:    Benign prostatic hyperplasia (12/10/2015)      Cardiovascular disease (12/10/2015)      Essential hypertension, benign (12/10/2015)      COPD (chronic obstructive pulmonary disease) with emphysema ([facility] (10/17/2016)      Overview: Last Assessment & Plan:       Not currently on any maintence medication regimen for COPD as he felt them       to be unhelpful in the past.  I recommended a trial of a ANoro Ellipta       which he should take 1 inhalation daily. Demonstration was completed on       the correct method of administration and he was able to return       demonstration independently in the office today and administer his first       dose. I have given him a 2-week sample which he will use daily and       monitor if he notes any improvement in his breathing. He may also try       pretreating himself with Ventolin before exertion to see if this offers       any relief. Acute on chronic respiratory failure with hypoxia (HCC) (5/6/2019)      VT (ventricular tachycardia) ([facility] (5/6/2019)        Plan:   -HCAP, right middle lobe pneumonia  -hx bilateral pleural effusions, thoracentesis  -Sp pleurodesis on 5/16/19  -HX COPD on 2 liters home oxygen, not in exacerbation      Wean o2 prn  Continue iv vanc and zosyn  Continue duonebs, mucinex  Check sputum culture  f/u blood cultures     -Elevation of LFTs with CEA elevation:   -New liver lesion on Liver US, Concern for malignancy, but ct scan was normal      -VT: continue amiodarone     -HTN: continue home meds     -BPH: home meds     -Falls: PT/OT     DVT ppx: heparin sq     Code status: full code.  Confirmed with the patient     Disposition: STR- CM on board        Signed By: Carlota Cervantes MD     September 2, 2019

## 2019-09-02 NOTE — PROGRESS NOTES
Patient resting in bed quietly. Respirations even and unlabored on 4 L via nasal cannula. Bed low and locked. Bedside table, personal belongings and call light all within reach. Preparing to give bedside shift report to oncoming nurse.

## 2019-09-02 NOTE — PROGRESS NOTES
Received bedside shift report from off going nurse, Maria C Mayorga. Patient resting in bed quietly. Respirations even and unlabored on 4 L via nasal cannula. Bed low and locked. Bedside table, personal belongings, and call light all within reach.

## 2019-09-02 NOTE — PROGRESS NOTES
Problem: Falls - Risk of  Goal: *Absence of Falls  Description  Document Baldev Boeck Fall Risk and appropriate interventions in the flowsheet.   Outcome: Progressing Towards Goal  Note:   Fall Risk Interventions:  Mobility Interventions: Patient to call before getting OOB         Medication Interventions: Patient to call before getting OOB    Elimination Interventions: Call light in reach    History of Falls Interventions: Door open when patient unattended         Problem: Gas Exchange - Impaired  Goal: *Absence of hypoxia  Outcome: Progressing Towards Goal

## 2019-09-02 NOTE — PROGRESS NOTES
Occupational Therapy Note:  OT orders received, chart reviewed, and evaluation attempted. Patient with PT at this time. Will check back as schedule permits and patient is available to initiate occupational therapy evaluation.    Thank you for this consult,   Sandy Gonzalez, OTR/L

## 2019-09-02 NOTE — PROGRESS NOTES
Problem: Mobility Impaired (Adult and Pediatric)  Goal: *Acute Goals and Plan of Care (Insert Text)  Description  LTG:  (1.)Mr. Nasra Dallas will move from supine to sit and sit to supine, scoot up and down and roll side to side with MODIFIED INDEPENDENCE within 7 treatment day(s). (2.)Mr. Nasra Dallas will transfer from bed to chair and chair to bed with STAND BY ASSIST using the least restrictive device within 7 treatment day(s). (3.)Mr. Nasra Dallas will ambulate with MINIMAL ASSIST for 25+ feet with the least restrictive device within 7 treatment day(s). (4.)Mr. Nasra Dallas will perform exercises per HEP for 10+ minutes to improve strength and mobility within 7 days. ________________________________________________________________________________________________   Outcome: Progressing Towards Goal     PHYSICAL THERAPY: Initial Assessment and AM 9/2/2019  INPATIENT: PT Visit Days : 1  [place] / [place] / Product Type: Medicare /       NAME/AGE/GENDER: Roxie Collado is a 80 y.o. male   PRIMARY DIAGNOSIS: HCAP (healthcare-associated pneumonia) [J18.9] HCAP (healthcare-associated pneumonia)   HCAP (healthcare-associated pneumonia)          ICD-10: Treatment Diagnosis:    Generalized Muscle Weakness (M62.81)  Difficulty in walking, Not elsewhere classified (R26.2)  Other abnormalities of gait and mobility (R26.89)   Precaution/Allergies:  Patient has no known allergies. ASSESSMENT:     Mr. Nasra Dallas is an 80year old male [facility] with HCAP, weakness, PATE. He presents in supine without complaints and is agreeable to therapy assessment. Pt reports that he was not very mobile or ambulatory and has not walked \"for weeks\". Requires min assist and additional time, cueing for bed mobility and transfer to sitting. Demonstrates fair seated balance at edge of bed. LE assessment shows decreased AROM and strength in B LEs.  Pt performs sit-stand transfer with min-mod assist from edge of bed. Fair standing balance noted and needs constant support from walker and min assist. Able to take steps from bed to chair, cueing for safe descent into chair and safe body mechanics. Pt can reposition himself in chair without assistance. Legs elevated, chair alarm on, and family present. RN notified of pt position and mobility level. Rashid Storey appears to be functioning well below baseline with strength, balance, and activity tolerance and will benefit from continued therapy during hospital stay to address deficits/maximize independence with mobility. Will need to return to rehab at [place]. This section established at most recent assessment   PROBLEM LIST (Impairments causing functional limitations):  Decreased Strength  Decreased ADL/Functional Activities  Decreased Transfer Abilities  Decreased Ambulation Ability/Technique  Decreased Balance  Decreased Activity Tolerance   INTERVENTIONS PLANNED: (Benefits and precautions of physical therapy have been discussed with the patient.)  Balance Exercise  Bed Mobility  Gait Training  Home Exercise Program (HEP)  Therapeutic Activites  Therapeutic Exercise/Strengthening  Transfer Training     TREATMENT PLAN: Frequency/Duration: 3 times a week for duration of hospital stay  Rehabilitation Potential For Stated Goals: Good     REHAB RECOMMENDATIONS (at time of discharge pending progress):    Placement: It is my opinion, based on this patient's performance to date, that Mr. Marie Barahona may benefit from intensive therapy at [address] after discharge due to the functional deficits listed above that are likely to improve with skilled rehabilitation and concerns that he/she may be unsafe to be unsupervised at home due to weakness, debility, fall risk .   Equipment:   Tbd pending progress               HISTORY:   History of Present Injury/Illness (Reason for Referral):  Per H&P, \"Octavio Collins is a 80 y.o. male who has a PMH of COPD on 2 liters home oxygen, HTN, BPH, elevated CEA levels seen by oncology, VT on amiodarone, recurrent pleural effusion requiring thoracentesis, sp pleurodesis on 5/16/19, who came from [facility] in view of progressive PATE, productive yellowish cough, weakness for the past 2 days. He has been compliant with oxygen, he stated he has required at some point 8 liters at night recently due to sob. He has had falls with no head trauma. He had a recent hospitalization from 6/10 - 6/16 due to bilateral pleural effusions. The patient denies fever, chills, nausea, vomiting, chest pain, diarrhea. His appetite is decreased. Upon arrival VS: HR 60 /63  O 90 % on 4 liters NC. He seemed in mild distress due to cough. Labs: wbc 15k; chemistry: ALT 84, AST 59, PCT 0.1; Troponin 0.02; abg: ph 7.43; PCO2 41, PO2 56  CXR: progressed RML infiltrate. Blood cultures taken. Iv vanc and zosyn started\"    Past Medical History/Comorbidities:   Mr. Krista Schafer  has a past medical history of Abdominal aortic aneurysm ([address] (12/10/2015), Abdominal aortic aneurysm without rupture ([address] Allergic rhinitis (12/10/2015), Asthma, Benign neoplasm, Benign prostatic hyperplasia (12/10/2015), BPH without urinary obstruction, Cardiovascular disease (12/10/2015), Chronic obstructive asthma with exacerbation ([address] (12/10/2015), COPD (chronic obstructive pulmonary disease) ([address] (12/10/2015), Cough with hemoptysis, Erectile dysfunction (12/10/2015), Essential hypertension, benign (12/10/2015), Fracture (10/2014), History of colon polyps, Low testosterone (12/10/2015), Lumbago, Memory loss, Overflow incontinence, Raynaud's syndrome (12/10/2015), Rotator cuff tendinitis, Thrombocytopenia ([address] and Urinary frequency. Mr. Krista Schafer  has a past surgical history that includes hx hernia repair (1980); hx colonoscopy; hx fracture tx (10/2014); hx cataract removal (6/2016-right); and hx orthopaedic (03/2018).   Social History/Living Environment:   Home Environment: Rehabilitation facility  One/Two Story Residence: One story  Living Alone: No  Support Systems: Child(tremayne), Family member(s)  Patient Expects to be Discharged to[de-identified] Rehabilitation facility  Current DME Used/Available at Home: [address]  Prior Level of Function/Work/Activity:  Admitted from [facility] rehab facility. Pt states he was not doing much therapy there and had not really walked in \"weeks\". No family present to confirm or assist with history. Number of Personal Factors/Comorbidities that affect the Plan of Care: 1-2: MODERATE COMPLEXITY   EXAMINATION:   Most Recent Physical Functioning:   Gross Assessment:  AROM: Generally decreased, functional  Strength: Generally decreased, functional  Coordination: Generally decreased, functional               Posture:  Posture (WDL): Exceptions to WDL  Posture Assessment: Forward head, Rounded shoulders, Trunk flexion  Balance:  Sitting: Impaired  Sitting - Static: Fair (occasional)  Sitting - Dynamic: Fair (occasional)  Standing: Impaired  Standing - Static: Fair(-)  Standing - Dynamic : Fair(-) Bed Mobility:  Rolling: Minimum assistance  Supine to Sit: Minimum assistance  Scooting: Minimum assistance  Wheelchair Mobility:     Transfers:  Sit to Stand: Minimum assistance; Moderate assistance  Stand to Sit: Minimum assistance  Bed to Chair: Minimum assistance  Interventions: Verbal cues; Safety awareness training; Tactile cues  Duration: 9 Minutes  Gait:     Base of Support: Narrowed  Speed/Marifer: Pace decreased (<100 feet/min); Slow  Step Length: Left shortened;Right shortened  Gait Abnormalities: Trunk sway increased;Decreased step clearance  Distance (ft): 4 Feet (ft)  Assistive Device: Walker, rolling;Gait belt  Ambulation - Level of Assistance: Minimal assistance; Moderate assistance  Interventions: Verbal cues; Visual/Demos; Safety awareness training; Tactile cues;Manual cues      Body Structures Involved:  Muscles Body Functions Affected:  Respiratory  Movement Related Activities and Participation Affected:  General Tasks and Demands  Mobility  Domestic Life  Community, Social and [place] [employer]   Number of elements that affect the Plan of Care: 4+: HIGH COMPLEXITY   CLINICAL PRESENTATION:   Presentation: Stable and uncomplicated: LOW COMPLEXITY   CLINICAL DECISION MAKIN [facility] Inpatient Short Form  How much difficulty does the patient currently have. .. Unable A Lot A Little None   1. Turning over in bed (including adjusting bedclothes, sheets and blankets)? ? 1   ? 2   ? 3   ? 4   2. Sitting down on and standing up from a chair with arms ( e.g., wheelchair, bedside commode, etc.)   ? 1   ? 2   ? 3   ? 4   3. Moving from lying on back to sitting on the side of the bed?   ? 1   ? 2   ? 3   ? 4   How much help from another person does the patient currently need. .. Total A Lot A Little None   4. Moving to and from a bed to a chair (including a wheelchair)? ? 1   ? 2   ? 3   ? 4   5. Need to walk in hospital room? ? 1   ? 2   ? 3   ? 4   6. Climbing 3-5 steps with a railing? ? 1   ? 2   ? 3   ? 4   © 2007, Trustees of [address] [place] [address] [address] [id number], under license to [employer]. All rights reserved      Score:  Initial: 15 Most Recent: X (Date: -- )    Interpretation of Tool:  Represents activities that are increasingly more difficult (i.e. Bed mobility, Transfers, Gait). Medical Necessity:     Patient demonstrates good   rehab potential due to higher previous functional level. Reason for Services/Other Comments:  Patient continues to demonstrate capacity to improve strength, mobility, balance, transfers, activity tolerance which will increase independence, decrease amount of assistance required from caregiver and increase safety  .    Use of outcome tool(s) and clinical judgement create a POC that gives a: Clear prediction of patient's progress: LOW COMPLEXITY            TREATMENT:   (In addition to Assessment/Re-Assessment sessions the following treatments were rendered)   Pre-treatment Symptoms/Complaints:  \"thank you\"  Pain: Initial:   Pain Intensity 1: 0  Post Session:  0/10     Therapeutic Activity: (  9 Minutes ):  Therapeutic activities including Bed mobility, seated activities, sit-stand transfers, Chair transfers and Ambulation on level ground to improve mobility, strength, balance and activity tolerance . Required minimal Verbal cues; Visual/Demos; Safety awareness training; Tactile cues;Manual cues to promote static and dynamic balance in standing and promote motor control of bilateral, lower extremity(s). Braces/Orthotics/Lines/Etc:   IV  O2 Device: Nasal cannula  Treatment/Session Assessment:    Response to Treatment:  pt performs mobility with min-mod assist. Weak, debilitated  Interdisciplinary Collaboration:   Physical Therapist  Registered Nurse  After treatment position/precautions:   Up in chair  Bed alarm/tab alert on  Bed/Chair-wheels locked  Bed in low position  Call light within reach  Family at bedside   Compliance with Program/Exercises: Will assess as treatment progresses  Recommendations/Intent for next treatment session: \"Next visit will focus on advancements to more challenging activities and reduction in assistance provided\".   Total Treatment Duration:  PT Patient Time In/Time Out  Time In: 1115  Time Out: [address] DPT

## 2019-09-02 NOTE — PROGRESS NOTES
Pt resting in bed with wife at bedside. Pt remains on 5 L NC at this time. No distress noted at this time. Call light in reach, door open will monitor.

## 2019-09-02 NOTE — PROGRESS NOTES
Pt sitting up in bed no distress noted at this time. Attempted to I/O cath pt and no kits on floor at this time. Order sent to materials and call place no answer.

## 2019-09-02 NOTE — PROGRESS NOTES
Pt given contrast for CT scan. Pt drank and CT made aware pt completed contrast. Pt remains NPO at this time.

## 2019-09-03 LAB
ANION GAP SERPL CALC-SCNC: 6 MMOL/L (ref 7–16)
BASOPHILS # BLD: 0.1 K/UL (ref 0–0.2)
BASOPHILS NFR BLD: 1 % (ref 0–2)
BUN SERPL-MCNC: 14 MG/DL (ref 8–23)
CALCIUM SERPL-MCNC: 8.3 MG/DL (ref 8.3–10.4)
CHLORIDE SERPL-SCNC: 101 MMOL/L (ref 98–107)
CO2 SERPL-SCNC: 28 MMOL/L (ref 21–32)
CREAT SERPL-MCNC: 0.85 MG/DL (ref 0.8–1.5)
DIFFERENTIAL METHOD BLD: ABNORMAL
EOSINOPHIL # BLD: 0.2 K/UL (ref 0–0.8)
EOSINOPHIL NFR BLD: 2 % (ref 0.5–7.8)
ERYTHROCYTE [DISTWIDTH] IN BLOOD BY AUTOMATED COUNT: 15 % (ref 11.9–14.6)
GLUCOSE SERPL-MCNC: 94 MG/DL (ref 65–100)
HAV IGM SERPL QL IA: NEGATIVE
HBV CORE IGM SERPL QL IA: NEGATIVE
HBV SURFACE AG SERPL QL IA: NEGATIVE
HCT VFR BLD AUTO: 35.7 % (ref 41.1–50.3)
HCV AB S/CO SERPL IA: <0.1 S/CO RATIO (ref 0–0.9)
HGB BLD-MCNC: 11.4 G/DL (ref 13.6–17.2)
IMM GRANULOCYTES # BLD AUTO: 0 K/UL (ref 0–0.5)
IMM GRANULOCYTES NFR BLD AUTO: 0 % (ref 0–5)
LYMPHOCYTES # BLD: 1.6 K/UL (ref 0.5–4.6)
LYMPHOCYTES NFR BLD: 16 % (ref 13–44)
MCH RBC QN AUTO: 32.3 PG (ref 26.1–32.9)
MCHC RBC AUTO-ENTMCNC: 31.9 G/DL (ref 31.4–35)
MCV RBC AUTO: 101.1 FL (ref 79.6–97.8)
MM INDURATION POC: 0 MM (ref 0–5)
MONOCYTES # BLD: 1 K/UL (ref 0.1–1.3)
MONOCYTES NFR BLD: 10 % (ref 4–12)
NEUTS SEG # BLD: 7.5 K/UL (ref 1.7–8.2)
NEUTS SEG NFR BLD: 72 % (ref 43–78)
NRBC # BLD: 0 K/UL (ref 0–0.2)
PLATELET # BLD AUTO: 220 K/UL (ref 150–450)
PMV BLD AUTO: 10.1 FL (ref 9.4–12.3)
POTASSIUM SERPL-SCNC: 4.3 MMOL/L (ref 3.5–5.1)
PPD POC: NORMAL
RBC # BLD AUTO: 3.53 M/UL (ref 4.23–5.6)
SODIUM SERPL-SCNC: 135 MMOL/L (ref 136–145)
VANCOMYCIN TROUGH SERPL-MCNC: 6.6 UG/ML (ref 5–20)
WBC # BLD AUTO: 10.4 K/UL (ref 4.3–11.1)

## 2019-09-03 PROCEDURE — 94760 N-INVAS EAR/PLS OXIMETRY 1: CPT

## 2019-09-03 PROCEDURE — 36415 COLL VENOUS BLD VENIPUNCTURE: CPT

## 2019-09-03 PROCEDURE — 80202 ASSAY OF VANCOMYCIN: CPT

## 2019-09-03 PROCEDURE — 74011250636 HC RX REV CODE- 250/636: Performed by: INTERNAL MEDICINE

## 2019-09-03 PROCEDURE — 74011000250 HC RX REV CODE- 250: Performed by: INTERNAL MEDICINE

## 2019-09-03 PROCEDURE — 80048 BASIC METABOLIC PNL TOTAL CA: CPT

## 2019-09-03 PROCEDURE — 77030011943

## 2019-09-03 PROCEDURE — 97166 OT EVAL MOD COMPLEX 45 MIN: CPT

## 2019-09-03 PROCEDURE — 74011250637 HC RX REV CODE- 250/637: Performed by: INTERNAL MEDICINE

## 2019-09-03 PROCEDURE — 65660000000 HC RM CCU STEPDOWN

## 2019-09-03 PROCEDURE — 77010033678 HC OXYGEN DAILY

## 2019-09-03 PROCEDURE — 77010033711 HC HIGH FLOW OXYGEN

## 2019-09-03 PROCEDURE — 94640 AIRWAY INHALATION TREATMENT: CPT

## 2019-09-03 PROCEDURE — 65270000029 HC RM PRIVATE

## 2019-09-03 PROCEDURE — 85025 COMPLETE CBC W/AUTO DIFF WBC: CPT

## 2019-09-03 PROCEDURE — 74011000258 HC RX REV CODE- 258: Performed by: INTERNAL MEDICINE

## 2019-09-03 PROCEDURE — 97530 THERAPEUTIC ACTIVITIES: CPT

## 2019-09-03 RX ORDER — VANCOMYCIN HYDROCHLORIDE
1250
Status: DISCONTINUED | OUTPATIENT
Start: 2019-09-03 | End: 2019-09-07

## 2019-09-03 RX ORDER — AMLODIPINE BESYLATE 5 MG/1
5 TABLET ORAL DAILY
Status: DISCONTINUED | OUTPATIENT
Start: 2019-09-03 | End: 2019-09-09 | Stop reason: HOSPADM

## 2019-09-03 RX ORDER — HYDRALAZINE HYDROCHLORIDE 25 MG/1
25 TABLET, FILM COATED ORAL
Status: DISCONTINUED | OUTPATIENT
Start: 2019-09-03 | End: 2019-09-09 | Stop reason: HOSPADM

## 2019-09-03 RX ADMIN — FUROSEMIDE 40 MG: 40 TABLET ORAL at 09:46

## 2019-09-03 RX ADMIN — ACETAMINOPHEN 650 MG: 325 TABLET, FILM COATED ORAL at 05:42

## 2019-09-03 RX ADMIN — IPRATROPIUM BROMIDE AND ALBUTEROL SULFATE 3 ML: .5; 3 SOLUTION RESPIRATORY (INHALATION) at 21:37

## 2019-09-03 RX ADMIN — IPRATROPIUM BROMIDE AND ALBUTEROL SULFATE 3 ML: .5; 3 SOLUTION RESPIRATORY (INHALATION) at 13:07

## 2019-09-03 RX ADMIN — FAMOTIDINE 20 MG: 20 TABLET ORAL at 17:00

## 2019-09-03 RX ADMIN — HYDRALAZINE HYDROCHLORIDE 25 MG: 25 TABLET, FILM COATED ORAL at 15:56

## 2019-09-03 RX ADMIN — IPRATROPIUM BROMIDE AND ALBUTEROL SULFATE 3 ML: .5; 3 SOLUTION RESPIRATORY (INHALATION) at 01:29

## 2019-09-03 RX ADMIN — GUAIFENESIN 1200 MG: 600 TABLET, EXTENDED RELEASE ORAL at 09:45

## 2019-09-03 RX ADMIN — PIPERACILLIN SODIUM,TAZOBACTAM SODIUM 3.38 G: 3; .375 INJECTION, POWDER, FOR SOLUTION INTRAVENOUS at 21:25

## 2019-09-03 RX ADMIN — FAMOTIDINE 20 MG: 20 TABLET ORAL at 09:46

## 2019-09-03 RX ADMIN — MONTELUKAST 10 MG: 10 TABLET, FILM COATED ORAL at 09:46

## 2019-09-03 RX ADMIN — PIPERACILLIN SODIUM,TAZOBACTAM SODIUM 3.38 G: 3; .375 INJECTION, POWDER, FOR SOLUTION INTRAVENOUS at 03:59

## 2019-09-03 RX ADMIN — PIPERACILLIN SODIUM,TAZOBACTAM SODIUM 3.38 G: 3; .375 INJECTION, POWDER, FOR SOLUTION INTRAVENOUS at 11:49

## 2019-09-03 RX ADMIN — TAMSULOSIN HYDROCHLORIDE 0.4 MG: 0.4 CAPSULE ORAL at 09:46

## 2019-09-03 RX ADMIN — FINASTERIDE 5 MG: 5 TABLET, FILM COATED ORAL at 09:46

## 2019-09-03 RX ADMIN — ASPIRIN 81 MG: 81 TABLET ORAL at 09:46

## 2019-09-03 RX ADMIN — VANCOMYCIN HYDROCHLORIDE 1000 MG: 1 INJECTION, POWDER, LYOPHILIZED, FOR SOLUTION INTRAVENOUS at 09:45

## 2019-09-03 RX ADMIN — FLUTICASONE PROPIONATE 2 SPRAY: 50 SPRAY, METERED NASAL at 09:47

## 2019-09-03 RX ADMIN — POTASSIUM CHLORIDE 40 MEQ: 20 TABLET, EXTENDED RELEASE ORAL at 09:46

## 2019-09-03 RX ADMIN — HEPARIN SODIUM 5000 UNITS: 5000 INJECTION INTRAVENOUS; SUBCUTANEOUS at 11:49

## 2019-09-03 RX ADMIN — AMLODIPINE BESYLATE 5 MG: 5 TABLET ORAL at 11:49

## 2019-09-03 RX ADMIN — FUROSEMIDE 40 MG: 40 TABLET ORAL at 17:00

## 2019-09-03 RX ADMIN — IPRATROPIUM BROMIDE AND ALBUTEROL SULFATE 3 ML: .5; 3 SOLUTION RESPIRATORY (INHALATION) at 07:46

## 2019-09-03 NOTE — PROGRESS NOTES
Pt given hydralazine 25 mg tablet orally for blood pressure 179/86. Pt alert and oriented. Will continue to monitor blood pressure.

## 2019-09-03 NOTE — PROGRESS NOTES
Pt resting in bed with eyes closed at this time. Alert and oriented. No distress noted at this time. Respirations even and unlabored on 5 1/2 L NC high flow. Pt denies pain at this time. Pt instructed to call for assistance if needed. Call light in place. Door open. Will continue to monitor.

## 2019-09-03 NOTE — PROGRESS NOTES
Care Management Interventions  PCP Verified by CM: Yes  Mode of Transport at Discharge: BLS  Transition of Care Consult (CM Consult): Long Term Care  Physical Therapy Consult: Yes  Occupational Therapy Consult: Yes  Current Support Network: Nursing Facility  Confirm Follow Up Transport: Other (see comment)  Plan discussed with Pt/Family/Caregiver: Yes  Freedom of Choice Offered: Yes  Discharge Location  Discharge Placement: Skilled nursing facility  CM spoke with patient's spouse. Patient has a long term bed at [facility]. Patient can return when medically stable.

## 2019-09-03 NOTE — PROGRESS NOTES
Pt straight cath and removed 600 ml of clear yellow urine. Pt tolerated well. Pt resting quietly in bed. Alert and oriented times 3. No distress noted at this time. Respirations even and unlabored. 5 L NC high flow. Pt denies pain at this time. Pt instructed to call for assistance if needed. Call light in place. Door open. Will continue to monitor.

## 2019-09-03 NOTE — PROGRESS NOTES
Progress Note    Patient: Gallo Granger MRN: [id number]  SSN: [id number]    YOB: 1932  Age: 80 y.o. Sex: male      Admit Date: 9/1/2019    LOS: 2 days     Subjective:     PMH of COPD, on 2 LPM oxygen cannula, hypertension, BPH, elevated CEA, VT on amiodarone, recurrent pleural effusion requiring thoracentesis, sp pleurodesis on 5/16/19,     Admitted due to shortness of breath. Found to have RML pneumonia. On Vancomycin and Zosyn now. Objective:     Vitals:    09/03/19 0800 09/03/19 1107 09/03/19 1157 09/03/19 1307   BP:  173/89     Pulse: (!) 57 (!) 56 (!) 58    Resp:  20     Temp:  97.4 °F (36.3 °C)     SpO2:  98%  95%   Weight:       Height:            Intake and Output:  Current Shift: 09/03 0701 - 09/03 1900  In: 600 [P.O.:600]  Out: -   Last three shifts: 09/01 1901 - 09/03 0700  In: 1027 [P.O.:120; I.V.:907]  Out: 3950 [Urine:3950]    Physical Exam:     General:                    The patient is a pleasant male in no acute distress now. Head:                                   Normocephalic/atraumatic. Eyes:                                   palpebral pallor, no scleral icterus. ENT:                                    External auricular and nasal exam within normal limits. Mucous membranes are moist.  Neck:                                   Supple, non-tender, no JVD. Lungs:                       decreased to auscultation bilaterally without wheezes or crackles. No respiratory accessory muscle use. Heart:                                  Regular rate and rhythm, without murmurs, rubs, or gallops. Abdomen:                  Soft, non-tender, non-distended with normoactive bowel sounds. Genitourinary:           No tenderness over the bladder or bilateral CVAs. Extremities:               Without clubbing, cyanosis, or edema.   Skin:                                    Normal color, texture, and turgor. No rashes, lesions, or jaundice. Pulses:                      Radial and dorsalis pedis pulses present 2+ bilaterally. Capillary refill <2s. Neurologic:                CN II-XII grossly intact and symmetrical.                                               Moving all four extremities well with no focal deficits. Psychiatric:                Pleasant demeanor, appropriate affect. Alert and oriented x 3      Lab/Data Review:    Recent Results (from the past 24 hour(s))   CBC WITH AUTOMATED DIFF    Collection Time: 09/03/19  8:44 AM   Result Value Ref Range    WBC 10.4 4.3 - 11.1 K/uL    RBC 3.53 (L) 4.23 - 5.6 M/uL    HGB 11.4 (L) 13.6 - 17.2 g/dL    HCT 35.7 (L) 41.1 - 50.3 %    .1 (H) 79.6 - 97.8 FL    MCH 32.3 26.1 - 32.9 PG    MCHC 31.9 31.4 - 35.0 g/dL    RDW 15.0 (H) 11.9 - 14.6 %    PLATELET 105 220 - 941 K/uL    MPV 10.1 9.4 - 12.3 FL    ABSOLUTE NRBC 0.00 0.0 - 0.2 K/uL    DF AUTOMATED      NEUTROPHILS 72 43 - 78 %    LYMPHOCYTES 16 13 - 44 %    MONOCYTES 10 4.0 - 12.0 %    EOSINOPHILS 2 0.5 - 7.8 %    BASOPHILS 1 0.0 - 2.0 %    IMMATURE GRANULOCYTES 0 0.0 - 5.0 %    ABS. NEUTROPHILS 7.5 1.7 - 8.2 K/UL    ABS. LYMPHOCYTES 1.6 0.5 - 4.6 K/UL    ABS. MONOCYTES 1.0 0.1 - 1.3 K/UL    ABS. EOSINOPHILS 0.2 0.0 - 0.8 K/UL    ABS. BASOPHILS 0.1 0.0 - 0.2 K/UL    ABS. IMM.  GRANS. 0.0 0.0 - 0.5 K/UL   METABOLIC PANEL, BASIC    Collection Time: 09/03/19  8:44 AM   Result Value Ref Range    Sodium 135 (L) 136 - 145 mmol/L    Potassium 4.3 3.5 - 5.1 mmol/L    Chloride 101 98 - 107 mmol/L    CO2 28 21 - 32 mmol/L    Anion gap 6 (L) 7 - 16 mmol/L    Glucose 94 65 - 100 mg/dL    BUN 14 8 - 23 MG/DL    Creatinine 0.85 0.8 - 1.5 MG/DL    GFR est AA >60 >60 ml/min/1.73m2    GFR est non-AA >60 >60 ml/min/1.73m2    Calcium 8.3 8.3 - 10.4 MG/DL   Alli Mention    Collection Time: 09/03/19  8:44 AM   Result Value Ref Range    Vancomycin,trough 6.6 5 - 20 ug/mL       Current Facility-Administered Medications:     vancomycin (VANCOCIN) 1250 mg in  ml infusion, 1,250 mg, IntraVENous, Q18H, Katie Dolan MD    amLODIPine (NORVASC) tablet 5 mg, 5 mg, Oral, DAILY, Kevin Urbina MD, 5 mg at 09/03/19 1149    hydrALAZINE (APRESOLINE) tablet 25 mg, 25 mg, Oral, Q6H PRN, Kevin Urbina MD    acetaminophen (TYLENOL) tablet 650 mg, 650 mg, Oral, Q6H PRN, Katie Dolan MD, 650 mg at 09/03/19 0542    albuterol-ipratropium (DUO-NEB) 2.5 MG-0.5 MG/3 ML, 3 mL, Nebulization, Q6H RT, Katie Dolan MD, 3 mL at 09/03/19 1307    aspirin delayed-release tablet 81 mg, 81 mg, Oral, DAILY, Katie Dolan MD, 81 mg at 09/03/19 0946    finasteride (PROSCAR) tablet 5 mg, 5 mg, Oral, DAILY, Katie Dolan MD, 5 mg at 09/03/19 0946    fluticasone propionate (FLONASE) 50 mcg/actuation nasal spray 2 Spray, 2 Spray, Both Nostrils, DAILY, Katie Dolan MD, 2 [place] at 09/03/19 0947    furosemide (LASIX) tablet 40 mg, 40 mg, Oral, BID, Katie Dolan MD, 40 mg at 09/03/19 0946    guaiFENesin ER (MUCINEX) tablet 1,200 mg, 1,200 mg, Oral, DAILY, Katie Dolan MD, 1,200 mg at 09/03/19 0945    montelukast (SINGULAIR) tablet 10 mg, 10 mg, Oral, DAILY, Katie Dolan MD, 10 mg at 09/03/19 0946    potassium chloride (K-DUR, KLOR-CON) SR tablet 40 mEq, 40 mEq, Oral, DAILY, Katie Dolan MD, 40 mEq at 09/03/19 0946    famotidine (PEPCID) tablet 20 mg, 20 mg, Oral, BID, Katie Dolan MD, 20 mg at 09/03/19 0946    tamsulosin (FLOMAX) capsule 0.4 mg, 0.4 mg, Oral, DAILY, Katie Dolan MD, 0.4 mg at 09/03/19 0946    piperacillin-tazobactam (ZOSYN) 3.375 g in 0.9% sodium chloride (MBP/ADV) 100 mL, 3.375 g, IntraVENous, Q8H, Katie Dolan MD, Last Rate: 25 mL/hr at 09/03/19 1149, 3.375 g at 09/03/19 1149    heparin (porcine) injection 5,000 Units, 5,000 Units, SubCUTAneous, Q12H, Katie Dolan MD, 5,000 Units at 09/03/19 1149    ondansetron [facility] injection 4 mg, 4 mg, IntraVENous, Q8H PRN, Juan Mcdaniels MD      Assessment:     Principal Problem:    HCAP (healthcare-associated pneumonia) (9/1/2019)    Active Problems:    Benign prostatic hyperplasia (12/10/2015)      Cardiovascular disease (12/10/2015)      Essential hypertension, benign (12/10/2015)      COPD (chronic obstructive pulmonary disease) with emphysema ([facility] (10/17/2016)      Overview: Last Assessment & Plan:       Not currently on any maintence medication regimen for COPD as he felt them       to be unhelpful in the past.  I recommended a trial of a ANoro Ellipta       which he should take 1 inhalation daily. Demonstration was completed on       the correct method of administration and he was able to return       demonstration independently in the office today and administer his first       dose. I have given him a 2-week sample which he will use daily and       monitor if he notes any improvement in his breathing. He may also try       pretreating himself with Ventolin before exertion to see if this offers       any relief. Acute on chronic respiratory failure with hypoxia (HCC) (5/6/2019)      VT (ventricular tachycardia) ([facility] (5/6/2019)        Plan:     Health care associated pneumonia  Continue current medications. Clinically improving. Continue oxygen cannula as per baseline. Duoneb, Mucinex. Hypertension  Monitor blood pressure and manage accordingly. Continue home medications. BPH   Continue home medications. I have discussed the plan of care with patient.       DVT prophylaxis : heparin [place]       Signed By: Lori Atkinson MD     September 3, 2019

## 2019-09-03 NOTE — PROGRESS NOTES
Problem: Falls - Risk of  Goal: *Absence of Falls  Description  Document [facility] Fall Risk and appropriate interventions in the flowsheet.   Outcome: Progressing Towards Goal  Note:   Fall Risk Interventions:  Mobility Interventions: Communicate number of staff needed for ambulation/transfer, Bed/chair exit alarm, Patient to call before getting OOB, PT Consult for mobility concerns, Strengthening exercises (ROM-active/passive), Utilize walker, cane, or other assistive device         Medication Interventions: Bed/chair exit alarm, Patient to call before getting OOB, Teach patient to arise slowly    Elimination Interventions: Call light in reach, Patient to call for help with toileting needs, Urinal in reach, Bed/chair exit alarm    History of Falls Interventions: Bed/chair exit alarm, Consult care management for discharge planning, Door open when patient unattended

## 2019-09-03 NOTE — PROGRESS NOTES
Problem: Self Care Deficits Care Plan (Adult)  Goal: *Acute Goals and Plan of Care (Insert Text)  Description  1. Patient will complete total body bathing and dressing with minimal assistance and adaptive equipment as needed. 2. Patient will complete toileting with contact guard assistance and adaptive equipment as needed. 3. Patient will tolerate 20 minutes of OT treatment with up to 3 rest breaks to increase activity tolerance for ADLs. 4. Patient will complete functional transfers with stand by assistance and adaptive equipment as needed. 5. Patient will demonstrate modified independence with therapeutic exercise HEP to increase strength in BUEs for increased safety and independence with functional transfers. 6. Patient will complete functional mobility for ADLs with stand by assistance and adaptive equipment as needed. 7. Patient will verbalize 2 energy conservation techniques with no cues from therapist to increase safety and independence with ADLs. Timeframe: 7 visits      Outcome: Progressing Towards Goal     OCCUPATIONAL THERAPY: Initial Assessment, Daily Note and PM 9/3/2019  INPATIENT: OT Visit Days: 1  [place] / [place] / Product Type: Medicare /      NAME/AGE/GENDER: Amanda Amador is a 80 y.o. male   PRIMARY DIAGNOSIS:  HCAP (healthcare-associated pneumonia) [J18.9] HCAP (healthcare-associated pneumonia)   HCAP (healthcare-associated pneumonia)          ICD-10: Treatment Diagnosis:    Generalized Muscle Weakness (M62.81)  Other lack of cordination (R27.8)  History of falling (Z91.81)  Dizziness and Giddiness (R42)   Precautions/Allergies:    Fall precautions  Patient has no known allergies. ASSESSMENT:     Mr. Gus Perdomo is a 80 y.o. male admitted with HCAP, SOB. Pt admitted from [address] [place] [address] and reports receiving assistance with ADLs, however believes he needs less assist than he is given. Primarily needs assist with LB ADLs.  Pt uses w/c for mobility, has been working on walking with RW with therapy. Utilizes 2L supplemental O2. Upon arrival pt alert and agreeable to OT evaluation and treatment, on 5L O2 HFNC. BUE assessment revealed AROM WFL and strength generally decreased in BUEs. Pt completed bed mobility with SBA/additional time. Pt demonstrates intact sitting balance. Treatment initiated to include functional transfers with Tiffany/cueing and steps forward/backward with CGA/RW, required seated rest break due to fatigue/SOB. O2 sats in high 70s, pt cued on breathing technique and sats improved to low 80s. O2 increased to 7L and pt improved to 90% with rest/breathing. Pt stood again Tiffany, took steps to head of bed and sat with CGA. Returned to supine with SBA/additional time. O2 sats dropped again but improved in supine with breathing technique. Pt left on 5L O2 HFNC, O2 sats 90%, with call bell within reach. Pt presents with deficits in strength, activity tolerance, balance, ambulation and transfers. Cami Amaro is currently functioning below baseline and would benefit from continued OT to increase safety and independence with ADLs. Will follow.       This section established at most recent assessment   PROBLEM LIST (Impairments causing functional limitations):  Decreased Strength  Decreased ADL/Functional Activities  Decreased Transfer Abilities  Decreased Ambulation Ability/Technique  Decreased Balance  Decreased Activity Tolerance  Decreased Pacing Skills  Decreased Work Simplification/Energy Conservation Techniques  Increased Fatigue  Increased Shortness of Breath  Decreased [place] with Home Exercise Program   INTERVENTIONS PLANNED: (Benefits and precautions of occupational therapy have been discussed with the patient.)  Activities of daily living training  Adaptive equipment training  Balance training  Clothing management  Donning&doffing training  Hygiene training  Neuromuscular re-eduation  Re-evaluation  Therapeutic activity  Therapeutic exercise  Wheelchair management     TREATMENT PLAN: Frequency/Duration: Follow patient 3x/week to address above goals. Rehabilitation Potential For Stated Goals: Good     REHAB RECOMMENDATIONS (at time of discharge pending progress):    Placement: It is my opinion, based on this patient's performance to date, that Mr. Reyna Veronica may benefit from intensive therapy at a [address] [place] [address] after discharge due to the functional deficits listed above that are likely to improve with skilled rehabilitation and concerns that he/she may be unsafe to be unsupervised at home due to    impaired balance, strength, activity tolerance, SOB impacting ADLs, increasing risk for falls. Equipment:   TBD               OCCUPATIONAL PROFILE AND HISTORY:   History of Present Injury/Illness (Reason for Referral):  See H&P. Past Medical History/Comorbidities:   Mr. Reyna Veronica  has a past medical history of Abdominal aortic aneurysm ([address] (12/10/2015), Abdominal aortic aneurysm without rupture ([address] Allergic rhinitis (12/10/2015), Asthma, Benign neoplasm, Benign prostatic hyperplasia (12/10/2015), BPH without urinary obstruction, Cardiovascular disease (12/10/2015), Chronic obstructive asthma with exacerbation ([address] (12/10/2015), COPD (chronic obstructive pulmonary disease) ([address] (12/10/2015), Cough with hemoptysis, Erectile dysfunction (12/10/2015), Essential hypertension, benign (12/10/2015), Fracture (10/2014), History of colon polyps, Low testosterone (12/10/2015), Lumbago, Memory loss, Overflow incontinence, Raynaud's syndrome (12/10/2015), Rotator cuff tendinitis, Thrombocytopenia ([address] and Urinary frequency. Mr. Reyna Veronica  has a past surgical history that includes hx hernia repair (1980); hx colonoscopy; hx fracture tx (10/2014); hx cataract removal (6/2016-right); and hx orthopaedic (03/2018).   Social History/Living Environment:   Home Environment: Rehabilitation facility  # Steps to Enter: 0  One/Two Story Residence: [facility] story  Living Alone: No  Support Systems: Skilled nursing facility  Patient Expects to be Discharged to[de-identified] Rehabilitation facility  Current DME Used/Available at Home: Wheelchair, Walker  Tub or Shower Type: Shower  Prior Level of Function/Work/Activity:  Pt admitted from [employer] and reports receiving assistance with ADLs, however believes he needs less assist than he is given. Primarily needs assist with LB ADLs. Pt uses w/c for mobility, has been working on walking with RW with therapy. Utilizes 2L supplemental O2. Personal Factors:          Sex:  male        Age:  80 y.o. Other factors that influence how disability is experienced by the patient:  falls    Number of Personal Factors/Comorbidities that affect the Plan of Care: Expanded review of therapy/medical records (1-2):  MODERATE COMPLEXITY   ASSESSMENT OF OCCUPATIONAL PERFORMANCE[de-identified]   Activities of Daily Living:   Basic ADLs (From Assessment) Complex ADLs (From Assessment)   Feeding: Independent  Oral Facial Hygiene/Grooming: Setup  Bathing: Moderate assistance  Upper Body Dressing: Setup  Lower Body Dressing: Maximum assistance  Toileting: Minimum assistance Instrumental ADL  Meal Preparation: Total assistance  Homemaking: Total assistance  Medication Management: Total assistance  Financial Management: Total assistance   Grooming/Bathing/Dressing Activities of Daily Living     Cognitive Retraining  Safety/Judgement: Awareness of environment; Fall prevention; Insight into deficits                       Bed/Mat Mobility  Supine to Sit: Stand-by assistance; Additional time  Sit to Supine: Stand-by assistance; Additional time  Sit to Stand: Minimum assistance  Stand to Sit: Contact guard assistance  Scooting: Stand-by assistance; Additional time     Most Recent Physical Functioning:   Gross Assessment:  AROM: Within functional limits(BUEs)  Strength: Generally decreased, functional(BUEs)  Coordination: Within functional limits(BUEs)  Sensation: Intact(BUEs to light touch)               Posture:  Posture (WDL): Exceptions to WDL  Posture Assessment: Forward head, Rounded shoulders, Trunk flexion  Balance:  Sitting: Intact  Sitting - Static: Good (unsupported)  Sitting - Dynamic: Good (unsupported)  Standing: Impaired  Standing - Static: Fair;Constant support  Standing - Dynamic : Fair;Constant support Bed Mobility:  Supine to Sit: Stand-by assistance; Additional time  Sit to Supine: Stand-by assistance; Additional time  Scooting: Stand-by assistance; Additional time  Wheelchair Mobility:     Transfers:  Sit to Stand: Minimum assistance  Stand to Sit: Contact guard assistance            Patient Vitals for the past 6 hrs:   BP SpO2 O2 Flow Rate (L/min) Pulse   19 1107 173/89 98 % -- (!) 56   19 1157 -- -- -- (!) 58   19 1307 -- 95 % 5 l/min --   19 1508 163/85 97 % -- 61       Mental Status  Neurologic State: Alert  Orientation Level: Appropriate for age, Oriented to person, Oriented to place, Oriented to situation  Cognition: Appropriate decision making, Appropriate for age attention/concentration, Follows commands  Perception: Appears intact  Perseveration: No perseveration noted  Safety/Judgement: Awareness of environment, Fall prevention, Insight into deficits                          Physical Skills Involved:  Balance  Strength  Activity Tolerance Cognitive Skills Affected (resulting in the inability to perform in a timely and safe manner):  None  Psychosocial Skills Affected:  Habits/Routines  Self-Awareness   Number of elements that affect the Plan of Care: 3-5:  MODERATE COMPLEXITY   CLINICAL DECISION MAKIN [facility] [address] [address] AM-PAC 6 Clicks   Daily Activity Inpatient Short Form  How much help from another person does the patient currently need. .. Total A Lot A Little None   1. Putting on and taking off regular lower body clothing? ? 1   ? 2   ? 3   ? 4   2. Bathing (including washing, rinsing, drying)? ? 1   ? 2   ? 3   ? 4   3. Toileting, which includes using toilet, bedpan or urinal?   ? 1   ? 2   ? 3   ? 4   4. Putting on and taking off regular upper body clothing? ? 1   ? 2   ? 3   ? 4   5. Taking care of personal grooming such as brushing teeth? ? 1   ? 2   ? 3   ? 4   6. Eating meals? ? 1   ? 2   ? 3   ? 4   © 2007, Trustees of [address] [place] [address] [address] [address], under license to [employer]. All rights reserved      Score:  Initial: 17 9/3/19 Most Recent: X (Date: -- )    Interpretation of Tool:  Represents activities that are increasingly more difficult (i.e. Bed mobility, Transfers, Gait). Medical Necessity:     Patient demonstrates good   rehab potential due to higher previous functional level. Reason for Services/Other Comments:  Patient continues to require skilled intervention due to impaired ability to complete ADLs at prior level of independence   . Use of outcome tool(s) and clinical judgement create a POC that gives a: MODERATE COMPLEXITY         TREATMENT:   (In addition to Assessment/Re-Assessment sessions the following treatments were rendered)     Pre-treatment Symptoms/Complaints:    Pain: Initial:     0/10 Post Session:  same     Therapeutic Activity: (    8 minutes): Therapeutic activities including Bed transfers, Chair transfers and Ambulation on level ground to improve mobility, strength, balance, coordination and activity tolerance . Required minimal   to promote dynamic balance in standing. Treatment initiated to include functional transfers with Tiffany/cueing and steps forward/backward with CGA/RW, required seated rest break due to fatigue/SOB. O2 sats in high 70s, pt cued on breathing technique and sats improved to low 80s. O2 increased to 7L and pt improved to 90% with rest/breathing. Pt stood again Tiffany, took steps to head of bed and sat with CGA. Returned to supine with SBA/additional time. O2 sats dropped again but improved in supine with breathing technique.      Braces/Orthotics/Lines/Etc:   IV  O2 Device: Hi flow nasal cannula  Treatment/Session Assessment:    Response to Treatment:  SOB, decreased activity tolerance  Interdisciplinary Collaboration:   Occupational Therapist  Registered Nurse  After treatment position/precautions:   Supine in bed  Bed/Chair-wheels locked  Bed in low position  Call light within reach   Compliance with Program/Exercises: Compliant all of the time, Will assess as treatment progresses. Recommendations/Intent for next treatment session: \"Next visit will focus on advancements to more challenging activities and reduction in assistance provided\".   Total Treatment Duration:  OT Patient Time In/Time Out  Time In: 1457  Time Out: [facility] [place], OTR/L

## 2019-09-03 NOTE — PROGRESS NOTES
Pt resting in bed with eyes closed at this time. Pt alert and oriented to person place and time. No distress noted. Respirations even and unlabored on 7 L NC high flow. Right arm IV clean dry and intact. No signs of infection noted at this time. Pt denies pain at this time. Pt instructed to call for assistance if needed. Call light in place. Door open. Will continue to monitor.

## 2019-09-03 NOTE — PROGRESS NOTES
Pharmacokinetic Consult to Pharmacist    Cami Prem is a 80 y.o. male being treated for HAP with vancomycin and zosyn. Height: 5' 8\" (172.7 cm)  Weight: 58.5 kg (129 lb)  Lab Results   Component Value Date/Time    BUN 14 09/03/2019 08:44 AM    Creatinine 0.85 09/03/2019 08:44 AM    WBC 10.4 09/03/2019 08:44 AM    Procalcitonin 0.1 09/01/2019 05:43 AM    Lactic Acid (POC) 1.65 09/01/2019 07:31 AM      Estimated Creatinine Clearance: 50.7 mL/min (based on SCr of 0.85 mg/dL). CULTURES:  Results     Procedure Component Value Units Date/Time    CULTURE, RESPIRATORY/SPUTUM/BRONCH [place] Punches [[id number]]     Order Status:  Sent Specimen:  Sputum     CULTURE, BLOOD [[id number]] Collected:  09/01/19 0725    Order Status:  Completed Specimen:  Blood Updated:  09/02/19 1115     Special Requests: --        NO SPECIAL REQUESTS  LEFT  ARM       Culture result: NO GROWTH 1 DAY       CULTURE, BLOOD [[id number]] Collected:  09/01/19 0723    Order Status:  Completed Specimen:  Blood Updated:  09/02/19 1115     Special Requests: --        NO SPECIAL REQUESTS  RIGHT  FOREARM       Culture result: NO GROWTH 1 DAY               Lab Results   Component Value Date/Time    Vancomycin,trough 6.6 09/03/2019 08:44 AM       Day 3 of vancomycin. Goal trough is 15-20. Will increase vancomycin to 1250mg q 18 hours and start new dose tonight at 2300 since vanco level is so low. Will continue to follow patient.       Thank you,  Christi Barr, EmiliaD

## 2019-09-04 PROCEDURE — 65660000000 HC RM CCU STEPDOWN

## 2019-09-04 PROCEDURE — 74011250636 HC RX REV CODE- 250/636: Performed by: INTERNAL MEDICINE

## 2019-09-04 PROCEDURE — 77030011943

## 2019-09-04 PROCEDURE — 74011000250 HC RX REV CODE- 250: Performed by: INTERNAL MEDICINE

## 2019-09-04 PROCEDURE — 74011000258 HC RX REV CODE- 258: Performed by: INTERNAL MEDICINE

## 2019-09-04 PROCEDURE — 94760 N-INVAS EAR/PLS OXIMETRY 1: CPT

## 2019-09-04 PROCEDURE — 74011250637 HC RX REV CODE- 250/637: Performed by: INTERNAL MEDICINE

## 2019-09-04 PROCEDURE — 77010033711 HC HIGH FLOW OXYGEN

## 2019-09-04 PROCEDURE — 51798 US URINE CAPACITY MEASURE: CPT

## 2019-09-04 PROCEDURE — 94640 AIRWAY INHALATION TREATMENT: CPT

## 2019-09-04 RX ADMIN — VANCOMYCIN HYDROCHLORIDE 1250 MG: 10 INJECTION, POWDER, LYOPHILIZED, FOR SOLUTION INTRAVENOUS at 00:02

## 2019-09-04 RX ADMIN — FINASTERIDE 5 MG: 5 TABLET, FILM COATED ORAL at 09:32

## 2019-09-04 RX ADMIN — PIPERACILLIN SODIUM,TAZOBACTAM SODIUM 3.38 G: 3; .375 INJECTION, POWDER, FOR SOLUTION INTRAVENOUS at 05:17

## 2019-09-04 RX ADMIN — TAMSULOSIN HYDROCHLORIDE 0.4 MG: 0.4 CAPSULE ORAL at 09:32

## 2019-09-04 RX ADMIN — IPRATROPIUM BROMIDE AND ALBUTEROL SULFATE 3 ML: .5; 3 SOLUTION RESPIRATORY (INHALATION) at 15:26

## 2019-09-04 RX ADMIN — PIPERACILLIN SODIUM,TAZOBACTAM SODIUM 3.38 G: 3; .375 INJECTION, POWDER, FOR SOLUTION INTRAVENOUS at 21:41

## 2019-09-04 RX ADMIN — FLUTICASONE PROPIONATE 2 SPRAY: 50 SPRAY, METERED NASAL at 09:33

## 2019-09-04 RX ADMIN — ASPIRIN 81 MG: 81 TABLET ORAL at 09:32

## 2019-09-04 RX ADMIN — PIPERACILLIN SODIUM,TAZOBACTAM SODIUM 3.38 G: 3; .375 INJECTION, POWDER, FOR SOLUTION INTRAVENOUS at 14:01

## 2019-09-04 RX ADMIN — IPRATROPIUM BROMIDE AND ALBUTEROL SULFATE 3 ML: .5; 3 SOLUTION RESPIRATORY (INHALATION) at 02:26

## 2019-09-04 RX ADMIN — IPRATROPIUM BROMIDE AND ALBUTEROL SULFATE 3 ML: .5; 3 SOLUTION RESPIRATORY (INHALATION) at 21:10

## 2019-09-04 RX ADMIN — FAMOTIDINE 20 MG: 20 TABLET ORAL at 09:32

## 2019-09-04 RX ADMIN — FUROSEMIDE 40 MG: 40 TABLET ORAL at 09:32

## 2019-09-04 RX ADMIN — HEPARIN SODIUM 5000 UNITS: 5000 INJECTION INTRAVENOUS; SUBCUTANEOUS at 00:06

## 2019-09-04 RX ADMIN — GUAIFENESIN 1200 MG: 600 TABLET, EXTENDED RELEASE ORAL at 09:32

## 2019-09-04 RX ADMIN — POTASSIUM CHLORIDE 40 MEQ: 20 TABLET, EXTENDED RELEASE ORAL at 09:32

## 2019-09-04 RX ADMIN — HEPARIN SODIUM 5000 UNITS: 5000 INJECTION INTRAVENOUS; SUBCUTANEOUS at 13:54

## 2019-09-04 RX ADMIN — FUROSEMIDE 40 MG: 40 TABLET ORAL at 18:19

## 2019-09-04 RX ADMIN — VANCOMYCIN HYDROCHLORIDE 1250 MG: 10 INJECTION, POWDER, LYOPHILIZED, FOR SOLUTION INTRAVENOUS at 18:23

## 2019-09-04 RX ADMIN — IPRATROPIUM BROMIDE AND ALBUTEROL SULFATE 3 ML: .5; 3 SOLUTION RESPIRATORY (INHALATION) at 08:12

## 2019-09-04 RX ADMIN — ACETAMINOPHEN 650 MG: 325 TABLET, FILM COATED ORAL at 09:32

## 2019-09-04 RX ADMIN — FAMOTIDINE 20 MG: 20 TABLET ORAL at 18:19

## 2019-09-04 RX ADMIN — MONTELUKAST 10 MG: 10 TABLET, FILM COATED ORAL at 09:32

## 2019-09-04 RX ADMIN — AMLODIPINE BESYLATE 5 MG: 5 TABLET ORAL at 09:32

## 2019-09-04 NOTE — PROGRESS NOTES
Progress Note    Patient: Cami Amaro MRN: [id number]  SSN: [id number]    YOB: 1932  Age: 80 y.o. Sex: male      Admit Date: 9/1/2019    LOS: 3 days     Subjective:     PMH of COPD, on 2 LPM oxygen cannula, hypertension, BPH, elevated CEA, VT on amiodarone, recurrent pleural effusion requiring thoracentesis, sp pleurodesis on 5/16/19,     Admitted due to shortness of breath. Found to have RML pneumonia. On Vancomycin and Zosyn now. Patient is feeling better. No shortness of breath. No fever. No shaking. No chills. Objective:     Vitals:    09/04/19 0403 09/04/19 0730 09/04/19 0813 09/04/19 1109   BP: 141/73 160/88  148/86   Pulse: 68 64  62   Resp: 20 20  22   Temp: 98.6 °F (37 °C) 97.7 °F (36.5 °C)  95.9 °F (35.5 °C)   SpO2: 90% 94% 90% 94%   Weight:       Height:            Intake and Output:  Current Shift: 09/04 0701 - 09/04 1900  In: 240 [P.O.:240]  Out: 550 [Urine:550]  Last three shifts: 09/02 1901 - 09/04 0700  In: 1070 [P.O.:720; I.V.:350]  Out: 3855 [Urine:3855]    Physical Exam:     General:                    The patient is a pleasant male in no acute distress now. he is sitting in bed and eating. Head:                                   Normocephalic/atraumatic. Eyes:                                   palpebral pallor, no scleral icterus. ENT:                                    External auricular and nasal exam within normal limits. Mucous membranes are moist.  Neck:                                   Supple, non-tender, no JVD. Lungs:                       decreased to auscultation bilaterally without wheezes or crackles. No respiratory accessory muscle use. Heart:                                  Regular rate and rhythm, without murmurs, rubs, or gallops. Abdomen:                  Soft, non-tender, non-distended with normoactive bowel sounds.    Genitourinary: No tenderness over the bladder or bilateral CVAs. Extremities:               Without clubbing, cyanosis, or edema. Skin:                                    Normal color, texture, and turgor. No rashes, lesions, or jaundice. Pulses:                      Radial and dorsalis pedis pulses present 2+ bilaterally. Capillary refill <2s. Neurologic:                CN II-XII grossly intact and symmetrical.                                               Moving all four extremities well with no focal deficits. Psychiatric:                Pleasant demeanor, appropriate affect.  Alert and oriented x 3      Lab/Data Review:      Current Facility-Administered Medications:     influenza vaccine 2019-20 (6 mos+)(PF) (FLUARIX/FLULAVAL/FLUZONE QUAD) injection 0.5 mL, 0.5 mL, IntraMUSCular, PRIOR TO DISCHARGE, Loren Isabel MD    [facility]) 1250 mg in  ml infusion, 1,250 mg, IntraVENous, Q18H, Dawood Barillas MD, Last Rate: 125 mL/hr at 09/04/19 0002, 1,250 mg at 09/04/19 0002    amLODIPine (NORVASC) tablet 5 mg, 5 mg, Oral, DAILY, Kevin Urbina MD, 5 mg at 09/04/19 0932    hydrALAZINE (APRESOLINE) tablet 25 mg, 25 mg, Oral, Q6H PRN, Kevin Urbina MD, 25 mg at 09/03/19 1556    acetaminophen (TYLENOL) tablet 650 mg, 650 mg, Oral, Q6H PRN, Dawood Barillas MD, 650 mg at 09/04/19 0932    albuterol-ipratropium (DUO-NEB) 2.5 MG-0.5 MG/3 ML, 3 mL, Nebulization, Q6H RT, Dawood Barillas MD, 3 mL at 09/04/19 7494    aspirin delayed-release tablet 81 mg, 81 mg, Oral, DAILY, Dawood Barillas MD, 81 mg at 09/04/19 0932    finasteride (PROSCAR) tablet 5 mg, 5 mg, Oral, DAILY, Dawood Barillas MD, 5 mg at 09/04/19 0932    fluticasone propionate (FLONASE) 50 mcg/actuation nasal spray 2 Spray, 2 Spray, Both Nostrils, DAILY, Dawood Barillas MD, 2 [place] at 09/04/19 0933    furosemide (LASIX) tablet 40 mg, 40 mg, Oral, BID, Dawood Barillas MD, 40 mg at 09/04/19 0932    guaiFENesin ER (MUCINEX) tablet 1,200 mg, 1,200 mg, Oral, DAILY, Raquel Carvajal MD, 1,200 mg at 09/04/19 0932    montelukast (SINGULAIR) tablet 10 mg, 10 mg, Oral, DAILY, Raquel Carvajal MD, 10 mg at 09/04/19 0932    potassium chloride (K-DUR, KLOR-CON) SR tablet 40 mEq, 40 mEq, Oral, DAILY, Loren Santos MD, 40 mEq at 09/04/19 0932    famotidine (PEPCID) tablet 20 mg, 20 mg, Oral, BID, Raquel Carvajal MD, 20 mg at 09/04/19 0932    tamsulosin (FLOMAX) capsule 0.4 mg, 0.4 mg, Oral, DAILY, Raquel Carvajal MD, 0.4 mg at 09/04/19 0932    piperacillin-tazobactam (ZOSYN) 3.375 g in 0.9% sodium chloride (MBP/ADV) 100 mL, 3.375 g, IntraVENous, Q8H, Raquel Carvajal MD, Last Rate: 25 mL/hr at 09/04/19 0517, 3.375 g at 09/04/19 0517    heparin (porcine) injection 5,000 Units, 5,000 Units, SubCUTAneous, Q12H, Raquel Carvajal MD, 5,000 Units at 09/04/19 0006    [facility]ron [facility]) injection 4 mg, 4 mg, IntraVENous, Q8H PRN, Raquel Carvajal MD      Assessment:     Principal Problem:    HCAP (healthcare-associated pneumonia) (9/1/2019)    Active Problems:    Benign prostatic hyperplasia (12/10/2015)      Cardiovascular disease (12/10/2015)      Essential hypertension, benign (12/10/2015)      COPD (chronic obstructive pulmonary disease) with emphysema ([facility] (10/17/2016)      Overview: Last Assessment & Plan:       Not currently on any maintence medication regimen for COPD as he felt them       to be unhelpful in the past.  I recommended a trial of a ANoro Ellipta       which he should take 1 inhalation daily. Demonstration was completed on       the correct method of administration and he was able to return       demonstration independently in the office today and administer his first       dose. I have given him a 2-week sample which he will use daily and       monitor if he notes any improvement in his breathing.   He may also try       pretreating himself with Ventolin before exertion to see if this offers any relief. Acute on chronic respiratory failure with hypoxia (HCC) (5/6/2019)      VT (ventricular tachycardia) ([facility] (5/6/2019)        Plan:     Health care associated pneumonia  Continue current medications. Clinically improving. Continue oxygen cannula. Duoneb, Mucinex. Hypertension  Monitor blood pressure and manage accordingly. Continue home medications. BPH   Continue home medications. I have discussed the plan of care with patient. DVT prophylaxis : heparin [place]     Disposition plan : ? Discharge in 1-2 days.        Signed By: Ernie Hilton MD     September 4, 2019

## 2019-09-04 NOTE — INTERDISCIPLINARY ROUNDS
Interdisciplinary team rounds were held 9/4/2019 with the following team members:Care Management, Physical Therapy, Physician and Clinical Coordinator and the patient. Plan of care discussed. See clinical pathway and/or care plan for interventions and desired outcomes.

## 2019-09-05 LAB — GLUCOSE BLD STRIP.AUTO-MCNC: 105 MG/DL (ref 65–100)

## 2019-09-05 PROCEDURE — 82962 GLUCOSE BLOOD TEST: CPT

## 2019-09-05 PROCEDURE — 74011250636 HC RX REV CODE- 250/636: Performed by: INTERNAL MEDICINE

## 2019-09-05 PROCEDURE — 74011000258 HC RX REV CODE- 258: Performed by: INTERNAL MEDICINE

## 2019-09-05 PROCEDURE — 74011000250 HC RX REV CODE- 250: Performed by: INTERNAL MEDICINE

## 2019-09-05 PROCEDURE — 77030011943

## 2019-09-05 PROCEDURE — 74011250637 HC RX REV CODE- 250/637: Performed by: INTERNAL MEDICINE

## 2019-09-05 PROCEDURE — 65660000000 HC RM CCU STEPDOWN

## 2019-09-05 PROCEDURE — 77010033711 HC HIGH FLOW OXYGEN

## 2019-09-05 PROCEDURE — 94760 N-INVAS EAR/PLS OXIMETRY 1: CPT

## 2019-09-05 PROCEDURE — 94640 AIRWAY INHALATION TREATMENT: CPT

## 2019-09-05 PROCEDURE — 77030020120 HC VLV RESP PEP HI -B

## 2019-09-05 RX ADMIN — HEPARIN SODIUM 5000 UNITS: 5000 INJECTION INTRAVENOUS; SUBCUTANEOUS at 13:12

## 2019-09-05 RX ADMIN — FLUTICASONE PROPIONATE 2 SPRAY: 50 SPRAY, METERED NASAL at 09:47

## 2019-09-05 RX ADMIN — MONTELUKAST 10 MG: 10 TABLET, FILM COATED ORAL at 09:47

## 2019-09-05 RX ADMIN — PIPERACILLIN SODIUM,TAZOBACTAM SODIUM 3.38 G: 3; .375 INJECTION, POWDER, FOR SOLUTION INTRAVENOUS at 22:51

## 2019-09-05 RX ADMIN — AMLODIPINE BESYLATE 5 MG: 5 TABLET ORAL at 09:47

## 2019-09-05 RX ADMIN — TAMSULOSIN HYDROCHLORIDE 0.4 MG: 0.4 CAPSULE ORAL at 09:47

## 2019-09-05 RX ADMIN — PIPERACILLIN SODIUM,TAZOBACTAM SODIUM 3.38 G: 3; .375 INJECTION, POWDER, FOR SOLUTION INTRAVENOUS at 15:13

## 2019-09-05 RX ADMIN — FAMOTIDINE 20 MG: 20 TABLET ORAL at 18:05

## 2019-09-05 RX ADMIN — FAMOTIDINE 20 MG: 20 TABLET ORAL at 09:47

## 2019-09-05 RX ADMIN — HEPARIN SODIUM 5000 UNITS: 5000 INJECTION INTRAVENOUS; SUBCUTANEOUS at 22:53

## 2019-09-05 RX ADMIN — POTASSIUM CHLORIDE 40 MEQ: 20 TABLET, EXTENDED RELEASE ORAL at 09:47

## 2019-09-05 RX ADMIN — ACETAMINOPHEN 650 MG: 325 TABLET, FILM COATED ORAL at 09:51

## 2019-09-05 RX ADMIN — PIPERACILLIN SODIUM,TAZOBACTAM SODIUM 3.38 G: 3; .375 INJECTION, POWDER, FOR SOLUTION INTRAVENOUS at 05:36

## 2019-09-05 RX ADMIN — GUAIFENESIN 1200 MG: 600 TABLET, EXTENDED RELEASE ORAL at 09:47

## 2019-09-05 RX ADMIN — FUROSEMIDE 40 MG: 40 TABLET ORAL at 18:05

## 2019-09-05 RX ADMIN — FUROSEMIDE 40 MG: 40 TABLET ORAL at 09:47

## 2019-09-05 RX ADMIN — VANCOMYCIN HYDROCHLORIDE 1250 MG: 10 INJECTION, POWDER, LYOPHILIZED, FOR SOLUTION INTRAVENOUS at 13:08

## 2019-09-05 RX ADMIN — FINASTERIDE 5 MG: 5 TABLET, FILM COATED ORAL at 09:47

## 2019-09-05 RX ADMIN — IPRATROPIUM BROMIDE AND ALBUTEROL SULFATE 3 ML: .5; 3 SOLUTION RESPIRATORY (INHALATION) at 14:06

## 2019-09-05 RX ADMIN — IPRATROPIUM BROMIDE AND ALBUTEROL SULFATE 3 ML: .5; 3 SOLUTION RESPIRATORY (INHALATION) at 08:51

## 2019-09-05 RX ADMIN — ASPIRIN 81 MG: 81 TABLET ORAL at 09:47

## 2019-09-05 RX ADMIN — IPRATROPIUM BROMIDE AND ALBUTEROL SULFATE 3 ML: .5; 3 SOLUTION RESPIRATORY (INHALATION) at 21:20

## 2019-09-05 RX ADMIN — IPRATROPIUM BROMIDE AND ALBUTEROL SULFATE 3 ML: .5; 3 SOLUTION RESPIRATORY (INHALATION) at 01:40

## 2019-09-05 NOTE — PROGRESS NOTES
Pt resting in chair comfortably at this time. Pt alert and oriented times 3. No distress noted at this time. Respirations even and unlabored on 6 1/2 L NC. Tried to wean pt down to 5 L NC and sat was 75%. Pt resting comfortably on 6 1/2 L. Pt denies pain at this time. Will continue to monitor.

## 2019-09-05 NOTE — PROGRESS NOTES
Progress Note    Patient: Amanda Amador MRN: [id number]  SSN: [id number]    YOB: 1932  Age: 80 y.o. Sex: male      Admit Date: 9/1/2019    LOS: 4 days     Subjective:     PMH of COPD, on 2 LPM oxygen cannula, hypertension, BPH, elevated CEA, VT on amiodarone, recurrent pleural effusion requiring thoracentesis, sp pleurodesis on 5/16/19,     Admitted due to shortness of breath. Found to have RML pneumonia. On Vancomycin and Zosyn now. Patient is feeling slowly better. No shortness of breath. No fever. No shaking. No chills. Still requiring 6 LPM of oxygen although he normally uses 2 LPM before admission. Objective:     Vitals:    09/04/19 2343 09/05/19 0424 09/05/19 0717 09/05/19 0852   BP: 128/74 126/78 134/56    Pulse: 71 68 100    Resp: 20 19 18    Temp: 98 °F (36.7 °C) 98 °F (36.7 °C) 98.1 °F (36.7 °C)    SpO2: 90% 90% 99% 94%   Weight:  60.5 kg (133 lb 4.8 oz)     Height:            Intake and Output:  Current Shift: 09/05 0701 - 09/05 1900  In: 240 [P.O.:240]  Out: 200 [Urine:200]  Last three shifts: 09/03 1901 - 09/05 0700  In: 1270 [P.O.:720; I.V.:550]  Out: 6224 [Urine:3695]    Physical Exam:     General:                    The patient is a pleasant male in no acute distress now. he is sitting in bed and eating. On oxygen cannula. Head:                                   Normocephalic/atraumatic. Eyes:                                   palpebral pallor, no scleral icterus. ENT:                                    External auricular and nasal exam within normal limits. Mucous membranes are moist.  Neck:                                   Supple, non-tender, no JVD. Lungs:                       decreased to auscultation bilaterally without wheezes or crackles. No respiratory accessory muscle use.   Heart:                                  Regular rate and rhythm, without murmurs, rubs, or gallops. Abdomen:                  Soft, non-tender, non-distended with normoactive bowel sounds. Genitourinary:           No tenderness over the bladder or bilateral CVAs. Extremities:               Without clubbing, cyanosis, or edema. Skin:                                    Normal color, texture, and turgor. No rashes, lesions, or jaundice. Pulses:                      Radial and dorsalis pedis pulses present 2+ bilaterally. Capillary refill <2s. Neurologic:                CN II-XII grossly intact and symmetrical.                                               Moving all four extremities well with no focal deficits. Psychiatric:                Pleasant demeanor, appropriate affect.  Alert and oriented x 3      Lab/Data Review:      Current Facility-Administered Medications:     [START ON 9/6/2019] vancomycin trough reminder, , Other, ONCE, Lady Rafael MD    influenza vaccine 2019-20 (6 mos+)(PF) (FLUARIX/FLULAVAL/FLUZONE QUAD) injection 0.5 mL, 0.5 mL, IntraMUSCular, PRIOR TO DISCHARGE, Lady Rafael MD    [facility]) 1250 mg in  ml infusion, 1,250 mg, IntraVENous, Q18H, Lady Rafael MD, Last Rate: 125 mL/hr at 09/04/19 1823, 1,250 mg at 09/04/19 1823    amLODIPine (NORVASC) tablet 5 mg, 5 mg, Oral, DAILY, Kevin Urbina MD, 5 mg at 09/05/19 0947    hydrALAZINE (APRESOLINE) tablet 25 mg, 25 mg, Oral, Q6H PRN, Kevin Urbina MD, 25 mg at 09/03/19 1556    acetaminophen (TYLENOL) tablet 650 mg, 650 mg, Oral, Q6H PRN, Lady Rafael MD, 650 mg at 09/05/19 0951    albuterol-ipratropium (DUO-NEB) 2.5 MG-0.5 MG/3 ML, 3 mL, Nebulization, Q6H RT, Lady Rafael MD, 3 mL at 09/05/19 0851    aspirin delayed-release tablet 81 mg, 81 mg, Oral, DAILY, Lady Rafael MD, 81 mg at 09/05/19 0947    finasteride (PROSCAR) tablet 5 mg, 5 mg, Oral, DAILY, Lady Rafael MD, 5 mg at 09/05/19 0947    fluticasone propionate (FLONASE) 50 mcg/actuation nasal spray 2 Spray, 2 Spray, Both Nostrils, DAILY, Raquel Carvajal MD, 2 [place] at 09/05/19 2500    furosemide (LASIX) tablet 40 mg, 40 mg, Oral, BID, Raquel Carvajal MD, 40 mg at 09/05/19 0947    guaiFENesin ER (MUCINEX) tablet 1,200 mg, 1,200 mg, Oral, DAILY, Raquel Carvajal MD, 1,200 mg at 09/05/19 0947    montelukast (SINGULAIR) tablet 10 mg, 10 mg, Oral, DAILY, Raquel Carvajal MD, 10 mg at 09/05/19 0947    potassium chloride (K-DUR, KLOR-CON) SR tablet 40 mEq, 40 mEq, Oral, DAILY, Loren Santos MD, 40 mEq at 09/05/19 0947    famotidine (PEPCID) tablet 20 mg, 20 mg, Oral, BID, Raquel Carvajal MD, 20 mg at 09/05/19 0947    tamsulosin (FLOMAX) capsule 0.4 mg, 0.4 mg, Oral, DAILY, Raquel Carvajal MD, 0.4 mg at 09/05/19 0947    piperacillin-tazobactam (ZOSYN) 3.375 g in 0.9% sodium chloride (MBP/ADV) 100 mL, 3.375 g, IntraVENous, Q8H, Rqauel Carvajal MD, Last Rate: 25 mL/hr at 09/05/19 0536, 3.375 g at 09/05/19 0536    heparin (porcine) injection 5,000 Units, 5,000 Units, SubCUTAneous, Q12H, Raquel Carvajal MD, 5,000 Units at 09/04/19 1354    ondansetron [facility]) injection 4 mg, 4 mg, IntraVENous, Q8H PRN, Raquel Carvajal MD      Assessment:     Principal Problem:    HCAP (healthcare-associated pneumonia) (9/1/2019)    Active Problems:    Benign prostatic hyperplasia (12/10/2015)      Cardiovascular disease (12/10/2015)      Essential hypertension, benign (12/10/2015)      COPD (chronic obstructive pulmonary disease) with emphysema ([facility] (10/17/2016)      Overview: Last Assessment & Plan:       Not currently on any maintence medication regimen for COPD as he felt them       to be unhelpful in the past.  I recommended a trial of a ANoro Ellipta       which he should take 1 inhalation daily. Demonstration was completed on       the correct method of administration and he was able to return       demonstration independently in the office today and administer his first       dose. I have given him a 2-week sample which he will use daily and       monitor if he notes any improvement in his breathing. He may also try       pretreating himself with Ventolin before exertion to see if this offers       any relief. Acute on chronic respiratory failure with hypoxia (HCC) (5/6/2019)      VT (ventricular tachycardia) ([facility] (5/6/2019)        Plan:     Health care associated pneumonia  Continue current medications. Clinically improving. Continue oxygen cannula. Wean as tolerated. Duoneb, Mucinex. Hypertension  Monitor blood pressure and manage accordingly. Continue home medications. BPH   Continue home medications. I have discussed the plan of care with patient. DVT prophylaxis : heparin [place]     Disposition plan : ? Discharge in 1-2 days. Continue to wean oxygen.        Signed By: Parul Mathis MD     September 5, 2019

## 2019-09-05 NOTE — PROGRESS NOTES
Pt resting in bed with eyes closed at this time. Pt alert and oriented to person place and time. No distress noted at this time. Respirations even and unlabored on 6 L NC high flow. IV left arm clean dry, intact and infusing. Pt does not complain of pain at this time. Pt instructed to call for assistance if needed. Call light in place. Door open. Will continue to monitor.

## 2019-09-05 NOTE — PROGRESS NOTES
PT note: Patient eating lunch. Will attempt PT treatment at later time as schedule allows.    Kana Solis, PT  9/5/2019

## 2019-09-05 NOTE — PROGRESS NOTES
Pt straight cath and tolerated well. 600 ml of clear yellow urine was removed from the bladder. Will continue to monitor.

## 2019-09-05 NOTE — PROGRESS NOTES
Pt resting in chair at this time comfortably. Alert and oriented to person place and time. No distress noted at this time. Respirations even and unlabored. Zosyn infusing in right hand IV. Pt denies pain at this time. Pt instructed to call for assistance if needed. Call light in place. Door open. Will continue to monitor.

## 2019-09-06 ENCOUNTER — APPOINTMENT (OUTPATIENT)
Dept: GENERAL RADIOLOGY | Age: 84
DRG: 193 | End: 2019-09-06
Attending: INTERNAL MEDICINE
Payer: MEDICARE

## 2019-09-06 LAB
BACTERIA SPEC CULT: NORMAL
BACTERIA SPEC CULT: NORMAL
SERVICE CMNT-IMP: NORMAL
SERVICE CMNT-IMP: NORMAL
VANCOMYCIN TROUGH SERPL-MCNC: 16.5 UG/ML (ref 5–20)

## 2019-09-06 PROCEDURE — 65660000000 HC RM CCU STEPDOWN

## 2019-09-06 PROCEDURE — 80202 ASSAY OF VANCOMYCIN: CPT

## 2019-09-06 PROCEDURE — 36415 COLL VENOUS BLD VENIPUNCTURE: CPT

## 2019-09-06 PROCEDURE — 94760 N-INVAS EAR/PLS OXIMETRY 1: CPT

## 2019-09-06 PROCEDURE — 94640 AIRWAY INHALATION TREATMENT: CPT

## 2019-09-06 PROCEDURE — 77030011943

## 2019-09-06 PROCEDURE — 77010033711 HC HIGH FLOW OXYGEN

## 2019-09-06 PROCEDURE — 71045 X-RAY EXAM CHEST 1 VIEW: CPT

## 2019-09-06 PROCEDURE — 74011000258 HC RX REV CODE- 258: Performed by: INTERNAL MEDICINE

## 2019-09-06 PROCEDURE — 74011250637 HC RX REV CODE- 250/637: Performed by: INTERNAL MEDICINE

## 2019-09-06 PROCEDURE — 74011000250 HC RX REV CODE- 250: Performed by: INTERNAL MEDICINE

## 2019-09-06 PROCEDURE — 74011250636 HC RX REV CODE- 250/636: Performed by: INTERNAL MEDICINE

## 2019-09-06 PROCEDURE — 97530 THERAPEUTIC ACTIVITIES: CPT

## 2019-09-06 RX ADMIN — IPRATROPIUM BROMIDE AND ALBUTEROL SULFATE 3 ML: .5; 3 SOLUTION RESPIRATORY (INHALATION) at 02:27

## 2019-09-06 RX ADMIN — FAMOTIDINE 20 MG: 20 TABLET ORAL at 18:11

## 2019-09-06 RX ADMIN — MONTELUKAST 10 MG: 10 TABLET, FILM COATED ORAL at 10:12

## 2019-09-06 RX ADMIN — PIPERACILLIN SODIUM,TAZOBACTAM SODIUM 3.38 G: 3; .375 INJECTION, POWDER, FOR SOLUTION INTRAVENOUS at 12:40

## 2019-09-06 RX ADMIN — VANCOMYCIN HYDROCHLORIDE 1250 MG: 10 INJECTION, POWDER, LYOPHILIZED, FOR SOLUTION INTRAVENOUS at 22:39

## 2019-09-06 RX ADMIN — IPRATROPIUM BROMIDE AND ALBUTEROL SULFATE 3 ML: .5; 3 SOLUTION RESPIRATORY (INHALATION) at 14:18

## 2019-09-06 RX ADMIN — POTASSIUM CHLORIDE 40 MEQ: 20 TABLET, EXTENDED RELEASE ORAL at 10:12

## 2019-09-06 RX ADMIN — VANCOMYCIN HYDROCHLORIDE 1250 MG: 10 INJECTION, POWDER, LYOPHILIZED, FOR SOLUTION INTRAVENOUS at 05:00

## 2019-09-06 RX ADMIN — ASPIRIN 81 MG: 81 TABLET ORAL at 10:12

## 2019-09-06 RX ADMIN — FLUTICASONE PROPIONATE 2 SPRAY: 50 SPRAY, METERED NASAL at 10:13

## 2019-09-06 RX ADMIN — FUROSEMIDE 40 MG: 40 TABLET ORAL at 10:12

## 2019-09-06 RX ADMIN — PIPERACILLIN SODIUM,TAZOBACTAM SODIUM 3.38 G: 3; .375 INJECTION, POWDER, FOR SOLUTION INTRAVENOUS at 22:38

## 2019-09-06 RX ADMIN — IPRATROPIUM BROMIDE AND ALBUTEROL SULFATE 3 ML: .5; 3 SOLUTION RESPIRATORY (INHALATION) at 20:50

## 2019-09-06 RX ADMIN — AMLODIPINE BESYLATE 5 MG: 5 TABLET ORAL at 10:13

## 2019-09-06 RX ADMIN — FUROSEMIDE 40 MG: 40 TABLET ORAL at 18:11

## 2019-09-06 RX ADMIN — HEPARIN SODIUM 5000 UNITS: 5000 INJECTION INTRAVENOUS; SUBCUTANEOUS at 12:41

## 2019-09-06 RX ADMIN — PIPERACILLIN SODIUM,TAZOBACTAM SODIUM 3.38 G: 3; .375 INJECTION, POWDER, FOR SOLUTION INTRAVENOUS at 05:16

## 2019-09-06 RX ADMIN — IPRATROPIUM BROMIDE AND ALBUTEROL SULFATE 3 ML: .5; 3 SOLUTION RESPIRATORY (INHALATION) at 08:40

## 2019-09-06 RX ADMIN — FAMOTIDINE 20 MG: 20 TABLET ORAL at 10:12

## 2019-09-06 RX ADMIN — GUAIFENESIN 1200 MG: 600 TABLET, EXTENDED RELEASE ORAL at 10:12

## 2019-09-06 RX ADMIN — FINASTERIDE 5 MG: 5 TABLET, FILM COATED ORAL at 10:12

## 2019-09-06 RX ADMIN — HEPARIN SODIUM 5000 UNITS: 5000 INJECTION INTRAVENOUS; SUBCUTANEOUS at 22:38

## 2019-09-06 RX ADMIN — TAMSULOSIN HYDROCHLORIDE 0.4 MG: 0.4 CAPSULE ORAL at 10:12

## 2019-09-06 NOTE — PROGRESS NOTES
Problem: Mobility Impaired (Adult and Pediatric)  Goal: *Acute Goals and Plan of Care (Insert Text)  Description  LTG:  (1.)Mr. Apryl Martinez will move from supine to sit and sit to supine, scoot up and down and roll side to side with MODIFIED INDEPENDENCE within 7 treatment day(s). (2.)Mr. Apryl Martinez will transfer from bed to chair and chair to bed with STAND BY ASSIST using the least restrictive device within 7 treatment day(s). (3.)Mr. Apryl Martinez will ambulate with MINIMAL ASSIST for 25+ feet with the least restrictive device within 7 treatment day(s). (4.)Mr. Apryl Martinez will perform exercises per HEP for 10+ minutes to improve strength and mobility within 7 days. ________________________________________________________________________________________________   Outcome: Progressing Towards Goal     PHYSICAL THERAPY: Daily Note and AM 9/6/2019  INPATIENT: PT Visit Days : 2  [place] / [place] / Product Type: Medicare /       NAME/AGE/GENDER: Hermes Pierre is a 80 y.o. male   PRIMARY DIAGNOSIS: HCAP (healthcare-associated pneumonia) [J18.9] HCAP (healthcare-associated pneumonia)   HCAP (healthcare-associated pneumonia)         ICD-10: Treatment Diagnosis:    · Generalized Muscle Weakness (M62.81)  · Difficulty in walking, Not elsewhere classified (R26.2)  · Other abnormalities of gait and mobility (R26.89)   Precaution/Allergies:  Patient has no known allergies. ASSESSMENT:     Mr. Apryl Martinez is an 80year old [facility] with [facility], weakness, PATE. He presents in supine without complaints and is agreeable to participating. He seems frustrated with his current physical situation. He got himself to the EOB with extra time but no help from me. He stood into the walker with minimal assist and walked about 60 feet. He rested in the chair in hallway then proceeded to walk 60 feet 3 more times with seated rests.   He felt good about this activity and said it felt better each time. He was on 8L with sats struggling to stay 90% with moderate SOB. He tends to be somewhat unsafe with the walker during transfers and discussed this with him. Good progress and would benefit from continued rehab. This section established at most recent assessment   PROBLEM LIST (Impairments causing functional limitations):  1. Decreased Strength  2. Decreased ADL/Functional Activities  3. Decreased Transfer Abilities  4. Decreased Ambulation Ability/Technique  5. Decreased Balance  6. Decreased Activity Tolerance   INTERVENTIONS PLANNED: (Benefits and precautions of physical therapy have been discussed with the patient.)  1. Balance Exercise  2. Bed Mobility  3. Gait Training  4. Home Exercise Program (HEP)  5. Therapeutic Activites  6. Therapeutic Exercise/Strengthening  7. Transfer Training     TREATMENT PLAN: Frequency/Duration: 3 times a week for duration of hospital stay  Rehabilitation Potential For Stated Goals: Good     REHAB RECOMMENDATIONS (at time of discharge pending progress):    Placement: It is my opinion, based on this patient's performance to date, that Mr. Shun Felix may benefit from intensive therapy at a [address] [place] [address] after discharge due to the functional deficits listed above that are likely to improve with skilled rehabilitation and concerns that he/she may be unsafe to be unsupervised at home due to weakness, debility, fall risk . Equipment:    Tbd pending progress               HISTORY:   History of Present Injury/Illness (Reason for Referral):  Per H&P, \"Octavio Wells is a 80 y.o. male who has a PMH of COPD on 2 liters home oxygen, HTN, BPH, elevated CEA levels seen by oncology, VT on amiodarone, recurrent pleural effusion requiring thoracentesis, sp pleurodesis on 5/16/19, who came from [facility] in view of progressive PATE, productive yellowish cough, weakness for the past 2 days.  He has been compliant with oxygen, he stated he has required at some point 8 liters at night recently due to sob. He has had falls with no head trauma. He had a recent hospitalization from 6/10 - 6/16 due to bilateral pleural effusions. The patient denies fever, chills, nausea, vomiting, chest pain, diarrhea. His appetite is decreased. Upon arrival VS: HR 60 /63  O 90 % on 4 liters NC. He seemed in mild distress due to cough. Labs: wbc 15k; chemistry: ALT 84, AST 59, PCT 0.1; Troponin 0.02; abg: ph 7.43; PCO2 41, PO2 56  CXR: progressed RML infiltrate. Blood cultures taken. Iv vanc and zosyn started\"    Past Medical History/Comorbidities:   Mr. Kristi Acosta  has a past medical history of Abdominal aortic aneurysm ([facility] (12/10/2015), Abdominal aortic aneurysm without rupture ([facility] Allergic rhinitis (12/10/2015), Asthma, Benign neoplasm, Benign prostatic hyperplasia (12/10/2015), BPH without urinary obstruction, Cardiovascular disease (12/10/2015), Chronic obstructive asthma with exacerbation ([address] (12/10/2015), COPD (chronic obstructive pulmonary disease) ([facility] (12/10/2015), Cough with hemoptysis, Erectile dysfunction (12/10/2015), Essential hypertension, benign (12/10/2015), Fracture (10/2014), History of colon polyps, Low testosterone (12/10/2015), Lumbago, Memory loss, Overflow incontinence, Raynaud's syndrome (12/10/2015), Rotator cuff tendinitis, Thrombocytopenia ([address] and Urinary frequency. Mr. Kristi Acosta  has a past surgical history that includes hx hernia repair (1980); hx colonoscopy; hx fracture tx (10/2014); hx cataract removal (6/2016-right); and hx orthopaedic (03/2018).   Social History/Living Environment:   Home Environment: Rehabilitation facility  # Steps to Enter: 0  One/Two Story Residence: One story  Living Alone: No  Support Systems: Skilled nursing facility  Patient Expects to be Discharged to[de-identified] Rehabilitation facility  Current DME Used/Available at Home: Wheelchair, Walker  Tub or Shower Type: Shower  Prior Level of Function/Work/Activity:  Admitted from [facility] facility. Pt states he was not doing much therapy there and had not really walked in \"weeks\". No family present to confirm or assist with history. Number of Personal Factors/Comorbidities that affect the Plan of Care: 1-2: MODERATE COMPLEXITY   EXAMINATION:   Most Recent Physical Functioning:   Gross Assessment:                  Posture:     Balance:    Bed Mobility:  Supine to Sit: Modified independent; Additional time  Wheelchair Mobility:     Transfers:  Sit to Stand: Contact guard assistance;Minimum assistance  Stand to Sit: Contact guard assistance;Minimum assistance  Gait:     Speed/Marifer: Pace decreased (<100 feet/min); Shuffled  Step Length: Left shortened;Right shortened  Gait Abnormalities: Decreased step clearance;Shuffling gait; Steppage gait  Distance (ft): 60 Feet (ft)(x3)  Assistive Device: Gait belt;Walker, rolling  Ambulation - Level of Assistance: Contact guard assistance;Minimal assistance      Body Structures Involved:  1. Muscles Body Functions Affected:  1. Respiratory  2. Movement Related Activities and Participation Affected:  1. General Tasks and Demands  2. Mobility  3. Domestic Life  4. Community, Social and [place]   Number of elements that affect the Plan of Care: 4+: HIGH COMPLEXITY   CLINICAL PRESENTATION:   Presentation: Stable and uncomplicated: LOW COMPLEXITY   CLINICAL DECISION MAKIN [facility] Mobility Inpatient Short Form  How much difficulty does the patient currently have. .. Unable A Lot A Little None   1. Turning over in bed (including adjusting bedclothes, sheets and blankets)? ? 1   ? 2   ? 3   ? 4   2. Sitting down on and standing up from a chair with arms ( e.g., wheelchair, bedside commode, etc.)   ? 1   ? 2   ? 3   ? 4   3. Moving from lying on back to sitting on the side of the bed?   ? 1   ? 2   ? 3   ? 4   How much help from another person does the patient currently need. .. Total A Lot A Little None   4.   Moving to and from a bed to a chair (including a wheelchair)? ? 1   ? 2   ? 3   ? 4   5. Need to walk in hospital room? ? 1   ? 2   ? 3   ? 4   6. Climbing 3-5 steps with a railing? ? 1   ? 2   ? 3   ? 4   © 2007, Trustees of [address] [place] [address] [address] [id number], under license to [employer]. All rights reserved      Score:  Initial: 15 Most Recent: X (Date: -- )    Interpretation of Tool:  Represents activities that are increasingly more difficult (i.e. Bed mobility, Transfers, Gait). Medical Necessity:     · Patient demonstrates good  ·  rehab potential due to higher previous functional level. Reason for Services/Other Comments:  · Patient continues to demonstrate capacity to improve strength, mobility, balance, transfers, activity tolerance which will increase independence, decrease amount of assistance required from caregiver and increase safety  · . Use of outcome tool(s) and clinical judgement create a POC that gives a: Clear prediction of patient's progress: LOW COMPLEXITY            TREATMENT:   (In addition to Assessment/Re-Assessment sessions the following treatments were rendered)   Pre-treatment Symptoms/Complaints:  \"I am weak\"  Pain: Initial:   Pain Intensity 1: 0  Post Session:  0/10     Therapeutic Activity: (25 minutes): Therapeutic activities including Bed mobility,  sit-stand transfers, Chair transfers and Ambulation on level ground to improve mobility, strength, balance and activity tolerance . Required minimal   to promote static and dynamic balance in standing and promote motor control of bilateral, lower extremity(s).      Braces/Orthotics/Lines/Etc:   · IV  · O2 Device: Nasal cannula  Treatment/Session Assessment:    · Response to Treatment:  pt performs mobility with min assist. Weak, debilitated  · Interdisciplinary Collaboration:   o Physical Therapy Assistant  o Registered Nurse  · After treatment position/precautions:   o Up in chair  o Bed alarm/tab alert on  o Bed/Chair-wheels locked  o Bed in low position  o Call light within reach  o RN notified   · Compliance with Program/Exercises: Compliant all of the time  · Recommendations/Intent for next treatment session: \"Next visit will focus on advancements to more challenging activities and reduction in assistance provided\".   Total Treatment Duration:  PT Patient Time In/Time Out  Time In: 1100  Time Out: 1125    Star Fountain, PTA

## 2019-09-06 NOTE — PROGRESS NOTES
Pt sitting up in the bed while eating dinner. Pt is stable. No needs expressed. SBAR report given to the oncoming nurse.

## 2019-09-06 NOTE — PROGRESS NOTES
Patient slept well throughout the night with no complaints  Respirations are even unlabored on 6L high flow NC there are no signs of distress at this time  Will give BSR to oncoming RN upon arrival

## 2019-09-06 NOTE — PROGRESS NOTES
Pt sitting up in the bed watching tv. Pt is stable. Pt is axo. Respirations are even and unlabored at rest with nasal cannula, 6 L/min. No needs requested. Bed is in low and locked position. Call light is within reach. Pt instructed to ask for assistance if needed. Will continue to monitor.

## 2019-09-06 NOTE — PROGRESS NOTES
Nutrition LOS Note: day 5  Assessment  DIET CARDIAC Regular    Food,Nutrition, and Pertinent History: Pt from [place] [address] and receiving tx for PNA. H/O COPD. Note recent admission in May and June of this year. Pt states that he ate 100% of breakfast this morning which is typically his best meal of the day. Per pt, typically eats a larger breakfast (eggs, grits, whole milk, cheese toast), snacks throughout the day ([employer] PB cups, cheez its, doritos, chips, pretzels) and will then eat a dinner meal.  He states that he has been drinking boost plus (1-2/day). Per pt, he had a UBW of ~180 pounds. When asked about weight loss, pt states that he has had numerous medical problems and hospitalizations. Pt states, \"my wife told me I need to try to take bites of everything on my plate whether I like it or not. \"  Pt does have c/o the food tasting bland on a cardiac diet. He is on 40 mg lasix BID. Anthropometrics: Height: 5' 8\" (172.7 cm), Weight Source: Bed, Weight: 60.5 kg (133 lb 4.8 oz), Body mass index is 20.27 kg/m². BMI class of underweight. WT / BMI 9/5/2019 8/21/2019 8/6/2019 6/26/2019 6/16/2019   WEIGHT 133 lb 4.8 oz 129 lb 136 lb 137 lb 144 lb 9.6 oz     WT / BMI 5/25/2019 2/11/2019 12/22/2018 12/11/2018 9/21/2018   WEIGHT 135 lb 146 lb 143 lb 142 lb 160 lb 8 oz   Per weights listed in EMR, potential for a 27 pound, 16.8% weight loss over ~1 year and a recent 11 pound, 7.6% weight loss over ~3 months (clinically significant). Pt did have visible signs of muscle wasting and fat loss. Macronutrient Needs:(60.5 kg)  · EER:  8791-4557 kcal /day (25-30 kcal/kg listed BW)  · EPR:  61-73 grams protein/day (1-1.2 grams/kg listed BW)  Intake/Comparative Standards: Average intake for past 5 day(s)/12 recorded meal(s): 58%.  This potentially meets ~60-80% of kcal and ~80-90% of protein needs    Nutrition Diagnosis: Severe/chronic malnutrition r/t inadequate energy intake, as evidenced by significant weight loss noted above and physical signs of muscle wasting and fat loss. Intervention:   Meals and snacks: Continue current diet. Whole milk with all meal trays. Preferences. ONS: Add boost plus TID (chocolate)  Discharge nutrition plan: boost prn.     Guevara [address], [facility], [address] [place], [address]

## 2019-09-06 NOTE — PROGRESS NOTES
Progress Note    Patient: Karuna Kiran MRN: [id number]  SSN: [id number]    YOB: 1932  Age: 80 y.o. Sex: male      Admit Date: 9/1/2019    LOS: 5 days     Subjective:     PMH of COPD, on 2 LPM oxygen cannula, hypertension, BPH, elevated CEA, VT on amiodarone, recurrent pleural effusion requiring thoracentesis, sp pleurodesis on 5/16/19,     Admitted due to shortness of breath. Found to have RML pneumonia. On Vancomycin and Zosyn now. Patient is feeling slowly better. No shortness of breath. No fever. No shaking. No chills. Still requiring 6 LPM of oxygen although he normally uses 2 LPM before admission. Still complaining of feeling weak. Objective:     Vitals:    09/05/19 2350 09/06/19 0359 09/06/19 0730 09/06/19 0840   BP: 128/66 131/67 163/85    Pulse: 89 68 63    Resp: 19 19 18    Temp: 98.6 °F (37 °C) 98.6 °F (37 °C) 97.7 °F (36.5 °C)    SpO2: 98% 92% 94% 99%   Weight:       Height:            Intake and Output:  Current Shift: No intake/output data recorded. Last three shifts: 09/04 1901 - 09/06 0700  In: 920 [P.O.:720; I.V.:200]  Out: 2250 [Urine:2250]    Physical Exam:     General:                    The patient is a pleasant male in no acute distress now. he is sitting in bed. On oxygen cannula. Head:                                   Normocephalic/atraumatic. Eyes:                                   palpebral pallor, no scleral icterus. ENT:                                    External auricular and nasal exam within normal limits. Mucous membranes are moist.  Neck:                                   Supple, non-tender, no JVD. Lungs:                       decreased to auscultation bilaterally without wheezes or crackles. No respiratory accessory muscle use. Heart:                                  Regular rate and rhythm, without murmurs, rubs, or gallops.   Abdomen: Soft, non-tender, non-distended with normoactive bowel sounds. Genitourinary:           No tenderness over the bladder or bilateral CVAs. Extremities:               Without clubbing, cyanosis, or edema. Skin:                                    Normal color, texture, and turgor. No rashes, lesions, or jaundice. Pulses:                      Radial and dorsalis pedis pulses present 2+ bilaterally. Capillary refill <2s. Neurologic:                CN II-XII grossly intact and symmetrical.                                               Moving all four extremities well with no focal deficits. Psychiatric:                Pleasant demeanor, appropriate affect. Alert and oriented x 3      Lab/Data Review:    XR chest   9-6-2019  IMPRESSION: No significant interval change in the atypical pneumonia pattern  with more confluent opacity in the right upper lobe.       Current Facility-Administered Medications:     influenza vaccine 2019-20 (6 mos+)(PF) (FLUARIX/FLULAVAL/FLUZONE QUAD) injection 0.5 mL, 0.5 mL, IntraMUSCular, PRIOR TO DISCHARGE, Loren Santos MD    [facility]) 1250 mg in  ml infusion, 1,250 mg, IntraVENous, Q18H, Raquel Carvajal MD, Last Rate: 125 mL/hr at 09/06/19 0500, 1,250 mg at 09/06/19 0500    amLODIPine (NORVASC) tablet 5 mg, 5 mg, Oral, DAILY, Kevin Urbina MD, 5 mg at 09/05/19 0947    hydrALAZINE (APRESOLINE) tablet 25 mg, 25 mg, Oral, Q6H PRN, Kevin Urbina MD, 25 mg at 09/03/19 1556    acetaminophen (TYLENOL) tablet 650 mg, 650 mg, Oral, Q6H PRN, Raquel Carvajal MD, 650 mg at 09/05/19 0951    albuterol-ipratropium (DUO-NEB) 2.5 MG-0.5 MG/3 ML, 3 mL, Nebulization, Q6H RT, Raquel Carvajal MD, 3 mL at 09/06/19 0840    aspirin delayed-release tablet 81 mg, 81 mg, Oral, DAILY, Raquel Carvajal MD, 81 mg at 09/05/19 0947    finasteride (PROSCAR) tablet 5 mg, 5 mg, Oral, DAILY, Raquel Carvajal MD, 5 mg at 09/05/19 6234    fluticasone propionate (FLONASE) 50 mcg/actuation nasal spray 2 Spray, 2 Spray, Both Nostrils, DAILY, Raquel Carvajal MD, 2 [place] at 09/05/19 0477    furosemide (LASIX) tablet 40 mg, 40 mg, Oral, BID, Raquel Carvajal MD, 40 mg at 09/05/19 1805    guaiFENesin ER (MUCINEX) tablet 1,200 mg, 1,200 mg, Oral, DAILY, Raquel Carvajal MD, 1,200 mg at 09/05/19 0947    montelukast (SINGULAIR) tablet 10 mg, 10 mg, Oral, DAILY, Raquel Carvajal MD, 10 mg at 09/05/19 0947    potassium chloride (K-DUR, KLOR-CON) SR tablet 40 mEq, 40 mEq, Oral, DAILY, Conrado ChallengerLoren MD, 40 mEq at 09/05/19 0947    famotidine (PEPCID) tablet 20 mg, 20 mg, Oral, BID, Raquel Carvajal MD, 20 mg at 09/05/19 1805    tamsulosin (FLOMAX) capsule 0.4 mg, 0.4 mg, Oral, DAILY, Raquel Carvajal MD, 0.4 mg at 09/05/19 0947    piperacillin-tazobactam (ZOSYN) 3.375 g in 0.9% sodium chloride (MBP/ADV) 100 mL, 3.375 g, IntraVENous, Q8H, Raquel Carvajal MD, Last Rate: 25 mL/hr at 09/06/19 0516, 3.375 g at 09/06/19 0516    heparin (porcine) injection 5,000 Units, 5,000 Units, SubCUTAneous, Q12H, Raquel Carvajal MD, 5,000 Units at 09/05/19 2253    [facility]etron [facility]) injection 4 mg, 4 mg, IntraVENous, Q8H PRN, Raquel Carvjaal MD      Assessment:     Principal Problem:    HCAP (healthcare-associated pneumonia) (9/1/2019)    Active Problems:    Benign prostatic hyperplasia (12/10/2015)      Cardiovascular disease (12/10/2015)      Essential hypertension, benign (12/10/2015)      COPD (chronic obstructive pulmonary disease) [facility]sema [facility] (10/17/2016)      Overview: Last Assessment & Plan:       Not currently on any maintence medication regimen for COPD as he felt them       to be unhelpful in the past.  I recommended a trial of a ANoro Ellipta       which he should take 1 inhalation daily.   Demonstration was completed on       the correct method of administration and he was able to return       demonstration independently in the office today and administer his first       dose. I have given him a 2-week sample which he will use daily and       monitor if he notes any improvement in his breathing. He may also try       pretreating himself with Ventolin before exertion to see if this offers       any relief. Acute on chronic respiratory failure with hypoxia (HCC) (5/6/2019)      VT (ventricular tachycardia) ([address] (5/6/2019)        Plan:     Health care associated pneumonia  Continue current medications. Clinically improving slowly. Continue oxygen cannula. Wean as tolerated. Duoneb, Mucinex. Hypertension  Monitor blood pressure and manage accordingly. Continue home medications. BPH   Continue home medications. I have discussed the plan of care with patient. DVT prophylaxis : heparin SC     Disposition plan : Continue to wean oxygen.        Signed By: Breana Mace MD     September 6, 2019

## 2019-09-06 NOTE — PROGRESS NOTES
Pharmacokinetic Consult to Pharmacist    Rashid Storey is a 80 y.o. male being treated for HAP with vancomycin and zosyn. Height: 5' 8\" (172.7 cm)  Weight: 60.5 kg (133 lb 4.8 oz)  Lab Results   Component Value Date/Time    BUN 14 09/03/2019 08:44 AM    Creatinine 0.85 09/03/2019 08:44 AM    WBC 10.4 09/03/2019 08:44 AM    Procalcitonin 0.1 09/01/2019 05:43 AM    Lactic Acid (POC) 1.65 09/01/2019 07:31 AM      Estimated Creatinine Clearance: 52.4 mL/min (based on SCr of 0.85 mg/dL). CULTURES:  Results     Procedure Component Value Units Date/Time    CULTURE, RESPIRATORY/SPUTUM/BRONCH Renard Ading [[id number]] Collected:  09/01/19 1155    Order Status:  Canceled Specimen:  Sputum     CULTURE, BLOOD [[id number]] Collected:  09/01/19 0725    Order Status:  Completed Specimen:  Blood Updated:  09/06/19 0711     Special Requests: --        NO SPECIAL REQUESTS  LEFT  ARM       Culture result: NO GROWTH 5 DAYS       CULTURE, BLOOD [[id number]] Collected:  09/01/19 0723    Order Status:  Completed Specimen:  Blood Updated:  09/06/19 0711     Special Requests: --        NO SPECIAL REQUESTS  RIGHT  FOREARM       Culture result: NO GROWTH 5 DAYS               Lab Results   Component Value Date/Time    Vancomycin,trough 16.5 09/06/2019 03:37 AM       Day 6 of vancomycin. Goal trough is 15-20. Will continue current dose of vancomycin. Will continue to follow patient.       Thank you,  Femi Carson, PharmD

## 2019-09-06 NOTE — PROGRESS NOTES
BSR received  Patient is resting in bed quietly  Respirations are even unlabored on 6L  high flow NC there are no sign of distress at this time

## 2019-09-07 LAB
ANION GAP SERPL CALC-SCNC: 7 MMOL/L (ref 7–16)
BUN SERPL-MCNC: 18 MG/DL (ref 8–23)
CALCIUM SERPL-MCNC: 8.2 MG/DL (ref 8.3–10.4)
CHLORIDE SERPL-SCNC: 101 MMOL/L (ref 98–107)
CO2 SERPL-SCNC: 29 MMOL/L (ref 21–32)
CREAT SERPL-MCNC: 0.85 MG/DL (ref 0.8–1.5)
GLUCOSE SERPL-MCNC: 79 MG/DL (ref 65–100)
POTASSIUM SERPL-SCNC: 3.7 MMOL/L (ref 3.5–5.1)
SODIUM SERPL-SCNC: 137 MMOL/L (ref 136–145)

## 2019-09-07 PROCEDURE — 74011250637 HC RX REV CODE- 250/637: Performed by: INTERNAL MEDICINE

## 2019-09-07 PROCEDURE — 74011000258 HC RX REV CODE- 258: Performed by: INTERNAL MEDICINE

## 2019-09-07 PROCEDURE — 74011250636 HC RX REV CODE- 250/636: Performed by: INTERNAL MEDICINE

## 2019-09-07 PROCEDURE — 94760 N-INVAS EAR/PLS OXIMETRY 1: CPT

## 2019-09-07 PROCEDURE — 65660000000 HC RM CCU STEPDOWN

## 2019-09-07 PROCEDURE — 80048 BASIC METABOLIC PNL TOTAL CA: CPT

## 2019-09-07 PROCEDURE — 97530 THERAPEUTIC ACTIVITIES: CPT

## 2019-09-07 PROCEDURE — 77010033678 HC OXYGEN DAILY

## 2019-09-07 PROCEDURE — 97110 THERAPEUTIC EXERCISES: CPT

## 2019-09-07 PROCEDURE — 74011000250 HC RX REV CODE- 250: Performed by: INTERNAL MEDICINE

## 2019-09-07 PROCEDURE — 36415 COLL VENOUS BLD VENIPUNCTURE: CPT

## 2019-09-07 PROCEDURE — 94640 AIRWAY INHALATION TREATMENT: CPT

## 2019-09-07 PROCEDURE — 77030011943

## 2019-09-07 RX ADMIN — TAMSULOSIN HYDROCHLORIDE 0.4 MG: 0.4 CAPSULE ORAL at 09:37

## 2019-09-07 RX ADMIN — GUAIFENESIN 1200 MG: 600 TABLET, EXTENDED RELEASE ORAL at 09:37

## 2019-09-07 RX ADMIN — IPRATROPIUM BROMIDE AND ALBUTEROL SULFATE 3 ML: .5; 3 SOLUTION RESPIRATORY (INHALATION) at 14:54

## 2019-09-07 RX ADMIN — AMLODIPINE BESYLATE 5 MG: 5 TABLET ORAL at 09:37

## 2019-09-07 RX ADMIN — FINASTERIDE 5 MG: 5 TABLET, FILM COATED ORAL at 09:37

## 2019-09-07 RX ADMIN — FLUTICASONE PROPIONATE 2 SPRAY: 50 SPRAY, METERED NASAL at 09:37

## 2019-09-07 RX ADMIN — FUROSEMIDE 40 MG: 40 TABLET ORAL at 17:04

## 2019-09-07 RX ADMIN — PIPERACILLIN SODIUM,TAZOBACTAM SODIUM 3.38 G: 3; .375 INJECTION, POWDER, FOR SOLUTION INTRAVENOUS at 20:27

## 2019-09-07 RX ADMIN — FAMOTIDINE 20 MG: 20 TABLET ORAL at 17:04

## 2019-09-07 RX ADMIN — ASPIRIN 81 MG: 81 TABLET ORAL at 09:37

## 2019-09-07 RX ADMIN — IPRATROPIUM BROMIDE AND ALBUTEROL SULFATE 3 ML: .5; 3 SOLUTION RESPIRATORY (INHALATION) at 02:26

## 2019-09-07 RX ADMIN — HEPARIN SODIUM 5000 UNITS: 5000 INJECTION INTRAVENOUS; SUBCUTANEOUS at 23:51

## 2019-09-07 RX ADMIN — PIPERACILLIN SODIUM,TAZOBACTAM SODIUM 3.38 G: 3; .375 INJECTION, POWDER, FOR SOLUTION INTRAVENOUS at 03:55

## 2019-09-07 RX ADMIN — FUROSEMIDE 40 MG: 40 TABLET ORAL at 09:37

## 2019-09-07 RX ADMIN — IPRATROPIUM BROMIDE AND ALBUTEROL SULFATE 3 ML: .5; 3 SOLUTION RESPIRATORY (INHALATION) at 20:22

## 2019-09-07 RX ADMIN — IPRATROPIUM BROMIDE AND ALBUTEROL SULFATE 3 ML: .5; 3 SOLUTION RESPIRATORY (INHALATION) at 08:10

## 2019-09-07 RX ADMIN — MONTELUKAST 10 MG: 10 TABLET, FILM COATED ORAL at 09:36

## 2019-09-07 RX ADMIN — FAMOTIDINE 20 MG: 20 TABLET ORAL at 09:37

## 2019-09-07 RX ADMIN — HEPARIN SODIUM 5000 UNITS: 5000 INJECTION INTRAVENOUS; SUBCUTANEOUS at 12:00

## 2019-09-07 RX ADMIN — PIPERACILLIN SODIUM,TAZOBACTAM SODIUM 3.38 G: 3; .375 INJECTION, POWDER, FOR SOLUTION INTRAVENOUS at 12:24

## 2019-09-07 RX ADMIN — POTASSIUM CHLORIDE 40 MEQ: 20 TABLET, EXTENDED RELEASE ORAL at 09:36

## 2019-09-07 NOTE — PROGRESS NOTES
Patient slept well throughout the night with no complaints  Respirations are even on 6L NC there are no signs of distress at this time  Will give BSR to oncoming RN

## 2019-09-07 NOTE — PROGRESS NOTES
Pt resting in bed with eyes closed at this time. Pt alert and oriented times 3. No distress noted at this time. Respirations even and unlabored on 6 L NC high flow. Pt assisted with urinal this am. Pt denies pain at this time. Pt instructed to call for assistance if needed. Call light in place. Door open. Will continue to monitor.

## 2019-09-07 NOTE — PROGRESS NOTES
Pt resting in bed with eyes closed at this time. Pt alert and oriented times 3. No distress noted. Respirations even and unlabored on 6 L NC. Pt denies pain at this time. Pt instructed to call for assistance if needed. Call light in place. Door open. Will continue to monitor.

## 2019-09-07 NOTE — PROGRESS NOTES
Problem: Self Care Deficits Care Plan (Adult)  Goal: *Acute Goals and Plan of Care (Insert Text)  Description  1. Patient will complete total body bathing and dressing with minimal assistance and adaptive equipment as needed. 2. Patient will complete toileting with contact guard assistance and adaptive equipment as needed. 3. Patient will tolerate 20 minutes of OT treatment with up to 3 rest breaks to increase activity tolerance for ADLs. 4. Patient will complete functional transfers with stand by assistance and adaptive equipment as needed. 5. Patient will demonstrate modified independence with therapeutic exercise HEP to increase strength in BUEs for increased safety and independence with functional transfers. 6. Patient will complete functional mobility for ADLs with stand by assistance and adaptive equipment as needed. 7. Patient will verbalize 2 energy conservation techniques with no cues from therapist to increase safety and independence with ADLs. Timeframe: 7 visits      Outcome: Progressing Towards Goal     OCCUPATIONAL THERAPY: Daily Note and PM    9/7/2019  INPATIENT: OT Visit Days: 2  [place] / [place] / Product Type: Medicare /      NAME/AGE/GENDER: Teri Orr is a 80 y.o. male   PRIMARY DIAGNOSIS:  HCAP (healthcare-associated pneumonia) [J18.9] HCAP (healthcare-associated pneumonia)   HCAP (healthcare-associated pneumonia)         ICD-10: Treatment Diagnosis:    · Generalized Muscle Weakness (M62.81)  · Other lack of cordination (R27.8)  · History of falling (Z91.81)  · Dizziness and Giddiness (R42)   Precautions/Allergies:    Fall precautions  Patient has no known allergies. ASSESSMENT:     Mr. Ervin Michael is a 80 y.o. male admitted with HCAP, SOB. Pt admitted from [address] [place] [address] and reports receiving assistance with ADLs, however believes he needs less assist than he is given. Primarily needs assist with LB ADLs.  Pt uses w/c for mobility, has been working on walking with RW with therapy. Utilizes 2L supplemental O2. Upon arrival pt alert and agreeable to OT evaluation and treatment, on 5L O2 HFNC. BUE assessment revealed AROM WFL and strength generally decreased in BUEs. Pt completed bed mobility with SBA/additional time. Pt demonstrates intact sitting balance. 9/7/2019 Pt presents up in the chair upon arrival. Pt demonstrated to therapist exercises that he usually completes as well as new ones therapist gave him. Pt stood with min a and a rolling walker and wanted to walk, but his IV antibiotics were still running. Therapist returned later and pt completed functional mobility in the hallway with CGA/min a. Pt became very dizzy and PCT brought up a chair and pt was wheeled back to his room and positioned back in the recliner chair. We could not pt's O2 to read on the monitor for several minutes and RN in the room with therapist, we increased his O2 from 5% to 7% and still had difficulty getting a reading. Spoke with RT who stated in only reads on pt's finger and in read at 80. RT notified and stated to keep pt on 7% and he would check back with the pt. Good effort. Continue POC. This section established at most recent assessment   PROBLEM LIST (Impairments causing functional limitations):  1. Decreased Strength  2. Decreased ADL/Functional Activities  3. Decreased Transfer Abilities  4. Decreased Ambulation Ability/Technique  5. Decreased Balance  6. Decreased Activity Tolerance  7. Decreased Pacing Skills  8. Decreased Work Simplification/Energy Conservation Techniques  9. Increased Fatigue  10. Increased Shortness of Breath  11. Decreased [place] with Home Exercise Program   INTERVENTIONS PLANNED: (Benefits and precautions of occupational therapy have been discussed with the patient.)  1. Activities of daily living training  2. Adaptive equipment training  3. Balance training  4. Clothing management  5. Donning&doffing training  6.  Hygiene training  7. Neuromuscular re-eduation  8. Re-evaluation  9. Therapeutic activity  10. Therapeutic exercise  11. Wheelchair management     TREATMENT PLAN: Frequency/Duration: Follow patient 3x/week to address above goals. Rehabilitation Potential For Stated Goals: Good     REHAB RECOMMENDATIONS (at time of discharge pending progress):    Placement: It is my opinion, based on this patient's performance to date, that Mr. Deshaun Thomas may benefit from intensive therapy at a [facility] after discharge due to the functional deficits listed above that are likely to improve with skilled rehabilitation and concerns that he/she may be unsafe to be unsupervised at home due to    impaired balance, strength, activity tolerance, SOB impacting ADLs, increasing risk for falls. Equipment:    BrowserlingD               OCCUPATIONAL PROFILE AND HISTORY:   History of Present Injury/Illness (Reason for Referral):  See H&P. Past Medical History/Comorbidities:   Mr. Deshaun Thomas  has a past medical history of Abdominal aortic aneurysm ([address] (12/10/2015), Abdominal aortic aneurysm without rupture ([address] Allergic rhinitis (12/10/2015), Asthma, Benign neoplasm, Benign prostatic hyperplasia (12/10/2015), BPH without urinary obstruction, Cardiovascular disease (12/10/2015), Chronic obstructive asthma with exacerbation ([address] (12/10/2015), COPD (chronic obstructive pulmonary disease) ([address] (12/10/2015), Cough with hemoptysis, Erectile dysfunction (12/10/2015), Essential hypertension, benign (12/10/2015), Fracture (10/2014), History of colon polyps, Low testosterone (12/10/2015), Lumbago, Memory loss, Overflow incontinence, Raynaud's syndrome (12/10/2015), Rotator cuff tendinitis, Thrombocytopenia ([address] and Urinary frequency. Mr. Deshaun Thomas  has a past surgical history that includes hx hernia repair (1980); hx colonoscopy; hx fracture tx (10/2014); hx cataract removal (6/2016-right); and hx orthopaedic (03/2018).   Social History/Living Environment: Home Environment: Rehabilitation facility  # Steps to Enter: 0  One/Two Story Residence: One story  Living Alone: No  Support Systems: Skilled nursing facility  Patient Expects to be Discharged to[de-identified] Rehabilitation facility  Current DME Used/Available at Home: Wheelchair, Walker  Tub or Shower Type: Shower  Prior Level of Function/Work/Activity:  Pt admitted from [employer] and reports receiving assistance with ADLs, however believes he needs less assist than he is given. Primarily needs assist with LB ADLs. Pt uses w/c for mobility, has been working on walking with RW with therapy. Utilizes 2L supplemental O2. Personal Factors:          Sex:  male        Age:  80 y.o. Other factors that influence how disability is experienced by the patient:  falls    Number of Personal Factors/Comorbidities that affect the Plan of Care: Expanded review of therapy/medical records (1-2):  MODERATE COMPLEXITY   ASSESSMENT OF OCCUPATIONAL PERFORMANCE[de-identified]   Activities of Daily Living:   Basic ADLs (From Assessment) Complex ADLs (From Assessment)   Feeding: Independent  Oral Facial Hygiene/Grooming: Setup  Bathing: Moderate assistance  Upper Body Dressing: Setup  Lower Body Dressing: Maximum assistance  Toileting: Minimum assistance Instrumental ADL  Meal Preparation: Total assistance  Homemaking: Total assistance  Medication Management: Total assistance  Financial Management: Total assistance   Grooming/Bathing/Dressing Activities of Daily Living                                   Most Recent Physical Functioning:   Gross Assessment:                  Posture:  Posture (WDL): Exceptions to WDL  Posture Assessment:  Forward head, Rounded shoulders, Trunk flexion  Balance:    Bed Mobility:     Wheelchair Mobility:     Transfers:               Patient Vitals for the past 6 hrs:   BP BP Patient Position SpO2 O2 Flow Rate (L/min) Pulse   09/07/19 1155 112/66 At rest 97 % 6 l/min 67       Mental Status  Neurologic State: Alert, Eyes open spontaneously  Orientation Level: Appropriate for age, Oriented X4  Cognition: Appropriate decision making, Appropriate for age attention/concentration, Appropriate safety awareness, Follows commands  Perception: Appears intact  Perseveration: No perseveration noted  Safety/Judgement: Awareness of environment, Fall prevention, Insight into deficits                          Physical Skills Involved:  1. Balance  2. Strength  3. Activity Tolerance Cognitive Skills Affected (resulting in the inability to perform in a timely and safe manner):  1. None  Psychosocial Skills Affected:  1. Habits/Routines  2. Self-Awareness   Number of elements that affect the Plan of Care: 3-5:  MODERATE COMPLEXITY   CLINICAL DECISION MAKIN[address] [place], [place] [id number] AM-PAC 6 Clicks   Daily Activity Inpatient Short Form  How much help from another person does the patient currently need. .. Total A Lot A Little None   1. Putting on and taking off regular lower body clothing? ? 1   ? 2   ? 3   ? 4   2. Bathing (including washing, rinsing, drying)? ? 1   ? 2   ? 3   ? 4   3. Toileting, which includes using toilet, bedpan or urinal?   ? 1   ? 2   ? 3   ? 4   4. Putting on and taking off regular upper body clothing? ? 1   ? 2   ? 3   ? 4   5. Taking care of personal grooming such as brushing teeth? ? 1   ? 2   ? 3   ? 4   6. Eating meals? ? 1   ? 2   ? 3   ? 4   © , Trustees of [address] [place], [place] [id number], under license to [employer]. All rights reserved      Score:  Initial: 17 9/3/19 Most Recent: X (Date: -- )    Interpretation of Tool:  Represents activities that are increasingly more difficult (i.e. Bed mobility, Transfers, Gait). Medical Necessity:     · Patient demonstrates good  ·  rehab potential due to higher previous functional level. Reason for Services/Other Comments:  · Patient continues to require skilled intervention due to impaired ability to complete ADLs at prior level of independence   · .    Use of outcome tool(s) and clinical judgement create a POC that gives a: MODERATE COMPLEXITY         TREATMENT:   (In addition to Assessment/Re-Assessment sessions the following treatments were rendered)     Pre-treatment Symptoms/Complaints:    Pain: Initial:   Pain Intensity 1: 0 0/10 Post Session:  same     Therapeutic Exercise: (35 minutes):  Exercises per grid below to improve mobility and strength. Required minimal visual and verbal cues to promote proper body posture and promote proper body mechanics. Progressed resistance, range and repetitions as indicated. Therapeutic Activity: (8 minutes): Therapeutic activities including Chair transfers and static standing to improve mobility, strength and balance. Required minimal a to promote static balance in standing. Therapeutic Activity: (30 minutes): Therapeutic activities including Ambulation on level ground  to improve mobility, strength and balance. Required minimal   to promote dynamic balance in standing. Braces/Orthotics/Lines/Etc:   · IV  · O2 Device: Hi flow nasal cannula  Treatment/Session Assessment:    · Response to Treatment:  SOB, decreased activity tolerance  · Interdisciplinary Collaboration:   o Certified Occupational Therapy Assistant  o Registered Nurse  o Respiratory Therapist  · After treatment position/precautions:   o Up in chair  o Bed/Chair-wheels locked  o Call light within reach  o RN notified   · Compliance with Program/Exercises: Compliant all of the time, Will assess as treatment progresses. · Recommendations/Intent for next treatment session: \"Next visit will focus on advancements to more challenging activities and reduction in assistance provided\".   Total Treatment Duration:  OT Patient Time In/Time Out  Time In: 1500  Time Out: [place]

## 2019-09-07 NOTE — PROGRESS NOTES
BSR received  Patient is A/Ox4 resting in bed  Respirations are even on 6L High flow NC  There are no signs of distress at this time

## 2019-09-07 NOTE — PROGRESS NOTES
Pt straight cath. 1000 ml of clear yellow urine obtained. Pt tolerated well. Will continue to monitor.

## 2019-09-07 NOTE — PROGRESS NOTES
Pt resting in bed with eyes closed at this time. Alert and oriented times 3. No distress noted at this time. Respirations even and unlabored on 6 L NC. Pt denies pain at this time. Pt instructed to call for assistance if needed. Call light in place. Door open. Will continue to monitor.

## 2019-09-07 NOTE — PROGRESS NOTES
Progress Note    Patient: Kevin Bro MRN: [id number]  SSN: [id number]    YOB: 1932  Age: 80 y.o. Sex: male      Admit Date: 9/1/2019    LOS: 6 days     Subjective:     PMH of COPD, on 2 LPM oxygen cannula, hypertension, BPH, elevated CEA, VT on amiodarone, recurrent pleural effusion requiring thoracentesis, sp pleurodesis on 5/16/19,     Admitted due to shortness of breath. Found to have RML pneumonia. On Vancomycin and Zosyn now. Patient is feeling slowly better. Less shortness of breath. No fever. No shaking. No chills. Still requiring 6 LPM of oxygen although he normally uses 2 LPM before admission. Still complaining of feeling weak though stronger. Objective:     Vitals:    09/07/19 0013 09/07/19 0226 09/07/19 0334 09/07/19 0822   BP: 145/83  148/75 154/67   Pulse: 79  70 (!) 59   Resp: 19  18 20   Temp: 97.4 °F (36.3 °C)  97.5 °F (36.4 °C) 97.1 °F (36.2 °C)   SpO2: 94% 95% 98% 98%   Weight:       Height:            Intake and Output:  Current Shift: 09/07 0701 - 09/07 1900  In: 539 [P.O.:595]  Out: 100 [Urine:100]  Last three shifts: 09/05 1901 - 09/07 0700  In: -   Out: 1900 [Urine:1900]    Physical Exam:     General:                    The patient is a pleasant male in no acute distress now. He is sitting in bed. On oxygen cannula. Head:                                   Normocephalic/atraumatic. Eyes:                                   palpebral pallor, no scleral icterus. ENT:                                    External auricular and nasal exam within normal limits. Mucous membranes are moist.  Neck:                                   Supple, non-tender, no JVD. Lungs:                       decreased to auscultation bilaterally without wheezes or crackles. No respiratory accessory muscle use.   Heart:                                  Regular rate and rhythm, without murmurs, rubs, or gallops. Abdomen:                  Soft, non-tender, non-distended with normoactive bowel sounds. Genitourinary:           No tenderness over the bladder or bilateral CVAs. Extremities:               Without clubbing, cyanosis, or edema. Skin:                                    Normal color, texture, and turgor. No rashes, lesions, or jaundice. Pulses:                      Radial and dorsalis pedis pulses present 2+ bilaterally. Capillary refill <2s. Neurologic:                CN II-XII grossly intact and symmetrical.                                               Moving all four extremities well with no focal deficits. Psychiatric:                Pleasant demeanor, appropriate affect. Alert and oriented x 3      Lab/Data Review:    XR chest   9-6-2019  IMPRESSION: No significant interval change in the atypical pneumonia pattern  with more confluent opacity in the right upper lobe.     Results     Procedure Component Value Units Date/Time    CULTURE, RESPIRATORY/SPUTUM/BRONCH Clearance Marianela [[id number]] Collected:  09/01/19 1155    Order Status:  Canceled Specimen:  Sputum     CULTURE, BLOOD [[id number]] Collected:  09/01/19 0725    Order Status:  Completed Specimen:  Blood Updated:  09/06/19 0711     Special Requests: --        NO SPECIAL REQUESTS  LEFT  ARM       Culture result: NO GROWTH 5 DAYS       CULTURE, BLOOD [[id number]] Collected:  09/01/19 0723    Order Status:  Completed Specimen:  Blood Updated:  09/06/19 0711     Special Requests: --        NO SPECIAL REQUESTS  RIGHT  FOREARM       Culture result: NO GROWTH 5 DAYS                 Current Facility-Administered Medications:     influenza vaccine 2019-20 (6 mos+)(PF) (FLUARIX/FLULAVAL/FLUZONE QUAD) injection 0.5 mL, 0.5 mL, IntraMUSCular, PRIOR TO DISCHARGE, Raquel Carvajal MD    vancomycin [facility]) 1250 mg in  ml infusion, 1,250 mg, IntraVENous, Q18H, Rqauel Carvajal MD, Last Rate: 125 mL/hr at 09/06/19 2239, 1,250 mg at 09/06/19 2239    amLODIPine (NORVASC) tablet 5 mg, 5 mg, Oral, DAILY, Kevin Urbina MD, 5 mg at 09/07/19 0937    hydrALAZINE (APRESOLINE) tablet 25 mg, 25 mg, Oral, Q6H PRN, Kevin Urbina MD, 25 mg at 09/03/19 1556    acetaminophen (TYLENOL) tablet 650 mg, 650 mg, Oral, Q6H PRN, Wayne Joe MD, 650 mg at 09/05/19 0951    albuterol-ipratropium (DUO-NEB) 2.5 MG-0.5 MG/3 ML, 3 mL, Nebulization, Q6H RT, Wayne Joe MD, 3 mL at 09/07/19 0810    aspirin delayed-release tablet 81 mg, 81 mg, Oral, DAILY, Wayne Joe MD, 81 mg at 09/07/19 9269    finasteride (PROSCAR) tablet 5 mg, 5 mg, Oral, DAILY, Wayne Joe MD, 5 mg at 09/07/19 7947    fluticasone propionate (FLONASE) 50 mcg/actuation nasal spray 2 Spray, 2 Spray, Both Nostrils, DAILY, Wayne Joe MD, 2 [place] at 09/07/19 9521    furosemide (LASIX) tablet 40 mg, 40 mg, Oral, BID, Wayne Joe MD, 40 mg at 09/07/19 0254    guaiFENesin ER (MUCINEX) tablet 1,200 mg, 1,200 mg, Oral, DAILY, Wayne Joe MD, 1,200 mg at 09/07/19 8502    montelukast (SINGULAIR) tablet 10 mg, 10 mg, Oral, DAILY, Wayne Joe MD, 10 mg at 09/07/19 0936    potassium chloride (K-DUR, KLOR-CON) SR tablet 40 mEq, 40 mEq, Oral, DAILY, Wayne Joe MD, 40 mEq at 09/07/19 0936    famotidine (PEPCID) tablet 20 mg, 20 mg, Oral, BID, Wayne Joe MD, 20 mg at 09/07/19 1690    tamsulosin (FLOMAX) capsule 0.4 mg, 0.4 mg, Oral, DAILY, Wayne Joe MD, 0.4 mg at 09/07/19 0937    piperacillin-tazobactam (ZOSYN) 3.375 g in 0.9% sodium chloride (MBP/ADV) 100 mL, 3.375 g, IntraVENous, Q8H, Wayne Joe MD, Last Rate: 25 mL/hr at 09/07/19 0355, 3.375 g at 09/07/19 0355    heparin (porcine) injection 5,000 Units, 5,000 Units, SubCUTAneous, Q12H, Wayne Joe MD, 5,000 Units at 09/06/19 2238    ondansetron [facility]) injection 4 mg, 4 mg, IntraVENous, Q8H PRN, Wayne Joe MD      Assessment:     Principal Problem:    HCAP (healthcare-associated pneumonia) (9/1/2019)    Active Problems:    Benign prostatic hyperplasia (12/10/2015)      Cardiovascular disease (12/10/2015)      Essential hypertension, benign (12/10/2015)      COPD (chronic obstructive pulmonary disease) with emphysema ([facility] [address] (10/17/2016)      Overview: Last Assessment & Plan:       Not currently on any maintence medication regimen for COPD as he felt them       to be unhelpful in the past.  I recommended a trial of a ANoro Ellipta       which he should take 1 inhalation daily. Demonstration was completed on       the correct method of administration and he was able to return       demonstration independently in the office today and administer his first       dose. I have given him a 2-week sample which he will use daily and       monitor if he notes any improvement in his breathing. He may also try       pretreating himself with Ventolin before exertion to see if this offers       any relief. Acute on chronic respiratory failure with hypoxia (HCC) (5/6/2019)      VT (ventricular tachycardia) ([facility] [address] (5/6/2019)        Plan:     Health care associated pneumonia  Culture is negative. Will stop Vancomycin and continue with Zosyn. Clinically improving slowly. Continue oxygen cannula. Wean as tolerated. Duoneb, Mucinex. Monitor. Hypertension  Monitor blood pressure and manage accordingly. Continue home medications. BPH   Continue home medications. I have discussed the plan of care with patient. DVT prophylaxis : heparin SC     Disposition plan : Continue to wean oxygen.        Signed By: Parul Mathis MD     September 7, 2019

## 2019-09-08 PROCEDURE — 74011000258 HC RX REV CODE- 258: Performed by: INTERNAL MEDICINE

## 2019-09-08 PROCEDURE — 74011250637 HC RX REV CODE- 250/637: Performed by: INTERNAL MEDICINE

## 2019-09-08 PROCEDURE — 74011250636 HC RX REV CODE- 250/636: Performed by: INTERNAL MEDICINE

## 2019-09-08 PROCEDURE — 74011000250 HC RX REV CODE- 250: Performed by: INTERNAL MEDICINE

## 2019-09-08 PROCEDURE — 77030019605

## 2019-09-08 PROCEDURE — 94760 N-INVAS EAR/PLS OXIMETRY 1: CPT

## 2019-09-08 PROCEDURE — 77010033678 HC OXYGEN DAILY

## 2019-09-08 PROCEDURE — 77030011943

## 2019-09-08 PROCEDURE — 65660000000 HC RM CCU STEPDOWN

## 2019-09-08 PROCEDURE — 94640 AIRWAY INHALATION TREATMENT: CPT

## 2019-09-08 RX ADMIN — FAMOTIDINE 20 MG: 20 TABLET ORAL at 17:06

## 2019-09-08 RX ADMIN — IPRATROPIUM BROMIDE AND ALBUTEROL SULFATE 3 ML: .5; 3 SOLUTION RESPIRATORY (INHALATION) at 08:39

## 2019-09-08 RX ADMIN — HEPARIN SODIUM 5000 UNITS: 5000 INJECTION INTRAVENOUS; SUBCUTANEOUS at 23:54

## 2019-09-08 RX ADMIN — IPRATROPIUM BROMIDE AND ALBUTEROL SULFATE 3 ML: .5; 3 SOLUTION RESPIRATORY (INHALATION) at 15:02

## 2019-09-08 RX ADMIN — TAMSULOSIN HYDROCHLORIDE 0.4 MG: 0.4 CAPSULE ORAL at 09:55

## 2019-09-08 RX ADMIN — MONTELUKAST 10 MG: 10 TABLET, FILM COATED ORAL at 09:56

## 2019-09-08 RX ADMIN — PIPERACILLIN SODIUM,TAZOBACTAM SODIUM 3.38 G: 3; .375 INJECTION, POWDER, FOR SOLUTION INTRAVENOUS at 04:57

## 2019-09-08 RX ADMIN — ASPIRIN 81 MG: 81 TABLET ORAL at 09:55

## 2019-09-08 RX ADMIN — FUROSEMIDE 40 MG: 40 TABLET ORAL at 09:55

## 2019-09-08 RX ADMIN — FUROSEMIDE 40 MG: 40 TABLET ORAL at 17:06

## 2019-09-08 RX ADMIN — POTASSIUM CHLORIDE 40 MEQ: 20 TABLET, EXTENDED RELEASE ORAL at 09:55

## 2019-09-08 RX ADMIN — GUAIFENESIN 1200 MG: 600 TABLET, EXTENDED RELEASE ORAL at 09:54

## 2019-09-08 RX ADMIN — FINASTERIDE 5 MG: 5 TABLET, FILM COATED ORAL at 09:55

## 2019-09-08 RX ADMIN — IPRATROPIUM BROMIDE AND ALBUTEROL SULFATE 3 ML: .5; 3 SOLUTION RESPIRATORY (INHALATION) at 21:03

## 2019-09-08 RX ADMIN — HEPARIN SODIUM 5000 UNITS: 5000 INJECTION INTRAVENOUS; SUBCUTANEOUS at 11:54

## 2019-09-08 RX ADMIN — IPRATROPIUM BROMIDE AND ALBUTEROL SULFATE 3 ML: .5; 3 SOLUTION RESPIRATORY (INHALATION) at 01:16

## 2019-09-08 RX ADMIN — AMLODIPINE BESYLATE 5 MG: 5 TABLET ORAL at 09:55

## 2019-09-08 RX ADMIN — FAMOTIDINE 20 MG: 20 TABLET ORAL at 09:56

## 2019-09-08 RX ADMIN — FLUTICASONE PROPIONATE 2 SPRAY: 50 SPRAY, METERED NASAL at 09:56

## 2019-09-08 NOTE — PROGRESS NOTES
Progress Note    Patient: Amanda Amador MRN: [id number]  SSN: [id number]    YOB: 1932  Age: 80 y.o. Sex: male      Admit Date: 9/1/2019    LOS: 7 days     Subjective:     PMH of COPD, on 2 LPM oxygen cannula, hypertension, BPH, elevated CEA, VT on amiodarone, recurrent pleural effusion requiring thoracentesis, sp pleurodesis on 5/16/19,     Admitted due to shortness of breath. Found to have RML pneumonia. On Zosyn now. Patient has been working with physical therapy. His oxygen saturation dropped to 70+% when he was working with physical therapy on 9-7-2019. Objective:     Vitals:    09/08/19 0116 09/08/19 0346 09/08/19 0705 09/08/19 0954   BP:  105/54 97/56 115/54   Pulse:  77 (!) 55 71   Resp:  18 18    Temp:  98.4 °F (36.9 °C) 98.3 °F (36.8 °C)    SpO2: 95% 98%     Weight:       Height:            Intake and Output:  Current Shift: No intake/output data recorded. Last three shifts: 09/06 1901 - 09/08 0700  In: 1010 [P.O.:910; I.V.:100]  Out: 3250 [Urine:3250]    Physical Exam:     General:                    The patient is a pleasant male in no acute distress now. He is sitting in bed. On oxygen cannula 6 LPM.   Head:                                   Normocephalic/atraumatic. Eyes:                                   palpebral pallor, no scleral icterus. ENT:                                    External auricular and nasal exam within normal limits. Mucous membranes are moist.  Neck:                                   Supple, non-tender, no JVD. Lungs:                       decreased to auscultation bilaterally without wheezes or crackles. No respiratory accessory muscle use. Heart:                                  Regular rate and rhythm, without murmurs, rubs, or gallops. Abdomen:                  Soft, non-tender, non-distended with normoactive bowel sounds.    Genitourinary: No tenderness over the bladder or bilateral CVAs. Extremities:               Without clubbing, cyanosis, or edema. Skin:                                    Normal color, texture, and turgor. No rashes, lesions, or jaundice. Pulses:                      Radial and dorsalis pedis pulses present 2+ bilaterally. Capillary refill <2s. Neurologic:                CN II-XII grossly intact and symmetrical.                                               Moving all four extremities well with no focal deficits. Psychiatric:                Pleasant demeanor, appropriate affect. Alert and oriented x 3      Lab/Data Review:    XR chest   9-6-2019  IMPRESSION: No significant interval change in the atypical pneumonia pattern  with more confluent opacity in the right upper lobe.     Results     Procedure Component Value Units Date/Time    CULTURE, RESPIRATORY/SPUTUM/BRONCH Sophronia Frames [[id number]] Collected:  09/01/19 1155    Order Status:  Canceled Specimen:  Sputum     CULTURE, BLOOD [[id number]] Collected:  09/01/19 0725    Order Status:  Completed Specimen:  Blood Updated:  09/06/19 0711     Special Requests: --        NO SPECIAL REQUESTS  LEFT  ARM       Culture result: NO GROWTH 5 DAYS       CULTURE, BLOOD [[id number]] Collected:  09/01/19 0723    Order Status:  Completed Specimen:  Blood Updated:  09/06/19 0711     Special Requests: --        NO SPECIAL REQUESTS  RIGHT  FOREARM       Culture result: NO GROWTH 5 DAYS                 Current Facility-Administered Medications:     influenza vaccine 2019-20 (6 mos+)(PF) (FLUARIX/FLULAVAL/FLUZONE QUAD) injection 0.5 mL, 0.5 mL, IntraMUSCular, PRIOR TO DISCHARGE, Milena Lafleur MD    amLODIPine (NORVASC) tablet 5 mg, 5 mg, Oral, DAILY, Kevin Urbina MD, 5 mg at 09/08/19 0955    hydrALAZINE (APRESOLINE) tablet 25 mg, 25 mg, Oral, Q6H PRN, Kevin Urbina MD, 25 mg at 09/03/19 1556    acetaminophen (TYLENOL) tablet 650 mg, 650 mg, Oral, Q6H PRN, Lyn March MD, 650 mg at 09/05/19 0951    albuterol-ipratropium (DUO-NEB) 2.5 MG-0.5 MG/3 ML, 3 mL, Nebulization, Q6H RT, Lyn March MD, 3 mL at 09/08/19 1755    aspirin delayed-release tablet 81 mg, 81 mg, Oral, DAILY, Lyn March MD, 81 mg at 09/08/19 0955    finasteride (PROSCAR) tablet 5 mg, 5 mg, Oral, DAILY, Lyn March MD, 5 mg at 09/08/19 0955    fluticasone propionate (FLONASE) 50 mcg/actuation nasal spray 2 Spray, 2 Spray, Both Nostrils, DAILY, Lyn March MD, 2 [place] at 09/08/19 0522    furosemide (LASIX) tablet 40 mg, 40 mg, Oral, BID, Lyn March MD, 40 mg at 09/08/19 0955    guaiFENesin ER (MUCINEX) tablet 1,200 mg, 1,200 mg, Oral, DAILY, Lyn March MD, 1,200 mg at 09/08/19 0954    montelukast (SINGULAIR) tablet 10 mg, 10 mg, Oral, DAILY, Lyn March MD, 10 mg at 09/08/19 0956    potassium chloride (K-DUR, KLOR-CON) SR tablet 40 mEq, 40 mEq, Oral, DAILY, Lyn March MD, 40 mEq at 09/08/19 0955    famotidine (PEPCID) tablet 20 mg, 20 mg, Oral, BID, Lyn March MD, 20 mg at 09/08/19 0956    tamsulosin (FLOMAX) capsule 0.4 mg, 0.4 mg, Oral, DAILY, Lyn March MD, 0.4 mg at 09/08/19 0955    heparin (porcine) injection 5,000 Units, 5,000 Units, SubCUTAneous, Q12H, Lyn March MD, 5,000 Units at 09/07/19 5611    ondansetron [facility]) injection 4 mg, 4 mg, IntraVENous, Q8H PRN, Lyn March MD      Assessment:     Principal Problem:    HCAP (healthcare-associated pneumonia) (9/1/2019)    Active Problems:    Benign prostatic hyperplasia (12/10/2015)      Cardiovascular disease (12/10/2015)      Essential hypertension, benign (12/10/2015)      COPD (chronic obstructive pulmonary disease) with emphysema ([facility] [address] (10/17/2016)      Overview: Last Assessment & Plan:       Not currently on any maintence medication regimen for COPD as he felt them       to be unhelpful in the past.  I recommended a trial of a ANoro Ellipta which he should take 1 inhalation daily. Demonstration was completed on       the correct method of administration and he was able to return       demonstration independently in the office today and administer his first       dose. I have given him a 2-week sample which he will use daily and       monitor if he notes any improvement in his breathing. He may also try       pretreating himself with Ventolin before exertion to see if this offers       any relief. Acute on chronic respiratory failure with hypoxia (HCC) (5/6/2019)      VT (ventricular tachycardia) ([facility] [address] (5/6/2019)        Plan:     Health care associated pneumonia  Culture is negative. On Zosyn. Clinically improving slowly. Continue oxygen cannula. Still on high level of oxygen and saturation dropped significantly with exertion. Wean as tolerated. Duonealvin Mucinex. Monitor. Hypertension  Monitor blood pressure and manage accordingly. Continue home medications. BPH   Continue home medications. Debility   Continue with physical therapy. I have discussed the plan of care with patient.       DVT prophylaxis : heparin SC           Signed By: Leonel Flanagan MD     September 8, 2019

## 2019-09-08 NOTE — PROGRESS NOTES
Pt resting in bed comfortably at this time. Pt alert and oriented to person place and time. No distress noted. Respirations even and unlabored on 5 L NC high flow. Pt denies pain at this time. Will continue to monitor. Will prepare for bedside shift report.

## 2019-09-08 NOTE — PROGRESS NOTES
Pt resting in bed with eyes closed at this time. Pt alert and oriented to person place and time. No distress noted at this time. Respirations even and unlabored on 5 L NC. Pt denies pain at this time. Zosyn infusing in right AC IV 20 G. IV clean dry and intact. Pt instructed to call for assistance if needed. Call light in place. Door open. Will continue to monitor.

## 2019-09-08 NOTE — PROGRESS NOTES
Pt resting in bed with eyes closed at this time. Pt alert and oriented to person place and time. No distress noted. Respirations even and unlabored on 5 L NC high flow. IV right AC clean dry and intact. Pt denies pain at this time. Pt instructed to call for assistance if needed. Call light in place. Door open. Will continue to monitor.

## 2019-09-08 NOTE — PROGRESS NOTES
Received bedside shift report from off going nurse, Ruben Bacon. Patient resting in bed quietly. Respirations even and unlabored on 6 L via nasal cannula. Bed low and locked. Bedside table, personal belongings and call light all within reach.

## 2019-09-08 NOTE — PROGRESS NOTES
Patient resting in bed quietly. Respirations even and unlabaored on 5 L via nasal cannula. Bd low and locked. Bedside table, personal belongings and call light all within reach. Preparing to give bedside shift report.

## 2019-09-08 NOTE — PROGRESS NOTES
Pt straight cath. Pt tolerated well. 550 ml of yellow clear urine obtained. Will continue to monitor.

## 2019-09-09 VITALS
BODY MASS INDEX: 20.2 KG/M2 | HEART RATE: 69 BPM | WEIGHT: 133.3 LBS | TEMPERATURE: 97.9 F | SYSTOLIC BLOOD PRESSURE: 96 MMHG | OXYGEN SATURATION: 92 % | DIASTOLIC BLOOD PRESSURE: 59 MMHG | RESPIRATION RATE: 18 BRPM | HEIGHT: 68 IN

## 2019-09-09 PROCEDURE — 74011250637 HC RX REV CODE- 250/637: Performed by: INTERNAL MEDICINE

## 2019-09-09 PROCEDURE — 94760 N-INVAS EAR/PLS OXIMETRY 1: CPT

## 2019-09-09 PROCEDURE — 77030011943

## 2019-09-09 PROCEDURE — 74011000250 HC RX REV CODE- 250: Performed by: INTERNAL MEDICINE

## 2019-09-09 PROCEDURE — 94640 AIRWAY INHALATION TREATMENT: CPT

## 2019-09-09 PROCEDURE — 77010033678 HC OXYGEN DAILY

## 2019-09-09 RX ORDER — FUROSEMIDE 40 MG/1
40 TABLET ORAL DAILY
Qty: 30 TAB | Refills: 0 | Status: SHIPPED
Start: 2019-09-09 | End: 2020-01-10

## 2019-09-09 RX ORDER — AMLODIPINE BESYLATE 5 MG/1
5 TABLET ORAL DAILY
Qty: 15 TAB | Refills: 0 | Status: SHIPPED | OUTPATIENT
Start: 2019-09-10 | End: 2019-09-25

## 2019-09-09 RX ADMIN — FINASTERIDE 5 MG: 5 TABLET, FILM COATED ORAL at 08:26

## 2019-09-09 RX ADMIN — IPRATROPIUM BROMIDE AND ALBUTEROL SULFATE 3 ML: .5; 3 SOLUTION RESPIRATORY (INHALATION) at 07:33

## 2019-09-09 RX ADMIN — GUAIFENESIN 1200 MG: 600 TABLET, EXTENDED RELEASE ORAL at 08:27

## 2019-09-09 RX ADMIN — AMLODIPINE BESYLATE 5 MG: 5 TABLET ORAL at 08:27

## 2019-09-09 RX ADMIN — ACETAMINOPHEN 650 MG: 325 TABLET, FILM COATED ORAL at 02:29

## 2019-09-09 RX ADMIN — FLUTICASONE PROPIONATE 2 SPRAY: 50 SPRAY, METERED NASAL at 08:28

## 2019-09-09 RX ADMIN — IPRATROPIUM BROMIDE AND ALBUTEROL SULFATE 3 ML: .5; 3 SOLUTION RESPIRATORY (INHALATION) at 02:24

## 2019-09-09 RX ADMIN — FUROSEMIDE 40 MG: 40 TABLET ORAL at 08:26

## 2019-09-09 RX ADMIN — POTASSIUM CHLORIDE 40 MEQ: 20 TABLET, EXTENDED RELEASE ORAL at 08:27

## 2019-09-09 RX ADMIN — FAMOTIDINE 20 MG: 20 TABLET ORAL at 08:26

## 2019-09-09 RX ADMIN — TAMSULOSIN HYDROCHLORIDE 0.4 MG: 0.4 CAPSULE ORAL at 08:27

## 2019-09-09 RX ADMIN — MONTELUKAST 10 MG: 10 TABLET, FILM COATED ORAL at 08:26

## 2019-09-09 RX ADMIN — ASPIRIN 81 MG: 81 TABLET ORAL at 08:26

## 2019-09-09 NOTE — PROGRESS NOTES
Care Management Interventions  PCP Verified by CM: Yes  Mode of Transport at Discharge: BLS  Transition of Care Consult (CM Consult): Long Term Care  Physical Therapy Consult: Yes  Occupational Therapy Consult: Yes  Current Support Network: [address] [place] [address]  Confirm Follow Up Transport: Other (see comment)  Plan discussed with Pt/Family/Caregiver: Yes  Freedom of Choice Offered: Yes  Discharge Location  Discharge Placement: Skilled nursing facility  Patient is discharging to [facility] for rehab via [facility]. CM called patient's wife and made aware.

## 2019-09-09 NOTE — PROGRESS NOTES
Pt resting in bed. Pt awakens when spoken to. Pt oriented times 3 at this time. Pt complaints of left rib pain 5/10 at this time. Pt has no acute distress noted at this time. Pt on 3 1/2L NC At this time. Pt encouraged to call for assistance if needed call light in reach, door open will monitor.

## 2019-09-09 NOTE — PROGRESS NOTES
Completed bedside shift report with oncoming nurse, [facility]. Patient resting in bed quietly. Respirations even and unlabored on 4 L via nasal cannula. Bed low and locked. Bedside table, personal belongings and call light all within reach.

## 2019-09-09 NOTE — PROGRESS NOTES
Received bedside shift report from off going nurse, [facility], . Patient resting in bed quietly. Respirations even and unlabored on 5 L via nasal cannula. Bed low and locked. Bedside table, personal belongings and call light all within reach.

## 2019-09-09 NOTE — PROGRESS NOTES
Pt assisted up to chair. No distress noted at this time. Pt tolerated well. Posey pad and call light in reach, will monitor.

## 2019-09-09 NOTE — DISCHARGE SUMMARY
Discharge Summary     Patient: Nisha Flores MRN: [id number]  SSN: [id number]    YOB: 1932  Age: 80 y.o. Sex: male       Admit Date: 9/1/2019    Discharge Date: 9/9/2019      Admission Diagnoses: HCAP (healthcare-associated pneumonia) [J18.9]    Discharge Diagnoses:   Problem List as of 9/9/2019 Date Reviewed: 8/21/2019          Codes Class Noted - Resolved    * (Principal) HCAP (healthcare-associated pneumonia) ICD-10-CM: J18.9  ICD-9-CM: 486  9/1/2019 - Present        Abnormality of urethral meatus ICD-10-CM: Q64.70  ICD-9-CM: 753.9  8/8/2019 - Present        Hypoproteinemia ([facility] ICD-10-CM: E77.8  ICD-9-CM: 273.8  6/13/2019 - Present        Hypocalcemia ICD-10-CM: E83.51  ICD-9-CM: 275.41  6/13/2019 - Present        Pleural effusion ICD-10-CM: J90  ICD-9-CM: 511.9  6/10/2019 - Present    Overview Signed 6/14/2019 12:59 PM by Didier Casas NP     6/10/19 Right thoracentesis:  800 cc of yellow fluid                  Hyponatremia ICD-10-CM: E87.1  ICD-9-CM: 276.1  5/22/2019 - Present        Urine retention ICD-10-CM: R33.9  ICD-9-CM: 788.20  5/20/2019 - Present        Subcutaneous emphysema ([facility] ICD-10-CM: T79. 7XXA  ICD-9-CM: 958.7  5/9/2019 - Present        Pneumothorax on right ICD-10-CM: J93.9  ICD-9-CM: 512.89  5/6/2019 - Present        Shortness of breath ICD-10-CM: R06.02  ICD-9-CM: 786.05  5/6/2019 - Present        Acute on chronic respiratory failure with hypoxia [facility]) ICD-10-CM: J96.21  ICD-9-CM: 518.84, 799.02  5/6/2019 - Present        COPD exacerbation (HCC) ICD-10-CM: J44.1  ICD-9-CM: 491.21  5/6/2019 - Present        Ventricular tachyarrhythmia ([facility]) ICD-10-CM: I47.2  ICD-9-CM: 427.1  5/6/2019 - Present        Pulmonary infiltrates ICD-10-CM: R91.8  ICD-9-CM: 793.19  5/6/2019 - Present        VT (ventricular tachycardia) ([facility]) ICD-10-CM: I47.2  ICD-9-CM: 427.1  5/6/2019 - Present        Closed compression fracture of lumbosacral spine ([facility]) ICD-10-CM: S32.000A  ICD-9-CM: 805.4 12/11/2018 - Present        Hip fracture ([facility] ICD-10-CM: S72.009A  ICD-9-CM: 820.8  2/1/2018 - Present        SOB (shortness of breath) ICD-10-CM: R06.02  ICD-9-CM: 786.05  3/9/2017 - Present    Overview Addendum 12/11/2018 10:09 AM by Kristin Guthrie LPN     Last Assessment & Plan:   Stable, 6mwt ordered and reviewed. Management per copd, hypoxia. Last Assessment & Plan:   He presents today with shortness of breath that has worsened over the past 2 weeks which he relates to shallow breathing due to back pain after recent lumbar fractures. His shortness of breath appears to be multifactorial.  He does not appear infected today and I do not believe this is a COPD exacerbation. I have recommended breathing exercises to combat his I will breathing. He does have an incentive spirometer from his previous hospitalization which I have encouraged him to use daily as a reminder to take deep breaths and to prevent pneumonia. We reviewed how to use this device. He expresses great concern about his 30 pound weight loss and dyspnea. His chest x-ray today was negative with no signs of infection or cancer. However, a CT is recommended to rule out malignancy. I have explained that cancer is unlikely due to his hip fracture with sudden loss of appetite then gradual weight loss due to minimal p.o. intake immediately after however will proceed with CT scan. 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  HRCT             COPD (chronic obstructive pulmonary disease) with emphysema ([facility] ICD-10-CM: J43.9  ICD-9-CM: 492.8  10/17/2016 - Present    Overview Signed 12/11/2018 10:09 AM by Kristin Guthrie LPN     Last Assessment & Plan:   Not currently on any maintence medication regimen for COPD as he felt them to be unhelpful in the past.  I recommended a trial of a ANoro Ellipta which he should take 1 inhalation daily. Demonstration was completed on the correct method of administration and he was able to return demonstration independently in the office today and administer his first dose. I have given him a 2-week sample which he will use daily and monitor if he notes any improvement in his breathing. He may also try pretreating himself with Ventolin before exertion to see if this offers any relief. Chronic respiratory failure with hypoxia (HCC) (Chronic) ICD-10-CM: J96.11  ICD-9-CM: 518.83, 799.02  3/29/2016 - Present    Overview Addendum 5/6/2019 10:14 AM by Aleyda Arredondo, NP     Continue to wear 2 L nasal cannula at night. May use 2 L supplemental oxygen to maintain sats greater than 90%. [place] ICD-10-CM: I49.9  ICD-9-CM: 427.89  3/28/2016 - Present    Overview Addendum 12/11/2018 10:09 AM by Franca Cowan LPN     Overview:   Reported by LIN Manuel physician. Last Assessment & Plan:   EKG not done. I placed the patient on telemetry today and is having fairly frequent PVCs. We'll check BMP and magnesium. Overview:   Reported by LIN Manuel physician. Last Assessment & Plan:   EKG not done. I placed the patient on telemetry today and is having fairly frequent PVCs. We'll check BMP and magnesium.              Abdominal aortic aneurysm without rupture ([facility] ICD-10-CM: I71.4  ICD-9-CM: 441.4  12/10/2015 - Present    Overview Addendum 12/11/2018 10:09 AM by Franca Cowan LPN     In [id number]             Benign prostatic hyperplasia ICD-10-CM: N40.0  ICD-9-CM: 600.00  12/10/2015 - Present        Cardiovascular disease ICD-10-CM: I25.10  ICD-9-CM: 429.2  12/10/2015 - Present        COPD (chronic obstructive pulmonary disease) ([facility] ICD-10-CM: J44.9  ICD-9-CM: 286  12/10/2015 - Present        Low testosterone ICD-10-CM: R79.89  ICD-9-CM: 790.99  12/10/2015 - Present        Essential hypertension, benign ICD-10-CM: I10  ICD-9-CM: 401.1  12/10/2015 - Present        Allergic rhinitis ICD-10-CM: J30.9  ICD-9-CM: 477.9  12/10/2015 - Present        Raynaud's syndrome ICD-10-CM: I73.00  ICD-9-CM: 443.0  12/10/2015 - Present        Erectile dysfunction ICD-10-CM: N52.9  ICD-9-CM: 607.84  12/10/2015 - Present        RESOLVED: Pulmonary edema ICD-10-CM: J81.1  ICD-9-CM: 217  2/1/2018 - 6/11/2018        RESOLVED: Chronic obstructive asthma with exacerbation ([facility] ICD-10-CM: J44.1, J45.901  ICD-9-CM: 493.22  12/10/2015 - 2/1/2018               Discharge Condition: Stable    Hospital Course:     PMH of COPD, on 2 LPM oxygen cannula, hypertension, BPH, elevated CEA, VT on amiodarone, recurrent pleural effusion requiring thoracentesis, sp pleurodesis on 5/16/19,      Admitted due to shortness of breath. Found to have RML pneumonia. Patient was treated with Vancomycin and Zosyn. Patient completed the course of antibiotics. He has had no fever and doing well. His oxygen requirement is now about 4 LPM cannula. He is being discharged to a short-term rehabilitation facility. His pneumonia could not be specified as to specific organisms. Physical Exam:      General:                    The patient is a pleasant male in no acute distress now.  He is sitting in his chair. On oxygen cannula 4 LPM.   ZOON:                                   QOCCUOPGHZVHD/KVKQHMYCMX. Eyes:                                   palpebral pallor, no scleral icterus. ENT:                                    External auricular and nasal exam within normal limits.                                             TJCPVK membranes are moist.  Neck:                                   Supple, non-tender, no JVD. Lungs:                       decreased to auscultation bilaterally without wheezes or crackles.                                               No respiratory accessory muscle use. Heart:                                  Regular rate and rhythm, without murmurs, rubs, or gallops. Abdomen:                  Soft, non-tender, non-distended with normoactive bowel sounds. Genitourinary:           No tenderness over the bladder or bilateral CVAs. Extremities:               Without clubbing, cyanosis, or edema. Skin:                                    Normal color, texture, and turgor. No rashes, lesions, or jaundice. Pulses:                      Radial and dorsalis pedis pulses present 2+ bilaterally.                                               Capillary refill <2s. Neurologic:                CN II-XII grossly intact and symmetrical.                                               Moving all four extremities well with no focal deficits. Psychiatric:                Pleasant demeanor, appropriate affect. Alert and oriented x 3    Consults: None    Significant Diagnostic Studies:     XR chest   9-6-2019  IMPRESSION: No significant interval change in the atypical pneumonia pattern with more confluent opacity in the right upper lobe.     Results     Procedure Component Value Units Date/Time    CULTURE, RESPIRATORY/SPUTUM/BRONCH Pleas Shayne [[id number]] Collected:  09/01/19 1155    Order Status:  Canceled Specimen:  Sputum     CULTURE, BLOOD [[id number]] Collected:  09/01/19 0725    Order Status:  Completed Specimen:  Blood Updated:  09/06/19 0711     Special Requests: --        NO SPECIAL REQUESTS  LEFT  ARM       Culture result: NO GROWTH 5 DAYS       CULTURE, BLOOD [[id number]] Collected:  09/01/19 0723    Order Status:  Completed Specimen:  Blood Updated:  09/06/19 0711     Special Requests: --        NO SPECIAL REQUESTS  RIGHT  FOREARM       Culture result: NO GROWTH 5 DAYS           METABOLIC PANEL, BASIC [[id number]] (Order [id number])   Lab   Date: 9/7/2019 Department: [facility] 8 Med Surg Released By/Authorizing: Roseanna Salcido MD (auto-released)   Component Value Flag Ref Range Units Status   Sodium 137   136 - 145 mmol/L Final   Potassium 3.7   3.5 - 5.1 mmol/L Final   Chloride 101   98 - 107 mmol/L Final   CO2 29   21 - 32 mmol/L Final   Anion gap 7   7 - 16 mmol/L Final   Glucose 79   65 - 100 mg/dL Final   Comment:   47 - 60 mg/dl Consistent with, but not fully diagnostic of hypoglycemia. 101 - 125 mg/dl Impaired fasting glucose/consistent with pre-diabetes mellitus   > 126 mg/dl Fasting glucose consistent with overt diabetes mellitus    BUN 18   8 - 23 MG/DL Final   Creatinine 0.85   0.8 - 1.5 MG/DL Final   GFR est AA >60   >60 ml/min/1.73m2 Final   GFR est non-AA >60   >60 ml/min/1.73m2 Final   Comment:   (NOTE)   Estimated GFR is calculated using the Modification of Diet in Renal   Disease (MDRD) Study equation, reported for both  Americans   (GFRAA) and non- Americans (GFRNA), and normalized to 1.73m2   body surface area. The physician must decide which value applies to   the patient. The MDRD study equation should only be used in   individuals age 25 or older. It has not been validated for the   following: pregnant women, patients with serious comorbid conditions,   or on certain medications, or persons with extremes of body size,   muscle mass, or nutritional status.     Calcium 8.2  Low   8.3 - 10.4 MG/DL Final     CBC WITH AUTOMATED DIFF [[id number]] (Order [id number])   Lab   Date: 9/2/2019 Department: [facility]  Med Surg Released By/Authorizing: Vincent Drummond MD (auto-released)   Component Value Flag Ref Range Units Status   WBC 10.4   4.3 - 11.1 K/uL Final   RBC 3.53  Low   4.23 - 5.6 M/uL Final   HGB 11.4  Low   13.6 - 17.2 g/dL Final   HCT 35.7  Low   41.1 - 50.3 % Final   .1  High   79.6 - 97.8 FL Final   MCH 32.3   26.1 - 32.9 PG Final   MCHC 31.9   31.4 - 35.0 g/dL Final   RDW 15.0  High   11.9 - 14.6 % Final   PLATELET 260   340 - 450 K/uL Final   MPV 10.1   9.4 - 12.3 FL Final   ABSOLUTE NRBC 0.00   0.0 - 0.2 K/uL Final   Comment:   **Note: Absolute NRBC parameter is now reported with Hemogram**   DF AUTOMATED      Final   NEUTROPHILS 72   43 - 78 % Final   LYMPHOCYTES 16   13 - 44 % Final   MONOCYTES 10   4.0 - 12.0 % Final   EOSINOPHILS 2   0.5 - 7.8 % Final   BASOPHILS 1   0.0 - 2.0 % Final   IMMATURE GRANULOCYTES 0   0.0 - 5.0 % Final   ABS. NEUTROPHILS 7.5   1.7 - 8.2 K/UL Final   ABS. LYMPHOCYTES 1.6   0.5 - 4.6 K/UL Final   ABS. MONOCYTES 1.0   0.1 - 1.3 K/UL Final   ABS. EOSINOPHILS 0.2   0.0 - 0.8 K/UL Final   ABS. BASOPHILS 0.1   0.0 - 0.2 K/UL Final   ABS. IMM. GRANS. 0.0   0.0 - 0.5 K/UL Final         Disposition: short-term rehabilitation facility     Discharge Medications:   Current Discharge Medication List      START taking these medications    Details   amLODIPine (NORVASC) 5 mg tablet Take 1 Tab by mouth daily for 15 days. Qty: 15 Tab, Refills: 0         CONTINUE these medications which have CHANGED    Details   furosemide (LASIX) 40 mg tablet Take 1 Tab by mouth daily. Qty: 30 Tab, Refills: 0         CONTINUE these medications which have NOT CHANGED    Details   tamsulosin (FLOMAX) 0.4 mg capsule Take 1 Cap by mouth daily. Qty: 90 Cap, Refills: 4      finasteride (PROSCAR) 5 mg tablet Take 1 Tab by mouth daily. Qty: 90 Tab, Refills: 4      raNITIdine (ZANTAC) 75 mg tab Take  by mouth two (2) times a day. multivitamin,tx-iron-ca-min (THERA-M) 27-0.4 mg tab Take 1 Tab by mouth daily. amiodarone (PACERONE) 400 mg tablet       magnesium 250 mg tab Take  by mouth. albuterol-ipratropium (DUO-NEB) 2.5 mg-0.5 mg/3 ml nebu 3 mL by Nebulization route every four (4) hours as needed for Other (dyspnea). Qty: 30 Nebule, Refills: 3      potassium chloride (K-DUR, KLOR-CON) 20 mEq tablet Take 2 Tabs by mouth daily. Qty: 3 Tab, Refills: 0      montelukast (SINGULAIR) 10 mg tablet Take 1 Tab by mouth daily.   Qty: 90 Tab, Refills: 4    Associated Diagnoses: Mixed simple and mucopurulent chronic bronchitis (HCC)      umeclidinium-vilanterol (ANORO ELLIPTA) 62.5-25 mcg/actuation inhaler Take 1 Puff by inhalation daily. Oxygen Use as instructed. Use as instructed; faxed to [employer]      cyanocobalamin (VITAMIN B12) 1,000 mcg/mL injection INJECT 1ML INTRAMUSCULARLY ONCE FOR 1 DOSE  Refills: 12      fluticasone (FLONASE) 50 mcg/actuation nasal spray 2 Sprays by Both Nostrils route daily. Qty: 48 g, Refills: 4    Associated Diagnoses: Allergic rhinitis due to pollen, unspecified seasonality      calcium citrate-vitamin d3 (CITRACAL+D) 315-200 mg-unit tab Take 2 Tabs by mouth daily (with breakfast). cholecalciferol (VITAMIN D3) 1,000 unit cap Take  by mouth.      guaiFENesin ER (MUCINEX) 600 mg ER tablet Take 1,200 mg by mouth daily. Biotin 2,500 mcg cap Take  by mouth. aspirin delayed-release 81 mg tablet Take  by mouth daily. STOP taking these medications       acetaminophen (TYLENOL) 650 mg TbER Comments:   Reason for Stopping:         DULoxetine (CYMBALTA) 20 mg capsule Comments:   Reason for Stopping:               Activity: Activity as tolerated  Diet: Cardiac Diet  Wound Care: Keep wound clean and dry    Follow-up Appointments   Procedures    FOLLOW UP VISIT Appointment in: 3 - 5 Days See your primary doctor. See your primary doctor. Standing Status:   Standing     Number of Occurrences:   1     Order Specific Question:   Appointment in     Answer:   3 - 5 Days     I have discussed the plan of care with patient. Time spent on discharge is 42 minutes.       Signed By: Darci Smith MD     September 9, 2019

## 2019-09-09 NOTE — PROGRESS NOTES
Problem: Falls - Risk of  Goal: *Absence of Falls  Description  Document Candida Lightning Fall Risk and appropriate interventions in the flowsheet.   Outcome: Progressing Towards Goal  Note:   Fall Risk Interventions:  Mobility Interventions: Patient to call before getting OOB         Medication Interventions: Evaluate medications/consider consulting pharmacy    Elimination Interventions: Call light in reach    History of Falls Interventions: Door open when patient unattended         Problem: Gas Exchange - Impaired  Goal: *Absence of hypoxia  Outcome: Progressing Towards Goal

## 2019-09-10 ENCOUNTER — PATIENT OUTREACH (OUTPATIENT)
Dept: CASE MANAGEMENT | Age: 84
End: 2019-09-10

## 2019-09-10 NOTE — PROGRESS NOTES
This note will not be viewable in [address] Patient discharged to a [facility] Preferred Provider [facility]) . Patient will be included in weekly care coordination calls. Message sent to Yareli Cruz RN SNF Preferred Provider [address]

## 2019-11-01 ENCOUNTER — HOSPITAL ENCOUNTER (INPATIENT)
Age: 84
LOS: 5 days | Discharge: NURSING, REHAB OR LONG-TERM CARE | DRG: 193 | End: 2019-11-06
Attending: EMERGENCY MEDICINE | Admitting: FAMILY MEDICINE
Payer: MEDICARE

## 2019-11-01 ENCOUNTER — APPOINTMENT (OUTPATIENT)
Dept: GENERAL RADIOLOGY | Age: 84
DRG: 193 | End: 2019-11-01
Attending: EMERGENCY MEDICINE
Payer: MEDICARE

## 2019-11-01 DIAGNOSIS — J18.9 PNEUMONIA OF RIGHT LOWER LOBE DUE TO INFECTIOUS ORGANISM: Primary | ICD-10-CM

## 2019-11-01 LAB
ALBUMIN SERPL-MCNC: 2.7 G/DL (ref 3.2–4.6)
ALBUMIN/GLOB SERPL: 0.8 {RATIO} (ref 1.2–3.5)
ALP SERPL-CCNC: 106 U/L (ref 50–136)
ALT SERPL-CCNC: 69 U/L (ref 12–65)
ANION GAP SERPL CALC-SCNC: 8 MMOL/L (ref 7–16)
AST SERPL-CCNC: 61 U/L (ref 15–37)
BASOPHILS # BLD: 0 K/UL (ref 0–0.2)
BASOPHILS NFR BLD: 0 % (ref 0–2)
BILIRUB SERPL-MCNC: 1.1 MG/DL (ref 0.2–1.1)
BUN SERPL-MCNC: 28 MG/DL (ref 8–23)
CALCIUM SERPL-MCNC: 8.2 MG/DL (ref 8.3–10.4)
CHLORIDE SERPL-SCNC: 100 MMOL/L (ref 98–107)
CO2 SERPL-SCNC: 27 MMOL/L (ref 21–32)
CREAT SERPL-MCNC: 0.9 MG/DL (ref 0.8–1.5)
DIFFERENTIAL METHOD BLD: ABNORMAL
EOSINOPHIL # BLD: 0 K/UL (ref 0–0.8)
EOSINOPHIL NFR BLD: 0 % (ref 0.5–7.8)
ERYTHROCYTE [DISTWIDTH] IN BLOOD BY AUTOMATED COUNT: 15 % (ref 11.9–14.6)
GLOBULIN SER CALC-MCNC: 3.6 G/DL (ref 2.3–3.5)
GLUCOSE SERPL-MCNC: 116 MG/DL (ref 65–100)
HCT VFR BLD AUTO: 34.2 % (ref 41.1–50.3)
HGB BLD-MCNC: 10.9 G/DL (ref 13.6–17.2)
IMM GRANULOCYTES # BLD AUTO: 0.3 K/UL (ref 0–0.5)
IMM GRANULOCYTES NFR BLD AUTO: 1 % (ref 0–5)
LACTATE BLD-SCNC: 1.44 MMOL/L (ref 0.5–1.9)
LYMPHOCYTES # BLD: 0.5 K/UL (ref 0.5–4.6)
LYMPHOCYTES NFR BLD: 2 % (ref 13–44)
MAGNESIUM SERPL-MCNC: 1.9 MG/DL (ref 1.8–2.4)
MCH RBC QN AUTO: 32.5 PG (ref 26.1–32.9)
MCHC RBC AUTO-ENTMCNC: 31.9 G/DL (ref 31.4–35)
MCV RBC AUTO: 102.1 FL (ref 79.6–97.8)
MONOCYTES # BLD: 1.6 K/UL (ref 0.1–1.3)
MONOCYTES NFR BLD: 6 % (ref 4–12)
NEUTS SEG # BLD: 24.7 K/UL (ref 1.7–8.2)
NEUTS SEG NFR BLD: 91 % (ref 43–78)
NRBC # BLD: 0 K/UL (ref 0–0.2)
PLATELET # BLD AUTO: 222 K/UL (ref 150–450)
PLATELET COMMENTS,PCOM: ADEQUATE
PMV BLD AUTO: 9.8 FL (ref 9.4–12.3)
POTASSIUM SERPL-SCNC: 4.8 MMOL/L (ref 3.5–5.1)
PROCALCITONIN SERPL-MCNC: 0.4 NG/ML
PROT SERPL-MCNC: 6.3 G/DL (ref 6.3–8.2)
RBC # BLD AUTO: 3.35 M/UL (ref 4.23–5.6)
RBC MORPH BLD: ABNORMAL
SODIUM SERPL-SCNC: 135 MMOL/L (ref 136–145)
TROPONIN I BLD-MCNC: 0.04 NG/ML (ref 0.02–0.05)
WBC # BLD AUTO: 27.1 K/UL (ref 4.3–11.1)
WBC MORPH BLD: ABNORMAL

## 2019-11-01 PROCEDURE — 74011250636 HC RX REV CODE- 250/636: Performed by: EMERGENCY MEDICINE

## 2019-11-01 PROCEDURE — 74011250636 HC RX REV CODE- 250/636: Performed by: FAMILY MEDICINE

## 2019-11-01 PROCEDURE — 85025 COMPLETE CBC W/AUTO DIFF WBC: CPT

## 2019-11-01 PROCEDURE — 65270000029 HC RM PRIVATE

## 2019-11-01 PROCEDURE — 84484 ASSAY OF TROPONIN QUANT: CPT

## 2019-11-01 PROCEDURE — 87641 MR-STAPH DNA AMP PROBE: CPT

## 2019-11-01 PROCEDURE — 96375 TX/PRO/DX INJ NEW DRUG ADDON: CPT | Performed by: EMERGENCY MEDICINE

## 2019-11-01 PROCEDURE — 86580 TB INTRADERMAL TEST: CPT | Performed by: FAMILY MEDICINE

## 2019-11-01 PROCEDURE — 93005 ELECTROCARDIOGRAM TRACING: CPT | Performed by: FAMILY MEDICINE

## 2019-11-01 PROCEDURE — 74011000302 HC RX REV CODE- 302: Performed by: FAMILY MEDICINE

## 2019-11-01 PROCEDURE — 87040 BLOOD CULTURE FOR BACTERIA: CPT

## 2019-11-01 PROCEDURE — 96365 THER/PROPH/DIAG IV INF INIT: CPT | Performed by: EMERGENCY MEDICINE

## 2019-11-01 PROCEDURE — 71045 X-RAY EXAM CHEST 1 VIEW: CPT

## 2019-11-01 PROCEDURE — 84145 PROCALCITONIN (PCT): CPT

## 2019-11-01 PROCEDURE — 83735 ASSAY OF MAGNESIUM: CPT

## 2019-11-01 PROCEDURE — 83605 ASSAY OF LACTIC ACID: CPT

## 2019-11-01 PROCEDURE — 80053 COMPREHEN METABOLIC PANEL: CPT

## 2019-11-01 PROCEDURE — 74011000258 HC RX REV CODE- 258: Performed by: EMERGENCY MEDICINE

## 2019-11-01 PROCEDURE — 99285 EMERGENCY DEPT VISIT HI MDM: CPT | Performed by: EMERGENCY MEDICINE

## 2019-11-01 RX ORDER — ONDANSETRON 4 MG/1
4 TABLET, ORALLY DISINTEGRATING ORAL
Status: DISCONTINUED | OUTPATIENT
Start: 2019-11-01 | End: 2019-11-06 | Stop reason: HOSPADM

## 2019-11-01 RX ORDER — HEPARIN SODIUM 5000 [USP'U]/ML
5000 INJECTION, SOLUTION INTRAVENOUS; SUBCUTANEOUS EVERY 8 HOURS
Status: DISCONTINUED | OUTPATIENT
Start: 2019-11-01 | End: 2019-11-06 | Stop reason: HOSPADM

## 2019-11-01 RX ORDER — POTASSIUM CHLORIDE 20 MEQ/1
20 TABLET, EXTENDED RELEASE ORAL DAILY
Status: DISCONTINUED | OUTPATIENT
Start: 2019-11-02 | End: 2019-11-06 | Stop reason: HOSPADM

## 2019-11-01 RX ORDER — FUROSEMIDE 40 MG/1
40 TABLET ORAL DAILY
Status: DISCONTINUED | OUTPATIENT
Start: 2019-11-02 | End: 2019-11-06 | Stop reason: HOSPADM

## 2019-11-01 RX ORDER — ASPIRIN 81 MG/1
81 TABLET ORAL DAILY
Status: DISCONTINUED | OUTPATIENT
Start: 2019-11-02 | End: 2019-11-06 | Stop reason: HOSPADM

## 2019-11-01 RX ORDER — SODIUM CHLORIDE 0.9 % (FLUSH) 0.9 %
5-40 SYRINGE (ML) INJECTION EVERY 8 HOURS
Status: DISCONTINUED | OUTPATIENT
Start: 2019-11-01 | End: 2019-11-06 | Stop reason: HOSPADM

## 2019-11-01 RX ORDER — ACETAMINOPHEN 325 MG/1
650 TABLET ORAL
Status: DISCONTINUED | OUTPATIENT
Start: 2019-11-01 | End: 2019-11-06 | Stop reason: HOSPADM

## 2019-11-01 RX ORDER — BUDESONIDE 0.5 MG/2ML
500 INHALANT ORAL
Status: DISCONTINUED | OUTPATIENT
Start: 2019-11-01 | End: 2019-11-06 | Stop reason: HOSPADM

## 2019-11-01 RX ORDER — SODIUM CHLORIDE 0.9 % (FLUSH) 0.9 %
5-40 SYRINGE (ML) INJECTION AS NEEDED
Status: DISCONTINUED | OUTPATIENT
Start: 2019-11-01 | End: 2019-11-06 | Stop reason: HOSPADM

## 2019-11-01 RX ORDER — IPRATROPIUM BROMIDE AND ALBUTEROL SULFATE 2.5; .5 MG/3ML; MG/3ML
3 SOLUTION RESPIRATORY (INHALATION)
Status: DISCONTINUED | OUTPATIENT
Start: 2019-11-01 | End: 2019-11-06 | Stop reason: HOSPADM

## 2019-11-01 RX ORDER — MONTELUKAST SODIUM 10 MG/1
10 TABLET ORAL DAILY
Status: DISCONTINUED | OUTPATIENT
Start: 2019-11-02 | End: 2019-11-06 | Stop reason: HOSPADM

## 2019-11-01 RX ORDER — FINASTERIDE 5 MG/1
5 TABLET, FILM COATED ORAL DAILY
Status: DISCONTINUED | OUTPATIENT
Start: 2019-11-02 | End: 2019-11-06 | Stop reason: HOSPADM

## 2019-11-01 RX ORDER — FLUTICASONE PROPIONATE 50 MCG
2 SPRAY, SUSPENSION (ML) NASAL DAILY
Status: DISCONTINUED | OUTPATIENT
Start: 2019-11-02 | End: 2019-11-06 | Stop reason: HOSPADM

## 2019-11-01 RX ORDER — GUAIFENESIN 600 MG/1
1200 TABLET, EXTENDED RELEASE ORAL DAILY
Status: DISCONTINUED | OUTPATIENT
Start: 2019-11-02 | End: 2019-11-06 | Stop reason: HOSPADM

## 2019-11-01 RX ORDER — BISACODYL 5 MG
5 TABLET, DELAYED RELEASE (ENTERIC COATED) ORAL DAILY PRN
Status: DISCONTINUED | OUTPATIENT
Start: 2019-11-01 | End: 2019-11-06 | Stop reason: HOSPADM

## 2019-11-01 RX ORDER — VANCOMYCIN/0.9 % SOD CHLORIDE 1.5G/250ML
1500 PLASTIC BAG, INJECTION (ML) INTRAVENOUS ONCE
Status: COMPLETED | OUTPATIENT
Start: 2019-11-01 | End: 2019-11-01

## 2019-11-01 RX ORDER — AMIODARONE HYDROCHLORIDE 200 MG/1
200 TABLET ORAL DAILY
Status: DISCONTINUED | OUTPATIENT
Start: 2019-11-02 | End: 2019-11-06 | Stop reason: HOSPADM

## 2019-11-01 RX ORDER — VANCOMYCIN HYDROCHLORIDE
1250
Status: DISCONTINUED | OUTPATIENT
Start: 2019-11-02 | End: 2019-11-02

## 2019-11-01 RX ADMIN — VANCOMYCIN HYDROCHLORIDE 1500 MG: 10 INJECTION, POWDER, LYOPHILIZED, FOR SOLUTION INTRAVENOUS at 21:16

## 2019-11-01 RX ADMIN — HEPARIN SODIUM 5000 UNITS: 5000 INJECTION INTRAVENOUS; SUBCUTANEOUS at 23:46

## 2019-11-01 RX ADMIN — PIPERACILLIN AND TAZOBACTAM 4.5 G: 4; .5 INJECTION, POWDER, FOR SOLUTION INTRAVENOUS at 20:26

## 2019-11-01 RX ADMIN — Medication 10 ML: at 23:46

## 2019-11-01 RX ADMIN — TUBERCULIN PURIFIED PROTEIN DERIVATIVE 5 UNITS: 5 INJECTION, SOLUTION INTRADERMAL at 23:47

## 2019-11-01 NOTE — ED TRIAGE NOTES
Pt arrives via [facility]. EMS called out for possible stroke. Pt increaed weakness and shortness of breath. Pt also has new onset of feet twitching. Pt has has productive cough and runny nose. Temp: 99.0    4L NC 88%  Pt placed on non-re breather 97%  Rales in bases. 116/80  HR dropped to 38 came back up to 70s.     .    18g R AC

## 2019-11-02 ENCOUNTER — APPOINTMENT (OUTPATIENT)
Dept: MRI IMAGING | Age: 84
DRG: 193 | End: 2019-11-02
Attending: INTERNAL MEDICINE
Payer: MEDICARE

## 2019-11-02 PROBLEM — I47.20 VT (VENTRICULAR TACHYCARDIA): Chronic | Status: ACTIVE | Noted: 2019-05-06

## 2019-11-02 PROBLEM — G93.41 ACUTE METABOLIC ENCEPHALOPATHY: Status: ACTIVE | Noted: 2019-11-02

## 2019-11-02 PROBLEM — Z66 DNR (DO NOT RESUSCITATE): Chronic | Status: ACTIVE | Noted: 2019-11-02

## 2019-11-02 LAB
ANION GAP SERPL CALC-SCNC: 8 MMOL/L (ref 7–16)
ATRIAL RATE: 56 BPM
BUN SERPL-MCNC: 27 MG/DL (ref 8–23)
CALCIUM SERPL-MCNC: 8.3 MG/DL (ref 8.3–10.4)
CALCULATED P AXIS, ECG09: 69 DEGREES
CALCULATED R AXIS, ECG10: 85 DEGREES
CALCULATED T AXIS, ECG11: 78 DEGREES
CHLORIDE SERPL-SCNC: 105 MMOL/L (ref 98–107)
CO2 SERPL-SCNC: 25 MMOL/L (ref 21–32)
CREAT SERPL-MCNC: 0.8 MG/DL (ref 0.8–1.5)
DIAGNOSIS, 93000: NORMAL
ERYTHROCYTE [DISTWIDTH] IN BLOOD BY AUTOMATED COUNT: 15.2 % (ref 11.9–14.6)
GLUCOSE SERPL-MCNC: 88 MG/DL (ref 65–100)
HCT VFR BLD AUTO: 32.5 % (ref 41.1–50.3)
HGB BLD-MCNC: 10.2 G/DL (ref 13.6–17.2)
MCH RBC QN AUTO: 31.6 PG (ref 26.1–32.9)
MCHC RBC AUTO-ENTMCNC: 31.4 G/DL (ref 31.4–35)
MCV RBC AUTO: 100.6 FL (ref 79.6–97.8)
MM INDURATION POC: 0 MM (ref 0–5)
NRBC # BLD: 0 K/UL (ref 0–0.2)
P-R INTERVAL, ECG05: 190 MS
PLATELET # BLD AUTO: 237 K/UL (ref 150–450)
PMV BLD AUTO: 10.1 FL (ref 9.4–12.3)
POTASSIUM SERPL-SCNC: 4.2 MMOL/L (ref 3.5–5.1)
PPD POC: NEGATIVE NEGATIVE
Q-T INTERVAL, ECG07: 428 MS
QRS DURATION, ECG06: 92 MS
QTC CALCULATION (BEZET), ECG08: 413 MS
RBC # BLD AUTO: 3.23 M/UL (ref 4.23–5.6)
SODIUM SERPL-SCNC: 138 MMOL/L (ref 136–145)
VENTRICULAR RATE, ECG03: 56 BPM
WBC # BLD AUTO: 15.7 K/UL (ref 4.3–11.1)

## 2019-11-02 PROCEDURE — 80048 BASIC METABOLIC PNL TOTAL CA: CPT

## 2019-11-02 PROCEDURE — 94640 AIRWAY INHALATION TREATMENT: CPT

## 2019-11-02 PROCEDURE — 74011000250 HC RX REV CODE- 250: Performed by: FAMILY MEDICINE

## 2019-11-02 PROCEDURE — 70551 MRI BRAIN STEM W/O DYE: CPT

## 2019-11-02 PROCEDURE — 77030011943

## 2019-11-02 PROCEDURE — 92610 EVALUATE SWALLOWING FUNCTION: CPT

## 2019-11-02 PROCEDURE — 65660000000 HC RM CCU STEPDOWN

## 2019-11-02 PROCEDURE — 74011000258 HC RX REV CODE- 258: Performed by: FAMILY MEDICINE

## 2019-11-02 PROCEDURE — 85027 COMPLETE CBC AUTOMATED: CPT

## 2019-11-02 PROCEDURE — 94760 N-INVAS EAR/PLS OXIMETRY 1: CPT

## 2019-11-02 PROCEDURE — 74011250637 HC RX REV CODE- 250/637: Performed by: INTERNAL MEDICINE

## 2019-11-02 PROCEDURE — 74011000258 HC RX REV CODE- 258: Performed by: INTERNAL MEDICINE

## 2019-11-02 PROCEDURE — 77010033678 HC OXYGEN DAILY

## 2019-11-02 PROCEDURE — 74011250637 HC RX REV CODE- 250/637: Performed by: FAMILY MEDICINE

## 2019-11-02 PROCEDURE — 74011250636 HC RX REV CODE- 250/636: Performed by: INTERNAL MEDICINE

## 2019-11-02 PROCEDURE — 74011250636 HC RX REV CODE- 250/636: Performed by: FAMILY MEDICINE

## 2019-11-02 PROCEDURE — 36415 COLL VENOUS BLD VENIPUNCTURE: CPT

## 2019-11-02 RX ORDER — DULOXETIN HYDROCHLORIDE 30 MG/1
30 CAPSULE, DELAYED RELEASE ORAL 2 TIMES DAILY
COMMUNITY

## 2019-11-02 RX ORDER — DOXYCYCLINE 100 MG/1
100 CAPSULE ORAL EVERY 12 HOURS
Status: DISCONTINUED | OUTPATIENT
Start: 2019-11-02 | End: 2019-11-06 | Stop reason: HOSPADM

## 2019-11-02 RX ADMIN — POTASSIUM CHLORIDE 20 MEQ: 20 TABLET, EXTENDED RELEASE ORAL at 10:53

## 2019-11-02 RX ADMIN — FUROSEMIDE 40 MG: 40 TABLET ORAL at 10:53

## 2019-11-02 RX ADMIN — AMIODARONE HYDROCHLORIDE 200 MG: 200 TABLET ORAL at 10:53

## 2019-11-02 RX ADMIN — GUAIFENESIN 1200 MG: 600 TABLET ORAL at 10:53

## 2019-11-02 RX ADMIN — DOXYCYCLINE HYCLATE 100 MG: 100 CAPSULE ORAL at 21:58

## 2019-11-02 RX ADMIN — Medication 10 ML: at 05:09

## 2019-11-02 RX ADMIN — Medication 10 ML: at 22:05

## 2019-11-02 RX ADMIN — DOXYCYCLINE HYCLATE 100 MG: 100 CAPSULE ORAL at 10:57

## 2019-11-02 RX ADMIN — CEFTRIAXONE 1 G: 1 INJECTION, POWDER, FOR SOLUTION INTRAMUSCULAR; INTRAVENOUS at 10:57

## 2019-11-02 RX ADMIN — IPRATROPIUM BROMIDE AND ALBUTEROL SULFATE 3 ML: .5; 3 SOLUTION RESPIRATORY (INHALATION) at 07:15

## 2019-11-02 RX ADMIN — Medication 5 ML: at 14:08

## 2019-11-02 RX ADMIN — HEPARIN SODIUM 5000 UNITS: 5000 INJECTION INTRAVENOUS; SUBCUTANEOUS at 06:00

## 2019-11-02 RX ADMIN — MONTELUKAST 10 MG: 10 TABLET, FILM COATED ORAL at 10:52

## 2019-11-02 RX ADMIN — FINASTERIDE 5 MG: 5 TABLET, FILM COATED ORAL at 10:53

## 2019-11-02 RX ADMIN — BUDESONIDE 500 MCG: 0.5 INHALANT RESPIRATORY (INHALATION) at 07:15

## 2019-11-02 RX ADMIN — ASPIRIN 81 MG: 81 TABLET, COATED ORAL at 10:54

## 2019-11-02 RX ADMIN — HEPARIN SODIUM 5000 UNITS: 5000 INJECTION INTRAVENOUS; SUBCUTANEOUS at 21:59

## 2019-11-02 RX ADMIN — FLUTICASONE PROPIONATE 2 SPRAY: 50 SPRAY, METERED NASAL at 10:53

## 2019-11-02 RX ADMIN — PIPERACILLIN SODIUM AND TAZOBACTAM SODIUM 4.5 G: 4; .5 INJECTION, POWDER, LYOPHILIZED, FOR SOLUTION INTRAVENOUS at 04:15

## 2019-11-02 NOTE — PROGRESS NOTES
Patient attempted to get up and use urinal without assist.  Very unsteady, assisted back to bed, posey on bed, fall precautions continue.   Will give report to oncoming RN

## 2019-11-02 NOTE — ED PROVIDER NOTES
80-year-old gentleman presents with concerns about progressive shortness of breath as well as some increased confusion. Family said he had a couple episodes today where he would not really interact or engage in conversation which is apparently unusual for him. Patient's son said that the conversation I had with the patient was the best when he had all day. He does have a history of COPD and lung problems and is on chronic oxygen. Normally he needs between 2 and 4 L but apparently when EMS arrived they could not get his O2 sats out of the mid 80s on 4 L. Therefore, he was placed on a nonrebreather. Patient has been at a rehab facility for the last few months after having had hip surgery as well as a pneumonia. Family is worried that the pneumonia may potentially be back. No other associated symptoms. Elements of this note were created using speech recognition software. As such, errors of speech recognition may be present.            Past Medical History:   Diagnosis Date    Abdominal aortic aneurysm ([address] 12/10/2015    In 2005    Abdominal aortic aneurysm without rupture ([address]     Allergic rhinitis 12/10/2015    Asthma     Benign neoplasm     Benign prostatic hyperplasia 12/10/2015    BPH without urinary obstruction     Cardiovascular disease 12/10/2015    Chronic obstructive asthma with exacerbation ([address] 12/10/2015    COPD (chronic obstructive pulmonary disease) ([address] 12/10/2015    Cough with hemoptysis     Erectile dysfunction 12/10/2015    Essential hypertension, benign 12/10/2015    Fracture 10/2014    of left hand and ribs after fall on concrete    History of colon polyps     Low testosterone 12/10/2015    Lumbago     Memory loss     Overflow incontinence     Raynaud's syndrome 12/10/2015    Rotator cuff tendinitis     Thrombocytopenia (HCC)     Urinary frequency        Past Surgical History:   Procedure Laterality Date    HX CATARACT REMOVAL  6/2016-right    HX COLONOSCOPY      HX FRACTURE TX  10/2014    of left hand and ribs are fall on concrete    [facility]    HX ORTHOPAEDIC  03/2018    hip fracture         Family History:   Problem Relation Age of Onset    Dementia Mother     Cancer Father         lung cancer    Heart Attack Father        Social History     Socioeconomic History    Marital status:      Spouse name: Not on file    Number of children: Not on file    Years of education: Not on file    Highest education level: Not on file   Occupational History    Not on file   Social Needs    Financial resource strain: Not on file    Food insecurity:     Worry: Not on file     Inability: Not on file    Transportation needs:     Medical: Not on file     Non-medical: Not on file   Tobacco Use    Smoking status: Former Smoker    Smokeless tobacco: Never Used   Substance and Sexual Activity    Alcohol use: Yes     Comment: occasional    Drug use: Not on file    Sexual activity: Not on file   Lifestyle    Physical activity:     Days per week: Not on file     Minutes per session: Not on file    Stress: Not on file   Relationships    Social connections:     Talks on phone: Not on file     Gets together: Not on file     Attends Confucianism service: Not on file     Active member of club or organization: Not on file     Attends meetings of clubs or organizations: Not on file     Relationship status: Not on file    Intimate partner violence:     Fear of current or ex partner: Not on file     Emotionally abused: Not on file     Physically abused: Not on file     Forced sexual activity: Not on file   Other Topics Concern    Not on file   Social History Narrative    Not on file         ALLERGIES: Patient has no known allergies. Review of Systems   Constitutional: Positive for activity change, appetite change and fatigue. Negative for chills, diaphoresis and fever. HENT: Negative for congestion, rhinorrhea and sore throat.     Eyes: Negative for redness and visual disturbance. Respiratory: Positive for cough and shortness of breath. Negative for chest tightness and wheezing. Cardiovascular: Negative for chest pain and palpitations. Gastrointestinal: Negative for abdominal pain, blood in stool, diarrhea, nausea and vomiting. Endocrine: Negative for polydipsia and polyuria. Genitourinary: Negative for dysuria and hematuria. Musculoskeletal: Negative for arthralgias, myalgias and neck stiffness. Skin: Negative for rash. Allergic/Immunologic: Negative for environmental allergies and food allergies. Neurological: Negative for dizziness, weakness and headaches. Hematological: Negative for adenopathy. Does not bruise/bleed easily. Psychiatric/Behavioral: Positive for confusion. Negative for sleep disturbance. The patient is not nervous/anxious. Vitals:    11/01/19 1905   BP: 123/61   Pulse: 60   Resp: 18   Temp: 98 °F (36.7 °C)   SpO2: 94%   Weight: 60.3 kg (133 lb)   Height: 5' 8\" (1.727 m)            Physical Exam   Constitutional: He appears well-developed and well-nourished. HENT:   Head: Normocephalic and atraumatic. Eyes: Pupils are equal, round, and reactive to light. Right eye exhibits no discharge. Left eye exhibits no discharge. Cardiovascular: Normal rate and regular rhythm. Pulmonary/Chest:   Diffuse coarse rhonchi with bilateral rales   Abdominal: He exhibits no distension. Neurological: He is alert. No cranial nerve deficit. Skin: Skin is warm and dry. Vitals reviewed. MDM  Number of Diagnoses or Management Options  Diagnosis management comments: Patient's lactic acid is negative but his white count is 27,000. Chest x-ray shows a right lower lobe infiltrate. I will treat with antibiotics and discussed the case with the hospitalist for admission. 8:21 PM  I spoke with the hospitalist who kindly agreed to see the patient.          Procedures

## 2019-11-02 NOTE — PROGRESS NOTES
SPEECH LANGUAGE PATHOLOGY: DYSPHAGIA- Initial Assessment and Discharge    NAME/AGE/GENDER: Afsaneh Rodriguez is a 80 y.o. male  DATE: 11/2/2019  PRIMARY DIAGNOSIS: HCAP (healthcare-associated pneumonia) [J18.9]      ICD-10: Treatment Diagnosis: R13.11 Dysphagia, Oral Phase    INTERDISCIPLINARY COLLABORATION: Registered Nurse  PRECAUTIONS/ALLERGIES: Patient has no known allergies. SUBJECTIVE   Pt sitting upright In bed eating his lunch with family present. Diet Prior to Evaluation: regular/thin     History of Present Injury/Illness: Mr. Stacy Saxena  has a past medical history of Abdominal aortic aneurysm ([facility] [address] (12/10/2015), Abdominal aortic aneurysm without rupture ([facility] [address] Allergic rhinitis (12/10/2015), Asthma, Benign neoplasm, Benign prostatic hyperplasia (12/10/2015), BPH without urinary obstruction, Cardiovascular disease (12/10/2015), Chronic obstructive asthma with exacerbation ([facility] [address] (12/10/2015), COPD (chronic obstructive pulmonary disease) ([facility] [address] (12/10/2015), Cough with hemoptysis, Erectile dysfunction (12/10/2015), Essential hypertension, benign (12/10/2015), Fracture (10/2014), History of colon polyps, Low testosterone (12/10/2015), Lumbago, Memory loss, Overflow incontinence, Raynaud's syndrome (12/10/2015), Rotator cuff tendinitis, Thrombocytopenia ([address] and Urinary frequency. Melanie Mullins He also  has a past surgical history that includes hx hernia repair (1980); hx colonoscopy; hx fracture tx (10/2014); hx cataract removal (6/2016-right); and hx orthopaedic (03/2018). Previous Dysphagia: NONE REPORTED    Orientation:   Person  Place  Time  Situation     Pain: Pain Scale 1: Visual  Pain Intensity 1: 0         OBJECTIVE   Oral Motor Assessment:  · Labial: No impairment  · Dentition: Natural  · Oral Hygiene: Adequate  · Lingual: No impairment     Swallow assessment:   Patient presented with thin liquids via straw, mixed and solid from lunch tray. Adequate oral prep. No oral residue. Timely swallow.  No overt signs/sx of aspiration with presented trials. Pt reports of poor appetite. ASSESSMENT   Patient presents with a swallow function WFL. No overt signs/sx of aspiration with lunch tray of regular/thin. Recommend continued diet. No further ST indicated. Tool Used: Dysphagia Outcome and Severity Scale (COREY)    Score Comments   Normal Diet  [x] 7 With no strategies or extra time needed   Functional Swallow  [] 6 May have mild oral or pharyngeal delay   Mild Dysphagia  [] 5 Which may require one diet consistency restricted    Mild-Moderate Dysphagia  [] 4 With 1-2 diet consistencies restricted   Moderate Dysphagia  [] 3 With 2 or more diet consistencies restricted   Moderate-Severe Dysphagia  [] 2 With partial PO strategies (trials with ST only)   Severe Dysphagia  [] 1 With inability to tolerate any PO safely      Score:  Initial: 7 Most Recent:  (Date 11/02/19 )   Interpretation of Tool: The Dysphagia Outcome and Severity Scale (COREY) is a simple, easy-to-use, 7-point scale developed to systematically rate the functional severity of dysphagia based on objective assessment and make recommendations for diet level, independence level, and type of nutrition. Current Medications:   No current facility-administered medications on file prior to encounter. Current Outpatient Medications on File Prior to Encounter   Medication Sig Dispense Refill    DULoxetine (CYMBALTA) 30 mg capsule Take 30 mg by mouth two (2) times a day.  amLODIPine (NORVASC) 5 mg tablet Take 5 mg by mouth daily.  magnesium oxide 250 mg magnesium tablet Take 250 mg by mouth daily.  nystatin (MYCOSTATIN) powder Apply  to affected area four (4) times daily.  multivit-iron-FA-calcium-mins (THERA-TABS M) 27 mg iron-400 mcg tab Take  by mouth.  furosemide (LASIX) 40 mg tablet Take 1 Tab by mouth daily. 30 Tab 0    finasteride (PROSCAR) 5 mg tablet Take 1 Tab by mouth daily.  90 Tab 4    raNITIdine (ZANTAC) 75 mg tab Take  by mouth two (2) times a day.  amiodarone (PACERONE) 400 mg tablet       albuterol-ipratropium (DUO-NEB) 2.5 mg-0.5 mg/3 ml nebu 3 mL by Nebulization route every four (4) hours as needed for Other (dyspnea). 30 Nebule 3    potassium chloride (K-DUR, KLOR-CON) 20 mEq tablet Take 2 Tabs by mouth daily. (Patient taking differently: Take 20 mEq by mouth daily.) 3 Tab 0    umeclidinium-vilanterol (ANORO ELLIPTA) 62.5-25 mcg/actuation inhaler Take 1 Puff by inhalation daily.  Oxygen Use as instructed. Use as instructed; faxed to [employer]      cyanocobalamin (VITAMIN B12) 1,000 mcg/mL injection INJECT 1ML INTRAMUSCULARLY ONCE FOR 1 DOSE  12    fluticasone (FLONASE) 50 mcg/actuation nasal spray 2 Sprays by Both Nostrils route daily. 48 g 4    calcium citrate-vitamin d3 (CITRACAL+D) 315-200 mg-unit tab Take 2 Tabs by mouth daily (with breakfast).  cholecalciferol (VITAMIN D3) 1,000 unit cap Take  by mouth.  guaiFENesin ER (MUCINEX) 600 mg ER tablet Take 1,200 mg by mouth daily.  Biotin 2,500 mcg cap Take  by mouth.  aspirin delayed-release 81 mg tablet Take  by mouth daily.  acetaminophen (TYLENOL) 650 mg TbER Take 650 mg by mouth every eight (8) hours.  multivitamin,tx-iron-ca-min (THERA-M) 27-0.4 mg tab Take 1 Tab by mouth daily.  montelukast (SINGULAIR) 10 mg tablet Take 1 Tab by mouth daily. (Patient taking differently: Take 10 mg by mouth nightly.) 90 Tab 4         PLAN    FREQUENCY/DURATION: No further speech therapy indicated at this time as oropharyngeal swallow function is within normal limits. - Recommendations for next treatment session: No additional speech therapy indicated at this time.                RECOMMENDATIONS   DIET:    PO:  Regular   Liquids:  regular thin    MEDICATIONS: With liquid     ASPIRATION PRECAUTIONS  · Slow rate of intake  · Small bites/sips  · Upright at 90 degrees during meal           RECOMMENDATIONS for CONTINUED SPEECH THERAPY:   No further speech therapy indicated at this time.        SAFETY:  After treatment position/precautions:  · Upright in bed  · RN notified  · family at bedside  · Call light within reach      Total Treatment Duration:   Time In: 1300  Time Out: [address] MA/CCC/SLP

## 2019-11-02 NOTE — PROGRESS NOTES
Speech therapy note  Attempted to see pt this am, however, pt unavailable.      Varun Davis MA/LEVI/SLP

## 2019-11-02 NOTE — PROGRESS NOTES
Hospitalist Note     Admit Date:  2019  6:58 PM   Name:  Catarino Ahr   Age:  80 y.o.  :  1932   MRN:  [id number]   PCP:  Dyllan Parks MD  Treatment Team: Attending Provider: Lorna Padilla MD    HPI/Subjective:   Patient is an 79yo M with a history of COPD (2L NC dependant), HTN, VT on amiodarone, recurrent pleural effusions and pleurodesis who was recently admitted for HCAP - and presented with fatigue, subjective fever, cough for two days. Much less alert and interactive with family at home. RLL on CXR in ER.     - pt seems slightly confused this morning. Grumpy which family says is normal in mornings. Some complaints by family of slurred speech and twitching at times, no other focality. SOB and hypoxia improving. No CP, fevers. Objective:     Patient Vitals for the past 24 hrs:   Temp Pulse Resp BP SpO2   19     92 %   1915 97.6 °F (36.4 °C) (!) 59 19 120/56 94 %   19 0342 98.5 °F (36.9 °C) 68 20 95/48 92 %   19 2310 97.8 °F (36.6 °C) (!) 59 20 115/61    19 2220  68 20  93 %   19 2206  (!) 57 19  97 %   19 2039  (!) 57 19 94/53    19 1952  (!) 40 18 111/57 95 %   19 1905 98 °F (36.7 °C) 60 18 123/61 94 %     Oxygen Therapy  O2 Sat (%): 92 % (19)  Pulse via Oximetry: 84 beats per minute (19)  O2 Device: Nasal cannula;Humidifier (19)  O2 Flow Rate (L/min): 4 l/min (19)    Estimated body mass index is 20.22 kg/m² as calculated from the following:    Height as of this encounter: 5' 8\" (1.727 m). Weight as of this encounter: 60.3 kg (133 lb). No intake or output data in the 24 hours ending 19 1023    *Note that automatically entered I/Os may not be accurate; dependent on patient compliance with collection and accurate  by techs. General:    Well nourished. Alert. CV:   RRR. No murmur, rub, or gallop. Lungs:   CTAB.   No wheezing, rhonchi, or rales. Abdomen:   Soft, nontender, nondistended. Extremities: Warm and dry. No cyanosis or edema. Skin:     No rashes or jaundice. Neuro:  No gross focal deficits    Data Review:  I have reviewed all labs, meds, and studies from the last 24 hours:    Recent Results (from the past 24 hour(s))   POC TROPONIN-I    Collection Time: 11/01/19  7:25 PM   Result Value Ref Range    Troponin-I (POC) 0.04 0.02 - 0.05 ng/ml   CBC WITH AUTOMATED DIFF    Collection Time: 11/01/19  7:26 PM   Result Value Ref Range    WBC 27.1 (H) 4.3 - 11.1 K/uL    RBC 3.35 (L) 4.23 - 5.6 M/uL    HGB 10.9 (L) 13.6 - 17.2 g/dL    HCT 34.2 (L) 41.1 - 50.3 %    .1 (H) 79.6 - 97.8 FL    MCH 32.5 26.1 - 32.9 PG    MCHC 31.9 31.4 - 35.0 g/dL    RDW 15.0 (H) 11.9 - 14.6 %    PLATELET 393 296 - 586 K/uL    MPV 9.8 9.4 - 12.3 FL    ABSOLUTE NRBC 0.00 0.0 - 0.2 K/uL    NEUTROPHILS 91 (H) 43 - 78 %    LYMPHOCYTES 2 (L) 13 - 44 %    MONOCYTES 6 4.0 - 12.0 %    EOSINOPHILS 0 (L) 0.5 - 7.8 %    BASOPHILS 0 0.0 - 2.0 %    IMMATURE GRANULOCYTES 1 0.0 - 5.0 %    ABS. NEUTROPHILS 24.7 (H) 1.7 - 8.2 K/UL    ABS. LYMPHOCYTES 0.5 0.5 - 4.6 K/UL    ABS. MONOCYTES 1.6 (H) 0.1 - 1.3 K/UL    ABS. EOSINOPHILS 0.0 0.0 - 0.8 K/UL    ABS. BASOPHILS 0.0 0.0 - 0.2 K/UL    ABS. IMM.  GRANS. 0.3 0.0 - 0.5 K/UL    RBC COMMENTS SLIGHT  ANISOCYTOSIS + POIKILOCYTOSIS        WBC COMMENTS Result Confirmed By Smear      PLATELET COMMENTS ADEQUATE      DF AUTOMATED     METABOLIC PANEL, COMPREHENSIVE    Collection Time: 11/01/19  7:26 PM   Result Value Ref Range    Sodium 135 (L) 136 - 145 mmol/L    Potassium 4.8 3.5 - 5.1 mmol/L    Chloride 100 98 - 107 mmol/L    CO2 27 21 - 32 mmol/L    Anion gap 8 7 - 16 mmol/L    Glucose 116 (H) 65 - 100 mg/dL    BUN 28 (H) 8 - 23 MG/DL    Creatinine 0.90 0.8 - 1.5 MG/DL    GFR est AA >60 >60 ml/min/1.73m2    GFR est non-AA >60 >60 ml/min/1.73m2    Calcium 8.2 (L) 8.3 - 10.4 MG/DL    Bilirubin, total 1.1 0.2 - 1.1 MG/DL ALT (SGPT) 69 (H) 12 - 65 U/L    AST (SGOT) 61 (H) 15 - 37 U/L    Alk. phosphatase 106 50 - 136 U/L    Protein, total 6.3 6.3 - 8.2 g/dL    Albumin 2.7 (L) 3.2 - 4.6 g/dL    Globulin 3.6 (H) 2.3 - 3.5 g/dL    A-G Ratio 0.8 (L) 1.2 - 3.5     MAGNESIUM    Collection Time: 11/01/19  7:26 PM   Result Value Ref Range    Magnesium 1.9 1.8 - 2.4 mg/dL   PROCALCITONIN    Collection Time: 11/01/19  7:26 PM   Result Value Ref Range    Procalcitonin 0.4 ng/mL   POC LACTIC ACID    Collection Time: 11/01/19  7:28 PM   Result Value Ref Range    Lactic Acid (POC) 1.44 0.5 - 1.9 mmol/L   CULTURE, BLOOD    Collection Time: 11/01/19  8:25 PM   Result Value Ref Range    Special Requests: LEFT  FOREARM        Culture result: NO GROWTH AFTER 9 HOURS     CULTURE, BLOOD    Collection Time: 11/01/19  8:37 PM   Result Value Ref Range    Special Requests: LEFT  FOREARM        Culture result: NO GROWTH AFTER 8 HOURS     EKG, 12 LEAD, INITIAL    Collection Time: 11/02/19  4:31 AM   Result Value Ref Range    Ventricular Rate 56 BPM    Atrial Rate 56 BPM    P-R Interval 190 ms    QRS Duration 92 ms    Q-T Interval 428 ms    QTC Calculation (Bezet) 413 ms    Calculated P Axis 69 degrees    Calculated R Axis 85 degrees    Calculated T Axis 78 degrees    Diagnosis       !! AGE AND GENDER SPECIFIC ECG ANALYSIS !!   Sinus bradycardia with sinus arrhythmia  Minimal voltage criteria for LVH, may be normal variant  Early repolarization  Borderline ECG  When compared with ECG of 01-SEP-2019 05:45,  QRS axis Shifted right  Criteria for Septal infarct are no longer Present  ST elevation has replaced ST depression in Anterior leads  Confirmed by Pantera Tomas ([id number]) on 11/2/2019 7:36:41 AM     CBC W/O DIFF    Collection Time: 11/02/19  6:24 AM   Result Value Ref Range    WBC 15.7 (H) 4.3 - 11.1 K/uL    RBC 3.23 (L) 4.23 - 5.6 M/uL    HGB 10.2 (L) 13.6 - 17.2 g/dL    HCT 32.5 (L) 41.1 - 50.3 %    .6 (H) 79.6 - 97.8 FL    MCH 31.6 26.1 - 32.9 PG MCHC 31.4 31.4 - 35.0 g/dL    RDW 15.2 (H) 11.9 - 14.6 %    PLATELET 937 619 - 514 K/uL    MPV 10.1 9.4 - 12.3 FL    ABSOLUTE NRBC 0.00 0.0 - 0.2 K/uL   METABOLIC PANEL, BASIC    Collection Time: 11/02/19  6:24 AM   Result Value Ref Range    Sodium 138 136 - 145 mmol/L    Potassium 4.2 3.5 - 5.1 mmol/L    Chloride 105 98 - 107 mmol/L    CO2 25 21 - 32 mmol/L    Anion gap 8 7 - 16 mmol/L    Glucose 88 65 - 100 mg/dL    BUN 27 (H) 8 - 23 MG/DL    Creatinine 0.80 0.8 - 1.5 MG/DL    GFR est AA >60 >60 ml/min/1.73m2    GFR est non-AA >60 >60 ml/min/1.73m2    Calcium 8.3 8.3 - 10.4 MG/DL        All Micro Results     Procedure Component Value Units Date/Time    CULTURE, BLOOD [[id number]] Collected:  11/01/19 2025    Order Status:  Completed Specimen:  Blood Updated:  11/02/19 0628     Special Requests: --        LEFT  FOREARM       Culture result: NO GROWTH AFTER 9 HOURS       CULTURE, BLOOD [[id number]] Collected:  11/01/19 2037    Order Status:  Completed Specimen:  Blood Updated:  11/02/19 0628     Special Requests: --        LEFT  FOREARM       Culture result: NO GROWTH AFTER 8 HOURS       CULTURE, RESPIRATORY/SPUTUM/BRONCH [place] STAIN [[id number]]     Order Status:  Sent Specimen:  Sputum     MSSA/MRSA SC BY PCR, NASAL SWAB [[id number]]     Order Status:  Sent Specimen:  Nasal swab           No results found for this visit on 11/01/19.     Current Meds:  Current Facility-Administered Medications   Medication Dose Route Frequency    cefTRIAXone (ROCEPHIN) 1 g in 0.9% sodium chloride (MBP/ADV) 50 mL  1 g IntraVENous Q24H    doxycycline (VIBRAMYCIN) capsule 100 mg  100 mg Oral Q12H    albuterol-ipratropium (DUO-NEB) 2.5 MG-0.5 MG/3 ML  3 mL Nebulization Q4H PRN    amiodarone (CORDARONE) tablet 200 mg  200 mg Oral DAILY    aspirin delayed-release tablet 81 mg  81 mg Oral DAILY    finasteride (PROSCAR) tablet 5 mg  5 mg Oral DAILY    fluticasone propionate (FLONASE) 50 mcg/actuation nasal spray 2 Spray  2 Spray Both Nostrils DAILY    furosemide (LASIX) tablet 40 mg  40 mg Oral DAILY    guaiFENesin ER (MUCINEX) tablet 1,200 mg  1,200 mg Oral DAILY    montelukast (SINGULAIR) tablet 10 mg  10 mg Oral DAILY    potassium chloride (K-DUR, KLOR-CON) SR tablet 20 mEq  20 mEq Oral DAILY    budesonide (PULMICORT) 500 mcg/2 ml nebulizer suspension  500 mcg Nebulization BID RT    sodium chloride (NS) flush 5-40 mL  5-40 mL IntraVENous Q8H    sodium chloride (NS) flush 5-40 mL  5-40 mL IntraVENous PRN    tuberculin injection 5 Units  5 Units IntraDERMal ONCE    acetaminophen (TYLENOL) tablet 650 mg  650 mg Oral Q4H PRN    ondansetron (ZOFRAN ODT) tablet 4 mg  4 mg Oral Q4H PRN    bisacodyl (DULCOLAX) tablet 5 mg  5 mg Oral DAILY PRN    heparin (porcine) injection 5,000 Units  5,000 Units SubCUTAneous Q8H       Other Studies (last 24 hours):  Xr Chest Port    Result Date: 11/1/2019  Portable chest x-ray. CLINICAL INDICATION: Shortness of breath FINDINGS: Single AP view the chest compared to a similar exam dated 9/6/2019 shows persistent, diffuse reticular nodular interstitial densities throughout both lungs with new patchy airspace opacity in the right midlung. The lungs are underexpanded. A small right pleural effusion is suspected. The cardiac silhouette and mediastinum are stable. The bones are also patent. IMPRESSION: Acute right lower lobe pneumonia suspected on a background of chronic reticular nodular interstitial lung disease.       Assessment and Plan:     Hospital Problems as of 11/2/2019 Date Reviewed: 10/6/2019          Codes Class Noted - Resolved POA    HCAP (healthcare-associated pneumonia) ICD-10-CM: J18.9  ICD-9-CM: 005  9/1/2019 - Present Yes        * (Principal) Acute on chronic respiratory failure with hypoxia ([facility] [address] ICD-10-CM: J96.21  ICD-9-CM: 518.84, 799.02  5/6/2019 - Present Yes        VT (ventricular tachycardia) (HCC) (Chronic) ICD-10-CM: I47.2  ICD-9-CM: 427.1  5/6/2019 - Present Yes        Chronic respiratory failure with hypoxia (HCC) (Chronic) ICD-10-CM: J96.11  ICD-9-CM: 518.83, 799.02  3/29/2016 - Present Yes    Overview Addendum 5/6/2019 10:14 AM by Diana Aorra, NP     Continue to wear 2 L nasal cannula at night. May use 2 L supplemental oxygen to maintain sats greater than 90%. COPD (chronic obstructive pulmonary disease) (HCC) (Chronic) ICD-10-CM: J44.9  ICD-9-CM: 496  12/10/2015 - Present Yes        Essential hypertension, benign (Chronic) ICD-10-CM: I10  ICD-9-CM: 401.1  12/10/2015 - Present Yes              Plan:  COPD, PNA  -cont nebs  -try de-escalating abx to rocephin/doxy; WBC improved, hypoxia improving, and no fevers  -home singulair  -mucinex, other supportive therapies  -SLP eval for ?aspiration    Family reports of slurred speech, intermittent twitching  -I do not appreciate these symptoms at current time  -check MRI brain    Urinary retention  -caths q6-8h every day    Vtach hx  -amiodarone  -tele    HTN  -hold home norvasc  -cont lasix    FEN  -family reports weight loss  -consulted RD    DC planning/Dispo:    -PT/OT  -PPD    Code status:  -family provided living will. No TFs.   DNR    Diet:  DIET CARDIAC  DIET NUTRITIONAL SUPPLEMENTS  DVT ppx:  heparin    Signed:  Lukas Walters MD

## 2019-11-02 NOTE — ROUTINE PROCESS
TRANSFER - OUT REPORT: 
 
Verbal report given to Sussy Swanson RN on Collin Bach  being transferred to [address] [place] [address] for routine progression of care Report consisted of patients Situation, Background, Assessment and  
Recommendations(SBAR). Information from the following report(s) SBAR, ED Summary, [facility] and Recent Results was reviewed with the receiving nurse. Lines:  
Peripheral IV 11/01/19 Right Forearm (Active) Site Assessment Clean, dry, & intact 11/1/2019 10:08 PM  
Phlebitis Assessment 0 11/1/2019 10:08 PM  
Infiltration Assessment 0 11/1/2019 10:08 PM  
Dressing Status Clean, dry, & intact 11/1/2019 10:08 PM  
   
Peripheral IV 11/01/19 Left Arm (Active) Site Assessment Clean, dry, & intact 11/1/2019 10:08 PM  
Phlebitis Assessment 0 11/1/2019 10:08 PM  
Infiltration Assessment 0 11/1/2019 10:08 PM  
Dressing Status Clean, dry, & intact 11/1/2019 10:08 PM  
  
 
Opportunity for questions and clarification was provided. Patient transported with: 
 [employer]

## 2019-11-02 NOTE — PROGRESS NOTES
Pharmacokinetic Consult to Pharmacist    Sharmaine Dilcia is a 80 y.o. male being treated for HAP with vancomycin . Height: 5' 8\" (172.7 cm)  Weight: 60.3 kg (133 lb)  Lab Results   Component Value Date/Time    BUN 28 (H) 11/01/2019 07:26 PM    Creatinine 0.90 11/01/2019 07:26 PM    WBC 27.1 (H) 11/01/2019 07:26 PM    Procalcitonin 0.4 11/01/2019 07:26 PM    Lactic Acid (POC) 1.44 11/01/2019 07:28 PM      Estimated Creatinine Clearance: 49.3 mL/min (based on SCr of 0.9 mg/dL). CULTURES:        Day 1 of vancomycin. Goal trough is 15-20. Vancomycin dose initiated at 1500 mg x 1 dose; followed by Vanc 1250 mg IV q18h. Will continue to follow patient. Thank you,  Angelica Norris, PharmD.

## 2019-11-02 NOTE — PROGRESS NOTES
Patient arrived from ED by stretcher. Transferred to bed. Vanco 1500mg infusing wnl. Family at bedside. Oriented to room and staff.

## 2019-11-02 NOTE — PROGRESS NOTES
Patient is resting in bed. Assessment completed. Respirations are even and unlabored. O2 at 4lpm high flow NC. Patient is alert and oriented. No distress at this time. Bed is low, locked and call light within reach.

## 2019-11-02 NOTE — PROGRESS NOTES
Patient ate snack when he arrived to floor, has been resting quietly. No c/o discomfort, continues on 6 liters nasal cannula. Family have gone home for night. Call light in reach, fall precautions in place.

## 2019-11-02 NOTE — H&P
HOSPITALIST H&P/CONSULT  NAME:  Alejandro Montalvo   Age:  80 y.o.  :   1932   MRN:   [id number]  PCP: Maria Del Carmen Garcia MD  Consulting MD:  Treatment Team: Attending Provider: Jerrod Jesus MD; Primary Nurse: Valerio Madrigal RN  HPI:   Patient is an 81yo M with a history of COPD (2L NC dependant), HTN, VT (on amiodarone), recurrent pleural effusions and pleurodesis who was recently admitted for HCAP - and now presents with fatigue, subjective fever, cough for two days. Much less alert and interactive with family, but now mentating more at baseline. Found to have leukocytosis and signs PNA on CXR. No fever, hemodynamically stable. Increased oxygen requirement to 6L.      Complete ROS done and is as stated in HPI or otherwise negative  Past Medical History:   Diagnosis Date    Abdominal aortic aneurysm ([address] 12/10/2015    In 2005    Abdominal aortic aneurysm without rupture ([address]     Allergic rhinitis 12/10/2015    Asthma     Benign neoplasm     Benign prostatic hyperplasia 12/10/2015    BPH without urinary obstruction     Cardiovascular disease 12/10/2015    Chronic obstructive asthma with exacerbation ([address] 12/10/2015    COPD (chronic obstructive pulmonary disease) ([address] 12/10/2015    Cough with hemoptysis     Erectile dysfunction 12/10/2015    Essential hypertension, benign 12/10/2015    Fracture 10/2014    of left hand and ribs after fall on concrete    History of colon polyps     Low testosterone 12/10/2015    Lumbago     Memory loss     Overflow incontinence     Raynaud's syndrome 12/10/2015    Rotator cuff tendinitis     Thrombocytopenia ([address]     Urinary frequency       Past Surgical History:   Procedure Laterality Date    HX CATARACT REMOVAL  2016-right    HX COLONOSCOPY      HX FRACTURE TX  10/2014    of left hand and ribs are fall on concrete    [id number] HX ORTHOPAEDIC  2018    hip fracture    reviewed  Prior to Admission Medications   Prescriptions Last Dose Informant Patient Reported? Taking? Biotin 2,500 mcg cap   Yes No   Sig: Take  by mouth. Oxygen   Yes No   Sig: Use as instructed. Use as instructed; faxed to [facility]   acetaminophen (TYLENOL) 650 mg TbER   Yes No   Sig: Take 650 mg by mouth every eight (8) hours. albuterol-ipratropium (DUO-NEB) 2.5 mg-0.5 mg/3 ml nebu   No No   Sig: 3 mL by Nebulization route every four (4) hours as needed for Other (dyspnea). amLODIPine (NORVASC) 5 mg tablet   Yes No   Sig: Take 5 mg by mouth daily. amiodarone (PACERONE) 400 mg tablet   Yes No   aspirin delayed-release 81 mg tablet   Yes No   Sig: Take  by mouth daily. calcium citrate-vitamin d3 (CITRACAL+D) 315-200 mg-unit tab   Yes No   Sig: Take 2 Tabs by mouth daily (with breakfast). cholecalciferol (VITAMIN D3) 1,000 unit cap   Yes No   Sig: Take  by mouth.   cyanocobalamin (VITAMIN B12) 1,000 mcg/mL injection   Yes No   Sig: INJECT 1ML INTRAMUSCULARLY ONCE FOR 1 DOSE   finasteride (PROSCAR) 5 mg tablet   No No   Sig: Take 1 Tab by mouth daily. fluticasone (FLONASE) 50 mcg/actuation nasal spray   No No   Si Sprays by Both Nostrils route daily. furosemide (LASIX) 40 mg tablet   No No   Sig: Take 1 Tab by mouth daily. guaiFENesin ER (MUCINEX) 600 mg ER tablet   Yes No   Sig: Take 1,200 mg by mouth daily. magnesium oxide 250 mg magnesium tablet   Yes No   Sig: Take 250 mg by mouth daily. montelukast (SINGULAIR) 10 mg tablet   No No   Sig: Take 1 Tab by mouth daily. multivit-iron-FA-calcium-mins (THERA-TABS M) 27 mg iron-400 mcg tab   Yes No   Sig: Take  by mouth.   multivitamin,tx-iron-ca-min (THERA-M) 27-0.4 mg tab   Yes No   Sig: Take 1 Tab by mouth daily. nystatin (MYCOSTATIN) powder   Yes No   Sig: Apply  to affected area four (4) times daily. potassium chloride (K-DUR, KLOR-CON) 20 mEq tablet   No No   Sig: Take 2 Tabs by mouth daily. Patient taking differently: Take 20 mEq by mouth daily. raNITIdine (ZANTAC) 75 mg tab   Yes No   Sig: Take  by mouth two (2) times a day. umeclidinium-vilanterol (ANORO ELLIPTA) 62.5-25 mcg/actuation inhaler   Yes No   Sig: Take 1 Puff by inhalation daily. Facility-Administered Medications: None     No Known Allergies   Social History     Tobacco Use    Smoking status: Former Smoker    Smokeless tobacco: Never Used   Substance Use Topics    Alcohol use: Yes     Comment: occasional      Family History   Problem Relation Age of Onset    Dementia Mother     Cancer Father         lung cancer    Heart Attack Father     reviewed  Objective:     Visit Vitals  /61 (BP 1 Location: Left arm, BP Patient Position: At rest)   Pulse 60   Temp 98 °F (36.7 °C)   Resp 18   Ht 5' 8\" (1.727 m)   Wt 60.3 kg (133 lb)   SpO2 94%   BMI 20.22 kg/m²      Temp (24hrs), Av °F (36.7 °C), Min:98 °F (36.7 °C), Max:98 °F (36.7 °C)    Oxygen Therapy  O2 Sat (%): 94 % (19)  O2 Device: Nasal cannula (19)  O2 Flow Rate (L/min): 6 l/min (19)  Physical Exam:  General:    Elderly, NAD  Head:   Normocephalic, without obvious abnormality, atraumatic. Nose:  Nares normal. No drainage or sinus tenderness. Lungs:   Rales R base, wet cough  Heart:   Regular rate and rhythm,  no murmur, rub or gallop. Abdomen:   Soft, non-tender. Not distended. Bowel sounds normal.   Extremities: No cyanosis. No edema. No clubbing  Skin:     Texture, turgor normal. No rashes or lesions.   Not Jaundiced  Neurologic: Alert and oriented x 3, no focal deficits   Data Review:   Recent Results (from the past 24 hour(s))   POC TROPONIN-I    Collection Time: 19  7:25 PM   Result Value Ref Range    Troponin-I (POC) 0.04 0.02 - 0.05 ng/ml   CBC WITH AUTOMATED DIFF    Collection Time: 19  7:26 PM   Result Value Ref Range    WBC 27.1 (H) 4.3 - 11.1 K/uL    RBC 3.35 (L) 4.23 - 5.6 M/uL    HGB 10.9 (L) 13.6 - 17.2 g/dL    HCT 34.2 (L) 41.1 - 50.3 %    .1 (H) 79.6 - 97.8 FL    MCH 32.5 26.1 - 32.9 PG    MCHC 31.9 31.4 - 35.0 g/dL    RDW 15.0 (H) 11.9 - 14.6 %    PLATELET 455 990 - 192 K/uL    MPV 9.8 9.4 - 12.3 FL    ABSOLUTE NRBC 0.00 0.0 - 0.2 K/uL    NEUTROPHILS 91 (H) 43 - 78 %    LYMPHOCYTES 2 (L) 13 - 44 %    MONOCYTES 6 4.0 - 12.0 %    EOSINOPHILS 0 (L) 0.5 - 7.8 %    BASOPHILS 0 0.0 - 2.0 %    IMMATURE GRANULOCYTES 1 0.0 - 5.0 %    ABS. NEUTROPHILS 24.7 (H) 1.7 - 8.2 K/UL    ABS. LYMPHOCYTES 0.5 0.5 - 4.6 K/UL    ABS. MONOCYTES 1.6 (H) 0.1 - 1.3 K/UL    ABS. EOSINOPHILS 0.0 0.0 - 0.8 K/UL    ABS. BASOPHILS 0.0 0.0 - 0.2 K/UL    ABS. IMM. GRANS. 0.3 0.0 - 0.5 K/UL    RBC COMMENTS SLIGHT  ANISOCYTOSIS + POIKILOCYTOSIS        WBC COMMENTS Result Confirmed By Smear      PLATELET COMMENTS ADEQUATE      DF AUTOMATED     METABOLIC PANEL, COMPREHENSIVE    Collection Time: 11/01/19  7:26 PM   Result Value Ref Range    Sodium 135 (L) 136 - 145 mmol/L    Potassium 4.8 3.5 - 5.1 mmol/L    Chloride 100 98 - 107 mmol/L    CO2 27 21 - 32 mmol/L    Anion gap 8 7 - 16 mmol/L    Glucose 116 (H) 65 - 100 mg/dL    BUN 28 (H) 8 - 23 MG/DL    Creatinine 0.90 0.8 - 1.5 MG/DL    GFR est AA >60 >60 ml/min/1.73m2    GFR est non-AA >60 >60 ml/min/1.73m2    Calcium 8.2 (L) 8.3 - 10.4 MG/DL    Bilirubin, total 1.1 0.2 - 1.1 MG/DL    ALT (SGPT) 69 (H) 12 - 65 U/L    AST (SGOT) 61 (H) 15 - 37 U/L    Alk. phosphatase 106 50 - 136 U/L    Protein, total 6.3 6.3 - 8.2 g/dL    Albumin 2.7 (L) 3.2 - 4.6 g/dL    Globulin 3.6 (H) 2.3 - 3.5 g/dL    A-G Ratio 0.8 (L) 1.2 - 3.5     MAGNESIUM    Collection Time: 11/01/19  7:26 PM   Result Value Ref Range    Magnesium 1.9 1.8 - 2.4 mg/dL   POC LACTIC ACID    Collection Time: 11/01/19  7:28 PM   Result Value Ref Range    Lactic Acid (POC) 1.44 0.5 - 1.9 mmol/L     Imaging /Procedures /Studies   XR CHEST PORT   Final Result   IMPRESSION: Acute right lower lobe pneumonia suspected on a background of   chronic reticular nodular interstitial lung disease. Assessment and Plan: Active Hospital Problems    Diagnosis Date Noted    HCAP (healthcare-associated pneumonia) 09/01/2019       PLAN:  HCAP: vanc/zosyn, blood/sputum cultures, supplemental oxygen. BDs as needed, no wheeze currently   Nasal MRSA swab  Trend wbc    Hx VT, HTN: home meds    DVT PPx: hsq  Code Status: full. Son and wife are decision makers.     Anticipated discharge: 3-4d    Signed By: Melina Giron MD     November 1, 2019 mild impairment

## 2019-11-02 NOTE — PROGRESS NOTES
Nutrition  Reason for assessment: Referral received from nursing admission Malnutrition Screening Tool   Recently Lost Weight Without Trying: Yes  If Yes, How Much Weight Loss: 2-13 lbs  Eating Poorly Due to Decreased Appetite: Yes  Consult received for electrolyte replacement protocol. Assessment:   Diet: DIET CARDIAC Regular  DIET NUTRITIONAL SUPPLEMENTS All Meals; Ensure Enlive    Food/Nutrition Patient History:  Pt seen in company of son and wife at bedside. Pt's son assists with information. He states that the pt went to [facility] 6 months ago and has since lost ~30-40 pounds. He reports a UBW of ~170 pounds and states his last weight was around 130 pounds. Per son, pt refuses most foods and states that nothing looks good. They both endorse increased secretions and mucous production with dairy products (ie. Ensure) and state that he still drinks some boost at [facility]. SLP consult pending, as well as brain MRI. Pt receiving treatment for HCAP. H/O COPD. Pt reports no BM for ~2 days PTA. Anthropometrics:Height: 5' 8\" (172.7 cm),  Weight: 60.3 kg (133 lb), Weight Source: Patient stated, Body mass index is 20.22 kg/m². BMI class of underweight. WT / BMI 11/1/2019 10/4/2019 9/5/2019 8/21/2019 8/6/2019   WEIGHT 133 lb 133 lb 133 lb 4.8 oz 129 lb 136 lb     WT / BMI 6/26/2019 6/16/2019 5/25/2019 2/11/2019 12/22/2018   WEIGHT 137 lb 144 lb 9.6 oz 135 lb 146 lb 143 lb     WT / BMI 12/11/2018   WEIGHT 142 lb   Per weights listed in EMR, potential for an 11 pound, 7.6% weight loss over ~5 months.   Pt does have visible signs of muscle wasting and fat loss (depressed temples, prominent clavicles, and hollow orbitals)  Meets Criteria for Malnutrition in the context of Chronic Illness   [x] Severe Malnutrition, as evidenced by:   [] Nutritional intake of <75% of energy intake compared to estimated energy needs for > 1 month   [] Weight loss of >5% in 1 month, >7.5% in 3 months,  >10% in 6 months, or >20% in 12 months   [] Severe edema   [x] Severe loss of muscle mass   [x] Severe loss of subcutaneous fat   [] Functional decline   [] Severe edema     Macronutrient needs:(using stated body weight 60.3 kg)  EER:  8399-0442 kcal /day 25-30 kcal/kg Stated body weight  EPR:  73-85 grams protein/day  1.2-1.4 g/kg Stated body weight  Intake/Comparative Standards: Per RD meal rounds: 100% of breakfast (eggs, hashbrowns and milk). No recorded meal intakes. Nutrition Diagnosis: Severe/chronic malnutrition r/t inadequate energy intake, as evidenced by pt with signs of muscle wasting, fat loss, and report of eating fairly little PTA. Intervention:  Meals and snacks: Continue current diet. Nutrition Supplement Therapy: d/c ensure enlive, add boost plus BID and add ensure clear TID  Ordered a weight. Electrolyte replacement protocol activated on the MAR. Discharge nutrition plan: Needs to continue nutrition supplements at discharge.      [address], [address], [address] [place] [address], [address] [place] [address] Ave.

## 2019-11-03 PROBLEM — E43 SEVERE PROTEIN-CALORIE MALNUTRITION (HCC): Status: ACTIVE | Noted: 2019-11-03

## 2019-11-03 LAB
MM INDURATION POC: 0 MM (ref 0–5)
PPD POC: NEGATIVE NEGATIVE

## 2019-11-03 PROCEDURE — 74011250637 HC RX REV CODE- 250/637: Performed by: INTERNAL MEDICINE

## 2019-11-03 PROCEDURE — 77030011943

## 2019-11-03 PROCEDURE — 74011250637 HC RX REV CODE- 250/637: Performed by: FAMILY MEDICINE

## 2019-11-03 PROCEDURE — 94760 N-INVAS EAR/PLS OXIMETRY 1: CPT

## 2019-11-03 PROCEDURE — 97166 OT EVAL MOD COMPLEX 45 MIN: CPT

## 2019-11-03 PROCEDURE — 97530 THERAPEUTIC ACTIVITIES: CPT

## 2019-11-03 PROCEDURE — 74011250636 HC RX REV CODE- 250/636: Performed by: FAMILY MEDICINE

## 2019-11-03 PROCEDURE — 97161 PT EVAL LOW COMPLEX 20 MIN: CPT

## 2019-11-03 PROCEDURE — 77010033678 HC OXYGEN DAILY

## 2019-11-03 PROCEDURE — 74011000250 HC RX REV CODE- 250: Performed by: FAMILY MEDICINE

## 2019-11-03 PROCEDURE — 74011000258 HC RX REV CODE- 258: Performed by: INTERNAL MEDICINE

## 2019-11-03 PROCEDURE — 77030040830 HC CATH URETH FOL MDII -A

## 2019-11-03 PROCEDURE — 74011250636 HC RX REV CODE- 250/636: Performed by: INTERNAL MEDICINE

## 2019-11-03 PROCEDURE — 94640 AIRWAY INHALATION TREATMENT: CPT

## 2019-11-03 PROCEDURE — 65660000000 HC RM CCU STEPDOWN

## 2019-11-03 RX ORDER — HYDRALAZINE HYDROCHLORIDE 20 MG/ML
20 INJECTION INTRAMUSCULAR; INTRAVENOUS
Status: DISCONTINUED | OUTPATIENT
Start: 2019-11-03 | End: 2019-11-06 | Stop reason: HOSPADM

## 2019-11-03 RX ORDER — AMLODIPINE BESYLATE 5 MG/1
5 TABLET ORAL DAILY
Status: DISCONTINUED | OUTPATIENT
Start: 2019-11-03 | End: 2019-11-06 | Stop reason: HOSPADM

## 2019-11-03 RX ADMIN — BUDESONIDE 500 MCG: 0.5 INHALANT RESPIRATORY (INHALATION) at 07:42

## 2019-11-03 RX ADMIN — Medication 10 ML: at 16:21

## 2019-11-03 RX ADMIN — FUROSEMIDE 40 MG: 40 TABLET ORAL at 09:26

## 2019-11-03 RX ADMIN — HEPARIN SODIUM 5000 UNITS: 5000 INJECTION INTRAVENOUS; SUBCUTANEOUS at 05:24

## 2019-11-03 RX ADMIN — ASPIRIN 81 MG: 81 TABLET, COATED ORAL at 09:26

## 2019-11-03 RX ADMIN — DOXYCYCLINE HYCLATE 100 MG: 100 CAPSULE ORAL at 21:36

## 2019-11-03 RX ADMIN — MONTELUKAST 10 MG: 10 TABLET, FILM COATED ORAL at 09:26

## 2019-11-03 RX ADMIN — HEPARIN SODIUM 5000 UNITS: 5000 INJECTION INTRAVENOUS; SUBCUTANEOUS at 16:20

## 2019-11-03 RX ADMIN — Medication 10 ML: at 21:38

## 2019-11-03 RX ADMIN — CEFTRIAXONE 1 G: 1 INJECTION, POWDER, FOR SOLUTION INTRAMUSCULAR; INTRAVENOUS at 12:39

## 2019-11-03 RX ADMIN — FINASTERIDE 5 MG: 5 TABLET, FILM COATED ORAL at 09:26

## 2019-11-03 RX ADMIN — DOXYCYCLINE HYCLATE 100 MG: 100 CAPSULE ORAL at 09:25

## 2019-11-03 RX ADMIN — AMIODARONE HYDROCHLORIDE 200 MG: 200 TABLET ORAL at 09:26

## 2019-11-03 RX ADMIN — AMLODIPINE BESYLATE 5 MG: 5 TABLET ORAL at 09:26

## 2019-11-03 RX ADMIN — FLUTICASONE PROPIONATE 2 SPRAY: 50 SPRAY, METERED NASAL at 09:27

## 2019-11-03 RX ADMIN — Medication 10 ML: at 05:23

## 2019-11-03 RX ADMIN — POTASSIUM CHLORIDE 20 MEQ: 20 TABLET, EXTENDED RELEASE ORAL at 09:26

## 2019-11-03 RX ADMIN — HEPARIN SODIUM 5000 UNITS: 5000 INJECTION INTRAVENOUS; SUBCUTANEOUS at 21:37

## 2019-11-03 RX ADMIN — GUAIFENESIN 1200 MG: 600 TABLET ORAL at 09:26

## 2019-11-03 NOTE — PROGRESS NOTES
In and out cath done. 700 ml tea color urine with sediment obtained.  MD says to insert  catheter  in the next six hours Instead of in and out  cath

## 2019-11-03 NOTE — PROGRESS NOTES
Hospitalist Note     Admit Date:  2019  6:58 PM   Name:  Jeramie Tsang   Age:  80 y.o.  :  1932   MRN:  [id number]   PCP:  Scott Nowak MD  Treatment Team: Attending Provider: Betzy Lane MD; Utilization Review: Chloé Delcid, RN; Primary Nurse: Maria E Bryant; Physical Therapist: Alexa Mckeon PT    HPI/Subjective:   Patient is an 81yo M with a history of COPD (2L NC dependant), HTN, VT on amiodarone, recurrent pleural effusions and pleurodesis who was recently admitted for HCAP - and presented with fatigue, subjective fever, cough for two days. Much less alert and interactive with family at home. RLL on CXR in ER.    11/3 - feeling well. Less SOB. Occasional productive cough. No CP, fevers. No other complaints. Objective:     Patient Vitals for the past 24 hrs:   Temp Pulse Resp BP SpO2   19 0326 97.5 °F (36.4 °C) (!) 56 22 175/82 91 %   19 2342 98 °F (36.7 °C) 62 17 109/49    19 2020 98 °F (36.7 °C) 61 18 131/71 92 %   19 1547 97.7 °F (36.5 °C) (!) 55 18 100/57 99 %   19 1108 97.7 °F (36.5 °C) (!) 58 21 128/57 91 %   19 1053  60      19 0821     92 %     Oxygen Therapy  O2 Sat (%): 91 % (19 0326)  Pulse via Oximetry: 84 beats per minute (19 0715)  O2 Device: Nasal cannula;Humidifier (19)  O2 Flow Rate (L/min): 4 l/min (19)    Estimated body mass index is 20.68 kg/m² as calculated from the following:    Height as of 19: 5' 8\" (1.727 m). Weight as of this encounter: 61.7 kg (136 lb). Intake/Output Summary (Last 24 hours) at 11/3/2019 0815  Last data filed at 11/3/2019 0340  Gross per 24 hour   Intake    Output 1200 ml   Net -1200 ml       *Note that automatically entered I/Os may not be accurate; dependent on patient compliance with collection and accurate  by techs. General:    Well nourished. Alert. CV:   RRR. No murmur, rub, or gallop. Lungs:   CTAB. No wheezing, rhonchi, or rales. Extremities: Warm and dry. No cyanosis or edema. Skin:     No rashes or jaundice. Neuro:  No gross focal deficits    Data Review:  I have reviewed all labs, meds, and studies from the last 24 hours:    Recent Results (from the past 24 hour(s))   PLEASE READ & DOCUMENT PPD TEST IN 24 HRS    Collection Time: 11/02/19 11:47 PM   Result Value Ref Range    PPD Negative Negative    mm Induration 0 0 - 5 mm        All Micro Results     Procedure Component Value Units Date/Time    CULTURE, BLOOD [[id number]] Collected:  11/01/19 2025    Order Status:  Completed Specimen:  Blood Updated:  11/02/19 0628     Special Requests: --        LEFT  FOREARM       Culture result: NO GROWTH AFTER 9 HOURS       CULTURE, BLOOD [[id number]] Collected:  11/01/19 2037    Order Status:  Completed Specimen:  Blood Updated:  11/02/19 0628     Special Requests: --        LEFT  FOREARM       Culture result: NO GROWTH AFTER 8 HOURS       CULTURE, RESPIRATORY/SPUTUM/BRONCH Adriana Schilder STAIN [[id number]]     Order Status:  Sent Specimen:  Sputum     MSSA/MRSA SC BY PCR, NASAL SWAB [[id number]]     Order Status:  Sent Specimen:  Nasal swab           No results found for this visit on 11/01/19.     Current Meds:  Current Facility-Administered Medications   Medication Dose Route Frequency    cefTRIAXone (ROCEPHIN) 1 g in 0.9% sodium chloride (MBP/ADV) 50 mL  1 g IntraVENous Q24H    doxycycline (VIBRAMYCIN) capsule 100 mg  100 mg Oral Q12H    NUTRITIONAL SUPPORT ELECTROLYTE PRN ORDERS   Does Not Apply PRN    albuterol-ipratropium (DUO-NEB) 2.5 MG-0.5 MG/3 ML  3 mL Nebulization Q4H PRN    amiodarone (CORDARONE) tablet 200 mg  200 mg Oral DAILY    aspirin delayed-release tablet 81 mg  81 mg Oral DAILY    finasteride (PROSCAR) tablet 5 mg  5 mg Oral DAILY    fluticasone propionate (FLONASE) 50 mcg/actuation nasal spray 2 Spray  2 Spray Both Nostrils DAILY    furosemide (LASIX) tablet 40 mg  40 mg Oral DAILY    guaiFENesin ER (MUCINEX) tablet 1,200 mg  1,200 mg Oral DAILY    montelukast (SINGULAIR) tablet 10 mg  10 mg Oral DAILY    potassium chloride (K-DUR, KLOR-CON) SR tablet 20 mEq  20 mEq Oral DAILY    budesonide (PULMICORT) 500 mcg/2 ml nebulizer suspension  500 mcg Nebulization BID RT    sodium chloride (NS) flush 5-40 mL  5-40 mL IntraVENous Q8H    sodium chloride (NS) flush 5-40 mL  5-40 mL IntraVENous PRN    acetaminophen (TYLENOL) tablet 650 mg  650 mg Oral Q4H PRN    ondansetron (ZOFRAN ODT) tablet 4 mg  4 mg Oral Q4H PRN    bisacodyl (DULCOLAX) tablet 5 mg  5 mg Oral DAILY PRN    heparin (porcine) injection 5,000 Units  5,000 Units SubCUTAneous Q8H       Other Studies (last 24 hours):  Mri Brain Wo Cont    Result Date: 11/2/2019  Clinical history: Possible stroke. Weakness and shortness of breath. Feet twitching. Evaluate for CVA. TECHNIQUE: Multiplanar, multisequence MRI of the brain without IV contrast. COMPARISON: No prior MRI. FINDINGS: There is no acute intracranial hemorrhage or acute infarction. There is no mass effect, midline shift or hydrocephalus. No extra-axial fluid collection. Cerebellum and brainstem are unremarkable. There is mineralization in the basal ganglia. Mild periventricular T-2/FLAIR signal intensity abnormality, nonspecific and likely due to chronic small vessel changes. There is mild generalized cerebral volume loss, age-related. The major intracranial vascular flow voids are present. There is 7th/8th cranial nerve complexes are intact. Included globes appear intact. There is mild mucosal thickening of the paranasal sinuses. Mastoid air cells are aerated. IMPRESSION: 1. No acute intracranial hemorrhage or acute infarction. No acute intracranial findings.       Assessment and Plan:     Hospital Problems as of 11/3/2019 Date Reviewed: 10/6/2019          Codes Class Noted - Resolved POA    Severe protein-calorie malnutrition ([facility] ICD-10-CM: D28  ICD-9-CM: 665  11/3/2019 - Present Yes        Acute metabolic encephalopathy GJU-96-GT: G93.41  ICD-9-CM: 348.31  11/2/2019 - Present Yes        DNR (do not resuscitate) (Chronic) ICD-10-CM: Z66  ICD-9-CM: V49.86  11/2/2019 - Present Yes        HCAP (healthcare-associated pneumonia) ICD-10-CM: J18.9  ICD-9-CM: 553  9/1/2019 - Present Yes        * (Principal) Acute on chronic respiratory failure with hypoxia ([address] ICD-10-CM: J96.21  ICD-9-CM: 518.84, 799.02  5/6/2019 - Present Yes        VT (ventricular tachycardia) (HCC) (Chronic) ICD-10-CM: I47.2  ICD-9-CM: 427.1  5/6/2019 - Present Yes        Chronic respiratory failure with hypoxia (HCC) (Chronic) ICD-10-CM: J96.11  ICD-9-CM: 518.83, 799.02  3/29/2016 - Present Yes    Overview Addendum 5/6/2019 10:14 AM by Stephanie Stevens, NP     Continue to wear 2 L nasal cannula at night. May use 2 L supplemental oxygen to maintain sats greater than 90%. COPD (chronic obstructive pulmonary disease) (HCC) (Chronic) ICD-10-CM: J44.9  ICD-9-CM: 496  12/10/2015 - Present Yes        Essential hypertension, benign (Chronic) ICD-10-CM: I10  ICD-9-CM: 401.1  12/10/2015 - Present Yes              Plan:  COPD, PNA  -cont nebs  -cont rocephin/doxy  -home singulair  -mucinex, other supportive therapies  -SLP eval for aspiration was negative    Family reports of slurred speech, intermittent twitching  -I do not appreciate these symptoms at current time  -MRI brain negative except for volume loss and chronic small vessel disease. Possibly some early dementia per family    Urinary retention  -caths q6-8h every day    Vtach hx  -amiodarone  -recheck LFTs. Amiodarone can cause hepatotoxicity. Slightly elevated on admit   -tele    HTN  -resume home norvasc  -cont lasix    FEN  -family reports weight loss  -appreciate RD assistance    DC planning/Dispo:    -PT/OT  -PPD    Code status:  -family provided living will. No TFs.   DNR    Diet:  DIET CARDIAC  DIET NUTRITIONAL SUPPLEMENTS  DIET NUTRITIONAL SUPPLEMENTS  DVT ppx:  heparin    Signed:  Livia Santos MD

## 2019-11-03 NOTE — PROGRESS NOTES
Problem: Self Care Deficits Care Plan (Adult)  Goal: *Acute Goals and Plan of Care (Insert Text)  Description  1. Patient will complete lower body bathing and dressing with minimal assistance and adaptive equipment as needed. 2. Patient will complete toileting with standby assistance. 3. Patient will tolerate 30 minutes of OT treatment with 1-2 rest breaks to increase activity tolerance for ADLs. 4. Patient will complete functional transfers with standby assistance and adaptive equipment as needed. Timeframe: 7 visits    Outcome: Progressing Towards Goal       OCCUPATIONAL THERAPY: Initial Assessment and AM 11/3/2019  INPATIENT:    [place] / [place] / Product Type: Medicare /      NAME/AGE/GENDER: Tiffanie Lane is a 80 y.o. male   PRIMARY DIAGNOSIS:  HCAP (healthcare-associated pneumonia) [J18.9] Acute on chronic respiratory failure with hypoxia ([facility]   Acute on chronic respiratory failure with hypoxia ([facility]          ICD-10: Treatment Diagnosis:    Generalized Muscle Weakness (M62.81)  Other lack of cordination (R27.8)   Precautions/Allergies:     Patient has no known allergies. ASSESSMENT:     Mr. Lazarus Mcbride presents with acute respiratory failure with hypoxia. Patient was admitted from [facility] and believed to be undergoing therapy. Patient presents up in chair on Hi Flow 12L O2 viz NC. Patient O2 sats are 95-96% at rest. Patient is oriented x4, however is an unsure historian about what he was able to do at his PLOF. Patient states he has a wife that lives alone and he lives at [facility]. Patient is able to scoot to the edge of the chair with supervision and stands with minimal assist and requires BUE support using rolling walker. Patient completes dynamic challenges outside JINNY and requires minimal assistance to sit back down. Patient tolerates standing for 45 seconds and O2 sats remain 95%. Patient performs UE exercises while sitting and gives good effort.  Patient left up in the chair with needs in reach and alarm under chair turned on. Patient educated on role of OT and would benefit from skilled OT services during hospital stay to improve independence in ADLs and functional mobility. This section established at most recent assessment   PROBLEM LIST (Impairments causing functional limitations):  Decreased Strength  Decreased ADL/Functional Activities  Decreased Transfer Abilities  Decreased Ambulation Ability/Technique  Decreased Balance  Decreased Activity Tolerance  Decreased Pacing Skills  Decreased Work Simplification/Energy Conservation Techniques  Increased Shortness of Breath   INTERVENTIONS PLANNED: (Benefits and precautions of occupational therapy have been discussed with the patient.)  Activities of daily living training  Balance training  Clothing management  Therapeutic activity  Therapeutic exercise     TREATMENT PLAN: Frequency/Duration: Follow patient 3x/week to address above goals. Rehabilitation Potential For Stated Goals: Good     REHAB RECOMMENDATIONS (at time of discharge pending progress):    Placement: It is my opinion, based on this patient's performance to date, that Mr. Meryle Lapine may benefit from intensive therapy at a [address] [place] [address] after discharge due to the functional deficits listed above that are likely to improve with skilled rehabilitation and concerns that he/she may be unsafe to be unsupervised at home due to weakness .   Equipment:   None at this time              OCCUPATIONAL PROFILE AND HISTORY:   History of Present Injury/Illness (Reason for Referral):  See H&P  Past Medical History/Comorbidities:   Mr. Meryle Lapine  has a past medical history of Abdominal aortic aneurysm ([address] (12/10/2015), Abdominal aortic aneurysm without rupture ([facility] Allergic rhinitis (12/10/2015), Asthma, Benign neoplasm, Benign prostatic hyperplasia (12/10/2015), BPH without urinary obstruction, Cardiovascular disease (12/10/2015), Chronic obstructive asthma with exacerbation ([facility] (12/10/2015), COPD (chronic obstructive pulmonary disease) ([facility] (12/10/2015), Cough with hemoptysis, Erectile dysfunction (12/10/2015), Essential hypertension, benign (12/10/2015), Fracture (10/2014), History of colon polyps, Low testosterone (12/10/2015), Lumbago, Memory loss, Overflow incontinence, Raynaud's syndrome (12/10/2015), Rotator cuff tendinitis, Thrombocytopenia ([facility] and Urinary frequency. Mr. Saúl Mcdaniels  has a past surgical history that includes hx hernia repair (1980); hx colonoscopy; hx fracture tx (10/2014); hx cataract removal (6/2016-right); and hx orthopaedic (03/2018). Social History/Living Environment:   Home Environment: [address] [place] [address] Name: [facility]  # Steps to Enter: 0  Rails to Enter: No  One/Two Story Residence: One story  Living Alone: No  Support Systems: Skilled nursing facility, Family member(s), Spouse/Significant Other/Partner  Patient Expects to be Discharged to[de-identified] Skilled nursing facility  Current DME Used/Available at Home: dipika Winter  Prior Level of Function/Work/Activity:  See above  Personal Factors:          Sex:  male        Age:  80 y.o. Number of Personal Factors/Comorbidities that affect the Plan of Care: Expanded review of therapy/medical records (1-2):  MODERATE COMPLEXITY   ASSESSMENT OF OCCUPATIONAL PERFORMANCE[de-identified]   Activities of Daily Living:   Basic ADLs (From Assessment) Complex ADLs (From Assessment)   Feeding: Setup  Oral Facial Hygiene/Grooming: Setup  Bathing: Moderate assistance  Upper Body Dressing: Minimum assistance  Lower Body Dressing: Moderate assistance  Toileting: Moderate assistance     Grooming/Bathing/Dressing Activities of Daily Living     Cognitive Retraining  Safety/Judgement: Awareness of environment; Fall prevention                       Bed/Mat Mobility  Rolling: Minimum assistance  Supine to Sit: Minimum assistance  Sit to Stand: Minimum assistance  Stand to Sit: Contact guard assistance  Bed to Chair: Minimum assistance;Assist x2  Scooting: Minimum assistance     Most Recent Physical Functioning:   Gross Assessment:  AROM: Generally decreased, functional  Strength: Generally decreased, functional  Coordination: Generally decreased, functional               Posture:     Balance:  Sitting: Impaired  Sitting - Static: Fair (occasional)  Sitting - Dynamic: Fair (occasional)  Standing: Impaired  Standing - Static: Fair  Standing - Dynamic : Poor Bed Mobility:  Rolling: Minimum assistance  Supine to Sit: Minimum assistance  Scooting: Minimum assistance  Wheelchair Mobility:     Transfers:  Sit to Stand: Minimum assistance  Stand to Sit: Contact guard assistance  Bed to Chair: Minimum assistance;Assist x2  Interventions: Safety awareness training;Verbal cues            Patient Vitals for the past 6 hrs:   BP SpO2 O2 Flow Rate (L/min) Pulse   11/03/19 0742 -- 95 % 7 l/min --   11/03/19 0829 145/66 91 % -- (!) 57   11/03/19 0900 -- 92 % 12 l/min --   11/03/19 1040 -- 92 % 12 l/min --       Mental Status  Neurologic State: Alert  Orientation Level: Oriented X4  Cognition: Follows commands, Decreased attention/concentration  Perception: Appears intact  Perseveration: No perseveration noted  Safety/Judgement: Awareness of environment, Fall prevention                          Physical Skills Involved:  Balance  Strength  Activity Tolerance  Fine Motor Control  Gross Motor Control Cognitive Skills Affected (resulting in the inability to perform in a timely and safe manner):  Executive Function  Short Term Recall  Long Term Memory  Sustained Attention  Divided Attention Psychosocial Skills Affected:  Habits/Routines  Environmental Adaptation  Social Interaction   Number of elements that affect the Plan of Care: 3-5:  MODERATE COMPLEXITY   CLINICAL DECISION MAKING:   Curtis [employer]bar [employer] 6 Clicks   Daily Activity Inpatient Short Form  How much help from another person does the patient currently need. ..  Total A Lot A Little None   1. Putting on and taking off regular lower body clothing? ? 1   ? 2   ? 3   ? 4   2. Bathing (including washing, rinsing, drying)? ? 1   ? 2   ? 3   ? 4   3. Toileting, which includes using toilet, bedpan or urinal?   ? 1   ? 2   ? 3   ? 4   4. Putting on and taking off regular upper body clothing? ? 1   ? 2   ? 3   ? 4   5. Taking care of personal grooming such as brushing teeth? ? 1   ? 2   ? 3   ? 4   6. Eating meals? ? 1   ? 2   ? 3   ? 4   © 2007, Trustees of [address] [place] [address] [address] [address], under license to [employer]. All rights reserved      Score:  Initial: 17 Most Recent: X (Date: -- )    Interpretation of Tool:  Represents activities that are increasingly more difficult (i.e. Bed mobility, Transfers, Gait). Medical Necessity:     Patient demonstrates good   rehab potential due to higher previous functional level. Reason for Services/Other Comments:  Patient continues to require modification of therapeutic interventions to increase complexity of exercises  . Use of outcome tool(s) and clinical judgement create a POC that gives a: MODERATE COMPLEXITY         TREATMENT:   (In addition to Assessment/Re-Assessment sessions the following treatments were rendered)     Pre-treatment Symptoms/Complaints:    Pain: Initial:   Pain Intensity 1: 0  Post Session:  0     Therapeutic Activity: (    10): Therapeutic activities including standing balance training  to improve strength, balance and coordination. Required moderate   to promote dynamic balance in standing. N/a     Braces/Orthotics/Lines/Etc:   O2 Device: Hi flow nasal cannula  Treatment/Session Assessment:    Response to Treatment:  no adverse reactions  Interdisciplinary Collaboration:   Occupational Therapist  Registered Nurse  After treatment position/precautions:   Up in chair  Bed alarm/tab alert on  Bed/Chair-wheels locked  Call light within reach  RN notified   Compliance with Program/Exercises:  Will assess as treatment progresses. Recommendations/Intent for next treatment session: \"Next visit will focus on advancements to more challenging activities\".   Total Treatment Duration:  OT Patient Time In/Time Out  Time In: 1040  Time Out: [address], OT

## 2019-11-03 NOTE — PROGRESS NOTES
Problem: Mobility Impaired (Adult and Pediatric)  Goal: *Acute Goals and Plan of Care (Insert Text)  Description  LTG:  (1.)Mr. Stacy Saxena will move from supine to sit and sit to supine , scoot up and down and roll side to side in bed with SUPERVISION within 7 treatment day(s). (2.)Mr. Stacy Saxena will transfer from bed to chair and chair to bed with MINIMAL ASSIST using the least restrictive device within 7 treatment day(s). (3.)Mr. Stacy Saxena will ambulate with MINIMAL ASSIST for >or=25 feet with the least restrictive device, appropriate gait pattern and fair dynamic standing balance within 7 treatment day(s). (4.)Mr. Stacy Saxena will perform B LE therapeutic exercises x 20 reps with SUPERVISION within 7 days to increase strength for improved safety and independence in transfers and gait.  ________________________________________________________________________________________________     Outcome: Progressing Towards Goal      PHYSICAL THERAPY: Initial Assessment, Daily Note and AM 11/3/2019  INPATIENT: PT Visit Days : 1  [place] / Product Type: Medicare /       NAME/AGE/GENDER: Afsaneh Rodriguez is a 80 y.o. male   PRIMARY DIAGNOSIS: HCAP (healthcare-associated pneumonia) [J18.9] Acute on chronic respiratory failure with hypoxia ([address]   Acute on chronic respiratory failure with hypoxia ([facility]          ICD-10: Treatment Diagnosis:    Generalized Muscle Weakness (M62.81)  Difficulty in walking, Not elsewhere classified (R26.2)   Precaution/Allergies:  Patient has no known allergies. ASSESSMENT:     Mr. Stacy Saxena presents supine in bed, RN at bedside. Pt is on 7 L O2 via NC upon entering, however JANINA Mejia) turns O2 up to 12 L so that pt can participate in therapy. Ami Ripper from RT also entered room as O2 sat monitor appeared to be readying inaccurately. RT's monitor showed pt's sat in the 90's and both RN and RT cleared pt for participation in PT.  Pt was admitted from [facility] with HCAP and respiratory failure. Pt unable to provide complete history, but does believe he was receiving therapy services at [facility], but says his walking was very limited. Pt agreed to treatment and was very cooperative. Pt performed supine to sit with min A. Pt sat edge of bed to recover with fair sitting balance. Pt reported needing a fresh brief and this was performed in standing. Sit to stand with min A x 2 and pt ambulated 3' with HHA x2 and min/mod assist to get to bedside chair. Pt's O2 sats monitored continuously during treatment, and O2 sats found to be quite volatile. Constant cues provided during all movement for pt to utilize purse lip breathing to help maintain O2 sats. After sitting in chair, sats were mid to upper 80's. RT back in room to monitor and RN notified. After 2-3 minutes, sats increased to above 90% and pt remained on 12 L/min. RN and RT ok'd leaving pt up in chair and agreed to monitor pt closely. Pt is clearly not functioning at his baseline and would benefit from skilled PT. Suspect pt will need continual care and will need to return to rehab (unsure of his home/family situation prior to rehab).      This section established at most recent assessment   PROBLEM LIST (Impairments causing functional limitations):  Decreased Strength  Decreased ADL/Functional Activities  Decreased Transfer Abilities  Decreased Ambulation Ability/Technique  Decreased Balance  Decreased Activity Tolerance  Decreased Pacing Skills  Increased Shortness of Breath  Decreased [place] with Home Exercise Program   INTERVENTIONS PLANNED: (Benefits and precautions of physical therapy have been discussed with the patient.)  Balance Exercise  Bed Mobility  Gait Training  Home Exercise Program (HEP)  Therapeutic Activites  Therapeutic Exercise/Strengthening  Transfer Training     TREATMENT PLAN: Frequency/Duration: 3 times a week for duration of hospital stay  Rehabilitation Potential For Stated Goals: Good     REHAB RECOMMENDATIONS (at time of discharge pending progress):    Placement: It is my opinion, based on this patient's performance to date, that Mr. Carly Savage may benefit from intensive therapy at a [facility] after discharge due to the functional deficits listed above that are likely to improve with skilled rehabilitation and concerns that he/she may be unsafe to be unsupervised at home due to high fall risk and low tolerance for physical activity . 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 Much less alert and interactive with family, but now mentating more at baseline. Found to have leukocytosis and signs PNA on CXR. No fever, hemodynamically stable. Increased oxygen requirement to 6L. Past Medical History/Comorbidities:   Mr. Carly Savage  has a past medical history of Abdominal aortic aneurysm ([address] (12/10/2015), Abdominal aortic aneurysm without rupture ([address] Allergic rhinitis (12/10/2015), Asthma, Benign neoplasm, Benign prostatic hyperplasia (12/10/2015), BPH without urinary obstruction, Cardiovascular disease (12/10/2015), Chronic obstructive asthma with exacerbation ([address] (12/10/2015), COPD (chronic obstructive pulmonary disease) ([address] (12/10/2015), Cough with hemoptysis, Erectile dysfunction (12/10/2015), Essential hypertension, benign (12/10/2015), Fracture (10/2014), History of colon polyps, Low testosterone (12/10/2015), Lumbago, Memory loss, Overflow incontinence, Raynaud's syndrome (12/10/2015), Rotator cuff tendinitis, Thrombocytopenia ([address] and Urinary frequency.   Mr. Carly Savage  has a past surgical history that includes hx hernia repair (1980); hx colonoscopy; hx fracture tx (10/2014); hx cataract removal (6/2016-right); and hx orthopaedic (03/2018). Social History/Living Environment:   Home Environment: [address] [facility] Name: [facility]  # Steps to Enter: 0  One/Two Story Residence: One story  Living Alone: No  Support Systems: Family member(s), Assisted living  Patient Expects to be Discharged to[de-identified] Assisted living  Current DME Used/Available at Home: Other (comment)(Assisted living)  Prior Level of Function/Work/Activity:  Pt has been at [facility] following hip surgery and apparently has been receiving PT services. Pt is unsure of what he has been working on in therapy and admits to spending most of his days in bed or in a wheelchair. Unsure of living situation prior to [facility]. Personal Factors:          Sex:  male        Age:  80 y.o. Number of Personal Factors/Comorbidities that affect the Plan of Care: 1-2: MODERATE COMPLEXITY   EXAMINATION:   Most Recent Physical Functioning:   Gross Assessment:  AROM: Generally decreased, functional  Strength: Generally decreased, functional  Coordination: Generally decreased, functional               Posture:     Balance:  Sitting: Impaired  Sitting - Static: Fair (occasional)  Sitting - Dynamic: Fair (occasional)  Standing: Impaired  Standing - Static: Poor  Standing - Dynamic : Poor Bed Mobility:  Rolling: Minimum assistance  Supine to Sit: Minimum assistance  Scooting: Minimum assistance  Wheelchair Mobility:     Transfers:  Sit to Stand: Minimum assistance;Assist x2  Stand to Sit: Minimum assistance;Assist x2  Bed to Chair: Minimum assistance;Assist x2  Interventions: Safety awareness training;Verbal cues  Gait:     Speed/Marifer: Slow;Shuffled  Step Length: Right shortened;Left shortened  Gait Abnormalities: Decreased step clearance  Distance (ft): 3 Feet (ft)  Assistive Device: (hand held assist x 2)  Ambulation - Level of Assistance: Minimal assistance; Moderate assistance;Assist x2      Body Structures Involved:  Lungs  Bones  Joints  Muscles Body Functions Affected:  Respiratory  Neuromusculoskeletal  Movement Related Activities and Participation Affected:  General Tasks and Demands  Mobility  Self Care   Number of elements that affect the Plan of Care: 4+: HIGH COMPLEXITY   CLINICAL PRESENTATION:   Presentation: Evolving clinical presentation with changing clinical characteristics: MODERATE COMPLEXITY   CLINICAL DECISION MAKIN [facility] Mobility Inpatient Short Form  How much difficulty does the patient currently have. .. Unable A Lot A Little None   1. Turning over in bed (including adjusting bedclothes, sheets and blankets)? ? 1   ? 2   ? 3   ? 4   2. Sitting down on and standing up from a chair with arms ( e.g., wheelchair, bedside commode, etc.)   ? 1   ? 2   ? 3   ? 4   3. Moving from lying on back to sitting on the side of the bed?   ? 1   ? 2   ? 3   ? 4   How much help from another person does the patient currently need. .. Total A Lot A Little None   4. Moving to and from a bed to a chair (including a wheelchair)? ? 1   ? 2   ? 3   ? 4   5. Need to walk in hospital room? ? 1   ? 2   ? 3   ? 4   6. Climbing 3-5 steps with a railing? ? 1   ? 2   ? 3   ? 4   © , Trustees of [facility] [employer], under license to [employer]. All rights reserved      Score:  Initial: 16 Most Recent: X (Date: -- )    Interpretation of Tool:  Represents activities that are increasingly more difficult (i.e. Bed mobility, Transfers, Gait). Medical Necessity:     Patient demonstrates good   rehab potential due to higher previous functional level. Reason for Services/Other Comments:  Patient continues to require present interventions due to patient's inability to function at baseline   .    Use of outcome tool(s) and clinical judgement create a POC that gives a: Questionable prediction of patient's progress: MODERATE COMPLEXITY            TREATMENT:   (In addition to Assessment/Re-Assessment sessions the following treatments were rendered)   Pre-treatment Symptoms/Complaints:  Pt requests a fresh brief. Pain: Initial:   Pain Intensity 1: 0  Post Session:  0/10     Therapeutic Activity: (    25 minutes): Therapeutic activities including Bed transfers, Chair transfers, Ambulation on level ground and sitting/standing balance to improve mobility, strength, balance and coordination. Required moderate verbal and tactile cues  to promote dynamic balance in standing and promote pursed lip breathing to keep O2 sats >90% . Braces/Orthotics/Lines/Etc:   O2 Device: Hi flow nasal cannula  Treatment/Session Assessment:    Response to Treatment:  Fatigued quickly. Volatile O2 saturation levels. Interdisciplinary Collaboration:   Physical Therapist  Registered Nurse  Certified Nursing Assistant/Patient Care Technician  Respiratory Therapist  After treatment position/precautions:   Up in chair  Bed alarm/tab alert on  Bed/Chair-wheels locked  Bed in low position  Call light within reach  RN notified  RT and CNA at bedside    Compliance with Program/Exercises: Will assess as treatment progresses  Recommendations/Intent for next treatment session: \"Next visit will focus on advancements to more challenging activities and reduction in assistance provided\".   Total Treatment Duration:  PT Patient Time In/Time Out  Time In: 0936  Time Out: [address], PT

## 2019-11-04 ENCOUNTER — APPOINTMENT (OUTPATIENT)
Dept: ULTRASOUND IMAGING | Age: 84
DRG: 193 | End: 2019-11-04
Attending: INTERNAL MEDICINE
Payer: MEDICARE

## 2019-11-04 LAB
ALBUMIN SERPL-MCNC: 2.2 G/DL (ref 3.2–4.6)
ALBUMIN/GLOB SERPL: 0.7 {RATIO} (ref 1.2–3.5)
ALP SERPL-CCNC: 91 U/L (ref 50–136)
ALT SERPL-CCNC: 82 U/L (ref 12–65)
ANION GAP SERPL CALC-SCNC: 7 MMOL/L (ref 7–16)
AST SERPL-CCNC: 69 U/L (ref 15–37)
BACTERIA SPEC CULT: ABNORMAL
BILIRUB DIRECT SERPL-MCNC: 0.3 MG/DL
BILIRUB SERPL-MCNC: 0.8 MG/DL (ref 0.2–1.1)
BUN SERPL-MCNC: 24 MG/DL (ref 8–23)
CALCIUM SERPL-MCNC: 7.9 MG/DL (ref 8.3–10.4)
CHLORIDE SERPL-SCNC: 101 MMOL/L (ref 98–107)
CO2 SERPL-SCNC: 27 MMOL/L (ref 21–32)
CREAT SERPL-MCNC: 0.56 MG/DL (ref 0.8–1.5)
ERYTHROCYTE [DISTWIDTH] IN BLOOD BY AUTOMATED COUNT: 14.9 % (ref 11.9–14.6)
GLOBULIN SER CALC-MCNC: 3 G/DL (ref 2.3–3.5)
GLUCOSE SERPL-MCNC: 92 MG/DL (ref 65–100)
HCT VFR BLD AUTO: 31.2 % (ref 41.1–50.3)
HGB BLD-MCNC: 10 G/DL (ref 13.6–17.2)
MCH RBC QN AUTO: 32.4 PG (ref 26.1–32.9)
MCHC RBC AUTO-ENTMCNC: 32.1 G/DL (ref 31.4–35)
MCV RBC AUTO: 101 FL (ref 79.6–97.8)
NRBC # BLD: 0 K/UL (ref 0–0.2)
PLATELET # BLD AUTO: 215 K/UL (ref 150–450)
PMV BLD AUTO: 9.9 FL (ref 9.4–12.3)
POTASSIUM SERPL-SCNC: 3.6 MMOL/L (ref 3.5–5.1)
PROT SERPL-MCNC: 5.2 G/DL (ref 6.3–8.2)
RBC # BLD AUTO: 3.09 M/UL (ref 4.23–5.6)
SERVICE CMNT-IMP: ABNORMAL
SODIUM SERPL-SCNC: 135 MMOL/L (ref 136–145)
WBC # BLD AUTO: 12 K/UL (ref 4.3–11.1)

## 2019-11-04 PROCEDURE — 80048 BASIC METABOLIC PNL TOTAL CA: CPT

## 2019-11-04 PROCEDURE — 76705 ECHO EXAM OF ABDOMEN: CPT

## 2019-11-04 PROCEDURE — 74011000250 HC RX REV CODE- 250: Performed by: FAMILY MEDICINE

## 2019-11-04 PROCEDURE — 80076 HEPATIC FUNCTION PANEL: CPT

## 2019-11-04 PROCEDURE — 74011000258 HC RX REV CODE- 258: Performed by: INTERNAL MEDICINE

## 2019-11-04 PROCEDURE — 74011250636 HC RX REV CODE- 250/636: Performed by: FAMILY MEDICINE

## 2019-11-04 PROCEDURE — 36415 COLL VENOUS BLD VENIPUNCTURE: CPT

## 2019-11-04 PROCEDURE — 74011250637 HC RX REV CODE- 250/637: Performed by: FAMILY MEDICINE

## 2019-11-04 PROCEDURE — 74011250636 HC RX REV CODE- 250/636: Performed by: INTERNAL MEDICINE

## 2019-11-04 PROCEDURE — 65660000000 HC RM CCU STEPDOWN

## 2019-11-04 PROCEDURE — 77010033678 HC OXYGEN DAILY

## 2019-11-04 PROCEDURE — 74011250637 HC RX REV CODE- 250/637: Performed by: INTERNAL MEDICINE

## 2019-11-04 PROCEDURE — 85027 COMPLETE CBC AUTOMATED: CPT

## 2019-11-04 PROCEDURE — 94760 N-INVAS EAR/PLS OXIMETRY 1: CPT

## 2019-11-04 PROCEDURE — 94640 AIRWAY INHALATION TREATMENT: CPT

## 2019-11-04 RX ADMIN — Medication 5 ML: at 21:03

## 2019-11-04 RX ADMIN — POTASSIUM CHLORIDE 20 MEQ: 20 TABLET, EXTENDED RELEASE ORAL at 12:00

## 2019-11-04 RX ADMIN — DOXYCYCLINE HYCLATE 100 MG: 100 CAPSULE ORAL at 09:42

## 2019-11-04 RX ADMIN — FLUTICASONE PROPIONATE 2 SPRAY: 50 SPRAY, METERED NASAL at 12:16

## 2019-11-04 RX ADMIN — GUAIFENESIN 1200 MG: 600 TABLET ORAL at 11:58

## 2019-11-04 RX ADMIN — FINASTERIDE 5 MG: 5 TABLET, FILM COATED ORAL at 11:59

## 2019-11-04 RX ADMIN — ASPIRIN 81 MG: 81 TABLET, COATED ORAL at 11:57

## 2019-11-04 RX ADMIN — DOXYCYCLINE HYCLATE 100 MG: 100 CAPSULE ORAL at 20:53

## 2019-11-04 RX ADMIN — HEPARIN SODIUM 5000 UNITS: 5000 INJECTION INTRAVENOUS; SUBCUTANEOUS at 05:38

## 2019-11-04 RX ADMIN — Medication 10 ML: at 15:30

## 2019-11-04 RX ADMIN — Medication 5 ML: at 12:06

## 2019-11-04 RX ADMIN — BUDESONIDE 500 MCG: 0.5 INHALANT RESPIRATORY (INHALATION) at 20:14

## 2019-11-04 RX ADMIN — FUROSEMIDE 40 MG: 40 TABLET ORAL at 12:00

## 2019-11-04 RX ADMIN — AMIODARONE HYDROCHLORIDE 200 MG: 200 TABLET ORAL at 11:57

## 2019-11-04 RX ADMIN — CEFTRIAXONE 1 G: 1 INJECTION, POWDER, FOR SOLUTION INTRAMUSCULAR; INTRAVENOUS at 12:06

## 2019-11-04 RX ADMIN — HEPARIN SODIUM 5000 UNITS: 5000 INJECTION INTRAVENOUS; SUBCUTANEOUS at 15:29

## 2019-11-04 RX ADMIN — BUDESONIDE 500 MCG: 0.5 INHALANT RESPIRATORY (INHALATION) at 07:25

## 2019-11-04 RX ADMIN — MONTELUKAST 10 MG: 10 TABLET, FILM COATED ORAL at 11:59

## 2019-11-04 RX ADMIN — Medication 10 ML: at 05:37

## 2019-11-04 RX ADMIN — HEPARIN SODIUM 5000 UNITS: 5000 INJECTION INTRAVENOUS; SUBCUTANEOUS at 22:08

## 2019-11-04 NOTE — PROGRESS NOTES
Care Management Interventions  PCP Verified by CM: Yes()  Mode of Transport at Discharge: BLS  Transition of Care Consult (CM Consult): Discharge Planning  Physical Therapy Consult: Yes  Occupational Therapy Consult: Yes  Speech Therapy Consult: Yes  Current Support Network: [address] [place] [address]  Confirm Follow Up Transport: [facility] discussed with Pt/Family/Caregiver: Yes  Freedom of Choice Offered: Yes  Discharge Location  Discharge Placement: Skilled nursing facility    CM to see patient in room,  Patient alert and orient at this time, Patient resides at [facility] and has live there for years. Patient will return to the facility at discharge. Called and spoke with Marly at [facility] who stated they are holding the bed for him. Marly stated they will only need another referral from us at his discharge.

## 2019-11-04 NOTE — PROGRESS NOTES
SBAR report given to oncoming nurse. Respirations are even and unlabored. No distress at this time. Bed is low, locked and call light within reach.

## 2019-11-04 NOTE — PROGRESS NOTES
Hospitalist Note     Admit Date:  2019  6:58 PM   Name:  Dinorah Jimenez   Age:  80 y.o.  :  1932   MRN:  [id number]   PCP:  Samy Hurd MD  Treatment Team: Attending Provider: Yvonne Byrd MD; Utilization Review: Tara Esparza RN; Primary Nurse: Halima Nelson    HPI/Subjective:   Patient is an 81yo M with a history of COPD (2L NC dependant), HTN, VT on amiodarone, recurrent pleural effusions and pleurodesis who was recently admitted for HCAP - and presented with fatigue, subjective fever, cough for two days. Much less alert and interactive with family at home. MRI brain negative. RLL on CXR in ER. Somewhat improved on abx. Weak so PT/OT consulted. DNR, no TFs.       - pt says he feels OK. Oxygen requirements were high overnight but he denies SOB. Some productive cough still. No fevers, CP, abd pain, or other complaints    Objective:     Patient Vitals for the past 24 hrs:   Temp Pulse Resp BP SpO2   19 0242 98 °F (36.7 °C) (!) 55 18 95/50 99 %   19 2322 98.2 °F (36.8 °C) (!) 58 18 127/67 94 %   19 2020 98.9 °F (37.2 °C) 79 18 139/64 90 %   19 1523 98.9 °F (37.2 °C) (!) 58 20 108/68 94 %   19 1202 97.7 °F (36.5 °C) (!) 59 22 127/61 93 %   19 1040     92 %   19 0900     92 %   19 0829 97.4 °F (36.3 °C) (!) 57 20 145/66 91 %   19 0742     95 %     Oxygen Therapy  O2 Sat (%): 99 % (19 024)  Pulse via Oximetry: 84 beats per minute (19 0715)  O2 Device: Hi flow nasal cannula (19)  O2 Flow Rate (L/min): 7 l/min (19 1627)    Estimated body mass index is 20.68 kg/m² as calculated from the following:    Height as of 19: 5' 8\" (1.727 m). Weight as of this encounter: 61.7 kg (136 lb).       Intake/Output Summary (Last 24 hours) at 2019 0729  Last data filed at 2019 0544  Gross per 24 hour   Intake 360 ml   Output 600 ml   Net -240 ml       *Note that automatically entered I/Os may not be accurate; dependent on patient compliance with collection and accurate  by techs. General:    Well nourished. Alert. CV:   RRR. No murmur, rub, or gallop. Lungs:   Some rhonchi RLL. Poor insp effort  Extremities: Warm and dry. No cyanosis or edema. Skin:     No rashes or jaundice. Neuro:  No gross focal deficits    Data Review:  I have reviewed all labs, meds, and studies from the last 24 hours:    Recent Results (from the past 24 hour(s))   PLEASE READ & DOCUMENT PPD TEST IN 48 HRS    Collection Time: 11/03/19 11:47 PM   Result Value Ref Range    PPD Negative Negative    mm Induration 0 0 - 5 mm   CBC W/O DIFF    Collection Time: 11/04/19  5:31 AM   Result Value Ref Range    WBC 12.0 (H) 4.3 - 11.1 K/uL    RBC 3.09 (L) 4.23 - 5.6 M/uL    HGB 10.0 (L) 13.6 - 17.2 g/dL    HCT 31.2 (L) 41.1 - 50.3 %    .0 (H) 79.6 - 97.8 FL    MCH 32.4 26.1 - 32.9 PG    MCHC 32.1 31.4 - 35.0 g/dL    RDW 14.9 (H) 11.9 - 14.6 %    PLATELET 575 144 - 478 K/uL    MPV 9.9 9.4 - 12.3 FL    ABSOLUTE NRBC 0.00 0.0 - 0.2 K/uL   METABOLIC PANEL, BASIC    Collection Time: 11/04/19  5:31 AM   Result Value Ref Range    Sodium 135 (L) 136 - 145 mmol/L    Potassium 3.6 3.5 - 5.1 mmol/L    Chloride 101 98 - 107 mmol/L    CO2 27 21 - 32 mmol/L    Anion gap 7 7 - 16 mmol/L    Glucose 92 65 - 100 mg/dL    BUN 24 (H) 8 - 23 MG/DL    Creatinine 0.56 (L) 0.8 - 1.5 MG/DL    GFR est AA >60 >60 ml/min/1.73m2    GFR est non-AA >60 >60 ml/min/1.73m2    Calcium 7.9 (L) 8.3 - 10.4 MG/DL   HEPATIC FUNCTION PANEL    Collection Time: 11/04/19  5:31 AM   Result Value Ref Range    Protein, total 5.2 (L) 6.3 - 8.2 g/dL    Albumin 2.2 (L) 3.2 - 4.6 g/dL    Globulin 3.0 2.3 - 3.5 g/dL    A-G Ratio 0.7 (L) 1.2 - 3.5      Bilirubin, total 0.8 0.2 - 1.1 MG/DL    Bilirubin, direct 0.3 <0.4 MG/DL    Alk.  phosphatase 91 50 - 136 U/L    AST (SGOT) 69 (H) 15 - 37 U/L    ALT (SGPT) 82 (H) 12 - 65 U/L        All Micro Results Procedure Component Value Units Date/Time    MSSA/MRSA SC BY PCR, NASAL SWAB [[id number]]  (Abnormal) Collected:  11/01/19 2133    Order Status:  Completed Specimen:  Nasal swab Updated:  11/04/19 0043     Special Requests: NO SPECIAL REQUESTS        Culture result:       MRSA target DNA not detected, SA target DNA detected. A MRSA negative, SA positive test result does not preclude MRSA nasal colonization. CULTURE, BLOOD [[id number]] Collected:  11/01/19 1925    Order Status:  Completed Specimen:  Blood Updated:  11/03/19 1351     Special Requests: --        LEFT  FOREARM       Culture result: NO GROWTH 2 DAYS       CULTURE, BLOOD [[id number]] Collected:  11/01/19 1937    Order Status:  Completed Specimen:  Blood Updated:  11/03/19 1351     Special Requests: --        LEFT  FOREARM       Culture result: NO GROWTH 2 DAYS       CULTURE, RESPIRATORY/SPUTUM/BRONCH Milton Sanjuana [[id number]] Collected:  11/01/19 2100    Order Status:  Canceled Specimen:  Sputum           No results found for this visit on 11/01/19.     Current Meds:  Current Facility-Administered Medications   Medication Dose Route Frequency    [Held by provider] amLODIPine (NORVASC) tablet 5 mg  5 mg Oral DAILY    hydrALAZINE (APRESOLINE) 20 mg/mL injection 20 mg  20 mg IntraVENous Q4H PRN    cefTRIAXone (ROCEPHIN) 1 g in 0.9% sodium chloride (MBP/ADV) 50 mL  1 g IntraVENous Q24H    doxycycline (VIBRAMYCIN) capsule 100 mg  100 mg Oral Q12H    NUTRITIONAL SUPPORT ELECTROLYTE PRN ORDERS   Does Not Apply PRN    albuterol-ipratropium (DUO-NEB) 2.5 MG-0.5 MG/3 ML  3 mL Nebulization Q4H PRN    amiodarone (CORDARONE) tablet 200 mg  200 mg Oral DAILY    aspirin delayed-release tablet 81 mg  81 mg Oral DAILY    finasteride (PROSCAR) tablet 5 mg  5 mg Oral DAILY    fluticasone propionate (FLONASE) 50 mcg/actuation nasal spray 2 Spray  2 Spray Both Nostrils DAILY    furosemide (LASIX) tablet 40 mg  40 mg Oral DAILY    guaiFENesin ER (MUCINEX) tablet 1,200 mg  1,200 mg Oral DAILY    montelukast (SINGULAIR) tablet 10 mg  10 mg Oral DAILY    potassium chloride (K-DUR, KLOR-CON) SR tablet 20 mEq  20 mEq Oral DAILY    budesonide (PULMICORT) 500 mcg/2 ml nebulizer suspension  500 mcg Nebulization BID RT    sodium chloride (NS) flush 5-40 mL  5-40 mL IntraVENous Q8H    sodium chloride (NS) flush 5-40 mL  5-40 mL IntraVENous PRN    acetaminophen (TYLENOL) tablet 650 mg  650 mg Oral Q4H PRN    ondansetron (ZOFRAN ODT) tablet 4 mg  4 mg Oral Q4H PRN    bisacodyl (DULCOLAX) tablet 5 mg  5 mg Oral DAILY PRN    heparin (porcine) injection 5,000 Units  5,000 Units SubCUTAneous Q8H       Other Studies (last 24 hours):  No results found. Assessment and Plan:     Hospital Problems as of 11/4/2019 Date Reviewed: 10/6/2019          Codes Class Noted - Resolved POA    Severe protein-calorie malnutrition ([facility] ICD-10-CM: E43  ICD-9-CM: 262  11/3/2019 - Present Yes        Acute metabolic encephalopathy RVY-38-OM: G93.41  ICD-9-CM: 348.31  11/2/2019 - Present Yes        DNR (do not resuscitate) (Chronic) ICD-10-CM: Z66  ICD-9-CM: V49.86  11/2/2019 - Present Yes        HCAP (healthcare-associated pneumonia) ICD-10-CM: J18.9  ICD-9-CM: 339  9/1/2019 - Present Yes        * (Principal) Acute on chronic respiratory failure with hypoxia ([facility] ICD-10-CM: J96.21  ICD-9-CM: 518.84, 799.02  5/6/2019 - Present Yes        VT (ventricular tachycardia) (HCC) (Chronic) ICD-10-CM: I47.2  ICD-9-CM: 427.1  5/6/2019 - Present Yes        Chronic respiratory failure with hypoxia (HCC) (Chronic) ICD-10-CM: J96.11  ICD-9-CM: 518.83, 799.02  3/29/2016 - Present Yes    Overview Addendum 5/6/2019 10:14 AM by Mercie Nam, NP     Continue to wear 2 L nasal cannula at night. May use 2 L supplemental oxygen to maintain sats greater than 90%.              COPD (chronic obstructive pulmonary disease) (HCC) (Chronic) ICD-10-CM: J44.9  ICD-9-CM: 496  12/10/2015 - Present Yes        Essential hypertension, benign (Chronic) ICD-10-CM: I10  ICD-9-CM: 401.1  12/10/2015 - Present Yes              Plan:  COPD, PNA  -cont nebs  -cont rocephin/doxy  -wean oxygen as able  -home singulair  -mucinex, other supportive therapies  -SLP eval for aspiration was negative    Elevated LFTs, hx Vtach   -suspect amiodarone. UTD recs stopping if symptomatic or >twofold elevation from baseline LFTs. In June ALT/AST = 36/25.  -will check RUQ US for other etiologies. No abd pain  -trend. If stays elevated may need to stop amio and get cardiology consult. He is on a fairly benign dose already    Family reports of slurred speech, intermittent twitching  -I do not appreciate these symptoms at current time  -MRI brain negative except for volume loss and chronic small vessel disease. Signs of early dementia per family    Urinary retention  -caths q6-8h every day at home  -night physician ordered li. Can leave for now but needs to come out before discharge. Infection risk    HTN  -hold home norvasc today  -cont lasix    FEN  -family reports weight loss  -appreciate RD assistance    DC planning/Dispo:    -PT/OT  -PPD    Code status:  -family provided living will. No TFs.   DNR    Diet:  DIET CARDIAC  DIET NUTRITIONAL SUPPLEMENTS  DIET NUTRITIONAL SUPPLEMENTS  DVT ppx:  heparin    Signed:  Martha Abdul MD

## 2019-11-04 NOTE — PROGRESS NOTES
Pt resting in the bed. Pt is stable. Pt is axo. Pt denies SOB. Respirations are even and unlabored with 7 L/min high flow nasal cannula. Pt states that he continues to have a productive cough. Still waiting on sputum sample from pt. Pt denies pain or discomfort. No needs expressed. Bed is in low and locked position. Call light is within reach. Pt instructed to ask for assistance if needed. Will continue to monitor.

## 2019-11-05 LAB
ALBUMIN SERPL-MCNC: 2.2 G/DL (ref 3.2–4.6)
ALBUMIN/GLOB SERPL: 0.7 {RATIO} (ref 1.2–3.5)
ALP SERPL-CCNC: 96 U/L (ref 50–136)
ALT SERPL-CCNC: 115 U/L (ref 12–65)
ANION GAP SERPL CALC-SCNC: 6 MMOL/L (ref 7–16)
AST SERPL-CCNC: 105 U/L (ref 15–37)
BILIRUB SERPL-MCNC: 0.8 MG/DL (ref 0.2–1.1)
BUN SERPL-MCNC: 22 MG/DL (ref 8–23)
CALCIUM SERPL-MCNC: 7.9 MG/DL (ref 8.3–10.4)
CHLORIDE SERPL-SCNC: 103 MMOL/L (ref 98–107)
CO2 SERPL-SCNC: 28 MMOL/L (ref 21–32)
CREAT SERPL-MCNC: 0.47 MG/DL (ref 0.8–1.5)
ERYTHROCYTE [DISTWIDTH] IN BLOOD BY AUTOMATED COUNT: 14.8 % (ref 11.9–14.6)
GLOBULIN SER CALC-MCNC: 3.2 G/DL (ref 2.3–3.5)
GLUCOSE SERPL-MCNC: 100 MG/DL (ref 65–100)
HCT VFR BLD AUTO: 33.6 % (ref 41.1–50.3)
HGB BLD-MCNC: 10.7 G/DL (ref 13.6–17.2)
MCH RBC QN AUTO: 31.9 PG (ref 26.1–32.9)
MCHC RBC AUTO-ENTMCNC: 31.8 G/DL (ref 31.4–35)
MCV RBC AUTO: 100.3 FL (ref 79.6–97.8)
NRBC # BLD: 0 K/UL (ref 0–0.2)
PLATELET # BLD AUTO: 204 K/UL (ref 150–450)
PMV BLD AUTO: 9.8 FL (ref 9.4–12.3)
POTASSIUM SERPL-SCNC: 3.4 MMOL/L (ref 3.5–5.1)
PROT SERPL-MCNC: 5.4 G/DL (ref 6.3–8.2)
RBC # BLD AUTO: 3.35 M/UL (ref 4.23–5.6)
SODIUM SERPL-SCNC: 137 MMOL/L (ref 136–145)
WBC # BLD AUTO: 9.4 K/UL (ref 4.3–11.1)

## 2019-11-05 PROCEDURE — 80053 COMPREHEN METABOLIC PANEL: CPT

## 2019-11-05 PROCEDURE — 74011250636 HC RX REV CODE- 250/636: Performed by: FAMILY MEDICINE

## 2019-11-05 PROCEDURE — 94640 AIRWAY INHALATION TREATMENT: CPT

## 2019-11-05 PROCEDURE — 97110 THERAPEUTIC EXERCISES: CPT

## 2019-11-05 PROCEDURE — 97530 THERAPEUTIC ACTIVITIES: CPT

## 2019-11-05 PROCEDURE — 36415 COLL VENOUS BLD VENIPUNCTURE: CPT

## 2019-11-05 PROCEDURE — 74011000250 HC RX REV CODE- 250: Performed by: FAMILY MEDICINE

## 2019-11-05 PROCEDURE — 65660000000 HC RM CCU STEPDOWN

## 2019-11-05 PROCEDURE — 94760 N-INVAS EAR/PLS OXIMETRY 1: CPT

## 2019-11-05 PROCEDURE — 97535 SELF CARE MNGMENT TRAINING: CPT

## 2019-11-05 PROCEDURE — 74011000258 HC RX REV CODE- 258: Performed by: INTERNAL MEDICINE

## 2019-11-05 PROCEDURE — 77010033678 HC OXYGEN DAILY

## 2019-11-05 PROCEDURE — 74011250637 HC RX REV CODE- 250/637: Performed by: FAMILY MEDICINE

## 2019-11-05 PROCEDURE — 85027 COMPLETE CBC AUTOMATED: CPT

## 2019-11-05 PROCEDURE — 74011250637 HC RX REV CODE- 250/637: Performed by: INTERNAL MEDICINE

## 2019-11-05 PROCEDURE — 74011250636 HC RX REV CODE- 250/636: Performed by: INTERNAL MEDICINE

## 2019-11-05 RX ADMIN — HEPARIN SODIUM 5000 UNITS: 5000 INJECTION INTRAVENOUS; SUBCUTANEOUS at 05:25

## 2019-11-05 RX ADMIN — CEFTRIAXONE 1 G: 1 INJECTION, POWDER, FOR SOLUTION INTRAMUSCULAR; INTRAVENOUS at 12:16

## 2019-11-05 RX ADMIN — POTASSIUM CHLORIDE 20 MEQ: 20 TABLET, EXTENDED RELEASE ORAL at 08:54

## 2019-11-05 RX ADMIN — BUDESONIDE 500 MCG: 0.5 INHALANT RESPIRATORY (INHALATION) at 07:16

## 2019-11-05 RX ADMIN — DOXYCYCLINE HYCLATE 100 MG: 100 CAPSULE ORAL at 08:54

## 2019-11-05 RX ADMIN — Medication 10 ML: at 12:16

## 2019-11-05 RX ADMIN — Medication 5 ML: at 05:03

## 2019-11-05 RX ADMIN — DOXYCYCLINE HYCLATE 100 MG: 100 CAPSULE ORAL at 21:54

## 2019-11-05 RX ADMIN — ASPIRIN 81 MG: 81 TABLET, COATED ORAL at 08:56

## 2019-11-05 RX ADMIN — HEPARIN SODIUM 5000 UNITS: 5000 INJECTION INTRAVENOUS; SUBCUTANEOUS at 22:00

## 2019-11-05 RX ADMIN — Medication 10 ML: at 21:54

## 2019-11-05 RX ADMIN — FUROSEMIDE 40 MG: 40 TABLET ORAL at 08:54

## 2019-11-05 RX ADMIN — GUAIFENESIN 1200 MG: 600 TABLET ORAL at 08:54

## 2019-11-05 RX ADMIN — MONTELUKAST 10 MG: 10 TABLET, FILM COATED ORAL at 08:54

## 2019-11-05 RX ADMIN — FLUTICASONE PROPIONATE 2 SPRAY: 50 SPRAY, METERED NASAL at 09:47

## 2019-11-05 RX ADMIN — HEPARIN SODIUM 5000 UNITS: 5000 INJECTION INTRAVENOUS; SUBCUTANEOUS at 15:04

## 2019-11-05 RX ADMIN — BUDESONIDE 500 MCG: 0.5 INHALANT RESPIRATORY (INHALATION) at 19:39

## 2019-11-05 RX ADMIN — AMIODARONE HYDROCHLORIDE 200 MG: 200 TABLET ORAL at 08:54

## 2019-11-05 RX ADMIN — FINASTERIDE 5 MG: 5 TABLET, FILM COATED ORAL at 08:54

## 2019-11-05 NOTE — PROGRESS NOTES
Repositioned, skin protectant applied to coccyx, old scar noted, skin wnl . Resting quietly in bed, no c/o discomfort, respirations easy and unlabored.   Call light in reach, will give report to oncoming RN

## 2019-11-05 NOTE — PROGRESS NOTES
Pt is lying in bed, resting quietly. Pt voices no complaints or concerns at this time. Pt is on 7L high flow nasal cannula. Pt appears in no acute distress. SBAR end of shift report given to oncoming RN.

## 2019-11-05 NOTE — PROGRESS NOTES
Problem: Self Care Deficits Care Plan (Adult)  Goal: *Acute Goals and Plan of Care (Insert Text)  Description  1. Patient will complete lower body bathing and dressing with minimal assistance and adaptive equipment as needed. 2. Patient will complete toileting with standby assistance. 3. Patient will tolerate 30 minutes of OT treatment with 1-2 rest breaks to increase activity tolerance for ADLs. 4. Patient will complete functional transfers with standby assistance and adaptive equipment as needed. Timeframe: 7 visits    Outcome: Progressing Towards Goal       OCCUPATIONAL THERAPY: Daily Note and AM    11/5/2019  INPATIENT: OT Visit Days: 1  [place] / [place] / Product Type: Medicare /      NAME/AGE/GENDER: Michelle Dill is a 80 y.o. male   PRIMARY DIAGNOSIS:  HCAP (healthcare-associated pneumonia) [J18.9] Acute on chronic respiratory failure with hypoxia ([address]   Acute on chronic respiratory failure with hypoxia ([facility]         ICD-10: Treatment Diagnosis:    · Generalized Muscle Weakness (M62.81)  · Other lack of cordination (R27.8)   Precautions/Allergies:     Patient has no known allergies. ASSESSMENT:     Mr. Jessica Alston presents with acute respiratory failure with hypoxia. Patient was admitted from [facility] and believed to be undergoing therapy. Patient presents up in chair on Hi Flow 12L O2 viz NC. Patient O2 sats are 95-96% at rest. Patient is oriented x4, however is an unsure historian about what he was able to do at his PLOF. Patient states he has a wife that lives alone and he lives at [facility]. 11/5/2019 Pt presents in supine upon arrival and transferred to sitting with CGA and additional time. Pt stood with min a and transferred to the [facility] with min a and additional time. Pt stood and required total assist for bowel hygiene. Pt completed functional mobility in the room with min a and a rolling walker. Pt left up in the chair with posey on and belongings in reach.  Good effort. Continue POC. This section established at most recent assessment   PROBLEM LIST (Impairments causing functional limitations):  1. Decreased Strength  2. Decreased ADL/Functional Activities  3. Decreased Transfer Abilities  4. Decreased Ambulation Ability/Technique  5. Decreased Balance  6. Decreased Activity Tolerance  7. Decreased Pacing Skills  8. Decreased Work Simplification/Energy Conservation Techniques  9. Increased Shortness of Breath   INTERVENTIONS PLANNED: (Benefits and precautions of occupational therapy have been discussed with the patient.)  1. Activities of daily living training  2. Balance training  3. Clothing management  4. Therapeutic activity  5. Therapeutic exercise     TREATMENT PLAN: Frequency/Duration: Follow patient 3x/week to address above goals. Rehabilitation Potential For Stated Goals: Good     REHAB RECOMMENDATIONS (at time of discharge pending progress):    Placement: It is my opinion, based on this patient's performance to date, that Mr. Buffy Lloyd may benefit from intensive therapy at a [address] [place] [address] after discharge due to the functional deficits listed above that are likely to improve with skilled rehabilitation and concerns that he/she may be unsafe to be unsupervised at home due to weakness .   Equipment:    None at this time              OCCUPATIONAL PROFILE AND HISTORY:   History of Present Injury/Illness (Reason for Referral):  See H&P  Past Medical History/Comorbidities:   Mr. Buffy Lloyd  has a past medical history of Abdominal aortic aneurysm ([facility] (12/10/2015), Abdominal aortic aneurysm without rupture ([facility] Allergic rhinitis (12/10/2015), Asthma, Benign neoplasm, Benign prostatic hyperplasia (12/10/2015), BPH without urinary obstruction, Cardiovascular disease (12/10/2015), Chronic obstructive asthma with exacerbation ([address] (12/10/2015), COPD (chronic obstructive pulmonary disease) ([facility] (12/10/2015), Cough with hemoptysis, Erectile dysfunction (12/10/2015), Essential hypertension, benign (12/10/2015), Fracture (10/2014), History of colon polyps, Low testosterone (12/10/2015), Lumbago, Memory loss, Overflow incontinence, Raynaud's syndrome (12/10/2015), Rotator cuff tendinitis, Thrombocytopenia ([facility] and Urinary frequency. Mr. Sussy Sanchez  has a past surgical history that includes hx hernia repair (1980); hx colonoscopy; hx fracture tx (10/2014); hx cataract removal (6/2016-right); and hx orthopaedic (03/2018). Social History/Living Environment:   Home Environment: [address] [place] [address] Name: [facility]  # Steps to Enter: 0  Rails to Enter: No  One/Two Story Residence: One story  Living Alone: No  Support Systems: Skilled nursing facility, Family member(s), Spouse/Significant Other/Partner  Patient Expects to be Discharged to[de-identified] Skilled nursing facility  Current DME Used/Available at Home: dipika Ortega  Prior Level of Function/Work/Activity:  See above  Personal Factors:          Sex:  male        Age:  80 y.o. Number of Personal Factors/Comorbidities that affect the Plan of Care: Expanded review of therapy/medical records (1-2):  MODERATE COMPLEXITY   ASSESSMENT OF OCCUPATIONAL PERFORMANCE[de-identified]   Activities of Daily Living:   Basic ADLs (From Assessment) Complex ADLs (From Assessment)   Feeding: Setup  Oral Facial Hygiene/Grooming: Setup  Bathing: Moderate assistance  Upper Body Dressing: Minimum assistance  Lower Body Dressing: Moderate assistance  Toileting: Moderate assistance     Grooming/Bathing/Dressing Activities of Daily Living                 Toileting  Bowel Hygiene:  Total assistance (dependent)     Functional Transfers  Toilet Transfer : Minimum assistance     Bed/Mat Mobility  Sit to Stand: Contact guard assistance;Minimum assistance  Stand to Sit: Contact guard assistance     Most Recent Physical Functioning:   Gross Assessment:                  Posture:     Balance:  Sitting: Impaired  Sitting - Static: Fair (occasional)  Sitting - Dynamic: Fair (occasional)  Standing: Impaired  Standing - Static: Fair  Standing - Dynamic : Poor Bed Mobility:     Wheelchair Mobility:     Transfers:  Sit to Stand: Contact guard assistance;Minimum assistance  Stand to Sit: Contact guard assistance            Patient Vitals for the past 6 hrs:   BP SpO2 O2 Flow Rate (L/min) Pulse   19 0716  96 % 4 l/min    19 0729 139/74 94 %  71   19 0737   7 l/min    19 1126 105/68 93 %  60       Mental Status  Neurologic State: Alert  Orientation Level: Oriented X4  Cognition: Appropriate for age attention/concentration  Perception: Appears intact  Perseveration: No perseveration noted  Safety/Judgement: Awareness of environment, Fall prevention                          Physical Skills Involved:  1. Balance  2. Strength  3. Activity Tolerance  4. Fine Motor Control  5. Gross Motor Control Cognitive Skills Affected (resulting in the inability to perform in a timely and safe manner):  1. Executive Function  2. Short Term Recall  3. Long Term Memory  4. Sustained Attention  5. Divided Attention Psychosocial Skills Affected:  1. Habits/Routines  2. Environmental Adaptation  3. Social Interaction   Number of elements that affect the Plan of Care: 3-5:  MODERATE COMPLEXITY   CLINICAL DECISION MAKIN [facility] [address] [address] AM-PAC 6 Clicks   Daily Activity Inpatient Short Form  How much help from another person does the patient currently need. .. Total A Lot A Little None   1. Putting on and taking off regular lower body clothing? ? 1   ? 2   ? 3   ? 4   2. Bathing (including washing, rinsing, drying)? ? 1   ? 2   ? 3   ? 4   3. Toileting, which includes using toilet, bedpan or urinal?   ? 1   ? 2   ? 3   ? 4   4. Putting on and taking off regular upper body clothing? ? 1   ? 2   ? 3   ? 4   5. Taking care of personal grooming such as brushing teeth? ? 1   ? 2   ? 3   ? 4   6. Eating meals?    ? 1   ? 2   ? 3   ? 4   © 2007, Trustees of [facility] [address] [address], under license to [employer]. All rights reserved      Score:  Initial: 17 Most Recent: X (Date: -- )    Interpretation of Tool:  Represents activities that are increasingly more difficult (i.e. Bed mobility, Transfers, Gait). Medical Necessity:     · Patient demonstrates good  ·  rehab potential due to higher previous functional level. Reason for Services/Other Comments:  · Patient continues to require modification of therapeutic interventions to increase complexity of exercises  · . Use of outcome tool(s) and clinical judgement create a POC that gives a: MODERATE COMPLEXITY         TREATMENT:   (In addition to Assessment/Re-Assessment sessions the following treatments were rendered)     Pre-treatment Symptoms/Complaints:    Pain: Initial:   Pain Intensity 1: 0  Post Session:  0     Self Care: (8 minutes): Procedure(s) (per grid) utilized to improve and/or restore self-care/home management as related to toileting. Required total manual and   cueing to facilitate activities of daily living skills and compensatory activities. Therapeutic Activity: (15 minutes): Therapeutic activities including Bed transfers, Chair transfers, Toilet transfers and static/dynamic standing to improve mobility, strength and balance. Required minimal   to promote static and dynamic balance in standing. Braces/Orthotics/Lines/Etc:   · O2 Device: Hi flow nasal cannula  Treatment/Session Assessment:    · Response to Treatment:  no adverse reactions  · Interdisciplinary Collaboration:   o Certified Occupational Therapy Assistant  o Registered Nurse  · After treatment position/precautions:   o Up in chair  o Bed alarm/tab alert on  o Bed/Chair-wheels locked  o Call light within reach  o RN notified   · Compliance with Program/Exercises: Will assess as treatment progresses. · Recommendations/Intent for next treatment session: \"Next visit will focus on advancements to more challenging activities\".   Total Treatment Duration:  OT Patient Time In/Time Out  Time In: 1000  Time Out: [address] Letty Gitelman

## 2019-11-05 NOTE — PROGRESS NOTES
Patient refused pm snack, states he is very tired tonight, resting quietly in bed, oxygen continues at 7 liters. Call light in reach, will monitor.

## 2019-11-05 NOTE — PROGRESS NOTES
SBAR shift report received from Friday [facility]. Pt stable. Assessment complete. Pt is lying in bed, resting quietly. Resp even, unlabored. Pt is alert, orient X 4. Pt appears in no acute distress at this time. Pt is on 7L nasal cannula 02. Pt has li in place, draining yellow urine. Pt voices no complaints or concerns at this time. Pt encouraged to call for assistance, call light in reach. Safety measures in place.  Will continue to monitor

## 2019-11-05 NOTE — PROGRESS NOTES
Hospitalist Progress Note    2019  Admit Date: 2019  6:58 PM   NAME: Macy Syed   :  1932   DOS:              19  MRN:  [id number]   Attending: Latia Gavin MD  PCP:  Estela Nunes MD  Treatment Team: Attending Provider: Elvira Brown MD; Utilization Review: Rj Crouch RN; Care Manager: Karuna Wild RN    DNR     SUBJECTIVE:   As previously documented:86yo M with a history of COPD (2L NC dependant), HTN, VT on amiodarone, recurrent pleural effusions and pleurodesis who was recently admitted for HCAP - and presented with fatigue, subjective fever, cough for two days.  Much less alert and interactive with family at home. MRI brain negative. RLL on CXR in ER. Somewhat improved on abx. Weak so PT/OT consulted. 19    Macy Syed reported still having generalized weakness. Overall feels slightly better. 10+ ROS reviewed and negative except for positive in HPI.    No Known Allergies  Current Facility-Administered Medications   Medication Dose Route Frequency    [Held by provider] amLODIPine (NORVASC) tablet 5 mg  5 mg Oral DAILY    hydrALAZINE (APRESOLINE) 20 mg/mL injection 20 mg  20 mg IntraVENous Q4H PRN    cefTRIAXone (ROCEPHIN) 1 g in 0.9% sodium chloride (MBP/ADV) 50 mL  1 g IntraVENous Q24H    doxycycline (VIBRAMYCIN) capsule 100 mg  100 mg Oral Q12H    NUTRITIONAL SUPPORT ELECTROLYTE PRN ORDERS   Does Not Apply PRN    albuterol-ipratropium (DUO-NEB) 2.5 MG-0.5 MG/3 ML  3 mL Nebulization Q4H PRN    amiodarone (CORDARONE) tablet 200 mg  200 mg Oral DAILY    aspirin delayed-release tablet 81 mg  81 mg Oral DAILY    finasteride (PROSCAR) tablet 5 mg  5 mg Oral DAILY    fluticasone propionate (FLONASE) 50 mcg/actuation nasal spray 2 Spray  2 Spray Both Nostrils DAILY    furosemide (LASIX) tablet 40 mg  40 mg Oral DAILY    guaiFENesin ER (MUCINEX) tablet 1,200 mg  1,200 mg Oral DAILY    montelukast (SINGULAIR) tablet 10 mg  10 mg Oral DAILY    potassium chloride (K-DUR, KLOR-CON) SR tablet 20 mEq  20 mEq Oral DAILY    budesonide (PULMICORT) 500 mcg/2 ml nebulizer suspension  500 mcg Nebulization BID RT    sodium chloride (NS) flush 5-40 mL  5-40 mL IntraVENous Q8H    sodium chloride (NS) flush 5-40 mL  5-40 mL IntraVENous PRN    acetaminophen (TYLENOL) tablet 650 mg  650 mg Oral Q4H PRN    ondansetron (ZOFRAN ODT) tablet 4 mg  4 mg Oral Q4H PRN    bisacodyl (DULCOLAX) tablet 5 mg  5 mg Oral DAILY PRN    heparin (porcine) injection 5,000 Units  5,000 Units SubCUTAneous Q8H         Immunization History   Administered Date(s) Administered    Influenza High Dose Vaccine PF 10/12/2016, 10/23/2017, 2018    Influenza Vaccine 09/10/2010    Pneumococcal Conjugate (PCV-13) 10/01/2015, 2016    Pneumococcal Vaccine (Unspecified Type) 10/01/1998    TB Skin Test (PPD) Intradermal 2018, 2019, 06/10/2019, 2019, 2019    Zoster Recombinant 05/10/2018, 2018    Zoster Vaccine, Live 2011     Objective:     Patient Vitals for the past 24 hrs:   Temp Pulse Resp BP SpO2   19 1511 97.4 °F (36.3 °C) (!) 56 18 105/59 93 %   19 1322     96 %   19 1126 97.5 °F (36.4 °C) 60 20 105/68 93 %   19 0729 97.9 °F (36.6 °C) 71 18 139/74 94 %   19 0716     96 %   19 0307 97.7 °F (36.5 °C) (!) 55 16 113/61 94 %   19 2302 98.4 °F (36.9 °C) (!) 57 16 115/53 93 %   19 2014     94 %   19 1959 97.4 °F (36.3 °C) 60 16 101/46 92 %     Temp (24hrs), Av.7 °F (36.5 °C), Min:97.4 °F (36.3 °C), Max:98.4 °F (36.9 °C)    Oxygen Therapy  O2 Sat (%): 93 % (19 1511)  Pulse via Oximetry: 55 beats per minute (19 0716)  O2 Device: Nasal cannula (19 1322)  O2 Flow Rate (L/min): 7 l/min (19 1322)  Oxygen Therapy  O2 Sat (%): 93 % (19 1511)  Pulse via Oximetry: 55 beats per minute (19 0716)  O2 Device: Nasal cannula (11/05/19 1322)  O2 Flow Rate (L/min): 7 l/min (11/05/19 1322)    Physical Exam:  General:         Alert, cooperative, no distress   Lungs: No wheezing/rhonchi/rales  Cardiovascular:   RRR. No m/r/g. No pedal edema b/l. Abdomen:       S/nt/nd. Bowel sounds normal. .   Skin:         No rashes or lesions. Not Jaundiced  Neurologic:     No gross focal deficit. DIAGNOSTIC STUDIES      Data Review:   Recent Results (from the past 24 hour(s))   METABOLIC PANEL, COMPREHENSIVE    Collection Time: 11/05/19  7:54 AM   Result Value Ref Range    Sodium 137 136 - 145 mmol/L    Potassium 3.4 (L) 3.5 - 5.1 mmol/L    Chloride 103 98 - 107 mmol/L    CO2 28 21 - 32 mmol/L    Anion gap 6 (L) 7 - 16 mmol/L    Glucose 100 65 - 100 mg/dL    BUN 22 8 - 23 MG/DL    Creatinine 0.47 (L) 0.8 - 1.5 MG/DL    GFR est AA >60 >60 ml/min/1.73m2    GFR est non-AA >60 >60 ml/min/1.73m2    Calcium 7.9 (L) 8.3 - 10.4 MG/DL    Bilirubin, total 0.8 0.2 - 1.1 MG/DL    ALT (SGPT) 115 (H) 12 - 65 U/L    AST (SGOT) 105 (H) 15 - 37 U/L    Alk.  phosphatase 96 50 - 136 U/L    Protein, total 5.4 (L) 6.3 - 8.2 g/dL    Albumin 2.2 (L) 3.2 - 4.6 g/dL    Globulin 3.2 2.3 - 3.5 g/dL    A-G Ratio 0.7 (L) 1.2 - 3.5     CBC W/O DIFF    Collection Time: 11/05/19  7:54 AM   Result Value Ref Range    WBC 9.4 4.3 - 11.1 K/uL    RBC 3.35 (L) 4.23 - 5.6 M/uL    HGB 10.7 (L) 13.6 - 17.2 g/dL    HCT 33.6 (L) 41.1 - 50.3 %    .3 (H) 79.6 - 97.8 FL    MCH 31.9 26.1 - 32.9 PG    MCHC 31.8 31.4 - 35.0 g/dL    RDW 14.8 (H) 11.9 - 14.6 %    PLATELET 701 858 - 635 K/uL    MPV 9.8 9.4 - 12.3 FL    ABSOLUTE NRBC 0.00 0.0 - 0.2 K/uL       All Micro Results     Procedure Component Value Units Date/Time    CULTURE, BLOOD [[id number]] Collected:  11/01/19 1937    Order Status:  Completed Specimen:  Blood Updated:  11/05/19 0825     Special Requests: --        LEFT  FOREARM       Culture result: NO GROWTH 4 DAYS       CULTURE, BLOOD [[id number]] Collected:  11/01/19 1925    Order Status:  Completed Specimen:  Blood Updated:  11/05/19 0825     Special Requests: --        LEFT  FOREARM       Culture result: NO GROWTH 4 DAYS       MSSA/MRSA SC BY PCR, NASAL SWAB [[id number]]  (Abnormal) Collected:  11/01/19 2133    Order Status:  Completed Specimen:  Nasal swab Updated:  11/04/19 0043     Special Requests: NO SPECIAL REQUESTS        Culture result:       MRSA target DNA not detected, SA target DNA detected. A MRSA negative, SA positive test result does not preclude MRSA nasal colonization. CULTURE, RESPIRATORY/SPUTUM/BRONCH Louise Cranker [[id number]] Collected:  11/01/19 2100    Order Status:  Canceled Specimen:  Sputum           Imaging [place] /Studies:    CXR Results  (Last 48 hours)    None        CT Results  (Last 48 hours)    None        No results found. No results found for this visit on 11/01/19. Labs and Studies from previous 24 hours have been personally reviewed by myself [place] Problems    Diagnosis Date Noted    Severe protein-calorie malnutrition ([facility] [address] 11/03/2019    Acute metabolic encephalopathy 45/46/1448    DNR (do not resuscitate) 11/02/2019    HCAP (healthcare-associated pneumonia) 09/01/2019    Acute on chronic respiratory failure with hypoxia ([address] 05/06/2019    VT (ventricular tachycardia) ([facility] [address] 05/06/2019    Chronic respiratory failure with hypoxia ([address] 03/29/2016     Continue to wear 2 L nasal cannula at night. May use 2 L supplemental oxygen to maintain sats greater than 90%.       COPD (chronic obstructive pulmonary disease) ([facility] [address] 12/10/2015    Essential hypertension, benign 12/10/2015     Hospital Problems as of 11/5/2019 Date Reviewed: 10/6/2019          Codes Class Noted - Resolved POA    Severe protein-calorie malnutrition ([facility] [address] ICD-10-CM: E43  ICD-9-CM: 262  11/3/2019 - Present Yes        Acute metabolic encephalopathy NCO-63-RX: G93.41  ICD-9-CM: 348.31 11/2/2019 - Present Yes        DNR (do not resuscitate) (Chronic) ICD-10-CM: Z66  ICD-9-CM: V49.86  11/2/2019 - Present Yes        HCAP (healthcare-associated pneumonia) ICD-10-CM: J18.9  ICD-9-CM: 167  9/1/2019 - Present Yes        * (Principal) Acute on chronic respiratory failure with hypoxia ([facility] [address] ICD-10-CM: J96.21  ICD-9-CM: 518.84, 799.02  5/6/2019 - Present Yes        VT (ventricular tachycardia) (HCC) (Chronic) ICD-10-CM: I47.2  ICD-9-CM: 427.1  5/6/2019 - Present Yes        Chronic respiratory failure with hypoxia (HCC) (Chronic) ICD-10-CM: J96.11  ICD-9-CM: 518.83, 799.02  3/29/2016 - Present Yes    Overview Addendum 5/6/2019 10:14 AM by Valerie Purvis, NP     Continue to wear 2 L nasal cannula at night. May use 2 L supplemental oxygen to maintain sats greater than 90%. COPD (chronic obstructive pulmonary disease) (HCC) (Chronic) ICD-10-CM: J44.9  ICD-9-CM: 496  12/10/2015 - Present Yes        Essential hypertension, benign (Chronic) ICD-10-CM: I10  ICD-9-CM: 401.1  12/10/2015 - Present Yes              A/P:    -PNA  Cultures NG  Cont ceftriaxone and doxy day #3    -COPD  No active wheezing  Cont pulmicort and alb PRN    -Elevated LFTS  ?amiodarone related  Patient is asymptomatic but LFTs is trending up above baseline. Bili 0.8  US abdomen with no acute liver findings  Will ask Cardiology to evaluate for alternative  Hold amiodarone. Monitor remote tele    -Acute encephalopathy  Likely secondary to PNA.  Following commands apropiately  MRI brain with no acute disease  PT/OT eval and tx.     -chronic urinary retention  Rojas needs d/c before discharge    DVT Prophylaxis: heparin  CODE Status: DNR      Carmen Cardoso MD  11/05/19

## 2019-11-05 NOTE — PROGRESS NOTES
Patient resting quietly in bed, watching TV, continues using 7 liters oxygen nasal cannula. Call light in reach, will monitor.

## 2019-11-06 VITALS
OXYGEN SATURATION: 92 % | HEART RATE: 56 BPM | HEIGHT: 68 IN | RESPIRATION RATE: 20 BRPM | SYSTOLIC BLOOD PRESSURE: 111 MMHG | TEMPERATURE: 98.1 F | BODY MASS INDEX: 20.61 KG/M2 | WEIGHT: 136 LBS | DIASTOLIC BLOOD PRESSURE: 63 MMHG

## 2019-11-06 LAB
ALBUMIN SERPL-MCNC: 2.2 G/DL (ref 3.2–4.6)
ALBUMIN/GLOB SERPL: 0.7 {RATIO} (ref 1.2–3.5)
ALP SERPL-CCNC: 103 U/L (ref 50–136)
ALT SERPL-CCNC: 123 U/L (ref 12–65)
ANION GAP SERPL CALC-SCNC: 5 MMOL/L (ref 7–16)
AST SERPL-CCNC: 103 U/L (ref 15–37)
BACTERIA SPEC CULT: NORMAL
BACTERIA SPEC CULT: NORMAL
BILIRUB SERPL-MCNC: 0.7 MG/DL (ref 0.2–1.1)
BUN SERPL-MCNC: 21 MG/DL (ref 8–23)
CALCIUM SERPL-MCNC: 8 MG/DL (ref 8.3–10.4)
CHLORIDE SERPL-SCNC: 103 MMOL/L (ref 98–107)
CO2 SERPL-SCNC: 28 MMOL/L (ref 21–32)
CREAT SERPL-MCNC: 0.55 MG/DL (ref 0.8–1.5)
GLOBULIN SER CALC-MCNC: 3.3 G/DL (ref 2.3–3.5)
GLUCOSE SERPL-MCNC: 95 MG/DL (ref 65–100)
POTASSIUM SERPL-SCNC: 3.4 MMOL/L (ref 3.5–5.1)
PROT SERPL-MCNC: 5.5 G/DL (ref 6.3–8.2)
SERVICE CMNT-IMP: NORMAL
SERVICE CMNT-IMP: NORMAL
SODIUM SERPL-SCNC: 136 MMOL/L (ref 136–145)

## 2019-11-06 PROCEDURE — 74011000250 HC RX REV CODE- 250: Performed by: FAMILY MEDICINE

## 2019-11-06 PROCEDURE — 74011250637 HC RX REV CODE- 250/637: Performed by: FAMILY MEDICINE

## 2019-11-06 PROCEDURE — 94640 AIRWAY INHALATION TREATMENT: CPT

## 2019-11-06 PROCEDURE — 74011250636 HC RX REV CODE- 250/636: Performed by: INTERNAL MEDICINE

## 2019-11-06 PROCEDURE — 74011000258 HC RX REV CODE- 258: Performed by: INTERNAL MEDICINE

## 2019-11-06 PROCEDURE — 94760 N-INVAS EAR/PLS OXIMETRY 1: CPT

## 2019-11-06 PROCEDURE — 36415 COLL VENOUS BLD VENIPUNCTURE: CPT

## 2019-11-06 PROCEDURE — 80053 COMPREHEN METABOLIC PANEL: CPT

## 2019-11-06 PROCEDURE — 74011250637 HC RX REV CODE- 250/637: Performed by: INTERNAL MEDICINE

## 2019-11-06 PROCEDURE — 77010033678 HC OXYGEN DAILY

## 2019-11-06 RX ORDER — DOXYCYCLINE 100 MG/1
100 CAPSULE ORAL EVERY 12 HOURS
Qty: 8 CAP | Refills: 0 | Status: SHIPPED | OUTPATIENT
Start: 2019-11-06 | End: 2019-11-10

## 2019-11-06 RX ORDER — AMOXICILLIN AND CLAVULANATE POTASSIUM 875; 125 MG/1; MG/1
1 TABLET, FILM COATED ORAL 2 TIMES DAILY
Qty: 8 TAB | Refills: 0 | Status: SHIPPED | OUTPATIENT
Start: 2019-11-06 | End: 2019-11-10

## 2019-11-06 RX ADMIN — MONTELUKAST 10 MG: 10 TABLET, FILM COATED ORAL at 09:14

## 2019-11-06 RX ADMIN — GUAIFENESIN 1200 MG: 600 TABLET ORAL at 09:14

## 2019-11-06 RX ADMIN — POTASSIUM CHLORIDE 20 MEQ: 20 TABLET, EXTENDED RELEASE ORAL at 09:14

## 2019-11-06 RX ADMIN — CEFTRIAXONE 1 G: 1 INJECTION, POWDER, FOR SOLUTION INTRAMUSCULAR; INTRAVENOUS at 11:31

## 2019-11-06 RX ADMIN — FLUTICASONE PROPIONATE 2 SPRAY: 50 SPRAY, METERED NASAL at 09:14

## 2019-11-06 RX ADMIN — Medication 10 ML: at 11:37

## 2019-11-06 RX ADMIN — DOXYCYCLINE HYCLATE 100 MG: 100 CAPSULE ORAL at 09:14

## 2019-11-06 RX ADMIN — ASPIRIN 81 MG: 81 TABLET, COATED ORAL at 09:14

## 2019-11-06 RX ADMIN — FUROSEMIDE 40 MG: 40 TABLET ORAL at 09:14

## 2019-11-06 RX ADMIN — Medication 10 ML: at 06:10

## 2019-11-06 RX ADMIN — BUDESONIDE 500 MCG: 0.5 INHALANT RESPIRATORY (INHALATION) at 08:09

## 2019-11-06 RX ADMIN — FINASTERIDE 5 MG: 5 TABLET, FILM COATED ORAL at 09:14

## 2019-11-06 NOTE — PROGRESS NOTES
Chart screened by  for discharge planning. Patient will return to [facility], bed is being held. No needs identified at this time. Please consult  if any new issues arise.

## 2019-11-06 NOTE — CONSULTS
[address] Cardiology Consultation        Date of  Admission: 11/1/2019  6:58 PM     Primary Care Physician: Dr. Saldaña Freeland  Primary Cardiologist: Dr. Chanell Nesbitt  Referring Physician: Dr. Barb Ibarra Physician: Dr. Chanell Nesbitt    CC/Reason for consult: Amiodarone toxicity    HPI:  Sharmaine Ha is a 80 y.o. WM with h/o COPD, chronic respiratory failure on continuous O2 2 L, HTN, AAA, previous pneumothorax with NSVT in 5/2019 and several admission for HCAP. He was admitted on 11/1 by the hospitalist for HCAP and started on IV antibiotics and required increased O2 replacement up to 6 L. He has had increasing LFTs and there was concern for Liver toxicity from Amiodarone. [address] [place] [address] [address] Cardiology was consulted to evaluate patient for need for Amiodarone or alternative medication. Chart reviewed which reveals Pt was seen in consultation by  on 5/6/19 when he had NSVT in the setting of a pneumothorax and was started on IV Amiodarone. At DC on 5/25/19, the hospitalist prescribed Amiodarone 400 mg every 12 hours. He was admitted again on 6/10 until 6/16 due to a pleural effusion with thoracentesis. He was DC again on Amiodarone 400 mg every 12 hours on 6/16/19. He was seen by  Dr. Chanell Nesbitt on 6/26 and Amiodarone was discontinued. However, the SNF apparently continue giving this medication. He was admitted from 9/1-9/9/19 for HCAP and again DC on Amiodarone 400 mg twice daily. He has changes on CXR, elevated LFTs and some confusion. He denies CP, palpitations, LE swelling, orthopnea, PND or syncope. He reports SOB, PATE and fatigue. He states he doesn't know his home medications and takes what they give him at the SNF.     Past Medical History:   Diagnosis Date    Abdominal aortic aneurysm ([facility] [address] 12/10/2015    In 2005    Abdominal aortic aneurysm without rupture ([facility] [address]     Allergic rhinitis 12/10/2015    Asthma     Benign neoplasm     Benign prostatic hyperplasia 12/10/2015    BPH without urinary obstruction     Cardiovascular disease 12/10/2015    Chronic obstructive asthma with exacerbation ([facility] [address] 12/10/2015    COPD (chronic obstructive pulmonary disease) ([facility] [address] 12/10/2015    Cough with hemoptysis     Erectile dysfunction 12/10/2015    Essential hypertension, benign 12/10/2015    Fracture 10/2014    of left hand and ribs after fall on concrete    History of colon polyps     Low testosterone 12/10/2015    Lumbago     Memory loss     Overflow incontinence     Raynaud's syndrome 12/10/2015    Rotator cuff tendinitis     Thrombocytopenia (HCC)     Urinary frequency       Past Surgical History:   Procedure Laterality Date    HX CATARACT REMOVAL  6/2016-right    HX COLONOSCOPY      HX FRACTURE TX  10/2014    of left hand and ribs are fall on concrete    [facility]    HX ORTHOPAEDIC  03/2018    hip fracture       No Known Allergies   Social History     Socioeconomic History    Marital status:      Spouse name: Not on file    Number of children: Not on file    Years of education: Not on file    Highest education level: Not on file   Occupational History    Not on file   Social Needs    Financial resource strain: Not on file    Food insecurity:     Worry: Not on file     Inability: Not on file    Transportation needs:     Medical: Not on file     Non-medical: Not on file   Tobacco Use    Smoking status: Former Smoker    Smokeless tobacco: Never Used   Substance and Sexual Activity    Alcohol use: Yes     Comment: occasional    Drug use: Not on file    Sexual activity: Not on file   Lifestyle    Physical activity:     Days per week: Not on file     Minutes per session: Not on file    Stress: Not on file   Relationships    Social connections:     Talks on phone: Not on file     Gets together: Not on file     Attends [employer] service: Not on file     Active member of club or organization: Not on file     Attends meetings of clubs or organizations: Not on file     Relationship status: Not on file    Intimate partner violence:     Fear of current or ex partner: Not on file     Emotionally abused: Not on file     Physically abused: Not on file     Forced sexual activity: Not on file   Other Topics Concern    Not on file   Social History Narrative    Not on file     Family History   Problem Relation Age of Onset    Dementia Mother     Cancer Father         lung cancer    Heart Attack Father         Current Facility-Administered Medications   Medication Dose Route Frequency    [Held by provider] amLODIPine (NORVASC) tablet 5 mg  5 mg Oral DAILY    hydrALAZINE (APRESOLINE) 20 mg/mL injection 20 mg  20 mg IntraVENous Q4H PRN    cefTRIAXone (ROCEPHIN) 1 g in 0.9% sodium chloride (MBP/ADV) 50 mL  1 g IntraVENous Q24H    doxycycline (VIBRAMYCIN) capsule 100 mg  100 mg Oral Q12H    NUTRITIONAL SUPPORT ELECTROLYTE PRN ORDERS   Does Not Apply PRN    albuterol-ipratropium (DUO-NEB) 2.5 MG-0.5 MG/3 ML  3 mL Nebulization Q4H PRN    [Held by provider] amiodarone (CORDARONE) tablet 200 mg  200 mg Oral DAILY    aspirin delayed-release tablet 81 mg  81 mg Oral DAILY    finasteride (PROSCAR) tablet 5 mg  5 mg Oral DAILY    fluticasone propionate (FLONASE) 50 mcg/actuation nasal spray 2 Spray  2 Spray Both Nostrils DAILY    furosemide (LASIX) tablet 40 mg  40 mg Oral DAILY    guaiFENesin ER (MUCINEX) tablet 1,200 mg  1,200 mg Oral DAILY    montelukast (SINGULAIR) tablet 10 mg  10 mg Oral DAILY    potassium chloride (K-DUR, KLOR-CON) SR tablet 20 mEq  20 mEq Oral DAILY    budesonide (PULMICORT) 500 mcg/2 ml nebulizer suspension  500 mcg Nebulization BID RT    sodium chloride (NS) flush 5-40 mL  5-40 mL IntraVENous Q8H    sodium chloride (NS) flush 5-40 mL  5-40 mL IntraVENous PRN    acetaminophen (TYLENOL) tablet 650 mg  650 mg Oral Q4H PRN    ondansetron (ZOFRAN ODT) tablet 4 mg  4 mg Oral Q4H PRN    bisacodyl (DULCOLAX) tablet 5 mg  5 mg Oral DAILY PRN    heparin (porcine) injection 5,000 Units  5,000 Units SubCUTAneous Q8H       Review of symptoms:  General: no recent weight loss/gain, +weakness, +fatigue, fever or chills   Skin: no rashes, lumps, or other skin changes   HEENT: no headache, dizziness, lightheadedness, vision changes, hearing changes, tinnitus, vertigo, sinus pressure/pain, bleeding gums, sore throat, or hoarseness   Neck: no swollen glands, goiter, pain or stiffness   Respiratory: no cough, sputum, hemoptysis, +dyspnea, wheezing   Cardiovascular: no chest pain or discomfort, palpitations, +dyspnea, orthopnea, paroxysmal nocturnal dyspnea, peripheral edema   Gastrointestinal: no trouble swallowing, heartburn, change of appetite, nausea, change in bowel habits, pain with defecation, rectal bleeding or black/tarry stools, hemorrhoids, constipation, diarrhea, abdominal pain, jaundice, liver or gallbladder problems   Urinary: no frequency, urgency , hematuria, burning/pain with urination, recent flank pain, polyuria, nocturia, or difficulty urinating   Genital: no vaginal or pelvic infections   Peripheral Vascular: no claudication, leg cramps, prior DVTs, swelling of calves, legs, or feet, color change, or swelling with redness or tenderness   Musculoskeletal: no muscle or joint pain/stiffness, joint swelling, erythema of joints, or back pain   Psychiatric: no depression, mental disorders, or excessive stress   Neurological: no history of CVA, dizziness, no sensory or motor loss, seizures, syncope, tremors, numbness, tingling, no changes in mood, attention, or speech, no changes in orientation, memory, insight, or judgment. no headache, vertigo.    Hematologic: no anemia, easy bruising or bleeding   Endocrine: no diabetes, thyroid problems, heat or cold intolerance, excessive sweating, polyuria, polydipsia        Subjective:   Physical Exam    Visit Vitals  /70   Pulse (!) 57   Temp 97.7 °F (36.5 °C)   Resp 18   Ht 5' 8\" (1.727 m)   Wt 61.7 kg (136 lb)   SpO2 99% BMI 20.68 kg/m²     General Appearance:  Well developed, thin appearing, alert and oriented, and individual in no acute distress. Ears/Nose/Mouth/Throat:   Hearing grossly normal.         Neck: Supple. Chest:   Lungs diminished and occ rhonchi bilaterally. Cardiovascular:  Regular rate and rhythm, S1, S2   Abdomen:   Soft, non-tender, bowel sounds are active. Extremities: No edema bilaterally. Skin: Warm and dry. Labs:   Recent Results (from the past 24 hour(s))   METABOLIC PANEL, COMPREHENSIVE    Collection Time: 11/06/19  7:29 AM   Result Value Ref Range    Sodium 136 136 - 145 mmol/L    Potassium 3.4 (L) 3.5 - 5.1 mmol/L    Chloride 103 98 - 107 mmol/L    CO2 28 21 - 32 mmol/L    Anion gap 5 (L) 7 - 16 mmol/L    Glucose 95 65 - 100 mg/dL    BUN 21 8 - 23 MG/DL    Creatinine 0.55 (L) 0.8 - 1.5 MG/DL    GFR est AA >60 >60 ml/min/1.73m2    GFR est non-AA >60 >60 ml/min/1.73m2    Calcium 8.0 (L) 8.3 - 10.4 MG/DL    Bilirubin, total 0.7 0.2 - 1.1 MG/DL    ALT (SGPT) 123 (H) 12 - 65 U/L    AST (SGOT) 103 (H) 15 - 37 U/L    Alk. phosphatase 103 50 - 136 U/L    Protein, total 5.5 (L) 6.3 - 8.2 g/dL    Albumin 2.2 (L) 3.2 - 4.6 g/dL    Globulin 3.3 2.3 - 3.5 g/dL    A-G Ratio 0.7 (L) 1.2 - 3.5         Pt has been seen and examined by Dr. Danny Sellers. He agrees with the following assessment and plan. 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 Danny Sellers on 6/26/2019- however somehow it was continued by SNF until this admission. STOP Amiodarone.  Monitor on remote tele    Severe protein-calorie malnutrition ([address]    Acute metabolic encephalopathy    DNR (do not resuscitate)    HCAP (healthcare-associated pneumonia)- IV AB per primary team    Acute on chronic respiratory failure with hypoxia ([address]    COPD exacerbation ([address]    Ventricular tachyarrhythmia ([address]- in setting of a pneumothorax in 5/2019- Amiodarone was stopped by Dr. Eddy Zazueta on 6/26/2019- however somehow it was continued by SNF until this admission. STOP Amiodarone. Monitor on remote tele    Pulmonary infiltrates    Abdominal aortic aneurysm without rupture (HCC)    Benign prostatic hyperplasia    Cardiovascular disease    COPD (chronic obstructive pulmonary disease) ([facility] [address]    Low testosterone    Essential hypertension, benign    Raynaud's syndrome     [facility]     11/7/2019     6:20 AM    I have personally seen and examined Kings Collado with the physician extender listed above. .  I agree and confirm findings in history, physical exam, and assessment/plan as outlined above with following pertinent additions/exceptions:   Male with respiratory failure. He was on amiodarone therapy that was supposed to be discontinued earlier this year. It was continued at high doses by his facility for several months. Unfortunately his current imaging has signs of amiodarone toxicity. Amiodoarone should be stopped permanently. Ashwin Arreguin MD   Thank you for consulting [facility] Cardiology and allowing us to participate in the care of this patient. We will continue to follow along with you.     Marta Riojas PA-C

## 2019-11-06 NOTE — DISCHARGE SUMMARY
Hospitalist Discharge Summary     Admit Date:  2019  6:58 PM   Name:  Audrey Marinelli   Age:  80 y.o.  :  1932   MRN:  [id number]   PCP:  Han Ley MD  Treatment Team: Attending Provider: Hakan Cox MD; Utilization Review: Pedro Collins RN; Care Manager: Heidi Mcclure RN; Consulting Provider: Dannielle Johnson MD; Occupational Therapy Assistant: Venkat Owen    Problem List for this Hospitalization:  Hospital Problems as of 2019 Date Reviewed: 10/6/2019          Codes Class Noted - Resolved POA    Severe protein-calorie malnutrition ([facility] ICD-10-CM: E43  ICD-9-CM: 262  11/3/2019 - Present Yes        Acute metabolic encephalopathy TFH-43-CM: G93.41  ICD-9-CM: 348.31  2019 - Present Yes        DNR (do not resuscitate) (Chronic) ICD-10-CM: Z66  ICD-9-CM: V49.86  2019 - Present Yes        HCAP (healthcare-associated pneumonia) ICD-10-CM: J18.9  ICD-9-CM: 486  2019 - Present Yes        * (Principal) Acute on chronic respiratory failure with hypoxia ([facility] [address] ICD-10-CM: J96.21  ICD-9-CM: 518.84, 799.02  2019 - Present Yes        VT (ventricular tachycardia) ([facility] [address] (Chronic) ICD-10-CM: I47.2  ICD-9-CM: 427.1  2019 - Present Yes        Chronic respiratory failure with hypoxia ([facility] [address] (Chronic) ICD-10-CM: J96.11  ICD-9-CM: 518.83, 799.02  3/29/2016 - Present Yes    Overview Addendum 2019 10:14 AM by Brenden Andersen, NP     Continue to wear 2 L nasal cannula at night. May use 2 L supplemental oxygen to maintain sats greater than 90%. COPD (chronic obstructive pulmonary disease) (HCC) (Chronic) ICD-10-CM: J44.9  ICD-9-CM: 496  12/10/2015 - Present Yes        Essential hypertension, benign (Chronic) ICD-10-CM: I10  ICD-9-CM: 401.1  12/10/2015 - Present Yes                Admission HPI from 2019:    \" Please refer to HPI for more details \".     Hospital Course:    79yo M with a history of COPD (2L NC dependant), HTN, VT, recurrent pleural effusions and pleurodesis who was recently admitted for HCAP 9/1-9/9 and presented with fatigue, subjective fever, cough for two days.  Much less alert and interactive with family at home. In  The ED  CXR revealed PNA on RLL. Patient had MRI brain with no acute findings. Acute metabolic encephalopathy likely secondary to PNA. In the medical floor patient mental status improved, he was AAO X 4 following commands appropriately. Patient LFTs were elevated likely secondary to amiodarone. Cardiology was consulted who recommended to stop amiodarone as patient no longer need the medication. Patient is stable to be discharged today to complete antibiotics PO. Patient needs repeat LFTs at rehab facility. Amiodarone will be discontinue. Patient also needs follow up CXR 3-4 weeks to evaluate for resolution of PNA. Follow up instructions below. Plan was discussed with patient/careteam.  All questions answered. Patient was stable at time of discharge and was instructed to call or return if there are any concerns or recurrence of symptoms. Diagnostic Imaging/Tests:     Clinical history: Possible stroke. Weakness and shortness of breath. Feet  twitching. Evaluate for CVA.     TECHNIQUE: Multiplanar, multisequence MRI of the brain without IV contrast.     COMPARISON: No prior MRI.     FINDINGS:     There is no acute intracranial hemorrhage or acute infarction. There is no mass  effect, midline shift or hydrocephalus. No extra-axial fluid collection. Cerebellum and brainstem are unremarkable.     There is mineralization in the basal ganglia. Mild periventricular T-2/FLAIR  signal intensity abnormality, nonspecific and likely due to chronic small vessel  changes. There is mild generalized cerebral volume loss, age-related.     The major intracranial vascular flow voids are present. There is 7th/8th cranial  nerve complexes are intact.     Included globes appear intact.  There is mild mucosal thickening of the paranasal  sinuses. Mastoid air cells are aerated.        IMPRESSION  IMPRESSION:     1. No acute intracranial hemorrhage or acute infarction. No acute intracranial  Findings. FINDINGS: Single AP view the chest compared to a similar exam dated 9/6/2019  shows persistent, diffuse reticular nodular interstitial densities throughout  both lungs with new patchy airspace opacity in the right midlung. The lungs are  underexpanded. A small right pleural effusion is suspected. The cardiac  silhouette and mediastinum are stable. The bones are also patent.     IMPRESSION  IMPRESSION: Acute right lower lobe pneumonia suspected on a background of  chronic reticular nodular interstitial lung disease      All Micro Results     Procedure Component Value Units Date/Time    CULTURE, BLOOD [[id number]] Collected:  11/01/19 1937    Order Status:  Completed Specimen:  Blood Updated:  11/06/19 0721     Special Requests: --        LEFT  FOREARM       Culture result: NO GROWTH 5 DAYS       CULTURE, BLOOD [[id number]] Collected:  11/01/19 1925    Order Status:  Completed Specimen:  Blood Updated:  11/06/19 0721     Special Requests: --        LEFT  FOREARM       Culture result: NO GROWTH 5 DAYS       MSSA/MRSA SC BY PCR, NASAL SWAB [[id number]]  (Abnormal) Collected:  11/01/19 2133    Order Status:  Completed Specimen:  Nasal swab Updated:  11/04/19 0043     Special Requests: NO SPECIAL REQUESTS        Culture result:       MRSA target DNA not detected, SA target DNA detected. A MRSA negative, SA positive test result does not preclude MRSA nasal colonization.           CULTURE, RESPIRATORY/SPUTUM/BRONCH Daria Davisnt [[id number]] Collected:  11/01/19 2100    Order Status:  Canceled Specimen:  Sputum           Labs: Results:       BMP, Mg, Phos Recent Labs     11/06/19  0729 11/05/19  0754 11/04/19  0531    137 135*   K 3.4* 3.4* 3.6    103 101   CO2 28 28 27   AGAP 5* 6* 7   BUN 21 22 24*   CREA 0.55* 0.47* 0.56*   CA 8.0* 7.9* 7.9*   GLU 95 100 92      CBC Recent Labs     11/05/19  0754 11/04/19  0531   WBC 9.4 12.0*   RBC 3.35* 3.09*   HGB 10.7* 10.0*   HCT 33.6* 31.2*    215      LFT Recent Labs     11/06/19  0729 11/05/19  0754 11/04/19  0531   SGOT 103* 105* 69*   * 115* 82*    96 91   TP 5.5* 5.4* 5.2*   ALB 2.2* 2.2* 2.2*   GLOB 3.3 3.2 3.0   AGRAT 0.7* 0.7* 0.7*      Cardiac Testing Lab Results   Component Value Date/Time     (H) 06/13/2019 04:50 AM     (H) 06/10/2019 12:01 PM     (H) 05/06/2019 12:53 PM    Troponin-I, Qt. <0.02 (L) 09/01/2019 10:44 AM    Troponin-I, Qt. <0.02 (L) 09/01/2019 05:45 AM    Troponin-I, Qt. 0.05 02/04/2018 01:04 AM      Coagulation Tests Lab Results   Component Value Date/Time    Prothrombin time 14.8 (H) 02/02/2018 06:34 AM    Prothrombin time 10.8 04/13/2009 10:59 AM    INR 1.2 02/02/2018 06:34 AM    INR 1.1 04/13/2009 10:59 AM    aPTT 37.5 (H) 02/02/2018 06:34 AM    aPTT 27.8 04/13/2009 10:59 AM      A1c No results found for: HBA1C, HGBE8, OZL0VVNE, UVF4VMQS   Lipid Panel No results found for: CHOL, CHOLPOCT, CHOLX, CHLST, CHOLV, 769551, HDL, HDLP, LDL, LDLC, DLDLP, 907369, VLDLC, VLDL, TGLX, TRIGL, TRIGP, TGLPOCT, CHHD, [facility]   Thyroid Panel Lab Results   Component Value Date/Time    TSH 0.780 05/08/2019 04:10 AM    TSH 1.680 12/11/2018 11:40 AM        Most Recent UA Lab Results   Component Value Date/Time    Color YELLOW 06/11/2019 10:29 AM    Appearance CLOUDY 06/11/2019 10:29 AM    Specific gravity 1.011 06/11/2019 10:29 AM    pH (UA) 6.0 06/11/2019 10:29 AM    Protein NEGATIVE  06/11/2019 10:29 AM    Glucose NEGATIVE  06/11/2019 10:29 AM    Ketone NEGATIVE  06/11/2019 10:29 AM    Bilirubin NEGATIVE  06/11/2019 10:29 AM    Blood LARGE (A) 06/11/2019 10:29 AM    Urobilinogen 0.2 06/11/2019 10:29 AM    Nitrites NEGATIVE  06/11/2019 10:29 AM    Leukocyte Esterase NEGATIVE  06/11/2019 10:29 AM        No Known Allergies  Immunization History   Administered Date(s) Administered    Influenza High Dose Vaccine PF 10/12/2016, 10/23/2017, 09/23/2018    Influenza Vaccine 09/10/2010    Pneumococcal Conjugate (PCV-13) 10/01/2015, 12/08/2016    Pneumococcal Vaccine (Unspecified Type) 10/01/1998    TB Skin Test (PPD) Intradermal 02/02/2018, 05/06/2019, 06/10/2019, 09/01/2019, 11/01/2019    Zoster Recombinant 05/10/2018, 08/05/2018    Zoster Vaccine, Live 07/13/2011       All Labs from Last 24 Hrs:  Recent Results (from the past 24 hour(s))   METABOLIC PANEL, COMPREHENSIVE    Collection Time: 11/06/19  7:29 AM   Result Value Ref Range    Sodium 136 136 - 145 mmol/L    Potassium 3.4 (L) 3.5 - 5.1 mmol/L    Chloride 103 98 - 107 mmol/L    CO2 28 21 - 32 mmol/L    Anion gap 5 (L) 7 - 16 mmol/L    Glucose 95 65 - 100 mg/dL    BUN 21 8 - 23 MG/DL    Creatinine 0.55 (L) 0.8 - 1.5 MG/DL    GFR est AA >60 >60 ml/min/1.73m2    GFR est non-AA >60 >60 ml/min/1.73m2    Calcium 8.0 (L) 8.3 - 10.4 MG/DL    Bilirubin, total 0.7 0.2 - 1.1 MG/DL    ALT (SGPT) 123 (H) 12 - 65 U/L    AST (SGOT) 103 (H) 15 - 37 U/L    Alk.  phosphatase 103 50 - 136 U/L    Protein, total 5.5 (L) 6.3 - 8.2 g/dL    Albumin 2.2 (L) 3.2 - 4.6 g/dL    Globulin 3.3 2.3 - 3.5 g/dL    A-G Ratio 0.7 (L) 1.2 - 3.5         Discharge Exam:  Patient Vitals for the past 24 hrs:   Temp Pulse Resp BP SpO2   11/06/19 0809     99 %   11/06/19 0732 97.7 °F (36.5 °C) (!) 57 18 145/70 90 %   11/06/19 0323 98 °F (36.7 °C) (!) 52 20 107/65 93 %   11/05/19 2348 97.9 °F (36.6 °C) (!) 55 20 108/68 95 %   11/05/19 1939     97 %   11/05/19 1824 98 °F (36.7 °C) (!) 58 20 132/67 91 %   11/05/19 1511 97.4 °F (36.3 °C) (!) 56 18 105/59 93 %   11/05/19 1322     96 %   11/05/19 1126 97.5 °F (36.4 °C) 60 20 105/68 93 %     Oxygen Therapy  O2 Sat (%): 99 % (11/06/19 0809)  Pulse via Oximetry: 84 beats per minute (11/06/19 0809)  O2 Device: Nasal cannula(decreased from 6) (11/06/19 0809)  O2 Flow Rate (L/min): 4 l/min (11/06/19 2887)    Intake/Output Summary (Last 24 hours) at 11/6/2019 1120  Last data filed at 11/6/2019 0616  Gross per 24 hour   Intake 480 ml   Output 700 ml   Net -220 ml       Patient reported his breathing is better today. Patient denies SOB or any other complaints. General:               Alert, cooperative, no distress   Lungs:                 few b/l scattered rhonchi  Cardiovascular:   RRR. No m/r/g. No pedal edema b/l. Abdomen:            S/nt/nd. Bowel sounds normal. .   Skin:                    No rashes or lesions. Not Jaundiced  Neurologic:          No gross focal deficit. AAO x4. Following commands apropiately. Discharge Info:   Current Discharge Medication List      START taking these medications    Details   doxycycline (VIBRAMYCIN) 100 mg capsule Take 1 Cap by mouth every twelve (12) hours for 4 days. Qty: 8 Cap, Refills: 0      amoxicillin-clavulanate (AUGMENTIN) 875-125 mg per tablet Take 1 Tab by mouth two (2) times a day for 4 days. Qty: 8 Tab, Refills: 0         CONTINUE these medications which have NOT CHANGED    Details   DULoxetine (CYMBALTA) 30 mg capsule Take 30 mg by mouth two (2) times a day. magnesium oxide 250 mg magnesium tablet Take 250 mg by mouth daily. nystatin (MYCOSTATIN) powder Apply  to affected area four (4) times daily. multivit-iron-FA-calcium-mins (THERA-TABS M) 27 mg iron-400 mcg tab Take  by mouth. furosemide (LASIX) 40 mg tablet Take 1 Tab by mouth daily. Qty: 30 Tab, Refills: 0      finasteride (PROSCAR) 5 mg tablet Take 1 Tab by mouth daily. Qty: 90 Tab, Refills: 4      raNITIdine (ZANTAC) 75 mg tab Take  by mouth two (2) times a day. albuterol-ipratropium (DUO-NEB) 2.5 mg-0.5 mg/3 ml nebu 3 mL by Nebulization route every four (4) hours as needed for Other (dyspnea). Qty: 30 Nebule, Refills: 3      potassium chloride (K-DUR, KLOR-CON) 20 mEq tablet Take 2 Tabs by mouth daily.   Qty: 3 Tab, Refills: 0      umeclidinium-vilanterol (ANORO ELLIPTA) 62.5-25 mcg/actuation inhaler Take 1 Puff by inhalation daily. Oxygen Use as instructed. Use as instructed; faxed to [employer]      cyanocobalamin (VITAMIN B12) 1,000 mcg/mL injection INJECT 1ML INTRAMUSCULARLY ONCE FOR 1 DOSE  Refills: 12      fluticasone (FLONASE) 50 mcg/actuation nasal spray 2 Sprays by Both Nostrils route daily. Qty: 48 g, Refills: 4    Associated Diagnoses: Allergic rhinitis due to pollen, unspecified seasonality      calcium citrate-vitamin d3 (CITRACAL+D) 315-200 mg-unit tab Take 2 Tabs by mouth daily (with breakfast). cholecalciferol (VITAMIN D3) 1,000 unit cap Take  by mouth.      guaiFENesin ER (MUCINEX) 600 mg ER tablet Take 1,200 mg by mouth daily. Biotin 2,500 mcg cap Take  by mouth. aspirin delayed-release 81 mg tablet Take  by mouth daily. acetaminophen (TYLENOL) 650 mg TbER Take 650 mg by mouth every eight (8) hours. multivitamin,tx-iron-ca-min (THERA-M) 27-0.4 mg tab Take 1 Tab by mouth daily. montelukast (SINGULAIR) 10 mg tablet Take 1 Tab by mouth daily. Qty: 90 Tab, Refills: 4    Associated Diagnoses: Mixed simple and mucopurulent chronic bronchitis (HCC)         STOP taking these medications       amLODIPine (NORVASC) 5 mg tablet Comments:   Reason for Stopping:         amiodarone (PACERONE) 400 mg tablet Comments:   Reason for Stopping:                 Disposition: SNF  Activity: PT/OT Eval and Treat  Diet: Cardiac Diet    Follow-up Information     Follow up With Specialties Details Why Contact Info    Oseas Davis MD Internal Medicine   [address]  [address] [place] [address]                  Signed:   Aria Valadez MD

## 2019-11-06 NOTE — PROGRESS NOTES
Patient to discharge back to [facility] to room 110A. [facility] transport services scheduled to pick patient up at 1500. Patient and family aware and verbalize understanding. Report number given to primary nurse. Packet made and at the .

## 2019-11-06 NOTE — PROGRESS NOTES
[facility] Patient resting quietly in bed at this time, awake, respirations even and unlabored on 7L hi flow nasal cannula. Denies needs at this time. Bed low and locked. Bedside table, personal belongings and call light within reach. Bed alarm in place for safety.

## 2019-11-06 NOTE — PROGRESS NOTES
Patient to be discharged today back to [facility],  Referral sent to facility awaiting room assignment.

## 2019-11-06 NOTE — PROGRESS NOTES
SBAR shift report received from [facility]. Pt stable. Assessment complete. Pt is lying in bed. Resp even, unlabored. Pt is alert, orient X 3. Pt appears in no acute distress at this time. Pt is on 6L nasal cannula 02. Pt voices no complaints or concerns at this time. Pt encouraged to call for assistance, call light in reach. Safety measures in place.  Will continue to monitor

## 2019-11-06 NOTE — PROGRESS NOTES
EMS transporting pt to [address] [place] [address]. Pt left floor in stable condition on 4L nasal cannula. Pt voices no complaints or concerns at this time. Pt appears in no acute distress.

## 2019-11-06 NOTE — PROGRESS NOTES
Completed bedside shift report with oncoming nurse, Frances. Patient resting quietly in bed at this time, awake, respirations even and unlabored on 6L hi flow nasal cannula. Denies needs at this time. Bed low and locked. Bedside table, personal belongings and call light within reach.

## 2019-11-06 NOTE — PROGRESS NOTES
Report called and given to [facility]. Opportunity for questions provided. Made aware that  time is scheduled for 1500.

## 2019-11-07 ENCOUNTER — PATIENT OUTREACH (OUTPATIENT)
Dept: CASE MANAGEMENT | Age: 84
End: 2019-11-07

## 2019-11-07 NOTE — PROGRESS NOTES
This note will not be viewable in [address] Patient discharged to [facility] on 11/6/2019 . Patient discharged to a [facility] facility. Patient will be included in weekly care coordination calls. Information forwarded to Pablo Huffman RN, [facility] RN Care Manager.

## 2019-12-11 ENCOUNTER — HOSPITAL ENCOUNTER (OUTPATIENT)
Dept: GENERAL RADIOLOGY | Age: 84
Discharge: HOME | End: 2019-12-11
Attending: NURSE PRACTITIONER
Payer: MEDICARE

## 2019-12-11 DIAGNOSIS — J18.9 HCAP (HEALTHCARE-ASSOCIATED PNEUMONIA): ICD-10-CM

## 2019-12-11 PROBLEM — J93.9 PNEUMOTHORAX ON RIGHT: Status: RESOLVED | Noted: 2019-05-06 | Resolved: 2019-12-11

## 2019-12-11 PROBLEM — J44.1 COPD EXACERBATION (HCC): Status: RESOLVED | Noted: 2019-05-06 | Resolved: 2019-12-11

## 2019-12-11 PROCEDURE — 71046 X-RAY EXAM CHEST 2 VIEWS: CPT

## 2020-01-05 ENCOUNTER — HOSPITAL ENCOUNTER (INPATIENT)
Age: 85
LOS: 5 days | Discharge: NURSING, REHAB OR LONG-TERM CARE | DRG: 871 | End: 2020-01-10
Attending: EMERGENCY MEDICINE | Admitting: FAMILY MEDICINE
Payer: MEDICARE

## 2020-01-05 ENCOUNTER — APPOINTMENT (OUTPATIENT)
Dept: GENERAL RADIOLOGY | Age: 85
DRG: 871 | End: 2020-01-05
Attending: EMERGENCY MEDICINE
Payer: MEDICARE

## 2020-01-05 DIAGNOSIS — J18.9 PNEUMONIA OF RIGHT LOWER LOBE DUE TO INFECTIOUS ORGANISM: Primary | ICD-10-CM

## 2020-01-05 PROBLEM — R91.8 PULMONARY INFILTRATES: Status: RESOLVED | Noted: 2019-05-06 | Resolved: 2020-01-05

## 2020-01-05 PROBLEM — J96.21 ACUTE ON CHRONIC RESPIRATORY FAILURE WITH HYPOXIA (HCC): Status: RESOLVED | Noted: 2019-05-06 | Resolved: 2020-01-05

## 2020-01-05 PROBLEM — R06.02 SOB (SHORTNESS OF BREATH): Status: RESOLVED | Noted: 2017-03-09 | Resolved: 2020-01-05

## 2020-01-05 PROBLEM — E43 SEVERE PROTEIN-CALORIE MALNUTRITION (HCC): Chronic | Status: ACTIVE | Noted: 2019-11-03

## 2020-01-05 PROBLEM — E83.51 HYPOCALCEMIA: Status: RESOLVED | Noted: 2019-06-13 | Resolved: 2020-01-05

## 2020-01-05 PROBLEM — Z66 DNR (DO NOT RESUSCITATE): Chronic | Status: RESOLVED | Noted: 2019-11-02 | Resolved: 2020-01-05

## 2020-01-05 PROBLEM — I47.20 VT (VENTRICULAR TACHYCARDIA): Chronic | Status: RESOLVED | Noted: 2019-05-06 | Resolved: 2020-01-05

## 2020-01-05 PROBLEM — S32.000A CLOSED COMPRESSION FRACTURE OF LUMBOSACRAL SPINE (HCC): Status: RESOLVED | Noted: 2018-12-11 | Resolved: 2020-01-05

## 2020-01-05 PROBLEM — S72.009A HIP FRACTURE (HCC): Status: RESOLVED | Noted: 2018-02-01 | Resolved: 2020-01-05

## 2020-01-05 PROBLEM — R06.02 SHORTNESS OF BREATH: Status: RESOLVED | Noted: 2019-05-06 | Resolved: 2020-01-05

## 2020-01-05 PROBLEM — G93.41 ACUTE METABOLIC ENCEPHALOPATHY: Status: RESOLVED | Noted: 2019-11-02 | Resolved: 2020-01-05

## 2020-01-05 PROBLEM — J90 PLEURAL EFFUSION: Status: RESOLVED | Noted: 2019-06-10 | Resolved: 2020-01-05

## 2020-01-05 PROBLEM — R33.9 URINE RETENTION: Status: RESOLVED | Noted: 2019-05-20 | Resolved: 2020-01-05

## 2020-01-05 PROBLEM — T79.7XXA SUBCUTANEOUS EMPHYSEMA (HCC): Status: RESOLVED | Noted: 2019-05-09 | Resolved: 2020-01-05

## 2020-01-05 PROBLEM — I47.20 VENTRICULAR TACHYARRHYTHMIA: Status: RESOLVED | Noted: 2019-05-06 | Resolved: 2020-01-05

## 2020-01-05 PROBLEM — E77.8 HYPOPROTEINEMIA (HCC): Status: RESOLVED | Noted: 2019-06-13 | Resolved: 2020-01-05

## 2020-01-05 PROBLEM — E87.1 HYPONATREMIA: Status: RESOLVED | Noted: 2019-05-22 | Resolved: 2020-01-05

## 2020-01-05 PROBLEM — Q64.70 ABNORMALITY OF URETHRAL MEATUS: Status: RESOLVED | Noted: 2019-08-08 | Resolved: 2020-01-05

## 2020-01-05 LAB
ALBUMIN SERPL-MCNC: 3.4 G/DL (ref 3.2–4.6)
ALBUMIN/GLOB SERPL: 0.9 {RATIO} (ref 1.2–3.5)
ALP SERPL-CCNC: 130 U/L (ref 50–136)
ALT SERPL-CCNC: 26 U/L (ref 12–65)
ANION GAP SERPL CALC-SCNC: 6 MMOL/L (ref 7–16)
AST SERPL-CCNC: 28 U/L (ref 15–37)
BASOPHILS # BLD: 0 K/UL (ref 0–0.2)
BASOPHILS NFR BLD: 0 % (ref 0–2)
BILIRUB SERPL-MCNC: 0.7 MG/DL (ref 0.2–1.1)
BNP SERPL-MCNC: 710 PG/ML
BUN SERPL-MCNC: 25 MG/DL (ref 8–23)
CALCIUM SERPL-MCNC: 9 MG/DL (ref 8.3–10.4)
CHLORIDE SERPL-SCNC: 100 MMOL/L (ref 98–107)
CO2 SERPL-SCNC: 27 MMOL/L (ref 21–32)
CREAT SERPL-MCNC: 0.68 MG/DL (ref 0.8–1.5)
DIFFERENTIAL METHOD BLD: ABNORMAL
EOSINOPHIL # BLD: 0.1 K/UL (ref 0–0.8)
EOSINOPHIL NFR BLD: 0 % (ref 0.5–7.8)
ERYTHROCYTE [DISTWIDTH] IN BLOOD BY AUTOMATED COUNT: 15.5 % (ref 11.9–14.6)
GLOBULIN SER CALC-MCNC: 3.9 G/DL (ref 2.3–3.5)
GLUCOSE SERPL-MCNC: 119 MG/DL (ref 65–100)
HCT VFR BLD AUTO: 42.2 % (ref 41.1–50.3)
HGB BLD-MCNC: 13.4 G/DL (ref 13.6–17.2)
IMM GRANULOCYTES # BLD AUTO: 0.2 K/UL (ref 0–0.5)
IMM GRANULOCYTES NFR BLD AUTO: 1 % (ref 0–5)
LACTATE BLD-SCNC: 2.06 MMOL/L (ref 0.5–1.9)
LYMPHOCYTES # BLD: 2.5 K/UL (ref 0.5–4.6)
LYMPHOCYTES NFR BLD: 10 % (ref 13–44)
MCH RBC QN AUTO: 32.6 PG (ref 26.1–32.9)
MCHC RBC AUTO-ENTMCNC: 31.8 G/DL (ref 31.4–35)
MCV RBC AUTO: 102.7 FL (ref 79.6–97.8)
MONOCYTES # BLD: 1.3 K/UL (ref 0.1–1.3)
MONOCYTES NFR BLD: 5 % (ref 4–12)
NEUTS SEG # BLD: 20.1 K/UL (ref 1.7–8.2)
NEUTS SEG NFR BLD: 83 % (ref 43–78)
NRBC # BLD: 0 K/UL (ref 0–0.2)
PLATELET # BLD AUTO: 304 K/UL (ref 150–450)
PMV BLD AUTO: 9.7 FL (ref 9.4–12.3)
POTASSIUM SERPL-SCNC: 4.4 MMOL/L (ref 3.5–5.1)
PROCALCITONIN SERPL-MCNC: 0.12 NG/ML
PROT SERPL-MCNC: 7.3 G/DL (ref 6.3–8.2)
RBC # BLD AUTO: 4.11 M/UL (ref 4.23–5.6)
SODIUM SERPL-SCNC: 133 MMOL/L (ref 136–145)
WBC # BLD AUTO: 24.1 K/UL (ref 4.3–11.1)

## 2020-01-05 PROCEDURE — 77030020263 HC SOL INJ SOD CL0.9% LFCR 1000ML

## 2020-01-05 PROCEDURE — 96365 THER/PROPH/DIAG IV INF INIT: CPT | Performed by: EMERGENCY MEDICINE

## 2020-01-05 PROCEDURE — 74011250636 HC RX REV CODE- 250/636: Performed by: FAMILY MEDICINE

## 2020-01-05 PROCEDURE — 99285 EMERGENCY DEPT VISIT HI MDM: CPT | Performed by: EMERGENCY MEDICINE

## 2020-01-05 PROCEDURE — 83605 ASSAY OF LACTIC ACID: CPT

## 2020-01-05 PROCEDURE — 85025 COMPLETE CBC W/AUTO DIFF WBC: CPT

## 2020-01-05 PROCEDURE — 93005 ELECTROCARDIOGRAM TRACING: CPT | Performed by: EMERGENCY MEDICINE

## 2020-01-05 PROCEDURE — 71045 X-RAY EXAM CHEST 1 VIEW: CPT

## 2020-01-05 PROCEDURE — 77030027688 HC DRSG MEPILEX 16-48IN NO BORD MOLN -A

## 2020-01-05 PROCEDURE — 94640 AIRWAY INHALATION TREATMENT: CPT

## 2020-01-05 PROCEDURE — 65270000029 HC RM PRIVATE

## 2020-01-05 PROCEDURE — 84145 PROCALCITONIN (PCT): CPT

## 2020-01-05 PROCEDURE — 74011000250 HC RX REV CODE- 250: Performed by: EMERGENCY MEDICINE

## 2020-01-05 PROCEDURE — 83880 ASSAY OF NATRIURETIC PEPTIDE: CPT

## 2020-01-05 PROCEDURE — 74011250637 HC RX REV CODE- 250/637: Performed by: FAMILY MEDICINE

## 2020-01-05 PROCEDURE — 74011250636 HC RX REV CODE- 250/636: Performed by: EMERGENCY MEDICINE

## 2020-01-05 PROCEDURE — 77030011256 HC DRSG MEPILEX <16IN NO BORD MOLN -A

## 2020-01-05 PROCEDURE — 80053 COMPREHEN METABOLIC PANEL: CPT

## 2020-01-05 PROCEDURE — 87040 BLOOD CULTURE FOR BACTERIA: CPT

## 2020-01-05 RX ORDER — IPRATROPIUM BROMIDE AND ALBUTEROL SULFATE 2.5; .5 MG/3ML; MG/3ML
3 SOLUTION RESPIRATORY (INHALATION)
Status: COMPLETED | OUTPATIENT
Start: 2020-01-05 | End: 2020-01-05

## 2020-01-05 RX ORDER — ASPIRIN 81 MG/1
81 TABLET ORAL DAILY
Status: DISCONTINUED | OUTPATIENT
Start: 2020-01-06 | End: 2020-01-10 | Stop reason: HOSPADM

## 2020-01-05 RX ORDER — BUDESONIDE 0.5 MG/2ML
500 INHALANT ORAL
Status: DISCONTINUED | OUTPATIENT
Start: 2020-01-06 | End: 2020-01-10 | Stop reason: HOSPADM

## 2020-01-05 RX ORDER — GUAIFENESIN 600 MG/1
1200 TABLET, EXTENDED RELEASE ORAL DAILY
Status: DISCONTINUED | OUTPATIENT
Start: 2020-01-06 | End: 2020-01-10 | Stop reason: HOSPADM

## 2020-01-05 RX ORDER — MONTELUKAST SODIUM 10 MG/1
10 TABLET ORAL
Status: DISCONTINUED | OUTPATIENT
Start: 2020-01-05 | End: 2020-01-10 | Stop reason: HOSPADM

## 2020-01-05 RX ORDER — SODIUM CHLORIDE 0.9 % (FLUSH) 0.9 %
5-40 SYRINGE (ML) INJECTION EVERY 8 HOURS
Status: DISCONTINUED | OUTPATIENT
Start: 2020-01-05 | End: 2020-01-10 | Stop reason: HOSPADM

## 2020-01-05 RX ORDER — IPRATROPIUM BROMIDE AND ALBUTEROL SULFATE 2.5; .5 MG/3ML; MG/3ML
3 SOLUTION RESPIRATORY (INHALATION)
Status: DISCONTINUED | OUTPATIENT
Start: 2020-01-05 | End: 2020-01-10 | Stop reason: HOSPADM

## 2020-01-05 RX ORDER — POTASSIUM CHLORIDE 20 MEQ/1
20 TABLET, EXTENDED RELEASE ORAL DAILY
Status: DISCONTINUED | OUTPATIENT
Start: 2020-01-06 | End: 2020-01-10 | Stop reason: HOSPADM

## 2020-01-05 RX ORDER — LEVOFLOXACIN 5 MG/ML
750 INJECTION, SOLUTION INTRAVENOUS EVERY 24 HOURS
Status: DISCONTINUED | OUTPATIENT
Start: 2020-01-06 | End: 2020-01-09

## 2020-01-05 RX ORDER — FAMOTIDINE 20 MG/1
10 TABLET, FILM COATED ORAL 2 TIMES DAILY
Status: DISCONTINUED | OUTPATIENT
Start: 2020-01-05 | End: 2020-01-10 | Stop reason: HOSPADM

## 2020-01-05 RX ORDER — LANOLIN ALCOHOL/MO/W.PET/CERES
200 CREAM (GRAM) TOPICAL DAILY
Status: DISCONTINUED | OUTPATIENT
Start: 2020-01-06 | End: 2020-01-06 | Stop reason: SDUPTHER

## 2020-01-05 RX ORDER — ENOXAPARIN SODIUM 100 MG/ML
40 INJECTION SUBCUTANEOUS EVERY 24 HOURS
Status: DISCONTINUED | OUTPATIENT
Start: 2020-01-05 | End: 2020-01-08

## 2020-01-05 RX ORDER — ACETAMINOPHEN 325 MG/1
650 TABLET ORAL
Status: DISCONTINUED | OUTPATIENT
Start: 2020-01-05 | End: 2020-01-10 | Stop reason: HOSPADM

## 2020-01-05 RX ORDER — SODIUM CHLORIDE 0.9 % (FLUSH) 0.9 %
5-40 SYRINGE (ML) INJECTION AS NEEDED
Status: DISCONTINUED | OUTPATIENT
Start: 2020-01-05 | End: 2020-01-10 | Stop reason: HOSPADM

## 2020-01-05 RX ORDER — SODIUM CHLORIDE 9 MG/ML
100 INJECTION, SOLUTION INTRAVENOUS CONTINUOUS
Status: DISCONTINUED | OUTPATIENT
Start: 2020-01-05 | End: 2020-01-06

## 2020-01-05 RX ORDER — DULOXETIN HYDROCHLORIDE 30 MG/1
30 CAPSULE, DELAYED RELEASE ORAL 2 TIMES DAILY
Status: DISCONTINUED | OUTPATIENT
Start: 2020-01-05 | End: 2020-01-10 | Stop reason: HOSPADM

## 2020-01-05 RX ORDER — LEVOFLOXACIN 5 MG/ML
750 INJECTION, SOLUTION INTRAVENOUS
Status: COMPLETED | OUTPATIENT
Start: 2020-01-05 | End: 2020-01-05

## 2020-01-05 RX ORDER — FINASTERIDE 5 MG/1
5 TABLET, FILM COATED ORAL DAILY
Status: DISCONTINUED | OUTPATIENT
Start: 2020-01-06 | End: 2020-01-10 | Stop reason: HOSPADM

## 2020-01-05 RX ORDER — LORAZEPAM 2 MG/ML
0.5 INJECTION INTRAMUSCULAR
Status: COMPLETED | OUTPATIENT
Start: 2020-01-05 | End: 2020-01-05

## 2020-01-05 RX ORDER — BISACODYL 5 MG
5 TABLET, DELAYED RELEASE (ENTERIC COATED) ORAL DAILY PRN
Status: DISCONTINUED | OUTPATIENT
Start: 2020-01-05 | End: 2020-01-10 | Stop reason: HOSPADM

## 2020-01-05 RX ORDER — LORAZEPAM 1 MG/1
1 TABLET ORAL
Status: DISCONTINUED | OUTPATIENT
Start: 2020-01-05 | End: 2020-01-10 | Stop reason: HOSPADM

## 2020-01-05 RX ORDER — OXYCODONE HYDROCHLORIDE 5 MG/1
5 TABLET ORAL
Status: DISCONTINUED | OUTPATIENT
Start: 2020-01-05 | End: 2020-01-10 | Stop reason: HOSPADM

## 2020-01-05 RX ORDER — ALBUTEROL SULFATE 0.83 MG/ML
2.5 SOLUTION RESPIRATORY (INHALATION)
Status: DISCONTINUED | OUTPATIENT
Start: 2020-01-06 | End: 2020-01-10 | Stop reason: HOSPADM

## 2020-01-05 RX ADMIN — DULOXETINE 30 MG: 30 CAPSULE, DELAYED RELEASE ORAL at 22:48

## 2020-01-05 RX ADMIN — SODIUM CHLORIDE 100 ML/HR: 900 INJECTION, SOLUTION INTRAVENOUS at 22:44

## 2020-01-05 RX ADMIN — LEVOFLOXACIN 750 MG: 5 INJECTION, SOLUTION INTRAVENOUS at 18:28

## 2020-01-05 RX ADMIN — Medication 10 ML: at 22:45

## 2020-01-05 RX ADMIN — FAMOTIDINE 10 MG: 20 TABLET, FILM COATED ORAL at 22:49

## 2020-01-05 RX ADMIN — LORAZEPAM 0.5 MG: 2 INJECTION INTRAMUSCULAR; INTRAVENOUS at 19:54

## 2020-01-05 RX ADMIN — MONTELUKAST 10 MG: 10 TABLET, FILM COATED ORAL at 22:48

## 2020-01-05 RX ADMIN — IPRATROPIUM BROMIDE AND ALBUTEROL SULFATE 3 ML: .5; 3 SOLUTION RESPIRATORY (INHALATION) at 17:56

## 2020-01-05 RX ADMIN — ENOXAPARIN SODIUM 40 MG: 40 INJECTION SUBCUTANEOUS at 22:49

## 2020-01-05 NOTE — ED TRIAGE NOTES
Pt recently had pneumonia. He began coughing, experiencing Respiratory distress and chest pain 3 days ago.

## 2020-01-06 PROBLEM — J18.9 CAP (COMMUNITY ACQUIRED PNEUMONIA): Status: RESOLVED | Noted: 2020-01-05 | Resolved: 2020-01-06

## 2020-01-06 PROBLEM — A41.9 SEPSIS (HCC): Status: ACTIVE | Noted: 2020-01-06

## 2020-01-06 LAB
ANION GAP SERPL CALC-SCNC: 5 MMOL/L (ref 7–16)
ATRIAL RATE: 77 BPM
BASOPHILS # BLD: 0 K/UL (ref 0–0.2)
BASOPHILS NFR BLD: 0 % (ref 0–2)
BUN SERPL-MCNC: 24 MG/DL (ref 8–23)
CALCIUM SERPL-MCNC: 8.2 MG/DL (ref 8.3–10.4)
CALCULATED P AXIS, ECG09: 60 DEGREES
CALCULATED R AXIS, ECG10: -20 DEGREES
CALCULATED T AXIS, ECG11: 43 DEGREES
CHLORIDE SERPL-SCNC: 103 MMOL/L (ref 98–107)
CO2 SERPL-SCNC: 27 MMOL/L (ref 21–32)
CREAT SERPL-MCNC: 0.7 MG/DL (ref 0.8–1.5)
DIAGNOSIS, 93000: NORMAL
DIFFERENTIAL METHOD BLD: ABNORMAL
EOSINOPHIL # BLD: 0 K/UL (ref 0–0.8)
EOSINOPHIL NFR BLD: 0 % (ref 0.5–7.8)
ERYTHROCYTE [DISTWIDTH] IN BLOOD BY AUTOMATED COUNT: 15.5 % (ref 11.9–14.6)
GLUCOSE SERPL-MCNC: 102 MG/DL (ref 65–100)
HCT VFR BLD AUTO: 32.7 % (ref 41.1–50.3)
HGB BLD-MCNC: 10.4 G/DL (ref 13.6–17.2)
IMM GRANULOCYTES # BLD AUTO: 0.1 K/UL (ref 0–0.5)
IMM GRANULOCYTES NFR BLD AUTO: 1 % (ref 0–5)
LYMPHOCYTES # BLD: 1.4 K/UL (ref 0.5–4.6)
LYMPHOCYTES NFR BLD: 8 % (ref 13–44)
MCH RBC QN AUTO: 32.7 PG (ref 26.1–32.9)
MCHC RBC AUTO-ENTMCNC: 31.8 G/DL (ref 31.4–35)
MCV RBC AUTO: 102.8 FL (ref 79.6–97.8)
MONOCYTES # BLD: 1.3 K/UL (ref 0.1–1.3)
MONOCYTES NFR BLD: 8 % (ref 4–12)
NEUTS SEG # BLD: 14.8 K/UL (ref 1.7–8.2)
NEUTS SEG NFR BLD: 84 % (ref 43–78)
NRBC # BLD: 0 K/UL (ref 0–0.2)
P-R INTERVAL, ECG05: 176 MS
PLATELET # BLD AUTO: 204 K/UL (ref 150–450)
PMV BLD AUTO: 9.5 FL (ref 9.4–12.3)
POTASSIUM SERPL-SCNC: 3.9 MMOL/L (ref 3.5–5.1)
Q-T INTERVAL, ECG07: 360 MS
QRS DURATION, ECG06: 96 MS
QTC CALCULATION (BEZET), ECG08: 407 MS
RBC # BLD AUTO: 3.18 M/UL (ref 4.23–5.6)
SODIUM SERPL-SCNC: 135 MMOL/L (ref 136–145)
VENTRICULAR RATE, ECG03: 77 BPM
WBC # BLD AUTO: 17.7 K/UL (ref 4.3–11.1)

## 2020-01-06 PROCEDURE — 77030040830 HC CATH URETH FOL MDII -A

## 2020-01-06 PROCEDURE — 94640 AIRWAY INHALATION TREATMENT: CPT

## 2020-01-06 PROCEDURE — 65270000029 HC RM PRIVATE

## 2020-01-06 PROCEDURE — 36415 COLL VENOUS BLD VENIPUNCTURE: CPT

## 2020-01-06 PROCEDURE — 77030020263 HC SOL INJ SOD CL0.9% LFCR 1000ML

## 2020-01-06 PROCEDURE — 74011000250 HC RX REV CODE- 250: Performed by: FAMILY MEDICINE

## 2020-01-06 PROCEDURE — 87070 CULTURE OTHR SPECIMN AEROBIC: CPT

## 2020-01-06 PROCEDURE — 74011250637 HC RX REV CODE- 250/637: Performed by: FAMILY MEDICINE

## 2020-01-06 PROCEDURE — 77030018846 HC SOL IRR STRL H20 ICUM -A

## 2020-01-06 PROCEDURE — 85025 COMPLETE CBC W/AUTO DIFF WBC: CPT

## 2020-01-06 PROCEDURE — 80048 BASIC METABOLIC PNL TOTAL CA: CPT

## 2020-01-06 PROCEDURE — 94760 N-INVAS EAR/PLS OXIMETRY 1: CPT

## 2020-01-06 PROCEDURE — 74011250636 HC RX REV CODE- 250/636: Performed by: FAMILY MEDICINE

## 2020-01-06 RX ORDER — LANOLIN ALCOHOL/MO/W.PET/CERES
200 CREAM (GRAM) TOPICAL DAILY
Status: DISCONTINUED | OUTPATIENT
Start: 2020-01-06 | End: 2020-01-10 | Stop reason: HOSPADM

## 2020-01-06 RX ADMIN — FAMOTIDINE 10 MG: 20 TABLET, FILM COATED ORAL at 17:48

## 2020-01-06 RX ADMIN — SODIUM CHLORIDE 100 ML/HR: 900 INJECTION, SOLUTION INTRAVENOUS at 10:51

## 2020-01-06 RX ADMIN — LEVOFLOXACIN 750 MG: 5 INJECTION, SOLUTION INTRAVENOUS at 17:48

## 2020-01-06 RX ADMIN — MONTELUKAST 10 MG: 10 TABLET, FILM COATED ORAL at 21:07

## 2020-01-06 RX ADMIN — BUDESONIDE 500 MCG: 0.5 INHALANT RESPIRATORY (INHALATION) at 08:22

## 2020-01-06 RX ADMIN — ALBUTEROL SULFATE 2.5 MG: 2.5 SOLUTION RESPIRATORY (INHALATION) at 08:22

## 2020-01-06 RX ADMIN — POTASSIUM CHLORIDE 20 MEQ: 20 TABLET, EXTENDED RELEASE ORAL at 08:51

## 2020-01-06 RX ADMIN — ALBUTEROL SULFATE 2.5 MG: 2.5 SOLUTION RESPIRATORY (INHALATION) at 13:56

## 2020-01-06 RX ADMIN — FINASTERIDE 5 MG: 5 TABLET, FILM COATED ORAL at 08:51

## 2020-01-06 RX ADMIN — DULOXETINE 30 MG: 30 CAPSULE, DELAYED RELEASE ORAL at 18:00

## 2020-01-06 RX ADMIN — ALBUTEROL SULFATE 2.5 MG: 2.5 SOLUTION RESPIRATORY (INHALATION) at 20:23

## 2020-01-06 RX ADMIN — Medication 200 MG: at 08:51

## 2020-01-06 RX ADMIN — FAMOTIDINE 10 MG: 20 TABLET, FILM COATED ORAL at 08:52

## 2020-01-06 RX ADMIN — Medication 5 ML: at 10:52

## 2020-01-06 RX ADMIN — ENOXAPARIN SODIUM 40 MG: 40 INJECTION SUBCUTANEOUS at 21:07

## 2020-01-06 RX ADMIN — BUDESONIDE 500 MCG: 0.5 INHALANT RESPIRATORY (INHALATION) at 20:23

## 2020-01-06 RX ADMIN — DULOXETINE 30 MG: 30 CAPSULE, DELAYED RELEASE ORAL at 08:51

## 2020-01-06 RX ADMIN — ASPIRIN 81 MG: 81 TABLET, COATED ORAL at 08:51

## 2020-01-06 RX ADMIN — Medication 10 ML: at 21:08

## 2020-01-06 RX ADMIN — GUAIFENESIN 1200 MG: 600 TABLET, EXTENDED RELEASE ORAL at 08:51

## 2020-01-06 NOTE — PROGRESS NOTES
Connie 72 li catheter placed with 2 attempts. Draining bloody drainage at this time. 2000 ml of urine emptied. Patient tolerated well.

## 2020-01-06 NOTE — CDMP QUERY
Pt admitted with pneumonia./Pt noted to have elevated WBCs, elevated tempeture, and tachypnea.  If possible, please document in the progress notes and d/c summary if you are evaluating and / or treating any of the following:     Sepsis, present on admission, suspected or probable causative organism (please specify)   Sepsis, now resolved, suspected or probable causative organism (please specify)   Sepsis, not present on admission, suspected or probable causative organism (please specify)   No Sepsis, suspected or probable localized infection (please specify)   Sepsis was ruled out (include corresponding diagnosis for patients clinical picture and treatment)   Other, please specify   Clinically unable to determine    The medical record reflects the following:     Risk Factors: age, pt currently admitted to hospital for pneumonia      Clinical Indicators: WBCs-24.1 on 1/5, T-100.5 ax on 1/5 @ 1717, RR-26 on 1/5 @ 1929     Treatment: Levaquin, continue to monitor    Thanks,  ABIGAIL TayN, RN, CDS  Compliant Documentation Management Program  [phone]

## 2020-01-06 NOTE — ED NOTES
TRANSFER - OUT REPORT:    Verbal report given to Mauro, Olena Bates  being transferred to [phone] for routine progression of care       Report consisted of patients Situation, Background, Assessment and   Recommendations(SBAR). Information from the following report(s) Kardex, MAR, Recent Results and Cardiac Rhythm NSR was reviewed with the receiving nurse. Lines:   Peripheral IV 01/05/20 Right Antecubital (Active)   Site Assessment Clean, dry, & intact 1/5/2020  6:16 PM   Phlebitis Assessment 0 1/5/2020  6:16 PM   Infiltration Assessment 0 1/5/2020  6:16 PM   Dressing Status Clean, dry, & intact 1/5/2020  6:16 PM   Dressing Type Tape;Transparent 1/5/2020  6:16 PM   Hub Color/Line Status Green;Flushed;Patent 1/5/2020  6:16 PM   Action Taken Blood drawn 1/5/2020  6:16 PM        Opportunity for questions and clarification was provided.       Patient transported with:   O2 @ 4 liters  Tech

## 2020-01-06 NOTE — PROGRESS NOTES
Patient refusing to have li catheter placed at this time, stating he will wait a little longer but he does not want a tube stuck in him and left in place

## 2020-01-06 NOTE — PROGRESS NOTES
Progress Note    Patient: Gina Esparza MRN: [id number]  SSN: [id number]    YOB: 1932  Age: 80 y.o. Sex: male      Admit Date: 1/5/2020    LOS: 1 day     Mr. Naya Ozuna is a 80 y.o. male who has a PMH of chronic respiratory failure with hypoxia on home oxygen, COPD, HTN, caloric malnutrition, who was admitted for CAP. He comes from [facility]. He states his symptoms started about 3 days ago and have been getting gradually worse. He has actually been having ongoing lung/pulmonary issues since May of last year when he developed a pneumothorax. He met sepsis criteria and the patient was started on iv rocephin and zithromax. His clinical status remained stable on supplemental oxygen and duonebs. Subjective:   He was found in no distress. His wife was present and all questions were answered. Objective:     Vitals:    01/06/20 0425 01/06/20 0731 01/06/20 0823 01/06/20 1159   BP: 120/70 111/60  108/57   Pulse: 76 69  72   Resp: 19 18  18   Temp: 97.9 °F (36.6 °C) 98.5 °F (36.9 °C)  97.6 °F (36.4 °C)   SpO2: 95% 92% 91% 93%   Weight:       Height:            Intake and Output:  Current Shift: No intake/output data recorded. Last three shifts: 01/04 1901 - 01/06 0700  In: -   Out: 2000 [Urine:2000]    Physical Exam:   GENERAL: alert, cooperative, no distress, appears stated age  EYE: negative  LYMPHATIC: Cervical, supraclavicular, and axillary nodes normal.   THROAT & NECK: normal and no erythema or exudates noted. LUNG: bilateral air entry, basal rales - 02 90% on 2 liters   HEART: regular rate and rhythm, S1, S2 normal, no murmur, click, rub or gallop  ABDOMEN: soft, non-tender. Bowel sounds normal. No masses,  no organomegaly  EXTREMITIES: cachectic, no edema   NEUROLOGIC: negative  PSYCHIATRIC: non focal    Lab/Data Review: All lab results for the last 24 hours reviewed.      Assessment:     Active Hospital Problems    Diagnosis Date Noted    Sepsis ([address] 01/06/2020    Severe protein-calorie malnutrition ([facility] 11/03/2019    DNR (do not resuscitate) 11/02/2019    HCAP (healthcare-associated pneumonia) 09/01/2019    V-tach ([facility] [facility] 05/06/2019    Chronic respiratory failure with hypoxia ([facility] [address] [facility] 03/29/2016     Continue to wear 2 L nasal cannula at night. May use 2 L supplemental oxygen to maintain sats greater than 90%.       COPD (chronic obstructive pulmonary disease) ([facility] [address] [facility] 12/10/2015    Essential hypertension, benign 12/10/2015     Plan:     -Sepsis due to HCAP:  Wean 2 to his home baseline  Continue iv levaquin  Duonebs, mucinex  Monitor blood cultures  Check sputum cultures  Daily labs    - COPD   -Chronic respiratory failure on home oxygen:  Continue home meds     -Essential hypertension: home meds.     - Severe protein-calorie malnutrition: nutritionist consult    DVT ppx: heparin sq    Code status: DNR/DNI    Disposition: back to [facility] once medically ready        Signed By: Deidra Serrano MD     January 6, 2020

## 2020-01-06 NOTE — PROGRESS NOTES
Spoke with Dr. Shelton Lan about patient stating he is in an out cath every 8 hours at [facility]. SBAR noted states patient has chronic urinary retention and is in and out cath three times daily. Order received to insert li catheter.

## 2020-01-06 NOTE — PROGRESS NOTES
Interdisciplinary team rounds were held 1/6/2020 with the following team members:Care Management, Nursing, Nutrition, Pharmacy, Physical Therapy and Physician. Plan of care discussed. See clinical pathway and/or care plan for interventions and desired outcomes.

## 2020-01-06 NOTE — PROGRESS NOTES
Shift assessment complete. Pt resting in bed. A&Ox4. Respirations present, even, unlabored. Lung sounds diminished on auscultation. HR regular, S1&S2 auscultated. IVF infusing without difficulty. Rojas in place. Pt denies pain at this time. Bed in lowest position, call light within reach, side rails x3. Encouraged to call for help when needed.

## 2020-01-06 NOTE — H&P
HOSPITALIST H&P  NAME:  Chai Machuca   Age:  80 y.o.  :   1932   MRN:   [id number]  PCP: Jose Beck MD  Treatment Team: Attending Provider: Mil Flores MD; Primary Nurse: Michelle Dorsey RN    Prior     CC: Reason for admission is: CAP    HPI:   Patient history was obtained from the ER provider prior to seeing the patient. Patient is a 80 y.o. male who presents to the ER due to fever, shortness of breath, and cough. He comes from [facility]. He states his symptoms started about 3 days ago and have been getting gradually worse. EMS reported that his O2 saturation on room air was 88 percent. He does report a productive cough with some phlegm at times and occasionally sharp left-sided chest pains with coughing. He has actually been having ongoing lung/pulmonary issues since May of last year when he developed a pneumothorax. He denies fever/chills, nausea/vomiting. ROS:  All systems have been reviewed and are negative except as stated in HPI or elsewhere. 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 HRCT    Abnormality of urethral meatus 2019    Allergic rhinitis 12/10/2015    Asthma     Benign neoplasm     Benign prostatic hyperplasia 12/10/2015    Bigeminy 3/28/2016    Overview:  Reported by M.D. 5 physician. Last Assessment & Plan:  EKG not done. I placed the patient on telemetry today and is having fairly frequent PVCs. We'll check BMP and magnesium. Overview:  Reported by M.D. 5 physician. Last Assessment & Plan:  EKG not done. I placed the patient on telemetry today and is having fairly frequent PVCs. We'll check BMP and magnesium.     BPH without urinary obstruction     Cardiovascular disease 12/10/2015    Chronic obstructive asthma with exacerbation ([address] 12/10/2015    Closed compression fracture of lumbosacral spine ([address] 2018    COPD (chronic obstructive pulmonary disease) ([address] 12/10/2015    COPD (chronic obstructive pulmonary disease) with emphysema ([address] 10/17/2016    Last Assessment & Plan:  Not currently on any maintence medication regimen for COPD as he felt them to be unhelpful in the past.  I recommended a trial of a ANoro Ellipta which he should take 1 inhalation daily. Demonstration was completed on the correct method of administration and he was able to return demonstration independently in the office today and administer his first dose.   I have given     COPD exacerbation ([address] 2019    Cough with hemoptysis     Erectile dysfunction 12/10/2015    Essential hypertension, benign 12/10/2015    Fracture 10/2014    of left hand and ribs after fall on concrete    History of colon polyps     Low testosterone 12/10/2015    Lumbago     Memory loss     Overflow incontinence     Pleural effusion 6/10/2019    6/10/19 Right thoracentesis:  800 cc of yellow fluid       Pneumothorax on right 2019    Raynaud's syndrome 12/10/2015    Rotator cuff tendinitis     Thrombocytopenia (HCC)     Urinary frequency     Ventricular tachyarrhythmia ([address] 2019    VT (ventricular tachycardia) ([address] 2019      Past Surgical History:   Procedure Laterality Date    HX CATARACT REMOVAL  2016-right    HX COLONOSCOPY      HX FRACTURE TX  10/2014    of left hand and ribs are fall on concrete    [address] HX ORTHOPAEDIC  2018    hip fracture      Social History     Tobacco Use    Smoking status: Former Smoker     Packs/day: 2.00     Years: 60.00     Pack years: 120.00     Types: Cigarettes     Last attempt to quit: 1994     Years since quittin.0    Smokeless tobacco: Never Used   Substance Use Topics    Alcohol use: Yes     Comment: occasional      Family History   Problem Relation Age of Onset    Dementia Mother     Cancer Father         lung cancer    Heart Attack Father        FH Reviewed and non-contributory to admitting diagnosis    No Known Allergies   Prior to Admission Medications   Prescriptions Last Dose Informant Patient Reported? Taking? Biotin 2,500 mcg cap   Yes No   Sig: Take  by mouth. DULoxetine (CYMBALTA) 30 mg capsule   Yes No   Sig: Take 30 mg by mouth two (2) times a day. Oxygen   Yes No   Si lpm cont. acetaminophen (TYLENOL) 650 mg TbER   Yes No   Sig: Take 650 mg by mouth every eight (8) hours. albuterol (PROVENTIL HFA, VENTOLIN HFA, PROAIR HFA) 90 mcg/actuation inhaler   Yes No   Sig: Take 2 Puffs by inhalation every four (4) hours as needed for Wheezing. albuterol-ipratropium (DUO-NEB) 2.5 mg-0.5 mg/3 ml nebu   Yes No   Sig: 3 mL by Nebulization route every four (4) hours as needed for Wheezing. aspirin delayed-release 81 mg tablet   Yes No   Sig: Take  by mouth daily. cyanocobalamin (VITAMIN B12) 1,000 mcg/mL injection   Yes No   Sig: Once month   famotidine (PEPCID) 10 mg tablet   Yes No   Sig: Take 10 mg by mouth two (2) times a day. finasteride (PROSCAR) 5 mg tablet   No No   Sig: Take 1 Tab by mouth daily. fluconazole (DIFLUCAN) 100 mg tablet   Yes No   Sig: Take 200 mg by mouth daily. FDA advises cautious prescribing of oral fluconazole in pregnancy. fluticasone (FLONASE) 50 mcg/actuation nasal spray   No No   Si Sprays by Both Nostrils route daily. fluticasone furoate-vilanterol (BREO ELLIPTA) 100-25 mcg/dose inhaler   No No   Si inhalation daily, rinse mouth after use   furosemide (LASIX) 40 mg tablet   No No   Sig: Take 1 Tab by mouth daily. guaiFENesin ER (MUCINEX) 600 mg ER tablet   Yes No   Sig: Take 1,200 mg by mouth daily. magnesium oxide 250 mg magnesium tablet   Yes No   Sig: Take 250 mg by mouth daily.    melatonin 3 mg tablet   Yes No   Sig: Take  by mouth.   montelukast (SINGULAIR) 10 mg tablet   No No   Sig: Take 1 Tab by mouth daily. Patient taking differently: Take 10 mg by mouth nightly. nystatin (MYCOSTATIN) powder   Yes No   Sig: Apply  to affected area four (4) times daily. potassium chloride (K-DUR, KLOR-CON) 20 mEq tablet   No No   Sig: Take 2 Tabs by mouth daily. Patient taking differently: Take 20 mEq by mouth daily. raNITIdine (ZANTAC) 75 mg tab   Yes No   Sig: Take  by mouth two (2) times a day. Facility-Administered Medications: None         Objective:     No intake or output data in the 24 hours ending 20   Temp (24hrs), Av.5 °F (38.1 °C), Min:100.5 °F (38.1 °C), Max:100.5 °F (38.1 °C)    Oxygen Therapy  O2 Sat (%): 96 % (20)  Pulse via Oximetry: 84 beats per minute (20)  O2 Device: Nasal cannula (20)  O2 Flow Rate (L/min): 4 l/min (20)   Body mass index is 18.25 kg/m². Patient Vitals for the past 24 hrs:   Temp Pulse Resp BP SpO2   20     96 %   20 1805  82 21 121/72 98 %   20 1756     96 %   20 1717 (!) 100.5 °F (38.1 °C) 84 24 (!) 207/111 93 %     Physical Exam:    General:    WD and WN, No apparent distress. Head:   Normocephalic, without obvious abnormality, atraumatic. Eyes:  PERRL; EOMI; sclera normal/non-icteric  ENT:  Hearing is normal.  Oropharynx is clear with tacky mucous membranes   Resp:    Rhonchi on the right; diffuse wheezing that is mild at this point Resp are even and unlabored on supplemental oxygen  Heart[de-identified]  Regular rate and rhythm,  no murmur,   No LE edema  Abdomen:   Soft, non-tender. Not distended. Bowel sounds normal.  hepato-splenomegaly -none   Musc/SK: Muscle strength is good and tone normal; No cyanosis. No clubbing  Skin:     Texture, turgor normal. No significant rashes or lesions.    Capillary refill < 2 sec  Neurologic: CN II - XII are grossly intact - Eye exam as noted above  Psych: Alert and oriented x 4;  Judgement and insight are normal     Data Review:   Recent Results (from the past 24 hour(s))   POC LACTIC ACID    Collection Time: 01/05/20  5:51 PM   Result Value Ref Range    Lactic Acid (POC) 2.06 (H) 0.5 - 1.9 mmol/L   CBC WITH AUTOMATED DIFF    Collection Time: 01/05/20  6:02 PM   Result Value Ref Range    WBC 24.1 (H) 4.3 - 11.1 K/uL    RBC 4.11 (L) 4.23 - 5.6 M/uL    HGB 13.4 (L) 13.6 - 17.2 g/dL    HCT 42.2 41.1 - 50.3 %    .7 (H) 79.6 - 97.8 FL    MCH 32.6 26.1 - 32.9 PG    MCHC 31.8 31.4 - 35.0 g/dL    RDW 15.5 (H) 11.9 - 14.6 %    PLATELET 629 933 - 697 K/uL    MPV 9.7 9.4 - 12.3 FL    ABSOLUTE NRBC 0.00 0.0 - 0.2 K/uL    DF AUTOMATED      NEUTROPHILS 83 (H) 43 - 78 %    LYMPHOCYTES 10 (L) 13 - 44 %    MONOCYTES 5 4.0 - 12.0 %    EOSINOPHILS 0 (L) 0.5 - 7.8 %    BASOPHILS 0 0.0 - 2.0 %    IMMATURE GRANULOCYTES 1 0.0 - 5.0 %    ABS. NEUTROPHILS 20.1 (H) 1.7 - 8.2 K/UL    ABS. LYMPHOCYTES 2.5 0.5 - 4.6 K/UL    ABS. MONOCYTES 1.3 0.1 - 1.3 K/UL    ABS. EOSINOPHILS 0.1 0.0 - 0.8 K/UL    ABS. BASOPHILS 0.0 0.0 - 0.2 K/UL    ABS. IMM. GRANS. 0.2 0.0 - 0.5 K/UL   METABOLIC PANEL, COMPREHENSIVE    Collection Time: 01/05/20  6:02 PM   Result Value Ref Range    Sodium 133 (L) 136 - 145 mmol/L    Potassium 4.4 3.5 - 5.1 mmol/L    Chloride 100 98 - 107 mmol/L    CO2 27 21 - 32 mmol/L    Anion gap 6 (L) 7 - 16 mmol/L    Glucose 119 (H) 65 - 100 mg/dL    BUN 25 (H) 8 - 23 MG/DL    Creatinine 0.68 (L) 0.8 - 1.5 MG/DL    GFR est AA >60 >60 ml/min/1.73m2    GFR est non-AA >60 >60 ml/min/1.73m2    Calcium 9.0 8.3 - 10.4 MG/DL    Bilirubin, total 0.7 0.2 - 1.1 MG/DL    ALT (SGPT) 26 12 - 65 U/L    AST (SGOT) 28 15 - 37 U/L    Alk.  phosphatase 130 50 - 136 U/L    Protein, total 7.3 6.3 - 8.2 g/dL    Albumin 3.4 3.2 - 4.6 g/dL    Globulin 3.9 (H) 2.3 - 3.5 g/dL    A-G Ratio 0.9 (L) 1.2 - 3.5     NT-PRO BNP    Collection Time: 01/05/20  6:02 PM   Result Value Ref Range    NT pro- (H) <450 PG/ML     CXR Results  (Last 48 hours)               01/05/20 1814  XR CHEST PORT Final result    Impression:  IMPRESSION:    Redemonstrated basilar predominant coarsened reticular lung markings with a mild   increase in a right basilar patchy airspace opacity which may represent   atelectasis however cannot exclude pneumonia. Narrative:  EXAM: Single view chest radiograph. INDICATION: 87 years dyspnea    COMPARISON: Chest radiograph dated December 11, 2019. FINDINGS:   No pneumothorax or pleural effusion. Redemonstrated basilar predominant   coarsened reticular lung markings the left lower lobe hazy airspace opacity   which appears unchanged from prior. Slight increase in a right basilar patchy   airspace opacity. No new pulmonary consolidation. Heart appears normal in size. CT Results  (Last 48 hours)    None              Assessment and Plan: Active Hospital Problems    Diagnosis Date Noted    CAP (community acquired pneumonia) 01/05/2020    Severe protein-calorie malnutrition ([facility] 11/03/2019    [facility] 03/29/2016     Continue to wear 2 L nasal cannula at night. May use 2 L supplemental oxygen to maintain sats greater than 90%.  COPD (chronic obstructive pulmonary disease) ([facility] 12/10/2015    Essential hypertension, benign 12/10/2015     Principal Problem:    CAP (community acquired pneumonia) (1/5/2020)  IV antibiotics, IV fluids    Active Problems:    COPD (chronic obstructive pulmonary disease) ([facility] (12/10/2015)    Chronic condition is stable, but may affect hospital stay; continue home medications with the following changes, if any:    Will continue to monitor and adjust treatment as needed. Essential hypertension, benign (12/10/2015)    Continue home meds and add prn hydralazine, if needed.       Chronic respiratory failure with hypoxia ([facility]) (3/29/2016)    Chronic condition is stable, but may affect hospital stay; continue home medications with the following changes, if any:    Will continue to monitor and adjust treatment as needed. Severe protein-calorie malnutrition ([facility] (11/3/2019)    chronic        · PLAN General  ·   · Cont appropriate home meds (see MAR)  · Control symptoms (pain, n/v, fever, etc)  · Monitor appropriate labs   · DVT prophylaxis:  Lovenox  · Code status: Full;  HCPOA:   · Risk: high  · Anticipated DC needs:  · Estimated LOS:  Greater than 2 midnights  · Plans discussed with patient and/or caregiver; questions answered.       Med records reviewed if applicable; findings:     Critical care time if applicable:      Signed By: Terrance Drummond MD     January 5, 2020

## 2020-01-06 NOTE — PROGRESS NOTES
01/05/20 2258   Dual Skin Pressure Injury Assessment   Dual Skin Pressure Injury Assessment X   Second Care Provider (Based on [address] [place] [address]) Destiney ROA   Skin Integumentary   Skin Integumentary (WDL) X   Skin Color Ecchymosis (comment); Other (comment)   Skin Condition/Temp Warm;Dry   Skin Integrity Wound (add Wound LDA); Other (comment)  (skin tear right hand, left elbow, scab head, right arm)   Turgor Epidermis thin w/ loss of subcut tissue

## 2020-01-06 NOTE — WOUND CARE
Right hand and left forearm with skin tears, skin flap removed from each at fall, currently covered with foam dressing, would continue with every other day dressing change. Will monitor.

## 2020-01-06 NOTE — PROGRESS NOTES
In bed resting quietly. Rojas draining yellow/redish urine. No distress observed. 4L of O2 on via nasal cannula.

## 2020-01-06 NOTE — PROGRESS NOTES
Care Management Interventions  Transition of Care Consult (CM Consult): Discharge Planning  Current Support Network: Nursing Facility  Confirm Follow Up Transport: Other (see comment)  Discharge Location  Discharge Placement: Skilled nursing facility      Chart reviewed. LATHA spoke with Marly at [facility] who states pt is a long term resident. Pt is a pvt pay bed hold and will return to room ( [address] ).

## 2020-01-06 NOTE — PROGRESS NOTES
TRANSFER - IN REPORT:    Verbal report received from [facility] on [facility]  being received from ER for routine progression of care      Report consisted of patients Situation, Background, Assessment and   Recommendations(SBAR). Information from the following report(s) Kardex, ED Summary, Intake/Output, MAR and Recent Results was reviewed with the receiving nurse. Opportunity for questions and clarification was provided. Assessment completed upon patients arrival to unit and care assumed.

## 2020-01-06 NOTE — ED PROVIDER NOTES
44-year-old gentleman presents from [place] [id number] with fever dyspnea and cough. Symptom onset about 3 days ago. When EMS picked him up today's oxygen saturation was 88%. He has had a cough productive of some phlegm and some sharp left-sided chest pain. Patient has been in and out of nursing homes/hospitals for roughly the last 8 months initially secondary to a pneumothorax.     No nausea or vomiting           Past Medical History:   Diagnosis Date    Abdominal aortic aneurysm ([address] 12/10/2015    In 2005    Abdominal aortic aneurysm without rupture ([address]     Allergic rhinitis 12/10/2015    Asthma     Benign neoplasm     Benign prostatic hyperplasia 12/10/2015    BPH without urinary obstruction     Cardiovascular disease 12/10/2015    Chronic obstructive asthma with exacerbation ([address] 12/10/2015    COPD (chronic obstructive pulmonary disease) ([address] 12/10/2015    COPD exacerbation ([address] 5/6/2019    Cough with hemoptysis     Erectile dysfunction 12/10/2015    Essential hypertension, benign 12/10/2015    Fracture 10/2014    of left hand and ribs after fall on concrete    History of colon polyps     Low testosterone 12/10/2015    Lumbago     Memory loss     Overflow incontinence     Pneumothorax on right 5/6/2019    Raynaud's syndrome 12/10/2015    Rotator cuff tendinitis     Thrombocytopenia (HCC)     Urinary frequency        Past Surgical History:   Procedure Laterality Date    HX CATARACT REMOVAL  6/2016-right    HX COLONOSCOPY      HX FRACTURE [place]  10/2014    of left hand and ribs are fall on concrete    [facility]    HX ORTHOPAEDIC  03/2018    hip fracture         Family History:   Problem Relation Age of Onset    Dementia Mother     Cancer Father         lung cancer    Heart Attack Father        Social History     Socioeconomic History    Marital status:      Spouse name: Not on file    Number of children: Not on file    Years of education: Not on file   Allyssa Arriaga Highest education level: Not on file   Occupational History    Not on file   Social Needs    Financial resource strain: Not on file    Food insecurity:     Worry: Not on file     Inability: Not on file    Transportation needs:     Medical: Not on file     Non-medical: Not on file   Tobacco Use    Smoking status: Former Smoker     Packs/day: 2.00     Years: 60.00     Pack years: 120.00     Types: Cigarettes     Last attempt to quit: 1994     Years since quittin.0    Smokeless tobacco: Never Used   Substance and Sexual Activity    Alcohol use: Yes     Comment: occasional    Drug use: Not on file    Sexual activity: Not on file   Lifestyle    Physical activity:     Days per week: Not on file     Minutes per session: Not on file    Stress: Not on file   Relationships    Social connections:     Talks on phone: Not on file     Gets together: Not on file     Attends Taoism service: Not on file     Active member of club or organization: Not on file     Attends meetings of clubs or organizations: Not on file     Relationship status: Not on file    Intimate partner violence:     Fear of current or ex partner: Not on file     Emotionally abused: Not on file     Physically abused: Not on file     Forced sexual activity: Not on file   Other Topics Concern    Not on file   Social History Narrative    Not on file         ALLERGIES: Patient has no known allergies. Review of Systems   Constitutional: Positive for fatigue and fever. Negative for chills. HENT: Negative for rhinorrhea and sore throat. Eyes: Negative for discharge and redness. Respiratory: Positive for cough, shortness of breath and wheezing. Cardiovascular: Positive for chest pain. Negative for palpitations. Gastrointestinal: Negative for abdominal pain, diarrhea, nausea and vomiting. Musculoskeletal: Negative for arthralgias and back pain. Skin: Negative for rash. Neurological: Negative for dizziness and headaches.    All other systems reviewed and are negative. Vitals:    01/05/20 1717 01/05/20 1756 01/05/20 1805 01/05/20 1821   BP: (!) 207/111  121/72    Pulse: 84  82    Resp: 24  21    Temp: (!) 100.5 °F (38.1 °C)      SpO2: 93% 96% 98% 96%   Weight: 54.4 kg (120 lb)      Height: 5' 8\" (1.727 m)               Physical Exam  Vitals signs and nursing note reviewed. Constitutional:       General: He is not in acute distress. Appearance: Normal appearance. He is well-developed. He is not ill-appearing, toxic-appearing or diaphoretic. HENT:      Head: Normocephalic and atraumatic. Eyes:      General: No scleral icterus. Right eye: No discharge. Left eye: No discharge. Conjunctiva/sclera: Conjunctivae normal.      Pupils: Pupils are equal, round, and reactive to light. Neck:      Musculoskeletal: Normal range of motion and neck supple. Cardiovascular:      Rate and Rhythm: Normal rate and regular rhythm. Heart sounds: Normal heart sounds. No murmur. No gallop. Pulmonary:      Effort: Pulmonary effort is normal. No respiratory distress. Breath sounds: Examination of the right-middle field reveals rales. Examination of the right-lower field reveals rales. Wheezing and rales present. Abdominal:      General: Bowel sounds are normal.      Palpations: Abdomen is soft. Tenderness: There is no tenderness. There is no guarding. Musculoskeletal: Normal range of motion. Skin:     General: Skin is warm and dry. Neurological:      General: No focal deficit present. Mental Status: He is alert and oriented to person, place, and time. Mental status is at baseline. Motor: No abnormal muscle tone.       Comments: cni 2-12 grossly   Psychiatric:         Mood and Affect: Mood normal.         Behavior: Behavior normal.          MDM  Number of Diagnoses or Management Options  Diagnosis management comments: Medical decision making note:  80-year-old gentleman with hypoxia dyspnea cough.  White count is 24,000. Will treat for pneumonia and secondary to respiratory fragility / age will admit initially. This concludes the \"medical decision making note\" part of this emergency department visit note. Amount and/or Complexity of Data Reviewed  Clinical lab tests: reviewed and ordered (Results Include:    Recent Results (from the past 24 hour(s))  -POC LACTIC ACID  Collection Time: 01/05/20  5:51 PM       Result                      Value             Ref Range           Lactic Acid (POC)           2.06 (H)          0.5 - 1.9 mm*  -CBC WITH AUTOMATED DIFF  Collection Time: 01/05/20  6:02 PM       Result                      Value             Ref Range           WBC                         24.1 (H)          4.3 - 11.1 K*       RBC                         4.11 (L)          4.23 - 5.6 M*       HGB                         13.4 (L)          13.6 - 17.2 *       HCT                         42.2              41.1 - 50.3 %       MCV                         102.7 (H)         79.6 - 97.8 *       MCH                         32.6              26.1 - 32.9 *       MCHC                        31.8              31.4 - 35.0 *       RDW                         15.5 (H)          11.9 - 14.6 %       PLATELET                    304               150 - 450 K/*       MPV                         9.7               9.4 - 12.3 FL       ABSOLUTE NRBC               0.00              0.0 - 0.2 K/*       DF                          AUTOMATED                             NEUTROPHILS                 83 (H)            43 - 78 %           LYMPHOCYTES                 10 (L)            13 - 44 %           MONOCYTES                   5                 4.0 - 12.0 %        EOSINOPHILS                 0 (L)             0.5 - 7.8 %         BASOPHILS                   0                 0.0 - 2.0 %         IMMATURE GRANULOCYTES       1                 0.0 - 5.0 %         ABS.  NEUTROPHILS            20.1 (H)          1.7 - 8.2 K/* ABS. LYMPHOCYTES            2.5               0.5 - 4.6 K/*       ABS. MONOCYTES              1.3               0.1 - 1.3 K/*       ABS. EOSINOPHILS            0.1               0.0 - 0.8 K/*       ABS. BASOPHILS              0.0               0.0 - 0.2 K/*       ABS. IMM. GRANS.            0.2               0.0 - 0.5 K/*  -METABOLIC PANEL, COMPREHENSIVE  Collection Time: 01/05/20  6:02 PM       Result                      Value             Ref Range           Sodium                      133 (L)           136 - 145 mm*       Potassium                   4.4               3.5 - 5.1 mm*       Chloride                    100               98 - 107 mmo*       CO2                         27                21 - 32 mmol*       Anion gap                   6 (L)             7 - 16 mmol/L       Glucose                     119 (H)           65 - 100 mg/*       BUN                         25 (H)            8 - 23 MG/DL        Creatinine                  0.68 (L)          0.8 - 1.5 MG*       GFR est AA                  >60               >60 ml/min/1*       GFR est non-AA              >60               >60 ml/min/1*       Calcium                     9.0               8.3 - 10.4 M*       Bilirubin, total            0.7               0.2 - 1.1 MG*       ALT (SGPT)                  26                12 - 65 U/L         AST (SGOT)                  28                15 - 37 U/L         Alk.  phosphatase            130               50 - 136 U/L        Protein, total              7.3               6.3 - 8.2 g/*       Albumin                     3.4               3.2 - 4.6 g/*       Globulin                    3.9 (H)           2.3 - 3.5 g/*       A-G Ratio                   0.9 (L)           1.2 - 3.5      -NT-PRO BNP  Collection Time: 01/05/20  6:02 PM       Result                      Value             Ref Range           NT pro-BNP                  710 (H)           <450 PG/ML     )  Tests in the radiology section of CPT®: reviewed and ordered (XR CHEST PORT   Final Result    IMPRESSION:     Redemonstrated basilar predominant coarsened reticular lung markings with a mild increase in a right basilar patchy airspace opacity which may represent  atelectasis however cannot exclude pneumonia.         )           Procedures

## 2020-01-06 NOTE — PROGRESS NOTES
Patient alert and oriented x 4. Able to state name, date of birth, time, and situation. Respirations even and unlabored. Lungs sounds diminished with productive yellow cough. O2 @ 4L continually. Denies any pain. Oriented to call light system, medication, plan of care, diet order. Patient sitting in bed eating turkey tray at this time. Call light in reach. Bed in low position. Side rails up x 3.

## 2020-01-06 NOTE — PROGRESS NOTES
Problem: Falls - Risk of  Goal: *Absence of Falls  Description  Document Kyel Campoverde Fall Risk and appropriate interventions in the flowsheet. Outcome: Progressing Towards Goal  Note: Fall Risk Interventions:  Mobility Interventions: Bed/chair exit alarm         Medication Interventions: Bed/chair exit alarm    Elimination Interventions: Bed/chair exit alarm              Problem: Pressure Injury - Risk of  Goal: *Prevention of pressure injury  Description  Document Brant Scale and appropriate interventions in the flowsheet.   Outcome: Progressing Towards Goal  Note: Pressure Injury Interventions:       Moisture Interventions: Absorbent underpads, Apply protective barrier, creams and emollients    Activity Interventions: Increase time out of bed    Mobility Interventions: Pressure redistribution bed/mattress (bed type)    Nutrition Interventions: Offer support with meals,snacks and hydration    Friction and Shear Interventions: Apply protective barrier, creams and emollients                Problem: Pneumonia: Day 1  Goal: Activity/Safety  Outcome: Progressing Towards Goal  Goal: Consults, if ordered  Outcome: Progressing Towards Goal  Goal: Diagnostic Test/Procedures  Outcome: Progressing Towards Goal  Goal: Nutrition/Diet  Outcome: Progressing Towards Goal  Goal: Medications  Outcome: Progressing Towards Goal  Goal: Respiratory  Outcome: Progressing Towards Goal  Goal: Treatments/Interventions/Procedures  Outcome: Progressing Towards Goal  Goal: Psychosocial  Outcome: Progressing Towards Goal  Goal: *Oxygen saturation within defined limits  Outcome: Progressing Towards Goal  Goal: *Influenza vaccine administered (October-March)  Outcome: Progressing Towards Goal  Goal: *Pneumoccocal vaccine administered  Outcome: Progressing Towards Goal  Goal: *Hemodynamically stable  Outcome: Progressing Towards Goal  Goal: *Demonstrates progressive activity  Outcome: Progressing Towards Goal  Goal: *Tolerating diet  Outcome: Progressing Towards Goal

## 2020-01-06 NOTE — PROGRESS NOTES
Problem: Falls - Risk of  Goal: *Absence of Falls  Description  Document Bishop Villarreal Fall Risk and appropriate interventions in the flowsheet. Outcome: Progressing Towards Goal  Note: Fall Risk Interventions:  Mobility Interventions: Bed/chair exit alarm, Patient to call before getting OOB              Elimination Interventions: Call light in reach, Patient to call for help with toileting needs              Problem: Patient Education: Go to Patient Education Activity  Goal: Patient/Family Education  Outcome: Progressing Towards Goal     Problem: Pressure Injury - Risk of  Goal: *Prevention of pressure injury  Description  Document Brant Scale and appropriate interventions in the flowsheet.   Outcome: Progressing Towards Goal  Note: Pressure Injury Interventions:       Moisture Interventions: Absorbent underpads, Moisture barrier    Activity Interventions: Pressure redistribution bed/mattress(bed type), PT/OT evaluation    Mobility Interventions: HOB 30 degrees or less, Pressure redistribution bed/mattress (bed type), PT/OT evaluation    Nutrition Interventions: Document food/fluid/supplement intake    Friction and Shear Interventions: Apply protective barrier, creams and emollients                Problem: Patient Education: Go to Patient Education Activity  Goal: Patient/Family Education  Outcome: Progressing Towards Goal     Problem: Pneumonia: Day 1  Goal: Off Pathway (Use only if patient is Off Pathway)  Outcome: Progressing Towards Goal  Goal: Activity/Safety  Outcome: Progressing Towards Goal  Goal: Consults, if ordered  Outcome: Progressing Towards Goal  Goal: Diagnostic Test/Procedures  Outcome: Progressing Towards Goal  Goal: Nutrition/Diet  Outcome: Progressing Towards Goal  Goal: Medications  Outcome: Progressing Towards Goal  Goal: Respiratory  Outcome: Progressing Towards Goal  Goal: Treatments/Interventions/Procedures  Outcome: Progressing Towards Goal  Goal: Psychosocial  Outcome: Progressing Towards Goal  Goal: *Oxygen saturation within defined limits  Outcome: Progressing Towards Goal  Goal: *Influenza vaccine administered (October-March)  Outcome: Progressing Towards Goal  Goal: *Pneumoccocal vaccine administered  Outcome: Progressing Towards Goal  Goal: *Hemodynamically stable  Outcome: Progressing Towards Goal  Goal: *Demonstrates progressive activity  Outcome: Progressing Towards Goal  Goal: *Tolerating diet  Outcome: Progressing Towards Goal     Problem: Pneumonia: Day 2  Goal: Off Pathway (Use only if patient is Off Pathway)  Outcome: Progressing Towards Goal  Goal: Activity/Safety  Outcome: Progressing Towards Goal  Goal: Consults, if ordered  Outcome: Progressing Towards Goal  Goal: Diagnostic Test/Procedures  Outcome: Progressing Towards Goal  Goal: Nutrition/Diet  Outcome: Progressing Towards Goal  Goal: Discharge Planning  Outcome: Progressing Towards Goal  Goal: Medications  Outcome: Progressing Towards Goal  Goal: Respiratory  Outcome: Progressing Towards Goal  Goal: Treatments/Interventions/Procedures  Outcome: Progressing Towards Goal  Goal: Psychosocial  Outcome: Progressing Towards Goal  Goal: *Oxygen saturation within defined limits  Outcome: Progressing Towards Goal  Goal: *Hemodynamically stable  Outcome: Progressing Towards Goal  Goal: *Demonstrates progressive activity  Outcome: Progressing Towards Goal  Goal: *Tolerating diet  Outcome: Progressing Towards Goal  Goal: *Optimal pain control at patient's stated goal  Outcome: Progressing Towards Goal     Problem: Pneumonia: Day 3  Goal: Off Pathway (Use only if patient is Off Pathway)  Outcome: Progressing Towards Goal  Goal: Activity/Safety  Outcome: Progressing Towards Goal  Goal: Consults, if ordered  Outcome: Progressing Towards Goal  Goal: Diagnostic Test/Procedures  Outcome: Progressing Towards Goal  Goal: Nutrition/Diet  Outcome: Progressing Towards Goal  Goal: Discharge Planning  Outcome: Progressing Towards Goal  Goal: Medications  Outcome: Progressing Towards Goal  Goal: Respiratory  Outcome: Progressing Towards Goal  Goal: Treatments/Interventions/Procedures  Outcome: Progressing Towards Goal  Goal: Psychosocial  Outcome: Progressing Towards Goal  Goal: *Oxygen saturation within defined limits  Outcome: Progressing Towards Goal  Goal: *Hemodynamically stable  Outcome: Progressing Towards Goal  Goal: *Demonstrates progressive activity  Outcome: Progressing Towards Goal  Goal: *Tolerating diet  Outcome: Progressing Towards Goal  Goal: *Describes available resources and support systems  Outcome: Progressing Towards Goal  Goal: *Optimal pain control at patient's stated goal  Outcome: Progressing Towards Goal     Problem: Pneumonia: Day 4  Goal: Off Pathway (Use only if patient is Off Pathway)  Outcome: Progressing Towards Goal  Goal: Activity/Safety  Outcome: Progressing Towards Goal  Goal: Nutrition/Diet  Outcome: Progressing Towards Goal  Goal: Discharge Planning  Outcome: Progressing Towards Goal  Goal: Medications  Outcome: Progressing Towards Goal  Goal: Respiratory  Outcome: Progressing Towards Goal  Goal: Treatments/Interventions/Procedures  Outcome: Progressing Towards Goal  Goal: Psychosocial  Outcome: Progressing Towards Goal     Problem: Pneumonia: Discharge Outcomes  Goal: *Demonstrates progressive activity  Outcome: Progressing Towards Goal  Goal: *Describes follow-up/return visits to physicians  Outcome: Progressing Towards Goal  Goal: *Tolerating diet  Outcome: Progressing Towards Goal  Goal: *Verbalizes name, dosage, time, side effects, and number of days to continue medications  Outcome: Progressing Towards Goal  Goal: *Influenza immunization  Outcome: Progressing Towards Goal  Goal: *Pneumococcal immunization  Outcome: Progressing Towards Goal  Goal: *Respiratory status at baseline  Outcome: Progressing Towards Goal  Goal: *Vital signs within defined limits  Outcome: Progressing Towards Goal  Goal: *Describes available resources and support systems  Outcome: Progressing Towards Goal  Goal: *Optimal pain control at patient's stated goal  Outcome: Progressing Towards Goal

## 2020-01-07 LAB
ANION GAP SERPL CALC-SCNC: 4 MMOL/L (ref 7–16)
BASOPHILS # BLD: 0 K/UL (ref 0–0.2)
BASOPHILS NFR BLD: 0 % (ref 0–2)
BUN SERPL-MCNC: 20 MG/DL (ref 8–23)
CALCIUM SERPL-MCNC: 7.9 MG/DL (ref 8.3–10.4)
CHLORIDE SERPL-SCNC: 105 MMOL/L (ref 98–107)
CO2 SERPL-SCNC: 26 MMOL/L (ref 21–32)
CREAT SERPL-MCNC: 0.5 MG/DL (ref 0.8–1.5)
DIFFERENTIAL METHOD BLD: ABNORMAL
EOSINOPHIL # BLD: 0.1 K/UL (ref 0–0.8)
EOSINOPHIL NFR BLD: 1 % (ref 0.5–7.8)
ERYTHROCYTE [DISTWIDTH] IN BLOOD BY AUTOMATED COUNT: 15.6 % (ref 11.9–14.6)
GLUCOSE SERPL-MCNC: 97 MG/DL (ref 65–100)
HCT VFR BLD AUTO: 30.7 % (ref 41.1–50.3)
HGB BLD-MCNC: 9.7 G/DL (ref 13.6–17.2)
IMM GRANULOCYTES # BLD AUTO: 0.1 K/UL (ref 0–0.5)
IMM GRANULOCYTES NFR BLD AUTO: 1 % (ref 0–5)
LYMPHOCYTES # BLD: 1.3 K/UL (ref 0.5–4.6)
LYMPHOCYTES NFR BLD: 11 % (ref 13–44)
MCH RBC QN AUTO: 32.3 PG (ref 26.1–32.9)
MCHC RBC AUTO-ENTMCNC: 31.6 G/DL (ref 31.4–35)
MCV RBC AUTO: 102.3 FL (ref 79.6–97.8)
MONOCYTES # BLD: 1.3 K/UL (ref 0.1–1.3)
MONOCYTES NFR BLD: 11 % (ref 4–12)
NEUTS SEG # BLD: 9.1 K/UL (ref 1.7–8.2)
NEUTS SEG NFR BLD: 77 % (ref 43–78)
NRBC # BLD: 0 K/UL (ref 0–0.2)
PLATELET # BLD AUTO: 189 K/UL (ref 150–450)
PMV BLD AUTO: 9.4 FL (ref 9.4–12.3)
POTASSIUM SERPL-SCNC: 3.6 MMOL/L (ref 3.5–5.1)
RBC # BLD AUTO: 3 M/UL (ref 4.23–5.6)
SODIUM SERPL-SCNC: 135 MMOL/L (ref 136–145)
WBC # BLD AUTO: 11.9 K/UL (ref 4.3–11.1)

## 2020-01-07 PROCEDURE — 94760 N-INVAS EAR/PLS OXIMETRY 1: CPT

## 2020-01-07 PROCEDURE — 80048 BASIC METABOLIC PNL TOTAL CA: CPT

## 2020-01-07 PROCEDURE — 74011250637 HC RX REV CODE- 250/637: Performed by: FAMILY MEDICINE

## 2020-01-07 PROCEDURE — 74011250637 HC RX REV CODE- 250/637: Performed by: INTERNAL MEDICINE

## 2020-01-07 PROCEDURE — 36415 COLL VENOUS BLD VENIPUNCTURE: CPT

## 2020-01-07 PROCEDURE — 77010033678 HC OXYGEN DAILY

## 2020-01-07 PROCEDURE — 74011000250 HC RX REV CODE- 250: Performed by: FAMILY MEDICINE

## 2020-01-07 PROCEDURE — 85025 COMPLETE CBC W/AUTO DIFF WBC: CPT

## 2020-01-07 PROCEDURE — 65270000029 HC RM PRIVATE

## 2020-01-07 PROCEDURE — 94640 AIRWAY INHALATION TREATMENT: CPT

## 2020-01-07 PROCEDURE — 74011250636 HC RX REV CODE- 250/636: Performed by: FAMILY MEDICINE

## 2020-01-07 RX ORDER — DOCUSATE SODIUM 100 MG/1
100 CAPSULE, LIQUID FILLED ORAL DAILY
Status: DISCONTINUED | OUTPATIENT
Start: 2020-01-07 | End: 2020-01-10 | Stop reason: HOSPADM

## 2020-01-07 RX ADMIN — DOCUSATE SODIUM 100 MG: 100 CAPSULE, LIQUID FILLED ORAL at 17:42

## 2020-01-07 RX ADMIN — ALBUTEROL SULFATE 2.5 MG: 2.5 SOLUTION RESPIRATORY (INHALATION) at 19:43

## 2020-01-07 RX ADMIN — DULOXETINE 30 MG: 30 CAPSULE, DELAYED RELEASE ORAL at 09:06

## 2020-01-07 RX ADMIN — POTASSIUM CHLORIDE 20 MEQ: 20 TABLET, EXTENDED RELEASE ORAL at 09:06

## 2020-01-07 RX ADMIN — ALBUTEROL SULFATE 2.5 MG: 2.5 SOLUTION RESPIRATORY (INHALATION) at 08:42

## 2020-01-07 RX ADMIN — BUDESONIDE 500 MCG: 0.5 INHALANT RESPIRATORY (INHALATION) at 19:43

## 2020-01-07 RX ADMIN — ALBUTEROL SULFATE 2.5 MG: 2.5 SOLUTION RESPIRATORY (INHALATION) at 13:24

## 2020-01-07 RX ADMIN — Medication 10 ML: at 05:23

## 2020-01-07 RX ADMIN — Medication 10 ML: at 21:23

## 2020-01-07 RX ADMIN — FAMOTIDINE 10 MG: 20 TABLET, FILM COATED ORAL at 09:06

## 2020-01-07 RX ADMIN — Medication 200 MG: at 09:06

## 2020-01-07 RX ADMIN — FAMOTIDINE 10 MG: 20 TABLET, FILM COATED ORAL at 17:42

## 2020-01-07 RX ADMIN — BUDESONIDE 500 MCG: 0.5 INHALANT RESPIRATORY (INHALATION) at 08:42

## 2020-01-07 RX ADMIN — DULOXETINE 30 MG: 30 CAPSULE, DELAYED RELEASE ORAL at 17:42

## 2020-01-07 RX ADMIN — Medication 10 ML: at 14:13

## 2020-01-07 RX ADMIN — GUAIFENESIN 1200 MG: 600 TABLET, EXTENDED RELEASE ORAL at 09:06

## 2020-01-07 RX ADMIN — LEVOFLOXACIN 750 MG: 5 INJECTION, SOLUTION INTRAVENOUS at 17:42

## 2020-01-07 RX ADMIN — FINASTERIDE 5 MG: 5 TABLET, FILM COATED ORAL at 09:06

## 2020-01-07 RX ADMIN — ENOXAPARIN SODIUM 40 MG: 40 INJECTION SUBCUTANEOUS at 21:22

## 2020-01-07 RX ADMIN — ASPIRIN 81 MG: 81 TABLET, COATED ORAL at 09:00

## 2020-01-07 RX ADMIN — MONTELUKAST 10 MG: 10 TABLET, FILM COATED ORAL at 21:22

## 2020-01-07 NOTE — PROGRESS NOTES
Problem: Falls - Risk of  Goal: *Absence of Falls  Description  Document Demetrius Mccray Fall Risk and appropriate interventions in the flowsheet. Outcome: Progressing Towards Goal  Note: Fall Risk Interventions:  Mobility Interventions: Bed/chair exit alarm         Medication Interventions: Bed/chair exit alarm    Elimination Interventions: Call light in reach              Problem: Pressure Injury - Risk of  Goal: *Prevention of pressure injury  Description  Document Brant Scale and appropriate interventions in the flowsheet.   Outcome: Progressing Towards Goal  Note: Pressure Injury Interventions:       Moisture Interventions: Apply protective barrier, creams and emollients, Absorbent underpads    Activity Interventions: Increase time out of bed    Mobility Interventions: Pressure redistribution bed/mattress (bed type)    Nutrition Interventions: Offer support with meals,snacks and hydration    Friction and Shear Interventions: Apply protective barrier, creams and emollients, Feet elevated on foot rest                Problem: Pneumonia: Day 3  Goal: Activity/Safety  Outcome: Progressing Towards Goal  Goal: Consults, if ordered  Outcome: Progressing Towards Goal  Goal: Diagnostic Test/Procedures  Outcome: Progressing Towards Goal  Goal: Nutrition/Diet  Outcome: Progressing Towards Goal  Goal: Discharge Planning  Outcome: Progressing Towards Goal  Goal: Medications  Outcome: Progressing Towards Goal  Goal: Respiratory  Outcome: Progressing Towards Goal  Goal: Treatments/Interventions/Procedures  Outcome: Progressing Towards Goal  Goal: Psychosocial  Outcome: Progressing Towards Goal  Goal: *Oxygen saturation within defined limits  Outcome: Progressing Towards Goal  Goal: *Hemodynamically stable  Outcome: Progressing Towards Goal  Goal: *Demonstrates progressive activity  Outcome: Progressing Towards Goal  Goal: *Tolerating diet  Outcome: Progressing Towards Goal  Goal: *Describes available resources and support systems  Outcome: Progressing Towards Goal  Goal: *Optimal pain control at patient's stated goal  Outcome: Progressing Towards Goal

## 2020-01-07 NOTE — PROGRESS NOTES
Shift assessment complete. A&Ox4. Respirations present, even, unlabored. Lung sounds diminished on auscultation. HR regular, S1&S2 auscultated. Skin tears scattered throughout with dressings c/d/i. IV capped and patent. Rojas in place. Pt denies pain at this time. Bed in lowest position, call light within reach, side rails x3. Encouraged to call for help when needed.

## 2020-01-07 NOTE — PROGRESS NOTES
Interdisciplinary team rounds were held 1/7/2020 with the following team members:Care Management, Nursing, Nutrition, Pharmacy, Physical Therapy and Physician. Plan of care discussed. See clinical pathway and/or care plan for interventions and desired outcomes.

## 2020-01-07 NOTE — PROGRESS NOTES
Hospitalist Note     Admit Date:  2020  5:16 PM   Name:  Vinod Marmolejo   Age:  80 y.o.  :  1932   MRN:  [id number]   PCP:  Mariaelena Hensley MD  Treatment Team: Attending Provider: Lyn Bonner MD; Utilization Review: [place]; : MATEO Barreto    HPI/Subjective:   Patient is seen and examined at bedside. No acute events reported overnight by nursing staff. Laying in bed comfortably. Denies fever, chills, chest pains, n/v, abdominal pain. Tolerating diet and but has not have any BM for about 2 days. Objective:     Patient Vitals for the past 24 hrs:   Temp Pulse Resp BP SpO2   20 1324     92 %   20 1145 97.6 °F (36.4 °C) 66 16 121/70 96 %   20 0843     93 %   20 0724 97.7 °F (36.5 °C) 72 16 150/81 94 %   20 0338 98.6 °F (37 °C) 71 18 131/63 91 %   20 2306 98 °F (36.7 °C) 71 18 115/51 90 %   20     96 %   20 1923 98.1 °F (36.7 °C) 74 20 134/82 95 %   20 1604 97.6 °F (36.4 °C) 77 18 105/61 90 %     Oxygen Therapy  O2 Sat (%): 92 % (20 1324)  Pulse via Oximetry: 71 beats per minute (20 132)  O2 Device: Nasal cannula (20 132)  O2 Flow Rate (L/min): 2 l/min (20 132)    Estimated body mass index is 18.25 kg/m² as calculated from the following:    Height as of this encounter: 5' 8\" (1.727 m). Weight as of this encounter: 54.4 kg (120 lb). Intake/Output Summary (Last 24 hours) at 2020 1549  Last data filed at 2020 1126  Gross per 24 hour   Intake    Output 625 ml   Net -625 ml       *Note that automatically entered I/Os may not be accurate; dependent on patient compliance with collection and accurate  by techs. Physical Exam:    General:     alert, awake, no acute distress. Thin/cachetic  Head:   normocephalic, atraumatic  Eyes, Ears, nose: PERRL, EOMI.    Neck:    supple, non-tender  Lungs:   CTAB, no wheezing, rhonchi, rales  Cardiac:   RRR, Normal S1 and S2. Abdomen:   Soft, non distended, nontender, +BS, no guarding/rebound  Extremities:   Warm, dry. No edema   Skin:   No rashes, no jaundice  Neuro:  Alert and oriented to person, time, place, situation. Psychiatric:  No anxiety, calm, cooperative    Data Review:  I have reviewed all labs, meds, and studies from the last 24 hours:    Recent Results (from the past 24 hour(s))   METABOLIC PANEL, BASIC    Collection Time: 01/07/20  5:32 AM   Result Value Ref Range    Sodium 135 (L) 136 - 145 mmol/L    Potassium 3.6 3.5 - 5.1 mmol/L    Chloride 105 98 - 107 mmol/L    CO2 26 21 - 32 mmol/L    Anion gap 4 (L) 7 - 16 mmol/L    Glucose 97 65 - 100 mg/dL    BUN 20 8 - 23 MG/DL    Creatinine 0.50 (L) 0.8 - 1.5 MG/DL    GFR est AA >60 >60 ml/min/1.73m2    GFR est non-AA >60 >60 ml/min/1.73m2    Calcium 7.9 (L) 8.3 - 10.4 MG/DL   CBC WITH AUTOMATED DIFF    Collection Time: 01/07/20  5:32 AM   Result Value Ref Range    WBC 11.9 (H) 4.3 - 11.1 K/uL    RBC 3.00 (L) 4.23 - 5.6 M/uL    HGB 9.7 (L) 13.6 - 17.2 g/dL    HCT 30.7 (L) 41.1 - 50.3 %    .3 (H) 79.6 - 97.8 FL    MCH 32.3 26.1 - 32.9 PG    MCHC 31.6 31.4 - 35.0 g/dL    RDW 15.6 (H) 11.9 - 14.6 %    PLATELET 446 763 - 716 K/uL    MPV 9.4 9.4 - 12.3 FL    ABSOLUTE NRBC 0.00 0.0 - 0.2 K/uL    DF AUTOMATED      NEUTROPHILS 77 43 - 78 %    LYMPHOCYTES 11 (L) 13 - 44 %    MONOCYTES 11 4.0 - 12.0 %    EOSINOPHILS 1 0.5 - 7.8 %    BASOPHILS 0 0.0 - 2.0 %    IMMATURE GRANULOCYTES 1 0.0 - 5.0 %    ABS. NEUTROPHILS 9.1 (H) 1.7 - 8.2 K/UL    ABS. LYMPHOCYTES 1.3 0.5 - 4.6 K/UL    ABS. MONOCYTES 1.3 0.1 - 1.3 K/UL    ABS. EOSINOPHILS 0.1 0.0 - 0.8 K/UL    ABS. BASOPHILS 0.0 0.0 - 0.2 K/UL    ABS. IMM.  GRANS. 0.1 0.0 - 0.5 K/UL        All Micro Results     Procedure Component Value Units Date/Time    CULTURE, RESPIRATORY/SPUTUM/BRONCH Deejay Douse [[id number]] Collected:  01/06/20 1525    Order Status:  Completed Specimen:  Sputum Updated:  01/07/20 1533    CULTURE, BLOOD [[id number]] Collected:  01/05/20 1802    Order Status:  Completed Specimen:  Blood Updated:  01/07/20 0637     Special Requests: --        RIGHT  Antecubital       Culture result: NO GROWTH 2 DAYS       CULTURE, BLOOD [[id number]] Collected:  01/05/20 1802    Order Status:  Completed Specimen:  Blood Updated:  01/07/20 0637     Special Requests: --        LEFT  FOREARM       Culture result: NO GROWTH 2 DAYS       CULTURE, BLOOD [[id number]]     Order Status:  Canceled Specimen:  Blood     CULTURE, BLOOD [[id number]]     Order Status:  Canceled Specimen:  Blood           Current Meds:  Current Facility-Administered Medications   Medication Dose Route Frequency    magnesium oxide (MAG-OX) tablet 200 mg  200 mg Oral DAILY    acetaminophen (TYLENOL) tablet 650 mg  650 mg Oral Q6H PRN    albuterol-ipratropium (DUO-NEB) 2.5 MG-0.5 MG/3 ML  3 mL Nebulization Q4H PRN    aspirin delayed-release tablet 81 mg  81 mg Oral DAILY    DULoxetine (CYMBALTA) capsule 30 mg  30 mg Oral BID    famotidine (PEPCID) tablet 10 mg  10 mg Oral BID    finasteride (PROSCAR) tablet 5 mg  5 mg Oral DAILY    guaiFENesin ER (MUCINEX) tablet 1,200 mg  1,200 mg Oral DAILY    montelukast (SINGULAIR) tablet 10 mg  10 mg Oral QHS    potassium chloride (K-DUR, KLOR-CON) SR tablet 20 mEq  20 mEq Oral DAILY    sodium chloride (NS) flush 5-40 mL  5-40 mL IntraVENous Q8H    sodium chloride (NS) flush 5-40 mL  5-40 mL IntraVENous PRN    oxyCODONE IR (ROXICODONE) tablet 5 mg  5 mg Oral Q4H PRN    bisacodyl (DULCOLAX) tablet 5 mg  5 mg Oral DAILY PRN    levoFLOXacin (LEVAQUIN) 750 mg in D5W IVPB  750 mg IntraVENous Q24H    LORazepam (ATIVAN) tablet 1 mg  1 mg Oral Q6H PRN    enoxaparin (LOVENOX) injection 40 mg  40 mg SubCUTAneous Q24H    budesonide (PULMICORT) 500 mcg/2 ml nebulizer suspension  500 mcg Nebulization BID RT    And    albuterol (PROVENTIL VENTOLIN) nebulizer solution 2.5 mg  2.5 mg Nebulization Q6HWA RT       Other Studies:    Xr Chest Port    Result Date: 1/5/2020  EXAM: Single view chest radiograph. INDICATION: 87 years dyspnea COMPARISON: Chest radiograph dated December 11, 2019. FINDINGS: No pneumothorax or pleural effusion. Redemonstrated basilar predominant coarsened reticular lung markings the left lower lobe hazy airspace opacity which appears unchanged from prior. Slight increase in a right basilar patchy airspace opacity. No new pulmonary consolidation. Heart appears normal in size. IMPRESSION: Redemonstrated basilar predominant coarsened reticular lung markings with a mild increase in a right basilar patchy airspace opacity which may represent atelectasis however cannot exclude pneumonia. Assessment and Plan:     Hospital Problems as of 1/7/2020 Date Reviewed: 1/6/2020          Codes Class Noted - Resolved POA    Sepsis ([facility] ICD-10-CM: A41.9  ICD-9-CM: 038.9, 995.91  1/6/2020 - Present Yes        Severe protein-calorie malnutrition ([facility] (Chronic) ICD-10-CM: E43  ICD-9-CM: 262  11/3/2019 - Present Yes        DNR (do not resuscitate) (Chronic) ICD-10-CM: Z66  ICD-9-CM: V49.86  11/2/2019 - Present Unknown        * (Principal) HCAP (healthcare-associated pneumonia) ICD-10-CM: J18.9  ICD-9-CM: 486  9/1/2019 - Present Unknown        V-tach ([facility] (Chronic) ICD-10-CM: I47.2  ICD-9-CM: 427.1  5/6/2019 - Present No        Chronic respiratory failure with hypoxia ([facility] (Chronic) ICD-10-CM: J96.11  ICD-9-CM: 518.83, 799.02  3/29/2016 - Present Yes    Overview Addendum 5/6/2019 10:14 AM by Laney Lester, NP     Continue to wear 2 L nasal cannula at night. May use 2 L supplemental oxygen to maintain sats greater than 90%.              COPD (chronic obstructive pulmonary disease) (HCC) (Chronic) ICD-10-CM: J44.9  ICD-9-CM: 496  12/10/2015 - Present Yes        Essential hypertension, benign (Chronic) ICD-10-CM: I10  ICD-9-CM: 401.1  12/10/2015 - Present Yes        RESOLVED: CAP (community acquired pneumonia) ICD-10-CM: J18.9  ICD-9-CM: 759  1/5/2020 - 1/6/2020               Plan:  Mr. Nick Moon is a 80 y. o. male from [address] [place] [address] who has a PMH of chronic respiratory failure with hypoxia on home oxygen, COPD, HTN, caloric malnutrition, who was admitted for CAP.      # Sepsis secondary to healthcare associated pneumonia  - CXR: right basilar airspace opacity  - BCx (no growth to date)  - c/w home o2  - c/w IV levaquin (start 1/6)  - duonebs and mucinex  - f/u sputum culture    # COPD  - c/w O2  - c/w home meds  - duonebs    # HTN: c/w home meds    # Severe PCM  - nutrition consult    Diet:  DIET REGULAR  DVT PPx: heparin sq  Code: DNR      Medical Decision Making:    Labs/Imaging reviewed. Additional information obtained from nursing staff. Patient is moderate risk due to medical condition and comorbidities. Plan discussed with nursing staff. Plan discussed with patient/family. All questions/concerns were addressed. Pt/family agrees with the plan.          Signed By: Maria M Mancuso MD     January 7, 2020

## 2020-01-08 ENCOUNTER — PATIENT OUTREACH (OUTPATIENT)
Dept: CASE MANAGEMENT | Age: 85
End: 2020-01-08

## 2020-01-08 LAB
ANION GAP SERPL CALC-SCNC: 6 MMOL/L (ref 7–16)
BASOPHILS # BLD: 0 K/UL (ref 0–0.2)
BASOPHILS NFR BLD: 0 % (ref 0–2)
BUN SERPL-MCNC: 14 MG/DL (ref 8–23)
CALCIUM SERPL-MCNC: 8.2 MG/DL (ref 8.3–10.4)
CHLORIDE SERPL-SCNC: 103 MMOL/L (ref 98–107)
CO2 SERPL-SCNC: 24 MMOL/L (ref 21–32)
CREAT SERPL-MCNC: 0.46 MG/DL (ref 0.8–1.5)
DIFFERENTIAL METHOD BLD: ABNORMAL
EOSINOPHIL # BLD: 0.1 K/UL (ref 0–0.8)
EOSINOPHIL NFR BLD: 1 % (ref 0.5–7.8)
ERYTHROCYTE [DISTWIDTH] IN BLOOD BY AUTOMATED COUNT: 15.4 % (ref 11.9–14.6)
GLUCOSE SERPL-MCNC: 91 MG/DL (ref 65–100)
HCT VFR BLD AUTO: 31.5 % (ref 41.1–50.3)
HGB BLD-MCNC: 10 G/DL (ref 13.6–17.2)
IMM GRANULOCYTES # BLD AUTO: 0.1 K/UL (ref 0–0.5)
IMM GRANULOCYTES NFR BLD AUTO: 1 % (ref 0–5)
LYMPHOCYTES # BLD: 1.4 K/UL (ref 0.5–4.6)
LYMPHOCYTES NFR BLD: 13 % (ref 13–44)
MCH RBC QN AUTO: 32.2 PG (ref 26.1–32.9)
MCHC RBC AUTO-ENTMCNC: 31.7 G/DL (ref 31.4–35)
MCV RBC AUTO: 101.3 FL (ref 79.6–97.8)
MONOCYTES # BLD: 1.1 K/UL (ref 0.1–1.3)
MONOCYTES NFR BLD: 10 % (ref 4–12)
NEUTS SEG # BLD: 8.7 K/UL (ref 1.7–8.2)
NEUTS SEG NFR BLD: 76 % (ref 43–78)
NRBC # BLD: 0 K/UL (ref 0–0.2)
PLATELET # BLD AUTO: 191 K/UL (ref 150–450)
PMV BLD AUTO: 9.3 FL (ref 9.4–12.3)
POTASSIUM SERPL-SCNC: 3.9 MMOL/L (ref 3.5–5.1)
RBC # BLD AUTO: 3.11 M/UL (ref 4.23–5.6)
SODIUM SERPL-SCNC: 133 MMOL/L (ref 136–145)
WBC # BLD AUTO: 11.5 K/UL (ref 4.3–11.1)

## 2020-01-08 PROCEDURE — 77010033678 HC OXYGEN DAILY

## 2020-01-08 PROCEDURE — 36415 COLL VENOUS BLD VENIPUNCTURE: CPT

## 2020-01-08 PROCEDURE — 77030011256 HC DRSG MEPILEX <16IN NO BORD MOLN -A

## 2020-01-08 PROCEDURE — 94640 AIRWAY INHALATION TREATMENT: CPT

## 2020-01-08 PROCEDURE — 94760 N-INVAS EAR/PLS OXIMETRY 1: CPT

## 2020-01-08 PROCEDURE — 85025 COMPLETE CBC W/AUTO DIFF WBC: CPT

## 2020-01-08 PROCEDURE — 74011250636 HC RX REV CODE- 250/636: Performed by: FAMILY MEDICINE

## 2020-01-08 PROCEDURE — 74011250637 HC RX REV CODE- 250/637: Performed by: FAMILY MEDICINE

## 2020-01-08 PROCEDURE — 74011000250 HC RX REV CODE- 250: Performed by: FAMILY MEDICINE

## 2020-01-08 PROCEDURE — 74011250636 HC RX REV CODE- 250/636: Performed by: INTERNAL MEDICINE

## 2020-01-08 PROCEDURE — 65270000029 HC RM PRIVATE

## 2020-01-08 PROCEDURE — 80048 BASIC METABOLIC PNL TOTAL CA: CPT

## 2020-01-08 PROCEDURE — 74011250637 HC RX REV CODE- 250/637: Performed by: INTERNAL MEDICINE

## 2020-01-08 RX ORDER — ENOXAPARIN SODIUM 100 MG/ML
30 INJECTION SUBCUTANEOUS EVERY 24 HOURS
Status: DISCONTINUED | OUTPATIENT
Start: 2020-01-08 | End: 2020-01-10 | Stop reason: HOSPADM

## 2020-01-08 RX ADMIN — ALBUTEROL SULFATE 2.5 MG: 2.5 SOLUTION RESPIRATORY (INHALATION) at 08:29

## 2020-01-08 RX ADMIN — ENOXAPARIN SODIUM 30 MG: 30 INJECTION SUBCUTANEOUS at 22:17

## 2020-01-08 RX ADMIN — DULOXETINE 30 MG: 30 CAPSULE, DELAYED RELEASE ORAL at 17:25

## 2020-01-08 RX ADMIN — Medication 10 ML: at 05:31

## 2020-01-08 RX ADMIN — DOCUSATE SODIUM 100 MG: 100 CAPSULE, LIQUID FILLED ORAL at 08:55

## 2020-01-08 RX ADMIN — MONTELUKAST 10 MG: 10 TABLET, FILM COATED ORAL at 22:18

## 2020-01-08 RX ADMIN — GUAIFENESIN 1200 MG: 600 TABLET, EXTENDED RELEASE ORAL at 08:55

## 2020-01-08 RX ADMIN — FINASTERIDE 5 MG: 5 TABLET, FILM COATED ORAL at 08:55

## 2020-01-08 RX ADMIN — FAMOTIDINE 10 MG: 20 TABLET, FILM COATED ORAL at 17:25

## 2020-01-08 RX ADMIN — BUDESONIDE 500 MCG: 0.5 INHALANT RESPIRATORY (INHALATION) at 19:55

## 2020-01-08 RX ADMIN — Medication 10 ML: at 14:00

## 2020-01-08 RX ADMIN — Medication 200 MG: at 08:55

## 2020-01-08 RX ADMIN — POTASSIUM CHLORIDE 20 MEQ: 20 TABLET, EXTENDED RELEASE ORAL at 08:55

## 2020-01-08 RX ADMIN — ALBUTEROL SULFATE 2.5 MG: 2.5 SOLUTION RESPIRATORY (INHALATION) at 13:47

## 2020-01-08 RX ADMIN — LEVOFLOXACIN 750 MG: 5 INJECTION, SOLUTION INTRAVENOUS at 17:25

## 2020-01-08 RX ADMIN — FAMOTIDINE 10 MG: 20 TABLET, FILM COATED ORAL at 08:55

## 2020-01-08 RX ADMIN — ALBUTEROL SULFATE 2.5 MG: 2.5 SOLUTION RESPIRATORY (INHALATION) at 19:55

## 2020-01-08 RX ADMIN — DULOXETINE 30 MG: 30 CAPSULE, DELAYED RELEASE ORAL at 08:55

## 2020-01-08 RX ADMIN — ASPIRIN 81 MG: 81 TABLET, COATED ORAL at 08:55

## 2020-01-08 RX ADMIN — Medication 10 ML: at 22:18

## 2020-01-08 RX ADMIN — BUDESONIDE 500 MCG: 0.5 INHALANT RESPIRATORY (INHALATION) at 08:29

## 2020-01-08 NOTE — PROGRESS NOTES
Foam dressings to left elbow and right hand cleaned with NS and changed per MD order.  Pt tolerated well

## 2020-01-08 NOTE — PROGRESS NOTES
Interdisciplinary team rounds were held 1/8/2020 with the following team members:Care Management, Nursing, Nutrition, Pharmacy, Physical Therapy and NP. Plan of care discussed. See clinical pathway and/or care plan for interventions and desired outcomes.

## 2020-01-08 NOTE — PROGRESS NOTES
Shift assessment completed. Pt alert and oriented x4. Lung sounds diminished with even and unlabored respirations, on 2L NC. Heart sounds regular. Active bowel sounds with soft and non-tender abdomen. Skin warm and dry. Scattered abrasions and ecchymosis noted to BUE with foam dressings in place. IV c/d, flushed and capped. Rojas catheter site c/d and draining angelito, clear urine. Pt reports no pain, nausea, and no signs of acute distress noted. Bed locked in lowest position with call light in reach and bed alarm set.

## 2020-01-08 NOTE — PROGRESS NOTES
Md informed pt will have li d/c in am as he is in/out cathed at [facility]. Pt wears 02 at [facility] as well. Anticipate return to [facility]- room 125 A tomorrow.  Sw to complete packet in am.  Gabriel Guillaume

## 2020-01-08 NOTE — PROGRESS NOTES
Care Transition Nurse Hospital Outreach      Date/Time:  2020 12:26 PM    Patient is identified as High Risk for Readmission. Medical History:   COPD  V-tach  HTN  Sepsis      Advanced Care Planning:    Does patient have an Advance Directive:  reviewed and current          Inpatient RRAT score: 32  Was this a readmission? yes       Care Transition Nurse (CTN) introduced self to the patient. Verified name and  with patient as identifiers. Provided explanation of the CTN role. Patients top risk factors for readmission:  functional physical ability, medical condition    Medication Reconciliation:   Medication reconciliation was performed with patient, who verbalizes understanding of administration of home medications. There were no barriers to obtaining medications identified at this time. No current facility-administered medications for this visit. No current outpatient medications on file.      Facility-Administered Medications Ordered in Other Visits   Medication Dose Route Frequency    docusate sodium (COLACE) capsule 100 mg  100 mg Oral DAILY    magnesium oxide (MAG-OX) tablet 200 mg  200 mg Oral DAILY    acetaminophen (TYLENOL) tablet 650 mg  650 mg Oral Q6H PRN    albuterol-ipratropium (DUO-NEB) 2.5 MG-0.5 MG/3 ML  3 mL Nebulization Q4H PRN    aspirin delayed-release tablet 81 mg  81 mg Oral DAILY    DULoxetine (CYMBALTA) capsule 30 mg  30 mg Oral BID    famotidine (PEPCID) tablet 10 mg  10 mg Oral BID    finasteride (PROSCAR) tablet 5 mg  5 mg Oral DAILY    guaiFENesin ER (MUCINEX) tablet 1,200 mg  1,200 mg Oral DAILY    montelukast (SINGULAIR) tablet 10 mg  10 mg Oral QHS    potassium chloride (K-DUR, KLOR-CON) SR tablet 20 mEq  20 mEq Oral DAILY    sodium chloride (NS) flush 5-40 mL  5-40 mL IntraVENous Q8H    sodium chloride (NS) flush 5-40 mL  5-40 mL IntraVENous PRN    oxyCODONE IR (ROXICODONE) tablet 5 mg  5 mg Oral Q4H PRN    bisacodyl (DULCOLAX) tablet 5 mg  5 mg Oral DAILY PRN    levoFLOXacin (LEVAQUIN) 750 mg in D5W IVPB  750 mg IntraVENous Q24H    LORazepam (ATIVAN) tablet 1 mg  1 mg Oral Q6H PRN    enoxaparin (LOVENOX) injection 40 mg  40 mg SubCUTAneous Q24H    budesonide (PULMICORT) 500 mcg/2 ml nebulizer suspension  500 mcg Nebulization BID RT    And    albuterol (PROVENTIL VENTOLIN) nebulizer solution 2.5 mg  2.5 mg Nebulization Q6HWA RT       Discharge Plan: Patient to discharge back to [facility]. Patient is a long term resident. Self pay. Will return to room 125-A. Goals    None        This note will not be viewable in [employer].

## 2020-01-08 NOTE — PROGRESS NOTES
Hospitalist Progress Note    2020  Admit Date: 2020  5:16 PM   NAME: Patrice Edwards   :  1932   MRN:  [id number]   Attending: Lauri Boland MD  PCP:  Yennifer Durant MD    SUBJECTIVE:   Patrice Edwards is a 80 y.o. Male NH resident with a history of chronic respiratory failure with hypoxia on home oxygen, COPD, HTN, caloric malnutrition who presented to the ED with a complaint of SOB. Found to have Sepsis secondary to healthcare associated pneumonia and admitted. Given IV Levaquin, duonebs and mucinex. Blood cultures no growth to date. 2020: No Events over night. No new complaints. SOB with productive cough continues but is improved. Back to baseline home o2 2L. Tolerating po. Rojas in place for chronic urinary retention. Review of Systems negative with exception of pertinent positives noted above  PHYSICAL EXAM     Visit Vitals  /74 (BP 1 Location: Left arm, BP Patient Position: At rest;Supine)   Pulse 67   Temp 97.6 °F (36.4 °C)   Resp 18   Ht 5' 8\" (1.727 m)   Wt 54.4 kg (120 lb)   SpO2 95%   BMI 18.25 kg/m²      Temp (24hrs), Av.8 °F (36.6 °C), Min:96.8 °F (36 °C), Max:98.5 °F (36.9 °C)    Oxygen Therapy  O2 Sat (%): 95 % (20 1116)  Pulse via Oximetry: 64 beats per minute (20 0831)  O2 Device: Nasal cannula (20)  O2 Flow Rate (L/min): 2 l/min (2055)    Intake/Output Summary (Last 24 hours) at 2020 1237  Last data filed at 2020 0602  Gross per 24 hour   Intake    Output 750 ml   Net -750 ml      General: Alert and oriented, No acute distress. Cachetic  Eye: EOMI, Normal conjunctiva, Vision Unchanged. HENT: Normocephalic, atraumatic, Normal hearing, moist mucous membranes. Neck: Supple, Non-tender. Respiratory: Course Breath sounds RLL, Respirations are non-labored. o2 via NC 2 L  Cardiovascular: Normal rate, Regular rhythm, No murmur.    Gastrointestinal: Soft, Non-tender, nondistended, positive bowel sounds   Musculoskeletal: No cyanosis, clubbing or edema. Integumentary: Warm, Dry, Pink, no rash. Neurologic: Alert, Oriented, No focal deficits. Cognition and Speech: Oriented, Speech clear and coherent. Psychiatric: Cooperative, Appropriate mood & affect. CXR 1/5/20: Redemonstrated basilar predominant coarsened reticular lung markings with a mild increase in a right basilar patchy airspace opacity which may represent atelectasis however cannot exclude pneumonia. ASSESSMENT      Sepsis due to HCAP  - IV Levaquin Day 3  - BCx: NGTD  - Sputum Culture Pending  - duonebs and mucinex    Chronic Resp Failure with hypoxia (home 02 2 L)  - back to baseline home o2  - monitor     COPD  - No acute exacerbation  - cont nebs, o2    Hypertension  -  Continue home antihypertensives. -  Monitor and trend BP. Severe Protein-Calorie Malnutrition and Underweight  - BMI: 18.25 kg/m²   - nutrition following    Chronic Urinary Retention  - Li while hospitalized  - dc li in am as dc anticipated  - I/O cath prn after li is discontinued. DVT prophylaxis: Heparin  Status: DNR  Dispo: to Private Bed hold at his [address] [place] [address] in am  Total Time spent: 31 minutes. Counseling & coordinating care dominated the encounter >55%. Counseled patient regarding dx, rx, tx. Reviewed prior records, labs, vitals, diagnostic tests, flow sheet, home medications, inpatient medications, nursing and provider notes.     Mohsen Norman PA-C, MPAS

## 2020-01-09 ENCOUNTER — APPOINTMENT (OUTPATIENT)
Dept: GENERAL RADIOLOGY | Age: 85
DRG: 871 | End: 2020-01-09
Attending: PHYSICIAN ASSISTANT
Payer: MEDICARE

## 2020-01-09 LAB
ANION GAP SERPL CALC-SCNC: 5 MMOL/L (ref 7–16)
BASOPHILS # BLD: 0 K/UL (ref 0–0.2)
BASOPHILS NFR BLD: 0 % (ref 0–2)
BUN SERPL-MCNC: 13 MG/DL (ref 8–23)
CALCIUM SERPL-MCNC: 8 MG/DL (ref 8.3–10.4)
CHLORIDE SERPL-SCNC: 101 MMOL/L (ref 98–107)
CO2 SERPL-SCNC: 27 MMOL/L (ref 21–32)
CREAT SERPL-MCNC: 0.46 MG/DL (ref 0.8–1.5)
DIFFERENTIAL METHOD BLD: ABNORMAL
EOSINOPHIL # BLD: 0.1 K/UL (ref 0–0.8)
EOSINOPHIL NFR BLD: 1 % (ref 0.5–7.8)
ERYTHROCYTE [DISTWIDTH] IN BLOOD BY AUTOMATED COUNT: 15 % (ref 11.9–14.6)
GLUCOSE SERPL-MCNC: 92 MG/DL (ref 65–100)
HCT VFR BLD AUTO: 32.7 % (ref 41.1–50.3)
HGB BLD-MCNC: 10.4 G/DL (ref 13.6–17.2)
IMM GRANULOCYTES # BLD AUTO: 0.1 K/UL (ref 0–0.5)
IMM GRANULOCYTES NFR BLD AUTO: 1 % (ref 0–5)
LYMPHOCYTES # BLD: 1.2 K/UL (ref 0.5–4.6)
LYMPHOCYTES NFR BLD: 12 % (ref 13–44)
MCH RBC QN AUTO: 32.3 PG (ref 26.1–32.9)
MCHC RBC AUTO-ENTMCNC: 31.8 G/DL (ref 31.4–35)
MCV RBC AUTO: 101.6 FL (ref 79.6–97.8)
MONOCYTES # BLD: 1.2 K/UL (ref 0.1–1.3)
MONOCYTES NFR BLD: 12 % (ref 4–12)
NEUTS SEG # BLD: 7.3 K/UL (ref 1.7–8.2)
NEUTS SEG NFR BLD: 74 % (ref 43–78)
NRBC # BLD: 0 K/UL (ref 0–0.2)
PLATELET # BLD AUTO: 213 K/UL (ref 150–450)
PMV BLD AUTO: 9.3 FL (ref 9.4–12.3)
POTASSIUM SERPL-SCNC: 3.8 MMOL/L (ref 3.5–5.1)
RBC # BLD AUTO: 3.22 M/UL (ref 4.23–5.6)
SODIUM SERPL-SCNC: 133 MMOL/L (ref 136–145)
WBC # BLD AUTO: 9.9 K/UL (ref 4.3–11.1)

## 2020-01-09 PROCEDURE — 74011250636 HC RX REV CODE- 250/636: Performed by: INTERNAL MEDICINE

## 2020-01-09 PROCEDURE — 74011250636 HC RX REV CODE- 250/636: Performed by: PHYSICIAN ASSISTANT

## 2020-01-09 PROCEDURE — 74011250637 HC RX REV CODE- 250/637: Performed by: FAMILY MEDICINE

## 2020-01-09 PROCEDURE — 74011250637 HC RX REV CODE- 250/637: Performed by: INTERNAL MEDICINE

## 2020-01-09 PROCEDURE — 94640 AIRWAY INHALATION TREATMENT: CPT

## 2020-01-09 PROCEDURE — 36415 COLL VENOUS BLD VENIPUNCTURE: CPT

## 2020-01-09 PROCEDURE — 65270000029 HC RM PRIVATE

## 2020-01-09 PROCEDURE — 71046 X-RAY EXAM CHEST 2 VIEWS: CPT

## 2020-01-09 PROCEDURE — 77010033678 HC OXYGEN DAILY

## 2020-01-09 PROCEDURE — 80048 BASIC METABOLIC PNL TOTAL CA: CPT

## 2020-01-09 PROCEDURE — 85025 COMPLETE CBC W/AUTO DIFF WBC: CPT

## 2020-01-09 PROCEDURE — 74011000250 HC RX REV CODE- 250: Performed by: FAMILY MEDICINE

## 2020-01-09 PROCEDURE — 94760 N-INVAS EAR/PLS OXIMETRY 1: CPT

## 2020-01-09 RX ORDER — LEVOFLOXACIN 5 MG/ML
750 INJECTION, SOLUTION INTRAVENOUS
Status: DISCONTINUED | OUTPATIENT
Start: 2020-01-10 | End: 2020-01-10 | Stop reason: ALTCHOICE

## 2020-01-09 RX ORDER — FUROSEMIDE 10 MG/ML
20 INJECTION INTRAMUSCULAR; INTRAVENOUS ONCE
Status: COMPLETED | OUTPATIENT
Start: 2020-01-09 | End: 2020-01-09

## 2020-01-09 RX ADMIN — MONTELUKAST 10 MG: 10 TABLET, FILM COATED ORAL at 21:08

## 2020-01-09 RX ADMIN — ENOXAPARIN SODIUM 30 MG: 30 INJECTION SUBCUTANEOUS at 21:08

## 2020-01-09 RX ADMIN — DULOXETINE 30 MG: 30 CAPSULE, DELAYED RELEASE ORAL at 08:37

## 2020-01-09 RX ADMIN — Medication 10 ML: at 21:08

## 2020-01-09 RX ADMIN — Medication 10 ML: at 05:57

## 2020-01-09 RX ADMIN — DULOXETINE 30 MG: 30 CAPSULE, DELAYED RELEASE ORAL at 17:29

## 2020-01-09 RX ADMIN — POTASSIUM CHLORIDE 20 MEQ: 20 TABLET, EXTENDED RELEASE ORAL at 08:37

## 2020-01-09 RX ADMIN — BUDESONIDE 500 MCG: 0.5 INHALANT RESPIRATORY (INHALATION) at 19:58

## 2020-01-09 RX ADMIN — FINASTERIDE 5 MG: 5 TABLET, FILM COATED ORAL at 08:37

## 2020-01-09 RX ADMIN — Medication 10 ML: at 13:03

## 2020-01-09 RX ADMIN — Medication 200 MG: at 08:37

## 2020-01-09 RX ADMIN — ALBUTEROL SULFATE 2.5 MG: 2.5 SOLUTION RESPIRATORY (INHALATION) at 07:20

## 2020-01-09 RX ADMIN — GUAIFENESIN 1200 MG: 600 TABLET, EXTENDED RELEASE ORAL at 08:37

## 2020-01-09 RX ADMIN — ASPIRIN 81 MG: 81 TABLET, COATED ORAL at 08:37

## 2020-01-09 RX ADMIN — BUDESONIDE 500 MCG: 0.5 INHALANT RESPIRATORY (INHALATION) at 07:20

## 2020-01-09 RX ADMIN — FUROSEMIDE 20 MG: 10 INJECTION, SOLUTION INTRAMUSCULAR; INTRAVENOUS at 13:02

## 2020-01-09 RX ADMIN — FAMOTIDINE 10 MG: 20 TABLET, FILM COATED ORAL at 08:36

## 2020-01-09 RX ADMIN — DOCUSATE SODIUM 100 MG: 100 CAPSULE, LIQUID FILLED ORAL at 08:37

## 2020-01-09 RX ADMIN — FAMOTIDINE 10 MG: 20 TABLET, FILM COATED ORAL at 17:29

## 2020-01-09 RX ADMIN — ALBUTEROL SULFATE 2.5 MG: 2.5 SOLUTION RESPIRATORY (INHALATION) at 14:05

## 2020-01-09 RX ADMIN — ALBUTEROL SULFATE 2.5 MG: 2.5 SOLUTION RESPIRATORY (INHALATION) at 19:58

## 2020-01-09 NOTE — PROGRESS NOTES
Interdisciplinary team rounds were held 1/9/2020 with the following team members:Care Management, Nursing, Nutrition, Pharmacy, Physical Therapy and Physician. Plan of care discussed. See clinical pathway and/or care plan for interventions and desired outcomes.

## 2020-01-09 NOTE — PROGRESS NOTES
Hospitalist Progress Note    2020  Admit Date: 2020  5:16 PM   NAME: Patrice Edwards   :  1932   MRN:  [id number]   Attending: Lauri Boland MD  PCP:  Yennifer Durant MD    SUBJECTIVE:   Patrice Edwards is a 80 y.o. Male NH resident with a history of chronic respiratory failure with hypoxia on home oxygen, COPD, HTN, caloric malnutrition who presented to the ED with a complaint of SOB. Found to have Sepsis secondary to healthcare associated pneumonia and admitted. Given IV Levaquin, duonebs and mucinex. Blood cultures no growth to date. 20 worsening edema and infiltrate on CXR.    2020: No Events over night. No new complaints. SOB with productive cough continues and o2 requirement increased overnight to 4L. Tolerating po. Rojas in place for chronic urinary retention. Admits to mouth breathing. Overall states he feels same as yesterday. Review of Systems negative with exception of pertinent positives noted above  PHYSICAL EXAM     Visit Vitals  /90 (BP 1 Location: Right arm, BP Patient Position: At rest;Supine)   Pulse 64   Temp 97 °F (36.1 °C)   Resp 18   Ht 5' 8\" (1.727 m)   Wt 54.4 kg (120 lb)   SpO2 93%   BMI 18.25 kg/m²      Temp (24hrs), Av.2 °F (36.2 °C), Min:96.2 °F (35.7 °C), Max:98.1 °F (36.7 °C)    Oxygen Therapy  O2 Sat (%): 93 % (20 0758)  Pulse via Oximetry: 62 beats per minute (20)  O2 Device: Nasal cannula (20)  O2 Flow Rate (L/min): 4 l/min (20)  FIO2 (%): 36 % (20)    Intake/Output Summary (Last 24 hours) at 2020 1204  Last data filed at 2020 0417  Gross per 24 hour   Intake    Output 825 ml   Net -825 ml      General: Alert and oriented, No acute distress. Cachetic  Eye: EOMI, Normal conjunctiva, Vision Unchanged. HENT: Normocephalic, atraumatic, Normal hearing, moist mucous membranes. Neck: Supple, Non-tender. Respiratory: Course Breath sounds RLL, diminished, Respirations are non-labored.  o2 via NC 4 L  Cardiovascular: Normal rate, Regular rhythm, No murmur. Gastrointestinal: Soft, Non-tender, nondistended, positive bowel sounds   Musculoskeletal: No cyanosis, clubbing or edema. Integumentary: Warm, Dry, Pink, no rash. Neurologic: Alert, Oriented, No focal deficits. Cognition and Speech: Oriented, Speech clear and coherent. Psychiatric: Cooperative, Appropriate mood & affect. CXR 1/5/20: Redemonstrated basilar predominant coarsened reticular lung markings with a mild increase in a right basilar patchy airspace opacity which may represent atelectasis however cannot exclude pneumonia. CXR 1/9/19: Probable worsening moderate interstitial edema with increased  patchy right basilar atelectasis and or consolidation. ASSESSMENT      Sepsis due to HCAP  - worsened in last 24 hours  - increased o2 need  - cxr worse  - IV Levaquin Day 4  - BCx: NGTD  - Sputum Culture mod normal resp patric  - duonebs and mucinex  - suspect mouthbreating with o2 via nc, encouraged nasal breathing  - if not improving consulted pulm consult  - h/o Pulm HTN, no CHF    Chronic Resp Failure with hypoxia (home 02 2 L)  - continues  - discussed with RT and will continue to monitor and nebs, o2, atb as above    COPD  - No acute exacerbation  - cont nebs, o2    Hypertension  -  Continue home antihypertensives. -  Monitor and trend BP. Severe Protein-Calorie Malnutrition and Underweight  - BMI: 18.25 kg/m²   - nutrition following    Chronic Urinary Retention  - Rojas while hospitalized    DVT prophylaxis: Heparin  Status: DNR  Dispo: to Private Bed hold at his Nursing Residence   Discussed with Dr. Jerry Madsen, RT, RN  Total Time spent: 30 minutes. Counseling & coordinating care dominated the encounter >55%. Counseled patient regarding dx, rx, tx. Reviewed prior records, labs, vitals, diagnostic tests, flow sheet,  inpatient medications, nursing and provider notes.     Cierra Pollard PA-C, MPAS

## 2020-01-09 NOTE — PROGRESS NOTES
Shift assessment completed. Pt alert and oriented x4. Lung sounds diminished with even and unlabored respirations, on 4L NC. Heart sounds regular. Active bowel sounds with soft and non-tender abdomen. Skin warm and dry. Scattered ecchymosis and abrasions noted. Foam dressings c/d/i. IV c/d and capped. Rojas catheter site c/d and draining clear, angelito urine. Pt reports no pain, nausea, and no signs of acute distress noted. Pt resting quietly in bed and sat up for breakfast, with call light in reach and bed alarm set.

## 2020-01-10 VITALS
SYSTOLIC BLOOD PRESSURE: 151 MMHG | HEART RATE: 69 BPM | DIASTOLIC BLOOD PRESSURE: 79 MMHG | WEIGHT: 120 LBS | TEMPERATURE: 97.2 F | RESPIRATION RATE: 18 BRPM | OXYGEN SATURATION: 91 % | BODY MASS INDEX: 18.19 KG/M2 | HEIGHT: 68 IN

## 2020-01-10 LAB
ANION GAP SERPL CALC-SCNC: 6 MMOL/L (ref 7–16)
BACTERIA SPEC CULT: NORMAL
BASOPHILS # BLD: 0 K/UL (ref 0–0.2)
BASOPHILS NFR BLD: 0 % (ref 0–2)
BUN SERPL-MCNC: 15 MG/DL (ref 8–23)
CALCIUM SERPL-MCNC: 8.5 MG/DL (ref 8.3–10.4)
CHLORIDE SERPL-SCNC: 101 MMOL/L (ref 98–107)
CO2 SERPL-SCNC: 26 MMOL/L (ref 21–32)
CREAT SERPL-MCNC: 0.48 MG/DL (ref 0.8–1.5)
DIFFERENTIAL METHOD BLD: ABNORMAL
EOSINOPHIL # BLD: 0.2 K/UL (ref 0–0.8)
EOSINOPHIL NFR BLD: 2 % (ref 0.5–7.8)
ERYTHROCYTE [DISTWIDTH] IN BLOOD BY AUTOMATED COUNT: 15.2 % (ref 11.9–14.6)
GLUCOSE SERPL-MCNC: 102 MG/DL (ref 65–100)
GRAM STN SPEC: NORMAL
HCT VFR BLD AUTO: 32.7 % (ref 41.1–50.3)
HGB BLD-MCNC: 10.5 G/DL (ref 13.6–17.2)
IMM GRANULOCYTES # BLD AUTO: 0.1 K/UL (ref 0–0.5)
IMM GRANULOCYTES NFR BLD AUTO: 1 % (ref 0–5)
LYMPHOCYTES # BLD: 1.2 K/UL (ref 0.5–4.6)
LYMPHOCYTES NFR BLD: 13 % (ref 13–44)
MCH RBC QN AUTO: 32.4 PG (ref 26.1–32.9)
MCHC RBC AUTO-ENTMCNC: 32.1 G/DL (ref 31.4–35)
MCV RBC AUTO: 100.9 FL (ref 79.6–97.8)
MONOCYTES # BLD: 1.3 K/UL (ref 0.1–1.3)
MONOCYTES NFR BLD: 15 % (ref 4–12)
NEUTS SEG # BLD: 6.2 K/UL (ref 1.7–8.2)
NEUTS SEG NFR BLD: 70 % (ref 43–78)
NRBC # BLD: 0 K/UL (ref 0–0.2)
PLATELET # BLD AUTO: 217 K/UL (ref 150–450)
PMV BLD AUTO: 9.1 FL (ref 9.4–12.3)
POTASSIUM SERPL-SCNC: 3.7 MMOL/L (ref 3.5–5.1)
RBC # BLD AUTO: 3.24 M/UL (ref 4.23–5.6)
SERVICE CMNT-IMP: NORMAL
SODIUM SERPL-SCNC: 133 MMOL/L (ref 136–145)
WBC # BLD AUTO: 9 K/UL (ref 4.3–11.1)

## 2020-01-10 PROCEDURE — 77030019905 HC CATH URETH INTMIT MDII -A

## 2020-01-10 PROCEDURE — 94760 N-INVAS EAR/PLS OXIMETRY 1: CPT

## 2020-01-10 PROCEDURE — 77010033678 HC OXYGEN DAILY

## 2020-01-10 PROCEDURE — 74011250637 HC RX REV CODE- 250/637: Performed by: INTERNAL MEDICINE

## 2020-01-10 PROCEDURE — 80048 BASIC METABOLIC PNL TOTAL CA: CPT

## 2020-01-10 PROCEDURE — 74011250636 HC RX REV CODE- 250/636: Performed by: INTERNAL MEDICINE

## 2020-01-10 PROCEDURE — 77030011256 HC DRSG MEPILEX <16IN NO BORD MOLN -A

## 2020-01-10 PROCEDURE — 94640 AIRWAY INHALATION TREATMENT: CPT

## 2020-01-10 PROCEDURE — 74011000250 HC RX REV CODE- 250: Performed by: FAMILY MEDICINE

## 2020-01-10 PROCEDURE — 36415 COLL VENOUS BLD VENIPUNCTURE: CPT

## 2020-01-10 PROCEDURE — 74011250637 HC RX REV CODE- 250/637: Performed by: FAMILY MEDICINE

## 2020-01-10 PROCEDURE — 85025 COMPLETE CBC W/AUTO DIFF WBC: CPT

## 2020-01-10 RX ORDER — LEVOFLOXACIN 500 MG/1
500 TABLET, FILM COATED ORAL DAILY
Qty: 3 TAB | Refills: 0 | Status: SHIPPED | OUTPATIENT
Start: 2020-01-10 | End: 2020-11-22

## 2020-01-10 RX ORDER — FUROSEMIDE 20 MG/1
20 TABLET ORAL DAILY
Qty: 30 TAB | Refills: 0 | Status: SHIPPED | OUTPATIENT
Start: 2020-01-10 | End: 2020-11-26

## 2020-01-10 RX ORDER — FUROSEMIDE 10 MG/ML
20 INJECTION INTRAMUSCULAR; INTRAVENOUS ONCE
Status: COMPLETED | OUTPATIENT
Start: 2020-01-10 | End: 2020-01-10

## 2020-01-10 RX ADMIN — BUDESONIDE 500 MCG: 0.5 INHALANT RESPIRATORY (INHALATION) at 07:40

## 2020-01-10 RX ADMIN — GUAIFENESIN 1200 MG: 600 TABLET, EXTENDED RELEASE ORAL at 08:47

## 2020-01-10 RX ADMIN — Medication 10 ML: at 05:15

## 2020-01-10 RX ADMIN — FINASTERIDE 5 MG: 5 TABLET, FILM COATED ORAL at 08:47

## 2020-01-10 RX ADMIN — ASPIRIN 81 MG: 81 TABLET, COATED ORAL at 08:47

## 2020-01-10 RX ADMIN — DULOXETINE 30 MG: 30 CAPSULE, DELAYED RELEASE ORAL at 08:47

## 2020-01-10 RX ADMIN — DOCUSATE SODIUM 100 MG: 100 CAPSULE, LIQUID FILLED ORAL at 08:47

## 2020-01-10 RX ADMIN — POTASSIUM CHLORIDE 20 MEQ: 20 TABLET, EXTENDED RELEASE ORAL at 08:47

## 2020-01-10 RX ADMIN — FAMOTIDINE 10 MG: 20 TABLET, FILM COATED ORAL at 08:47

## 2020-01-10 RX ADMIN — ALBUTEROL SULFATE 2.5 MG: 2.5 SOLUTION RESPIRATORY (INHALATION) at 07:40

## 2020-01-10 RX ADMIN — Medication 200 MG: at 08:47

## 2020-01-10 RX ADMIN — FUROSEMIDE 20 MG: 10 INJECTION, SOLUTION INTRAMUSCULAR; INTRAVENOUS at 10:45

## 2020-01-10 NOTE — DISCHARGE INSTRUCTIONS
- Please take medication as prescribed. - Please take levaquin PO for 3 more days.  - Please follow-up with your primary care physician within 1 to 2 weeks of discharge.

## 2020-01-10 NOTE — DISCHARGE SUMMARY
Hospitalist Discharge Summary     Admit Date:  2020  5:16 PM   Name:  Shirley Roberto   Age:  80 y.o.  :  1932   MRN:  [id number]   PCP:  Eduardo Smith MD  Treatment Team: Attending Provider: Kenzie Grimaldo MD; Utilization Review: Al Randall; : Franny Bourgeois; Care Manager: Ana Maria Hilliard [facility] [place] [facility]    Problem List for this Hospitalization:  Hospital Problems as of 1/10/2020 Date Reviewed: 2020          Codes Class Noted - Resolved POA    Sepsis ([facility] ICD-10-CM: A41.9  ICD-9-CM: 038.9, 995.91  2020 - Present Yes        Severe protein-calorie malnutrition ([facility] (Chronic) ICD-10-CM: E43  ICD-9-CM: 262  11/3/2019 - Present Yes        DNR (do not resuscitate) (Chronic) ICD-10-CM: Z66  ICD-9-CM: V49.86  2019 - Present Unknown        * (Principal) HCAP (healthcare-associated pneumonia) ICD-10-CM: J18.9  ICD-9-CM: 486  2019 - Present Unknown        V-tach ([facility] (Chronic) ICD-10-CM: I47.2  ICD-9-CM: 427.1  2019 - Present No        Chronic respiratory failure with hypoxia ([facility] (Chronic) ICD-10-CM: J96.11  ICD-9-CM: 518.83, 799.02  3/29/2016 - Present Yes    Overview Addendum 2019 10:14 AM by Bandar Martell, NP     Continue to wear 2 L nasal cannula at night. May use 2 L supplemental oxygen to maintain sats greater than 90%. COPD (chronic obstructive pulmonary disease) (HCC) (Chronic) ICD-10-CM: J44.9  ICD-9-CM: 496  12/10/2015 - Present Yes        Essential hypertension, benign (Chronic) ICD-10-CM: I10  ICD-9-CM: 401.1  12/10/2015 - Present Yes        RESOLVED: CAP (community acquired pneumonia) ICD-10-CM: J18.9  ICD-9-CM: 486  2020 - 2020                 Admission HPI from 2020:    \" 80 y.o. male who presents to the ER due to fever, shortness of breath, and cough. He comes from [facility]. He states his symptoms started about 3 days ago and have been getting gradually worse.   EMS reported that his O2 saturation on room air was 88 percent. He does report a productive cough with some phlegm at times and occasionally sharp left-sided chest pains with coughing. He has actually been having ongoing lung/pulmonary issues since May of last year when he developed a pneumothorax \"    Hospital Course:  He met sepsis criteria and CXR showed RLL opacity. Patient is started on IV antibiotics. His O2 requirement was increased from baseline initially and now is back to baseline. He was also given 2 doses of Lasix IV for pulmonary congestion. He should continue 20mg PO lasix on discharge. He will need 3 more days of oral antibiotics to complete the course. He is now stable and at baseline to discharged from the hospital.    Disposition:   Activity: Activity as tolerated  Diet: DIET REGULAR  Code Status: DNR      Follow up instructions, discharge meds at bottom of this note. Plan was discussed with patient. All questions answered. Patient was stable at time of discharge. Patient will call a physician or return if any concerns. Diagnostic Imaging/Tests:   Xr Chest Port    Result Date: 1/5/2020  EXAM: Single view chest radiograph. INDICATION: 87 years dyspnea COMPARISON: Chest radiograph dated December 11, 2019. FINDINGS: No pneumothorax or pleural effusion. Redemonstrated basilar predominant coarsened reticular lung markings the left lower lobe hazy airspace opacity which appears unchanged from prior. Slight increase in a right basilar patchy airspace opacity. No new pulmonary consolidation. Heart appears normal in size. IMPRESSION: Redemonstrated basilar predominant coarsened reticular lung markings with a mild increase in a right basilar patchy airspace opacity which may represent atelectasis however cannot exclude pneumonia. Echocardiogram results:  No results found for this visit on 01/05/20.     Procedures done this admission:  * No surgery found *    All Micro Results     Procedure Component Value Units Date/Time    CULTURE, BLOOD [[id number]] Collected:  01/05/20 1802    Order Status:  Completed Specimen:  Blood Updated:  01/10/20 0939     Special Requests: --        RIGHT  Antecubital       Culture result: NO GROWTH 5 DAYS       CULTURE, BLOOD [[id number]] Collected:  01/05/20 1802    Order Status:  Completed Specimen:  Blood Updated:  01/10/20 0939     Special Requests: --        LEFT  FOREARM       Culture result: NO GROWTH 5 DAYS       CULTURE, RESPIRATORY/SPUTUM/BRONCH Dewane Peace STAIN [[id number]] Collected:  01/06/20 1525    Order Status:  Completed Specimen:  Sputum Updated:  01/10/20 0935     Special Requests: NO SPECIAL REQUESTS        GRAM STAIN 1 TO 16 WBC'S/OIF      0 TO 4 EPIT /OIF            MODERATE GRAM POSITIVE COCCI IN PAIRS CHAINS AND CLUSTERS            FEW GRAM POSITIVE RODS         FEW YEAST         4+ MUCUS PRESENT        Culture result:       MODERATE NORMAL RESPIRATORY NAEL          CULTURE, BLOOD [[id number]]     Order Status:  Canceled Specimen:  Blood     CULTURE, BLOOD [[id number]]     Order Status:  Canceled Specimen:  Blood           Labs: Results:       BMP, Mg, Phos Recent Labs     01/10/20  0600 01/09/20  0628 01/08/20  0544   * 133* 133*   K 3.7 3.8 3.9    101 103   CO2 26 27 24   AGAP 6* 5* 6*   BUN 15 13 14   CREA 0.48* 0.46* 0.46*   CA 8.5 8.0* 8.2*   * 92 91      CBC Recent Labs     01/10/20  0600 01/09/20  0628 01/08/20  0544   WBC 9.0 9.9 11.5*   RBC 3.24* 3.22* 3.11*   HGB 10.5* 10.4* 10.0*   HCT 32.7* 32.7* 31.5*    213 191   GRANS 70 74 76   LYMPH 13 12* 13   EOS 2 1 1   MONOS 15* 12 10   BASOS 0 0 0   IG 1 1 1   ANEU 6.2 7.3 8.7*   ABL 1.2 1.2 1.4   ILIANA 0.2 0.1 0.1   ABM 1.3 1.2 1.1   ABB 0.0 0.0 0.0   AIG 0.1 0.1 0.1      LFT No results for input(s): SGOT, ALT, TBIL, AP, TP, ALB, GLOB, AGRAT, GPT in the last 72 hours.    Cardiac Testing Lab Results   Component Value Date/Time     (H) 06/13/2019 04:50 AM     (H) 06/10/2019 12:01 PM     (H) 05/06/2019 12:53 PM    Troponin-I, Qt. <0.02 (L) 09/01/2019 10:44 AM    Troponin-I, Qt. <0.02 (L) 09/01/2019 05:45 AM    Troponin-I, Qt. 0.05 02/04/2018 01:04 AM      Coagulation Tests Lab Results   Component Value Date/Time    Prothrombin time 14.8 (H) 02/02/2018 06:34 AM    Prothrombin time 10.8 04/13/2009 10:59 AM    INR 1.2 02/02/2018 06:34 AM    INR 1.1 04/13/2009 10:59 AM    aPTT 37.5 (H) 02/02/2018 06:34 AM    aPTT 27.8 04/13/2009 10:59 AM      A1c No results found for: HBA1C, HGBE8, CAD7MAYA, ASQ1FHSS   Lipid Panel No results found for: CHOL, CHOLPOCT, CHOLX, CHLST, CHOLV, 855187, HDL, HDLP, LDL, LDLC, DLDLP, 043084, VLDLC, VLDL, TGLX, TRIGL, TRIGP, TGLPOCT, CHHD, [facility]   Thyroid Panel Lab Results   Component Value Date/Time    TSH 0.780 05/08/2019 04:10 AM    TSH 1.680 12/11/2018 11:40 AM        Most Recent UA Lab Results   Component Value Date/Time    Color YELLOW 06/11/2019 10:29 AM    Appearance CLOUDY 06/11/2019 10:29 AM    Specific gravity 1.011 06/11/2019 10:29 AM    pH (UA) 6.0 06/11/2019 10:29 AM    Protein NEGATIVE  06/11/2019 10:29 AM    Glucose NEGATIVE  06/11/2019 10:29 AM    Ketone NEGATIVE  06/11/2019 10:29 AM    Bilirubin NEGATIVE  06/11/2019 10:29 AM    Blood LARGE (A) 06/11/2019 10:29 AM    Urobilinogen 0.2 06/11/2019 10:29 AM    Nitrites NEGATIVE  06/11/2019 10:29 AM    Leukocyte Esterase NEGATIVE  06/11/2019 10:29 AM    WBC 0-3 06/11/2019 10:29 AM    RBC 20-50 06/11/2019 10:29 AM    Epithelial cells 0-3 06/11/2019 10:29 AM    Bacteria TRACE 06/11/2019 10:29 AM    Casts 5-10 06/11/2019 10:29 AM    Mucus TRACE 06/11/2019 10:29 AM    Other observations RESULTS VERIFIED MANUALLY 05/21/2019 05:31 PM        No Known Allergies  Immunization History   Administered Date(s) Administered    Influenza High Dose Vaccine PF 10/12/2016, 10/23/2017, 09/23/2018, 11/01/2019    Influenza Vaccine 09/10/2010    Influenza Vaccine (Tri) Adjuvanted 09/23/2018    Pneumococcal Conjugate (PCV-13) 10/01/2015, 12/08/2016  Pneumococcal Vaccine (Unspecified Type) 10/01/1998    TB Skin Test (PPD) Intradermal 02/02/2018, 05/06/2019, 06/10/2019, 09/01/2019, 11/01/2019    Zoster Recombinant 05/10/2018, 08/05/2018    Zoster Vaccine, Live 07/13/2011       All Labs from Last 24 Hrs:  Recent Results (from the past 24 hour(s))   CBC WITH AUTOMATED DIFF    Collection Time: 01/10/20  6:00 AM   Result Value Ref Range    WBC 9.0 4.3 - 11.1 K/uL    RBC 3.24 (L) 4.23 - 5.6 M/uL    HGB 10.5 (L) 13.6 - 17.2 g/dL    HCT 32.7 (L) 41.1 - 50.3 %    .9 (H) 79.6 - 97.8 FL    MCH 32.4 26.1 - 32.9 PG    MCHC 32.1 31.4 - 35.0 g/dL    RDW 15.2 (H) 11.9 - 14.6 %    PLATELET 301 913 - 575 K/uL    MPV 9.1 (L) 9.4 - 12.3 FL    ABSOLUTE NRBC 0.00 0.0 - 0.2 K/uL    DF AUTOMATED      NEUTROPHILS 70 43 - 78 %    LYMPHOCYTES 13 13 - 44 %    MONOCYTES 15 (H) 4.0 - 12.0 %    EOSINOPHILS 2 0.5 - 7.8 %    BASOPHILS 0 0.0 - 2.0 %    IMMATURE GRANULOCYTES 1 0.0 - 5.0 %    ABS. NEUTROPHILS 6.2 1.7 - 8.2 K/UL    ABS. LYMPHOCYTES 1.2 0.5 - 4.6 K/UL    ABS. MONOCYTES 1.3 0.1 - 1.3 K/UL    ABS. EOSINOPHILS 0.2 0.0 - 0.8 K/UL    ABS. BASOPHILS 0.0 0.0 - 0.2 K/UL    ABS. IMM.  GRANS. 0.1 0.0 - 0.5 K/UL   METABOLIC PANEL, BASIC    Collection Time: 01/10/20  6:00 AM   Result Value Ref Range    Sodium 133 (L) 136 - 145 mmol/L    Potassium 3.7 3.5 - 5.1 mmol/L    Chloride 101 98 - 107 mmol/L    CO2 26 21 - 32 mmol/L    Anion gap 6 (L) 7 - 16 mmol/L    Glucose 102 (H) 65 - 100 mg/dL    BUN 15 8 - 23 MG/DL    Creatinine 0.48 (L) 0.8 - 1.5 MG/DL    GFR est AA >60 >60 ml/min/1.73m2    GFR est non-AA >60 >60 ml/min/1.73m2    Calcium 8.5 8.3 - 10.4 MG/DL       Current Med List in Hospital:   Current Facility-Administered Medications   Medication Dose Route Frequency    furosemide (LASIX) injection 20 mg  20 mg IntraVENous ONCE    levoFLOXacin (LEVAQUIN) 750 mg in D5W IVPB  750 mg IntraVENous Q48H    enoxaparin (LOVENOX) injection 30 mg  30 mg SubCUTAneous Q24H    docusate sodium (COLACE) capsule 100 mg  100 mg Oral DAILY    magnesium oxide (MAG-OX) tablet 200 mg  200 mg Oral DAILY    acetaminophen (TYLENOL) tablet 650 mg  650 mg Oral Q6H PRN    albuterol-ipratropium (DUO-NEB) 2.5 MG-0.5 MG/3 ML  3 mL Nebulization Q4H PRN    aspirin delayed-release tablet 81 mg  81 mg Oral DAILY    DULoxetine (CYMBALTA) capsule 30 mg  30 mg Oral BID    famotidine (PEPCID) tablet 10 mg  10 mg Oral BID    finasteride (PROSCAR) tablet 5 mg  5 mg Oral DAILY    guaiFENesin ER (MUCINEX) tablet 1,200 mg  1,200 mg Oral DAILY    montelukast (SINGULAIR) tablet 10 mg  10 mg Oral QHS    potassium chloride (K-DUR, KLOR-CON) SR tablet 20 mEq  20 mEq Oral DAILY    sodium chloride (NS) flush 5-40 mL  5-40 mL IntraVENous Q8H    sodium chloride (NS) flush 5-40 mL  5-40 mL IntraVENous PRN    oxyCODONE IR (ROXICODONE) tablet 5 mg  5 mg Oral Q4H PRN    bisacodyl (DULCOLAX) tablet 5 mg  5 mg Oral DAILY PRN    LORazepam (ATIVAN) tablet 1 mg  1 mg Oral Q6H PRN    budesonide (PULMICORT) 500 mcg/2 ml nebulizer suspension  500 mcg Nebulization BID RT    And    albuterol (PROVENTIL VENTOLIN) nebulizer solution 2.5 mg  2.5 mg Nebulization Q6HWA RT       Discharge Exam:  Patient Vitals for the past 24 hrs:   Temp Pulse Resp BP SpO2   01/10/20 0742 97.4 °F (36.3 °C) 63 18 142/76 98 %   01/10/20 0740     95 %   01/10/20 0511 97.9 °F (36.6 °C) 62 18 110/65 95 %   01/10/20 0214 98.1 °F (36.7 °C) 60 18 109/63 94 %   01/09/20 1958     94 %   01/09/20 1945 97.9 °F (36.6 °C) 65 18 141/81 93 %   01/09/20 1410     97 %   01/09/20 1254 98.7 °F (37.1 °C) 64 20 142/86 96 %     Oxygen Therapy  O2 Sat (%): 98 % (01/10/20 0742)  Pulse via Oximetry: 67 beats per minute (01/10/20 0740)  O2 Device: Nasal cannula (01/10/20 0845)  O2 Flow Rate (L/min): 2 l/min (01/10/20 0845)  FIO2 (%): 32 % (01/10/20 0740)    Estimated body mass index is 18.25 kg/m² as calculated from the following:    Height as of this encounter: 5' 8\" (1.727 m). Weight as of this encounter: 54.4 kg (120 lb). Intake/Output Summary (Last 24 hours) at 1/10/2020 1032  Last data filed at 1/10/2020 0215  Gross per 24 hour   Intake    Output 1500 ml   Net -1500 ml       *Note that automatically entered I/Os may not be accurate; dependent on patient compliance with collection and accurate  by assistants. General:    Alert and awake. Cachetic. Eyes:   Normal sclerae. Extraocular movements intact. ENT:  Normocephalic, atraumatic. Moist mucous membranes  CV:   Regular rate and rhythm. No murmur, rub, or gallop. Lungs:  Slight coarse breath sounds especially in RLL. No wheezing, rhonchi, or rales. Abdomen: Soft, nontender, nondistended. Bowel sounds normal.   Extremities: Warm and dry. No cyanosis or edema. Neurologic: CN II-XII grossly intact. Sensation intact. Skin:     No rashes or jaundice. Psych:  Normal mood and affect. Discharge Info:   Current Discharge Medication List      START taking these medications    Details   levoFLOXacin (LEVAQUIN) 500 mg tablet Take 1 Tab by mouth daily. Indications: Pneumonia  Qty: 3 Tab, Refills: 0         CONTINUE these medications which have CHANGED    Details   furosemide (LASIX) 20 mg tablet Take 1 Tab by mouth daily. Qty: 30 Tab, Refills: 0         CONTINUE these medications which have NOT CHANGED    Details   albuterol (PROVENTIL HFA, VENTOLIN HFA, PROAIR HFA) 90 mcg/actuation inhaler Take 2 Puffs by inhalation every four (4) hours as needed for Wheezing. famotidine (PEPCID) 10 mg tablet Take 10 mg by mouth two (2) times a day. albuterol-ipratropium (DUO-NEB) 2.5 mg-0.5 mg/3 ml nebu 3 mL by Nebulization route every four (4) hours as needed for Wheezing.      melatonin 3 mg tablet Take  by mouth.       fluticasone furoate-vilanterol (BREO ELLIPTA) 100-25 mcg/dose inhaler 1 inhalation daily, rinse mouth after use  Qty: 1 Inhaler, Refills: 11      DULoxetine (CYMBALTA) 30 mg capsule Take 30 mg by mouth two (2) times a day. magnesium oxide 250 mg magnesium tablet Take 250 mg by mouth daily. nystatin (MYCOSTATIN) powder Apply  to affected area four (4) times daily. acetaminophen (TYLENOL) 650 mg TbER Take 650 mg by mouth every eight (8) hours. finasteride (PROSCAR) 5 mg tablet Take 1 Tab by mouth daily. Qty: 90 Tab, Refills: 4      raNITIdine (ZANTAC) 75 mg tab Take  by mouth two (2) times a day. potassium chloride (K-DUR, KLOR-CON) 20 mEq tablet Take 2 Tabs by mouth daily. Qty: 3 Tab, Refills: 0      montelukast (SINGULAIR) 10 mg tablet Take 1 Tab by mouth daily. Qty: 90 Tab, Refills: 4    Associated Diagnoses: Mixed simple and mucopurulent chronic bronchitis (HCC)      Oxygen 4 lpm cont. cyanocobalamin (VITAMIN B12) 1,000 mcg/mL injection Once month  Refills: 12      fluticasone (FLONASE) 50 mcg/actuation nasal spray 2 Sprays by Both Nostrils route daily. Qty: 48 g, Refills: 4    Associated Diagnoses: Allergic rhinitis due to pollen, unspecified seasonality      guaiFENesin ER (MUCINEX) 600 mg ER tablet Take 1,200 mg by mouth daily. Biotin 2,500 mcg cap Take  by mouth. aspirin delayed-release 81 mg tablet Take  by mouth daily. STOP taking these medications       fluconazole (DIFLUCAN) 100 mg tablet Comments:   Reason for Stopping: Follow Up Orders:  No orders of the defined types were placed in this encounter. Follow-up Information     Follow up With Specialties Details Why Contact Info    [address] [place] [address]  [phone]          Time spent in patient discharge planning and coordination 45 minutes.     Signed:  Michael Liang MD

## 2020-01-10 NOTE — PROGRESS NOTES
Care Management Interventions  Transition of Care Consult (CM Consult): SNF  Partner SNF: No  Reason Why Partner SNF Not Chosen: Positive previous encounter  Physical Therapy Consult: No  Occupational Therapy Consult: No  Speech Therapy Consult: No  Current Support Network: Nursing Facility  Confirm Follow Up Transport: Other (see comment)  Discharge Location  Discharge Placement: Skilled nursing facility  SW met with pt and spoke with pt's son via phone. Pt will return to [facility] today where he is a LT resident. [facility] ambulance set for 2 pm.   No additional needs or questions identified at this time.   Anand Mccracken

## 2020-01-10 NOTE — PROGRESS NOTES
Problem: Falls - Risk of  Goal: *Absence of Falls  Description  Document Paula Stoll Fall Risk and appropriate interventions in the flowsheet. Outcome: Progressing Towards Goal  Note: Fall Risk Interventions:  Mobility Interventions: Bed/chair exit alarm         Medication Interventions: Bed/chair exit alarm    Elimination Interventions: Bed/chair exit alarm    History of Falls Interventions: Room close to nurse's station         Problem: Patient Education: Go to Patient Education Activity  Goal: Patient/Family Education  Outcome: Progressing Towards Goal     Problem: Pressure Injury - Risk of  Goal: *Prevention of pressure injury  Description  Document Brant Scale and appropriate interventions in the flowsheet.   Outcome: Progressing Towards Goal  Note: Pressure Injury Interventions:       Moisture Interventions: Absorbent underpads    Activity Interventions: Increase time out of bed    Mobility Interventions: PT/OT evaluation    Nutrition Interventions: Offer support with meals,snacks and hydration    Friction and Shear Interventions: Apply protective barrier, creams and emollients                Problem: Patient Education: Go to Patient Education Activity  Goal: Patient/Family Education  Outcome: Progressing Towards Goal     Problem: Pneumonia: Day 1  Goal: Off Pathway (Use only if patient is Off Pathway)  Outcome: Progressing Towards Goal  Goal: Activity/Safety  Outcome: Progressing Towards Goal  Goal: Consults, if ordered  Outcome: Progressing Towards Goal  Goal: Diagnostic Test/Procedures  Outcome: Progressing Towards Goal  Goal: Nutrition/Diet  Outcome: Progressing Towards Goal  Goal: Medications  Outcome: Progressing Towards Goal  Goal: Respiratory  Outcome: Progressing Towards Goal  Goal: Treatments/Interventions/Procedures  Outcome: Progressing Towards Goal  Goal: Psychosocial  Outcome: Progressing Towards Goal  Goal: *Oxygen saturation within defined limits  Outcome: Progressing Towards Goal  Goal: *Influenza vaccine administered (October-March)  Outcome: Progressing Towards Goal  Goal: *Pneumoccocal vaccine administered  Outcome: Progressing Towards Goal  Goal: *Hemodynamically stable  Outcome: Progressing Towards Goal  Goal: *Demonstrates progressive activity  Outcome: Progressing Towards Goal  Goal: *Tolerating diet  Outcome: Progressing Towards Goal     Problem: Pneumonia: Day 2  Goal: Off Pathway (Use only if patient is Off Pathway)  Outcome: Progressing Towards Goal  Goal: Activity/Safety  Outcome: Progressing Towards Goal  Goal: Consults, if ordered  Outcome: Progressing Towards Goal  Goal: Diagnostic Test/Procedures  Outcome: Progressing Towards Goal  Goal: Nutrition/Diet  Outcome: Progressing Towards Goal  Goal: Discharge Planning  Outcome: Progressing Towards Goal  Goal: Medications  Outcome: Progressing Towards Goal  Goal: Respiratory  Outcome: Progressing Towards Goal  Goal: Treatments/Interventions/Procedures  Outcome: Progressing Towards Goal  Goal: Psychosocial  Outcome: Progressing Towards Goal  Goal: *Oxygen saturation within defined limits  Outcome: Progressing Towards Goal  Goal: *Hemodynamically stable  Outcome: Progressing Towards Goal  Goal: *Demonstrates progressive activity  Outcome: Progressing Towards Goal  Goal: *Tolerating diet  Outcome: Progressing Towards Goal  Goal: *Optimal pain control at patient's stated goal  Outcome: Progressing Towards Goal     Problem: Pneumonia: Day 3  Goal: Off Pathway (Use only if patient is Off Pathway)  Outcome: Progressing Towards Goal  Goal: Activity/Safety  Outcome: Progressing Towards Goal  Goal: Consults, if ordered  Outcome: Progressing Towards Goal  Goal: Diagnostic Test/Procedures  Outcome: Progressing Towards Goal  Goal: Nutrition/Diet  Outcome: Progressing Towards Goal  Goal: Discharge Planning  Outcome: Progressing Towards Goal  Goal: Medications  Outcome: Progressing Towards Goal  Goal: Respiratory  Outcome: Progressing Towards Goal  Goal: Treatments/Interventions/Procedures  Outcome: Progressing Towards Goal  Goal: Psychosocial  Outcome: Progressing Towards Goal  Goal: *Oxygen saturation within defined limits  Outcome: Progressing Towards Goal  Goal: *Hemodynamically stable  Outcome: Progressing Towards Goal  Goal: *Demonstrates progressive activity  Outcome: Progressing Towards Goal  Goal: *Tolerating diet  Outcome: Progressing Towards Goal  Goal: *Describes available resources and support systems  Outcome: Progressing Towards Goal  Goal: *Optimal pain control at patient's stated goal  Outcome: Progressing Towards Goal     Problem: Pneumonia: Day 4  Goal: Off Pathway (Use only if patient is Off Pathway)  Outcome: Progressing Towards Goal  Goal: Activity/Safety  Outcome: Progressing Towards Goal  Goal: Nutrition/Diet  Outcome: Progressing Towards Goal  Goal: Discharge Planning  Outcome: Progressing Towards Goal  Goal: Medications  Outcome: Progressing Towards Goal  Goal: Respiratory  Outcome: Progressing Towards Goal  Goal: Treatments/Interventions/Procedures  Outcome: Progressing Towards Goal  Goal: Psychosocial  Outcome: Progressing Towards Goal     Problem: Pneumonia: Discharge Outcomes  Goal: *Demonstrates progressive activity  Outcome: Progressing Towards Goal  Goal: *Describes follow-up/return visits to physicians  Outcome: Progressing Towards Goal  Goal: *Tolerating diet  Outcome: Progressing Towards Goal  Goal: *Verbalizes name, dosage, time, side effects, and number of days to continue medications  Outcome: Progressing Towards Goal  Goal: *Influenza immunization  Outcome: Progressing Towards Goal  Goal: *Pneumococcal immunization  Outcome: Progressing Towards Goal  Goal: *Respiratory status at baseline  Outcome: Progressing Towards Goal  Goal: *Vital signs within defined limits  Outcome: Progressing Towards Goal  Goal: *Describes available resources and support systems  Outcome: Progressing Towards Goal  Goal: *Optimal pain control at patient's stated goal  Outcome: Progressing Towards Goal

## 2020-01-10 NOTE — PROGRESS NOTES
TRANSFER - OUT REPORT:    Verbal report given to JANINA Clark (name) on Shirley Decrerodolfo  being transferred to [address] [place] [address] (unit) for routine progression of care       Report consisted of patients Situation, Background, Assessment and   Recommendations(SBAR). Information from the following report(s) SBAR, Kardex, Intake/Output and MAR was reviewed with the receiving nurse. Lines:       Opportunity for questions and clarification was provided.       Patient transported with:   O2 @ 2 liters w/ [facility]

## 2020-01-10 NOTE — PROGRESS NOTES
Shift assessment completed. Pt alert and oriented x4. Lung sounds diminished with even and unlabored respirations, on 2L NC. Heart sounds regular. Active bowel sounds with soft and non-tender abdomen. Skin warm and dry. Abrasions noted to right hand and left elbow with foam dressing c/d/i. IV c/d and capped. Rojas catheter draining clear angelito urine. Pt reports no pain, nausea, and no signs of acute distress noted. Pt resting quietly in bed locked in lowest position with call light in reach and bed alarm set.

## 2020-01-13 ENCOUNTER — PATIENT OUTREACH (OUTPATIENT)
Dept: CASE MANAGEMENT | Age: 85
End: 2020-01-13

## 2020-01-13 NOTE — PROGRESS NOTES
This note will not be viewable in [address] Patient discharged to [facility] where he is a long term resident. No further COSME outreach indicated at this time as patient's care met by facility staff.

## 2020-03-26 ENCOUNTER — HOSPITAL ENCOUNTER (EMERGENCY)
Age: 85
Discharge: HOME | End: 2020-03-26
Attending: EMERGENCY MEDICINE
Payer: MEDICARE

## 2020-03-26 VITALS
OXYGEN SATURATION: 96 % | DIASTOLIC BLOOD PRESSURE: 89 MMHG | SYSTOLIC BLOOD PRESSURE: 146 MMHG | HEART RATE: 68 BPM | HEIGHT: 68 IN | TEMPERATURE: 97.9 F | RESPIRATION RATE: 16 BRPM | BODY MASS INDEX: 17.58 KG/M2 | WEIGHT: 116 LBS

## 2020-03-26 DIAGNOSIS — R31.9 HEMATURIA, UNSPECIFIED TYPE: Primary | ICD-10-CM

## 2020-03-26 LAB
ANION GAP SERPL CALC-SCNC: 5 MMOL/L (ref 7–16)
APPEARANCE UR: ABNORMAL
BACTERIA URNS QL MICRO: 0 /HPF
BILIRUB UR QL: ABNORMAL
BUN SERPL-MCNC: 32 MG/DL (ref 8–23)
CALCIUM SERPL-MCNC: 8.8 MG/DL (ref 8.3–10.4)
CASTS URNS QL MICRO: 0 /LPF
CHLORIDE SERPL-SCNC: 106 MMOL/L (ref 98–107)
CO2 SERPL-SCNC: 28 MMOL/L (ref 21–32)
COLOR UR: ABNORMAL
CREAT SERPL-MCNC: 0.94 MG/DL (ref 0.8–1.5)
CRYSTALS URNS QL MICRO: 0 /LPF
EPI CELLS #/AREA URNS HPF: 0 /HPF
ERYTHROCYTE [DISTWIDTH] IN BLOOD BY AUTOMATED COUNT: 13.2 % (ref 11.9–14.6)
GLUCOSE SERPL-MCNC: 110 MG/DL (ref 65–100)
GLUCOSE UR STRIP.AUTO-MCNC: NEGATIVE MG/DL
HCT VFR BLD AUTO: 44.8 % (ref 41.1–50.3)
HGB BLD-MCNC: 13.9 G/DL (ref 13.6–17.2)
HGB UR QL STRIP: ABNORMAL
INR PPP: 1
KETONES UR QL STRIP.AUTO: NEGATIVE MG/DL
LEUKOCYTE ESTERASE UR QL STRIP.AUTO: ABNORMAL
MCH RBC QN AUTO: 32.9 PG (ref 26.1–32.9)
MCHC RBC AUTO-ENTMCNC: 31 G/DL (ref 31.4–35)
MCV RBC AUTO: 106.2 FL (ref 79.6–97.8)
MUCOUS THREADS URNS QL MICRO: 0 /LPF
NITRITE UR QL STRIP.AUTO: POSITIVE
NRBC # BLD: 0 K/UL (ref 0–0.2)
PH UR STRIP: 6.5 [PH] (ref 5–9)
PLATELET # BLD AUTO: 242 K/UL (ref 150–450)
PMV BLD AUTO: 9.8 FL (ref 9.4–12.3)
POTASSIUM SERPL-SCNC: 4.7 MMOL/L (ref 3.5–5.1)
PROT UR STRIP-MCNC: 100 MG/DL
PROTHROMBIN TIME: 13.9 SEC (ref 12–14.7)
RBC # BLD AUTO: 4.22 M/UL (ref 4.23–5.6)
RBC #/AREA URNS HPF: >100 /HPF
SODIUM SERPL-SCNC: 139 MMOL/L (ref 136–145)
SP GR UR REFRACTOMETRY: 1.01 (ref 1–1.02)
UROBILINOGEN UR QL STRIP.AUTO: 0.2 EU/DL (ref 0.2–1)
WBC # BLD AUTO: 14.8 K/UL (ref 4.3–11.1)
WBC URNS QL MICRO: >100 /HPF

## 2020-03-26 PROCEDURE — 51700 IRRIGATION OF BLADDER: CPT

## 2020-03-26 PROCEDURE — 80048 BASIC METABOLIC PNL TOTAL CA: CPT

## 2020-03-26 PROCEDURE — 99285 EMERGENCY DEPT VISIT HI MDM: CPT

## 2020-03-26 PROCEDURE — 85610 PROTHROMBIN TIME: CPT

## 2020-03-26 PROCEDURE — 51798 US URINE CAPACITY MEASURE: CPT

## 2020-03-26 PROCEDURE — 81015 MICROSCOPIC EXAM OF URINE: CPT

## 2020-03-26 PROCEDURE — 81003 URINALYSIS AUTO W/O SCOPE: CPT

## 2020-03-26 PROCEDURE — 85027 COMPLETE CBC AUTOMATED: CPT

## 2020-03-26 RX ORDER — CEPHALEXIN 500 MG/1
500 CAPSULE ORAL 3 TIMES DAILY
Qty: 40 CAP | Refills: 0 | Status: SHIPPED | OUTPATIENT
Start: 2020-03-26 | End: 2020-04-09

## 2020-03-26 NOTE — ED NOTES
3rd bag of fluids infused into li with better success. Report called to Sonya Marie at [facility]. [facility] ambulance transporting patient currently.

## 2020-03-26 NOTE — ED PROVIDER NOTES
80-year-old male presents from his nursing facility with complaints of hematuria. Patient has a longstanding indwelling Rojas catheter. Followed by Dr. Tahira Bynum. Denies fever vomiting or diarrhea. Has some suprapubic abdominal discomfort with the urge to urinate. No trauma    The history is provided by the patient and the EMS personnel. Blood in Urine    This is a new problem. The current episode started 12 to 24 hours ago. The problem occurs every urination. The problem has not changed since onset. The quality of the pain is described as aching. The pain is moderate. There has been no fever. He is not sexually active. Associated symptoms include hematuria and abdominal pain. Pertinent negatives include no chills, no sweats, no nausea, no vomiting, no flank pain and no back pain. The patient is not pregnant. He has tried nothing for the symptoms. His past medical history is significant for catheterization. 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 HRCT    Abnormality of urethral meatus 8/8/2019    Allergic rhinitis 12/10/2015    Asthma     Benign neoplasm     Benign prostatic hyperplasia 12/10/2015    Bigeminy 3/28/2016    Overview:  Reported by LIN Manuel physician. Last Assessment & Plan:  EKG not done. I placed the patient on telemetry today and is having fairly frequent PVCs. We'll check BMP and magnesium. Overview:  Reported by LIN Manuel physician. Last Assessment & Plan:  EKG not done. I placed the patient on telemetry today and is having fairly frequent PVCs. We'll check BMP and magnesium.     BPH without urinary obstruction     Cardiovascular disease 12/10/2015    Chronic obstructive asthma with exacerbation ([address] 12/10/2015    Closed compression fracture of lumbosacral spine ([address] 12/11/2018    COPD (chronic obstructive pulmonary disease) ([address] 12/10/2015    COPD (chronic obstructive pulmonary disease) with emphysema ([address] 10/17/2016    Last Assessment & Plan:  Not currently on any maintence medication regimen for COPD as he felt them to be unhelpful in the past.  I recommended a trial of a ANoro Ellipta which he should take 1 inhalation daily. Demonstration was completed on the correct method of administration and he was able to return demonstration independently in the office today and administer his first dose.   I have given     COPD exacerbation ([address] 5/6/2019    Cough with hemoptysis     Erectile dysfunction 12/10/2015    Essential hypertension, benign 12/10/2015    Fracture 10/2014    of left hand and ribs after fall on concrete    History of colon polyps     Low testosterone 12/10/2015    Lumbago     Memory loss     Overflow incontinence     Pleural effusion 6/10/2019    6/10/19 Right thoracentesis:  800 cc of yellow fluid       Pneumothorax on right 5/6/2019    Raynaud's syndrome 12/10/2015    Rotator cuff tendinitis     Thrombocytopenia (HCC)     Urinary frequency     Ventricular tachyarrhythmia ([address] 5/6/2019    VT (ventricular tachycardia) ([address] 5/6/2019       Past Surgical History:   Procedure Laterality Date    HX CATARACT REMOVAL  6/2016-right    HX COLONOSCOPY      HX FRACTURE [place]  10/2014    of left hand and ribs are fall on concrete    [facility]    HX ORTHOPAEDIC  03/2018    hip fracture         Family History:   Problem Relation Age of Onset    Dementia Mother     Cancer Father         lung cancer    Heart Attack Father        Social History     Socioeconomic History    Marital status:      Spouse name: Not on file    Number of children: Not on file    Years of education: Not on file    Highest education level: Not on file Occupational History    Not on file   Social Needs    Financial resource strain: Not on file    Food insecurity     Worry: Not on file     Inability: Not on file    Transportation needs     Medical: Not on file     Non-medical: Not on file   Tobacco Use    Smoking status: Former Smoker     Packs/day: 2.00     Years: 60.00     Pack years: 120.00     Types: Cigarettes     Last attempt to quit: 1994     Years since quittin.2    Smokeless tobacco: Never Used   Substance and Sexual Activity    Alcohol use: Yes     Comment: occasional    Drug use: Not on file    Sexual activity: Not on file   Lifestyle    Physical activity     Days per week: Not on file     Minutes per session: Not on file    Stress: Not on file   Relationships    Social connections     Talks on phone: Not on file     Gets together: Not on file     Attends Religion service: Not on file     Active member of club or organization: Not on file     Attends meetings of clubs or organizations: Not on file     Relationship status: Not on file    Intimate partner violence     Fear of current or ex partner: Not on file     Emotionally abused: Not on file     Physically abused: Not on file     Forced sexual activity: Not on file   Other Topics Concern    Not on file   Social History Narrative    Not on file         ALLERGIES: Patient has no known allergies. Review of Systems   Constitutional: Negative for activity change, chills, diaphoresis and fever. HENT: Negative for dental problem, hearing loss, nosebleeds, rhinorrhea and sore throat. Eyes: Negative for pain, discharge, redness and visual disturbance. Respiratory: Negative for cough, chest tightness and shortness of breath. Cardiovascular: Negative for chest pain, palpitations and leg swelling. Gastrointestinal: Positive for abdominal pain. Negative for constipation, diarrhea, nausea and vomiting.    Endocrine: Negative for cold intolerance, heat intolerance, polydipsia and polyuria. Genitourinary: Positive for hematuria. Negative for dysuria and flank pain. Musculoskeletal: Negative for arthralgias, back pain, joint swelling, myalgias and neck pain. Skin: Negative for pallor and rash. Allergic/Immunologic: Negative for environmental allergies and food allergies. Neurological: Negative for dizziness, tremors, light-headedness, numbness and headaches. Hematological: Negative for adenopathy. Does not bruise/bleed easily. Psychiatric/Behavioral: Negative for confusion and dysphoric mood. The patient is not nervous/anxious and is not hyperactive. All other systems reviewed and are negative. Vitals:    03/26/20 1139 03/26/20 1141   BP: 158/85    Pulse: 82    Resp: 16    Temp: 97.9 °F (36.6 °C)    SpO2: 97%    Weight:  52.6 kg (116 lb)   Height:  5' 8\" (1.727 m)            Physical Exam  Vitals signs and nursing note reviewed. Constitutional:       General: He is in acute distress. Appearance: Normal appearance. He is well-developed and normal weight. HENT:      Head: Normocephalic and atraumatic. Right Ear: External ear normal.      Left Ear: External ear normal.      Mouth/Throat:      Mouth: Mucous membranes are moist.      Pharynx: Oropharynx is clear. No oropharyngeal exudate. Eyes:      General: No scleral icterus. Extraocular Movements: Extraocular movements intact. Conjunctiva/sclera: Conjunctivae normal.      Pupils: Pupils are equal, round, and reactive to light. Neck:      Musculoskeletal: Normal range of motion and neck supple. Thyroid: No thyromegaly. Vascular: No JVD. Cardiovascular:      Rate and Rhythm: Normal rate and regular rhythm. Pulses: Normal pulses. Heart sounds: Normal heart sounds. No murmur. No friction rub. No gallop. Pulmonary:      Effort: Pulmonary effort is normal. No respiratory distress. Breath sounds: Normal breath sounds. No wheezing.    Abdominal:      General: Bowel sounds are normal. There is no distension. Palpations: Abdomen is soft. Tenderness: There is abdominal tenderness in the suprapubic area. Musculoskeletal: Normal range of motion. General: No tenderness or deformity. Skin:     General: Skin is warm and dry. Capillary Refill: Capillary refill takes less than 2 seconds. Findings: No rash. Neurological:      Mental Status: He is alert and oriented to person, place, and time. Cranial Nerves: No cranial nerve deficit. Sensory: No sensory deficit. Motor: No abnormal muscle tone. Coordination: Coordination normal.   Psychiatric:         Behavior: Behavior normal.         Thought Content: Thought content normal.         Judgment: Judgment normal.          MDM  Number of Diagnoses or Management Options  Hematuria, unspecified type: new and requires workup  Diagnosis management comments: Rojas catheter switched to three-way   Bladder irrigation performed with 3 L of saline   Output cleared nicely     Labs reviewed     2:33 PM  Case reviewed with Dr. Evans Alcantara antibiotics  Office follow-up      5:53 PM  At 3:00 shift change his urine to turn begin pretty dark. A third bag of irrigation was ordered. That is completed urine is a thin Crimson more translucent than opaque. Patient resting comfortably in bed. Dr. Pauline Pop updated of events. He feels patient is suitable for discharge and should call the office tomorrow if not better. Amount and/or Complexity of Data Reviewed  Clinical lab tests: ordered and reviewed  Tests in the medicine section of CPT®: reviewed  Review and summarize past medical records: yes    Risk of Complications, Morbidity, and/or Mortality  Presenting problems: moderate  Diagnostic procedures: moderate  Management options: moderate  General comments: Elements of this note have been dictated via voice recognition software. Text and phrases may be limited by the accuracy of the software.   The chart has been reviewed, but errors may still be present.       Patient Progress  Patient progress: improved         Procedures

## 2020-03-26 NOTE — ED TRIAGE NOTES
Patient arrives via [facility] ambulance service from [facility] d/t blood in his urine. Per staff at facility, patient had blood noted in his catheter.  Patient has a chronic li for urinary retention that gets changed monthly by a urololgist.

## 2020-03-26 NOTE — DISCHARGE INSTRUCTIONS
You must finish all the prescribed antibiotics.   THERE SHOULD BE NO LEFT OVER PILLS!!  Call your doctor/follow up doctor to set up appointment for recheck visit  Follow-up with urology, Dr. Annel Lloyd  Return to ER for any worsening symptoms or new problems which may arise

## 2020-03-26 NOTE — ED NOTES
Urine still dark red s/p 2nd bag of normal saline irrigation. 2500mL of dark red urine emptied from li. Dr. Lorna Longo notified and orders for a 3rd bag of fluids given.

## 2020-03-26 NOTE — ED NOTES
Existing li replaced with 18fr 3-way li for bladder irrigation. Few clots manually irrigated. Patient currently connected to continuous bladder irrigation with NS infusing into bladder. Current bloody urine noted.

## 2020-03-26 NOTE — ED NOTES
Urine still noted to be dark red. Dr. Lili Plummer notified and gave orders for another bag of irrigation fluid per li.

## 2020-10-11 ENCOUNTER — HOSPITAL ENCOUNTER (EMERGENCY)
Age: 85
Discharge: HOME | End: 2020-10-11
Attending: EMERGENCY MEDICINE
Payer: MEDICARE

## 2020-10-11 VITALS
OXYGEN SATURATION: 98 % | SYSTOLIC BLOOD PRESSURE: 120 MMHG | RESPIRATION RATE: 16 BRPM | DIASTOLIC BLOOD PRESSURE: 65 MMHG | HEART RATE: 68 BPM | TEMPERATURE: 97.6 F

## 2020-10-11 DIAGNOSIS — T83.091A OBSTRUCTION OF FOLEY CATHETER, INITIAL ENCOUNTER (HCC): ICD-10-CM

## 2020-10-11 DIAGNOSIS — R33.9 URINARY RETENTION: Primary | ICD-10-CM

## 2020-10-11 LAB
APPEARANCE UR: ABNORMAL
BACTERIA URNS QL MICRO: ABNORMAL /HPF
BILIRUB UR QL: NEGATIVE
CASTS URNS QL MICRO: ABNORMAL /LPF
COLOR UR: YELLOW
EPI CELLS #/AREA URNS HPF: 0 /HPF
GLUCOSE UR STRIP.AUTO-MCNC: NEGATIVE MG/DL
HGB UR QL STRIP: ABNORMAL
KETONES UR QL STRIP.AUTO: NEGATIVE MG/DL
LEUKOCYTE ESTERASE UR QL STRIP.AUTO: ABNORMAL
NITRITE UR QL STRIP.AUTO: POSITIVE
PH UR STRIP: 8.5 [PH] (ref 5–9)
PROT UR STRIP-MCNC: 100 MG/DL
RBC #/AREA URNS HPF: ABNORMAL /HPF
SP GR UR REFRACTOMETRY: 1.02 (ref 1–1.02)
UROBILINOGEN UR QL STRIP.AUTO: 0.2 EU/DL (ref 0.2–1)
WBC URNS QL MICRO: >100 /HPF

## 2020-10-11 PROCEDURE — 51702 INSERT TEMP BLADDER CATH: CPT

## 2020-10-11 PROCEDURE — 87186 SC STD MICRODIL/AGAR DIL: CPT

## 2020-10-11 PROCEDURE — 87088 URINE BACTERIA CULTURE: CPT

## 2020-10-11 PROCEDURE — 99284 EMERGENCY DEPT VISIT MOD MDM: CPT

## 2020-10-11 PROCEDURE — 87086 URINE CULTURE/COLONY COUNT: CPT

## 2020-10-11 PROCEDURE — 81001 URINALYSIS AUTO W/SCOPE: CPT

## 2020-10-11 NOTE — ED NOTES
I have reviewed discharge instructions with the patient. The patient verbalized understanding. Patient left ED via Discharge Method: stretcher to SNF with transport from [facility]. The patient is being transported back to facility via [facility] at this time. The patient has been provided discharge instructions and follow up information. The patient does not have any questions at this time. All appropriate paperwork has been provided to [employer] for questions and clarification provided. Patient given 0 scripts. To continue your aftercare when you leave the hospital, you may receive an automated call from our care team to check in on how you are doing. This is a free service and part of our promise to provide the best care and service to meet your aftercare needs.  If you have questions, or wish to unsubscribe from this service please call [phone]. Thank you for Choosing our [facility] Emergency Department.

## 2020-10-11 NOTE — DISCHARGE INSTRUCTIONS
Patient Education        Urinary Retention: Care Instructions  Your Care Instructions     Urinary retention means that you aren't able to urinate. In men, it is often caused by a blockage of the urinary tract from an enlarged prostate gland. In men and women, it can also be caused by an infection or nerve damage. Or it may be a side effect of a medicine. The doctor may have drained the urine from your bladder. If you still have problems passing urine, you may need to use a catheter at home. This is used to empty your bladder until the problem can be fixed. Your doctor may put a catheter in your bladder before you go home. If so, he or she will tell you when to come back to have the catheter removed. The doctor has checked you closely. But problems can develop later. If you notice any problems or new symptoms, get medical treatment right away. Follow-up care is a key part of your treatment and safety. Be sure to make and go to all appointments, and call your doctor if you are having problems. It's also a good idea to know your test results and keep a list of the medicines you take. How can you care for yourself at home? · Take your medicines exactly as prescribed. Call your doctor if you think you are having a problem with your medicine. You will get more details on the specific medicines your doctor prescribes. · Check with your doctor before you use any over-the-counter medicines. Many cold and allergy medicines, for example, can make this problem worse. Make sure your doctor knows all of the medicines, vitamins, supplements, and herbal remedies you take. · Spread out through the day the amount of fluid you drink. Do not drink a lot at bedtime. · Avoid alcohol and caffeine. · If you have been given a catheter, or if one is already in place, follow the instructions you were given. Always wash your hands before and after you handle the catheter. When should you call for help?    Call your doctor now or seek immediate medical care if:    · You cannot urinate at all, or it is getting harder to urinate.     · You have symptoms of a urinary tract infection. These may include:  ? Pain or burning when you urinate. ? A frequent need to urinate without being able to pass much urine. ? Pain in the flank, which is just below the rib cage and above the waist on either side of the back. ? Blood in your urine. ? A fever. Watch closely for changes in your health, and be sure to contact your doctor if:    · You have any problems with your catheter.     · You do not get better as expected. Where can you learn more? Go to http://www.gray.com/  Enter M244 in the search box to learn more about \"Urinary Retention: Care Instructions. \"  Current as of: June 29, 2020               Content Version: 12.6  © 6952-0950 [employer]. Care instructions adapted under license by [employer] (which disclaims liability or warranty for this information). If you have questions about a medical condition or this instruction, always ask your healthcare professional. [place] [address] any warranty or liability for your use of this information.

## 2020-10-11 NOTE — ED PROVIDER NOTES
80-year-old gentleman with history of chronic urinary outlet obstruction presents with a clogged Rojas catheter. Patient feels abdominal fullness in the suprapubic region and states he is only had a couple drops of urine emanating through the catheter in the last day. He has had some urine escaping from the urethral meatus around the catheter. He has had no fevers or chills, no recent antibiotics. Patient denies any nausea or vomiting, and he has no flank pain. 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 HRCT    Abnormality of urethral meatus 8/8/2019    Allergic rhinitis 12/10/2015    Asthma     Benign neoplasm     Benign prostatic hyperplasia 12/10/2015    Bigeminy 3/28/2016    Overview:  Reported by LIN Manuel physician. Last Assessment & Plan:  EKG not done. I placed the patient on telemetry today and is having fairly frequent PVCs. We'll check BMP and magnesium. Overview:  Reported by LIN Manuel physician. Last Assessment & Plan:  EKG not done. I placed the patient on telemetry today and is having fairly frequent PVCs. We'll check BMP and magnesium.     BPH without urinary obstruction     Cardiovascular disease 12/10/2015    Chronic obstructive asthma with exacerbation ([address] 12/10/2015    Closed compression fracture of lumbosacral spine ([address] 12/11/2018    COPD (chronic obstructive pulmonary disease) ([address] 12/10/2015    COPD (chronic obstructive pulmonary disease) with emphysema ([address] 10/17/2016    Last Assessment & Plan:  Not currently on any maintence medication regimen for COPD as he felt them to be unhelpful in the past.  I recommended a trial of a ANoro Ellipta which he should take 1 inhalation daily. Demonstration was completed on the correct method of administration and he was able to return demonstration independently in the office today and administer his first dose.   I have given     COPD exacerbation ([address] 2019    Cough with hemoptysis     Erectile dysfunction 12/10/2015    Essential hypertension, benign 12/10/2015    Fracture 10/2014    of left hand and ribs after fall on concrete    History of colon polyps     Low testosterone 12/10/2015    Lumbago     Memory loss     Overflow incontinence     Pleural effusion 6/10/2019    6/10/19 Right thoracentesis:  800 cc of yellow fluid       Pneumothorax on right 2019    Raynaud's syndrome 12/10/2015    Rotator cuff tendinitis     Thrombocytopenia (HCC)     Urinary frequency     Ventricular tachyarrhythmia ([address] 2019    VT (ventricular tachycardia) ([address] 2019       Past Surgical History:   Procedure Laterality Date    HX CATARACT REMOVAL  2016-right    HX COLONOSCOPY      HX FRACTURE [place]  10/2014    of left hand and ribs are fall on concrete    [facility] [address]    HX ORTHOPAEDIC  2018    hip fracture         Family History:   Problem Relation Age of Onset    Dementia Mother     Cancer Father         lung cancer    Heart Attack Father        Social History     Socioeconomic History    Marital status:      Spouse name: Not on file    Number of children: Not on file    Years of education: Not on file    Highest education level: Not on file   Occupational History    Not on file   Social Needs    Financial resource strain: Not on file    Food insecurity     Worry: Not on file     Inability: Not on file    Transportation needs     Medical: Not on file     Non-medical: Not on file   Tobacco Use    Smoking status: Former Smoker     Packs/day: 2.00     Years: 60.00     Pack years: 120.00     Types: Cigarettes     Last attempt to quit: 1994     Years since quittin.7    Smokeless tobacco: Never Used   Substance and Sexual Activity    Alcohol use: Yes     Comment: occasional    Drug use: Not on file    Sexual activity: Not on file   Lifestyle    Physical activity     Days per week: Not on file     Minutes per session: Not on file    Stress: Not on file   Relationships    Social connections     Talks on phone: Not on file     Gets together: Not on file     Attends [employer] service: Not on file     Active member of club or organization: Not on file     Attends meetings of clubs or organizations: Not on file     Relationship status: Not on file    Intimate partner violence     Fear of current or ex partner: Not on file     Emotionally abused: Not on file     Physically abused: Not on file     Forced sexual activity: Not on file   Other Topics Concern    Not on file   Social History Narrative    Not on file         ALLERGIES: Patient has no known allergies. Review of Systems   Constitutional: Negative for chills and fever. HENT: Negative for rhinorrhea and sore throat. Eyes: Negative for discharge and redness. Respiratory: Negative for cough and shortness of breath. Gastrointestinal: Negative for abdominal pain, nausea and vomiting. Genitourinary: Positive for difficulty urinating and urgency. Negative for flank pain. Musculoskeletal: Negative for arthralgias and back pain. Skin: Negative for rash. Neurological: Negative for dizziness and headaches. All other systems reviewed and are negative. Vitals:    10/11/20 1441   BP: (!) 156/87   Pulse: 79   Resp: 24   Temp: 97.5 °F (36.4 °C)   SpO2: 97%            Physical Exam  Vitals signs and nursing note reviewed. Constitutional:       General: He is not in acute distress. Appearance: Normal appearance. He is well-developed. He is not ill-appearing, toxic-appearing or diaphoretic. HENT:      Head: Normocephalic and atraumatic. Eyes:      General:         Right eye: No discharge.          Left eye: No discharge. Conjunctiva/sclera: Conjunctivae normal.   Neck:      Musculoskeletal: Normal range of motion and neck supple. Pulmonary:      Effort: Pulmonary effort is normal. No respiratory distress. Abdominal:      Tenderness: There is abdominal tenderness in the suprapubic area. Musculoskeletal: Normal range of motion. Skin:     General: Skin is warm and dry. Findings: No rash. Neurological:      General: No focal deficit present. Mental Status: He is alert and oriented to person, place, and time. Mental status is at baseline. Motor: No abnormal muscle tone. Comments: cni 2-12 grossly  Nl gait,  Nl speech     Psychiatric:         Mood and Affect: Mood normal.         Behavior: Behavior normal.          MDM  Number of Diagnoses or Management Options  Obstruction of Rojas catheter, initial encounter [facility]):   Urinary retention:   Diagnosis management comments: Medical decision making note: Rojas catheter obstruction,  800 cc of urine obtained after new, catheter was placed. Culture urine, but wont treat at this point, favoring it is more colonoization than infectoin, based on his lack of symptoms  This concludes the \"medical decision making note\" part of this emergency department visit note.            Procedures

## 2020-10-11 NOTE — ED TRIAGE NOTES
Patient arrives via EMS from [facility]. Patient with li catheter in place. States changed out 1 week ago. Patient with very little output today and having pressure in his bladder. Reports some urine draining from around his catheter.

## 2020-10-15 LAB
BACTERIA SPEC CULT: ABNORMAL
BACTERIA SPEC CULT: ABNORMAL
SERVICE CMNT-IMP: ABNORMAL

## 2020-10-16 NOTE — PROGRESS NOTES
Called patient's home number to speak with him regarding urine culture results and check on him. He was not placed on antibiotics. He will need antibiotics. Message left to return call.

## 2020-10-16 NOTE — PROGRESS NOTES
Marylin Gurinder, patients wife returned call, she states he is at [facility]. Called there, spoke with unit 1 Mohsen Nguyen RN, he would like me to fax a copy of the result so their NP can prescribe antibiotics. [phone] Anoop Vines RN. I have faxed that now.

## 2020-11-22 ENCOUNTER — APPOINTMENT (OUTPATIENT)
Dept: GENERAL RADIOLOGY | Age: 85
DRG: 193 | End: 2020-11-22
Attending: EMERGENCY MEDICINE
Payer: MEDICARE

## 2020-11-22 ENCOUNTER — HOSPITAL ENCOUNTER (INPATIENT)
Age: 85
LOS: 4 days | Discharge: NURSING, REHAB OR LONG-TERM CARE | DRG: 193 | End: 2020-11-26
Attending: EMERGENCY MEDICINE | Admitting: INTERNAL MEDICINE
Payer: MEDICARE

## 2020-11-22 DIAGNOSIS — J18.9 PNEUMONIA OF LEFT LUNG DUE TO INFECTIOUS ORGANISM, UNSPECIFIED PART OF LUNG: Primary | ICD-10-CM

## 2020-11-22 PROBLEM — R91.8 MULTILOBAR LUNG INFILTRATE: Status: ACTIVE | Noted: 2020-11-22

## 2020-11-22 PROBLEM — R06.02 SHORTNESS OF BREATH: Status: ACTIVE | Noted: 2020-11-22

## 2020-11-22 PROBLEM — Z97.8 CHRONIC INDWELLING FOLEY CATHETER: Status: ACTIVE | Noted: 2020-11-22

## 2020-11-22 LAB
ALBUMIN SERPL-MCNC: 3.1 G/DL (ref 3.2–4.6)
ALBUMIN/GLOB SERPL: 0.9 {RATIO} (ref 1.2–3.5)
ALP SERPL-CCNC: 68 U/L (ref 50–136)
ALT SERPL-CCNC: 15 U/L (ref 12–65)
ANION GAP SERPL CALC-SCNC: 14 MMOL/L (ref 7–16)
AST SERPL-CCNC: 21 U/L (ref 15–37)
BASOPHILS # BLD: 0 K/UL (ref 0–0.2)
BASOPHILS NFR BLD: 0 % (ref 0–2)
BILIRUB SERPL-MCNC: 1 MG/DL (ref 0.2–1.1)
BUN SERPL-MCNC: 30 MG/DL (ref 8–23)
CALCIUM SERPL-MCNC: 8.3 MG/DL (ref 8.3–10.4)
CHLORIDE SERPL-SCNC: 109 MMOL/L (ref 98–107)
CO2 SERPL-SCNC: 22 MMOL/L (ref 21–32)
CREAT SERPL-MCNC: 0.79 MG/DL (ref 0.8–1.5)
DIFFERENTIAL METHOD BLD: ABNORMAL
EOSINOPHIL # BLD: 0 K/UL (ref 0–0.8)
EOSINOPHIL NFR BLD: 0 % (ref 0.5–7.8)
ERYTHROCYTE [DISTWIDTH] IN BLOOD BY AUTOMATED COUNT: 14.9 % (ref 11.9–14.6)
FOLATE SERPL-MCNC: 7.1 NG/ML (ref 3.1–17.5)
GLOBULIN SER CALC-MCNC: 3.3 G/DL (ref 2.3–3.5)
GLUCOSE SERPL-MCNC: 89 MG/DL (ref 65–100)
HCT VFR BLD AUTO: 33.5 % (ref 41.1–50.3)
HGB BLD-MCNC: 10.5 G/DL (ref 13.6–17.2)
IMM GRANULOCYTES # BLD AUTO: 0.1 K/UL (ref 0–0.5)
IMM GRANULOCYTES NFR BLD AUTO: 1 % (ref 0–5)
LACTATE SERPL-SCNC: 1.1 MMOL/L (ref 0.4–2)
LACTATE SERPL-SCNC: 2 MMOL/L (ref 0.4–2)
LYMPHOCYTES # BLD: 0.7 K/UL (ref 0.5–4.6)
LYMPHOCYTES NFR BLD: 5 % (ref 13–44)
MAGNESIUM SERPL-MCNC: 1.7 MG/DL (ref 1.8–2.4)
MCH RBC QN AUTO: 32.6 PG (ref 26.1–32.9)
MCHC RBC AUTO-ENTMCNC: 31.3 G/DL (ref 31.4–35)
MCV RBC AUTO: 104 FL (ref 79.6–97.8)
MONOCYTES # BLD: 0.9 K/UL (ref 0.1–1.3)
MONOCYTES NFR BLD: 6 % (ref 4–12)
NEUTS SEG # BLD: 12.6 K/UL (ref 1.7–8.2)
NEUTS SEG NFR BLD: 88 % (ref 43–78)
NRBC # BLD: 0 K/UL (ref 0–0.2)
PLATELET # BLD AUTO: 133 K/UL (ref 150–450)
PMV BLD AUTO: 10.6 FL (ref 9.4–12.3)
POTASSIUM SERPL-SCNC: 4 MMOL/L (ref 3.5–5.1)
PROCALCITONIN SERPL-MCNC: 0.28 NG/ML
PROT SERPL-MCNC: 6.4 G/DL (ref 6.3–8.2)
RBC # BLD AUTO: 3.22 M/UL (ref 4.23–5.6)
SODIUM SERPL-SCNC: 145 MMOL/L (ref 136–145)
VIT B12 SERPL-MCNC: 410 PG/ML (ref 193–986)
WBC # BLD AUTO: 14.4 K/UL (ref 4.3–11.1)

## 2020-11-22 PROCEDURE — 80053 COMPREHEN METABOLIC PANEL: CPT

## 2020-11-22 PROCEDURE — 74011250637 HC RX REV CODE- 250/637: Performed by: INTERNAL MEDICINE

## 2020-11-22 PROCEDURE — 82746 ASSAY OF FOLIC ACID SERUM: CPT

## 2020-11-22 PROCEDURE — 87040 BLOOD CULTURE FOR BACTERIA: CPT

## 2020-11-22 PROCEDURE — 87635 SARS-COV-2 COVID-19 AMP PRB: CPT

## 2020-11-22 PROCEDURE — 74011000258 HC RX REV CODE- 258: Performed by: EMERGENCY MEDICINE

## 2020-11-22 PROCEDURE — 74011250637 HC RX REV CODE- 250/637: Performed by: HOSPITALIST

## 2020-11-22 PROCEDURE — 83735 ASSAY OF MAGNESIUM: CPT

## 2020-11-22 PROCEDURE — 94640 AIRWAY INHALATION TREATMENT: CPT

## 2020-11-22 PROCEDURE — 77010033678 HC OXYGEN DAILY

## 2020-11-22 PROCEDURE — 2709999900 HC NON-CHARGEABLE SUPPLY

## 2020-11-22 PROCEDURE — 99284 EMERGENCY DEPT VISIT MOD MDM: CPT

## 2020-11-22 PROCEDURE — 96365 THER/PROPH/DIAG IV INF INIT: CPT

## 2020-11-22 PROCEDURE — 84145 PROCALCITONIN (PCT): CPT

## 2020-11-22 PROCEDURE — 74011250636 HC RX REV CODE- 250/636: Performed by: EMERGENCY MEDICINE

## 2020-11-22 PROCEDURE — 77030013140 HC MSK NEB VYRM -A

## 2020-11-22 PROCEDURE — 83605 ASSAY OF LACTIC ACID: CPT

## 2020-11-22 PROCEDURE — 82607 VITAMIN B-12: CPT

## 2020-11-22 PROCEDURE — 74011000250 HC RX REV CODE- 250: Performed by: INTERNAL MEDICINE

## 2020-11-22 PROCEDURE — 94760 N-INVAS EAR/PLS OXIMETRY 1: CPT

## 2020-11-22 PROCEDURE — 65270000029 HC RM PRIVATE

## 2020-11-22 PROCEDURE — 85025 COMPLETE CBC W/AUTO DIFF WBC: CPT

## 2020-11-22 PROCEDURE — 71045 X-RAY EXAM CHEST 1 VIEW: CPT

## 2020-11-22 RX ORDER — BUDESONIDE 0.5 MG/2ML
500 INHALANT ORAL
Status: DISCONTINUED | OUTPATIENT
Start: 2020-11-22 | End: 2020-11-26 | Stop reason: HOSPADM

## 2020-11-22 RX ORDER — ACETAMINOPHEN 325 MG/1
650 TABLET ORAL
Status: DISCONTINUED | OUTPATIENT
Start: 2020-11-22 | End: 2020-11-26 | Stop reason: HOSPADM

## 2020-11-22 RX ORDER — ONDANSETRON 2 MG/ML
4 INJECTION INTRAMUSCULAR; INTRAVENOUS
Status: DISCONTINUED | OUTPATIENT
Start: 2020-11-22 | End: 2020-11-26 | Stop reason: HOSPADM

## 2020-11-22 RX ORDER — SODIUM CHLORIDE 0.9 % (FLUSH) 0.9 %
5-40 SYRINGE (ML) INJECTION AS NEEDED
Status: DISCONTINUED | OUTPATIENT
Start: 2020-11-22 | End: 2020-11-26 | Stop reason: HOSPADM

## 2020-11-22 RX ORDER — POLYETHYLENE GLYCOL 3350 17 G/17G
17 POWDER, FOR SOLUTION ORAL DAILY PRN
Status: DISCONTINUED | OUTPATIENT
Start: 2020-11-22 | End: 2020-11-26 | Stop reason: HOSPADM

## 2020-11-22 RX ORDER — PROMETHAZINE HYDROCHLORIDE 25 MG/1
12.5 TABLET ORAL
Status: DISCONTINUED | OUTPATIENT
Start: 2020-11-22 | End: 2020-11-26 | Stop reason: HOSPADM

## 2020-11-22 RX ORDER — ACETAMINOPHEN 650 MG/1
650 SUPPOSITORY RECTAL
Status: DISCONTINUED | OUTPATIENT
Start: 2020-11-22 | End: 2020-11-26 | Stop reason: HOSPADM

## 2020-11-22 RX ORDER — SODIUM CHLORIDE 0.9 % (FLUSH) 0.9 %
5-40 SYRINGE (ML) INJECTION EVERY 8 HOURS
Status: DISCONTINUED | OUTPATIENT
Start: 2020-11-22 | End: 2020-11-26 | Stop reason: HOSPADM

## 2020-11-22 RX ORDER — IPRATROPIUM BROMIDE AND ALBUTEROL SULFATE 2.5; .5 MG/3ML; MG/3ML
3 SOLUTION RESPIRATORY (INHALATION)
Status: DISCONTINUED | OUTPATIENT
Start: 2020-11-22 | End: 2020-11-26 | Stop reason: HOSPADM

## 2020-11-22 RX ORDER — ASPIRIN 81 MG/1
81 TABLET ORAL DAILY
Status: DISCONTINUED | OUTPATIENT
Start: 2020-11-23 | End: 2020-11-26 | Stop reason: HOSPADM

## 2020-11-22 RX ORDER — MONTELUKAST SODIUM 10 MG/1
10 TABLET ORAL
Status: DISCONTINUED | OUTPATIENT
Start: 2020-11-22 | End: 2020-11-26 | Stop reason: HOSPADM

## 2020-11-22 RX ORDER — MIRTAZAPINE 15 MG/1
7.5 TABLET, FILM COATED ORAL
Status: DISCONTINUED | OUTPATIENT
Start: 2020-11-22 | End: 2020-11-26 | Stop reason: HOSPADM

## 2020-11-22 RX ADMIN — BUDESONIDE 500 MCG: 0.5 INHALANT RESPIRATORY (INHALATION) at 21:19

## 2020-11-22 RX ADMIN — MIRTAZAPINE 7.5 MG: 15 TABLET, FILM COATED ORAL at 21:04

## 2020-11-22 RX ADMIN — IPRATROPIUM BROMIDE AND ALBUTEROL SULFATE 3 ML: .5; 3 SOLUTION RESPIRATORY (INHALATION) at 21:19

## 2020-11-22 RX ADMIN — MONTELUKAST 10 MG: 10 TABLET, FILM COATED ORAL at 20:25

## 2020-11-22 RX ADMIN — CEFTRIAXONE SODIUM 2 G: 2 INJECTION, POWDER, FOR SOLUTION INTRAMUSCULAR; INTRAVENOUS at 15:47

## 2020-11-22 RX ADMIN — Medication 10 ML: at 22:00

## 2020-11-22 RX ADMIN — AZITHROMYCIN MONOHYDRATE 500 MG: 500 INJECTION, POWDER, LYOPHILIZED, FOR SOLUTION INTRAVENOUS at 16:35

## 2020-11-22 NOTE — ED NOTES
Pt states that he does not want this RN to change his catheter because he has an appt with his urologist.MD notified. MD verbalized understanding.

## 2020-11-22 NOTE — PROGRESS NOTES
Problem: Pneumonia: Day 1  Goal: Activity/Safety  Outcome: Progressing Towards Goal  Goal: Consults, if ordered  Outcome: Progressing Towards Goal  Goal: Diagnostic Test/Procedures  Outcome: Progressing Towards Goal  Goal: Nutrition/Diet  Outcome: Progressing Towards Goal  Goal: Medications  Outcome: Progressing Towards Goal  Goal: Respiratory  Outcome: Progressing Towards Goal  Goal: Treatments/Interventions/Procedures  Outcome: Progressing Towards Goal  Goal: Psychosocial  Outcome: Progressing Towards Goal  Goal: *Oxygen saturation within defined limits  Outcome: Progressing Towards Goal  Goal: *Influenza vaccine administered (October-March)  Outcome: Progressing Towards Goal  Goal: *Pneumoccocal vaccine administered  Outcome: Progressing Towards Goal  Goal: *Hemodynamically stable  Outcome: Progressing Towards Goal  Goal: *Demonstrates progressive activity  Outcome: Progressing Towards Goal  Goal: *Tolerating diet  Outcome: Progressing Towards Goal

## 2020-11-22 NOTE — H&P
Hospitalist H&P Note     Admit Date:  2020  1:59 PM   Name:  Lord Bruno  Age: 80 y.o.  : 1932   MRN:  [id number]   PCP:  Kee Cagle MD    HPI/Subjective:   Lord Bruno is a 80 y.o. male with medical history significant for V. tach, chronic respiratory failure with hypoxia on home oxygen, COPD, hypertension, BPH with chronic li catheter who presented from NH with SOB and hand shaking this morning. Per reports, patient's sats were lower than normal this morning and 1 episode of coughing up blood. He reported sudden onset of shaking in his hands this morning feeling sick and screen for help per records. There were no documented fevers, chest pain, abdominal pain, nausea, vomiting, diarrhea or worsening shortness of breath from baseline. Per patient, he woke up feeling not well this morning. He is unable to specify what he means by not well. He states he does not like Sundays as he is unable to speak to his wife as she is out with her friends while he is confined to NH. He was very upset with   He reports he feels better now compared to this morning. He also states that he normally does not like Sundays as he is unable to speak to his wife. States that his wife normally goes out with her friends on  and due to Covid he is confined to the nursing home. He states he was not happy this morning and got worked up. He denies any increased breath or cough. Denies any fever, chills, nausea, vomiting, abdominal pain, chest pain. He has chronic Li catheter which was inserted by his outpatient urologist and he reports no complications from the Li. He is planning on seeing his urologist soon to exchange her Li catheter and does not want us to exchange the Li catheter at this time. In the ED, he is hemodynamically stable without any fever and saturating well on 4 L nasal cannula.   However, he is noted to be in slight respiratory distress as he is conversing with me.  Labs are notable for WBC 14.4, hemoglobin 10.5, lactic acid 2.0. SARSCov2 rapid test is negative. Chest x-ray shows left mid and lower lung pneumonia. 10 systems reviewed and negative except as noted in HPI. 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 HRCT    Abnormality of urethral meatus 8/8/2019    Allergic rhinitis 12/10/2015    Asthma     Benign neoplasm     Benign prostatic hyperplasia 12/10/2015    Bigeminy 3/28/2016    Overview:  Reported by LIN Manuel physician. Last Assessment & Plan:  EKG not done. I placed the patient on telemetry today and is having fairly frequent PVCs. We'll check BMP and magnesium. Overview:  Reported by LIN Manuel physician. Last Assessment & Plan:  EKG not done. I placed the patient on telemetry today and is having fairly frequent PVCs. We'll check BMP and magnesium.  BPH without urinary obstruction     Cardiovascular disease 12/10/2015    Chronic obstructive asthma with exacerbation ([address] 12/10/2015    Closed compression fracture of lumbosacral spine ([address] 12/11/2018    COPD (chronic obstructive pulmonary disease) [facility] 12/10/2015    COPD (chronic obstructive pulmonary disease) with emphysema ([address] 10/17/2016    Last Assessment & Plan:  Not currently on any maintence medication regimen for COPD as he felt them to be unhelpful in the past.  I recommended a trial of a ANoro Ellipta which he should take 1 inhalation daily. Demonstration was completed on the correct method of administration and he was able to return demonstration independently in the office today and administer his first dose.   I have given     COPD exacerbation ([address] 5/6/2019    Cough with hemoptysis     Erectile dysfunction 12/10/2015    Essential hypertension, benign 12/10/2015    Fracture 10/2014    of left hand and ribs after fall on concrete    History of colon polyps     Low testosterone 12/10/2015    Lumbago     Memory loss     Overflow incontinence     Pleural effusion 6/10/2019    6/10/19 Right thoracentesis:  800 cc of yellow fluid       Pneumothorax on right 2019    Raynaud's syndrome 12/10/2015    Rotator cuff tendinitis     Thrombocytopenia ([address]     Urinary frequency     Ventricular tachyarrhythmia ([address] 2019    VT (ventricular tachycardia) ([address] 2019      Past Surgical History:   Procedure Laterality Date    HX CATARACT REMOVAL  2016-right    HX COLONOSCOPY      HX FRACTURE TX  10/2014    of left hand and ribs are fall on concrete    [address] [place] HX ORTHOPAEDIC  2018    hip fracture      No Known Allergies   Social History     Tobacco Use    Smoking status: Former Smoker     Packs/day: 2.00     Years: 60.00     Pack years: 120.00     Types: Cigarettes     Last attempt to quit: 1994     Years since quittin.9    Smokeless tobacco: Never Used   Substance Use Topics    Alcohol use: Yes     Comment: occasional      Family History   Problem Relation Age of Onset    Dementia Mother     Cancer Father         lung cancer    Heart Attack Father       Immunization History   Administered Date(s) Administered    Influenza High Dose Vaccine PF 10/12/2016, 10/23/2017, 2018, 2019    Influenza Vaccine 09/10/2010    Influenza Vaccine (Tri) Adjuvanted (>65 Yrs FLUAD TRI 01451) 2018    Pneumococcal Conjugate (PCV-13) 10/01/2015, 2016    Pneumococcal Vaccine (Unspecified Type) 10/01/1998    TB Skin Test (PPD) Intradermal 2018, 2019, 06/10/2019, 2019, 2019    Zoster Recombinant 05/10/2018, 2018    Zoster Vaccine, Live 2011     PTA Medications:  Prior to Admission Medications   Prescriptions Last Dose Informant Patient Reported? Taking? Biotin 2,500 mcg cap   Yes No   Sig: Take  by mouth. DULoxetine (CYMBALTA) 30 mg capsule   Yes No   Sig: Take 30 mg by mouth two (2) times a day. Oxygen   Yes No   Si lpm cont. acetaminophen (TYLENOL) 650 mg TbER   Yes No   Sig: Take 650 mg by mouth every eight (8) hours. albuterol (PROVENTIL HFA, VENTOLIN HFA, PROAIR HFA) 90 mcg/actuation inhaler   Yes No   Sig: Take 2 Puffs by inhalation every four (4) hours as needed for Wheezing. albuterol-ipratropium (DUO-NEB) 2.5 mg-0.5 mg/3 ml nebu   Yes No   Sig: 3 mL by Nebulization route every four (4) hours as needed for Wheezing. aspirin delayed-release 81 mg tablet   Yes No   Sig: Take  by mouth daily. cyanocobalamin (VITAMIN B12) 1,000 mcg/mL injection   Yes No   Sig: Once month   famotidine (PEPCID) 10 mg tablet   Yes No   Sig: Take 10 mg by mouth two (2) times a day. finasteride (PROSCAR) 5 mg tablet   No No   Sig: Take 1 Tab by mouth daily. fluticasone (FLONASE) 50 mcg/actuation nasal spray   No No   Si Sprays by Both Nostrils route daily. fluticasone furoate-vilanterol (BREO ELLIPTA) 100-25 mcg/dose inhaler   No No   Si inhalation daily, rinse mouth after use   furosemide (LASIX) 20 mg tablet   No No   Sig: Take 1 Tab by mouth daily. guaiFENesin ER (MUCINEX) 600 mg ER tablet   Yes No   Sig: Take 1,200 mg by mouth daily. levoFLOXacin (LEVAQUIN) 500 mg tablet   No No   Sig: Take 1 Tab by mouth daily. Indications: Pneumonia   magnesium oxide 250 mg magnesium tablet   Yes No   Sig: Take 250 mg by mouth daily. melatonin 3 mg tablet   Yes No   Sig: Take  by mouth.   montelukast (SINGULAIR) 10 mg tablet   No No   Sig: Take 1 Tab by mouth daily. Patient taking differently: Take 10 mg by mouth nightly. nystatin (MYCOSTATIN) powder   Yes No   Sig: Apply  to affected area four (4) times daily.    potassium chloride (K-DUR, KLOR-CON) 20 mEq tablet   No No   Sig: Take 2 Tabs by mouth daily. Patient taking differently: Take 20 mEq by mouth daily. raNITIdine (ZANTAC) 75 mg tab   Yes No   Sig: Take  by mouth two (2) times a day. Facility-Administered Medications: None       Objective:     Patient Vitals for the past 24 hrs:   Temp Pulse Resp BP SpO2   11/22/20 1639  75  96/62 92 %   11/22/20 1417     93 %   11/22/20 1414 98.3 °F (36.8 °C) 80 20 109/64      Oxygen Therapy  O2 Sat (%): 92 % (11/22/20 1639)  Pulse via Oximetry: 75 beats per minute (11/22/20 1639)  O2 Device: Nasal cannula (11/22/20 1417)  O2 Flow Rate (L/min): 4 l/min (11/22/20 1417)    Estimated body mass index is 18.55 kg/m² as calculated from the following:    Height as of this encounter: 5' 8\" (1.727 m). Weight as of this encounter: 55.3 kg (122 lb). Intake/Output Summary (Last 24 hours) at 11/22/2020 1731  Last data filed at 11/22/2020 1655  Gross per 24 hour   Intake 50 ml   Output    Net 50 ml       *Note that automatically entered I/Os may not be accurate; dependent on patient compliance with collection and accurate  by assistants. Physical Exam:  General:     Thin and frail elderly male. Alert, awake. Conversational dyspnea noted  Head:   normocephalic, atraumatic  Eyes, Ears, nose: PERRL, EOMI. Normal Conjunctiva. Neck:    supple, non-tender. Trachea midline. Lungs: No wheezing. Crackles in the left lung feels. Cardiac:   RRR, Normal S1 and S2. Abdomen:   Soft, non distended, nontender, +BS  :   Rojas draining yellow urine with some sediment. Extremities:   Warm, dry. No edema   Skin:   No rashes, no jaundice  Neuro:  AAOx 3 .  No gross focal neurological deficit  Psychiatric:  No anxiety, calm, cooperative      Data Review:   Recent Results (from the past 24 hour(s))   METABOLIC PANEL, COMPREHENSIVE    Collection Time: 11/22/20  2:39 PM   Result Value Ref Range    Sodium 145 136 - 145 mmol/L    Potassium 4.0 3.5 - 5.1 mmol/L    Chloride 109 (H) 98 - 107 mmol/L    CO2 22 21 - 32 mmol/L    Anion gap 14 7 - 16 mmol/L    Glucose 89 65 - 100 mg/dL    BUN 30 (H) 8 - 23 MG/DL    Creatinine 0.79 (L) 0.8 - 1.5 MG/DL    GFR est AA >60 >60 ml/min/1.73m2    GFR est non-AA >60 >60 ml/min/1.73m2    Calcium 8.3 8.3 - 10.4 MG/DL    Bilirubin, total 1.0 0.2 - 1.1 MG/DL    ALT (SGPT) 15 12 - 65 U/L    AST (SGOT) 21 15 - 37 U/L    Alk. phosphatase 68 50 - 136 U/L    Protein, total 6.4 6.3 - 8.2 g/dL    Albumin 3.1 (L) 3.2 - 4.6 g/dL    Globulin 3.3 2.3 - 3.5 g/dL    A-G Ratio 0.9 (L) 1.2 - 3.5     CBC WITH AUTOMATED DIFF    Collection Time: 11/22/20  2:39 PM   Result Value Ref Range    WBC 14.4 (H) 4.3 - 11.1 K/uL    RBC 3.22 (L) 4.23 - 5.6 M/uL    HGB 10.5 (L) 13.6 - 17.2 g/dL    HCT 33.5 (L) 41.1 - 50.3 %    .0 (H) 79.6 - 97.8 FL    MCH 32.6 26.1 - 32.9 PG    MCHC 31.3 (L) 31.4 - 35.0 g/dL    RDW 14.9 (H) 11.9 - 14.6 %    PLATELET 920 (L) 077 - 450 K/uL    MPV 10.6 9.4 - 12.3 FL    ABSOLUTE NRBC 0.00 0.0 - 0.2 K/uL    DF AUTOMATED      NEUTROPHILS 88 (H) 43 - 78 %    LYMPHOCYTES 5 (L) 13 - 44 %    MONOCYTES 6 4.0 - 12.0 %    EOSINOPHILS 0 (L) 0.5 - 7.8 %    BASOPHILS 0 0.0 - 2.0 %    IMMATURE GRANULOCYTES 1 0.0 - 5.0 %    ABS. NEUTROPHILS 12.6 (H) 1.7 - 8.2 K/UL    ABS. LYMPHOCYTES 0.7 0.5 - 4.6 K/UL    ABS. MONOCYTES 0.9 0.1 - 1.3 K/UL    ABS. EOSINOPHILS 0.0 0.0 - 0.8 K/UL    ABS. BASOPHILS 0.0 0.0 - 0.2 K/UL    ABS. IMM.  GRANS. 0.1 0.0 - 0.5 K/UL   MAGNESIUM    Collection Time: 11/22/20  2:39 PM   Result Value Ref Range    Magnesium 1.7 (L) 1.8 - 2.4 mg/dL   LACTIC ACID    Collection Time: 11/22/20  2:39 PM   Result Value Ref Range    Lactic acid 2.0 0.4 - 2.0 MMOL/L   SARS-COV-2    Collection Time: 11/22/20  3:06 PM   Result Value Ref Range    Specimen source Nasopharyngeal      COVID-19 rapid test Not detected NOTD      SARS CoV-2 PENDING    LACTIC ACID    Collection Time: 11/22/20  4:49 PM   Result Value Ref Range    Lactic acid 1.1 0.4 - 2.0 MMOL/L       All Micro Results     Procedure Component Value Units Date/Time    CULTURE, BLOOD [[id number]] Collected:  11/22/20 1546    Order Status:  Completed Specimen:  Blood Updated:  11/22/20 1556    CULTURE, BLOOD [[id number]] Collected:  11/22/20 1546    Order Status:  Completed Specimen:  Blood Updated:  11/22/20 1556    CULTURE, URINE [[id number]]     Order Status:  Sent Specimen:  Urine from Clean catch           Current Facility-Administered Medications   Medication Dose Route Frequency    azithromycin (ZITHROMAX) 500 mg in 0.9% sodium chloride 250 mL (VIAL-MATE)  500 mg IntraVENous Q24H    sodium chloride (NS) flush 5-40 mL  5-40 mL IntraVENous Q8H    sodium chloride (NS) flush 5-40 mL  5-40 mL IntraVENous PRN    acetaminophen (TYLENOL) tablet 650 mg  650 mg Oral Q6H PRN    Or    acetaminophen (TYLENOL) suppository 650 mg  650 mg Rectal Q6H PRN    polyethylene glycol (MIRALAX) packet 17 g  17 g Oral DAILY PRN    promethazine (PHENERGAN) tablet 12.5 mg  12.5 mg Oral Q6H PRN    Or    ondansetron (ZOFRAN) injection 4 mg  4 mg IntraVENous Q6H PRN    budesonide (PULMICORT) 500 mcg/2 ml nebulizer suspension  500 mcg Nebulization BID RT    albuterol-ipratropium (DUO-NEB) 2.5 MG-0.5 MG/3 ML  3 mL Nebulization Q6H PRN    [START ON 11/23/2020] aspirin delayed-release tablet 81 mg  81 mg Oral DAILY    montelukast (SINGULAIR) tablet 10 mg  10 mg Oral QHS    [START ON 11/23/2020] cefTRIAXone (ROCEPHIN) 1 g in 0.9% sodium chloride (MBP/ADV) 50 mL MBP  1 g IntraVENous Q24H    [START ON 11/23/2020] azithromycin (ZITHROMAX) 250 mg in 0.9% sodium chloride 250 mL IVPB  250 mg IntraVENous Q24H     Current Outpatient Medications   Medication Sig    furosemide (LASIX) 20 mg tablet Take 1 Tab by mouth daily.  albuterol (PROVENTIL HFA, VENTOLIN HFA, PROAIR HFA) 90 mcg/actuation inhaler Take 2 Puffs by inhalation every four (4) hours as needed for Wheezing.  famotidine (PEPCID) 10 mg tablet Take 10 mg by mouth two (2) times a day.  albuterol-ipratropium (DUO-NEB) 2.5 mg-0.5 mg/3 ml nebu 3 mL by Nebulization route every four (4) hours as needed for Wheezing.  melatonin 3 mg tablet Take  by mouth.  fluticasone furoate-vilanterol (BREO ELLIPTA) 100-25 mcg/dose inhaler 1 inhalation daily, rinse mouth after use    DULoxetine (CYMBALTA) 30 mg capsule Take 30 mg by mouth two (2) times a day.  magnesium oxide 250 mg magnesium tablet Take 250 mg by mouth daily.  nystatin (MYCOSTATIN) powder Apply  to affected area four (4) times daily.  acetaminophen (TYLENOL) 650 mg TbER Take 650 mg by mouth every eight (8) hours.  finasteride (PROSCAR) 5 mg tablet Take 1 Tab by mouth daily.  raNITIdine (ZANTAC) 75 mg tab Take  by mouth two (2) times a day.  potassium chloride (K-DUR, KLOR-CON) 20 mEq tablet Take 2 Tabs by mouth daily. (Patient taking differently: Take 20 mEq by mouth daily.)    montelukast (SINGULAIR) 10 mg tablet Take 1 Tab by mouth daily. (Patient taking differently: Take 10 mg by mouth nightly.)    Oxygen 4 lpm cont.  cyanocobalamin (VITAMIN B12) 1,000 mcg/mL injection Once month    fluticasone (FLONASE) 50 mcg/actuation nasal spray 2 Sprays by Both Nostrils route daily.  guaiFENesin ER (MUCINEX) 600 mg ER tablet Take 1,200 mg by mouth daily.  Biotin 2,500 mcg cap Take  by mouth.  aspirin delayed-release 81 mg tablet Take  by mouth daily. Other Studies:  Xr Chest Port    Result Date: 11/22/2020  PORTABLE CHEST, November 22, 2020 at 1428 hours CLINICAL HISTORY:  Shortness of breath and rigors. Single episode of hemoptysis this morning. COMPARISON:  January 9, 2020. FINDINGS:  AP erect image demonstrates hyperinflation and chronic irregular linear parenchymal scarring bilaterally with blunting of the right lateral costophrenic angle. However, there is new infiltrate laterally in the left mid and lower lung field which is suspicious for pneumonia in this clinical setting.   The heart size is within normal limits without definite pneumothorax. The bony thorax appears intact on this view. There are overlying radiopaque support devices. IMPRESSION:  LEFT MID AND LOWER LUNG PNEUMONIA SUPERIMPOSED UPON COPD AND CHRONIC SCARRING. Assessment:     Active Hospital Problems    Diagnosis Date Noted    Shortness of breath 11/22/2020    Multilobar lung infiltrate 11/22/2020    Chronic indwelling Li catheter 11/22/2020    Severe protein-calorie malnutrition ([facility] 11/03/2019    Urinary retention 05/20/2019    Acute on chronic respiratory failure with hypoxia (HCC) 05/06/2019    COPD (chronic obstructive pulmonary disease) ([facility] 12/10/2015    Essential hypertension, benign 12/10/2015    BPH (benign prostatic hyperplasia) 12/10/2015       Plan:     Acute on chronic hypoxic respiratory failure with hypoxia  Multilobar PNA  CXR with infiltrates in left mid and lower lobes. -follow up ROSA-COV2 as pt is from NH. Rapid is neg.  -Continue with ceftriaxone and azithromycin (11/22 - )  -Follow-up for cath, blood cultures  -Continue with O2 supplement and wean as tolerated back to baseline. COPD  Appears stable, no wheezing on exam  - continue with nebs and home medications    HTN: continue with home medications    Severe PCM  As evident by thin, cachetic elderly male with temporal wasting and visible ribs  - nutrition supplements    Macrocytic anemia  Hb appears to be at baseline.  - follow up b12 and folate  - monitor      BPH with urinary retention on chronic indwelling li  He has appointment with urology to exchange the li and does not want us to exchange it at this time. - follow up UA    Diet:  DIET REGULAR  DVT PPx: SCDs  Code: Full Code; Discussed with pt and patient states he wants to be FULL CODE. Estimated LOS:  Greater than 2 midnights    Unable to reach wife. Labs/Imaging Reviewed.    Risk: HIGH risk due to current condition and comorbid conditions as well as requiring frequent monitoring and high risk of decline. Plan discussed with staff, patient/family and are in agreement. Part of this note was written by using a voice dictation software and the note has been proof read but may still contain some grammatical/other typographical errors.      Signed:  Fabrizio Sosa MD

## 2020-11-22 NOTE — ED NOTES
TRANSFER - OUT REPORT:    Verbal report given to JANINA Gutiérrez on Susy Lamar  being transferred to Room 343 for routine progression of care       Report consisted of patients Situation, Background, Assessment and   Recommendations(SBAR). Information from the following report(s) SBAR was reviewed with the receiving nurse. Lines:   Peripheral IV 11/22/20 Left Wrist (Active)   Site Assessment Clean, dry, & intact 11/22/20 1449   Phlebitis Assessment 0 11/22/20 1449   Infiltration Assessment 0 11/22/20 1449   Dressing Status Clean, dry, & intact 11/22/20 1449   Dressing Type 4 X 4 11/22/20 1449        Opportunity for questions and clarification was provided.       Patient transported with:   [employer]

## 2020-11-22 NOTE — PROGRESS NOTES
Pt refusing li change 2/2 hx of uretheral erosion with surgical procedure, followed by urology OP, MD aware, will collect UA

## 2020-11-22 NOTE — ED PROVIDER NOTES
55-year-old male with a history of abdominal aortic aneurysm, COPD and pulmonary fibrosis on home oxygen, bigeminy, BPH, hypertension, thrombocytopenia, V. tach presents from a nursing home for sudden onset of shaking in his hands this morning. He states he felt \"sick\" and scream for help. He has chronic shortness of breath and cough that is unchanged. He had one episode of coughing up blood this morning. He denies documented fevers, chest pain, abdominal pain, nausea, vomiting, diarrhea, or worsening shortness of breath from baseline. He has a chronic indwelling Rojas and denies any complications associated with this. Nursing home staff noted that his oxygen saturations were lower than normal.           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 HRCT    Abnormality of urethral meatus 8/8/2019    Allergic rhinitis 12/10/2015    Asthma     Benign neoplasm     Benign prostatic hyperplasia 12/10/2015    Bigeminy 3/28/2016    Overview:  Reported by LIN Manuel physician. Last Assessment & Plan:  EKG not done. I placed the patient on telemetry today and is having fairly frequent PVCs. We'll check BMP and magnesium. Overview:  Reported by LIN Manuel physician. Last Assessment & Plan:  EKG not done. I placed the patient on telemetry today and is having fairly frequent PVCs. We'll check BMP and magnesium.     BPH without urinary obstruction     Cardiovascular disease 12/10/2015    Chronic obstructive asthma with exacerbation ([address] 12/10/2015    Closed compression fracture of lumbosacral spine ([address] 12/11/2018    COPD (chronic obstructive pulmonary disease) ([address] 12/10/2015    COPD (chronic obstructive pulmonary disease) with emphysema ([facility] 10/17/2016    Last Assessment & Plan:  Not currently on any maintence medication regimen for COPD as he felt them to be unhelpful in the past.  I recommended a trial of a ANoro Ellipta which he should take 1 inhalation daily. Demonstration was completed on the correct method of administration and he was able to return demonstration independently in the office today and administer his first dose.   I have given     COPD exacerbation ([facility] 5/6/2019    Cough with hemoptysis     Erectile dysfunction 12/10/2015    Essential hypertension, benign 12/10/2015    Fracture 10/2014    of left hand and ribs after fall on concrete    History of colon polyps     Low testosterone 12/10/2015    Lumbago     Memory loss     Overflow incontinence     Pleural effusion 6/10/2019    6/10/19 Right thoracentesis:  800 cc of yellow fluid       Pneumothorax on right 5/6/2019    Raynaud's syndrome 12/10/2015    Rotator cuff tendinitis     Thrombocytopenia (HCC)     Urinary frequency     Ventricular tachyarrhythmia ([facility] 5/6/2019    VT (ventricular tachycardia) ([facility] 5/6/2019       Past Surgical History:   Procedure Laterality Date    HX CATARACT REMOVAL  6/2016-right    HX COLONOSCOPY      HX FRACTURE TX  10/2014    of left hand and ribs are fall on concrete    [facility]    HX ORTHOPAEDIC  03/2018    hip fracture         Family History:   Problem Relation Age of Onset    Dementia Mother     Cancer Father         lung cancer    Heart Attack Father        Social History     Socioeconomic History    Marital status:      Spouse name: Not on file    Number of children: Not on file    Years of education: Not on file    Highest education level: Not on file   Occupational History    Not on file   Social Needs    Financial resource strain: Not on file    Food insecurity     Worry: Not on file     Inability: Not on file    Transportation needs     Medical: Not on file Non-medical: Not on file   Tobacco Use    Smoking status: Former Smoker     Packs/day: 2.00     Years: 60.00     Pack years: 120.00     Types: Cigarettes     Last attempt to quit: 1994     Years since quittin.9    Smokeless tobacco: Never Used   Substance and Sexual Activity    Alcohol use: Yes     Comment: occasional    Drug use: Not on file    Sexual activity: Not on file   Lifestyle    Physical activity     Days per week: Not on file     Minutes per session: Not on file    Stress: Not on file   Relationships    Social connections     Talks on phone: Not on file     Gets together: Not on file     Attends Buddhist service: Not on file     Active member of club or organization: Not on file     Attends meetings of clubs or organizations: Not on file     Relationship status: Not on file    Intimate partner violence     Fear of current or ex partner: Not on file     Emotionally abused: Not on file     Physically abused: Not on file     Forced sexual activity: Not on file   Other Topics Concern    Not on file   Social History Narrative    Not on file         ALLERGIES: Patient has no known allergies. Review of Systems   Constitutional: Positive for chills. Negative for fever. HENT: Negative for hearing loss. Eyes: Negative for visual disturbance. Respiratory: Positive for cough and shortness of breath. Cardiovascular: Negative for chest pain and palpitations. Gastrointestinal: Negative for abdominal pain, diarrhea, nausea and vomiting. Genitourinary: Negative for dysuria. Musculoskeletal: Negative for back pain. Skin: Negative for rash. Neurological: Positive for tremors. Negative for weakness and headaches. Psychiatric/Behavioral: Negative for confusion. There were no vitals filed for this visit. Physical Exam  Vitals signs and nursing note reviewed. Constitutional:       Appearance: He is well-developed. HENT:      Head: Normocephalic and atraumatic. Eyes:      Pupils: Pupils are equal, round, and reactive to light. Neck:      Musculoskeletal: Normal range of motion and neck supple. Cardiovascular:      Rate and Rhythm: Regular rhythm. Heart sounds: Normal heart sounds. Pulmonary:      Effort: Pulmonary effort is normal.      Breath sounds: Normal breath sounds. Abdominal:      Palpations: Abdomen is soft. Tenderness: There is no abdominal tenderness. Musculoskeletal: Normal range of motion. Skin:     General: Skin is warm and dry. Neurological:      Mental Status: He is alert. Psychiatric:         Behavior: Behavior normal.          MDM  Number of Diagnoses or Management Options  Diagnosis management comments: Parts of this document were created using dragon voice recognition software. The chart has been reviewed but errors may still be present. I wore appropriate PPE throughout this patient's ED visit. Lui Diaz MD, 2:15 PM    3:14 PM  Likely bacterial pneumonia. Rapid covid sent.  violeta hospitalist for admission       Amount and/or Complexity of Data Reviewed  Clinical lab tests: ordered and reviewed (Results for orders placed or performed during the hospital encounter of 49/25/00  -METABOLIC PANEL, COMPREHENSIVE       Result                      Value             Ref Range           Sodium                      145               136 - 145 mm*       Potassium                   4.0               3.5 - 5.1 mm*       Chloride                    109 (H)           98 - 107 mmo*       CO2                         22                21 - 32 mmol*       Anion gap                   14                7 - 16 mmol/L       Glucose                     89                65 - 100 mg/*       BUN                         30 (H)            8 - 23 MG/DL        Creatinine                  0.79 (L)          0.8 - 1.5 MG*       GFR est AA                  >60               >60 ml/min/1*       GFR est non-AA              >60               >60 ml/min/1* Calcium                     8.3               8.3 - 10.4 M*       Bilirubin, total            1.0               0.2 - 1.1 MG*       ALT (SGPT)                  15                12 - 65 U/L         AST (SGOT)                  21                15 - 37 U/L         Alk. phosphatase            68                50 - 136 U/L        Protein, total              6.4               6.3 - 8.2 g/*       Albumin                     3.1 (L)           3.2 - 4.6 g/*       Globulin                    3.3               2.3 - 3.5 g/*       A-G Ratio                   0.9 (L)           1.2 - 3.5      -CBC WITH AUTOMATED DIFF       Result                      Value             Ref Range           WBC                         14.4 (H)          4.3 - 11.1 K*       RBC                         3.22 (L)          4.23 - 5.6 M*       HGB                         10.5 (L)          13.6 - 17.2 *       HCT                         33.5 (L)          41.1 - 50.3 %       MCV                         104.0 (H)         79.6 - 97.8 *       MCH                         32.6              26.1 - 32.9 *       MCHC                        31.3 (L)          31.4 - 35.0 *       RDW                         14.9 (H)          11.9 - 14.6 %       PLATELET                    133 (L)           150 - 450 K/*       MPV                         10.6              9.4 - 12.3 FL       ABSOLUTE NRBC               0.00              0.0 - 0.2 K/*       DF                          AUTOMATED                             NEUTROPHILS                 88 (H)            43 - 78 %           LYMPHOCYTES                 5 (L)             13 - 44 %           MONOCYTES                   6                 4.0 - 12.0 %        EOSINOPHILS                 0 (L)             0.5 - 7.8 %         BASOPHILS                   0                 0.0 - 2.0 %         IMMATURE GRANULOCYTES       1                 0.0 - 5.0 %         ABS. NEUTROPHILS            12.6 (H)          1.7 - 8.2 K/*       ABS. LYMPHOCYTES            0.7               0.5 - 4.6 K/*       ABS. MONOCYTES              0.9               0.1 - 1.3 K/*       ABS. EOSINOPHILS            0.0               0.0 - 0.8 K/*       ABS. BASOPHILS              0.0               0.0 - 0.2 K/*       ABS. IMM. GRANS.            0.1               0.0 - 0.5 K/*  -MAGNESIUM       Result                      Value             Ref Range           Magnesium                   1.7 (L)           1.8 - 2.4 mg*  -LACTIC ACID       Result                      Value             Ref Range           Lactic acid                 2.0               0.4 - 2.0 MM*  )  Tests in the radiology section of CPT®: ordered and reviewed (Xr Chest Port    Result Date: 11/22/2020  PORTABLE CHEST, November 22, 2020 at 1428 hours CLINICAL HISTORY:  Shortness of breath and rigors. Single episode of hemoptysis this morning. COMPARISON:  January 9, 2020. FINDINGS:  AP erect image demonstrates hyperinflation and chronic irregular linear parenchymal scarring bilaterally with blunting of the right lateral costophrenic angle. However, there is new infiltrate laterally in the left mid and lower lung field which is suspicious for pneumonia in this clinical setting. The heart size is within normal limits without definite pneumothorax. The bony thorax appears intact on this view. There are overlying radiopaque support devices.      IMPRESSION:  LEFT MID AND LOWER LUNG PNEUMONIA SUPERIMPOSED UPON COPD AND CHRONIC SCARRING.    )  Tests in the medicine section of CPT®: ordered and reviewed           Procedures

## 2020-11-23 LAB
ANION GAP SERPL CALC-SCNC: 6 MMOL/L (ref 7–16)
APPEARANCE UR: ABNORMAL
APPEARANCE UR: ABNORMAL
BACTERIA URNS QL MICRO: ABNORMAL /HPF
BACTERIA URNS QL MICRO: ABNORMAL /HPF
BILIRUB UR QL: NEGATIVE
BILIRUB UR QL: NEGATIVE
BUN SERPL-MCNC: 26 MG/DL (ref 8–23)
CALCIUM SERPL-MCNC: 8.1 MG/DL (ref 8.3–10.4)
CASTS URNS QL MICRO: ABNORMAL /LPF
CASTS URNS QL MICRO: ABNORMAL /LPF
CHLORIDE SERPL-SCNC: 114 MMOL/L (ref 98–107)
CO2 SERPL-SCNC: 22 MMOL/L (ref 21–32)
COLOR UR: YELLOW
COLOR UR: YELLOW
COVID-19 RAPID TEST, COVR: NOT DETECTED
CREAT SERPL-MCNC: 0.57 MG/DL (ref 0.8–1.5)
CRYSTALS URNS QL MICRO: 0 /LPF
EPI CELLS #/AREA URNS HPF: 0 /HPF
EPI CELLS #/AREA URNS HPF: 0 /HPF
ERYTHROCYTE [DISTWIDTH] IN BLOOD BY AUTOMATED COUNT: 14.9 % (ref 11.9–14.6)
GLUCOSE SERPL-MCNC: 91 MG/DL (ref 65–100)
GLUCOSE UR STRIP.AUTO-MCNC: NEGATIVE MG/DL
GLUCOSE UR STRIP.AUTO-MCNC: NEGATIVE MG/DL
HCT VFR BLD AUTO: 37.1 % (ref 41.1–50.3)
HGB BLD-MCNC: 11.4 G/DL (ref 13.6–17.2)
HGB UR QL STRIP: ABNORMAL
HGB UR QL STRIP: ABNORMAL
KETONES UR QL STRIP.AUTO: NEGATIVE MG/DL
KETONES UR QL STRIP.AUTO: NEGATIVE MG/DL
LEUKOCYTE ESTERASE UR QL STRIP.AUTO: ABNORMAL
LEUKOCYTE ESTERASE UR QL STRIP.AUTO: ABNORMAL
MAGNESIUM SERPL-MCNC: 1.9 MG/DL (ref 1.8–2.4)
MCH RBC QN AUTO: 31.4 PG (ref 26.1–32.9)
MCHC RBC AUTO-ENTMCNC: 30.7 G/DL (ref 31.4–35)
MCV RBC AUTO: 102.2 FL (ref 79.6–97.8)
MUCOUS THREADS URNS QL MICRO: ABNORMAL /LPF
NITRITE UR QL STRIP.AUTO: NEGATIVE
NITRITE UR QL STRIP.AUTO: POSITIVE
NRBC # BLD: 0 K/UL (ref 0–0.2)
PH UR STRIP: 5.5 [PH] (ref 5–9)
PH UR STRIP: 6.5 [PH] (ref 5–9)
PLATELET # BLD AUTO: 151 K/UL (ref 150–450)
PMV BLD AUTO: 10.2 FL (ref 9.4–12.3)
POTASSIUM SERPL-SCNC: 3.4 MMOL/L (ref 3.5–5.1)
PROT UR STRIP-MCNC: 30 MG/DL
PROT UR STRIP-MCNC: NEGATIVE MG/DL
RBC # BLD AUTO: 3.63 M/UL (ref 4.23–5.6)
RBC #/AREA URNS HPF: >100 /HPF
RBC #/AREA URNS HPF: ABNORMAL /HPF
SARS COV-2, XPGCVT: NEGATIVE
SODIUM SERPL-SCNC: 142 MMOL/L (ref 136–145)
SOURCE, COVRS: NORMAL
SP GR UR REFRACTOMETRY: 1.01 (ref 1–1.02)
SP GR UR REFRACTOMETRY: 1.02 (ref 1–1.02)
UROBILINOGEN UR QL STRIP.AUTO: 0.2 EU/DL (ref 0.2–1)
UROBILINOGEN UR QL STRIP.AUTO: 0.2 EU/DL (ref 0.2–1)
WBC # BLD AUTO: 9.8 K/UL (ref 4.3–11.1)
WBC URNS QL MICRO: >100 /HPF
WBC URNS QL MICRO: >100 /HPF

## 2020-11-23 PROCEDURE — 2709999900 HC NON-CHARGEABLE SUPPLY

## 2020-11-23 PROCEDURE — 83735 ASSAY OF MAGNESIUM: CPT

## 2020-11-23 PROCEDURE — 74011250637 HC RX REV CODE- 250/637: Performed by: INTERNAL MEDICINE

## 2020-11-23 PROCEDURE — 94640 AIRWAY INHALATION TREATMENT: CPT

## 2020-11-23 PROCEDURE — 87086 URINE CULTURE/COLONY COUNT: CPT

## 2020-11-23 PROCEDURE — 81001 URINALYSIS AUTO W/SCOPE: CPT

## 2020-11-23 PROCEDURE — 81015 MICROSCOPIC EXAM OF URINE: CPT

## 2020-11-23 PROCEDURE — 77030021668 HC NEB PREFIL KT VYRM -A

## 2020-11-23 PROCEDURE — 94760 N-INVAS EAR/PLS OXIMETRY 1: CPT

## 2020-11-23 PROCEDURE — 85027 COMPLETE CBC AUTOMATED: CPT

## 2020-11-23 PROCEDURE — 74011000258 HC RX REV CODE- 258: Performed by: INTERNAL MEDICINE

## 2020-11-23 PROCEDURE — 77010033678 HC OXYGEN DAILY

## 2020-11-23 PROCEDURE — 74011250636 HC RX REV CODE- 250/636: Performed by: INTERNAL MEDICINE

## 2020-11-23 PROCEDURE — 80048 BASIC METABOLIC PNL TOTAL CA: CPT

## 2020-11-23 PROCEDURE — 74011000250 HC RX REV CODE- 250: Performed by: INTERNAL MEDICINE

## 2020-11-23 PROCEDURE — 36415 COLL VENOUS BLD VENIPUNCTURE: CPT

## 2020-11-23 PROCEDURE — 77030040830 HC CATH URETH FOL MDII -A

## 2020-11-23 PROCEDURE — 65270000029 HC RM PRIVATE

## 2020-11-23 RX ORDER — LIDOCAINE HYDROCHLORIDE 20 MG/ML
JELLY TOPICAL ONCE
Status: COMPLETED | OUTPATIENT
Start: 2020-11-23 | End: 2020-11-23

## 2020-11-23 RX ORDER — POTASSIUM CHLORIDE 20 MEQ/1
40 TABLET, EXTENDED RELEASE ORAL
Status: COMPLETED | OUTPATIENT
Start: 2020-11-23 | End: 2020-11-23

## 2020-11-23 RX ADMIN — MONTELUKAST 10 MG: 10 TABLET, FILM COATED ORAL at 21:09

## 2020-11-23 RX ADMIN — IPRATROPIUM BROMIDE AND ALBUTEROL SULFATE 3 ML: .5; 3 SOLUTION RESPIRATORY (INHALATION) at 08:28

## 2020-11-23 RX ADMIN — BUDESONIDE 500 MCG: 0.5 INHALANT RESPIRATORY (INHALATION) at 21:24

## 2020-11-23 RX ADMIN — AZITHROMYCIN MONOHYDRATE 500 MG: 500 INJECTION, POWDER, LYOPHILIZED, FOR SOLUTION INTRAVENOUS at 09:10

## 2020-11-23 RX ADMIN — CEFTRIAXONE 1 G: 1 INJECTION, POWDER, FOR SOLUTION INTRAMUSCULAR; INTRAVENOUS at 08:09

## 2020-11-23 RX ADMIN — LIDOCAINE HYDROCHLORIDE: 20 JELLY TOPICAL at 14:30

## 2020-11-23 RX ADMIN — Medication 10 ML: at 14:47

## 2020-11-23 RX ADMIN — POTASSIUM CHLORIDE 40 MEQ: 1500 TABLET, EXTENDED RELEASE ORAL at 08:09

## 2020-11-23 RX ADMIN — Medication 81 MG: at 08:09

## 2020-11-23 RX ADMIN — BUDESONIDE 500 MCG: 0.5 INHALANT RESPIRATORY (INHALATION) at 08:28

## 2020-11-23 NOTE — PROGRESS NOTES
Patient is on 4L home regiment, water added for humidity. Clear and diminished per ascultation. Will continue with current measurement.

## 2020-11-23 NOTE — PROGRESS NOTES
Name: Radha Verduzco MRN: [id number]  : 1932  Age:88 y.o.  male  Admit Date:  2020 LOS: 1      Hospitalist Progress Note     Reason for Admission:  Shortness of breath [R06.02]  Multilobar lung infiltrate [R91.8]    Hospital Course:  Please refer to the admission H&P for details of presentation. In summary, Radha Verduzco is a 80 y.o. male with medical history significant for V. tach, chronic respiratory failure with hypoxia on home oxygen, COPD, hypertension, BPH with chronic li catheter who presented from NH with SOB and hand shaking this morning. He was noted to have saturations lower than his normal at the nursing home with 1 episode of coughing up blood. In the ED, workup revealed WBC 14.4, hemoglobin 10.5, lactic acid 2.0. SARSCov2 rapid test was negative. Chest x-ray shows left mid and lower lung pneumonia. He was noted to be saturating well on 4L NC which is his baseline but is visibly dyspneic during conversations and exertion. He was started on Abx for now. Patient refused Li catheter exchange on arrival.  After urinalysis revealing 2+ bacteria, patient is now agreeable to having his Li catheter exchanged. Subjective/24 hr Events (20) : Patient is seen and examined at bedside. No acute events reported overnight by nursing staff. Sitting in his bed, eating breakfast independently without any acute distress. Noted to have increased work of breathing while talking. At baseline O2 requirement of 4 L O2 nasal cannula. Discussed the findings of UA and the need for li catheter exchange and he is in agreement. Patient denies fever, chills, chest pains, shortness of breath(on O2), n/v, abdominal pain. ROS: 10 point review of systems is otherwise negative with the exception of the elements mentioned above.     Objective:    Patient Vitals for the past 24 hrs:   Temp Pulse Resp BP SpO2   20 1108 97.3 °F (36.3 °C) 71 20 102/65 99 %   20 0828     91 %   11/23/20 0822 97.6 °F (36.4 °C) (!) 109 20 (!) 154/80 91 %   11/23/20 0248 98.1 °F (36.7 °C) 72 19 112/65 93 %   11/23/20 0239 98 °F (36.7 °C) 69 19 110/63 93 %   11/22/20 2233 98 °F (36.7 °C) 78 20 109/63 93 %   11/22/20 2119     92 %   11/22/20 1822 98.2 °F (36.8 °C) 76 20 112/63 94 %   11/22/20 1639  75  96/62 92 %   11/22/20 1417     93 %   11/22/20 1414 98.3 °F (36.8 °C) 80 20 109/64      Oxygen Therapy  O2 Sat (%): 99 % (11/23/20 1108)  Pulse via Oximetry: 109 beats per minute (11/23/20 0828)  O2 Device: Nasal cannula (11/23/20 0828)  O2 Flow Rate (L/min): 4 l/min (11/23/20 0828)    Estimated body mass index is 18.61 kg/m² as calculated from the following:    Height as of this encounter: 5' 8\" (1.727 m). Weight as of this encounter: 55.5 kg (122 lb 6.4 oz). Intake/Output Summary (Last 24 hours) at 11/23/2020 1233  Last data filed at 11/23/2020 1029  Gross per 24 hour   Intake 410 ml   Output 340 ml   Net 70 ml       *Note that automatically entered I/Os may not be accurate; dependent on patient compliance with collection and accurate  by Stupil. Physical Exam:     General:                     Thin and frail elderly male. Alert, awake. Conversational dyspnea noted  Head:                          normocephalic, atraumatic  Eyes, Ears, nose:      PERRL, EOMI. Normal Conjunctiva. Neck:                          supple, non-tender. Trachea midline. Lungs: No wheezing. Crackles in the left lung feels. Cardiac:                      RRR, Normal S1 and S2. Abdomen:                  Soft, non distended, nontender, +BS  :                              Rojas draining yellow urine with some sediment. Extremities:               Warm, dry. No edema   Skin:                           No rashes, no jaundice  Neuro:             AAOx 3 .  No gross focal neurological deficit  Psychiatric:                No anxiety, calm, cooperative    Data Review:  I have reviewed all labs, meds, and studies from the last 24 hours:      Labs:    Recent Results (from the past 24 hour(s))   METABOLIC PANEL, COMPREHENSIVE    Collection Time: 11/22/20  2:39 PM   Result Value Ref Range    Sodium 145 136 - 145 mmol/L    Potassium 4.0 3.5 - 5.1 mmol/L    Chloride 109 (H) 98 - 107 mmol/L    CO2 22 21 - 32 mmol/L    Anion gap 14 7 - 16 mmol/L    Glucose 89 65 - 100 mg/dL    BUN 30 (H) 8 - 23 MG/DL    Creatinine 0.79 (L) 0.8 - 1.5 MG/DL    GFR est AA >60 >60 ml/min/1.73m2    GFR est non-AA >60 >60 ml/min/1.73m2    Calcium 8.3 8.3 - 10.4 MG/DL    Bilirubin, total 1.0 0.2 - 1.1 MG/DL    ALT (SGPT) 15 12 - 65 U/L    AST (SGOT) 21 15 - 37 U/L    Alk. phosphatase 68 50 - 136 U/L    Protein, total 6.4 6.3 - 8.2 g/dL    Albumin 3.1 (L) 3.2 - 4.6 g/dL    Globulin 3.3 2.3 - 3.5 g/dL    A-G Ratio 0.9 (L) 1.2 - 3.5     CBC WITH AUTOMATED DIFF    Collection Time: 11/22/20  2:39 PM   Result Value Ref Range    WBC 14.4 (H) 4.3 - 11.1 K/uL    RBC 3.22 (L) 4.23 - 5.6 M/uL    HGB 10.5 (L) 13.6 - 17.2 g/dL    HCT 33.5 (L) 41.1 - 50.3 %    .0 (H) 79.6 - 97.8 FL    MCH 32.6 26.1 - 32.9 PG    MCHC 31.3 (L) 31.4 - 35.0 g/dL    RDW 14.9 (H) 11.9 - 14.6 %    PLATELET 238 (L) 692 - 450 K/uL    MPV 10.6 9.4 - 12.3 FL    ABSOLUTE NRBC 0.00 0.0 - 0.2 K/uL    DF AUTOMATED      NEUTROPHILS 88 (H) 43 - 78 %    LYMPHOCYTES 5 (L) 13 - 44 %    MONOCYTES 6 4.0 - 12.0 %    EOSINOPHILS 0 (L) 0.5 - 7.8 %    BASOPHILS 0 0.0 - 2.0 %    IMMATURE GRANULOCYTES 1 0.0 - 5.0 %    ABS. NEUTROPHILS 12.6 (H) 1.7 - 8.2 K/UL    ABS. LYMPHOCYTES 0.7 0.5 - 4.6 K/UL    ABS. MONOCYTES 0.9 0.1 - 1.3 K/UL    ABS. EOSINOPHILS 0.0 0.0 - 0.8 K/UL    ABS. BASOPHILS 0.0 0.0 - 0.2 K/UL    ABS. IMM.  GRANS. 0.1 0.0 - 0.5 K/UL   MAGNESIUM    Collection Time: 11/22/20  2:39 PM   Result Value Ref Range    Magnesium 1.7 (L) 1.8 - 2.4 mg/dL   LACTIC ACID    Collection Time: 11/22/20  2:39 PM   Result Value Ref Range    Lactic acid 2.0 0.4 - 2.0 MMOL/L   FOLATE    Collection Time: 11/22/20  2:39 PM   Result Value Ref Range    Folate 7.1 3.1 - 17.5 ng/mL   VITAMIN B12    Collection Time: 11/22/20  2:39 PM   Result Value Ref Range    Vitamin B12 410 193 - 986 pg/mL   PROCALCITONIN    Collection Time: 11/22/20  2:39 PM   Result Value Ref Range    Procalcitonin 0.28 ng/mL   SARS-COV-2    Collection Time: 11/22/20  3:06 PM   Result Value Ref Range    Specimen source Nasopharyngeal      COVID-19 rapid test Not detected NOTD      SARS CoV-2 PENDING    CULTURE, BLOOD    Collection Time: 11/22/20  3:46 PM    Specimen: Blood   Result Value Ref Range    Special Requests: RIGHT  FOREARM        Culture result: NO GROWTH AFTER 15 HOURS     CULTURE, BLOOD    Collection Time: 11/22/20  3:46 PM    Specimen: Blood   Result Value Ref Range    Special Requests: LEFT  FOREARM        Culture result: NO GROWTH AFTER 15 HOURS     LACTIC ACID    Collection Time: 11/22/20  4:49 PM   Result Value Ref Range    Lactic acid 1.1 0.4 - 2.0 MMOL/L   URINALYSIS W/ RFLX MICROSCOPIC    Collection Time: 11/23/20  1:32 AM   Result Value Ref Range    Color YELLOW      Appearance CLOUDY      Specific gravity 1.007 1.001 - 1.023      pH (UA) 6.5 5.0 - 9.0      Protein Negative NEG mg/dL    Glucose Negative mg/dL    Ketone Negative NEG mg/dL    Bilirubin Negative NEG      Blood MODERATE (A) NEG      Urobilinogen 0.2 0.2 - 1.0 EU/dL    Nitrites Positive (A) NEG      Leukocyte Esterase LARGE (A) NEG      WBC >100 (H) 0 /hpf    RBC 0-3 0 /hpf    Epithelial cells 0 0 /hpf    Bacteria 2+ (H) 0 /hpf    Casts 5-10 0 /lpf   CULTURE, URINE    Collection Time: 11/23/20  1:32 AM    Specimen: Cath Urine   Result Value Ref Range    Special Requests: NO SPECIAL REQUESTS      Culture result:        NO GROWTH AFTER SHORT PERIOD OF INCUBATION. FURTHER RESULTS TO FOLLOW AFTER OVERNIGHT INCUBATION.    METABOLIC PANEL, BASIC    Collection Time: 11/23/20  5:27 AM   Result Value Ref Range    Sodium 142 136 - 145 mmol/L    Potassium 3.4 (L) 3.5 - 5.1 mmol/L    Chloride 114 (H) 98 - 107 mmol/L    CO2 22 21 - 32 mmol/L    Anion gap 6 (L) 7 - 16 mmol/L    Glucose 91 65 - 100 mg/dL    BUN 26 (H) 8 - 23 MG/DL    Creatinine 0.57 (L) 0.8 - 1.5 MG/DL    GFR est AA >60 >60 ml/min/1.73m2    GFR est non-AA >60 >60 ml/min/1.73m2    Calcium 8.1 (L) 8.3 - 10.4 MG/DL   CBC W/O DIFF    Collection Time: 11/23/20  5:27 AM   Result Value Ref Range    WBC 9.8 4.3 - 11.1 K/uL    RBC 3.63 (L) 4.23 - 5.6 M/uL    HGB 11.4 (L) 13.6 - 17.2 g/dL    HCT 37.1 (L) 41.1 - 50.3 %    .2 (H) 79.6 - 97.8 FL    MCH 31.4 26.1 - 32.9 PG    MCHC 30.7 (L) 31.4 - 35.0 g/dL    RDW 14.9 (H) 11.9 - 14.6 %    PLATELET 169 847 - 014 K/uL    MPV 10.2 9.4 - 12.3 FL    ABSOLUTE NRBC 0.00 0.0 - 0.2 K/uL   MAGNESIUM    Collection Time: 11/23/20  5:27 AM   Result Value Ref Range    Magnesium 1.9 1.8 - 2.4 mg/dL         All Micro Results     Procedure Component Value Units Date/Time    CULTURE, URINE [[id number]] Collected:  11/23/20 0132    Order Status:  Completed Specimen:  Cath Urine Updated:  11/23/20 0830     Special Requests: NO SPECIAL REQUESTS        Culture result:       NO GROWTH AFTER SHORT PERIOD OF INCUBATION. FURTHER RESULTS TO FOLLOW AFTER OVERNIGHT INCUBATION.           CULTURE, BLOOD [[id number]] Collected:  11/22/20 1546    Order Status:  Completed Specimen:  Blood Updated:  11/23/20 0716     Special Requests: --        RIGHT  FOREARM       Culture result: NO GROWTH AFTER 15 HOURS       CULTURE, BLOOD [[id number]] Collected:  11/22/20 1546    Order Status:  Completed Specimen:  Blood Updated:  11/23/20 0716     Special Requests: --        LEFT  FOREARM       Culture result: NO GROWTH AFTER 15 HOURS       CULTURE, URINE [[id number]] Collected:  11/22/20 2210    Order Status:  Canceled Specimen:  Urine from Clean catch           Current Meds:  Current Facility-Administered Medications   Medication Dose Route Frequency    sodium chloride (NS) flush 5-40 mL 5-40 mL IntraVENous Q8H    sodium chloride (NS) flush 5-40 mL  5-40 mL IntraVENous PRN    acetaminophen (TYLENOL) tablet 650 mg  650 mg Oral Q6H PRN    Or    acetaminophen (TYLENOL) suppository 650 mg  650 mg Rectal Q6H PRN    polyethylene glycol (MIRALAX) packet 17 g  17 g Oral DAILY PRN    promethazine (PHENERGAN) tablet 12.5 mg  12.5 mg Oral Q6H PRN    Or    ondansetron (ZOFRAN) injection 4 mg  4 mg IntraVENous Q6H PRN    budesonide (PULMICORT) 500 mcg/2 ml nebulizer suspension  500 mcg Nebulization BID RT    albuterol-ipratropium (DUO-NEB) 2.5 MG-0.5 MG/3 ML  3 mL Nebulization Q6H PRN    aspirin delayed-release tablet 81 mg  81 mg Oral DAILY    montelukast (SINGULAIR) tablet 10 mg  10 mg Oral QHS    cefTRIAXone (ROCEPHIN) 1 g in 0.9% sodium chloride (MBP/ADV) 50 mL MBP  1 g IntraVENous Q24H    azithromycin (ZITHROMAX) 500 mg in 0.9% sodium chloride 250 mL (VIAL-MATE)  500 mg IntraVENous Q24H    influenza vaccine 2020-21 (6 mos+)(PF) (FLUARIX/FLULAVAL/FLUZONE QUAD) injection 0.5 mL  0.5 mL IntraMUSCular PRIOR TO DISCHARGE    mirtazapine (REMERON) tablet 7.5 mg  7.5 mg Oral QHS PRN         Other Studies:  Xr Chest Port    Result Date: 11/22/2020  PORTABLE CHEST, November 22, 2020 at 1428 hours CLINICAL HISTORY:  Shortness of breath and rigors. Single episode of hemoptysis this morning. COMPARISON:  January 9, 2020. FINDINGS:  AP erect image demonstrates hyperinflation and chronic irregular linear parenchymal scarring bilaterally with blunting of the right lateral costophrenic angle. However, there is new infiltrate laterally in the left mid and lower lung field which is suspicious for pneumonia in this clinical setting. The heart size is within normal limits without definite pneumothorax. The bony thorax appears intact on this view. There are overlying radiopaque support devices. IMPRESSION:  LEFT MID AND LOWER LUNG PNEUMONIA SUPERIMPOSED UPON COPD AND CHRONIC SCARRING. Assessment:    Active Hospital Problems    Diagnosis Date Noted    Shortness of breath 11/22/2020    Multilobar lung infiltrate 11/22/2020    Chronic indwelling Li catheter 11/22/2020    Severe protein-calorie malnutrition ([address] 11/03/2019    Urinary retention 05/20/2019    Acute on chronic respiratory failure with hypoxia (HCC) 05/06/2019    COPD (chronic obstructive pulmonary disease) ([facility] [address] 12/10/2015    Essential hypertension, benign 12/10/2015    BPH (benign prostatic hyperplasia) 12/10/2015       Plan:    Acute on chronic hypoxic respiratory failure with hypoxia  Multilobar PNA  CXR with infiltrates in left mid and lower lobes. -follow up ROSA-COV2 as pt is from NH. Rapid is neg.  -Continue with ceftriaxone and azithromycin (11/22 - )  -Follow-up blood cultures  -Continue with O2 supplement and wean as tolerated back to baseline.         BPH with urinary retention on chronic indwelling li  UTI  UA is positive for UTI (+2 bacteria, >100 WBC with nitrites and large leukocyte esterase) but could be from colonization in the li. Reports that he is suppose to see  (urology) for options on the li cathers but unsure of when the appointment is. No info regarding the appointment is found in the EMR. - follow up UCx and repeat UA after li exchange  - continue abx as per above. - need to may sure he has urology appointment on dc for follow up. COPD  Appears stable, no wheezing on exam  - continue with nebs and home medications     HTN: continue with home medications     Severe PCM  As evident by thin, cachetic elderly male with temporal wasting and visible ribs  - nutrition supplements     Macrocytic anemia  Hb appears to be at baseline.  - monitor      Diet:  DIET REGULAR  DVT PPx: SCDs  Code: Full Code  Dispo: from [facility] , will likely go back. Estimated Discharge: TBD based on clinical course    Labs/Imaging Reviewed.    Risk: HIGH risk due to current condition and comorbid conditions as well as requiring frequent monitoring and high risk of decline. Plan discussed with staff, patient/family and are in agreement. Unable to reach [place] Mel[place] (wife). Left voicemail. Part of this note was written by using a voice dictation software and the note has been proof read but may still contain some grammatical/other typographical errors.     Signed By: Cheryl Hartman MD     November 23, 2020

## 2020-11-23 NOTE — PROGRESS NOTES
END OF SHIFT NOTE:    Intake/Output  11/23 0701 - 11/23 1900  In: 660 [P.O.:660]  Out: 700 [Urine:700]   Voiding: YES  Catheter: YES  Drain:              Stool:  2 occurrences. Stool Assessment  Stool Color: Yellow (11/23/20 1754)  Stool Appearance: Loose (11/23/20 1754)  Stool Amount: Medium (11/23/20 1754)  Stool Source/Status: Rectum (11/23/20 1754)    Emesis:  0 occurrences. VITAL SIGNS  Patient Vitals for the past 12 hrs:   Temp Pulse Resp BP SpO2   11/23/20 1450 98.1 °F (36.7 °C) 68 18 134/84 99 %   11/23/20 1108 97.3 °F (36.3 °C) 71 20 102/65 99 %   11/23/20 0828     91 %   11/23/20 0822 97.6 °F (36.4 °C) (!) 109 20 (!) 154/80 91 %       Pain Assessment  Pain 1  Pain Scale 1: Numeric (0 - 10) (11/23/20 0200)  Pain Intensity 1: 0 (11/23/20 0200)  Patient Stated Pain Goal: 0 (11/23/20 0200)    Ambulating  Yes    Additional Information: switched li and resent UA    Shift report given to oncoming nurse at the bedside.     Kg Gonzalez RN

## 2020-11-23 NOTE — PROGRESS NOTES
Care Management Interventions  Transition of Care Consult (CM Consult): Discharge Planning  Current Support Network: Nursing Facility  Confirm Follow Up Transport: Other (see comment)  The Patient and/or Patient Representative was Provided with a Choice of Provider and Agrees with the Discharge Plan?: Yes  Freedom of Choice List was Provided with Basic Dialogue that Supports the Patient's Individualized Plan of Care/Goals, Treatment Preferences and Shares the Quality Data Associated with the Providers?: Yes  Discharge Location  Discharge Placement: Skilled nursing facility      Chart reviewed. LATHA spoke with pt & spouse at bedside. Per spouse pt is a long term resident at [facility]. Plan is for pt to return to [facility]. LATHA called & left VM message for Marly at [facility] to confirm pt is a pvt pay bed hold & also if pt will need at COVID test to return. Pt & spouse state pt had his cell phone when arrived at hospital & now does not have. Unit secretary reported to Patient Relations. LATHA received call back from Marly at [facility] who confirms pt is a long term pvt pay resident. COVID test has been placed.

## 2020-11-23 NOTE — PROGRESS NOTES
Problem: Falls - Risk of  Goal: *Absence of Falls  Description: Document Lorenzo Grover Fall Risk and appropriate interventions in the flowsheet.   Outcome: Progressing Towards Goal  Note: Fall Risk Interventions:  Mobility Interventions: Patient to call before getting OOB         Medication Interventions: Patient to call before getting OOB    Elimination Interventions: Call light in reach

## 2020-11-24 LAB
ANION GAP SERPL CALC-SCNC: 7 MMOL/L (ref 7–16)
BUN SERPL-MCNC: 20 MG/DL (ref 8–23)
CALCIUM SERPL-MCNC: 7.9 MG/DL (ref 8.3–10.4)
CHLORIDE SERPL-SCNC: 115 MMOL/L (ref 98–107)
CO2 SERPL-SCNC: 22 MMOL/L (ref 21–32)
CREAT SERPL-MCNC: 0.6 MG/DL (ref 0.8–1.5)
ERYTHROCYTE [DISTWIDTH] IN BLOOD BY AUTOMATED COUNT: 14.9 % (ref 11.9–14.6)
GLUCOSE SERPL-MCNC: 90 MG/DL (ref 65–100)
HCT VFR BLD AUTO: 34.5 % (ref 41.1–50.3)
HGB BLD-MCNC: 10.7 G/DL (ref 13.6–17.2)
MCH RBC QN AUTO: 31.2 PG (ref 26.1–32.9)
MCHC RBC AUTO-ENTMCNC: 31 G/DL (ref 31.4–35)
MCV RBC AUTO: 100.6 FL (ref 79.6–97.8)
NRBC # BLD: 0 K/UL (ref 0–0.2)
PLATELET # BLD AUTO: 165 K/UL (ref 150–450)
PMV BLD AUTO: 10.2 FL (ref 9.4–12.3)
POTASSIUM SERPL-SCNC: 3.8 MMOL/L (ref 3.5–5.1)
RBC # BLD AUTO: 3.43 M/UL (ref 4.23–5.6)
SODIUM SERPL-SCNC: 144 MMOL/L (ref 136–145)
WBC # BLD AUTO: 9.8 K/UL (ref 4.3–11.1)

## 2020-11-24 PROCEDURE — 74011000258 HC RX REV CODE- 258: Performed by: INTERNAL MEDICINE

## 2020-11-24 PROCEDURE — 74011250636 HC RX REV CODE- 250/636: Performed by: INTERNAL MEDICINE

## 2020-11-24 PROCEDURE — 74011000250 HC RX REV CODE- 250: Performed by: INTERNAL MEDICINE

## 2020-11-24 PROCEDURE — 74011250637 HC RX REV CODE- 250/637: Performed by: INTERNAL MEDICINE

## 2020-11-24 PROCEDURE — 94640 AIRWAY INHALATION TREATMENT: CPT

## 2020-11-24 PROCEDURE — 65270000029 HC RM PRIVATE

## 2020-11-24 PROCEDURE — 80048 BASIC METABOLIC PNL TOTAL CA: CPT

## 2020-11-24 PROCEDURE — 85027 COMPLETE CBC AUTOMATED: CPT

## 2020-11-24 PROCEDURE — 36415 COLL VENOUS BLD VENIPUNCTURE: CPT

## 2020-11-24 PROCEDURE — 2709999900 HC NON-CHARGEABLE SUPPLY

## 2020-11-24 RX ADMIN — Medication 10 ML: at 22:28

## 2020-11-24 RX ADMIN — Medication 81 MG: at 09:58

## 2020-11-24 RX ADMIN — CEFTRIAXONE 1 G: 1 INJECTION, POWDER, FOR SOLUTION INTRAMUSCULAR; INTRAVENOUS at 07:20

## 2020-11-24 RX ADMIN — BUDESONIDE 500 MCG: 0.5 INHALANT RESPIRATORY (INHALATION) at 08:45

## 2020-11-24 RX ADMIN — Medication 10 ML: at 15:02

## 2020-11-24 RX ADMIN — MONTELUKAST 10 MG: 10 TABLET, FILM COATED ORAL at 22:28

## 2020-11-24 RX ADMIN — AZITHROMYCIN MONOHYDRATE 500 MG: 500 INJECTION, POWDER, LYOPHILIZED, FOR SOLUTION INTRAVENOUS at 09:58

## 2020-11-24 NOTE — PROGRESS NOTES
Hospitalist Note     Admit Date:  2020  1:59 PM   Name:  Avery Barrera   Age:  80 y.o.  :  1932   MRN:  [id number]   PCP:  Elba Qiu MD  Treatment Team: Attending Provider: Aurelia Hanks MD; Utilization Review: Sally Richardson RN; : MATEO Jain    HPI/Subjective:     Please refer to the admission H&P for details of presentation. In summary, Avery Barrera is a 80 y.o. male with medical history significant for V. tach, chronic respiratory failure with hypoxia on home oxygen, COPD, hypertension, BPH with chronic li catheter who presented from NH with SOB and hand shaking this morning.  He was noted to have saturations lower than his normal at the nursing home with 1 episode of coughing up blood. In the ED, workup revealed WBC 14.4, hemoglobin 10.5, lactic acid 2.0.  SARSCov2 rapid test was negative.  Chest x-ray shows left mid and lower lung pneumonia. He was noted to be saturating well on 4L NC which is his baseline but is visibly dyspneic during conversations and exertion. He was started on Abx for now. Patient refused Li catheter exchange on arrival.  After urinalysis revealing 2+ bacteria, patient is now agreeable to having his Li catheter exchanged.       Still having increased work of breathing with talking.     No other complaints  Objective:     Patient Vitals for the past 24 hrs:   Temp Pulse Resp BP SpO2   20 1445 97.9 °F (36.6 °C) 73 18 (!) 140/86 93 %   20 1234 97.6 °F (36.4 °C) 73 18 119/75 93 %   20 0803 98.2 °F (36.8 °C) 70 20 (!) 141/78 96 %   20 0219 98.2 °F (36.8 °C) 75 18 117/71 95 %   20 2306 98.1 °F (36.7 °C) 73 18 (!) 143/75 96 %   20 2124     98 %   20 1915 98 °F (36.7 °C) 73 18 133/82 98 %     Oxygen Therapy  O2 Sat (%): 93 % (20 1445)  Pulse via Oximetry: 78 beats per minute (20)  O2 Device: Nasal cannula (20)  O2 Flow Rate (L/min): 4 l/min (20 2124)    Estimated body mass index is 18.61 kg/m² as calculated from the following:    Height as of this encounter: 5' 8\" (1.727 m). Weight as of this encounter: 55.5 kg (122 lb 6.4 oz). Intake/Output Summary (Last 24 hours) at 11/24/2020 1857  Last data filed at 11/24/2020 1831  Gross per 24 hour   Intake 1140 ml   Output 900 ml   Net 240 ml       *Note that automatically entered I/Os may not be accurate; dependent on patient compliance with collection and accurate  by techs. General:                     Thin and frail elderly male.  Alert, awake.  Conversational dyspnea noted  Head:                          normocephalic, atraumatic  Eyes, Ears, nose:      PERRL, EOMI. Normal Conjunctiva. Neck:                          supple, non-tender. Trachea midline. Lungs:                        No wheezing.  Crackles in the left lung feels. Cardiac:                      RRR, Normal S1 and S2.    Abdomen:                  Soft, non distended, nontender, +BS  :                              Rojas draining yellow urine with some sediment. Extremities:               Warm, dry.  No edema   Skin:                           No rashes, no jaundice  Neuro:             AAOx 3 . No gross focal neurological deficit  Psychiatric:                No anxiety, calm, cooperative  Data Review:  I have reviewed all labs, meds, and studies from the last 24 hours:    Recent Results (from the past 24 hour(s))   METABOLIC PANEL, BASIC    Collection Time: 11/24/20  6:28 AM   Result Value Ref Range    Sodium 144 136 - 145 mmol/L    Potassium 3.8 3.5 - 5.1 mmol/L    Chloride 115 (H) 98 - 107 mmol/L    CO2 22 21 - 32 mmol/L    Anion gap 7 7 - 16 mmol/L    Glucose 90 65 - 100 mg/dL    BUN 20 8 - 23 MG/DL    Creatinine 0.60 (L) 0.8 - 1.5 MG/DL    GFR est AA >60 >60 ml/min/1.73m2    GFR est non-AA >60 >60 ml/min/1.73m2    Calcium 7.9 (L) 8.3 - 10.4 MG/DL   CBC W/O DIFF    Collection Time: 11/24/20  6:28 AM   Result Value Ref Range    WBC 9.8 4.3 - 11.1 K/uL    RBC 3.43 (L) 4.23 - 5.6 M/uL    HGB 10.7 (L) 13.6 - 17.2 g/dL    HCT 34.5 (L) 41.1 - 50.3 %    .6 (H) 79.6 - 97.8 FL    MCH 31.2 26.1 - 32.9 PG    MCHC 31.0 (L) 31.4 - 35.0 g/dL    RDW 14.9 (H) 11.9 - 14.6 %    PLATELET 604 713 - 089 K/uL    MPV 10.2 9.4 - 12.3 FL    ABSOLUTE NRBC 0.00 0.0 - 0.2 K/uL        All Micro Results     Procedure Component Value Units Date/Time    CULTURE, URINE [[id number]] Collected:  11/23/20 0132    Order Status:  Completed Specimen:  Cath Urine Updated:  11/24/20 0811     Special Requests: NO SPECIAL REQUESTS        Culture result:       >100,000 COLONIES/mL MIXED SKIN NAEL ISOLATED          CULTURE, BLOOD [[id number]] Collected:  11/22/20 1546    Order Status:  Completed Specimen:  Blood Updated:  11/24/20 0718     Special Requests: --        LEFT  FOREARM       Culture result: NO GROWTH 2 DAYS       CULTURE, BLOOD [[id number]] Collected:  11/22/20 1546    Order Status:  Completed Specimen:  Blood Updated:  11/24/20 0718     Special Requests: --        RIGHT  FOREARM       Culture result: NO GROWTH 2 DAYS       CULTURE, URINE [[id number]] Collected:  11/22/20 2214    Order Status:  Canceled Specimen:  Urine from Clean catch           Current Meds:  Current Facility-Administered Medications   Medication Dose Route Frequency    sodium chloride (NS) flush 5-40 mL  5-40 mL IntraVENous Q8H    sodium chloride (NS) flush 5-40 mL  5-40 mL IntraVENous PRN    acetaminophen (TYLENOL) tablet 650 mg  650 mg Oral Q6H PRN    Or    acetaminophen (TYLENOL) suppository 650 mg  650 mg Rectal Q6H PRN    polyethylene glycol (MIRALAX) packet 17 g  17 g Oral DAILY PRN    promethazine (PHENERGAN) tablet 12.5 mg  12.5 mg Oral Q6H PRN    Or    ondansetron (ZOFRAN) injection 4 mg  4 mg IntraVENous Q6H PRN    budesonide (PULMICORT) 500 mcg/2 ml nebulizer suspension  500 mcg Nebulization BID RT    albuterol-ipratropium (DUO-NEB) 2.5 MG-0.5 MG/3 ML  3 mL Nebulization Q6H PRN    aspirin delayed-release tablet 81 mg  81 mg Oral DAILY    montelukast (SINGULAIR) tablet 10 mg  10 mg Oral QHS    cefTRIAXone (ROCEPHIN) 1 g in 0.9% sodium chloride (MBP/ADV) 50 mL MBP  1 g IntraVENous Q24H    azithromycin (ZITHROMAX) 500 mg in 0.9% sodium chloride 250 mL (VIAL-MATE)  500 mg IntraVENous Q24H    influenza vaccine 2020-21 (6 mos+)(PF) (FLUARIX/FLULAVAL/FLUZONE QUAD) injection 0.5 mL  0.5 mL IntraMUSCular PRIOR TO DISCHARGE    mirtazapine (REMERON) tablet 7.5 mg  7.5 mg Oral QHS PRN       Other Studies:  No results found for this visit on 11/22/20. No results found. Assessment and Plan:     Hospital Problems as of 11/24/2020 Date Reviewed: 1/6/2020          Codes Class Noted - Resolved POA    Shortness of breath ICD-10-CM: R06.02  ICD-9-CM: 786.05  11/22/2020 - Present Unknown        Multilobar lung infiltrate ICD-10-CM: R91.8  ICD-9-CM: 793.19  11/22/2020 - Present Unknown        Chronic indwelling Rojas catheter ICD-10-CM: Z97.8  ICD-9-CM: V45.89  11/22/2020 - Present Unknown        Severe protein-calorie malnutrition (HCC) (Chronic) ICD-10-CM: E43  ICD-9-CM: 262  11/3/2019 - Present Yes        Urinary retention ICD-10-CM: R33.9  ICD-9-CM: 788.20  5/20/2019 - Present Unknown        * (Principal) Acute on chronic respiratory failure with hypoxia (HCC) ICD-10-CM: J96.21  ICD-9-CM: 518.84, 799.02  5/6/2019 - Present Unknown        BPH (benign prostatic hyperplasia) ICD-10-CM: N40.0  ICD-9-CM: 600.00  12/10/2015 - Present Unknown        COPD (chronic [facility] pulmonary disease) ([facility] (Chronic) ICD-10-CM: J44.9  ICD-9-CM: 496  12/10/2015 - Present Yes        Essential hypertension, benign (Chronic) ICD-10-CM: I10  ICD-9-CM: 401.1  12/10/2015 - Present Yes              Acute on chronic hypoxic respiratory failure with hypoxia  Multilobar PNA  CXR with infiltrates in left mid and lower lobes. -follow up ROSA-COV2 as pt is from NH.  Rapid is neg.  -Continue with ceftriaxone and azithromycin (11/22 - )  -Follow-up blood cultures  -Continue with O2 supplement and wean as tolerated back to baseline.  -check PCT         BPH with urinary retention on chronic indwelling li  UTI  UA is positive for UTI (+2 bacteria, >100 WBC with nitrites and large leukocyte esterase) but could be from colonization in the li. Reports that he is suppose to see  (urology) for options on the li cathers but unsure of when the appointment is. No info regarding the appointment is found in the EMR. - follow up UCx and repeat UA after li exchange  - continue abx as per above. - need to may sure he has urology appointment on dc for follow up.        COPD  Appears stable, no wheezing on exam  - continue with nebs and home medications     HTN: continue with home medications     Severe PCM  As evident by thin, cachetic elderly male with temporal wasting and visible ribs  - nutrition supplements     Macrocytic anemia  Hb appears to be at baseline.  - monitor        Diet:  DIET REGULAR  DVT PPx: SCDs  Code: Full Code  Dispo: from [facility] , will likely go back. Estimated Discharge: TBD based on clinical course     Labs/Imaging Reviewed. Risk: HIGH risk due to current condition and comorbid conditions as well as requiring frequent monitoring and high risk of decline.     Plan discussed with staff, patient/family and are in agreement.     Unable to reach Domenick Ahumada (wife). Left voicemail.      Part of this note was written by using a voice dictation software and the note has been proof read but may still contain some grammatical/other typographical errors.     Diet:  DIET REGULAR  DVT ppx:      Signed:  Key Dorsey MD

## 2020-11-24 NOTE — PROGRESS NOTES
END OF SHIFT NOTE:    Intake/Output  11/24 0701 - 11/24 1900  In: 900 [P.O.:900]  Out: 600 [Urine:600]   Voiding: YES  Catheter: YES  Drain:              Stool:  0 occurrences. Stool Assessment  Stool Color: Yellow (11/23/20 1754)  Stool Appearance: Loose (11/23/20 1754)  Stool Amount: Medium (11/23/20 1754)  Stool Source/Status: Rectum (11/23/20 1754)    Emesis:  0 occurrences. VITAL SIGNS  Patient Vitals for the past 12 hrs:   Temp Pulse Resp BP SpO2   11/24/20 1445 97.9 °F (36.6 °C) 73 18 (!) 140/86 93 %   11/24/20 1234 97.6 °F (36.4 °C) 73 18 119/75 93 %   11/24/20 0803 98.2 °F (36.8 °C) 70 20 (!) 141/78 96 %       Pain Assessment  Pain 1  Pain Scale 1: Numeric (0 - 10) (11/24/20 0200)  Pain Intensity 1: 0 (11/24/20 0200)  Patient Stated Pain Goal: 0 (11/24/20 0200)    Ambulating  Yes    Additional Information: pt c/o of severe itching on arms. IV looked infiltrated and was switched to the other arm. Pt stated itching resolved    Shift report will be given to oncoming nurse at the bedside.     Keron Mccann RN

## 2020-11-24 NOTE — PROGRESS NOTES
Problem: Falls - Risk of  Goal: *Absence of Falls  Description: Document Paulina Kamara Fall Risk and appropriate interventions in the flowsheet.   Outcome: Progressing Towards Goal  Note: Fall Risk Interventions:  Mobility Interventions: Communicate number of staff needed for ambulation/transfer         Medication Interventions: Teach patient to arise slowly    Elimination Interventions: Call light in reach

## 2020-11-25 ENCOUNTER — APPOINTMENT (OUTPATIENT)
Dept: ULTRASOUND IMAGING | Age: 85
DRG: 193 | End: 2020-11-25
Attending: INTERNAL MEDICINE
Payer: MEDICARE

## 2020-11-25 ENCOUNTER — APPOINTMENT (OUTPATIENT)
Dept: CT IMAGING | Age: 85
DRG: 193 | End: 2020-11-25
Attending: INTERNAL MEDICINE
Payer: MEDICARE

## 2020-11-25 LAB
ANION GAP SERPL CALC-SCNC: 8 MMOL/L (ref 7–16)
BACTERIA SPEC CULT: NORMAL
BACTERIA SPEC CULT: NORMAL
BASOPHILS # BLD: 0 K/UL (ref 0–0.2)
BASOPHILS NFR BLD: 0 % (ref 0–2)
BUN SERPL-MCNC: 18 MG/DL (ref 8–23)
CALCIUM SERPL-MCNC: 7.9 MG/DL (ref 8.3–10.4)
CHLORIDE SERPL-SCNC: 112 MMOL/L (ref 98–107)
CO2 SERPL-SCNC: 22 MMOL/L (ref 21–32)
CREAT SERPL-MCNC: 0.51 MG/DL (ref 0.8–1.5)
D DIMER PPP FEU-MCNC: 1.21 UG/ML(FEU)
DIFFERENTIAL METHOD BLD: ABNORMAL
EOSINOPHIL # BLD: 0.4 K/UL (ref 0–0.8)
EOSINOPHIL NFR BLD: 4 % (ref 0.5–7.8)
ERYTHROCYTE [DISTWIDTH] IN BLOOD BY AUTOMATED COUNT: 14.6 % (ref 11.9–14.6)
GLUCOSE SERPL-MCNC: 89 MG/DL (ref 65–100)
HCT VFR BLD AUTO: 35.7 % (ref 41.1–50.3)
HGB BLD-MCNC: 11.1 G/DL (ref 13.6–17.2)
IMM GRANULOCYTES # BLD AUTO: 0.1 K/UL (ref 0–0.5)
IMM GRANULOCYTES NFR BLD AUTO: 1 % (ref 0–5)
LYMPHOCYTES # BLD: 2.2 K/UL (ref 0.5–4.6)
LYMPHOCYTES NFR BLD: 23 % (ref 13–44)
MCH RBC QN AUTO: 31.2 PG (ref 26.1–32.9)
MCHC RBC AUTO-ENTMCNC: 31.1 G/DL (ref 31.4–35)
MCV RBC AUTO: 100.3 FL (ref 79.6–97.8)
MONOCYTES # BLD: 1.1 K/UL (ref 0.1–1.3)
MONOCYTES NFR BLD: 11 % (ref 4–12)
NEUTS SEG # BLD: 5.9 K/UL (ref 1.7–8.2)
NEUTS SEG NFR BLD: 61 % (ref 43–78)
NRBC # BLD: 0 K/UL (ref 0–0.2)
PLATELET # BLD AUTO: 168 K/UL (ref 150–450)
PMV BLD AUTO: 10.2 FL (ref 9.4–12.3)
POTASSIUM SERPL-SCNC: 3.4 MMOL/L (ref 3.5–5.1)
RBC # BLD AUTO: 3.56 M/UL (ref 4.23–5.6)
SERVICE CMNT-IMP: NORMAL
SODIUM SERPL-SCNC: 142 MMOL/L (ref 136–145)
WBC # BLD AUTO: 9.6 K/UL (ref 4.3–11.1)

## 2020-11-25 PROCEDURE — 93970 EXTREMITY STUDY: CPT

## 2020-11-25 PROCEDURE — 71260 CT THORAX DX C+: CPT

## 2020-11-25 PROCEDURE — 94761 N-INVAS EAR/PLS OXIMETRY MLT: CPT

## 2020-11-25 PROCEDURE — 74011000636 HC RX REV CODE- 636: Performed by: INTERNAL MEDICINE

## 2020-11-25 PROCEDURE — 80048 BASIC METABOLIC PNL TOTAL CA: CPT

## 2020-11-25 PROCEDURE — 74011000250 HC RX REV CODE- 250: Performed by: INTERNAL MEDICINE

## 2020-11-25 PROCEDURE — 36415 COLL VENOUS BLD VENIPUNCTURE: CPT

## 2020-11-25 PROCEDURE — 85379 FIBRIN DEGRADATION QUANT: CPT

## 2020-11-25 PROCEDURE — 94640 AIRWAY INHALATION TREATMENT: CPT

## 2020-11-25 PROCEDURE — 94760 N-INVAS EAR/PLS OXIMETRY 1: CPT

## 2020-11-25 PROCEDURE — 74011250637 HC RX REV CODE- 250/637: Performed by: INTERNAL MEDICINE

## 2020-11-25 PROCEDURE — 74011000258 HC RX REV CODE- 258: Performed by: INTERNAL MEDICINE

## 2020-11-25 PROCEDURE — 74011250636 HC RX REV CODE- 250/636: Performed by: INTERNAL MEDICINE

## 2020-11-25 PROCEDURE — 2709999900 HC NON-CHARGEABLE SUPPLY

## 2020-11-25 PROCEDURE — 77010033678 HC OXYGEN DAILY

## 2020-11-25 PROCEDURE — 65270000029 HC RM PRIVATE

## 2020-11-25 PROCEDURE — 85025 COMPLETE CBC W/AUTO DIFF WBC: CPT

## 2020-11-25 RX ORDER — SODIUM CHLORIDE 0.9 % (FLUSH) 0.9 %
10 SYRINGE (ML) INJECTION
Status: COMPLETED | OUTPATIENT
Start: 2020-11-25 | End: 2020-11-25

## 2020-11-25 RX ADMIN — Medication 10 ML: at 21:01

## 2020-11-25 RX ADMIN — IOPAMIDOL 100 ML: 755 INJECTION, SOLUTION INTRAVENOUS at 12:21

## 2020-11-25 RX ADMIN — Medication 10 ML: at 06:00

## 2020-11-25 RX ADMIN — Medication 10 ML: at 12:21

## 2020-11-25 RX ADMIN — CEFTRIAXONE 1 G: 1 INJECTION, POWDER, FOR SOLUTION INTRAMUSCULAR; INTRAVENOUS at 08:16

## 2020-11-25 RX ADMIN — BUDESONIDE 500 MCG: 0.5 INHALANT RESPIRATORY (INHALATION) at 19:59

## 2020-11-25 RX ADMIN — SODIUM CHLORIDE 100 ML: 900 INJECTION, SOLUTION INTRAVENOUS at 12:21

## 2020-11-25 RX ADMIN — MONTELUKAST 10 MG: 10 TABLET, FILM COATED ORAL at 21:16

## 2020-11-25 RX ADMIN — Medication 81 MG: at 09:03

## 2020-11-25 RX ADMIN — AZITHROMYCIN MONOHYDRATE 500 MG: 500 INJECTION, POWDER, LYOPHILIZED, FOR SOLUTION INTRAVENOUS at 09:03

## 2020-11-25 NOTE — PROGRESS NOTES
0700-Bedside report received from [facility]. Resting in bed. No needs voiced. No s/s of acute distress. 1858-Bedside shift change report given to 1812 Benita Patrick (oncoming nurse) by Saul Monique RN (offgoing nurse). Report included the following information SBAR, Kardex, Intake/Output, MAR and Recent Results.

## 2020-11-25 NOTE — PROGRESS NOTES
Pt resting on 4L/min NC SpO2 92%. Pt ambulating on 4L/min SpO2 dropped to 82%. Pt unable to maintain SpO2 above 90% while ambulating on 6L/min. Pt required 8L/min to maintain SpO2 of 92% while ambulating.

## 2020-11-25 NOTE — PROGRESS NOTES
Problem: Pneumonia: Day 1  Goal: Activity/Safety  Outcome: Progressing Towards Goal  Goal: Consults, if ordered  Outcome: Progressing Towards Goal  Goal: Diagnostic Test/Procedures  Outcome: Progressing Towards Goal  Goal: Nutrition/Diet  Outcome: Progressing Towards Goal  Goal: Medications  Outcome: Progressing Towards Goal  Goal: Respiratory  Outcome: Progressing Towards Goal  Goal: Treatments/Interventions/Procedures  Outcome: Progressing Towards Goal  Goal: Psychosocial  Outcome: Progressing Towards Goal  Goal: *Oxygen saturation within defined limits  Outcome: Progressing Towards Goal  Goal: *Influenza vaccine administered (October-March)  Outcome: Progressing Towards Goal  Goal: *Pneumoccocal vaccine administered  Outcome: Progressing Towards Goal  Goal: *Hemodynamically stable  Outcome: Progressing Towards Goal  Goal: *Demonstrates progressive activity  Outcome: Progressing Towards Goal  Goal: *Tolerating diet  Outcome: Progressing Towards Goal     Problem: Falls - Risk of  Goal: *Absence of Falls  Description: Document Senthil Fall Risk and appropriate interventions in the flowsheet. Outcome: Progressing Towards Goal  Note: Fall Risk Interventions:  Mobility Interventions: Communicate number of staff needed for ambulation/transfer, Patient to call before getting OOB         Medication Interventions: Evaluate medications/consider consulting pharmacy, Patient to call before getting OOB, Teach patient to arise slowly    Elimination Interventions: Call light in reach, Patient to call for help with toileting needs              Problem: Patient Education: Go to Patient Education Activity  Goal: Patient/Family Education  Outcome: Progressing Towards Goal     Problem: Pressure Injury - Risk of  Goal: *Prevention of pressure injury  Description: Document Brant Scale and appropriate interventions in the flowsheet.   Outcome: Progressing Towards Goal     Problem: Patient Education: Go to Patient Education Activity  Goal: Patient/Family Education  Outcome: Progressing Towards Goal

## 2020-11-25 NOTE — PROGRESS NOTES
LATHA advised by MD that patient may be able to return to [facility] today pending his CT scan. LATHA called [address] [facility], confirmed with Marly that the patient can return today if discharged. Packet prepared and LATHA conducted a mental assessment for the PASSAR. SW awaiting final decision on discharge before arranging transport.      ABIGAIL McarthurW    [address] [facility]    * [email or web address]

## 2020-11-25 NOTE — PROGRESS NOTES
MD anticipates patient will discharge. Marly states she cannot accept the patient after 6:30 pm. CM supervisor spoke with Irajromero Gonzalez who states that they can be here in thirty minutes, or by 6:00 pm which would not guarantee at 6 pm arrival time. Transport arranged for 5:00 pm.  met with MD who states that the patient reports that he does not feel well and does not wish to discharge today. Transport cancelled. Packet prepared and on the desk, only needs discharge summaries and hard scripts if required.      SHAR Reynolds    [address] [place] [address]    * [email or web address]

## 2020-11-25 NOTE — PROGRESS NOTES
Physician Progress Note      Jennie Cardona  [facility] #:                  [id number]  :                       1932  ADMIT DATE:       2020 1:59 PM  DISCH DATE:  RESPONDING  PROVIDER #:        Brett Parmar MD          QUERY TEXT:    Patient admitted with pneumonia. Noted documentation of acute on chronic hypoxic respiratory failure in H&P and subsequent PN. Please provide clinical evidence to support the acute portion of this diagnosis as there is no tachypnea or increased O2 requirements or ABGs performed. Please indicate one of the following and document in the medical record:[[Chronic hypoxic respiratory failure only::This patient only has chronic hypoxic respiratory failure. The medical record reflects the following:  Risk Factors: pna, COPD, former smoker    Clinical Indicators:    4L NC RR 17-20 > 93%    H&P-  In the ED, he is hemodynamically stable without any fever and saturating well on 4 L nasal cannula. However, he is noted to be in slight respiratory distress as he is conversing with me. Edu PN -  He was noted to be saturating well on 4L NC which is his baseline but is visibly dyspneic during conversations and exertion.     Treatment: supplemental O2 as baseline; monitor VS    Acute Respiratory Failure Clinical Indicators per [employer] Training Guide and Quick Reference Guide:  pO2 < 60 mmHg or SpO2 (pulse oximetry) < 91% breathing room air  pCO2 > 50 and pH < 7.35  P/F ratio (pO2 / FIO2) < 300  pO2 decrease or pCO2 increase by 10 mmHg from baseline (if known)  Supplemental oxygen of 40% or more  Presence of respiratory distress, tachypnea, dyspnea, shortness of breath, wheezing  Unable to speak in complete sentences  Use of accessory muscles to breathe  Extreme anxiety and feeling of impending doom  Tripod position  Confusion/altered mental status/obtunded    Thank you,  Uvaldo Cervantes, RN, BSN  Clinical   Options provided:  -- Acute Respiratory Failure currently as evidenced by, Please document evidence. -- Acute Respiratory Failure ruled out after study  -- Other - I will add my own diagnosis  -- Disagree - Not applicable / Not valid  -- Disagree - Clinically unable to determine / Unknown  -- Refer to Clinical Documentation Reviewer    PROVIDER RESPONSE TEXT:    This patient is in acute respiratory failure as evidenced by respiratory distress with visible use of accessory muscles . having trouble speaking in full sentences without resting due to dyspnea. Query created by: Aby Bowles on 11/25/2020 10:52 AM      QUERY TEXT:    Patient admitted with pneumonia, UTI. Noted documentation of severe protein calorie malnutrition in H&P and subsequent PNs without documentation of at least 2 ASPEN criteria. If possible, please document in progress notes and discharge summary:.     The medical record reflects the following:  Risk Factors:    Clinical Indicators:    H&P-  Severe PCM  As evident by thin, cachetic elderly male with temporal wasting and visible ribs  - nutrition supplements    Treatment: Regular diet;    REFERENCE: ASPEN CRITERIA    Aspen Criteria--must meet 2 of these criteria    Moderate Malnutrition  --- energy intake <75% of energy requirement for >1month  ----weight loss 5%/1 month  7.5%/3 months  10 %/6months  20%/1 year  -----body fat (*loss of SQ fat from the orbits, triceps, or fat overlying the ribs )  MILD  ----muscle mass (*muscle wasting at the temples, clavicles,  shoulders, interosseous spaces, scapula, thigh calf)  ----fluid accumulation-(*localized or generalized edema of the  extremities, vulva, scrotum--**weight loss may be masked by edema      Severe Malnutrition  --energy intake <75% of energy requirement for >1month  ----weight loss >5%/1 month  >7.5%/3 months  >10 %/6months  >20%/1 year  ----body fat (*loss of SQ fat from the orbits, triceps, or fat overlying the ribs )  SEVERE ---muscle mass (muscle wasting at the temples, clavicles, shoulders, interosseous spaces, scapula, thigh calf) SEVERE  ----fluid accumulation-(*localized or generalized edema of the extremities, vulva, scrotum--weight loss may be masked by  edema)  SEVERE  ----- strength measurably decreased by devices standards      Thank you,  Leyda Daly, RN, BSN  Clinical   Options provided:  -- Severe protein calorie malnutrition present as evidenced by, Please document evidence. -- Severe protein calorie malnutrition was ruled out  -- Underweight with BMI 18.30%  -- Other - I will add my own diagnosis  -- Disagree - Not applicable / Not valid  -- Disagree - Clinically unable to determine / Unknown  -- Refer to Clinical Documentation Reviewer    PROVIDER RESPONSE TEXT:    Severe protein calorie malnutrition is present as evidenced by muscle wasting noted at the temples, clavicles, interosseous spaces.     Query created by: Tesfaye Mosqueda on 11/25/2020 11:07 AM      Electronically signed by:  Kong Nichole MD 11/25/2020 5:30 PM

## 2020-11-25 NOTE — PROGRESS NOTES
Hospitalist Note     Admit Date:  2020  1:59 PM   Name:  Melvi Shah   Age:  80 y.o.  :  1932   MRN:  [id number]   PCP:  Pooja Little MD  Treatment Team: Attending Provider: Madhu Lauren MD; Utilization Review: Luis Romero RN; : MATEO Solares    HPI/Subjective:     Please refer to the admission H&P for details of presentation. In summary, Melvi Shah is a 80 y.o. male with medical history significant for V. tach, chronic respiratory failure with hypoxia on home oxygen, COPD, hypertension, BPH with chronic li catheter who presented from NH with SOB and hand shaking this morning.  He was noted to have saturations lower than his normal at the nursing home with 1 episode of coughing up blood. In the ED, workup revealed WBC 14.4, hemoglobin 10.5, lactic acid 2.0.  SARSCov2 rapid test was negative.  Chest x-ray shows left mid and lower lung pneumonia. He was noted to be saturating well on 4L NC which is his baseline but is visibly dyspneic during conversations and exertion. He was started on Abx for now.   Patient refused Li catheter exchange on arrival.  After urinalysis revealing 2+ bacteria, patient is now agreeable to having his Li catheter exchanged.       Walked with RT and needed 8L with ambulation    No other complaints  Objective:     Patient Vitals for the past 24 hrs:   Temp Pulse Resp BP SpO2   20 1516 97.5 °F (36.4 °C) 88 20 134/88 93 %   20 1213 97.6 °F (36.4 °C) 70 18 106/63 97 %   20 0815 97.7 °F (36.5 °C) 64 18 101/75 98 %   20 0255 98 °F (36.7 °C) 80 18 115/66 96 %   20 2230 97.9 °F (36.6 °C) 74 18 109/68 95 %   20 1930 97.5 °F (36.4 °C) 76 17 (!) 133/96 95 %     Oxygen Therapy  O2 Sat (%): 93 % (20 1516)  Pulse via Oximetry: 78 beats per minute (20)  O2 Device: Nasal cannula (20)  O2 Flow Rate (L/min): 4 l/min (20)    Estimated body mass index is 18.3 kg/m² as calculated from the following:    Height as of this encounter: 5' 8\" (1.727 m). Weight as of this encounter: 54.6 kg (120 lb 5.9 oz). Intake/Output Summary (Last 24 hours) at 11/25/2020 1715  Last data filed at 11/25/2020 1516  Gross per 24 hour   Intake 1326 ml   Output 1125 ml   Net 201 ml       *Note that automatically entered I/Os may not be accurate; dependent on patient compliance with collection and accurate  by techs. General:                     Thin and frail elderly male.  Alert, awake.  Conversational dyspnea noted  Head:                          normocephalic, atraumatic  Eyes, Ears, nose:      PERRL, EOMI. Normal Conjunctiva. Neck:                          supple, non-tender. Trachea midline. Lungs:                        No wheezing.  Crackles in the left lung feels. Cardiac:                      RRR, Normal S1 and S2.    Abdomen:                  Soft, non distended, nontender, +BS  :                              Rojas draining yellow urine with some sediment. Extremities:               Warm, dry.  No edema   Skin:                           No rashes, no jaundice  Neuro:             AAOx 3 . No gross focal neurological deficit  Psychiatric:                No anxiety, calm, cooperative  Data Review:  I have reviewed all labs, meds, and studies from the last 24 hours:    Recent Results (from the past 24 hour(s))   METABOLIC PANEL, BASIC    Collection Time: 11/25/20  6:00 AM   Result Value Ref Range    Sodium 142 136 - 145 mmol/L    Potassium 3.4 (L) 3.5 - 5.1 mmol/L    Chloride 112 (H) 98 - 107 mmol/L    CO2 22 21 - 32 mmol/L    Anion gap 8 7 - 16 mmol/L    Glucose 89 65 - 100 mg/dL    BUN 18 8 - 23 MG/DL    Creatinine 0.51 (L) 0.8 - 1.5 MG/DL    GFR est AA >60 >60 ml/min/1.73m2    GFR est non-AA >60 >60 ml/min/1.73m2    Calcium 7.9 (L) 8.3 - 10.4 MG/DL   CBC WITH AUTOMATED DIFF    Collection Time: 11/25/20  6:00 AM   Result Value Ref Range    WBC 9.6 4.3 - 11.1 K/uL RBC 3.56 (L) 4.23 - 5.6 M/uL    HGB 11.1 (L) 13.6 - 17.2 g/dL    HCT 35.7 (L) 41.1 - 50.3 %    .3 (H) 79.6 - 97.8 FL    MCH 31.2 26.1 - 32.9 PG    MCHC 31.1 (L) 31.4 - 35.0 g/dL    RDW 14.6 11.9 - 14.6 %    PLATELET 205 653 - 790 K/uL    MPV 10.2 9.4 - 12.3 FL    ABSOLUTE NRBC 0.00 0.0 - 0.2 K/uL    DF AUTOMATED      NEUTROPHILS 61 43 - 78 %    LYMPHOCYTES 23 13 - 44 %    MONOCYTES 11 4.0 - 12.0 %    EOSINOPHILS 4 0.5 - 7.8 %    BASOPHILS 0 0.0 - 2.0 %    IMMATURE GRANULOCYTES 1 0.0 - 5.0 %    ABS. NEUTROPHILS 5.9 1.7 - 8.2 K/UL    ABS. LYMPHOCYTES 2.2 0.5 - 4.6 K/UL    ABS. MONOCYTES 1.1 0.1 - 1.3 K/UL    ABS. EOSINOPHILS 0.4 0.0 - 0.8 K/UL    ABS. BASOPHILS 0.0 0.0 - 0.2 K/UL    ABS. IMM. GRANS. 0.1 0.0 - 0.5 K/UL   D DIMER    Collection Time: 11/25/20 10:00 AM   Result Value Ref Range    D DIMER 1.21 (H) <0.56 ug/ml(FEU)        All Micro Results     Procedure Component Value Units Date/Time    CULTURE, URINE [[id number]] Collected:  11/23/20 0132    Order Status:  Completed Specimen:  Cath Urine Updated:  11/25/20 0746     Special Requests: NO SPECIAL REQUESTS        Culture result:       >100,000 COLONIES/mL MIXED SKIN NAEL ISOLATED                  THREE OR MORE TYPES OF ORGANISMS ARE PRESENT. THIS IS INDICATIVE OF CONTAMINATION DUE TO IMPROPER COLLECTION TECHNIQUE. PLEASE REPEAT COLLECTION UNLESS PATIENT HAS STARTED ANTIBIOTIC TREATMENT.           CULTURE, BLOOD [[id number]] Collected:  11/22/20 1546    Order Status:  Completed Specimen:  Blood Updated:  11/25/20 0774     Special Requests: --        RIGHT  FOREARM       Culture result: NO GROWTH 3 DAYS       CULTURE, BLOOD [[id number]] Collected:  11/22/20 1546    Order Status:  Completed Specimen:  Blood Updated:  11/25/20 0725     Special Requests: --        LEFT  FOREARM       Culture result: NO GROWTH 3 DAYS       CULTURE, URINE [[id number]] Collected:  11/22/20 2214    Order Status:  Canceled Specimen:  Urine from Clean catch           Current Meds:  Current Facility-Administered Medications   Medication Dose Route Frequency    sodium chloride (NS) flush 5-40 mL  5-40 mL IntraVENous Q8H    sodium chloride (NS) flush 5-40 mL  5-40 mL IntraVENous PRN    acetaminophen (TYLENOL) tablet 650 mg  650 mg Oral Q6H PRN    Or    acetaminophen (TYLENOL) suppository 650 mg  650 mg Rectal Q6H PRN    polyethylene glycol (MIRALAX) packet 17 g  17 g Oral DAILY PRN    promethazine (PHENERGAN) tablet 12.5 mg  12.5 mg Oral Q6H PRN    Or    ondansetron (ZOFRAN) injection 4 mg  4 mg IntraVENous Q6H PRN    budesonide (PULMICORT) 500 mcg/2 ml nebulizer suspension  500 mcg Nebulization BID RT    albuterol-ipratropium (DUO-NEB) 2.5 MG-0.5 MG/3 ML  3 mL Nebulization Q6H PRN    aspirin delayed-release tablet 81 mg  81 mg Oral DAILY    montelukast (SINGULAIR) tablet 10 mg  10 mg Oral QHS    cefTRIAXone (ROCEPHIN) 1 g in 0.9% sodium chloride (MBP/ADV) 50 mL MBP  1 g IntraVENous Q24H    azithromycin (ZITHROMAX) 500 mg in 0.9% sodium chloride 250 mL (VIAL-MATE)  500 mg IntraVENous Q24H    influenza vaccine 2020-21 (6 mos+)(PF) (FLUARIX/FLULAVAL/FLUZONE QUAD) injection 0.5 mL  0.5 mL IntraMUSCular PRIOR TO DISCHARGE    mirtazapine (REMERON) tablet 7.5 mg  7.5 mg Oral QHS PRN       Other Studies:  No results found for this visit on 11/22/20. Ct Chest W Cont    Result Date: 11/25/2020  EXAM: CHEST CT PE PROTOCOL INDICATION: Possible embolism. COMPARISON: Chest CT 6/10/2019 Technique: Multiple contiguous 2.5 mm axial images were obtained through the pulmonary vasculature following uneventful administration of IV contrast (Isovue 370, 100ml). Intravenous contrast was used for better evaluation of solid organs and vascular structures. Coronal reformatted imaging provided. Radiation dose reduction techniques were used for this study.  Our CT scanners use one or all of the following: Automated exposure control, adjustment of the mA and/or kV according to patient size, iterative reconstruction. FINDINGS: Mediastinum and visualized thyroid: Normal. Heart: Multivessel coronary atherosclerotic ossifications. Large Vessels: No definite central filling defect. Unchanged abrupt vessel cut off in the left lower lobe were there is near complete absence of normal lung architecture, also seen on CT from 6/10/2019. Bony trunk is engorged, suggesting pulmonary arterial hypertension. Pleura: Small right pleural effusion and associated pleural thickening with calcification. Lungs: Advanced pulmonary emphysema with bullous changes suspect superimposed advanced interstitial lung disease. There is near complete loss of normal architecture within the left lower lobe and to a lesser extent within the basal right lower lobe. Scattered areas of scarring. Airways: Normal. Lymph nodes: Prominent size and number of multiple mediastinal lymph nodes, although unchanged compared to the 6/2019 study. Bones/Soft tissues: Normal. Visualized abdomen: Gallstones present. IMPRESSION: 1. No evidence of acute pulmonary embolism. 2.  Severe chronic changes in the lungs consistent with advanced pulmonary emphysema and superimposed interstitial lung disease. No definite pneumonia or pulmonary edema evident. 3.  Suspected pulmonary arterial hypertension. Duplex Lower Ext Venous Bilat    Result Date: 11/25/2020  Bilateral lower extremity duplex venous doppler ultrasound Indication: Abnormal d-dimer. Technique: Gray-scale ultrasound with and without compression and color Doppler evaluation were performed of the deep veins of bilateral lower extremities from the level of the common femoral veins to the level of the peroneal and posterior tibial veins. Findings: Bilateral common femoral veins,  femoral veins, posterior tibial and peroneal veins, and popliteal veins are compressible and opacify with color at color Doppler evaluation. There is no evidence of deep vein thrombosis.      Impression: No evidence of DVT in the bilateral lower extremities. Assessment and Plan:     Hospital Problems as of 11/25/2020 Date Reviewed: 1/6/2020          Codes Class Noted - Resolved POA    Shortness of breath ICD-10-CM: R06.02  ICD-9-CM: 786.05  11/22/2020 - Present Unknown        Multilobar lung infiltrate ICD-10-CM: R91.8  ICD-9-CM: 793.19  11/22/2020 - Present Unknown        Chronic indwelling Li catheter ICD-10-CM: Z97.8  ICD-9-CM: V45.89  11/22/2020 - Present Unknown        Severe protein-calorie malnutrition (HCC) (Chronic) ICD-10-CM: E43  ICD-9-CM: 262  11/3/2019 - Present Yes        Urinary retention ICD-10-CM: R33.9  ICD-9-CM: 788.20  5/20/2019 - Present Unknown        * (Principal) Acute on chronic respiratory failure with hypoxia (HCC) ICD-10-CM: J96.21  ICD-9-CM: 518.84, 799.02  5/6/2019 - Present Unknown        BPH (benign prostatic hyperplasia) ICD-10-CM: N40.0  ICD-9-CM: 600.00  12/10/2015 - Present Unknown        COPD (chronic obstructive pulmonary disease) ([facility] [address] (Chronic) ICD-10-CM: J44.9  ICD-9-CM: 496  12/10/2015 - Present Yes        Essential hypertension, benign (Chronic) ICD-10-CM: I10  ICD-9-CM: 401.1  12/10/2015 - Present Yes              Acute on chronic hypoxic respiratory failure with hypoxia  Multilobar PNA  CXR with infiltrates in left mid and lower lobes. -follow up ROSA-COV2 as pt is from NH. Rapid is neg.  -Continue with ceftriaxone and azithromycin (11/22 - )  -Follow-up blood cultures  -Continue with O2 supplement and wean as tolerated back to baseline.  -check PCT  -CT chest w/o PE  -still needs 8L with ambulation         BPH with urinary retention on chronic indwelling li  UTI  UA is positive for UTI (+2 bacteria, >100 WBC with nitrites and large leukocyte esterase) but could be from colonization in the li. Reports that he is suppose to see  (urology) for options on the li cathers but unsure of when the appointment is.  No info regarding the appointment is found in the EMR.  - follow up UCx and repeat UA after li exchange  - continue abx as per above. - need to may sure he has urology appointment on dc for follow up.        COPD  Appears stable, no wheezing on exam  - continue with nebs and home medications     HTN: continue with home medications     Severe PCM  As evident by thin, cachetic elderly male with temporal wasting and visible ribs  - nutrition supplements     Macrocytic anemia  Hb appears to be at baseline.  - monitor        Diet:  DIET REGULAR  DVT PPx: SCDs  Code: Full Code  Dispo: from [facility] , will likely go back. Estimated Discharge: TBD based on clinical course     Labs/Imaging Reviewed. Risk: HIGH risk due to current condition and comorbid conditions as well as requiring frequent monitoring and high risk of decline.     Plan discussed with staff, patient/family and are in agreement.       Part of this note was written by using a voice dictation software and the note has been proof read but may still contain some grammatical/other typographical errors.     Diet:  DIET REGULAR  DVT ppx:      Signed:  Colin Magaña MD

## 2020-11-26 VITALS
BODY MASS INDEX: 18.24 KG/M2 | HEIGHT: 68 IN | DIASTOLIC BLOOD PRESSURE: 68 MMHG | SYSTOLIC BLOOD PRESSURE: 109 MMHG | HEART RATE: 66 BPM | TEMPERATURE: 97.7 F | WEIGHT: 120.37 LBS | OXYGEN SATURATION: 97 % | RESPIRATION RATE: 20 BRPM

## 2020-11-26 LAB
ANION GAP SERPL CALC-SCNC: 7 MMOL/L (ref 7–16)
BASOPHILS # BLD: 0 K/UL (ref 0–0.2)
BASOPHILS NFR BLD: 0 % (ref 0–2)
BUN SERPL-MCNC: 15 MG/DL (ref 8–23)
CALCIUM SERPL-MCNC: 8.2 MG/DL (ref 8.3–10.4)
CHLORIDE SERPL-SCNC: 111 MMOL/L (ref 98–107)
CO2 SERPL-SCNC: 23 MMOL/L (ref 21–32)
CREAT SERPL-MCNC: 0.54 MG/DL (ref 0.8–1.5)
DIFFERENTIAL METHOD BLD: ABNORMAL
EOSINOPHIL # BLD: 0.4 K/UL (ref 0–0.8)
EOSINOPHIL NFR BLD: 4 % (ref 0.5–7.8)
ERYTHROCYTE [DISTWIDTH] IN BLOOD BY AUTOMATED COUNT: 14.4 % (ref 11.9–14.6)
GLUCOSE SERPL-MCNC: 80 MG/DL (ref 65–100)
HCT VFR BLD AUTO: 37.5 % (ref 41.1–50.3)
HGB BLD-MCNC: 11.6 G/DL (ref 13.6–17.2)
IMM GRANULOCYTES # BLD AUTO: 0.1 K/UL (ref 0–0.5)
IMM GRANULOCYTES NFR BLD AUTO: 1 % (ref 0–5)
LYMPHOCYTES # BLD: 3.1 K/UL (ref 0.5–4.6)
LYMPHOCYTES NFR BLD: 28 % (ref 13–44)
MCH RBC QN AUTO: 31.5 PG (ref 26.1–32.9)
MCHC RBC AUTO-ENTMCNC: 30.9 G/DL (ref 31.4–35)
MCV RBC AUTO: 101.9 FL (ref 79.6–97.8)
MONOCYTES # BLD: 1.3 K/UL (ref 0.1–1.3)
MONOCYTES NFR BLD: 12 % (ref 4–12)
NEUTS SEG # BLD: 6.1 K/UL (ref 1.7–8.2)
NEUTS SEG NFR BLD: 55 % (ref 43–78)
NRBC # BLD: 0 K/UL (ref 0–0.2)
PLATELET # BLD AUTO: 184 K/UL (ref 150–450)
PMV BLD AUTO: 10 FL (ref 9.4–12.3)
POTASSIUM SERPL-SCNC: 3.4 MMOL/L (ref 3.5–5.1)
RBC # BLD AUTO: 3.68 M/UL (ref 4.23–5.6)
SODIUM SERPL-SCNC: 141 MMOL/L (ref 136–145)
WBC # BLD AUTO: 11.1 K/UL (ref 4.3–11.1)

## 2020-11-26 PROCEDURE — 80048 BASIC METABOLIC PNL TOTAL CA: CPT

## 2020-11-26 PROCEDURE — 74011000250 HC RX REV CODE- 250: Performed by: INTERNAL MEDICINE

## 2020-11-26 PROCEDURE — 94760 N-INVAS EAR/PLS OXIMETRY 1: CPT

## 2020-11-26 PROCEDURE — 94640 AIRWAY INHALATION TREATMENT: CPT

## 2020-11-26 PROCEDURE — 74011250636 HC RX REV CODE- 250/636: Performed by: INTERNAL MEDICINE

## 2020-11-26 PROCEDURE — 77010033678 HC OXYGEN DAILY

## 2020-11-26 PROCEDURE — 85025 COMPLETE CBC W/AUTO DIFF WBC: CPT

## 2020-11-26 PROCEDURE — 36415 COLL VENOUS BLD VENIPUNCTURE: CPT

## 2020-11-26 PROCEDURE — 74011250637 HC RX REV CODE- 250/637: Performed by: INTERNAL MEDICINE

## 2020-11-26 PROCEDURE — 74011000258 HC RX REV CODE- 258: Performed by: INTERNAL MEDICINE

## 2020-11-26 PROCEDURE — 2709999900 HC NON-CHARGEABLE SUPPLY

## 2020-11-26 RX ORDER — ALBUTEROL SULFATE 0.83 MG/ML
2.5 SOLUTION RESPIRATORY (INHALATION)
Status: DISCONTINUED | OUTPATIENT
Start: 2020-11-26 | End: 2020-11-26 | Stop reason: HOSPADM

## 2020-11-26 RX ORDER — FAMOTIDINE 20 MG/1
10 TABLET, FILM COATED ORAL 2 TIMES DAILY
Status: DISCONTINUED | OUTPATIENT
Start: 2020-11-26 | End: 2020-11-26 | Stop reason: HOSPADM

## 2020-11-26 RX ORDER — LANOLIN ALCOHOL/MO/W.PET/CERES
400 CREAM (GRAM) TOPICAL DAILY
Status: DISCONTINUED | OUTPATIENT
Start: 2020-11-26 | End: 2020-11-26 | Stop reason: HOSPADM

## 2020-11-26 RX ORDER — BUDESONIDE AND FORMOTEROL FUMARATE DIHYDRATE 160; 4.5 UG/1; UG/1
2 AEROSOL RESPIRATORY (INHALATION)
Status: DISCONTINUED | OUTPATIENT
Start: 2020-11-26 | End: 2020-11-26 | Stop reason: HOSPADM

## 2020-11-26 RX ORDER — FUROSEMIDE 20 MG/1
20 TABLET ORAL DAILY
Status: DISCONTINUED | OUTPATIENT
Start: 2020-11-26 | End: 2020-11-26 | Stop reason: HOSPADM

## 2020-11-26 RX ORDER — CEFDINIR 300 MG/1
300 CAPSULE ORAL 2 TIMES DAILY
Qty: 14 CAP | Refills: 0 | Status: SHIPPED | OUTPATIENT
Start: 2020-11-26 | End: 2021-01-22 | Stop reason: ALTCHOICE

## 2020-11-26 RX ORDER — POTASSIUM CHLORIDE 20 MEQ/1
20 TABLET, EXTENDED RELEASE ORAL DAILY
Status: DISCONTINUED | OUTPATIENT
Start: 2020-11-26 | End: 2020-11-26 | Stop reason: HOSPADM

## 2020-11-26 RX ORDER — FLUTICASONE PROPIONATE 50 MCG
2 SPRAY, SUSPENSION (ML) NASAL DAILY
Status: DISCONTINUED | OUTPATIENT
Start: 2020-11-26 | End: 2020-11-26 | Stop reason: HOSPADM

## 2020-11-26 RX ORDER — DULOXETIN HYDROCHLORIDE 30 MG/1
30 CAPSULE, DELAYED RELEASE ORAL 2 TIMES DAILY
Status: DISCONTINUED | OUTPATIENT
Start: 2020-11-26 | End: 2020-11-26 | Stop reason: HOSPADM

## 2020-11-26 RX ORDER — BUDESONIDE AND FORMOTEROL FUMARATE DIHYDRATE 160; 4.5 UG/1; UG/1
2 AEROSOL RESPIRATORY (INHALATION) 2 TIMES DAILY
Status: DISCONTINUED | OUTPATIENT
Start: 2020-11-26 | End: 2020-11-26

## 2020-11-26 RX ORDER — FINASTERIDE 5 MG/1
5 TABLET, FILM COATED ORAL DAILY
Status: DISCONTINUED | OUTPATIENT
Start: 2020-11-26 | End: 2020-11-26 | Stop reason: HOSPADM

## 2020-11-26 RX ORDER — GUAIFENESIN 600 MG/1
1200 TABLET, EXTENDED RELEASE ORAL DAILY
Status: DISCONTINUED | OUTPATIENT
Start: 2020-11-27 | End: 2020-11-26 | Stop reason: HOSPADM

## 2020-11-26 RX ADMIN — FAMOTIDINE 10 MG: 20 TABLET ORAL at 09:39

## 2020-11-26 RX ADMIN — CEFTRIAXONE 1 G: 1 INJECTION, POWDER, FOR SOLUTION INTRAMUSCULAR; INTRAVENOUS at 07:59

## 2020-11-26 RX ADMIN — DULOXETINE HYDROCHLORIDE 30 MG: 30 CAPSULE, DELAYED RELEASE ORAL at 09:39

## 2020-11-26 RX ADMIN — BUDESONIDE 500 MCG: 0.5 INHALANT RESPIRATORY (INHALATION) at 07:52

## 2020-11-26 RX ADMIN — FINASTERIDE 5 MG: 5 TABLET, FILM COATED ORAL at 09:39

## 2020-11-26 RX ADMIN — Medication 5 ML: at 05:01

## 2020-11-26 RX ADMIN — AZITHROMYCIN MONOHYDRATE 500 MG: 500 INJECTION, POWDER, LYOPHILIZED, FOR SOLUTION INTRAVENOUS at 08:50

## 2020-11-26 RX ADMIN — Medication 400 MG: at 09:39

## 2020-11-26 RX ADMIN — Medication 81 MG: at 09:38

## 2020-11-26 RX ADMIN — IPRATROPIUM BROMIDE AND ALBUTEROL SULFATE 3 ML: .5; 3 SOLUTION RESPIRATORY (INHALATION) at 07:52

## 2020-11-26 RX ADMIN — FUROSEMIDE 20 MG: 20 TABLET ORAL at 09:39

## 2020-11-26 NOTE — DISCHARGE SUMMARY
Hospitalist Discharge Summary     Admit Date:  2020  1:59 PM   Name:  Bria Hicks   Age:  80 y.o.  :  1932   MRN:  [id number]   PCP:  Suzanne Hodges MD  Treatment Team: Attending Provider: Alejandra Tim MD; Utilization Review: Alondra Knox RN; : Wm Healy; Primary Nurse: Rhys Esparza RN    Problem List for this Hospitalization:  Hospital Problems as of 2020 Date Reviewed: 2020          Codes Class Noted - Resolved POA    Shortness of breath ICD-10-CM: R06.02  ICD-9-CM: 786.05  2020 - Present Unknown        Multilobar lung infiltrate ICD-10-CM: R91.8  ICD-9-CM: 793.19  2020 - Present Unknown        Chronic indwelling Li catheter ICD-10-CM: Z97.8  ICD-9-CM: V45.89  2020 - Present Unknown        Severe protein-calorie malnutrition ([facility] (Chronic) ICD-10-CM: E43  ICD-9-CM: 262  11/3/2019 - Present Yes        Urinary retention ICD-10-CM: R33.9  ICD-9-CM: 788.20  2019 - Present Unknown        * (Principal) Acute on chronic respiratory failure with hypoxia ([facility] ICD-10-CM: J96.21  ICD-9-CM: 518.84, 799.02  2019 - Present Unknown        BPH (benign prostatic hyperplasia) ICD-10-CM: N40.0  ICD-9-CM: 600.00  12/10/2015 - Present Unknown        COPD (chronic obstructive pulmonary disease) ([facility] (Chronic) ICD-10-CM: J44.9  ICD-9-CM: 311  12/10/2015 - Present Yes        Essential hypertension, benign (Chronic) ICD-10-CM: I10  ICD-9-CM: 401.1  12/10/2015 - Present Yes                Hospital Course:  Please refer to the admission H&P for details of presentation.  In summary, Hector Chaudharyings a 80 y. o. male with medical history significant for V. tach, chronic respiratory failure with hypoxia on home oxygen, COPD, hypertension, BPH with chronic li catheter who presented from NH with SOB and hand shaking this morning.  He was noted to have saturations lower than his normal at the nursing home with 1 episode of coughing up blood.  In the ED, workup revealed WBC 14.4, hemoglobin 10.5, lactic acid 2.0.  SARSCov2 rapid test was negative.  Chest x-ray shows left mid and lower lung pneumonia. He was noted to be saturating well on 4L NC which is his baseline but is visibly dyspneic during conversations and exertion. He was started on Abx for now. Chi Linda with possible UTI and Rojas catheter exchange on arrival. CTA chest w/o PE or evidence of PNA. Pt's breathing returned to his baseline (according to him). He was deemed safe for outpatient follow to PMD and pulmonologist. He needs sleep study. I told him to wear 4L O2 at night. Pt also expressed interest in wanting to have a suprapubic catheter placed and was instructed to f/u OP with urology. His was discharged with antibiotics for ?UTI. His BP was borderline low and he was clinically dry and his PTA lasix 20mg stopped. Pt takes K and Mg supplements, renal panel to be done by PMD in 1 week. Pt discharged back to [facility]. Disposition: Skilled Nursing Facility  Activity: Activity as tolerated  Diet: DIET REGULAR  Code Status: Full Code    Follow up instructions, discharge meds at bottom of this note. Plan was discussed with patient. All questions answered. Patient was stable at time of discharge. Patient will call a physician or return if any concerns. Discharge summary was sent to PCP electronically via \"Comm Mgt\" link in [facility], if possible. Diagnostic Imaging/Tests:   Ct Chest W Cont    Result Date: 11/25/2020  EXAM: CHEST CT PE PROTOCOL INDICATION: Possible embolism. COMPARISON: Chest CT 6/10/2019 Technique: Multiple contiguous 2.5 mm axial images were obtained through the pulmonary vasculature following uneventful administration of IV contrast (Isovue 370, 100ml). Intravenous contrast was used for better evaluation of solid organs and vascular structures. Coronal reformatted imaging provided. Radiation dose reduction techniques were used for this study.  Our CT scanners use one or all of the following: Automated exposure control, adjustment of the mA and/or kV according to patient size, iterative reconstruction. FINDINGS: Mediastinum and visualized thyroid: Normal. Heart: Multivessel coronary atherosclerotic ossifications. Large Vessels: No definite central filling defect. Unchanged abrupt vessel cut off in the left lower lobe were there is near complete absence of normal lung architecture, also seen on CT from 6/10/2019. Bony trunk is engorged, suggesting pulmonary arterial hypertension. Pleura: Small right pleural effusion and associated pleural thickening with calcification. Lungs: Advanced pulmonary emphysema with bullous changes suspect superimposed advanced interstitial lung disease. There is near complete loss of normal architecture within the left lower lobe and to a lesser extent within the basal right lower lobe. Scattered areas of scarring. Airways: Normal. Lymph nodes: Prominent size and number of multiple mediastinal lymph nodes, although unchanged compared to the 6/2019 study. Bones/Soft tissues: Normal. Visualized abdomen: Gallstones present. IMPRESSION: 1. No evidence of acute pulmonary embolism. 2.  Severe chronic changes in the lungs consistent with advanced pulmonary emphysema and superimposed interstitial lung disease. No definite pneumonia or pulmonary edema evident. 3.  Suspected pulmonary arterial hypertension. Xr Chest Port    Result Date: 11/22/2020  PORTABLE CHEST, November 22, 2020 at 1428 hours CLINICAL HISTORY:  Shortness of breath and rigors. Single episode of hemoptysis this morning. COMPARISON:  January 9, 2020. FINDINGS:  AP erect image demonstrates hyperinflation and chronic irregular linear parenchymal scarring bilaterally with blunting of the right lateral costophrenic angle. However, there is new infiltrate laterally in the left mid and lower lung field which is suspicious for pneumonia in this clinical setting.   The heart size is within normal limits without definite pneumothorax. The bony thorax appears intact on this view. There are overlying radiopaque support devices. IMPRESSION:  LEFT MID AND LOWER LUNG PNEUMONIA SUPERIMPOSED UPON COPD AND CHRONIC SCARRING. Duplex Lower Ext Venous Bilat    Result Date: 11/25/2020  Bilateral lower extremity duplex venous doppler ultrasound Indication: Abnormal d-dimer. Technique: Gray-scale ultrasound with and without compression and color Doppler evaluation were performed of the deep veins of bilateral lower extremities from the level of the common femoral veins to the level of the peroneal and posterior tibial veins. Findings: Bilateral common femoral veins,  femoral veins, posterior tibial and peroneal veins, and popliteal veins are compressible and opacify with color at color Doppler evaluation. There is no evidence of deep vein thrombosis. Impression: No evidence of DVT in the bilateral lower extremities. Echocardiogram results:  No results found for this visit on 11/22/20. Procedures done this admission:  * No surgery found *    All Micro Results     Procedure Component Value Units Date/Time    CULTURE, BLOOD [[id number]] Collected:  11/22/20 1546    Order Status:  Completed Specimen:  Blood Updated:  11/26/20 0752     Special Requests: --        RIGHT  FOREARM       Culture result: NO GROWTH 4 DAYS       CULTURE, BLOOD [[id number]] Collected:  11/22/20 1546    Order Status:  Completed Specimen:  Blood Updated:  11/26/20 0752     Special Requests: --        LEFT  FOREARM       Culture result: NO GROWTH 4 DAYS       CULTURE, URINE [[id number]] Collected:  11/23/20 0132    Order Status:  Completed Specimen:  Cath Urine Updated:  11/25/20 0746     Special Requests: NO SPECIAL REQUESTS        Culture result:       >100,000 COLONIES/mL MIXED SKIN NAEL ISOLATED                  THREE OR MORE TYPES OF ORGANISMS ARE PRESENT.   THIS IS INDICATIVE OF CONTAMINATION DUE TO IMPROPER COLLECTION TECHNIQUE. PLEASE REPEAT COLLECTION UNLESS PATIENT HAS STARTED ANTIBIOTIC TREATMENT. CULTURE, URINE [[id number]] Collected:  11/22/20 2214    Order Status:  Canceled Specimen:  Urine from Clean catch           Labs: Results:       BMP, Mg, Phos Recent Labs     11/26/20  0610 11/25/20  0600 11/24/20  0628    142 144   K 3.4* 3.4* 3.8   * 112* 115*   CO2 23 22 22   AGAP 7 8 7   BUN 15 18 20   CREA 0.54* 0.51* 0.60*   CA 8.2* 7.9* 7.9*   GLU 80 89 90      CBC Recent Labs     11/26/20 0610 11/25/20 0600 11/24/20 0628   WBC 11.1 9.6 9.8   RBC 3.68* 3.56* 3.43*   HGB 11.6* 11.1* 10.7*   HCT 37.5* 35.7* 34.5*    168 165   GRANS 55 61  --    LYMPH 28 23  --    EOS 4 4  --    MONOS 12 11  --    BASOS 0 0  --    IG 1 1  --    ANEU 6.1 5.9  --    ABL 3.1 2.2  --    ILIANA 0.4 0.4  --    ABM 1.3 1.1  --    ABB 0.0 0.0  --    AIG 0.1 0.1  --       LFT No results for input(s): ALT, TBIL, AP, TP, ALB, GLOB, AGRAT in the last 72 hours.     No lab exists for component: SGOT, GPT   Cardiac Testing Lab Results   Component Value Date/Time     (H) 06/13/2019 04:50 AM     (H) 06/10/2019 12:01 PM     (H) 05/06/2019 12:53 PM    Troponin-I, Qt. <0.02 (L) 09/01/2019 10:44 AM    Troponin-I, Qt. <0.02 (L) 09/01/2019 05:45 AM    Troponin-I, Qt. 0.05 02/04/2018 01:04 AM      Coagulation Tests Lab Results   Component Value Date/Time    Prothrombin time 13.9 03/26/2020 12:38 PM    Prothrombin time 14.8 (H) 02/02/2018 06:34 AM    Prothrombin time 10.8 04/13/2009 10:59 AM    INR 1.0 03/26/2020 12:38 PM    INR 1.2 02/02/2018 06:34 AM    INR 1.1 04/13/2009 10:59 AM    aPTT 37.5 (H) 02/02/2018 06:34 AM    aPTT 27.8 04/13/2009 10:59 AM      A1c No results found for: HBA1C, HGBE8, ZGI1ELGB   Lipid Panel No results found for: CHOL, CHOLPOCT, CHOLX, CHLST, CHOLV, 096159, HDL, HDLP, LDL, LDLC, DLDLP, 081486, VLDLC, VLDL, TGLX, TRIGL, TRIGP, TGLPOCT, CHHD, CHHDX   Thyroid Panel Lab Results   Component Value Date/Time    TSH 0.780 05/08/2019 04:10 AM    TSH 1.680 12/11/2018 11:40 AM        Most Recent UA Lab Results   Component Value Date/Time    Color YELLOW 11/23/2020 02:35 PM    Appearance TURBID 11/23/2020 02:35 PM    Specific gravity 1.022 11/23/2020 02:35 PM    pH (UA) 5.5 11/23/2020 02:35 PM    Protein 30 (A) 11/23/2020 02:35 PM    Glucose Negative 11/23/2020 02:35 PM    Ketone Negative 11/23/2020 02:35 PM    Bilirubin Negative 11/23/2020 02:35 PM    Blood LARGE (A) 11/23/2020 02:35 PM    Urobilinogen 0.2 11/23/2020 02:35 PM    Nitrites Negative 11/23/2020 02:35 PM    Leukocyte Esterase LARGE (A) 11/23/2020 02:35 PM    WBC >100 11/23/2020 02:35 PM    RBC >100 11/23/2020 02:35 PM    Epithelial cells 0 11/23/2020 02:35 PM    Bacteria 3+ (H) 11/23/2020 02:35 PM    Casts 0-3 11/23/2020 02:35 PM    Crystals, urine 0 11/23/2020 02:35 PM    Mucus 1+ (H) 11/23/2020 02:35 PM    Other observations RESULTS VERIFIED MANUALLY 05/21/2019 05:31 PM        No Known Allergies  Immunization History   Administered Date(s) Administered    Influenza High Dose Vaccine PF 10/12/2016, 10/23/2017, 09/23/2018, 11/01/2019    Influenza Vaccine 09/10/2010    Influenza Vaccine (Tri) Adjuvanted (>65 Yrs FLUAD TRI 63642) 09/23/2018    Pneumococcal Conjugate (PCV-13) 10/01/2015, 12/08/2016    Pneumococcal Vaccine (Unspecified Type) 10/01/1998    TB Skin Test (PPD) Intradermal 02/02/2018, 05/06/2019, 06/10/2019, 09/01/2019, 11/01/2019    Zoster Recombinant 05/10/2018, 08/05/2018    Zoster Vaccine, Live 07/13/2011       All Labs from Last 24 Hrs:  Recent Results (from the past 24 hour(s))   METABOLIC PANEL, BASIC    Collection Time: 11/26/20  6:10 AM   Result Value Ref Range    Sodium 141 136 - 145 mmol/L    Potassium 3.4 (L) 3.5 - 5.1 mmol/L    Chloride 111 (H) 98 - 107 mmol/L    CO2 23 21 - 32 mmol/L    Anion gap 7 7 - 16 mmol/L    Glucose 80 65 - 100 mg/dL    BUN 15 8 - 23 MG/DL    Creatinine 0.54 (L) 0.8 - 1.5 MG/DL    GFR est AA >60 >60 ml/min/1.73m2    GFR est non-AA >60 >60 ml/min/1.73m2    Calcium 8.2 (L) 8.3 - 10.4 MG/DL   CBC WITH AUTOMATED DIFF    Collection Time: 11/26/20  6:10 AM   Result Value Ref Range    WBC 11.1 4.3 - 11.1 K/uL    RBC 3.68 (L) 4.23 - 5.6 M/uL    HGB 11.6 (L) 13.6 - 17.2 g/dL    HCT 37.5 (L) 41.1 - 50.3 %    .9 (H) 79.6 - 97.8 FL    MCH 31.5 26.1 - 32.9 PG    MCHC 30.9 (L) 31.4 - 35.0 g/dL    RDW 14.4 11.9 - 14.6 %    PLATELET 909 552 - 770 K/uL    MPV 10.0 9.4 - 12.3 FL    ABSOLUTE NRBC 0.00 0.0 - 0.2 K/uL    DF AUTOMATED      NEUTROPHILS 55 43 - 78 %    LYMPHOCYTES 28 13 - 44 %    MONOCYTES 12 4.0 - 12.0 %    EOSINOPHILS 4 0.5 - 7.8 %    BASOPHILS 0 0.0 - 2.0 %    IMMATURE GRANULOCYTES 1 0.0 - 5.0 %    ABS. NEUTROPHILS 6.1 1.7 - 8.2 K/UL    ABS. LYMPHOCYTES 3.1 0.5 - 4.6 K/UL    ABS. MONOCYTES 1.3 0.1 - 1.3 K/UL    ABS. EOSINOPHILS 0.4 0.0 - 0.8 K/UL    ABS. BASOPHILS 0.0 0.0 - 0.2 K/UL    ABS. IMM.  GRANS. 0.1 0.0 - 0.5 K/UL       Current Med List in Hospital:   Current Facility-Administered Medications   Medication Dose Route Frequency    budesonide-formoteroL (SYMBICORT) 160-4.5 mcg/actuation HFA inhaler 2 Puff  2 Puff Inhalation BID    fluticasone propionate (FLONASE) 50 mcg/actuation nasal spray 2 Spray  2 Spray Both Nostrils DAILY    furosemide (LASIX) tablet 20 mg  20 mg Oral DAILY    guaiFENesin ER (MUCINEX) tablet 1,200 mg  1,200 mg Oral DAILY    magnesium oxide (MAG-OX) tablet 400 mg  400 mg Oral DAILY    albuterol (PROVENTIL VENTOLIN) nebulizer solution 2.5 mg  2.5 mg Nebulization Q6H PRN    DULoxetine (CYMBALTA) capsule 30 mg  30 mg Oral BID    famotidine (PEPCID) tablet 10 mg  10 mg Oral BID    potassium chloride (K-DUR, KLOR-CON) SR tablet 20 mEq  20 mEq Oral DAILY    finasteride (PROSCAR) tablet 5 mg  5 mg Oral DAILY    sodium chloride (NS) flush 5-40 mL  5-40 mL IntraVENous Q8H    sodium chloride (NS) flush 5-40 mL  5-40 mL IntraVENous PRN    acetaminophen (TYLENOL) tablet 650 mg 650 mg Oral Q6H PRN    Or    acetaminophen (TYLENOL) suppository 650 mg  650 mg Rectal Q6H PRN    polyethylene glycol (MIRALAX) packet 17 g  17 g Oral DAILY PRN    promethazine (PHENERGAN) tablet 12.5 mg  12.5 mg Oral Q6H PRN    Or    ondansetron (ZOFRAN) injection 4 mg  4 mg IntraVENous Q6H PRN    budesonide (PULMICORT) 500 mcg/2 ml nebulizer suspension  500 mcg Nebulization BID RT    albuterol-ipratropium (DUO-NEB) 2.5 MG-0.5 MG/3 ML  3 mL Nebulization Q6H PRN    aspirin delayed-release tablet 81 mg  81 mg Oral DAILY    montelukast (SINGULAIR) tablet 10 mg  10 mg Oral QHS    mirtazapine (REMERON) tablet 7.5 mg  7.5 mg Oral QHS PRN       Discharge Exam:  Patient Vitals for the past 24 hrs:   Temp Pulse Resp BP SpO2   11/26/20 1155 97.7 °F (36.5 °C) 66 20 109/68 97 %   11/26/20 0752     94 %   11/26/20 0743 97.3 °F (36.3 °C) 75 20 127/64 94 %   11/26/20 0225 97.4 °F (36.3 °C) (!) 52 19 98/72 100 %   11/25/20 2300 98.1 °F (36.7 °C) 70 18 (!) 105/47 97 %   11/25/20 1959     94 %   11/25/20 1908 97.9 °F (36.6 °C) 72 18 (!) 96/59 96 %   11/25/20 1516 97.5 °F (36.4 °C) 88 20 134/88 93 %     Oxygen Therapy  O2 Sat (%): 97 % (11/26/20 1155)  Pulse via Oximetry: 58 beats per minute (11/26/20 0752)  O2 Device: Nasal cannula (11/26/20 0752)  O2 Flow Rate (L/min): 4 l/min (11/26/20 0752)    Estimated body mass index is 18.3 kg/m² as calculated from the following:    Height as of this encounter: 5' 8\" (1.727 m). Weight as of this encounter: 54.6 kg (120 lb 5.9 oz). Intake/Output Summary (Last 24 hours) at 11/26/2020 1408  Last data filed at 11/26/2020 7425  Gross per 24 hour   Intake 880 ml   Output 1200 ml   Net -320 ml       *Note that automatically entered I/Os may not be accurate; dependent on patient compliance with collection and accurate  by assistants. General:                     Thin and frail elderly male.  Alert, awake.    Head:                          normocephalic, atraumatic  Eyes, Ears, nose:      PERRL, EOMI. Normal Conjunctiva. Neck:                          supple, non-tender. Trachea midline. Lungs:                        No wheezing.  CTA b/;  Cardiac:                      RRR, Normal S1 and S2.    Abdomen:                  Soft, non distended, nontender, +BS  :                              Rojas   Extremities:               Warm, dry. No edema   Skin:                           No rashes, no jaundice  Neuro:             AAOx 3 . No gross focal neurological deficit  Psychiatric:                No anxiety, calm, cooperative      Discharge Info:   Current Discharge Medication List      START taking these medications    Details   cefdinir (OMNICEF) 300 mg capsule Take 1 Cap by mouth two (2) times a day. Qty: 14 Cap, Refills: 0         CONTINUE these medications which have NOT CHANGED    Details   albuterol (PROVENTIL HFA, VENTOLIN HFA, PROAIR HFA) 90 mcg/actuation inhaler Take 2 Puffs by inhalation every four (4) hours as needed for Wheezing. famotidine (PEPCID) 10 mg tablet Take 10 mg by mouth two (2) times a day. albuterol-ipratropium (DUO-NEB) 2.5 mg-0.5 mg/3 ml nebu 3 mL by Nebulization route every four (4) hours as needed for Wheezing.      melatonin 3 mg tablet Take  by mouth. fluticasone furoate-vilanterol (BREO ELLIPTA) 100-25 mcg/dose inhaler 1 inhalation daily, rinse mouth after use  Qty: 1 Inhaler, Refills: 11      DULoxetine (CYMBALTA) 30 mg capsule Take 30 mg by mouth two (2) times a day. magnesium oxide 250 mg magnesium tablet Take 250 mg by mouth daily. nystatin (MYCOSTATIN) powder Apply  to affected area four (4) times daily. acetaminophen (TYLENOL) 650 mg TbER Take 650 mg by mouth every eight (8) hours. finasteride (PROSCAR) 5 mg tablet Take 1 Tab by mouth daily. Qty: 90 Tab, Refills: 4      raNITIdine (ZANTAC) 75 mg tab Take  by mouth two (2) times a day.       potassium chloride (K-DUR, KLOR-CON) 20 mEq tablet Take 2 Tabs by mouth daily. Qty: 3 Tab, Refills: 0      montelukast (SINGULAIR) 10 mg tablet Take 1 Tab by mouth daily. Qty: 90 Tab, Refills: 4    Associated Diagnoses: Mixed simple and mucopurulent chronic bronchitis (HCC)      Oxygen 4 lpm cont. cyanocobalamin (VITAMIN B12) 1,000 mcg/mL injection Once month  Refills: 12      fluticasone (FLONASE) 50 mcg/actuation nasal spray 2 Sprays by Both Nostrils route daily. Qty: 48 g, Refills: 4    Associated Diagnoses: Allergic rhinitis due to pollen, unspecified seasonality      guaiFENesin ER (MUCINEX) 600 mg ER tablet Take 1,200 mg by mouth daily. Biotin 2,500 mcg cap Take  by mouth. aspirin delayed-release 81 mg tablet Take  by mouth daily. STOP taking these medications       furosemide (LASIX) 20 mg tablet Comments:   Reason for Stopping: Follow Up Orders: Follow-up Appointments   Procedures    FOLLOW UP VISIT Appointment in: Other (Specify) Follow up with Pulmonologist in 1 week, PMD in 3-5 days (needs labwork: renal panel, sleep study, post-hospitalization follow up), and urologist in 2 weeks. Follow up with Pulmonologist in 1 week, PMD in 3-5 days (needs labwork: renal panel, sleep study, post-hospitalization follow up), and urologist in 2 weeks. Standing Status:   Standing     Number of Occurrences:   1     Order Specific Question:   Appointment in     Answer: Other (Specify)       Follow-up Information     Follow up With Specialties Details Why Contact [place] [id number]    Andre Berger MD Internal Medicine   [address]  [address] [facility]  [phone]            Time spent in patient discharge planning and coordination 35 minutes.     Signed:  Abdifatah Price MD

## 2020-11-27 ENCOUNTER — PATIENT OUTREACH (OUTPATIENT)
Dept: CASE MANAGEMENT | Age: 85
End: 2020-11-27

## 2020-11-27 LAB
BACTERIA SPEC CULT: NORMAL
BACTERIA SPEC CULT: NORMAL
SERVICE CMNT-IMP: NORMAL
SERVICE CMNT-IMP: NORMAL

## 2020-11-27 NOTE — PROGRESS NOTES
Patient discharged to a SN[facility] facility. Patient will be included in weekly care coordination calls. Message sent to Cait Brunner RN SNF [facility] [address].    11/22/acute on chronic respiratory failure with hypoxia/11/26/[facility]

## 2020-11-30 NOTE — PROGRESS NOTES
[facility] CARDIOLOGY PROGRESS NOTE 
      
 
5/13/2019 4:05 PM 
 
Admit Date: 5/6/2019 Subjective: Stable SOB, continues on PO amiodarone. ROS: 
Cardiovascular:  As noted above Objective:  
  
Vitals:  
 05/13/19 1138 05/13/19 1401 05/13/19 1514 05/13/19 1536 BP: 151/84  129/76 Pulse: 60  61 Resp: 18  18 Temp: 98.1 °F (36.7 °C)  98 °F (36.7 °C) SpO2: 99% 98% 95% 96% Weight:      
Height: On telemetry: 
 
 
Physical Exam: 
General: Well Developed, Well Nourished, No Acute Distress, Alert & Oriented x 3, Appropriate mood Neck: supple, no JVD Heart: S1S2 with RRR without murmurs or gallops Lungs: Clear throughout auscultation bilaterally without adventitious sounds Abd: soft, nontender, nondistended, with good bowel sounds Ext: no edema bilaterally Skin: warm and dry Data Review:  
Recent Labs 05/13/19 
0720 05/12/19 
8019 05/11/19 
3995 *  --  133* K 4.1  --  4.5 MG 2.2 2.3 2.4 BUN 21  --  17  
CREA 0.51*  --  0.57* *  --  106* WBC 11.7*  --   --   
HGB 13.3*  --   --   
HCT 39.8*  --   --   
  --   -- No results for input(s): Jailene Nulato in the last 72 hours. Assessment/Plan:  
 
Principal Problem: 
  Pneumothorax on right (5/6/2019) Active Problems: 
  Essential hypertension, benign (12/10/2015) Chronic respiratory failure with hypoxia ([address] (3/29/2016) Overview: Continue to wear 2 L nasal cannula at night. May use 2 L supplemental  
    oxygen to maintain sats greater than 90%. Shortness of breath (5/6/2019) Acute on chronic respiratory failure with hypoxia ([address] (5/6/2019) COPD exacerbation ([address] (5/6/2019) Ventricular tachyarrhythmia ([address] (5/6/2019) Pulmonary infiltrates (5/6/2019) VT (ventricular tachycardia) ([address] (5/6/2019) [facility] (5/9/2019) A/P 
1) NSVT - continue amiodarone PO loading dose 2) HTN - acceptable mildly elevated today 3) Hypoxia - per pulm team 
 
 
Dayday Nick MD 
5/13/2019 4:05 PM 
 
 as per dr miller t placed pt on bipap [id number] no sob noted ayt this time

## 2020-12-14 NOTE — PROGRESS NOTES
Physician Progress Note      Manjit Laguerre  CSN #:                  [id number]  :                       1932  ADMIT DATE:       2020 1:59 PM  [address] DATE:        2020 5:24 PM  RESPONDING  PROVIDER #:        Beth Prasad MD          QUERY TEXT:    Patient presented with increased SOB, and cough. Patient was admitted with suspected pneumonia and treated with antibiotics. The CXR shows left mid and lower lung pneumonia, but the CT Chest w/o PE or evidence of PNA. After study, can the pneumonia be further clarified as: The medical record reflects the following:  Risk Factors: COPD; lives at [facility]    Clinical Indicators:    H&P-  Multilobar PNA  CXR with infiltrates in left mid and lower lobes. CT Chest- Severe chronic changes in the lungs consistent with advanced pulmonary emphysema and superimposed interstitial lung disease. No definite pneumonia or pulmonary edema evident    Treatment: Ceftriaxone 1g IV daily; Azithromycin 500mg IV daily    Thank you,  Doris Siu, RN, BSN  Clinical   Options provided:  -- Pneumonia was present at time of admission  -- Pneumonia was ruled out  -- Other - I will add my own diagnosis  -- Disagree - Not applicable / Not valid  -- Disagree - Clinically unable to determine / Unknown  -- Refer to Clinical Documentation Reviewer    PROVIDER RESPONSE TEXT:    Pneumonia was present at time of admission. Query created by: Soco Krishna on 2020 7:33 AM      QUERY TEXT:    Patient admitted with pneumonia. Noted documentation that patient has a chronic li catheter with UCx showing mixed skin patric and treated with antibiotics. If possible, please document if you are treating any of the following:     The medical record reflects the following:  Risk Factors: chronic li catheter    Clinical Indicators:    UA= yellow, turbid, blood LG, Leuks LG, WBC >100, Bact 3+  UCx= >100,000 COLONIES/mL MIXED SKIN NAEL ISOLATED    Chuck PN 11/24- UA is positive for UTI (+2 bacteria, >100 WBC with nitrites and large leukocyte esterase) but could be from colonization in the li. Treatment: Azithomycin 500mg IV daily; Rocephin 1g IV daily    Thank you,  Chance Overton, RN, BSN  Clinical   Options provided:  -- UTI was present on admission  -- UTI was ruled out  -- Other - I will add my own diagnosis  -- Disagree - Not applicable / Not valid  -- Disagree - Clinically unable to determine / Unknown  -- Refer to Clinical Documentation Reviewer    PROVIDER RESPONSE TEXT:    UTI was present of admission.     Query created by: Coy Zimmerman on 12/2/2020 7:48 AM      Electronically signed by:  Sridevi Booth MD 12/14/2020 6:48 AM

## 2021-01-30 ENCOUNTER — HOSPITAL ENCOUNTER (INPATIENT)
Age: 86
LOS: 11 days | Discharge: NURSING, REHAB OR LONG-TERM CARE | DRG: 189 | End: 2021-02-10
Attending: EMERGENCY MEDICINE | Admitting: INTERNAL MEDICINE
Payer: MEDICARE

## 2021-01-30 ENCOUNTER — APPOINTMENT (OUTPATIENT)
Dept: GENERAL RADIOLOGY | Age: 86
DRG: 189 | End: 2021-01-30
Attending: EMERGENCY MEDICINE
Payer: MEDICARE

## 2021-01-30 ENCOUNTER — APPOINTMENT (OUTPATIENT)
Dept: CT IMAGING | Age: 86
DRG: 189 | End: 2021-01-30
Attending: EMERGENCY MEDICINE
Payer: MEDICARE

## 2021-01-30 DIAGNOSIS — J96.01 ACUTE RESPIRATORY FAILURE WITH HYPOXIA (HCC): Primary | ICD-10-CM

## 2021-01-30 DIAGNOSIS — M25.551 RIGHT HIP PAIN: ICD-10-CM

## 2021-01-30 DIAGNOSIS — R07.89 CHEST WALL PAIN: ICD-10-CM

## 2021-01-30 PROBLEM — F03.90 DEMENTIA (HCC): Chronic | Status: ACTIVE | Noted: 2021-01-30

## 2021-01-30 LAB
ALBUMIN SERPL-MCNC: 3.4 G/DL (ref 3.2–4.6)
ALBUMIN/GLOB SERPL: 0.9 {RATIO} (ref 1.2–3.5)
ALP SERPL-CCNC: 116 U/L (ref 50–136)
ALT SERPL-CCNC: 23 U/L (ref 12–65)
ANION GAP SERPL CALC-SCNC: 5 MMOL/L (ref 7–16)
ARTERIAL PATENCY WRIST A: YES
ARTERIAL PATENCY WRIST A: YES
AST SERPL-CCNC: 31 U/L (ref 15–37)
ATRIAL RATE: 76 BPM
BASE DEFICIT BLD-SCNC: 2 MMOL/L
BASE DEFICIT BLD-SCNC: 2 MMOL/L
BASOPHILS # BLD: 0 K/UL (ref 0–0.2)
BASOPHILS NFR BLD: 0 % (ref 0–2)
BDY SITE: ABNORMAL
BDY SITE: ABNORMAL
BILIRUB SERPL-MCNC: 1.1 MG/DL (ref 0.2–1.1)
BNP SERPL-MCNC: 638 PG/ML
BUN SERPL-MCNC: 24 MG/DL (ref 8–23)
CALCIUM SERPL-MCNC: 9.1 MG/DL (ref 8.3–10.4)
CALCULATED P AXIS, ECG09: 56 DEGREES
CALCULATED R AXIS, ECG10: -20 DEGREES
CALCULATED T AXIS, ECG11: 79 DEGREES
CHLORIDE SERPL-SCNC: 104 MMOL/L (ref 98–107)
CO2 BLD-SCNC: 20 MMOL/L
CO2 BLD-SCNC: 23 MMOL/L
CO2 SERPL-SCNC: 27 MMOL/L (ref 21–32)
COLLECT TIME,HTIME: 1255
COLLECT TIME,HTIME: 1603
COVID-19 RAPID TEST, COVR: NOT DETECTED
CREAT SERPL-MCNC: 0.85 MG/DL (ref 0.8–1.5)
DIAGNOSIS, 93000: NORMAL
DIFFERENTIAL METHOD BLD: ABNORMAL
EOSINOPHIL # BLD: 0.3 K/UL (ref 0–0.8)
EOSINOPHIL NFR BLD: 2 % (ref 0.5–7.8)
ERYTHROCYTE [DISTWIDTH] IN BLOOD BY AUTOMATED COUNT: 13.5 % (ref 11.9–14.6)
FLOW RATE ISTAT,IFRATE: 6 L/MIN
FLOW RATE ISTAT,IFRATE: 6 L/MIN
GAS FLOW.O2 O2 DELIVERY SYS: ABNORMAL L/MIN
GAS FLOW.O2 O2 DELIVERY SYS: ABNORMAL L/MIN
GLOBULIN SER CALC-MCNC: 3.9 G/DL (ref 2.3–3.5)
GLUCOSE SERPL-MCNC: 92 MG/DL (ref 65–100)
HCO3 BLD-SCNC: 19.7 MMOL/L (ref 22–26)
HCO3 BLD-SCNC: 22 MMOL/L (ref 22–26)
HCT VFR BLD AUTO: 43.5 % (ref 41.1–50.3)
HGB BLD-MCNC: 13.8 G/DL (ref 13.6–17.2)
IMM GRANULOCYTES # BLD AUTO: 0.1 K/UL (ref 0–0.5)
IMM GRANULOCYTES NFR BLD AUTO: 1 % (ref 0–5)
LACTATE SERPL-SCNC: 2.4 MMOL/L (ref 0.4–2)
LYMPHOCYTES # BLD: 1.9 K/UL (ref 0.5–4.6)
LYMPHOCYTES NFR BLD: 16 % (ref 13–44)
MCH RBC QN AUTO: 31.6 PG (ref 26.1–32.9)
MCHC RBC AUTO-ENTMCNC: 31.7 G/DL (ref 31.4–35)
MCV RBC AUTO: 99.5 FL (ref 79.6–97.8)
MONOCYTES # BLD: 1.1 K/UL (ref 0.1–1.3)
MONOCYTES NFR BLD: 10 % (ref 4–12)
NEUTS SEG # BLD: 8.5 K/UL (ref 1.7–8.2)
NEUTS SEG NFR BLD: 71 % (ref 43–78)
NRBC # BLD: 0 K/UL (ref 0–0.2)
P-R INTERVAL, ECG05: 168 MS
PCO2 BLD: 26.3 MMHG (ref 35–45)
PCO2 BLD: 35.9 MMHG (ref 35–45)
PH BLD: 7.4 [PH] (ref 7.35–7.45)
PH BLD: 7.48 [PH] (ref 7.35–7.45)
PLATELET # BLD AUTO: 239 K/UL (ref 150–450)
PMV BLD AUTO: 9.6 FL (ref 9.4–12.3)
PO2 BLD: 38 MMHG (ref 75–100)
PO2 BLD: 42 MMHG (ref 75–100)
POTASSIUM SERPL-SCNC: 4.4 MMOL/L (ref 3.5–5.1)
PROCALCITONIN SERPL-MCNC: 0.1 NG/ML
PROT SERPL-MCNC: 7.3 G/DL (ref 6.3–8.2)
Q-T INTERVAL, ECG07: 362 MS
QRS DURATION, ECG06: 78 MS
QTC CALCULATION (BEZET), ECG08: 407 MS
RBC # BLD AUTO: 4.37 M/UL (ref 4.23–5.6)
SAO2 % BLD: 73 % (ref 95–98)
SAO2 % BLD: 82 % (ref 95–98)
SARS-COV-2, COV2: NORMAL
SERVICE CMNT-IMP: ABNORMAL
SODIUM SERPL-SCNC: 136 MMOL/L (ref 136–145)
SOURCE, COVRS: NORMAL
SPECIMEN TYPE: ABNORMAL
SPECIMEN TYPE: ABNORMAL
VENTRICULAR RATE, ECG03: 76 BPM
WBC # BLD AUTO: 11.9 K/UL (ref 4.3–11.1)

## 2021-01-30 PROCEDURE — 74011000636 HC RX REV CODE- 636: Performed by: INTERNAL MEDICINE

## 2021-01-30 PROCEDURE — 74011000250 HC RX REV CODE- 250

## 2021-01-30 PROCEDURE — 36600 WITHDRAWAL OF ARTERIAL BLOOD: CPT

## 2021-01-30 PROCEDURE — 82803 BLOOD GASES ANY COMBINATION: CPT

## 2021-01-30 PROCEDURE — 74011250636 HC RX REV CODE- 250/636: Performed by: FAMILY MEDICINE

## 2021-01-30 PROCEDURE — 85025 COMPLETE CBC W/AUTO DIFF WBC: CPT

## 2021-01-30 PROCEDURE — 94640 AIRWAY INHALATION TREATMENT: CPT

## 2021-01-30 PROCEDURE — 74011000302 HC RX REV CODE- 302: Performed by: INTERNAL MEDICINE

## 2021-01-30 PROCEDURE — 65270000029 HC RM PRIVATE

## 2021-01-30 PROCEDURE — 87635 SARS-COV-2 COVID-19 AMP PRB: CPT

## 2021-01-30 PROCEDURE — 94762 N-INVAS EAR/PLS OXIMTRY CONT: CPT

## 2021-01-30 PROCEDURE — 74011250637 HC RX REV CODE- 250/637: Performed by: FAMILY MEDICINE

## 2021-01-30 PROCEDURE — 96374 THER/PROPH/DIAG INJ IV PUSH: CPT

## 2021-01-30 PROCEDURE — 74011000258 HC RX REV CODE- 258: Performed by: INTERNAL MEDICINE

## 2021-01-30 PROCEDURE — 2709999900 HC NON-CHARGEABLE SUPPLY

## 2021-01-30 PROCEDURE — 83605 ASSAY OF LACTIC ACID: CPT

## 2021-01-30 PROCEDURE — 73502 X-RAY EXAM HIP UNI 2-3 VIEWS: CPT

## 2021-01-30 PROCEDURE — 80053 COMPREHEN METABOLIC PANEL: CPT

## 2021-01-30 PROCEDURE — 96375 TX/PRO/DX INJ NEW DRUG ADDON: CPT

## 2021-01-30 PROCEDURE — 71260 CT THORAX DX C+: CPT

## 2021-01-30 PROCEDURE — 74011250637 HC RX REV CODE- 250/637: Performed by: INTERNAL MEDICINE

## 2021-01-30 PROCEDURE — 81001 URINALYSIS AUTO W/SCOPE: CPT

## 2021-01-30 PROCEDURE — 87040 BLOOD CULTURE FOR BACTERIA: CPT

## 2021-01-30 PROCEDURE — 86580 TB INTRADERMAL TEST: CPT | Performed by: INTERNAL MEDICINE

## 2021-01-30 PROCEDURE — 84145 PROCALCITONIN (PCT): CPT

## 2021-01-30 PROCEDURE — 77010033711 HC HIGH FLOW OXYGEN

## 2021-01-30 PROCEDURE — 93005 ELECTROCARDIOGRAM TRACING: CPT | Performed by: EMERGENCY MEDICINE

## 2021-01-30 PROCEDURE — 71045 X-RAY EXAM CHEST 1 VIEW: CPT

## 2021-01-30 PROCEDURE — 99285 EMERGENCY DEPT VISIT HI MDM: CPT

## 2021-01-30 PROCEDURE — 73552 X-RAY EXAM OF FEMUR 2/>: CPT

## 2021-01-30 PROCEDURE — 83880 ASSAY OF NATRIURETIC PEPTIDE: CPT

## 2021-01-30 PROCEDURE — 74011250636 HC RX REV CODE- 250/636: Performed by: EMERGENCY MEDICINE

## 2021-01-30 RX ORDER — LORAZEPAM 2 MG/ML
0.5 INJECTION INTRAMUSCULAR ONCE
Status: COMPLETED | OUTPATIENT
Start: 2021-01-30 | End: 2021-01-30

## 2021-01-30 RX ORDER — LORAZEPAM 0.5 MG/1
0.5 TABLET ORAL ONCE
Status: ACTIVE | OUTPATIENT
Start: 2021-01-30 | End: 2021-01-31

## 2021-01-30 RX ORDER — IPRATROPIUM BROMIDE AND ALBUTEROL SULFATE 2.5; .5 MG/3ML; MG/3ML
SOLUTION RESPIRATORY (INHALATION)
Status: COMPLETED
Start: 2021-01-30 | End: 2021-01-30

## 2021-01-30 RX ORDER — NYSTATIN 100000 [USP'U]/G
POWDER TOPICAL 2 TIMES DAILY
Status: DISCONTINUED | OUTPATIENT
Start: 2021-01-31 | End: 2021-02-10 | Stop reason: HOSPADM

## 2021-01-30 RX ORDER — IPRATROPIUM BROMIDE AND ALBUTEROL SULFATE 2.5; .5 MG/3ML; MG/3ML
3 SOLUTION RESPIRATORY (INHALATION)
Status: COMPLETED | OUTPATIENT
Start: 2021-01-30 | End: 2021-01-30

## 2021-01-30 RX ORDER — HYDROMORPHONE HYDROCHLORIDE 1 MG/ML
0.5 INJECTION, SOLUTION INTRAMUSCULAR; INTRAVENOUS; SUBCUTANEOUS
Status: COMPLETED | OUTPATIENT
Start: 2021-01-30 | End: 2021-01-30

## 2021-01-30 RX ORDER — ONDANSETRON 2 MG/ML
4 INJECTION INTRAMUSCULAR; INTRAVENOUS
Status: COMPLETED | OUTPATIENT
Start: 2021-01-30 | End: 2021-01-30

## 2021-01-30 RX ORDER — SODIUM CHLORIDE 0.9 % (FLUSH) 0.9 %
10 SYRINGE (ML) INJECTION
Status: COMPLETED | OUTPATIENT
Start: 2021-01-30 | End: 2021-01-30

## 2021-01-30 RX ADMIN — SODIUM CHLORIDE 100 ML: 900 INJECTION, SOLUTION INTRAVENOUS at 17:41

## 2021-01-30 RX ADMIN — LORAZEPAM 0.5 MG: 2 INJECTION INTRAMUSCULAR; INTRAVENOUS at 22:25

## 2021-01-30 RX ADMIN — ONDANSETRON 4 MG: 2 INJECTION INTRAMUSCULAR; INTRAVENOUS at 12:59

## 2021-01-30 RX ADMIN — SODIUM CHLORIDE 500 ML: 900 INJECTION, SOLUTION INTRAVENOUS at 13:43

## 2021-01-30 RX ADMIN — IPRATROPIUM BROMIDE AND ALBUTEROL SULFATE: .5; 3 SOLUTION RESPIRATORY (INHALATION) at 13:20

## 2021-01-30 RX ADMIN — HYDROMORPHONE HYDROCHLORIDE 0.5 MG: 1 INJECTION, SOLUTION INTRAMUSCULAR; INTRAVENOUS; SUBCUTANEOUS at 13:00

## 2021-01-30 RX ADMIN — IPRATROPIUM BROMIDE AND ALBUTEROL SULFATE: 2.5; .5 SOLUTION RESPIRATORY (INHALATION) at 13:20

## 2021-01-30 RX ADMIN — IOPAMIDOL 100 ML: 755 INJECTION, SOLUTION INTRAVENOUS at 17:41

## 2021-01-30 RX ADMIN — TUBERCULIN PURIFIED PROTEIN DERIVATIVE 5 UNITS: 5 INJECTION, SOLUTION INTRADERMAL at 21:25

## 2021-01-30 RX ADMIN — LEVOFLOXACIN 750 MG: 500 TABLET, FILM COATED ORAL at 21:24

## 2021-01-30 RX ADMIN — Medication 10 ML: at 17:41

## 2021-01-30 NOTE — H&P
Hospitalist Note     Admit Date:  2021 12:33 PM   Name:  Rajani Brown   Age:  80 y.o.  :  1932   MRN:  [id number]   PCP:  Cece Mcginnis MD  Treatment Team: Attending Provider: Ravindra Barnhart MD; Primary Nurse: Robert Grady RN    HPI/Subjective:   Pt is an 81 y/o M SNF resident with severe COPD on 4L NC, ILD, dementia, malnutrition, who presented to ER after a fall. He fell while getting out of bed and fell on right side, with chest and hip sustaining brunt of strike. Significant bruising noted there and R elbow. Noted to be hypoxic and needed NRB to bring sats back up to 90s. He was tachypneic and ABG showed O2 42 in ER, which persisted on recheck (38). hospitalists called to admit for worsened hypoxic resp failure. No complaints except for pain in L hip and ribs; no fractures seen on XR. He actually denies feeling more SOB but he appears to be. No CP, fevers, cough. COVID neg in ER    10 systems reviewed and negative except as noted in HPI. 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 HRCT    Abnormality of urethral meatus 2019    Allergic rhinitis 12/10/2015    Asthma     Benign neoplasm     Benign prostatic hyperplasia 12/10/2015    Bigeminy 3/28/2016    Overview:  Reported by LIN Manuel physician. Last Assessment & Plan:  EKG not done. I placed the patient on telemetry today and is having fairly frequent PVCs. We'll check BMP and magnesium. Overview:  Reported by LIN Manuel physician. Last Assessment & Plan:  EKG not done. I placed the patient on telemetry today and is having fairly frequent PVCs. We'll check BMP and magnesium.     BPH without urinary obstruction  Cardiovascular disease 12/10/2015    Chronic obstructive asthma with exacerbation ([address] 12/10/2015    Closed compression fracture of lumbosacral spine ([address] 2018    COPD (chronic obstructive pulmonary disease) ([address] 12/10/2015    COPD (chronic obstructive pulmonary disease) with emphysema ([address] 10/17/2016    Last Assessment & Plan:  Not currently on any maintence medication regimen for COPD as he felt them to be unhelpful in the past.  I recommended a trial of a ANoro Ellipta which he should take 1 inhalation daily. Demonstration was completed on the correct method of administration and he was able to return demonstration independently in the office today and administer his first dose.   I have given     COPD exacerbation ([address] 2019    Cough with hemoptysis     Erectile dysfunction 12/10/2015    Essential hypertension, benign 12/10/2015    Fracture 10/2014    of left hand and ribs after fall on concrete    History of colon polyps     Low testosterone 12/10/2015    Lumbago     Memory loss     Overflow incontinence     Pleural effusion 6/10/2019    6/10/19 Right thoracentesis:  800 cc of yellow fluid       Pneumothorax on right 2019    Raynaud's syndrome 12/10/2015    Rotator cuff tendinitis     Thrombocytopenia (HCC)     Urinary frequency     Ventricular tachyarrhythmia ([address] 2019    VT (ventricular tachycardia) ([facility] 2019      Past Surgical History:   Procedure Laterality Date    HX CATARACT REMOVAL  2016-right    HX COLONOSCOPY      HX FRACTURE TX  10/2014    of left hand and ribs are fall on concrete    [address] HX ORTHOPAEDIC  2018    hip fracture      No Known Allergies   Social History     Tobacco Use    Smoking status: Former Smoker     Packs/day: 2.00     Years: 60.00     Pack years: 120.00     Types: Cigarettes     Quit date: 1994     Years since quittin.0    Smokeless tobacco: Never Used   Substance Use Topics    Alcohol use: Yes     Comment: occasional      Family History   Problem Relation Age of Onset    Dementia Mother     Cancer Father         lung cancer    Heart Attack Father       Family history reviewed and noncontributory. Immunization History   Administered Date(s) Administered    Influenza High Dose Vaccine PF 10/12/2016, 10/23/2017, 09/23/2018, 11/01/2019    Influenza Vaccine 09/10/2010    Influenza Vaccine (Tri) Adjuvanted (>65 Yrs FLUAD TRI 65091) 09/23/2018    Pneumococcal Conjugate (PCV-13) 10/01/2015, 12/08/2016    Pneumococcal Vaccine (Unspecified Type) 10/01/1998    TB Skin Test (PPD) Intradermal 02/02/2018, 05/06/2019, 06/10/2019, 09/01/2019, 11/01/2019    Zoster Recombinant 05/10/2018, 08/05/2018    Zoster Vaccine, Live 07/13/2011     PTA Medications:  Current Outpatient Medications   Medication Instructions    acetaminophen (TYLENOL) 650 mg, Oral, EVERY 8 HOURS    albuterol (PROVENTIL HFA, VENTOLIN HFA, PROAIR HFA) 90 mcg/actuation inhaler 2 Puffs, Inhalation, EVERY 4 HOURS AS NEEDED    albuterol-ipratropium (DUO-NEB) 2.5 mg-0.5 mg/3 ml nebu 3 mL, Nebulization, EVERY 4 HOURS AS NEEDED    aspirin delayed-release 81 mg tablet DAILY    Biotin 2,500 mcg cap Oral    cholecalciferol, vitamin D3, (Vitamin D3) 50 mcg (2,000 unit) tab Oral, DAILY    cyanocobalamin (VITAMIN B12) 1,000 mcg/mL injection Once month    DULoxetine (CYMBALTA) 30 mg, Oral, 2 TIMES DAILY    famotidine (PEPCID) 10 mg, Oral, 2 TIMES DAILY    finasteride (PROSCAR) 5 mg, Oral, DAILY    fluticasone (FLONASE) 50 mcg/actuation nasal spray 2 Sprays, Both Nostrils, DAILY    fluticasone furoate-vilanterol (BREO ELLIPTA) 100-25 mcg/dose inhaler 1 inhalation daily, rinse mouth after use    guaiFENesin ER (MUCINEX) 1,200 mg, Oral, DAILY    magnesium oxide 250 mg, Oral, DAILY    melatonin 3 mg tablet Oral    montelukast (SINGULAIR) 10 mg, Oral, DAILY    nystatin (MYCOSTATIN) powder Topical, 4 TIMES DAILY    Oxygen 4 lpm cont.  potassium chloride (K-DUR, KLOR-CON) 20 mEq tablet 40 mEq, Oral, DAILY       Objective:     Patient Vitals for the past 24 hrs:   Temp Pulse Resp BP SpO2   01/30/21 1643  80  (!) 145/66 100 %   01/30/21 1624     100 %   01/30/21 1623  76 18 (!) 145/66 100 %   01/30/21 1615  81 24  94 %   01/30/21 1603  85 25 (!) 160/75    01/30/21 1543  79 19 (!) 144/76 100 %   01/30/21 1443  78 16 109/62 100 %   01/30/21 1429  78 15 110/64 100 %   01/30/21 1359  79 15 118/71 100 %   01/30/21 1343  79 14 (!) 140/76 100 %   01/30/21 1320     98 %   01/30/21 1314  77 24 (!) 151/81 100 %   01/30/21 1241  78 (!) 38  98 %   01/30/21 1240  77 29 (!) 200/98    01/30/21 1236 98.7 °F (37.1 °C) 87 26 (!) 161/86      Oxygen Therapy  O2 Sat (%): 100 % (01/30/21 1643)  Pulse via Oximetry: 82 beats per minute (01/30/21 1643)  O2 Device: Heated; Hi flow nasal cannula (01/30/21 1624)  O2 Flow Rate (L/min): 40 l/min (01/30/21 1624)  FIO2 (%): 90 % (01/30/21 1624)    Estimated body mass index is 18.7 kg/m² as calculated from the following:    Height as of this encounter: 5' 8\" (1.727 m). Weight as of this encounter: 55.8 kg (123 lb). No intake or output data in the 24 hours ending 01/30/21 1725    *Note that automatically entered I/Os may not be accurate; dependent on patient compliance with collection and accurate  by assistants. Physical Exam:  General:    Chronically ill appearing, malnourished, frail appearing. No overt distress. Eyes:   Normal sclerae. Extraocular movements intact. HENT:  Normocephalic, atraumatic. Moist mucous membranes  CV:   RRR. No m/r/g. No edema  Lungs:  Diminished bilat. On airvo, slightly tachypneic and using accessory muscles, shallow breaths. No wheezing, rhonchi, or rales. Unlabored  Abdomen: Soft, nontender, nondistended. Extremities: Warm and dry. No cyanosis or clubbing  Neurologic: CN II-XII grossly intact. Sensation intact. dementia  Skin:     No rashes. No jaundice. Normal coloration  Psych:  Normal mood and affect. I reviewed the labs, imaging, EKGs, telemetry, and other studies done this admission. Data Reviewed:   Recent Results (from the past 24 hour(s))   CBC WITH AUTOMATED DIFF    Collection Time: 01/30/21 12:41 PM   Result Value Ref Range    WBC 11.9 (H) 4.3 - 11.1 K/uL    RBC 4.37 4.23 - 5.6 M/uL    HGB 13.8 13.6 - 17.2 g/dL    HCT 43.5 41.1 - 50.3 %    MCV 99.5 (H) 79.6 - 97.8 FL    MCH 31.6 26.1 - 32.9 PG    MCHC 31.7 31.4 - 35.0 g/dL    RDW 13.5 11.9 - 14.6 %    PLATELET 028 914 - 624 K/uL    MPV 9.6 9.4 - 12.3 FL    ABSOLUTE NRBC 0.00 0.0 - 0.2 K/uL    DF AUTOMATED      NEUTROPHILS 71 43 - 78 %    LYMPHOCYTES 16 13 - 44 %    MONOCYTES 10 4.0 - 12.0 %    EOSINOPHILS 2 0.5 - 7.8 %    BASOPHILS 0 0.0 - 2.0 %    IMMATURE GRANULOCYTES 1 0.0 - 5.0 %    ABS. NEUTROPHILS 8.5 (H) 1.7 - 8.2 K/UL    ABS. LYMPHOCYTES 1.9 0.5 - 4.6 K/UL    ABS. MONOCYTES 1.1 0.1 - 1.3 K/UL    ABS. EOSINOPHILS 0.3 0.0 - 0.8 K/UL    ABS. BASOPHILS 0.0 0.0 - 0.2 K/UL    ABS. IMM. GRANS. 0.1 0.0 - 0.5 K/UL   METABOLIC PANEL, COMPREHENSIVE    Collection Time: 01/30/21 12:41 PM   Result Value Ref Range    Sodium 136 136 - 145 mmol/L    Potassium 4.4 3.5 - 5.1 mmol/L    Chloride 104 98 - 107 mmol/L    CO2 27 21 - 32 mmol/L    Anion gap 5 (L) 7 - 16 mmol/L    Glucose 92 65 - 100 mg/dL    BUN 24 (H) 8 - 23 MG/DL    Creatinine 0.85 0.8 - 1.5 MG/DL    GFR est AA >60 >60 ml/min/1.73m2    GFR est non-AA >60 >60 ml/min/1.73m2    Calcium 9.1 8.3 - 10.4 MG/DL    Bilirubin, total 1.1 0.2 - 1.1 MG/DL    ALT (SGPT) 23 12 - 65 U/L    AST (SGOT) 31 15 - 37 U/L    Alk.  phosphatase 116 50 - 136 U/L    Protein, total 7.3 6.3 - 8.2 g/dL    Albumin 3.4 3.2 - 4.6 g/dL    Globulin 3.9 (H) 2.3 - 3.5 g/dL    A-G Ratio 0.9 (L) 1.2 - 3.5     LACTIC ACID    Collection Time: 01/30/21 12:41 PM   Result Value Ref Range    Lactic acid 2.4 (H) 0.4 - 2.0 MMOL/L   NT-PRO BNP    Collection Time: 01/30/21 12:41 PM Result Value Ref Range    NT pro- (H) <450 PG/ML   PROCALCITONIN    Collection Time: 01/30/21 12:41 PM   Result Value Ref Range    Procalcitonin 0.10 ng/mL   EKG, 12 LEAD, INITIAL    Collection Time: 01/30/21 12:46 PM   Result Value Ref Range    Ventricular Rate 76 BPM    Atrial Rate 76 BPM    P-R Interval 168 ms    QRS Duration 78 ms    Q-T Interval 362 ms    QTC Calculation (Bezet) 407 ms    Calculated P Axis 56 degrees    Calculated R Axis -20 degrees    Calculated T Axis 79 degrees    Diagnosis       !! AGE AND GENDER SPECIFIC ECG ANALYSIS !!   Sinus rhythm with occasional Premature ventricular complexes  Anteroseptal infarct (cited on or before 01-FEB-2018)  Abnormal ECG  When compared with ECG of 05-JAN-2020 17:37,  Fusion complexes are no longer Present  Premature ventricular complexes are now Present  ST now depressed in Anterior leads  Confirmed by Des Pham ([id number]) on 1/30/2021 2:16:24 PM     POC G3    Collection Time: 01/30/21  1:00 PM   Result Value Ref Range    Device: NASAL CANNULA      pH (POC) 7.48 (H) 7.35 - 7.45      pCO2 (POC) 26.3 (L) 35 - 45 MMHG    pO2 (POC) 42 (LL) 75 - 100 MMHG    HCO3 (POC) 19.7 (L) 22 - 26 MMOL/L    sO2 (POC) 82 (L) 95 - 98 %    Base deficit (POC) 2 mmol/L    Allens test (POC) YES      Site LEFT RADIAL      Specimen type (POC) ARTERIAL      Performed by Edward     CO2, POC 20 MMOL/L    Flow rate (POC) 6.000 L/min    COLLECT TIME 1,255     SARS-COV-2    Collection Time: 01/30/21  1:18 PM   Result Value Ref Range    SARS-CoV-2 Please find results under separate order     COVID-19 RAPID TEST    Collection Time: 01/30/21  1:18 PM   Result Value Ref Range    Specimen source Nasopharyngeal      COVID-19 rapid test Not detected NOTD     POC G3    Collection Time: 01/30/21  4:06 PM   Result Value Ref Range    Device: NASAL CANNULA      pH (POC) 7.40 7.35 - 7.45      pCO2 (POC) 35.9 35 - 45 MMHG    pO2 (POC) 38 (LL) 75 - 100 MMHG    HCO3 (POC) 22.0 22 - 26 MMOL/L    sO2 (POC) 73 (L) 95 - 98 %    Base deficit (POC) 2 mmol/L    Allens test (POC) YES      Site LEFT RADIAL      Specimen type (POC) ARTERIAL      Performed by Brandyn     CO2, POC 23 MMOL/L    Flow rate (POC) 6.000 L/min    Critical value read back 00:01     Respiratory comment: PhysicianNotified     COLLECT TIME 1,603         All Micro Results     Procedure Component Value Units Date/Time    COVID-19 RAPID TEST [[id number]] Collected: 01/30/21 1318    Order Status: Completed Specimen: Nasopharyngeal Updated: 01/30/21 1418     Specimen source Nasopharyngeal        COVID-19 rapid test Not detected        Comment:      The specimen is NEGATIVE for SARS-CoV-2, the novel coronavirus associated with COVID-19. A negative result does not rule out COVID-19. This test has been authorized by the FDA under an Emergency Use Authorization (EUA) for use by authorized laboratories.         Fact sheet for Healthcare Providers: DigiPathdate.co.nz  Fact sheet for Patients: Att[employer].co.nz       Methodology: Isothermal Nucleic Acid Amplification         CULTURE, BLOOD [[id number]] Collected: 01/30/21 1334    Order Status: Completed Specimen: Blood Updated: 01/30/21 1354    CULTURE, BLOOD [[id number]] Collected: 01/30/21 1334    Order Status: Completed Specimen: Blood Updated: 01/30/21 1354          Medications Administered     albuterol-ipratropium (DUO-NEB) 2.5 MG-0.5 MG/3 ML     Admin Date  01/30/2021 Action  Given Dose   Route  Nebulization Administered By  Carolina Demarco, RT          HYDROmorphone (PF) (DILAUDID) injection 0.5 mg     Admin Date  01/30/2021 Action  Given Dose  0.5 mg Route  IntraVENous Administered By  Meron Armas          ondansetron [facility]) injection 4 mg     Admin Date  01/30/2021 Action  Given Dose  4 mg Route  IntraVENous Administered By  Meron Armas          sodium chloride 0.9 % bolus infusion 500 mL     Admin Date  01/30/2021 Action  New Bag Dose  500 mL Rate  500 mL/hr Route  IntraVENous Administered By  Paulina Herrera RN                Other Studies:  No results found for this visit on 01/30/21. Xr Hip Rt W Or Wo Pelv 2-3 Vws    Result Date: 1/30/2021  Right hip, right femur radiographs HISTORY: Pain after fall. Right hip: 3 views were obtained. No prior studies are available for comparison. FINDINGS: There is no evidence of acute fracture or dislocation. The joint space is well-preserved. There is no bony destruction. There is diffuse osteopenia. A left trochanteric fixation nail is present. No evidence of acute bony abnormality. Right femur: AP and lateral views were obtained. No prior studies are available for comparison. FINDINGS: There is no evidence of acute fracture or dislocation. There is diffuse osteopenia. Atherosclerotic changes are present. The soft tissues are unremarkable. There is no bony destruction. IMPRESSION: No evidence of acute bony abnormality. Xr Femur Rt 2 Vs    Result Date: 1/30/2021  Right hip, right femur radiographs HISTORY: Pain after fall. Right hip: 3 views were obtained. No prior studies are available for comparison. FINDINGS: There is no evidence of acute fracture or dislocation. The joint space is well-preserved. There is no bony destruction. There is diffuse osteopenia. A left trochanteric fixation nail is present. No evidence of acute bony abnormality. Right femur: AP and lateral views were obtained. No prior studies are available for comparison. FINDINGS: There is no evidence of acute fracture or dislocation. There is diffuse osteopenia. Atherosclerotic changes are present. The soft tissues are unremarkable. There is no bony destruction. IMPRESSION: No evidence of acute bony abnormality. Xr Chest Port    Result Date: 1/30/2021  Chest X-ray INDICATION: Shortness of breath, possible COVID-19 A portable AP view of the chest was obtained.  FINDINGS: There is chronic interstitial lung disease with hazy background density which could represent persistent infiltrate. .  There is stable cardiomegaly. The bony thorax is intact. Extensive chronic lung disease. Possible residual background infiltrate. SARS-CoV-2 Lab Results  \"Novel Coronavirus\" Test: No results found for: COV2NT   \"Emergent Disease\" Test: No results found for: EDPR  \"SARS-COV-2\" Test: No results found for: XGCOVT  Rapid Test:   Lab Results   Component Value Date/Time    COVR Not detected 01/30/2021 01:18 PM              Assessment and Plan:     Hospital Problems as of 1/30/2021 Date Reviewed: 12/17/2020          Codes Class Noted - Resolved POA    Severe protein-calorie malnutrition ([facility] [address] (Chronic) ICD-10-CM: A77  ICD-9-CM: 262  11/3/2019 - Present Yes        DNR (do not resuscitate) (Chronic) ICD-10-CM: Z66  ICD-9-CM: V49.86  11/2/2019 - Present Yes        * (Principal) Acute on chronic respiratory failure with hypoxia ([facility] [address] ICD-10-CM: J96.21  ICD-9-CM: 518.84, 799.02  5/6/2019 - Present Yes        Chronic respiratory failure with hypoxia ([address] (Chronic) ICD-10-CM: J96.11  ICD-9-CM: 518.83, 799.02  3/29/2016 - Present Yes    Overview Addendum 1/30/2021  5:25 PM by Melodie Vega MD     4+L chronically             BPH (benign prostatic hyperplasia) (Chronic) ICD-10-CM: N40.0  ICD-9-CM: 600.00  12/10/2015 - Present Yes        COPD (chronic obstructive pulmonary disease) ([facility] [address] (Chronic) ICD-10-CM: J44.9  ICD-9-CM: 670  12/10/2015 - Present Yes        Essential hypertension, benign (Chronic) ICD-10-CM: I10  ICD-9-CM: 401.1  12/10/2015 - Present Yes              Plan:  Acute on chronic resp failure  -on airvo currently 90%  -CT chest pending  -rib pain may be limiting some respirations  -try levaquin in case of atypical PNA but CXR not convincing. PCT unimpressive. QTc ok  -wean oxygen as tolerated to baseline 4L. Wonder if lung disease could be progressing.   -CXR looks pretty similar to prior in Nov; L infiltrate improved, some scarring. Personally reviewed and discussed with ER physician. Suspect significant chronic lung disease. -COVID neg    Malnutrition  -RD consult    Chronic Rojas  -need to find out how long it has been in place. Hopefully facility changes every 28 days    Goals of Care  -DNR. Consider hospice given age, severe lung disease, dementia    Discharge planning:    -PT/OT/PPD/CM     Other listed chronic conditions stable; continue essential home meds.     DVT ppx ordered  Code status:  Full  Estimated LOS:  Greater than 2 midnights  Risk:  high    Signed:  Sandy Peterson MD

## 2021-01-30 NOTE — ED PROVIDER NOTES
26-year-old gentleman with COPD on 4 L home oxygen presents with chest pain and dyspnea following a fall this morning. States he hardly ever uses his nebulizer. Denies interstitial lung disease, just copd on oxygen (NO cpap)    He was in his usual state of health this morning, when he sustained a fall trying to get out of bed. Patient landed on his right anterolateral chest.  He has right hip pain, and right chest wall pain. When staff found him his sats were in the 80s on room air sats came up to the 90s with a nonrebreather mask. No neck pain no back pain, no arm pain or left leg pain. Pain isolated to the right hip and rib cage. Capitalized with pneumonia in November. Patient states that this pneumonia cleared up, and he is actually been feeling pretty good recently, and he states that his current level of dyspnea was not present before this morning's fall. He has had a Rojas catheter for quite some time and has been at [facility] as a permanent resident for over a year. There is talk of soon upgrading him to a suprapubic but he needs heart clearance first.           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 5 physician. Last Assessment & Plan:  EKG not done. I placed the patient on telemetry today and is having fairly frequent PVCs. We'll check BMP and magnesium. Overview:  Reported by LIN Manuel physician. Last Assessment & Plan:  EKG not done.  I placed the patient on telemetry today and is having fairly frequent PVCs. We'll check BMP and magnesium.  BPH without urinary obstruction     Cardiovascular disease 12/10/2015    Chronic obstructive asthma with exacerbation ([address] 12/10/2015    Closed compression fracture of lumbosacral spine ([address] 12/11/2018    COPD (chronic obstructive pulmonary disease) ([address] 12/10/2015    COPD (chronic obstructive pulmonary disease) with emphysema ([address] 10/17/2016    Last Assessment & Plan:  Not currently on any maintence medication regimen for COPD as he felt them to be unhelpful in the past.  I recommended a trial of a ANoro Ellipta which he should take 1 inhalation daily. Demonstration was completed on the correct method of administration and he was able to return demonstration independently in the office today and administer his first dose.   I have given     COPD exacerbation ([address] 5/6/2019    Cough with hemoptysis     Erectile dysfunction 12/10/2015    Essential hypertension, benign 12/10/2015    Fracture 10/2014    of left hand and ribs after fall on concrete    History of colon polyps     Low testosterone 12/10/2015    Lumbago     Memory loss     Overflow incontinence     Pleural effusion 6/10/2019    6/10/19 Right thoracentesis:  800 cc of yellow fluid       Pneumothorax on right 5/6/2019    Raynaud's syndrome 12/10/2015    Rotator cuff tendinitis     Thrombocytopenia (HCC)     Urinary frequency     Ventricular tachyarrhythmia ([address] 5/6/2019    VT (ventricular tachycardia) ([address] 5/6/2019       Past Surgical History:   Procedure Laterality Date    HX CATARACT REMOVAL  6/2016-right    HX COLONOSCOPY      HX FRACTURE TX  10/2014    of left hand and ribs are fall on concrete    [facility]    HX ORTHOPAEDIC  03/2018    hip fracture         Family History:   Problem Relation Age of Onset    Dementia Mother     Cancer Father         lung cancer    Heart Attack Father        Social History     Socioeconomic History    Marital status:      Spouse name: Not on file    Number of children: Not on file    Years of education: Not on file    Highest education level: Not on file   Occupational History    Not on file   Social Needs    Financial resource strain: Not on file    Food insecurity     Worry: Not on file     Inability: Not on file    Transportation needs     Medical: Not on file     Non-medical: Not on file   Tobacco Use    Smoking status: Former Smoker     Packs/day: 2.00     Years: 60.00     Pack years: 120.00     Types: Cigarettes     Quit date: 1994     Years since quittin.0    Smokeless tobacco: Never Used   Substance and Sexual Activity    Alcohol use: Yes     Comment: occasional    Drug use: Not on file    Sexual activity: Not on file   Lifestyle    Physical activity     Days per week: Not on file     Minutes per session: Not on file    Stress: Not on file   Relationships    Social connections     Talks on phone: Not on file     Gets together: Not on file     Attends Judaism service: Not on file     Active member of club or organization: Not on file     Attends meetings of clubs or organizations: Not on file     Relationship status: Not on file    Intimate partner violence     Fear of current or ex partner: Not on file     Emotionally abused: Not on file     Physically abused: Not on file     Forced sexual activity: Not on file   Other Topics Concern    Not on file   Social History Narrative    Not on file         ALLERGIES: Patient has no known allergies. Review of Systems   Constitutional: Negative for chills and fever. HENT: Positive for nosebleeds. Negative for congestion, rhinorrhea and sore throat. Eyes: Negative for discharge and redness. Respiratory: Positive for shortness of breath. Negative for cough. Cardiovascular: Positive for chest pain. Negative for palpitations and leg swelling. Gastrointestinal: Positive for diarrhea.  Negative for abdominal pain, nausea and vomiting. Genitourinary: Positive for difficulty urinating. Musculoskeletal: Positive for arthralgias and gait problem. Negative for back pain and neck pain. Skin: Negative for rash. Neurological: Negative for dizziness, weakness, numbness and headaches. All other systems reviewed and are negative. Vitals:    01/30/21 1236 01/30/21 1240 01/30/21 1241   BP: (!) 161/86 (!) 200/98    Pulse: 87 77 78   Resp: 26 29 (!) 38   Temp: 98.7 °F (37.1 °C)     SpO2:   98%   Weight: 55.8 kg (123 lb)     Height: 5' 8\" (1.727 m)              Physical Exam  Vitals signs and nursing note reviewed. Constitutional:       General: He is not in acute distress. Appearance: Normal appearance. He is well-developed. He is not ill-appearing, toxic-appearing or diaphoretic. HENT:      Head: Normocephalic. Right Ear: External ear normal.      Left Ear: External ear normal.      Nose: Nasal tenderness present. No rhinorrhea. Left Nostril: Epistaxis present. Mouth/Throat:      Mouth: Mucous membranes are moist.      Pharynx: No oropharyngeal exudate or posterior oropharyngeal erythema. Eyes:      General:         Right eye: No discharge. Left eye: No discharge. Extraocular Movements: Extraocular movements intact. Conjunctiva/sclera: Conjunctivae normal.      Pupils: Pupils are equal, round, and reactive to light. Neck:      Musculoskeletal: Normal range of motion and neck supple. Cardiovascular:      Rate and Rhythm: Normal rate and regular rhythm. Pulses: Normal pulses. Heart sounds: No murmur. Pulmonary:      Effort: Tachypnea, accessory muscle usage and respiratory distress present. No retractions. Breath sounds: No stridor. Examination of the right-upper field reveals decreased breath sounds. Examination of the right-middle field reveals rales. Examination of the left-middle field reveals rales. Examination of the right-lower field reveals rales.  Examination of the left-lower field reveals rales. Decreased breath sounds and rales present. No wheezing. Chest:      Chest wall: Tenderness present. No crepitus. Abdominal:      General: Abdomen is flat. Palpations: There is no hepatomegaly or splenomegaly. Tenderness: There is no abdominal tenderness. There is no guarding or rebound. Genitourinary:     Comments: Rojas catheter    Musculoskeletal:         General: Tenderness present. Right hip: He exhibits decreased range of motion, tenderness and bony tenderness. He exhibits no deformity. Skin:     General: Skin is warm and dry. Findings: No rash. Neurological:      General: No focal deficit present. Mental Status: He is alert and oriented to person, place, and time. Mental status is at baseline. Motor: No abnormal muscle tone. Comments: cni 2-12 grossly  Nl gait,  Nl speech     Psychiatric:         Mood and Affect: Mood normal.         Behavior: Behavior normal.          MDM  Number of Diagnoses or Management Options  Acute respiratory failure with hypoxia [facility]): new and requires workup  Chest wall pain: new and requires workup  Right hip pain: new and requires workup  Diagnosis management comments: Medical decision making note:  70-year-old gentleman from nursing home presents with fall. Chest x-ray negative for acute rib fracture no pneumothorax. Scattered infiltrates, question of interstitial lung disease. Not very different from his pneumonia x-ray in November. Oxygen levels requiring significantly more support than usual.  Blood gas shows CO2 around 25 with PaO2 around 42 on 6 L nasal cannula  CO2 a little better oxygen a little worse after analgesics and DuoNeb  Will check CAT scan for PE or other pathology    Patient may have some occult rib fractures will be able to better evaluate sternum on CT scan as well    Moderate hip pain with mild range of motion.   Plain films are negative, once his breathing situation is stabilized, if he is unable to ambulate may require MRI. Admit to hospitalist service  This concludes the \"medical decision making note\" part of this emergency department visit note. Amount and/or Complexity of Data Reviewed  Clinical lab tests: reviewed and ordered (Results Include:    Recent Results (from the past 24 hour(s))  -CBC WITH AUTOMATED DIFF  Collection Time: 01/30/21 12:41 PM       Result                      Value             Ref Range           WBC                         11.9 (H)          4.3 - 11.1 K*       RBC                         4.37              4.23 - 5.6 M*       HGB                         13.8              13.6 - 17.2 *       HCT                         43.5              41.1 - 50.3 %       MCV                         99.5 (H)          79.6 - 97.8 *       MCH                         31.6              26.1 - 32.9 *       MCHC                        31.7              31.4 - 35.0 *       RDW                         13.5              11.9 - 14.6 %       PLATELET                    239               150 - 450 K/*       MPV                         9.6               9.4 - 12.3 FL       ABSOLUTE NRBC               0.00              0.0 - 0.2 K/*       DF                          AUTOMATED                             NEUTROPHILS                 71                43 - 78 %           LYMPHOCYTES                 16                13 - 44 %           MONOCYTES                   10                4.0 - 12.0 %        EOSINOPHILS                 2                 0.5 - 7.8 %         BASOPHILS                   0                 0.0 - 2.0 %         IMMATURE GRANULOCYTES       1                 0.0 - 5.0 %         ABS. NEUTROPHILS            8.5 (H)           1.7 - 8.2 K/*       ABS. LYMPHOCYTES            1.9               0.5 - 4.6 K/*       ABS. MONOCYTES              1.1               0.1 - 1.3 K/*       ABS. EOSINOPHILS            0.3               0.0 - 0.8 K/*       ABS.  BASOPHILS 0.0               0.0 - 0.2 K/*       ABS. IMM. GRANS.            0.1               0.0 - 0.5 K/*  -METABOLIC PANEL, COMPREHENSIVE  Collection Time: 01/30/21 12:41 PM       Result                      Value             Ref Range           Sodium                      136               136 - 145 mm*       Potassium                   4.4               3.5 - 5.1 mm*       Chloride                    104               98 - 107 mmo*       CO2                         27                21 - 32 mmol*       Anion gap                   5 (L)             7 - 16 mmol/L       Glucose                     92                65 - 100 mg/*       BUN                         24 (H)            8 - 23 MG/DL        Creatinine                  0.85              0.8 - 1.5 MG*       GFR est AA                  >60               >60 ml/min/1*       GFR est non-AA              >60               >60 ml/min/1*       Calcium                     9.1               8.3 - 10.4 M*       Bilirubin, total            1.1               0.2 - 1.1 MG*       ALT (SGPT)                  23                12 - 65 U/L         AST (SGOT)                  31                15 - 37 U/L         Alk.  phosphatase            116               50 - 136 U/L        Protein, total              7.3               6.3 - 8.2 g/*       Albumin                     3.4               3.2 - 4.6 g/*       Globulin                    3.9 (H)           2.3 - 3.5 g/*       A-G Ratio                   0.9 (L)           1.2 - 3.5      -LACTIC ACID  Collection Time: 01/30/21 12:41 PM       Result                      Value             Ref Range           Lactic acid                 2.4 (H)           0.4 - 2.0 MM*  -EKG, 12 LEAD, INITIAL  Collection Time: 01/30/21 12:46 PM       Result                      Value             Ref Range           Ventricular Rate            76                BPM                 Atrial Rate                 76                BPM                 P-R Interval                168               ms                  QRS Duration                78                ms                  Q-T Interval                362               ms                  QTC Calculation (Bezet)     407               ms                  Calculated P Axis           56                degrees             Calculated R Axis           -20               degrees             Calculated T Axis           79                degrees             Diagnosis                                                     !! AGE AND GENDER SPECIFIC ECG ANALYSIS !! Sinus rhythm with occasional Premature ventricular complexes Anteroseptal infarct (cited on or before 01-FEB-2018) Abnormal ECG When compared with ECG of 05-JAN-2020 17:37, Fusion complexes are no longer Present Premature ventricular complexes are now Present ST now depressed in Anterior leads )  Tests in the radiology section of CPT®: reviewed and ordered (XR CHEST PORT   Final Result    Extensive chronic lung disease. Possible residual background infiltrate.           )  Tests in the medicine section of CPT®: reviewed and ordered (Blood gas on 6 L nasal cannula  Significant hyper ventilation with hypercapnia. Oxygen distressingly low despite his efforts, supplementation.     Collection Time: 01/30/21  1:00 PM       Result                      Value             Ref Range           Device:                     NASAL CANNULA                         pH (POC)                    7.48 (H)          7.35 - 7.45         pCO2 (POC)                  26.3 (L)          35 - 45 MMHG        pO2 (POC)                   42 (LL)           75 - 100 MMHG       HCO3 (POC)                  19.7 (L)          22 - 26 MMOL*       sO2 (POC)                   82 (L)            95 - 98 %           Base deficit (POC)          2                 mmol/L              Allens test (POC)           YES                                   Site                        LEFT RADIAL Specimen type (POC)         ARTERIAL                              Performed by                                                  Edward       CO2, POC                    20                MMOL/L              Flow rate (POC)             6.000             L/min               COLLECT TIME                1,255                            )  Decide to obtain previous medical records or to obtain history from someone other than the patient: yes  Obtain history from someone other than the patient: yes (Wife)  Discuss the patient with other providers: yes (Dr. Nhi Zee, hospitalist)  Independent visualization of images, tracings, or specimens: yes (Chest x-ray from November compared to today  )    Risk of Complications, Morbidity, and/or Mortality  Presenting problems: high  Diagnostic procedures: low  Management options: moderate    Patient Progress  Patient progress: improved         Procedures

## 2021-01-30 NOTE — ROUTINE PROCESS
TRANSFER - IN REPORT: 
 
Verbal report received from Odalis  RN (name) on Lazaro Toussaint  being received from [facility] (unit) for routine progression of care Report consisted of patients Situation, Background, Assessment and  
Recommendations(SBAR). Information from the following report(s) SBAR, Kardex and ED Summary was reviewed with the receiving nurse. Opportunity for questions and clarification was provided. Assessment completed upon patients arrival to unit and care assumed. SBAR received and given to primary receiving RN, April. Patient not on 8th @ transfer-in time.

## 2021-01-30 NOTE — PROGRESS NOTES
ABG completed PO2 38 on 5L NC, placed patient back on AirVo 40L @ 90% to maintain oxygen saturation @ 90%.

## 2021-01-30 NOTE — ED TRIAGE NOTES
Pt arrives via ems from [facility]. Reports he had a fall this morning and then started complaining of right hip, sternum and right side of chest pain. Ems found pt in the 60s room air. Placed on nonrebreather and sat up to 90s. Pt arrives with multiple bruises, including bruising to right side of chest. Pt anxious.

## 2021-01-30 NOTE — ED NOTES
TRANSFER - OUT REPORT:    Verbal report given to Ximena(name) on Jean-Paul Zepeda  being transferred to [facility](unit) for routine progression of care       Report consisted of patients Situation, Background, Assessment and   Recommendations(SBAR). Information from the following report(s) SBAR was reviewed with the receiving nurse. Lines:   Peripheral IV Left Antecubital (Active)   Site Assessment Clean, dry, & intact 01/30/21 1238   Phlebitis Assessment 0 01/30/21 1238   Infiltration Assessment 0 01/30/21 1238   Dressing Status Clean, dry, & intact 01/30/21 1238       Peripheral IV 01/30/21 Right Forearm (Active)   Site Assessment Clean, dry, & intact 01/30/21 1342   Phlebitis Assessment 0 01/30/21 1342   Infiltration Assessment 0 01/30/21 1342   Dressing Status Clean, dry, & intact 01/30/21 1342   Hub Color/Line Status Pink 01/30/21 1342   Action Taken Blood drawn 01/30/21 1342        Opportunity for questions and clarification was provided.       Patient transported with:   [employer]

## 2021-01-31 LAB
APPEARANCE UR: ABNORMAL
BACTERIA URNS QL MICRO: ABNORMAL /HPF
BILIRUB UR QL: NEGATIVE
CASTS URNS QL MICRO: ABNORMAL /LPF
COLOR UR: ABNORMAL
CRYSTALS URNS QL MICRO: ABNORMAL /LPF
EPI CELLS #/AREA URNS HPF: ABNORMAL /HPF
GLUCOSE UR STRIP.AUTO-MCNC: NEGATIVE MG/DL
HGB UR QL STRIP: ABNORMAL
KETONES UR QL STRIP.AUTO: ABNORMAL MG/DL
LEUKOCYTE ESTERASE UR QL STRIP.AUTO: ABNORMAL
MM INDURATION POC: 0 MM (ref 0–5)
NITRITE UR QL STRIP.AUTO: POSITIVE
OTHER OBSERVATIONS,UCOM: ABNORMAL
PH UR STRIP: 5 [PH] (ref 5–9)
PPD POC: NEGATIVE NEGATIVE
PROT UR STRIP-MCNC: 30 MG/DL
RBC #/AREA URNS HPF: ABNORMAL /HPF
SARS COV-2, XPGCVT: NEGATIVE
SP GR UR REFRACTOMETRY: 1.05 (ref 1–1.02)
UROBILINOGEN UR QL STRIP.AUTO: 1 EU/DL (ref 0.2–1)
WBC URNS QL MICRO: ABNORMAL /HPF

## 2021-01-31 PROCEDURE — 2709999900 HC NON-CHARGEABLE SUPPLY

## 2021-01-31 PROCEDURE — 94640 AIRWAY INHALATION TREATMENT: CPT

## 2021-01-31 PROCEDURE — 77010033711 HC HIGH FLOW OXYGEN

## 2021-01-31 PROCEDURE — 97163 PT EVAL HIGH COMPLEX 45 MIN: CPT

## 2021-01-31 PROCEDURE — 74011250636 HC RX REV CODE- 250/636: Performed by: INTERNAL MEDICINE

## 2021-01-31 PROCEDURE — 77030012341 HC CHMB SPCR OPTC MDI VYRM -A

## 2021-01-31 PROCEDURE — 97530 THERAPEUTIC ACTIVITIES: CPT

## 2021-01-31 PROCEDURE — 65270000029 HC RM PRIVATE

## 2021-01-31 PROCEDURE — 74011250636 HC RX REV CODE- 250/636: Performed by: FAMILY MEDICINE

## 2021-01-31 PROCEDURE — 87186 SC STD MICRODIL/AGAR DIL: CPT

## 2021-01-31 PROCEDURE — 87086 URINE CULTURE/COLONY COUNT: CPT

## 2021-01-31 PROCEDURE — 74011250637 HC RX REV CODE- 250/637: Performed by: INTERNAL MEDICINE

## 2021-01-31 PROCEDURE — 74011250637 HC RX REV CODE- 250/637: Performed by: FAMILY MEDICINE

## 2021-01-31 PROCEDURE — 87088 URINE BACTERIA CULTURE: CPT

## 2021-01-31 PROCEDURE — 94762 N-INVAS EAR/PLS OXIMTRY CONT: CPT

## 2021-01-31 PROCEDURE — 74011000250 HC RX REV CODE- 250: Performed by: FAMILY MEDICINE

## 2021-01-31 RX ORDER — DULOXETIN HYDROCHLORIDE 30 MG/1
30 CAPSULE, DELAYED RELEASE ORAL 2 TIMES DAILY
Status: DISCONTINUED | OUTPATIENT
Start: 2021-01-31 | End: 2021-02-10 | Stop reason: HOSPADM

## 2021-01-31 RX ORDER — FINASTERIDE 5 MG/1
5 TABLET, FILM COATED ORAL DAILY
Status: DISCONTINUED | OUTPATIENT
Start: 2021-01-31 | End: 2021-02-10 | Stop reason: HOSPADM

## 2021-01-31 RX ORDER — FAMOTIDINE 20 MG/1
20 TABLET, FILM COATED ORAL DAILY
Status: DISCONTINUED | OUTPATIENT
Start: 2021-01-31 | End: 2021-02-10 | Stop reason: HOSPADM

## 2021-01-31 RX ORDER — PROMETHAZINE HYDROCHLORIDE 25 MG/1
25 TABLET ORAL
Status: DISCONTINUED | OUTPATIENT
Start: 2021-01-31 | End: 2021-02-10 | Stop reason: HOSPADM

## 2021-01-31 RX ORDER — FACIAL-BODY WIPES
10 EACH TOPICAL DAILY PRN
Status: DISCONTINUED | OUTPATIENT
Start: 2021-01-31 | End: 2021-02-10 | Stop reason: HOSPADM

## 2021-01-31 RX ORDER — MONTELUKAST SODIUM 10 MG/1
10 TABLET ORAL
Status: DISCONTINUED | OUTPATIENT
Start: 2021-01-31 | End: 2021-02-10 | Stop reason: HOSPADM

## 2021-01-31 RX ORDER — ACETAMINOPHEN 650 MG/1
650 SUPPOSITORY RECTAL
Status: DISCONTINUED | OUTPATIENT
Start: 2021-01-31 | End: 2021-02-10 | Stop reason: HOSPADM

## 2021-01-31 RX ORDER — SODIUM CHLORIDE 0.9 % (FLUSH) 0.9 %
5-40 SYRINGE (ML) INJECTION AS NEEDED
Status: DISCONTINUED | OUTPATIENT
Start: 2021-01-31 | End: 2021-02-10 | Stop reason: HOSPADM

## 2021-01-31 RX ORDER — ACETAMINOPHEN 325 MG/1
650 TABLET ORAL
Status: DISCONTINUED | OUTPATIENT
Start: 2021-01-31 | End: 2021-02-10 | Stop reason: HOSPADM

## 2021-01-31 RX ORDER — ONDANSETRON 2 MG/ML
4 INJECTION INTRAMUSCULAR; INTRAVENOUS
Status: DISCONTINUED | OUTPATIENT
Start: 2021-01-31 | End: 2021-02-10 | Stop reason: HOSPADM

## 2021-01-31 RX ORDER — ENOXAPARIN SODIUM 100 MG/ML
40 INJECTION SUBCUTANEOUS DAILY
Status: DISCONTINUED | OUTPATIENT
Start: 2021-01-31 | End: 2021-02-10 | Stop reason: HOSPADM

## 2021-01-31 RX ORDER — ASPIRIN 81 MG/1
81 TABLET ORAL DAILY
Status: DISCONTINUED | OUTPATIENT
Start: 2021-01-31 | End: 2021-02-10 | Stop reason: HOSPADM

## 2021-01-31 RX ORDER — POTASSIUM CHLORIDE 7.45 MG/ML
10 INJECTION INTRAVENOUS AS NEEDED
Status: DISCONTINUED | OUTPATIENT
Start: 2021-01-31 | End: 2021-02-10 | Stop reason: HOSPADM

## 2021-01-31 RX ORDER — BUDESONIDE AND FORMOTEROL FUMARATE DIHYDRATE 160; 4.5 UG/1; UG/1
2 AEROSOL RESPIRATORY (INHALATION) 2 TIMES DAILY
Status: DISCONTINUED | OUTPATIENT
Start: 2021-01-31 | End: 2021-01-31

## 2021-01-31 RX ORDER — LANOLIN ALCOHOL/MO/W.PET/CERES
400 CREAM (GRAM) TOPICAL DAILY
Status: DISCONTINUED | OUTPATIENT
Start: 2021-01-31 | End: 2021-02-10 | Stop reason: HOSPADM

## 2021-01-31 RX ORDER — GUAIFENESIN 600 MG/1
1200 TABLET, EXTENDED RELEASE ORAL DAILY
Status: DISCONTINUED | OUTPATIENT
Start: 2021-01-31 | End: 2021-02-07

## 2021-01-31 RX ORDER — BUDESONIDE AND FORMOTEROL FUMARATE DIHYDRATE 160; 4.5 UG/1; UG/1
2 AEROSOL RESPIRATORY (INHALATION)
Status: DISCONTINUED | OUTPATIENT
Start: 2021-02-01 | End: 2021-02-10 | Stop reason: HOSPADM

## 2021-01-31 RX ORDER — FAMOTIDINE 20 MG/1
10 TABLET, FILM COATED ORAL 2 TIMES DAILY
Status: DISCONTINUED | OUTPATIENT
Start: 2021-01-31 | End: 2021-01-31

## 2021-01-31 RX ORDER — MAGNESIUM SULFATE HEPTAHYDRATE 40 MG/ML
2 INJECTION, SOLUTION INTRAVENOUS AS NEEDED
Status: DISCONTINUED | OUTPATIENT
Start: 2021-01-31 | End: 2021-02-10 | Stop reason: HOSPADM

## 2021-01-31 RX ORDER — IPRATROPIUM BROMIDE AND ALBUTEROL SULFATE 2.5; .5 MG/3ML; MG/3ML
3 SOLUTION RESPIRATORY (INHALATION)
Status: DISCONTINUED | OUTPATIENT
Start: 2021-01-31 | End: 2021-02-10 | Stop reason: HOSPADM

## 2021-01-31 RX ORDER — SODIUM CHLORIDE 0.9 % (FLUSH) 0.9 %
5-40 SYRINGE (ML) INJECTION EVERY 8 HOURS
Status: DISCONTINUED | OUTPATIENT
Start: 2021-01-31 | End: 2021-02-10 | Stop reason: HOSPADM

## 2021-01-31 RX ORDER — POLYETHYLENE GLYCOL 3350 17 G/17G
17 POWDER, FOR SOLUTION ORAL DAILY PRN
Status: DISCONTINUED | OUTPATIENT
Start: 2021-01-31 | End: 2021-02-10 | Stop reason: HOSPADM

## 2021-01-31 RX ADMIN — NYSTATIN: 100000 POWDER TOPICAL at 09:00

## 2021-01-31 RX ADMIN — BUDESONIDE AND FORMOTEROL FUMARATE DIHYDRATE 2 PUFF: 160; 4.5 AEROSOL RESPIRATORY (INHALATION) at 20:39

## 2021-01-31 RX ADMIN — METHYLPREDNISOLONE SODIUM SUCCINATE 40 MG: 40 INJECTION, POWDER, FOR SOLUTION INTRAMUSCULAR; INTRAVENOUS at 09:10

## 2021-01-31 RX ADMIN — METHYLPREDNISOLONE SODIUM SUCCINATE 40 MG: 40 INJECTION, POWDER, FOR SOLUTION INTRAMUSCULAR; INTRAVENOUS at 20:52

## 2021-01-31 RX ADMIN — ZIPRASIDONE MESYLATE 10 MG: 20 INJECTION, POWDER, LYOPHILIZED, FOR SOLUTION INTRAMUSCULAR at 03:47

## 2021-01-31 RX ADMIN — Medication 10 ML: at 08:03

## 2021-01-31 RX ADMIN — DULOXETINE HYDROCHLORIDE 30 MG: 30 CAPSULE, DELAYED RELEASE ORAL at 17:12

## 2021-01-31 RX ADMIN — MONTELUKAST SODIUM 10 MG: 10 TABLET, FILM COATED ORAL at 22:00

## 2021-01-31 RX ADMIN — ENOXAPARIN SODIUM 40 MG: 40 INJECTION SUBCUTANEOUS at 08:12

## 2021-01-31 RX ADMIN — Medication 5 ML: at 21:04

## 2021-01-31 RX ADMIN — BUDESONIDE AND FORMOTEROL FUMARATE DIHYDRATE 2 PUFF: 160; 4.5 AEROSOL RESPIRATORY (INHALATION) at 09:49

## 2021-01-31 RX ADMIN — Medication 10 ML: at 13:14

## 2021-01-31 RX ADMIN — NYSTATIN: 100000 POWDER TOPICAL at 18:00

## 2021-01-31 NOTE — PROGRESS NOTES
SBAR received from April Lawson, [address] [place] [address]. Patient stable, sitting in bed watching tv, in no apparent distress. Patient is confused and restless, removes his oxygen often and desaturates to the 50s with removal. Receiving oxygen at 60 L/min at 100% via Airvo with ventimask. Soft wrist restraints are ordered and in place. Continuous pulse oximeter reads 99%. Rojas catheter present, patent, and draining angelito urine. Call light within reach. Droplet plus precautions maintained.

## 2021-01-31 NOTE — PROGRESS NOTES
Pt coughed significantly as this RN fed him pudding. Notified MD Althia Gitelman. Orders placed for NPO status and SLP consult.

## 2021-01-31 NOTE — PROGRESS NOTES
While assisting Tish ELIZABETH, to change this patient's brief, this nurse noted that pt's chronic urinary catheter had torn the length of the patient's penis beginning at urethral meatus. Additionally, pus leaking from urethra and severely inflamed rash present surrounding patient's genitals. Urine noted to be cloudy and malodorous. Notified MD Rei Hoover. Orders placed for Nystatin powder, urine cultures, and urinalysis. Will collect and continue to monitor.

## 2021-01-31 NOTE — PROGRESS NOTES
Hospitalist Note     Admit Date:  2021 12:33 PM   Name:  Stephenie Canas   Age:  80 y.o.  :  1932   MRN:  [id number]   PCP:  Oumar Brandt MD  Treatment Team: Attending Provider: Bg Swanson MD; Primary Nurse: Stefanie Zimmer; Physical Therapist: Rachel Pandey DPT    Hospital Course:   Mr. Jennifer Medina is an 79 y/o WM with severe COPD, chronic hypoxemic respiratory failure on 4L NC O2, ILD, dementia, malnutrition who presented to the ER on  after a fall onto his right side. Was hypoxic requiring NRB. ABG with hypoxia. Rapid COVID negative. CT chest with severe chronic disease/emphysema, slight infiltrate vs atelectasis posterior lung bases, no PE, vascular abnormalities or rib fractures/PTX. He was admitted on Airvo. 24hr Events/Subjective:   : In bed, does not follow commands, in restraints, on Airvo, pulse ox at 100%. Unable to get ROS. Objective:     Patient Vitals for the past 24 hrs:   Temp Pulse Resp BP SpO2   21 0731 98 °F (36.7 °C) 72 20 107/67 97 %   21 0722     100 %   21     99 %   21 1822     99 %   21 1804 98.8 °F (37.1 °C) 77 19 (!) 160/93 96 %   21 1643  80  (!) 145/66 100 %   21 1624     100 %   21 1623  76 18 (!) 145/66 100 %   21 1615  81 24  94 %   21 1603  85 25 (!) 160/75    21 1543  79 19 (!) 144/76 100 %   21 1443  78 16 109/62 100 %   21 1429  78 15 110/64 100 %   21 1359  79 15 118/71 100 %   21 1343  79 14 (!) 140/76 100 %   21 1320     98 %   21 1314  77 24 (!) 151/81 100 %   21 1241  78 (!) 38  98 %   21 1240  77 29 (!) 200/98    21 1236 98.7 °F (37.1 °C) 87 26 (!) 161/86      Oxygen Therapy  O2 Sat (%): 97 % (21 0731)  Pulse via Oximetry: 78 beats per minute (21 1822)  O2 Device: Heated; Hi flow nasal cannula(sub venti mask for nasal cannula) (21 0722)  O2 Flow Rate (L/min): 50 l/min (01/31/21 0722)  O2 Temperature: 87.8 °F (31 °C) (01/30/21 1822)  FIO2 (%): 80 % (01/31/21 0722)    Estimated body mass index is 18.7 kg/m² as calculated from the following:    Height as of this encounter: 5' 8\" (1.727 m). Weight as of this encounter: 55.8 kg (123 lb). No intake or output data in the 24 hours ending 01/31/21 9042    *Note that automatically entered I/Os may not be accurate; dependent on patient compliance with collection and accurate  by techs. General:    Cachectic, does not cooperate with exam or follow any commands. Chronically ill appearing. CV:   RRR. No m/r/g. No edema. No JVD  Lungs:   Decreased throughout. On Airvo 50L. Abdomen:   Soft, nontender, nondistended. Extremities: Warm and dry. No cyanosis. Skin:     No rashes. Normal coloration  Neuro:  Unable to fully assess. Data Reviewed:  I have reviewed all labs, meds, and studies from the last 24 hours:  Recent Results (from the past 24 hour(s))   CBC WITH AUTOMATED DIFF    Collection Time: 01/30/21 12:41 PM   Result Value Ref Range    WBC 11.9 (H) 4.3 - 11.1 K/uL    RBC 4.37 4.23 - 5.6 M/uL    HGB 13.8 13.6 - 17.2 g/dL    HCT 43.5 41.1 - 50.3 %    MCV 99.5 (H) 79.6 - 97.8 FL    MCH 31.6 26.1 - 32.9 PG    MCHC 31.7 31.4 - 35.0 g/dL    RDW 13.5 11.9 - 14.6 %    PLATELET 986 138 - 341 K/uL    MPV 9.6 9.4 - 12.3 FL    ABSOLUTE NRBC 0.00 0.0 - 0.2 K/uL    DF AUTOMATED      NEUTROPHILS 71 43 - 78 %    LYMPHOCYTES 16 13 - 44 %    MONOCYTES 10 4.0 - 12.0 %    EOSINOPHILS 2 0.5 - 7.8 %    BASOPHILS 0 0.0 - 2.0 %    IMMATURE GRANULOCYTES 1 0.0 - 5.0 %    ABS. NEUTROPHILS 8.5 (H) 1.7 - 8.2 K/UL    ABS. LYMPHOCYTES 1.9 0.5 - 4.6 K/UL    ABS. MONOCYTES 1.1 0.1 - 1.3 K/UL    ABS. EOSINOPHILS 0.3 0.0 - 0.8 K/UL    ABS. BASOPHILS 0.0 0.0 - 0.2 K/UL    ABS. IMM.  GRANS. 0.1 0.0 - 0.5 K/UL   METABOLIC PANEL, COMPREHENSIVE    Collection Time: 01/30/21 12:41 PM   Result Value Ref Range    Sodium 136 136 - 145 mmol/L    Potassium 4.4 3.5 - 5.1 mmol/L    Chloride 104 98 - 107 mmol/L    CO2 27 21 - 32 mmol/L    Anion gap 5 (L) 7 - 16 mmol/L    Glucose 92 65 - 100 mg/dL    BUN 24 (H) 8 - 23 MG/DL    Creatinine 0.85 0.8 - 1.5 MG/DL    GFR est AA >60 >60 ml/min/1.73m2    GFR est non-AA >60 >60 ml/min/1.73m2    Calcium 9.1 8.3 - 10.4 MG/DL    Bilirubin, total 1.1 0.2 - 1.1 MG/DL    ALT (SGPT) 23 12 - 65 U/L    AST (SGOT) 31 15 - 37 U/L    Alk. phosphatase 116 50 - 136 U/L    Protein, total 7.3 6.3 - 8.2 g/dL    Albumin 3.4 3.2 - 4.6 g/dL    Globulin 3.9 (H) 2.3 - 3.5 g/dL    A-G Ratio 0.9 (L) 1.2 - 3.5     LACTIC ACID    Collection Time: 01/30/21 12:41 PM   Result Value Ref Range    Lactic acid 2.4 (H) 0.4 - 2.0 MMOL/L   NT-PRO BNP    Collection Time: 01/30/21 12:41 PM   Result Value Ref Range    NT pro- (H) <450 PG/ML   PROCALCITONIN    Collection Time: 01/30/21 12:41 PM   Result Value Ref Range    Procalcitonin 0.10 ng/mL   EKG, 12 LEAD, INITIAL    Collection Time: 01/30/21 12:46 PM   Result Value Ref Range    Ventricular Rate 76 BPM    Atrial Rate 76 BPM    P-R Interval 168 ms    QRS Duration 78 ms    Q-T Interval 362 ms    QTC Calculation (Bezet) 407 ms    Calculated P Axis 56 degrees    Calculated R Axis -20 degrees    Calculated T Axis 79 degrees    Diagnosis       !! AGE AND GENDER SPECIFIC ECG ANALYSIS !!   Sinus rhythm with occasional Premature ventricular complexes  Anteroseptal infarct (cited on or before 01-FEB-2018)  Abnormal ECG  When compared with ECG of 05-JAN-2020 17:37,  Fusion complexes are no longer Present  Premature ventricular complexes are now Present  ST now depressed in Anterior leads  Confirmed by Gypsy Smith ([id number]) on 1/30/2021 2:16:24 PM     POC G3    Collection Time: 01/30/21  1:00 PM   Result Value Ref Range    Device: NASAL CANNULA      pH (POC) 7.48 (H) 7.35 - 7.45      pCO2 (POC) 26.3 (L) 35 - 45 MMHG    pO2 (POC) 42 (LL) 75 - 100 MMHG    HCO3 (POC) 19.7 (L) 22 - 26 MMOL/L    sO2 (POC) 82 (L) 95 - 98 %    Base deficit (POC) 2 mmol/L    Allens test (POC) YES      Site LEFT RADIAL      Specimen type (POC) ARTERIAL      Performed by Edward     CO2, POC 20 MMOL/L    Flow rate (POC) 6.000 L/min    COLLECT TIME 1,255     SARS-COV-2    Collection Time: 01/30/21  1:18 PM   Result Value Ref Range    SARS-CoV-2 Please find results under separate order     COVID-19 RAPID TEST    Collection Time: 01/30/21  1:18 PM   Result Value Ref Range    Specimen source Nasopharyngeal      COVID-19 rapid test Not detected NOTD     CULTURE, BLOOD    Collection Time: 01/30/21  1:34 PM    Specimen: Blood   Result Value Ref Range    Special Requests: RIGHT  FOREARM        Culture result: NO GROWTH AFTER 17 HOURS     CULTURE, BLOOD    Collection Time: 01/30/21  1:34 PM    Specimen: Blood   Result Value Ref Range    Special Requests: LEFT  HAND        Culture result: NO GROWTH AFTER 17 HOURS     POC G3    Collection Time: 01/30/21  4:06 PM   Result Value Ref Range    Device: NASAL CANNULA      pH (POC) 7.40 7.35 - 7.45      pCO2 (POC) 35.9 35 - 45 MMHG    pO2 (POC) 38 (LL) 75 - 100 MMHG    HCO3 (POC) 22.0 22 - 26 MMOL/L    sO2 (POC) 73 (L) 95 - 98 %    Base deficit (POC) 2 mmol/L    Allens test (POC) YES      Site LEFT RADIAL      Specimen type (POC) ARTERIAL      Performed by Brandyn     CO2, POC 23 MMOL/L    Flow rate (POC) 6.000 L/min    Critical value read back 00:01     Respiratory comment: PhysicianNotified     COLLECT TIME 1,603         Current Meds:  Current Facility-Administered Medications   Medication Dose Route Frequency    albuterol-ipratropium (DUO-NEB) 2.5 MG-0.5 MG/3 ML  3 mL Nebulization Q4H PRN    aspirin delayed-release tablet 81 mg  81 mg Oral DAILY    DULoxetine (CYMBALTA) capsule 30 mg  30 mg Oral BID    finasteride (PROSCAR) tablet 5 mg  5 mg Oral DAILY    budesonide-formoteroL (SYMBICORT) 160-4.5 mcg/actuation HFA inhaler 2 Puff  2 Puff Inhalation BID    guaiFENesin ER (MUCINEX) tablet 1,200 mg  1,200 mg Oral DAILY    magnesium oxide (MAG-OX) tablet 400 mg  400 mg Oral DAILY    montelukast (SINGULAIR) tablet 10 mg  10 mg Oral QHS    sodium chloride (NS) flush 5-40 mL  5-40 mL IntraVENous Q8H    sodium chloride (NS) flush 5-40 mL  5-40 mL IntraVENous PRN    potassium chloride 10 mEq in 100 ml IVPB  10 mEq IntraVENous PRN    magnesium sulfate 2 g/50 ml IVPB (premix or compounded)  2 g IntraVENous PRN    acetaminophen (TYLENOL) tablet 650 mg  650 mg Oral Q6H PRN    Or    acetaminophen (TYLENOL) suppository 650 mg  650 mg Rectal Q6H PRN    polyethylene glycol (MIRALAX) packet 17 g  17 g Oral DAILY PRN    bisacodyL (DULCOLAX) suppository 10 mg  10 mg Rectal DAILY PRN    promethazine (PHENERGAN) tablet 25 mg  25 mg Oral Q6H PRN    Or    ondansetron (ZOFRAN) injection 4 mg  4 mg IntraVENous Q6H PRN    famotidine (PEPCID) tablet 20 mg  20 mg Oral DAILY    enoxaparin (LOVENOX) injection 40 mg  40 mg SubCUTAneous DAILY    levoFLOXacin (LEVAQUIN) tablet 750 mg  750 mg Oral Q48H    tuberculin injection 5 Units  5 Units IntraDERMal ONCE    LORazepam (ATIVAN) tablet 0.5 mg  0.5 mg Oral ONCE    nystatin (MYCOSTATIN) 100,000 unit/gram powder   Topical BID       Other Studies:  No results found for this visit on 01/30/21. Xr Hip Rt W Or Wo Pelv 2-3 Vws    Result Date: 1/30/2021  Right hip, right femur radiographs HISTORY: Pain after fall. Right hip: 3 views were obtained. No prior studies are available for comparison. FINDINGS: There is no evidence of acute fracture or dislocation. The joint space is well-preserved. There is no bony destruction. There is diffuse osteopenia. A left trochanteric fixation nail is present. No evidence of acute bony abnormality. Right femur: AP and lateral views were obtained. No prior studies are available for comparison. FINDINGS: There is no evidence of acute fracture or dislocation. There is diffuse osteopenia.  Atherosclerotic changes are present. The soft tissues are unremarkable. There is no bony destruction. IMPRESSION: No evidence of acute bony abnormality. Xr Femur Rt 2 Vs    Result Date: 1/30/2021  Right hip, right femur radiographs HISTORY: Pain after fall. Right hip: 3 views were obtained. No prior studies are available for comparison. FINDINGS: There is no evidence of acute fracture or dislocation. The joint space is well-preserved. There is no bony destruction. There is diffuse osteopenia. A left trochanteric fixation nail is present. No evidence of acute bony abnormality. Right femur: AP and lateral views were obtained. No prior studies are available for comparison. FINDINGS: There is no evidence of acute fracture or dislocation. There is diffuse osteopenia. Atherosclerotic changes are present. The soft tissues are unremarkable. There is no bony destruction. IMPRESSION: No evidence of acute bony abnormality. Ct Chest W Cont    Result Date: 1/30/2021  CHEST CT INDICATION: Shortness of breath and right chest pain, recent fall Multiple axial images were obtained through the chest during intravenous infusion of 100mL of Isovue 370. Coronal reformats were also evaluated. Radiation dose reduction techniques were used for this study: All CT scans performed at this facility use one or all of the following: Automated exposure control, adjustment of the mA and/or kVp according to patient's size, iterative reconstruction. COMPARISON: 11/25/2020 FINDINGS: -LUNGS: Severe chronic lung disease. Extensive bullous change and interstitial prominence. Slight increased hazy infiltrate/atelectasis in the posterior lung bases. Tiny right pleural effusion is also present. -HEART/VESSELS: Exam is limited by significant respiratory motion, especially in the lung bases. There is no definite pulmonary embolus. There is moderate atherosclerosis. No evidence of aortic dissection or aneurysm. There is aortic valve calcification.   Heart size is normal. -MEDIASTINUM/AXILLA: No significant adenopathy. -CHEST WALL/BONES: Stable T7 and T12 compression fractures. No acute fractures are identified. -UPPER ABDOMEN: No acute findings. 1.  No evidence of pulmonary embolus. 2.  Severe emphysema. Minimal bibasilar infiltrate. ** If there are any questions about this report, I can be reached on [employer] or at [phone] **    Xr Chest Port    Result Date: 1/30/2021  Chest X-ray INDICATION: Shortness of breath, possible COVID-19 A portable AP view of the chest was obtained. FINDINGS: There is chronic interstitial lung disease with hazy background density which could represent persistent infiltrate. .  There is stable cardiomegaly. The bony thorax is intact. Extensive chronic lung disease. Possible residual background infiltrate. All Micro Results     Procedure Component Value Units Date/Time    CULTURE, BLOOD [[id number]] Collected: 01/30/21 1334    Order Status: Completed Specimen: Blood Updated: 01/31/21 0743     Special Requests: --        LEFT  HAND       Culture result: NO GROWTH AFTER 17 HOURS       CULTURE, BLOOD [[id number]] Collected: 01/30/21 1334    Order Status: Completed Specimen: Blood Updated: 01/31/21 0743     Special Requests: --        RIGHT  FOREARM       Culture result: NO GROWTH AFTER 17 HOURS       CULTURE, URINE [[id number]]     Order Status: Sent Specimen: Cath Urine     COVID-19 RAPID TEST [[id number]] Collected: 01/30/21 1318    Order Status: Completed Specimen: Nasopharyngeal Updated: 01/30/21 1418     Specimen source Nasopharyngeal        COVID-19 rapid test Not detected        Comment:      The specimen is NEGATIVE for SARS-CoV-2, the novel coronavirus associated with COVID-19. A negative result does not rule out COVID-19. This test has been authorized by the FDA under an Emergency Use Authorization (EUA) for use by authorized laboratories.         Fact sheet for Healthcare Providers: ConventionUpdate.co.nz  Fact sheet for Patients: ConventionUpdate.co.nz       Methodology: Isothermal Nucleic Acid Amplification               SARS-CoV-2 Lab Results  \"Novel Coronavirus\" Test: No results found for: COV2NT   \"Emergent Disease\" Test: No results found for: EDPR  \"SARS-COV-2\" Test: No results found for: XGCOVT  Rapid Test:   Lab Results   Component Value Date/Time    COVR Not detected 01/30/2021 01:18 PM            Assessment and Plan:     Hospital Problems as of 1/31/2021 Date Reviewed: 12/17/2020          Codes Class Noted - Resolved POA    Dementia ([facility] [address] (Chronic) ICD-10-CM: F03.90  ICD-9-CM: 294.20  1/30/2021 - Present Yes        Severe protein-calorie malnutrition ([facility] [address] (Chronic) ICD-10-CM: B42  ICD-9-CM: 262  11/3/2019 - Present Yes        DNR (do not resuscitate) (Chronic) ICD-10-CM: Z66  ICD-9-CM: V49.86  11/2/2019 - Present Yes        * (Principal) Acute on chronic respiratory failure with hypoxia ([facility] [address] ICD-10-CM: J96.21  ICD-9-CM: 518.84, 799.02  5/6/2019 - Present Yes        Chronic respiratory failure with hypoxia ([facility] [address] (Chronic) ICD-10-CM: J96.11  ICD-9-CM: 518.83, 799.02  3/29/2016 - Present Yes    Overview Addendum 1/30/2021  5:25 PM by Jelly Wasserman MD     4+L chronically             BPH (benign prostatic hyperplasia) (Chronic) ICD-10-CM: N40.0  ICD-9-CM: 600.00  12/10/2015 - Present Yes        COPD (chronic obstructive pulmonary disease) ([facility] [address] (Chronic) ICD-10-CM: J44.9  ICD-9-CM: 048  12/10/2015 - Present Yes        Essential hypertension, benign (Chronic) ICD-10-CM: I10  ICD-9-CM: 401.1  12/10/2015 - Present Yes              Plan:  # Acute on chronic hypoxemic respiratory failure   - Infectious vs inflammatory in setting of severe COPD. Empiric abx. Add IV steroids in case of COPD component. Wean O2 as able (baseline 4L O2). Rapid COVID negative. # Adult failure to thrive   - Dietary consult. # Severe COPD   - As above.      Other listed chronic conditions stable, continue current management. Addendum: I spoke to the patient's wife and provided her with an update on the patient's status. I expressed concern over the patient's frailty, failure to thrive and chronic health issues. He is listed as full code per his NH papers. I think any aggressive resuscitation poses more harm than good in him. Fortunately I think he is improving as O2 needs have been weaned today. His wife spoke to the son who called back and felt it appropriate to change his code status to DNR. I will change this in EMR. DC planning/Dispo: NH paperwork reviewed, listed as full code. Attempted to call son, Yair Ferro, but no answer. Need to discuss goals/boundaries of care. Diet:  DIET REGULAR  DIET NPO  DVT ppx: Lovenox.     Signed:  Vernetta Moritz, MD

## 2021-01-31 NOTE — PROGRESS NOTES
SBAR received from [employer]. Pt was combative and continues to remove his O2 he was D-sat to 58%  he was placed on soft wrist restraints and airvo in place 40/100 O2 sat is 95% he is stable at current time. Dual skin assessment done with [employer].

## 2021-01-31 NOTE — PROGRESS NOTES
Pt resting in bed quietly this morning. SaO2 100 on Airvo 50L/70% using mask instead of nasal cannula. Pt is oriented x3 this morning, states he lives at [facility], knows he is in [facility] because \"I fell. \" 4 pt restraints changed to 2pt wrist restraints at this time. Pt remains NPO pending SLP eval due to high risk of aspiration. Rojas catheter in place and patent. UA sent to lab.

## 2021-01-31 NOTE — PROGRESS NOTES
ACUTE PHYSICAL THERAPY GOALS:  (Developed with and agreed upon by patient and/or caregiver. )    LTG:  (1.)Mr. Alli Watkins will move from supine to sit and sit to supine , scoot up and down, and roll side to side in bed with MINIMAL ASSIST within 7 treatment day(s). (2.)Mr. Alli Watkins will transfer from bed to chair and chair to bed with MINIMAL ASSIST using the least restrictive device within 7 treatment day(s). (3.)Mr. Alli Watkins will ambulate with MINIMAL ASSIST for 25+ feet with the least restrictive device within 7 treatment day(s). (4.)Mr. Alli Watkins will perform sit-stand x3  SUPERVISION without using UEs under 20 seconds within 7 treatment day(s). ________________________________________________________________________________________________        PHYSICAL THERAPY ASSESSMENT: Initial Assessment and PM PT Treatment Day # 1      Romario Christine is a 80 y.o. male   PRIMARY DIAGNOSIS: Acute on chronic respiratory failure with hypoxia (HCC)  Acute on chronic respiratory failure with hypoxia (HCC) [J96.21]       Reason for Referral:    ICD-10: Treatment Diagnosis: Generalized Muscle Weakness (M62.81)  Difficulty in walking, Not elsewhere classified (R26.2)  [place]PAT[place] / [place] / Product Type: Medicare /     ASSESSMENT:     REHAB RECOMMENDATIONS:   Recommendation to date pending progress:  Setting:   Short-term Rehab  Equipment:    None     PRIOR LEVEL OF FUNCTION:  (Prior to Hospitalization) INITIAL/CURRENT LEVEL OF FUNCTION:  (Most Recently Demonstrated)   Bed Mobility:   Independent  Sit to Stand:   Independent  Transfers:   Independent  Gait/Mobility:   Modified Independent Bed Mobility:   Moderate Assistance  Sit to Stand:   Not tested  Transfers:   Moderate Assistance  Gait/Mobility:   Not tested     ASSESSMENT:  Mr. Alli Watkins supine asleep at arrival. Pt reports he feels OK, but understands he is ill. He required modA to EOB and holding breath.  He sat x10min with sat level dropping to 70% at times with ave sat level in sitting mid 70s. Pt could not recover in sitting and returned to supine. Pt is depressed and states he misses his wife. Pt will benefit from skilled and sophisticated PT to help improve impairments. SUBJECTIVE:   Mr. Sandra Ndiaye states, \"did I miss the superbowl? .\"    SOCIAL HISTORY/LIVING ENVIRONMENT: pt states he lives at home, then corrected self stating he lives at [facility] and uses RW. He says he is IND with ADLs.       OBJECTIVE:     PAIN: VITAL SIGNS: LINES/DRAINS:   Pre Treatment: Pain Screen  Pain Scale 1: Numeric (0 - 10)  Pain Intensity 1: 0  Post Treatment:    40L, 45%  O2 Device: Hi flow nasal cannula, Heated     GROSS EVALUATION:   Within Functional Limits Abnormal/ Functional Abnormal/ Non-Functional (see comments) Not Tested Comments:   AROM [x] [] [] []    PROM [] [] [] []    Strength [x] [] [] []    Balance [x] [] [] []    Posture [x] [] [] []    Sensation [x] [] [] []    Coordination [x] [] [] []    Tone [] [] [] []    Edema [] [] [] []    Activity Tolerance [] [x] [] []     [] [] [] []      COGNITION/  PERCEPTION: Intact Impaired   (see comments) Comments:   Orientation [x] []    Vision [x] []    Hearing [] [x]    Command Following [x] []    Safety Awareness [] [x]     [] []      MOBILITY: I Mod I S SBA CGA Min Mod Max Total  NT x2 Comments:   Bed Mobility    Rolling [] [] [] [] [] [] [x] [] [] [] []    Supine to Sit [] [] [] [] [] [] [x] [] [] [] []    Scooting [] [] [] [] [] [] [x] [] [] [] []    Sit to Supine [] [] [] [] [] [x] [] [] [] [] []    Transfers    Sit to Stand [] [] [] [] [] [] [] [] [] [x] []    Bed to Chair [] [] [] [] [] [] [] [] [] [x] []    Stand to Sit [] [] [] [] [] [] [] [] [] [x] []    I=Independent, Mod I=Modified Independent, S=Supervision, SBA=Standby Assistance, CGA=Contact Guard Assistance,   Min=Minimal Assistance, Mod=Moderate Assistance, Max=Maximal Assistance, Total=Total Assistance, NT=Not Tested  GAIT: I Mod I S SBA CGA Min Mod Max Total  NT x2 Comments:   Level of Assistance [] [] [] [] [] [] [] [] [] [x] []    Distance      DME None    Gait Quality      Weightbearing Status N/A     I=Independent, Mod I=Modified Independent, S=Supervision, SBA=Standby Assistance, CGA=Contact Guard Assistance,   Min=Minimal Assistance, Mod=Moderate Assistance, Max=Maximal Assistance, Total=Total Assistance, NT=Not Tested    [address] [facility] [address] AM-PAC [facility] Form       How much difficulty does the patient currently have. .. Unable A Lot A Little None   1. Turning over in bed (including adjusting bedclothes, sheets and blankets)? [] 1   [x] 2   [] 3   [] 4   2. Sitting down on and standing up from a chair with arms ( e.g., wheelchair, bedside commode, etc.)   [] 1   [x] 2   [] 3   [] 4   3. Moving from lying on back to sitting on the side of the bed? [] 1   [x] 2   [] 3   [] 4   How much help from another person does the patient currently need. .. Total A Lot A Little None   4. Moving to and from a bed to a chair (including a wheelchair)? [] 1   [x] 2   [] 3   [] 4   5. Need to walk in hospital room? [] 1   [x] 2   [] 3   [] 4   6. Climbing 3-5 steps with a railing? [] 1   [x] 2   [] 3   [] 4   © 2007, Trustees of [address] [place] [id number], under license to [employer]. All rights reserved     Score:  Initial: 12 Most Recent: X (Date: -- )    Interpretation of Tool:  Represents activities that are increasingly more difficult (i.e. Bed mobility, Transfers, Gait). PLAN:   FREQUENCY/DURATION: PT Plan of Care: 3 times/week for duration of hospital stay or until stated goals are met, whichever comes first.    PROBLEM LIST:   (Skilled intervention is medically necessary to address:)  1. Decreased ADL/Functional Activities  2. Decreased Activity Tolerance  3. Decreased AROM/PROM  4. Decreased Balance  5. Decreased Cognition  6. Decreased Coordination  7. Decreased Gait Ability  8.  Decreased Strength  9. Decreased Transfer Abilities   INTERVENTIONS PLANNED:   (Benefits and precautions of physical therapy have been discussed with the patient.)  1. Therapeutic Activity  2. Therapeutic Exercise/HEP  3. Neuromuscular Re-education  4. Gait Training  5. Education     TREATMENT:     EVALUATION: High Complexity : (Untimed Charge)    TREATMENT:   ($$ Therapeutic Activity: 8-22 mins    )  Therapeutic Activity (11 Minutes): Therapeutic activity included Rolling, Supine to Sit, Sit to Supine, Scooting and Sitting balance  to improve functional Mobility, Strength, ROM and Activity tolerance.     AFTER TREATMENT POSITION/PRECAUTIONS:  Bed, Needs within reach and RN notified    INTERDISCIPLINARY COLLABORATION:  RN/PCT and PT/PTA    TOTAL TREATMENT DURATION:  PT Patient Time In/Time Out  Time In: 1255  Time Out: [address], DPT

## 2021-02-01 LAB
ANION GAP SERPL CALC-SCNC: 5 MMOL/L (ref 7–16)
BASOPHILS # BLD: 0 K/UL (ref 0–0.2)
BASOPHILS NFR BLD: 0 % (ref 0–2)
BUN SERPL-MCNC: 28 MG/DL (ref 8–23)
CALCIUM SERPL-MCNC: 8.4 MG/DL (ref 8.3–10.4)
CHLORIDE SERPL-SCNC: 107 MMOL/L (ref 98–107)
CO2 SERPL-SCNC: 28 MMOL/L (ref 21–32)
CREAT SERPL-MCNC: 0.7 MG/DL (ref 0.8–1.5)
DIFFERENTIAL METHOD BLD: ABNORMAL
EOSINOPHIL # BLD: 0 K/UL (ref 0–0.8)
EOSINOPHIL NFR BLD: 0 % (ref 0.5–7.8)
ERYTHROCYTE [DISTWIDTH] IN BLOOD BY AUTOMATED COUNT: 13.4 % (ref 11.9–14.6)
GLUCOSE SERPL-MCNC: 121 MG/DL (ref 65–100)
HCT VFR BLD AUTO: 36.7 % (ref 41.1–50.3)
HGB BLD-MCNC: 11.2 G/DL (ref 13.6–17.2)
IMM GRANULOCYTES # BLD AUTO: 0.1 K/UL (ref 0–0.5)
IMM GRANULOCYTES NFR BLD AUTO: 1 % (ref 0–5)
LYMPHOCYTES # BLD: 1.7 K/UL (ref 0.5–4.6)
LYMPHOCYTES NFR BLD: 18 % (ref 13–44)
MCH RBC QN AUTO: 31.4 PG (ref 26.1–32.9)
MCHC RBC AUTO-ENTMCNC: 30.5 G/DL (ref 31.4–35)
MCV RBC AUTO: 102.8 FL (ref 79.6–97.8)
MM INDURATION POC: 0 MM (ref 0–5)
MONOCYTES # BLD: 1.1 K/UL (ref 0.1–1.3)
MONOCYTES NFR BLD: 12 % (ref 4–12)
NEUTS SEG # BLD: 6.6 K/UL (ref 1.7–8.2)
NEUTS SEG NFR BLD: 70 % (ref 43–78)
NRBC # BLD: 0 K/UL (ref 0–0.2)
PLATELET # BLD AUTO: 236 K/UL (ref 150–450)
PMV BLD AUTO: 9.7 FL (ref 9.4–12.3)
POTASSIUM SERPL-SCNC: 4.1 MMOL/L (ref 3.5–5.1)
PPD POC: NEGATIVE NEGATIVE
RBC # BLD AUTO: 3.57 M/UL (ref 4.23–5.6)
SODIUM SERPL-SCNC: 140 MMOL/L (ref 136–145)
WBC # BLD AUTO: 9.6 K/UL (ref 4.3–11.1)

## 2021-02-01 PROCEDURE — 94762 N-INVAS EAR/PLS OXIMTRY CONT: CPT

## 2021-02-01 PROCEDURE — 80048 BASIC METABOLIC PNL TOTAL CA: CPT

## 2021-02-01 PROCEDURE — 74011250636 HC RX REV CODE- 250/636: Performed by: INTERNAL MEDICINE

## 2021-02-01 PROCEDURE — 77010033711 HC HIGH FLOW OXYGEN

## 2021-02-01 PROCEDURE — 36415 COLL VENOUS BLD VENIPUNCTURE: CPT

## 2021-02-01 PROCEDURE — 97530 THERAPEUTIC ACTIVITIES: CPT

## 2021-02-01 PROCEDURE — 94640 AIRWAY INHALATION TREATMENT: CPT

## 2021-02-01 PROCEDURE — 2709999900 HC NON-CHARGEABLE SUPPLY

## 2021-02-01 PROCEDURE — 74011250637 HC RX REV CODE- 250/637: Performed by: INTERNAL MEDICINE

## 2021-02-01 PROCEDURE — 97166 OT EVAL MOD COMPLEX 45 MIN: CPT

## 2021-02-01 PROCEDURE — 77030040831 HC BAG URINE DRNG MDII -A

## 2021-02-01 PROCEDURE — 85025 COMPLETE CBC W/AUTO DIFF WBC: CPT

## 2021-02-01 PROCEDURE — 92610 EVALUATE SWALLOWING FUNCTION: CPT

## 2021-02-01 PROCEDURE — 74011250637 HC RX REV CODE- 250/637: Performed by: FAMILY MEDICINE

## 2021-02-01 PROCEDURE — 65270000029 HC RM PRIVATE

## 2021-02-01 RX ADMIN — MONTELUKAST SODIUM 10 MG: 10 TABLET, FILM COATED ORAL at 21:13

## 2021-02-01 RX ADMIN — FINASTERIDE 5 MG: 5 TABLET, FILM COATED ORAL at 08:50

## 2021-02-01 RX ADMIN — DULOXETINE HYDROCHLORIDE 30 MG: 30 CAPSULE, DELAYED RELEASE ORAL at 08:50

## 2021-02-01 RX ADMIN — GUAIFENESIN 1200 MG: 600 TABLET ORAL at 08:49

## 2021-02-01 RX ADMIN — METHYLPREDNISOLONE SODIUM SUCCINATE 40 MG: 40 INJECTION, POWDER, FOR SOLUTION INTRAMUSCULAR; INTRAVENOUS at 21:13

## 2021-02-01 RX ADMIN — DULOXETINE HYDROCHLORIDE 30 MG: 30 CAPSULE, DELAYED RELEASE ORAL at 16:27

## 2021-02-01 RX ADMIN — FAMOTIDINE 20 MG: 20 TABLET, FILM COATED ORAL at 08:51

## 2021-02-01 RX ADMIN — ENOXAPARIN SODIUM 40 MG: 40 INJECTION SUBCUTANEOUS at 08:51

## 2021-02-01 RX ADMIN — NYSTATIN: 100000 POWDER TOPICAL at 17:00

## 2021-02-01 RX ADMIN — Medication 400 MG: at 08:49

## 2021-02-01 RX ADMIN — NYSTATIN: 100000 POWDER TOPICAL at 08:51

## 2021-02-01 RX ADMIN — BUDESONIDE AND FORMOTEROL FUMARATE DIHYDRATE 2 PUFF: 160; 4.5 AEROSOL RESPIRATORY (INHALATION) at 19:52

## 2021-02-01 RX ADMIN — Medication 10 ML: at 05:39

## 2021-02-01 RX ADMIN — METHYLPREDNISOLONE SODIUM SUCCINATE 40 MG: 40 INJECTION, POWDER, FOR SOLUTION INTRAMUSCULAR; INTRAVENOUS at 08:49

## 2021-02-01 RX ADMIN — Medication 5 ML: at 11:52

## 2021-02-01 RX ADMIN — Medication 10 ML: at 21:14

## 2021-02-01 RX ADMIN — LEVOFLOXACIN 750 MG: 500 TABLET, FILM COATED ORAL at 16:27

## 2021-02-01 RX ADMIN — BUDESONIDE AND FORMOTEROL FUMARATE DIHYDRATE 2 PUFF: 160; 4.5 AEROSOL RESPIRATORY (INHALATION) at 07:27

## 2021-02-01 RX ADMIN — ASPIRIN 81 MG: 81 TABLET ORAL at 08:50

## 2021-02-01 NOTE — PROGRESS NOTES
Pt is known to CM from previous care. He is a pvt pay  long term care resident at [facility] and his bed is held for his return. Will follow pt plan of care and assist with his return to his SNF as D/C orders received. WIll update spouse Any Grant [phone] and/or son Patti Perera [phone] as plan of care develops. Care Management Interventions  PCP Verified by CM: (MD at [facility])  Mode of Transport at Discharge: Domitila Padilla)  Transition of Care Consult (CM Consult): Discharge Planning, SNF(Pt is a long term care resident at [facility].   Pt is covered by medicare and a supplement insurance.  )  Partner SNF: Yes  Discharge Durable Medical Equipment: No(DME provided by SNF)  Physical Therapy Consult: Yes  Occupational Therapy Consult: Yes  Speech Therapy Consult: Yes  Current Support Network: Nursing Facility(Pt is a LTC resident at [facility].  )  Confirm Follow Up Transport: Other (see comment)(SNF will coordinate any needed transport)  Name of the Patient Representative Who was Provided with a Choice of Provider and Agrees with the Discharge Plan: spouse/son   Margarita Provided?: No  Discharge Location  Discharge Placement: Skilled nursing facility(Pt will return to [facility] for continued care at [facility].)

## 2021-02-01 NOTE — PROGRESS NOTES
TRANSFER - OUT REPORT:    Verbal report given to Melly(name) on Eron Toth  being transferred to [facility](unit) for routine progression of care       Report consisted of patients Situation, Background, Assessment and   Recommendations(SBAR). Information from the following report(s) SBAR was reviewed with the receiving nurse. Lines:   Peripheral IV Left Antecubital (Active)   Site Assessment Clean, dry, & intact 01/31/21 1945   Phlebitis Assessment 0 01/31/21 1945   Infiltration Assessment 0 01/31/21 1945   Dressing Status Clean, dry, & intact 01/31/21 1945   Dressing Type Transparent 01/31/21 1945   Hub Color/Line Status Patent 01/31/21 1945   Alcohol Cap Used No 01/30/21 2300       Peripheral IV 01/30/21 Right Forearm (Active)   Site Assessment Clean, dry, & intact 01/31/21 1945   Phlebitis Assessment 0 01/31/21 1945   Infiltration Assessment 0 01/31/21 1945   Dressing Status Clean, dry, & intact 01/31/21 1945   Dressing Type Transparent 01/31/21 1945   Hub Color/Line Status Patent 01/31/21 1945   Action Taken Blood drawn 01/30/21 1342   Alcohol Cap Used No 01/30/21 2300        Opportunity for questions and clarification was provided.       Patient transported with:   O2 @ AIRVO 50L/70% liters

## 2021-02-01 NOTE — PROGRESS NOTES
02/01/21 0135   Dual Skin Pressure Injury Assessment   Dual Skin Pressure Injury Assessment X   Second Care Provider (Based on [address] [place] [address]) Ike Skinner RN   Elbows Right   Skin Integumentary   Skin Integumentary (WDL) X    Pressure  Injury Documentation No Pressure Injury Noted-Pressure Ulcer Prevention Initiated   Skin Color Pale;Ecchymosis (comment)   Skin Condition/Temp Cool;Fragile;Dry   Skin Integrity Abrasion  (right elbow and right knee)   Turgor Epidermis thin w/ loss of subcut tissue   Wound Prevention and Protection Methods   Orientation of Wound Prevention Posterior   Location of Wound Prevention Sacrum/Coccyx   Dressing Present  Yes   Dressing Status Intact   Wound Offloading (Prevention Methods) Bed, pressure redistribution/air;Bed, pressure reduction mattress;Pillows;Repositioning;Turning   Brant Scale (11years of age and older)   Sensory Perception 3   Moisture 3   Activity 1   Mobility 3   Nutrition 1   Friction and Shear 2   Brant Score 13       Primary Nurse George Lyons RN and Ike Skinner., RN performed a dual skin assessment on this patient Impairment noted- see wound doc flow sheet  Brant score is 14

## 2021-02-01 NOTE — PROGRESS NOTES
ACUTE OT GOALS:  (Developed with and agreed upon by patient and/or caregiver.)  1. Patient will complete upper body bathing and dressing with SBA and adaptive equipment as needed. 2. Patient will complete rolling side to side for hygiene/positioning with supervision. 3. Patient will tolerate 23 minutes of OT treatment with 2-3 rest breaks to increase activity tolerance for ADLs. 4. Patient will complete sitting to the edge of the bed with minimal assistance to tolerance upright sitting for ADL. 5. Patient will complete therapeutic exercises for 8 minutes to improve strength for ADL/functional transfers. 6. Patient will complete functional transfers to the chair/BSC within 3 visits to demonstrate advancement with transfers for ADL.     Timeframe: 7 visits       OCCUPATIONAL THERAPY ASSESSMENT: Initial Assessment and Daily Note OT Treatment Day # 1    Cuate Cameron is a 80 y.o. male   PRIMARY DIAGNOSIS: Acute on chronic respiratory failure with hypoxia (HCC)  Acute on chronic respiratory failure with hypoxia (HCC) [J96.21]        Reason for Referral:    ICD-10: Treatment Diagnosis: Pain in Right Hip (M25.551)  Stiffness of Right Hip, Not elsewhere classified (M25.651)  Generalized Muscle Weakness (M62.81)  History of falling (Z91.81)  INPATIENT: [place] / [place] / Product Type: Medicare /   ASSESSMENT:     REHAB RECOMMENDATIONS:   Recommendation to date pending progress:  Setting:   Short-term Rehab  Equipment:    To Be Determined     PRIOR LEVEL OF FUNCTION:  (Prior to Hospitalization)  INITIAL/CURRENT LEVEL OF FUNCTION:  (Based on today's evaluation)   Bathing:   Unknown  Dressing:   Unknown  Feeding/Grooming:   Unknown  Toileting:   Unknown  Functional Mobility:   Unknown Bathing:   Maximal Assistance  Dressing:   Maximal Assistance  Feeding/Grooming:   Minimal Assistance  Toileting:   Total Assistance  Functional Mobility:   Unable to perform ASSESSMENT:  Mr. Tiana Stinson presents to the hospital after falling with R hip/sternum pain. Imaging was negative for a fracture. Pt also respiratory failure with pt currently on Airvo at 96% at rest. Pt is pleasant and cooperative. Pt oriented to person, place, and time but told varying stories of his prior level of functioning. Pt attempted rolling to sit up on the R side of the bed with pt screaming in 10/10 pain with any slight movement of his R hip. O2 levels decreased to 86%  with pain as well. Attempted to have pt roll to the L which he did do better but pt still not able to sit edge of bed due to the pain levels. Pt is currently very limited with ADL/functional transfers. Unsure of prior status. Pt will benefit from OT services to address stated goals and plan of care. Mr. Tiana Stinson states, \"I wish my wife would come and see me here. \"    SOCIAL HISTORY/LIVING ENVIRONMENT: Pt from [facility]. Pt unable to state what level of care he is receiving there. Pt states he was using a walker but then later reports using a manual wheelchair mostly. Pt unclear about ADL status but is sounds like he needs assistance.         OBJECTIVE:     PAIN: VITAL SIGNS: LINES/DRAINS:   Pre Treatment: Pain Screen  Pain Scale 1: Numeric (0 - 10)  Pain Intensity 1: 10  Pain Onset 1: movement/rolling  Pain Location 1: Hip  Pain Orientation 1: Right  Pain Intervention(s) 1: Emotional support;Position  Post Treatment: 0   Rojas Catheter  O2 Device: Hi flow nasal cannula     GROSS EVALUATION:  B UE Within Functional Limits Abnormal/ Functional Abnormal/ Non-Functional (see comments) Not Tested Comments:   AROM [x] [] [] []    PROM [x] [] [] []    Strength [] [x] [] []    Balance [] [] [] [x]    Posture [] [x] [] []    Sensation [x] [] [] []    Coordination [] [x] [] []    Tone [x] [] [] []    Edema [x] [] [] []    Activity Tolerance [] [x] [] [] poor    [] [] [] []      COGNITION/  PERCEPTION: Intact Impaired   (see comments) Comments: Orientation [] [] Oriented to person, place, time   Vision [x] []    Hearing [] [x] Hard of hearing; hears better in R ear   Judgment/ Insight [] [x]    Attention [] [x]    Memory [] [x]    Command Following [] [x]    Emotional Regulation [] [x]     [] []      ACTIVITIES OF DAILY LIVING: I Mod I S SBA CGA Min Mod Max Total NT Comments   BASIC ADLs:              Bathing/ Showering [] [] [] [] [] [] [] [] [] [x]    Toileting [] [] [] [] [] [] [] [] [] [x]    Dressing [] [] [] [] [] [] [] [] [] [x]    Feeding [] [] [] [] [] [] [] [] [] [x]    Grooming [] [] [] [] [] [] [] [] [] [x]    Personal Device Care [] [] [] [] [] [] [] [] [] [x]    Functional Mobility [] [] [] [] [] [] [] [] [] [x]    I=Independent, Mod I=Modified Independent, S=Supervision, SBA=Standby Assistance, CGA=Contact Guard Assistance,   Min=Minimal Assistance, Mod=Moderate Assistance, Max=Maximal Assistance, Total=Total Assistance, NT=Not Tested    MOBILITY: I Mod I S SBA CGA Min Mod Max Total  NT x2 Comments:   Supine to sit [] [] [] [] [] [] [] [] [] [x] []    Sit to supine [] [] [] [] [] [] [] [] [] [x] []    Sit to stand [] [] [] [] [] [] [] [] [] [x] []    Bed to chair [] [] [] [] [] [] [] [] [] [x] []    I=Independent, Mod I=Modified Independent, S=Supervision, SBA=Standby Assistance, CGA=Contact Guard Assistance,   Min=Minimal Assistance, Mod=Moderate Assistance, Max=Maximal Assistance, Total=Total Assistance, NT=Not Tested    [facility] AM-PAC 6 Clicks   Daily Activity Inpatient Short Form        How much help from another person does the patient currently need. .. Total A Lot A Little None   1. Putting on and taking off regular lower body clothing? [x] 1   [] 2   [] 3   [] 4   2. Bathing (including washing, rinsing, drying)? [] 1   [x] 2   [] 3   [] 4   3. Toileting, which includes using toilet, bedpan or urinal?   [x] 1   [] 2   [] 3   [] 4   4. Putting on and taking off regular upper body clothing?    [] 1   [x] 2   [] 3 [] 4   5. Taking care of personal grooming such as brushing teeth? [] 1   [] 2   [x] 3   [] 4   6. Eating meals? [] 1   [] 2   [x] 3   [] 4   © 2007, Trustees of [facility] [employer], under license to [employer]. All rights reserved     Score:  Initial: 12 Most Recent: X (Date: -- )   Interpretation of Tool:  Represents activities that are increasingly more difficult (i.e. Bed mobility, Transfers, Gait). PLAN:   FREQUENCY/DURATION: OT Plan of Care: 3 times/week for duration of hospital stay or until stated goals are met, whichever comes first.    PROBLEM LIST:   (Skilled intervention is medically necessary to address:)  1. Decreased ADL/Functional Activities  2. Decreased Activity Tolerance  3. Decreased AROM/PROM  4. Decreased Balance  5. Decreased Cognition  6. Decreased Coordination  7. Decreased Gait Ability  8. Decreased Strength  9. Decreased Transfer Abilities  10. Increased Pain   INTERVENTIONS PLANNED:   (Benefits and precautions of occupational therapy have been discussed with the patient.)  1. Self Care Training  2. Therapeutic Activity  3. Therapeutic Exercise/HEP  4. Neuromuscular Re-education  5. Education     TREATMENT:     EVALUATION: Moderate Complexity : (Untimed Charge)    TREATMENT:   ( $$ Therapeutic Activity: 8-22 mins   )  Therapeutic Activity (8 Minutes): Therapeutic activity included Rolling and supported sitting in chair position to improve functional Mobility, Strength, ROM and Activity tolerance.     AFTER TREATMENT POSITION/PRECAUTIONS:  Bed, Needs within reach and RN notified    INTERDISCIPLINARY COLLABORATION:  RN/PCT and OT/SOTO    TOTAL TREATMENT DURATION:  OT Patient Time In/Time Out  Time In: 8547  Time Out: 6[address], OT

## 2021-02-01 NOTE — PROGRESS NOTES
Notice that the family has discussions with the staff concerning code status he was changed to a DNR per notes

## 2021-02-01 NOTE — PROGRESS NOTES
Patient found with no restraints on. Patient cooperative and pleasant at this time. Patient is in stable condition with continuous pulse oximetry monitor showing 93%. AIRVO canula in place at 40L/50%. No signs of distress noted at this time. Rojas catheter present. Will continue to monitor.

## 2021-02-01 NOTE — PROGRESS NOTES
SPEECH LANGUAGE PATHOLOGY: DYSPHAGIA- Initial Assessment and Discharge    NAME/AGE/GENDER: Iraida Billy is a 80 y.o. male  DATE: 2/1/2021  PRIMARY DIAGNOSIS: Acute on chronic respiratory failure with [facility] [J96.21]      ICD-10: Treatment Diagnosis: R13.12 Dysphagia, Oropharyngeal Phase    RECOMMENDATIONS   DIET:    continue prescribed diet   PO:  Regular   Liquids:  regular thin    MEDICATIONS: With liquid     ASPIRATION PRECAUTIONS  · Slow rate of intake  · Small bites/sips  · Upright at 90 degrees during meal     COMPENSATORY STRATEGIES/MODIFICATIONS  · None     RECOMMENDATIONS for CONTINUED SPEECH THERAPY:   No further speech therapy indicated at this time. ASSESSMENT   Patient presents with normal oropharyngeal swallow function. Consuming thin liquids, mixed, and solid consistency trials without overt s/sx airway compromise. Recommend continue regular consistency diet/thin liquids. Meds 1 at a time with liquid rinse. Ensure patient takes small bites/sips at slow rate to improve breathe/swallow coordination as patient is at increased risk for aspiration due to respiratory status. CONTINUATION OF SKILLED SERVICES/MEDICAL NECESSITY:   No additional speech services warranted. EDUCATION:  · Recommendations discussed with Patient and RN  REHABILITATION POTENTIAL FOR STATED GOALS: Excellent    PLAN    FREQUENCY/DURATION: No further speech therapy indicated at this time as oropharyngeal swallow function is within normal limits. - Recommendations for next treatment session: No additional speech therapy indicated at this time. SUBJECTIVE   Patient alert upright in bed for session. Friendly and talkative.  Hard of hearing    Oxygen Device: Airvo 50/55%  Pain: Pain Scale 1: Numeric (0 - 10)  Pain Intensity 1: 0    History of Present Injury/Illness: Mr. Oliver Lewis  has a past medical history of Abdominal aortic aneurysm ([facility] (12/10/2015), Abdominal aortic aneurysm without rupture (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), Allergic rhinitis (12/10/2015), Asthma, Benign neoplasm, Benign prostatic hyperplasia (12/10/2015), Bigeminy (3/28/2016), BPH without urinary obstruction, Cardiovascular disease (12/10/2015), Chronic obstructive asthma with exacerbation ([address] (12/10/2015), Closed compression fracture of lumbosacral spine ([address] (12/11/2018), COPD (chronic obstructive pulmonary disease) ([address] (12/10/2015), COPD (chronic obstructive pulmonary disease) with emphysema ([address] (10/17/2016), COPD exacerbation ([address] (5/6/2019), Cough with hemoptysis, Erectile dysfunction (12/10/2015), Essential hypertension, benign (12/10/2015), Fracture (10/2014), History of colon polyps, Low testosterone (12/10/2015), Lumbago, Memory loss, Overflow incontinence, Pleural effusion (6/10/2019), Pneumothorax on right (5/6/2019), Raynaud's syndrome (12/10/2015), Rotator cuff tendinitis, Thrombocytopenia ([address] Urinary frequency, Ventricular tachyarrhythmia ([address] (5/6/2019), and VT (ventricular tachycardia) ([address] (5/6/2019). . He also  has a past surgical history that includes hx hernia repair (1980); hx colonoscopy; hx fracture tx (10/2014); hx cataract removal (6/2016-right); and hx orthopaedic (03/2018). PRECAUTIONS/ALLERGIES: Patient has no known allergies. Problem List:  (Impairments causing functional limitations):  1. Oropharyngeal dysphagia- No symptoms identified      Previous Dysphagia: Patient last seen by speech therapy 11/2019 with recommendations for a regular diet/thin liquids. Had modified barium swallow study 6/2019 with oropharyngeal swallow function WNL and recommendations for a regular diet/thin liquids. Diet Prior to Evaluation: regular/thin    Orientation:  Person  Place  Time    Cognitive-Linguistic Screening:  Patient alert and oriented x3. Follows commands and responds appropriately to questions. Hard of hearing.     OBJECTIVE   Oral Motor:   · Labial: No impairment  · Dentition: Intact and Natural  · Oral Hygiene: Adequate  · Lingual: No impairment    Swallow evaluation:   Patient consumed trials of thin by cup/straw/serial sips, mixed, and solid consistency without overt s/sx airway compromise. Timely oral prep time and functional oral clearance with all consistencies. Tool Used: Dysphagia Outcome and Severity Scale (COREY)    Score Comments   Normal Diet  [x] 7 With no strategies or extra time needed   Functional Swallow  [] 6 May have mild oral or pharyngeal delay   Mild Dysphagia  [] 5 Which may require one diet consistency restricted    Mild-Moderate Dysphagia  [] 4 With 1-2 diet consistencies restricted   Moderate Dysphagia  [] 3 With 2 or more diet consistencies restricted   Moderate-Severe Dysphagia  [] 2 With partial PO strategies (trials with ST only)   Severe Dysphagia  [] 1 With inability to tolerate any PO safely      Score:  Initial: 7 Most Recent: x (Date 02/01/21 )   Interpretation of Tool: The Dysphagia Outcome and Severity Scale (COREY) is a simple, easy-to-use, 7-point scale developed to systematically rate the functional severity of dysphagia based on objective assessment and make recommendations for diet level, independence level, and type of nutrition. Current Medications:   No current facility-administered medications on file prior to encounter. Current Outpatient Medications on File Prior to Encounter   Medication Sig Dispense Refill    cholecalciferol, vitamin D3, (Vitamin D3) 50 mcg (2,000 unit) tab Take  by mouth daily.  albuterol (PROVENTIL HFA, VENTOLIN HFA, PROAIR HFA) 90 mcg/actuation inhaler Take 2 Puffs by inhalation every four (4) hours as needed for Wheezing.  famotidine (PEPCID) 10 mg tablet Take 10 mg by mouth two (2) times a day.  albuterol-ipratropium (DUO-NEB) 2.5 mg-0.5 mg/3 ml nebu 3 mL by Nebulization route every four (4) hours as needed for Wheezing.       melatonin 3 mg tablet Take  by mouth.      fluticasone furoate-vilanterol (BREO ELLIPTA) 100-25 mcg/dose inhaler 1 inhalation daily, rinse mouth after use 1 Inhaler 11    DULoxetine (CYMBALTA) 30 mg capsule Take 30 mg by mouth two (2) times a day.  magnesium oxide 250 mg magnesium tablet Take 250 mg by mouth daily.  nystatin (MYCOSTATIN) powder Apply  to affected area four (4) times daily.  acetaminophen (TYLENOL) 650 mg TbER Take 650 mg by mouth every eight (8) hours.  finasteride (PROSCAR) 5 mg tablet Take 1 Tab by mouth daily. 90 Tab 4    potassium chloride (K-DUR, KLOR-CON) 20 mEq tablet Take 2 Tabs by mouth daily. (Patient taking differently: Take 20 mEq by mouth daily.) 3 Tab 0    montelukast (SINGULAIR) 10 mg tablet Take 1 Tab by mouth daily. (Patient taking differently: Take 10 mg by mouth nightly.) 90 Tab 4    Oxygen 4 lpm cont.  cyanocobalamin (VITAMIN B12) 1,000 mcg/mL injection Once month  12    fluticasone (FLONASE) 50 mcg/actuation nasal spray 2 Sprays by Both Nostrils route daily. 48 g 4    guaiFENesin ER (MUCINEX) 600 mg ER tablet Take 1,200 mg by mouth daily.  Biotin 2,500 mcg cap Take  by mouth.  aspirin delayed-release 81 mg tablet Take  by mouth daily.           INTERDISCIPLINARY COLLABORATION: RN    After treatment position/precautions:  · Upright in bed  · Nursing assistant at bedside    Total Treatment Duration:   Time In: 1005  Time Out: 140 [facility], [id number] [facility]

## 2021-02-01 NOTE — PROGRESS NOTES
Problem: Non-Violent Restraints  Goal: *Removal from restraints as soon as assessed to be safe  Outcome: Progressing Towards Goal  Goal: *No harm/injury to patient while restraints in use  Outcome: Progressing Towards Goal  Goal: *Patient's dignity will be maintained  Outcome: Progressing Towards Goal  Goal: *Patient Specific Goal (EDIT GOAL, INSERT TEXT)  Outcome: Progressing Towards Goal  Goal: Non-violent Restaints:Standard Interventions  Outcome: Progressing Towards Goal  Goal: Non-violent Restraints:Patient Interventions  Outcome: Progressing Towards Goal  Goal: Patient/Family Education  Outcome: Progressing Towards Goal     Problem: Falls - Risk of  Goal: *Absence of Falls  Description: Document Senthil Fall Risk and appropriate interventions in the flowsheet. Outcome: Progressing Towards Goal  Note: Fall Risk Interventions:  Mobility Interventions: Bed/chair exit alarm    Mentation Interventions: Bed/chair exit alarm    Medication Interventions: Bed/chair exit alarm    Elimination Interventions: Toileting schedule/hourly rounds    History of Falls Interventions: Bed/chair exit alarm         Problem: Patient Education: Go to Patient Education Activity  Goal: Patient/Family Education  Outcome: Progressing Towards Goal     Problem: Pressure Injury - Risk of  Goal: *Prevention of pressure injury  Description: Document Brant Scale and appropriate interventions in the flowsheet.   Outcome: Progressing Towards Goal  Note: Pressure Injury Interventions:  Sensory Interventions: Pressure redistribution bed/mattress (bed type)    Moisture Interventions: Absorbent underpads, Apply protective barrier, creams and emollients    Activity Interventions: Pressure redistribution bed/mattress(bed type)    Mobility Interventions: Pressure redistribution bed/mattress (bed type)    Nutrition Interventions: Document food/fluid/supplement intake    Friction and Shear Interventions: Minimize layers

## 2021-02-01 NOTE — PROGRESS NOTES
Hospitalist Note     Admit Date:  2021 12:33 PM   Name:  Jean-Paul Zepeda   Age:  80 y.o.  :  1932   MRN:  [id number]   PCP:  Temo Bloom MD  Treatment Team: Attending Provider: Arabella Serna MD; Utilization Review: Hines Hatchet; Occupational Therapist: Nati Garber OT    Hospital Course:   Mr. Kiran Wong is an 79 y/o WM with severe COPD, chronic hypoxemic respiratory failure on 4L NC O2, ILD, dementia, malnutrition who presented to the ER on  after a fall onto his right side. Was hypoxic requiring NRB. ABG with hypoxia. Rapid COVID negative. CT chest with severe chronic disease/emphysema, slight infiltrate vs atelectasis posterior lung bases, no PE, vascular abnormalities or rib fractures/PTX. He was admitted on Airvo. 24hr Events/Subjective:   : Up in bed, A/O x3, Airvo 50L/55%, bedside sats 100%. Feels well, appetite is decent, no pain, afebrile. Spoke to wife yesterday and expressed concerns over his code status, family opted for DNR so that was changed. Discussed with patient this AM and he prefers to be full code.      No other complaints  Objective:     Patient Vitals for the past 24 hrs:   Temp Pulse Resp BP SpO2   21 0811 98.2 °F (36.8 °C) 74 16 123/70 97 %   21 0727     96 %   21 0343 98.7 °F (37.1 °C) 71 16 (!) 152/78 96 %   21 0144 98.2 °F (36.8 °C) 68 16 (!) 151/84 95 %   21 0135     96 %   21 0128     99 %   21 2353 98.2 °F (36.8 °C) 83 16 (!) 147/82 98 %   21 2039     98 %   21 1920 97.8 °F (36.6 °C) 74 18 125/82 100 %   21 1530 98 °F (36.7 °C) 89 19 121/84 99 %   21 1218     98 %   21 1114 98.4 °F (36.9 °C) 69 19 136/76 99 %     Oxygen Therapy  O2 Sat (%): 97 % (21 0811)  Pulse via Oximetry: 74 beats per minute (21 0727)  O2 Device: Hi flow nasal cannula (21 0900)  O2 Flow Rate (L/min): 50 l/min (21 0900)  O2 Temperature: 87.8 °F (31 °C) (21 0727)  FIO2 (%): 55 % (02/01/21 0900)    Estimated body mass index is 18.7 kg/m² as calculated from the following:    Height as of this encounter: 5' 8\" (1.727 m). Weight as of this encounter: 55.8 kg (123 lb). Intake/Output Summary (Last 24 hours) at 2/1/2021 1036  Last data filed at 2/1/2021 0349  Gross per 24 hour   Intake 240 ml   Output 800 ml   Net -560 ml       *Note that automatically entered I/Os may not be accurate; dependent on patient compliance with collection and accurate  by techs. General:    Thin, frail, chronically ill appearing. CV:   RRR. No m/r/g. No edema. No JVD  Lungs:   Decreased throughout, Airvo 50L/55%  Abdomen:   Soft, nontender, nondistended. Extremities: Warm and dry. No cyanosis   Skin:     No rashes. Normal coloration  Neuro:  No gross focal deficits.   Alert    Data Reviewed:  I have reviewed all labs, meds, and studies from the last 24 hours:  Recent Results (from the past 24 hour(s))   METABOLIC PANEL, BASIC    Collection Time: 02/01/21  7:38 AM   Result Value Ref Range    Sodium 140 136 - 145 mmol/L    Potassium 4.1 3.5 - 5.1 mmol/L    Chloride 107 98 - 107 mmol/L    CO2 28 21 - 32 mmol/L    Anion gap 5 (L) 7 - 16 mmol/L    Glucose 121 (H) 65 - 100 mg/dL    BUN 28 (H) 8 - 23 MG/DL    Creatinine 0.70 (L) 0.8 - 1.5 MG/DL    GFR est AA >60 >60 ml/min/1.73m2    GFR est non-AA >60 >60 ml/min/1.73m2    Calcium 8.4 8.3 - 10.4 MG/DL   CBC WITH AUTOMATED DIFF    Collection Time: 02/01/21  7:38 AM   Result Value Ref Range    WBC 9.6 4.3 - 11.1 K/uL    RBC 3.57 (L) 4.23 - 5.6 M/uL    HGB 11.2 (L) 13.6 - 17.2 g/dL    HCT 36.7 (L) 41.1 - 50.3 %    .8 (H) 79.6 - 97.8 FL    MCH 31.4 26.1 - 32.9 PG    MCHC 30.5 (L) 31.4 - 35.0 g/dL    RDW 13.4 11.9 - 14.6 %    PLATELET 156 244 - 663 K/uL    MPV 9.7 9.4 - 12.3 FL    ABSOLUTE NRBC 0.00 0.0 - 0.2 K/uL    DF AUTOMATED      NEUTROPHILS 70 43 - 78 %    LYMPHOCYTES 18 13 - 44 %    MONOCYTES 12 4.0 - 12.0 % EOSINOPHILS 0 (L) 0.5 - 7.8 %    BASOPHILS 0 0.0 - 2.0 %    IMMATURE GRANULOCYTES 1 0.0 - 5.0 %    ABS. NEUTROPHILS 6.6 1.7 - 8.2 K/UL    ABS. LYMPHOCYTES 1.7 0.5 - 4.6 K/UL    ABS. MONOCYTES 1.1 0.1 - 1.3 K/UL    ABS. EOSINOPHILS 0.0 0.0 - 0.8 K/UL    ABS. BASOPHILS 0.0 0.0 - 0.2 K/UL    ABS. IMM.  GRANS. 0.1 0.0 - 0.5 K/UL       Current Meds:  Current Facility-Administered Medications   Medication Dose Route Frequency    albuterol-ipratropium (DUO-NEB) 2.5 MG-0.5 MG/3 ML  3 mL Nebulization Q4H PRN    aspirin delayed-release tablet 81 mg  81 mg Oral DAILY    DULoxetine (CYMBALTA) capsule 30 mg  30 mg Oral BID    finasteride (PROSCAR) tablet 5 mg  5 mg Oral DAILY    guaiFENesin ER (MUCINEX) tablet 1,200 mg  1,200 mg Oral DAILY    magnesium oxide (MAG-OX) tablet 400 mg  400 mg Oral DAILY    montelukast (SINGULAIR) tablet 10 mg  10 mg Oral QHS    sodium chloride (NS) flush 5-40 mL  5-40 mL IntraVENous Q8H    sodium chloride (NS) flush 5-40 mL  5-40 mL IntraVENous PRN    potassium chloride 10 mEq in 100 ml IVPB  10 mEq IntraVENous PRN    magnesium sulfate 2 g/50 ml IVPB (premix or compounded)  2 g IntraVENous PRN    acetaminophen (TYLENOL) tablet 650 mg  650 mg Oral Q6H PRN    Or    acetaminophen (TYLENOL) suppository 650 mg  650 mg Rectal Q6H PRN    polyethylene glycol (MIRALAX) packet 17 g  17 g Oral DAILY PRN    bisacodyL (DULCOLAX) suppository 10 mg  10 mg Rectal DAILY PRN    promethazine (PHENERGAN) tablet 25 mg  25 mg Oral Q6H PRN    Or    ondansetron (ZOFRAN) injection 4 mg  4 mg IntraVENous Q6H PRN    famotidine (PEPCID) tablet 20 mg  20 mg Oral DAILY    enoxaparin (LOVENOX) injection 40 mg  40 mg SubCUTAneous DAILY    methylPREDNISolone (PF) (SOLU-MEDROL) injection 40 mg  40 mg IntraVENous Q12H    budesonide-formoteroL (SYMBICORT) 160-4.5 mcg/actuation HFA inhaler 2 Puff  2 Puff Inhalation BID RT    levoFLOXacin (LEVAQUIN) tablet 750 mg  750 mg Oral Q48H    nystatin (MYCOSTATIN) 100,000 unit/gram powder   Topical BID       Other Studies:  No results found for this visit on 01/30/21. No results found. All Micro Results     Procedure Component Value Units Date/Time    CULTURE, URINE [[id number]] Collected: 01/31/21 0730    Order Status: Completed Specimen: Cath Urine Updated: 02/01/21 0834     Special Requests: NO SPECIAL REQUESTS        Culture result:       >100,000 COLONIES/mL MIXED SKIN NAEL ISOLATED          CULTURE, BLOOD [[id number]] Collected: 01/30/21 1334    Order Status: Completed Specimen: Blood Updated: 01/31/21 0743     Special Requests: --        LEFT  HAND       Culture result: NO GROWTH AFTER 17 HOURS       CULTURE, BLOOD [[id number]] Collected: 01/30/21 1334    Order Status: Completed Specimen: Blood Updated: 01/31/21 0743     Special Requests: --        RIGHT  FOREARM       Culture result: NO GROWTH AFTER 17 HOURS       COVID-19 RAPID TEST [[id number]] Collected: 01/30/21 1318    Order Status: Completed Specimen: Nasopharyngeal Updated: 01/30/21 1418     Specimen source Nasopharyngeal        COVID-19 rapid test Not detected        Comment:      The specimen is NEGATIVE for SARS-CoV-2, the novel coronavirus associated with COVID-19. A negative result does not rule out COVID-19. This test has been authorized by the FDA under an Emergency Use Authorization (EUA) for use by authorized laboratories.         Fact sheet for Healthcare Providers: ConventionUpdate.co.nz  Fact sheet for Patients: ConventionUpdate.co.nz       Methodology: Isothermal Nucleic Acid Amplification               SARS-CoV-2 Lab Results  \"Novel Coronavirus\" Test: No results found for: COV2NT   \"Emergent Disease\" Test: No results found for: EDPR  \"SARS-COV-2\" Test: No results found for: XGCOVT  Rapid Test:   Lab Results   Component Value Date/Time    COVR Not detected 01/30/2021 01:18 PM            Assessment and Plan:     Hospital Problems as of 2/1/2021 Date Reviewed: 12/17/2020          Codes Class Noted - Resolved POA    Dementia ([facility] (Chronic) ICD-10-CM: F03.90  ICD-9-CM: 294.20  1/30/2021 - Present Yes        Severe protein-calorie malnutrition ([facility] (Chronic) ICD-10-CM: L05  ICD-9-CM: 262  11/3/2019 - Present Yes        DNR (do not resuscitate) (Chronic) ICD-10-CM: Z66  ICD-9-CM: V49.86  11/2/2019 - Present Yes        * (Principal) Acute on chronic respiratory failure with hypoxia ([facility] ICD-10-CM: J96.21  ICD-9-CM: 518.84, 799.02  5/6/2019 - Present Yes        Chronic respiratory failure with hypoxia ([facility]) (Chronic) ICD-10-CM: J96.11  ICD-9-CM: 518.83, 799.02  3/29/2016 - Present Yes    Overview Addendum 1/30/2021  5:25 PM by Jelly Wasserman MD     4+L chronically             BPH (benign prostatic hyperplasia) (Chronic) ICD-10-CM: N40.0  ICD-9-CM: 600.00  12/10/2015 - Present Yes        COPD (chronic obstructive pulmonary disease) ([facility] (Chronic) ICD-10-CM: J44.9  ICD-9-CM: 879  12/10/2015 - Present Yes        Essential hypertension, benign (Chronic) ICD-10-CM: I10  ICD-9-CM: 401.1  12/10/2015 - Present Yes              Plan:  # Acute on chronic hypoxemic respiratory failure              - Infectious vs inflammatory in setting of severe COPD. Empiric abx, IV steroids. COVID negative. - Weaning Airvo, likely can be off in 1-2d     # Adult failure to thrive              - Dietary consult.     # Severe COPD              - As above. Other listed chronic conditions stable, continue current management. DC planning/Dispo: Back to facility when able. Updated wife this morning on patient's condition and on our ACP discussion. Patient back as full code per his request.    Diet:  DIET REGULAR  DVT ppx: Lovenox.     Signed:  Donna Barraza MD

## 2021-02-01 NOTE — PROGRESS NOTES
TRANSFER - IN REPORT:    Verbal report received from Anuja Saunders (name) on Jessica Simms  being received from 8th floor(unit) for routine progression of care      Report consisted of patients Situation, Background, Assessment and   Recommendations(SBAR). Information from the following report(s) SBAR was reviewed with the receiving nurse. Opportunity for questions and clarification was provided. Assessment completed upon patients arrival to unit and care assumed.

## 2021-02-01 NOTE — PROGRESS NOTES
Comprehensive Nutrition Assessment    Type and Reason for Visit: Initial, Consult  General nutrition management/ other reason malnutrition (Hospitalist)    Nutrition Recommendations/Plan:   Meals and Snacks:  Continue current diet. Nutrition Supplement Therapy:   Medical food supplement therapy:  Initiate Boost TID as pt has accepted in the past from prior RD notes but refused ensure enlive. Malnutrition Assessment:  Malnutrition Status: Severe malnutrition  Context: Chronic illness  Findings of clinical characteristics of malnutrition:   Energy Intake:  Unable to assess  Weight Loss:  (hx of significant wt loss, 8% additional over the past 17 months)     Body Fat Loss:  7 - Severe body fat loss, Triceps, Fat overlying ribs   Muscle Mass Loss:  7 - Severe muscle mass loss, Calf (gastrocnemius), Hand (interosseous), Temples (temporalis), Thigh (quadraceps), Clavicles (pectoralis &deltoids)  Fluid Accumulation:  Unable to assess,     Strength:  Not performed     Nutrition Assessment:   Nutrition History: Pt elects not to provide, h e tells me he has phone calls to make. Nutrition Background: PMH remarkable for severe COPD, chronic hypoxemic respiratory failure on 4L NC O2, ILD, dementia, malnutrition. Admitted with acute on chronic hypoxemic respiratory failure, infectious vs inflamatory Covid negative, AFTT, severe COPD. Daily Update:  Pt's hospital phone was in the floor when I arrived. He fixates on wanting the phone and tells me he can't answer questions as I unravel the phone cord and provide him with the phone. He repeats a series of numbers and eventually calls out a full phone number, it is unclear if he gets anyone on the phone or if he is conversing with himself. PCT arrives and he begins to fixate on wanting his catheter arrived. Unable to elicit any nutrition hx from pt. Nutrition Related Findings: On Airvo. Cleared by SLP 2/1 for Regular diet.        Current Nutrition Therapies:  DIET REGULAR    Current Intake:   Average Meal Intake: (PCT reports he ate ~25% noon meal)        Anthropometric Measures:  Height: 5' 8\" (172.7 cm)  Current Body Wt: 55.8 kg (123 lb 0.3 oz)(1/30), Weight source: Stated  BMI: 18.7, Underweight (BMI less than 22) age over 72  Admission Body Weight: 123 lb 0.3 oz(stated)  Ideal Body Wt: 154 lbs (70 kg), 79.9 %  Usual Body Wt: 60.5 kg (133 lb 6.1 oz)(RD note bed scale 9/6/19), Percent weight change: -7.8          Estimated Daily Nutrient Needs:  Energy (kcal/day): 7329-6697 (Kcal/kg(30-35), Weight Used: Admission)  Protein (g/day): 67-73 (1.2-1.3 g/kg) Weight Used: (Admission)  Fluid (ml/day):   (1 ml/kcal)    Nutrition Diagnosis:   · Inadequate oral intake related to cognitive or neurological impairment, impaired respiratory function as evidenced by (eaily distracted, po meeting <25% estimated needs at present)    · Severe malnutrition, In context of chronic illness related to increased demand for energy/nutrients, cognitive or neurological impairment as evidenced by (criteria as identified in malnutrition assessment above.)    Nutrition Interventions:   Food and/or Nutrient Delivery: Continue current diet, Start oral nutrition supplement     Coordination of Nutrition Care: Continue to monitor while inpatient  Plan of Care discussed with ROBBIE Mackenzie. Goals: Active Goal: Meet >75% estimated needs by nutrition follow-up    Nutrition Monitoring and Evaluation:      Food/Nutrient Intake Outcomes: Food and nutrient intake, Supplement intake  Physical Signs/Symptoms Outcomes: Biochemical data, GI status    Discharge Planning:     Too soon to determine    Hodan Mead RD, LDN on 2/1/2021 at 3:56 PM  Contact: [phone]    Disaster Mode active

## 2021-02-01 NOTE — PROGRESS NOTES
Transfer Assessment. Patient arrived in room 608 around 01: 30 AM via hospital bed and accompanied by two 8th floor RNs and a RT. Patient is alert and oriented x 3 on NRB mask, S/P fall and hypoxemia. COVID -19 tests rapid and send out were negative. Patient has a medical history of COPD and is 4 L dependent. Patient switched back to [facility] by RT and setting is 50L at 55 % with saturation reading 96 on the continuous pulse oxymetry. Patient is awake with even and unlabored respirations. Left AC and right FA IV sites are saline locked, flushed and patent. Dual skin assessment completed upon arrival and his skin is intact. Oriented patient to staff and surroundings, no acute distress noted, vitals signs taken and stable, will continue to monitor and assess.

## 2021-02-01 NOTE — PROGRESS NOTES
Patient's SARS-CoV-2 test resulted negative 1/31/2021 at 1951. Notified Dr. Bi Aden at [phone] via [employer] of patient's negative Covid test results.  [facility] gave verbal orders to transfer patient to non-Covid medical bed. Primary RN, Caryle Gowers, aware of all of the above stated.

## 2021-02-01 NOTE — PROGRESS NOTES
Shift Summary  Hourly rounds performed on pt, pt resting in the bed quietly with head the bed elevated and eyes cosed. Patient remained alert and oriented x 4 on airvo with 50 L at 55 % and saturation reads 97. Right AC and left FA IV sites remained patent. No other concern voiced or noted this shift. Patient remained alert and oriented, VSS, see chart, adequate output via li catheter and 0 BM noted and charted.  No acute distress noted

## 2021-02-02 LAB
ANION GAP SERPL CALC-SCNC: 4 MMOL/L (ref 7–16)
BASOPHILS # BLD: 0 K/UL (ref 0–0.2)
BASOPHILS NFR BLD: 0 % (ref 0–2)
BUN SERPL-MCNC: 24 MG/DL (ref 8–23)
CALCIUM SERPL-MCNC: 8.5 MG/DL (ref 8.3–10.4)
CHLORIDE SERPL-SCNC: 106 MMOL/L (ref 98–107)
CO2 SERPL-SCNC: 30 MMOL/L (ref 21–32)
CREAT SERPL-MCNC: 0.5 MG/DL (ref 0.8–1.5)
DIFFERENTIAL METHOD BLD: ABNORMAL
EOSINOPHIL # BLD: 0 K/UL (ref 0–0.8)
EOSINOPHIL NFR BLD: 0 % (ref 0.5–7.8)
ERYTHROCYTE [DISTWIDTH] IN BLOOD BY AUTOMATED COUNT: 13.5 % (ref 11.9–14.6)
GLUCOSE SERPL-MCNC: 94 MG/DL (ref 65–100)
HCT VFR BLD AUTO: 36.2 % (ref 41.1–50.3)
HGB BLD-MCNC: 11 G/DL (ref 13.6–17.2)
IMM GRANULOCYTES # BLD AUTO: 0.1 K/UL (ref 0–0.5)
IMM GRANULOCYTES NFR BLD AUTO: 1 % (ref 0–5)
LYMPHOCYTES # BLD: 1.8 K/UL (ref 0.5–4.6)
LYMPHOCYTES NFR BLD: 19 % (ref 13–44)
MCH RBC QN AUTO: 31.4 PG (ref 26.1–32.9)
MCHC RBC AUTO-ENTMCNC: 30.4 G/DL (ref 31.4–35)
MCV RBC AUTO: 103.4 FL (ref 79.6–97.8)
MM INDURATION POC: 0 MM (ref 0–5)
MONOCYTES # BLD: 1.3 K/UL (ref 0.1–1.3)
MONOCYTES NFR BLD: 14 % (ref 4–12)
NEUTS SEG # BLD: 6.3 K/UL (ref 1.7–8.2)
NEUTS SEG NFR BLD: 66 % (ref 43–78)
NRBC # BLD: 0 K/UL (ref 0–0.2)
PLATELET # BLD AUTO: 213 K/UL (ref 150–450)
PMV BLD AUTO: 9.6 FL (ref 9.4–12.3)
POTASSIUM SERPL-SCNC: 3.8 MMOL/L (ref 3.5–5.1)
PPD POC: NEGATIVE NEGATIVE
RBC # BLD AUTO: 3.5 M/UL (ref 4.23–5.6)
SODIUM SERPL-SCNC: 140 MMOL/L (ref 138–145)
WBC # BLD AUTO: 9.5 K/UL (ref 4.3–11.1)

## 2021-02-02 PROCEDURE — 97112 NEUROMUSCULAR REEDUCATION: CPT

## 2021-02-02 PROCEDURE — 94762 N-INVAS EAR/PLS OXIMTRY CONT: CPT

## 2021-02-02 PROCEDURE — 2709999900 HC NON-CHARGEABLE SUPPLY

## 2021-02-02 PROCEDURE — 97530 THERAPEUTIC ACTIVITIES: CPT

## 2021-02-02 PROCEDURE — 77010033711 HC HIGH FLOW OXYGEN

## 2021-02-02 PROCEDURE — 94640 AIRWAY INHALATION TREATMENT: CPT

## 2021-02-02 PROCEDURE — 36415 COLL VENOUS BLD VENIPUNCTURE: CPT

## 2021-02-02 PROCEDURE — 74011250637 HC RX REV CODE- 250/637: Performed by: INTERNAL MEDICINE

## 2021-02-02 PROCEDURE — 85025 COMPLETE CBC W/AUTO DIFF WBC: CPT

## 2021-02-02 PROCEDURE — 74011000250 HC RX REV CODE- 250: Performed by: INTERNAL MEDICINE

## 2021-02-02 PROCEDURE — 65270000029 HC RM PRIVATE

## 2021-02-02 PROCEDURE — 74011250636 HC RX REV CODE- 250/636: Performed by: INTERNAL MEDICINE

## 2021-02-02 PROCEDURE — 80048 BASIC METABOLIC PNL TOTAL CA: CPT

## 2021-02-02 PROCEDURE — 97535 SELF CARE MNGMENT TRAINING: CPT

## 2021-02-02 RX ADMIN — NYSTATIN: 100000 POWDER TOPICAL at 17:00

## 2021-02-02 RX ADMIN — MONTELUKAST SODIUM 10 MG: 10 TABLET, FILM COATED ORAL at 22:16

## 2021-02-02 RX ADMIN — METHYLPREDNISOLONE SODIUM SUCCINATE 40 MG: 40 INJECTION, POWDER, FOR SOLUTION INTRAMUSCULAR; INTRAVENOUS at 22:16

## 2021-02-02 RX ADMIN — BUDESONIDE AND FORMOTEROL FUMARATE DIHYDRATE 2 PUFF: 160; 4.5 AEROSOL RESPIRATORY (INHALATION) at 07:56

## 2021-02-02 RX ADMIN — FINASTERIDE 5 MG: 5 TABLET, FILM COATED ORAL at 08:08

## 2021-02-02 RX ADMIN — ZIPRASIDONE MESYLATE 10 MG: 20 INJECTION, POWDER, LYOPHILIZED, FOR SOLUTION INTRAMUSCULAR at 14:26

## 2021-02-02 RX ADMIN — DULOXETINE HYDROCHLORIDE 30 MG: 30 CAPSULE, DELAYED RELEASE ORAL at 08:08

## 2021-02-02 RX ADMIN — Medication 10 ML: at 22:17

## 2021-02-02 RX ADMIN — DULOXETINE HYDROCHLORIDE 30 MG: 30 CAPSULE, DELAYED RELEASE ORAL at 16:33

## 2021-02-02 RX ADMIN — FAMOTIDINE 20 MG: 20 TABLET, FILM COATED ORAL at 08:08

## 2021-02-02 RX ADMIN — Medication 400 MG: at 08:08

## 2021-02-02 RX ADMIN — BUDESONIDE AND FORMOTEROL FUMARATE DIHYDRATE 2 PUFF: 160; 4.5 AEROSOL RESPIRATORY (INHALATION) at 20:23

## 2021-02-02 RX ADMIN — ENOXAPARIN SODIUM 40 MG: 40 INJECTION SUBCUTANEOUS at 08:08

## 2021-02-02 RX ADMIN — Medication 10 ML: at 05:40

## 2021-02-02 RX ADMIN — METHYLPREDNISOLONE SODIUM SUCCINATE 40 MG: 40 INJECTION, POWDER, FOR SOLUTION INTRAMUSCULAR; INTRAVENOUS at 08:08

## 2021-02-02 RX ADMIN — GUAIFENESIN 1200 MG: 600 TABLET ORAL at 08:08

## 2021-02-02 RX ADMIN — Medication 10 ML: at 12:05

## 2021-02-02 RX ADMIN — ASPIRIN 81 MG: 81 TABLET ORAL at 08:08

## 2021-02-02 RX ADMIN — NYSTATIN: 100000 POWDER TOPICAL at 09:00

## 2021-02-02 NOTE — PROGRESS NOTES
Hospitalist Note     Admit Date:  2021 12:33 PM   Name:  Jessica Simms   Age:  80 y.o.  :  1932   MRN:  [id number]   PCP:  Dahlia Hutchison MD  Treatment Team: Attending Provider: aWlker Jimenez MD; Utilization Review: Timothy Washington; Primary Nurse: Reji Thomas, [facility] Course:   Mr. Celestine Ronquillo is an 79 y/o WM with severe COPD, chronic hypoxemic respiratory failure on 4L NC O2, ILD, dementia, malnutrition who presented to the ER on  after a fall onto his right side. Was hypoxic requiring NRB. ABG with hypoxia. Rapid COVID negative. CT chest with severe chronic disease/emphysema, slight infiltrate vs atelectasis posterior lung bases, no PE, vascular abnormalities or rib fractures/PTX. He was admitted on Airvo. 24hr Events/Subjective:   2/2: In bed, looks better, says he feels better but still having a hard time getting around. Airvo at 35L/70%, bedside sats high 90s. No resp distress. No other complaints  Objective:     Patient Vitals for the past 24 hrs:   Temp Pulse Resp BP SpO2   21 0445 98 °F (36.7 °C) 77 18 (!) 140/83 98 %   219 98.3 °F (36.8 °C) 78 18 136/78 96 %   21     99 %   21 1926 98.2 °F (36.8 °C) 83 18 132/78 94 %   21 1618 98.1 °F (36.7 °C) 85 16 139/77 97 %   21 1137 97.5 °F (36.4 °C) 83 16 (!) 144/78 97 %   21 0811 98.2 °F (36.8 °C) 74 16 123/70 97 %   21 0727     96 %     Oxygen Therapy  O2 Sat (%): 98 % (215)  Pulse via Oximetry: 77 beats per minute (21)  O2 Device: Heated; Hi flow nasal cannula (21)  O2 Flow Rate (L/min): 35 l/min (21)  O2 Temperature: 87.8 °F (31 °C) (21)  FIO2 (%): 70 % (21)    Estimated body mass index is 18.7 kg/m² as calculated from the following:    Height as of this encounter: 5' 8\" (1.727 m). Weight as of this encounter: 55.8 kg (123 lb).     Intake/Output Summary (Last 24 hours) at 2021 0721  Last data filed at 2/2/2021 0448  Gross per 24 hour   Intake    Output 550 ml   Net -550 ml       *Note that automatically entered I/Os may not be accurate; dependent on patient compliance with collection and accurate  by techs. General:    Thin and frail, chronically ill appearing. CV:   RRR. No m/r/g. No edema. No JVD  Lungs:   Decreased throughout, but clear. Airvo 35L/70%, pulse ox 98%. Abdomen:   Soft, nontender, nondistended. Extremities: Warm and dry. No cyanosis. Scattered ecchymoses. Skin:     No rashes. Normal coloration  Neuro:  No gross focal deficits. Alert    Data Reviewed:  I have reviewed all labs, meds, and studies from the last 24 hours:  Recent Results (from the past 24 hour(s))   METABOLIC PANEL, BASIC    Collection Time: 02/01/21  7:38 AM   Result Value Ref Range    Sodium 140 136 - 145 mmol/L    Potassium 4.1 3.5 - 5.1 mmol/L    Chloride 107 98 - 107 mmol/L    CO2 28 21 - 32 mmol/L    Anion gap 5 (L) 7 - 16 mmol/L    Glucose 121 (H) 65 - 100 mg/dL    BUN 28 (H) 8 - 23 MG/DL    Creatinine 0.70 (L) 0.8 - 1.5 MG/DL    GFR est AA >60 >60 ml/min/1.73m2    GFR est non-AA >60 >60 ml/min/1.73m2    Calcium 8.4 8.3 - 10.4 MG/DL   CBC WITH AUTOMATED DIFF    Collection Time: 02/01/21  7:38 AM   Result Value Ref Range    WBC 9.6 4.3 - 11.1 K/uL    RBC 3.57 (L) 4.23 - 5.6 M/uL    HGB 11.2 (L) 13.6 - 17.2 g/dL    HCT 36.7 (L) 41.1 - 50.3 %    .8 (H) 79.6 - 97.8 FL    MCH 31.4 26.1 - 32.9 PG    MCHC 30.5 (L) 31.4 - 35.0 g/dL    RDW 13.4 11.9 - 14.6 %    PLATELET 988 984 - 599 K/uL    MPV 9.7 9.4 - 12.3 FL    ABSOLUTE NRBC 0.00 0.0 - 0.2 K/uL    DF AUTOMATED      NEUTROPHILS 70 43 - 78 %    LYMPHOCYTES 18 13 - 44 %    MONOCYTES 12 4.0 - 12.0 %    EOSINOPHILS 0 (L) 0.5 - 7.8 %    BASOPHILS 0 0.0 - 2.0 %    IMMATURE GRANULOCYTES 1 0.0 - 5.0 %    ABS. NEUTROPHILS 6.6 1.7 - 8.2 K/UL    ABS. LYMPHOCYTES 1.7 0.5 - 4.6 K/UL    ABS. MONOCYTES 1.1 0.1 - 1.3 K/UL    ABS.  EOSINOPHILS 0.0 0.0 - 0.8 K/UL    ABS. BASOPHILS 0.0 0.0 - 0.2 K/UL    ABS. IMM. GRANS. 0.1 0.0 - 0.5 K/UL   PLEASE READ & DOCUMENT PPD TEST IN 48 HRS    Collection Time: 02/01/21  9:16 PM   Result Value Ref Range    PPD Negative Negative    mm Induration 0 0 - 5 mm   METABOLIC PANEL, BASIC    Collection Time: 02/02/21  5:29 AM   Result Value Ref Range    Sodium 140 138 - 145 mmol/L    Potassium 3.8 3.5 - 5.1 mmol/L    Chloride 106 98 - 107 mmol/L    CO2 30 21 - 32 mmol/L    Anion gap 4 (L) 7 - 16 mmol/L    Glucose 94 65 - 100 mg/dL    BUN 24 (H) 8 - 23 MG/DL    Creatinine 0.50 (L) 0.8 - 1.5 MG/DL    GFR est AA >60 >60 ml/min/1.73m2    GFR est non-AA >60 >60 ml/min/1.73m2    Calcium 8.5 8.3 - 10.4 MG/DL   CBC WITH AUTOMATED DIFF    Collection Time: 02/02/21  5:29 AM   Result Value Ref Range    WBC 9.5 4.3 - 11.1 K/uL    RBC 3.50 (L) 4.23 - 5.6 M/uL    HGB 11.0 (L) 13.6 - 17.2 g/dL    HCT 36.2 (L) 41.1 - 50.3 %    .4 (H) 79.6 - 97.8 FL    MCH 31.4 26.1 - 32.9 PG    MCHC 30.4 (L) 31.4 - 35.0 g/dL    RDW 13.5 11.9 - 14.6 %    PLATELET 619 410 - 613 K/uL    MPV 9.6 9.4 - 12.3 FL    ABSOLUTE NRBC 0.00 0.0 - 0.2 K/uL    DF AUTOMATED      NEUTROPHILS 66 43 - 78 %    LYMPHOCYTES 19 13 - 44 %    MONOCYTES 14 (H) 4.0 - 12.0 %    EOSINOPHILS 0 (L) 0.5 - 7.8 %    BASOPHILS 0 0.0 - 2.0 %    IMMATURE GRANULOCYTES 1 0.0 - 5.0 %    ABS. NEUTROPHILS 6.3 1.7 - 8.2 K/UL    ABS. LYMPHOCYTES 1.8 0.5 - 4.6 K/UL    ABS. MONOCYTES 1.3 0.1 - 1.3 K/UL    ABS. EOSINOPHILS 0.0 0.0 - 0.8 K/UL    ABS. BASOPHILS 0.0 0.0 - 0.2 K/UL    ABS. IMM.  GRANS. 0.1 0.0 - 0.5 K/UL       Current Meds:  Current Facility-Administered Medications   Medication Dose Route Frequency    albuterol-ipratropium (DUO-NEB) 2.5 MG-0.5 MG/3 ML  3 mL Nebulization Q4H PRN    aspirin delayed-release tablet 81 mg  81 mg Oral DAILY    DULoxetine (CYMBALTA) capsule 30 mg  30 mg Oral BID    finasteride (PROSCAR) tablet 5 mg  5 mg Oral DAILY    guaiFENesin ER (MUCINEX) tablet 1,200 mg 1,200 mg Oral DAILY    magnesium oxide (MAG-OX) tablet 400 mg  400 mg Oral DAILY    montelukast (SINGULAIR) tablet 10 mg  10 mg Oral QHS    sodium chloride (NS) flush 5-40 mL  5-40 mL IntraVENous Q8H    sodium chloride (NS) flush 5-40 mL  5-40 mL IntraVENous PRN    potassium chloride 10 mEq in 100 ml IVPB  10 mEq IntraVENous PRN    magnesium sulfate 2 g/50 ml IVPB (premix or compounded)  2 g IntraVENous PRN    acetaminophen (TYLENOL) tablet 650 mg  650 mg Oral Q6H PRN    Or    acetaminophen (TYLENOL) suppository 650 mg  650 mg Rectal Q6H PRN    polyethylene glycol (MIRALAX) packet 17 g  17 g Oral DAILY PRN    bisacodyL (DULCOLAX) suppository 10 mg  10 mg Rectal DAILY PRN    promethazine (PHENERGAN) tablet 25 mg  25 mg Oral Q6H PRN    Or    ondansetron (ZOFRAN) injection 4 mg  4 mg IntraVENous Q6H PRN    famotidine (PEPCID) tablet 20 mg  20 mg Oral DAILY    enoxaparin (LOVENOX) injection 40 mg  40 mg SubCUTAneous DAILY    methylPREDNISolone (PF) (SOLU-MEDROL) injection 40 mg  40 mg IntraVENous Q12H    budesonide-formoteroL (SYMBICORT) 160-4.5 mcg/actuation HFA inhaler 2 Puff  2 Puff Inhalation BID RT    levoFLOXacin (LEVAQUIN) tablet 750 mg  750 mg Oral Q48H    nystatin (MYCOSTATIN) 100,000 unit/gram powder   Topical BID       Other Studies:  No results found for this visit on 01/30/21. No results found.     All Micro Results     Procedure Component Value Units Date/Time    CULTURE, BLOOD [[id number]] Collected: 01/30/21 1334    Order Status: Completed Specimen: Blood Updated: 02/01/21 1106     Special Requests: --        RIGHT  FOREARM       Culture result: NO GROWTH 2 DAYS       CULTURE, BLOOD [[id number]] Collected: 01/30/21 1334    Order Status: Completed Specimen: Blood Updated: 02/01/21 1106     Special Requests: --        LEFT  HAND       Culture result: NO GROWTH 2 DAYS       CULTURE, URINE [[id number]] Collected: 01/31/21 0730    Order Status: Completed Specimen: Cath Urine Updated: 02/01/21 6939     Special Requests: NO SPECIAL REQUESTS        Culture result:       >100,000 COLONIES/mL MIXED SKIN NAEL ISOLATED          COVID-19 RAPID TEST [[id number]] Collected: 01/30/21 1318    Order Status: Completed Specimen: Nasopharyngeal Updated: 01/30/21 1418     Specimen source Nasopharyngeal        COVID-19 rapid test Not detected        Comment:      The specimen is NEGATIVE for SARS-CoV-2, the novel coronavirus associated with COVID-19. A negative result does not rule out COVID-19. This test has been authorized by the FDA under an Emergency Use Authorization (EUA) for use by authorized laboratories.         Fact sheet for Healthcare Providers: [employer]  Fact sheet for Patients: [employer]       Methodology: Isothermal Nucleic Acid Amplification               SARS-CoV-2 Lab Results  \"Novel Coronavirus\" Test: No results found for: COV2NT   \"Emergent Disease\" Test: No results found for: EDPR  \"SARS-COV-2\" Test: No results found for: XGCOVT  Rapid Test:   Lab Results   Component Value Date/Time    COVR Not detected 01/30/2021 01:18 PM            Assessment and Plan:     Hospital Problems as of 2/2/2021 Date Reviewed: 12/17/2020          Codes Class Noted - Resolved POA    Dementia ([facility] [facility] (Chronic) ICD-10-CM: F03.90  ICD-9-CM: 294.20  1/30/2021 - Present Yes        Severe protein-calorie malnutrition ([facility] [facility] (Chronic) ICD-10-CM: R98  ICD-9-CM: 262  11/3/2019 - Present Yes        DNR (do not resuscitate) (Chronic) ICD-10-CM: Z66  ICD-9-CM: V49.86  11/2/2019 - Present Yes        * (Principal) Acute on chronic respiratory failure with hypoxia ([facility] ICD-10-CM: J96.21  ICD-9-CM: 518.84, 799.02  5/6/2019 - Present Yes        Ch[facility] ([facility] (Chronic) ICD-10-CM: J96.11  ICD-9-CM: 518.83, 799.02  3/29/2016 - Present Yes    Overview Addendum 1/30/2021  5:25 PM by Dave Paulson MD     4+L chronically             BPH (benign prostatic hyperplasia) (Chronic) ICD-10-CM: N40.0  ICD-9-CM: 600.00  12/10/2015 - Present Yes        COPD (chronic obstructive pulmonary disease) (HCC) (Chronic) ICD-10-CM: J44.9  ICD-9-CM: 496  12/10/2015 - Present Yes        Essential hypertension, benign (Chronic) ICD-10-CM: I10  ICD-9-CM: 401.1  12/10/2015 - Present Yes              Plan:  # Acute on chronic hypoxemic respiratory failure              - Infectious vs inflammatory in setting of severe COPD. Empiric abx, IV steroids. COVID negative. - Weaning Airvo as able. # Generalized weakness/fall   - PT/OT. Nutrition.      # Adult failure to thrive              - Dietary consult.     # Severe COPD              - As above. # Chronic Rojas    Other listed chronic conditions stable, continue current management. DC planning/Dispo: Back to facility when able. Diet:  DIET REGULAR  DIET NUTRITIONAL SUPPLEMENTS  DVT ppx: Lovenox.     Signed:  Martinez Carrizales MD

## 2021-02-02 NOTE — PROGRESS NOTES
ACUTE OT GOALS:  (Developed with and agreed upon by patient and/or caregiver.)  1. Patient will complete upper body bathing and dressing with SBA and adaptive equipment as needed. 2. Patient will complete rolling side to side for hygiene/positioning with supervision. 3. Patient will tolerate 23 minutes of OT treatment with 2-3 rest breaks to increase activity tolerance for ADLs. 4. Patient will complete sitting to the edge of the bed with minimal assistance to tolerance upright sitting for ADL. 5. Patient will complete therapeutic exercises for 8 minutes to improve strength for ADL/functional transfers. 6. Patient will complete functional transfers to the chair/BSC within 3 visits to demonstrate advancement with transfers for ADL.    Timeframe: 7 visits     OCCUPATIONAL THERAPY: Daily Note OT Treatment Day # 2    Malia Alvarado is a 80 y.o. male   PRIMARY DIAGNOSIS: Acute on chronic respiratory failure with hypoxia (HCC)  Acute on chronic respiratory failure with hypoxia (HCC) [J96.21]       [place] / [place] / Product Type: Medicare /   ASSESSMENT:     REHAB RECOMMENDATIONS: CURRENT LEVEL OF FUNCTION:  (Most Recently Demonstrated)   Recommendation to date pending progress:  Setting:   Short-term Rehab  Equipment:    To Be Determined Bathing:   Not tested  Dressing:   Not tested  Feeding/Grooming:   Standby Assistance  Toileting:   Not tested  Functional Mobility:   Not tested     ASSESSMENT:  Mr. Rangel Churchill present supine in the bed. Pt is pleasantly confused. Pt has no pain at rest but as soon as he moves he begins to scream in pain with his R hip. Pt holds R LE in flexed position and needs cues to relax it into extension. Pt positioned into sitting today and seemed to tolerate sitting well with fair sitting balance. Pt worked on brushing teeth from unsupported sitting position.  Pt fearful of falling throughout session but did stand several times requiring maximal assistance x 2. Pt's O2 levels stable throughout session. Pt did tolerate more activity than previous session. Pt to continue per plan of care. SUBJECTIVE:   Mr. Pat Ge states, \"I'm gonna fall! \"    SOCIAL HISTORY/LIVING ENVIRONMENT:        OBJECTIVE:     PAIN: VITAL SIGNS: LINES/DRAINS:   Pre Treatment: Pain Screen  Pain Scale 1: Numeric (0 - 10)  Pain Intensity 1: 0(at rest)  Pain Location 1: Hip  Pain Orientation 1: Right  Pain Intervention(s) 1: Repositioned  Post Treatment: 0   Rojas Catheter  O2 Device: Heated, Hi flow nasal cannula     ACTIVITIES OF DAILY LIVING: I Mod I S SBA CGA Min Mod Max Total NT Comments   BASIC ADLs:              Bathing/ Showering [] [] [] [] [] [] [] [] [] [x]    Toileting [] [] [] [] [] [] [] [] [] [x]    Dressing [] [] [] [] [] [] [] [] [] [x]    Feeding [] [] [] [] [] [] [] [] [] [x]    Grooming [] [] [] [x] [] [] [] [] [] []    Personal Device Care [] [] [] [] [] [] [] [] [] [x]    Functional Mobility [] [] [] [] [] [] [] [] [] [x]    I=Independent, Mod I=Modified Independent, S=Supervision, SBA=Standby Assistance, CGA=Contact Guard Assistance,   Min=Minimal Assistance, Mod=Moderate Assistance, Max=Maximal Assistance, Total=Total Assistance, NT=Not Tested    MOBILITY: I Mod I S SBA CGA Min Mod Max Total  NT x2 Comments:   Supine to sit [] [] [] [] [] [] [x] [x] [] [] [x]    Sit to supine [] [] [] [] [] [] [x] [x] [] [] [x]    Sit to stand [] [] [] [] [] [] [x] [x] [] [] [x]    Bed to chair [] [] [] [] [] [] [] [] [] [x] []    I=Independent, Mod I=Modified Independent, S=Supervision, SBA=Standby Assistance, CGA=Contact Guard Assistance,   Min=Minimal Assistance, Mod=Moderate Assistance, Max=Maximal Assistance, Total=Total Assistance, NT=Not Tested    PLAN:   FREQUENCY/DURATION: OT Plan of Care: 3 times/week for duration of hospital stay or until stated goals are met, whichever comes first.    TREATMENT:   TREATMENT:   ($$ Self Care/Home Management: 8-22 mins$$ Neuromuscular Re-Education: 8-22 mins   )  Co-Treatment PT/OT necessary due to patient's decreased overall endurance/tolerance levels, as well as need for high level skilled assistance to complete functional transfers/mobility and functional tasks  Self Care (15 Minutes): Self care including Grooming to increase independence and decrease level of assistance required. Neuromuscular Re-education (8 Minutes): Neuromuscular Re-education included Balance Training, Sitting balance training and Standing balance training to improve Balance, Coordination, Functional Mobility and Postural Control.     AFTER TREATMENT POSITION/PRECAUTIONS:  Bed, Needs within reach and RN notified    INTERDISCIPLINARY COLLABORATION:  RN/PCT, PT/PTA and OT/SOTO    TOTAL TREATMENT DURATION:  OT Patient Time In/Time Out  Time In: 1012  Time Out: Donnie Vyas OT

## 2021-02-02 NOTE — PROGRESS NOTES
Patient resting quietly in bed with AIRVO in place and O2 level is at 97%. Has calmed down and is sleeping at this time. Will continue to monitor.

## 2021-02-02 NOTE — PROGRESS NOTES
Patient in bed has taken O2 off and O2 sat is in low 80. PCT and nurses times two attempted to place patient back on oxygen. He began yelling and being extremely combative. MD notified, new order received for Geodon 10mg IM times one. Given to patient in right hip. O2 replaced with non rebreather to bring oxygen level back up. Will continue to monitor.

## 2021-02-02 NOTE — PROGRESS NOTES
ACUTE PHYSICAL THERAPY GOALS:  (Developed with and agreed upon by patient and/or caregiver. )  LTG:  (1.)Mr. Marielena Jacobson will move from supine to sit and sit to supine , scoot up and down, and roll side to side in bed with MINIMAL ASSIST within 7 treatment day(s). (2.)Mr. Marielena Jacobson will transfer from bed to chair and chair to bed with MINIMAL ASSIST using the least restrictive device within 7 treatment day(s). (3.)Mr. Marielena Jacobson will ambulate with MINIMAL ASSIST for 25+ feet with the least restrictive device within 7 treatment day(s). (4.)Mr. Marielena Jacobson will perform sit-stand x3  SUPERVISION without using UEs under 20 seconds within 7 treatment day(s). PHYSICAL THERAPY: Daily Note and AM Treatment Day # 2    Bobby Ardon is a 80 y.o. male   PRIMARY DIAGNOSIS: Acute on chronic respiratory failure with hypoxia (HCC)  Acute on chronic respiratory failure with hypoxia (HCC) [J96.21]         ASSESSMENT:     REHAB RECOMMENDATIONS: CURRENT LEVEL OF FUNCTION:  (Most Recently Demonstrated)   Recommendation to date pending progress:  Setting:   Short-term Rehab  Equipment:    To Be Determined Bed Mobility:   Minimal Assistance x 2  Sit to Stand:   Moderate Assistance x 2, to max A x 2  Transfers:   Not tested  Gait/Mobility:   Not tested     ASSESSMENT:  Mr. Marielena Jacobson on airvo, 35L/70%, 98% at rest. Pt states comfortable at rest, does scream in pain with any movement of R hip. Pt required min A x 2 for bed mob, mod A x 2 for supine to sit due to pain. Once seated EOB, pt with good static sitting balance while working with OT for ADL needs. Pt required encouragement to perform sit to stand attempts at bedside, declined use of RW for assist, pt states feels better with 2 person assist. Pt expressed fear of falling again. Pt with sit to stand attempts x 3 reps, unable to come to full standing. Pt keeps B knees flexed, cues required for posture, weight shifting.  Pt encouraged to stretch R LE out in supine instead of maintaining flexed position. Pt making progress toward goals, additional time required for activity. SUBJECTIVE:   Mr. Juan Daniel Bhakta states, \"I need to go slow. \"    SOCIAL HISTORY/ LIVING ENVIRONMENT: from [facility]     OBJECTIVE:     PAIN: VITAL SIGNS: LINES/DRAINS:   Pre Treatment: Pain Screen  Pain Scale 1: Numeric (0 - 10)  Pain Intensity 1: 0(at rest, screams with R hip movement)  Post Treatment: 0 at rest Vital Signs  O2 Sat (%): 98 %  O2 Device: Heated; Hi flow nasal cannula  O2 Flow Rate (L/min): 35 l/min  FIO2 (%): 70 % none  O2 Device: Heated, Hi flow nasal cannula     MOBILITY: I Mod I S SBA CGA Min Mod Max Total  NT x2 Comments:   Bed Mobility    Rolling [] [] [] [] [] [x] [] [] [] [] [x]    Supine to Sit [] [] [] [] [] [] [x] [] [] [] [x]    Scooting [] [] [] [] [] [x] [x] [] [] [] []    Sit to Supine [] [] [] [] [] [] [x] [] [] [] [x]    Transfers    Sit to Stand [] [] [] [] [] [] [x] [] [] [] [x]    Bed to Chair [] [] [] [] [] [] [] [] [] [x] []    Stand to Sit [] [] [] [] [] [] [x] [] [] [] [x]    I=Independent, Mod I=Modified Independent, S=Supervision, SBA=Standby Assistance, CGA=Contact Guard Assistance,   Min=Minimal Assistance, Mod=Moderate Assistance, Max=Maximal Assistance, Total=Total Assistance, NT=Not Tested    GAIT: I Mod I S SBA CGA Min Mod Max Total  NT x2 Comments:   Level of Assistance [] [] [] [] [] [] [] [] [] [] [] On airvo, limited activity due to R pain   Distance     DME None    Gait Quality     Weightbearing  Status N/A     I=Independent, Mod I=Modified Independent, S=Supervision, SBA=Standby Assistance, CGA=Contact Guard Assistance,   Min=Minimal Assistance, Mod=Moderate Assistance, Max=Maximal Assistance, Total=Total Assistance, NT=Not Tested    PLAN:   FREQUENCY/DURATION: PT Plan of Care: 3 times/week for duration of hospital stay or until stated goals are met, whichever comes first.  TREATMENT:     TREATMENT:   ($$ Therapeutic Activity: 23-37 mins    )  Therapeutic Activity (23 Minutes): Therapeutic activity included Supine to Sit, Sit to Supine, Transfer Training, Ambulation on level ground, Sitting balance  and Standing balance to improve functional Mobility, Strength, ROM and Activity tolerance.    Co-Treatment PT/OT necessary due to patient's decreased overall endurance/tolerance levels, as well as need for high level skilled assistance to complete functional transfers/mobility and functional tasks    AFTER TREATMENT POSITION/PRECAUTIONS:  Bed, Needs within reach and RN notified    INTERDISCIPLINARY COLLABORATION:  RN/PCT, PT/PTA and OT/SOTO    TOTAL TREATMENT DURATION:  PT Patient Time In/Time Out  Time In: 1012  Time Out: [facility],

## 2021-02-02 NOTE — PROGRESS NOTES
Rounding done every hour. Patient resting in room. No signs or symptoms of distress. No changes in status. Pt remains on AirVo currently at 35 l/min and 70%. Patient denies any further needs or pain at this time.

## 2021-02-03 ENCOUNTER — APPOINTMENT (OUTPATIENT)
Dept: GENERAL RADIOLOGY | Age: 86
DRG: 189 | End: 2021-02-03
Attending: INTERNAL MEDICINE
Payer: MEDICARE

## 2021-02-03 PROCEDURE — 71045 X-RAY EXAM CHEST 1 VIEW: CPT

## 2021-02-03 PROCEDURE — 65270000029 HC RM PRIVATE

## 2021-02-03 PROCEDURE — 77030021668 HC NEB PREFIL KT VYRM -A

## 2021-02-03 PROCEDURE — 74011250637 HC RX REV CODE- 250/637: Performed by: INTERNAL MEDICINE

## 2021-02-03 PROCEDURE — 74011250636 HC RX REV CODE- 250/636: Performed by: INTERNAL MEDICINE

## 2021-02-03 PROCEDURE — 77010033711 HC HIGH FLOW OXYGEN

## 2021-02-03 PROCEDURE — 94762 N-INVAS EAR/PLS OXIMTRY CONT: CPT

## 2021-02-03 PROCEDURE — 74011250637 HC RX REV CODE- 250/637: Performed by: FAMILY MEDICINE

## 2021-02-03 PROCEDURE — 94640 AIRWAY INHALATION TREATMENT: CPT

## 2021-02-03 RX ORDER — FUROSEMIDE 10 MG/ML
40 INJECTION INTRAMUSCULAR; INTRAVENOUS ONCE
Status: COMPLETED | OUTPATIENT
Start: 2021-02-03 | End: 2021-02-03

## 2021-02-03 RX ORDER — DEXTROSE 50 % IN WATER (D50W) INTRAVENOUS SYRINGE
25-50 AS NEEDED
Status: DISCONTINUED | OUTPATIENT
Start: 2021-02-03 | End: 2021-02-10 | Stop reason: HOSPADM

## 2021-02-03 RX ORDER — DEXTROSE 40 %
15 GEL (GRAM) ORAL AS NEEDED
Status: DISCONTINUED | OUTPATIENT
Start: 2021-02-03 | End: 2021-02-10 | Stop reason: HOSPADM

## 2021-02-03 RX ADMIN — BUDESONIDE AND FORMOTEROL FUMARATE DIHYDRATE 2 PUFF: 160; 4.5 AEROSOL RESPIRATORY (INHALATION) at 19:45

## 2021-02-03 RX ADMIN — Medication 10 ML: at 05:35

## 2021-02-03 RX ADMIN — FAMOTIDINE 20 MG: 20 TABLET, FILM COATED ORAL at 08:50

## 2021-02-03 RX ADMIN — METHYLPREDNISOLONE SODIUM SUCCINATE 40 MG: 40 INJECTION, POWDER, FOR SOLUTION INTRAMUSCULAR; INTRAVENOUS at 08:50

## 2021-02-03 RX ADMIN — Medication 10 ML: at 13:07

## 2021-02-03 RX ADMIN — FUROSEMIDE 40 MG: 10 INJECTION, SOLUTION INTRAMUSCULAR; INTRAVENOUS at 17:16

## 2021-02-03 RX ADMIN — Medication 10 ML: at 20:18

## 2021-02-03 RX ADMIN — DULOXETINE HYDROCHLORIDE 30 MG: 30 CAPSULE, DELAYED RELEASE ORAL at 17:16

## 2021-02-03 RX ADMIN — ENOXAPARIN SODIUM 40 MG: 40 INJECTION SUBCUTANEOUS at 08:51

## 2021-02-03 RX ADMIN — Medication 400 MG: at 08:50

## 2021-02-03 RX ADMIN — NYSTATIN: 100000 POWDER TOPICAL at 17:20

## 2021-02-03 RX ADMIN — BUDESONIDE AND FORMOTEROL FUMARATE DIHYDRATE 2 PUFF: 160; 4.5 AEROSOL RESPIRATORY (INHALATION) at 07:26

## 2021-02-03 RX ADMIN — GUAIFENESIN 1200 MG: 600 TABLET ORAL at 08:50

## 2021-02-03 RX ADMIN — ASPIRIN 81 MG: 81 TABLET ORAL at 08:50

## 2021-02-03 RX ADMIN — MONTELUKAST SODIUM 10 MG: 10 TABLET, FILM COATED ORAL at 20:18

## 2021-02-03 RX ADMIN — NYSTATIN: 100000 POWDER TOPICAL at 08:50

## 2021-02-03 RX ADMIN — FINASTERIDE 5 MG: 5 TABLET, FILM COATED ORAL at 08:50

## 2021-02-03 RX ADMIN — LEVOFLOXACIN 750 MG: 500 TABLET, FILM COATED ORAL at 17:15

## 2021-02-03 RX ADMIN — DULOXETINE HYDROCHLORIDE 30 MG: 30 CAPSULE, DELAYED RELEASE ORAL at 08:50

## 2021-02-03 NOTE — PROGRESS NOTES
Hospitalist Note     Admit Date:  2021 12:33 PM   Name:  Zulma Torres   Age:  80 y.o.  :  1932   MRN:  [id number]   PCP:  Espinoza Landeros MD  Treatment Team: Attending Provider: Concetta London MD; Utilization Review: Aby Petit; Physical Therapist: Edwardo Springer DPT; Care Manager: Nas Jameson, [facility] Course:   Mr. Domenico Persaud is an 81 y/o WM with severe COPD, chronic hypoxemic respiratory failure on 4L NC O2, ILD, dementia, malnutrition who presented to the ER on  after a fall onto his right side. Was hypoxic requiring NRB. ABG with hypoxia. Rapid COVID negative. CT chest with severe chronic disease/emphysema, slight infiltrate vs atelectasis posterior lung bases, no PE, vascular abnormalities or rib fractures/PTX. He was admitted on Airvo. COVID neg. On abx and IV steroids. 24hr Events/Subjective:   2/3: Stable Airvo settings, bedside sats low 90s. Seems a little confused. Repeat CXR pending. No other complaints  Objective:     Patient Vitals for the past 24 hrs:   Temp Pulse Resp BP SpO2   21 1507 98.9 °F (37.2 °C) 86 18 121/73 95 %   21 1440     94 %   21 1410     (!) 86 %   21 1115 98.5 °F (36.9 °C) 74 20 130/66 90 %   21 0726     95 %   21 0709 97.9 °F (36.6 °C) 78 21 136/75 96 %   21 0528 98.7 °F (37.1 °C) 75 20 116/78 97 %   21 0033 98.2 °F (36.8 °C) 75 20 117/76 99 %   21     100 %   21 1930 98.6 °F (37 °C) 77 20 98/64 94 %     Oxygen Therapy  O2 Sat (%): 95 % (21 1507)  Pulse via Oximetry: 87 beats per minute (21)  O2 Device: Heated; Hi flow nasal cannula (21)  O2 Flow Rate (L/min): 35 l/min (21)  O2 Temperature: 87.8 °F (31 °C) (21)  FIO2 (%): 60 % (21)    Estimated body mass index is 18.7 kg/m² as calculated from the following:    Height as of this encounter: 5' 8\" (1.727 m).     Weight as of this encounter: 55.8 kg (123 lb). Intake/Output Summary (Last 24 hours) at 2/3/2021 1520  Last data filed at 2/3/2021 0531  Gross per 24 hour   Intake 240 ml   Output 1175 ml   Net -935 ml       *Note that automatically entered I/Os may not be accurate; dependent on patient compliance with collection and accurate  by techs. General:    Weak and frail appearing. No overt distress  CV:   RRR. No m/r/g. No edema. No JVD  Lungs:   Decreased throughout. Airvo 35L/70%, bedside ox low 90s. NAD. Abdomen:   Soft, nontender, nondistended. Extremities: Warm and dry. No cyanosis. Ecchymoses scattered over extremities. Skin:     No rashes. Normal coloration  Neuro:  No gross focal deficits.   Alert    Data Reviewed:  I have reviewed all labs, meds, and studies from the last 24 hours:  Recent Results (from the past 24 hour(s))   PLEASE READ & DOCUMENT PPD TEST IN 72 HRS    Collection Time: 02/02/21  9:31 PM   Result Value Ref Range    PPD Negative Negative    mm Induration 0 0 - 5 mm       Current Meds:  Current Facility-Administered Medications   Medication Dose Route Frequency    dextrose 40% (GLUTOSE) oral gel 1 Tube  15 g Oral PRN    glucagon (GLUCAGEN) injection 1 mg  1 mg IntraMUSCular PRN    dextrose (D50W) injection syrg 12.5-25 g  25-50 mL IntraVENous PRN    [START ON 2/4/2021] methylPREDNISolone (PF) (SOLU-MEDROL) injection 40 mg  40 mg IntraVENous DAILY    albuterol-ipratropium (DUO-NEB) 2.5 MG-0.5 MG/3 ML  3 mL Nebulization Q4H PRN    aspirin delayed-release tablet 81 mg  81 mg Oral DAILY    DULoxetine (CYMBALTA) capsule 30 mg  30 mg Oral BID    finasteride (PROSCAR) tablet 5 mg  5 mg Oral DAILY    guaiFENesin ER (MUCINEX) tablet 1,200 mg  1,200 mg Oral DAILY    magnesium oxide (MAG-OX) tablet 400 mg  400 mg Oral DAILY    montelukast (SINGULAIR) tablet 10 mg  10 mg Oral QHS    sodium chloride (NS) flush 5-40 mL  5-40 mL IntraVENous Q8H    sodium chloride (NS) flush 5-40 mL  5-40 mL IntraVENous PRN  potassium chloride 10 mEq in 100 ml IVPB  10 mEq IntraVENous PRN    magnesium sulfate 2 g/50 ml IVPB (premix or compounded)  2 g IntraVENous PRN    acetaminophen (TYLENOL) tablet 650 mg  650 mg Oral Q6H PRN    Or    acetaminophen (TYLENOL) suppository 650 mg  650 mg Rectal Q6H PRN    polyethylene glycol (MIRALAX) packet 17 g  17 g Oral DAILY PRN    bisacodyL (DULCOLAX) suppository 10 mg  10 mg Rectal DAILY PRN    promethazine (PHENERGAN) tablet 25 mg  25 mg Oral Q6H PRN    Or    ondansetron (ZOFRAN) injection 4 mg  4 mg IntraVENous Q6H PRN    famotidine (PEPCID) tablet 20 mg  20 mg Oral DAILY    enoxaparin (LOVENOX) injection 40 mg  40 mg SubCUTAneous DAILY    budesonide-formoteroL (SYMBICORT) 160-4.5 mcg/actuation HFA inhaler 2 Puff  2 Puff Inhalation BID RT    levoFLOXacin (LEVAQUIN) tablet 750 mg  750 mg Oral Q48H    nystatin (MYCOSTATIN) 100,000 unit/gram powder   Topical BID       Other Studies:  No results found for this visit on 01/30/21. No results found.     All Micro Results     Procedure Component Value Units Date/Time    CULTURE, BLOOD [[id number]] Collected: 01/30/21 1334    Order Status: Completed Specimen: Blood Updated: 02/03/21 1221     Special Requests: --        RIGHT  FOREARM       Culture result: NO GROWTH 4 DAYS       CULTURE, BLOOD [[id number]] Collected: 01/30/21 1334    Order Status: Completed Specimen: Blood Updated: 02/03/21 1221     Special Requests: --        LEFT  HAND       Culture result: NO GROWTH 4 DAYS       CULTURE, URINE [[id number]]  (Abnormal) Collected: 01/31/21 0730    Order Status: Completed Specimen: Cath Urine Updated: 02/03/21 0707     Special Requests: NO SPECIAL REQUESTS        Culture result:       >100,000 COLONIES/mL GRAM NEGATIVE RODS IDENTIFICATION AND SUSCEPTIBILITY TO FOLLOW                  >100,000 COLONIES/mL MIXED SKIN NAEL ISOLATED                  CULTURE IN PROGRESS,FURTHER UPDATES TO FOLLOW          COVID-19 RAPID TEST [[id number]] Collected: 01/30/21 1318    Order Status: Completed Specimen: Nasopharyngeal Updated: 01/30/21 1418     Specimen source Nasopharyngeal        COVID-19 rapid test Not detected        Comment:      The specimen is NEGATIVE for SARS-CoV-2, the novel coronavirus associated with COVID-19. A negative result does not rule out COVID-19. This test has been authorized by the FDA under an Emergency Use Authorization (EUA) for use by authorized laboratories.         Fact sheet for Healthcare Providers: [employer]  Fact sheet for Patients: [employer]       Methodology: Isothermal Nucleic Acid Amplification               SARS-CoV-2 Lab Results  \"Novel Coronavirus\" Test: No results found for: COV2NT   \"Emergent Disease\" Test: No results found for: EDPR  \"SARS-COV-2\" Test: No results found for: XGCOVT  Rapid Test:   Lab Results   Component Value Date/Time    COVR Not detected 01/30/2021 01:18 PM            Assessment and Plan:     Hospital Problems as of 2/3/2021 Date Reviewed: 12/17/2020          Codes Class Noted - Resolved POA    Dementia ([facility] [address] (Chronic) ICD-10-CM: F03.90  ICD-9-CM: 294.20  1/30/2021 - Present Yes        Severe protein-calorie malnutrition ([facility] [address] (Chronic) ICD-10-CM: H61  ICD-9-CM: 262  11/3/2019 - Present Yes        DNR (do not resuscitate) (Chronic) ICD-10-CM: Z66  ICD-9-CM: V49.86  11/2/2019 - Present Yes        * (Principal) Acute on chronic respiratory failure with hypoxia ([facility] [address] ICD-10-CM: J96.21  ICD-9-CM: 518.84, 799.02  5/6/2019 - Present Yes        Chronic respiratory failure with hypoxia ([facility] [address] (Chronic) ICD-10-CM: J96.11  ICD-9-CM: 518.83, 799.02  3/29/2016 - Present Yes    Overview Addendum 1/30/2021  5:25 PM by Farnaz Carter MD     4+L chronically             BPH (benign prostatic hyperplasia) (Chronic) ICD-10-CM: N40.0  ICD-9-CM: 600.00  12/10/2015 - Present Yes        COPD (chronic obstructive pulmonary disease) ([facility] [address] (Chronic) ICD-10-CM: J44.9  ICD-9-CM: 122  12/10/2015 - Present Yes        Essential hypertension, benign (Chronic) ICD-10-CM: I10  ICD-9-CM: 401.1  12/10/2015 - Present Yes              Plan:  # Acute on chronic hypoxemic respiratory failure              - CT neg for PE, changes c/wi nfectious vs inflammatory in setting of severe COPD. COVID negative. Stable Airvo needs. Repeat CXR today. Reduce steroids to once daily. Con't abx.     # Generalized weakness/fall              - PT/OT. Nutrition.      # Adult failure to thrive              - Dietary consult.     # Severe COPD              - As above.      # Chronic Rojas    Other listed chronic conditions stable, continue current management. DC planning/Dispo: Back to facility when able.   Diet:  DIET REGULAR  DIET NUTRITIONAL SUPPLEMENTS  DVT ppx: Lovenox     Signed:  Jane Scott MD

## 2021-02-03 NOTE — PROGRESS NOTES
Pt became verbally and physically aggressive with this nurse and techs. Pt continues to pull off oxygen and tries to hit this nurse and techs when putting it back on him. Md notified and orders received for 2 point restraints.

## 2021-02-03 NOTE — PROGRESS NOTES
Hourly rounds performed. Pt remains on airvo 35L @ 57%. Pt is still confused, not combative. Will continue to monitor.

## 2021-02-03 NOTE — PROGRESS NOTES
Rounding done every hour. Patient resting in bed. No signs or symptoms of distress. Pt calm and relaxed this am. D/C restraints at this time. Patient denies any further needs or pain at this time.  Bed in low position and call light/ personal items within reach. Will continue to monitor and give bedside shift report to oncoming day shift nurse.

## 2021-02-04 LAB
ANION GAP SERPL CALC-SCNC: 4 MMOL/L (ref 7–16)
BACTERIA SPEC CULT: NORMAL
BACTERIA SPEC CULT: NORMAL
BASOPHILS # BLD: 0 K/UL (ref 0–0.2)
BASOPHILS NFR BLD: 0 % (ref 0–2)
BUN SERPL-MCNC: 40 MG/DL (ref 8–23)
CALCIUM SERPL-MCNC: 8.9 MG/DL (ref 8.3–10.4)
CHLORIDE SERPL-SCNC: 106 MMOL/L (ref 98–107)
CO2 SERPL-SCNC: 31 MMOL/L (ref 21–32)
CREAT SERPL-MCNC: 0.59 MG/DL (ref 0.8–1.5)
DIFFERENTIAL METHOD BLD: ABNORMAL
EOSINOPHIL # BLD: 0.1 K/UL (ref 0–0.8)
EOSINOPHIL NFR BLD: 0 % (ref 0.5–7.8)
ERYTHROCYTE [DISTWIDTH] IN BLOOD BY AUTOMATED COUNT: 13.4 % (ref 11.9–14.6)
GLUCOSE SERPL-MCNC: 101 MG/DL (ref 65–100)
HCT VFR BLD AUTO: 38.4 % (ref 41.1–50.3)
HGB BLD-MCNC: 11.7 G/DL (ref 13.6–17.2)
IMM GRANULOCYTES # BLD AUTO: 0.1 K/UL (ref 0–0.5)
IMM GRANULOCYTES NFR BLD AUTO: 1 % (ref 0–5)
LYMPHOCYTES # BLD: 1.7 K/UL (ref 0.5–4.6)
LYMPHOCYTES NFR BLD: 13 % (ref 13–44)
MCH RBC QN AUTO: 30.9 PG (ref 26.1–32.9)
MCHC RBC AUTO-ENTMCNC: 30.5 G/DL (ref 31.4–35)
MCV RBC AUTO: 101.3 FL (ref 79.6–97.8)
MONOCYTES # BLD: 1.6 K/UL (ref 0.1–1.3)
MONOCYTES NFR BLD: 13 % (ref 4–12)
NEUTS SEG # BLD: 9.1 K/UL (ref 1.7–8.2)
NEUTS SEG NFR BLD: 73 % (ref 43–78)
NRBC # BLD: 0 K/UL (ref 0–0.2)
PLATELET # BLD AUTO: 232 K/UL (ref 150–450)
PMV BLD AUTO: 9.8 FL (ref 9.4–12.3)
POTASSIUM SERPL-SCNC: 3.9 MMOL/L (ref 3.5–5.1)
RBC # BLD AUTO: 3.79 M/UL (ref 4.23–5.6)
SERVICE CMNT-IMP: NORMAL
SERVICE CMNT-IMP: NORMAL
SODIUM SERPL-SCNC: 141 MMOL/L (ref 138–145)
WBC # BLD AUTO: 12.5 K/UL (ref 4.3–11.1)

## 2021-02-04 PROCEDURE — 2709999900 HC NON-CHARGEABLE SUPPLY

## 2021-02-04 PROCEDURE — 74011250636 HC RX REV CODE- 250/636: Performed by: INTERNAL MEDICINE

## 2021-02-04 PROCEDURE — 74011250636 HC RX REV CODE- 250/636: Performed by: NURSE PRACTITIONER

## 2021-02-04 PROCEDURE — 65270000029 HC RM PRIVATE

## 2021-02-04 PROCEDURE — 80048 BASIC METABOLIC PNL TOTAL CA: CPT

## 2021-02-04 PROCEDURE — 94762 N-INVAS EAR/PLS OXIMTRY CONT: CPT

## 2021-02-04 PROCEDURE — 36415 COLL VENOUS BLD VENIPUNCTURE: CPT

## 2021-02-04 PROCEDURE — 85025 COMPLETE CBC W/AUTO DIFF WBC: CPT

## 2021-02-04 PROCEDURE — 77010033711 HC HIGH FLOW OXYGEN

## 2021-02-04 PROCEDURE — 74011250637 HC RX REV CODE- 250/637: Performed by: FAMILY MEDICINE

## 2021-02-04 PROCEDURE — 77030021668 HC NEB PREFIL KT VYRM -A

## 2021-02-04 PROCEDURE — 94640 AIRWAY INHALATION TREATMENT: CPT

## 2021-02-04 PROCEDURE — 74011250637 HC RX REV CODE- 250/637: Performed by: INTERNAL MEDICINE

## 2021-02-04 RX ORDER — LORAZEPAM 2 MG/ML
.5-1 INJECTION INTRAMUSCULAR
Status: DISCONTINUED | OUTPATIENT
Start: 2021-02-04 | End: 2021-02-10 | Stop reason: HOSPADM

## 2021-02-04 RX ADMIN — GUAIFENESIN 1200 MG: 600 TABLET ORAL at 09:10

## 2021-02-04 RX ADMIN — FINASTERIDE 5 MG: 5 TABLET, FILM COATED ORAL at 09:10

## 2021-02-04 RX ADMIN — Medication 400 MG: at 09:10

## 2021-02-04 RX ADMIN — FAMOTIDINE 20 MG: 20 TABLET, FILM COATED ORAL at 09:10

## 2021-02-04 RX ADMIN — NYSTATIN: 100000 POWDER TOPICAL at 18:00

## 2021-02-04 RX ADMIN — DULOXETINE HYDROCHLORIDE 30 MG: 30 CAPSULE, DELAYED RELEASE ORAL at 17:23

## 2021-02-04 RX ADMIN — LORAZEPAM 1 MG: 2 INJECTION INTRAMUSCULAR; INTRAVENOUS at 15:26

## 2021-02-04 RX ADMIN — Medication 10 ML: at 14:40

## 2021-02-04 RX ADMIN — Medication 10 ML: at 05:06

## 2021-02-04 RX ADMIN — Medication 10 ML: at 20:25

## 2021-02-04 RX ADMIN — NYSTATIN: 100000 POWDER TOPICAL at 09:10

## 2021-02-04 RX ADMIN — METHYLPREDNISOLONE SODIUM SUCCINATE 40 MG: 40 INJECTION, POWDER, FOR SOLUTION INTRAMUSCULAR; INTRAVENOUS at 09:10

## 2021-02-04 RX ADMIN — BUDESONIDE AND FORMOTEROL FUMARATE DIHYDRATE 2 PUFF: 160; 4.5 AEROSOL RESPIRATORY (INHALATION) at 07:38

## 2021-02-04 RX ADMIN — ENOXAPARIN SODIUM 40 MG: 40 INJECTION SUBCUTANEOUS at 09:11

## 2021-02-04 RX ADMIN — DULOXETINE HYDROCHLORIDE 30 MG: 30 CAPSULE, DELAYED RELEASE ORAL at 09:11

## 2021-02-04 RX ADMIN — LORAZEPAM 1 MG: 2 INJECTION INTRAMUSCULAR; INTRAVENOUS at 19:22

## 2021-02-04 RX ADMIN — ASPIRIN 81 MG: 81 TABLET ORAL at 09:10

## 2021-02-04 NOTE — PROGRESS NOTES
Alert, oriented to person. Restless and uncooperative early shift then went to sleep and has been fine. Restraints have not been needed this shift. Remains on airvo 35 liter, 65%  Hourly rounds completed. Resting in bed. Denies needs or pain at this time. Bed in low locked position. Call light and personal items within reach. Will continue to monitor and give report to oncoming day shift nurse.

## 2021-02-04 NOTE — PROGRESS NOTES
Pt removed his airvo and oxygen level desated to the 40s and 50s. Staff attempted to put back pt's airvo and pt started kicking and fighting staff. Restraints applied per order. Pt started pulling on his restraints. Pt sustained a huge skin tear while pulling and tugging on his restraints. Pt's top skin to his right hand pulled off to expose his muscles. Pt's hand bleeding very much. Sharona Sutton NP notified. Samy Johnson said to put dressing on wound and consult wound care and she will come later to check on pt. Will continue to monitor.

## 2021-02-04 NOTE — PROGRESS NOTES
Hospitalist Progress Note    Subjective:   Daily Progress Note: 2/4/2021 10:56 AM    Debilitated patient with dementia presented to ER 1/30 after mechanical fall on to his right side. Found with hypoxia requiring NRB, abgs with hypoxia. COVID negative, CT chest as below with severe chronic disease, slight infiltrate vs atelectasis posterior bases. Admitted on airvo, abx, steroids    2/4:  Continued need for airvo with sats in the low 90's. Removing prior to mitt placement. Follow up CXR as below, unchanged. Restless, aggressive, and agitated with mitts on most of shift. Attempting to punch and bite staff. No meaningful speech. Large skin tear right forearm prior to mitts placement when thrashing about. Bleeding controlled, Steristrips ineffective. Wound dressed, will get wound care tomorrow. ADDITIONAL HISTORY:  Severe COPD/Emphysema, chronic hypoxemic respiratory failure on 4 liters NC, ILD, advanced dementia, malnutrition.      Current Facility-Administered Medications   Medication Dose Route Frequency    dextrose 40% (GLUTOSE) oral gel 1 Tube  15 g Oral PRN    glucagon (GLUCAGEN) injection 1 mg  1 mg IntraMUSCular PRN    dextrose (D50W) injection syrg 12.5-25 g  25-50 mL IntraVENous PRN    methylPREDNISolone (PF) (SOLU-MEDROL) injection 40 mg  40 mg IntraVENous DAILY    albuterol-ipratropium (DUO-NEB) 2.5 MG-0.5 MG/3 ML  3 mL Nebulization Q4H PRN    aspirin delayed-release tablet 81 mg  81 mg Oral DAILY    DULoxetine (CYMBALTA) capsule 30 mg  30 mg Oral BID    finasteride (PROSCAR) tablet 5 mg  5 mg Oral DAILY    guaiFENesin ER (MUCINEX) tablet 1,200 mg  1,200 mg Oral DAILY    magnesium oxide (MAG-OX) tablet 400 mg  400 mg Oral DAILY    montelukast (SINGULAIR) tablet 10 mg  10 mg Oral QHS    sodium chloride (NS) flush 5-40 mL  5-40 mL IntraVENous Q8H    sodium chloride (NS) flush 5-40 mL  5-40 mL IntraVENous PRN    potassium chloride 10 mEq in 100 ml IVPB  10 mEq IntraVENous PRN    magnesium sulfate 2 g/50 ml IVPB (premix or compounded)  2 g IntraVENous PRN    acetaminophen (TYLENOL) tablet 650 mg  650 mg Oral Q6H PRN    Or    acetaminophen (TYLENOL) suppository 650 mg  650 mg Rectal Q6H PRN    polyethylene glycol (MIRALAX) packet 17 g  17 g Oral DAILY PRN    bisacodyL (DULCOLAX) suppository 10 mg  10 mg Rectal DAILY PRN    promethazine (PHENERGAN) tablet 25 mg  25 mg Oral Q6H PRN    Or    ondansetron (ZOFRAN) injection 4 mg  4 mg IntraVENous Q6H PRN    famotidine (PEPCID) tablet 20 mg  20 mg Oral DAILY    enoxaparin (LOVENOX) injection 40 mg  40 mg SubCUTAneous DAILY    budesonide-formoteroL (SYMBICORT) 160-4.5 mcg/actuation HFA inhaler 2 Puff  2 Puff Inhalation BID RT    nystatin (MYCOSTATIN) 100,000 unit/gram powder   Topical BID        Review of Systems  Patient is unable due to confusion. Objective:     Visit Vitals  /70 (BP 1 Location: Left upper arm)   Pulse 73   Temp 98 °F (36.7 °C)   Resp 16   Ht 5' 8\" (1.727 m)   Wt 53.2 kg (117 lb 3.2 oz)   SpO2 99%   BMI 17.82 kg/m²    O2 Flow Rate (L/min): 35 l/min O2 Device: Hi flow nasal cannula, Heated    Temp (24hrs), Av.5 °F (36.9 °C), Min:98 °F (36.7 °C), Max:98.9 °F (37.2 °C)     1901 -  0700  In: 710 [P.O.:710]  Out: 2300 [Urine:2300]    General appearance: Awake, confused, alert, attempting to bite and punch staff. No meaningful speech. Thin and frail. Head: Normocephalic, without obvious abnormality, atraumatic  Eyes: conjunctivae/corneas clear. PERRL, EOM's intact. Fundi benign  Throat: Lips, mucosa, and tongue dry. Teeth and gums normal  Neck: supple, symmetrical, trachea midline, and no JVD  Lungs: Diminished to auscultation bilaterally. Continued airvo. Heart: regular rate and rhythm, S1, S2 normal, no murmur, click, rub or gallop  Abdomen: Flat, soft, non-tender. Bowel sounds normal. No masses,  no organomegaly  Extremities:  All extremities normal with the exception of multiple bruises, large skin tear as above. No cyanosis or edema  Skin: Skin color, texture, turgor thin, dry. Multiple bruises and skin tears. No rashes or lesions  Neurologic: Moving all extremities equally. Surprisingly strong. Additional comments: Notes,orders, test results, vitals reviewed    Data Review  Recent Results (from the past 24 hour(s))   CBC WITH AUTOMATED DIFF    Collection Time: 02/04/21  6:37 AM   Result Value Ref Range    WBC 12.5 (H) 4.3 - 11.1 K/uL    RBC 3.79 (L) 4.23 - 5.6 M/uL    HGB 11.7 (L) 13.6 - 17.2 g/dL    HCT 38.4 (L) 41.1 - 50.3 %    .3 (H) 79.6 - 97.8 FL    MCH 30.9 26.1 - 32.9 PG    MCHC 30.5 (L) 31.4 - 35.0 g/dL    RDW 13.4 11.9 - 14.6 %    PLATELET 192 556 - 230 K/uL    MPV 9.8 9.4 - 12.3 FL    ABSOLUTE NRBC 0.00 0.0 - 0.2 K/uL    DF AUTOMATED      NEUTROPHILS 73 43 - 78 %    LYMPHOCYTES 13 13 - 44 %    MONOCYTES 13 (H) 4.0 - 12.0 %    EOSINOPHILS 0 (L) 0.5 - 7.8 %    BASOPHILS 0 0.0 - 2.0 %    IMMATURE GRANULOCYTES 1 0.0 - 5.0 %    ABS. NEUTROPHILS 9.1 (H) 1.7 - 8.2 K/UL    ABS. LYMPHOCYTES 1.7 0.5 - 4.6 K/UL    ABS. MONOCYTES 1.6 (H) 0.1 - 1.3 K/UL    ABS. EOSINOPHILS 0.1 0.0 - 0.8 K/UL    ABS. BASOPHILS 0.0 0.0 - 0.2 K/UL    ABS. IMM. GRANS. 0.1 0.0 - 0.5 K/UL   METABOLIC PANEL, BASIC    Collection Time: 02/04/21  6:37 AM   Result Value Ref Range    Sodium 141 138 - 145 mmol/L    Potassium 3.9 3.5 - 5.1 mmol/L    Chloride 106 98 - 107 mmol/L    CO2 31 21 - 32 mmol/L    Anion gap 4 (L) 7 - 16 mmol/L    Glucose 101 (H) 65 - 100 mg/dL    BUN 40 (H) 8 - 23 MG/DL    Creatinine 0.59 (L) 0.8 - 1.5 MG/DL    GFR est AA >60 >60 ml/min/1.73m2    GFR est non-AA >60 >60 ml/min/1.73m2    Calcium 8.9 8.3 - 10.4 MG/DL     1/30:  CXR:  Extensive chronic lung disease. Possible residual background infiltrate.     1/30:  RIGHT FEMUR AND HIP XRAY:  No acute findings. 1/30:  CT CHEST:   No evidence of pulmonary embolus. Severe emphysema. Minimal bibasilar infiltrate.     2/3:  CXR:  Chronic interstitial lung changes unchanged from the prior exam    1/29:  URINE CULTURE:    > 100,000 COLONIES PSEUDOMONAS AERUGINOSA:  Pending sensitivity      Assessment/Plan:   ACUTE ON CHRONIC RESPIRATORY FAILURE WITH HYPOXIA:     CT negative for PE   Continues to require airvo   Repeat CXR unchanged.    Steroids reduced yesterday     GENERALIZED WEAKNESS, FALLS   PT/OT/Nutrition     SEVERE COPD/EMPHYSEMA:  As above    Continue aerosols     CHRONIC CALLEJAS:  Cause for recurrent UTI     HYPERTENSION:  Home meds     DNR:  Noted     DEMENTIA:  Advanced    Start depakote sprinkles 125 mg bid tomorrow     SEVERE PROTEIN CALORIE MALNUTRITION/FTT:  Nutrition on board with supplements   Encourage po intake     ACUTE ON RECURRENT UTI:  Finished 3 doses levaquin   Culture above, pending sensitivity    Adding to confusion     Return to facility when weaned off [address] discussed with: Nurse    Signed By: Yanique Cabrera NP     February 4, 2021

## 2021-02-04 NOTE — PROGRESS NOTES
Hourly rounds completed throughout this shift. Pt is still very agitated and violent. Sitter at bed side. Pt resting in bed; denies needs at this time. Will continue to monitor and report to oncoming night shift nurse.

## 2021-02-05 LAB
ALBUMIN SERPL-MCNC: 2.8 G/DL (ref 3.2–4.6)
ALBUMIN/GLOB SERPL: 0.8 {RATIO} (ref 1.2–3.5)
ALP SERPL-CCNC: 85 U/L (ref 50–136)
ALT SERPL-CCNC: 21 U/L (ref 12–65)
ANION GAP SERPL CALC-SCNC: 5 MMOL/L (ref 7–16)
AST SERPL-CCNC: 23 U/L (ref 15–37)
BASOPHILS # BLD: 0 K/UL (ref 0–0.2)
BASOPHILS NFR BLD: 0 % (ref 0–2)
BILIRUB SERPL-MCNC: 0.8 MG/DL (ref 0.2–1.1)
BUN SERPL-MCNC: 41 MG/DL (ref 8–23)
CALCIUM SERPL-MCNC: 8.7 MG/DL (ref 8.3–10.4)
CHLORIDE SERPL-SCNC: 107 MMOL/L (ref 98–107)
CO2 SERPL-SCNC: 30 MMOL/L (ref 21–32)
CREAT SERPL-MCNC: 0.62 MG/DL (ref 0.8–1.5)
DIFFERENTIAL METHOD BLD: ABNORMAL
EOSINOPHIL # BLD: 0 K/UL (ref 0–0.8)
EOSINOPHIL NFR BLD: 0 % (ref 0.5–7.8)
ERYTHROCYTE [DISTWIDTH] IN BLOOD BY AUTOMATED COUNT: 13.7 % (ref 11.9–14.6)
GLOBULIN SER CALC-MCNC: 3.4 G/DL (ref 2.3–3.5)
GLUCOSE SERPL-MCNC: 112 MG/DL (ref 65–100)
HCT VFR BLD AUTO: 39.4 % (ref 41.1–50.3)
HGB BLD-MCNC: 12 G/DL (ref 13.6–17.2)
IMM GRANULOCYTES # BLD AUTO: 0.1 K/UL (ref 0–0.5)
IMM GRANULOCYTES NFR BLD AUTO: 1 % (ref 0–5)
LYMPHOCYTES # BLD: 1.9 K/UL (ref 0.5–4.6)
LYMPHOCYTES NFR BLD: 11 % (ref 13–44)
MAGNESIUM SERPL-MCNC: 2.5 MG/DL (ref 1.8–2.4)
MCH RBC QN AUTO: 31.3 PG (ref 26.1–32.9)
MCHC RBC AUTO-ENTMCNC: 30.5 G/DL (ref 31.4–35)
MCV RBC AUTO: 102.6 FL (ref 79.6–97.8)
MONOCYTES # BLD: 1.8 K/UL (ref 0.1–1.3)
MONOCYTES NFR BLD: 11 % (ref 4–12)
NEUTS SEG # BLD: 12.7 K/UL (ref 1.7–8.2)
NEUTS SEG NFR BLD: 77 % (ref 43–78)
NRBC # BLD: 0 K/UL (ref 0–0.2)
PLATELET # BLD AUTO: 264 K/UL (ref 150–450)
PMV BLD AUTO: 9.9 FL (ref 9.4–12.3)
POTASSIUM SERPL-SCNC: 3.9 MMOL/L (ref 3.5–5.1)
PROT SERPL-MCNC: 6.2 G/DL (ref 6.3–8.2)
RBC # BLD AUTO: 3.84 M/UL (ref 4.23–5.6)
SODIUM SERPL-SCNC: 142 MMOL/L (ref 136–145)
WBC # BLD AUTO: 16.5 K/UL (ref 4.3–11.1)

## 2021-02-05 PROCEDURE — 65270000029 HC RM PRIVATE

## 2021-02-05 PROCEDURE — 80053 COMPREHEN METABOLIC PANEL: CPT

## 2021-02-05 PROCEDURE — 74011250637 HC RX REV CODE- 250/637: Performed by: INTERNAL MEDICINE

## 2021-02-05 PROCEDURE — 97530 THERAPEUTIC ACTIVITIES: CPT

## 2021-02-05 PROCEDURE — 83735 ASSAY OF MAGNESIUM: CPT

## 2021-02-05 PROCEDURE — 97535 SELF CARE MNGMENT TRAINING: CPT

## 2021-02-05 PROCEDURE — 74011250636 HC RX REV CODE- 250/636: Performed by: INTERNAL MEDICINE

## 2021-02-05 PROCEDURE — 85025 COMPLETE CBC W/AUTO DIFF WBC: CPT

## 2021-02-05 PROCEDURE — 74011250637 HC RX REV CODE- 250/637: Performed by: NURSE PRACTITIONER

## 2021-02-05 PROCEDURE — 94762 N-INVAS EAR/PLS OXIMTRY CONT: CPT

## 2021-02-05 PROCEDURE — 2709999900 HC NON-CHARGEABLE SUPPLY

## 2021-02-05 PROCEDURE — 36415 COLL VENOUS BLD VENIPUNCTURE: CPT

## 2021-02-05 RX ORDER — DIVALPROEX SODIUM 125 MG/1
125 CAPSULE, COATED PELLETS ORAL 2 TIMES DAILY
Status: DISCONTINUED | OUTPATIENT
Start: 2021-02-05 | End: 2021-02-10 | Stop reason: HOSPADM

## 2021-02-05 RX ADMIN — FINASTERIDE 5 MG: 5 TABLET, FILM COATED ORAL at 09:52

## 2021-02-05 RX ADMIN — GUAIFENESIN 1200 MG: 600 TABLET ORAL at 09:51

## 2021-02-05 RX ADMIN — DIVALPROEX SODIUM 125 MG: 125 CAPSULE ORAL at 22:00

## 2021-02-05 RX ADMIN — ASPIRIN 81 MG: 81 TABLET ORAL at 09:51

## 2021-02-05 RX ADMIN — MONTELUKAST SODIUM 10 MG: 10 TABLET, FILM COATED ORAL at 22:48

## 2021-02-05 RX ADMIN — Medication 400 MG: at 09:51

## 2021-02-05 RX ADMIN — ENOXAPARIN SODIUM 40 MG: 40 INJECTION SUBCUTANEOUS at 09:50

## 2021-02-05 RX ADMIN — Medication 10 ML: at 14:10

## 2021-02-05 RX ADMIN — DULOXETINE HYDROCHLORIDE 30 MG: 30 CAPSULE, DELAYED RELEASE ORAL at 22:00

## 2021-02-05 RX ADMIN — NYSTATIN: 100000 POWDER TOPICAL at 09:00

## 2021-02-05 RX ADMIN — Medication 10 ML: at 05:09

## 2021-02-05 RX ADMIN — NYSTATIN: 100000 POWDER TOPICAL at 18:00

## 2021-02-05 RX ADMIN — METHYLPREDNISOLONE SODIUM SUCCINATE 40 MG: 40 INJECTION, POWDER, FOR SOLUTION INTRAMUSCULAR; INTRAVENOUS at 09:51

## 2021-02-05 RX ADMIN — Medication 10 ML: at 22:55

## 2021-02-05 RX ADMIN — DIVALPROEX SODIUM 125 MG: 125 CAPSULE ORAL at 09:51

## 2021-02-05 RX ADMIN — FAMOTIDINE 20 MG: 20 TABLET, FILM COATED ORAL at 09:51

## 2021-02-05 RX ADMIN — DULOXETINE HYDROCHLORIDE 30 MG: 30 CAPSULE, DELAYED RELEASE ORAL at 09:52

## 2021-02-05 NOTE — PROGRESS NOTES
ACUTE OT GOALS:  (Developed with and agreed upon by patient and/or caregiver.)  1. Patient will complete upper body bathing and dressing with SBA and adaptive equipment as needed. 2. Patient will complete rolling side to side for hygiene/positioning with supervision. 3. Patient will tolerate 23 minutes of OT treatment with 2-3 rest breaks to increase activity tolerance for ADLs. 4. Patient will complete sitting to the edge of the bed with minimal assistance to tolerance upright sitting for ADL. 5. Patient will complete therapeutic exercises for 8 minutes to improve strength for ADL/functional transfers. 6. Patient will complete functional transfers to the chair/BSC within 3 visits to demonstrate advancement with transfers for ADL.    Timeframe: 7 visits     OCCUPATIONAL THERAPY: Daily Note OT Treatment Day # 3    Jason Ronquillo is a 80 y.o. male   PRIMARY DIAGNOSIS: Acute on chronic respiratory failure with hypoxia (HCC)  Acute on chronic respiratory failure with hypoxia (HCC) [J96.21]       [place] / [place] / Product Type: Medicare /   ASSESSMENT:     REHAB RECOMMENDATIONS: CURRENT LEVEL OF FUNCTION:  (Most Recently Demonstrated)   Recommendation to date pending progress:  Setting:   Short-term Rehab  Equipment:    To Be Determined Bathing:   Not tested  Dressing:   Not tested  Feeding/Grooming:   Standby Assistance  Toileting:   Not tested  Functional Mobility:   Not tested     ASSESSMENT:  Mr. Pat Ge has restraints in place and sitter in place. Pt was calm and cooperative this afternoon with supportive wife at bedside. Pt did not scream in pain today with movement and demonstrated improved bed mobility for sitting edge of bed. Pt's balance was fair to good edge of bed with wife sitting at edge of bed and they held hands and pt worked on upright posture in sitting. O2 levels stable throughout on the Airvo. Pt brushed teeth sitting edge of bed with SBA.  Pt also worked on standing edge of bed and did remarkably well. Pt motivated to stand and hug wife and was able to stand with minimal assistance and stood for at least a minute as they hugged. Pt stood multiple times and was unsteady but did better trying it on his own with CGA/min A to the walker. Pt did finally report he was fatigued and returned back to supine in bed at end of session. Great session overall. SUBJECTIVE:   Mr. Sherly Mayorga states, \"I'm gonna fall! \"    SOCIAL HISTORY/LIVING ENVIRONMENT:        OBJECTIVE:     PAIN: VITAL SIGNS: LINES/DRAINS:   Pre Treatment: Pain Screen  Pain Scale 1: Numeric (0 - 10)  Pain Intensity 1: 0  Post Treatment: 0   Rojas Catheter  O2 Device: Heated, Hi flow nasal cannula     ACTIVITIES OF DAILY LIVING: I Mod I S SBA CGA Min Mod Max Total NT Comments   BASIC ADLs:              Bathing/ Showering [] [] [] [] [] [] [] [] [] [x]    Toileting [] [] [] [] [] [] [] [] [] [x]    Dressing [] [] [] [] [] [] [] [] [] [x]    Feeding [] [] [] [] [] [] [] [] [] [x]    Grooming [] [] [] [x] [] [] [] [] [] []    Personal Device Care [] [] [] [] [] [] [] [] [] [x]    Functional Mobility [] [] [] [] [] [] [] [] [] [x]    I=Independent, Mod I=Modified Independent, S=Supervision, SBA=Standby Assistance, CGA=Contact Guard Assistance,   Min=Minimal Assistance, Mod=Moderate Assistance, Max=Maximal Assistance, Total=Total Assistance, NT=Not Tested    MOBILITY: I Mod I S SBA CGA Min Mod Max Total  NT x2 Comments:   Supine to sit [] [] [] [] [] [] [x] [x] [] [] [x]    Sit to supine [] [] [] [] [] [] [x] [x] [] [] [x]    Sit to stand [] [] [] [] [] [] [x] [x] [] [] [x]    Bed to chair [] [] [] [] [] [] [] [] [] [x] []    I=Independent, Mod I=Modified Independent, S=Supervision, SBA=Standby Assistance, CGA=Contact Guard Assistance,   Min=Minimal Assistance, Mod=Moderate Assistance, Max=Maximal Assistance, Total=Total Assistance, NT=Not Tested    PLAN:   FREQUENCY/DURATION: OT Plan of Care: 3 times/week for duration of hospital stay or until stated goals are met, whichever comes first.    TREATMENT:   TREATMENT:   ($$ Self Care/Home Management: 8-22 mins$$ Therapeutic Activity: 23-37 mins   )  Co-Treatment PT/OT necessary due to patient's decreased overall endurance/tolerance levels, as well as need for high level skilled assistance to complete functional transfers/mobility and functional tasks  Self Care (15 Minutes): Self care including Grooming to increase independence and decrease level of assistance required. Neuromuscular Re-education (8 Minutes): Neuromuscular Re-education included Balance Training, Sitting balance training and Standing balance training to improve Balance, Coordination, Functional Mobility and Postural Control.     AFTER TREATMENT POSITION/PRECAUTIONS:  Bed, Needs within reach and RN notified    INTERDISCIPLINARY COLLABORATION:  RN/PCT, PT/PTA and OT/SOTO    TOTAL TREATMENT DURATION:  OT Patient Time In/Time Out  Time In: 1578  Time Out: Francis 1036, OT

## 2021-02-05 NOTE — WOUND CARE
Pt seen for right hand/arm skin tears from pt trying to get out of restraints. Currently pt able to follow commands and not agitated. Removed right wrist restraint and removed dressings to right hand/arm wounds. Right medial arm wound with significant skin loss , unable to steri strip at this time, some areas have clotted off over wound bed, other than that wound bed is clean and pink. Cleansed area with dermal wound cleanser and applied thick layer of xeroform over open area covered with ABD pad and secured with kerlix/neris gauze wrap. Anterior right arm/hand wounds with steri strips in place, no drainage noted, cleansed and covered with xeroform, ABD and kerlix/neris. Wound to right upper, anterior arm with pink, clean wound bed, able to cleanse and apply steri strips to that area, covered with xeroform, ABD and kerlix/neris gauze wrap. Recommend daily dressings changes and as needed. Also recommend extra ABD and kerlix/neris for extra padding over wrist restraint to decrease possibly opening wounds again. Pt tolerated well. Wound team will continue to follow while in acute care setting.

## 2021-02-05 NOTE — PROGRESS NOTES
Problem: Non-Violent Restraints  Goal: *Removal from restraints as soon as assessed to be safe  Outcome: Not Progressing Towards Goal  Goal: *No harm/injury to patient while restraints in use  Outcome: Not Progressing Towards Goal  Goal: *Patient's dignity will be maintained  Outcome: Not Progressing Towards Goal  Goal: *Patient Specific Goal (EDIT GOAL, INSERT TEXT)  Outcome: Not Progressing Towards Goal  Goal: Non-violent Restaints:Standard Interventions  Outcome: Not Progressing Towards Goal  Goal: Non-violent Restraints:Patient Interventions  Outcome: Not Progressing Towards Goal  Goal: Patient/Family Education  Outcome: Not Progressing Towards Goal     Problem: Falls - Risk of  Goal: *Absence of Falls  Description: Document Senthil Fall Risk and appropriate interventions in the flowsheet. Outcome: Not Progressing Towards Goal  Note: Fall Risk Interventions:  Mobility Interventions: Bed/chair exit alarm    Mentation Interventions: Bed/chair exit alarm, Family/sitter at bedside    Medication Interventions: Bed/chair exit alarm    Elimination Interventions: Call light in reach    History of Falls Interventions: Bed/chair exit alarm         Problem: Patient Education: Go to Patient Education Activity  Goal: Patient/Family Education  Outcome: Not Progressing Towards Goal     Problem: Pressure Injury - Risk of  Goal: *Prevention of pressure injury  Description: Document Brant Scale and appropriate interventions in the flowsheet.   Outcome: Not Progressing Towards Goal  Note: Pressure Injury Interventions:  Sensory Interventions: Assess changes in LOC    Moisture Interventions: Check for incontinence Q2 hours and as needed    Activity Interventions: Pressure redistribution bed/mattress(bed type)    Mobility Interventions: Pressure redistribution bed/mattress (bed type)    Nutrition Interventions: Document food/fluid/supplement intake    Friction and Shear Interventions: Apply protective barrier, creams and emollients                Problem: Patient Education: Go to Patient Education Activity  Goal: Patient/Family Education  Outcome: Not Progressing Towards Goal     Problem: Patient Education: Go to Patient Education Activity  Goal: Patient/Family Education  Outcome: Not Progressing Towards Goal     Problem: Patient Education: Go to Patient Education Activity  Goal: Patient/Family Education  Outcome: Not Progressing Towards Goal     Problem: Gas Exchange - Impaired  Goal: *Absence of hypoxia  Outcome: Not Progressing Towards Goal     Problem: Patient Education: Go to Patient Education Activity  Goal: Patient/Family Education  Outcome: Not Progressing Towards Goal

## 2021-02-05 NOTE — PROGRESS NOTES
ACUTE PHYSICAL THERAPY GOALS:  (Developed with and agreed upon by patient and/or caregiver. )  LTG:  (1.)Mr. Sandra Ndiaye will move from supine to sit and sit to supine , scoot up and down, and roll side to side in bed with MINIMAL ASSIST within 7 treatment day(s). (2.)Mr. Sandra Ndiaye will transfer from bed to chair and chair to bed with MINIMAL ASSIST using the least restrictive device within 7 treatment day(s). (3.)Mr. Sandra Ndiaye will ambulate with MINIMAL ASSIST for 25+ feet with the least restrictive device within 7 treatment day(s). (4.)Mr. Sandra Ndiaye will perform sit-stand x3  SUPERVISION without using UEs under 20 seconds within 7 treatment day(s). PHYSICAL THERAPY: Daily Note and AM Treatment Day # 3    Tatyana Mckee is a 80 y.o. male   PRIMARY DIAGNOSIS: Acute on chronic respiratory failure with hypoxia (HCC)  Acute on chronic respiratory failure with hypoxia (HCC) [J96.21]         ASSESSMENT:     REHAB RECOMMENDATIONS: CURRENT LEVEL OF FUNCTION:  (Most Recently Demonstrated)   Recommendation to date pending progress:  Setting:   Short-term Rehab  Equipment:    To Be Determined Bed Mobility:   Minimal Assistance  Sit to Stand:   Moderate Assistance,   Transfers:   Not tested  Gait/Mobility:   Not tested     ASSESSMENT:  Mr. Sandra Ndiaye on [place], 2 restraints, Pt states comfortable at rest, does scream in pain with any movement of R hip. Pt required min A for bed mob, mod A for supine to sit due to pain. Once seated EOB, pt with good static sitting balance. Pt required encouragement to perform sit to stand attempts at bedside, declined asst initially but required once pt started to clear bed. Standing tolerance 2min in flexed posture. Attempted sit to stand x2 2 more, but pt shut down after 2nd effort. unable to come to full standing with 2nd/3rd attempts. . Pt keeps B knees flexed, cues required for posture, weight shifting. Pt encouraged to stretch R LE out in supine instead of maintaining flexed position.  Pt making slow progress toward goals, additional time required for activity. SUBJECTIVE:   Mr. Rangel Churchill states, \"I need to go slow. \"    SOCIAL HISTORY/ LIVING ENVIRONMENT: from [facility]     OBJECTIVE:     PAIN: VITAL SIGNS: LINES/DRAINS:   Pre Treatment: Pain Screen  Pain Scale 1: Numeric (0 - 10)  Pain Intensity 1: 0  Pain Location 1: Hip  Pain Orientation 1: Right  Post Treatment: 0 at rest   50L, 60% airvo  O2 Device: Heated, Hi flow nasal cannula     MOBILITY: I Mod I S SBA CGA Min Mod Max Total  NT x2 Comments:   Bed Mobility    Rolling [] [] [] [] [] [x] [] [] [] [] []    Supine to Sit [] [] [] [] [] [] [x] [] [] [] []    Scooting [] [] [] [] [] [x] [] [] [] [] []    Sit to Supine [] [] [] [] [] [x] [] [] [] [] []    Transfers    Sit to Stand [] [] [] [] [] [] [x] [] [] [] []    Bed to Chair [] [] [] [] [] [] [] [] [] [x] []    Stand to Sit [] [] [] [] [] [] [x] [] [] [] []    I=Independent, Mod I=Modified Independent, S=Supervision, SBA=Standby Assistance, CGA=Contact Guard Assistance,   Min=Minimal Assistance, Mod=Moderate Assistance, Max=Maximal Assistance, Total=Total Assistance, NT=Not Tested    GAIT: I Mod I S SBA CGA Min Mod Max Total  NT x2 Comments:   Level of Assistance [] [] [] [] [] [] [] [] [] [] [] On airvo, limited activity due to R pain   Distance     DME None    Gait Quality     Weightbearing  Status N/A     I=Independent, Mod I=Modified Independent, S=Supervision, SBA=Standby Assistance, CGA=Contact Guard Assistance,   Min=Minimal Assistance, Mod=Moderate Assistance, Max=Maximal Assistance, Total=Total Assistance, NT=Not Tested    PLAN:   FREQUENCY/DURATION: PT Plan of Care: 3 times/week for duration of hospital stay or until stated goals are met, whichever comes first.  TREATMENT:     TREATMENT:   ($$ Therapeutic Activity: 23-37 mins    )  Therapeutic Activity (23 Minutes):  Therapeutic activity included Supine to Sit, Sit to Supine, Transfer Training, Ambulation on level ground, Sitting balance and Standing balance to improve functional Mobility, Strength, ROM and Activity tolerance.      AFTER TREATMENT POSITION/PRECAUTIONS:  Bed, Needs within reach and RN notified    INTERDISCIPLINARY COLLABORATION:  RN/PCT, PT/PTA and OT/SOTO    TOTAL TREATMENT DURATION:  PT Patient Time In/Time Out  Time In: 1020  Time Out: 1050    Da Arias DPT

## 2021-02-05 NOTE — PROGRESS NOTES
Chart reviewed for discharge planning. Patient has LTC bed at [facility]. Per notes, currently requiring oxygen at 50L/80%. CM will continue to follow and will assist with discharge back to LTC when stable.

## 2021-02-05 NOTE — PROGRESS NOTES
Alert, turned, asked for water; given 10 ml via syringe. HOB elevated, air vo 50 liter 80%. On continuous SAT monitor. Will continue restraints but do not see need for ativan at this time hoping he will eat breakfast and take am meds. Sitter at bedside. Hourly rounds completed. Resting in bed. Bed in low locked position. Bed alarm on. Call light and personal items within reach. Will continue to monitor and give report to oncoming day shift nurse.

## 2021-02-05 NOTE — PROGRESS NOTES
Resting on side. SAT>92. Easily woken and begins to be aggressive again. Restraints remain in place and sitter at bedside.

## 2021-02-06 LAB
ANION GAP SERPL CALC-SCNC: 5 MMOL/L (ref 7–16)
BACTERIA SPEC CULT: ABNORMAL
BACTERIA SPEC CULT: ABNORMAL
BASOPHILS # BLD: 0 K/UL (ref 0–0.2)
BASOPHILS NFR BLD: 0 % (ref 0–2)
BUN SERPL-MCNC: 38 MG/DL (ref 8–23)
CALCIUM SERPL-MCNC: 8.6 MG/DL (ref 8.3–10.4)
CHLORIDE SERPL-SCNC: 104 MMOL/L (ref 98–107)
CO2 SERPL-SCNC: 30 MMOL/L (ref 21–32)
CREAT SERPL-MCNC: 0.67 MG/DL (ref 0.8–1.5)
DIFFERENTIAL METHOD BLD: ABNORMAL
EOSINOPHIL # BLD: 0 K/UL (ref 0–0.8)
EOSINOPHIL NFR BLD: 0 % (ref 0.5–7.8)
ERYTHROCYTE [DISTWIDTH] IN BLOOD BY AUTOMATED COUNT: 13.3 % (ref 11.9–14.6)
GLUCOSE SERPL-MCNC: 120 MG/DL (ref 65–100)
HCT VFR BLD AUTO: 35.6 % (ref 41.1–50.3)
HGB BLD-MCNC: 11.2 G/DL (ref 13.6–17.2)
IMM GRANULOCYTES # BLD AUTO: 0.2 K/UL (ref 0–0.5)
IMM GRANULOCYTES NFR BLD AUTO: 1 % (ref 0–5)
LYMPHOCYTES # BLD: 1.2 K/UL (ref 0.5–4.6)
LYMPHOCYTES NFR BLD: 6 % (ref 13–44)
MAGNESIUM SERPL-MCNC: 2.4 MG/DL (ref 1.8–2.4)
MCH RBC QN AUTO: 31.6 PG (ref 26.1–32.9)
MCHC RBC AUTO-ENTMCNC: 31.5 G/DL (ref 31.4–35)
MCV RBC AUTO: 100.6 FL (ref 79.6–97.8)
MONOCYTES # BLD: 0.9 K/UL (ref 0.1–1.3)
MONOCYTES NFR BLD: 5 % (ref 4–12)
NEUTS SEG # BLD: 15.9 K/UL (ref 1.7–8.2)
NEUTS SEG NFR BLD: 87 % (ref 43–78)
NRBC # BLD: 0 K/UL (ref 0–0.2)
PLATELET # BLD AUTO: 290 K/UL (ref 150–450)
PMV BLD AUTO: 10.5 FL (ref 9.4–12.3)
POTASSIUM SERPL-SCNC: 4.4 MMOL/L (ref 3.5–5.1)
RBC # BLD AUTO: 3.54 M/UL (ref 4.23–5.6)
SERVICE CMNT-IMP: ABNORMAL
SODIUM SERPL-SCNC: 139 MMOL/L (ref 138–145)
WBC # BLD AUTO: 18.1 K/UL (ref 4.3–11.1)

## 2021-02-06 PROCEDURE — 83735 ASSAY OF MAGNESIUM: CPT

## 2021-02-06 PROCEDURE — 74011636637 HC RX REV CODE- 636/637: Performed by: NURSE PRACTITIONER

## 2021-02-06 PROCEDURE — 74011250636 HC RX REV CODE- 250/636: Performed by: NURSE PRACTITIONER

## 2021-02-06 PROCEDURE — 80048 BASIC METABOLIC PNL TOTAL CA: CPT

## 2021-02-06 PROCEDURE — 94762 N-INVAS EAR/PLS OXIMTRY CONT: CPT

## 2021-02-06 PROCEDURE — 77030040393 HC DRSG OPTIFOAM GENT MDII -B

## 2021-02-06 PROCEDURE — 94640 AIRWAY INHALATION TREATMENT: CPT

## 2021-02-06 PROCEDURE — 36415 COLL VENOUS BLD VENIPUNCTURE: CPT

## 2021-02-06 PROCEDURE — 74011250637 HC RX REV CODE- 250/637: Performed by: INTERNAL MEDICINE

## 2021-02-06 PROCEDURE — 74011250636 HC RX REV CODE- 250/636: Performed by: INTERNAL MEDICINE

## 2021-02-06 PROCEDURE — 77010033711 HC HIGH FLOW OXYGEN

## 2021-02-06 PROCEDURE — 65270000029 HC RM PRIVATE

## 2021-02-06 PROCEDURE — 74011250637 HC RX REV CODE- 250/637: Performed by: NURSE PRACTITIONER

## 2021-02-06 PROCEDURE — 2709999900 HC NON-CHARGEABLE SUPPLY

## 2021-02-06 PROCEDURE — 85025 COMPLETE CBC W/AUTO DIFF WBC: CPT

## 2021-02-06 RX ORDER — PREDNISONE 20 MG/1
20 TABLET ORAL
Status: COMPLETED | OUTPATIENT
Start: 2021-02-06 | End: 2021-02-08

## 2021-02-06 RX ADMIN — DIVALPROEX SODIUM 125 MG: 125 CAPSULE ORAL at 09:19

## 2021-02-06 RX ADMIN — DULOXETINE HYDROCHLORIDE 30 MG: 30 CAPSULE, DELAYED RELEASE ORAL at 17:07

## 2021-02-06 RX ADMIN — Medication 10 ML: at 05:07

## 2021-02-06 RX ADMIN — FAMOTIDINE 20 MG: 20 TABLET, FILM COATED ORAL at 09:19

## 2021-02-06 RX ADMIN — BUDESONIDE AND FORMOTEROL FUMARATE DIHYDRATE 2 PUFF: 160; 4.5 AEROSOL RESPIRATORY (INHALATION) at 08:07

## 2021-02-06 RX ADMIN — Medication 10 ML: at 22:00

## 2021-02-06 RX ADMIN — FINASTERIDE 5 MG: 5 TABLET, FILM COATED ORAL at 09:19

## 2021-02-06 RX ADMIN — MONTELUKAST SODIUM 10 MG: 10 TABLET, FILM COATED ORAL at 22:00

## 2021-02-06 RX ADMIN — ENOXAPARIN SODIUM 40 MG: 40 INJECTION SUBCUTANEOUS at 09:18

## 2021-02-06 RX ADMIN — ACETAMINOPHEN 650 MG: 325 TABLET ORAL at 12:09

## 2021-02-06 RX ADMIN — DULOXETINE HYDROCHLORIDE 30 MG: 30 CAPSULE, DELAYED RELEASE ORAL at 09:19

## 2021-02-06 RX ADMIN — DIVALPROEX SODIUM 125 MG: 125 CAPSULE ORAL at 17:07

## 2021-02-06 RX ADMIN — ASPIRIN 81 MG: 81 TABLET ORAL at 09:19

## 2021-02-06 RX ADMIN — NYSTATIN: 100000 POWDER TOPICAL at 18:00

## 2021-02-06 RX ADMIN — LORAZEPAM 1 MG: 2 INJECTION INTRAMUSCULAR; INTRAVENOUS at 20:22

## 2021-02-06 RX ADMIN — NYSTATIN: 100000 POWDER TOPICAL at 09:00

## 2021-02-06 RX ADMIN — Medication 10 ML: at 13:15

## 2021-02-06 RX ADMIN — GUAIFENESIN 1200 MG: 600 TABLET ORAL at 09:19

## 2021-02-06 RX ADMIN — Medication 400 MG: at 09:19

## 2021-02-06 RX ADMIN — PREDNISONE 20 MG: 20 TABLET ORAL at 09:19

## 2021-02-06 NOTE — PROGRESS NOTES
Hourly rounds completed throughout this shift. Pt resting in bed; restraints removed around 1600 and pt has been calm. Sitter in room. Pt  denies needs at this time. Will continue to monitor and report to oncoming night shift nurse.

## 2021-02-06 NOTE — PROGRESS NOTES
Hourly rounds completed throughout this shift. Sitter at bed side, pt has been calm and cooperative this shift. Pt resting in bed; denies needs at this time. Will continue to monitor and report to oncoming night shift nurse.

## 2021-02-06 NOTE — PROGRESS NOTES
Remained calm/cooperative through night and did not need restraints. Occasional congested cough. On airvo at this time. Will pass on in report and to RT to wean off airvo. Hourly rounds completed. Resting in bed. Denies needs or pain at this time. Bed in low locked position. Call light and personal items within reach. Will continue to monitor and give report to oncoming day shift nurse.

## 2021-02-06 NOTE — PROGRESS NOTES
Hospitalist Progress Note    Subjective:   Daily Progress Note: 2/5/2021 1244    Debilitated patient with dementia presented to ER 1/30 after mechanical fall on to his right side. Found with hypoxia requiring NRB, abgs with hypoxia. COVID negative, CT chest as below with severe chronic disease, slight infiltrate vs atelectasis posterior bases. Admitted on airvo, abx, steroids   2/4:  Continued need for airvo with sats in the low 90's. Removing prior to mitt placement. Follow up CXR as below, unchanged. Restless, aggressive, and agitated with mitts on most of shift. Attempting to punch and bite staff. No meaningful speech. Large skin tear right forearm prior to mitts placement when thrashing about. Bleeding controlled, Steristrips ineffective. 2/5:  Sitter at bedside. Having meaningful conversation intermittently. Wound care in.  OT/PT in, sat patient up, stood briefly, making slow progress. Wife in. Continued airvo with desats when removed. WBC continues to rise on IV steroids.  Afebrile.     ADDITIONAL HISTORY:  Severe COPD/Emphysema, chronic hypoxemic respiratory failure on 4 liters NC, ILD, advanced dementia, malnutrition.      Current Facility-Administered Medications   Medication Dose Route Frequency    divalproex (DEPAKOTE SPRINKLE) capsule 125 mg  125 mg Oral BID    LORazepam (ATIVAN) injection 0.5-1 mg  0.5-1 mg IntraVENous Q4H PRN    dextrose 40% (GLUTOSE) oral gel 1 Tube  15 g Oral PRN    glucagon (GLUCAGEN) injection 1 mg  1 mg IntraMUSCular PRN    dextrose (D50W) injection syrg 12.5-25 g  25-50 mL IntraVENous PRN    methylPREDNISolone (PF) (SOLU-MEDROL) injection 40 mg  40 mg IntraVENous DAILY    albuterol-ipratropium (DUO-NEB) 2.5 MG-0.5 MG/3 ML  3 mL Nebulization Q4H PRN    aspirin delayed-release tablet 81 mg  81 mg Oral DAILY    DULoxetine (CYMBALTA) capsule 30 mg  30 mg Oral BID    finasteride (PROSCAR) tablet 5 mg  5 mg Oral DAILY    guaiFENesin ER (MUCINEX) tablet 1,200 mg 1,200 mg Oral DAILY    magnesium oxide (MAG-OX) tablet 400 mg  400 mg Oral DAILY    montelukast (SINGULAIR) tablet 10 mg  10 mg Oral QHS    sodium chloride (NS) flush 5-40 mL  5-40 mL IntraVENous Q8H    sodium chloride (NS) flush 5-40 mL  5-40 mL IntraVENous PRN    potassium chloride 10 mEq in 100 ml IVPB  10 mEq IntraVENous PRN    magnesium sulfate 2 g/50 ml IVPB (premix or compounded)  2 g IntraVENous PRN    acetaminophen (TYLENOL) tablet 650 mg  650 mg Oral Q6H PRN    Or    acetaminophen (TYLENOL) suppository 650 mg  650 mg Rectal Q6H PRN    polyethylene glycol (MIRALAX) packet 17 g  17 g Oral DAILY PRN    bisacodyL (DULCOLAX) suppository 10 mg  10 mg Rectal DAILY PRN    promethazine (PHENERGAN) tablet 25 mg  25 mg Oral Q6H PRN    Or    ondansetron (ZOFRAN) injection 4 mg  4 mg IntraVENous Q6H PRN    famotidine (PEPCID) tablet 20 mg  20 mg Oral DAILY    enoxaparin (LOVENOX) injection 40 mg  40 mg SubCUTAneous DAILY    budesonide-formoteroL (SYMBICORT) 160-4.5 mcg/actuation HFA inhaler 2 Puff  2 Puff Inhalation BID RT    nystatin (MYCOSTATIN) 100,000 unit/gram powder   Topical BID      Review of Systems  A comprehensive review of systems was negative except for that written in the HPI. Objective:     Visit Vitals  /85 (BP 1 Location: Left upper arm, BP Patient Position: At rest)   Pulse 64   Temp 97.5 °F (36.4 °C)   Resp 25   Ht 5' 8\" (1.727 m)   Wt 53.2 kg (117 lb 3.2 oz)   SpO2 98%   BMI 17.82 kg/m²    O2 Flow Rate (L/min): 40 l/min O2 Device: Hi flow nasal cannula, Heated    Temp (24hrs), Av.8 °F (36.6 °C), Min:97.5 °F (36.4 °C), Max:98 °F (36.7 °C)    1901 -  0700  In: 200 [P.O.:200]  Out: -    07 - 02/05 1900  In: 484 [P.O.:484]  Out: 400 [Urine:400]    General appearance: Awake, oriented and alert intermittently. Meaningful speech today. Sitter and wife at bedside. Thin and frail.   Improving slowly   Head: Normocephalic, without obvious abnormality, atraumatic  Eyes: conjunctivae/corneas clear. PERRL, EOM's intact. Throat: Lips, mucosa, and tongue dry. Teeth and gums normal  Neck: supple, symmetrical, trachea midline, and no JVD  Lungs: Diminished to auscultation bilaterally, some thick secretions. Continued airvo. Heart: regular rate and rhythm, S1, S2 normal, no murmur, click, rub or gallop  Abdomen: Flat, soft, non-tender. Bowel sounds normal. No masses,  no organomegaly  Extremities: All extremities normal with the exception of multiple bruises, large skin tear as above. No cyanosis or edema. Cries out at times when legs moved. Skin: Skin color, texture, turgor thin, dry. Multiple bruises and skin tears. No rashes or lesions  Neurologic: Moving all extremities equally. Surprisingly strong.       Additional comments: Notes,orders, test results, vitals reviewed    Data Review  Recent Results (from the past 24 hour(s))   CBC WITH AUTOMATED DIFF    Collection Time: 02/05/21  5:35 AM   Result Value Ref Range    WBC 16.5 (H) 4.3 - 11.1 K/uL    RBC 3.84 (L) 4.23 - 5.6 M/uL    HGB 12.0 (L) 13.6 - 17.2 g/dL    HCT 39.4 (L) 41.1 - 50.3 %    .6 (H) 79.6 - 97.8 FL    MCH 31.3 26.1 - 32.9 PG    MCHC 30.5 (L) 31.4 - 35.0 g/dL    RDW 13.7 11.9 - 14.6 %    PLATELET 862 291 - 419 K/uL    MPV 9.9 9.4 - 12.3 FL    ABSOLUTE NRBC 0.00 0.0 - 0.2 K/uL    DF AUTOMATED      NEUTROPHILS 77 43 - 78 %    LYMPHOCYTES 11 (L) 13 - 44 %    MONOCYTES 11 4.0 - 12.0 %    EOSINOPHILS 0 (L) 0.5 - 7.8 %    BASOPHILS 0 0.0 - 2.0 %    IMMATURE GRANULOCYTES 1 0.0 - 5.0 %    ABS. NEUTROPHILS 12.7 (H) 1.7 - 8.2 K/UL    ABS. LYMPHOCYTES 1.9 0.5 - 4.6 K/UL    ABS. MONOCYTES 1.8 (H) 0.1 - 1.3 K/UL    ABS. EOSINOPHILS 0.0 0.0 - 0.8 K/UL    ABS. BASOPHILS 0.0 0.0 - 0.2 K/UL    ABS. IMM.  GRANS. 0.1 0.0 - 0.5 K/UL   METABOLIC PANEL, COMPREHENSIVE    Collection Time: 02/05/21  5:35 AM   Result Value Ref Range    Sodium 142 136 - 145 mmol/L    Potassium 3.9 3.5 - 5.1 mmol/L    Chloride 107 98 - 107 mmol/L    CO2 30 21 - 32 mmol/L    Anion gap 5 (L) 7 - 16 mmol/L    Glucose 112 (H) 65 - 100 mg/dL    BUN 41 (H) 8 - 23 MG/DL    Creatinine 0.62 (L) 0.8 - 1.5 MG/DL    GFR est AA >60 >60 ml/min/1.73m2    GFR est non-AA >60 >60 ml/min/1.73m2    Calcium 8.7 8.3 - 10.4 MG/DL    Bilirubin, total 0.8 0.2 - 1.1 MG/DL    ALT (SGPT) 21 12 - 65 U/L    AST (SGOT) 23 15 - 37 U/L    Alk. phosphatase 85 50 - 136 U/L    Protein, total 6.2 (L) 6.3 - 8.2 g/dL    Albumin 2.8 (L) 3.2 - 4.6 g/dL    Globulin 3.4 2.3 - 3.5 g/dL    A-G Ratio 0.8 (L) 1.2 - 3.5     MAGNESIUM    Collection Time: 02/05/21  5:35 AM   Result Value Ref Range    Magnesium 2.5 (H) 1.8 - 2.4 mg/dL      1/30:  CXR:  Extensive chronic lung disease.  Possible residual background infiltrate.     1/30:  RIGHT FEMUR AND HIP XRAY:  No acute findings.     1/30:  CT CHEST:   No evidence of pulmonary embolus. Severe emphysema.  Minimal bibasilar infiltrate.     2/3:  CXR:  Chronic interstitial lung changes unchanged from the prior exam     1/29:  URINE CULTURE:    > 100,000 COLONIES PSEUDOMONAS AERUGINOSA: Pan sensitive except merrem    Assessment/Plan:   ACUTE ON CHRONIC RESPIRATORY FAILURE WITH HYPOXIA:     On home oxygen 4 liters 24/7                CT negative for PE              Continues to require airvo 2/5   Attempt to wean 2/6              Repeat CXR unchanged. IV Steroids d/c 2/5.  Begin po prednisone 2/6      GENERALIZED WEAKNESS, FALLS              PT/OT/Nutrition    PPD in place      SEVERE COPD/EMPHYSEMA:  As above               Continue aerosols     On home oxygen     CHRONIC CALLEJAS:  Cause for recurrent UTI      HYPERTENSION:  Home meds      DNR:  Noted      DEMENTIA:  Advanced               Start depakote sprinkles 125 mg bid 2/5, seems to be  working     SEVERE PROTEIN CALORIE MALNUTRITION/FTT:  Nutrition on board with supplements              Encourage po intake      ACUTE ON RECURRENT UTI:  Finished 3 doses levaquin as ordered by admitting MD              Culture above, sensitive to levaquin               Adding to confusion      Return to facility when weaned off [address] discussed with: Nurse, Miguel Angel HICKEY    Signed By: Lynn Taylor NP     February 5, 2021

## 2021-02-07 ENCOUNTER — APPOINTMENT (OUTPATIENT)
Dept: GENERAL RADIOLOGY | Age: 86
DRG: 189 | End: 2021-02-07
Attending: NURSE PRACTITIONER
Payer: MEDICARE

## 2021-02-07 LAB
ANION GAP SERPL CALC-SCNC: 4 MMOL/L (ref 7–16)
BASOPHILS # BLD: 0 K/UL (ref 0–0.2)
BASOPHILS NFR BLD: 0 % (ref 0–2)
BUN SERPL-MCNC: 35 MG/DL (ref 8–23)
CALCIUM SERPL-MCNC: 8.7 MG/DL (ref 8.3–10.4)
CHLORIDE SERPL-SCNC: 105 MMOL/L (ref 98–107)
CO2 SERPL-SCNC: 31 MMOL/L (ref 21–32)
CREAT SERPL-MCNC: 0.63 MG/DL (ref 0.8–1.5)
DIFFERENTIAL METHOD BLD: ABNORMAL
EOSINOPHIL # BLD: 0.1 K/UL (ref 0–0.8)
EOSINOPHIL NFR BLD: 1 % (ref 0.5–7.8)
ERYTHROCYTE [DISTWIDTH] IN BLOOD BY AUTOMATED COUNT: 13.3 % (ref 11.9–14.6)
GLUCOSE SERPL-MCNC: 85 MG/DL (ref 65–100)
HCT VFR BLD AUTO: 37.5 % (ref 41.1–50.3)
HGB BLD-MCNC: 11.3 G/DL (ref 13.6–17.2)
IMM GRANULOCYTES # BLD AUTO: 0.2 K/UL (ref 0–0.5)
IMM GRANULOCYTES NFR BLD AUTO: 1 % (ref 0–5)
LYMPHOCYTES # BLD: 2.4 K/UL (ref 0.5–4.6)
LYMPHOCYTES NFR BLD: 16 % (ref 13–44)
MCH RBC QN AUTO: 31.2 PG (ref 26.1–32.9)
MCHC RBC AUTO-ENTMCNC: 30.1 G/DL (ref 31.4–35)
MCV RBC AUTO: 103.6 FL (ref 79.6–97.8)
MONOCYTES # BLD: 1.6 K/UL (ref 0.1–1.3)
MONOCYTES NFR BLD: 11 % (ref 4–12)
NEUTS SEG # BLD: 11 K/UL (ref 1.7–8.2)
NEUTS SEG NFR BLD: 72 % (ref 43–78)
NRBC # BLD: 0 K/UL (ref 0–0.2)
PLATELET # BLD AUTO: 243 K/UL (ref 150–450)
PMV BLD AUTO: 10.3 FL (ref 9.4–12.3)
POTASSIUM SERPL-SCNC: 4 MMOL/L (ref 3.5–5.1)
RBC # BLD AUTO: 3.62 M/UL (ref 4.23–5.6)
SODIUM SERPL-SCNC: 140 MMOL/L (ref 136–145)
WBC # BLD AUTO: 15.3 K/UL (ref 4.3–11.1)

## 2021-02-07 PROCEDURE — 94640 AIRWAY INHALATION TREATMENT: CPT

## 2021-02-07 PROCEDURE — 2709999900 HC NON-CHARGEABLE SUPPLY

## 2021-02-07 PROCEDURE — 36415 COLL VENOUS BLD VENIPUNCTURE: CPT

## 2021-02-07 PROCEDURE — 80048 BASIC METABOLIC PNL TOTAL CA: CPT

## 2021-02-07 PROCEDURE — 77010033711 HC HIGH FLOW OXYGEN

## 2021-02-07 PROCEDURE — 74011250637 HC RX REV CODE- 250/637: Performed by: INTERNAL MEDICINE

## 2021-02-07 PROCEDURE — 74011250636 HC RX REV CODE- 250/636: Performed by: INTERNAL MEDICINE

## 2021-02-07 PROCEDURE — 74011250637 HC RX REV CODE- 250/637: Performed by: FAMILY MEDICINE

## 2021-02-07 PROCEDURE — 74011636637 HC RX REV CODE- 636/637: Performed by: NURSE PRACTITIONER

## 2021-02-07 PROCEDURE — 65270000029 HC RM PRIVATE

## 2021-02-07 PROCEDURE — 71045 X-RAY EXAM CHEST 1 VIEW: CPT

## 2021-02-07 PROCEDURE — 94762 N-INVAS EAR/PLS OXIMTRY CONT: CPT

## 2021-02-07 PROCEDURE — 74011250637 HC RX REV CODE- 250/637: Performed by: NURSE PRACTITIONER

## 2021-02-07 PROCEDURE — 85025 COMPLETE CBC W/AUTO DIFF WBC: CPT

## 2021-02-07 RX ORDER — GUAIFENESIN 600 MG/1
1200 TABLET, EXTENDED RELEASE ORAL EVERY 12 HOURS
Status: DISCONTINUED | OUTPATIENT
Start: 2021-02-07 | End: 2021-02-10 | Stop reason: HOSPADM

## 2021-02-07 RX ADMIN — DULOXETINE HYDROCHLORIDE 30 MG: 30 CAPSULE, DELAYED RELEASE ORAL at 08:53

## 2021-02-07 RX ADMIN — BUDESONIDE AND FORMOTEROL FUMARATE DIHYDRATE 2 PUFF: 160; 4.5 AEROSOL RESPIRATORY (INHALATION) at 20:44

## 2021-02-07 RX ADMIN — ENOXAPARIN SODIUM 40 MG: 40 INJECTION SUBCUTANEOUS at 08:55

## 2021-02-07 RX ADMIN — NYSTATIN: 100000 POWDER TOPICAL at 17:00

## 2021-02-07 RX ADMIN — GUAIFENESIN 1200 MG: 600 TABLET ORAL at 08:53

## 2021-02-07 RX ADMIN — MONTELUKAST SODIUM 10 MG: 10 TABLET, FILM COATED ORAL at 20:46

## 2021-02-07 RX ADMIN — Medication 10 ML: at 06:00

## 2021-02-07 RX ADMIN — PREDNISONE 20 MG: 20 TABLET ORAL at 08:53

## 2021-02-07 RX ADMIN — GUAIFENESIN 1200 MG: 600 TABLET ORAL at 20:45

## 2021-02-07 RX ADMIN — BUDESONIDE AND FORMOTEROL FUMARATE DIHYDRATE 2 PUFF: 160; 4.5 AEROSOL RESPIRATORY (INHALATION) at 08:26

## 2021-02-07 RX ADMIN — ASPIRIN 81 MG: 81 TABLET ORAL at 08:53

## 2021-02-07 RX ADMIN — Medication 400 MG: at 08:53

## 2021-02-07 RX ADMIN — Medication 10 ML: at 13:42

## 2021-02-07 RX ADMIN — FINASTERIDE 5 MG: 5 TABLET, FILM COATED ORAL at 08:53

## 2021-02-07 RX ADMIN — DIVALPROEX SODIUM 125 MG: 125 CAPSULE ORAL at 16:59

## 2021-02-07 RX ADMIN — DIVALPROEX SODIUM 125 MG: 125 CAPSULE ORAL at 08:53

## 2021-02-07 RX ADMIN — NYSTATIN: 100000 POWDER TOPICAL at 08:55

## 2021-02-07 RX ADMIN — Medication 10 ML: at 20:46

## 2021-02-07 RX ADMIN — DULOXETINE HYDROCHLORIDE 30 MG: 30 CAPSULE, DELAYED RELEASE ORAL at 16:59

## 2021-02-07 RX ADMIN — FAMOTIDINE 20 MG: 20 TABLET, FILM COATED ORAL at 08:53

## 2021-02-07 NOTE — PROGRESS NOTES
Physician Progress Note      PATIENTRosaline Chain  [facility] #:                  [id number]  :                       1932  ADMIT DATE:       2021 12:33 PM  [address] [place] DATE:  RESPONDING  PROVIDER #:        MAKI COLLINS 1 Adair Mosquera NP          QUERY TEXT:    Pt admitted with acute on chronic hypoxemic respiratory failure. Pt noted to have chronic indwelling urinary catheter with unknown last change by facility. If possible, please document in the progress notes and discharge summary if you are evaluating and/or treating any of the following: The medical record reflects the following:  Risk Factors: Chronic indwelling li catheter, Severe malnutrition, Adult failure to thrive    Clinical Indicators:  -- ED PN states: He has had a Li catheter for quite some time and has been at [facility] as a permanent resident for over a  year. There is talk of soon upgrading him to a suprapubic but he needs heart clearance first  -- H&P states: Chronic Li-need to find out how long it has been in place. Hopefully facility changes every 28 days  -- UA: [place], Cloudy, Blood- small, Nitrites- positive, Leukocyte- moderate, WBC , Bacteria- 2+  -- UCx: >100,000 COLONIES/mL GRAM NEGATIVE RODS  -- PN states: ACUTE ON RECURRENT UTI:  Finished 3 doses levaquin    Treatment: UA, UCx, Levaquin, IV NACL  Options provided:  -- UTI due to chronic indwelling urinary catheter  -- Urinary Tract Infection (UTI) not d/t chronic indwelling urinary catheter  -- Other - I will add my own diagnosis  -- Disagree - Not applicable / Not valid  -- Disagree - Clinically unable to determine / Unknown  -- Refer to Clinical Documentation Reviewer    PROVIDER RESPONSE TEXT:    UTI is due to the chronic indwelling urinary catheter. Query created by: Breanna Armenta on 2/3/2021 9:48 AM      QUERY TEXT:    Pt admitted with acute on chronic respiratory failure with hypoxia and has advanced dementia documented.   Pt noted to be aggressive, agitated and combative. If possible, please document in the progress notes and discharge summary if you are evaluating and / or treating any of the following: The medical record reflects the following:  Risk Factors: Advanced dementia, UTI, AMS, Hypoxia    Clinical Indicators:  --2/4 PN states: Restless, aggressive, and agitated with mitts on most of shift. Attempting to punch and bite staff. No meaningful speech. Large skin tear right forearm prior to mitts placement when  thrashing about. --2/4 RN PN states: Restless and uncooperative early shift then went to sleep and has been fine  --2/3 RN PN states: Pt became verbally and physically aggressive with this nurse and techs. Pt continues to pull off oxygen and tries to hit this nurse and techs when putting it back on him. Md notified  and orders received for 2 point restraints. --2/2 RN PN states: Patient in bed has taken O2 off and O2 sat is in low 80. PCT and nurses times two attempted to place patient back on oxygen. He began yelling and being extremely combative. MD  notified, new order received for Geodon 10mg IM times one. Given to patient in right hip. O2 replaced with non rebreather to bring oxygen level back up. Treatment: Restraints, Geodon  Options provided:  -- Advanced Dementia with behavioral disturbance  -- Advanced Dementia without behavioral disturbance  -- Other - I will add my own diagnosis  -- Disagree - Not applicable / Not valid  -- Disagree - Clinically unable to determine / Unknown  -- Refer to Clinical Documentation Reviewer    PROVIDER RESPONSE TEXT:    This patient has advanced dementia with behavioral disturbances.     Query created by: Lizeth Rincon on 2/5/2021 9:40 AM      Electronically signed by:  Zenon Velazquez NP 2/7/2021 6:38 AM

## 2021-02-07 NOTE — PROGRESS NOTES
Hourly rounding completed on this shift. All needs met. Pt out of restraints most of this shift. Pt calm and cooperative at this time but has had moments of confusion during this shift. Sitter at bedside. Pt son at bedside. Pt is currently resting in bed watching TV. Will continue to monitor and give report to oncoming nurse.

## 2021-02-07 NOTE — PROGRESS NOTES
Hospitalist Progress Note    Subjective:   Daily Progress Note: 2/6/2021 1602    Debilitated patient with baseline mild dementia presented to ER 1/30 after mechanical fall on to his right side.  Found with hypoxia requiring NRB, abgs with hypoxia.  COVID negative, CT chest as below with severe chronic disease, slight infiltrate vs atelectasis posterior bases. Admitted on airvo, abx, steroids   2/4:  Continued need for airvo with sats in the low 90's. Removing prior to mitt placement.  Follow up CXR as below, unchanged. Restless, aggressive, and agitated with mitts on most of shift.  Attempting to punch and bite staff.  No meaningful speech. Large skin tear right forearm prior to mitts placement when thrashing about.  Bleeding controlled, Steristrips ineffective.    2/5:  Sitter at bedside. Having meaningful conversation intermittently. Wound care in.  OT/PT in, sat patient up, stood briefly, making slow progress. Wife in. Continued airvo with desats when removed. WBC continues to rise on IV steroids. Afebrile. 2/6:  Amazing transformation today. Sitting up in bed, alert, oriented, remembering staff names, recalling past, conversant. Continued thick secretions on airvo. Message to RT to attempt weaning.   Increasing mucinex dose.     ADDITIONAL HISTORY:  Severe COPD/Emphysema, chronic hypoxemic respiratory failure on 4 liters NC, ILD, advanced dementia, malnutrition.     Current Facility-Administered Medications   Medication Dose Route Frequency    predniSONE (DELTASONE) tablet 20 mg  20 mg Oral DAILY WITH BREAKFAST    divalproex (DEPAKOTE SPRINKLE) capsule 125 mg  125 mg Oral BID    LORazepam (ATIVAN) injection 0.5-1 mg  0.5-1 mg IntraVENous Q4H PRN    dextrose 40% (GLUTOSE) oral gel 1 Tube  15 g Oral PRN    glucagon (GLUCAGEN) injection 1 mg  1 mg IntraMUSCular PRN    dextrose (D50W) injection syrg 12.5-25 g  25-50 mL IntraVENous PRN    albuterol-ipratropium (DUO-NEB) 2.5 MG-0.5 MG/3 ML  3 mL Nebulization Q4H PRN    aspirin delayed-release tablet 81 mg  81 mg Oral DAILY    DULoxetine (CYMBALTA) capsule 30 mg  30 mg Oral BID    finasteride (PROSCAR) tablet 5 mg  5 mg Oral DAILY    guaiFENesin ER (MUCINEX) tablet 1,200 mg  1,200 mg Oral DAILY    magnesium oxide (MAG-OX) tablet 400 mg  400 mg Oral DAILY    montelukast (SINGULAIR) tablet 10 mg  10 mg Oral QHS    sodium chloride (NS) flush 5-40 mL  5-40 mL IntraVENous Q8H    sodium chloride (NS) flush 5-40 mL  5-40 mL IntraVENous PRN    potassium chloride 10 mEq in 100 ml IVPB  10 mEq IntraVENous PRN    magnesium sulfate 2 g/50 ml IVPB (premix or compounded)  2 g IntraVENous PRN    acetaminophen (TYLENOL) tablet 650 mg  650 mg Oral Q6H PRN    Or    acetaminophen (TYLENOL) suppository 650 mg  650 mg Rectal Q6H PRN    polyethylene glycol (MIRALAX) packet 17 g  17 g Oral DAILY PRN    bisacodyL (DULCOLAX) suppository 10 mg  10 mg Rectal DAILY PRN    promethazine (PHENERGAN) tablet 25 mg  25 mg Oral Q6H PRN    Or    ondansetron (ZOFRAN) injection 4 mg  4 mg IntraVENous Q6H PRN    famotidine (PEPCID) tablet 20 mg  20 mg Oral DAILY    enoxaparin (LOVENOX) injection 40 mg  40 mg SubCUTAneous DAILY    budesonide-formoteroL (SYMBICORT) 160-4.5 mcg/actuation HFA inhaler 2 Puff  2 Puff Inhalation BID RT    nystatin (MYCOSTATIN) 100,000 unit/gram powder   Topical BID        Review of Systems  A comprehensive review of systems was negative except for that written in the HPI.      Objective:     Visit Vitals  /82 (BP 1 Location: Right upper arm, BP Patient Position: At rest)   Pulse 68   Temp 98 °F (36.7 °C)   Resp 22   Ht 5' 8\" (1.727 m)   Wt 53.2 kg (117 lb 3.2 oz)   SpO2 90%   BMI 17.82 kg/m²    O2 Flow Rate (L/min): 30 l/min O2 Device: Hi flow nasal cannula, Heated    Temp (24hrs), Av.7 °F (36.5 °C), Min:97.4 °F (36.3 °C), Max:98 °F (36.7 °C)    1901 - 700  In: -   Out: 300 [Urine:300]  701 - 1900  In: 0102 [P.O.:1748]  Out: 800 [Urine:800]    General appearance: Awake, oriented to place, person, situation. Looks much better today. Does not recall last 3 days. Conversant with meaningful speech today. Sitter at bedside. Thin and frail. Improved dramatically today.     Head: Normocephalic, without obvious abnormality, atraumatic  Eyes: conjunctivae/corneas clear. PERRL, EOM's intact. Throat: Lips, mucosa, and tongue dry.  Teeth and gums normal  Neck: supple, symmetrical, trachea midline, and no JVD  Lungs: Diminished to auscultation bilaterally, scattered coarse sounds, cough, thick secretions with difficulty clearing.  Continued airvo. Heart: regular rate and rhythm, S1, S2 normal, no murmur, click, rub or gallop  Abdomen: Flat, soft, non-tender. Bowel sounds normal. No masses,  no organomegaly  Extremities: All extremities normal with the exception of multiple bruises, large skin tear as above.  No cyanosis or edema.  Now participating with PT/OT   Skin: Skin color, texture, turgor thin, dry.  Multiple bruises and skin tears.  No rashes or lesions  Neurologic: Moving all extremities equally.  Surprisingly strong.       Additional comments: Notes,orders, test results, vitals reviewed     Data Review  Recent Results         Recent Results (from the past 24 hour(s))   CBC WITH AUTOMATED DIFF     Collection Time: 02/05/21  5:35 AM   Result Value Ref Range     WBC 16.5 (H) 4.3 - 11.1 K/uL     RBC 3.84 (L) 4.23 - 5.6 M/uL     HGB 12.0 (L) 13.6 - 17.2 g/dL     HCT 39.4 (L) 41.1 - 50.3 %     .6 (H) 79.6 - 97.8 FL     MCH 31.3 26.1 - 32.9 PG     MCHC 30.5 (L) 31.4 - 35.0 g/dL     RDW 13.7 11.9 - 14.6 %     PLATELET 907 421 - 503 K/uL     MPV 9.9 9.4 - 12.3 FL     ABSOLUTE NRBC 0.00 0.0 - 0.2 K/uL     DF AUTOMATED       NEUTROPHILS 77 43 - 78 %     LYMPHOCYTES 11 (L) 13 - 44 %     MONOCYTES 11 4.0 - 12.0 %     EOSINOPHILS 0 (L) 0.5 - 7.8 %     BASOPHILS 0 0.0 - 2.0 %     IMMATURE GRANULOCYTES 1 0.0 - 5.0 %     ABS. NEUTROPHILS 12.7 (H) 1.7 - 8.2 K/UL     ABS. LYMPHOCYTES 1.9 0.5 - 4.6 K/UL     ABS. MONOCYTES 1.8 (H) 0.1 - 1.3 K/UL     ABS. EOSINOPHILS 0.0 0.0 - 0.8 K/UL     ABS. BASOPHILS 0.0 0.0 - 0.2 K/UL     ABS. IMM. GRANS. 0.1 0.0 - 0.5 K/UL   METABOLIC PANEL, COMPREHENSIVE     Collection Time: 02/05/21  5:35 AM   Result Value Ref Range     Sodium 142 136 - 145 mmol/L     Potassium 3.9 3.5 - 5.1 mmol/L     Chloride 107 98 - 107 mmol/L     CO2 30 21 - 32 mmol/L     Anion gap 5 (L) 7 - 16 mmol/L     Glucose 112 (H) 65 - 100 mg/dL     BUN 41 (H) 8 - 23 MG/DL     Creatinine 0.62 (L) 0.8 - 1.5 MG/DL     GFR est AA >60 >60 ml/min/1.73m2     GFR est non-AA >60 >60 ml/min/1.73m2     Calcium 8.7 8.3 - 10.4 MG/DL     Bilirubin, total 0.8 0.2 - 1.1 MG/DL     ALT (SGPT) 21 12 - 65 U/L     AST (SGOT) 23 15 - 37 U/L     Alk. phosphatase 85 50 - 136 U/L     Protein, total 6.2 (L) 6.3 - 8.2 g/dL     Albumin 2.8 (L) 3.2 - 4.6 g/dL     Globulin 3.4 2.3 - 3.5 g/dL     A-G Ratio 0.8 (L) 1.2 - 3.5     MAGNESIUM     Collection Time: 02/05/21  5:35 AM   Result Value Ref Range     Magnesium 2.5 (H) 1.8 - 2.4 mg/dL          1/30:  CXR:  Extensive         Data Review  Recent Results (from the past 24 hour(s))   CBC WITH AUTOMATED DIFF    Collection Time: 02/06/21  3:41 PM   Result Value Ref Range    WBC 18.1 (H) 4.3 - 11.1 K/uL    RBC 3.54 (L) 4.23 - 5.6 M/uL    HGB 11.2 (L) 13.6 - 17.2 g/dL    HCT 35.6 (L) 41.1 - 50.3 %    .6 (H) 79.6 - 97.8 FL    MCH 31.6 26.1 - 32.9 PG    MCHC 31.5 31.4 - 35.0 g/dL    RDW 13.3 11.9 - 14.6 %    PLATELET 290 150 - 450 K/uL    MPV 10.5 9.4 - 12.3 FL    ABSOLUTE NRBC 0.00 0.0 - 0.2 K/uL    DF AUTOMATED      NEUTROPHILS 87 (H) 43 - 78 %    LYMPHOCYTES 6 (L) 13 - 44 %    MONOCYTES 5 4.0 - 12.0 %    EOSINOPHILS 0 (L) 0.5 - 7.8 %    BASOPHILS 0 0.0 - 2.0 %    IMMATURE GRANULOCYTES 1 0.0 - 5.0 %    ABS. NEUTROPHILS 15.9 (H) 1.7 - 8.2 K/UL    ABS. LYMPHOCYTES 1.2 0.5 - 4.6 K/UL    ABS. MONOCYTES 0.9 0.1 - 1.3  K/UL    ABS. EOSINOPHILS 0.0 0.0 - 0.8 K/UL    ABS. BASOPHILS 0.0 0.0 - 0.2 K/UL    ABS. IMM. GRANS. 0.2 0.0 - 0.5 K/UL   METABOLIC PANEL, BASIC    Collection Time: 02/06/21  3:41 PM   Result Value Ref Range    Sodium 139 138 - 145 mmol/L    Potassium 4.4 3.5 - 5.1 mmol/L    Chloride 104 98 - 107 mmol/L    CO2 30 21 - 32 mmol/L    Anion gap 5 (L) 7 - 16 mmol/L    Glucose 120 (H) 65 - 100 mg/dL    BUN 38 (H) 8 - 23 MG/DL    Creatinine 0.67 (L) 0.8 - 1.5 MG/DL    GFR est AA >60 >60 ml/min/1.73m2    GFR est non-AA >60 >60 ml/min/1.73m2    Calcium 8.6 8.3 - 10.4 MG/DL   MAGNESIUM    Collection Time: 02/06/21  3:41 PM   Result Value Ref Range    Magnesium 2.4 1.8 - 2.4 mg/dL      1/30:  CXR:  Extensive chronic lung disease.  Possible residual background infiltrate.     1/30:  RIGHT FEMUR AND HIP XRAY:  No acute findings.     1/30:  CT CHEST:   No evidence of pulmonary embolus. Severe emphysema.  Minimal bibasilar infiltrate.     2/3:  CXR:  Chronic interstitial lung changes unchanged from the prior exam     1/29:  URINE CULTURE:    > 100,000 COLONIES PSEUDOMONAS AERUGINOSA: Pan sensitive except merrem     Assessment/Plan:   ACUTE ON CHRONIC RESPIRATORY FAILURE WITH HYPOXIA:                On home oxygen 4 liters 24/7                           CT negative for PE              Continues to require airvo 2/5              Attempt to wean 2/6              Repeat CXR in am.              IV Steroids d/c 2/5.  Begin po prednisone 2/6      GENERALIZED WEAKNESS, FALLS              PT/OT/Nutrition               PPD in place      SEVERE COPD/EMPHYSEMA:  As above               Continue aerosols               On home oxygen      CHRONIC CALLEJAS:  Cause for recurrent UTI      HYPERTENSION:  Home meds       DEMENTIA:  Advanced               Start depakote sprinkles 125 mg bid 2/5, seems to be          working     SEVERE PROTEIN CALORIE MALNUTRITION/FTT:  Nutrition on board with supplements              Encourage po intake      ACUTE ON RECURRENT UTI:  Finished 3 doses levaquin as ordered by admitting MD              Culture above, sensitive to levaquin               Adding to confusion     ACUTE METABOLIC ENCEPHALOPATHY:  Resolved to baseline 2/6    Return to facility when weaned off airvo to home oxygen of 4 liters.      Care Plan discussed with: Patient and Nurse    Signed By: Kimmie Fajardo NP     February 6, 2021

## 2021-02-07 NOTE — PROGRESS NOTES
Hospitalist Progress Note     Admit Date:  2021 12:33 PM   Name:  Bobby Ardon   Age:  80 y.o.  :  1932   MRN:  [id number]   PCP:  Mariya Valencia MD  Treatment Team: Attending Provider: Clover Samuels MD; Utilization Review: Gerhardt Lamy; Care Manager: Nesha Brothers RN; Hospitalist: Estela Thurston NP    Subjective:     Pt is a 79 yo male with pmh chronic resp failure on 4 L NC from [facility] who presented to ER  with acute on chronic resp failure requiring NRB and acute encephalopathy. Covid neg, CT chest reveals infiltrate vs atelectasis. Admitted with airvo, abx, steroids. He was restless, aggressive, agitated which has all seemed to resolve after starting Depakote sprinkles. He is on heated high flow NC at 15 L , apparently has significant sudden drops with attempting to wean. He is alert and oriented to his baseline. Objective:     Patient Vitals for the past 24 hrs:   Temp Pulse Resp BP SpO2   21 1113 97.3 °F (36.3 °C) 94 20 124/79 96 %   21 08     93 %   21 0659 97.4 °F (36.3 °C) 82 20 133/82 96 %   21 0454 98.6 °F (37 °C) 67 20 115/73 100 %   21 0211 98 °F (36.7 °C) 68 22 130/82 90 %   21 2054 97.4 °F (36.3 °C) 87 22 139/77 94 %   21 1548 97.8 °F (36.6 °C) 71 22 113/69 98 %   21 1432     95 %     Oxygen Therapy  O2 Sat (%): 96 % (21 1113)  Pulse via Oximetry: 71 beats per minute (21)  O2 Device: Hi flow nasal cannula; Heated (21)  O2 Flow Rate (L/min): 30 l/min (21)  O2 Temperature: 87.8 °F (31 °C) (21)  FIO2 (%): 60 % (21)    Intake/Output Summary (Last 24 hours) at 2021 1347  Last data filed at 2021 0906  Gross per 24 hour   Intake 954 ml   Output 900 ml   Net 54 ml         General:    Thin, hard of hearing,  Alert. CV:   RRR. No murmur, rub, or gallop. Lungs:   CTAB. No wheezing, rhonchi, or rales.   Abdomen:   Soft, nontender, nondistended. Extremities: Warm and dry. No cyanosis or edema. Skin:     No rashes or jaundice. Scattered bruising, abraisions, skin tears   Neuro:  No gross focal deficits    Data Review:  I have reviewed all labs, meds, telemetry events, and studies from the last 24 hours:    Recent Results (from the past 24 hour(s))   CBC WITH AUTOMATED DIFF    Collection Time: 02/06/21  3:41 PM   Result Value Ref Range    WBC 18.1 (H) 4.3 - 11.1 K/uL    RBC 3.54 (L) 4.23 - 5.6 M/uL    HGB 11.2 (L) 13.6 - 17.2 g/dL    HCT 35.6 (L) 41.1 - 50.3 %    .6 (H) 79.6 - 97.8 FL    MCH 31.6 26.1 - 32.9 PG    MCHC 31.5 31.4 - 35.0 g/dL    RDW 13.3 11.9 - 14.6 %    PLATELET 833 261 - 567 K/uL    MPV 10.5 9.4 - 12.3 FL    ABSOLUTE NRBC 0.00 0.0 - 0.2 K/uL    DF AUTOMATED      NEUTROPHILS 87 (H) 43 - 78 %    LYMPHOCYTES 6 (L) 13 - 44 %    MONOCYTES 5 4.0 - 12.0 %    EOSINOPHILS 0 (L) 0.5 - 7.8 %    BASOPHILS 0 0.0 - 2.0 %    IMMATURE GRANULOCYTES 1 0.0 - 5.0 %    ABS. NEUTROPHILS 15.9 (H) 1.7 - 8.2 K/UL    ABS. LYMPHOCYTES 1.2 0.5 - 4.6 K/UL    ABS. MONOCYTES 0.9 0.1 - 1.3 K/UL    ABS. EOSINOPHILS 0.0 0.0 - 0.8 K/UL    ABS. BASOPHILS 0.0 0.0 - 0.2 K/UL    ABS. IMM.  GRANS. 0.2 0.0 - 0.5 K/UL   METABOLIC PANEL, BASIC    Collection Time: 02/06/21  3:41 PM   Result Value Ref Range    Sodium 139 138 - 145 mmol/L    Potassium 4.4 3.5 - 5.1 mmol/L    Chloride 104 98 - 107 mmol/L    CO2 30 21 - 32 mmol/L    Anion gap 5 (L) 7 - 16 mmol/L    Glucose 120 (H) 65 - 100 mg/dL    BUN 38 (H) 8 - 23 MG/DL    Creatinine 0.67 (L) 0.8 - 1.5 MG/DL    GFR est AA >60 >60 ml/min/1.73m2    GFR est non-AA >60 >60 ml/min/1.73m2    Calcium 8.6 8.3 - 10.4 MG/DL   MAGNESIUM    Collection Time: 02/06/21  3:41 PM   Result Value Ref Range    Magnesium 2.4 1.8 - 2.4 mg/dL   CBC WITH AUTOMATED DIFF    Collection Time: 02/07/21  6:08 AM   Result Value Ref Range    WBC 15.3 (H) 4.3 - 11.1 K/uL    RBC 3.62 (L) 4.23 - 5.6 M/uL    HGB 11.3 (L) 13.6 - 17.2 g/dL HCT 37.5 (L) 41.1 - 50.3 %    .6 (H) 79.6 - 97.8 FL    MCH 31.2 26.1 - 32.9 PG    MCHC 30.1 (L) 31.4 - 35.0 g/dL    RDW 13.3 11.9 - 14.6 %    PLATELET 894 014 - 303 K/uL    MPV 10.3 9.4 - 12.3 FL    ABSOLUTE NRBC 0.00 0.0 - 0.2 K/uL    DF AUTOMATED      NEUTROPHILS 72 43 - 78 %    LYMPHOCYTES 16 13 - 44 %    MONOCYTES 11 4.0 - 12.0 %    EOSINOPHILS 1 0.5 - 7.8 %    BASOPHILS 0 0.0 - 2.0 %    IMMATURE GRANULOCYTES 1 0.0 - 5.0 %    ABS. NEUTROPHILS 11.0 (H) 1.7 - 8.2 K/UL    ABS. LYMPHOCYTES 2.4 0.5 - 4.6 K/UL    ABS. MONOCYTES 1.6 (H) 0.1 - 1.3 K/UL    ABS. EOSINOPHILS 0.1 0.0 - 0.8 K/UL    ABS. BASOPHILS 0.0 0.0 - 0.2 K/UL    ABS. IMM. GRANS. 0.2 0.0 - 0.5 K/UL   METABOLIC PANEL, BASIC    Collection Time: 02/07/21  6:08 AM   Result Value Ref Range    Sodium 140 136 - 145 mmol/L    Potassium 4.0 3.5 - 5.1 mmol/L    Chloride 105 98 - 107 mmol/L    CO2 31 21 - 32 mmol/L    Anion gap 4 (L) 7 - 16 mmol/L    Glucose 85 65 - 100 mg/dL    BUN 35 (H) 8 - 23 MG/DL    Creatinine 0.63 (L) 0.8 - 1.5 MG/DL    GFR est AA >60 >60 ml/min/1.73m2    GFR est non-AA >60 >60 ml/min/1.73m2    Calcium 8.7 8.3 - 10.4 MG/DL        All Micro Results     Procedure Component Value Units Date/Time    CULTURE, URINE [[id number]]  (Abnormal)  (Susceptibility) Collected: 01/31/21 0730    Order Status: Completed Specimen: Cath Urine Updated: 02/06/21 0657     Special Requests: NO SPECIAL REQUESTS        Culture result:       >100,000 COLONIES/mL PSEUDOMONAS AERUGINOSA m CIM NEGATIVE.  THIS ISOLATE DOES NOT DEMONSTRATE CARBAPENEMASE PRODUTION BY m ED test.                  >100,000 COLONIES/mL MIXED SKIN NAEL ISOLATED          CULTURE, BLOOD [[id number]] Collected: 01/30/21 1334    Order Status: Completed Specimen: Blood Updated: 02/04/21 0741     Special Requests: --        LEFT  HAND       Culture result: NO GROWTH 5 DAYS       CULTURE, BLOOD [[id number]] Collected: 01/30/21 1334    Order Status: Completed Specimen: Blood Updated: 02/04/21 5502     Special Requests: --        RIGHT  FOREARM       Culture result: NO GROWTH 5 DAYS       COVID-19 RAPID TEST [[id number]] Collected: 01/30/21 1318    Order Status: Completed Specimen: Nasopharyngeal Updated: 01/30/21 1418     Specimen source Nasopharyngeal        COVID-19 rapid test Not detected        Comment:      The specimen is NEGATIVE for SARS-CoV-2, the novel coronavirus associated with COVID-19. A negative result does not rule out COVID-19. This test has been authorized by the FDA under an Emergency Use Authorization (EUA) for use by authorized laboratories. Fact sheet for Healthcare Providers: [employer]  Fact sheet for Patients: [employer]       Methodology: Isothermal Nucleic Acid Amplification               No results found for this visit on 01/30/21.     Current Meds:  Current Facility-Administered Medications   Medication Dose Route Frequency    guaiFENesin ER (MUCINEX) tablet 1,200 mg  1,200 mg Oral Q12H    predniSONE (DELTASONE) tablet 20 mg  20 mg Oral DAILY WITH BREAKFAST    divalproex (DEPAKOTE SPRINKLE) capsule 125 mg  125 mg Oral BID    LORazepam (ATIVAN) injection 0.5-1 mg  0.5-1 mg IntraVENous Q4H PRN    dextrose 40% (GLUTOSE) oral gel 1 Tube  15 g Oral PRN    glucagon (GLUCAGEN) injection 1 mg  1 mg IntraMUSCular PRN    dextrose (D50W) injection syrg 12.5-25 g  25-50 mL IntraVENous PRN    albuterol-ipratropium (DUO-NEB) 2.5 MG-0.5 MG/3 ML  3 mL Nebulization Q4H PRN    aspirin delayed-release tablet 81 mg  81 mg Oral DAILY    DULoxetine (CYMBALTA) capsule 30 mg  30 mg Oral BID    finasteride (PROSCAR) tablet 5 mg  5 mg Oral DAILY    magnesium oxide (MAG-OX) tablet 400 mg  400 mg Oral DAILY    montelukast (SINGULAIR) tablet 10 mg  10 mg Oral QHS    sodium chloride (NS) flush 5-40 mL  5-40 mL IntraVENous Q8H    sodium chloride (NS) flush 5-40 mL  5-40 mL IntraVENous PRN    potassium chloride 10 mEq in 100 ml IVPB  10 mEq IntraVENous PRN    magnesium sulfate 2 g/50 ml IVPB (premix or compounded)  2 g IntraVENous PRN    acetaminophen (TYLENOL) tablet 650 mg  650 mg Oral Q6H PRN    Or    acetaminophen (TYLENOL) suppository 650 mg  650 mg Rectal Q6H PRN    polyethylene glycol (MIRALAX) packet 17 g  17 g Oral DAILY PRN    bisacodyL (DULCOLAX) suppository 10 mg  10 mg Rectal DAILY PRN    promethazine (PHENERGAN) tablet 25 mg  25 mg Oral Q6H PRN    Or    ondansetron (ZOFRAN) injection 4 mg  4 mg IntraVENous Q6H PRN    famotidine (PEPCID) tablet 20 mg  20 mg Oral DAILY    enoxaparin (LOVENOX) injection 40 mg  40 mg SubCUTAneous DAILY    budesonide-formoteroL (SYMBICORT) 160-4.5 mcg/actuation HFA inhaler 2 Puff  2 Puff Inhalation BID RT    nystatin (MYCOSTATIN) 100,000 unit/gram powder   Topical BID       Other Studies (last 24 hours):  Xr Chest Sngl V    Result Date: 2/7/2021  EXAM: Chest x-ray. INDICATION: Cough. COMPARISON: February 3, 2021. TECHNIQUE: Frontal view chest x-ray. FINDINGS: Emphysema and pulmonary fibrosis are similar to prior. No definite acute infiltrate. The cardiac size is within normal limits. There is no pneumothorax or pleural effusion. Emphysema and pulmonary fibrosis. No definite acute process.       Assessment and Plan:     Hospital Problems as of 2/7/2021 Date Reviewed: 12/17/2020          Codes Class Noted - Resolved POA    Dementia ([facility] [address] (Chronic) ICD-10-CM: F03.90  ICD-9-CM: 294.20  1/30/2021 - Present Yes        Severe protein-calorie malnutrition ([facility] [address] (Chronic) ICD-10-CM: R67  ICD-9-CM: 262  11/3/2019 - Present Yes        DNR (do not resuscitate) (Chronic) ICD-10-CM: Z66  ICD-9-CM: V49.86  11/2/2019 - Present Yes        * (Principal) Acute on chronic respiratory failure with hypoxia ([facility] [address] ICD-10-CM: J96.21  ICD-9-CM: 518.84, 799.02  5/6/2019 - Present Yes        Chronic respiratory failure with hypoxia ([facility]) (Chronic) ICD-10-CM: J96.11  ICD-9-CM: 518.83, 799.02  3/29/2016 - Present Yes    Overview Addendum 1/30/2021  5:25 PM by Dave Paulson MD     4+L chronically             BPH (benign prostatic hyperplasia) (Chronic) ICD-10-CM: N40.0  ICD-9-CM: 600.00  12/10/2015 - Present Yes        COPD (chronic obstructive pulmonary disease) ([facility] [address] (Chronic) ICD-10-CM: J44.9  ICD-9-CM: 496  12/10/2015 - Present Yes        Essential hypertension, benign (Chronic) ICD-10-CM: I10  ICD-9-CM: 401.1  12/10/2015 - Present Yes              Plan:    Acute on chronic resp failure, COPD  02/07 CXR with chronic findings   Cont PO Prednisone, plan for end of treatment tomorrow   Cont Symbicort and Singulair   Wean 02 for sat goal 90-92  Encourage ambulation  Out of bed at least once a shift    Generalized weakness, falls  PT, OT, nutrition followin    Dementia  Acute encephalopathy resolved 02/06   Depakote seems to be helfpul  Plan to cont at d/c     Recurrent UTI  Completed 3 Ds Levaquin  D/C li today     DC planning  Plan to return to [facility] once able to wean more off 02      Diet:  DIET REGULAR  DIET NUTRITIONAL SUPPLEMENTS  DVT ppx:  Lovenox     Signed:  Jelena Perry NP

## 2021-02-07 NOTE — PROGRESS NOTES
Pt. A/Ox1. Confused, combative, verbally aggressive and refusing care. Pt. Taking off airvo O2 dropped to 74%. Soft wrist restraints applied. PRN Ativan given. Airvo reapplied O2 sating in 95%. Sitter at bedside. Pt. Calm at this time. Bed in lowest position and locked. Will continue to monitor.

## 2021-02-07 NOTE — PROGRESS NOTES
Comprehensive Nutrition Assessment    Type and Reason for Visit: Reassess  General nutrition management/ other reason malnutrition (Hospitalist)    Nutrition Recommendations/Plan:   Meals and Snacks:  Continue current diet. Nutrition Supplement Therapy:   Medical food supplement therapy:  Change increase Boost to 4 servings daily from 3. Malnutrition Assessment:  Malnutrition Status: Severe malnutrition  Context: Chronic illness  Findings of clinical characteristics of malnutrition:   Energy Intake:  Unable to assess  Weight Loss:  (hx of significant wt loss, 8% additional over the past 17 months)     Body Fat Loss:  7 - Severe body fat loss, Triceps, Fat overlying ribs   Muscle Mass Loss:  7 - Severe muscle mass loss, Calf (gastrocnemius), Hand (interosseous), Temples (temporalis), Thigh (quadraceps), Clavicles (pectoralis &deltoids)  Fluid Accumulation:  Unable to assess,     Strength:  Not performed     Nutrition Assessment:   Nutrition History: Pt is on the phone with his wife again at today's visit, unable to obtain any nutrition hx. Nutrition Background: PMH remarkable for severe COPD, chronic hypoxemic respiratory failure on 4L NC O2, ILD, dementia, malnutrition. Admitted with acute on chronic hypoxemic respiratory failure, infectious vs inflamatory Covid negative, AFTT, severe COPD. Daily Update:  Pt noted to have fluctuations in mentation, depakote sprinkles added. He is oriented to self at RD visit, briefly entertains my questions before returning to talk to his wife on his mobile phone lying in the bed. He does not provide any additional nutrition information. Sitter at bedside reports he doesn't have a significant appetite for meals but has taken some po at each meal, he drinks 100% of the boost servings. Nutrition Related Findings: On Airvo. Cleared by SLP 2/1 for Regular diet. Current Nutrition Therapies:  DIET REGULAR  DIET NUTRITIONAL SUPPLEMENTS All Meals;  Other ( )    Current Intake:   Average Meal Intake: 1-25% Average Supplement Intake: %      Anthropometric Measures:  Height: 5' 8\" (172.7 cm)  Current Body Wt: 53.2 kg (117 lb 4.6 oz)(2/6), Weight source: Bed scale  BMI: 17.8, Underweight (BMI less than 22) age over 72  Admission Body Weight: 123 lb 0.3 oz(stated)  Ideal Body Wt: 154 lbs (70 kg), 79.9 %  Usual Body Wt: 60.5 kg (133 lb 6.1 oz)(RD note bed scale on 9/6/19), Percent weight change: -12.1          Estimated Daily Nutrient Needs:  Energy (kcal/day): 9322-3184 (Kcal/kg(30-35), Weight Used: Current(53.2 kg))  Protein (g/day): 64-80 (1.2-1.5 g/kg) Weight Used: (Current)  Fluid (ml/day):   (1 ml/kcal)    Nutrition Diagnosis:   · Inadequate oral intake related to cognitive or neurological impairment, impaired respiratory function as evidenced by (po intake primarily consists of boost)    · Severe malnutrition, In context of chronic illness related to increased demand for energy/nutrients, cognitive or neurological impairment as evidenced by (criteria as identified in malnutrition assessment above.)    Nutrition Interventions:   Food and/or Nutrient Delivery: Continue current diet, Modify oral nutrition supplement     Coordination of Nutrition Care: Continue to monitor while inpatient    Goals:   Previous Goal Met: Progressing toward goal(s)  Active Goal: Meet >75% estimated needs by nutrition follow-up    Nutrition Monitoring and Evaluation:      Food/Nutrient Intake Outcomes: Food and nutrient intake, Supplement intake  Physical Signs/Symptoms Outcomes: Biochemical data    Discharge Planning:    Continue oral nutrition supplement    Power Back MA, RD, LDN on 2/7/2021 at 2:52 PM  Contact: [phone]     Disaster Mode active

## 2021-02-08 LAB
ANION GAP SERPL CALC-SCNC: 6 MMOL/L (ref 7–16)
BASOPHILS # BLD: 0 K/UL (ref 0–0.2)
BASOPHILS NFR BLD: 0 % (ref 0–2)
BUN SERPL-MCNC: 22 MG/DL (ref 8–23)
CALCIUM SERPL-MCNC: 8.5 MG/DL (ref 8.3–10.4)
CHLORIDE SERPL-SCNC: 103 MMOL/L (ref 98–107)
CO2 SERPL-SCNC: 30 MMOL/L (ref 21–32)
CREAT SERPL-MCNC: 0.46 MG/DL (ref 0.8–1.5)
DIFFERENTIAL METHOD BLD: ABNORMAL
EOSINOPHIL # BLD: 0.1 K/UL (ref 0–0.8)
EOSINOPHIL NFR BLD: 1 % (ref 0.5–7.8)
ERYTHROCYTE [DISTWIDTH] IN BLOOD BY AUTOMATED COUNT: 13.2 % (ref 11.9–14.6)
GLUCOSE SERPL-MCNC: 85 MG/DL (ref 65–100)
HCT VFR BLD AUTO: 37.8 % (ref 41.1–50.3)
HGB BLD-MCNC: 12 G/DL (ref 13.6–17.2)
IMM GRANULOCYTES # BLD AUTO: 0.1 K/UL (ref 0–0.5)
IMM GRANULOCYTES NFR BLD AUTO: 1 % (ref 0–5)
LYMPHOCYTES # BLD: 2.7 K/UL (ref 0.5–4.6)
LYMPHOCYTES NFR BLD: 25 % (ref 13–44)
MCH RBC QN AUTO: 32.2 PG (ref 26.1–32.9)
MCHC RBC AUTO-ENTMCNC: 31.7 G/DL (ref 31.4–35)
MCV RBC AUTO: 101.3 FL (ref 79.6–97.8)
MONOCYTES # BLD: 1.4 K/UL (ref 0.1–1.3)
MONOCYTES NFR BLD: 13 % (ref 4–12)
NEUTS SEG # BLD: 6.3 K/UL (ref 1.7–8.2)
NEUTS SEG NFR BLD: 60 % (ref 43–78)
NRBC # BLD: 0 K/UL (ref 0–0.2)
PLATELET # BLD AUTO: 244 K/UL (ref 150–450)
PMV BLD AUTO: 10.3 FL (ref 9.4–12.3)
POTASSIUM SERPL-SCNC: 4.2 MMOL/L (ref 3.5–5.1)
RBC # BLD AUTO: 3.73 M/UL (ref 4.23–5.6)
SARS-COV-2, COV2: NORMAL
SODIUM SERPL-SCNC: 139 MMOL/L (ref 138–145)
WBC # BLD AUTO: 10.6 K/UL (ref 4.3–11.1)

## 2021-02-08 PROCEDURE — 74011250637 HC RX REV CODE- 250/637: Performed by: INTERNAL MEDICINE

## 2021-02-08 PROCEDURE — 94640 AIRWAY INHALATION TREATMENT: CPT

## 2021-02-08 PROCEDURE — 74011250636 HC RX REV CODE- 250/636: Performed by: INTERNAL MEDICINE

## 2021-02-08 PROCEDURE — 36415 COLL VENOUS BLD VENIPUNCTURE: CPT

## 2021-02-08 PROCEDURE — 2709999900 HC NON-CHARGEABLE SUPPLY

## 2021-02-08 PROCEDURE — 77010033678 HC OXYGEN DAILY

## 2021-02-08 PROCEDURE — 94762 N-INVAS EAR/PLS OXIMTRY CONT: CPT

## 2021-02-08 PROCEDURE — 87635 SARS-COV-2 COVID-19 AMP PRB: CPT

## 2021-02-08 PROCEDURE — 74011636637 HC RX REV CODE- 636/637: Performed by: NURSE PRACTITIONER

## 2021-02-08 PROCEDURE — 85025 COMPLETE CBC W/AUTO DIFF WBC: CPT

## 2021-02-08 PROCEDURE — 65270000029 HC RM PRIVATE

## 2021-02-08 PROCEDURE — 74011250637 HC RX REV CODE- 250/637: Performed by: NURSE PRACTITIONER

## 2021-02-08 PROCEDURE — 80048 BASIC METABOLIC PNL TOTAL CA: CPT

## 2021-02-08 RX ORDER — MAG HYDROX/ALUMINUM HYD/SIMETH 200-200-20
30 SUSPENSION, ORAL (FINAL DOSE FORM) ORAL
Status: DISCONTINUED | OUTPATIENT
Start: 2021-02-08 | End: 2021-02-10 | Stop reason: HOSPADM

## 2021-02-08 RX ADMIN — GUAIFENESIN 1200 MG: 600 TABLET ORAL at 21:40

## 2021-02-08 RX ADMIN — FAMOTIDINE 20 MG: 20 TABLET, FILM COATED ORAL at 07:54

## 2021-02-08 RX ADMIN — ASPIRIN 81 MG: 81 TABLET ORAL at 07:54

## 2021-02-08 RX ADMIN — GUAIFENESIN 1200 MG: 600 TABLET ORAL at 07:54

## 2021-02-08 RX ADMIN — PREDNISONE 20 MG: 20 TABLET ORAL at 07:54

## 2021-02-08 RX ADMIN — ALUMINUM HYDROXIDE, MAGNESIUM HYDROXIDE, AND SIMETHICONE 30 ML: 200; 200; 20 SUSPENSION ORAL at 21:40

## 2021-02-08 RX ADMIN — DIVALPROEX SODIUM 125 MG: 125 CAPSULE ORAL at 17:09

## 2021-02-08 RX ADMIN — NYSTATIN: 100000 POWDER TOPICAL at 08:08

## 2021-02-08 RX ADMIN — DULOXETINE HYDROCHLORIDE 30 MG: 30 CAPSULE, DELAYED RELEASE ORAL at 07:56

## 2021-02-08 RX ADMIN — BUDESONIDE AND FORMOTEROL FUMARATE DIHYDRATE 2 PUFF: 160; 4.5 AEROSOL RESPIRATORY (INHALATION) at 20:42

## 2021-02-08 RX ADMIN — DULOXETINE HYDROCHLORIDE 30 MG: 30 CAPSULE, DELAYED RELEASE ORAL at 17:09

## 2021-02-08 RX ADMIN — ENOXAPARIN SODIUM 40 MG: 40 INJECTION SUBCUTANEOUS at 07:54

## 2021-02-08 RX ADMIN — Medication 10 ML: at 21:40

## 2021-02-08 RX ADMIN — MONTELUKAST SODIUM 10 MG: 10 TABLET, FILM COATED ORAL at 21:40

## 2021-02-08 RX ADMIN — Medication 400 MG: at 07:54

## 2021-02-08 RX ADMIN — BUDESONIDE AND FORMOTEROL FUMARATE DIHYDRATE 2 PUFF: 160; 4.5 AEROSOL RESPIRATORY (INHALATION) at 07:44

## 2021-02-08 RX ADMIN — FINASTERIDE 5 MG: 5 TABLET, FILM COATED ORAL at 07:54

## 2021-02-08 RX ADMIN — DIVALPROEX SODIUM 125 MG: 125 CAPSULE ORAL at 07:54

## 2021-02-08 NOTE — PROGRESS NOTES
Hourly rounds performed this shift, needs met at this time. Bed in low/locked position and call light within reach. Will give bedside report to oncoming nurse. Patient rested throughout the night.

## 2021-02-08 NOTE — PROGRESS NOTES
Chart screened by  for discharge planning. Patient is a LTC resident at [facility]. Patient will need a new COVID test to return to the facility. The facility does not take a rapid test it will need to be a PCR. CM will continue to follow patient during hospitalization for discharge planning and needs. Please consult or notify  if any new issues arise.

## 2021-02-08 NOTE — PROGRESS NOTES
Hospitalist Progress Note    Subjective:   Daily Progress Note: 2/8/2021 3:36 PM     Debilitated patient with baseline mild dementia presented to ER 1/30 after mechanical fall on to his right side.  Found with hypoxia requiring NRB, abgs with hypoxia.  COVID negative, CT chest as below with severe chronic disease, slight infiltrate vs atelectasis posterior bases. Admitted on airvo, abx, steroids   2/4:  Continued need for airvo with sats in the low 90's. CXR unchanged. Restless, aggressive, agitated with mitts on.  Attempting to punch and bite staff.  No meaningful speech. Large skin tear right forearm prior to mitts placement when thrashing about.  Bleeding controlled, Steristrips ineffective.    2/5:  Sitter at bedside. Eleazar Pacheco meaningful conversation intermittently. Wound care in.  OT/PT in, sat patient up, stood briefly, making slow progress. Wife in. Continued airvo with desats when removed.   WBC continues to rise on IV steroids. Afebrile. 2/6:  Amazing transformation today. Sitting up in bed, alert, oriented, remembering staff names, recalling past, conversant. Continued thick secretions on airvo. Message to RT to attempt weaning. Increasing mucinex dose. 2/8:  Rarely confused. Weaned to nasal cannula 3 liters with 4 liters at home at baseline. Keeping oxygen sats >90%. Afebrile. Son at bedside. In good spirits.   To be discharged back to facility when COVID test results.       ADDITIONAL HISTORY:  Severe COPD/Emphysema, chronic hypoxemic respiratory failure on 4 liters NC, ILD, advanced dementia, malnutrition.        Current Facility-Administered Medications   Medication Dose Route Frequency    guaiFENesin ER (MUCINEX) tablet 1,200 mg  1,200 mg Oral Q12H    divalproex (DEPAKOTE SPRINKLE) capsule 125 mg  125 mg Oral BID    LORazepam (ATIVAN) injection 0.5-1 mg  0.5-1 mg IntraVENous Q4H PRN    dextrose 40% (GLUTOSE) oral gel 1 Tube  15 g Oral PRN    glucagon (GLUCAGEN) injection 1 mg  1 mg IntraMUSCular PRN    dextrose (D50W) injection syrg 12.5-25 g  25-50 mL IntraVENous PRN    albuterol-ipratropium (DUO-NEB) 2.5 MG-0.5 MG/3 ML  3 mL Nebulization Q4H PRN    aspirin delayed-release tablet 81 mg  81 mg Oral DAILY    DULoxetine (CYMBALTA) capsule 30 mg  30 mg Oral BID    finasteride (PROSCAR) tablet 5 mg  5 mg Oral DAILY    magnesium oxide (MAG-OX) tablet 400 mg  400 mg Oral DAILY    montelukast (SINGULAIR) tablet 10 mg  10 mg Oral QHS    sodium chloride (NS) flush 5-40 mL  5-40 mL IntraVENous Q8H    sodium chloride (NS) flush 5-40 mL  5-40 mL IntraVENous PRN    potassium chloride 10 mEq in 100 ml IVPB  10 mEq IntraVENous PRN    magnesium sulfate 2 g/50 ml IVPB (premix or compounded)  2 g IntraVENous PRN    acetaminophen (TYLENOL) tablet 650 mg  650 mg Oral Q6H PRN    Or    acetaminophen (TYLENOL) suppository 650 mg  650 mg Rectal Q6H PRN    polyethylene glycol (MIRALAX) packet 17 g  17 g Oral DAILY PRN    bisacodyL (DULCOLAX) suppository 10 mg  10 mg Rectal DAILY PRN    promethazine (PHENERGAN) tablet 25 mg  25 mg Oral Q6H PRN    Or    ondansetron (ZOFRAN) injection 4 mg  4 mg IntraVENous Q6H PRN    famotidine (PEPCID) tablet 20 mg  20 mg Oral DAILY    enoxaparin (LOVENOX) injection 40 mg  40 mg SubCUTAneous DAILY    budesonide-formoteroL (SYMBICORT) 160-4.5 mcg/actuation HFA inhaler 2 Puff  2 Puff Inhalation BID RT    nystatin (MYCOSTATIN) 100,000 unit/gram powder   Topical BID      Review of Systems  A comprehensive review of systems was negative except for that written in the HPI.     Objective:     Visit Vitals  /73 (BP 1 Location: Left upper arm, BP Patient Position: At rest)   Pulse 76   Temp 97.6 °F (36.4 °C)   Resp 18   Ht 5' 8\" (1.727 m)   Wt 53.2 kg (117 lb 3.2 oz)   SpO2 96%   BMI 17.82 kg/m²    O2 Flow Rate (L/min): 2 l/min O2 Device: Nasal cannula    Temp (24hrs), Av.8 °F (36.6 °C), Min:97.4 °F (36.3 °C), Max:98.1 °F (36.7 °C)    701 - 1900  In: - Out: 450 [Urine:450]  02/06 1901 - 02/08 0700  In: 240 [P.O.:240]  Out: 1225 [Urine:1225]    General appearance: Awake, oriented to place, person, situation. Improving daily. Does not recall last 3 days. Conversant with meaningful speech today, remembers name. [facility] at bedside. Thin and frail.  Improved dramatically today.     Head: Normocephalic, without obvious abnormality, atraumatic  Eyes: conjunctivae/corneas clear. PERRL, EOM's intact. Throat: Lips, mucosa, and tongue dry.  Teeth and gums normal  Neck: supple, symmetrical, trachea midline, and no JVD  Lungs: Diminished to auscultation bilaterally, rare scattered coarse sounds, cough, thick secretions with difficulty clearing.  Continued airvo. Heart: regular rate and rhythm, S1, S2 normal, no murmur, click, rub or gallop  Abdomen: Flat, soft, non-tender. Bowel sounds normal. No masses,  no organomegaly  Extremities: All extremities normal with the exception of multiple bruises, large skin tear as above.  No cyanosis or edema.  Now participating with PT/OT   Skin: Skin color, texture, turgor thin, dry.  Multiple bruises and skin tears.  No rashes or lesions  Neurologic: Moving all extremities equally.  Surprisingly strong.       Additional comments: Notes,orders, test results, vitals reviewed     Data Review  Recent Results (from the past 24 hour(s))   CBC WITH AUTOMATED DIFF    Collection Time: 02/08/21  8:03 AM   Result Value Ref Range    WBC 10.6 4.3 - 11.1 K/uL    RBC 3.73 (L) 4.23 - 5.6 M/uL    HGB 12.0 (L) 13.6 - 17.2 g/dL    HCT 37.8 (L) 41.1 - 50.3 %    .3 (H) 79.6 - 97.8 FL    MCH 32.2 26.1 - 32.9 PG    MCHC 31.7 31.4 - 35.0 g/dL    RDW 13.2 11.9 - 14.6 %    PLATELET 686 462 - 795 K/uL    MPV 10.3 9.4 - 12.3 FL    ABSOLUTE NRBC 0.00 0.0 - 0.2 K/uL    DF AUTOMATED      NEUTROPHILS 60 43 - 78 %    LYMPHOCYTES 25 13 - 44 %    MONOCYTES 13 (H) 4.0 - 12.0 %    EOSINOPHILS 1 0.5 - 7.8 %    BASOPHILS 0 0.0 - 2.0 %    IMMATURE GRANULOCYTES 1 0.0 - 5.0 %    ABS. NEUTROPHILS 6.3 1.7 - 8.2 K/UL    ABS. LYMPHOCYTES 2.7 0.5 - 4.6 K/UL    ABS. MONOCYTES 1.4 (H) 0.1 - 1.3 K/UL    ABS. EOSINOPHILS 0.1 0.0 - 0.8 K/UL    ABS. BASOPHILS 0.0 0.0 - 0.2 K/UL    ABS. IMM. GRANS. 0.1 0.0 - 0.5 K/UL   METABOLIC PANEL, BASIC    Collection Time: 02/08/21  8:03 AM   Result Value Ref Range    Sodium 139 138 - 145 mmol/L    Potassium 4.2 3.5 - 5.1 mmol/L    Chloride 103 98 - 107 mmol/L    CO2 30 21 - 32 mmol/L    Anion gap 6 (L) 7 - 16 mmol/L    Glucose 85 65 - 100 mg/dL    BUN 22 8 - 23 MG/DL    Creatinine 0.46 (L) 0.8 - 1.5 MG/DL    GFR est AA >60 >60 ml/min/1.73m2    GFR est non-AA >60 >60 ml/min/1.73m2    Calcium 8.5 8.3 - 10.4 MG/DL   SARS-COV-2    Collection Time: 02/08/21  2:33 PM   Result Value Ref Range    SARS-CoV-2 Please find results under separate order       1/30:  CXR:  Extensive chronic lung disease.  Possible residual background infiltrate.     1/30:  RIGHT FEMUR AND HIP XRAY:  No acute findings.     1/30:  CT CHEST:   No evidence of pulmonary embolus. Severe emphysema.  Minimal bibasilar infiltrate.     2/3:  CXR:  Chronic interstitial lung changes unchanged from the prior exam     1/29:  URINE CULTURE:    > 100,000 COLONIES PSEUDOMONAS AERUGINOSA: Pan sensitive except merrem     2/7:  CXR:  Emphysema and pulmonary fibrosis. No definite acute process. Assessment/Plan:   ACUTE ON CHRONIC RESPIRATORY FAILURE WITH HYPOXIA:                On home oxygen 4 liters 24/7                           CT negative for PE              Continues to require airvo 2/5              Attempt to wean 2/6, weaned to N/C 2/7, down to 3 liters, on 4 liters at home               Repeat CXR 2/7 without acute process              IV Steroids d/c 2/5.  Begin po prednisone 2/6      GENERALIZED WEAKNESS, FALLS              PT/OT/Nutrition               PPD in place      SEVERE COPD/EMPHYSEMA:  As above               Continue aerosols               On home oxygen      CHRONIC CALLEJAS:  Cause for recurrent UTI      HYPERTENSION:  Home meds       DEMENTIA:  Mild at baseline               Start depakote sprinkles 125 mg bid 2/5, will leave for now     SEVERE PROTEIN CALORIE MALNUTRITION/FTT:  Nutrition on board with supplements              Encourage po intake      ACUTE ON RECURRENT UTI:  Finished 3 doses levaquin as ordered by admitting MD              Culture above, sensitive to levaquin               Added to confusion      ACUTE METABOLIC ENCEPHALOPATHY:  Resolved to baseline 2/6     Care Plan discussed with: Patient, Son, CM and Nurse    Signed By: Janae Stinson NP     February 8, 2021

## 2021-02-09 LAB — SARS COV-2, XPGCVT: NEGATIVE

## 2021-02-09 PROCEDURE — 74011250637 HC RX REV CODE- 250/637: Performed by: NURSE PRACTITIONER

## 2021-02-09 PROCEDURE — 65270000029 HC RM PRIVATE

## 2021-02-09 PROCEDURE — 97530 THERAPEUTIC ACTIVITIES: CPT

## 2021-02-09 PROCEDURE — 2709999900 HC NON-CHARGEABLE SUPPLY

## 2021-02-09 PROCEDURE — 74011250637 HC RX REV CODE- 250/637: Performed by: INTERNAL MEDICINE

## 2021-02-09 PROCEDURE — 94640 AIRWAY INHALATION TREATMENT: CPT

## 2021-02-09 PROCEDURE — 74011250636 HC RX REV CODE- 250/636: Performed by: INTERNAL MEDICINE

## 2021-02-09 RX ADMIN — DULOXETINE HYDROCHLORIDE 30 MG: 30 CAPSULE, DELAYED RELEASE ORAL at 16:29

## 2021-02-09 RX ADMIN — FINASTERIDE 5 MG: 5 TABLET, FILM COATED ORAL at 08:00

## 2021-02-09 RX ADMIN — ENOXAPARIN SODIUM 40 MG: 40 INJECTION SUBCUTANEOUS at 08:01

## 2021-02-09 RX ADMIN — Medication 10 ML: at 21:59

## 2021-02-09 RX ADMIN — GUAIFENESIN 1200 MG: 600 TABLET ORAL at 08:00

## 2021-02-09 RX ADMIN — GUAIFENESIN 1200 MG: 600 TABLET ORAL at 21:59

## 2021-02-09 RX ADMIN — Medication 400 MG: at 08:00

## 2021-02-09 RX ADMIN — BUDESONIDE AND FORMOTEROL FUMARATE DIHYDRATE 2 PUFF: 160; 4.5 AEROSOL RESPIRATORY (INHALATION) at 20:00

## 2021-02-09 RX ADMIN — DIVALPROEX SODIUM 125 MG: 125 CAPSULE ORAL at 16:29

## 2021-02-09 RX ADMIN — BUDESONIDE AND FORMOTEROL FUMARATE DIHYDRATE 2 PUFF: 160; 4.5 AEROSOL RESPIRATORY (INHALATION) at 07:55

## 2021-02-09 RX ADMIN — NYSTATIN: 100000 POWDER TOPICAL at 08:04

## 2021-02-09 RX ADMIN — DIVALPROEX SODIUM 125 MG: 125 CAPSULE ORAL at 08:00

## 2021-02-09 RX ADMIN — FAMOTIDINE 20 MG: 20 TABLET, FILM COATED ORAL at 08:00

## 2021-02-09 RX ADMIN — ASPIRIN 81 MG: 81 TABLET ORAL at 08:00

## 2021-02-09 RX ADMIN — MONTELUKAST SODIUM 10 MG: 10 TABLET, FILM COATED ORAL at 21:59

## 2021-02-09 RX ADMIN — DULOXETINE HYDROCHLORIDE 30 MG: 30 CAPSULE, DELAYED RELEASE ORAL at 08:00

## 2021-02-09 NOTE — PROGRESS NOTES
ACUTE PHYSICAL THERAPY GOALS:  (Developed with and agreed upon by patient and/or caregiver. )  LTG:  (1.)Mr. Librado Olivera will move from supine to sit and sit to supine , scoot up and down, and roll side to side in bed with MINIMAL ASSIST within 7 treatment day(s). (2.)Mr. Librado Olivera will transfer from bed to chair and chair to bed with MINIMAL ASSIST using the least restrictive device within 7 treatment day(s). (3.)Mr. Librado Olivera will ambulate with MINIMAL ASSIST for 25+ feet with the least restrictive device within 7 treatment day(s). (4.)Mr. Librado Olivera will perform sit-stand x3  SUPERVISION without using UEs under 20 seconds within 7 treatment day(s). PHYSICAL THERAPY: Daily Note and PM Treatment Day # 4    Kathy Torres is a 80 y.o. male   PRIMARY DIAGNOSIS: Acute on chronic respiratory failure with hypoxia (HCC)  Acute on chronic respiratory failure with hypoxia (HCC) [J96.21]         ASSESSMENT:     REHAB RECOMMENDATIONS: CURRENT LEVEL OF FUNCTION:  (Most Recently Demonstrated)   Recommendation to date pending progress:  Setting:   Short-term Rehab  Equipment:    To Be Determined Bed Mobility:   Moderate Assistance  Sit to Stand:   Moderate Assistance,   Transfers:   Not tested  Gait/Mobility:   Not tested     ASSESSMENT:  Mr. Librado Olivera was supine in bed on arrival. His wife of 76 years at bedside. Supine to sit with mod/max A. Increased time with activity. Sit to stand with mod A. He tolerated standing for about 1 min. With long rest period, he attempted to stand 2 more times but unable to clear the bed. RN came to assist with standing. Sit to stand with mod A x2 and was able to take a few side steps to the [facility] with mod A. Sit to supine with max A. Pt is fearful of falling and pain. Ice pack made for R hip and encouraged movement in bed with R LE.        SUBJECTIVE:   Mr. Librado Olivera states, \"Let me try it\"    SOCIAL HISTORY/ LIVING ENVIRONMENT: from [facility]     OBJECTIVE:     PAIN: VITAL SIGNS: LINES/DRAINS:   Pre Treatment:   not rated  Post Treatment: not rated   Rojas Catheter  O2 Device: Nasal cannula     MOBILITY: I Mod I S SBA CGA Min Mod Max Total  NT x2 Comments:   Bed Mobility    Rolling [] [] [] [] [] [] [x] [] [] [] []    Supine to Sit [] [] [] [] [] [] [x] [] [] [] []    Scooting [] [] [] [] [] [x] [x] [] [] [] []    Sit to Supine [] [] [] [] [] [] [] [x] [] [] []    Transfers    Sit to Stand [] [] [] [] [] [] [x] [] [] [] [x]    Bed to Chair [] [] [] [] [] [] [] [] [] [x] []    Stand to Sit [] [] [] [] [] [] [x] [] [] [] []    I=Independent, Mod I=Modified Independent, S=Supervision, SBA=Standby Assistance, CGA=Contact Guard Assistance,   Min=Minimal Assistance, Mod=Moderate Assistance, Max=Maximal Assistance, Total=Total Assistance, NT=Not Tested    GAIT: I Mod I S SBA CGA Min Mod Max Total  NT x2 Comments:   Level of Assistance [] [] [] [] [] [] [x] [] [] [] []    Distance Few steps to [facility]    DME Rolling Walker    Gait Quality Shuffling steps    Weightbearing  Status N/A     I=Independent, Mod I=Modified Independent, S=Supervision, SBA=Standby Assistance, CGA=Contact Guard Assistance,   Min=Minimal Assistance, Mod=Moderate Assistance, Max=Maximal Assistance, Total=Total Assistance, NT=Not Tested    PLAN:   FREQUENCY/DURATION: PT Plan of Care: 3 times/week for duration of hospital stay or until stated goals are met, whichever comes first.  TREATMENT:     TREATMENT:   ($$ Therapeutic Activity: 53-67 mins    )  Therapeutic Activity (60 Minutes): Therapeutic activity included Supine to Sit, Sit to Supine, Transfer Training, Ambulation on level ground, Sitting balance  and Standing balance to improve functional Mobility, Strength, ROM and Activity tolerance.      AFTER TREATMENT POSITION/PRECAUTIONS:  Bed, Needs within reach and RN notified    INTERDISCIPLINARY COLLABORATION:  RN/PCT and PT/PTA    TOTAL TREATMENT DURATION:  PT Patient Time In/Time Out  Time In: 1510  Time Out: [place], PTA

## 2021-02-09 NOTE — PROGRESS NOTES
Hospitalist Progress Note    Subjective:   Daily Progress Note: 2/9/2021 1:10 PM    Debilitated patient with baseline mild dementia presented to ER 1/30 after mechanical fall on to his right side.  Found with hypoxia requiring NRB, abgs with hypoxia.  COVID negative, CT chest as below with severe chronic disease, slight infiltrate vs atelectasis posterior bases. Admitted on airvo, abx, steroids   2/4:  Continued need for airvo with sats in the low 90's. CXR unchanged. Restless, aggressive, agitated with mitts on.  Attempting to punch and bite staff.  No meaningful speech. Large skin tear right forearm prior to mitts placement when thrashing about.  Bleeding controlled, Steristrips ineffective.    2/5:  Sitter at bedside. Burt Diaz meaningful conversation intermittently. Wound care in.  OT/PT in, sat patient up, stood briefly, making slow progress. Wife in. Continued airvo with desats when removed.   WBC continues to rise on IV steroids. Afebrile. 2/6:  Amazing transformation today.  Sitting up in bed, alert, oriented, remembering staff names, recalling past, conversant.  Continued thick secretions on airvo. Message to RT to attempt weaning.  Increasing mucinex dose.   2/8:  Rarely confused. Weaned to nasal cannula 3 liters with 4 liters at home at baseline. Keeping oxygen sats >90%. Afebrile. Son at bedside. In good spirits. To be discharged back to facility when COVID test results. 2/9:  Pending discharge waiting for COVID send out results. Rapid COVID negative. Visiting with wife. Alert, oriented and states he is feeling good.   Continued oxygenation at baseline.       ADDITIONAL HISTORY:  Severe COPD/Emphysema, chronic hypoxemic respiratory failure on 4 liters NC, ILD, advanced dementia, malnutrition.     Current Facility-Administered Medications   Medication Dose Route Frequency    alum-mag hydroxide-simeth (MYLANTA) oral suspension 30 mL  30 mL Oral Q4H PRN    guaiFENesin ER (MUCINEX) tablet 1,200 mg  1,200 mg Oral Q12H    divalproex (DEPAKOTE SPRINKLE) capsule 125 mg  125 mg Oral BID    LORazepam (ATIVAN) injection 0.5-1 mg  0.5-1 mg IntraVENous Q4H PRN    dextrose 40% (GLUTOSE) oral gel 1 Tube  15 g Oral PRN    glucagon (GLUCAGEN) injection 1 mg  1 mg IntraMUSCular PRN    dextrose (D50W) injection syrg 12.5-25 g  25-50 mL IntraVENous PRN    albuterol-ipratropium (DUO-NEB) 2.5 MG-0.5 MG/3 ML  3 mL Nebulization Q4H PRN    aspirin delayed-release tablet 81 mg  81 mg Oral DAILY    DULoxetine (CYMBALTA) capsule 30 mg  30 mg Oral BID    finasteride (PROSCAR) tablet 5 mg  5 mg Oral DAILY    magnesium oxide (MAG-OX) tablet 400 mg  400 mg Oral DAILY    montelukast (SINGULAIR) tablet 10 mg  10 mg Oral QHS    sodium chloride (NS) flush 5-40 mL  5-40 mL IntraVENous Q8H    sodium chloride (NS) flush 5-40 mL  5-40 mL IntraVENous PRN    potassium chloride 10 mEq in 100 ml IVPB  10 mEq IntraVENous PRN    magnesium sulfate 2 g/50 ml IVPB (premix or compounded)  2 g IntraVENous PRN    acetaminophen (TYLENOL) tablet 650 mg  650 mg Oral Q6H PRN    Or    acetaminophen (TYLENOL) suppository 650 mg  650 mg Rectal Q6H PRN    polyethylene glycol (MIRALAX) packet 17 g  17 g Oral DAILY PRN    bisacodyL (DULCOLAX) suppository 10 mg  10 mg Rectal DAILY PRN    promethazine (PHENERGAN) tablet 25 mg  25 mg Oral Q6H PRN    Or    ondansetron (ZOFRAN) injection 4 mg  4 mg IntraVENous Q6H PRN    famotidine (PEPCID) tablet 20 mg  20 mg Oral DAILY    enoxaparin (LOVENOX) injection 40 mg  40 mg SubCUTAneous DAILY    budesonide-formoteroL (SYMBICORT) 160-4.5 mcg/actuation HFA inhaler 2 Puff  2 Puff Inhalation BID RT    nystatin (MYCOSTATIN) 100,000 unit/gram powder   Topical BID        Review of Systems  A comprehensive review of systems was negative except for that written in the HPI.     Objective:     Visit Vitals  /79   Pulse 75   Temp 97.6 °F (36.4 °C)   Resp 19   Ht 5' 8\" (1.727 m)   Wt 53.2 kg (117 lb 3.2 oz) SpO2 97%   BMI 17.82 kg/m²    O2 Flow Rate (L/min): 2 l/min O2 Device: Nasal cannula    Temp (24hrs), Av.7 °F (36.5 °C), Min:97.4 °F (36.3 °C), Max:98.4 °F (36.9 °C)     1901 -  0700  In: -   Out: 9058 [Urine:1625]    General appearance: Awake, oriented to place, person, situation.  Improving daily. [facility]. Wife at bedside. Thin and frail.  Improved dramatically.       Head: Normocephalic, without obvious abnormality, atraumatic  Eyes: conjunctivae/corneas clear. PERRL, EOM's intact. Throat: Lips, mucosa, and tongue dry.  Teeth and gums normal  Neck: supple, symmetrical, trachea midline, and no JVD  Lungs: Diminished to auscultation bilaterally, rare scattered coarse sounds, cough, thick secretions with difficulty clearing.  Continued airvo. Heart: regular rate and rhythm, S1, S2 normal, no murmur, click, rub or gallop  Abdomen: Flat, soft, non-tender. Bowel sounds normal. No masses,  no organomegaly  Extremities: All extremities normal with the exception of multiple bruises, large skin tear as above.  No cyanosis or edema. Now participating with PT/OT   Skin: Skin color, texture, turgor thin, dry.  Multiple bruises and skin tears.  No rashes or lesions  Neurologic: Moving all extremities equally.  Surprisingly strong.       Additional comments: Notes,orders, test results, vitals reviewed    Data Review  Rapid COVID negative:  Pending send out      Ref.  Range 2021 08:03   WBC Latest Ref Range: 4.3 - 11.1 K/uL 10.6   RBC Latest Ref Range: 4.23 - 5.6 M/uL 3.73 (L)   HGB Latest Ref Range: 13.6 - 17.2 g/dL 12.0 (L)   HCT Latest Ref Range: 41.1 - 50.3 % 37.8 (L)   MCV Latest Ref Range: 79.6 - 97.8 .3 (H)   MCH Latest Ref Range: 26.1 - 32.9 PG 32.2   MCHC Latest Ref Range: 31.4 - 35.0 g/dL 31.7   RDW Latest Ref Range: 11.9 - 14.6 % 13.2   PLATELET Latest Ref Range: 150 - 450 K/uL 244   MPV Latest Ref Range: 9.4 - 12.3 FL 10.3   NEUTROPHILS Latest Ref Range: 43 - 78 % 60   LYMPHOCYTES Latest Ref Range: 13 - 44 % 25   MONOCYTES Latest Ref Range: 4.0 - 12.0 % 13 (H)   EOSINOPHILS Latest Ref Range: 0.5 - 7.8 % 1   BASOPHILS Latest Ref Range: 0.0 - 2.0 % 0   IMMATURE GRANULOCYTES Latest Ref Range: 0.0 - 5.0 % 1   DF Latest Units:   AUTOMATED   ABSOLUTE NRBC Latest Ref Range: 0.0 - 0.2 K/uL 0.00   ABS. NEUTROPHILS Latest Ref Range: 1.7 - 8.2 K/UL 6.3   ABS. IMM. GRANS. Latest Ref Range: 0.0 - 0.5 K/UL 0.1   ABS. LYMPHOCYTES Latest Ref Range: 0.5 - 4.6 K/UL 2.7   ABS. MONOCYTES Latest Ref Range: 0.1 - 1.3 K/UL 1.4 (H)   ABS. EOSINOPHILS Latest Ref Range: 0.0 - 0.8 K/UL 0.1   ABS. BASOPHILS Latest Ref Range: 0.0 - 0.2 K/UL 0.0   Sodium Latest Ref Range: 138 - 145 mmol/L 139   Potassium Latest Ref Range: 3.5 - 5.1 mmol/L 4.2   Chloride Latest Ref Range: 98 - 107 mmol/L 103   CO2 Latest Ref Range: 21 - 32 mmol/L 30   Anion gap Latest Ref Range: 7 - 16 mmol/L 6 (L)   Glucose Latest Ref Range: 65 - 100 mg/dL 85   BUN Latest Ref Range: 8 - 23 MG/DL 22   Creatinine Latest Ref Range: 0.8 - 1.5 MG/DL 0.46 (L)   Calcium Latest Ref Range: 8.3 - 10.4 MG/DL 8.5   GFR est non-AA Latest Ref Range: >60 ml/min/1.73m2 >60   GFR est AA Latest Ref Range: >60 ml/min/1.73m2 >60     1/30:  CXR:  Extensive chronic lung disease.  Possible residual background infiltrate.     1/30:  RIGHT FEMUR AND HIP XRAY:  No acute findings.     1/30:  CT CHEST:   No evidence of pulmonary embolus. Severe emphysema.  Minimal bibasilar infiltrate.     2/3:  CXR:  Chronic interstitial lung changes unchanged from the prior exam     1/29:  URINE CULTURE:    > 100,000 COLONIES PSEUDOMONAS AERUGINOSA: Pan sensitive except merrem      2/7:  CXR:  Emphysema and pulmonary fibrosis. No definite acute process.      Assessment/Plan:   ACUTE ON CHRONIC RESPIRATORY FAILURE WITH HYPOXIA:                On home oxygen 4 liters 24/7                           CT negative for PE              Continues to require airvo 2/5              Attempt to wean 2/6, weaned to N/C 2/7, down to 3 liters, on 4 liters at home               Repeat CXR 2/7 without acute process              IV Steroids d/c 2/5. Begin po prednisone 2/6, continue and taper on discharge.       GENERALIZED WEAKNESS, FALLS              PT/OT/Nutrition               PPD in place      SEVERE COPD/EMPHYSEMA:  As above               Continue aerosols               On home oxygen      CHRONIC CALLEJAS:  Cause for recurrent UTI.   Changed on admission     HYPERTENSION:  Home meds       DEMENTIA:  Mild at baseline               Start depakote sprinkles 125 mg bid 2/5, will leave for now     SEVERE PROTEIN CALORIE MALNUTRITION/FTT:  Nutrition on board with supplements              Encourage po intake      ACUTE ON RECURRENT UTI:  Finished 3 doses levaquin as ordered by admitting MD              Culture above, sensitive to levaquin               Added to confusion      ACUTE METABOLIC ENCEPHALOPATHY:  Resolved to baseline 2/6     Care Plan discussed with: Patient, wife, and Nurse    Signed By: Kimmie Fajardo NP     February 9, 2021

## 2021-02-09 NOTE — ACP (ADVANCE CARE PLANNING)
CM spoke with patient and family regarding ACP and wish to continue to follow with the current documentation that has been provided prior to hospitalizations.

## 2021-02-09 NOTE — PROGRESS NOTES
Hourly rounds performed this shift, needs met at this time. Bed in low/locked position and call light within reach. Will give bedside report to oncoming nurse.

## 2021-02-10 VITALS
HEIGHT: 68 IN | SYSTOLIC BLOOD PRESSURE: 113 MMHG | WEIGHT: 117.2 LBS | OXYGEN SATURATION: 95 % | DIASTOLIC BLOOD PRESSURE: 78 MMHG | BODY MASS INDEX: 17.76 KG/M2 | RESPIRATION RATE: 18 BRPM | HEART RATE: 79 BPM | TEMPERATURE: 97.6 F

## 2021-02-10 PROCEDURE — 97112 NEUROMUSCULAR REEDUCATION: CPT

## 2021-02-10 PROCEDURE — 97530 THERAPEUTIC ACTIVITIES: CPT

## 2021-02-10 PROCEDURE — 94640 AIRWAY INHALATION TREATMENT: CPT

## 2021-02-10 PROCEDURE — 77010033678 HC OXYGEN DAILY

## 2021-02-10 PROCEDURE — 94762 N-INVAS EAR/PLS OXIMTRY CONT: CPT

## 2021-02-10 PROCEDURE — 74011250637 HC RX REV CODE- 250/637: Performed by: INTERNAL MEDICINE

## 2021-02-10 PROCEDURE — 74011250637 HC RX REV CODE- 250/637: Performed by: NURSE PRACTITIONER

## 2021-02-10 PROCEDURE — 74011250636 HC RX REV CODE- 250/636: Performed by: INTERNAL MEDICINE

## 2021-02-10 RX ORDER — DIVALPROEX SODIUM 125 MG/1
125 CAPSULE, COATED PELLETS ORAL 2 TIMES DAILY
Qty: 30 CAP | Refills: 0 | Status: SHIPPED | OUTPATIENT
Start: 2021-02-10 | End: 2021-02-25

## 2021-02-10 RX ADMIN — DULOXETINE HYDROCHLORIDE 30 MG: 30 CAPSULE, DELAYED RELEASE ORAL at 08:53

## 2021-02-10 RX ADMIN — Medication 10 ML: at 05:11

## 2021-02-10 RX ADMIN — ACETAMINOPHEN 650 MG: 325 TABLET ORAL at 01:13

## 2021-02-10 RX ADMIN — Medication 400 MG: at 08:53

## 2021-02-10 RX ADMIN — GUAIFENESIN 1200 MG: 600 TABLET ORAL at 08:53

## 2021-02-10 RX ADMIN — ENOXAPARIN SODIUM 40 MG: 40 INJECTION SUBCUTANEOUS at 08:53

## 2021-02-10 RX ADMIN — BUDESONIDE AND FORMOTEROL FUMARATE DIHYDRATE 2 PUFF: 160; 4.5 AEROSOL RESPIRATORY (INHALATION) at 09:51

## 2021-02-10 RX ADMIN — ASPIRIN 81 MG: 81 TABLET ORAL at 08:53

## 2021-02-10 RX ADMIN — DIVALPROEX SODIUM 125 MG: 125 CAPSULE ORAL at 08:53

## 2021-02-10 RX ADMIN — FINASTERIDE 5 MG: 5 TABLET, FILM COATED ORAL at 08:53

## 2021-02-10 RX ADMIN — FAMOTIDINE 20 MG: 20 TABLET, FILM COATED ORAL at 08:53

## 2021-02-10 RX ADMIN — NYSTATIN: 100000 POWDER TOPICAL at 09:00

## 2021-02-10 NOTE — PROGRESS NOTES
EDELMIRA contacted Marly with [facility]- to see if patient can return today as he has a negative Covid. CM left message requesting a return call. CM will follow up again at 10:30 if no call has been received.

## 2021-02-10 NOTE — PROGRESS NOTES
Report given to [facility] RN. Updated on all medical treatment received during this hospitalization. Corie Pro to arrive at 1330 to transport pt to [facility].

## 2021-02-10 NOTE — PROGRESS NOTES
Pt rounded on hourly and PRN. No complaints of nausea or vomiting. Pain tx per MAR. Bed is low, locked, call bell within reach. All needs met this shift. Will continue to monitor until next RN comes on.

## 2021-02-10 NOTE — DISCHARGE SUMMARY
Discharge Summary     Patient: Maral Snyder MRN: [id number]  SSN: [id number]    YOB: 1932  Age: 80 y.o.   Sex: male       Admit Date: 1/30/2021    Discharge Date: 2/10/2021      Admission Diagnoses: Acute on chronic respiratory failure with hypoxia [facility]) [J96.21]    Discharge Diagnoses:   Problem List as of 2/10/2021 Date Reviewed: 12/17/2020          Codes Class Noted - Resolved    Dementia ([facility] (Chronic) ICD-10-CM: F03.90  ICD-9-CM: 294.20  1/30/2021 - Present        Shortness of breath ICD-10-CM: R06.02  ICD-9-CM: 786.05  11/22/2020 - Present        Multilobar lung infiltrate ICD-10-CM: R91.8  ICD-9-CM: 793.19  11/22/2020 - Present        Chronic indwelling Rojas catheter ICD-10-CM: Z97.8  ICD-9-CM: V45.89  11/22/2020 - Present        Sepsis ([facility] ICD-10-CM: A41.9  ICD-9-CM: 038.9, 995.91  1/6/2020 - Present        Severe protein-calorie malnutrition ([facility] (Chronic) ICD-10-CM: E43  ICD-9-CM: 262  11/3/2019 - Present        DNR (do not resuscitate) (Chronic) ICD-10-CM: Z66  ICD-9-CM: V49.86  11/2/2019 - Present        HCAP (healthcare-associated pneumonia) ICD-10-CM: J18.9  ICD-9-CM: 486  9/1/2019 - Present        Urinary retention ICD-10-CM: R33.9  ICD-9-CM: 788.20  5/20/2019 - Present        * (Principal) Acute on chronic respiratory failure with hypoxia ([facility] ICD-10-CM: J96.21  ICD-9-CM: 518.84, 799.02  5/6/2019 - Present        Chronic respiratory failure with hypoxia (HCC) (Chronic) ICD-10-CM: J96.11  ICD-9-CM: 518.83, 799.02  3/29/2016 - Present    Overview Addendum 1/30/2021  5:25 PM by Daksha Tillman MD     4+L chronically             BPH (benign prostatic hyperplasia) (Chronic) ICD-10-CM: N40.0  ICD-9-CM: 600.00  12/10/2015 - Present        COPD (chronic obstructive pulmonary disease) ([facility] (Chronic) ICD-10-CM: J44.9  ICD-9-CM: 496  12/10/2015 - Present        Essential hypertension, benign (Chronic) ICD-10-CM: I10  ICD-9-CM: 401.1  12/10/2015 - Present        RESOLVED: CAP (community acquired pneumonia) ICD-10-CM: J18.9  ICD-9-CM: 620  1/5/2020 - 1/6/2020        RESOLVED: Acute metabolic encephalopathy KUU-28-DO: G93.41  ICD-9-CM: 348.31  11/2/2019 - 1/5/2020        RESOLVED: Abnormality of urethral meatus ICD-10-CM: Q64.70  ICD-9-CM: 753.9  8/8/2019 - 1/5/2020        RESOLVED: Hypoproteinemia ([facility] ICD-10-CM: E77.8  ICD-9-CM: 273.8  6/13/2019 - 1/5/2020        RESOLVED: Hypocalcemia ICD-10-CM: E83.51  ICD-9-CM: 275.41  6/13/2019 - 1/5/2020        RESOLVED: Pleural effusion ICD-10-CM: J90  ICD-9-CM: 511.9  6/10/2019 - 1/5/2020    Overview Signed 6/14/2019 12:59 PM by Thomas Ortiz NP     6/10/19 Right thoracentesis:  800 cc of yellow fluid                  RESOLVED: Hyponatremia ICD-10-CM: E87.1  ICD-9-CM: 276.1  5/22/2019 - 1/5/2020        RESOLVED: Subcutaneous emphysema ([facility] ICD-10-CM: T79. 7XXA  ICD-9-CM: 958.7  5/9/2019 - 1/5/2020        RESOLVED: Pneumothorax on right ICD-10-CM: J93.9  ICD-9-CM: 512.89  5/6/2019 - 12/11/2019        RESOLVED: Shortness of breath ICD-10-CM: R06.02  ICD-9-CM: 786.05  5/6/2019 - 1/5/2020        RESOLVED: COPD exacerbation ([facility] ICD-10-CM: J44.1  ICD-9-CM: 491.21  5/6/2019 - 12/11/2019        RESOLVED: Ventricular tachyarrhythmia ([facility] ICD-10-CM: I47.2  ICD-9-CM: 427.1  5/6/2019 - 1/5/2020        RESOLVED: Pulmonary infiltrates ICD-10-CM: R91.8  ICD-9-CM: 793.19  5/6/2019 - 1/5/2020        RESOLVED: Closed compression fracture of lumbosacral spine ([facility] ICD-10-CM: S32.000A  ICD-9-CM: 805.4  12/11/2018 - 1/5/2020        RESOLVED: Hip fracture ([facility] ICD-10-CM: S72.009A  ICD-9-CM: 820.8  2/1/2018 - 1/5/2020        RESOLVED: Pulmonary edema ICD-10-CM: J81.1  ICD-9-CM: 173  2/1/2018 - 6/11/2018        RESOLVED: SOB (shortness of breath) ICD-10-CM: R06.02  ICD-9-CM: 786.05  3/9/2017 - 1/5/2020    Overview Addendum 12/11/2018 10:09 AM by Dileep Barba LPN     Last Assessment & Plan:   Stable, 6mwt ordered and reviewed.   Management per copd, hypoxia. Last Assessment & Plan:   He presents today with shortness of breath that has worsened over the past 2 weeks which he relates to shallow breathing due to back pain after recent lumbar fractures. His shortness of breath appears to be multifactorial.  He does not appear infected today and I do not believe this is a COPD exacerbation. I have recommended breathing exercises to combat his I will breathing. He does have an incentive spirometer from his previous hospitalization which I have encouraged him to use daily as a reminder to take deep breaths and to prevent pneumonia. We reviewed how to use this device. He expresses great concern about his 30 pound weight loss and dyspnea. His chest x-ray today was negative with no signs of infection or cancer. However, a CT is recommended to rule out malignancy. I have explained that cancer is unlikely due to his hip fracture with sudden loss of appetite then gradual weight loss due to minimal p.o. intake immediately after however will proceed with CT scan. 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 HRCT             RESOLVED: COPD (chronic obstructive pulmonary disease) with emphysema ([facility] ICD-10-CM: J43.9  ICD-9-CM: 492.8  10/17/2016 - 1/5/2020    Overview Signed 12/11/2018 10:09 AM by Tyler Robles LPN     Last Assessment & Plan:   Not currently on any maintence medication regimen for COPD as he felt them to be unhelpful in the past.  I recommended a trial of a ANoro Ellipta which he should take 1 inhalation daily.   Demonstration was completed on the correct method of administration and he was able to return demonstration independently in the office today and administer his first dose. I have given him a 2-week sample which he will use daily and monitor if he notes any improvement in his breathing. He may also try pretreating himself with Ventolin before exertion to see if this offers any relief. RESOLVED: Alfie ICD-10-CM: I49.8  ICD-9-CM: 427.89  3/28/2016 - 1/5/2020    Overview Addendum 12/11/2018 10:09 AM by Debby Staton LPN     Overview:   Reported by LIN Manuel physician. Last Assessment & Plan:   EKG not done. I placed the patient on telemetry today and is having fairly frequent PVCs. We'll check BMP and magnesium. Overview:   Reported by LIN Manuel physician. Last Assessment & Plan:   EKG not done. I placed the patient on telemetry today and is having fairly frequent PVCs. We'll check BMP and magnesium. RESOLVED: Abdominal aortic aneurysm without rupture ([facility] ICD-10-CM: I71.4  ICD-9-CM: 441.4  12/10/2015 - 1/5/2020    Overview Addendum 12/11/2018 10:09 AM by Debby Staton LPN     In 2027             RESOLVED: Cardiovascular disease ICD-10-CM: I25.10  ICD-9-CM: 429.2  12/10/2015 - 1/5/2020        RESOLVED: Low testosterone ICD-10-CM: R79.89  ICD-9-CM: 790.99  12/10/2015 - 1/5/2020        RESOLVED: Chronic obstructive asthma with exacerbation ([facility] ICD-10-CM: J44.1, J45.901  ICD-9-CM: 493.22  12/10/2015 - 2/1/2018        RESOLVED: Allergic rhinitis ICD-10-CM: J30.9  ICD-9-CM: 477.9  12/10/2015 - 1/5/2020        RESOLVED: Raynaud's syndrome ICD-10-CM: I73.00  ICD-9-CM: 443.0  12/10/2015 - 1/5/2020        RESOLVED: Erectile dysfunction ICD-10-CM: N52.9  ICD-9-CM: 607.84  12/10/2015 - 1/5/2020               Discharge Condition: Stable    Hospital Course:     Patient was admitted on 1- due to s/p a fall. He was found to have acute on chronic hypoxia. No fracture. He has been treated for COPD with exacerbation with acute on chronic respiratory failure.      He was put on high flow oxygen at one point, but now back to home dose of oxygen of 2-4 LPM oxygen cannula. He is comfortable. He has some dementia and behavior changes. Depakote sprinkle is ordered. He had acute on recurrent UTI and Levaquin was given. He finished the course. Patient is deemed stable to go back to a nursing facility that came from. Physical Exam:      General:                    The patient is a pleasant elderly male in no acute respiratory distress. On oxygen cannula. Head:                                   Normocephalic/atraumatic. Eyes:                                   No palpebral pallor or scleral icterus. ENT:                                    External auricular and nasal exam within normal limits. Mucous membranes are moist.  Neck:                                   Supple, non-tender, no JVD. Lungs:                       diminished to auscultation bilaterally without wheezes or crackles. No respiratory distress or accessory muscle use. Heart:                                  Regular rate and rhythm, without murmurs, rubs, or gallops. Abdomen:                  Soft, non-tender, non-distended with normoactive bowel sounds. Genitourinary:           No tenderness over the bladder or bilateral CVAs. Extremities:               Without clubbing, cyanosis, or edema. Skin:                                    Normal color, texture, and turgor. No rashes, lesions, or jaundice. Pulses:                      Radial and dorsalis pedis pulses present 2+ bilaterally. Capillary refill <2s. Neurologic:                CN II-XII grossly intact and symmetrical.                                               Moving all four extremities well with no focal deficits.         Consults: None    Significant Diagnostic Studies:     METABOLIC PANEL, BASIC [[id number]] (Order [id number])  Lab  Date: 2/7/2021 Department: [facility] Med Surg Released By/Authorizing: Agnes Vargas NP (auto-released)   Component Value Flag Ref Range Units Status   Sodium 139   138 - 145 mmol/L Final   Potassium 4.2   3.5 - 5.1 mmol/L Final   Chloride 103   98 - 107 mmol/L Final   CO2 30   21 - 32 mmol/L Final   Anion gap 6  Low   7 - 16 mmol/L Final   Glucose 85   65 - 100 mg/dL Final   Comment:   47 - 60 mg/dl Consistent with, but not fully diagnostic of hypoglycemia. 101 - 125 mg/dl Impaired fasting glucose/consistent with pre-diabetes mellitus   > 126 mg/dl Fasting glucose consistent with overt diabetes mellitus    BUN 22   8 - 23 MG/DL Final   Creatinine 0.46  Low   0.8 - 1.5 MG/DL Final   GFR est AA >60   >60 ml/min/1.73m2 Final   GFR est non-AA >60   >60 ml/min/1.73m2 Final   Comment:   (NOTE)   Estimated GFR is calculated using the Modification of Diet in Renal   Disease (MDRD) Study equation, reported for both  Americans   (GFRAA) and non- Americans (GFRNA), and normalized to 1.73m2   body surface area. The physician must decide which value applies to   the patient. The MDRD study equation should only be used in   individuals age 25 or older. It has not been validated for the   following: pregnant women, patients with serious comorbid conditions,   or on certain medications, or persons with extremes of body size,   muscle mass, or nutritional status.     Calcium 8.5   8.3 - 10.4 MG/DL Final     CBC WITH AUTOMATED DIFF [[id number]] (Order [id number])  Lab  Date: 2/7/2021 Department: [facility] Med S[facility] Released By/Authorizing: Agnes Vargas NP (auto-released)   Component Value Flag Ref Range Units Status   WBC 10.6   4.3 - 11.1 K/uL Final   RBC 3.73  Low   4.23 - 5.6 M/uL Final   HGB 12.0  Low   13.6 - 17.2 g/dL Final   HCT 37.8  Low   41.1 - 50.3 % Final   .3  High   79.6 - 97.8 FL Final   MCH 32.2   26.1 - 32.9 PG Final   MCHC 31.7   31.4 - 35.0 g/dL Final   RDW 13.2   11.9 - 14.6 % Final   PLATELET 524   992 - 450 K/uL Final   MPV 10.3   9.4 - 12.3 FL Final   ABSOLUTE NRBC 0.00   0.0 - 0.2 K/uL Final   Comment:   **Note: Absolute NRBC parameter is now reported with Hemogram**   DF AUTOMATED      Final   NEUTROPHILS 60   43 - 78 % Final   LYMPHOCYTES 25   13 - 44 % Final   MONOCYTES 13  High   4.0 - 12.0 % Final   EOSINOPHILS 1   0.5 - 7.8 % Final   BASOPHILS 0   0.0 - 2.0 % Final   IMMATURE GRANULOCYTES 1   0.0 - 5.0 % Final   ABS. NEUTROPHILS 6.3   1.7 - 8.2 K/UL Final   ABS. LYMPHOCYTES 2.7   0.5 - 4.6 K/UL Final   ABS. MONOCYTES 1.4  High   0.1 - 1.3 K/UL Final   ABS. EOSINOPHILS 0.1   0.0 - 0.8 K/UL Final   ABS. BASOPHILS 0.0   0.0 - 0.2 K/UL Final   ABS. IMM. GRANS. 0.1   0.0 - 0.5 K/UL Final     CT chest   1-  IMPRESSION  1. No evidence of pulmonary embolus. 2.  Severe emphysema. Minimal bibasilar infiltrate. XR chest   2-7-2021  IMPRESSION  Emphysema and pulmonary fibrosis. No definite acute process. Disposition: SNF    Discharge Medications:   Current Discharge Medication List      START taking these medications    Details   divalproex (DEPAKOTE SPRINKLE) 125 mg capsule Take 1 Cap by mouth two (2) times a day for 15 days. Qty: 30 Cap, Refills: 0         CONTINUE these medications which have NOT CHANGED    Details   cholecalciferol, vitamin D3, (Vitamin D3) 50 mcg (2,000 unit) tab Take  by mouth daily. albuterol (PROVENTIL HFA, VENTOLIN HFA, PROAIR HFA) 90 mcg/actuation inhaler Take 2 Puffs by inhalation every four (4) hours as needed for Wheezing. famotidine (PEPCID) 10 mg tablet Take 10 mg by mouth two (2) times a day. albuterol-ipratropium (DUO-NEB) 2.5 mg-0.5 mg/3 ml nebu 3 mL by Nebulization route every four (4) hours as needed for Wheezing.      melatonin 3 mg tablet Take  by mouth.       fluticasone furoate-vilanterol (BREO ELLIPTA) 100-25 mcg/dose inhaler 1 inhalation daily, rinse mouth after use  Qty: 1 Inhaler, Refills: 11      DULoxetine (CYMBALTA) 30 mg capsule Take 30 mg by mouth two (2) times a day. magnesium oxide 250 mg magnesium tablet Take 250 mg by mouth daily. nystatin (MYCOSTATIN) powder Apply  to affected area four (4) times daily. finasteride (PROSCAR) 5 mg tablet Take 1 Tab by mouth daily. Qty: 90 Tab, Refills: 4      montelukast (SINGULAIR) 10 mg tablet Take 1 Tab by mouth daily. Qty: 90 Tab, Refills: 4    Associated Diagnoses: Mixed simple and mucopurulent chronic bronchitis (HCC)      Oxygen 4 lpm cont. cyanocobalamin (VITAMIN B12) 1,000 mcg/mL injection Once month  Refills: 12      fluticasone (FLONASE) 50 mcg/actuation nasal spray 2 Sprays by Both Nostrils route daily. Qty: 48 g, Refills: 4    Associated Diagnoses: Allergic rhinitis due to pollen, unspecified seasonality      guaiFENesin ER (MUCINEX) 600 mg ER tablet Take 1,200 mg by mouth daily. Biotin 2,500 mcg cap Take  by mouth. aspirin delayed-release 81 mg tablet Take  by mouth daily. STOP taking these medications       acetaminophen (TYLENOL) 650 mg TbER Comments:   Reason for Stopping:         potassium chloride (K-DUR, KLOR-CON) 20 mEq tablet Comments:   Reason for Stopping:               Activity: Activity as tolerated  Diet: Cardiac Diet  Wound Care: Keep wound clean and dry    Follow-up Appointments   Procedures    FOLLOW UP VISIT Appointment in: Other (Specify) See your primary doctor in 3-5 days. See your primary doctor in 3-5 days. Standing Status:   Standing     Number of Occurrences:   1     Order Specific Question:   Appointment in     Answer: Other (Specify)     I have discussed the plan of care with patient. Time spent on discharge is 38 minutes.       Signed By: Abel Bernheim, MD     February 10, 2021

## 2021-02-10 NOTE — PROGRESS NOTES
ACUTE PHYSICAL THERAPY GOALS:  (Developed with and agreed upon by patient and/or caregiver. )  LTG:  (1.)Mr. Nathaniel Schaefer will move from supine to sit and sit to supine , scoot up and down, and roll side to side in bed with MINIMAL ASSIST within 7 treatment day(s). (2.)Mr. Nathaniel Schaefer will transfer from bed to chair and chair to bed with MINIMAL ASSIST using the least restrictive device within 7 treatment day(s). (3.)Mr. Nathaniel Schaefer will ambulate with MINIMAL ASSIST for 25+ feet with the least restrictive device within 7 treatment day(s). (4.)Mr. Nathaniel Schaefer will perform sit-stand x3  SUPERVISION without using UEs under 20 seconds within 7 treatment day(s). PHYSICAL THERAPY: Daily Note and AM Treatment Day # 5    Eduardo Heaton is a 80 y.o. male   PRIMARY DIAGNOSIS: Acute on chronic respiratory failure with hypoxia (HCC)  Acute on chronic respiratory failure with hypoxia (HCC) [J96.21]         ASSESSMENT:     REHAB RECOMMENDATIONS: CURRENT LEVEL OF FUNCTION:  (Most Recently Demonstrated)   Recommendation to date pending progress:  Setting:   Short-term Rehab  Equipment:    To Be Determined Bed Mobility:   Moderate Assistance  Sit to Stand:   Moderate Assistance x 2  Transfers:   Moderate Assistance x 2  Gait/Mobility:   Not tested     ASSESSMENT:  Mr. Nathaniel Schaefer was supine in bed on arrival. R hip still painful. He requires increased time due to pain and fear of falling. Pt able to stand and take a few steps to recliner. Once settled in the recliner, he requested to use the bed pan. Pt transferred back to bed and put on bed pan. Pt is very fatigued after transfer.         SUBJECTIVE:   Mr. Nathaniel Schaefer states, \"Let me try it\"    SOCIAL HISTORY/ LIVING ENVIRONMENT: from [facility]     OBJECTIVE:     PAIN: VITAL SIGNS: LINES/DRAINS:   Pre Treatment:   not rated  Post Treatment: not rated   Rojas Catheter  O2 Device: Nasal cannula     MOBILITY: I Mod I S SBA CGA Min Mod Max Total  NT x2 Comments:   Bed Mobility    Rolling [] [] [] [] [] [] [x] [] [] [] []    Supine to Sit [] [] [] [] [] [] [x] [] [] [] []    Scooting [] [] [] [] [] [x] [x] [] [] [] []    Sit to Supine [] [] [] [] [] [] [] [x] [] [] []    Transfers    Sit to Stand [] [] [] [] [] [] [x] [] [] [] [x]    Bed to Chair [] [] [] [] [] [] [x] [] [] [] [x]    Stand to Sit [] [] [] [] [] [] [x] [] [] [] []    I=Independent, Mod I=Modified Independent, S=Supervision, SBA=Standby Assistance, CGA=Contact Guard Assistance,   Min=Minimal Assistance, Mod=Moderate Assistance, Max=Maximal Assistance, Total=Total Assistance, NT=Not Tested    GAIT: I Mod I S SBA CGA Min Mod Max Total  NT x2 Comments:   Level of Assistance [] [] [] [] [] [] [x] [] [] [] [x]    Distance 5' x2    DME Rolling Walker    Gait Quality Shuffling steps    Weightbearing  Status N/A     I=Independent, Mod I=Modified Independent, S=Supervision, SBA=Standby Assistance, CGA=Contact Guard Assistance,   Min=Minimal Assistance, Mod=Moderate Assistance, Max=Maximal Assistance, Total=Total Assistance, NT=Not Tested    PLAN:   FREQUENCY/DURATION: PT Plan of Care: 3 times/week for duration of hospital stay or until stated goals are met, whichever comes first.  TREATMENT:     TREATMENT:   ($$ Therapeutic Activity: 38-52 mins    )  Therapeutic Activity (45 Minutes): Therapeutic activity included Supine to Sit, Sit to Supine, Transfer Training, Ambulation on level ground, Sitting balance  and Standing balance to improve functional Mobility, Strength, ROM and Activity tolerance.      AFTER TREATMENT POSITION/PRECAUTIONS:  Bed, Needs within reach and RN notified    INTERDISCIPLINARY COLLABORATION:  RN/PCT and PT/PTA    TOTAL TREATMENT DURATION:  PT Patient Time In/Time Out  Time In: 1115  Time Out: MICHELLE Santana

## 2021-02-10 NOTE — PROGRESS NOTES
Patient can return to [facility] today to room 202A.   notified Dr. Angelic Mejia via perfect serve at 10:26am.

## 2021-02-10 NOTE — PROGRESS NOTES
Care Management Interventions  PCP Verified by CM: (MD at SNF)  Mode of Transport at Discharge: [facility])  Transition of Care Consult (CM Consult): Discharge Planning, SNF(Pt is a long term care resident at [facility]. Pt is covered by medicare and a supplement insurance.  )  Partner SNF: Yes  Discharge Durable Medical Equipment: No(DME provided by SNF)  Physical Therapy Consult: Yes  Occupational Therapy Consult: Yes  Speech Therapy Consult: Yes  Current Support Network: Nursing Facility(Pt is a LTC resident at [facility].  )  Confirm Follow Up Transport: Other (see comment)(SNF will coordinate any needed transport)  Name of the Patient Representative Who was Provided with a Choice of Provider and Agrees with the Discharge Plan: spouse/son   [address] Provided?: No  Discharge Location  Discharge Placement: Skilled nursing facility(Pt will return to [facility] for continued care at [facility].)    Patient to discharge back to [facility] today to room 202a. Patient will transport back to the facility by way of [facility] services at 1330. CM notified patient wife Eriberto Miles of discharge time, Eriberto Miles aware and in agreement. All milestones for discharge have been met. CM remains available should any needs arise.

## 2021-02-10 NOTE — PROGRESS NOTES
ACUTE OT GOALS:  (Developed with and agreed upon by patient and/or caregiver.)  1. Patient will complete upper body bathing and dressing with SBA and adaptive equipment as needed. 2. Patient will complete rolling side to side for hygiene/positioning with supervision. 3. Patient will tolerate 23 minutes of OT treatment with 2-3 rest breaks to increase activity tolerance for ADLs. 4. Patient will complete sitting to the edge of the bed with minimal assistance to tolerance upright sitting for ADL. 5. Patient will complete therapeutic exercises for 8 minutes to improve strength for ADL/functional transfers. 6. Patient will complete functional transfers to the chair/BSC within 3 visits to demonstrate advancement with transfers for ADL.    Timeframe: 7 visits     OCCUPATIONAL THERAPY: Daily Note OT Treatment Day # 4    Rajani Brown is a 80 y.o. male   PRIMARY DIAGNOSIS: Acute on chronic respiratory failure with hypoxia (HCC)  Acute on chronic respiratory failure with hypoxia (HCC) [J96.21]       [place] / [place] / Product Type: Medicare /   ASSESSMENT:     REHAB RECOMMENDATIONS: CURRENT LEVEL OF FUNCTION:  (Most Recently Demonstrated)   Recommendation to date pending progress:  Setting:   Short-term Rehab  Equipment:    To Be Determined Bathing:   Not tested  Dressing:   Not tested  Feeding/Grooming:   Not tested  Toileting:   Not tested  Functional Mobility:   Moderate Assistance x 2     ASSESSMENT:  Mr. Breonna Gomez now presents on nasal cannula. Pt very pleasant to work with but requires additional time for all activity. Pt assisted up to sitting edge of bed and then pt worked on sit to stand edge of bed multiple times with facilitation for balance and posture throughout. Pt has difficulty fully extending lower extremities with standing. Pt completed taking slow steps to the chair with cues for weight shifts.  Pt fatigued once getting to the chair and required cues for breathing and additional time to recover. Pt then assisted back to bed due to having to use bedpan. Pt was able to bridge hips for getting on bedpan. Pt with good participation with therapy and will continue per plan of care. SUBJECTIVE:   Mr. Allyssa Atkinson states, \"It feels good to be in the chair! \"    SOCIAL HISTORY/LIVING ENVIRONMENT:        OBJECTIVE:     PAIN: VITAL SIGNS: LINES/DRAINS:   Pre Treatment: Pain Screen  Pain Scale 1: Numeric (0 - 10)  Pain Intensity 1: 0  Post Treatment: 0   Rojas Catheter  O2 Device: Nasal cannula     ACTIVITIES OF DAILY LIVING: I Mod I S SBA CGA Min Mod Max Total NT Comments   BASIC ADLs:              Bathing/ Showering [] [] [] [] [] [] [] [] [] [x]    Toileting [] [] [] [] [] [] [] [] [] [x]    Dressing [] [] [] [] [] [] [] [] [] [x]    Feeding [] [] [] [] [] [] [] [] [] [x]    Grooming [] [] [] [x] [] [] [] [] [] []    Personal Device Care [] [] [] [] [] [] [] [] [] [x]    Functional Mobility [] [] [] [] [] [] [x] [] [] [] X 2   I=Independent, Mod I=Modified Independent, S=Supervision, SBA=Standby Assistance, CGA=Contact Guard Assistance,   Min=Minimal Assistance, Mod=Moderate Assistance, Max=Maximal Assistance, Total=Total Assistance, NT=Not Tested    MOBILITY: I Mod I S SBA CGA Min Mod Max Total  NT x2 Comments:   Supine to sit [] [] [] [] [] [x] [] [] [] [] [x]    Sit to supine [] [] [] [] [] [] [x] [] [] [] [x]    Sit to stand [] [] [] [] [] [] [x] [] [] [] [x]    Bed to chair [] [] [] [] [] [] [x] [] [] [] [x]    I=Independent, Mod I=Modified Independent, S=Supervision, SBA=Standby Assistance, CGA=Contact Guard Assistance,   Min=Minimal Assistance, Mod=Moderate Assistance, Max=Maximal Assistance, Total=Total Assistance, NT=Not Tested    PLAN:   FREQUENCY/DURATION: OT Plan of Care: 3 times/week for duration of hospital stay or until stated goals are met, whichever comes first.    TREATMENT:   TREATMENT:   ( $$ Neuromuscular Re-Education: 38-52 mins   )  Co-Treatment PT/OT necessary due to patient's decreased overall endurance/tolerance levels, as well as need for high level skilled assistance to complete functional transfers/mobility and functional tasks  Neuromuscular Re-education (45 Minutes): Neuromuscular Re-education included Balance Training, Coordination training, Functional mobility with facilitation, Postural training, Sitting balance training and Standing balance training to improve Balance, Coordination, Functional Mobility and Postural Control.     AFTER TREATMENT POSITION/PRECAUTIONS:  Bed, Needs within reach and RN notified    INTERDISCIPLINARY COLLABORATION:  RN/PCT, PT/PTA and OT/SOTO    TOTAL TREATMENT DURATION:  OT Patient Time In/Time Out  Time In: 1115  Time Out: [address] [place] [address],

## 2021-03-07 ENCOUNTER — HOSPITAL ENCOUNTER (EMERGENCY)
Age: 86
Discharge: NURSING, REHAB OR LONG-TERM CARE | End: 2021-03-07
Attending: UROLOGY
Payer: MEDICARE

## 2021-03-07 ENCOUNTER — HOSPITAL ENCOUNTER (EMERGENCY)
Age: 86
Discharge: OTHER INSTITUTION | End: 2021-03-07
Attending: EMERGENCY MEDICINE
Payer: MEDICARE

## 2021-03-07 VITALS
TEMPERATURE: 97.5 F | BODY MASS INDEX: 17.76 KG/M2 | WEIGHT: 117.2 LBS | RESPIRATION RATE: 16 BRPM | SYSTOLIC BLOOD PRESSURE: 159 MMHG | HEIGHT: 68 IN | HEART RATE: 74 BPM | OXYGEN SATURATION: 96 % | DIASTOLIC BLOOD PRESSURE: 81 MMHG

## 2021-03-07 VITALS
SYSTOLIC BLOOD PRESSURE: 160 MMHG | TEMPERATURE: 97.9 F | OXYGEN SATURATION: 93 % | DIASTOLIC BLOOD PRESSURE: 90 MMHG | HEART RATE: 77 BPM

## 2021-03-07 DIAGNOSIS — N40.1 BENIGN PROSTATIC HYPERPLASIA WITH URINARY OBSTRUCTION: Chronic | ICD-10-CM

## 2021-03-07 DIAGNOSIS — T83.011A MALFUNCTION OF FOLEY CATHETER, INITIAL ENCOUNTER (HCC): Primary | ICD-10-CM

## 2021-03-07 DIAGNOSIS — N13.8 BENIGN PROSTATIC HYPERPLASIA WITH URINARY OBSTRUCTION: Chronic | ICD-10-CM

## 2021-03-07 PROCEDURE — 99284 EMERGENCY DEPT VISIT MOD MDM: CPT

## 2021-03-07 PROCEDURE — 51702 INSERT TEMP BLADDER CATH: CPT | Performed by: UROLOGY

## 2021-03-07 PROCEDURE — 99283 EMERGENCY DEPT VISIT LOW MDM: CPT

## 2021-03-07 PROCEDURE — 51702 INSERT TEMP BLADDER CATH: CPT

## 2021-03-07 PROCEDURE — 99214 OFFICE O/P EST MOD 30 MIN: CPT | Performed by: UROLOGY

## 2021-03-07 RX ORDER — CEPHALEXIN 500 MG/1
500 CAPSULE ORAL 2 TIMES DAILY
Qty: 6 CAP | Refills: 0 | Status: SHIPPED | OUTPATIENT
Start: 2021-03-07 | End: 2021-03-10

## 2021-03-07 NOTE — ED TRIAGE NOTES
[facility] ED from [address], pt has manually removed li with balloon intact. Pt penis has significant trauma to site. Unable to place li as ES location, pt transfer to [facility] for consult with specialty of urology. Spoke with pt son Alexa Mcdonald and states had procedure lined up for pt to receive a suprapubic cath but had to first be cleared by cardiology. Pt of Atiya Whelan MD of [facility] Cardiology.

## 2021-03-07 NOTE — ED TRIAGE NOTES
Pt arrives by EMS, ems states that he needs a urinary catheter and the urologist was \"unable to get a catheter in him\" Pt states he is unsure of what happens when he urinates, pt is hard of hearing.

## 2021-03-08 NOTE — ED PROVIDER NOTES
24-year-old gentleman presents with concerns about having his Rojas catheter accidentally pulled out when he was trying to transfer from a wheelchair. The balloon was still inflated. Nursing staff tried to replace the catheter but they were unable to do so so he was transferred here. Denies any other symptoms. Elements of this note were created using speech recognition software. As such, errors of speech recognition may be present. 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 HRCT    Abnormality of urethral meatus 8/8/2019    Allergic rhinitis 12/10/2015    Asthma     Benign neoplasm     Benign prostatic hyperplasia 12/10/2015    Bigeminy 3/28/2016    Overview:  Reported by LIN Manuel physician. Last Assessment & Plan:  EKG not done. I placed the patient on telemetry today and is having fairly frequent PVCs. We'll check BMP and magnesium. Overview:  Reported by LIN Manuel physician. Last Assessment & Plan:  EKG not done. I placed the patient on telemetry today and is having fairly frequent PVCs. We'll check BMP and magnesium.  BPH without urinary obstruction     Cardiovascular disease 12/10/2015    Chronic obstructive asthma with exacerbation ([address] 12/10/2015    Closed compression fracture of lumbosacral spine ([address] 12/11/2018    COPD (chronic obstructive pulmonary disease) ([address] 12/10/2015    COPD (chronic obstructive pulmonary disease) with emphysema ([address] 10/17/2016    Last Assessment & Plan:  Not currently on any maintence medication regimen for COPD as he felt them to be unhelpful in the past.  I recommended a trial of a ANoro Ellipta which he should take 1 inhalation daily. Demonstration was completed on the correct method of administration and he was able to return demonstration independently in the office today and administer his first dose.   I have given     COPD exacerbation ([address] 2019    Cough with hemoptysis     Erectile dysfunction 12/10/2015    Essential hypertension, benign 12/10/2015    Fracture 10/2014    of left hand and ribs after fall on concrete    History of colon polyps     Low testosterone 12/10/2015    Lumbago     Memory loss     Overflow incontinence     Pleural effusion 6/10/2019    6/10/19 Right thoracentesis:  800 cc of yellow fluid       Pneumothorax on right 2019    Raynaud's syndrome 12/10/2015    Rotator cuff tendinitis     Thrombocytopenia (HCC)     Urinary frequency     [facility]ricular [facility]ia [facility] 2019    VT (ventricular tachycardia) ([address] 2019       Past Surgical History:   Procedure Laterality Date    HX CATARACT REMOVAL  2016-right    HX COLONOSCOPY      HX FRACTURE [place]  10/2014    of left hand and ribs are fall on concrete    [facility]    HX ORTHOPAEDIC  2018    hip fracture         Family History:   Problem Relation Age of Onset    Dementia Mother     Cancer Father         lung cancer    Heart Attack Father        Social History     Socioeconomic History    Marital status:      Spouse name: Not on file    Number of children: Not on file    Years of education: Not on file    Highest education level: Not on file   Occupational History    Not on file   Social Needs    Financial resource strain: Not on file    Food insecurity     Worry: Not on file     Inability: Not on file    Transportation needs     Medical: Not on file     Non-medical: Not on file   Tobacco Use    Smoking status: Former Smoker     Packs/day: 2.00     Years: 60.00     Pack years: 120.00     Types: Cigarettes     Quit date: 1994     Years since quittin.1    Smokeless tobacco: Never Used   Substance and Sexual Activity    Alcohol use: Yes     Comment: occasional    Drug use: Not on file    Sexual activity: Not on file   Lifestyle    Physical activity     Days per week: Not on file     Minutes per session: Not on file    Stress: Not on file   Relationships    Social connections     Talks on phone: Not on file     Gets together: Not on file     Attends Ep[employer]opalian service: Not on file     Active member of club or organization: Not on file     Attends meetings of clubs or organizations: Not on file     Relationship status: Not on file    Intimate partner violence     Fear of current or ex partner: Not on file     Emotionally abused: Not on file     Physically abused: Not on file     Forced sexual activity: Not on file   Other Topics Concern    Not on file   Social History Narrative    Not on file         ALLERGIES: Patient has no known allergies. Review of Systems   Constitutional: Negative for chills and fever. Genitourinary: Positive for difficulty urinating. Vitals:    03/07/21 1633 03/07/21 1738   BP: (!) 148/86 (!) 160/90   Pulse: 77    Temp: 97.9 °F (36.6 °C)    SpO2: 90% 93%            Physical Exam  Vitals signs and nursing note reviewed. Genitourinary:     Comments: Patient is missing much of the skin on the ventral surface of his penis         MDM  Number of Diagnoses or Management Options  Malfunction of Rojas catheter, initial encounter [facility])  Diagnosis management comments: I personally attempted to place a Rojas and was unable to do so.          Procedures

## 2021-03-08 NOTE — CONSULTS
[address] Urology Consult Note                                           03/07/21     Patient: Hu Valverde  MRN: [id number]    Admission Date:  3/7/2021, 0    Reason for Consult: Urinary Retention / Difficult Li Catheter Placement    ASSESSMENT: 80 y.o.  male with neurogenic bladder / urinary retention and traumatic hypospadias. He traumatically pulled out his catheter this afternoon and attempts by his nursing facility / ER to replace the catheter have been unsuccessful. Urology is consulted for urinary retention and difficult catheter placement. He currently is in visible pain with a distended bladder/abdomen and requests urgent catheter replacement. PLAN:  -Will plan for bedside catheter placement today in the ER  -Patient verbally consented to procedure with son on the phone  -Dispo: D/C back to nursing facility after catheter placement with antibiotics for UTI prophylaxis given traumatic removal and multiple replacement attempts today.        __________________________________________________________________________________    HPI:     Hu Valverde is a 80 y.o.  male with a PMH of neurogenic bladder resulting in chronic urinary retention. He has managed for > 2 years with an indwelling li catheter changed monthly at his nursing facility. He follows with my partner, Dr. Pollo Barrios. He presented to [address] [place] [address] today after traumatically pulling out his li catheter with his wheelchair at 1 PM.  He has been unable to void since. Nursing facility and [address] [place] [address] ER attempts at catheter replacement were unsuccessful. He was therefore transferred urgently to [facility] for possible Urologic intervention. He denies any problems from the catheter recently.   He does have a chronic traumatic hypospadias from his indwelling catheter and currently is awaiting cardiology clearance to have a SP tube placed by  Arnoldo Hyatt. 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 HRCT    Abnormality of urethral meatus 8/8/2019    Allergic rhinitis 12/10/2015    Asthma     Benign neoplasm     Benign prostatic hyperplasia 12/10/2015    Bigeminy 3/28/2016    Overview:  Reported by LIN Manuel physician. Last Assessment & Plan:  EKG not done. I placed the patient on telemetry today and is having fairly frequent PVCs. We'll check BMP and magnesium. Overview:  Reported by LIN Manuel physician. Last Assessment & Plan:  EKG not done. I placed the patient on telemetry today and is having fairly frequent PVCs. We'll check BMP and magnesium.  BPH without urinary obstruction     Cardiovascular disease 12/10/2015    Chronic obstructive asthma with exacerbation ([address] 12/10/2015    Closed compression fracture of lumbosacral spine ([address] 12/11/2018    COPD (chronic obstructive pulmonary disease) ([address] 12/10/2015    COPD (chronic obstructive pulmonary disease) with emphysema ([address] 10/17/2016    Last Assessment & Plan:  Not currently on any maintence medication regimen for COPD as he felt them to be unhelpful in the past.  I recommended a trial of a ANoro Ellipta which he should take 1 inhalation daily. Demonstration was completed on the correct method of administration and he was able to return demonstration independently in the office today and administer his first dose.   I have given     COPD exacerbation ([address] 5/6/2019    Cough with hemoptysis     Erectile dysfunction 12/10/2015    Essential hypertension, benign 12/10/2015    Fracture 10/2014    of left hand and ribs after fall on concrete    History of colon polyps     Low testosterone 12/10/2015    Lumbago     Memory loss     Overflow incontinence     Pleural effusion 6/10/2019    6/10/19 Right thoracentesis:  800 cc of yellow fluid       Pneumothorax on right 5/6/2019    Raynaud's syndrome 12/10/2015    Rotator cuff tendinitis     Thrombocytopenia (HCC)     Urinary frequency     Ventricular tachyarrhythmia ([address] 5/6/2019    VT (ventricular tachycardia) ([facility] 5/6/2019       Past Surgical History:  Past Surgical History:   Procedure Laterality Date    HX CATARACT REMOVAL  6/2016-right    HX COLONOSCOPY      HX FRACTURE [place]  10/2014    of left hand and ribs are fall on concrete    [address] [place] HX ORTHOPAEDIC  03/2018    hip fracture       Medications:  No current facility-administered medications on file prior to encounter. Current Outpatient Medications on File Prior to Encounter   Medication Sig Dispense Refill    cholecalciferol, vitamin D3, (Vitamin D3) 50 mcg (2,000 unit) tab Take  by mouth daily.  albuterol (PROVENTIL HFA, VENTOLIN HFA, PROAIR HFA) 90 mcg/actuation inhaler Take 2 Puffs by inhalation every four (4) hours as needed for Wheezing.  famotidine (PEPCID) 10 mg tablet Take 10 mg by mouth two (2) times a day.  albuterol-ipratropium (DUO-NEB) 2.5 mg-0.5 mg/3 ml nebu 3 mL by Nebulization route every four (4) hours as needed for Wheezing.  melatonin 3 mg tablet Take  by mouth.  fluticasone furoate-vilanterol (BREO ELLIPTA) 100-25 mcg/dose inhaler 1 inhalation daily, rinse mouth after use 1 Inhaler 11    DULoxetine (CYMBALTA) 30 mg capsule Take 30 mg by mouth two (2) times a day.  magnesium oxide 250 mg magnesium tablet Take 250 mg by mouth daily.  nystatin (MYCOSTATIN) powder Apply  to affected area four (4) times daily.  finasteride (PROSCAR) 5 mg tablet Take 1 Tab by mouth daily. 90 Tab 4    montelukast (SINGULAIR) 10 mg tablet Take 1 Tab by mouth daily.  (Patient taking differently: Take 10 mg by mouth nightly.) 90 Tab 4    Oxygen 4 lpm cont.  cyanocobalamin (VITAMIN B12) 1,000 mcg/mL injection Once month  12    fluticasone (FLONASE) 50 mcg/actuation nasal spray 2 Sprays by Both Nostrils route daily. 48 g 4    guaiFENesin ER (MUCINEX) 600 mg ER tablet Take 1,200 mg by mouth daily.  Biotin 2,500 mcg cap Take  by mouth.  aspirin delayed-release 81 mg tablet Take  by mouth daily.          Allergies:  No Known Allergies    Social History:  Social History     Socioeconomic History    Marital status:      Spouse name: Not on file    Number of children: Not on file    Years of education: Not on file    Highest education level: Not on file   Occupational History    Not on file   Social Needs    Financial resource strain: Not on file    Food insecurity     Worry: Not on file     Inability: Not on file    Transportation needs     Medical: Not on file     Non-medical: Not on file   Tobacco Use    Smoking status: Former Smoker     Packs/day: 2.00     Years: 60.00     Pack years: 120.00     Types: Cigarettes     Quit date: 1994     Years since quittin.1    Smokeless tobacco: Never Used   Substance and Sexual Activity    Alcohol use: Yes     Comment: occasional    Drug use: Not on file    Sexual activity: Not on file   Lifestyle    Physical activity     Days per week: Not on file     Minutes per session: Not on file    Stress: Not on file   Relationships    Social connections     Talks on phone: Not on file     Gets together: Not on file     Attends Episcopal service: Not on file     Active member of club or organization: Not on file     Attends meetings of clubs or organizations: Not on file     Relationship status: Not on file    Intimate partner violence     Fear of current or ex partner: Not on file     Emotionally abused: Not on file     Physically abused: Not on file     Forced sexual activity: Not on file   Other Topics Concern    Not on file   Social History Narrative    Not on file       Family History:  Family History   Problem Relation Age of Onset    Dementia Mother     Cancer Father         lung cancer    Heart Attack Father        ROS:  Review of Systems - General ROS: negative for - chills, fatigue or fever  Respiratory ROS: no cough, shortness of breath, or wheezing  Cardiovascular ROS: no chest pain or dyspnea on exertion  Gastrointestinal ROS: positive for - abdominal pain  negative for - appetite loss, blood in stools, constipation, diarrhea or nausea/vomiting  Genito-Urinary ROS: positive for - change in urinary stream, pelvic pain and urinary frequency/urgency  negative for - hematuria or incontinence  Musculoskeletal ROS: negative  negative for - muscular weakness    Vitals:  Visit Vitals  BP (!) 155/86 (BP 1 Location: Left upper arm, BP Patient Position: At rest)   Pulse 77   Temp 97.5 °F (36.4 °C)   Resp 16   Ht 5' 8\" (1.727 m)   Wt 117 lb 3.2 oz (53.2 kg)   SpO2 95%   BMI 17.82 kg/m²       Intake/Output:  No intake or output data in the 24 hours ending 03/07/21 1930     Physical Exam:   Visit Vitals  BP (!) 155/86 (BP 1 Location: Left upper arm, BP Patient Position: At rest)   Pulse 77   Temp 97.5 °F (36.4 °C)   Resp 16   Ht 5' 8\" (1.727 m)   Wt 117 lb 3.2 oz (53.2 kg)   SpO2 95%   BMI 17.82 kg/m²        GENERAL: No acute distress, Awake, Alert, Oriented X 3  HEENT: Trachea midline, No gross visual or auditory impairments  CARDIAC: regular rate and rhythm  CHEST AND LUNG: Easy work of breathing, clear to auscultation bilaterally, no cyanosis  ABDOMEN: soft,  Tender in SP region, distended, positive bowel sounds, no organomegaly, no palpable masses, no guarding, no rebound tenderness  : Circumcised phallus with traumatic hypospadias from long term li catheter causing meatus to be at peno-scrotal junction , Testicles descended in scrotum bilaterally and without palpable mass/nodule, non-tender, no edema, no skin erythema, vas deferens palpable bilaterally, no hydrocele, no inguinal hernias, no inguinal lymphadenopathy  SKIN: No rash, no erythema, no lacerations or abrasions, no ecchymosis  NEUROLOGIC: cranial nerves 2-12 grossly intact     Lab/Radiology/Other Diagnostic Tests:  No results for input(s): HGB, HCT, WBC, NA, K, CL, CO2, BUN, CR, GLU, CA, MG, ALBUMIN, AST, ALT, PREALB, INR, HGBEXT, HCTEXT, INREXT in the last 72 hours. No lab exists for component: PLTCT, PO4, TOTPROT, TOTBILI, ALKPHOS, JONN, LIPASE, PT, PTT      Graham Reinoso M.D.     [facility]rology  [address], [address]  Phone: [phone]  Fax: [phone]

## 2021-03-08 NOTE — ED NOTES
Report from [facility] ORTHOPEDIC [facility] [place], care transferred at this time.

## 2021-03-08 NOTE — ED NOTES
TRANSFER - OUT REPORT:    Verbal report given to Lara(name) on Odalis Luda  being transferred to [facility](unit) for routine progression of care       Report consisted of patients Situation, Background, Assessment and   Recommendations(SBAR). Information from the following report(s) SBAR, ED Summary and Recent Results was reviewed with the receiving nurse. Lines:   Peripheral IV 03/07/21 Right Antecubital (Active)   Site Assessment Clean, dry, & intact 03/07/21 1749   Phlebitis Assessment 0 03/07/21 1749   Infiltration Assessment 0 03/07/21 1749   Dressing Status Clean, dry, & intact 03/07/21 1749   Hub Color/Line Status Pink 03/07/21 1749        Opportunity for questions and clarification was provided.       [facility]

## 2021-03-08 NOTE — PROCEDURES
Urology Bedside Procedure Note    Patient: Stephenie Canas  YOB: 1932  MRN: [id number]    Date of Procedure: 3/7/2021     Pre-Op Diagnosis: Urinary Retention    Post-Op Diagnosis: Same as preoperative diagnosis. Surgeon: Rosalva Ward MD    Surgical Assistant: None    Anesthesia: Local - Lidocaine Jelly 10 cc 2%    Estimated Blood Loss (mL): Minimal    Complications: None    Specimens: * Cannot find log *     Implants: None    Drains: 18 Fr coude catheter     Findings: BPH and altered anatomy with traumatic hypospadias that prevented passage of straight tip catheter. Coude catheter placed instead. Indications for procedure:   80 y.o.  male with neurogenic bladder / urinary retention and traumatic hypospadias. He traumatically pulled out his catheter this afternoon and attempts by his nursing facility / ER to replace the catheter have been unsuccessful. Urology is consulted for urinary retention and difficult catheter placement. Description of Procedure:   Using sterile technique, the patient was placed in the supine position and urethra was prepped/draped in the normal sterile fashion. He had a peno-scrotal traumatic hypospadias from chronic indwelling li catheters with dried blood at meatus from prior catheter attempts by the ER and nursing facility. I attempted placement with a 16 Fr straight catheter but met resistance at the prostatic urethra. I then switched to a 18 Fr coude catheter with with some manipulation was able to maneuver this past the prostate into the bladder with return of clear yellow urine. I inflated the balloon to 10 cc sterile water and left the catheter to drainage with 500 cc output in catheter bag. Patient reported relief of abdominal pain / distension. He tolerated the procedure well. Will discharge home with catheter and follow up with Dr. Yvette Chang. Antibiotics will be given for UTI prophylaxis given difficult catheter placement. Electronically Signed by Ravindra Gill MD on 3/7/2021 at 7:40 PM

## 2021-06-06 ENCOUNTER — HOSPITAL ENCOUNTER (INPATIENT)
Age: 86
LOS: 9 days | Discharge: NURSING, REHAB OR LONG-TERM CARE | DRG: 698 | End: 2021-06-15
Attending: EMERGENCY MEDICINE | Admitting: HOSPITALIST
Payer: MEDICARE

## 2021-06-06 ENCOUNTER — APPOINTMENT (OUTPATIENT)
Dept: GENERAL RADIOLOGY | Age: 86
DRG: 698 | End: 2021-06-06
Attending: EMERGENCY MEDICINE
Payer: MEDICARE

## 2021-06-06 DIAGNOSIS — N39.0 URINARY TRACT INFECTION WITHOUT HEMATURIA, SITE UNSPECIFIED: ICD-10-CM

## 2021-06-06 DIAGNOSIS — G93.40 ENCEPHALOPATHY ACUTE: Primary | ICD-10-CM

## 2021-06-06 DIAGNOSIS — E86.9 VOLUME DEPLETION: ICD-10-CM

## 2021-06-06 PROBLEM — N30.00 ACUTE CYSTITIS: Status: ACTIVE | Noted: 2021-06-06

## 2021-06-06 PROBLEM — R65.20 SEVERE SEPSIS (HCC): Status: ACTIVE | Noted: 2021-06-06

## 2021-06-06 PROBLEM — T83.511A URINARY TRACT INFECTION ASSOCIATED WITH INDWELLING URETHRAL CATHETER (HCC): Status: ACTIVE | Noted: 2021-06-06

## 2021-06-06 PROBLEM — A41.9 SEVERE SEPSIS (HCC): Status: ACTIVE | Noted: 2021-06-06

## 2021-06-06 LAB
ALBUMIN SERPL-MCNC: 2.5 G/DL (ref 3.2–4.6)
ALBUMIN/GLOB SERPL: 0.6 {RATIO} (ref 1.2–3.5)
ALP SERPL-CCNC: 94 U/L (ref 50–136)
ALT SERPL-CCNC: 18 U/L (ref 12–65)
ANION GAP SERPL CALC-SCNC: 10 MMOL/L (ref 7–16)
AST SERPL-CCNC: 20 U/L (ref 15–37)
ATRIAL RATE: 78 BPM
BACTERIA URNS QL MICRO: ABNORMAL /HPF
BASOPHILS # BLD: 0 K/UL (ref 0–0.2)
BASOPHILS NFR BLD: 0 % (ref 0–2)
BILIRUB SERPL-MCNC: 0.9 MG/DL (ref 0.2–1.1)
BUN SERPL-MCNC: 40 MG/DL (ref 8–23)
CALCIUM SERPL-MCNC: 8.2 MG/DL (ref 8.3–10.4)
CALCULATED P AXIS, ECG09: 63 DEGREES
CALCULATED R AXIS, ECG10: -3 DEGREES
CALCULATED T AXIS, ECG11: 72 DEGREES
CASTS URNS QL MICRO: ABNORMAL /LPF
CHLORIDE SERPL-SCNC: 108 MMOL/L (ref 98–107)
CO2 SERPL-SCNC: 24 MMOL/L (ref 21–32)
COVID-19 RAPID TEST, COVR: NOT DETECTED
CREAT SERPL-MCNC: 0.86 MG/DL (ref 0.8–1.5)
CRYSTALS URNS QL MICRO: 0 /LPF
DIAGNOSIS, 93000: NORMAL
DIFFERENTIAL METHOD BLD: ABNORMAL
EOSINOPHIL # BLD: 0 K/UL (ref 0–0.8)
EOSINOPHIL NFR BLD: 0 % (ref 0.5–7.8)
EPI CELLS #/AREA URNS HPF: ABNORMAL /HPF
ERYTHROCYTE [DISTWIDTH] IN BLOOD BY AUTOMATED COUNT: 14.6 % (ref 11.9–14.6)
GLOBULIN SER CALC-MCNC: 3.9 G/DL (ref 2.3–3.5)
GLUCOSE SERPL-MCNC: 108 MG/DL (ref 65–100)
HCT VFR BLD AUTO: 37.7 % (ref 41.1–50.3)
HGB BLD-MCNC: 11.6 G/DL (ref 13.6–17.2)
IMM GRANULOCYTES # BLD AUTO: 0.1 K/UL (ref 0–0.5)
IMM GRANULOCYTES NFR BLD AUTO: 1 % (ref 0–5)
LACTATE SERPL-SCNC: 2.3 MMOL/L (ref 0.4–2)
LACTATE SERPL-SCNC: 2.7 MMOL/L (ref 0.4–2)
LACTATE SERPL-SCNC: 3 MMOL/L (ref 0.4–2)
LYMPHOCYTES # BLD: 1.1 K/UL (ref 0.5–4.6)
LYMPHOCYTES NFR BLD: 7 % (ref 13–44)
MCH RBC QN AUTO: 30.2 PG (ref 26.1–32.9)
MCHC RBC AUTO-ENTMCNC: 30.8 G/DL (ref 31.4–35)
MCV RBC AUTO: 98.2 FL (ref 79.6–97.8)
MONOCYTES # BLD: 1.2 K/UL (ref 0.1–1.3)
MONOCYTES NFR BLD: 8 % (ref 4–12)
MUCOUS THREADS URNS QL MICRO: 0 /LPF
NEUTS SEG # BLD: 12.3 K/UL (ref 1.7–8.2)
NEUTS SEG NFR BLD: 84 % (ref 43–78)
NRBC # BLD: 0 K/UL (ref 0–0.2)
OTHER OBSERVATIONS,UCOM: ABNORMAL
P-R INTERVAL, ECG05: 136 MS
PLATELET # BLD AUTO: 246 K/UL (ref 150–450)
PMV BLD AUTO: 9.9 FL (ref 9.4–12.3)
POTASSIUM SERPL-SCNC: 3.9 MMOL/L (ref 3.5–5.1)
PROCALCITONIN SERPL-MCNC: 0.3 NG/ML
PROT SERPL-MCNC: 6.4 G/DL (ref 6.3–8.2)
Q-T INTERVAL, ECG07: 378 MS
QRS DURATION, ECG06: 92 MS
QTC CALCULATION (BEZET), ECG08: 446 MS
RBC # BLD AUTO: 3.84 M/UL (ref 4.23–5.6)
RBC #/AREA URNS HPF: ABNORMAL /HPF
SARS-COV-2, COV2: NORMAL
SODIUM SERPL-SCNC: 142 MMOL/L (ref 136–145)
SOURCE, COVRS: NORMAL
VENTRICULAR RATE, ECG03: 84 BPM
WBC # BLD AUTO: 14.7 K/UL (ref 4.3–11.1)
WBC URNS QL MICRO: >100 /HPF

## 2021-06-06 PROCEDURE — 87635 SARS-COV-2 COVID-19 AMP PRB: CPT

## 2021-06-06 PROCEDURE — 77010033678 HC OXYGEN DAILY

## 2021-06-06 PROCEDURE — 80053 COMPREHEN METABOLIC PANEL: CPT

## 2021-06-06 PROCEDURE — 84145 PROCALCITONIN (PCT): CPT

## 2021-06-06 PROCEDURE — 87150 DNA/RNA AMPLIFIED PROBE: CPT

## 2021-06-06 PROCEDURE — 94664 DEMO&/EVAL PT USE INHALER: CPT

## 2021-06-06 PROCEDURE — 94762 N-INVAS EAR/PLS OXIMTRY CONT: CPT

## 2021-06-06 PROCEDURE — 74011250637 HC RX REV CODE- 250/637: Performed by: HOSPITALIST

## 2021-06-06 PROCEDURE — 87070 CULTURE OTHR SPECIMN AEROBIC: CPT

## 2021-06-06 PROCEDURE — 93005 ELECTROCARDIOGRAM TRACING: CPT | Performed by: EMERGENCY MEDICINE

## 2021-06-06 PROCEDURE — 85025 COMPLETE CBC W/AUTO DIFF WBC: CPT

## 2021-06-06 PROCEDURE — 83605 ASSAY OF LACTIC ACID: CPT

## 2021-06-06 PROCEDURE — 87186 SC STD MICRODIL/AGAR DIL: CPT

## 2021-06-06 PROCEDURE — 87040 BLOOD CULTURE FOR BACTERIA: CPT

## 2021-06-06 PROCEDURE — 96360 HYDRATION IV INFUSION INIT: CPT

## 2021-06-06 PROCEDURE — 94640 AIRWAY INHALATION TREATMENT: CPT

## 2021-06-06 PROCEDURE — 81015 MICROSCOPIC EXAM OF URINE: CPT

## 2021-06-06 PROCEDURE — 74011250637 HC RX REV CODE- 250/637: Performed by: EMERGENCY MEDICINE

## 2021-06-06 PROCEDURE — 65610000001 HC ROOM ICU GENERAL

## 2021-06-06 PROCEDURE — 87088 URINE BACTERIA CULTURE: CPT

## 2021-06-06 PROCEDURE — 74011250636 HC RX REV CODE- 250/636: Performed by: EMERGENCY MEDICINE

## 2021-06-06 PROCEDURE — 74011250636 HC RX REV CODE- 250/636: Performed by: HOSPITALIST

## 2021-06-06 PROCEDURE — 94760 N-INVAS EAR/PLS OXIMETRY 1: CPT

## 2021-06-06 PROCEDURE — 87077 CULTURE AEROBIC IDENTIFY: CPT

## 2021-06-06 PROCEDURE — 99285 EMERGENCY DEPT VISIT HI MDM: CPT

## 2021-06-06 PROCEDURE — 74011000250 HC RX REV CODE- 250: Performed by: HOSPITALIST

## 2021-06-06 PROCEDURE — 71045 X-RAY EXAM CHEST 1 VIEW: CPT

## 2021-06-06 PROCEDURE — 87205 SMEAR GRAM STAIN: CPT

## 2021-06-06 PROCEDURE — 87086 URINE CULTURE/COLONY COUNT: CPT

## 2021-06-06 RX ORDER — ACETAMINOPHEN 325 MG/1
650 TABLET ORAL
Status: DISCONTINUED | OUTPATIENT
Start: 2021-06-06 | End: 2021-06-15 | Stop reason: HOSPADM

## 2021-06-06 RX ORDER — SODIUM CHLORIDE 0.9 % (FLUSH) 0.9 %
5-40 SYRINGE (ML) INJECTION AS NEEDED
Status: DISCONTINUED | OUTPATIENT
Start: 2021-06-06 | End: 2021-06-15 | Stop reason: HOSPADM

## 2021-06-06 RX ORDER — SODIUM CHLORIDE 0.9 % (FLUSH) 0.9 %
5-40 SYRINGE (ML) INJECTION EVERY 8 HOURS
Status: DISCONTINUED | OUTPATIENT
Start: 2021-06-06 | End: 2021-06-15 | Stop reason: HOSPADM

## 2021-06-06 RX ORDER — MIDODRINE HYDROCHLORIDE 5 MG/1
10 TABLET ORAL
Status: DISCONTINUED | OUTPATIENT
Start: 2021-06-06 | End: 2021-06-09

## 2021-06-06 RX ORDER — SODIUM CHLORIDE 9 MG/ML
50 INJECTION, SOLUTION INTRAVENOUS CONTINUOUS
Status: DISCONTINUED | OUTPATIENT
Start: 2021-06-06 | End: 2021-06-07

## 2021-06-06 RX ORDER — DIVALPROEX SODIUM 125 MG/1
125 CAPSULE, COATED PELLETS ORAL
Status: DISCONTINUED | OUTPATIENT
Start: 2021-06-06 | End: 2021-06-15 | Stop reason: HOSPADM

## 2021-06-06 RX ORDER — SODIUM CHLORIDE 0.9 % (FLUSH) 0.9 %
5-10 SYRINGE (ML) INJECTION EVERY 8 HOURS
Status: DISCONTINUED | OUTPATIENT
Start: 2021-06-06 | End: 2021-06-07 | Stop reason: SDUPTHER

## 2021-06-06 RX ORDER — TRAMADOL HYDROCHLORIDE 50 MG/1
50 TABLET ORAL
Status: DISCONTINUED | OUTPATIENT
Start: 2021-06-06 | End: 2021-06-15 | Stop reason: HOSPADM

## 2021-06-06 RX ORDER — FLUTICASONE PROPIONATE 50 MCG
2 SPRAY, SUSPENSION (ML) NASAL DAILY
Status: DISCONTINUED | OUTPATIENT
Start: 2021-06-07 | End: 2021-06-15 | Stop reason: HOSPADM

## 2021-06-06 RX ORDER — ACETAMINOPHEN 650 MG/1
650 SUPPOSITORY RECTAL
Status: DISCONTINUED | OUTPATIENT
Start: 2021-06-06 | End: 2021-06-15 | Stop reason: HOSPADM

## 2021-06-06 RX ORDER — PANTOPRAZOLE SODIUM 40 MG/1
40 TABLET, DELAYED RELEASE ORAL EVERY 24 HOURS
Status: DISCONTINUED | OUTPATIENT
Start: 2021-06-06 | End: 2021-06-15 | Stop reason: HOSPADM

## 2021-06-06 RX ORDER — GUAIFENESIN 600 MG/1
600 TABLET, EXTENDED RELEASE ORAL EVERY 12 HOURS
Status: DISCONTINUED | OUTPATIENT
Start: 2021-06-06 | End: 2021-06-15 | Stop reason: HOSPADM

## 2021-06-06 RX ORDER — SODIUM CHLORIDE 0.9 % (FLUSH) 0.9 %
5-10 SYRINGE (ML) INJECTION AS NEEDED
Status: DISCONTINUED | OUTPATIENT
Start: 2021-06-06 | End: 2021-06-15 | Stop reason: HOSPADM

## 2021-06-06 RX ORDER — BUDESONIDE 0.5 MG/2ML
500 INHALANT ORAL
Status: DISCONTINUED | OUTPATIENT
Start: 2021-06-06 | End: 2021-06-15 | Stop reason: HOSPADM

## 2021-06-06 RX ORDER — DOXYCYCLINE 100 MG/1
100 CAPSULE ORAL EVERY 12 HOURS
Status: DISPENSED | OUTPATIENT
Start: 2021-06-06 | End: 2021-06-11

## 2021-06-06 RX ORDER — ENOXAPARIN SODIUM 100 MG/ML
40 INJECTION SUBCUTANEOUS EVERY 24 HOURS
Status: DISCONTINUED | OUTPATIENT
Start: 2021-06-06 | End: 2021-06-15 | Stop reason: HOSPADM

## 2021-06-06 RX ORDER — ACETAMINOPHEN 325 MG/1
650 TABLET ORAL
Status: COMPLETED | OUTPATIENT
Start: 2021-06-06 | End: 2021-06-06

## 2021-06-06 RX ORDER — IPRATROPIUM BROMIDE AND ALBUTEROL SULFATE 2.5; .5 MG/3ML; MG/3ML
3 SOLUTION RESPIRATORY (INHALATION)
Status: DISCONTINUED | OUTPATIENT
Start: 2021-06-06 | End: 2021-06-15 | Stop reason: HOSPADM

## 2021-06-06 RX ORDER — FINASTERIDE 5 MG/1
5 TABLET, FILM COATED ORAL DAILY
Status: DISCONTINUED | OUTPATIENT
Start: 2021-06-07 | End: 2021-06-15 | Stop reason: HOSPADM

## 2021-06-06 RX ORDER — DULOXETIN HYDROCHLORIDE 30 MG/1
30 CAPSULE, DELAYED RELEASE ORAL 2 TIMES DAILY
Status: DISCONTINUED | OUTPATIENT
Start: 2021-06-06 | End: 2021-06-15 | Stop reason: HOSPADM

## 2021-06-06 RX ADMIN — SODIUM CHLORIDE, SODIUM LACTATE, POTASSIUM CHLORIDE, AND CALCIUM CHLORIDE 1000 ML: 600; 310; 30; 20 INJECTION, SOLUTION INTRAVENOUS at 15:47

## 2021-06-06 RX ADMIN — SODIUM CHLORIDE, SODIUM LACTATE, POTASSIUM CHLORIDE, AND CALCIUM CHLORIDE 1000 ML: 600; 310; 30; 20 INJECTION, SOLUTION INTRAVENOUS at 16:57

## 2021-06-06 RX ADMIN — SODIUM CHLORIDE 75 ML/HR: 900 INJECTION, SOLUTION INTRAVENOUS at 19:55

## 2021-06-06 RX ADMIN — ENOXAPARIN SODIUM 40 MG: 40 INJECTION SUBCUTANEOUS at 20:07

## 2021-06-06 RX ADMIN — ACETAMINOPHEN 650 MG: 325 TABLET, FILM COATED ORAL at 15:48

## 2021-06-06 RX ADMIN — PANTOPRAZOLE SODIUM 40 MG: 40 TABLET, DELAYED RELEASE ORAL at 20:07

## 2021-06-06 RX ADMIN — BUDESONIDE 500 MCG: 0.5 INHALANT RESPIRATORY (INHALATION) at 20:56

## 2021-06-06 RX ADMIN — Medication 10 ML: at 22:45

## 2021-06-06 RX ADMIN — DOXYCYCLINE HYCLATE 100 MG: 100 CAPSULE ORAL at 20:07

## 2021-06-06 RX ADMIN — DULOXETINE HYDROCHLORIDE 30 MG: 30 CAPSULE, DELAYED RELEASE ORAL at 20:07

## 2021-06-06 RX ADMIN — GUAIFENESIN 600 MG: 600 TABLET, EXTENDED RELEASE ORAL at 20:07

## 2021-06-06 RX ADMIN — MIDODRINE HYDROCHLORIDE 10 MG: 5 TABLET ORAL at 20:07

## 2021-06-06 NOTE — ACP (ADVANCE CARE PLANNING)
Advance care planninginitial encounter      Face to face time - 25 minutes face-to-face time spent discussion the care       Patient is able to make his/her own decisions:  [X] Yes  [] NO    Names of POA/surrogate decision maker when patient is altered  : Pt's son and pt's wife    Other members present in the meeting : nonw    Patient / surrogate decision-maker ultimately chose. .. [] Full code- full aggressive medical and surgical interventions, including intubations, resuscitations, pressors, artificial tube feeding  [ ] DO NOT RESUSCITATE -okay to intubate,  and other aggressive medical and surgical intervention   [X] Not resuscitate, DO NOT INTUBATE, but okay for other aggressive medical and surgical intervention   [ ] DNR/DNI, hospice, comfort focus care to maximize patient's quality of life  [ ] DNR/DNI, strict comfort care ONLY        Further discussion regarding principle disease process.  prognosis, plans of care, and treatment goals  : SEVERE SEPSIS FROM CAUTI

## 2021-06-06 NOTE — H&P
[facility]ist History and Physical       Name:  Jin Carranza  Age:89 y.o. Sex:male   :  1932    MRN:  [id number]   PCP:  Sam Lau MD      Admit Date:  2021  2:03 PM   Chief Complaint: WORSENING CONFUSION    Reason for Admission:   Severe sepsis ([facility] [address] [A41.9, R65.20]  Acute cystitis [N30.00]    Assessment & Plan:     SEVERE SEPSIS:  2/2 CAUTI and acute cystitis  LA 2.7  Recheck in 4 hours  IV fluids, given 2 ltr bolus in ER  NS @ 75 cc/hr  F/u with blood, sputum and urine culture. UA with 1+ bacteria  Start empiric IV zosyn     Acute encephalopathy secondary to sepsis/CAUTI with underlying dementia:  Treat underlying etiology   depakote sprinkles prn for agitation/restlessness    COPD:  On chronic 3-4 ltr via NC  pulmicort bid with duoneb prn  mucinex bid  Incentive spirometry  Doxycycline 100 mg po bid x 5 days    Chronic hypoxic respiratory failure:  Continue supplemental oxygen to titrate sPO2>92%    Depression:  Restarting cymbalta    BPH:  Restarting proscar    Severe protein calorie malnutrition with BMP 17:  Nutrition consult in AM  Supplement with meals  Pt has chronic physical deconditioning with poor oral intake  Albumin 2.5    PT/OT eval      Disposition/Expected LOS: 2-3 days  Diet: DIET ADULT  VTE ppx: lovenox  GI ppx: protonix  Code status: DNR  Surrogate decision-maker: pt's son and wife      History of Presenting Illness:     Jin Carranza is a 80 y.o. male with hx of COPD, chronic hypoxic respiratory failure, dementia, BPH with chronic indwelling li, HTN resident of [facility] brought in to ER in view of worsening of baseline mentation for past 3-4 days. Pt's son (POA) present at bedside, provided most of the history. Pt is having poor appetite and oral intake for past 3-5 days, appears to be more fatigued and lethargic per son.   Per son, pts mentation is gradually getting worse over past 1 year, and recently getting more confused with intermittent restlessness and agitation. Pt noted to be febrile in ER T 100.9, with LA 2.7. No reported hx of diarrhea, nausea/vomiting, chest/adbominal pain, chills. Pt endorses chronic cough. Review of Systems:  A 14 point review of systems was taken and pertinent positive as per HPI. 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 HRCT    Abnormality of urethral meatus 8/8/2019    Allergic rhinitis 12/10/2015    Asthma     Benign neoplasm     Benign prostatic hyperplasia 12/10/2015    Bigeminy 3/28/2016    Overview:  Reported by LIN Manuel physician. Last Assessment & Plan:  EKG not done. I placed the patient on telemetry today and is having fairly frequent PVCs. We'll check BMP and magnesium. Overview:  Reported by LIN Manuel physician. Last Assessment & Plan:  EKG not done. I placed the patient on telemetry today and is having fairly frequent PVCs. We'll check BMP and magnesium.  BPH without urinary obstruction     Cardiovascular disease 12/10/2015    Chronic obstructive asthma with exacerbation ([address] 12/10/2015    Closed compression fracture of lumbosacral spine ([facility] [address] 12/11/2018    COPD (chronic obstructive pulmonary disease) ([facility] [address] 12/10/2015    COPD (chronic obstructive pulmonary disease) with emphysema ([facility] [address] 10/17/2016    Last Assessment & Plan:  Not currently on any maintence medication regimen for COPD as he felt them to be unhelpful in the past.  I recommended a trial of a ANoro Ellipta which he should take 1 inhalation daily. Demonstration was completed on the correct method of administration and he was able to return demonstration independently in the office today and administer his first dose.   I have given     COPD exacerbation ([facility] [address] 2019    Cough with hemoptysis     Erectile dysfunction 12/10/2015    Essential hypertension, benign 12/10/2015    Fracture 10/2014    of left hand and ribs after fall on concrete    History of colon polyps     Low testosterone 12/10/2015    Lumbago     Memory loss     Overflow incontinence     Pleural effusion 6/10/2019    6/10/19 Right thoracentesis:  800 cc of yellow fluid       Pneumothorax on right 2019    Raynaud's syndrome 12/10/2015    Rotator cuff tendinitis     Thrombocytopenia (HCC)     Urinary frequency     Ventricular tachyarrhythmia ([facility] [address] 2019    VT (ventricular tachycardia) ([facility] [address] 2019       Past Surgical History:   Procedure Laterality Date    HX CATARACT REMOVAL  2016-right    HX COLONOSCOPY      HX FRACTURE TX  10/2014    of left hand and ribs are fall on concrete    [facility]    HX ORTHOPAEDIC  2018    hip fracture       Family History : reviewed  Family History   Problem Relation Age of Onset    Dementia Mother     Cancer Father         lung cancer    Heart Attack Father         Social History     Tobacco Use    Smoking status: Former Smoker     Packs/day: 2.00     Years: 60.00     Pack years: 120.00     Types: Cigarettes     Quit date: 1994     Years since quittin.4    Smokeless tobacco: Never Used   Substance Use Topics    Alcohol use: Yes     Comment: occasional       No Known Allergies    Immunization History   Administered Date(s) Administered    Influenza High Dose Vaccine PF 10/12/2016, 10/23/2017, 2018, 2019    Influenza Vaccine 09/10/2010    Influenza Vaccine (Tri) Adjuvanted (>65 Yrs FLUAD TRI [id number]) 2018    Pneumococcal Conjugate (PCV-13) 10/01/2015, 2016    Pneumococcal Vaccine (Unspecified Type) 10/01/1998    TB Skin Test (PPD) Intradermal 2018, 2019, 06/10/2019, 2019, 2019, 2021    Zoster Recombinant 05/10/2018, 2018    Zoster Vaccine, Live 07/13/2011         PTA Medications:  Current Outpatient Medications   Medication Instructions    albuterol (PROVENTIL HFA, VENTOLIN HFA, PROAIR HFA) 90 mcg/actuation inhaler 2 Puffs, Inhalation, EVERY 4 HOURS AS NEEDED    albuterol-ipratropium (DUO-NEB) 2.5 mg-0.5 mg/3 ml nebu 3 mL, Nebulization, EVERY 4 HOURS AS NEEDED    aspirin delayed-release 81 mg tablet DAILY    Biotin 2,500 mcg cap Oral    cholecalciferol, vitamin D3, (Vitamin D3) 50 mcg (2,000 unit) tab Oral, DAILY    cyanocobalamin (VITAMIN B12) 1,000 mcg/mL injection Once month    DULoxetine (CYMBALTA) 30 mg, Oral, 2 TIMES DAILY    famotidine (PEPCID) 10 mg, Oral, 2 TIMES DAILY    finasteride (PROSCAR) 5 mg, Oral, DAILY    fluticasone (FLONASE) 50 mcg/actuation nasal spray 2 Sprays, Both Nostrils, DAILY    fluticasone furoate-vilanterol (BREO ELLIPTA) 100-25 mcg/dose inhaler 1 inhalation daily, rinse mouth after use    guaiFENesin ER (MUCINEX) 1,200 mg, Oral, DAILY    magnesium oxide 250 mg, Oral, DAILY    melatonin 3 mg tablet Oral    montelukast (SINGULAIR) 10 mg, Oral, DAILY    nystatin (MYCOSTATIN) powder Topical, 4 TIMES DAILY    Oxygen 4 lpm cont. Objective:     Patient Vitals for the past 24 hrs:   Temp Pulse Resp BP SpO2   06/06/21 1622  75  126/87 95 %   06/06/21 1521 (!) 100.9 °F (38.3 °C)       06/06/21 1452  82  124/65 92 %   06/06/21 1413 98.8 °F (37.1 °C) 85 22 124/65        Oxygen Therapy  O2 Sat (%): 95 % (06/06/21 1622)  Pulse via Oximetry: 75 beats per minute (06/06/21 1622)  O2 Device: Nasal cannula (06/06/21 1622)  O2 Flow Rate (L/min): 4 l/min (06/06/21 1622)    There is no height or weight on file to calculate BMI.     Physical Exam:    General:  Elderly, frail, nad, alert/awake, on 4 ltr via NC  HEENT:  Head NCAT, PERRLA+  Neck:   Supple, no lymphadenopathy, no JVD   Lungs:  Coarse breath sounds bilaterally  CV:   Regular rate and rhythm with normal S1 and S2   Abdomen:  Soft, nontender, nondistended, normoactive bowel sounds   Extremities:  No cyanosis clubbing or edema   Neuro:  gcs 15, CN 2-12 intact, following commands and moving all 4 extremities spontaneously  Psych:  AOx2, mood and affect flat      Data Reviewed: I have reviewed all labs, meds, and studies. Recent Results (from the past 24 hour(s))   EKG, 12 LEAD, INITIAL    Collection Time: 06/06/21  2:23 PM   Result Value Ref Range    Ventricular Rate 84 BPM    Atrial Rate 78 BPM    P-R Interval 136 ms    QRS Duration 92 ms    Q-T Interval 378 ms    QTC Calculation (Bezet) 446 ms    Calculated P Axis 63 degrees    Calculated R Axis -3 degrees    Calculated T Axis 72 degrees    Diagnosis       !! AGE AND GENDER SPECIFIC ECG ANALYSIS !! Sinus rhythm with Premature supraventricular complexes with frequent   Premature ventricular complexes  Septal infarct (cited on or before 01-FEB-2018)  Abnormal ECG  When compared with ECG of 30-JAN-2021 12:46,  Premature supraventricular complexes are now Present  Nonspecific T wave abnormality no longer evident in Lateral leads     LACTIC ACID    Collection Time: 06/06/21  2:27 PM   Result Value Ref Range    Lactic acid 2.7 (H) 0.4 - 2.0 MMOL/L   CBC WITH AUTOMATED DIFF    Collection Time: 06/06/21  2:27 PM   Result Value Ref Range    WBC 14.7 (H) 4.3 - 11.1 K/uL    RBC 3.84 (L) 4.23 - 5.6 M/uL    HGB 11.6 (L) 13.6 - 17.2 g/dL    HCT 37.7 (L) 41.1 - 50.3 %    MCV 98.2 (H) 79.6 - 97.8 FL    MCH 30.2 26.1 - 32.9 PG    MCHC 30.8 (L) 31.4 - 35.0 g/dL    RDW 14.6 11.9 - 14.6 %    PLATELET 966 540 - 994 K/uL    MPV 9.9 9.4 - 12.3 FL    ABSOLUTE NRBC 0.00 0.0 - 0.2 K/uL    DF AUTOMATED      NEUTROPHILS 84 (H) 43 - 78 %    LYMPHOCYTES 7 (L) 13 - 44 %    MONOCYTES 8 4.0 - 12.0 %    EOSINOPHILS 0 (L) 0.5 - 7.8 %    BASOPHILS 0 0.0 - 2.0 %    IMMATURE GRANULOCYTES 1 0.0 - 5.0 %    ABS. NEUTROPHILS 12.3 (H) 1.7 - 8.2 K/UL    ABS. LYMPHOCYTES 1.1 0.5 - 4.6 K/UL    ABS.  MONOCYTES 1.2 0.1 - 1.3 K/UL ABS. EOSINOPHILS 0.0 0.0 - 0.8 K/UL    ABS. BASOPHILS 0.0 0.0 - 0.2 K/UL    ABS. IMM. GRANS. 0.1 0.0 - 0.5 K/UL   METABOLIC PANEL, COMPREHENSIVE    Collection Time: 06/06/21  2:27 PM   Result Value Ref Range    Sodium 142 136 - 145 mmol/L    Potassium 3.9 3.5 - 5.1 mmol/L    Chloride 108 (H) 98 - 107 mmol/L    CO2 24 21 - 32 mmol/L    Anion gap 10 7 - 16 mmol/L    Glucose 108 (H) 65 - 100 mg/dL    BUN 40 (H) 8 - 23 MG/DL    Creatinine 0.86 0.8 - 1.5 MG/DL    GFR est AA >60 >60 ml/min/1.73m2    GFR est non-AA >60 >60 ml/min/1.73m2    Calcium 8.2 (L) 8.3 - 10.4 MG/DL    Bilirubin, total 0.9 0.2 - 1.1 MG/DL    ALT (SGPT) 18 12 - 65 U/L    AST (SGOT) 20 15 - 37 U/L    Alk. phosphatase 94 50 - 136 U/L    Protein, total 6.4 6.3 - 8.2 g/dL    Albumin 2.5 (L) 3.2 - 4.6 g/dL    Globulin 3.9 (H) 2.3 - 3.5 g/dL    A-G Ratio 0.6 (L) 1.2 - 3.5     URINE MICROSCOPIC    Collection Time: 06/06/21  2:27 PM   Result Value Ref Range    WBC >100 0 /hpf    RBC 3-5 0 /hpf    Epithelial cells 0-3 0 /hpf    Bacteria 1+ (H) 0 /hpf    Casts 0-3 0 /lpf    Crystals, urine 0 0 /LPF    Mucus 0 0 /lpf    Other observations RESULTS VERIFIED MANUALLY         EKG Results     Procedure 720 Value Units Date/Time    EKG, 12 LEAD, INITIAL [[id number]] Collected: 06/06/21 1423    Order Status: Completed Updated: 06/06/21 1556     Ventricular Rate 84 BPM      Atrial Rate 78 BPM      P-R Interval 136 ms      QRS Duration 92 ms      Q-T Interval 378 ms      QTC Calculation (Bezet) 446 ms      Calculated P Axis 63 degrees      Calculated R Axis -3 degrees      Calculated T Axis 72 degrees      Diagnosis --     !! AGE AND GENDER SPECIFIC ECG ANALYSIS !!   Sinus rhythm with Premature supraventricular complexes with frequent   Premature ventricular complexes  Septal infarct (cited on or before 01-FEB-2018)  Abnormal ECG  When compared with ECG of 30-JAN-2021 12:46,  Premature supraventricular complexes are now Present  Nonspecific T wave abnormality no longer evident in Lateral leads            All Micro Results     Procedure Component Value Units Date/Time    CULTURE, RESPIRATORY/SPUTUM/BRONCH Xavier Payer [[id number]]     Order Status: Sent Specimen: Sputum     BLOOD CULTURE [[id number]] Collected: 06/06/21 1427    Order Status: Completed Specimen: Blood Updated: 06/06/21 1503    BLOOD CULTURE [[id number]] Collected: 06/06/21 1345    Order Status: Completed Specimen: Blood Updated: 06/06/21 1503    CULTURE, URINE [[id number]] Collected: 06/06/21 1427    Order Status: Completed Specimen: Cath Urine Updated: 06/06/21 1440          Other Studies:  XR CHEST PORT    Result Date: 6/6/2021  Chest portable CLINICAL INDICATION: Acute coughing with fever and altered mental status, shortness of breath, meets SIRS criteria. History of emphysema, pulmonary fibrosis. COMPARISON: Radiograph 2/7/2021 and CT 1/30/2021 TECHNIQUE: single AP portable view chest at 2:15 PM upright FINDINGS: Lungs again demonstrate chronic diffuse interstitial fibrotic changes and emphysema. No evidence of a developing consolidation, pneumothorax, CHF or enlarging effusion. Overall pattern has not definitely changed from prior imaging. Stable mediastinal and hilar contours. 1. No consolidation. 2. Extensive chronic changes similar to prior imaging.          Medications:  Medications Administered       acetaminophen (TYLENOL) tablet 650 mg       Admin Date  03/13/2021 Action  Given Dose  650 mg Route  Oral Administered By  Viki Nixon RN              cefTRIAXone (ROCEPHIN) 1 g in 0.9% sodium chloride (MBP/ADV) 50 mL MBP       Admin Date  03/13/2021 Action  New Bag Dose  1 g Rate  100 mL/hr Route  IntraVENous Administered By  Mabel Paniagua RN              cefTRIAXone (ROCEPHIN) 2 g in 0.9% sodium chloride (MBP/ADV) 50 mL MBP       Admin Date  03/13/2021 Action  New Bag Dose  2 g Rate  100 mL/hr Route  IntraVENous Administered By  Camden Umanzor RN               Admin Date  03/14/2021 Action  New Bag Dose  2 g Rate  100 mL/hr Route  IntraVENous Administered By  Ac Ca RN               Admin Date  03/15/2021 Action  New Bag Dose  2 g Rate  100 mL/hr Route  IntraVENous Administered By  Ac Ca RN               Admin Date  03/16/2021 Action  New Bag Dose  2 g Rate  100 mL/hr Route  IntraVENous Administered By  Dallas Murguia              diphenhydrAMINE (BENADRYL) injection 25 mg       Admin Date  03/14/2021 Action  Given Dose  25 mg Route  IntraVENous Administered By  Devante Belle RN              doxycycline (VIBRAMYCIN) 100 mg in 0.9% sodium chloride (MBP/ADV) 100 mL MBP       Admin Date  03/13/2021 Action  New Bag Dose  100 mg Rate  100 mL/hr Route  IntraVENous Administered By  Mark Frey RN               Admin Date  03/13/2021 Action  New Bag Dose  100 mg Rate  100 mL/hr Route  IntraVENous Administered By  Devante Belle RN               Admin Date  03/14/2021 Action  New Bag Dose  100 mg Rate  100 mL/hr Route  IntraVENous Administered By  Ac Ca RN               Admin Date  03/14/2021 Action  New Bag Dose  100 mg Rate  100 mL/hr Route  IntraVENous Administered By  Devante Belle RN               Admin Date  03/15/2021 Action  New Bag Dose  100 mg Rate  100 mL/hr Route  IntraVENous Administered By  Ac Ca RN              enoxaparin (LOVENOX) injection 40 mg       Admin Date  03/13/2021 Action  Given Dose  40 mg Route  SubCUTAneous Administered By  Mark Frey RN               Admin Date  03/13/2021 Action  Given Dose  40 mg Route  SubCUTAneous Administered By  Devante Belle RN               Admin Date  03/14/2021 Action  Given Dose  40 mg Route  SubCUTAneous Administered By  Ac Ca RN               Admin Date  03/14/2021 Action  Given Dose  40 mg Route  SubCUTAneous Administered By  Devante Belle RN               Admin Date  03/15/2021 Action  Given Dose  40 mg Route  SubCUTAneous Administered By  Ac Ca RN               Admin Date  03/15/2021 Action  Given Dose  40 mg Route  SubCUTAneous Administered By  Desi Richards RN               Admin Date  03/16/2021 Action  Given Dose  40 mg Route  SubCUTAneous Administered By  Bozena Eastern              fentaNYL citrate (PF) injection 50 mcg       Admin Date  03/13/2021 Action  Given Dose  50 mcg Route  IntraVENous Administered By  Camden Umanzor RN               Admin Date  03/14/2021 Action  Given Dose  50 mcg Route  IntraVENous Administered By  Chandler Ramesh RN              ferrous sulfate tablet 325 mg       Admin Date  03/14/2021 Action  Given Dose  325 mg Route  Oral Administered By  Annemarie Killian RN               Admin Date  03/14/2021 Action  Given Dose  325 mg Route  Oral Administered By  Annemarie Killian RN               Admin Date  03/15/2021 Action  Given Dose  325 mg Route  Oral Administered By  Annemarie Killian RN              folic acid (FOLVITE) tablet 1 mg       Admin Date  03/14/2021 Action  Given Dose  1 mg Route  Oral Administered By  Annemarie Killian RN               Admin Date  03/15/2021 Action  Given Dose  1 mg Route  Oral Administered By  Annemarie Killian RN               Admin Date  03/16/2021 Action  Given Dose  1 mg Route  Oral Administered By  Bozena Eastern              furosemide (LASIX) injection 20 mg       Admin Date  03/15/2021 Action  Given Dose  20 mg Route  IntraVENous Administered By  Annemarie Killian RN              furosemide (LASIX) injection 40 mg       Admin Date  03/13/2021 Action  Given Dose  40 mg Route  IntraVENous Administered By  Viki Nixon RN               Admin Date  03/13/2021 Action  Given Dose  40 mg Route  IntraVENous Administered By  Chandler Ramesh RN               Admin Date  03/14/2021 Action  Given Dose  40 mg Route  IntraVENous Administered By  Annemarie Killian RN               Admin Date  03/14/2021 Action  Given Dose  40 mg Route  IntraVENous Administered By  Chandler Ramesh RN               Admin Date  03/15/2021 Action  Given Dose  40 mg Route  IntraVENous Administered By  Leslee Zee RN               Admin Date  03/16/2021 Action  Given Dose  40 mg Route  IntraVENous Administered By  Denise Gramajo              HYDROcodone-acetaminophen [facility]) 5-325 mg per tablet 1 Tab       Admin Date  03/14/2021 Action  Given Dose  1 Tab Route  Oral Administered By  Jemma Fernández RN               Admin Date  03/15/2021 Action  Given Dose  1 Tab Route  Oral Administered By  Clark Chavez RN              insulin lispro (HUMALOG) injection       Admin Date  03/13/2021 Action  Given Dose  2 Units Route  SubCUTAneous Administered By  Clark Chavez RN               Admin Date  03/14/2021 Action  Given Dose  2 Units Route  SubCUTAneous Administered By  Jemma Fernández RN               Admin Date  03/14/2021 Action  Given Dose  2 Units Route  SubCUTAneous Administered By  Jemma Fernández RN               Admin Date  03/14/2021 Action  Given Dose  4 Units Route  SubCUTAneous Administered By  Clark Chavez RN               Admin Date  03/15/2021 Action  Given Dose  2 Units Route  SubCUTAneous Administered By  Jemma Fernández RN               Admin Date  03/15/2021 Action  Given Dose  4 Units Route  SubCUTAneous Administered By  Jemma eFrnández RN               Admin Date  03/15/2021 Action  Given Dose  2 Units Route  SubCUTAneous Administered By  Leslee Zee RN               Admin Date  03/16/2021 Action  Given Dose  4 Units Route  SubCUTAneous Administered By  Denise Gramajo              levothyroxine (SYNTHROID) tablet 137 mcg       Admin Date  03/14/2021 Action  Given Dose  137 mcg Route  Oral Administered By  Jemma Fernández RN               Admin Date  03/15/2021 Action  Given Dose  137 mcg Route  Oral Administered By  Jemma Fernández RN               Admin Date  03/16/2021 Action  Given Dose  137 mcg Route  Oral Administered By  Leslee Zee RN              midodrine (PROAMATINE) tablet 10 mg       Admin Date  03/14/2021 Action  Given Dose  10 mg Route  Oral Administered By  Coy Gaona RN               Admin Date  03/14/2021 Action  Given Dose  10 mg Route  Oral Administered By  Coy Gaona RN               Admin Date  03/15/2021 Action  Given Dose  10 mg Route  Oral Administered By  Coy Gaona RN               Admin Date  03/15/2021 Action  Given Dose  10 mg Route  Oral Administered By  Coy Gaona RN               Admin Date  03/15/2021 Action  Given Dose  10 mg Route  Oral Administered By  Coy Gaona RN               Admin Date  03/16/2021 Action  Given Dose  10 mg Route  Oral Administered By  Bright Harm Date  03/16/2021 Action  Given Dose  10 mg Route  Oral Administered By  Bright Harm Date  03/16/2021 Action  Given Dose  10 mg Route  Oral Administered By  Homero Rico              naloxone [facility]) injection 2 mg       Admin Date  03/13/2021 Action  Given Dose  2 mg Route  IntraVENous Administered By  Colon Hashimoto, RN              NOREPINephrine (LEVOPHED) 4 mg in 5% dextrose 250 mL infusion       Admin Date  03/14/2021 Action  New Bag Dose  4 mcg/min Rate  15 mL/hr Route  IntraVENous Administered By  Coy Gaona RN               Admin Date  03/14/2021 Action  Rate Change Dose  6 mcg/min Rate  22.5 mL/hr Route  IntraVENous Administered By  Coy Gaona RN               Admin Date  03/14/2021 Action  Rate Change Dose  4 mcg/min Rate  15 mL/hr Route  IntraVENous Administered By  Coy Gaona RN               Admin Date  03/14/2021 Action  Rate Change Dose  2 mcg/min Rate  7.5 mL/hr Route  IntraVENous Administered By  Coy Gaona RN               Admin Date  03/14/2021 Action  Rate Change Dose  1 mcg/min Rate  3.8 mL/hr Route  IntraVENous Administered By  Coy Gaona RN               Admin Date  03/14/2021 Action  Restarted Dose  2 mcg/min Rate  7.5 mL/hr Route  IntraVENous Administered By  Coy Gaona RN               Admin Date  03/14/2021 Action  Rate Change Dose  4 mcg/min Rate  15 mL/hr Route  IntraVENous Administered By  Katerin Ellis RN               Admin Date  03/15/2021 Action  Rate Change Dose  2 mcg/min Rate  7.5 mL/hr Route  IntraVENous Administered By  Lona Peterson RN              ondansetron [facility]) injection 4 mg       Admin Date  03/13/2021 Action  Given Dose  4 mg Route  IntraVENous Administered By  Floridalma Taylor RN              pantoprazole (PROTONIX) tablet 40 mg       Admin Date  03/14/2021 Action  Given Dose  40 mg Route  Oral Administered By  Katerin Ellis RN               Admin Date  03/15/2021 Action  Given Dose  40 mg Route  Oral Administered By  Katerin Ellis RN               Admin Date  03/16/2021 Action  Given Dose  40 mg Route  Oral Administered By  Vimal Negrete              perflutren lipid microspheres (DEFINITY) in NS bolus IV       Admin Date  03/13/2021 Action  Given Dose  1 mL Route  IntraVENous Administered By  DARLEEN Velarde              potassium chloride (K-DUR, KLOR-CON) SR tablet 40 mEq       Admin Date  03/15/2021 Action  Given Dose  40 mEq Route  Oral Administered By  Katerin Ellis RN               Admin Date  03/16/2021 Action  Given Dose  40 mEq Route  Oral Administered By  Vimal Negrete              potassium chloride 40 mEq IVPB       Admin Date  03/15/2021 Action  New Bag Dose  40 mEq Rate  25 mL/hr Route  IntraVENous Administered By  Lona Peterson RN              pregabalin (LYRICA) capsule 300 mg       Admin Date  03/14/2021 Action  Given Dose  300 mg Route  Oral Administered By  Katerin Ellis RN               Admin Date  03/14/2021 Action  Given Dose  300 mg Route  Oral Administered By  Katerin Ellis RN               Admin Date  03/15/2021 Action  Given Dose  300 mg Route  Oral Administered By  Katerin Ellis RN               Admin Date  03/15/2021 Action  Given Dose  300 mg Route  Oral Administered By  Peter Cali RN               Admin Date  03/16/2021 Action  Given Dose  300 mg Route  Oral Administered By  Ernesto Severs              sertraline (ZOLOFT) tablet 300 mg       Admin Date  03/14/2021 Action  Given Dose  300 mg Route  Oral Administered By  Dannielle Vann RN               Admin Date  03/15/2021 Action  Given Dose  300 mg Route  Oral Administered By  Dannielle Vann RN               Admin Date  03/16/2021 Action  Given Dose  300 mg Route  Oral Administered By  Ernesto Severs              sodium chloride (NS) flush 5-40 mL       Admin Date  03/13/2021 Action  Given Dose  10 mL Route  IntraVENous Administered By  Devante Lr RN               Admin Date  03/13/2021 Action  Given Dose  30 mL Route  IntraVENous Administered By  Devante Lr RN               Admin Date  03/13/2021 Action  Given Dose  40 mL Route  IntraVENous Administered By  Pedro Villalba RN               Admin Date  03/14/2021 Action  Given Dose  40 mL Route  IntraVENous Administered By  Pedro Villalba RN               Admin Date  03/14/2021 Action  Given Dose  10 mL Route  IntraVENous Administered By  Dannielle Vann RN               Admin Date  03/14/2021 Action  Given Dose  40 mL Route  IntraVENous Administered By  Pedro Villalba RN               Admin Date  03/15/2021 Action  Given Dose  40 mL Route  IntraVENous Administered By  Pedro Villalba RN               Admin Date  03/15/2021 Action  Given Dose  10 mL Route  IntraVENous Administered By  Dannielle Vann RN               Admin Date  03/15/2021 Action  Given Dose  10 mL Route  IntraVENous Administered By  Ninfa Brewer RN               Admin Date  03/16/2021 Action  Given Dose  10 mL Route  IntraVENous Administered By  Ernesto Severs              sodium chloride 0.9 % bolus infusion 250 mL       Admin Date  03/14/2021 Action  New Bag Dose  250 mL Rate  250 mL/hr Route  IntraVENous Administered By  Dannielle Vann RN              tiZANidine (ZANAFLEX) tablet 4 mg       Admin Date  03/14/2021 Action  Given Dose  4 mg Route  Oral Administered By  Loyd Benedict RN               Admin Date  03/14/2021 Action  Given Dose  4 mg Route  Oral Administered By  Eduin Landaverde RN               Admin Date  03/14/2021 Action  Given Dose  4 mg Route  Oral Administered By  Eduin Landaverde RN               Admin Date  03/14/2021 Action  Given Dose  4 mg Route  Oral Administered By  Loyd Benedict RN               Admin Date  03/15/2021 Action  Given Dose  4 mg Route  Oral Administered By  Eduin Landaverde RN               Admin Date  03/15/2021 Action  Given Dose  4 mg Route  Oral Administered By  Eduin Landaverde RN               Admin Date  03/15/2021 Action  Given Dose  4 mg Route  Oral Administered By  Jacek Carreno RN               Admin Date  03/16/2021 Action  Given Dose  4 mg Route  Oral Administered By  Dianna Germain Date  03/16/2021 Action  Given Dose  4 mg Route  Oral Administered By  Giorgi Senjudd              traZODone (DESYREL) tablet 150 mg       Admin Date  03/14/2021 Action  Given Dose  150 mg Route  Oral Administered By  Loyd Benedict RN               Admin Date  03/15/2021 Action  Given Dose  150 mg Route  Oral Administered By  Jacek Carreon RN              tuberculin injection 5 Units       Admin Date  03/15/2021 Action  Give PPD Dose  5 Units Route  IntraDERMal Administered By  Eduin Landaverde RN              vancomycin (VANCOCIN) 2500 mg in  mL infusion       Admin Date  03/13/2021 Action  Given Dose  2,500 mg Rate  200 mL/hr Route  IntraVENous Administered By  Aleida Marrufo RN              zonisamide (ZONEGRAN) capsule 300 mg       Admin Date  03/14/2021 Action  Given Dose  300 mg Route  Oral Administered By  Loyd Benedict RN               Admin Date  03/15/2021 Action  Given Dose  300 mg Route  Oral Administered By  Jacek Carreon RN                        Problem List:     Hospital Problems as of 6/6/2021 Date Reviewed: 12/17/2020        Codes Class Noted - Resolved POA    * (Principal) Severe sepsis ([facility] ICD-10-CM: A41.9, R65.20  ICD-9-CM: 038.9, 995.92  6/6/2021 - Present Unknown        Acute cystitis ICD-10-CM: N30.00  ICD-9-CM: 595.0  6/6/2021 - Present Unknown        Urinary tract infection associated with indwelling urethral catheter ([facility] ICD-10-CM: T83.511A, N39.0  ICD-9-CM: 996.64, 599.0  6/6/2021 - Present Unknown        Dementia (HCC) (Chronic) ICD-10-CM: F03.90  ICD-9-CM: 294.20  1/30/2021 - Present Yes        Chronic indwelling Rojas catheter ICD-10-CM: Z97.8  ICD-9-CM: V45.89  11/22/2020 - Present Yes        Severe protein-calorie malnutrition ([facility] (Chronic) ICD-10-CM: E43  ICD-9-CM: 262  11/3/2019 - Present Yes        Chronic respiratory failure with hypoxia (HCC) (Chronic) ICD-10-CM: J96.11  ICD-9-CM: 518.83, 799.02  3/29/2016 - Present Yes    Overview Addendum 1/30/2021  5:25 PM by Rachel Hernandez MD     4+L chronically             BPH (benign prostatic hyperplasia) (Chronic) ICD-10-CM: N40.0  ICD-9-CM: 600.00  12/10/2015 - Present Yes        COPD (chronic obstructive pulmonary disease) ([facility] (Chronic) ICD-10-CM: J44.9  ICD-9-CM: 119  12/10/2015 - Present Yes                 Signed By: Zion Friend MD   [employer] [facility]    June 6, 2021  4:22 PM

## 2021-06-06 NOTE — PROGRESS NOTES
Patient's oxygen saturation is 88% on 4 L nasal cannula. He denies shortness of breath or discomfort. Patient is very talkative and converses without issue. Lung sounds are diminished over bases. Patient transitioned to 5 L hi flow.  Oxygen saturation 92%

## 2021-06-06 NOTE — PROGRESS NOTES
TRANSFER - IN REPORT:    Verbal report received from [facility] on Adriana Dollr  being received from ED for routine progression of care      Report consisted of patients Situation, Background, Assessment and   Recommendations(SBAR). Information from the following report(s) ED Summary was reviewed with the receiving nurse. Opportunity for questions and clarification was provided. Assessment completed upon patients arrival to unit and care assumed.

## 2021-06-06 NOTE — ED TRIAGE NOTES
Patient comes via ems from [facility]. Per facility pt altered and with increase cough. Patient usually on 4 L NC. Patient has li in place. Per ems patient met sepsis criteria 100.1 fever tachy, zosyn given with 700 cc NS.     Patient has li in place

## 2021-06-06 NOTE — ED PROVIDER NOTES
70-year-old male sent from [facility] for evaluation of increasing cough and reports of altered mental status. Patient has a history of chronic COPD and is chronically on 4 L nasal cannula oxygen. He is a former smoker who quit in 1994. Patient also has a chronic indwelling Rojas catheter secondary to BPH. EMS reports that upon their arrival the patient had a temperature of 100.1 and was tachycardic. This met their sepsis threshold. They initiated a fluid bolus of about 700 mL as well as a dose of Zosyn    Patient does have a history of dementia but does not voice any specific complaints today he is well oriented to person during my exam    The history is provided by the patient, the EMS personnel and the nursing home. Altered mental status   This is a new problem. The current episode started 12 to 24 hours ago. The problem has not changed since onset. Associated symptoms include confusion and somnolence. Pertinent negatives include no numbness. Mental status baseline is moderate dementia. Risk factors include dementia. His past medical history is significant for COPD and dementia. His past medical history does not include diabetes or hypertension. 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 5 physician. Last Assessment & Plan:  EKG not done. I placed the patient on telemetry today and is having fairly frequent PVCs. We'll check BMP and magnesium.   Overview:  Reported by M.D. 168 physician. Last Assessment & Plan:  EKG not done. I placed the patient on telemetry today and is having fairly frequent PVCs. We'll check BMP and magnesium.  BPH without urinary obstruction     Cardiovascular disease 12/10/2015    Chronic obstructive asthma with exacerbation ([address] 12/10/2015    Closed compression fracture of lumbosacral spine ([address] 12/11/2018    COPD (chronic obstructive pulmonary disease) ([address] 12/10/2015    COPD (chronic obstructive pulmonary disease) with emphysema ([address] 10/17/2016    Last Assessment & Plan:  Not currently on any maintence medication regimen for COPD as he felt them to be unhelpful in the past.  I recommended a trial of a ANoro Ellipta which he should take 1 inhalation daily. Demonstration was completed on the correct method of administration and he was able to return demonstration independently in the office today and administer his first dose.   I have given     COPD exacerbation ([address] 5/6/2019    Cough with hemoptysis     Erectile dysfunction 12/10/2015    Essential hypertension, benign 12/10/2015    Fracture 10/2014    of left hand and ribs after fall on concrete    History of colon polyps     Low testosterone 12/10/2015    Lumbago     Memory loss     Overflow incontinence     Pleural effusion 6/10/2019    6/10/19 Right thoracentesis:  800 cc of yellow fluid       Pneumothorax on right 5/6/2019    Raynaud's syndrome 12/10/2015    Rotator cuff tendinitis     Thrombocytopenia (HCC)     Urinary frequency     Ventricular tachyarrhythmia ([address] 5/6/2019    VT (ventricular tachycardia) ([address] 5/6/2019       Past Surgical History:   Procedure Laterality Date    HX CATARACT REMOVAL  6/2016-right    HX COLONOSCOPY      HX FRACTURE TX  10/2014    of left hand and ribs are fall on concrete    [facility]    HX ORTHOPAEDIC  03/2018    hip fracture         Family History:   Problem Relation Age of Onset    Dementia Mother     Cancer Father lung cancer    Heart Attack Father        Social History     Socioeconomic History    Marital status:      Spouse name: Not on file    Number of children: Not on file    Years of education: Not on file    Highest education level: Not on file   Occupational History    Not on file   Tobacco Use    Smoking status: Former Smoker     Packs/day: 2.00     Years: 60.00     Pack years: 120.00     Types: Cigarettes     Quit date: 1994     Years since quittin.4    Smokeless tobacco: Never Used   Substance and Sexual Activity    Alcohol use: Yes     Comment: occasional    Drug use: Not on file    Sexual activity: Not on file   Other Topics Concern    Not on file   Social History Narrative    Not on file     Social Determinants of Health     Financial Resource Strain:     Difficulty of Paying Living Expenses:    Food Insecurity:     Worried About Running Out of Food in the Last Year:     [address] in the Last Year:    Transportation Needs:     Lack of Transportation (Medical):  Lack of Transportation (Non-Medical):    Physical Activity:     Days of Exercise per Week:     Minutes of Exercise per Session:    Stress:     Feeling of Stress :    Social Connections:     Frequency of Communication with Friends and Family:     Frequency of Social Gatherings with Friends and Family:     Attends Voodoo Services:     Active Member of Clubs or Organizations:     Attends Club or Organization Meetings:     Marital Status:    Intimate Partner Violence:     Fear of Current or Ex-Partner:     Emotionally Abused:     Physically Abused:     Sexually Abused: ALLERGIES: Patient has no known allergies. Review of Systems   Constitutional: Negative for activity change, chills, diaphoresis and fever. HENT: Positive for hearing loss. Negative for dental problem, nosebleeds, rhinorrhea and sore throat. Eyes: Negative for pain, discharge, redness and visual disturbance. Respiratory: Positive for cough. Negative for chest tightness and shortness of breath. Cardiovascular: Negative for chest pain, palpitations and leg swelling. Gastrointestinal: Negative for abdominal pain, constipation, diarrhea, nausea and vomiting. Endocrine: Negative for cold intolerance, heat intolerance, polydipsia and polyuria. Genitourinary: Negative for dysuria and flank pain. Musculoskeletal: Positive for arthralgias and myalgias. Negative for back pain, joint swelling and neck pain. Skin: Negative for pallor and rash. Allergic/Immunologic: Negative for environmental allergies and food allergies. Neurological: Negative for dizziness, tremors, light-headedness, numbness and headaches. Hematological: Negative for adenopathy. Does not bruise/bleed easily. Psychiatric/Behavioral: Positive for confusion. Negative for dysphoric mood. The patient is not nervous/anxious and is not hyperactive. All other systems reviewed and are negative. Vitals:    06/06/21 1413   BP: 124/65   Pulse: 85   Resp: 22   Temp: 98.8 °F (37.1 °C)            Physical Exam  Vitals and nursing note reviewed. Constitutional:       General: He is in acute distress. Appearance: He is well-developed and normal weight. HENT:      Head: Normocephalic and atraumatic. Right Ear: External ear normal.      Left Ear: External ear normal.      Mouth/Throat:      Mouth: Mucous membranes are moist.      Pharynx: Oropharynx is clear. No oropharyngeal exudate. Eyes:      General: No scleral icterus. Extraocular Movements: Extraocular movements intact. Conjunctiva/sclera: Conjunctivae normal.      Pupils: Pupils are equal, round, and reactive to light. Neck:      Thyroid: No thyromegaly. Vascular: No JVD. Cardiovascular:      Rate and Rhythm: Normal rate and regular rhythm. Heart sounds: Normal heart sounds. No murmur heard. No friction rub. No gallop.     Pulmonary:      Effort: Pulmonary effort is normal. No respiratory distress. Breath sounds: Normal breath sounds. No wheezing. Abdominal:      General: Bowel sounds are normal. There is no distension. Palpations: Abdomen is soft. Tenderness: There is no abdominal tenderness. Musculoskeletal:         General: No tenderness or deformity. Normal range of motion. Cervical back: Normal range of motion and neck supple. Skin:     General: Skin is warm and dry. Capillary Refill: Capillary refill takes less than 2 seconds. Findings: No rash. Neurological:      Mental Status: He is alert. GCS: GCS eye subscore is 4. GCS verbal subscore is 5. GCS motor subscore is 6. Cranial Nerves: No cranial nerve deficit. Sensory: No sensory deficit. Motor: No abnormal muscle tone. Coordination: Coordination normal.   Psychiatric:         Mood and Affect: Mood normal.         Speech: Speech normal.         Behavior: Behavior normal.         Thought Content: Thought content normal.         Judgment: Judgment normal.          MDM  Number of Diagnoses or Management Options  Encephalopathy acute: new and requires workup  Urinary tract infection without hematuria, site unspecified: new and requires workup  Volume depletion: new and requires workup  Diagnosis management comments: EKG reviewed    Lungs are clear to auscultation  Patient maintaining 92 to 94% saturation on his chronic 4 L nasal cannula    We will check labs and a chest x-ray. We will ask nursing to check a rectal temperature to confirm core temperature.   We will also need to have Rojas catheter exchanged for urine and urine culture  Lactic and blood cultures pending    4:18 PM  Work-up today reveals a UTI with fever and initial elevated lactic acid  Patient is otherwise hemodynamically stable    Patient will be admitted to the hospitalist service       Amount and/or Complexity of Data Reviewed  Clinical lab tests: ordered and reviewed  Tests in the radiology section of CPT®: ordered and reviewed  Tests in the medicine section of CPT®: ordered and reviewed  Decide to obtain previous medical records or to obtain history from someone other than the patient: yes  Obtain history from someone other than the patient: yes  Review and summarize past medical records: yes  Discuss the patient with other providers: yes  Independent visualization of images, tracings, or specimens: yes    Risk of Complications, Morbidity, and/or Mortality  Presenting problems: high  Diagnostic procedures: high  Management options: high  General comments: Elements of this note have been dictated via voice recognition software. Text and phrases may be limited by the accuracy of the software. The chart has been reviewed, but errors may still be present.       Patient Progress  Patient progress: stable         EKG    Date/Time: 6/6/2021 2:45 PM  Performed by: Dima Conn MD  Authorized by: Dima Conn MD     ECG reviewed by ED Physician in the absence of a cardiologist: yes    Previous ECG:     Previous ECG:  Compared to current    Similarity:  No change  Interpretation:     Interpretation: abnormal    Rate:     ECG rate assessment: normal    Rhythm:     Rhythm: sinus rhythm    Ectopy:     Ectopy: PVCs      PVCs:  Multifocal and frequent  QRS:     QRS axis:  Normal    QRS intervals:  Normal  Conduction:     Conduction: normal    ST segments:     ST segments:  Normal  T waves:     T waves: normal    Comments:      No  acute ischemic changes    Critical Care  Performed by: Dima Conn MD  Authorized by: Dima Conn MD     Critical care provider statement:     Critical care time (minutes):  45    Critical care time was exclusive of:  Separately billable procedures and treating other patients    Critical care was necessary to treat or prevent imminent or life-threatening deterioration of the following conditions:  Dehydration and sepsis    Critical care was time spent personally by me on the following activities:  Blood draw for specimens, discussions with consultants, evaluation of patient's response to treatment, examination of patient, ordering and performing treatments and interventions, ordering and review of laboratory studies, ordering and review of radiographic studies, pulse oximetry, re-evaluation of patient's condition and review of old charts    I assumed direction of critical care for this patient from another provider in my specialty: no

## 2021-06-07 PROBLEM — S37.33XA: Status: ACTIVE | Noted: 2019-08-08

## 2021-06-07 PROBLEM — R78.81 BACTEREMIA DUE TO GRAM-NEGATIVE BACTERIA: Status: ACTIVE | Noted: 2021-06-07

## 2021-06-07 LAB
ACC. NO. FROM MICRO ORDER, ACCP: ABNORMAL
ALBUMIN SERPL-MCNC: 2.2 G/DL (ref 3.2–4.6)
ALBUMIN/GLOB SERPL: 0.7 {RATIO} (ref 1.2–3.5)
ALP SERPL-CCNC: 83 U/L (ref 50–136)
ALT SERPL-CCNC: 14 U/L (ref 12–65)
ANION GAP SERPL CALC-SCNC: 6 MMOL/L (ref 7–16)
ARTERIAL PATENCY WRIST A: ABNORMAL
AST SERPL-CCNC: 21 U/L (ref 15–37)
BASE DEFICIT BLD-SCNC: 6.8 MMOL/L
BASOPHILS # BLD: 0 K/UL (ref 0–0.2)
BASOPHILS NFR BLD: 0 % (ref 0–2)
BDY SITE: ABNORMAL
BILIRUB SERPL-MCNC: 0.5 MG/DL (ref 0.2–1.1)
BUN SERPL-MCNC: 34 MG/DL (ref 8–23)
CALCIUM SERPL-MCNC: 7.4 MG/DL (ref 8.3–10.4)
CHLORIDE SERPL-SCNC: 110 MMOL/L (ref 98–107)
CO2 SERPL-SCNC: 25 MMOL/L (ref 21–32)
CREAT SERPL-MCNC: 0.51 MG/DL (ref 0.8–1.5)
DIFFERENTIAL METHOD BLD: ABNORMAL
EOSINOPHIL # BLD: 0.1 K/UL (ref 0–0.8)
EOSINOPHIL NFR BLD: 1 % (ref 0.5–7.8)
ERYTHROCYTE [DISTWIDTH] IN BLOOD BY AUTOMATED COUNT: 14.5 % (ref 11.9–14.6)
ESCHERICHIA COLI: DETECTED
GAS FLOW.O2 O2 DELIVERY SYS: ABNORMAL L/MIN
GLOBULIN SER CALC-MCNC: 3.2 G/DL (ref 2.3–3.5)
GLUCOSE SERPL-MCNC: 89 MG/DL (ref 65–100)
HCO3 BLD-SCNC: 17.9 MMOL/L (ref 22–26)
HCT VFR BLD AUTO: 31.8 % (ref 41.1–50.3)
HGB BLD-MCNC: 9.9 G/DL (ref 13.6–17.2)
IMM GRANULOCYTES # BLD AUTO: 0 K/UL (ref 0–0.5)
IMM GRANULOCYTES NFR BLD AUTO: 0 % (ref 0–5)
INTERPRETATION: ABNORMAL
KPC (CARBAPENEM RESISTANCE GENE): NOT DETECTED
LACTATE SERPL-SCNC: 1 MMOL/L (ref 0.4–2)
LYMPHOCYTES # BLD: 1.2 K/UL (ref 0.5–4.6)
LYMPHOCYTES NFR BLD: 12 % (ref 13–44)
MAGNESIUM SERPL-MCNC: 1.8 MG/DL (ref 1.8–2.4)
MCH RBC QN AUTO: 30.7 PG (ref 26.1–32.9)
MCHC RBC AUTO-ENTMCNC: 31.1 G/DL (ref 31.4–35)
MCV RBC AUTO: 98.5 FL (ref 79.6–97.8)
MONOCYTES # BLD: 1.4 K/UL (ref 0.1–1.3)
MONOCYTES NFR BLD: 15 % (ref 4–12)
NEUTS SEG # BLD: 6.9 K/UL (ref 1.7–8.2)
NEUTS SEG NFR BLD: 72 % (ref 43–78)
NRBC # BLD: 0 K/UL (ref 0–0.2)
PCO2 BLD: 32.7 MMHG (ref 35–45)
PH BLD: 7.35 [PH] (ref 7.35–7.45)
PLATELET # BLD AUTO: 187 K/UL (ref 150–450)
PMV BLD AUTO: 9.8 FL (ref 9.4–12.3)
PO2 BLD: 80 MMHG (ref 75–100)
POTASSIUM SERPL-SCNC: 3.7 MMOL/L (ref 3.5–5.1)
PROT SERPL-MCNC: 5.4 G/DL (ref 6.3–8.2)
RBC # BLD AUTO: 3.23 M/UL (ref 4.23–5.6)
SAO2 % BLD: 95.2 % (ref 95–98)
SARS-COV-2, COV2: NORMAL
SERVICE CMNT-IMP: ABNORMAL
SODIUM SERPL-SCNC: 141 MMOL/L (ref 136–145)
SPECIMEN TYPE: ABNORMAL
WBC # BLD AUTO: 9.7 K/UL (ref 4.3–11.1)

## 2021-06-07 PROCEDURE — 97530 THERAPEUTIC ACTIVITIES: CPT

## 2021-06-07 PROCEDURE — 77030013032 HC MSK BPAP/CPAP FISP -B

## 2021-06-07 PROCEDURE — 80053 COMPREHEN METABOLIC PANEL: CPT

## 2021-06-07 PROCEDURE — 74011000250 HC RX REV CODE- 250: Performed by: HOSPITALIST

## 2021-06-07 PROCEDURE — 36415 COLL VENOUS BLD VENIPUNCTURE: CPT

## 2021-06-07 PROCEDURE — 36600 WITHDRAWAL OF ARTERIAL BLOOD: CPT

## 2021-06-07 PROCEDURE — 74011000258 HC RX REV CODE- 258: Performed by: HOSPITALIST

## 2021-06-07 PROCEDURE — 85025 COMPLETE CBC W/AUTO DIFF WBC: CPT

## 2021-06-07 PROCEDURE — 77030012793 HC CIRC VNTLTR FISP -B

## 2021-06-07 PROCEDURE — U0005 INFEC AGEN DETEC AMPLI PROBE: HCPCS

## 2021-06-07 PROCEDURE — 65610000001 HC ROOM ICU GENERAL

## 2021-06-07 PROCEDURE — 74011250637 HC RX REV CODE- 250/637: Performed by: HOSPITALIST

## 2021-06-07 PROCEDURE — 83605 ASSAY OF LACTIC ACID: CPT

## 2021-06-07 PROCEDURE — 94640 AIRWAY INHALATION TREATMENT: CPT

## 2021-06-07 PROCEDURE — 94760 N-INVAS EAR/PLS OXIMETRY 1: CPT

## 2021-06-07 PROCEDURE — 97165 OT EVAL LOW COMPLEX 30 MIN: CPT

## 2021-06-07 PROCEDURE — 77030031476 HC EXCH HEAT MOISTW FLTR HALY -A

## 2021-06-07 PROCEDURE — 77010033678 HC OXYGEN DAILY

## 2021-06-07 PROCEDURE — 94660 CPAP INITIATION&MGMT: CPT

## 2021-06-07 PROCEDURE — 82803 BLOOD GASES ANY COMBINATION: CPT

## 2021-06-07 PROCEDURE — 97161 PT EVAL LOW COMPLEX 20 MIN: CPT

## 2021-06-07 PROCEDURE — 2709999900 HC NON-CHARGEABLE SUPPLY

## 2021-06-07 PROCEDURE — 74011250636 HC RX REV CODE- 250/636: Performed by: HOSPITALIST

## 2021-06-07 PROCEDURE — 83735 ASSAY OF MAGNESIUM: CPT

## 2021-06-07 RX ORDER — LEVALBUTEROL INHALATION SOLUTION 0.63 MG/3ML
0.63 SOLUTION RESPIRATORY (INHALATION)
Status: COMPLETED | OUTPATIENT
Start: 2021-06-07 | End: 2021-06-07

## 2021-06-07 RX ORDER — MORPHINE SULFATE 2 MG/ML
1 INJECTION, SOLUTION INTRAMUSCULAR; INTRAVENOUS ONCE
Status: COMPLETED | OUTPATIENT
Start: 2021-06-07 | End: 2021-06-07

## 2021-06-07 RX ORDER — HALOPERIDOL 5 MG/ML
4 INJECTION INTRAMUSCULAR
Status: DISCONTINUED | OUTPATIENT
Start: 2021-06-07 | End: 2021-06-15 | Stop reason: HOSPADM

## 2021-06-07 RX ORDER — FUROSEMIDE 10 MG/ML
40 INJECTION INTRAMUSCULAR; INTRAVENOUS ONCE
Status: COMPLETED | OUTPATIENT
Start: 2021-06-07 | End: 2021-06-07

## 2021-06-07 RX ORDER — HALOPERIDOL 5 MG/ML
4 INJECTION INTRAMUSCULAR
Status: DISCONTINUED | OUTPATIENT
Start: 2021-06-07 | End: 2021-06-07

## 2021-06-07 RX ADMIN — GUAIFENESIN 600 MG: 600 TABLET, EXTENDED RELEASE ORAL at 08:46

## 2021-06-07 RX ADMIN — MORPHINE SULFATE 1 MG: 2 INJECTION, SOLUTION INTRAMUSCULAR; INTRAVENOUS at 17:38

## 2021-06-07 RX ADMIN — PIPERACILLIN SODIUM AND TAZOBACTAM SODIUM 4.5 G: 4; .5 INJECTION, POWDER, LYOPHILIZED, FOR SOLUTION INTRAVENOUS at 04:17

## 2021-06-07 RX ADMIN — IPRATROPIUM BROMIDE AND ALBUTEROL SULFATE 3 ML: .5; 3 SOLUTION RESPIRATORY (INHALATION) at 08:21

## 2021-06-07 RX ADMIN — FUROSEMIDE 40 MG: 10 INJECTION, SOLUTION INTRAMUSCULAR; INTRAVENOUS at 17:38

## 2021-06-07 RX ADMIN — HALOPERIDOL LACTATE 4 MG: 5 INJECTION, SOLUTION INTRAMUSCULAR at 16:37

## 2021-06-07 RX ADMIN — FINASTERIDE 5 MG: 5 TABLET, FILM COATED ORAL at 08:46

## 2021-06-07 RX ADMIN — DULOXETINE HYDROCHLORIDE 30 MG: 30 CAPSULE, DELAYED RELEASE ORAL at 16:04

## 2021-06-07 RX ADMIN — ENOXAPARIN SODIUM 40 MG: 40 INJECTION SUBCUTANEOUS at 18:00

## 2021-06-07 RX ADMIN — BUDESONIDE 500 MCG: 0.5 INHALANT RESPIRATORY (INHALATION) at 20:17

## 2021-06-07 RX ADMIN — Medication 10 ML: at 13:09

## 2021-06-07 RX ADMIN — MIDODRINE HYDROCHLORIDE 10 MG: 5 TABLET ORAL at 13:08

## 2021-06-07 RX ADMIN — PIPERACILLIN AND TAZOBACTAM 3.38 G: 3; .375 INJECTION, POWDER, LYOPHILIZED, FOR SOLUTION INTRAVENOUS at 14:19

## 2021-06-07 RX ADMIN — SODIUM CHLORIDE 50 ML/HR: 900 INJECTION, SOLUTION INTRAVENOUS at 14:16

## 2021-06-07 RX ADMIN — IPRATROPIUM BROMIDE AND ALBUTEROL SULFATE 3 ML: .5; 3 SOLUTION RESPIRATORY (INHALATION) at 20:17

## 2021-06-07 RX ADMIN — BUDESONIDE 500 MCG: 0.5 INHALANT RESPIRATORY (INHALATION) at 08:21

## 2021-06-07 RX ADMIN — DOXYCYCLINE HYCLATE 100 MG: 100 CAPSULE ORAL at 08:46

## 2021-06-07 RX ADMIN — Medication 10 ML: at 05:55

## 2021-06-07 RX ADMIN — DULOXETINE HYDROCHLORIDE 30 MG: 30 CAPSULE, DELAYED RELEASE ORAL at 08:46

## 2021-06-07 RX ADMIN — PANTOPRAZOLE SODIUM 40 MG: 40 TABLET, DELAYED RELEASE ORAL at 16:04

## 2021-06-07 RX ADMIN — Medication 10 ML: at 21:33

## 2021-06-07 RX ADMIN — ACETAMINOPHEN 650 MG: 650 SUPPOSITORY RECTAL at 16:29

## 2021-06-07 RX ADMIN — PIPERACILLIN AND TAZOBACTAM 3.38 G: 3; .375 INJECTION, POWDER, LYOPHILIZED, FOR SOLUTION INTRAVENOUS at 21:33

## 2021-06-07 RX ADMIN — DIVALPROEX SODIUM 125 MG: 125 CAPSULE, COATED PELLETS ORAL at 16:04

## 2021-06-07 RX ADMIN — MIDODRINE HYDROCHLORIDE 10 MG: 5 TABLET ORAL at 08:46

## 2021-06-07 RX ADMIN — MIDODRINE HYDROCHLORIDE 10 MG: 5 TABLET ORAL at 16:04

## 2021-06-07 RX ADMIN — LEVALBUTEROL HYDROCHLORIDE 0.63 MG: 0.63 SOLUTION RESPIRATORY (INHALATION) at 17:08

## 2021-06-07 RX ADMIN — Medication 10 ML: at 13:08

## 2021-06-07 NOTE — PROGRESS NOTES
[address] [facility] Hospitalist        Name:  Matilda Dumont  Age:89 y.o. Sex:male   :  1932    MRN:  [id number]   PCP:  Jonathan Martinez MD      Admit Date:  2021  2:03 PM   Chief Complaint: WORSENING CONFUSION    Reason for Admission:   Severe sepsis ([address] [A41.9, R65.20]  Acute cystitis [N30.00]    Assessment & Plan:     SEVERE SEPSIS:   2/2 CAUTI and acute cystitis and GNR Bacteremia  : resolving  LA resolved  S/p adequate fluid resuscitation per sepsis protocol  NS @ 50 cc/hr  Blood culture positive for GNR  Urine culture negative so far  UA with 1+ bacteria  Continue  IV zosyn D2  Pt is hemodynamically stable  Continue midodrine for another 24-48 hours    Acute encephalopathy secondary to sepsis/CAUTI with underlying dementia:  : mentation is improving  Treat underlying etiology   depakote sprinkles prn for agitation/restlessness    COPD:  : stable  On chronic 3-4 ltr via NC  pulmicort bid with duoneb prn  mucinex bid  Incentive spirometry  Doxycycline 100 mg po bid x 5 days (D2)    Chronic hypoxic respiratory failure:  : stable  Continue supplemental oxygen to titrate sPO2>92%    Depression:  : stable  Restarting cymbalta    BPH:  : indwelling li cath in  continue proscar  Urology consulted to evaluate for urethral meatus injury? ?  Could be chronic    Severe protein calorie malnutrition with BMP 17:  :  Nutrition consulted this AM  Supplement with meals  Pt has chronic physical deconditioning with poor oral intake  Albumin 2.5    PT/OT eval      Disposition/Expected LOS: 2-3 days  Diet: DIET ADULT  VTE ppx: lovenox  GI ppx: protonix  Code status: DNR  Surrogate decision-maker: pt's son and wife      Interval Hx/Subjective:     Matilda Dumont is a 80 y.o. male with hx of COPD, chronic hypoxic respiratory failure, dementia, BPH with chronic indwelling li, HTN resident of [facility] admitted on  for severe sepsis secondary to CAUTI/acute cystitis resulting in worsening of baseline dementia. Pt noted to be febrile in ER T 100.9, with LA 2.7. No reported hx of diarrhea, nausea/vomiting, chest/adbominal pain, chills. Pt endorses chronic cough. Review of Systems:  A 14 point review of systems was taken and pertinent positive as mentioned above. Objective:     Patient Vitals for the past 24 hrs:   Temp Pulse Resp BP SpO2   06/07/21 0600  72 21 102/63 96 %   06/07/21 0500  67  115/69    06/07/21 0450  72 25  95 %   06/07/21 0416 98.9 °F (37.2 °C) 72 18 111/66 98 %   06/07/21 0415  73 23  98 %   06/07/21 0400  74 23 112/66 97 %   06/07/21 0300  71 24 116/67 97 %   06/07/21 0200  75 23 115/65 99 %   06/07/21 0100  69 24 108/67 93 %   06/07/21 0014 98.6 °F (37 °C) 76 20 124/68 99 %   06/07/21 0000  74 10 (!) 142/76 92 %   06/06/21 2300  76 26 124/71 98 %   06/06/21 2200  82 23 108/68    06/06/21 2152  73 25  93 %   06/06/21 2101  98 22  92 %   06/06/21 2100  86  115/73 91 %   06/06/21 2056     96 %   06/06/21 2000  84  123/68    06/06/21 1959  77   92 %   06/06/21 1933 97.8 °F (36.6 °C) 80 29 105/73 (!) 88 %   06/06/21 1900  76 28 138/66    06/06/21 1848 98.8 °F (37.1 °C)       06/06/21 1752 98.9 °F (37.2 °C) 74 20 (!) 101/58 93 %   06/06/21 1722  74  118/64 96 %   06/06/21 1720  79   96 %   06/06/21 1652  77  120/65 94 %   06/06/21 1622  75  126/87 95 %   06/06/21 1521 (!) 100.9 °F (38.3 °C)       06/06/21 1452  82  124/65 92 %   06/06/21 1413 98.8 °F (37.1 °C) 85 22 124/65        Oxygen Therapy  O2 Sat (%): 96 % (06/07/21 0600)  Pulse via Oximetry: 69 beats per minute (06/07/21 0600)  O2 Device: Nasal cannula (06/07/21 0416)  O2 Flow Rate (L/min): 5 l/min (06/07/21 0416)    Body mass index is 19.51 kg/m².     Physical Exam:    General:  Elderly, frail, nad, alert/awake, on 4 ltr via NC  HEENT:  Head NCAT, PERRLA+  Neck:   Supple, no lymphadenopathy, no JVD   Lungs:  Coarse breath sounds bilaterally  CV:   Regular rate and rhythm with normal S1 and S2   Abdomen:  Soft, nontender, nondistended, normoactive bowel sounds   Extremities:  No cyanosis clubbing or edema   Neuro:  gcs 15, CN 2-12 intact, following commands and moving all 4 extremities spontaneously  Psych:  AOx2, mood and affect flat      Data Reviewed: I have reviewed all labs, meds, and studies. Recent Results (from the past 24 hour(s))   CULTURE, BLOOD    Collection Time: 06/06/21  1:45 PM    Specimen: Blood   Result Value Ref Range    Special Requests: LEFT FOREARM      GRAM STAIN GRAM NEGATIVE RODS      GRAM STAIN        RESULTS VERIFIED, PHONED TO AND READ BACK BY AMISHA GRIFFITH RN BY NB AT [id number] ON [id number]    GRAM STAIN ANAEROBIC BOTTLE POSITIVE      Culture result: CULTURE IN PROGRESS,FURTHER UPDATES TO FOLLOW     EKG, 12 LEAD, INITIAL    Collection Time: 06/06/21  2:23 PM   Result Value Ref Range    Ventricular Rate 84 BPM    Atrial Rate 78 BPM    P-R Interval 136 ms    QRS Duration 92 ms    Q-T Interval 378 ms    QTC Calculation (Bezet) 446 ms    Calculated P Axis 63 degrees    Calculated R Axis -3 degrees    Calculated T Axis 72 degrees    Diagnosis       !! AGE AND GENDER SPECIFIC ECG ANALYSIS !!   Sinus rhythm with Premature supraventricular complexes with frequent   Premature ventricular complexes  Septal infarct (cited on or before 01-FEB-2018)  Abnormal ECG  When compared with ECG of 30-JAN-2021 12:46,  Premature supraventricular complexes are now Present  Nonspecific T wave abnormality no longer evident in Lateral leads  Confirmed by ST SHIRA CAMACHO MD (), GERARDO BURKS ([id number]) on 6/6/2021 5:04:38 PM     CULTURE, BLOOD    Collection Time: 06/06/21  2:27 PM    Specimen: Blood   Result Value Ref Range    Special Requests: RIGHT  Antecubital        Culture result: NO GROWTH AFTER 11 HOURS     LACTIC ACID    Collection Time: 06/06/21  2:27 PM   Result Value Ref Range    Lactic acid 2.7 (H) 0.4 - 2.0 MMOL/L   CBC WITH AUTOMATED DIFF    Collection Time: 06/06/21  2:27 PM   Result Value Ref Range    WBC 14.7 (H) 4.3 - 11.1 K/uL    RBC 3.84 (L) 4.23 - 5.6 M/uL    HGB 11.6 (L) 13.6 - 17.2 g/dL    HCT 37.7 (L) 41.1 - 50.3 %    MCV 98.2 (H) 79.6 - 97.8 FL    MCH 30.2 26.1 - 32.9 PG    MCHC 30.8 (L) 31.4 - 35.0 g/dL    RDW 14.6 11.9 - 14.6 %    PLATELET 207 490 - 334 K/uL    MPV 9.9 9.4 - 12.3 FL    ABSOLUTE NRBC 0.00 0.0 - 0.2 K/uL    DF AUTOMATED      NEUTROPHILS 84 (H) 43 - 78 %    LYMPHOCYTES 7 (L) 13 - 44 %    MONOCYTES 8 4.0 - 12.0 %    EOSINOPHILS 0 (L) 0.5 - 7.8 %    BASOPHILS 0 0.0 - 2.0 %    IMMATURE GRANULOCYTES 1 0.0 - 5.0 %    ABS. NEUTROPHILS 12.3 (H) 1.7 - 8.2 K/UL    ABS. LYMPHOCYTES 1.1 0.5 - 4.6 K/UL    ABS. MONOCYTES 1.2 0.1 - 1.3 K/UL    ABS. EOSINOPHILS 0.0 0.0 - 0.8 K/UL    ABS. BASOPHILS 0.0 0.0 - 0.2 K/UL    ABS. IMM. GRANS. 0.1 0.0 - 0.5 K/UL   METABOLIC PANEL, COMPREHENSIVE    Collection Time: 06/06/21  2:27 PM   Result Value Ref Range    Sodium 142 136 - 145 mmol/L    Potassium 3.9 3.5 - 5.1 mmol/L    Chloride 108 (H) 98 - 107 mmol/L    CO2 24 21 - 32 mmol/L    Anion gap 10 7 - 16 mmol/L    Glucose 108 (H) 65 - 100 mg/dL    BUN 40 (H) 8 - 23 MG/DL    Creatinine 0.86 0.8 - 1.5 MG/DL    GFR est AA >60 >60 ml/min/1.73m2    GFR est non-AA >60 >60 ml/min/1.73m2    Calcium 8.2 (L) 8.3 - 10.4 MG/DL    Bilirubin, total 0.9 0.2 - 1.1 MG/DL    ALT (SGPT) 18 12 - 65 U/L    AST (SGOT) 20 15 - 37 U/L    Alk.  phosphatase 94 50 - 136 U/L    Protein, total 6.4 6.3 - 8.2 g/dL    Albumin 2.5 (L) 3.2 - 4.6 g/dL    Globulin 3.9 (H) 2.3 - 3.5 g/dL    A-G Ratio 0.6 (L) 1.2 - 3.5     URINE MICROSCOPIC    Collection Time: 06/06/21  2:27 PM   Result Value Ref Range    WBC >100 0 /hpf    RBC 3-5 0 /hpf    Epithelial cells 0-3 0 /hpf    Bacteria 1+ (H) 0 /hpf    Casts 0-3 0 /lpf    Crystals, urine 0 0 /LPF    Mucus 0 0 /lpf    Other observations RESULTS VERIFIED MANUALLY     PROCALCITONIN    Collection Time: 06/06/21  2:27 PM   Result Value Ref Range    Procalcitonin 0.30 ng/mL   LACTIC ACID Collection Time: 06/06/21  4:59 PM   Result Value Ref Range    Lactic acid 2.3 (H) 0.4 - 2.0 MMOL/L   SARS-COV-2    Collection Time: 06/06/21  4:59 PM   Result Value Ref Range    SARS-CoV-2 Please find results under separate order     COVID-19 RAPID TEST    Collection Time: 06/06/21  4:59 PM   Result Value Ref Range    Specimen source Nasopharyngeal      COVID-19 rapid test Not detected NOTD     LACTIC ACID    Collection Time: 06/06/21  8:57 PM   Result Value Ref Range    Lactic acid 3.0 (H) 0.4 - 2.0 MMOL/L   LACTIC ACID    Collection Time: 06/07/21  1:13 AM   Result Value Ref Range    Lactic acid 1.0 0.4 - 2.0 MMOL/L   METABOLIC PANEL, COMPREHENSIVE    Collection Time: 06/07/21  4:22 AM   Result Value Ref Range    Sodium 141 136 - 145 mmol/L    Potassium 3.7 3.5 - 5.1 mmol/L    Chloride 110 (H) 98 - 107 mmol/L    CO2 25 21 - 32 mmol/L    Anion gap 6 (L) 7 - 16 mmol/L    Glucose 89 65 - 100 mg/dL    BUN 34 (H) 8 - 23 MG/DL    Creatinine 0.51 (L) 0.8 - 1.5 MG/DL    GFR est AA >60 >60 ml/min/1.73m2    GFR est non-AA >60 >60 ml/min/1.73m2    Calcium 7.4 (L) 8.3 - 10.4 MG/DL    Bilirubin, total 0.5 0.2 - 1.1 MG/DL    ALT (SGPT) 14 12 - 65 U/L    AST (SGOT) 21 15 - 37 U/L    Alk.  phosphatase 83 50 - 136 U/L    Protein, total 5.4 (L) 6.3 - 8.2 g/dL    Albumin 2.2 (L) 3.2 - 4.6 g/dL    Globulin 3.2 2.3 - 3.5 g/dL    A-G Ratio 0.7 (L) 1.2 - 3.5     MAGNESIUM    Collection Time: 06/07/21  4:22 AM   Result Value Ref Range    Magnesium 1.8 1.8 - 2.4 mg/dL   CBC WITH AUTOMATED DIFF    Collection Time: 06/07/21  4:22 AM   Result Value Ref Range    WBC 9.7 4.3 - 11.1 K/uL    RBC 3.23 (L) 4.23 - 5.6 M/uL    HGB 9.9 (L) 13.6 - 17.2 g/dL    HCT 31.8 (L) 41.1 - 50.3 %    MCV 98.5 (H) 79.6 - 97.8 FL    MCH 30.7 26.1 - 32.9 PG    MCHC 31.1 (L) 31.4 - 35.0 g/dL    RDW 14.5 11.9 - 14.6 %    PLATELET 002 110 - 228 K/uL    MPV 9.8 9.4 - 12.3 FL    ABSOLUTE NRBC 0.00 0.0 - 0.2 K/uL    DF AUTOMATED      NEUTROPHILS 72 43 - 78 % LYMPHOCYTES 12 (L) 13 - 44 %    MONOCYTES 15 (H) 4.0 - 12.0 %    EOSINOPHILS 1 0.5 - 7.8 %    BASOPHILS 0 0.0 - 2.0 %    IMMATURE GRANULOCYTES 0 0.0 - 5.0 %    ABS. NEUTROPHILS 6.9 1.7 - 8.2 K/UL    ABS. LYMPHOCYTES 1.2 0.5 - 4.6 K/UL    ABS. MONOCYTES 1.4 (H) 0.1 - 1.3 K/UL    ABS. EOSINOPHILS 0.1 0.0 - 0.8 K/UL    ABS. BASOPHILS 0.0 0.0 - 0.2 K/UL    ABS. IMM. GRANS. 0.0 0.0 - 0.5 K/UL       EKG Results     Procedure 720 Value Units Date/Time    EKG, 12 LEAD, INITIAL [[id number]] Collected: 06/06/21 1423    Order Status: Completed Updated: 06/06/21 1704     Ventricular Rate 84 BPM      Atrial Rate 78 BPM      P-R Interval 136 ms      QRS Duration 92 ms      Q-T Interval 378 ms      QTC Calculation (Bezet) 446 ms      Calculated P Axis 63 degrees      Calculated R Axis -3 degrees      Calculated T Axis 72 degrees      Diagnosis --     !! AGE AND GENDER SPECIFIC ECG ANALYSIS !!   Sinus rhythm with Premature supraventricular complexes with frequent   Premature ventricular complexes  Septal infarct (cited on or before 01-FEB-2018)  Abnormal ECG  When compared with ECG of 30-JAN-2021 12:46,  Premature supraventricular complexes are now Present  Nonspecific T wave abnormality no longer evident in Lateral leads  Confirmed by ST SHIRA CAMACHO MD (), GERARDO BURKS ([id number]) on 6/6/2021 5:04:38 PM            All Micro Results     Procedure Component Value Units Date/Time    BLOOD CULTURE [[id number]] Collected: 06/06/21 1345    Order Status: Completed Specimen: Blood Updated: 06/07/21 0820     Special Requests: LEFT FOREARM        GRAM STAIN GRAM NEGATIVE RODS               RESULTS VERIFIED, PHONED TO AND READ BACK BY AMISHA GRIFFITH RN BY NB AT [address] [place] [address] ON [id number]            ANAEROBIC BOTTLE POSITIVE        Culture result:       CULTURE IN PROGRESS,FURTHER UPDATES TO FOLLOW          BLOOD CULTURE [[id number]] Collected: 06/06/21 1427    Order Status: Completed Specimen: Blood Updated: 06/07/21 2702     Special Requests: -- RIGHT  Antecubital       Culture result: NO GROWTH AFTER 11 HOURS       CULTURE, URINE [[id number]] Collected: 06/06/21 1427    Order Status: Completed Specimen: Cath Urine Updated: 06/06/21 2251    CULTURE, RESPIRATORY/SPUTUM/BRONCH Orpah Dary STAIN [[id number]] Collected: 06/06/21 1821    Order Status: Completed Specimen: Sputum Updated: 06/06/21 2251    COVID-19 RAPID TEST [[id number]] Collected: 06/06/21 1659    Order Status: Completed Specimen: Nasopharyngeal Updated: 06/06/21 1728     Specimen source Nasopharyngeal        COVID-19 rapid test Not detected        Comment:      The specimen is NEGATIVE for SARS-CoV-2, the novel coronavirus associated with COVID-19. A negative result does not rule out COVID-19. This test has been authorized by the FDA under an Emergency Use Authorization (EUA) for use by authorized laboratories. Fact sheet for Healthcare Providers: [employer]  Fact sheet for Patients: [employer]       Methodology: Isothermal Nucleic Acid Amplification               Other Studies:  XR CHEST PORT    Result Date: 6/6/2021  Chest portable CLINICAL INDICATION: Acute coughing with fever and altered mental status, shortness of breath, meets SIRS criteria. History of emphysema, pulmonary fibrosis. COMPARISON: Radiograph 2/7/2021 and CT 1/30/2021 TECHNIQUE: single AP portable view chest at 2:15 PM upright FINDINGS: Lungs again demonstrate chronic diffuse interstitial fibrotic changes and emphysema. No evidence of a developing consolidation, pneumothorax, CHF or enlarging effusion. Overall pattern has not definitely changed from prior imaging. Stable mediastinal and hilar contours. 1. No consolidation. 2. Extensive chronic changes similar to prior imaging.          Medications:  Medications Administered       acetaminophen (TYLENOL) tablet 650 mg       Admin Date  03/13/2021 Action  Given Dose  650 mg Route  Oral Administered By  Subha Finn Quin Wyman RN              cefTRIAXone (ROCEPHIN) 1 g in 0.9% sodium chloride (MBP/ADV) 50 mL MBP       Admin Date  03/13/2021 Action  New Bag Dose  1 g Rate  100 mL/hr Route  IntraVENous Administered By  Jennifer Richardson RN              cefTRIAXone (ROCEPHIN) 2 g in 0.9% sodium chloride (MBP/ADV) 50 mL MBP       Admin Date  03/13/2021 Action  New Bag Dose  2 g Rate  100 mL/hr Route  IntraVENous Administered By  Dez Bartholomew RN               Admin Date  03/14/2021 Action  New Bag Dose  2 g Rate  100 mL/hr Route  IntraVENous Administered By  Madhavi Mckeon RN               Admin Date  03/15/2021 Action  New Bag Dose  2 g Rate  100 mL/hr Route  IntraVENous Administered By  Madhavi Mckeon RN               Admin Date  03/16/2021 Action  New Bag Dose  2 g Rate  100 mL/hr Route  IntraVENous Administered By  Lynette Alcnatara              diphenhydrAMINE (BENADRYL) injection 25 mg       Admin Date  03/14/2021 Action  Given Dose  25 mg Route  IntraVENous Administered By  Bridger Butler RN              doxycycline (VIBRAMYCIN) 100 mg in 0.9% sodium chloride (MBP/ADV) 100 mL MBP       Admin Date  03/13/2021 Action  New Bag Dose  100 mg Rate  100 mL/hr Route  IntraVENous Administered By  Dez Bartholomew RN               Admin Date  03/13/2021 Action  New Bag Dose  100 mg Rate  100 mL/hr Route  IntraVENous Administered By  Bridger Butler RN               Admin Date  03/14/2021 Action  New Bag Dose  100 mg Rate  100 mL/hr Route  IntraVENous Administered By  Madhavi Mckeon RN               Admin Date  03/14/2021 Action  New Bag Dose  100 mg Rate  100 mL/hr Route  IntraVENous Administered By  Bridger Butler RN               Admin Date  03/15/2021 Action  New Bag Dose  100 mg Rate  100 mL/hr Route  IntraVENous Administered By  Madhavi Mckeon RN              enoxaparin (LOVENOX) injection 40 mg       Admin Date  03/13/2021 Action  Given Dose  40 mg Route  SubCUTAneous Administered By  Dze Bartholomew RN               Admin Date  03/13/2021 Action  Given Dose  40 mg Route  SubCUTAneous Administered By  Dung Haney RN               Admin Date  03/14/2021 Action  Given Dose  40 mg Route  SubCUTAneous Administered By  Dora Terry RN               Admin Date  03/14/2021 Action  Given Dose  40 mg Route  SubCUTAneous Administered By  Dung Haney RN               Admin Date  03/15/2021 Action  Given Dose  40 mg Route  SubCUTAneous Administered By  Dora Terry RN               Admin Date  03/15/2021 Action  Given Dose  40 mg Route  SubCUTAneous Administered By  Yetta Barthel, RN               Admin Date  03/16/2021 Action  Given Dose  40 mg Route  SubCUTAneous Administered By  Beto Napoles              fentaNYL citrate (PF) injection 50 mcg       Admin Date  03/13/2021 Action  Given Dose  50 mcg Route  IntraVENous Administered By  Cassy Fajardo RN               Admin Date  03/14/2021 Action  Given Dose  50 mcg Route  IntraVENous Administered By  Dung Haney RN              ferrous sulfate tablet 325 mg       Admin Date  03/14/2021 Action  Given Dose  325 mg Route  Oral Administered By  Dora Terry RN               Admin Date  03/14/2021 Action  Given Dose  325 mg Route  Oral Administered By  Dora Terry, JANINA               Admin Date  03/15/2021 Action  Given Dose  325 mg Route  Oral Administered By  Dora Terry RN              folic acid (FOLVITE) tablet 1 mg       Admin Date  03/14/2021 Action  Given Dose  1 mg Route  Oral Administered By  Dora Terry RN               Admin Date  03/15/2021 Action  Given Dose  1 mg Route  Oral Administered By  Dora Terry RN               Admin Date  03/16/2021 Action  Given Dose  1 mg Route  Oral Administered By  Beto Napoles              furosemide (LASIX) injection 20 mg       Admin Date  03/15/2021 Action  Given Dose  20 mg Route  IntraVENous Administered By  Dora Terry RN              furosemide (LASIX) injection 40 mg       Admin Date  03/13/2021 Action  Given Dose  40 mg Route  IntraVENous Administered By  Rios Garrison RN               Admin Date  03/13/2021 Action  Given Dose  40 mg Route  IntraVENous Administered By  Kathryn Zelaya RN               Admin Date  03/14/2021 Action  Given Dose  40 mg Route  IntraVENous Administered By  Raghu Whyte RN               Admin Date  03/14/2021 Action  Given Dose  40 mg Route  IntraVENous Administered By  Kathryn Zelaya RN               Admin Date  03/15/2021 Action  Given Dose  40 mg Route  IntraVENous Administered By  Jennifer Tran RN               Admin Date  03/16/2021 Action  Given Dose  40 mg Route  IntraVENous Administered By  Pauline Donis              HYDROcodone-acetaminophen [facility]) 5-325 mg per tablet 1 Tab       Admin Date  03/14/2021 Action  Given Dose  1 Tab Route  Oral Administered By  Raghu Whyte RN               Admin Date  03/15/2021 Action  Given Dose  1 Tab Route  Oral Administered By  Kathryn Zelaya RN              insulin lispro (HUMALOG) injection       Admin Date  03/13/2021 Action  Given Dose  2 Units Route  SubCUTAneous Administered By  Kathryn Zelaya RN               Admin Date  03/14/2021 Action  Given Dose  2 Units Route  SubCUTAneous Administered By  Raghu Whyte RN               Admin Date  03/14/2021 Action  Given Dose  2 Units Route  SubCUTAneous Administered By  Raghu Whyte RN               Admin Date  03/14/2021 Action  Given Dose  4 Units Route  SubCUTAneous Administered By  Kathryn Zelaya RN               Admin Date  03/15/2021 Action  Given Dose  2 Units Route  SubCUTAneous Administered By  Raghu Whyte RN               Admin Date  03/15/2021 Action  Given Dose  4 Units Route  SubCUTAneous Administered By  Raghu Whyte RN               Admin Date  03/15/2021 Action  Given Dose  2 Units Route  SubCUTAneous Administered By  Jennifer Tran RN               Admin Date  03/16/2021 Action  Given Dose  4 Units Route  SubCUTAneous Administered By  Elisa December              levothyroxine (SYNTHROID) tablet 137 mcg       Admin Date  03/14/2021 Action  Given Dose  137 mcg Route  Oral Administered By  Roly Gutierrez RN               Admin Date  03/15/2021 Action  Given Dose  137 mcg Route  Oral Administered By  Roly Gutierrez RN               Admin Date  03/16/2021 Action  Given Dose  137 mcg Route  Oral Administered By  Manoj Ogden RN              midodrine (PROAMATINE) tablet 10 mg       Admin Date  03/14/2021 Action  Given Dose  10 mg Route  Oral Administered By  Roly Gutierrez RN               Admin Date  03/14/2021 Action  Given Dose  10 mg Route  Oral Administered By  Roly Gutierrez RN               Admin Date  03/15/2021 Action  Given Dose  10 mg Route  Oral Administered By  Roly Gutierrez RN               Admin Date  03/15/2021 Action  Given Dose  10 mg Route  Oral Administered By  Roly Gutierrez RN               Admin Date  03/15/2021 Action  Given Dose  10 mg Route  Oral Administered By  Roly Gutierrez RN               Admin Date  03/16/2021 Action  Given Dose  10 mg Route  Oral Administered By  Sergio Rick Date  03/16/2021 Action  Given Dose  10 mg Route  Oral Administered By  Sergio Rick Date  03/16/2021 Action  Given Dose  10 mg Route  Oral Administered By  Elisa December              naloxone [facility]) injection 2 mg       Admin Date  03/13/2021 Action  Given Dose  2 mg Route  IntraVENous Administered By  Gabby Fuentes RN              NOREPINephrine (LEVOPHED) 4 mg in 5% dextrose 250 mL infusion       Admin Date  03/14/2021 Action  New Bag Dose  4 mcg/min Rate  15 mL/hr Route  IntraVENous Administered By  Roly Gutierrez RN               Admin Date  03/14/2021 Action  Rate Change Dose  6 mcg/min Rate  22.5 mL/hr Route  IntraVENous Administered By  Roly Gutierrez RN               Admin Date  03/14/2021 Action  Rate Change Dose  4 mcg/min Rate  15 mL/hr Route  IntraVENous Administered By  Sergo Smalls RN               Admin Date  03/14/2021 Action  Rate Change Dose  2 mcg/min Rate  7.5 mL/hr Route  IntraVENous Administered By  Sergo Smalls RN               Admin Date  03/14/2021 Action  Rate Change Dose  1 mcg/min Rate  3.8 mL/hr Route  IntraVENous Administered By  Sergo Smalls RN               Admin Date  03/14/2021 Action  Restarted Dose  2 mcg/min Rate  7.5 mL/hr Route  IntraVENous Administered By  Sergo Smalls RN               Admin Date  03/14/2021 Action  Rate Change Dose  4 mcg/min Rate  15 mL/hr Route  IntraVENous Administered By  Sergo Smalls RN               Admin Date  03/15/2021 Action  Rate Change Dose  2 mcg/min Rate  7.5 mL/hr Route  IntraVENous Administered By  Rashida Veliz RN              [facility]) injection 4 mg       Admin Date  03/13/2021 Action  Given Dose  4 mg Route  IntraVENous Administered By  Melanie Salgado RN              pantoprazole (PROTONIX) tablet 40 mg       Admin Date  03/14/2021 Action  Given Dose  40 mg Route  Oral Administered By  Sergo Smalls RN               Admin Date  03/15/2021 Action  Given Dose  40 mg Route  Oral Administered By  Sergo Smalls RN               Admin Date  03/16/2021 Action  Given Dose  40 mg Route  Oral Administered By  Nina Valdez              perflutren lipid microspheres (DEFINITY) in NS bolus IV       Admin Date  03/13/2021 Action  Given Dose  1 mL Route  IntraVENous Administered By  DARLEEN Gibson              potassium chloride (K-DUR, KLOR-CON) SR tablet 40 mEq       Admin Date  03/15/2021 Action  Given Dose  40 mEq Route  Oral Administered By  Sergo Smalls RN               Admin Date  03/16/2021 Action  Given Dose  40 mEq Route  Oral Administered By  Nina Valdez              potassium chloride 40 mEq IVPB       Admin Date  03/15/2021 Action  New Bag Dose  40 mEq Rate  25 mL/hr Route  IntraVENous Administered By  Rashida Veliz RN              pregabalin (LYRICA) capsule 300 mg       Admin Date  03/14/2021 Action  Given Dose  300 mg Route  Oral Administered By  Eduin Landaverde RN               Admin Date  03/14/2021 Action  Given Dose  300 mg Route  Oral Administered By  Eduin Landaverde RN               Admin Date  03/15/2021 Action  Given Dose  300 mg Route  Oral Administered By  Eduin Landaverde RN               Admin Date  03/15/2021 Action  Given Dose  300 mg Route  Oral Administered By  Noni Strong RN               Admin Date  03/16/2021 Action  Given Dose  300 mg Route  Oral Administered By  Giorgi Durant              sertraline (ZOLOFT) tablet 300 mg       Admin Date  03/14/2021 Action  Given Dose  300 mg Route  Oral Administered By  Eduin Landaverde RN               Admin Date  03/15/2021 Action  Given Dose  300 mg Route  Oral Administered By  Eduin Landaverde RN               Admin Date  03/16/2021 Action  Given Dose  300 mg Route  Oral Administered By  Giorgi Durant              sodium chloride (NS) flush 5-40 mL       Admin Date  03/13/2021 Action  Given Dose  10 mL Route  IntraVENous Administered By  Aleida Marrufo RN               Admin Date  03/13/2021 Action  Given Dose  30 mL Route  IntraVENous Administered By  Aleida Marrufo RN               Admin Date  03/13/2021 Action  Given Dose  40 mL Route  IntraVENous Administered By  Loyd Benedict RN               Admin Date  03/14/2021 Action  Given Dose  40 mL Route  IntraVENous Administered By  Loyd Benedict RN               Admin Date  03/14/2021 Action  Given Dose  10 mL Route  IntraVENous Administered By  Eduin Landaverde RN               Admin Date  03/14/2021 Action  Given Dose  40 mL Route  IntraVENous Administered By  Loyd Benedict RN               Admin Date  03/15/2021 Action  Given Dose  40 mL Route  IntraVENous Administered By  Loyd Benedict RN               Admin Date  03/15/2021 Action  Given Dose  10 mL Route  IntraVENous Administered By  Eduin Landaverde RN               Admin Date  03/15/2021 Action  Given Dose  10 mL Route  IntraVENous Administered By  Leslee Zee RN               Admin Date  03/16/2021 Action  Given Dose  10 mL Route  IntraVENous Administered By  Denise Gramajo              sodium chloride 0.9 % bolus infusion 250 mL       Admin Date  03/14/2021 Action  New Bag Dose  250 mL Rate  250 mL/hr Route  IntraVENous Administered By  Jemma Fernández RN              tiZANidine (ZANAFLEX) tablet 4 mg       Admin Date  03/14/2021 Action  Given Dose  4 mg Route  Oral Administered By  Clark Chavez RN               Admin Date  03/14/2021 Action  Given Dose  4 mg Route  Oral Administered By  Jemma Fernández RN               Admin Date  03/14/2021 Action  Given Dose  4 mg Route  Oral Administered By  Jemma Fernández RN               Admin Date  03/14/2021 Action  Given Dose  4 mg Route  Oral Administered By  Clark Chavez RN               Admin Date  03/15/2021 Action  Given Dose  4 mg Route  Oral Administered By  Jemma Fernández RN               Admin Date  03/15/2021 Action  Given Dose  4 mg Route  Oral Administered By  Jemma Fernández RN               Admin Date  03/15/2021 Action  Given Dose  4 mg Route  Oral Administered By  Leslee Zee RN               Admin Date  03/16/2021 Action  Given Dose  4 mg Route  Oral Administered By  Karl Goetzing Date  03/16/2021 Action  Given Dose  4 mg Route  Oral Administered By  Denise Gramajo              traZODone (DESYREL) tablet 150 mg       Admin Date  03/14/2021 Action  Given Dose  150 mg Route  Oral Administered By  Clark Chavez RN               Admin Date  03/15/2021 Action  Given Dose  150 mg Route  Oral Administered By  Leslee Zee RN              tuberculin injection 5 Units       Admin Date  03/15/2021 Action  Give PPD Dose  5 Units Route  IntraDERMal Administered By  Jemma Fernández RN              vancomycin (VANCOCIN) 2500 mg in  mL infusion       Admin Date  03/13/2021 Action  Given Dose  2,500 mg Rate  200 mL/hr Route  IntraVENous Administered By  Adah Saint, RN              zonisamide [facility]) capsule 300 mg       Admin Date  03/14/2021 Action  Given Dose  300 mg Route  Oral Administered By  Miriam Wang RN               Admin Date  03/15/2021 Action  Given Dose  300 mg Route  Oral Administered By  Mercedes Rich RN                        Problem List:     Hospital Problems as of 6/7/2021 Date Reviewed: 12/17/2020        Codes Class Noted - Resolved POA    * (Principal) Severe sepsis ([facility] ICD-10-CM: A41.9, R65.20  ICD-9-CM: 038.9, 995.92  6/6/2021 - Present Unknown        Bacteremia due to Gram-negative bacteria ICD-10-CM: R78.81  ICD-9-CM: 790.7, 041.85  6/7/2021 - Present Unknown        Acute cystitis ICD-10-CM: N30.00  ICD-9-CM: 595.0  6/6/2021 - Present Unknown        Urinary tract infection associated with indwelling urethral catheter ([facility] ICD-10-CM: T83.511A, N39.0  ICD-9-CM: 996.64, 599.0  6/6/2021 - Present Unknown        Dementia ([facility] (Chronic) ICD-10-CM: F03.90  ICD-9-CM: 294.20  1/30/2021 - Present Yes        Chronic indwelling Rojas catheter ICD-10-CM: Z97.8  ICD-9-CM: V45.89  11/22/2020 - Present Yes        Severe protein-calorie malnutrition ([facility] (Chronic) ICD-10-CM: E43  ICD-9-CM: 184  11/3/2019 - Present Yes        Urethral tear, initial encounter ICD-10-CM: S37.33XA  ICD-9-CM: 867.0  8/8/2019 - Present Unknown        Chronic respiratory failure with hypoxia ([facility] (Chronic) ICD-10-CM: J96.11  ICD-9-CM: 518.83, 799.02  3/29/2016 - Present Yes    Overview Addendum 1/30/2021  5:25 PM by Elpidio La MD     4+L chronically             BPH (benign prostatic hyperplasia) (Chronic) ICD-10-CM: N40.0  ICD-9-CM: 600.00  12/10/2015 - Present Yes        COPD (chronic obstructive pulmonary disease) ([facility] (Chronic) ICD-10-CM: J44.9  ICD-9-CM: 448  12/10/2015 - Present Yes                 Signed By: Angelica Vazquez MD   [employer] [facility]    Ariela 7, 2021  4:22 PM

## 2021-06-07 NOTE — PROGRESS NOTES
Urology called to see pt about penile issue after pt pulled li. Pt is combative and confused. He is in the ICU in restraints. 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 This was not caused by his li trauma. No need for additional intervention. Primary team to manage his li as they see appropriate.

## 2021-06-07 NOTE — PROGRESS NOTES
Problem: Self Care Deficits Care Plan (Adult)  Goal: *Acute Goals and Plan of Care (Insert Text)  Outcome: Progressing Towards Goal  Note:   Patient will complete lower body dressing with minimal assist to increase self care independence. Patient will complete upper body dressing with contact guard assist to increase self care independence. Patient will tolerate 20 minutes of OT treatment with self incorporated rest breaks to increase activity tolerance to enhance participation in hobbies. Patient will complete chair transfer with contact guard assist using adaptive equipment as needed. Patient will complete UE exercises with supervision to increase overall activity tolerance and strength. Timeframe: 7 visits       OCCUPATIONAL THERAPY: Initial Assessment, Daily Note, and AM 6/7/2021  INPATIENT: OT Visit Days: 1  [place] / [place] / Product Type: Medicare /      NAME/AGE/GENDER: Tammi Fonseca is a 80 y.o. male   PRIMARY DIAGNOSIS:  Severe sepsis ([address] [A41.9, R65.20]  Acute cystitis [N30.00] Severe sepsis ([address] Severe sepsis ([address]        ICD-10: Treatment Diagnosis:    Generalized Muscle Weakness (M62.81)  Difficulty in walking, Not elsewhere classified (R26.2)   Precautions/Allergies:     Patient has no known allergies. ASSESSMENT:     Mr. Antonieta Jimenez presents from [facility] with symptoms of sepsis. Pt is very [place] but is able to answer direct questions. Pt is poor historian and no family or friends present so PLOF is unclear at this time. Pt on 5L of high flow NC and required cues and additional time to keep O2 sats up during session. Pt able to move from sup<>sit with min A x2 and donned socks at EOB. Pt fatigues very quickly with activity. Pt able to take a couple of shuffling steps toward the [facility] with HHA. Pt is currently functioning below baseline and would benefit from skilled occupational therapy while in house.      This section established at most recent assessment   PROBLEM LIST (Impairments causing functional limitations):  Decreased Strength  Decreased ADL/Functional Activities  Decreased Transfer Abilities  Decreased Ambulation Ability/Technique  Decreased Balance  Decreased Activity Tolerance  Increased Fatigue  Increased Shortness of Breath   INTERVENTIONS PLANNED: (Benefits and precautions of occupational therapy have been discussed with the patient.)  Activities of daily living training  Adaptive equipment training  Balance training  Clothing management  Cognitive training  Neuromuscular re-eduation  Therapeutic activity  Therapeutic exercise     TREATMENT PLAN: Frequency/Duration: Follow patient 3x/week to address above goals. Rehabilitation Potential For Stated Goals: Good     REHAB RECOMMENDATIONS (at time of discharge pending progress):    Placement: It is my opinion, based on this patient's performance to date, that Mr. Carlos Jones may benefit from participating in 1-2 additional therapy sessions in order to continue to assess for rehab potential and then make recommendation for disposition at discharge.   Equipment:   None at this time              OCCUPATIONAL PROFILE AND HISTORY:   History of Present Injury/Illness (Reason for Referral):  See H&P  Past Medical History/Comorbidities:   Mr. Carlos Jones  has a past medical history of Abdominal aortic aneurysm ([facility] (12/10/2015), Abdominal aortic aneurysm without rupture ([address] Abnormal finding of lung (10/17/2016), Abnormality of urethral meatus (8/8/2019), Allergic rhinitis (12/10/2015), Asthma, Benign neoplasm, Benign prostatic hyperplasia (12/10/2015), Bigeminy (3/28/2016), BPH without urinary obstruction, Cardiovascular disease (12/10/2015), Chronic obstructive asthma with exacerbation ([address] (12/10/2015), Closed compression fracture of lumbosacral spine ([address] (12/11/2018), COPD (chronic obstructive pulmonary disease) ([address] (12/10/2015), COPD (chronic obstructive pulmonary disease) with emphysema ([address] (10/17/2016), COPD exacerbation ([facility] (5/6/2019), Cough with hemoptysis, Erectile dysfunction (12/10/2015), Essential hypertension, benign (12/10/2015), Fracture (10/2014), History of colon polyps, Low testosterone (12/10/2015), Lumbago, Memory loss, Overflow incontinence, Pleural effusion (6/10/2019), Pneumothorax on right (5/6/2019), Raynaud's syndrome (12/10/2015), Rotator cuff tendinitis, Thrombocytopenia ([facility] Urinary frequency, Ventricular tachyarrhythmia ([facility] (5/6/2019), and VT (ventricular tachycardia) ([facility] (5/6/2019). Mr. Bonnie Villareal  has a past surgical history that includes hx hernia repair (1980); hx colonoscopy; hx fracture tx (10/2014); hx cataract removal (6/2016-right); and hx orthopaedic (03/2018). Social History/Living Environment:   Home Environment: [address] [place] [address] Name: [address] [place] [address] Alone: No  Support Systems: Skilled nursing facility, Family member(s)  Tub or Shower Type: Shower  Prior Level of Function/Work/Activity:  Unsure of PLOF at this time, [facility]     Number of Personal Factors/Comorbidities that affect the Plan of Care: Brief history (0):  LOW COMPLEXITY   ASSESSMENT OF OCCUPATIONAL PERFORMANCE[de-identified]   Activities of Daily Living:   Basic ADLs (From Assessment) Complex ADLs (From Assessment)   Feeding: Supervision  Oral Facial Hygiene/Grooming: Supervision  Bathing: Maximum assistance  Upper Body Dressing: Moderate assistance  Lower Body Dressing: Maximum assistance  Toileting: Total assistance (chronic indwelling li)     Grooming/Bathing/Dressing Activities of Daily Living     Cognitive Retraining  Safety/Judgement: Lack of insight into deficits; Fall prevention                 Functional Transfers  Bathroom Mobility:  (does not occur)     Bed/Mat Mobility  Supine to Sit: Minimum assistance  Sit to Supine: Minimum assistance  Sit to Stand: Minimum assistance;Assist x2  Stand to Sit: Minimum assistance;Assist x2     Most Recent Physical Functioning:   Gross Assessment:                  Posture:  Posture (WDL): Within defined limits  Balance:  Sitting: With support  Standing: Pull to stand; With support Bed Mobility:  Supine to Sit: Minimum assistance  Sit to Supine: Minimum assistance  Wheelchair Mobility:     Transfers:  Sit to Stand: Minimum assistance;Assist x2  Stand to Sit: Minimum assistance;Assist x2            Patient Vitals for the past 6 hrs:   BP BP Patient Position SpO2 O2 Flow Rate (L/min) Pulse   06/07/21 0600 102/63 -- 96 % -- 72   06/07/21 0700 113/66 At rest 95 % -- 67   06/07/21 0800 96/62 -- 94 % -- 66   06/07/21 0821 -- -- 95 % 5 l/min --   06/07/21 0900 (!) 143/77 -- 92 % -- 76   06/07/21 1000 108/66 -- 97 % -- 67   06/07/21 1005 -- -- -- 5 l/min --       Mental Status  Neurologic State: Confused, Alert  Orientation Level: Oriented to person, Disoriented to situation  Cognition: Follows commands  Perseveration: No perseveration noted  Safety/Judgement: Lack of insight into deficits, Fall prevention                          Physical Skills Involved:  Balance  Strength  Activity Tolerance Cognitive Skills Affected (resulting in the inability to perform in a timely and safe manner):  Executive Function  Short Term Recall  Long Term Memory Psychosocial Skills Affected:  Habits/Routines  Environmental Adaptation   Number of elements that affect the Plan of Care: 5+:  HIGH COMPLEXITY   CLINICAL DECISION MAKING:   [employer] AM-PAC 6 Clicks   Daily Activity Inpatient Short Form  How much help from another person does the patient currently need. .. Total A Lot A Little None   1. Putting on and taking off regular lower body clothing? [] 1   [x] 2   [] 3   [] 4   2. Bathing (including washing, rinsing, drying)? [] 1   [x] 2   [] 3   [] 4   3. Toileting, which includes using toilet, bedpan or urinal?   [x] 1   [] 2   [] 3   [] 4   4. Putting on and taking off regular upper body clothing? [] 1   [x] 2   [] 3   [] 4   5. Taking care of personal grooming such as brushing teeth? [] 1   [] 2   [x] 3   [] 4   6. Eating meals? [] 1   [] 2   [x] 3   [] 4   © 2007, Trustees of [address] [place] [id number] [address] [id number], under license to [employer] All rights reserved      Score:  Initial: 13 Most Recent: X (Date: -- )    Interpretation of Tool:  Represents activities that are increasingly more difficult (i.e. Bed mobility, Transfers, Gait). Medical Necessity:     Skilled intervention continues to be required due to the above deficits. Reason for Services/Other Comments:  See above deficits. Use of outcome tool(s) and clinical judgement create a POC that gives a: MODERATE COMPLEXITY         TREATMENT:   (In addition to Assessment/Re-Assessment sessions the following treatments were rendered)     Pre-treatment Symptoms/Complaints:    Pain: Initial:   Pain Intensity 1: 0  Post Session:  0     Therapeutic Activity: (    10): Therapeutic activities including Bed transfers and Ambulation on level ground to improve mobility, strength, and balance. Required moderate cues  to  safe mobility and t/fs . none    Evaluation complete    Braces/Orthotics/Lines/Etc:   IV  li catheter  O2 Device: Hi flow nasal cannula  Treatment/Session Assessment:    Response to Treatment:  fair  Interdisciplinary Collaboration:   Physical Therapist  Occupational Therapist  Registered Nurse  After treatment position/precautions:   Supine in bed  Bed alarm/tab alert on  Bed/Chair-wheels locked  Bed in low position  Call light within reach  RN notified  Restraints   Compliance with Program/Exercises: Will assess as treatment progresses. Recommendations/Intent for next treatment session: \"Next visit will focus on advancements to more challenging activities and reduction in assistance provided\".   Total Treatment Duration:  OT Patient Time In/Time Out  Time In: 1000  Time Out: Maurisio [address] [place] [address]

## 2021-06-07 NOTE — PROGRESS NOTES
Dr. Reilly Creighton called with new orders after speaking with the family. Continue current plan of care with Bipap but no intubation. Medicated with Lasix and morphine per MD order. Will continue to monitor patient.

## 2021-06-07 NOTE — PROGRESS NOTES
Patient continues to have increased work of breathing, trying to get oob and yelling he couldn't breathe and we were a scam trying to hurt. Tried to comfort patient with no success. RT called back to bedside. Medicated with Haldol per MD order. See mar. Paged Dr. George Miranda and informed him of patients status at this time.

## 2021-06-07 NOTE — PROGRESS NOTES
Care Management Interventions  PCP Verified by CM: Yes (Physician at [place] [id number])  Palliative Care Criteria Met (RRAT>21 & CHF Dx)?: No (Dx Sepsis, Risk 33%)  Mode of Transport at Discharge: BLS [place] [facility] ambulance to take back to SNF)  Transition of Care Consult (CM Consult): Discharge Planning  Discharge Durable Medical Equipment: No  Physical Therapy Consult: Yes  Occupational Therapy Consult: Yes  Speech Therapy Consult: No  Current Support Network: Nursing Facility  Confirm Follow Up Transport: Other (see comment) (ambulance)  Discharge Location  Discharge Placement: [address]   Interdisciplinary rounds with Dr Brayan Jiménez, nursing, CM and PT for plan of care. Patient is alert and oriented to person and place not time. He has mittens on to prevent him from pulling out his wires and tubes. Prior to admission he is at [facility]. CM spoke with liaison of [facility] and she confirmed he is private pay at facility. He does not need PASSAR or PPD but will need COVID and not rapid. CM obtained orders for COVID test. No family in room and CM left message for patient diego Garcia about d/c plans. ACP done by attending. Current d/c plan is to return to [address] when medically stable. CM following.

## 2021-06-07 NOTE — CONSULTS
Comprehensive Nutrition Assessment    Type and Reason for Visit: Initial, Consult  Oral nutrition supplements (Hospitalist)    Nutrition Recommendations/Plan:    Ensure Enlive 4 times per day. Noted hx of preference for chocolate     Malnutrition Assessment:  Malnutrition Status: Moderate malnutrition  Context: Chronic illness  Findings of clinical characteristics of malnutrition:   Energy Intake:   (Prior admission intake consisted primarily of ONS, current intake <25%)  Weight Loss:   (Prior hx of weight loss with more recent weight gain)     Body Fat Loss:  1 - Mild body fat loss (Mild to moderate), Triceps, Orbital, Buccal region, Fat overlying ribs   Muscle Mass Loss:  1 - Mild muscle mass loss (mild to moderate), Clavicles (pectoralis &deltoids), Thigh (quadraceps), Temples (temporalis), Hand (interosseous), Scapula (trapezius) (SCD on calves)  Fluid Accumulation:  Unable to assess,     Strength:          Nutrition Assessment:   Nutrition History: 6/7: Unable to obtain any hx from pt. HX from RD notes from Nov 2019 and Feb 2021. Pt primarily consumes chocolate Boost and 1-25% of meals. He was consistently drinking 3 bottles of Boost plus/d during the February admission so servings were increased to 4 per day. Nutrition Background: H/O: COPD, chronic hypoxic respiratory failure, ILD, dementia, BPH with chronic indwelling li, HTN,malnutrition. Resident of NH. Presented with AMS and cough. Findings of severe sepsis secondary to CAUTI/acute cystitis resulting in worsening of baseline dementia  Daily Update:  Pt unable to provide any meaningful diet or weight history. RN reprots pt ate bites at breakfast and half of mashed potatoes at lunch.     Nutrition Related Findings:   2/1/2021: Cleared for regular diet      Current Nutrition Therapies:  ADULT DIET Easy to Chew    Current Intake:   Average Meal Intake: 1-25% Average Supplement Intake: None ordered      Anthropometric Measures:  Height: 5' 8\" (172.7 cm)  Current Body Wt: 58.2 kg (128 lb 4.9 oz) (6/7), Weight source: Bed scale  BMI: 19.5, Underweight (BMI less than 22) age over 72  Admission Body Weight: 117 lb 1 oz (Source not specified)  Ideal Body Weight (lbs) (Calculated): 154 lbs (70 kg),    Usual Body Wt: 60.3 kg (133 lb) (RD note bed scale on 9/6/19), Percent weight change: -3.5          Edema: No data recorded   Estimated Daily Nutrient Needs:  Energy (kcal/day): 1097-6633 (Kcal/kg (25-30), Weight Used: Current (58.2 kg bed scale 6/7))  Protein (g/day): 70-87 (1.2-1.5 grams/kg) Weight Used: (Current)  Fluid (ml/day):   (1 ml/kcal)    Nutrition Diagnosis:   · Inadequate oral intake related to cognitive or neurological impairment (dementia) as evidenced by  (intake <25%, prior intake primarily consists of ONS)    · Moderate malnutrition, In context of chronic illness related to cognitive or neurological impairment, increased demand for energy/nutrients (dependence on ONS to meet nutrtional needs) as evidenced by  (as per malnutrition assessment above)    Nutrition Interventions:   Food and/or Nutrient Delivery: Continue current diet, Start oral nutrition supplement     Coordination of Nutrition Care: Continue to monitor while inpatient  Plan of Care discussed with Efe Alvarado RN    Goals:       Active Goal: Intake >75% of estimated needs by follow up    Nutrition Monitoring and Evaluation:      Food/Nutrient Intake Outcomes: Food and nutrient intake, Supplement intake  Physical Signs/Symptoms Outcomes: Weight    Discharge Planning:    Continue oral nutrition supplement    Valerie Em, [address] [place] [address], [address] [place] [address]

## 2021-06-07 NOTE — PROGRESS NOTES
Problem: Mobility Impaired (Adult and Pediatric)  Goal: *Acute Goals and Plan of Care (Insert Text)  Outcome: Progressing Towards Goal  Note: STG:  (1.)Mr. Juliane Mccracken will move from supine to sit and sit to supine  with MIN A-CGA within 1-2 treatment day(s). (2.)Mr. Juliane Mccracken will transfer from bed to chair and chair to bed with MINIMAL ASSIST-CGA using the least restrictive device within 1-2 treatment day(s). (3.)Mr. Juliane Mccracken will ambulate with MINIMAL ASSIST-CGA for 3-5 feet with the least restrictive device within 1-2 treatment day(s). (Unless pt non-ambulatory)    LTG:  (1.)Mr. Juliane Mccracken will move from supine to sit and sit to supine  in bed with CONTACT GUARD ASSIST-SBA within 3-10 treatment day(s). (2.)Mr. Juliane Mccracken will transfer from bed to chair and chair to bed with CONTACT GUARD ASSIST-SBA using the least restrictive device within 3-10 treatment day(s). (3.)Mr. Juliane Mccracken will ambulate with CONTACT GUARD ASSIST-SBA for 3-5 feet with the least restrictive device within 3-10 treatment day(s). ________________________________________________________________________________________________      PHYSICAL THERAPY: Initial Assessment 6/7/2021  INPATIENT:    [place] / [place] / Product Type: Medicare /       NAME/AGE/GENDER: Tio Mom is a 80 y.o. male   PRIMARY DIAGNOSIS: Severe sepsis ([address] [A41.9, R65.20]  Acute cystitis [N30.00] Severe sepsis ([address] Severe sepsis ([address]        ICD-10: Treatment Diagnosis:    · Difficulty in walking, Not elsewhere classified (R26.2)  · Other abnormalities of gait and mobility (R26.89)   Precaution/Allergies:  Patient has no known allergies. ASSESSMENT:     Mr. Juliane Mccracken presents with decreased independence with functional mobility. Per chart, pt is a resident of a local nursing home with recent agitation and decreased mentation. Per chart, son was with pt and provided history. Today, pt performed supine to sit with assistance.  Pt assisted with sit to stand. When asked, pt shuffled feet to several times toward head of bed then sat down. Pt returned supine with needs in reach. Pt will benefit from PT services to maximize functional independence level, which is difficult to determine, secondary to no family present and pt's susceptibility to dementia. This section established at most recent assessment   PROBLEM LIST (Impairments causing functional limitations):  1. Decreased Strength  2. Decreased Transfer Abilities  3. Decreased Ambulation Ability/Technique  4. Decreased Balance  5. Increased Pain  6. Decreased Activity Tolerance  7. Decreased Flexibility/Joint Mobility   INTERVENTIONS PLANNED: (Benefits and precautions of physical therapy have been discussed with the patient.)  1. Bed Mobility  2. Gait Training  3. Therapeutic Activites  4. Therapeutic Exercise/Strengthening  5. Transfer Training     TREATMENT PLAN: Frequency/Duration: daily for duration of hospital stay  Rehabilitation Potential For Stated Goals: Good     REHAB RECOMMENDATIONS (at time of discharge pending progress):    Placement: It is my opinion, based on this patient's performance to date, that Mr. Rommel Castellanos may benefit from [address] after discharge due to the functional deficits listed above that are likely to improve with skilled rehabilitation because he/she has multiple medical issues that affect his/her functional mobility in the community. Equipment:    None at this time              HISTORY:   History of Present Injury/Illness (Reason for Referral):  Pt admitted with above diagnosis.   Past Medical History/Comorbidities:   Mr. Rommel Castellanos  has a past medical history of Abdominal aortic aneurysm ([facility] (12/10/2015), Abdominal aortic aneurysm without rupture ([facility] Abnormal finding of lung (10/17/2016), Abnormality of urethral meatus (8/8/2019), Allergic rhinitis (12/10/2015), Asthma, Benign neoplasm, Benign prostatic hyperplasia (12/10/2015), Bigeminy (3/28/2016), BPH without urinary obstruction, Cardiovascular disease (12/10/2015), Chronic obstructive asthma with exacerbation ([address] (12/10/2015), Closed compression fracture of lumbosacral spine ([address] (12/11/2018), COPD (chronic obstructive pulmonary disease) ([address] (12/10/2015), COPD (chronic obstructive pulmonary disease) with emphysema ([address] (10/17/2016), COPD exacerbation ([address] (5/6/2019), Cough with hemoptysis, Erectile dysfunction (12/10/2015), Essential hypertension, benign (12/10/2015), Fracture (10/2014), History of colon polyps, Low testosterone (12/10/2015), Lumbago, Memory loss, Overflow incontinence, Pleural effusion (6/10/2019), Pneumothorax on right (5/6/2019), Raynaud's syndrome (12/10/2015), Rotator cuff tendinitis, Thrombocytopenia ([address] Urinary frequency, Ventricular tachyarrhythmia ([address] (5/6/2019), and VT (ventricular tachycardia) ([address] (5/6/2019). Mr. Dat Shaikh  has a past surgical history that includes hx hernia repair (1980); hx colonoscopy; hx fracture tx (10/2014); hx cataract removal (6/2016-right); and hx orthopaedic (03/2018). Social History/Living Environment:   Home Environment: [address] [place] [address] Name: [facility]  Living Alone: No  Support Systems: Skilled nursing facility, Family member(s)  Tub or Shower Type: Shower  Prior Level of Function/Work/Activity:  Pt states he did not walk prior to admission. Number of Personal Factors/Comorbidities that affect the Plan of Care: 0: LOW COMPLEXITY   EXAMINATION:   Most Recent Physical Functioning:   Gross Assessment:  AROM: Generally decreased, functional  Strength: Generally decreased, functional  Sensation: Intact               Posture:  Posture (WDL): Within defined limits  Balance:  Sitting: With support  Standing: Pull to stand; With support Bed Mobility:  Supine to Sit: Minimum assistance  Sit to Supine: Minimum assistance  Wheelchair Mobility:     Transfers:  Sit to Stand: Minimum assistance;Assist x2  Stand to Sit: Minimum assistance;Assist x2  Gait:     Gait Abnormalities: Other (unsteady gait)  Distance (ft): 2 Feet (ft) (to head of bed)  Ambulation - Level of Assistance: Minimal assistance;Assist x2      Body Structures Involved:  1. Bones  2. Joints  3. Muscles  4. Ligaments Body Functions Affected:  1. Neuromusculoskeletal  2. Movement Related Activities and Participation Affected:  1. Mobility   Number of elements that affect the Plan of Care: 4+: HIGH COMPLEXITY   CLINICAL PRESENTATION:   Presentation: Stable and uncomplicated: LOW COMPLEXITY   CLINICAL DECISION MAKIN[address] [place] [address] AM-PAC 6 Clicks   Basic Mobility Inpatient Short Form  How much difficulty does the patient currently have. .. Unable A Lot A Little None   1. Turning over in bed (including adjusting bedclothes, sheets and blankets)? [] 1   [] 2   [x] 3   [] 4   2. Sitting down on and standing up from a chair with arms ( e.g., wheelchair, bedside commode, etc.)   [] 1   [] 2   [x] 3   [] 4   3. Moving from lying on back to sitting on the side of the bed? [] 1   [] 2   [x] 3   [] 4   How much help from another person does the patient currently need. .. Total A Lot A Little None   4. Moving to and from a bed to a chair (including a wheelchair)? [] 1   [] 2   [x] 3   [] 4   5. Need to walk in hospital room? [] 1   [x] 2   [] 3   [] 4   6. Climbing 3-5 steps with a railing? [] 1   [x] 2   [] 3   [] 4   © , Trustees of [address] [place] [address], under license to [employer]. All rights reserved      Score:  Initial: 16 Most Recent: X (Date: -- )    Interpretation of Tool:  Represents activities that are increasingly more difficult (i.e. Bed mobility, Transfers, Gait). Medical Necessity:     · Skilled intervention continues to be required due to decreased independence with functional mobility.   Reason for Services/Other Comments:  · Patient continues to require skilled intervention due to   · Decreased independence with functional mobility. · .   Use of outcome tool(s) and clinical judgement create a POC that gives a: Clear prediction of patient's progress: LOW COMPLEXITY            TREATMENT:   (In addition to Assessment/Re-Assessment sessions the following treatments were rendered)   Pre-treatment Symptoms/Complaints:    Pain: Initial:      Post Session:  no complaints     Therapeutic Activity: (    15 minutes): Therapeutic activities including Bed transfers and sit to stand to improve mobility. Date:   Date:   Date:     Activity/Exercise Parameters Parameters Parameters                                                 Assessment    Braces/Orthotics/Lines/Etc:   · O2 Device: Hi flow nasal cannula  Treatment/Session Assessment:    · Response to Treatment:  Pt agreeable to therapy, despite being slightly confused. · Interdisciplinary Collaboration:   o Physical Therapist  o Occupational Therapist  o Registered Nurse  · After treatment position/precautions:   o Supine in bed  o Bed/Chair-wheels locked  o Bed in low position  o Caregiver at bedside  o Call light within reach  o RN notified   · Compliance with Program/Exercises: Compliant most of the time, Will assess as treatment progresses  · Recommendations/Intent for next treatment session: \"Next visit will focus on advancements to more challenging activities and reduction in assistance provided\".   Total Treatment Duration:  PT Patient Time In/Time Out  Time In: 1000  Time Out: Aleksandr Brooks PT

## 2021-06-07 NOTE — PROGRESS NOTES
Patient in room yelling out and throwing legs over side rails at this time. Upon entering patient room tried to reorient patient but he stated that we were nothing but a scam and trying to hurt him. States that he wants to get in his car and go home. Repositioned patient higher in bed due to him stating that he could not breathe. RT called to come to bedside and paged Dr. Eduin French to inform him of patients condition. New orders received.

## 2021-06-07 NOTE — PROGRESS NOTES
Patient has become very confused and combative trying to kick his legs over the side rails and continuing to take his mitts off and yelling. Upon entering patient room, tried to reorient patient and he continues to yell stating that we are a scam and trying to kill him. Tried to calm patient to no success. Repositioned patient higher in the bed because he is becoming very tachypneic. RT called at this time. Paged Dr. Sandro Grimes and new orders received at this time. See orders.

## 2021-06-07 NOTE — PROGRESS NOTES
Blood culture growing gram negative rods. Patient was started on zosyn on admission for empiric coverage of CAUTI and sepsis. Will continue zosyn.

## 2021-06-07 NOTE — PROGRESS NOTES
1619; Called to pt's room due to pt declining. Pt is lethargic and confused. ABG ordered and done. RN reported results to [address] [place] [address]. No new RT order received from MD.  1656; Called again to pt's room due to increase in WOB and SOB (from 20 to 40 bpm). Patient placed on BIPAP on documented settings in flow-sheets. Tolerating it well at this time. Also, Neb breathing treatment is given in-line with BIPAP. Will continue to monitor.

## 2021-06-08 ENCOUNTER — APPOINTMENT (OUTPATIENT)
Dept: GENERAL RADIOLOGY | Age: 86
DRG: 698 | End: 2021-06-08
Attending: HOSPITALIST
Payer: MEDICARE

## 2021-06-08 PROBLEM — E44.0 MODERATE PROTEIN-CALORIE MALNUTRITION (HCC): Status: ACTIVE | Noted: 2019-11-03

## 2021-06-08 LAB
ALBUMIN SERPL-MCNC: 2.3 G/DL (ref 3.2–4.6)
ALBUMIN/GLOB SERPL: 0.7 {RATIO} (ref 1.2–3.5)
ALP SERPL-CCNC: 90 U/L (ref 50–136)
ALT SERPL-CCNC: 21 U/L (ref 12–65)
ANION GAP SERPL CALC-SCNC: 6 MMOL/L (ref 7–16)
AST SERPL-CCNC: 30 U/L (ref 15–37)
BASOPHILS # BLD: 0 K/UL (ref 0–0.2)
BASOPHILS NFR BLD: 0 % (ref 0–2)
BILIRUB SERPL-MCNC: 0.5 MG/DL (ref 0.2–1.1)
BUN SERPL-MCNC: 32 MG/DL (ref 8–23)
CALCIUM SERPL-MCNC: 7.5 MG/DL (ref 8.3–10.4)
CHLORIDE SERPL-SCNC: 110 MMOL/L (ref 98–107)
CO2 SERPL-SCNC: 26 MMOL/L (ref 21–32)
CREAT SERPL-MCNC: 0.82 MG/DL (ref 0.8–1.5)
DIFFERENTIAL METHOD BLD: ABNORMAL
EOSINOPHIL # BLD: 0 K/UL (ref 0–0.8)
EOSINOPHIL NFR BLD: 0 % (ref 0.5–7.8)
ERYTHROCYTE [DISTWIDTH] IN BLOOD BY AUTOMATED COUNT: 14.5 % (ref 11.9–14.6)
GLOBULIN SER CALC-MCNC: 3.4 G/DL (ref 2.3–3.5)
GLUCOSE SERPL-MCNC: 109 MG/DL (ref 65–100)
HCT VFR BLD AUTO: 33.9 % (ref 41.1–50.3)
HGB BLD-MCNC: 10.4 G/DL (ref 13.6–17.2)
IMM GRANULOCYTES # BLD AUTO: 0.1 K/UL (ref 0–0.5)
IMM GRANULOCYTES NFR BLD AUTO: 1 % (ref 0–5)
LYMPHOCYTES # BLD: 1.8 K/UL (ref 0.5–4.6)
LYMPHOCYTES NFR BLD: 18 % (ref 13–44)
MAGNESIUM SERPL-MCNC: 2.4 MG/DL (ref 1.8–2.4)
MCH RBC QN AUTO: 30.1 PG (ref 26.1–32.9)
MCHC RBC AUTO-ENTMCNC: 30.7 G/DL (ref 31.4–35)
MCV RBC AUTO: 98.3 FL (ref 79.6–97.8)
MONOCYTES # BLD: 1.6 K/UL (ref 0.1–1.3)
MONOCYTES NFR BLD: 16 % (ref 4–12)
NEUTS SEG # BLD: 6.7 K/UL (ref 1.7–8.2)
NEUTS SEG NFR BLD: 65 % (ref 43–78)
NRBC # BLD: 0 K/UL (ref 0–0.2)
PLATELET # BLD AUTO: 199 K/UL (ref 150–450)
PMV BLD AUTO: 9.5 FL (ref 9.4–12.3)
POTASSIUM SERPL-SCNC: 3.8 MMOL/L (ref 3.5–5.1)
PROT SERPL-MCNC: 5.7 G/DL (ref 6.3–8.2)
RBC # BLD AUTO: 3.45 M/UL (ref 4.23–5.6)
SARS-COV-2, COV2: NOT DETECTED
SODIUM SERPL-SCNC: 142 MMOL/L (ref 136–145)
SPECIMEN SOURCE, FCOV2M: NORMAL
WBC # BLD AUTO: 10.2 K/UL (ref 4.3–11.1)

## 2021-06-08 PROCEDURE — 94640 AIRWAY INHALATION TREATMENT: CPT

## 2021-06-08 PROCEDURE — 80053 COMPREHEN METABOLIC PANEL: CPT

## 2021-06-08 PROCEDURE — 87040 BLOOD CULTURE FOR BACTERIA: CPT

## 2021-06-08 PROCEDURE — 74011000250 HC RX REV CODE- 250: Performed by: HOSPITALIST

## 2021-06-08 PROCEDURE — 36415 COLL VENOUS BLD VENIPUNCTURE: CPT

## 2021-06-08 PROCEDURE — 74011000258 HC RX REV CODE- 258: Performed by: HOSPITALIST

## 2021-06-08 PROCEDURE — 71045 X-RAY EXAM CHEST 1 VIEW: CPT

## 2021-06-08 PROCEDURE — 77030012793 HC CIRC VNTLTR FISP -B

## 2021-06-08 PROCEDURE — 74011250636 HC RX REV CODE- 250/636: Performed by: HOSPITALIST

## 2021-06-08 PROCEDURE — 74011250637 HC RX REV CODE- 250/637: Performed by: HOSPITALIST

## 2021-06-08 PROCEDURE — 83735 ASSAY OF MAGNESIUM: CPT

## 2021-06-08 PROCEDURE — 77030021668 HC NEB PREFIL KT VYRM -A

## 2021-06-08 PROCEDURE — 65610000001 HC ROOM ICU GENERAL

## 2021-06-08 PROCEDURE — 85025 COMPLETE CBC W/AUTO DIFF WBC: CPT

## 2021-06-08 RX ADMIN — DULOXETINE HYDROCHLORIDE 30 MG: 30 CAPSULE, DELAYED RELEASE ORAL at 08:53

## 2021-06-08 RX ADMIN — PIPERACILLIN AND TAZOBACTAM 3.38 G: 3; .375 INJECTION, POWDER, LYOPHILIZED, FOR SOLUTION INTRAVENOUS at 14:01

## 2021-06-08 RX ADMIN — ENOXAPARIN SODIUM 40 MG: 40 INJECTION SUBCUTANEOUS at 16:21

## 2021-06-08 RX ADMIN — MIDODRINE HYDROCHLORIDE 10 MG: 5 TABLET ORAL at 16:21

## 2021-06-08 RX ADMIN — PANTOPRAZOLE SODIUM 40 MG: 40 TABLET, DELAYED RELEASE ORAL at 16:21

## 2021-06-08 RX ADMIN — MIDODRINE HYDROCHLORIDE 10 MG: 5 TABLET ORAL at 11:45

## 2021-06-08 RX ADMIN — PIPERACILLIN AND TAZOBACTAM 3.38 G: 3; .375 INJECTION, POWDER, LYOPHILIZED, FOR SOLUTION INTRAVENOUS at 06:17

## 2021-06-08 RX ADMIN — GUAIFENESIN 600 MG: 600 TABLET, EXTENDED RELEASE ORAL at 21:00

## 2021-06-08 RX ADMIN — BUDESONIDE 500 MCG: 0.5 INHALANT RESPIRATORY (INHALATION) at 20:38

## 2021-06-08 RX ADMIN — HALOPERIDOL LACTATE 4 MG: 5 INJECTION, SOLUTION INTRAMUSCULAR at 21:24

## 2021-06-08 RX ADMIN — DOXYCYCLINE HYCLATE 100 MG: 100 CAPSULE ORAL at 08:53

## 2021-06-08 RX ADMIN — MIDODRINE HYDROCHLORIDE 10 MG: 5 TABLET ORAL at 08:53

## 2021-06-08 RX ADMIN — GUAIFENESIN 600 MG: 600 TABLET, EXTENDED RELEASE ORAL at 08:53

## 2021-06-08 RX ADMIN — DOXYCYCLINE HYCLATE 100 MG: 100 CAPSULE ORAL at 21:00

## 2021-06-08 RX ADMIN — FINASTERIDE 5 MG: 5 TABLET, FILM COATED ORAL at 08:53

## 2021-06-08 RX ADMIN — Medication 10 ML: at 14:03

## 2021-06-08 RX ADMIN — PIPERACILLIN AND TAZOBACTAM 3.38 G: 3; .375 INJECTION, POWDER, LYOPHILIZED, FOR SOLUTION INTRAVENOUS at 21:23

## 2021-06-08 RX ADMIN — Medication 10 ML: at 06:17

## 2021-06-08 RX ADMIN — DULOXETINE HYDROCHLORIDE 30 MG: 30 CAPSULE, DELAYED RELEASE ORAL at 16:21

## 2021-06-08 RX ADMIN — BUDESONIDE 500 MCG: 0.5 INHALANT RESPIRATORY (INHALATION) at 09:31

## 2021-06-08 RX ADMIN — DIVALPROEX SODIUM 125 MG: 125 CAPSULE, COATED PELLETS ORAL at 14:04

## 2021-06-08 RX ADMIN — Medication 10 ML: at 21:23

## 2021-06-08 NOTE — PROGRESS NOTES
[facility] Hospitalist        Name:  Matilda Dumont  Age:89 y.o. Sex:male   :  1932    MRN:  [id number]   PCP:  Jonathan Martinez MD      Admit Date:  2021  2:03 PM   Chief Complaint: WORSENING CONFUSION    Reason for Admission:   Severe sepsis ([address] [A41.9, R65.20]  Acute cystitis [N30.00]    Assessment & Plan:     SEVERE SEPSIS:   2/2 CAUTI and acute cystitis and GNR Bacteremia  : resolving  LA resolved  IV fluids stopped on   Blood culture positive for GNR pending ID and sensitivity  Urine culture positive for GNR  UA with 1+ bacteria  Continue  IV zosyn D3  Pt is hemodynamically stable  Continue midodrine for another 24-48 hours    Acute encephalopathy secondary to sepsis/CAUTI with underlying dementia:  : pt had an episode of agitation last night  Was given IV haldol and morphine  depakote sprinkles prn for agitation/restlessness    COPD:  With acute on chronic respiratory failure  : stable  Currently on airvo, required BIPAP last night.   Wean off of airvo as able  pulmicort bid with duoneb prn  mucinex bid  Incentive spirometry  Doxycycline 100 mg po bid x 5 days (D3)    Acute on Chronic hypoxic respiratory failure:  :   Pt placed transitioned to airvo from BIPAP  Had increased work of breathing last night  Getting CXR this AM  Continue supplemental oxygen to titrate sPO2>92%    Depression:  : stable  Restarting cymbalta    BPH:  : indwelling li cath in  continue proscar  Hx of Hypospadiasis    Severe protein calorie malnutrition with BMP 17:  :  Nutrition consulted  Supplement with meals  Pt has chronic physical deconditioning with poor oral intake  Albumin 2.5    PT/OT eval      Disposition/Expected LOS: pending clinical recovery  Diet: DIET ADULT  DIET ADULT ORAL NUTRITION SUPPLEMENT  VTE ppx: lovenox  GI ppx: protonix  Code status: DNR  Surrogate decision-maker: pt's son and wife      Interval Hx/Subjective:     Matilda Dumont is a 80 y.o. male with hx of COPD, chronic hypoxic respiratory failure, dementia, BPH with chronic indwelling li, HTN resident of [facility] admitted on 6/6 for severe sepsis secondary to CAUTI/acute cystitis resulting in worsening of baseline dementia. Pt noted to be febrile in ER T 100.9, with LA 2.7.  6/8:  Pt seen at bedside, resting in bed. He is alert/awake, oriented to self and place. Pt had an episode of agitation lastnight, required haldol and morphine. Also had increased work of breathing and hypoxia, is on airvo this AM.  No chest pain, nausea/vomiting, diarrhea. Indwelling cath in place. Review of Systems:  A 14 point review of systems was taken and pertinent positive as mentioned above.         Objective:     Patient Vitals for the past 24 hrs:   Temp Pulse Resp BP SpO2   06/08/21 0600  71 16 100/66 100 %   06/08/21 0500  71 26 94/63 100 %   06/08/21 0400 98.9 °F (37.2 °C) 77 (!) 52 91/71 98 %   06/08/21 0300  75 (!) 46 (!) 86/59 99 %   06/08/21 0200  75 22 (!) 85/58 100 %   06/08/21 0100  72 24 103/73 100 %   06/08/21 0040  77 29 104/68 100 %   06/08/21 0005 98.5 °F (36.9 °C) 72 20 (!) 86/57 100 %   06/08/21 0000  71 17 (!) 69/49 100 %   06/07/21 2306  75 26 (!) 80/56 100 %   06/07/21 2302  78 28 (!) 75/49 100 %   06/07/21 2200  77 (!) 34 (!) 80/62 99 %   06/07/21 2100  79 29 (!) 81/61 100 %   06/07/21 2017     99 %   06/07/21 2000  80 (!) 31 90/66 99 %   06/07/21 1906 99.8 °F (37.7 °C) 88 23 (!) 80/57 100 %   06/07/21 1900  84 (!) 32 (!) 79/59 100 %   06/07/21 1847     100 %   06/07/21 1804  93 (!) 33 (!) 86/64 100 %   06/07/21 1732 100.2 °F (37.9 °C)       06/07/21 1708     98 %   06/07/21 1700  99 (!) 38 108/71 100 %   06/07/21 1639  (!) 116 (!) 40 126/87 100 %   06/07/21 1628 (!) 101.2 °F (38.4 °C)       06/07/21 1500 98.8 °F (37.1 °C) 89 30 138/82    06/07/21 1414  82 26 (!) 158/81    06/07/21 1300  71 26 120/79 95 %   06/07/21 1200  70 23 129/68 95 %   06/07/21 1100 98 °F (36.7 °C) 69 22 (!) 102/58 92 %   06/07/21 1000  67 23 108/66 97 %   06/07/21 0900  76 22 (!) 143/77 92 %   06/07/21 0821     95 %   06/07/21 0800  66 23 96/62 94 %       Oxygen Therapy  O2 Sat (%): 100 % (06/08/21 0600)  Pulse via Oximetry: 69 beats per minute (06/08/21 0600)  O2 Device: BIPAP (06/08/21 0400)  Skin Assessment: Clean, dry, & intact (06/08/21 0400)  Skin Protection for O2 Device: No (06/07/21 2017)  O2 Flow Rate (L/min): 5 l/min (06/07/21 1005)  FIO2 (%): 50 % (06/08/21 0400)    Body mass index is 19.51 kg/m². Physical Exam:    General:  Elderly, frail, nad, alert/awake, on airvo  HEENT:  Head NCAT, PERRLA+  Neck:   Supple, no lymphadenopathy, no JVD   Lungs:  Coarse breath sounds bilaterally  CV:   Regular rate and rhythm with normal S1 and S2   Abdomen:  Soft, nontender, nondistended, normoactive bowel sounds   Extremities:  No cyanosis clubbing or edema   Neuro:  gcs 15, CN 2-12 intact, following commands and moving all 4 extremities spontaneously  Psych:  AOx2, mood and affect flat      Data Reviewed: I have reviewed all labs, meds, and studies.       Recent Results (from the past 24 hour(s))   BLOOD GAS, ARTERIAL POC    Collection Time: 06/07/21  4:29 PM   Result Value Ref Range    Device: Non rebreather      pH (POC) 7.35 7.35 - 7.45      pCO2 (POC) 32.7 (L) 35 - 45 MMHG    pO2 (POC) 80 75 - 100 MMHG    HCO3 (POC) 17.9 (L) 22 - 26 MMOL/L    sO2 (POC) 95.2 95 - 98 %    Base deficit (POC) 6.8 mmol/L    Allens test (POC) NOT APPLICABLE      Site RIGHT BRACHIAL      Specimen type (POC) ARTERIAL      Performed by Landen    SARS-COV-2    Collection Time: 06/07/21  4:46 PM   Result Value Ref Range    SARS-CoV-2 Please find results under separate order     METABOLIC PANEL, COMPREHENSIVE    Collection Time: 06/08/21  2:54 AM   Result Value Ref Range    Sodium 142 136 - 145 mmol/L    Potassium 3.8 3.5 - 5.1 mmol/L    Chloride 110 (H) 98 - 107 mmol/L    CO2 26 21 - 32 mmol/L    Anion gap 6 (L) 7 - 16 mmol/L Glucose 109 (H) 65 - 100 mg/dL    BUN 32 (H) 8 - 23 MG/DL    Creatinine 0.82 0.8 - 1.5 MG/DL    GFR est AA >60 >60 ml/min/1.73m2    GFR est non-AA >60 >60 ml/min/1.73m2    Calcium 7.5 (L) 8.3 - 10.4 MG/DL    Bilirubin, total 0.5 0.2 - 1.1 MG/DL    ALT (SGPT) 21 12 - 65 U/L    AST (SGOT) 30 15 - 37 U/L    Alk. phosphatase 90 50 - 136 U/L    Protein, total 5.7 (L) 6.3 - 8.2 g/dL    Albumin 2.3 (L) 3.2 - 4.6 g/dL    Globulin 3.4 2.3 - 3.5 g/dL    A-G Ratio 0.7 (L) 1.2 - 3.5     MAGNESIUM    Collection Time: 06/08/21  2:54 AM   Result Value Ref Range    Magnesium 2.4 1.8 - 2.4 mg/dL   CBC WITH AUTOMATED DIFF    Collection Time: 06/08/21  2:54 AM   Result Value Ref Range    WBC 10.2 4.3 - 11.1 K/uL    RBC 3.45 (L) 4.23 - 5.6 M/uL    HGB 10.4 (L) 13.6 - 17.2 g/dL    HCT 33.9 (L) 41.1 - 50.3 %    MCV 98.3 (H) 79.6 - 97.8 FL    MCH 30.1 26.1 - 32.9 PG    MCHC 30.7 (L) 31.4 - 35.0 g/dL    RDW 14.5 11.9 - 14.6 %    PLATELET 645 140 - 815 K/uL    MPV 9.5 9.4 - 12.3 FL    ABSOLUTE NRBC 0.00 0.0 - 0.2 K/uL    DF AUTOMATED      NEUTROPHILS 65 43 - 78 %    LYMPHOCYTES 18 13 - 44 %    MONOCYTES 16 (H) 4.0 - 12.0 %    EOSINOPHILS 0 (L) 0.5 - 7.8 %    BASOPHILS 0 0.0 - 2.0 %    IMMATURE GRANULOCYTES 1 0.0 - 5.0 %    ABS. NEUTROPHILS 6.7 1.7 - 8.2 K/UL    ABS. LYMPHOCYTES 1.8 0.5 - 4.6 K/UL    ABS. MONOCYTES 1.6 (H) 0.1 - 1.3 K/UL    ABS. EOSINOPHILS 0.0 0.0 - 0.8 K/UL    ABS. BASOPHILS 0.0 0.0 - 0.2 K/UL    ABS. IMM.  GRANS. 0.1 0.0 - 0.5 K/UL       EKG Results     Procedure 720 Value Units Date/Time    EKG, 12 LEAD, INITIAL [[id number]] Collected: 06/06/21 1423    Order Status: Completed Updated: 06/06/21 1704     Ventricular Rate 84 BPM      Atrial Rate 78 BPM      P-R Interval 136 ms      QRS Duration 92 ms      Q-T Interval 378 ms      QTC Calculation (Bezet) 446 ms      Calculated P Axis 63 degrees      Calculated R Axis -3 degrees      Calculated T Axis 72 degrees      Diagnosis --     !! AGE AND GENDER SPECIFIC ECG ANALYSIS !!  Sinus rhythm with Premature supraventricular complexes with frequent   Premature ventricular complexes  Septal infarct (cited on or before 01-FEB-2018)  Abnormal ECG  When compared with ECG of 30-JAN-2021 12:46,  Premature supraventricular complexes are now Present  Nonspecific T wave abnormality no longer evident in Lateral leads  Confirmed by ST SHIRA CAMACHO MD (), GERARDO BURKS ([id number]) on 6/6/2021 5:04:38 PM            All Micro Results     Procedure Component Value Units Date/Time    SARS-COV-2, PCR [[id number]] Collected: 06/07/21 1646    Order Status: Completed Updated: 06/07/21 1650    CULTURE, RESPIRATORY/SPUTUM/BRONCH [place] STAIN [[id number]] Collected: 06/06/21 1821    Order Status: Completed Specimen: Sputum Updated: 06/07/21 1324     Special Requests: NO SPECIAL REQUESTS        GRAM STAIN 2 TO 18 WBC'S/OIF      0 TO 2 EPITHELIAL CELLS SEEN /OIF      FEW GRAM POSITIVE COCCI         FEW GRAM NEGATIVE RODS         FEW YEAST         FEW GRAM POSITIVE RODS         3+ MUCUS PRESENT        Culture result:       NO GROWTH AFTER SHORT PERIOD OF INCUBATION. FURTHER RESULTS TO FOLLOW AFTER OVERNIGHT INCUBATION. BLOOD CULTURE ID PANEL [[id number]]  (Abnormal) Collected: 06/06/21 1345    Order Status: Completed Specimen: Blood Updated: 06/07/21 1322     Acc. no. from Micro Order [id number]     Escherichia coli Detected        Comment: RESULTS VERIFIED, PHONED TO AND READ BACK BY  EREN ACOSTA RN @ [phone] ON 6/7/21 BY BRAYDEN          KPC (Carbapenem Resistance Gene) NOT DETECTED        Comment: WARNING:  A Not Detected result for the KPC gene does not indicate susceptibility to carbapenems. Gram negative bacteria can be resistant to carbapenems by mechanisms other than carrying the KPC gene. INTERPRETATION       Gram negative dereck.  Identified by realtime PCR as E. coli          BLOOD CULTURE [[id number]] Collected: 06/06/21 1345    Order Status: Completed Specimen: Blood Updated: 06/07/21 1315     Special Requests: LEFT FOREARM        GRAM STAIN GRAM NEGATIVE RODS         ANAEROBIC BOTTLE POSITIVE               RESULTS VERIFIED, PHONED TO AND READ BACK BY AMISHA GRIFFITH RN BY NB AT [address] [facility] ON [id number]           Culture result:       CULTURE IN [facility] UPDATES TO FOLLOW                  Refer to Blood Culture ID Panel Accession  [id number]      CULTURE, URINE [[id number]] Collected: 06/06/21 1427    Order Status: Completed Specimen: Urine Updated: 06/07/21 1006     Special Requests: NO SPECIAL REQUESTS        Culture result:       NO GROWTH AFTER SHORT PERIOD OF INCUBATION. FURTHER RESULTS TO FOLLOW AFTER OVERNIGHT INCUBATION. BLOOD CULTURE [[id number]] Collected: 06/06/21 1427    Order Status: Completed Specimen: Blood Updated: 06/07/21 0212     Special Requests: --        RIGHT  Antecubital       Culture result: NO GROWTH AFTER 11 HOURS       COVID-19 RAPID TEST [[id number]] Collected: 06/06/21 1659    Order Status: Completed Specimen: Nasopharyngeal Updated: 06/06/21 1728     Specimen source Nasopharyngeal        COVID-19 rapid test Not detected        Comment:      The specimen is NEGATIVE for SARS-CoV-2, the novel coronavirus associated with COVID-19. A negative result does not rule out COVID-19. This test has been authorized by the FDA under an Emergency Use Authorization (EUA) for use by authorized laboratories. Fact sheet for Healthcare Providers: ConventionUpdate.co.nz  Fact sheet for Patients: ConventionUpdate.co.nz       Methodology: Isothermal Nucleic Acid Amplification               Other Studies:  No results found.       Medications:  Medications Administered       acetaminophen (TYLENOL) tablet 650 mg       Admin Date  03/13/2021 Action  Given Dose  650 mg Route  Oral Administered By  Susan Eugene RN              cefTRIAXone (ROCEPHIN) 1 g in 0.9% sodium chloride (MBP/ADV) 50 mL MBP       Admin Date  03/13/2021 Action  New Bag Dose  1 g Rate  100 mL/hr Route  IntraVENous Administered By  Charla Parish RN              cefTRIAXone (ROCEPHIN) 2 g in 0.9% sodium chloride (MBP/ADV) 50 mL MBP       Admin Date  03/13/2021 Action  New Bag Dose  2 g Rate  100 mL/hr Route  IntraVENous Administered By  Mark Frey RN               Admin Date  03/14/2021 Action  New Bag Dose  2 g Rate  100 mL/hr Route  IntraVENous Administered By  Ac Ca RN               Admin Date  03/15/2021 Action  New Bag Dose  2 g Rate  100 mL/hr Route  IntraVENous Administered By  Ac Ca RN               Admin Date  03/16/2021 Action  New Bag Dose  2 g Rate  100 mL/hr Route  IntraVENous Administered By  Dallas Murguia              diphenhydrAMINE (BENADRYL) injection 25 mg       Admin Date  03/14/2021 Action  Given Dose  25 mg Route  IntraVENous Administered By  Devante Belle RN              doxycycline (VIBRAMYCIN) 100 mg in 0.9% sodium chloride (MBP/ADV) 100 mL MBP       Admin Date  03/13/2021 Action  New Bag Dose  100 mg Rate  100 mL/hr Route  IntraVENous Administered By  Mark Frey RN               Admin Date  03/13/2021 Action  New Bag Dose  100 mg Rate  100 mL/hr Route  IntraVENous Administered By  Devante Belle RN               Admin Date  03/14/2021 Action  New Bag Dose  100 mg Rate  100 mL/hr Route  IntraVENous Administered By  Ac Ca RN               Admin Date  03/14/2021 Action  New Bag Dose  100 mg Rate  100 mL/hr Route  IntraVENous Administered By  Devante Belle RN               Admin Date  03/15/2021 Action  New Bag Dose  100 mg Rate  100 mL/hr Route  IntraVENous Administered By  Ac Ca RN              enoxaparin (LOVENOX) injection 40 mg       Admin Date  03/13/2021 Action  Given Dose  40 mg Route  SubCUTAneous Administered By  Mark Frey RN               Admin Date  03/13/2021 Action  Given Dose  40 mg Route  SubCUTAneous Administered By  Devante Belle RN               Admin Date  03/14/2021 Action  Given Dose  40 mg Route  SubCUTAneous Administered By  Sammy Ware RN               Admin Date  03/14/2021 Action  Given Dose  40 mg Route  SubCUTAneous Administered By  Elvira Monroy RN               Admin Date  03/15/2021 Action  Given Dose  40 mg Route  SubCUTAneous Administered By  Sammy Ware RN               Admin Date  03/15/2021 Action  Given Dose  40 mg Route  SubCUTAneous Administered By  Marylee Felty, RN               Admin Date  03/16/2021 Action  Given Dose  40 mg Route  SubCUTAneous Administered By  Priya Axe              fentaNYL citrate (PF) injection 50 mcg       Admin Date  03/13/2021 Action  Given Dose  50 mcg Route  IntraVENous Administered By  Ruben Bryant RN               Admin Date  03/14/2021 Action  Given Dose  50 mcg Route  IntraVENous Administered By  Elvira Monroy RN              ferrous sulfate tablet 325 mg       Admin Date  03/14/2021 Action  Given Dose  325 mg Route  Oral Administered By  Sammy Ware RN               Admin Date  03/14/2021 Action  Given Dose  325 mg Route  Oral Administered By  Sammy Ware RN               Admin Date  03/15/2021 Action  Given Dose  325 mg Route  Oral Administered By  Sammy Ware RN              folic acid (FOLVITE) tablet 1 mg       Admin Date  03/14/2021 Action  Given Dose  1 mg Route  Oral Administered By  Sammy Ware RN               Admin Date  03/15/2021 Action  Given Dose  1 mg Route  Oral Administered By  Sammy Ware RN               Admin Date  03/16/2021 Action  Given Dose  1 mg Route  Oral Administered By  Priya Taylor              furosemide (LASIX) injection 20 mg       Admin Date  03/15/2021 Action  Given Dose  20 mg Route  IntraVENous Administered By  Sammy Ware RN              furosemide (LASIX) injection 40 mg       Admin Date  03/13/2021 Action  Given Dose  40 mg Route  IntraVENous Administered By  Romulo Santos RN               Admin Date  03/13/2021 Action  Given Dose  40 mg Route  IntraVENous Administered By  Leah Reyes RN               Admin Date  03/14/2021 Action  Given Dose  40 mg Route  IntraVENous Administered By  Maria Ines Mendez RN               Admin Date  03/14/2021 Action  Given Dose  40 mg Route  IntraVENous Administered By  Leah Reyes RN               Admin Date  03/15/2021 Action  Given Dose  40 mg Route  IntraVENous Administered By  Leah Baldwin RN               Admin Date  03/16/2021 Action  Given Dose  40 mg Route  IntraVENous Administered By  Mena Alfaro              HYDROcodone-acetaminophen [facility]) 5-325 mg per tablet 1 Tab       Admin Date  03/14/2021 Action  Given Dose  1 Tab Route  Oral Administered By  Maria Ines Mendez RN               Admin Date  03/15/2021 Action  Given Dose  1 Tab Route  Oral Administered By  Leah Reyes RN              insulin lispro (HUMALOG) injection       Admin Date  03/13/2021 Action  Given Dose  2 Units Route  SubCUTAneous Administered By  Leah Reyes RN               Admin Date  03/14/2021 Action  Given Dose  2 Units Route  SubCUTAneous Administered By  Maria Ines Mendez RN               Admin Date  03/14/2021 Action  Given Dose  2 Units Route  SubCUTAneous Administered By  Maria Ines Mendez RN               Admin Date  03/14/2021 Action  Given Dose  4 Units Route  SubCUTAneous Administered By  Leah Reyes RN               Admin Date  03/15/2021 Action  Given Dose  2 Units Route  SubCUTAneous Administered By  Maria Ines Mendez RN               Admin Date  03/15/2021 Action  Given Dose  4 Units Route  SubCUTAneous Administered By  Maria Ines Mendez RN               Admin Date  03/15/2021 Action  Given Dose  2 Units Route  SubCUTAneous Administered By  Leah Baldwin RN               Admin Date  03/16/2021 Action  Given Dose  4 Units Route  SubCUTAneous Administered By  Mena Alfaro              levothyroxine (SYNTHROID) tablet 137 mcg       Admin Date  03/14/2021 Action  Given Dose  137 mcg Route  Oral Administered By  [facility] Norma Orr RN               Admin Date  03/15/2021 Action  Given Dose  137 mcg Route  Oral Administered By  Eduin Landaverde RN               Admin Date  03/16/2021 Action  Given Dose  137 mcg Route  Oral Administered By  Jacek Carreon RN              midodrine (PROAMATINE) tablet 10 mg       Admin Date  03/14/2021 Action  Given Dose  10 mg Route  Oral Administered By  Eduin Landaverde RN               Admin Date  03/14/2021 Action  Given Dose  10 mg Route  Oral Administered By  Eduin Landaverde RN               Admin Date  03/15/2021 Action  Given Dose  10 mg Route  Oral Administered By  Eduin Landaverde RN               Admin Date  03/15/2021 Action  Given Dose  10 mg Route  Oral Administered By  Eduin Landaverde RN               Admin Date  03/15/2021 Action  Given Dose  10 mg Route  Oral Administered By  Eduin Landaverde RN               Admin Date  03/16/2021 Action  Given Dose  10 mg Route  Oral Administered By  Dianna Germain Date  03/16/2021 Action  Given Dose  10 mg Route  Oral Administered By  Dianna Germain Date  03/16/2021 Action  Given Dose  10 mg Route  Oral Administered By  Giorgi hua [facility]) injection 2 mg       Admin Date  03/13/2021 Action  Given Dose  2 mg Route  IntraVENous Administered By  Ray Hoang RN              NOREPINephrine (LEVOPHED) 4 mg in 5% dextrose 250 mL infusion       Admin Date  03/14/2021 Action  New Bag Dose  4 mcg/min Rate  15 mL/hr Route  IntraVENous Administered By  Eduin Landaverde RN               Admin Date  03/14/2021 Action  Rate Change Dose  6 mcg/min Rate  22.5 mL/hr Route  IntraVENous Administered By  Eduin Landaverde RN               Admin Date  03/14/2021 Action  Rate Change Dose  4 mcg/min Rate  15 mL/hr Route  IntraVENous Administered By  Eduin Landaverde RN               Admin Date  03/14/2021 Action  Rate Change Dose  2 mcg/min Rate  7.5 mL/hr Route  IntraVENous Administered By  Shey Galvez Janeth Chaudhari RN               Admin Date  03/14/2021 Action  Rate Change Dose  1 mcg/min Rate  3.8 mL/hr Route  IntraVENous Administered By  Lacy Dodson RN               Admin Date  03/14/2021 Action  Restarted Dose  2 mcg/min Rate  7.5 mL/hr Route  IntraVENous Administered By  Lacy Dodson RN               Admin Date  03/14/2021 Action  Rate Change Dose  4 mcg/min Rate  15 mL/hr Route  IntraVENous Administered By  Lacy Dodson RN               Admin Date  03/15/2021 Action  Rate Change Dose  2 mcg/min Rate  7.5 mL/hr Route  IntraVENous Administered By  Dhruv White RN              ondansetron [facility]) injection 4 mg       Admin Date  03/13/2021 Action  Given Dose  4 mg Route  IntraVENous Administered By  Nessa Bull RN              pantoprazole (PROTONIX) tablet 40 mg       Admin Date  03/14/2021 Action  Given Dose  40 mg Route  Oral Administered By  Lacy Dodson RN               Admin Date  03/15/2021 Action  Given Dose  40 mg Route  Oral Administered By  Lacy Dodson RN               Admin Date  03/16/2021 Action  Given Dose  40 mg Route  Oral Administered By  Andrew Shell              perflutren lipid microspheres (DEFINITY) in NS bolus IV       Admin Date  03/13/2021 Action  Given Dose  1 mL Route  IntraVENous Administered By  DARLEEN Spain              potassium chloride (K-DUR, KLOR-CON) SR tablet 40 mEq       Admin Date  03/15/2021 Action  Given Dose  40 mEq Route  Oral Administered By  Lacy Dodson RN               Admin Date  03/16/2021 Action  Given Dose  40 mEq Route  Oral Administered By  Andrew Shell              potassium chloride 40 mEq IVPB       Admin Date  03/15/2021 Action  New Bag Dose  40 mEq Rate  25 mL/hr Route  IntraVENous Administered By  Dhruv White RN              pregabalin (LYRICA) capsule 300 mg       Admin Date  03/14/2021 Action  Given Dose  300 mg Route  Oral Administered By  Lacy Dodson RN               Admin Date  03/14/2021 Action  Given Dose  300 mg Route  Oral Administered By  Georgian Severin, RN               Admin Date  03/15/2021 Action  Given Dose  300 mg Route  Oral Administered By  Georgian Severin, RN               Admin Date  03/15/2021 Action  Given Dose  300 mg Route  Oral Administered By  Samuel Tamez, RN               Admin Date  03/16/2021 Action  Given Dose  300 mg Route  Oral Administered By  Coretta Lin              sertraline (ZOLOFT) tablet 300 mg       Admin Date  03/14/2021 Action  Given Dose  300 mg Route  Oral Administered By  Georgian Severin, RN               Admin Date  03/15/2021 Action  Given Dose  300 mg Route  Oral Administered By  Georgian Severin, RN               Admin Date  03/16/2021 Action  Given Dose  300 mg Route  Oral Administered By  Coretta Lin              sodium chloride (NS) flush 5-40 mL       Admin Date  03/13/2021 Action  Given Dose  10 mL Route  IntraVENous Administered By  Alvino Romeo, RN               Admin Date  03/13/2021 Action  Given Dose  30 mL Route  IntraVENous Administered By  Alvino Romeo, RN               Admin Date  03/13/2021 Action  Given Dose  40 mL Route  IntraVENous Administered By  Sandy Khan RN               Admin Date  03/14/2021 Action  Given Dose  40 mL Route  IntraVENous Administered By  Sandy Khan RN               Admin Date  03/14/2021 Action  Given Dose  10 mL Route  IntraVENous Administered By  Georgian Severin, RN               Admin Date  03/14/2021 Action  Given Dose  40 mL Route  IntraVENous Administered By  Sandy Khan RN               Admin Date  03/15/2021 Action  Given Dose  40 mL Route  IntraVENous Administered By  Sandy Khan RN               Admin Date  03/15/2021 Action  Given Dose  10 mL Route  IntraVENous Administered By  Georgian Severin, RN               Admin Date  03/15/2021 Action  Given Dose  10 mL Route  IntraVENous Administered By  Shelly Fuentes RN               Admin Date  03/16/2021 Action  Given Dose  10 mL Route  IntraVENous Administered By  Alli Fischer              sodium chloride 0.9 % bolus infusion 250 mL       Admin Date  03/14/2021 Action  New Bag Dose  250 mL Rate  250 mL/hr Route  IntraVENous Administered By  David Hogue RN              tiZANidine (ZANAFLEX) tablet 4 mg       Admin Date  03/14/2021 Action  Given Dose  4 mg Route  Oral Administered By  Roger Bentley RN               Admin Date  03/14/2021 Action  Given Dose  4 mg Route  Oral Administered By  David Hogue RN               Admin Date  03/14/2021 Action  Given Dose  4 mg Route  Oral Administered By  David Hogue RN               Admin Date  03/14/2021 Action  Given Dose  4 mg Route  Oral Administered By  Roger Bentley RN               Admin Date  03/15/2021 Action  Given Dose  4 mg Route  Oral Administered By  David Hogue RN               Admin Date  03/15/2021 Action  Given Dose  4 mg Route  Oral Administered By  David Hogue RN               Admin Date  03/15/2021 Action  Given Dose  4 mg Route  Oral Administered By  Kalli Coon RN               Admin Date  03/16/2021 Action  Given Dose  4 mg Route  Oral Administered By  Rachael Sofia Date  03/16/2021 Action  Given Dose  4 mg Route  Oral Administered By  Alli Fischer              traZODone (DESYREL) tablet 150 mg       Admin Date  03/14/2021 Action  Given Dose  150 mg Route  Oral Administered By  Roger Bentley RN               Admin Date  03/15/2021 Action  Given Dose  150 mg Route  Oral Administered By  Kalli Coon RN              tuberculin injection 5 Units       Admin Date  03/15/2021 Action  Give PPD Dose  5 Units Route  IntraDERMal Administered By  David Hogue RN              vancomycin (VANCOCIN) 2500 mg in  mL infusion       Admin Date  03/13/2021 Action  Given Dose  2,500 mg Rate  200 mL/hr Route  IntraVENous Administered By  Rell Lopez RN              zonisamide (ZONEGRAN) capsule 300 mg       Admin Date  03/14/2021 Action  Given Dose  300 mg Route  Oral Administered By  Nilton Mcclure RN               Admin Date  03/15/2021 Action  Given Dose  300 mg Route  Oral Administered By  Daniel Gonzales RN                        Problem List:     Hospital Problems as of 6/8/2021 Date Reviewed: 12/17/2020        Codes Class Noted - Resolved POA    * (Principal) Severe sepsis ([facility] ICD-10-CM: A41.9, R65.20  ICD-9-CM: 038.9, 995.92  6/6/2021 - Present Unknown        Bacteremia due to Gram-negative bacteria ICD-10-CM: R78.81  ICD-9-CM: 790.7, 041.85  6/7/2021 - Present Unknown        Acute cystitis ICD-10-CM: N30.00  ICD-9-CM: 595.0  6/6/2021 - Present Unknown        Urinary tract infection associated with indwelling urethral catheter ([facility] ICD-10-CM: T83.511A, N39.0  ICD-9-CM: 996.64, 599.0  6/6/2021 - Present Unknown        Dementia (HCC) (Chronic) ICD-10-CM: F03.90  ICD-9-CM: 294.20  1/30/2021 - Present Yes        Chronic indwelling Rojas catheter ICD-10-CM: Z97.8  ICD-9-CM: V45.89  11/22/2020 - Present Yes        Severe protein-calorie malnutrition ([facility] (Chronic) ICD-10-CM: E43  ICD-9-CM: 262  11/3/2019 - Present Yes        Urethral tear, initial encounter ICD-10-CM: S37.33XA  ICD-9-CM: 867.0  8/8/2019 - Present Unknown        Chronic respiratory failure with hypoxia (HCC) (Chronic) ICD-10-CM: J96.11  ICD-9-CM: 518.83, 799.02  3/29/2016 - Present Yes    Overview Addendum 1/30/2021  5:25 PM by Matthew Torres MD     4+L chronically             BPH (benign prostatic hyperplasia) (Chronic) ICD-10-CM: N40.0  ICD-9-CM: 600.00  12/10/2015 - Present Yes        COPD (chronic obstructive pulmonary disease) ([facility] (Chronic) ICD-10-CM: J44.9  ICD-9-CM: 050  12/10/2015 - Present Yes                 Signed By: Mayuri Ferrell MD   [facility] Hospitalist Service    June 8, 2021  4:22 PM

## 2021-06-08 NOTE — PROGRESS NOTES
Pt alert. sats great. Pulling at bipap mask. Placed pt on AIRVO. Tolerating great. SATs 95.  Joslyn ROA aware

## 2021-06-08 NOTE — PROGRESS NOTES
Care Management Interventions  PCP Verified by CM: Yes (Physician at [place] [id number])  Palliative Care Criteria Met (RRAT>21 & CHF Dx)?: No (Dx Sepsis, Risk 33%)  Mode of Transport at Discharge: [facility] to take back to SNF)  Transition of Care Consult (CM Consult): Discharge Planning (Bundle letter- in long term care, so no letter needed. )  Discharge Durable Medical Equipment: No  Physical Therapy Consult: Yes  Occupational Therapy Consult: Yes  Speech Therapy Consult: No  Current Support Network: Nursing Facility  Confirm Follow Up Transport: Other (see comment) (ambulance)  Discharge Location  Discharge Placement: Unable to determine at this time  Dr Zee Rees met with patient son and wife for plan of care. CM introduced role and let family know available for needs and d/c plans. Patient currently on Airvo was on BIPAP earlier. Patient with positive blood and urine cultures for GNR. D/C plan pending clinical progress. Patient is a DNR and ACP is on file with wife and son as POA. CM following.

## 2021-06-08 NOTE — PROGRESS NOTES
Problem: Falls - Risk of  Goal: *Absence of Falls  Description: Document Burrell Canavan Fall Risk and appropriate interventions in the flowsheet.   Outcome: Progressing Towards Goal  Note: Fall Risk Interventions:  Mobility Interventions: Bed/chair exit alarm, Communicate number of staff needed for ambulation/transfer, OT consult for ADLs, Patient to call before getting OOB, PT Consult for mobility concerns, PT Consult for assist device competence, Utilize walker, cane, or other assistive device    Mentation Interventions: Adequate sleep, hydration, pain control, Bed/chair exit alarm, Door open when patient unattended, Evaluate medications/consider consulting pharmacy, Eyeglasses and hearing aids, Increase mobility, More frequent rounding, Reorient patient, Room close to nurse's station, Toileting rounds, Update white board    Medication Interventions: Bed/chair exit alarm, Evaluate medications/consider consulting pharmacy, Patient to call before getting OOB, Teach patient to arise slowly    Elimination Interventions: Bed/chair exit alarm, Call light in reach, Patient to call for help with toileting needs, Stay With Me (per policy), Toileting schedule/hourly rounds              Problem: Sepsis: Day of Diagnosis  Goal: Off Pathway (Use only if patient is Off Pathway)  Outcome: Progressing Towards Goal  Goal: *Fluid resuscitation  Outcome: Progressing Towards Goal  Goal: *Paired blood cultures prior to first dose of antibiotic  Outcome: Progressing Towards Goal  Goal: *First dose of  appropriate antibiotic within 3 hours of arrival to ED, within 1 hour of arrival to ICU  Outcome: Progressing Towards Goal  Goal: *Lactic acid with first set of blood cultures  Outcome: Progressing Towards Goal  Goal: *Pneumococcal immunization (if eligible)  Outcome: Progressing Towards Goal  Goal: *Influenza immunization (if eligible)  Outcome: Progressing Towards Goal  Goal: Activity/Safety  Outcome: Progressing Towards Goal  Goal: Consults, if ordered  Outcome: Progressing Towards Goal  Goal: Diagnostic Test/Procedures  Outcome: Progressing Towards Goal  Goal: Nutrition/Diet  Outcome: Progressing Towards Goal  Goal: Discharge Planning  Outcome: Progressing Towards Goal  Goal: Medications  Outcome: Progressing Towards Goal  Goal: Respiratory  Outcome: Progressing Towards Goal  Goal: Treatments/Interventions/Procedures  Outcome: Progressing Towards Goal  Goal: Psychosocial  Outcome: Progressing Towards Goal     Problem: Sepsis: Day 2  Goal: Off Pathway (Use only if patient is Off Pathway)  Outcome: Progressing Towards Goal  Goal: *Central Venous Pressure maintained at 8-12 mm Hg  Outcome: Progressing Towards Goal  Goal: *Hemodynamically stable  Outcome: Progressing Towards Goal  Goal: *Tolerating diet  Outcome: Progressing Towards Goal  Goal: Activity/Safety  Outcome: Progressing Towards Goal  Goal: Consults, if ordered  Outcome: Progressing Towards Goal  Goal: Diagnostic Test/Procedures  Outcome: Progressing Towards Goal  Goal: Nutrition/Diet  Outcome: Progressing Towards Goal  Goal: Discharge Planning  Outcome: Progressing Towards Goal  Goal: Medications  Outcome: Progressing Towards Goal  Goal: Respiratory  Outcome: Progressing Towards Goal  Goal: Treatments/Interventions/Procedures  Outcome: Progressing Towards Goal  Goal: Psychosocial  Outcome: Progressing Towards Goal

## 2021-06-09 LAB
ALBUMIN SERPL-MCNC: 2.4 G/DL (ref 3.2–4.6)
ALBUMIN/GLOB SERPL: 0.7 {RATIO} (ref 1.2–3.5)
ALP SERPL-CCNC: 95 U/L (ref 50–136)
ALT SERPL-CCNC: 23 U/L (ref 12–65)
ANION GAP SERPL CALC-SCNC: 5 MMOL/L (ref 7–16)
AST SERPL-CCNC: 38 U/L (ref 15–37)
BACTERIA SPEC CULT: ABNORMAL
BACTERIA SPEC CULT: ABNORMAL
BASOPHILS # BLD: 0 K/UL (ref 0–0.2)
BASOPHILS NFR BLD: 0 % (ref 0–2)
BILIRUB SERPL-MCNC: 0.5 MG/DL (ref 0.2–1.1)
BUN SERPL-MCNC: 37 MG/DL (ref 8–23)
CALCIUM SERPL-MCNC: 8.4 MG/DL (ref 8.3–10.4)
CHLORIDE SERPL-SCNC: 110 MMOL/L (ref 98–107)
CO2 SERPL-SCNC: 28 MMOL/L (ref 21–32)
CREAT SERPL-MCNC: 0.73 MG/DL (ref 0.8–1.5)
DIFFERENTIAL METHOD BLD: ABNORMAL
EOSINOPHIL # BLD: 0.2 K/UL (ref 0–0.8)
EOSINOPHIL NFR BLD: 2 % (ref 0.5–7.8)
ERYTHROCYTE [DISTWIDTH] IN BLOOD BY AUTOMATED COUNT: 14.3 % (ref 11.9–14.6)
GLOBULIN SER CALC-MCNC: 3.6 G/DL (ref 2.3–3.5)
GLUCOSE SERPL-MCNC: 100 MG/DL (ref 65–100)
GRAM STN SPEC: ABNORMAL
HCT VFR BLD AUTO: 36.5 % (ref 41.1–50.3)
HGB BLD-MCNC: 10.8 G/DL (ref 13.6–17.2)
IMM GRANULOCYTES # BLD AUTO: 0.1 K/UL (ref 0–0.5)
IMM GRANULOCYTES NFR BLD AUTO: 1 % (ref 0–5)
LYMPHOCYTES # BLD: 2.2 K/UL (ref 0.5–4.6)
LYMPHOCYTES NFR BLD: 26 % (ref 13–44)
MAGNESIUM SERPL-MCNC: 2.2 MG/DL (ref 1.8–2.4)
MCH RBC QN AUTO: 29.8 PG (ref 26.1–32.9)
MCHC RBC AUTO-ENTMCNC: 29.6 G/DL (ref 31.4–35)
MCV RBC AUTO: 100.8 FL (ref 79.6–97.8)
MONOCYTES # BLD: 1.3 K/UL (ref 0.1–1.3)
MONOCYTES NFR BLD: 15 % (ref 4–12)
NEUTS SEG # BLD: 4.9 K/UL (ref 1.7–8.2)
NEUTS SEG NFR BLD: 57 % (ref 43–78)
NRBC # BLD: 0 K/UL (ref 0–0.2)
PLATELET # BLD AUTO: 228 K/UL (ref 150–450)
PMV BLD AUTO: 10 FL (ref 9.4–12.3)
POTASSIUM SERPL-SCNC: 4 MMOL/L (ref 3.5–5.1)
PROT SERPL-MCNC: 6 G/DL (ref 6.3–8.2)
RBC # BLD AUTO: 3.62 M/UL (ref 4.23–5.6)
SERVICE CMNT-IMP: ABNORMAL
SODIUM SERPL-SCNC: 143 MMOL/L (ref 136–145)
WBC # BLD AUTO: 8.6 K/UL (ref 4.3–11.1)

## 2021-06-09 PROCEDURE — 74011000250 HC RX REV CODE- 250: Performed by: HOSPITALIST

## 2021-06-09 PROCEDURE — 85025 COMPLETE CBC W/AUTO DIFF WBC: CPT

## 2021-06-09 PROCEDURE — 74011250636 HC RX REV CODE- 250/636: Performed by: HOSPITALIST

## 2021-06-09 PROCEDURE — 77030021668 HC NEB PREFIL KT VYRM -A

## 2021-06-09 PROCEDURE — 74011250637 HC RX REV CODE- 250/637: Performed by: FAMILY MEDICINE

## 2021-06-09 PROCEDURE — 80053 COMPREHEN METABOLIC PANEL: CPT

## 2021-06-09 PROCEDURE — 77010033711 HC HIGH FLOW OXYGEN

## 2021-06-09 PROCEDURE — 74011000258 HC RX REV CODE- 258: Performed by: FAMILY MEDICINE

## 2021-06-09 PROCEDURE — 65610000001 HC ROOM ICU GENERAL

## 2021-06-09 PROCEDURE — 74011250636 HC RX REV CODE- 250/636: Performed by: FAMILY MEDICINE

## 2021-06-09 PROCEDURE — 74011250637 HC RX REV CODE- 250/637: Performed by: HOSPITALIST

## 2021-06-09 PROCEDURE — 74011000258 HC RX REV CODE- 258: Performed by: HOSPITALIST

## 2021-06-09 PROCEDURE — 36415 COLL VENOUS BLD VENIPUNCTURE: CPT

## 2021-06-09 PROCEDURE — 94640 AIRWAY INHALATION TREATMENT: CPT

## 2021-06-09 PROCEDURE — 83735 ASSAY OF MAGNESIUM: CPT

## 2021-06-09 RX ORDER — MIDODRINE HYDROCHLORIDE 5 MG/1
5 TABLET ORAL
Status: DISCONTINUED | OUTPATIENT
Start: 2021-06-09 | End: 2021-06-12

## 2021-06-09 RX ADMIN — GUAIFENESIN 600 MG: 600 TABLET, EXTENDED RELEASE ORAL at 08:29

## 2021-06-09 RX ADMIN — MIDODRINE HYDROCHLORIDE 10 MG: 5 TABLET ORAL at 08:29

## 2021-06-09 RX ADMIN — GUAIFENESIN 600 MG: 600 TABLET, EXTENDED RELEASE ORAL at 20:50

## 2021-06-09 RX ADMIN — DOXYCYCLINE HYCLATE 100 MG: 100 CAPSULE ORAL at 08:28

## 2021-06-09 RX ADMIN — Medication 5 ML: at 05:32

## 2021-06-09 RX ADMIN — CEFTRIAXONE SODIUM 2 G: 2 INJECTION, POWDER, FOR SOLUTION INTRAMUSCULAR; INTRAVENOUS at 14:03

## 2021-06-09 RX ADMIN — BUDESONIDE 500 MCG: 0.5 INHALANT RESPIRATORY (INHALATION) at 07:55

## 2021-06-09 RX ADMIN — BUDESONIDE 500 MCG: 0.5 INHALANT RESPIRATORY (INHALATION) at 19:48

## 2021-06-09 RX ADMIN — PIPERACILLIN AND TAZOBACTAM 3.38 G: 3; .375 INJECTION, POWDER, LYOPHILIZED, FOR SOLUTION INTRAVENOUS at 05:32

## 2021-06-09 RX ADMIN — DOXYCYCLINE HYCLATE 100 MG: 100 CAPSULE ORAL at 20:50

## 2021-06-09 RX ADMIN — MIDODRINE HYDROCHLORIDE 10 MG: 5 TABLET ORAL at 12:06

## 2021-06-09 RX ADMIN — DULOXETINE HYDROCHLORIDE 30 MG: 30 CAPSULE, DELAYED RELEASE ORAL at 08:28

## 2021-06-09 RX ADMIN — ENOXAPARIN SODIUM 40 MG: 40 INJECTION SUBCUTANEOUS at 17:09

## 2021-06-09 RX ADMIN — Medication 10 ML: at 14:04

## 2021-06-09 RX ADMIN — Medication 10 ML: at 22:00

## 2021-06-09 RX ADMIN — MIDODRINE HYDROCHLORIDE 5 MG: 5 TABLET ORAL at 17:09

## 2021-06-09 RX ADMIN — FINASTERIDE 5 MG: 5 TABLET, FILM COATED ORAL at 08:29

## 2021-06-09 RX ADMIN — PANTOPRAZOLE SODIUM 40 MG: 40 TABLET, DELAYED RELEASE ORAL at 17:09

## 2021-06-09 RX ADMIN — DULOXETINE HYDROCHLORIDE 30 MG: 30 CAPSULE, DELAYED RELEASE ORAL at 17:09

## 2021-06-09 NOTE — PROGRESS NOTES
Full report to JANINA Razo. Care assumed by that RN at this time. At current, patient restful in bed with eyes closed, even non-labored respirations.

## 2021-06-09 NOTE — PROGRESS NOTES
Report received from [facility], care assumed by this RN until 0700 hours. 0300 assessment complete by offgoing, no differences noted on my assessment. Patient restful in bed with eyes closed, even non-labored respirations. Briefly awoken for assessment and then back to sleep. Bed alarm armed, no further needs expressed or apparent.

## 2021-06-09 NOTE — PROGRESS NOTES
[address] [facility] Hospitalist        Name:  Arash Gaming  Age:89 y.o. Sex:male   :  1932    MRN:  [id number]   PCP:  Melany Junior MD      Admit Date:  2021  2:03 PM   Chief Complaint: WORSENING CONFUSION    Reason for Admission:   Severe sepsis ([address] [A41.9, R65.20]  Acute cystitis [N30.00]    Assessment & Plan:     SEVERE SEPSIS 2/2 CAUTI/acute cystitis and Ecoli Bacteremia:  : resolving  LA resolved  IV fluids stopped on   Blood culture on + for E.  Coli, repeat blood culture on  till date negative  Urine culture positive for GNR  Antibiotics: Zosyn -  Antibiotic deescalated to Ceftriaxone today-    Pt is hemodynamically stable  Wean midodrine as tolerated, switch to 5 mg 3 times daily    COPD with acute on chronic hypoxic respiratory failure:  : stable  Off BiPAP   Currently on airvo 60 L, wean off of airvo as able  pulmicort bid with duoneb prn  mucinex bid  Incentive spirometry  Doxycycline 100 mg po bid x 5 days (D4)    Acute encephalopathy secondary to sepsis/CAUTI with underlying dementia:  : Improving   Delirium/dementia precautions  Reorient patient  Fall precautions  Avoid benzo, opiates and anticholinergic  depakote sprinkles prn for agitation/restlessness    Depression:  : stable  Continue cymbalta    BPH:  : indwelling li cath in  continue proscar  Hx of Hypospadiasis    Severe protein calorie malnutrition with BMP 17:  :  Nutrition consulted  Supplement with meals  Pt has chronic physical deconditioning with poor oral intake  PT/OT eval    Disposition/Expected LOS: pending clinical recovery  Diet: DIET ADULT  DIET ADULT ORAL NUTRITION SUPPLEMENT  VTE ppx: lovenox  GI ppx: protonix  Code status: DNR  Surrogate decision-maker: pt's son and wife      Interval Hx/Subjective:     Arash Gaming is a 80 y.o. male with hx of COPD, chronic hypoxic respiratory failure, dementia, BPH with chronic indwelling li, HTN resident of [facility] admitted on  for severe sepsis secondary to CAUTI/acute cystitis resulting in worsening of baseline dementia. Pt noted to be febrile in ER T 100.9, with LA 2.7.    6/9:  Pt seen at bedside, resting in bed. He is alert/awake, oriented to self and place. Mitten in place, little agitated. On airvo, 60 L. No chest pain, nausea/vomiting, diarrhea. Indwelling cath in place. Review of Systems:  A 14 point review of systems was taken and pertinent positive as mentioned above. Objective:     Patient Vitals for the past 24 hrs:   Temp Pulse Resp BP SpO2   06/09/21 1210 97.3 °F (36.3 °C) 73 29 116/80 99 %   06/09/21 0934  74 19 110/68 100 %   06/09/21 0829     94 %   06/09/21 0800  81 (!) 31 (!) 86/66 96 %   06/09/21 0759     100 %   06/09/21 0701 97.3 °F (36.3 °C) 72 21 (!) 91/56 100 %   06/09/21 0700  75 21 (!) 87/51 100 %   06/09/21 0501     99 %   06/09/21 0358  78 21 (!) 142/87 96 %   06/09/21 0300 98.5 °F (36.9 °C) 67 15 120/66 100 %   06/09/21 0200  72 (!) 35 100/60 100 %   06/09/21 0100  71 26 112/66 100 %   06/09/21 0000 98 °F (36.7 °C) 77 14 116/68 99 %   06/08/21 2300  80 15 (!) 148/83 99 %   06/08/21 2200  87 20 98/61 97 %   06/08/21 2100  95 (!) 32 113/75 99 %   06/08/21 2038     98 %   06/08/21 2000  89 19 (!) 146/76 100 %   06/08/21 1900 97.9 °F (36.6 °C) 88 23 (!) 125/93 98 %   06/08/21 1800  68 20 (!) 76/60 98 %   06/08/21 1700  71 23 (!) 82/56 99 %   06/08/21 1611 97.2 °F (36.2 °C) 76 16 (!) 83/59 97 %   06/08/21 1600  77 (!) 37 (!) 83/59 97 %   06/08/21 1500  83 (!) 76 (!) 80/68 98 %   06/08/21 1400  87 (!) 31 102/69    06/08/21 1308  85 29 (!) 87/58 (!) 86 %   06/08/21 1217     98 %       Oxygen Therapy  O2 Sat (%): 99 % (06/09/21 1210)  Pulse via Oximetry: 65 beats per minute (06/09/21 0934)  O2 Device: Hi flow nasal cannula; Heated;Humidifier (06/09/21 0759)  Skin Assessment: Clean, dry, & intact (06/09/21 0759)  Skin Protection for O2 Device: No (06/09/21 0759)  O2 Flow Rate (L/min): 45 l/min (06/09/21 0829)  O2 Temperature: 87.8 °F (31 °C) (06/09/21 0759)  FIO2 (%): 55 % (06/09/21 0829)    Body mass index is 19.51 kg/m². Physical Exam:    General:  Elderly, frail, nad, alert/awake, on airvo  HEENT:  Head NCAT, PERRLA+  Neck:   Supple, no lymphadenopathy, no JVD   Lungs:  Coarse breath sounds bilaterally  CV:   Regular rate and rhythm with normal S1 and S2   Abdomen:  Soft, nontender, nondistended, normoactive bowel sounds   Extremities:  No cyanosis clubbing or edema   Neuro:  gcs 15, CN 2-12 intact, following commands and moving all 4 extremities spontaneously  Psych:  AOx2, mood and affect flat      Data Reviewed: I have reviewed all labs, meds, and studies. Recent Results (from the past 24 hour(s))   METABOLIC PANEL, COMPREHENSIVE    Collection Time: 06/09/21  3:22 AM   Result Value Ref Range    Sodium 143 136 - 145 mmol/L    Potassium 4.0 3.5 - 5.1 mmol/L    Chloride 110 (H) 98 - 107 mmol/L    CO2 28 21 - 32 mmol/L    Anion gap 5 (L) 7 - 16 mmol/L    Glucose 100 65 - 100 mg/dL    BUN 37 (H) 8 - 23 MG/DL    Creatinine 0.73 (L) 0.8 - 1.5 MG/DL    GFR est AA >60 >60 ml/min/1.73m2    GFR est non-AA >60 >60 ml/min/1.73m2    Calcium 8.4 8.3 - 10.4 MG/DL    Bilirubin, total 0.5 0.2 - 1.1 MG/DL    ALT (SGPT) 23 12 - 65 U/L    AST (SGOT) 38 (H) 15 - 37 U/L    Alk.  phosphatase 95 50 - 136 U/L    Protein, total 6.0 (L) 6.3 - 8.2 g/dL    Albumin 2.4 (L) 3.2 - 4.6 g/dL    Globulin 3.6 (H) 2.3 - 3.5 g/dL    A-G Ratio 0.7 (L) 1.2 - 3.5     MAGNESIUM    Collection Time: 06/09/21  3:22 AM   Result Value Ref Range    Magnesium 2.2 1.8 - 2.4 mg/dL   CBC WITH AUTOMATED DIFF    Collection Time: 06/09/21  3:22 AM   Result Value Ref Range    WBC 8.6 4.3 - 11.1 K/uL    RBC 3.62 (L) 4.23 - 5.6 M/uL    HGB 10.8 (L) 13.6 - 17.2 g/dL    HCT 36.5 (L) 41.1 - 50.3 %    .8 (H) 79.6 - 97.8 FL    MCH 29.8 26.1 - 32.9 PG    MCHC 29.6 (L) 31.4 - 35.0 g/dL    RDW 14.3 11.9 - 14.6 %    PLATELET 788 150 - 450 K/uL    MPV 10.0 9.4 - 12.3 FL    ABSOLUTE NRBC 0.00 0.0 - 0.2 K/uL    DF AUTOMATED      NEUTROPHILS 57 43 - 78 %    LYMPHOCYTES 26 13 - 44 %    MONOCYTES 15 (H) 4.0 - 12.0 %    EOSINOPHILS 2 0.5 - 7.8 %    BASOPHILS 0 0.0 - 2.0 %    IMMATURE GRANULOCYTES 1 0.0 - 5.0 %    ABS. NEUTROPHILS 4.9 1.7 - 8.2 K/UL    ABS. LYMPHOCYTES 2.2 0.5 - 4.6 K/UL    ABS. MONOCYTES 1.3 0.1 - 1.3 K/UL    ABS. EOSINOPHILS 0.2 0.0 - 0.8 K/UL    ABS. BASOPHILS 0.0 0.0 - 0.2 K/UL    ABS. IMM. GRANS. 0.1 0.0 - 0.5 K/UL       EKG Results     Procedure 720 Value Units Date/Time    EKG, 12 LEAD, INITIAL [[id number]] Collected: 06/06/21 1423    Order Status: Completed Updated: 06/06/21 1704     Ventricular Rate 84 BPM      Atrial Rate 78 BPM      P-R Interval 136 ms      QRS Duration 92 ms      Q-T Interval 378 ms      QTC Calculation (Bezet) 446 ms      Calculated P Axis 63 degrees      Calculated R Axis -3 degrees      Calculated T Axis 72 degrees      Diagnosis --     !! AGE AND GENDER SPECIFIC ECG ANALYSIS !!   Sinus rhythm with Premature supraventricular complexes with frequent   Premature ventricular complexes  Septal infarct (cited on or before 01-FEB-2018)  Abnormal ECG  When compared with ECG of 30-JAN-2021 12:46,  Premature supraventricular complexes are now Present  Nonspecific T wave abnormality no longer evident in Lateral leads  Confirmed by ST SHIRA CAMACHO MD (), GERARDO BURKS ([id number]) on 6/6/2021 5:04:38 PM            All Micro Results     Procedure Component Value Units Date/Time    CULTURE, RESPIRATORY/SPUTUM/BRONCH [place] Cover STAIN [[id number]]  (Abnormal) Collected: 06/06/21 1821    Order Status: Completed Specimen: Sputum Updated: 06/09/21 0926     Special Requests: NO SPECIAL REQUESTS        GRAM STAIN 2 TO 18 WBC'S/OIF      0 TO 2 EPITHELIAL CELLS SEEN /OIF      FEW GRAM POSITIVE COCCI         FEW GRAM NEGATIVE RODS         FEW YEAST         FEW GRAM POSITIVE RODS         3+ MUCUS PRESENT        Culture result:       MODERATE STAPHYLOCOCCUS AUREUS SENSITIVITY TO FOLLOW                  HEAVY NORMAL RESPIRATORY NAEL          CULTURE, URINE [[id number]]  (Abnormal) Collected: 06/06/21 1427    Order Status: Completed Specimen: Urine Updated: 06/09/21 0808     Special Requests: NO SPECIAL REQUESTS        Culture result:       10,000 to 50,000 COLONIES/mL GRAM NEGATIVE RODS IDENTIFICATION AND SUSCEPTIBILITY TO FOLLOW                  <10,000 COLONIES/mL MIXED SKIN NAEL ISOLATED          BLOOD CULTURE [[id number]]  (Abnormal)  (Susceptibility) Collected: 06/06/21 1345    Order Status: Completed Specimen: Blood Updated: 06/09/21 0758     Special Requests: LEFT FOREARM        GRAM STAIN GRAM NEGATIVE RODS         ANAEROBIC BOTTLE POSITIVE               RESULTS VERIFIED, PHONED TO AND READ BACK BY AMISHA GRIFFITH RN BY NB AT [address] [place] [address] ON [id number]           Culture result: ESCHERICHIA COLI               Refer to Blood Culture ID Panel Accession  [id number]      CULTURE, BLOOD [[id number]] Collected: 06/08/21 0823    Order Status: Completed Specimen: Blood Updated: 06/09/21 0717     Special Requests: --        LEFT  HAND       Culture result: NO GROWTH AFTER 22 HOURS       CULTURE, BLOOD [[id number]] Collected: 06/08/21 0827    Order Status: Completed Specimen: Blood Updated: 06/09/21 0717     Special Requests: --        RIGHT  Antecubital       Culture result: NO GROWTH AFTER 22 HOURS       BLOOD CULTURE [[id number]] Collected: 06/06/21 1427    Order Status: Completed Specimen: Blood Updated: 06/09/21 0717     Special Requests: --        RIGHT  Antecubital       Culture result: NO GROWTH 3 DAYS       SARS-COV-2, PCR [[id number]] Collected: 06/07/21 1646    Order Status: Completed Specimen: Nasopharyngeal Updated: 06/08/21 1226     Specimen source Nasopharyngeal        SARS-CoV-2 Not detected        Comment:      The specimen is NEGATIVE for SARS-CoV-2, the novel coronavirus associated with COVID-19.        This test has been authorized by the FDA under an Emergency Use Authorization (EUA) for use by authorized laboratories. Fact sheet for Healthcare Providers: [employer]date.co.nz       Fact sheet for Patients: [employer]date.co[employer]nz       Methodology: RT-PCR         BLOOD CULTURE ID PANEL [[id number]]  (Abnormal) Collected: 06/06/21 1345    Order Status: Completed Specimen: Blood Updated: 06/07/21 1322     Acc. no. from Micro Order [id number]     Escherichia coli Detected        Comment: RESULTS VERIFIED, PHONED TO AND READ BACK BY  EREN ACOSTA RN @ 2359 ON 6/7/21 BY M          KPC (Carbapenem Resistance Gene) NOT DETECTED        Comment: WARNING:  A Not Detected result for the KPC gene does not indicate susceptibility to carbapenems. Gram negative bacteria can be resistant to carbapenems by mechanisms other than carrying the KPC gene. INTERPRETATION       Gram negative dereck. Identified by realtime PCR as E. coli          COVID-19 RAPID TEST [[id number]] Collected: 06/06/21 1659    Order Status: Completed Specimen: Nasopharyngeal Updated: 06/06/21 1728     Specimen source Nasopharyngeal        COVID-19 rapid test Not detected        Comment:      The specimen is NEGATIVE for SARS-CoV-2, the novel coronavirus associated with COVID-19. A negative result does not rule out COVID-19. This test has been authorized by the FDA under an Emergency Use Authorization (EUA) for use by authorized laboratories. Fact sheet for Healthcare Providers: [employer]nz  Fact sheet for Patients: [employer]date[employer]co[employer]nz       Methodology: Isothermal Nucleic Acid Amplification               Other Studies:  XR CHEST SNGL V    Result Date: 6/8/2021  CHEST X-RAY, one view. HISTORY:  Worsening shortness breath. TECHNIQUE:  AP upright portable view COMPARISON: Exam 2 days prior. FINDINGS: Lungs: Diffuse reticular pattern throughout both lungs.  No definite change from most recent exam and does appear more prominent than in February. Costophrenic angles: are sharp. Heart size: Appears mildly enlarged. Pulmonary vasculature: is unremarkable. Aorta: Arch calcifications. Included portion of the upper abdomen: is unremarkable. Bones: No gross bony lesions. Other: None. Underlying interstitial pattern. Interstitial pattern appears increased on today's exam and superimposed inflammation or edema could have this appearance.          Medications:  Medications Administered       acetaminophen (TYLENOL) tablet 650 mg       Admin Date  03/13/2021 Action  Given Dose  650 mg Route  Oral Administered By  Siobhan Dawson RN              cefTRIAXone (ROCEPHIN) 1 g in 0.9% sodium chloride (MBP/ADV) 50 mL MBP       Admin Date  03/13/2021 Action  New Bag Dose  1 g Rate  100 mL/hr Route  IntraVENous Administered By  Kenyatta Sim RN              cefTRIAXone (ROCEPHIN) 2 g in 0.9% sodium chloride (MBP/ADV) 50 mL MBP       Admin Date  03/13/2021 Action  New Bag Dose  2 g Rate  100 mL/hr Route  IntraVENous Administered By  Eduardo Razo RN               Admin Date  03/14/2021 Action  New Bag Dose  2 g Rate  100 mL/hr Route  IntraVENous Administered By  Sapna Irving RN               Admin Date  03/15/2021 Action  New Bag Dose  2 g Rate  100 mL/hr Route  IntraVENous Administered By  Sapna Irving RN               Admin Date  03/16/2021 Action  New Bag Dose  2 g Rate  100 mL/hr Route  IntraVENous Administered By  Brand Clack              diphenhydrAMINE (BENADRYL) injection 25 mg       Admin Date  03/14/2021 Action  Given Dose  25 mg Route  IntraVENous Administered By  eRji Vuong RN              doxycycline (VIBRAMYCIN) 100 mg in 0.9% sodium chloride (MBP/ADV) 100 mL MBP       Admin Date  03/13/2021 Action  New Bag Dose  100 mg Rate  100 mL/hr Route  IntraVENous Administered By  Eduardo Razo RN               Admin Date  03/13/2021 Action  New Bag Dose  100 mg Rate  100 mL/hr Route  IntraVENous Administered By  Nilton Mcclure RN               Admin Date  03/14/2021 Action  New Bag Dose  100 mg Rate  100 mL/hr Route  IntraVENous Administered By  Joyceann Klinefelter, RN               Admin Date  03/14/2021 Action  New Bag Dose  100 mg Rate  100 mL/hr Route  IntraVENous Administered By  Nilton Mcclure RN               Admin Date  03/15/2021 Action  New Bag Dose  100 mg Rate  100 mL/hr Route  IntraVENous Administered By  Joyceann Klinefelter, RN              enoxaparin (LOVENOX) injection 40 mg       Admin Date  03/13/2021 Action  Given Dose  40 mg Route  SubCUTAneous Administered By  Kareem Obrien RN               Admin Date  03/13/2021 Action  Given Dose  40 mg Route  SubCUTAneous Administered By  Nilton Mcclure RN               Admin Date  03/14/2021 Action  Given Dose  40 mg Route  SubCUTAneous Administered By  Joyceann Klinefelter, RN               Admin Date  03/14/2021 Action  Given Dose  40 mg Route  SubCUTAneous Administered By  Nilton Mcclure RN               Admin Date  03/15/2021 Action  Given Dose  40 mg Route  SubCUTAneous Administered By  Joyceann Klinefelter, RN               Admin Date  03/15/2021 Action  Given Dose  40 mg Route  SubCUTAneous Administered By  Daniel Gonzales RN               Admin Date  03/16/2021 Action  Given Dose  40 mg Route  SubCUTAneous Administered By  Eduin Rubio              fentaNYL citrate (PF) injection 50 mcg       Admin Date  03/13/2021 Action  Given Dose  50 mcg Route  IntraVENous Administered By  Kareem Obrien RN               Admin Date  03/14/2021 Action  Given Dose  50 mcg Route  IntraVENous Administered By  Nilton Mcclure RN              ferrous sulfate tablet 325 mg       Admin Date  03/14/2021 Action  Given Dose  325 mg Route  Oral Administered By  Joyceann Klinefelter, RN               Admin Date  03/14/2021 Action  Given Dose  325 mg Route  Oral Administered By  Joyceann Klinefelter, RN               Admin Date  03/15/2021 Action  Given Dose  325 mg Route  Oral Administered By  Nanda-Hill, Prabhakar Urena RN              folic acid (FOLVITE) tablet 1 mg       Admin Date  03/14/2021 Action  Given Dose  1 mg Route  Oral Administered By  Isabella Jolly, RN               Admin Date  03/15/2021 Action  Given Dose  1 mg Route  Oral Administered By  Isabella Jolly, JANINA               Admin Date  03/16/2021 Action  Given Dose  1 mg Route  Oral Administered By  Zee Sears              furosemide (LASIX) injection 20 mg       Admin Date  03/15/2021 Action  Given Dose  20 mg Route  IntraVENous Administered By  Isabella Jolly RN              furosemide (LASIX) injection 40 mg       Admin Date  03/13/2021 Action  Given Dose  40 mg Route  IntraVENous Administered By  Virgil Curry, RN               Admin Date  03/13/2021 Action  Given Dose  40 mg Route  IntraVENous Administered By  Stella Alfonso, JANINA               Admin Date  03/14/2021 Action  Given Dose  40 mg Route  IntraVENous Administered By  Isabella Jolly, RN               Admin Date  03/14/2021 Action  Given Dose  40 mg Route  IntraVENous Administered By  Stella Alfonso, JANINA               Admin Date  03/15/2021 Action  Given Dose  40 mg Route  IntraVENous Administered By  James Lee RN               Admin Date  03/16/2021 Action  Given Dose  40 mg Route  IntraVENous Administered By  Zee Sears              HYDROcodone-acetaminophen [facility]) 5-325 mg per tablet 1 Tab       Admin Date  03/14/2021 Action  Given Dose  1 Tab Route  Oral Administered By  Isabella Jolly RN               Admin Date  03/15/2021 Action  Given Dose  1 Tab Route  Oral Administered By  Stella Alfonso RN              insulin lispro (HUMALOG) injection       Admin Date  03/13/2021 Action  Given Dose  2 Units Route  SubCUTAneous Administered By  Stella Alfonso RN               Admin Date  03/14/2021 Action  Given Dose  2 Units Route  SubCUTAneous Administered By  Isabella Jolly RN               Admin Date  03/14/2021 Action  Given Dose  2 Units Route  SubCUTAneous Administered By  Sandy Odell RN               Admin Date  03/14/2021 Action  Given Dose  4 Units Route  SubCUTAneous Administered By  Severo Mcmillan RN               Admin Date  03/15/2021 Action  Given Dose  2 Units Route  SubCUTAneous Administered By  Sandy Odell RN               Admin Date  03/15/2021 Action  Given Dose  4 Units Route  SubCUTAneous Administered By  Sandy Odell RN               Admin Date  03/15/2021 Action  Given Dose  2 Units Route  SubCUTAneous Administered By  Vianca Limon RN               Admin Date  03/16/2021 Action  Given Dose  4 Units Route  SubCUTAneous Administered By  Ron Cole              levothyroxine (SYNTHROID) tablet 137 mcg       Admin Date  03/14/2021 Action  Given Dose  137 mcg Route  Oral Administered By  Sandy Odell RN               Admin Date  03/15/2021 Action  Given Dose  137 mcg Route  Oral Administered By  Sandy Odell RN               Admin Date  03/16/2021 Action  Given Dose  137 mcg Route  Oral Administered By  Vianca iLmon RN              midodrine (PROAMATINE) tablet 10 mg       Admin Date  03/14/2021 Action  Given Dose  10 mg Route  Oral Administered By  Sandy Odell RN               Admin Date  03/14/2021 Action  Given Dose  10 mg Route  Oral Administered By  Sandy Odell RN               Admin Date  03/15/2021 Action  Given Dose  10 mg Route  Oral Administered By  Sandy Odell RN               Admin Date  03/15/2021 Action  Given Dose  10 mg Route  Oral Administered By  Sandy Odell RN               Admin Date  03/15/2021 Action  Given Dose  10 mg Route  Oral Administered By  Sandy Odell RN               Admin Date  03/16/2021 Action  Given Dose  10 mg Route  Oral Administered By  Delmer Chang Date  03/16/2021 Action  Given Dose  10 mg Route  Oral Administered By  Delmer Chang Date  03/16/2021 Action  Given Dose  10 mg Route  Oral Administered By  Ron Cole naloxone [facility]) injection 2 mg       Admin Date  03/13/2021 Action  Given Dose  2 mg Route  IntraVENous Administered By  Mari Ulloa RN              NOREPINephrine (LEVOPHED) 4 mg in 5% dextrose 250 mL infusion       Admin Date  03/14/2021 Action  New Bag Dose  4 mcg/min Rate  15 mL/hr Route  IntraVENous Administered By  Preston Herrera RN               Admin Date  03/14/2021 Action  Rate Change Dose  6 mcg/min Rate  22.5 mL/hr Route  IntraVENous Administered By  Preston Herrera RN               Admin Date  03/14/2021 Action  Rate Change Dose  4 mcg/min Rate  15 mL/hr Route  IntraVENous Administered By  Preston Herrera RN               Admin Date  03/14/2021 Action  Rate Change Dose  2 mcg/min Rate  7.5 mL/hr Route  IntraVENous Administered By  Preston Herrera RN               Admin Date  03/14/2021 Action  Rate Change Dose  1 mcg/min Rate  3.8 mL/hr Route  IntraVENous Administered By  Preston Herrera RN               Admin Date  03/14/2021 Action  Restarted Dose  2 mcg/min Rate  7.5 mL/hr Route  IntraVENous Administered By  Preston Herrera RN               Admin Date  03/14/2021 Action  Rate Change Dose  4 mcg/min Rate  15 mL/hr Route  IntraVENous Administered By  Preston Herrera RN               Admin Date  03/15/2021 Action  Rate Change Dose  2 mcg/min Rate  7.5 mL/hr Route  IntraVENous Administered By  Blanche Brown RN              ondansetron [facility]) injection 4 mg       Admin Date  03/13/2021 Action  Given Dose  4 mg Route  IntraVENous Administered By  Mari Ulloa RN              pantoprazole (PROTONIX) tablet 40 mg       Admin Date  03/14/2021 Action  Given Dose  40 mg Route  Oral Administered By  Preston Herrera RN               Admin Date  03/15/2021 Action  Given Dose  40 mg Route  Oral Administered By  Preston Herrera RN               Admin Date  03/16/2021 Action  Given Dose  40 mg Route  Oral Administered By  Jose De Jesus parmar lipid microspheres (DEFINITY) in NS bolus IV       Admin Date  03/13/2021 Action  Given Dose  1 mL Route  IntraVENous Administered By  DARLEEN Colby              potassium chloride (K-DUR, KLOR-CON) SR tablet 40 mEq       Admin Date  03/15/2021 Action  Given Dose  40 mEq Route  Oral Administered By  Sandy Odell RN               Admin Date  03/16/2021 Action  Given Dose  40 mEq Route  Oral Administered By  Ron Cole              potassium chloride 40 mEq IVPB       Admin Date  03/15/2021 Action  New Bag Dose  40 mEq Rate  25 mL/hr Route  IntraVENous Administered By  Severo Mcmillan RN              pregabalin (LYRICA) capsule 300 mg       Admin Date  03/14/2021 Action  Given Dose  300 mg Route  Oral Administered By  Sandy Odell RN               Admin Date  03/14/2021 Action  Given Dose  300 mg Route  Oral Administered By  Sandy Odell RN               Admin Date  03/15/2021 Action  Given Dose  300 mg Route  Oral Administered By  Sandy Odell RN               Admin Date  03/15/2021 Action  Given Dose  300 mg Route  Oral Administered By  Jyotsna Covarrubias RN               Admin Date  03/16/2021 Action  Given Dose  300 mg Route  Oral Administered By  Ron Kelsey              sertraline (ZOLOFT) tablet 300 mg       Admin Date  03/14/2021 Action  Given Dose  300 mg Route  Oral Administered By  Sandy Odell RN               Admin Date  03/15/2021 Action  Given Dose  300 mg Route  Oral Administered By  Sandy Odell RN               Admin Date  03/16/2021 Action  Given Dose  300 mg Route  Oral Administered By  Ron Kelsey              sodium chloride (NS) flush 5-40 mL       Admin Date  03/13/2021 Action  Given Dose  10 mL Route  IntraVENous Administered By  Amilcar Mandujano RN               Admin Date  03/13/2021 Action  Given Dose  30 mL Route  IntraVENous Administered By  Amilcar Mandujano RN               Admin Date  03/13/2021 Action  Given Dose  40 mL Route  IntraVENous Administered By  Leoncio Royal Alanna Antione Date  03/14/2021 Action  Given Dose  40 mL Route  IntraVENous Administered By  Alden Rolon, JANINA               Admin Date  03/14/2021 Action  Given Dose  10 mL Route  IntraVENous Administered By  Morteza Romeo RN               Admin Date  03/14/2021 Action  Given Dose  40 mL Route  IntraVENous Administered By  Alden Rolon, JANINA               Admin Date  03/15/2021 Action  Given Dose  40 mL Route  IntraVENous Administered By  Alden Rolon, RN               Admin Date  03/15/2021 Action  Given Dose  10 mL Route  IntraVENous Administered By  Morteza Romeo, JANINA               Admin Date  03/15/2021 Action  Given Dose  10 mL Route  IntraVENous Administered By  Randi Mcfadden, JANINA               Admin Date  03/16/2021 Action  Given Dose  10 mL Route  IntraVENous Administered By  Srinivasa Em              sodium chloride 0.9 % bolus infusion 250 mL       Admin Date  03/14/2021 Action  New Bag Dose  250 mL Rate  250 mL/hr Route  IntraVENous Administered By  Morteza Romeo RN              tiZANidine (ZANAFLEX) tablet 4 mg       Admin Date  03/14/2021 Action  Given Dose  4 mg Route  Oral Administered By  Alden Rolon RN               Admin Date  03/14/2021 Action  Given Dose  4 mg Route  Oral Administered By  Morteza Romeo RN               Admin Date  03/14/2021 Action  Given Dose  4 mg Route  Oral Administered By  Morteza Romeo RN               Admin Date  03/14/2021 Action  Given Dose  4 mg Route  Oral Administered By  Alden Rolon RN               Admin Date  03/15/2021 Action  Given Dose  4 mg Route  Oral Administered By  Morteza Romeo RN               Admin Date  03/15/2021 Action  Given Dose  4 mg Route  Oral Administered By  Morteza Romeo RN               Admin Date  03/15/2021 Action  Given Dose  4 mg Route  Oral Administered By  Randi Mcfadden RN               Admin Date  03/16/2021 Action  Given Dose  4 mg Route  Oral Administered By  Srinivasa Em Admin Date  03/16/2021 Action  Given Dose  4 mg Route  Oral Administered By  Jose De Jesus Bailey              traZODone (DESYREL) tablet 150 mg       Admin Date  03/14/2021 Action  Given Dose  150 mg Route  Oral Administered By  Blanche Brown RN               Admin Date  03/15/2021 Action  Given Dose  150 mg Route  Oral Administered By  Sina Lacey RN              tuberculin injection 5 Units       Admin Date  03/15/2021 Action  Give PPD Dose  5 Units Route  IntraDERMal Administered By  Preston Herrera RN              vancomycin (VANCOCIN) 2500 mg in  mL infusion       Admin Date  03/13/2021 Action  Given Dose  2,500 mg Rate  200 mL/hr Route  IntraVENous Administered By  Annie Caldwell RN              zonisamide (ZONEGRAN) capsule 300 mg       Admin Date  03/14/2021 Action  Given Dose  300 mg Route  Oral Administered By  Blanche Brown RN               Admin Date  03/15/2021 Action  Given Dose  300 mg Route  Oral Administered By  Sina Lacey RN                        Problem List:     Hospital Problems as of 6/9/2021 Date Reviewed: 12/17/2020        Codes Class Noted - Resolved POA    Bacteremia due to Gram-negative bacteria ICD-10-CM: R78.81  ICD-9-CM: 790.7, 041.85  6/7/2021 - Present Unknown        Acute cystitis ICD-10-CM: N30.00  ICD-9-CM: 595.0  6/6/2021 - Present Unknown        * (Principal) Severe sepsis ([facility] ICD-10-CM: A41.9, R65.20  ICD-9-CM: 038.9, 995.92  6/6/2021 - Present Unknown        Urinary tract infection associated with indwelling urethral catheter ([facility] ICD-10-CM: T83.511A, N39.0  ICD-9-CM: 996.64, 599.0  6/6/2021 - Present Unknown        Dementia ([facility] (Chronic) ICD-10-CM: F03.90  ICD-9-CM: 294.20  1/30/2021 - Present Yes        Chronic indwelling Rojas catheter ICD-10-CM: Z97.8  ICD-9-CM: V45.89  11/22/2020 - Present Yes        Moderate protein-calorie malnutrition ([facility] ICD-10-CM: E44.0  ICD-9-CM: 263.0  11/3/2019 - Present Unknown        Urethral tear, initial encounter ICD-10-CM: F56.33HA  ICD-9-CM: 867.0  8/8/2019 - Present Unknown        Chronic respiratory failure with hypoxia (HCC) (Chronic) ICD-10-CM: J96.11  ICD-9-CM: 518.83, 799.02  3/29/2016 - Present Yes    Overview Addendum 1/30/2021  5:25 PM by Armand Ulloa MD     4+L chronically             BPH (benign prostatic hyperplasia) (Chronic) ICD-10-CM: N40.0  ICD-9-CM: 600.00  12/10/2015 - Present Yes        COPD (chronic obstructive pulmonary disease) ([facility] (Chronic) ICD-10-CM: J44.9  ICD-9-CM: 496  12/10/2015 - Present Yes                 Signed By: Erick Cain MD   [employer] [facility]    June 9, 2021  4:22 PM

## 2021-06-09 NOTE — PROGRESS NOTES
Problem: Falls - Risk of  Goal: *Absence of Falls  Description: Document Lewis Harmon Fall Risk and appropriate interventions in the flowsheet.   Outcome: Progressing Towards Goal  Note: Fall Risk Interventions:  Mobility Interventions: Assess mobility with egress test, Bed/chair exit alarm, Communicate number of staff needed for ambulation/transfer, OT consult for ADLs, Patient to call before getting OOB, PT Consult for mobility concerns, PT Consult for assist device competence, Utilize walker, cane, or other assistive device    Mentation Interventions: Adequate sleep, hydration, pain control, Bed/chair exit alarm, Door open when patient unattended, Evaluate medications/consider consulting pharmacy, Increase mobility, More frequent rounding, Reorient patient, Room close to nurse's station, Toileting rounds, Update white board    Medication Interventions: Assess postural VS orthostatic hypotension, Bed/chair exit alarm, Evaluate medications/consider consulting pharmacy, Patient to call before getting OOB, Teach patient to arise slowly    Elimination Interventions: Bed/chair exit alarm, Call light in reach, Patient to call for help with toileting needs, Stay With Me (per policy), Toileting schedule/hourly rounds              Problem: Sepsis: Day 3  Goal: Off Pathway (Use only if patient is Off Pathway)  Outcome: Progressing Towards Goal  Goal: *Central Venous Pressure maintained at 8-12 mm Hg  Outcome: Progressing Towards Goal  Goal: *Oxygen saturation within defined limits  Outcome: Progressing Towards Goal  Goal: *Vital sign stability  Outcome: Progressing Towards Goal  Goal: *Tolerating diet  Outcome: Progressing Towards Goal  Goal: *Demonstrates progressive activity  Outcome: Progressing Towards Goal  Goal: Activity/Safety  Outcome: Progressing Towards Goal  Goal: Consults, if ordered  Outcome: Progressing Towards Goal  Goal: Diagnostic Test/Procedures  Outcome: Progressing Towards Goal  Goal: Nutrition/Diet  Outcome: Progressing Towards Goal  Goal: Discharge Planning  Outcome: Progressing Towards Goal  Goal: Medications  Outcome: Progressing Towards Goal  Goal: Respiratory  Outcome: Progressing Towards Goal  Goal: Treatments/Interventions/Procedures  Outcome: Progressing Towards Goal  Goal: Psychosocial  Outcome: Progressing Towards Goal     Problem: Sepsis: Discharge Outcomes  Goal: *Vital signs within defined limits  Outcome: Progressing Towards Goal  Goal: *Tolerating diet  Outcome: Progressing Towards Goal  Goal: *Verbalizes understanding and describes prescribed diet  Outcome: Progressing Towards Goal  Goal: *Demonstrates progressive activity  Outcome: Progressing Towards Goal  Goal: *Describes follow-up/return visits to physicians  Outcome: Progressing Towards Goal  Goal: *Verbalizes name, dosage, time, side effects, and number of days to continue medications  Outcome: Progressing Towards Goal  Goal: *Influenza immunization (Oct-Mar only)  Outcome: Progressing Towards Goal  Goal: *Pneumococcal immunization  Outcome: Progressing Towards Goal  Goal: *Lungs clear or at baseline  Outcome: Progressing Towards Goal  Goal: *Oxygen saturation returns to baseline or 90% or better on room air  Outcome: Progressing Towards Goal  Goal: *Glycemic control  Outcome: Progressing Towards Goal  Goal: *Absence of deep venous thrombosis signs and symptoms(Stroke Metric)  Outcome: Progressing Towards Goal  Goal: *Describes available resources and support systems  Outcome: Progressing Towards Goal  Goal: *Optimal pain control at patient's stated goal  Outcome: Progressing Towards Goal

## 2021-06-09 NOTE — PROGRESS NOTES
Problem: Falls - Risk of  Goal: *Absence of Falls  Description: Document Eddie Paiz Fall Risk and appropriate interventions in the flowsheet.   Outcome: Progressing Towards Goal  Note: Fall Risk Interventions:  Mobility Interventions: Assess mobility with egress test, Bed/chair exit alarm, Communicate number of staff needed for ambulation/transfer, OT consult for ADLs, Patient to call before getting OOB, PT Consult for mobility concerns, PT Consult for assist device competence, Utilize walker, cane, or other assistive device    Mentation Interventions: Adequate sleep, hydration, pain control, Bed/chair exit alarm, Door open when patient unattended, Evaluate medications/consider consulting pharmacy, Increase mobility, More frequent rounding, Reorient patient, Room close to nurse's station, Toileting rounds, Update white board    Medication Interventions: Assess postural VS orthostatic hypotension, Bed/chair exit alarm, Evaluate medications/consider consulting pharmacy, Patient to call before getting OOB, Teach patient to arise slowly    Elimination Interventions: Bed/chair exit alarm, Call light in reach, Patient to call for help with toileting needs, Stay With Me (per policy), Toileting schedule/hourly rounds              Problem: Patient Education: Go to Patient Education Activity  Goal: Patient/Family Education  Outcome: Progressing Towards Goal     Problem: Patient Education: Go to Patient Education Activity  Goal: Patient/Family Education  Outcome: Progressing Towards Goal     Problem: Sepsis: Day of Diagnosis  Goal: Off Pathway (Use only if patient is Off Pathway)  Outcome: Progressing Towards Goal  Goal: *Fluid resuscitation  Outcome: Progressing Towards Goal  Goal: *Paired blood cultures prior to first dose of antibiotic  Outcome: Progressing Towards Goal  Goal: *First dose of  appropriate antibiotic within 3 hours of arrival to ED, within 1 hour of arrival to ICU  Outcome: Progressing Towards Goal  Goal: *Lactic acid with first set of blood cultures  Outcome: Progressing Towards Goal  Goal: *Pneumococcal immunization (if eligible)  Outcome: Progressing Towards Goal  Goal: *Influenza immunization (if eligible)  Outcome: Progressing Towards Goal  Goal: Activity/Safety  Outcome: Progressing Towards Goal  Goal: Consults, if ordered  Outcome: Progressing Towards Goal  Goal: Diagnostic Test/Procedures  Outcome: Progressing Towards Goal  Goal: Nutrition/Diet  Outcome: Progressing Towards Goal  Goal: Discharge Planning  Outcome: Progressing Towards Goal  Goal: Medications  Outcome: Progressing Towards Goal  Goal: Respiratory  Outcome: Progressing Towards Goal  Goal: Treatments/Interventions/Procedures  Outcome: Progressing Towards Goal  Goal: Psychosocial  Outcome: Progressing Towards Goal     Problem: Sepsis: Day 2  Goal: Off Pathway (Use only if patient is Off Pathway)  Outcome: Progressing Towards Goal  Goal: *Central Venous Pressure maintained at 8-12 mm Hg  Outcome: Progressing Towards Goal  Goal: *Hemodynamically stable  Outcome: Progressing Towards Goal  Goal: *Tolerating diet  Outcome: Progressing Towards Goal  Goal: Activity/Safety  Outcome: Progressing Towards Goal  Goal: Consults, if ordered  Outcome: Progressing Towards Goal  Goal: Diagnostic Test/Procedures  Outcome: Progressing Towards Goal  Goal: Nutrition/Diet  Outcome: Progressing Towards Goal  Goal: Discharge Planning  Outcome: Progressing Towards Goal  Goal: Medications  Outcome: Progressing Towards Goal  Goal: Respiratory  Outcome: Progressing Towards Goal  Goal: Treatments/Interventions/Procedures  Outcome: Progressing Towards Goal  Goal: Psychosocial  Outcome: Progressing Towards Goal     Problem: Sepsis: Day 3  Goal: Off Pathway (Use only if patient is Off Pathway)  Outcome: Progressing Towards Goal  Goal: *Central Venous Pressure maintained at 8-12 mm Hg  Outcome: Progressing Towards Goal  Goal: *Oxygen saturation within defined limits  Outcome: Progressing Towards Goal  Goal: *Vital sign stability  Outcome: Progressing Towards Goal  Goal: *Tolerating diet  Outcome: Progressing Towards Goal  Goal: *Demonstrates progressive activity  Outcome: Progressing Towards Goal  Goal: Activity/Safety  Outcome: Progressing Towards Goal  Goal: Consults, if ordered  Outcome: Progressing Towards Goal  Goal: Diagnostic Test/Procedures  Outcome: Progressing Towards Goal  Goal: Nutrition/Diet  Outcome: Progressing Towards Goal  Goal: Discharge Planning  Outcome: Progressing Towards Goal  Goal: Medications  Outcome: Progressing Towards Goal  Goal: Respiratory  Outcome: Progressing Towards Goal  Goal: Treatments/Interventions/Procedures  Outcome: Progressing Towards Goal  Goal: Psychosocial  Outcome: Progressing Towards Goal     Problem: Sepsis: Day 4  Goal: Off Pathway (Use only if patient is Off Pathway)  Outcome: Progressing Towards Goal  Goal: Activity/Safety  Outcome: Progressing Towards Goal  Goal: Consults, if ordered  Outcome: Progressing Towards Goal  Goal: Diagnostic Test/Procedures  Outcome: Progressing Towards Goal  Goal: Nutrition/Diet  Outcome: Progressing Towards Goal  Goal: Discharge Planning  Outcome: Progressing Towards Goal  Goal: Medications  Outcome: Progressing Towards Goal  Goal: Respiratory  Outcome: Progressing Towards Goal  Goal: Treatments/Interventions/Procedures  Outcome: Progressing Towards Goal  Goal: Psychosocial  Outcome: Progressing Towards Goal  Goal: *Oxygen saturation within defined limits  Outcome: Progressing Towards Goal  Goal: *Hemodynamically stable  Outcome: Progressing Towards Goal  Goal: *Vital signs within defined limits  Outcome: Progressing Towards Goal  Goal: *Tolerating diet  Outcome: Progressing Towards Goal  Goal: *Demonstrates progressive activity  Outcome: Progressing Towards Goal  Goal: *Fluid volume maintenance  Outcome: Progressing Towards Goal     Problem: Sepsis: Day 5  Goal: Off Pathway (Use only if patient is Off Pathway)  Outcome: Progressing Towards Goal  Goal: *Oxygen saturation within defined limits  Outcome: Progressing Towards Goal  Goal: *Vital signs within defined limits  Outcome: Progressing Towards Goal  Goal: *Tolerating diet  Outcome: Progressing Towards Goal  Goal: *Demonstrates progressive activity  Outcome: Progressing Towards Goal  Goal: *Discharge plan identified  Outcome: Progressing Towards Goal  Goal: Activity/Safety  Outcome: Progressing Towards Goal  Goal: Consults, if ordered  Outcome: Progressing Towards Goal  Goal: Diagnostic Test/Procedures  Outcome: Progressing Towards Goal  Goal: Nutrition/Diet  Outcome: Progressing Towards Goal  Goal: Discharge Planning  Outcome: Progressing Towards Goal  Goal: Medications  Outcome: Progressing Towards Goal  Goal: Respiratory  Outcome: Progressing Towards Goal  Goal: Treatments/Interventions/Procedures  Outcome: Progressing Towards Goal  Goal: Psychosocial  Outcome: Progressing Towards Goal     Problem: Sepsis: Day 6  Goal: Off Pathway (Use only if patient is Off Pathway)  Outcome: Progressing Towards Goal  Goal: *Oxygen saturation within defined limits  Outcome: Progressing Towards Goal  Goal: *Vital signs within defined limits  Outcome: Progressing Towards Goal  Goal: *Tolerating diet  Outcome: Progressing Towards Goal  Goal: *Demonstrates progressive activity  Outcome: Progressing Towards Goal  Goal: Influenza immunization  Outcome: Progressing Towards Goal  Goal: *Pneumococcal immunization  Outcome: Progressing Towards Goal  Goal: Activity/Safety  Outcome: Progressing Towards Goal  Goal: Diagnostic Test/Procedures  Outcome: Progressing Towards Goal  Goal: Nutrition/Diet  Outcome: Progressing Towards Goal  Goal: Discharge Planning  Outcome: Progressing Towards Goal  Goal: Medications  Outcome: Progressing Towards Goal  Goal: Respiratory  Outcome: Progressing Towards Goal  Goal: Treatments/Interventions/Procedures  Outcome: Progressing Towards Goal  Goal: Psychosocial  Outcome: Progressing Towards Goal     Problem: Sepsis: Discharge Outcomes  Goal: *Vital signs within defined limits  Outcome: Progressing Towards Goal  Goal: *Tolerating diet  Outcome: Progressing Towards Goal  Goal: *Verbalizes understanding and describes prescribed diet  Outcome: Progressing Towards Goal  Goal: *Demonstrates progressive activity  Outcome: Progressing Towards Goal  Goal: *Describes follow-up/return visits to physicians  Outcome: Progressing Towards Goal  Goal: *Verbalizes name, dosage, time, side effects, and number of days to continue medications  Outcome: Progressing Towards Goal  Goal: *Influenza immunization (Oct-Mar only)  Outcome: Progressing Towards Goal  Goal: *Pneumococcal immunization  Outcome: Progressing Towards Goal  Goal: *Lungs clear or at baseline  Outcome: Progressing Towards Goal  Goal: *Oxygen saturation returns to baseline or 90% or better on room air  Outcome: Progressing Towards Goal  Goal: *Glycemic control  Outcome: Progressing Towards Goal  Goal: *Absence of deep venous thrombosis signs and symptoms(Stroke Metric)  Outcome: Progressing Towards Goal  Goal: *Describes available resources and support systems  Outcome: Progressing Towards Goal  Goal: *Optimal pain control at patient's stated goal  Outcome: Progressing Towards Goal

## 2021-06-09 NOTE — PROGRESS NOTES
Attempted to deliver Pulmicort aerosol via mask. Patient is very confused and talking to son on cell phone. Will attempt at a later time.

## 2021-06-10 LAB
BACTERIA SPEC CULT: ABNORMAL
BACTERIA SPEC CULT: ABNORMAL
GRAM STN SPEC: ABNORMAL
MAGNESIUM SERPL-MCNC: 1.9 MG/DL (ref 1.8–2.4)
SERVICE CMNT-IMP: ABNORMAL

## 2021-06-10 PROCEDURE — 74011000250 HC RX REV CODE- 250: Performed by: HOSPITALIST

## 2021-06-10 PROCEDURE — 97110 THERAPEUTIC EXERCISES: CPT

## 2021-06-10 PROCEDURE — 74011250637 HC RX REV CODE- 250/637: Performed by: HOSPITALIST

## 2021-06-10 PROCEDURE — 74011000258 HC RX REV CODE- 258: Performed by: FAMILY MEDICINE

## 2021-06-10 PROCEDURE — 83735 ASSAY OF MAGNESIUM: CPT

## 2021-06-10 PROCEDURE — 97535 SELF CARE MNGMENT TRAINING: CPT

## 2021-06-10 PROCEDURE — 94660 CPAP INITIATION&MGMT: CPT

## 2021-06-10 PROCEDURE — 77010033711 HC HIGH FLOW OXYGEN

## 2021-06-10 PROCEDURE — 65610000001 HC ROOM ICU GENERAL

## 2021-06-10 PROCEDURE — 74011250636 HC RX REV CODE- 250/636: Performed by: FAMILY MEDICINE

## 2021-06-10 PROCEDURE — 94640 AIRWAY INHALATION TREATMENT: CPT

## 2021-06-10 PROCEDURE — 36415 COLL VENOUS BLD VENIPUNCTURE: CPT

## 2021-06-10 PROCEDURE — 97530 THERAPEUTIC ACTIVITIES: CPT

## 2021-06-10 PROCEDURE — 74011250637 HC RX REV CODE- 250/637: Performed by: FAMILY MEDICINE

## 2021-06-10 PROCEDURE — 74011250636 HC RX REV CODE- 250/636: Performed by: HOSPITALIST

## 2021-06-10 PROCEDURE — 2709999900 HC NON-CHARGEABLE SUPPLY

## 2021-06-10 PROCEDURE — 77030021668 HC NEB PREFIL KT VYRM -A

## 2021-06-10 RX ORDER — FUROSEMIDE 10 MG/ML
20 INJECTION INTRAMUSCULAR; INTRAVENOUS ONCE
Status: COMPLETED | OUTPATIENT
Start: 2021-06-10 | End: 2021-06-10

## 2021-06-10 RX ADMIN — MIDODRINE HYDROCHLORIDE 5 MG: 5 TABLET ORAL at 09:06

## 2021-06-10 RX ADMIN — Medication 10 ML: at 06:13

## 2021-06-10 RX ADMIN — ENOXAPARIN SODIUM 40 MG: 40 INJECTION SUBCUTANEOUS at 17:18

## 2021-06-10 RX ADMIN — GUAIFENESIN 600 MG: 600 TABLET, EXTENDED RELEASE ORAL at 21:27

## 2021-06-10 RX ADMIN — CEFTRIAXONE SODIUM 2 G: 2 INJECTION, POWDER, FOR SOLUTION INTRAMUSCULAR; INTRAVENOUS at 13:00

## 2021-06-10 RX ADMIN — DULOXETINE HYDROCHLORIDE 30 MG: 30 CAPSULE, DELAYED RELEASE ORAL at 17:18

## 2021-06-10 RX ADMIN — FUROSEMIDE 20 MG: 10 INJECTION, SOLUTION INTRAMUSCULAR; INTRAVENOUS at 09:06

## 2021-06-10 RX ADMIN — Medication 10 ML: at 21:29

## 2021-06-10 RX ADMIN — GUAIFENESIN 600 MG: 600 TABLET, EXTENDED RELEASE ORAL at 09:06

## 2021-06-10 RX ADMIN — HALOPERIDOL LACTATE 4 MG: 5 INJECTION, SOLUTION INTRAMUSCULAR at 22:16

## 2021-06-10 RX ADMIN — MIDODRINE HYDROCHLORIDE 5 MG: 5 TABLET ORAL at 13:00

## 2021-06-10 RX ADMIN — PANTOPRAZOLE SODIUM 40 MG: 40 TABLET, DELAYED RELEASE ORAL at 17:18

## 2021-06-10 RX ADMIN — MIDODRINE HYDROCHLORIDE 5 MG: 5 TABLET ORAL at 17:18

## 2021-06-10 RX ADMIN — DOXYCYCLINE HYCLATE 100 MG: 100 CAPSULE ORAL at 09:06

## 2021-06-10 RX ADMIN — FINASTERIDE 5 MG: 5 TABLET, FILM COATED ORAL at 09:06

## 2021-06-10 RX ADMIN — DULOXETINE HYDROCHLORIDE 30 MG: 30 CAPSULE, DELAYED RELEASE ORAL at 09:06

## 2021-06-10 RX ADMIN — Medication 10 ML: at 14:00

## 2021-06-10 RX ADMIN — BUDESONIDE 500 MCG: 0.5 INHALANT RESPIRATORY (INHALATION) at 07:45

## 2021-06-10 RX ADMIN — DOXYCYCLINE HYCLATE 100 MG: 100 CAPSULE ORAL at 21:27

## 2021-06-10 RX ADMIN — BUDESONIDE 500 MCG: 0.5 INHALANT RESPIRATORY (INHALATION) at 19:54

## 2021-06-10 NOTE — PROGRESS NOTES
Problem: Mobility Impaired (Adult and Pediatric)  Goal: *Acute Goals and Plan of Care (Insert Text)  Outcome: Progressing Towards Goal  Note: STG:  (1.)Mr. Miky Reeves will move from supine to sit and sit to supine  with MIN A-CGA within 1-2 treatment day(s). (2.)Mr. Miky Reeves will transfer from bed to chair and chair to bed with MINIMAL ASSIST-CGA using the least restrictive device within 1-2 treatment day(s). (3.)Mr. Miky Reeves will ambulate with MINIMAL ASSIST-CGA for 3-5 feet with the least restrictive device within 1-2 treatment day(s). (Unless pt non-ambulatory)    LTG:  (1.)Mr. Miky Reeves will move from supine to sit and sit to supine  in bed with CONTACT GUARD ASSIST-SBA within 3-10 treatment day(s). (2.)Mr. Miky Reeves will transfer from bed to chair and chair to bed with CONTACT GUARD ASSIST-SBA using the least restrictive device within 3-10 treatment day(s). (3.)Mr. Miky Reeves will ambulate with CONTACT GUARD ASSIST-SBA for 3-5 feet with the least restrictive device within 3-10 treatment day(s). ________________________________________________________________________________________________      PHYSICAL THERAPY: Daily Note and AM 6/10/2021  INPATIENT: PT Visit Days : 2  [place] / [place] / Product Type: Medicare /       NAME/AGE/GENDER: Sameera Ramey is a 80 y.o. male   PRIMARY DIAGNOSIS: Severe sepsis ([address] [A41.9, R65.20]  Acute cystitis [N30.00] Severe sepsis ([address] Severe sepsis ([address]       ICD-10: Treatment Diagnosis:    · Difficulty in walking, Not elsewhere classified (R26.2)  · Other abnormalities of gait and mobility (R26.89)   Precaution/Allergies:  Patient has no known allergies. ASSESSMENT:     Mr. Miky Reeves presents with decreased independence with functional mobility. Per chart, pt is a resident of a local nursing home with recent agitation and decreased mentation. Per chart, son was with pt and provided history. Patient was supine upon contact.  BM in bed and patient reported he needed to use the restroom. Transferred to [facility] with RW and min to mod X 2. Sat on BSC for BM. Multiple sit to stand transfers for cleaning and gown and sheet change. Performed LE exercises in sitting as below. Patient moves slowly and required extra time for all activities. He was a bit agitated about O2. Repeatedly would remove it and would not let therapist put it back on. RN called in to assist. Patient has been on hold for last 2 days for PT/OT. Required more assist today for all functional mobility as compared to last visit/ initial assesssment. Co- treat with OT. This section established at most recent assessment   PROBLEM LIST (Impairments causing functional limitations):  1. Decreased Strength  2. Decreased Transfer Abilities  3. Decreased Ambulation Ability/Technique  4. Decreased Balance  5. Increased Pain  6. Decreased Activity Tolerance  7. Decreased Flexibility/Joint Mobility   INTERVENTIONS PLANNED: (Benefits and precautions of physical therapy have been discussed with the patient.)  1. Bed Mobility  2. Gait Training  3. Therapeutic Activites  4. Therapeutic Exercise/Strengthening  5. Transfer Training     TREATMENT PLAN: Frequency/Duration: daily for duration of hospital stay  Rehabilitation Potential For Stated Goals: Good     REHAB RECOMMENDATIONS (at time of discharge pending progress):    Placement: It is my opinion, based on this patient's performance to date, that Mr. Bonnie Villareal may benefit from [address] after discharge due to the functional deficits listed above that are likely to improve with skilled rehabilitation because he/she has multiple medical issues that affect his/her functional mobility in the community. Equipment:    None at this time              HISTORY:   History of Present Injury/Illness (Reason for Referral):  Pt admitted with above diagnosis.   Past Medical History/Comorbidities:   Mr. Bonnie Villareal  has a past medical history of Abdominal aortic aneurysm ([address] (12/10/2015), Abdominal aortic aneurysm without rupture ([address] Abnormal finding of lung (10/17/2016), Abnormality of urethral meatus (8/8/2019), Allergic rhinitis (12/10/2015), Asthma, Benign neoplasm, Benign prostatic hyperplasia (12/10/2015), Bigeminy (3/28/2016), BPH without urinary obstruction, Cardiovascular disease (12/10/2015), Chronic obstructive asthma with exacerbation ([address] (12/10/2015), Closed compression fracture of lumbosacral spine ([address] (12/11/2018), COPD (chronic obstructive pulmonary disease) ([address] (12/10/2015), COPD (chronic obstructive pulmonary disease) with emphysema ([address] (10/17/2016), COPD exacerbation ([address] (5/6/2019), Cough with hemoptysis, Erectile dysfunction (12/10/2015), Essential hypertension, benign (12/10/2015), Fracture (10/2014), History of colon polyps, Low testosterone (12/10/2015), Lumbago, Memory loss, Overflow incontinence, Pleural effusion (6/10/2019), Pneumothorax on right (5/6/2019), Raynaud's syndrome (12/10/2015), Rotator cuff tendinitis, Thrombocytopenia ([address] Urinary frequency, Ventricular tachyarrhythmia ([address] (5/6/2019), and VT (ventricular tachycardia) ([address] (5/6/2019). Mr. Saúl Mcdaniels  has a past surgical history that includes hx hernia repair (1980); hx colonoscopy; hx fracture tx (10/2014); hx cataract removal (6/2016-right); and hx orthopaedic (03/2018). Social History/Living Environment:   Home Environment: [address] [place] [address] Name: [facility]   One/Two Story Residence: One story  Living Alone: No  Support Systems: Skilled nursing facility  Patient Expects to be Discharged to[de-identified] [address] [place] [address]  Current DME Used/Available at Home: Other (comment)  Tub or Shower Type: Shower  Prior Level of Function/Work/Activity:  Pt states he did not walk prior to admission.       Number of Personal Factors/Comorbidities that affect the Plan of Care: 0: LOW COMPLEXITY   EXAMINATION:   Most Recent Physical Functioning:   Gross Assessment:                  Posture:     Balance:  Sitting: With support  Standing: Pull to stand; With support Bed Mobility:  Supine to Sit: Minimum assistance; Moderate assistance; Additional time  Sit to Supine: Moderate assistance;Maximum assistance; Additional time  Wheelchair Mobility:     Transfers:  Sit to Stand: Minimum assistance; Moderate assistance; Additional time;Assist x2  Stand to Sit: Minimum assistance;Assist x2  Toilet Transfer : Minimum assistance; Moderate assistance;Assist x2  Duration: 30 Minutes  Gait:     Base of Support: Narrowed  Speed/Marifer: Slow  Gait Abnormalities: Decreased step clearance  Distance (ft): 5 Feet (ft) (X 2)  Assistive Device: Walker, rolling  Ambulation - Level of Assistance: Minimal assistance; Moderate assistance; Additional time;Assist x2  Interventions: Manual cues; Safety awareness training;Verbal cues      Body Structures Involved:  1. Bones  2. Joints  3. Muscles  4. Ligaments Body Functions Affected:  1. Neuromusculoskeletal  2. Movement Related Activities and Participation Affected:  1. Mobility   Number of elements that affect the Plan of Care: 4+: HIGH COMPLEXITY   CLINICAL PRESENTATION:   Presentation: Stable and uncomplicated: LOW COMPLEXITY   CLINICAL DECISION MAKING:   [facility] AM-PAC 6 Clicks   Basic Mobility Inpatient Short Form  How much difficulty does the patient currently have. .. Unable A Lot A Little None   1. Turning over in bed (including adjusting bedclothes, sheets and blankets)? [] 1   [] 2   [x] 3   [] 4   2. Sitting down on and standing up from a chair with arms ( e.g., wheelchair, bedside commode, etc.)   [] 1   [] 2   [x] 3   [] 4   3. Moving from lying on back to sitting on the side of the bed? [] 1   [] 2   [x] 3   [] 4   How much help from another person does the patient currently need. .. Total A Lot A Little None   4. Moving to and from a bed to a chair (including a wheelchair)? [] 1   [] 2   [x] 3   [] 4   5.   Need to walk in hospital room? [] 1   [x] 2   [] 3   [] 4   6. Climbing 3-5 steps with a railing? [] 1   [x] 2   [] 3   [] 4   © 2007, Trustees of [facility] [employer], under license to [employer]. All rights reserved      Score:  Initial: 16 Most Recent: X (Date: -- )    Interpretation of Tool:  Represents activities that are increasingly more difficult (i.e. Bed mobility, Transfers, Gait). Medical Necessity:     · Skilled intervention continues to be required due to decreased independence with functional mobility. Reason for Services/Other Comments:  · Patient continues to require skilled intervention due to   · Decreased independence with functional mobility. · .   Use of outcome tool(s) and clinical judgement create a POC that gives a: Clear prediction of patient's progress: LOW COMPLEXITY            TREATMENT:   (In addition to Assessment/Re-Assessment sessions the following treatments were rendered)   Pre-treatment Symptoms/Complaints:    Pain: Initial:   Pain Intensity 1: 0  Post Session:  no complaints     Therapeutic Activity: (  30 Minutes ):  Therapeutic activities including Bed transfers, Toilet transfers, Ambulation on level ground  to improve mobility, strength and balance. Required minimal to moderate Manual cues; Safety awareness training;Verbal cues       Therapeutic Exercise: (10 Minutes):  Exercises per grid below to improve mobility and strength. Required minimal verbal and manual cues to promote proper body mechanics.          Date:  6/10/21 Date:   Date:     Activity/Exercise Parameters Parameters Parameters   Ankle pumps 10     Long arc quads 10     Seated marching 10     Sit to stand 4                               Braces/Orthotics/Lines/Etc:   · li catheter  · telemetry/monitors  · O2 Device: Hi flow nasal cannula  Treatment/Session Assessment:    · Response to Treatment:  tolerated fair  · Interdisciplinary Collaboration:   o Physical Therapist  o Occupational Therapist  o Registered Nurse  · After treatment position/precautions:   o Supine in bed  o Bed/Chair-wheels locked  o Bed in low position  o Caregiver at bedside  o Call light within reach  o Side rails x 3   · Compliance with Program/Exercises: Compliant most of the time, Will assess as treatment progresses  · Recommendations/Intent for next treatment session: \"Next visit will focus on advancements to more challenging activities and reduction in assistance provided\".   Total Treatment Duration:  PT Patient Time In/Time Out  Time In: 1015  Time Out: [address], PT

## 2021-06-10 NOTE — ACP (ADVANCE CARE PLANNING)
Advance Care Planning     Advance Care Planning Activator (Inpatient)  Conversation Note    ACP Activator: Morgan Kohler RN    Patient has HPOA document on file dated Sept 2005 that list his wife Davis Ayala and his son Kira Ledezma as his healthcare power of . Patient now with history of dementia and unable to participate in ACP conversation. His wife and son are his POA for decisions.      Health Care Decision Maker:    Current Designated Health Care Decision Maker:     Primary Decision Maker: Vikash Carranza - [phone]    Secondary Decision Maker: Amandeep Roberts [facility] - [phone]

## 2021-06-10 NOTE — PROGRESS NOTES
[facility] Hospitalist        Name:  Ham Hoover  Age:89 y.o. Sex:male   :  1932    MRN:  [id number]   PCP:  Heather Jesus MD      Admit Date:  2021  2:03 PM   Chief Complaint: WORSENING CONFUSION    Reason for Admission:   Severe sepsis ([facility] [address] [A41.9, R65.20]  Acute cystitis [N30.00]    Assessment & Plan:     SEVERE SEPSIS 2/2 CAUTI/acute cystitis and Ecoli Bacteremia:  6/10: resolving  LA resolved  IV fluids stopped on   Blood culture on + for E.  Coli, repeat blood culture on  till date negative  Urine culture positive for GNR  Antibiotics: Zosyn -  Antibiotic deescalated to Ceftriaxone today-    Pt is hemodynamically stable  Wean midodrine as tolerated    COPD with acute on chronic hypoxic respiratory failure:  6/10: stable  Off BiPAP   Currently on airvo 30 L, wean off of airvo as able  pulmicort bid with duoneb prn  mucinex bid  Incentive spirometry  Doxycycline 100 mg po bid x 5 days (D5)    Acute encephalopathy secondary to sepsis/CAUTI with underlying dementia:  6/10: Improving   Delirium/dementia precautions  Reorient patient  Fall precautions  Avoid benzo, opiates and anticholinergic  depakote sprinkles prn for agitation/restlessness    Depression:  6/10: stable  Continue cymbalta    BPH:  6/10: indwelling li cath in  continue proscar  Hx of Hypospadiasis    Severe protein calorie malnutrition with BMP 17:  6/10:  Nutrition consulted  Supplement with meals  Pt has chronic physical deconditioning with poor oral intake  PT/OT eval    Disposition/Expected LOS: pending clinical recovery  Diet: DIET ADULT  DIET ADULT ORAL NUTRITION SUPPLEMENT  VTE ppx: lovenox  GI ppx: protonix  Code status: DNR  Surrogate decision-maker: pt's son and wife      Interval Hx/Subjective:     Ham Hoover is a 80 y.o. male with hx of COPD, chronic hypoxic respiratory failure, dementia, BPH with chronic indwelling li, HTN resident of [facility] admitted on  for severe sepsis secondary to CAUTI/acute cystitis resulting in worsening of baseline dementia. Pt noted to be febrile in ER T 100.9, with LA 2.7.    6/10:  Pt seen at bedside, resting in bed. He is alert/awake, oriented to self and place. On airvo, 30 L. No chest pain, nausea/vomiting, diarrhea. Indwelling cath in place. Review of Systems:  A 14 point review of systems was taken and pertinent positive as mentioned above. Objective:     Patient Vitals for the past 24 hrs:   Temp Pulse Resp BP SpO2   06/10/21 1300  83  (!) 106/54    06/10/21 1057 98.2 °F (36.8 °C) 85 29 91/61 90 %   06/10/21 0906  95  113/64    06/10/21 0747     100 %   06/10/21 0742 98.1 °F (36.7 °C) 77 23 110/73 100 %   06/10/21 0700 98.3 °F (36.8 °C) 78 15 109/66 96 %   06/10/21 0630  78 20 107/77 100 %   06/10/21 0500  83 22 129/73 92 %   06/10/21 0400 97.6 °F (36.4 °C) 75 19 125/79 93 %   06/10/21 0300  79 20 (!) 142/86 91 %   06/10/21 0206  83 26 113/66 (!) 88 %   06/10/21 0100  80 26 (!) 87/59 100 %   06/10/21 0000 97.5 °F (36.4 °C) 87 23 117/67 100 %   06/09/21 2300  88 27 (!) 103/59 95 %   06/09/21 2200  78 20 118/81 100 %   06/09/21 2100  80 25 (!) 146/72 98 %   06/09/21 2000 97.5 °F (36.4 °C) 86 21 (!) 152/84 100 %   06/09/21 1948     100 %   06/09/21 1900  85 25 (!) 148/97 94 %       Oxygen Therapy  O2 Sat (%): 90 % (06/10/21 1057)  Pulse via Oximetry: 78 beats per minute (06/10/21 0747)  O2 Device: Humidifier;Heated; Hi flow nasal cannula (06/10/21 0747)  Skin Assessment: Clean, dry, & intact (06/10/21 0747)  Skin Protection for O2 Device: No (06/10/21 0700)  O2 Flow Rate (L/min): 30 l/min (06/10/21 0747)  O2 Temperature: 93.2 °F (34 °C) (06/10/21 0747)  FIO2 (%): 40 % (06/10/21 0747)    Body mass index is 19.51 kg/m².     Physical Exam:    General:  Elderly, frail, nad, alert/awake, on airvo  HEENT:  Head NCAT, PERRLA+  Neck:   Supple, no lymphadenopathy, no JVD   Lungs:  Coarse breath sounds bilaterally  CV:   Regular rate and rhythm with normal S1 and S2   Abdomen:  Soft, nontender, nondistended, normoactive bowel sounds   Extremities:  No cyanosis clubbing or edema   Neuro:  gcs 15, CN 2-12 intact, following commands and moving all 4 extremities spontaneously  Psych:  AOx2, mood and affect flat      Data Reviewed: I have reviewed all labs, meds, and studies. Recent Results (from the past 24 hour(s))   MAGNESIUM    Collection Time: 06/10/21  3:48 AM   Result Value Ref Range    Magnesium 1.9 1.8 - 2.4 mg/dL       EKG Results     Procedure 720 Value Units Date/Time    EKG, 12 LEAD, INITIAL [[id number]] Collected: 06/06/21 1423    Order Status: Completed Updated: 06/06/21 1704     Ventricular Rate 84 BPM      Atrial Rate 78 BPM      P-R Interval 136 ms      QRS Duration 92 ms      Q-T Interval 378 ms      QTC Calculation (Bezet) 446 ms      Calculated P Axis 63 degrees      Calculated R Axis -3 degrees      Calculated T Axis 72 degrees      Diagnosis --     !! AGE AND GENDER SPECIFIC ECG ANALYSIS !!   Sinus rhythm with Premature supraventricular complexes with frequent   Premature ventricular complexes  Septal infarct (cited on or before 01-FEB-2018)  Abnormal ECG  When compared with ECG of 30-JAN-2021 12:46,  Premature supraventricular complexes are now Present  Nonspecific T wave abnormality no longer evident in Lateral leads  Confirmed by ST SHIRA CAMACHO MD (), GERARDO BURKS ([id number]) on 6/6/2021 5:04:38 PM            All Micro Results     Procedure Component Value Units Date/Time    CULTURE, URINE [[id number]]  (Abnormal)  (Susceptibility) Collected: 06/06/21 1427    Order Status: Completed Specimen: Urine Updated: 06/10/21 0623     Special Requests: NO SPECIAL REQUESTS        Culture result:       10,000 to 50,000 COLONIES/mL ESCHERICHIA COLI                  CULTURE IN PROGRESS,FURTHER UPDATES TO FOLLOW CORRECTED ON 06/10 AT 1157: PREVIOUSLY REPORTED AS <10,000 COLONIES/mL MIXED SKIN NAEL ISOLATED                  10,000 to 50,000 COLONIES/mL MIXED SKIN NAEL ISOLATED          CULTURE, RESPIRATORY/SPUTUM/BRONCH Linzie Gu STAIN [[id number]]  (Abnormal)  (Susceptibility) Collected: 06/06/21 1821    Order Status: Completed Specimen: Sputum Updated: 06/10/21 1019     Special Requests: NO SPECIAL REQUESTS        GRAM STAIN 2 TO 18 WBC'S/OIF      0 TO 2 EPITHELIAL CELLS SEEN /OIF      FEW GRAM POSITIVE COCCI         FEW GRAM NEGATIVE RODS         FEW YEAST         FEW GRAM POSITIVE RODS         3+ MUCUS PRESENT        Culture result:       MODERATE STAPHYLOCOCCUS AUREUS                  HEAVY NORMAL RESPIRATORY NAEL          CULTURE, BLOOD [[id number]] Collected: 06/08/21 0823    Order Status: Completed Specimen: Blood Updated: 06/10/21 0756     Special Requests: --        LEFT  HAND       Culture result: NO GROWTH 2 DAYS       CULTURE, BLOOD [[id number]] Collected: 06/08/21 0827    Order Status: Completed Specimen: Blood Updated: 06/10/21 0756     Special Requests: --        RIGHT  Antecubital       Culture result: NO GROWTH 2 DAYS       BLOOD CULTURE [[id number]] Collected: 06/06/21 1427    Order Status: Completed Specimen: Blood Updated: 06/10/21 0756     Special Requests: --        RIGHT  Antecubital       Culture result: NO GROWTH 4 DAYS       BLOOD CULTURE [[id number]]  (Abnormal)  (Susceptibility) Collected: 06/06/21 1345    Order Status: Completed Specimen: Blood Updated: 06/09/21 0758     Special Requests: LEFT FOREARM        GRAM STAIN GRAM NEGATIVE RODS         ANAEROBIC BOTTLE POSITIVE               RESULTS VERIFIED, PHONED TO AND READ BACK BY AMISHA GRIFFITH RN BY NB AT [address] [place] [id number] ON [id number]           Culture result: ESCHERICHIA COLI               Refer to Blood Culture ID Panel Accession  [id number]      SARS-COV-2, PCR [[id number]] Collected: 06/07/21 1646    Order Status: Completed Specimen: Nasopharyngeal Updated: 06/08/21 1226     Specimen source Nasopharyngeal        SARS-CoV-2 Not detected        Comment:      The specimen is NEGATIVE for SARS-CoV-2, the novel coronavirus associated with COVID-19. This test has been authorized by the FDA under an Emergency Use Authorization (EUA) for use by authorized laboratories. Fact sheet for Healthcare Providers: [employer]       Fact sheet for Patients: [employer]       Methodology: RT-PCR         BLOOD CULTURE ID PANEL [[id number]]  (Abnormal) Collected: 06/06/21 1345    Order Status: Completed Specimen: Blood Updated: 06/07/21 1322     Acc. no. from Micro Order [id number]     Escherichia coli Detected        Comment: RESULTS VERIFIED, PHONED TO AND READ BACK BY  EREN ACOSTA RN @ 6728 ON 6/7/21 BY BRAYDEN          KPC (Carbapenem Resistance Gene) NOT DETECTED        Comment: WARNING:  A Not Detected result for the KPC gene does not indicate susceptibility to carbapenems. Gram negative bacteria can be resistant to carbapenems by mechanisms other than carrying the KPC gene. INTERPRETATION       Gram negative dereck. Identified by realtime PCR as E. coli          COVID-19 RAPID TEST [[id number]] Collected: 06/06/21 1659    Order Status: Completed Specimen: Nasopharyngeal Updated: 06/06/21 1728     Specimen source Nasopharyngeal        COVID-19 rapid test Not detected        Comment:      The specimen is NEGATIVE for SARS-CoV-2, the novel coronavirus associated with COVID-19. A negative result does not rule out COVID-19. This test has been authorized by the FDA under an Emergency Use Authorization (EUA) for use by authorized laboratories. Fact sheet for Healthcare Providers: [email or web address]nz  Fact sheet for Patients: [employer]       Methodology: Isothermal Nucleic Acid Amplification               Other Studies:  No results found.       Medications:  Medications Administered       acetaminophen (TYLENOL) tablet 650 mg       Admin Date  03/13/2021 Action  Given Dose  650 mg Route  Oral Administered By  Dionna Abbasi RN              cefTRIAXone (ROCEPHIN) 1 g in 0.9% sodium chloride (MBP/ADV) 50 mL MBP       Admin Date  03/13/2021 Action  New Bag Dose  1 g Rate  100 mL/hr Route  IntraVENous Administered By  Gabby Fuentes RN              cefTRIAXone (ROCEPHIN) 2 g in 0.9% sodium chloride (MBP/ADV) 50 mL MBP       Admin Date  03/13/2021 Action  New Bag Dose  2 g Rate  100 mL/hr Route  IntraVENous Administered By  Jo Orta RN               Admin Date  03/14/2021 Action  New Bag Dose  2 g Rate  100 mL/hr Route  IntraVENous Administered By  Roly Gutierrez RN               Admin Date  03/15/2021 Action  New Bag Dose  2 g Rate  100 mL/hr Route  IntraVENous Administered By  Roly Gutierrez RN               Admin Date  03/16/2021 Action  New Bag Dose  2 g Rate  100 mL/hr Route  IntraVENous Administered By  Elisa Morrell              diphenhydrAMINE (BENADRYL) injection 25 mg       Admin Date  03/14/2021 Action  Given Dose  25 mg Route  IntraVENous Administered By  Savi Huertas RN              doxycycline (VIBRAMYCIN) 100 mg in 0.9% sodium chloride (MBP/ADV) 100 mL MBP       Admin Date  03/13/2021 Action  New Bag Dose  100 mg Rate  100 mL/hr Route  IntraVENous Administered By  Jo Orta RN               Admin Date  03/13/2021 Action  New Bag Dose  100 mg Rate  100 mL/hr Route  IntraVENous Administered By  Savi Huertas RN               Admin Date  03/14/2021 Action  New Bag Dose  100 mg Rate  100 mL/hr Route  IntraVENous Administered By  Roly Gutierrez RN               Admin Date  03/14/2021 Action  New Bag Dose  100 mg Rate  100 mL/hr Route  IntraVENous Administered By  Savi Huertas RN               Admin Date  03/15/2021 Action  New Bag Dose  100 mg Rate  100 mL/hr Route  IntraVENous Administered By  Roly Gutierrez RN              enoxaparin (LOVENOX) injection 40 mg       Admin Date  03/13/2021 Action  Given Dose  40 mg Route  SubCUTAneous Administered By  Jo Orta RN Admin Date  03/13/2021 Action  Given Dose  40 mg Route  SubCUTAneous Administered By  Roger Bentley RN               Admin Date  03/14/2021 Action  Given Dose  40 mg Route  SubCUTAneous Administered By  David Hogue RN               Admin Date  03/14/2021 Action  Given Dose  40 mg Route  SubCUTAneous Administered By  Roger Bentley RN               Admin Date  03/15/2021 Action  Given Dose  40 mg Route  SubCUTAneous Administered By  David Hogue RN               Admin Date  03/15/2021 Action  Given Dose  40 mg Route  SubCUTAneous Administered By  Kalli Coon RN               Admin Date  03/16/2021 Action  Given Dose  40 mg Route  SubCUTAneous Administered By  Alli Fischer              fentaNYL citrate (PF) injection 50 mcg       Admin Date  03/13/2021 Action  Given Dose  50 mcg Route  IntraVENous Administered By  Rell Lopez RN               Admin Date  03/14/2021 Action  Given Dose  50 mcg Route  IntraVENous Administered By  Roger Bentley RN              ferrous sulfate tablet 325 mg       Admin Date  03/14/2021 Action  Given Dose  325 mg Route  Oral Administered By  David Hogue RN               Admin Date  03/14/2021 Action  Given Dose  325 mg Route  Oral Administered By  David Hogue RN               Admin Date  03/15/2021 Action  Given Dose  325 mg Route  Oral Administered By  David Hogue RN              folic acid (FOLVITE) tablet 1 mg       Admin Date  03/14/2021 Action  Given Dose  1 mg Route  Oral Administered By  David Hogue RN               Admin Date  03/15/2021 Action  Given Dose  1 mg Route  Oral Administered By  David Hogue RN               Admin Date  03/16/2021 Action  Given Dose  1 mg Route  Oral Administered By  Alli Fischer              furosemide (LASIX) injection 20 mg       Admin Date  03/15/2021 Action  Given Dose  20 mg Route  IntraVENous Administered By  David Hogue RN              furosemide (LASIX) injection 40 mg       Admin Date  03/13/2021 Action  Given Dose  40 mg Route  IntraVENous Administered By  Efren Peña RN               Admin Date  03/13/2021 Action  Given Dose  40 mg Route  IntraVENous Administered By  Sandy Khan RN               Admin Date  03/14/2021 Action  Given Dose  40 mg Route  IntraVENous Administered By  Georgian Severin, RN               Admin Date  03/14/2021 Action  Given Dose  40 mg Route  IntraVENous Administered By  Sandy Khan RN               Admin Date  03/15/2021 Action  Given Dose  40 mg Route  IntraVENous Administered By  Shelly Fuentes RN               Admin Date  03/16/2021 Action  Given Dose  40 mg Route  IntraVENous Administered By  Coretta Lin              HYDROcodone-acetaminophen [facility] AND [facility]) 5-325 mg per tablet 1 Tab       Admin Date  03/14/2021 Action  Given Dose  1 Tab Route  Oral Administered By  Georgian Severin, RN               Admin Date  03/15/2021 Action  Given Dose  1 Tab Route  Oral Administered By  Sandy Khan RN              insulin lispro (HUMALOG) injection       Admin Date  03/13/2021 Action  Given Dose  2 Units Route  SubCUTAneous Administered By  Sandy Khan RN               Admin Date  03/14/2021 Action  Given Dose  2 Units Route  SubCUTAneous Administered By  Georgian Severin, RN               Admin Date  03/14/2021 Action  Given Dose  2 Units Route  SubCUTAneous Administered By  Georgian Severin, RN               Admin Date  03/14/2021 Action  Given Dose  4 Units Route  SubCUTAneous Administered By  Sandy Khan RN               Admin Date  03/15/2021 Action  Given Dose  2 Units Route  SubCUTAneous Administered By  Georgian Severin, RN               Admin Date  03/15/2021 Action  Given Dose  4 Units Route  SubCUTAneous Administered By  Georgian Severin, RN               Admin Date  03/15/2021 Action  Given Dose  2 Units Route  SubCUTAneous Administered By  Shelly Fuentes RN               Admin Date  03/16/2021 Action  Given Dose  4 Units Route  SubCUTAneous Administered By  Eduin Rubio              levothyroxine (SYNTHROID) tablet 137 mcg       Admin Date  03/14/2021 Action  Given Dose  137 mcg Route  Oral Administered By  Joyceann Klinefelter, RN               Admin Date  03/15/2021 Action  Given Dose  137 mcg Route  Oral Administered By  Joyceann Klinefelter, RN               Admin Date  03/16/2021 Action  Given Dose  137 mcg Route  Oral Administered By  Daniel Gonzales RN              midodrine (PROAMATINE) tablet 10 mg       Admin Date  03/14/2021 Action  Given Dose  10 mg Route  Oral Administered By  Joyceann Klinefelter, RN               Admin Date  03/14/2021 Action  Given Dose  10 mg Route  Oral Administered By  Joyceann Klinefelter, RN               Admin Date  03/15/2021 Action  Given Dose  10 mg Route  Oral Administered By  Joyceann Klinefelter, RN               Admin Date  03/15/2021 Action  Given Dose  10 mg Route  Oral Administered By  Joyceann Klinefelter, RN               Admin Date  03/15/2021 Action  Given Dose  10 mg Route  Oral Administered By  Joyceann Klinefelter, RN               Admin Date  03/16/2021 Action  Given Dose  10 mg Route  Oral Administered By  Praful Randall Date  03/16/2021 Action  Given Dose  10 mg Route  Oral Administered By  Praful Randall Date  03/16/2021 Action  Given Dose  10 mg Route  Oral Administered By  Eduin Rubio              naloxone [facility]) injection 2 mg       Admin Date  03/13/2021 Action  Given Dose  2 mg Route  IntraVENous Administered By  Ajay Nunes RN              NOREPINephrine (LEVOPHED) 4 mg in 5% dextrose 250 mL infusion       Admin Date  03/14/2021 Action  New Bag Dose  4 mcg/min Rate  15 mL/hr Route  IntraVENous Administered By  Joyceann Klinefelter, RN               Admin Date  03/14/2021 Action  Rate Change Dose  6 mcg/min Rate  22.5 mL/hr Route  IntraVENous Administered By  Joyceann Klinefelter, RN               Admin Date  03/14/2021 Action  Rate Change Dose  4 mcg/min Rate  15 mL/hr Route  IntraVENous Administered By  Raghu Whyte RN               Admin Date  03/14/2021 Action  Rate Change Dose  2 mcg/min Rate  7.5 mL/hr Route  IntraVENous Administered By  Raghu Whyte RN               Admin Date  03/14/2021 Action  Rate Change Dose  1 mcg/min Rate  3.8 mL/hr Route  IntraVENous Administered By  Raghu Whyte RN               Admin Date  03/14/2021 Action  Restarted Dose  2 mcg/min Rate  7.5 mL/hr Route  IntraVENous Administered By  Raghu Whyte RN               Admin Date  03/14/2021 Action  Rate Change Dose  4 mcg/min Rate  15 mL/hr Route  IntraVENous Administered By  Raghu Whyte RN               Admin Date  03/15/2021 Action  Rate Change Dose  2 mcg/min Rate  7.5 mL/hr Route  IntraVENous Administered By  Kathryn Zelaya RN              ondansetron [facility]) injection 4 mg       Admin Date  03/13/2021 Action  Given Dose  4 mg Route  IntraVENous Administered By  Ap Baptiste RN              pantoprazole (PROTONIX) tablet 40 mg       Admin Date  03/14/2021 Action  Given Dose  40 mg Route  Oral Administered By  Raghu Whyte RN               Admin Date  03/15/2021 Action  Given Dose  40 mg Route  Oral Administered By  Raghu Whyte RN               Admin Date  03/16/2021 Action  Given Dose  40 mg Route  Oral Administered By  Pauline Donis              perflutren lipid microspheres (DEFINITY) in NS bolus IV       Admin Date  03/13/2021 Action  Given Dose  1 mL Route  IntraVENous Administered By  DARLEEN Spencer              potassium chloride (K-DUR, KLOR-CON) SR tablet 40 mEq       Admin Date  03/15/2021 Action  Given Dose  40 mEq Route  Oral Administered By  Raghu Whyte RN               Admin Date  03/16/2021 Action  Given Dose  40 mEq Route  Oral Administered By  Pauline Donis              potassium chloride 40 mEq IVPB       Admin Date  03/15/2021 Action  New Bag Dose  40 mEq Rate  25 mL/hr Route  IntraVENous Administered By  Kathryn Zelaya RN pregabalin (LYRICA) capsule 300 mg       Admin Date  03/14/2021 Action  Given Dose  300 mg Route  Oral Administered By  David Hogue RN               Admin Date  03/14/2021 Action  Given Dose  300 mg Route  Oral Administered By  David Hogue RN               Admin Date  03/15/2021 Action  Given Dose  300 mg Route  Oral Administered By  David Hogue RN               Admin Date  03/15/2021 Action  Given Dose  300 mg Route  Oral Administered By  Bianca Green RN               Admin Date  03/16/2021 Action  Given Dose  300 mg Route  Oral Administered By  Alli Fischer              sertraline (ZOLOFT) tablet 300 mg       Admin Date  03/14/2021 Action  Given Dose  300 mg Route  Oral Administered By  David Hogue RN               Admin Date  03/15/2021 Action  Given Dose  300 mg Route  Oral Administered By  David Hogue RN               Admin Date  03/16/2021 Action  Given Dose  300 mg Route  Oral Administered By  Alli Fischer              sodium chloride (NS) flush 5-40 mL       Admin Date  03/13/2021 Action  Given Dose  10 mL Route  IntraVENous Administered By  Rell Lopez RN               Admin Date  03/13/2021 Action  Given Dose  30 mL Route  IntraVENous Administered By  Rell Lopez RN               Admin Date  03/13/2021 Action  Given Dose  40 mL Route  IntraVENous Administered By  Roger Bentley RN               Admin Date  03/14/2021 Action  Given Dose  40 mL Route  IntraVENous Administered By  Roger Bentley RN               Admin Date  03/14/2021 Action  Given Dose  10 mL Route  IntraVENous Administered By  David Hogue RN               Admin Date  03/14/2021 Action  Given Dose  40 mL Route  IntraVENous Administered By  Roger Bentley RN               Admin Date  03/15/2021 Action  Given Dose  40 mL Route  IntraVENous Administered By  Roger Bentley RN               Admin Date  03/15/2021 Action  Given Dose  10 mL Route  IntraVENous Administered By  David Hogue RN Admin Date  03/15/2021 Action  Given Dose  10 mL Route  IntraVENous Administered By  Sandy Reeves RN               Admin Date  03/16/2021 Action  Given Dose  10 mL Route  IntraVENous Administered By  Omid Diaz              sodium chloride 0.9 % bolus infusion 250 mL       Admin Date  03/14/2021 Action  New Bag Dose  250 mL Rate  250 mL/hr Route  IntraVENous Administered By  Anoop Apodaca RN              tiZANidine (ZANAFLEX) tablet 4 mg       Admin Date  03/14/2021 Action  Given Dose  4 mg Route  Oral Administered By  Leobardo Mak RN               Admin Date  03/14/2021 Action  Given Dose  4 mg Route  Oral Administered By  Anoop Apodaca RN               Admin Date  03/14/2021 Action  Given Dose  4 mg Route  Oral Administered By  Anoop Apodaca RN               Admin Date  03/14/2021 Action  Given Dose  4 mg Route  Oral Administered By  Leobardo Mak RN               Admin Date  03/15/2021 Action  Given Dose  4 mg Route  Oral Administered By  Anoop Apodaca RN               Admin Date  03/15/2021 Action  Given Dose  4 mg Route  Oral Administered By  Anoop Apodaca RN               Admin Date  03/15/2021 Action  Given Dose  4 mg Route  Oral Administered By  Sandy Reeves RN               Admin Date  03/16/2021 Action  Given Dose  4 mg Route  Oral Administered By  Jeyson Nixon Date  03/16/2021 Action  Given Dose  4 mg Route  Oral Administered By  Omid Diaz              traZODone (DESYREL) tablet 150 mg       Admin Date  03/14/2021 Action  Given Dose  150 mg Route  Oral Administered By  Leobardo Mak RN               Admin Date  03/15/2021 Action  Given Dose  150 mg Route  Oral Administered By  Sandy Reeves RN              tuberculin injection 5 Units       Admin Date  03/15/2021 Action  Give PPD Dose  5 Units Route  IntraDERMal Administered By  Anoop Apodaca RN              vancomycin (VANCOCIN) 2500 mg in  mL infusion       Admin Date  03/13/2021 Action  Given Dose  2,500 mg Rate  200 mL/hr Route  IntraVENous Administered By  Marcello Shoemaker RN              zonisamide [facility]) capsule 300 mg       Admin Date  03/14/2021 Action  Given Dose  300 mg Route  Oral Administered By  Rosaura Arriaga RN               Admin Date  03/15/2021 Action  Given Dose  300 mg Route  Oral Administered By  Joann Mccullough RN                        Problem List:     Hospital Problems as of 6/10/2021 Date Reviewed: 12/17/2020        Codes Class Noted - Resolved POA    Bacteremia due to Gram-negative bacteria ICD-10-CM: R78.81  ICD-9-CM: 790.7, 041.85  6/7/2021 - Present Unknown        Acute cystitis ICD-10-CM: N30.00  ICD-9-CM: 595.0  6/6/2021 - Present Unknown        * (Principal) Severe sepsis ([facility] ICD-10-CM: A41.9, R65.20  ICD-9-CM: 038.9, 995.92  6/6/2021 - Present Unknown        Urinary tract infection associated with indwelling urethral catheter ([facility] ICD-10-CM: T83.511A, N39.0  ICD-9-CM: 996.64, 599.0  6/6/2021 - Present Unknown        Dementia ([facility] (Chronic) ICD-10-CM: F03.90  ICD-9-CM: 294.20  1/30/2021 - Present Yes        Chronic indwelling Rojas catheter ICD-10-CM: Z97.8  ICD-9-CM: V45.89  11/22/2020 - Present Yes        Moderate protein-calorie malnutrition ([facility] ICD-10-CM: E44.0  ICD-9-CM: 263.0  11/3/2019 - Present Unknown        Urethral tear, initial encounter ICD-10-CM: S37.33XA  ICD-9-CM: 867.0  8/8/2019 - Present Unknown        Chronic respiratory failure with hypoxia (HCC) (Chronic) ICD-10-CM: J96.11  ICD-9-CM: 518.83, 799.02  3/29/2016 - Present Yes    Overview Addendum 1/30/2021  5:25 PM by Guillermina Morin MD     4+L chronically             BPH (benign prostatic hyperplasia) (Chronic) ICD-10-CM: N40.0  ICD-9-CM: 600.00  12/10/2015 - Present Yes        COPD (chronic obstructive pulmonary disease) ([facility] (Chronic) ICD-10-CM: J44.9  ICD-9-CM: 796  12/10/2015 - Present Yes                 Signed By: Felicitas Ramos MD   [facility] Hospitalist Service    Ariela 10, 2021  4:22 PM

## 2021-06-10 NOTE — PROGRESS NOTES
Problem: Self Care Deficits Care Plan (Adult)  Goal: *Acute Goals and Plan of Care (Insert Text)  Outcome: Progressing Towards Goal  Note:   1. Patient will complete lower body dressing with minimal assist to increase self care independence. 2. Patient will complete upper body dressing with contact guard assist to increase self care independence. 3. Patient will tolerate 20 minutes of OT treatment with self incorporated rest breaks to increase activity tolerance to enhance participation in hobbies. 4. Patient will complete chair transfer with contact guard assist using adaptive equipment as needed. 5. Patient will complete UE exercises with supervision to increase overall activity tolerance and strength. Timeframe: 7 visits       OCCUPATIONAL THERAPY: Daily Note 6/10/2021  INPATIENT: OT Visit Days: 2  [place] / [place] / Product Type: Medicare /      NAME/AGE/GENDER: Juanjose Paredes is a 80 y.o. male   PRIMARY DIAGNOSIS:  Severe sepsis ([address] [A41.9, R65.20]  Acute cystitis [N30.00] Severe sepsis ([address] Severe sepsis ([address]       ICD-10: Treatment Diagnosis:    · Generalized Muscle Weakness (M62.81)  · Difficulty in walking, Not elsewhere classified (R26.2)   Precautions/Allergies:     Patient has no known allergies. ASSESSMENT:     Mr. Dat Shaikh presents from [facility] with symptoms of sepsis. Pt is very [place] but is able to answer direct questions. Pt is poor historian and no family or friends present so PLOF is unclear at this time. Pt on  high flow NC and required cues and additional time to keep O2 sats up during session. Pt fatigues very quickly with activity. 6/10/2021 On Airvo but continues to remove during session, JANINA Alves only able to convince him to keep it on 3 times. BSC transfer for ADL treatment. Fatigues quickly. Continue OT.        This section established at most recent assessment   PROBLEM LIST (Impairments causing functional limitations):  1. Decreased Strength  2. Decreased ADL/Functional Activities  3. Decreased Transfer Abilities  4. Decreased Ambulation Ability/Technique  5. Decreased Balance  6. Decreased Activity Tolerance  7. Increased Fatigue  8. Increased Shortness of Breath   INTERVENTIONS PLANNED: (Benefits and precautions of occupational therapy have been discussed with the patient.)  1. Activities of daily living training  2. Adaptive equipment training  3. Balance training  4. Clothing management  5. Cognitive training  6. Neuromuscular re-eduation  7. Therapeutic activity  8. Therapeutic exercise     TREATMENT PLAN: Frequency/Duration: Follow patient 3x/week to address above goals. Rehabilitation Potential For Stated Goals: Good     REHAB RECOMMENDATIONS (at time of discharge pending progress):    Placement: It is my opinion, based on this patient's performance to date, that Mr. Verona Churchill may benefit from participating in 1-2 additional therapy sessions in order to continue to assess for rehab potential and then make recommendation for disposition at discharge.   Equipment:    None at this time              OCCUPATIONAL PROFILE AND HISTORY:   History of Present Injury/Illness (Reason for Referral):  See H&P  Past Medical History/Comorbidities:   Mr. Verona Churchill  has a past medical history of Abdominal aortic aneurysm ([facility] (12/10/2015), Abdominal aortic aneurysm without rupture ([address] Abnormal finding of lung (10/17/2016), Abnormality of urethral meatus (8/8/2019), Allergic rhinitis (12/10/2015), Asthma, Benign neoplasm, Benign prostatic hyperplasia (12/10/2015), Bigeminy (3/28/2016), BPH without urinary obstruction, Cardiovascular disease (12/10/2015), Chronic obstructive asthma with exacerbation ([address] (12/10/2015), Closed compression fracture of lumbosacral spine ([address] (12/11/2018), COPD (chronic obstructive pulmonary disease) ([address] (12/10/2015), COPD (chronic obstructive pulmonary disease) with emphysema ([address] (10/17/2016), COPD exacerbation ([address] (5/6/2019), Cough with hemoptysis, Erectile dysfunction (12/10/2015), Essential hypertension, benign (12/10/2015), Fracture (10/2014), History of colon polyps, Low testosterone (12/10/2015), Lumbago, Memory loss, Overflow incontinence, Pleural effusion (6/10/2019), Pneumothorax on right (5/6/2019), Raynaud's syndrome (12/10/2015), Rotator cuff tendinitis, Thrombocytopenia ([address] Urinary frequency, Ventricular tachyarrhythmia ([address] (5/6/2019), and VT (ventricular tachycardia) ([facility] (5/6/2019). Mr. Brittney Brice  has a past surgical history that includes hx hernia repair (1980); hx colonoscopy; hx fracture tx (10/2014); hx cataract removal (6/2016-right); and hx orthopaedic (03/2018). Social History/Living Environment:   Home Environment: Private residence  [address] [place] [address] [place] Name: [address]  One/Two Story Residence: One story  Living Alone: No  Support Systems: 2 [facility]  Patient Expects to be Discharged to[de-identified] 2 [facility]  Current DME Used/Available at Home: Other (comment)  Tub or Shower Type: Shower  Prior Level of Function/Work/Activity:  Unsure of PLOF at this time, from [facility]     Number of Personal Factors/Comorbidities that affect the Plan of Care: Brief history (0):  LOW COMPLEXITY   ASSESSMENT OF OCCUPATIONAL PERFORMANCE[de-identified]   Activities of Daily Living:   Basic ADLs (From Assessment) Complex ADLs (From Assessment)   Feeding: Supervision  Oral Facial Hygiene/Grooming: Supervision  Bathing: Maximum assistance  Upper Body Dressing: Moderate assistance  Lower Body Dressing: Maximum assistance  Toileting: Total assistance     Grooming/Bathing/Dressing Activities of Daily Living                       Functional Transfers  Toilet Transfer : Moderate assistance;Assist x2     Bed/Mat Mobility  Supine to Sit: Minimum assistance; Moderate assistance; Additional time  Sit to Supine: Moderate assistance;Maximum assistance; Additional time  Sit to Stand: Minimum assistance; Moderate assistance; Additional time;Assist x2  Stand to Sit: Minimum assistance;Assist x2     Most Recent Physical Functioning:   Gross Assessment:                  Posture:  Posture (WDL): Within defined limits  Balance:  Sitting: With support  Standing: Pull to stand; With support Bed Mobility:  Supine to Sit: Minimum assistance; Moderate assistance; Additional time  Sit to Supine: Moderate assistance;Maximum assistance; Additional time  Wheelchair Mobility:     Transfers:  Sit to Stand: Minimum assistance; Moderate assistance; Additional time;Assist x2  Stand to Sit: Minimum assistance;Assist x2  Toilet Transfer : Minimum assistance; Moderate assistance;Assist x2  Duration: 30 Minutes            Patient Vitals for the past 6 hrs:   BP BP Patient Position SpO2 O2 Flow Rate (L/min) Pulse   06/10/21 0700 109/66 At rest 96 %  78   06/10/21 0742 110/73  100 %  77   06/10/21 0747   100 % 30 l/min    06/10/21 0906 113/64    95   06/10/21 1057 91/61  90 %  85       Mental Status  Neurologic State: Alert  Orientation Level: Oriented to person, Oriented to place, Disoriented to situation, Disoriented to time  Cognition: Follows commands, Poor safety awareness, Recognition of people/places, Impulsive  Perseveration: No perseveration noted  Safety/Judgement: Lack of insight into deficits, Fall prevention                          Physical Skills Involved:  1. Balance  2. Strength  3. Activity Tolerance Cognitive Skills Affected (resulting in the inability to perform in a timely and safe manner):  1. Executive Function  2. Short Term Recall  3. Long Term Memory Psychosocial Skills Affected:  1. Habits/Routines  2. Environmental Adaptation   Number of elements that affect the Plan of Care: 5+:  HIGH COMPLEXITY   CLINICAL DECISION MAKIN [facility] [address] [id number] AM-PAC 6 Clicks   Daily Activity Inpatient Short Form  How much help from another person does the patient currently need. .. Total A Lot A Little None   1. Putting on and taking off regular lower body clothing? [] 1   [x] 2   [] 3   [] 4   2. Bathing (including washing, rinsing, drying)? [] 1   [x] 2   [] 3   [] 4   3. Toileting, which includes using toilet, bedpan or urinal?   [x] 1   [] 2   [] 3   [] 4   4. Putting on and taking off regular upper body clothing? [] 1   [x] 2   [] 3   [] 4   5. Taking care of personal grooming such as brushing teeth? [] 1   [] 2   [x] 3   [] 4   6. Eating meals? [] 1   [] 2   [x] 3   [] 4   © 2007, Trustees of [address] [place] [address] [address] [id number], under license to [employer]. All rights reserved      Score:  Initial: 13 Most Recent: X (Date: -- )    Interpretation of Tool:  Represents activities that are increasingly more difficult (i.e. Bed mobility, Transfers, Gait). Medical Necessity:     · Skilled intervention continues to be required due to the above deficits. Reason for Services/Other Comments:  · See above deficits. Use of outcome tool(s) and clinical judgement create a POC that gives a: MODERATE COMPLEXITY         TREATMENT:   (In addition to Assessment/Re-Assessment sessions the following treatments were rendered)     Pre-treatment Symptoms/Complaints:    Pain: Initial:      Post Session:  0     Self Care: (40): Procedure(s) (per grid) utilized to improve and/or restore self-care/home management as related to dressing, toileting and grooming. Required moderate verbal and tactile cueing to facilitate activities of daily living skills.   .         Braces/Orthotics/Lines/Etc:   · IV  · li catheter  · O2 Device: Hi flow nasal cannula  Treatment/Session Assessment:    · Response to Treatment:  fair  · Interdisciplinary Collaboration:   o Physical Therapist  o Occupational Therapist  o Registered Nurse  · After treatment position/precautions:   o Supine in bed  o Bed alarm/tab alert on  o Bed/Chair-wheels locked  o Bed in low position  o Call light within reach  o RN notified  o Restraints   · Compliance with Program/Exercises: Will assess as treatment progresses. · Recommendations/Intent for next treatment session: \"Next visit will focus on advancements to more challenging activities and reduction in assistance provided\".   Total Treatment Duration:        Chris Reilly OT

## 2021-06-10 NOTE — PROGRESS NOTES
Care Management Interventions  PCP Verified by CM: Yes (Physician at [place] [id number])  Palliative Care Criteria Met (RRAT>21 & CHF Dx)?: No (Dx Sepsis, Risk 33%)  Mode of Transport at Discharge: [facility] to take back to SNF)  Transition of Care Consult (CM Consult): Discharge Planning (Bundle letter- in long term care, so no letter needed. )  Discharge Durable Medical Equipment: No  Physical Therapy Consult: Yes  Occupational Therapy Consult: Yes  Speech Therapy Consult: No  Current Support Network: [address] [place] [address]  Confirm Follow Up Transport: Other (see comment) (ambulance)  Discharge Location  Discharge Placement: Unable to determine at this time  Saw pt in interdisciplinarily rounds with the following disciplines: Physician, Case Management, Nursing, Dietary,Therapy and Pharmacy. The plan of care was discussed along with goals for discharge date/ location. Pt remains on 30L or 40% Airvo and MD is continuing ABX. D/C plan is currently is that [facility] is willing to take pt back (pt is private paying there), but this is pending clinical progression, Pt will need a COVID to return. CM following.

## 2021-06-10 NOTE — PROGRESS NOTES
Participated in Interdisciplinary Rounds with staff. Will continue to follow. Alverto Lind M.Div.

## 2021-06-11 ENCOUNTER — APPOINTMENT (OUTPATIENT)
Dept: GENERAL RADIOLOGY | Age: 86
DRG: 698 | End: 2021-06-11
Attending: FAMILY MEDICINE
Payer: MEDICARE

## 2021-06-11 LAB
ANION GAP SERPL CALC-SCNC: 6 MMOL/L (ref 7–16)
BACTERIA SPEC CULT: ABNORMAL
BACTERIA SPEC CULT: NORMAL
BASOPHILS # BLD: 0 K/UL (ref 0–0.2)
BASOPHILS NFR BLD: 0 % (ref 0–2)
BUN SERPL-MCNC: 26 MG/DL (ref 8–23)
CALCIUM SERPL-MCNC: 7.8 MG/DL (ref 8.3–10.4)
CHLORIDE SERPL-SCNC: 106 MMOL/L (ref 98–107)
CO2 SERPL-SCNC: 30 MMOL/L (ref 21–32)
CREAT SERPL-MCNC: 0.61 MG/DL (ref 0.8–1.5)
DIFFERENTIAL METHOD BLD: ABNORMAL
EOSINOPHIL # BLD: 0.5 K/UL (ref 0–0.8)
EOSINOPHIL NFR BLD: 5 % (ref 0.5–7.8)
ERYTHROCYTE [DISTWIDTH] IN BLOOD BY AUTOMATED COUNT: 14.1 % (ref 11.9–14.6)
GLUCOSE SERPL-MCNC: 94 MG/DL (ref 65–100)
HCT VFR BLD AUTO: 35.5 % (ref 41.1–50.3)
HGB BLD-MCNC: 11 G/DL (ref 13.6–17.2)
IMM GRANULOCYTES # BLD AUTO: 0.1 K/UL (ref 0–0.5)
IMM GRANULOCYTES NFR BLD AUTO: 1 % (ref 0–5)
LYMPHOCYTES # BLD: 2.2 K/UL (ref 0.5–4.6)
LYMPHOCYTES NFR BLD: 22 % (ref 13–44)
MAGNESIUM SERPL-MCNC: 2.4 MG/DL (ref 1.8–2.4)
MCH RBC QN AUTO: 30 PG (ref 26.1–32.9)
MCHC RBC AUTO-ENTMCNC: 31 G/DL (ref 31.4–35)
MCV RBC AUTO: 96.7 FL (ref 79.6–97.8)
MONOCYTES # BLD: 1.2 K/UL (ref 0.1–1.3)
MONOCYTES NFR BLD: 12 % (ref 4–12)
NEUTS SEG # BLD: 6.1 K/UL (ref 1.7–8.2)
NEUTS SEG NFR BLD: 60 % (ref 43–78)
NRBC # BLD: 0 K/UL (ref 0–0.2)
PLATELET # BLD AUTO: 218 K/UL (ref 150–450)
PMV BLD AUTO: 9.8 FL (ref 9.4–12.3)
POTASSIUM SERPL-SCNC: 3.6 MMOL/L (ref 3.5–5.1)
RBC # BLD AUTO: 3.67 M/UL (ref 4.23–5.6)
SERVICE CMNT-IMP: ABNORMAL
SERVICE CMNT-IMP: NORMAL
SODIUM SERPL-SCNC: 142 MMOL/L (ref 136–145)
WBC # BLD AUTO: 10.2 K/UL (ref 4.3–11.1)

## 2021-06-11 PROCEDURE — 74011000258 HC RX REV CODE- 258: Performed by: FAMILY MEDICINE

## 2021-06-11 PROCEDURE — 71045 X-RAY EXAM CHEST 1 VIEW: CPT

## 2021-06-11 PROCEDURE — 80048 BASIC METABOLIC PNL TOTAL CA: CPT

## 2021-06-11 PROCEDURE — 36415 COLL VENOUS BLD VENIPUNCTURE: CPT

## 2021-06-11 PROCEDURE — 94640 AIRWAY INHALATION TREATMENT: CPT

## 2021-06-11 PROCEDURE — 65610000001 HC ROOM ICU GENERAL

## 2021-06-11 PROCEDURE — 74011000250 HC RX REV CODE- 250: Performed by: HOSPITALIST

## 2021-06-11 PROCEDURE — 74011250636 HC RX REV CODE- 250/636: Performed by: FAMILY MEDICINE

## 2021-06-11 PROCEDURE — 83735 ASSAY OF MAGNESIUM: CPT

## 2021-06-11 PROCEDURE — 74011250637 HC RX REV CODE- 250/637: Performed by: FAMILY MEDICINE

## 2021-06-11 PROCEDURE — 97530 THERAPEUTIC ACTIVITIES: CPT

## 2021-06-11 PROCEDURE — 74011250637 HC RX REV CODE- 250/637: Performed by: HOSPITALIST

## 2021-06-11 PROCEDURE — 74011250636 HC RX REV CODE- 250/636: Performed by: HOSPITALIST

## 2021-06-11 PROCEDURE — 85025 COMPLETE CBC W/AUTO DIFF WBC: CPT

## 2021-06-11 RX ORDER — FUROSEMIDE 40 MG/1
20 TABLET ORAL DAILY
Status: DISCONTINUED | OUTPATIENT
Start: 2021-06-11 | End: 2021-06-12

## 2021-06-11 RX ADMIN — MIDODRINE HYDROCHLORIDE 5 MG: 5 TABLET ORAL at 13:01

## 2021-06-11 RX ADMIN — PANTOPRAZOLE SODIUM 40 MG: 40 TABLET, DELAYED RELEASE ORAL at 17:29

## 2021-06-11 RX ADMIN — BUDESONIDE 500 MCG: 0.5 INHALANT RESPIRATORY (INHALATION) at 07:43

## 2021-06-11 RX ADMIN — Medication 10 ML: at 21:00

## 2021-06-11 RX ADMIN — GUAIFENESIN 600 MG: 600 TABLET, EXTENDED RELEASE ORAL at 21:00

## 2021-06-11 RX ADMIN — FLUTICASONE PROPIONATE 2 SPRAY: 50 SPRAY, METERED NASAL at 15:28

## 2021-06-11 RX ADMIN — MIDODRINE HYDROCHLORIDE 5 MG: 5 TABLET ORAL at 17:29

## 2021-06-11 RX ADMIN — DOXYCYCLINE HYCLATE 100 MG: 100 CAPSULE ORAL at 08:39

## 2021-06-11 RX ADMIN — Medication 10 ML: at 06:05

## 2021-06-11 RX ADMIN — MIDODRINE HYDROCHLORIDE 5 MG: 5 TABLET ORAL at 08:39

## 2021-06-11 RX ADMIN — BUDESONIDE 500 MCG: 0.5 INHALANT RESPIRATORY (INHALATION) at 22:01

## 2021-06-11 RX ADMIN — IPRATROPIUM BROMIDE AND ALBUTEROL SULFATE 3 ML: .5; 3 SOLUTION RESPIRATORY (INHALATION) at 07:43

## 2021-06-11 RX ADMIN — ENOXAPARIN SODIUM 40 MG: 40 INJECTION SUBCUTANEOUS at 18:43

## 2021-06-11 RX ADMIN — GUAIFENESIN 600 MG: 600 TABLET, EXTENDED RELEASE ORAL at 08:39

## 2021-06-11 RX ADMIN — DULOXETINE HYDROCHLORIDE 30 MG: 30 CAPSULE, DELAYED RELEASE ORAL at 08:39

## 2021-06-11 RX ADMIN — FINASTERIDE 5 MG: 5 TABLET, FILM COATED ORAL at 08:39

## 2021-06-11 RX ADMIN — IPRATROPIUM BROMIDE AND ALBUTEROL SULFATE 3 ML: .5; 3 SOLUTION RESPIRATORY (INHALATION) at 22:01

## 2021-06-11 RX ADMIN — DULOXETINE HYDROCHLORIDE 30 MG: 30 CAPSULE, DELAYED RELEASE ORAL at 18:43

## 2021-06-11 RX ADMIN — FUROSEMIDE 20 MG: 40 TABLET ORAL at 08:39

## 2021-06-11 RX ADMIN — CEFTRIAXONE SODIUM 2 G: 2 INJECTION, POWDER, FOR SOLUTION INTRAMUSCULAR; INTRAVENOUS at 12:59

## 2021-06-11 NOTE — PROGRESS NOTES
Problem: Falls - Risk of  Goal: *Absence of Falls  Description: Document Delvis Daileyve Fall Risk and appropriate interventions in the flowsheet.   Outcome: Progressing Towards Goal  Note: Fall Risk Interventions:  Mobility Interventions: Bed/chair exit alarm, Communicate number of staff needed for ambulation/transfer, OT consult for ADLs, Patient to call before getting OOB, PT Consult for mobility concerns, PT Consult for assist device competence, Strengthening exercises (ROM-active/passive), Utilize walker, cane, or other assistive device    Mentation Interventions: Adequate sleep, hydration, pain control, Bed/chair exit alarm, Door open when patient unattended, Evaluate medications/consider consulting pharmacy, Increase mobility, More frequent rounding, Reorient patient, Room close to nurse's station, Toileting rounds, Update white board    Medication Interventions: Assess postural VS orthostatic hypotension, Bed/chair exit alarm, Evaluate medications/consider consulting pharmacy, Patient to call before getting OOB, Teach patient to arise slowly    Elimination Interventions: Bed/chair exit alarm, Call light in reach, Patient to call for help with toileting needs, Stay With Me (per policy), Toilet paper/wipes in reach, Toileting schedule/hourly rounds              Problem: Sepsis: Day 4  Goal: Off Pathway (Use only if patient is Off Pathway)  Outcome: Progressing Towards Goal  Goal: Activity/Safety  Outcome: Progressing Towards Goal  Goal: Consults, if ordered  Outcome: Progressing Towards Goal  Goal: Diagnostic Test/Procedures  Outcome: Progressing Towards Goal  Goal: Nutrition/Diet  Outcome: Progressing Towards Goal  Goal: Discharge Planning  Outcome: Progressing Towards Goal  Goal: Medications  Outcome: Progressing Towards Goal  Goal: Respiratory  Outcome: Progressing Towards Goal  Goal: Treatments/Interventions/Procedures  Outcome: Progressing Towards Goal  Goal: Psychosocial  Outcome: Progressing Towards Goal  Goal: *Oxygen saturation within defined limits  Outcome: Progressing Towards Goal  Goal: *Hemodynamically stable  Outcome: Progressing Towards Goal  Goal: *Vital signs within defined limits  Outcome: Progressing Towards Goal  Goal: *Tolerating diet  Outcome: Progressing Towards Goal  Goal: *Demonstrates progressive activity  Outcome: Progressing Towards Goal  Goal: *Fluid volume maintenance  Outcome: Progressing Towards Goal

## 2021-06-11 NOTE — PROGRESS NOTES
Care Management Interventions  PCP Verified by CM: Yes (Physician at [place] [id number])  Palliative Care Criteria Met (RRAT>21 & CHF Dx)?: No (Dx Sepsis, Risk 33%)  Mode of Transport at Discharge: [facility] to take back to SNF)  Transition of Care Consult (CM Consult): Discharge Planning (Bundle letter- in long term care, so no letter needed. )  Discharge Durable Medical Equipment: No  Physical Therapy Consult: Yes  Occupational Therapy Consult: Yes  Speech Therapy Consult: No  Current Support Network: Nursing Facility  Confirm Follow Up Transport: Other (see comment) (ambulance)  Discharge Location  Discharge Placement: Unable to determine at this time  Interdisciplinary rounds with Dr Natalie Gonzalez, nursing, CM and PT for plan of care. Patient remains on hi flow NC at this time. D/C plan pending clinical progress. He was private pay at [facility] and they state he can return. He will need COVID test prior to return. CM following.

## 2021-06-11 NOTE — PROGRESS NOTES
[address] [facility] Hospitalist        Name:  Juanjose Paredes  Age:89 y.o. Sex:male   :  1932    MRN:  [id number]   PCP:  Darian Barry MD      Admit Date:  2021  2:03 PM   Chief Complaint: WORSENING CONFUSION    Reason for Admission:   Severe sepsis ([facility] [address] [A41.9, R65.20]  Acute cystitis [N30.00]    Assessment & Plan:     SEVERE SEPSIS 2/2 CAUTI/acute cystitis and Ecoli Bacteremia:  : resolving  LA resolved  IV fluids stopped on   Blood culture on + for E.  Coli, repeat blood culture on  till date negative  Urine culture positive for Ecoli  Antibiotics: Zosyn -  Antibiotic deescalated to Ceftriaxone today-    Pt is hemodynamically stable  Wean midodrine as tolerated    COPD with acute on chronic hypoxic respiratory failure:  : stable  Off BiPAP   Currently on airvo 35 L/46%, wean off of airvo as able  pulmicort bid with duoneb prn  mucinex bid  Incentive spirometry  Doxycycline 100 mg po bid x 5 days (D5)  Start patient on Lasix 20 mg daiily  Repeat chest x-ray today    Acute encephalopathy secondary to sepsis/CAUTI with underlying dementia:  : Improving   Delirium/dementia precautions  Reorient patient  Fall precautions  Avoid benzo, opiates and anticholinergic  depakote sprinkles prn for agitation/restlessness    Depression:  : stable  Continue cymbalta    BPH:  : indwelling li cath in  continue proscar  Hx of Hypospadiasis    Severe protein calorie malnutrition with BMP 17:  :  Nutrition consulted  Supplement with meals  Pt has chronic physical deconditioning with poor oral intake    Congenital hypospadias/urethral trauma:  Patient is evaluated by urology, recommend congenital hypospadias, no intervention required      Disposition/Expected LOS: pending clinical recovery, physical therapy recommend home health  Diet: DIET ADULT  DIET ADULT ORAL NUTRITION SUPPLEMENT  VTE ppx: lovenox  GI ppx: protonix  Code status: DNR  Surrogate decision-maker: pt's son and wife      Interval Hx/Subjective:     Tammi Fonseca is a 80 y.o. male with hx of COPD, chronic hypoxic respiratory failure, dementia, BPH with chronic indwelling li, HTN resident of [facility] admitted on 6/6 for severe sepsis secondary to CAUTI/acute cystitis resulting in worsening of baseline dementia. Pt noted to be febrile in ER T 100.9, with LA 2.7.    6/11:  Pt seen at bedside, resting in bed. He is alert/awake, oriented to self and place. On airvo, 35 L. Reports he is feeling better. No active complaint. Denies chest pain, nausea/vomiting, diarrhea. Indwelling cath in place. Review of Systems:  A 14 point review of systems was taken and pertinent positive as mentioned above. Objective:     Patient Vitals for the past 24 hrs:   Temp Pulse Resp BP SpO2   06/11/21 1100  81 22 99/60 95 %   06/11/21 1000  82 30 131/75 94 %   06/11/21 0900  75 20 126/73 93 %   06/11/21 0839  71  136/72    06/11/21 0830  72 15  95 %   06/11/21 0800  81 18 136/72 96 %   06/11/21 0752     97 %   06/11/21 0712 98.1 °F (36.7 °C) 76 18 115/67 95 %   06/11/21 0700  69 15 115/67 97 %   06/11/21 0600  72 14 121/70 98 %   06/11/21 0500  73 22 118/70 90 %   06/11/21 0400 98.4 °F (36.9 °C) 70 15 109/69 100 %   06/11/21 0300  76 24  93 %   06/11/21 0200  73 24 95/67 93 %   06/11/21 0100  91 16 107/75 (!) 89 %   06/11/21 0000 98.8 °F (37.1 °C) 81 24 108/66 96 %   06/10/21 2300  77 23 109/67 93 %   06/10/21 2200  83 25 132/84 91 %   06/10/21 2100  82 24 (!) 130/100 94 %   06/10/21 2000 99.3 °F (37.4 °C) 76 8 114/73 94 %   06/10/21 1954     93 %   06/10/21 1900  83 25  93 %   06/10/21 1718  84  (!) 140/92    06/10/21 1715  78 26 (!) 140/92 90 %   06/10/21 1514 98.3 °F (36.8 °C) 82 19 101/69 92 %   06/10/21 1300  83 (!) 31 (!) 106/54 94 %       Oxygen Therapy  O2 Sat (%): 95 % (06/11/21 1100)  Pulse via Oximetry: 79 beats per minute (06/11/21 0752)  O2 Device: Heated; Hi flow nasal cannula (06/11/21 9587)  Skin Assessment: Clean, dry, & intact (06/11/21 4770)  Skin Protection for O2 Device: No (06/11/21 0400)  O2 Flow Rate (L/min): 34 l/min (06/11/21 0752)  O2 Temperature: 93.2 °F (34 °C) (06/11/21 0752)  FIO2 (%): 45 % (06/11/21 0752)    Body mass index is 19.31 kg/m². Physical Exam:    General:  Elderly, frail, nad, alert/awake, on airvo  HEENT:  Head NCAT, PERRLA+  Neck:   Supple, no lymphadenopathy, no JVD   Lungs:  Coarse breath sounds bilaterally  CV:   Regular rate and rhythm with normal S1 and S2   Abdomen:  Soft, nontender, nondistended, normoactive bowel sounds   Extremities:  No cyanosis clubbing or edema   Neuro:  gcs 15, CN 2-12 intact, following commands and moving all 4 extremities spontaneously  Psych:  AOx2, mood and affect flat      Data Reviewed: I have reviewed all labs, meds, and studies.       Recent Results (from the past 24 hour(s))   MAGNESIUM    Collection Time: 06/11/21  3:43 AM   Result Value Ref Range    Magnesium 2.4 1.8 - 2.4 mg/dL   METABOLIC PANEL, BASIC    Collection Time: 06/11/21  7:52 AM   Result Value Ref Range    Sodium 142 136 - 145 mmol/L    Potassium 3.6 3.5 - 5.1 mmol/L    Chloride 106 98 - 107 mmol/L    CO2 30 21 - 32 mmol/L    Anion gap 6 (L) 7 - 16 mmol/L    Glucose 94 65 - 100 mg/dL    BUN 26 (H) 8 - 23 MG/DL    Creatinine 0.61 (L) 0.8 - 1.5 MG/DL    GFR est AA >60 >60 ml/min/1.73m2    GFR est non-AA >60 >60 ml/min/1.73m2    Calcium 7.8 (L) 8.3 - 10.4 MG/DL   CBC WITH AUTOMATED DIFF    Collection Time: 06/11/21  7:52 AM   Result Value Ref Range    WBC 10.2 4.3 - 11.1 K/uL    RBC 3.67 (L) 4.23 - 5.6 M/uL    HGB 11.0 (L) 13.6 - 17.2 g/dL    HCT 35.5 (L) 41.1 - 50.3 %    MCV 96.7 79.6 - 97.8 FL    MCH 30.0 26.1 - 32.9 PG    MCHC 31.0 (L) 31.4 - 35.0 g/dL    RDW 14.1 11.9 - 14.6 %    PLATELET 297 408 - 857 K/uL    MPV 9.8 9.4 - 12.3 FL    ABSOLUTE NRBC 0.00 0.0 - 0.2 K/uL    DF AUTOMATED      NEUTROPHILS 60 43 - 78 %    LYMPHOCYTES 22 13 - 44 %    MONOCYTES 12 4.0 - 12.0 %    EOSINOPHILS 5 0.5 - 7.8 %    BASOPHILS 0 0.0 - 2.0 %    IMMATURE GRANULOCYTES 1 0.0 - 5.0 %    ABS. NEUTROPHILS 6.1 1.7 - 8.2 K/UL    ABS. LYMPHOCYTES 2.2 0.5 - 4.6 K/UL    ABS. MONOCYTES 1.2 0.1 - 1.3 K/UL    ABS. EOSINOPHILS 0.5 0.0 - 0.8 K/UL    ABS. BASOPHILS 0.0 0.0 - 0.2 K/UL    ABS. IMM. GRANS. 0.1 0.0 - 0.5 K/UL       EKG Results     Procedure 720 Value Units Date/Time    EKG, 12 LEAD, INITIAL [[id number]] Collected: 06/06/21 1423    Order Status: Completed Updated: 06/06/21 1704     Ventricular Rate 84 BPM      Atrial Rate 78 BPM      P-R Interval 136 ms      QRS Duration 92 ms      Q-T Interval 378 ms      QTC Calculation (Bezet) 446 ms      Calculated P Axis 63 degrees      Calculated R Axis -3 degrees      Calculated T Axis 72 degrees      Diagnosis --     !! AGE AND GENDER SPECIFIC ECG ANALYSIS !!   Sinus rhythm with Premature supraventricular complexes with frequent   Premature ventricular complexes  Septal infarct (cited on or before 01-FEB-2018)  Abnormal ECG  When compared with ECG of 30-JAN-2021 12:46,  Premature supraventricular complexes are now Present  Nonspecific T wave abnormality no longer evident in Lateral leads  Confirmed by ST SHIRA CAMACHO MD (), GERARDO BURKS ([id number]) on 6/6/2021 5:04:38 PM            All Micro Results     Procedure Component Value Units Date/Time    CULTURE, URINE [[id number]]  (Abnormal)  (Susceptibility) Collected: 06/06/21 1427    Order Status: Completed Specimen: Urine Updated: 06/11/21 0792     Special Requests: NO SPECIAL REQUESTS        Culture result:       50,000-100,000 COLONIES/mL ESCHERICHIA COLI                  CULTURE IN PROGRESS,FURTHER UPDATES TO FOLLOW CORRECTED ON 06/10 AT 1157: PREVIOUSLY REPORTED AS <10,000 COLONIES/mL MIXED SKIN NAEL ISOLATED                  10,000 to 50,000 COLONIES/mL MIXED SKIN NAEL ISOLATED          CULTURE, BLOOD [[id number]] Collected: 06/08/21 9734    Order Status: Completed Specimen: Blood Updated: 06/11/21 1737 Special Requests: --        RIGHT  Antecubital       Culture result: NO GROWTH 3 DAYS       CULTURE, BLOOD [[id number]] Collected: 06/08/21 0823    Order Status: Completed Specimen: Blood Updated: 06/11/21 0704     Special Requests: --        LEFT  HAND       Culture result: NO GROWTH 3 DAYS       BLOOD CULTURE [[id number]] Collected: 06/06/21 1427    Order Status: Completed Specimen: Blood Updated: 06/11/21 0704     Special Requests: --        RIGHT  Antecubital       Culture result: NO GROWTH 5 DAYS       CULTURE, RESPIRATORY/SPUTUM/BRONCH W GRAM STAIN [[id number]]  (Abnormal)  (Susceptibility) Collected: 06/06/21 1821    Order Status: Completed Specimen: Sputum Updated: 06/10/21 1019     Special Requests: NO SPECIAL REQUESTS        GRAM STAIN 2 TO 18 WBC'S/OIF      0 TO 2 EPITHELIAL CELLS SEEN /OIF      FEW GRAM POSITIVE COCCI         FEW GRAM NEGATIVE RODS         FEW YEAST         FEW GRAM POSITIVE RODS         3+ MUCUS PRESENT        Culture result:       MODERATE STAPHYLOCOCCUS AUREUS                  HEAVY NORMAL RESPIRATORY NAEL          BLOOD CULTURE [[id number]]  (Abnormal)  (Susceptibility) Collected: 06/06/21 1345    Order Status: Completed Specimen: Blood Updated: 06/09/21 0758     Special Requests: LEFT FOREARM        GRAM STAIN GRAM NEGATIVE RODS         ANAEROBIC BOTTLE POSITIVE               RESULTS VERIFIED, PHONED TO AND READ BACK BY AMISHA GRIFFITH RN BY NB AT [address] [place] [address] ON [id number]           Culture result: ESCHERICHIA COLI               Refer to Blood Culture ID Panel Accession  [id number]      SARS-COV-2, PCR [[id number]] Collected: 06/07/21 1646    Order Status: Completed Specimen: Nasopharyngeal Updated: 06/08/21 1226     Specimen source Nasopharyngeal        SARS-CoV-2 Not detected        Comment:      The specimen is NEGATIVE for SARS-CoV-2, the novel coronavirus associated with COVID-19.        This test has been authorized by the FDA under an Emergency Use Authorization (EUA) for use by authorized laboratories. Fact sheet for Healthcare Providers: iTendency.uy       Fact sheet for Patients: iTendency.uy       Methodology: RT-PCR         BLOOD CULTURE ID PANEL [[id number]]  (Abnormal) Collected: 06/06/21 1345    Order Status: Completed Specimen: Blood Updated: 06/07/21 1322     Acc. no. from Micro Order [id number]     Escherichia coli Detected        Comment: RESULTS VERIFIED, PHONED TO AND READ BACK BY  EREN ACOSTA RN @ 7274 ON 6/7/21 BY AMM          KPC (Carbapenem Resistance Gene) NOT DETECTED        Comment: WARNING:  A Not Detected result for the KPC gene does not indicate susceptibility to carbapenems. Gram negative bacteria can be resistant to carbapenems by mechanisms other than carrying the KPC gene. INTERPRETATION       Gram negative dereck. Identified by realtime PCR as E. coli          COVID-19 RAPID TEST [[id number]] Collected: 06/06/21 1659    Order Status: Completed Specimen: Nasopharyngeal Updated: 06/06/21 1728     Specimen source Nasopharyngeal        COVID-19 rapid test Not detected        Comment:      The specimen is NEGATIVE for SARS-CoV-2, the novel coronavirus associated with COVID-19. A negative result does not rule out COVID-19. This test has been authorized by the FDA under an Emergency Use Authorization (EUA) for use by authorized laboratories. Fact sheet for Healthcare Providers: iTendency.uy  Fact sheet for Patients: iTendency.uy       Methodology: Isothermal Nucleic Acid Amplification               Other Studies:  XR CHEST SNGL V    Result Date: 6/11/2021  EXAMINATION: CHEST RADIOGRAPH 6/11/2021 10:55 AM INDICATION: Follow-up opacities. Sepsis. COMPARISON: 6/8/2021 TECHNIQUE: A single view of the chest was obtained. FINDINGS: Support Devices: None Osseous : No acute abnormality. Cardiomediastinal Silhouette: Normal in size.  Lungs: Unchanged interstitial opacities throughout the lungs from 6/8/2021 Pleura: No pleural fluid or pneumothorax. Upper Abdomen: No abnormality. Unchanged interstitial opacities from 6/8/2021.          Medications:  Medications Administered       acetaminophen (TYLENOL) tablet 650 mg       Admin Date  03/13/2021 Action  Given Dose  650 mg Route  Oral Administered By  Efren Peña RN              cefTRIAXone (ROCEPHIN) 1 g in 0.9% sodium chloride (MBP/ADV) 50 mL MBP       Admin Date  03/13/2021 Action  New Bag Dose  1 g Rate  100 mL/hr Route  IntraVENous Administered By  Drake Jovel RN              cefTRIAXone (ROCEPHIN) 2 g in 0.9% sodium chloride (MBP/ADV) 50 mL MBP       Admin Date  03/13/2021 Action  New Bag Dose  2 g Rate  100 mL/hr Route  IntraVENous Administered By  Alvino Romeo RN               Admin Date  03/14/2021 Action  New Bag Dose  2 g Rate  100 mL/hr Route  IntraVENous Administered By  Georgian Severin, RN               Admin Date  03/15/2021 Action  New Bag Dose  2 g Rate  100 mL/hr Route  IntraVENous Administered By  Georgian Severin, RN               Admin Date  03/16/2021 Action  New Bag Dose  2 g Rate  100 mL/hr Route  IntraVENous Administered By  Coretta Lin              diphenhydrAMINE (BENADRYL) injection 25 mg       Admin Date  03/14/2021 Action  Given Dose  25 mg Route  IntraVENous Administered By  Sandy Khan RN              doxycycline (VIBRAMYCIN) 100 mg in 0.9% sodium chloride (MBP/ADV) 100 mL MBP       Admin Date  03/13/2021 Action  New Bag Dose  100 mg Rate  100 mL/hr Route  IntraVENous Administered By  Alvino Romeo RN               Admin Date  03/13/2021 Action  New Bag Dose  100 mg Rate  100 mL/hr Route  IntraVENous Administered By  Sandy Khan RN               Admin Date  03/14/2021 Action  New Bag Dose  100 mg Rate  100 mL/hr Route  IntraVENous Administered By  Georgian Severin, RN               Admin Date  03/14/2021 Action  New Bag Dose  100 mg Rate  100 mL/hr Route  IntraVENous Administered By  Primo Tim RN               Admin Date  03/15/2021 Action  New Bag Dose  100 mg Rate  100 mL/hr Route  IntraVENous Administered By  Katie Avilez RN              enoxaparin (LOVENOX) injection 40 mg       Admin Date  03/13/2021 Action  Given Dose  40 mg Route  SubCUTAneous Administered By  Melba Hashimoto, RN               Admin Date  03/13/2021 Action  Given Dose  40 mg Route  SubCUTAneous Administered By  Primo Tim RN               Admin Date  03/14/2021 Action  Given Dose  40 mg Route  SubCUTAneous Administered By  Katie Avilez RN               Admin Date  03/14/2021 Action  Given Dose  40 mg Route  SubCUTAneous Administered By  Primo Tim RN               Admin Date  03/15/2021 Action  Given Dose  40 mg Route  SubCUTAneous Administered By  Katie Avilez RN               Admin Date  03/15/2021 Action  Given Dose  40 mg Route  SubCUTAneous Administered By  Carrie Amaya RN               Admin Date  03/16/2021 Action  Given Dose  40 mg Route  SubCUTAneous Administered By  Zulma Zamora              fentaNYL citrate (PF) injection 50 mcg       Admin Date  03/13/2021 Action  Given Dose  50 mcg Route  IntraVENous Administered By  Melba Hashimoto, RN               Admin Date  03/14/2021 Action  Given Dose  50 mcg Route  IntraVENous Administered By  Primo Tim RN              ferrous sulfate tablet 325 mg       Admin Date  03/14/2021 Action  Given Dose  325 mg Route  Oral Administered By  Katie Avilez RN               Admin Date  03/14/2021 Action  Given Dose  325 mg Route  Oral Administered By  Katie Avilez RN               Admin Date  03/15/2021 Action  Given Dose  325 mg Route  Oral Administered By  Katie Avilez RN              folic acid (FOLVITE) tablet 1 mg       Admin Date  03/14/2021 Action  Given Dose  1 mg Route  Oral Administered By  Katie Avilez RN               Admin Date  03/15/2021 Action  Given Dose  1 mg Route  Oral Administered By  Preston Herrera RN               Admin Date  03/16/2021 Action  Given Dose  1 mg Route  Oral Administered By  Jose De Jesus Bailey              furosemide (LASIX) injection 20 mg       Admin Date  03/15/2021 Action  Given Dose  20 mg Route  IntraVENous Administered By  Preston Herrera RN              furosemide (LASIX) injection 40 mg       Admin Date  03/13/2021 Action  Given Dose  40 mg Route  IntraVENous Administered By  Renetta Sparks RN               Admin Date  03/13/2021 Action  Given Dose  40 mg Route  IntraVENous Administered By  Blanche Brown RN               Admin Date  03/14/2021 Action  Given Dose  40 mg Route  IntraVENous Administered By  Preston Herrera RN               Admin Date  03/14/2021 Action  Given Dose  40 mg Route  IntraVENous Administered By  Blanche Brown RN               Admin Date  03/15/2021 Action  Given Dose  40 mg Route  IntraVENous Administered By  Sina Lacey RN               Admin Date  03/16/2021 Action  Given Dose  40 mg Route  IntraVENous Administered By  Jose De Jesus Bailey              HYDROcodone-acetaminophen [facility]) 5-325 mg per tablet 1 Tab       Admin Date  03/14/2021 Action  Given Dose  1 Tab Route  Oral Administered By  Preston Herrera RN               Admin Date  03/15/2021 Action  Given Dose  1 Tab Route  Oral Administered By  Blanche Brown RN              insulin lispro (HUMALOG) injection       Admin Date  03/13/2021 Action  Given Dose  2 Units Route  SubCUTAneous Administered By  Blanche Brown RN               Admin Date  03/14/2021 Action  Given Dose  2 Units Route  SubCUTAneous Administered By  Preston Herrera RN               Admin Date  03/14/2021 Action  Given Dose  2 Units Route  SubCUTAneous Administered By  Preston Herrera RN               Admin Date  03/14/2021 Action  Given Dose  4 Units Route  SubCUTAneous Administered By  Blanche Brown RN               Admin Date  03/15/2021 Action  Given Dose  2 Units Route  SubCUTAneous Administered By  Raghu Whyte RN               Admin Date  03/15/2021 Action  Given Dose  4 Units Route  SubCUTAneous Administered By  Raghu Whyte RN               Admin Date  03/15/2021 Action  Given Dose  2 Units Route  SubCUTAneous Administered By  Jennifer Tran RN               Admin Date  03/16/2021 Action  Given Dose  4 Units Route  SubCUTAneous Administered By  Pauline Donis              levothyroxine (SYNTHROID) tablet 137 mcg       Admin Date  03/14/2021 Action  Given Dose  137 mcg Route  Oral Administered By  Raghu Whyte RN               Admin Date  03/15/2021 Action  Given Dose  137 mcg Route  Oral Administered By  Raghu Whyte RN               Admin Date  03/16/2021 Action  Given Dose  137 mcg Route  Oral Administered By  Jennifer Tran RN              midodrine (PROAMATINE) tablet 10 mg       Admin Date  03/14/2021 Action  Given Dose  10 mg Route  Oral Administered By  Raghu Whyte RN               Admin Date  03/14/2021 Action  Given Dose  10 mg Route  Oral Administered By  Raghu Whyte RN               Admin Date  03/15/2021 Action  Given Dose  10 mg Route  Oral Administered By  Raghu Whyte RN               Admin Date  03/15/2021 Action  Given Dose  10 mg Route  Oral Administered By  Raghu Whyte RN               Admin Date  03/15/2021 Action  Given Dose  10 mg Route  Oral Administered By  Raghu hWyte RN               Admin Date  03/16/2021 Action  Given Dose  10 mg Route  Oral Administered By  Mary Choudhury Date  03/16/2021 Action  Given Dose  10 mg Route  Oral Administered By  Mary Choudhury Date  03/16/2021 Action  Given Dose  10 mg Route  Oral Administered By  Pauline Donis              naloxone [facility]) injection 2 mg       Admin Date  03/13/2021 Action  Given Dose  2 mg Route  IntraVENous Administered By  Ap Baptiste RN              NOREPINephrine (LEVOPHED) 4 mg in 5% dextrose 250 mL infusion       Admin Date  03/14/2021 Action  New Bag Dose  4 mcg/min Rate  15 mL/hr Route  IntraVENous Administered By  Adeel Maynard RN               Admin Date  03/14/2021 Action  Rate Change Dose  6 mcg/min Rate  22.5 mL/hr Route  IntraVENous Administered By  Adeel Maynard RN               Admin Date  03/14/2021 Action  Rate Change Dose  4 mcg/min Rate  15 mL/hr Route  IntraVENous Administered By  Adeel Maynard RN               Admin Date  03/14/2021 Action  Rate Change Dose  2 mcg/min Rate  7.5 mL/hr Route  IntraVENous Administered By  Adeel Maynard RN               Admin Date  03/14/2021 Action  Rate Change Dose  1 mcg/min Rate  3.8 mL/hr Route  IntraVENous Administered By  Adeel Maynard RN               Admin Date  03/14/2021 Action  Restarted Dose  2 mcg/min Rate  7.5 mL/hr Route  IntraVENous Administered By  Adeel Maynard RN               Admin Date  03/14/2021 Action  Rate Change Dose  4 mcg/min Rate  15 mL/hr Route  IntraVENous Administered By  Adeel Maynard RN               Admin Date  03/15/2021 Action  Rate Change Dose  2 mcg/min Rate  7.5 mL/hr Route  IntraVENous Administered By  Krystal Canas RN              ondansetron [facility]) injection 4 mg       Admin Date  03/13/2021 Action  Given Dose  4 mg Route  IntraVENous Administered By  Nallely Wise RN              pantoprazole (PROTONIX) tablet 40 mg       Admin Date  03/14/2021 Action  Given Dose  40 mg Route  Oral Administered By  Adeel Maynard RN               Admin Date  03/15/2021 Action  Given Dose  40 mg Route  Oral Administered By  Adeel Maynard RN               Admin Date  03/16/2021 Action  Given Dose  40 mg Route  Oral Administered By  Genie Barrow              perflutren lipid microspheres (DEFINITY) in NS bolus IV       Admin Date  03/13/2021 Action  Given Dose  1 mL Route  IntraVENous Administered By  DARLEEN Massey              potassium chloride (K-DUR, KLOR-CON) SR tablet 40 mEq       Admin Date  03/15/2021 Action  Given Dose  40 mEq Route  Oral Administered By  Stefani Celis RN               Admin Date  03/16/2021 Action  Given Dose  40 mEq Route  Oral Administered By  Lb Brandt              potassium chloride 40 mEq IVPB       Admin Date  03/15/2021 Action  New Bag Dose  40 mEq Rate  25 mL/hr Route  IntraVENous Administered By  Deysi Kidd RN              pregabalin (LYRICA) capsule 300 mg       Admin Date  03/14/2021 Action  Given Dose  300 mg Route  Oral Administered By  Stefani Celis RN               Admin Date  03/14/2021 Action  Given Dose  300 mg Route  Oral Administered By  Stefani Celis RN               Admin Date  03/15/2021 Action  Given Dose  300 mg Route  Oral Administered By  Stefani Celis RN               Admin Date  03/15/2021 Action  Given Dose  300 mg Route  Oral Administered By  Salvador Amin RN               Admin Date  03/16/2021 Action  Given Dose  300 mg Route  Oral Administered By  Lb Brandt              sertraline (ZOLOFT) tablet 300 mg       Admin Date  03/14/2021 Action  Given Dose  300 mg Route  Oral Administered By  Stefani Celis RN               Admin Date  03/15/2021 Action  Given Dose  300 mg Route  Oral Administered By  Stefani Celis RN               Admin Date  03/16/2021 Action  Given Dose  300 mg Route  Oral Administered By  Lb Brandt              sodium chloride (NS) flush 5-40 mL       Admin Date  03/13/2021 Action  Given Dose  10 mL Route  IntraVENous Administered By  Thierno Bell, JANINA               Admin Date  03/13/2021 Action  Given Dose  30 mL Route  IntraVENous Administered By  Thierno Bell, RN               Admin Date  03/13/2021 Action  Given Dose  40 mL Route  IntraVENous Administered By  Deysi Kidd RN               Admin Date  03/14/2021 Action  Given Dose  40 mL Route  IntraVENous Administered By  Deysi Kidd RN               Admin Date  03/14/2021 Action  Given Dose  10 mL Route  IntraVENous Administered By  Nanda-Hill, Hershal Members Date  03/14/2021 Action  Given Dose  40 mL Route  IntraVENous Administered By  Bridger Butler, RN               Admin Date  03/15/2021 Action  Given Dose  40 mL Route  IntraVENous Administered By  Bridger Butler, RN               Admin Date  03/15/2021 Action  Given Dose  10 mL Route  IntraVENous Administered By  Madhavi Mckeon, RN               Admin Date  03/15/2021 Action  Given Dose  10 mL Route  IntraVENous Administered By  Claudette Aguilar, RN               Admin Date  03/16/2021 Action  Given Dose  10 mL Route  IntraVENous Administered By  Lynette Alcantara              sodium chloride 0.9 % bolus infusion 250 mL       Admin Date  03/14/2021 Action  New Bag Dose  250 mL Rate  250 mL/hr Route  IntraVENous Administered By  Madhavi Mckeon, RN              tiZANidine (ZANAFLEX) tablet 4 mg       Admin Date  03/14/2021 Action  Given Dose  4 mg Route  Oral Administered By  Bridger Butler, RN               Admin Date  03/14/2021 Action  Given Dose  4 mg Route  Oral Administered By  Madhavi Mckeon, RN               Admin Date  03/14/2021 Action  Given Dose  4 mg Route  Oral Administered By  Madhavi Mckeon, RN               Admin Date  03/14/2021 Action  Given Dose  4 mg Route  Oral Administered By  Bridger Butler, RN               Admin Date  03/15/2021 Action  Given Dose  4 mg Route  Oral Administered By  Madhavi Mckeon, RN               Admin Date  03/15/2021 Action  Given Dose  4 mg Route  Oral Administered By  Madhavi Mckeon, RN               Admin Date  03/15/2021 Action  Given Dose  4 mg Route  Oral Administered By  Claudette Aguilar, RN               Admin Date  03/16/2021 Action  Given Dose  4 mg Route  Oral Administered By  Uvaldo Allison Date  03/16/2021 Action  Given Dose  4 mg Route  Oral Administered By  Lynette Alcantara              traZODone (DESYREL) tablet 150 mg       Admin Date  03/14/2021 Action  Given Dose  150 mg Route  Oral Administered By  Bryant Black Juan J Lowry Date  03/15/2021 Action  Given Dose  150 mg Route  Oral Administered By  Shelly Fuentes RN              tuberculin injection 5 Units       Admin Date  03/15/2021 Action  Give PPD Dose  5 Units Route  IntraDERMal Administered By  Georgian Severin, RN              vancomycin (VANCOCIN) 2500 mg in  mL infusion       Admin Date  03/13/2021 Action  Given Dose  2,500 mg Rate  200 mL/hr Route  IntraVENous Administered By  Alvino Romeo RN              zonisamide (ZONEGRAN) capsule 300 mg       Admin Date  03/14/2021 Action  Given Dose  300 mg Route  Oral Administered By  Sandy Khan RN               Admin Date  03/15/2021 Action  Given Dose  300 mg Route  Oral Administered By  Shelly Fuentes RN                        Problem List:     Hospital Problems as of 6/11/2021 Date Reviewed: 12/17/2020        Codes Class Noted - Resolved POA    Bacteremia due to Gram-negative bacteria ICD-10-CM: R78.81  ICD-9-CM: 790.7, 041.85  6/7/2021 - Present Unknown        Acute cystitis ICD-10-CM: N30.00  ICD-9-CM: 595.0  6/6/2021 - Present Unknown        * (Principal) Severe sepsis ([facility] ICD-10-CM: A41.9, R65.20  ICD-9-CM: 038.9, 995.92  6/6/2021 - Present Unknown        Urinary tract infection associated with indwelling urethral catheter ([facility] ICD-10-CM: T83.511A, N39.0  ICD-9-CM: 996.64, 599.0  6/6/2021 - Present Unknown        Dementia ([facility] (Chronic) ICD-10-CM: F03.90  ICD-9-CM: 294.20  1/30/2021 - Present Yes        Chronic indwelling Rojas catheter ICD-10-CM: Z97.8  ICD-9-CM: V45.89  11/22/2020 - Present Yes        Moderate protein-calorie malnutrition ([facility] ICD-10-CM: E44.0  ICD-9-CM: 263.0  11/3/2019 - Present Unknown        Urethral tear, initial encounter ICD-10-CM: S37.33XA  ICD-9-CM: 867.0  8/8/2019 - Present Unknown        Chronic respiratory failure with hypoxia (HCC) (Chronic) ICD-10-CM: J96.11  ICD-9-CM: 518.83, 799.02  3/29/2016 - Present Yes    Overview Addendum 1/30/2021  5:25 PM by Adilia Jordan MD     4+L chronically             BPH (benign prostatic hyperplasia) (Chronic) ICD-10-CM: N40.0  ICD-9-CM: 600.00  12/10/2015 - Present Yes        COPD (chronic obstructive pulmonary disease) ([facility] (Chronic) ICD-10-CM: J44.9  ICD-9-CM: 496  12/10/2015 - Present Yes                 Signed By: Jerry Aponte MD   [facility]    June 11, 2021  4:22 PM

## 2021-06-12 LAB
ANION GAP SERPL CALC-SCNC: 5 MMOL/L (ref 7–16)
BASOPHILS # BLD: 0.1 K/UL (ref 0–0.2)
BASOPHILS NFR BLD: 1 % (ref 0–2)
BUN SERPL-MCNC: 24 MG/DL (ref 8–23)
CALCIUM SERPL-MCNC: 8.4 MG/DL (ref 8.3–10.4)
CHLORIDE SERPL-SCNC: 105 MMOL/L (ref 98–107)
CO2 SERPL-SCNC: 30 MMOL/L (ref 21–32)
CREAT SERPL-MCNC: 0.5 MG/DL (ref 0.8–1.5)
DIFFERENTIAL METHOD BLD: ABNORMAL
EOSINOPHIL # BLD: 0.5 K/UL (ref 0–0.8)
EOSINOPHIL NFR BLD: 5 % (ref 0.5–7.8)
ERYTHROCYTE [DISTWIDTH] IN BLOOD BY AUTOMATED COUNT: 14.1 % (ref 11.9–14.6)
GLUCOSE SERPL-MCNC: 102 MG/DL (ref 65–100)
HCT VFR BLD AUTO: 33.7 % (ref 41.1–50.3)
HGB BLD-MCNC: 10.3 G/DL (ref 13.6–17.2)
IMM GRANULOCYTES # BLD AUTO: 0.1 K/UL (ref 0–0.5)
IMM GRANULOCYTES NFR BLD AUTO: 1 % (ref 0–5)
LYMPHOCYTES # BLD: 2.6 K/UL (ref 0.5–4.6)
LYMPHOCYTES NFR BLD: 23 % (ref 13–44)
MAGNESIUM SERPL-MCNC: 2.1 MG/DL (ref 1.8–2.4)
MCH RBC QN AUTO: 29.9 PG (ref 26.1–32.9)
MCHC RBC AUTO-ENTMCNC: 30.6 G/DL (ref 31.4–35)
MCV RBC AUTO: 97.7 FL (ref 79.6–97.8)
MONOCYTES # BLD: 1.2 K/UL (ref 0.1–1.3)
MONOCYTES NFR BLD: 11 % (ref 4–12)
NEUTS SEG # BLD: 6.5 K/UL (ref 1.7–8.2)
NEUTS SEG NFR BLD: 59 % (ref 43–78)
NRBC # BLD: 0 K/UL (ref 0–0.2)
PLATELET # BLD AUTO: 246 K/UL (ref 150–450)
PMV BLD AUTO: 10.2 FL (ref 9.4–12.3)
POTASSIUM SERPL-SCNC: 3.4 MMOL/L (ref 3.5–5.1)
RBC # BLD AUTO: 3.45 M/UL (ref 4.23–5.6)
SODIUM SERPL-SCNC: 140 MMOL/L (ref 136–145)
WBC # BLD AUTO: 11 K/UL (ref 4.3–11.1)

## 2021-06-12 PROCEDURE — 2709999900 HC NON-CHARGEABLE SUPPLY

## 2021-06-12 PROCEDURE — 85025 COMPLETE CBC W/AUTO DIFF WBC: CPT

## 2021-06-12 PROCEDURE — 74011250636 HC RX REV CODE- 250/636: Performed by: HOSPITALIST

## 2021-06-12 PROCEDURE — 97530 THERAPEUTIC ACTIVITIES: CPT

## 2021-06-12 PROCEDURE — 74011250637 HC RX REV CODE- 250/637: Performed by: HOSPITALIST

## 2021-06-12 PROCEDURE — 94640 AIRWAY INHALATION TREATMENT: CPT

## 2021-06-12 PROCEDURE — 74011250636 HC RX REV CODE- 250/636: Performed by: FAMILY MEDICINE

## 2021-06-12 PROCEDURE — 74011000258 HC RX REV CODE- 258: Performed by: FAMILY MEDICINE

## 2021-06-12 PROCEDURE — 83735 ASSAY OF MAGNESIUM: CPT

## 2021-06-12 PROCEDURE — 65610000001 HC ROOM ICU GENERAL

## 2021-06-12 PROCEDURE — 77010033711 HC HIGH FLOW OXYGEN

## 2021-06-12 PROCEDURE — 36415 COLL VENOUS BLD VENIPUNCTURE: CPT

## 2021-06-12 PROCEDURE — 74011250637 HC RX REV CODE- 250/637: Performed by: FAMILY MEDICINE

## 2021-06-12 PROCEDURE — 80048 BASIC METABOLIC PNL TOTAL CA: CPT

## 2021-06-12 PROCEDURE — 94760 N-INVAS EAR/PLS OXIMETRY 1: CPT

## 2021-06-12 PROCEDURE — 74011000250 HC RX REV CODE- 250: Performed by: HOSPITALIST

## 2021-06-12 RX ORDER — MIDODRINE HYDROCHLORIDE 5 MG/1
5 TABLET ORAL 2 TIMES DAILY WITH MEALS
Status: DISCONTINUED | OUTPATIENT
Start: 2021-06-12 | End: 2021-06-14

## 2021-06-12 RX ORDER — FUROSEMIDE 10 MG/ML
20 INJECTION INTRAMUSCULAR; INTRAVENOUS DAILY
Status: DISCONTINUED | OUTPATIENT
Start: 2021-06-12 | End: 2021-06-14

## 2021-06-12 RX ADMIN — Medication 10 ML: at 21:30

## 2021-06-12 RX ADMIN — FUROSEMIDE 20 MG: 10 INJECTION, SOLUTION INTRAMUSCULAR; INTRAVENOUS at 09:00

## 2021-06-12 RX ADMIN — CEFTRIAXONE SODIUM 2 G: 2 INJECTION, POWDER, FOR SOLUTION INTRAMUSCULAR; INTRAVENOUS at 14:09

## 2021-06-12 RX ADMIN — FINASTERIDE 5 MG: 5 TABLET, FILM COATED ORAL at 09:04

## 2021-06-12 RX ADMIN — GUAIFENESIN 600 MG: 600 TABLET, EXTENDED RELEASE ORAL at 21:30

## 2021-06-12 RX ADMIN — FLUTICASONE PROPIONATE 2 SPRAY: 50 SPRAY, METERED NASAL at 09:04

## 2021-06-12 RX ADMIN — MIDODRINE HYDROCHLORIDE 5 MG: 5 TABLET ORAL at 09:04

## 2021-06-12 RX ADMIN — DULOXETINE HYDROCHLORIDE 30 MG: 30 CAPSULE, DELAYED RELEASE ORAL at 17:00

## 2021-06-12 RX ADMIN — PANTOPRAZOLE SODIUM 40 MG: 40 TABLET, DELAYED RELEASE ORAL at 16:59

## 2021-06-12 RX ADMIN — BUDESONIDE 500 MCG: 0.5 INHALANT RESPIRATORY (INHALATION) at 07:49

## 2021-06-12 RX ADMIN — ENOXAPARIN SODIUM 40 MG: 40 INJECTION SUBCUTANEOUS at 17:00

## 2021-06-12 RX ADMIN — Medication 10 ML: at 14:00

## 2021-06-12 RX ADMIN — HALOPERIDOL LACTATE 4 MG: 5 INJECTION, SOLUTION INTRAMUSCULAR at 05:37

## 2021-06-12 RX ADMIN — BUDESONIDE 500 MCG: 0.5 INHALANT RESPIRATORY (INHALATION) at 19:51

## 2021-06-12 RX ADMIN — MIDODRINE HYDROCHLORIDE 5 MG: 5 TABLET ORAL at 16:59

## 2021-06-12 RX ADMIN — Medication 10 ML: at 05:40

## 2021-06-12 RX ADMIN — DULOXETINE HYDROCHLORIDE 30 MG: 30 CAPSULE, DELAYED RELEASE ORAL at 09:04

## 2021-06-12 RX ADMIN — GUAIFENESIN 600 MG: 600 TABLET, EXTENDED RELEASE ORAL at 09:04

## 2021-06-12 NOTE — PROGRESS NOTES
[address] [facility] Hospitalist        Name:  Sameera Ramey  Age:89 y.o. Sex:male   :  1932    MRN:  [id number]   PCP:  Zion Tracy MD      Admit Date:  2021  2:03 PM   Chief Complaint: WORSENING CONFUSION    Reason for Admission:   Severe sepsis ([address] [A41.9, R65.20]  Acute cystitis [N30.00]    Assessment & Plan:     SEVERE SEPSIS 2/2 CAUTI/acute cystitis and Ecoli Bacteremia:  : resolving  LA resolved  IV fluids stopped on   Blood culture on + for E.  Coli, repeat blood culture on  till date negative  Urine culture positive for Ecoli  Antibiotics: Zosyn -  Antibiotic deescalated to Ceftriaxone today-    Pt is hemodynamically stable  Wean midodrine as tolerated    COPD with acute on chronic hypoxic respiratory failure:  : stable  Off BiPAP   Currently on airvo 35 L/40%, wean off of airvo as able  pulmicort bid with duoneb prn  mucinex bid  Incentive spirometry  Completed doxycycline for 5 days  Chest x-ray with persistent opacities  Start patient on IV Lasix 20 mg daily, if  blood pressure tolerates    Acute encephalopathy secondary to sepsis/CAUTI with underlying dementia:  : Improving   Delirium/dementia precautions  Reorient patient  Fall precautions  Avoid benzo, opiates and anticholinergic  depakote sprinkles prn for agitation/restlessness    Depression:  : stable  Continue cymbalta    BPH:  indwelling li cath in  continue proscar  Hx of Hypospadiasis    Severe protein calorie malnutrition with BMP 17:  Nutrition consulted  Supplement with meals  Pt has chronic physical deconditioning with poor oral intake    Congenital hypospadias/urethral trauma:  Patient is evaluated by urology, recommend congenital hypospadias, no intervention required      Disposition/Expected LOS: pending clinical recovery, physical therapy recommend home health  Diet: DIET ADULT  DIET ADULT ORAL NUTRITION SUPPLEMENT  VTE ppx: lovenox  GI ppx: protonix  Code status: DNR  Surrogate decision-maker: pt's son and wife      Interval Hx/Subjective:     Raj Rowe is a 80 y.o. male with hx of COPD, chronic hypoxic respiratory failure, dementia, BPH with chronic indwelling li, HTN resident of [facility] admitted on 6/6 for severe sepsis secondary to CAUTI/acute cystitis resulting in worsening of baseline dementia. Pt noted to be febrile in ER T 100.9, with LA 2.7.    6/12:  Pt seen at bedside, resting in bed. He is alert, but little more confused as compared to yesterday. No active complaint. Denies chest pain, nausea/vomiting, diarrhea. Indwelling cath in place. Review of Systems:  A 14 point review of systems was taken and pertinent positive as mentioned above.         Objective:     Patient Vitals for the past 24 hrs:   Temp Pulse Resp BP SpO2   06/12/21 1119 98.5 °F (36.9 °C) 82 22 92/64 94 %   06/12/21 0748     97 %   06/12/21 0713    (!) 85/51 90 %   06/12/21 0706 98.6 °F (37 °C) 72 17 (!) 85/51 96 %   06/12/21 0604     97 %   06/12/21 0541     95 %   06/12/21 0540     95 %   06/12/21 0512     95 %   06/12/21 0511     92 %   06/12/21 0510     92 %   06/12/21 0508     94 %   06/12/21 0400 98.7 °F (37.1 °C) 73 20 136/83 96 %   06/12/21 0300     96 %   06/12/21 0200     (!) 71 %   06/12/21 0100     97 %   06/12/21 0013 98.4 °F (36.9 °C) 73 18 131/80 93 %   06/11/21 2300     93 %   06/11/21 2202     96 %   06/11/21 2200     95 %   06/11/21 2100  81 25 114/72 94 %   06/11/21 2000  89  (!) 88/62 91 %   06/11/21 1959  81 23  94 %   06/11/21 1919 98.1 °F (36.7 °C) 78 22 106/67 96 %   06/11/21 1900  81 29 113/69 (!) 82 %   06/11/21 1800  80 21 95/60    06/11/21 1729  77  119/75    06/11/21 1700  81 29 119/75    06/11/21 1600 98.8 °F (37.1 °C) 85 29 92/74 94 %   06/11/21 1500  79 21 93/62 96 %   06/11/21 1400  77 29 110/69 98 %       Oxygen Therapy  O2 Sat (%): 94 % (06/12/21 1119)  Pulse via Oximetry: 80 beats per minute (06/12/21 0748)  O2 Device: Heated; Hi flow nasal cannula (06/12/21 0748)  Skin Assessment: Clean, dry, & intact (06/12/21 4646)  Skin Protection for O2 Device: Yes (06/11/21 1600)  O2 Flow Rate (L/min): 35 l/min (06/12/21 0748)  O2 Temperature: 93.2 °F (34 °C) (06/12/21 0748)  FIO2 (%): 45 % (weaned to 35) (06/12/21 0748)    Body mass index is 19.07 kg/m². Physical Exam:    General:  Elderly, frail, nad, alert/awake, on airvo  HEENT:  Head NCAT, PERRLA+  Neck:   Supple, no lymphadenopathy, no JVD   Lungs:  Coarse breath sounds bilaterally  CV:   Regular rate and rhythm with normal S1 and S2   Abdomen:  Soft, nontender, nondistended, normoactive bowel sounds   Extremities:  No cyanosis clubbing or edema   Neuro:  gcs 15, CN 2-12 intact, following commands and moving all 4 extremities spontaneously  Psych:  AOx2, mood and affect flat      Data Reviewed: I have reviewed all labs, meds, and studies.       Recent Results (from the past 24 hour(s))   MAGNESIUM    Collection Time: 06/12/21  3:37 AM   Result Value Ref Range    Magnesium 2.1 1.8 - 2.4 mg/dL   METABOLIC PANEL, BASIC    Collection Time: 06/12/21  3:37 AM   Result Value Ref Range    Sodium 140 136 - 145 mmol/L    Potassium 3.4 (L) 3.5 - 5.1 mmol/L    Chloride 105 98 - 107 mmol/L    CO2 30 21 - 32 mmol/L    Anion gap 5 (L) 7 - 16 mmol/L    Glucose 102 (H) 65 - 100 mg/dL    BUN 24 (H) 8 - 23 MG/DL    Creatinine 0.50 (L) 0.8 - 1.5 MG/DL    GFR est AA >60 >60 ml/min/1.73m2    GFR est non-AA >60 >60 ml/min/1.73m2    Calcium 8.4 8.3 - 10.4 MG/DL   CBC WITH AUTOMATED DIFF    Collection Time: 06/12/21  3:37 AM   Result Value Ref Range    WBC 11.0 4.3 - 11.1 K/uL    RBC 3.45 (L) 4.23 - 5.6 M/uL    HGB 10.3 (L) 13.6 - 17.2 g/dL    HCT 33.7 (L) 41.1 - 50.3 %    MCV 97.7 79.6 - 97.8 FL    MCH 29.9 26.1 - 32.9 PG    MCHC 30.6 (L) 31.4 - 35.0 g/dL    RDW 14.1 11.9 - 14.6 %    PLATELET 486 191 - 490 K/uL    MPV 10.2 9.4 - 12.3 FL    ABSOLUTE NRBC 0.00 0.0 - 0.2 K/uL    DF AUTOMATED      NEUTROPHILS 59 43 - 78 %    LYMPHOCYTES 23 13 - 44 %    MONOCYTES 11 4.0 - 12.0 %    EOSINOPHILS 5 0.5 - 7.8 %    BASOPHILS 1 0.0 - 2.0 %    IMMATURE GRANULOCYTES 1 0.0 - 5.0 %    ABS. NEUTROPHILS 6.5 1.7 - 8.2 K/UL    ABS. LYMPHOCYTES 2.6 0.5 - 4.6 K/UL    ABS. MONOCYTES 1.2 0.1 - 1.3 K/UL    ABS. EOSINOPHILS 0.5 0.0 - 0.8 K/UL    ABS. BASOPHILS 0.1 0.0 - 0.2 K/UL    ABS. IMM. GRANS. 0.1 0.0 - 0.5 K/UL       EKG Results     Procedure 720 Value Units Date/Time    EKG, 12 LEAD, INITIAL [[id number]] Collected: 06/06/21 1423    Order Status: Completed Updated: 06/06/21 1704     Ventricular Rate 84 BPM      Atrial Rate 78 BPM      P-R Interval 136 ms      QRS Duration 92 ms      Q-T Interval 378 ms      QTC Calculation (Bezet) 446 ms      Calculated P Axis 63 degrees      Calculated R Axis -3 degrees      Calculated T Axis 72 degrees      Diagnosis --     !! AGE AND GENDER SPECIFIC ECG ANALYSIS !!   Sinus rhythm with Premature supraventricular complexes with frequent   Premature ventricular complexes  Septal infarct (cited on or before 01-FEB-2018)  Abnormal ECG  When compared with ECG of 30-JAN-2021 12:46,  Premature supraventricular complexes are now Present  Nonspecific T wave abnormality no longer evident in Lateral leads  Confirmed by ST SHIRA CAMACHO MD (), GERARDO BURKS ([id number]) on 6/6/2021 5:04:38 PM            All Micro Results     Procedure Component Value Units Date/Time    CULTURE, BLOOD [[id number]] Collected: 06/08/21 0827    Order Status: Completed Specimen: Blood Updated: 06/12/21 0752     Special Requests: --        RIGHT  Antecubital       Culture result: NO GROWTH 4 DAYS       CULTURE, BLOOD [[id number]] Collected: 06/08/21 0823    Order Status: Completed Specimen: Blood Updated: 06/12/21 0752     Special Requests: --        LEFT  HAND       Culture result: NO GROWTH 4 DAYS       CULTURE, URINE [[id number]]  (Abnormal)  (Susceptibility) Collected: 06/06/21 1427    Order Status: Completed Specimen: Urine Updated: 06/11/21 0722     Special Requests: NO SPECIAL REQUESTS        Culture result:       50,000-100,000 COLONIES/mL ESCHERICHIA COLI                  CULTURE IN PROGRESS,FURTHER UPDATES TO FOLLOW CORRECTED ON 06/10 AT 1157: PREVIOUSLY REPORTED AS <10,000 COLONIES/mL MIXED SKIN NAEL ISOLATED                  10,000 to 50,000 COLONIES/mL MIXED SKIN NAEL ISOLATED          BLOOD CULTURE [[id number]] Collected: 06/06/21 1427    Order Status: Completed Specimen: Blood Updated: 06/11/21 0704     Special Requests: --        RIGHT  Antecubital       Culture result: NO GROWTH 5 DAYS       CULTURE, RESPIRATORY/SPUTUM/BRONCH W GRAM STAIN [[id number]]  (Abnormal)  (Susceptibility) Collected: 06/06/21 1821    Order Status: Completed Specimen: Sputum Updated: 06/10/21 1019     Special Requests: NO SPECIAL REQUESTS        GRAM STAIN 2 TO 18 WBC'S/OIF      0 TO 2 EPITHELIAL CELLS SEEN /OIF      FEW GRAM POSITIVE COCCI         FEW GRAM NEGATIVE RODS         FEW YEAST         FEW GRAM POSITIVE RODS         3+ MUCUS PRESENT        Culture result:       MODERATE STAPHYLOCOCCUS AUREUS                  HEAVY NORMAL RESPIRATORY NAEL          BLOOD CULTURE [[id number]]  (Abnormal)  (Susceptibility) Collected: 06/06/21 1345    Order Status: Completed Specimen: Blood Updated: 06/09/21 0758     Special Requests: LEFT FOREARM        GRAM STAIN GRAM NEGATIVE RODS         ANAEROBIC BOTTLE POSITIVE               RESULTS VERIFIED, PHONED TO AND READ BACK BY AMISHA GRIFFITH RN BY NB AT [address] [place] [address] ON [id number]           Culture result: ESCHERICHIA COLI               Refer to Blood Culture ID Panel Accession  [id number]      SARS-COV-2, PCR [[id number]] Collected: 06/07/21 1646    Order Status: Completed Specimen: Nasopharyngeal Updated: 06/08/21 1226     Specimen source Nasopharyngeal        SARS-CoV-2 Not detected        Comment:      The specimen is NEGATIVE for SARS-CoV-2, the novel coronavirus associated with COVID-19.        This test has been authorized by the FDA under an Emergency Use Authorization (EUA) for use by authorized laboratories. Fact sheet for Healthcare Providers: [employer]       Fact sheet for Patients: [employer]       Methodology: RT-PCR         BLOOD CULTURE ID PANEL [[id number]]  (Abnormal) Collected: 06/06/21 1345    Order Status: Completed Specimen: Blood Updated: 06/07/21 1322     Acc. no. from Micro Order [id number]     Escherichia coli Detected        Comment: RESULTS VERIFIED, PHONED TO AND READ BACK BY  EREN ACOSTA RN @ 9090 ON 6/7/21 BY [facility]          KPC (Carbapenem Resistance Gene) NOT DETECTED        Comment: WARNING:  A Not Detected result for the KPC gene does not indicate susceptibility to carbapenems. Gram negative bacteria can be resistant to carbapenems by mechanisms other than carrying the KPC gene. INTERPRETATION       Gram negative dereck. Identified by realtime PCR as E. coli          COVID-19 RAPID TEST [[id number]] Collected: 06/06/21 1659    Order Status: Completed Specimen: Nasopharyngeal Updated: 06/06/21 1728     Specimen source Nasopharyngeal        COVID-19 rapid test Not detected        Comment:      The specimen is NEGATIVE for SARS-CoV-2, the novel coronavirus associated with COVID-19. A negative result does not rule out COVID-19. This test has been authorized by the FDA under an Emergency Use Authorization (EUA) for use by authorized laboratories. Fact sheet for Healthcare Providers: [employer]  Fact sheet for Patients: [employer]       Methodology: Isothermal Nucleic Acid Amplification               Other Studies:  No results found.       Medications:  Medications Administered       acetaminophen (TYLENOL) tablet 650 mg       Admin Date  03/13/2021 Action  Given Dose  650 mg Route  Oral Administered By  Carlos Schwartz RN              cefTRIAXone (ROCEPHIN) 1 g in 0.9% sodium chloride (MBP/ADV) 50 mL MBP       Admin Date  03/13/2021 Action  New Bag Dose  1 g Rate  100 mL/hr Route  IntraVENous Administered By  Barbara Mina RN              cefTRIAXone (ROCEPHIN) 2 g in 0.9% sodium chloride (MBP/ADV) 50 mL MBP       Admin Date  03/13/2021 Action  New Bag Dose  2 g Rate  100 mL/hr Route  IntraVENous Administered By  Danielle Valle RN               Admin Date  03/14/2021 Action  New Bag Dose  2 g Rate  100 mL/hr Route  IntraVENous Administered By  Reji Austin RN               Admin Date  03/15/2021 Action  New Bag Dose  2 g Rate  100 mL/hr Route  IntraVENous Administered By  Reji Austin RN               Admin Date  03/16/2021 Action  New Bag Dose  2 g Rate  100 mL/hr Route  IntraVENous Administered By  Bang Estrella              diphenhydrAMINE (BENADRYL) injection 25 mg       Admin Date  03/14/2021 Action  Given Dose  25 mg Route  IntraVENous Administered By  Marycruz James RN              doxycycline (VIBRAMYCIN) 100 mg in 0.9% sodium chloride (MBP/ADV) 100 mL MBP       Admin Date  03/13/2021 Action  New Bag Dose  100 mg Rate  100 mL/hr Route  IntraVENous Administered By  Danielle Valle RN               Admin Date  03/13/2021 Action  New Bag Dose  100 mg Rate  100 mL/hr Route  IntraVENous Administered By  Marycruz James RN               Admin Date  03/14/2021 Action  New Bag Dose  100 mg Rate  100 mL/hr Route  IntraVENous Administered By  Reji Austin RN               Admin Date  03/14/2021 Action  New Bag Dose  100 mg Rate  100 mL/hr Route  IntraVENous Administered By  Marycruz James RN               Admin Date  03/15/2021 Action  New Bag Dose  100 mg Rate  100 mL/hr Route  IntraVENous Administered By  Reji Austin RN              enoxaparin (LOVENOX) injection 40 mg       Admin Date  03/13/2021 Action  Given Dose  40 mg Route  SubCUTAneous Administered By  Danielle Valle RN               Admin Date  03/13/2021 Action  Given Dose  40 mg Route  SubCUTAneous Administered By  Kulwinder Aldrich RN               Admin Date  03/14/2021 Action  Given Dose  40 mg Route  SubCUTAneous Administered By  Radha Douglass RN               Admin Date  03/14/2021 Action  Given Dose  40 mg Route  SubCUTAneous Administered By  Kulwinder Aldrich RN               Admin Date  03/15/2021 Action  Given Dose  40 mg Route  SubCUTAneous Administered By  Radha Douglass RN               Admin Date  03/15/2021 Action  Given Dose  40 mg Route  SubCUTAneous Administered By  Storm Mayorga RN               Admin Date  03/16/2021 Action  Given Dose  40 mg Route  SubCUTAneous Administered By  Abrahan Kerr              fentaNYL citrate (PF) injection 50 mcg       Admin Date  03/13/2021 Action  Given Dose  50 mcg Route  IntraVENous Administered By  Susan Dillard RN               Admin Date  03/14/2021 Action  Given Dose  50 mcg Route  IntraVENous Administered By  Kulwinder Aldrich RN              ferrous sulfate tablet 325 mg       Admin Date  03/14/2021 Action  Given Dose  325 mg Route  Oral Administered By  Radha Douglass RN               Admin Date  03/14/2021 Action  Given Dose  325 mg Route  Oral Administered By  Radha Douglass RN               Admin Date  03/15/2021 Action  Given Dose  325 mg Route  Oral Administered By  Rdaha Douglass RN              folic acid (FOLVITE) tablet 1 mg       Admin Date  03/14/2021 Action  Given Dose  1 mg Route  Oral Administered By  Radha Douglass RN               Admin Date  03/15/2021 Action  Given Dose  1 mg Route  Oral Administered By  Radha Douglass RN               Admin Date  03/16/2021 Action  Given Dose  1 mg Route  Oral Administered By  Abrahan Kerr              furosemide (LASIX) injection 20 mg       Admin Date  03/15/2021 Action  Given Dose  20 mg Route  IntraVENous Administered By  Radha Douglass RN              furosemide (LASIX) injection 40 mg       Admin Date  03/13/2021 Action  Given Dose  40 mg Route  IntraVENous Administered By  Brian Pratt RN               Admin Date  03/13/2021 Action  Given Dose  40 mg Route  IntraVENous Administered By  Rosaura Arriaga RN               Admin Date  03/14/2021 Action  Given Dose  40 mg Route  IntraVENous Administered By  David Collado RN               Admin Date  03/14/2021 Action  Given Dose  40 mg Route  IntraVENous Administered By  Rosaura Arriaga RN               Admin Date  03/15/2021 Action  Given Dose  40 mg Route  IntraVENous Administered By  Joann Mccullough RN               Admin Date  03/16/2021 Action  Given Dose  40 mg Route  IntraVENous Administered By  Lauren Landaverde              HYDROcodone-acetaminophen [facility]) 5-325 mg per tablet 1 Tab       Admin Date  03/14/2021 Action  Given Dose  1 Tab Route  Oral Administered By  David Collado RN               Admin Date  03/15/2021 Action  Given Dose  1 Tab Route  Oral Administered By  Rosaura Arriaga RN              insulin lispro (HUMALOG) injection       Admin Date  03/13/2021 Action  Given Dose  2 Units Route  SubCUTAneous Administered By  Rosaura Arriaga RN               Admin Date  03/14/2021 Action  Given Dose  2 Units Route  SubCUTAneous Administered By  David Collado RN               Admin Date  03/14/2021 Action  Given Dose  2 Units Route  SubCUTAneous Administered By  David oCllado RN               Admin Date  03/14/2021 Action  Given Dose  4 Units Route  SubCUTAneous Administered By  Rosaura Arriaga RN               Admin Date  03/15/2021 Action  Given Dose  2 Units Route  SubCUTAneous Administered By  David Collado RN               Admin Date  03/15/2021 Action  Given Dose  4 Units Route  SubCUTAneous Administered By  David Collado RN               Admin Date  03/15/2021 Action  Given Dose  2 Units Route  SubCUTAneous Administered By  Joann Mccullough RN               Admin Date  03/16/2021 Action  Given Dose  4 Units Route  SubCUTAneous Administered By  Lauren Landaverde              levothyroxine (SYNTHROID) tablet 137 mcg       Admin Date  03/14/2021 Action  Given Dose  137 mcg Route  Oral Administered By  Dannielle Vann RN               Admin Date  03/15/2021 Action  Given Dose  137 mcg Route  Oral Administered By  Dannielle Vann RN               Admin Date  03/16/2021 Action  Given Dose  137 mcg Route  Oral Administered By  Ninfa Brewer RN              midodrine (PROAMATINE) tablet 10 mg       Admin Date  03/14/2021 Action  Given Dose  10 mg Route  Oral Administered By  Dannielle Vann RN               Admin Date  03/14/2021 Action  Given Dose  10 mg Route  Oral Administered By  Dannielle Vann RN               Admin Date  03/15/2021 Action  Given Dose  10 mg Route  Oral Administered By  Dannielle Vann RN               Admin Date  03/15/2021 Action  Given Dose  10 mg Route  Oral Administered By  Dannielle Vann RN               Admin Date  03/15/2021 Action  Given Dose  10 mg Route  Oral Administered By  Dannielle Vann RN               Admin Date  03/16/2021 Action  Given Dose  10 mg Route  Oral Administered By  Mouna Munoz Date  03/16/2021 Action  Given Dose  10 mg Route  Oral Administered By  Mouna Munoz Date  03/16/2021 Action  Given Dose  10 mg Route  Oral Administered By  Ernesto Severs naloxone [facility]) injection 2 mg       Admin Date  03/13/2021 Action  Given Dose  2 mg Route  IntraVENous Administered By  John Covarrubias RN              NOREPINephrine (LEVOPHED) 4 mg in 5% dextrose 250 mL infusion       Admin Date  03/14/2021 Action  New Bag Dose  4 mcg/min Rate  15 mL/hr Route  IntraVENous Administered By  Dannielle Vann RN               Admin Date  03/14/2021 Action  Rate Change Dose  6 mcg/min Rate  22.5 mL/hr Route  IntraVENous Administered By  Dannielle Vann RN               Admin Date  03/14/2021 Action  Rate Change Dose  4 mcg/min Rate  15 mL/hr Route  IntraVENous Administered By  Dannielle Vann RN               Admin Date  03/14/2021 Action  Rate Change Dose  2 mcg/min Rate  7.5 mL/hr Route  IntraVENous Administered By  Jonathan Chang RN               Admin Date  03/14/2021 Action  Rate Change Dose  1 mcg/min Rate  3.8 mL/hr Route  IntraVENous Administered By  Jonathan Chang RN               Admin Date  03/14/2021 Action  Restarted Dose  2 mcg/min Rate  7.5 mL/hr Route  IntraVENous Administered By  Jonathan Chang RN               Admin Date  03/14/2021 Action  Rate Change Dose  4 mcg/min Rate  15 mL/hr Route  IntraVENous Administered By  Jonathan Chang RN               Admin Date  03/15/2021 Action  Rate Change Dose  2 mcg/min Rate  7.5 mL/hr Route  IntraVENous Administered By  Day King RN              [facility]) injection 4 mg       Admin Date  03/13/2021 Action  Given Dose  4 mg Route  IntraVENous Administered By  Cal Roberto RN              pantoprazole (PROTONIX) tablet 40 mg       Admin Date  03/14/2021 Action  Given Dose  40 mg Route  Oral Administered By  Jonathan Chang RN               Admin Date  03/15/2021 Action  Given Dose  40 mg Route  Oral Administered By  Jonathan Chang RN               Admin Date  03/16/2021 Action  Given Dose  40 mg Route  Oral Administered By  Jackie Robison              perflutren lipid microspheres (DEFINITY) in NS bolus IV       Admin Date  03/13/2021 Action  Given Dose  1 mL Route  IntraVENous Administered By  DARLEEN Joyce              potassium chloride (K-DUR, KLOR-CON) SR tablet 40 mEq       Admin Date  03/15/2021 Action  Given Dose  40 mEq Route  Oral Administered By  Jonathan Chang RN               Admin Date  03/16/2021 Action  Given Dose  40 mEq Route  Oral Administered By  Jackie Robison              potassium chloride 40 mEq IVPB       Admin Date  03/15/2021 Action  New Bag Dose  40 mEq Rate  25 mL/hr Route  IntraVENous Administered By  Day King RN              pregabalin (LYRICA) capsule 300 mg       Admin Date  03/14/2021 Action  Given Dose  300 mg Route  Oral Administered By  Dannielle Vann RN               Admin Date  03/14/2021 Action  Given Dose  300 mg Route  Oral Administered By  Dannielle Vann RN               Admin Date  03/15/2021 Action  Given Dose  300 mg Route  Oral Administered By  Dannielle Vann RN               Admin Date  03/15/2021 Action  Given Dose  300 mg Route  Oral Administered By  Jose Mederos, RN               Admin Date  03/16/2021 Action  Given Dose  300 mg Route  Oral Administered By  Ernesto Severs              sertraline (ZOLOFT) tablet 300 mg       Admin Date  03/14/2021 Action  Given Dose  300 mg Route  Oral Administered By  Dannielle Vann, JANINA               Admin Date  03/15/2021 Action  Given Dose  300 mg Route  Oral Administered By  Dannielle Vann RN               Admin Date  03/16/2021 Action  Given Dose  300 mg Route  Oral Administered By  Ernesto Severs              sodium chloride (NS) flush 5-40 mL       Admin Date  03/13/2021 Action  Given Dose  10 mL Route  IntraVENous Administered By  Devante Lr RN               Admin Date  03/13/2021 Action  Given Dose  30 mL Route  IntraVENous Administered By  Devante Lr RN               Admin Date  03/13/2021 Action  Given Dose  40 mL Route  IntraVENous Administered By  Pedro Villalba RN               Admin Date  03/14/2021 Action  Given Dose  40 mL Route  IntraVENous Administered By  Pedro Villalba RN               Admin Date  03/14/2021 Action  Given Dose  10 mL Route  IntraVENous Administered By  Dannielle Vann RN               Admin Date  03/14/2021 Action  Given Dose  40 mL Route  IntraVENous Administered By  Pedro Villalba RN               Admin Date  03/15/2021 Action  Given Dose  40 mL Route  IntraVENous Administered By  Pedro Villalba RN               Admin Date  03/15/2021 Action  Given Dose  10 mL Route  IntraVENous Administered By  Dannielle Vann RN               Admin Date  03/15/2021 Action  Given Dose  10 mL Route  IntraVENous Administered By  Sammy Chapa RN               Admin Date  03/16/2021 Action  Given Dose  10 mL Route  IntraVENous Administered By  Lex Roberts              sodium chloride 0.9 % bolus infusion 250 mL       Admin Date  03/14/2021 Action  New Bag Dose  250 mL Rate  250 mL/hr Route  IntraVENous Administered By  Sidney Mccartney RN              tiZANidine (ZANAFLEX) tablet 4 mg       Admin Date  03/14/2021 Action  Given Dose  4 mg Route  Oral Administered By  Mali Peacock RN               Admin Date  03/14/2021 Action  Given Dose  4 mg Route  Oral Administered By  Sidney Mccartney RN               Admin Date  03/14/2021 Action  Given Dose  4 mg Route  Oral Administered By  Sidney Mccartney RN               Admin Date  03/14/2021 Action  Given Dose  4 mg Route  Oral Administered By  Mali Peacock RN               Admin Date  03/15/2021 Action  Given Dose  4 mg Route  Oral Administered By  Sidney Mccartney RN               Admin Date  03/15/2021 Action  Given Dose  4 mg Route  Oral Administered By  Sidney Mccartney RN               Admin Date  03/15/2021 Action  Given Dose  4 mg Route  Oral Administered By  Sammy Chapa RN               Admin Date  03/16/2021 Action  Given Dose  4 mg Route  Oral Administered By  Lou Bland Date  03/16/2021 Action  Given Dose  4 mg Route  Oral Administered By  Lex Roberts              traZODone (DESYREL) tablet 150 mg       Admin Date  03/14/2021 Action  Given Dose  150 mg Route  Oral Administered By  Mali Peacock RN               Admin Date  03/15/2021 Action  Given Dose  150 mg Route  Oral Administered By  Sammy Chapa RN              tuberculin injection 5 Units       Admin Date  03/15/2021 Action  Give PPD Dose  5 Units Route  IntraDERMal Administered By  Sidney Mccartney RN              vancomycin (VANCOCIN) 2500 mg in  mL infusion       Admin Date  03/13/2021 Action  Given Dose  2,500 mg Rate  200 mL/hr Route  IntraVENous Administered By  Christiano Fernandez RN              zonisamide [facility]) capsule 300 mg       Admin Date  03/14/2021 Action  Given Dose  300 mg Route  Oral Administered By  Alicia Bernstein RN               Admin Date  03/15/2021 Action  Given Dose  300 mg Route  Oral Administered By  Caro Quiñonez RN                        Problem List:     Hospital Problems as of 6/12/2021 Date Reviewed: 12/17/2020        Codes Class Noted - Resolved POA    Bacteremia due to Gram-negative bacteria ICD-10-CM: R78.81  ICD-9-CM: 790.7, 041.85  6/7/2021 - Present Unknown        Acute cystitis ICD-10-CM: N30.00  ICD-9-CM: 595.0  6/6/2021 - Present Unknown        * (Principal) Severe sepsis ([facility] ICD-10-CM: A41.9, R65.20  ICD-9-CM: 038.9, 995.92  6/6/2021 - Present Unknown        Urinary tract infection associated with indwelling urethral catheter ([facility] ICD-10-CM: T83.511A, N39.0  ICD-9-CM: 996.64, 599.0  6/6/2021 - Present Unknown        Dementia ([facility] (Chronic) ICD-10-CM: F03.90  ICD-9-CM: 294.20  1/30/2021 - Present Yes        Chronic indwelling Rojas catheter ICD-10-CM: Z97.8  ICD-9-CM: V45.89  11/22/2020 - Present Yes        Moderate protein-calorie malnutrition ([facility] ICD-10-CM: E44.0  ICD-9-CM: 263.0  11/3/2019 - Present Unknown        Urethral tear, initial encounter ICD-10-CM: S37.33XA  ICD-9-CM: 867.0  8/8/2019 - Present Unknown        Chronic respiratory failure with hypoxia (HCC) (Chronic) ICD-10-CM: J96.11  ICD-9-CM: 518.83, 799.02  3/29/2016 - Present Yes    Overview Addendum 1/30/2021  5:25 PM by Sarai Berrios MD     4+L chronically             BPH (benign prostatic hyperplasia) (Chronic) ICD-10-CM: N40.0  ICD-9-CM: 600.00  12/10/2015 - Present Yes        COPD (chronic obstructive pulmonary disease) ([facility] (Chronic) ICD-10-CM: J44.9  ICD-9-CM: 997  12/10/2015 - Present Yes                 Signed By: Latoya Moore MD   [facility] Hospitalist Service    June 12, 2021  4:22 PM

## 2021-06-12 NOTE — PROGRESS NOTES
Problem: Mobility Impaired (Adult and Pediatric)  Goal: *Acute Goals and Plan of Care (Insert Text)  Outcome: Progressing Towards Goal  Note:   GOALS UPDATED BASED ON CURRENT PROGRESS 6/12/21 :  (1.)Mr. Cheyenne Pena will move from supine to sit and sit to supine  in bed HOB 30 deg with rail) with CONTACT GUARD ASSIST (consistently). (2.)Mr. Cheyenne Pena will transfer from bed to chair and chair to bed using a walker & MINIMAL ASSIST (consistently). (3.)Mr. Cheyenne Pena will ambulate 25-30 ft using a rolling walker & MINIMAL ASSIST (consistently). 4) pt will perform LE & UE AROM on cue consistently as a warm up to functional activity. ________________________________________________________________________________________________      PHYSICAL THERAPY: Re-evaluation and PM 6/12/2021  INPATIENT: PT Visit Days : 1  [place] / [place] / Product Type: Medicare /       NAME/AGE/GENDER: Alexei Layton is a 80 y.o. male   PRIMARY DIAGNOSIS: Severe sepsis ([facility] [A41.9, R65.20]  Acute cystitis [N30.00] Severe sepsis ([facility] Severe sepsis ([facility]       ICD-10: Treatment Diagnosis:    · Difficulty in walking, Not elsewhere classified (R26.2)  · Other abnormalities of gait and mobility (R26.89)   Precaution/Allergies:  Patient has no known allergies. ASSESSMENT:     Mr. Cheyenne Pena was eager & willing to work with PT, with pt being calm throughout session & following directions well. Pt was oriented to place, person, partially to situation but not to time. Pt demonstrated progress with increased stability during [address] activity, along with increased gait distance. Pt also had improved level of assist for bed mobility, transfers & gait, as result goals were modified. This section established at most recent assessment   PROBLEM LIST (Impairments causing functional limitations):  1. Decreased Strength  2. Decreased Transfer Abilities  3. Decreased Ambulation Ability/Technique  4.  Decreased Balance  5. Increased Pain  6. Decreased Activity Tolerance  7. Decreased Flexibility/Joint Mobility   INTERVENTIONS PLANNED: (Benefits and precautions of physical therapy have been discussed with the patient.)  1. Bed Mobility  2. Gait Training  3. Therapeutic Activites  4. Therapeutic Exercise/Strengthening  5. Transfer Training     TREATMENT PLAN: Frequency/Duration: daily for duration of hospital stay  Rehabilitation Potential For Stated Goals: Good     REHAB RECOMMENDATIONS (at time of discharge pending progress):    Placement: It is my opinion, based on this patient's performance to date, that Mr. Julius Faulkner may benefit from intensive therapy at a [address] [place] [address] after discharge due to the functional deficits listed above that are likely to improve with skilled rehabilitation and concerns that he/she may be unsafe to be unsupervised at home due to being a high fall risk. Equipment:    None at this time              HISTORY:   History of Present Injury/Illness (Reason for Referral):  Pt admitted with above diagnosis.   Past Medical History/Comorbidities:   Mr. Julius Faulkner  has a past medical history of Abdominal aortic aneurysm ([address] (12/10/2015), Abdominal aortic aneurysm without rupture ([address] Abnormal finding of lung (10/17/2016), Abnormality of urethral meatus (8/8/2019), Allergic rhinitis (12/10/2015), Asthma, Benign neoplasm, Benign prostatic hyperplasia (12/10/2015), Bigeminy (3/28/2016), BPH without urinary obstruction, Cardiovascular disease (12/10/2015), Chronic obstructive asthma with exacerbation ([address] (12/10/2015), Closed compression fracture of lumbosacral spine ([address] (12/11/2018), COPD (chronic obstructive pulmonary disease) ([address] (12/10/2015), COPD (chronic obstructive pulmonary disease) with emphysema ([address] (10/17/2016), COPD exacerbation ([address] (5/6/2019), Cough with hemoptysis, Erectile dysfunction (12/10/2015), Essential hypertension, benign (12/10/2015), Fracture (10/2014), History of colon polyps, Low testosterone (12/10/2015), Lumbago, Memory loss, Overflow incontinence, Pleural effusion (6/10/2019), Pneumothorax on right (5/6/2019), Raynaud's syndrome (12/10/2015), Rotator cuff tendinitis, Thrombocytopenia ([facility] Urinary frequency, Ventricular tachyarrhythmia ([facility] (5/6/2019), and VT (ventricular tachycardia) ([facility] (5/6/2019). Mr. Bonnie Villareal  has a past surgical history that includes hx hernia repair (1980); hx colonoscopy; hx fracture tx (10/2014); hx cataract removal (6/2016-right); and hx orthopaedic (03/2018). Social History/Living Environment:   Home Environment: Private residence  [address] [place] [address] Antw[place] Name: Heena Levy   One/Two Story Residence: One story  Living Alone: No  Support Systems: Skilled nursing facility  Patient Expects to be Discharged to[de-identified] [address] facility  Current DME Used/Available at Home: Other (comment)  Tub or Shower Type: Shower  Prior Level of Function/Work/Activity:  Pt states he did not walk prior to admission. Number of Personal Factors/Comorbidities that affect the Plan of Care: 0: LOW COMPLEXITY   EXAMINATION:   Most Recent Physical Functioning:   Gross Assessment: 3-/5 to 2/5 throughout                       Balance:  Sitting: Intact; Without support  Standing: Impaired; With support (walker) Bed Mobility:  Supine to Sit: Minimum assistance  Sit to Supine: Contact guard assistance  Scooting: Minimum assistance       Transfers:  Sit to Stand: Minimum assistance  Stand to Sit: Minimum assistance  Bed to Chair: Minimum assistance (with walker)  Duration: 38 Minutes  Gait:     Base of Support: Narrowed  Speed/Marifer: Shuffled; Slow  Gait Abnormalities: Decreased step clearance (flexed posture & posterior lean)  Distance (ft): 15 Feet (ft) (10ft & 5ft)  Assistive Device: Walker, rolling  Ambulation - Level of Assistance: Minimal assistance  Interventions: Safety awareness training;Verbal cues      Body Structures Involved:  1. Lungs  2. Bones  3. Joints  4. Muscles  5. Ligaments Body Functions Affected:  1. Respiratory  2. Neuromusculoskeletal  3. Movement Related Activities and Participation Affected:  1. General Tasks and Demands  2. Mobility  3. Self Care   Number of elements that affect the Plan of Care: 4+: HIGH COMPLEXITY   CLINICAL PRESENTATION:   Presentation: Stable and uncomplicated: LOW COMPLEXITY   CLINICAL DECISION MAKIN[address] [place] [address] AM-PAC 6 Clicks   Basic Mobility Inpatient Short Form  How much difficulty does the patient currently have. .. Unable A Lot A Little None   1. Turning over in bed (including adjusting bedclothes, sheets and blankets)? [] 1   [] 2   [x] 3   [] 4   2. Sitting down on and standing up from a chair with arms ( e.g., wheelchair, bedside commode, etc.)   [] 1   [] 2   [x] 3   [] 4   3. Moving from lying on back to sitting on the side of the bed? [] 1   [] 2   [x] 3   [] 4   How much help from another person does the patient currently need. .. Total A Lot A Little None   4. Moving to and from a bed to a chair (including a wheelchair)? [] 1   [] 2   [x] 3   [] 4   5. Need to walk in hospital room? [] 1   [x] 2   [] 3   [] 4   6. Climbing 3-5 steps with a railing? [] 1   [x] 2   [] 3   [] 4   © , Trustees of [address] [place] [address], under license to [employer]. All rights reserved      Score:  Initial: 16 Most Recent: X (Date: -- )    Interpretation of Tool:  Represents activities that are increasingly more difficult (i.e. Bed mobility, Transfers, Gait). Medical Necessity:     · Skilled intervention continues to be required due to decreased independence with functional mobility. Reason for Services/Other Comments:  · Patient continues to require skilled intervention due to   · Decreased independence with functional mobility.   · .   Use of outcome tool(s) and clinical judgement create a POC that gives a: Clear prediction of patient's progress: LOW COMPLEXITY TREATMENT:   (In addition to Assessment/Re-Assessment sessions the following treatments were rendered)   Pre-treatment Symptoms/Complaints:  Pt was agreeable \" can we go again? \"  Pain: Initial: numeric scale  Pain Intensity 1: 0  Post Session: 0/10     Therapeutic Activity: (  38 Minutes ):  Therapeutic activities including LE AAROM as a warm up to functional activity, supine>sit, sitting balance EOB(static & dynamic-wt shifting) repeated sit<>stand with walker with standing balance & wt-shift R<>L, pt ambulated with walker 5 ft ,10 ft & 15 ft with rolling walker, pt also performed prolonged standing balance activity for clean up after BM, sit>supine with positioning HOB >30 deg to improve mobility, strength, balance, coordination and dynamic movement of arm - bilateral, leg - bilateral and core to improve functional endurance & stability. Braces/Orthotics/Lines/Etc:   · li catheter  · telemetry/monitors  · O2 Device: Hi flow nasal cannula  Treatment/Session Assessment:    · Response to Treatment: big gains functionally  · Interdisciplinary Collaboration:   o Registered Nurse  · After treatment position/precautions:   o Up in chair  o Supine in bed  o Bed/Chair-wheels locked  o Bed in low position  o Call light within reach  o RN notified   · Compliance with Program/Exercises: Will assess as treatment progresses  · Recommendations/Intent for next treatment session: \"Next visit will focus on reduction in assistance provided\".   Total Treatment Duration:  PT Patient Time In/Time Out  Time In: [id number]  Time Out: [address], PT

## 2021-06-12 NOTE — PROGRESS NOTES
Patient became confused and removed oxygen. Oxygen saturation dropped, and patient became combative. Mittens applied and oxygen replaced in nares. Patient's saturation gradually improved. Patient remained agitated and refused PO medication. Haldol 4 mg given IV. Patient is beginning to relax. While he is still confused as to situation, he is calm and cooperating. Mittens have been removed. Patient is talking with his son on the phone and denies discomfort.

## 2021-06-13 LAB
ANION GAP SERPL CALC-SCNC: 7 MMOL/L (ref 7–16)
BACTERIA SPEC CULT: NORMAL
BACTERIA SPEC CULT: NORMAL
BASOPHILS # BLD: 0.1 K/UL (ref 0–0.2)
BASOPHILS NFR BLD: 1 % (ref 0–2)
BUN SERPL-MCNC: 25 MG/DL (ref 8–23)
CALCIUM SERPL-MCNC: 8 MG/DL (ref 8.3–10.4)
CHLORIDE SERPL-SCNC: 105 MMOL/L (ref 98–107)
CO2 SERPL-SCNC: 29 MMOL/L (ref 21–32)
CREAT SERPL-MCNC: 0.54 MG/DL (ref 0.8–1.5)
DIFFERENTIAL METHOD BLD: ABNORMAL
EOSINOPHIL # BLD: 0.5 K/UL (ref 0–0.8)
EOSINOPHIL NFR BLD: 5 % (ref 0.5–7.8)
ERYTHROCYTE [DISTWIDTH] IN BLOOD BY AUTOMATED COUNT: 14.4 % (ref 11.9–14.6)
GLUCOSE SERPL-MCNC: 100 MG/DL (ref 65–100)
HCT VFR BLD AUTO: 33.1 % (ref 41.1–50.3)
HGB BLD-MCNC: 10.4 G/DL (ref 13.6–17.2)
IMM GRANULOCYTES # BLD AUTO: 0.1 K/UL (ref 0–0.5)
IMM GRANULOCYTES NFR BLD AUTO: 1 % (ref 0–5)
LYMPHOCYTES # BLD: 2.3 K/UL (ref 0.5–4.6)
LYMPHOCYTES NFR BLD: 22 % (ref 13–44)
MAGNESIUM SERPL-MCNC: 2.1 MG/DL (ref 1.8–2.4)
MCH RBC QN AUTO: 30.5 PG (ref 26.1–32.9)
MCHC RBC AUTO-ENTMCNC: 31.4 G/DL (ref 31.4–35)
MCV RBC AUTO: 97.1 FL (ref 79.6–97.8)
MONOCYTES # BLD: 1.1 K/UL (ref 0.1–1.3)
MONOCYTES NFR BLD: 11 % (ref 4–12)
NEUTS SEG # BLD: 6.4 K/UL (ref 1.7–8.2)
NEUTS SEG NFR BLD: 61 % (ref 43–78)
NRBC # BLD: 0 K/UL (ref 0–0.2)
PLATELET # BLD AUTO: 272 K/UL (ref 150–450)
PMV BLD AUTO: 10.2 FL (ref 9.4–12.3)
POTASSIUM SERPL-SCNC: 3.6 MMOL/L (ref 3.5–5.1)
RBC # BLD AUTO: 3.41 M/UL (ref 4.23–5.6)
SERVICE CMNT-IMP: NORMAL
SERVICE CMNT-IMP: NORMAL
SODIUM SERPL-SCNC: 141 MMOL/L (ref 136–145)
WBC # BLD AUTO: 10.5 K/UL (ref 4.3–11.1)

## 2021-06-13 PROCEDURE — 85025 COMPLETE CBC W/AUTO DIFF WBC: CPT

## 2021-06-13 PROCEDURE — 74011250637 HC RX REV CODE- 250/637: Performed by: HOSPITALIST

## 2021-06-13 PROCEDURE — 80048 BASIC METABOLIC PNL TOTAL CA: CPT

## 2021-06-13 PROCEDURE — 94760 N-INVAS EAR/PLS OXIMETRY 1: CPT

## 2021-06-13 PROCEDURE — 2709999900 HC NON-CHARGEABLE SUPPLY

## 2021-06-13 PROCEDURE — 74011000250 HC RX REV CODE- 250: Performed by: HOSPITALIST

## 2021-06-13 PROCEDURE — 74011250636 HC RX REV CODE- 250/636: Performed by: FAMILY MEDICINE

## 2021-06-13 PROCEDURE — 97535 SELF CARE MNGMENT TRAINING: CPT

## 2021-06-13 PROCEDURE — 94640 AIRWAY INHALATION TREATMENT: CPT

## 2021-06-13 PROCEDURE — 36415 COLL VENOUS BLD VENIPUNCTURE: CPT

## 2021-06-13 PROCEDURE — 77010033711 HC HIGH FLOW OXYGEN

## 2021-06-13 PROCEDURE — 97530 THERAPEUTIC ACTIVITIES: CPT

## 2021-06-13 PROCEDURE — 74011250637 HC RX REV CODE- 250/637: Performed by: FAMILY MEDICINE

## 2021-06-13 PROCEDURE — 74011250636 HC RX REV CODE- 250/636: Performed by: HOSPITALIST

## 2021-06-13 PROCEDURE — 83735 ASSAY OF MAGNESIUM: CPT

## 2021-06-13 PROCEDURE — 74011000258 HC RX REV CODE- 258: Performed by: FAMILY MEDICINE

## 2021-06-13 PROCEDURE — 77030021668 HC NEB PREFIL KT VYRM -A

## 2021-06-13 PROCEDURE — 65610000001 HC ROOM ICU GENERAL

## 2021-06-13 RX ORDER — SULFAMETHOXAZOLE AND TRIMETHOPRIM 800; 160 MG/1; MG/1
1 TABLET ORAL EVERY 12 HOURS
Status: DISCONTINUED | OUTPATIENT
Start: 2021-06-14 | End: 2021-06-15 | Stop reason: HOSPADM

## 2021-06-13 RX ADMIN — Medication 10 ML: at 14:00

## 2021-06-13 RX ADMIN — FINASTERIDE 5 MG: 5 TABLET, FILM COATED ORAL at 08:03

## 2021-06-13 RX ADMIN — GUAIFENESIN 600 MG: 600 TABLET, EXTENDED RELEASE ORAL at 21:15

## 2021-06-13 RX ADMIN — Medication 1 EACH: at 17:33

## 2021-06-13 RX ADMIN — DULOXETINE HYDROCHLORIDE 30 MG: 30 CAPSULE, DELAYED RELEASE ORAL at 17:33

## 2021-06-13 RX ADMIN — PANTOPRAZOLE SODIUM 40 MG: 40 TABLET, DELAYED RELEASE ORAL at 17:33

## 2021-06-13 RX ADMIN — FLUTICASONE PROPIONATE 2 SPRAY: 50 SPRAY, METERED NASAL at 08:03

## 2021-06-13 RX ADMIN — ENOXAPARIN SODIUM 40 MG: 40 INJECTION SUBCUTANEOUS at 17:33

## 2021-06-13 RX ADMIN — DULOXETINE HYDROCHLORIDE 30 MG: 30 CAPSULE, DELAYED RELEASE ORAL at 08:03

## 2021-06-13 RX ADMIN — CEFTRIAXONE SODIUM 2 G: 2 INJECTION, POWDER, FOR SOLUTION INTRAMUSCULAR; INTRAVENOUS at 12:37

## 2021-06-13 RX ADMIN — MIDODRINE HYDROCHLORIDE 5 MG: 5 TABLET ORAL at 08:03

## 2021-06-13 RX ADMIN — Medication 10 ML: at 21:15

## 2021-06-13 RX ADMIN — BUDESONIDE 500 MCG: 0.5 INHALANT RESPIRATORY (INHALATION) at 20:30

## 2021-06-13 RX ADMIN — FUROSEMIDE 20 MG: 10 INJECTION, SOLUTION INTRAMUSCULAR; INTRAVENOUS at 08:03

## 2021-06-13 RX ADMIN — MIDODRINE HYDROCHLORIDE 5 MG: 5 TABLET ORAL at 17:33

## 2021-06-13 RX ADMIN — Medication 10 ML: at 05:41

## 2021-06-13 RX ADMIN — GUAIFENESIN 600 MG: 600 TABLET, EXTENDED RELEASE ORAL at 08:03

## 2021-06-13 NOTE — PROGRESS NOTES
Problem: Self Care Deficits Care Plan (Adult)  Goal: *Acute Goals and Plan of Care (Insert Text)  Outcome: Progressing Towards Goal  Note:   1. Patient will complete lower body dressing with minimal assist to increase self care independence. 2. Patient will complete upper body dressing with contact guard assist to increase self care independence. 3. Patient will tolerate 20 minutes of OT treatment with self incorporated rest breaks to increase activity tolerance to enhance participation in hobbies. 4. Patient will complete chair transfer with contact guard assist using adaptive equipment as needed. 5. Patient will complete UE exercises with supervision to increase overall activity tolerance and strength. Timeframe: 7 visits       OCCUPATIONAL THERAPY: Daily Note 6/13/2021  INPATIENT: OT Visit Days: 3  [place] / [place] / Product Type: Medicare /      NAME/AGE/GENDER: Zachary Coello is a 80 y.o. male   PRIMARY DIAGNOSIS:  Severe sepsis ([address] [A41.9, R65.20]  Acute cystitis [N30.00] Severe sepsis ([address] Severe sepsis ([address]       ICD-10: Treatment Diagnosis:    · Generalized Muscle Weakness (M62.81)  · Difficulty in walking, Not elsewhere classified (R26.2)   Precautions/Allergies:     Patient has no known allergies. ASSESSMENT:     Mr. Edy Cardoso presents from [facility] with symptoms of sepsis. Pt is very [place] but is able to answer direct questions. Pt is poor historian and no family or friends present so PLOF is unclear at this time. Pt on  high flow NC and required cues and additional time to keep O2 sats up during session. Pt fatigues very quickly with activity. 6/10/2021 On Airvo but continues to remove during session, JANINA Junior only able to convince him to keep it on 3 times. BSC transfer for ADL treatment. Fatigues quickly. Continue OT.     6/13/21 Pt presents supine in bed and requires convincing to work with therapy.  Pt able to perform sup>sit with min A and additional time and verbal cues. Pt performed sit<>stand with RW and mod/max assistance. During session, pt transferred bed>chair>BSC>chair taking only a few steps between each. Pt required assistance with toileting and dressing and verbal cues throughout to keep airvo in place. Pt with low activity tolerance. Continue per OT POC during stay. Seen with PT this session. This section established at most recent assessment   PROBLEM LIST (Impairments causing functional limitations):  1. Decreased Strength  2. Decreased ADL/Functional Activities  3. Decreased Transfer Abilities  4. Decreased Ambulation Ability/Technique  5. Decreased Balance  6. Decreased Activity Tolerance  7. Increased Fatigue  8. Increased Shortness of Breath   INTERVENTIONS PLANNED: (Benefits and precautions of occupational therapy have been discussed with the patient.)  1. Activities of daily living training  2. Adaptive equipment training  3. Balance training  4. Clothing management  5. Cognitive training  6. Neuromuscular re-eduation  7. Therapeutic activity  8. Therapeutic exercise     TREATMENT PLAN: Frequency/Duration: Follow patient 3x/week to address above goals. Rehabilitation Potential For Stated Goals: Good     REHAB RECOMMENDATIONS (at time of discharge pending progress):    Placement: It is my opinion, based on this patient's performance to date, that Mr. Juliane Mccracken may benefit from participating in 1-2 additional therapy sessions in order to continue to assess for rehab potential and then make recommendation for disposition at discharge.   Equipment:    None at this time              OCCUPATIONAL PROFILE AND HISTORY:   History of Present Injury/Illness (Reason for Referral):  See H&P  Past Medical History/Comorbidities:   Mr. Juliane Mccracken  has a past medical history of Abdominal aortic aneurysm ([facility] (12/10/2015), Abdominal aortic aneurysm without rupture ([address] Abnormal finding of lung (10/17/2016), Abnormality of urethral meatus (8/8/2019), Allergic rhinitis (12/10/2015), Asthma, Benign neoplasm, Benign prostatic hyperplasia (12/10/2015), Bigeminy (3/28/2016), BPH without urinary obstruction, Cardiovascular disease (12/10/2015), Chronic obstructive asthma with exacerbation ([address] (12/10/2015), Closed compression fracture of lumbosacral spine ([address] (12/11/2018), COPD (chronic obstructive pulmonary disease) ([address] (12/10/2015), COPD (chronic obstructive pulmonary disease) with emphysema ([address] (10/17/2016), COPD exacerbation ([address] (5/6/2019), Cough with hemoptysis, Erectile dysfunction (12/10/2015), Essential hypertension, benign (12/10/2015), Fracture (10/2014), History of colon polyps, Low testosterone (12/10/2015), Lumbago, Memory loss, Overflow incontinence, Pleural effusion (6/10/2019), Pneumothorax on right (5/6/2019), Raynaud's syndrome (12/10/2015), Rotator cuff tendinitis, Thrombocytopenia ([address] Urinary frequency, Ventricular tachyarrhythmia ([address] (5/6/2019), and VT (ventricular tachycardia) ([address] (5/6/2019). Mr. Carlos Jones  has a past surgical history that includes hx hernia repair (1980); hx colonoscopy; hx fracture tx (10/2014); hx cataract removal (6/2016-right); and hx orthopaedic (03/2018).   Social History/Living Environment:   Home Environment: Private residence  [facility] [address] [facility]  One/Two Story Residence: One story  Living Alone: No  Support Systems: 2 [facility] facility  Patient Expects to be Discharged to[de-identified] 2 [facility]  Current DME Used/Available at Home: Other (comment)  Tub or Shower Type: Shower  Prior Level of Function/Work/Activity:  Unsure of PLOF at this time, from [facility]     Number of Personal Factors/Comorbidities that affect the Plan of Care: Brief history (0):  LOW COMPLEXITY   ASSESSMENT OF OCCUPATIONAL PERFORMANCE[de-identified]   Activities of Daily Living:   Basic ADLs (From Assessment) Complex ADLs (From Assessment)   Feeding: Supervision  Oral Facial Hygiene/Grooming: Supervision  Bathing: Maximum assistance  Upper Body Dressing: Maximum assistance  Lower Body Dressing: Maximum assistance  Toileting: Maximum assistance     Grooming/Bathing/Dressing Activities of Daily Living     Cognitive Retraining  Safety/Judgement: Lack of insight into deficits; Fall prevention                 Functional Transfers  Toilet Transfer : Moderate assistance     Bed/Mat Mobility  Supine to Sit: Minimum assistance; Additional time  Sit to Stand: Moderate assistance;Maximum assistance  Stand to Sit: Moderate assistance;Maximum assistance  Bed to Chair: Moderate assistance;Maximum assistance  Scooting: Minimum assistance; Additional time     Most Recent Physical Functioning:   Gross Assessment:                  Posture:  Posture (WDL): Within defined limits  Balance:    Bed Mobility:  Supine to Sit: Minimum assistance; Additional time  Scooting: Minimum assistance; Additional time  Wheelchair Mobility:     Transfers:  Sit to Stand: Moderate assistance;Maximum assistance  Stand to Sit: Moderate assistance;Maximum assistance  Bed to Chair: Moderate assistance;Maximum assistance            Patient Vitals for the past 6 hrs:   BP BP Patient Position SpO2 O2 Flow Rate (L/min) Pulse   06/13/21 0623 116/78  100 %     06/13/21 0729 133/79 At rest 100 % 35 l/min 68   06/13/21 0803 128/80  99 %  72   06/13/21 1013 98/67  100 %  84       Mental Status  Neurologic State: Alert, Confused  Orientation Level: Oriented to person  Cognition: Other (comment) (slow to follow commands)  Perception: Appears intact  Perseveration: No perseveration noted  Safety/Judgement: Lack of insight into deficits, Fall prevention                          Physical Skills Involved:  1. Balance  2. Strength  3. Activity Tolerance Cognitive Skills Affected (resulting in the inability to perform in a timely and safe manner):  1. Executive Function  2. Short Term Recall  3.  Long Term Memory Psychosocial Skills Affected:  1. Habits/Routines  2. Environmental Adaptation   Number of elements that affect the Plan of Care: 5+:  HIGH COMPLEXITY   CLINICAL DECISION MAKING:   [facility] AM-PAC 6 Clicks   Daily Activity Inpatient Short Form  How much help from another person does the patient currently need. .. Total A Lot A Little None   1. Putting on and taking off regular lower body clothing? [] 1   [x] 2   [] 3   [] 4   2. Bathing (including washing, rinsing, drying)? [] 1   [x] 2   [] 3   [] 4   3. Toileting, which includes using toilet, bedpan or urinal?   [x] 1   [] 2   [] 3   [] 4   4. Putting on and taking off regular upper body clothing? [] 1   [x] 2   [] 3   [] 4   5. Taking care of personal grooming such as brushing teeth? [] 1   [] 2   [x] 3   [] 4   6. Eating meals? [] 1   [] 2   [x] 3   [] 4   © 2007, Trustees of [facility], under license to [employer]. All rights reserved      Score:  Initial: 13 Most Recent: X (Date: -- )    Interpretation of Tool:  Represents activities that are increasingly more difficult (i.e. Bed mobility, Transfers, Gait). Medical Necessity:     · Skilled intervention continues to be required due to the above deficits. Reason for Services/Other Comments:  · See above deficits. Use of outcome tool(s) and clinical judgement create a POC that gives a: MODERATE COMPLEXITY         TREATMENT:   (In addition to Assessment/Re-Assessment sessions the following treatments were rendered)     Pre-treatment Symptoms/Complaints:    Pain: Initial:   Pain Intensity 1: 0  Post Session:  0     Self Care: (40): Procedure(s) (per grid) utilized to improve and/or restore self-care/home management as related to dressing, toileting and functional transfers. Required moderate verbal and tactile cueing to facilitate activities of daily living skills.     Braces/Orthotics/Lines/Etc:   · li catheter  · O2 Device: Hi flow nasal cannula  Treatment/Session Assessment:    · Response to Treatment:  fair  · Interdisciplinary Collaboration:   o Physical Therapist  o Occupational Therapist  o Registered Nurse  · After treatment position/precautions:   o Up in chair  o Bed alarm/tab alert on  o Bed/Chair-wheels locked  o Call light within reach  o RN notified   · Compliance with Program/Exercises: Will assess as treatment progresses. · Recommendations/Intent for next treatment session: \"Next visit will focus on advancements to more challenging activities and reduction in assistance provided\".   Total Treatment Duration:  OT Patient Time In/Time Out  Time In: 0930  Time Out: [address], [place]

## 2021-06-13 NOTE — PROGRESS NOTES
Problem: Mobility Impaired (Adult and Pediatric)  Goal: *Acute Goals and Plan of Care (Insert Text)  Outcome: Progressing Towards Goal  Note:   GOALS UPDATED BASED ON CURRENT PROGRESS 6/12/21 :  (1.)Mr. Zan Olivera will move from supine to sit and sit to supine  in bed HOB 30 deg with rail) with CONTACT GUARD ASSIST (consistently). (2.)Mr. Zan Olivera will transfer from bed to chair and chair to bed using a walker & MINIMAL ASSIST (consistently). (3.)Mr. Zan Olivera will ambulate 25-30 ft using a rolling walker & MINIMAL ASSIST (consistently). 4) pt will perform LE & UE AROM on cue consistently as a warm up to functional activity. ________________________________________________________________________________________________      PHYSICAL THERAPY: Daily Note and AM 6/13/2021  INPATIENT: PT Visit Days : 2  [place] / Product Type: Medicare /       NAME/AGE/GENDER: Nikko Carlos is a 80 y.o. male   PRIMARY DIAGNOSIS: Severe sepsis ([facility] [A41.9, R65.20]  Acute cystitis [N30.00] Severe sepsis ([facility] Severe sepsis ([facility]       ICD-10: Treatment Diagnosis:    · Difficulty in walking, Not elsewhere classified (R26.2)  · Other abnormalities of gait and mobility (R26.89)   Precaution/Allergies:  Patient has no known allergies. ASSESSMENT:     Mr. Zan Olivera with more limited mobility today. He required modA for sit to stand, mod-maxA for gait and transfers. Worked on sit to stand x3 at EOB, then ambulated to the recliner. Pt sat, declared he needed to have a BM, so worked on pivot transfer to [facility]. Pt then stood for cleaning. Transferred back to recliner with mod-maxA. Pt left up in recliner with chair alarm and needs in reach. Nursing notified. This section established at most recent assessment   PROBLEM LIST (Impairments causing functional limitations):  1. Decreased Strength  2. Decreased Transfer Abilities  3. Decreased Ambulation Ability/Technique  4.  Decreased Balance  5. Increased Pain  6. Decreased Activity Tolerance  7. Decreased Flexibility/Joint Mobility   INTERVENTIONS PLANNED: (Benefits and precautions of physical therapy have been discussed with the patient.)  1. Bed Mobility  2. Gait Training  3. Therapeutic Activites  4. Therapeutic Exercise/Strengthening  5. Transfer Training     TREATMENT PLAN: Frequency/Duration: daily for duration of hospital stay  Rehabilitation Potential For Stated Goals: Good     REHAB RECOMMENDATIONS (at time of discharge pending progress):    Placement: It is my opinion, based on this patient's performance to date, that Mr. Henny Card may benefit from intensive therapy at a [address] [place] [address] after discharge due to the functional deficits listed above that are likely to improve with skilled rehabilitation and concerns that he/she may be unsafe to be unsupervised at home due to being a high fall risk. Equipment:    None at this time              HISTORY:   History of Present Injury/Illness (Reason for Referral):  Pt admitted with above diagnosis.   Past Medical History/Comorbidities:   Mr. 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), Allergic rhinitis (12/10/2015), Asthma, Benign neoplasm, Benign prostatic hyperplasia (12/10/2015), Bigeminy (3/28/2016), BPH without urinary obstruction, Cardiovascular disease (12/10/2015), Chronic obstructive asthma with exacerbation ([address] (12/10/2015), Closed compression fracture of lumbosacral spine ([address] (12/11/2018), COPD (chronic obstructive pulmonary disease) ([address] (12/10/2015), COPD (chronic obstructive pulmonary disease) with emphysema ([address] (10/17/2016), COPD exacerbation ([address] (5/6/2019), Cough with hemoptysis, Erectile dysfunction (12/10/2015), Essential hypertension, benign (12/10/2015), Fracture (10/2014), History of colon polyps, Low testosterone (12/10/2015), Lumbago, Memory loss, Overflow incontinence, Pleural effusion (6/10/2019), Pneumothorax on right (5/6/2019), Raynaud's syndrome (12/10/2015), Rotator cuff tendinitis, Thrombocytopenia ([facility] Urinary frequency, Ventricular tachyarrhythmia ([facility] (5/6/2019), and VT (ventricular tachycardia) ([facility] (5/6/2019). Mr. Zan Olivera  has a past surgical history that includes hx hernia repair (1980); hx colonoscopy; hx fracture tx (10/2014); hx cataract removal (6/2016-right); and hx orthopaedic (03/2018). Social History/Living Environment:   Home Environment: Private residence  [facility] Name: [facility]  One/Two Story Residence: One story  Living Alone: No  Support Systems: Skilled nursing facility  Patient Expects to be Discharged to[de-identified] [address] [place] [address]  Current DME Used/Available at Home: Other (comment)  Tub or Shower Type: Shower  Prior Level of Function/Work/Activity:  Pt states he did not walk prior to admission. Number of Personal Factors/Comorbidities that affect the Plan of Care: 0: LOW COMPLEXITY   EXAMINATION:   Most Recent Physical Functioning:   Gross Assessment: 3-/5 to 2/5 throughout  AROM: Generally decreased, functional  Strength: Generally decreased, functional                    Balance:  Sitting: Intact; With support  Standing: Impaired;Pull to stand; With support  Standing - Static: Fair  Standing - Dynamic : Fair Bed Mobility:          Transfers:  Sit to Stand: Moderate assistance  Stand to Sit: Moderate assistance  Bed to Chair: Moderate assistance;Maximum assistance  Gait:     Base of Support: Widened  Speed/Marifer: Pace decreased (<100 feet/min); Delayed  Gait Abnormalities: Decreased step clearance; Path deviations  Distance (ft): 5 Feet (ft)  Assistive Device: Walker, rolling  Ambulation - Level of Assistance: Moderate assistance;Maximum assistance  Interventions: Verbal cues; Safety awareness training; Tactile cues; Visual/Demos      Body Structures Involved:  1. Lungs  2. Bones  3. Joints  4. Muscles  5. Ligaments Body Functions Affected:  1. Respiratory  2. Neuromusculoskeletal  3. Movement Related Activities and Participation Affected:  1. General Tasks and Demands  2. Mobility  3. Self Care   Number of elements that affect the Plan of Care: 4+: HIGH COMPLEXITY   CLINICAL PRESENTATION:   Presentation: Stable and uncomplicated: LOW COMPLEXITY   CLINICAL DECISION MAKIN[address] [place] [address] AM-PAC 6 Clicks   Basic Mobility Inpatient Short Form  How much difficulty does the patient currently have. .. Unable A Lot A Little None   1. Turning over in bed (including adjusting bedclothes, sheets and blankets)? [] 1   [] 2   [x] 3   [] 4   2. Sitting down on and standing up from a chair with arms ( e.g., wheelchair, bedside commode, etc.)   [] 1   [] 2   [x] 3   [] 4   3. Moving from lying on back to sitting on the side of the bed? [] 1   [] 2   [x] 3   [] 4   How much help from another person does the patient currently need. .. Total A Lot A Little None   4. Moving to and from a bed to a chair (including a wheelchair)? [] 1   [] 2   [x] 3   [] 4   5. Need to walk in hospital room? [] 1   [x] 2   [] 3   [] 4   6. Climbing 3-5 steps with a railing? [] 1   [x] 2   [] 3   [] 4   © , Trustees of [address] [place] [address], under license to [email or web address]. All rights reserved      Score:  Initial: 16 Most Recent: X (Date: -- )    Interpretation of Tool:  Represents activities that are increasingly more difficult (i.e. Bed mobility, Transfers, Gait). Medical Necessity:     · Skilled intervention continues to be required due to decreased independence with functional mobility. Reason for Services/Other Comments:  · Patient continues to require skilled intervention due to   · Decreased independence with functional mobility.   · .   Use of outcome tool(s) and clinical judgement create a POC that gives a: Clear prediction of patient's progress: LOW COMPLEXITY            TREATMENT:   (In addition to Assessment/Re-Assessment sessions the following treatments were rendered)   Pre-treatment Symptoms/Complaints:  Pt less agreeable to transfer to chair today  Pain: Initial: numeric scale  Pain Intensity 1: 0  Post Session: 0/10   Today's treatment session addressed Decreased Strength, Decreased Transfer Abilities and Decreased Ambulation Ability/Technique to progress towards achieving goal(s) 2 and 3. During this session, Occupational Therapy addressed Functional Transfers to progress towards their discipline specific goal(s). Co-treatment was necessary to improve patient's cognitive participation, ability to follow higher level commands and ability to return to normal functional activity. Therapeutic Activity: (   28 ):  Therapeutic activities including repeated sit<>stand with walker with standing balance, ambulation on level surface to recliner, transfers to [facility], pt also performed prolonged standing balance activity for clean up after BM to improve mobility, strength, balance, coordination and dynamic movement of arm - bilateral, leg - bilateral and core to improve functional endurance & stability. Braces/Orthotics/Lines/Etc:   · li catheter  · telemetry/monitors  · O2 Device: Hi flow nasal cannula  Treatment/Session Assessment:    · Response to Treatment: needed more assist than last visit  · Interdisciplinary Collaboration:   o Physical Therapist  o Occupational Therapist  o Registered Nurse  · After treatment position/precautions:   o Up in chair  o Bed/Chair-wheels locked  o Bed in low position  o Call light within reach  o RN notified   · Compliance with Program/Exercises: Will assess as treatment progresses  · Recommendations/Intent for next treatment session: \"Next visit will focus on reduction in assistance provided\".   Total Treatment Duration:  PT Patient Time In/Time Out  Time In: 0940  Time Out: [facility], PT

## 2021-06-13 NOTE — PROGRESS NOTES
Bedside and verbal shift change report received from  [facility] (VERONIKA KELLY) (offgoing nurse). Report included the following information SBAR, Kardex, Intake/Output, MAR, Recent Results and Cardiac Rhythm NSR.      Dual skin assessment completed at bedside: disseminated ecchymoses     Dual verification of gtts completed (name of gtts verified): None

## 2021-06-13 NOTE — PROGRESS NOTES
[address] [facility] Hospitalist        Name:  Deb Keita  Age:89 y.o. Sex:male   :  1932    MRN:  [id number]   PCP:  Andrew Pro MD      Admit Date:  2021  2:03 PM   Chief Complaint: WORSENING CONFUSION    Reason for Admission:   Severe sepsis ([facility] [address] [A41.9, R65.20]  Acute cystitis [N30.00]    Assessment & Plan:     SEVERE SEPSIS 2/2 CAUTI/acute cystitis and Ecoli Bacteremia:  : resolving  LA resolved  IV fluids stopped on   Blood culture on + for E. Coli, repeat blood culture on  till date negative  Urine culture positive for Ecoli  Antibiotics: Zosyn -  Antibiotic deescalated to Ceftriaxone on .   Plan to switch to oral antibiotic tomorrow  Pt is hemodynamically stable  Wean midodrine as tolerated    COPD with acute on chronic hypoxic respiratory failure:  : stable  Off BiPAP   Currently on airvo 35 L/40%, wean off of airvo as able  pulmicort bid with duoneb prn  mucinex bid  Incentive spirometry  Completed doxycycline for 5 days  Chest x-ray with persistent opacities  Continue IV Lasix 20 mg daily as blood pressure tolerates    Acute encephalopathy secondary to sepsis/CAUTI with underlying dementia:  : Improving   Delirium/dementia precautions  Reorient patient  Fall precautions  Avoid benzo, opiates and anticholinergic  depakote sprinkles prn for agitation/restlessness    Depression:  : stable  Continue cymbalta    BPH:  indwelling li cath in  continue proscar  Hx of Hypospadiasis    Severe protein calorie malnutrition with BMP 17:  Nutrition consulted  Supplement with meals  Pt has chronic physical deconditioning with poor oral intake    Congenital hypospadias/urethral trauma:  Patient is evaluated by urology, recommend congenital hypospadias, no intervention required      Disposition/Expected LOS: High oxygen demand, will ask LTAC to evaluate for transfer  Diet: DIET ADULT  DIET ADULT ORAL NUTRITION SUPPLEMENT  VTE ppx: lovenox  GI ppx: protonix  Code status: DNR  Surrogate decision-maker: pt's son and wife      Interval Hx/Subjective:     Osvaldo Davis is a 80 y.o. male with hx of COPD, chronic hypoxic respiratory failure, dementia, BPH with chronic indwelling li, HTN resident of [facility] admitted on 6/6 for severe sepsis secondary to CAUTI/acute cystitis resulting in worsening of baseline dementia. Pt noted to be febrile in ER T 100.9, with LA 2.7.    6/13:  Pt seen at bedside, resting in bed. Patient is alert. No active complaint. Denies chest pain, palpitation, nausea, vomiting or abdominal pain. He is stable on airvo. Will consider to transfer him to the LTAC if accepted. Review of Systems:  A 14 point review of systems was taken and pertinent positive as mentioned above. Objective:     Patient Vitals for the past 24 hrs:   Temp Pulse Resp BP SpO2   06/13/21 1138  82 24 93/70 (!) 86 %   06/13/21 1108 98.5 °F (36.9 °C) 78 (!) 36 105/62 98 %   06/13/21 1107  77 21 105/62 100 %   06/13/21 1013  84 15 98/67 100 %   06/13/21 0803  72 22 128/80 99 %   06/13/21 0729 98 °F (36.7 °C) 68 15 133/79 100 %   06/13/21 0623    116/78 100 %   06/13/21 0419 98.2 °F (36.8 °C) 88 21 133/75 97 %   06/13/21 0219    129/74 96 %   06/13/21 0019 98.4 °F (36.9 °C) 84 20 116/64 99 %   06/12/21 2219    104/61 90 %   06/12/21 1949 98.5 °F (36.9 °C) 81 22 138/79 92 %   06/12/21 1940     93 %   06/12/21 1512 97.5 °F (36.4 °C) 75 24 109/67 91 %       Oxygen Therapy  O2 Sat (%): (!) 86 % (06/13/21 1138)  Pulse via Oximetry: 84 beats per minute (06/13/21 1138)  O2 Device: Heated; Hi flow nasal cannula (06/13/21 0729)  Skin Assessment: Clean, dry, & intact (06/13/21 0729)  Skin Protection for O2 Device: No (06/13/21 0729)  O2 Flow Rate (L/min): 35 l/min (06/13/21 1108)  O2 Temperature: 93.2 °F (34 °C) (06/13/21 1108)  FIO2 (%): 40 % (06/13/21 1108)    Body mass index is 19.07 kg/m².     Physical Exam:    General:  Elderly, frail, nad, alert/awake, on airvo  HEENT:  Head NCAT, PERRLA+  Neck:   Supple, no lymphadenopathy, no JVD   Lungs:  Coarse breath sounds bilaterally  CV:   Regular rate and rhythm with normal S1 and S2   Abdomen:  Soft, nontender, nondistended, normoactive bowel sounds   Extremities:  No cyanosis clubbing or edema   Neuro:  gcs 15, CN 2-12 intact, following commands and moving all 4 extremities spontaneously  Psych:  AOx2, mood and affect flat      Data Reviewed: I have reviewed all labs, meds, and studies. Recent Results (from the past 24 hour(s))   MAGNESIUM    Collection Time: 06/13/21  4:35 AM   Result Value Ref Range    Magnesium 2.1 1.8 - 2.4 mg/dL   CBC WITH AUTOMATED DIFF    Collection Time: 06/13/21  4:35 AM   Result Value Ref Range    WBC 10.5 4.3 - 11.1 K/uL    RBC 3.41 (L) 4.23 - 5.6 M/uL    HGB 10.4 (L) 13.6 - 17.2 g/dL    HCT 33.1 (L) 41.1 - 50.3 %    MCV 97.1 79.6 - 97.8 FL    MCH 30.5 26.1 - 32.9 PG    MCHC 31.4 31.4 - 35.0 g/dL    RDW 14.4 11.9 - 14.6 %    PLATELET 797 646 - 907 K/uL    MPV 10.2 9.4 - 12.3 FL    ABSOLUTE NRBC 0.00 0.0 - 0.2 K/uL    DF AUTOMATED      NEUTROPHILS 61 43 - 78 %    LYMPHOCYTES 22 13 - 44 %    MONOCYTES 11 4.0 - 12.0 %    EOSINOPHILS 5 0.5 - 7.8 %    BASOPHILS 1 0.0 - 2.0 %    IMMATURE GRANULOCYTES 1 0.0 - 5.0 %    ABS. NEUTROPHILS 6.4 1.7 - 8.2 K/UL    ABS. LYMPHOCYTES 2.3 0.5 - 4.6 K/UL    ABS. MONOCYTES 1.1 0.1 - 1.3 K/UL    ABS. EOSINOPHILS 0.5 0.0 - 0.8 K/UL    ABS. BASOPHILS 0.1 0.0 - 0.2 K/UL    ABS. IMM.  GRANS. 0.1 0.0 - 0.5 K/UL   METABOLIC PANEL, BASIC    Collection Time: 06/13/21  4:35 AM   Result Value Ref Range    Sodium 141 136 - 145 mmol/L    Potassium 3.6 3.5 - 5.1 mmol/L    Chloride 105 98 - 107 mmol/L    CO2 29 21 - 32 mmol/L    Anion gap 7 7 - 16 mmol/L    Glucose 100 65 - 100 mg/dL    BUN 25 (H) 8 - 23 MG/DL    Creatinine 0.54 (L) 0.8 - 1.5 MG/DL    GFR est AA >60 >60 ml/min/1.73m2    GFR est non-AA >60 >60 ml/min/1.73m2    Calcium 8.0 (L) 8.3 - 10.4 MG/DL       EKG Results Procedure 720 Value Units Date/Time    EKG, 12 LEAD, INITIAL [[id number]] Collected: 06/06/21 1423    Order Status: Completed Updated: 06/06/21 1704     Ventricular Rate 84 BPM      Atrial Rate 78 BPM      P-R Interval 136 ms      QRS Duration 92 ms      Q-T Interval 378 ms      QTC Calculation (Bezet) 446 ms      Calculated P Axis 63 degrees      Calculated R Axis -3 degrees      Calculated T Axis 72 degrees      Diagnosis --     !! AGE AND GENDER SPECIFIC ECG ANALYSIS !!   Sinus rhythm with Premature supraventricular complexes with frequent   Premature ventricular complexes  Septal infarct (cited on or before 01-FEB-2018)  Abnormal ECG  When compared with ECG of 30-JAN-2021 12:46,  Premature supraventricular complexes are now Present  Nonspecific T wave abnormality no longer evident in Lateral leads  Confirmed by ST SHIRA CAMACHO MD (), GERARDO BURKS ([id number]) on 6/6/2021 5:04:38 PM            All Micro Results     Procedure Component Value Units Date/Time    CULTURE, BLOOD [[id number]] Collected: 06/08/21 0823    Order Status: Completed Specimen: Blood Updated: 06/13/21 0935     Special Requests: --        LEFT  HAND       Culture result: NO GROWTH 5 DAYS       CULTURE, BLOOD [[id number]] Collected: 06/08/21 0827    Order Status: Completed Specimen: Blood Updated: 06/13/21 0935     Special Requests: --        RIGHT  Antecubital       Culture result: NO GROWTH 5 DAYS       CULTURE, URINE [[id number]]  (Abnormal)  (Susceptibility) Collected: 06/06/21 1427    Order Status: Completed Specimen: Urine Updated: 06/11/21 0722     Special Requests: NO SPECIAL REQUESTS        Culture result:       50,000-100,000 COLONIES/mL ESCHERICHIA COLI                  CULTURE IN PROGRESS,FURTHER UPDATES TO FOLLOW CORRECTED ON 06/10 AT 1157: PREVIOUSLY REPORTED AS <10,000 COLONIES/mL MIXED SKIN NAEL ISOLATED                  10,000 to 50,000 COLONIES/mL MIXED SKIN NAEL ISOLATED          BLOOD CULTURE [[id number]] Collected: 06/06/21 1427    Order Status: Completed Specimen: Blood Updated: 06/11/21 0704     Special Requests: --        RIGHT  Antecubital       Culture result: NO GROWTH 5 DAYS       CULTURE, RESPIRATORY/SPUTUM/BRONCH W GRAM STAIN [[id number]]  (Abnormal)  (Susceptibility) Collected: 06/06/21 1821    Order Status: Completed Specimen: Sputum Updated: 06/10/21 1019     Special Requests: NO SPECIAL REQUESTS        GRAM STAIN 2 TO 18 WBC'S/OIF      0 TO 2 EPITHELIAL CELLS SEEN /OIF      FEW GRAM POSITIVE COCCI         FEW GRAM NEGATIVE RODS         FEW YEAST         FEW GRAM POSITIVE RODS         3+ MUCUS PRESENT        Culture result:       MODERATE STAPHYLOCOCCUS AUREUS                  HEAVY NORMAL RESPIRATORY NAEL          BLOOD CULTURE [[id number]]  (Abnormal)  (Susceptibility) Collected: 06/06/21 1345    Order Status: Completed Specimen: Blood Updated: 06/09/21 0758     Special Requests: LEFT FOREARM        GRAM STAIN GRAM NEGATIVE RODS         ANAEROBIC BOTTLE POSITIVE               RESULTS VERIFIED, PHONED TO AND READ BACK BY AMISHA GRIFFITH RN BY NB AT [address] [place] [address] ON [id number]           Culture result: ESCHERICHIA COLI               Refer to Blood Culture ID Panel Accession  [id number]      SARS-COV-2, PCR [[id number]] Collected: 06/07/21 1646    Order Status: Completed Specimen: Nasopharyngeal Updated: 06/08/21 1226     Specimen source Nasopharyngeal        SARS-CoV-2 Not detected        Comment:      The specimen is NEGATIVE for SARS-CoV-2, the novel coronavirus associated with COVID-19. This test has been authorized by the FDA under an Emergency Use Authorization (EUA) for use by authorized laboratories.         Fact sheet for Healthcare Providers: ConventionUpdate.co.nz       Fact sheet for Patients: ConventionUpdate.co.nz       Methodology: RT-PCR         BLOOD CULTURE ID PANEL [[id number]]  (Abnormal) Collected: 06/06/21 1345    Order Status: Completed Specimen: Blood Updated: 06/07/21 1322     Acc. no. from Micro Order [id number]     Escherichia coli Detected        Comment: RESULTS VERIFIED, PHONED TO AND READ BACK BY  EREN ACOSTA RN @ 4479 ON 6/7/21 BY BRAYDEN          KPC (Carbapenem Resistance Gene) NOT DETECTED        Comment: WARNING:  A Not Detected result for the KPC gene does not indicate susceptibility to carbapenems. Gram negative bacteria can be resistant to carbapenems by mechanisms other than carrying the KPC gene. INTERPRETATION       Gram negative dereck. Identified by realtime PCR as E. coli          COVID-19 RAPID TEST [[id number]] Collected: 06/06/21 1656    Order Status: Completed Specimen: Nasopharyngeal Updated: 06/06/21 1724     Specimen source Nasopharyngeal        COVID-19 rapid test Not detected        Comment:      The specimen is NEGATIVE for SARS-CoV-2, the novel coronavirus associated with COVID-19. A negative result does not rule out COVID-19. This test has been authorized by the FDA under an Emergency Use Authorization (EUA) for use by authorized laboratories. Fact sheet for Healthcare Providers: Allocadedate.co.nz  Fact sheet for Patients: AllocadedaGigaPan.co.nz       Methodology: Isothermal Nucleic Acid Amplification               Other Studies:  No results found.       Medications:  Medications Administered       acetaminophen (TYLENOL) tablet 650 mg       Admin Date  03/13/2021 Action  Given Dose  650 mg Route  Oral Administered By  Amada Schultz RN              cefTRIAXone (ROCEPHIN) 1 g in 0.9% sodium chloride (MBP/ADV) 50 mL MBP       Admin Date  03/13/2021 Action  New Bag Dose  1 g Rate  100 mL/hr Route  IntraVENous Administered By  Mary Barker RN              cefTRIAXone (ROCEPHIN) 2 g in 0.9% sodium chloride (MBP/ADV) 50 mL MBP       Admin Date  03/13/2021 Action  New Bag Dose  2 g Rate  100 mL/hr Route  IntraVENous Administered By  Thierno Bell RN               Admin Date  03/14/2021 Action  New Bag Dose  2 g Rate  100 mL/hr Route  IntraVENous Administered By  Lev Johnson RN               Admin Date  03/15/2021 Action  New Bag Dose  2 g Rate  100 mL/hr Route  IntraVENous Administered By  Lev Johnson RN               Admin Date  03/16/2021 Action  New Bag Dose  2 g Rate  100 mL/hr Route  IntraVENous Administered By  Giovanny Palmer              diphenhydrAMINE (BENADRYL) injection 25 mg       Admin Date  03/14/2021 Action  Given Dose  25 mg Route  IntraVENous Administered By  Shady Mcdonald RN              doxycycline (VIBRAMYCIN) 100 mg in 0.9% sodium chloride (MBP/ADV) 100 mL MBP       Admin Date  03/13/2021 Action  New Bag Dose  100 mg Rate  100 mL/hr Route  IntraVENous Administered By  Emy Greenwood RN               Admin Date  03/13/2021 Action  New Bag Dose  100 mg Rate  100 mL/hr Route  IntraVENous Administered By  Shady Mcdonald RN               Admin Date  03/14/2021 Action  New Bag Dose  100 mg Rate  100 mL/hr Route  IntraVENous Administered By  Lev Johnson RN               Admin Date  03/14/2021 Action  New Bag Dose  100 mg Rate  100 mL/hr Route  IntraVENous Administered By  Shady Mcdonald RN               Admin Date  03/15/2021 Action  New Bag Dose  100 mg Rate  100 mL/hr Route  IntraVENous Administered By  Lev Johnson RN              enoxaparin (LOVENOX) injection 40 mg       Admin Date  03/13/2021 Action  Given Dose  40 mg Route  SubCUTAneous Administered By  Emy Greenwood RN               Admin Date  03/13/2021 Action  Given Dose  40 mg Route  SubCUTAneous Administered By  Shady Mcdonald RN               Admin Date  03/14/2021 Action  Given Dose  40 mg Route  SubCUTAneous Administered By  Lev Johnson RN               Admin Date  03/14/2021 Action  Given Dose  40 mg Route  SubCUTAneous Administered By  Shady Mcdonald RN               Admin Date  03/15/2021 Action  Given Dose  40 mg Route  SubCUTAneous Administered By  Lev Johnson RN               Admin Date  03/15/2021 Action  Given Dose  40 mg Route  SubCUTAneous Administered By  Yetta Barthel, RN               Admin Date  03/16/2021 Action  Given Dose  40 mg Route  SubCUTAneous Administered By  Beto Napoles              fentaNYL citrate (PF) injection 50 mcg       Admin Date  03/13/2021 Action  Given Dose  50 mcg Route  IntraVENous Administered By  Cassy Fajardo, RN               Admin Date  03/14/2021 Action  Given Dose  50 mcg Route  IntraVENous Administered By  Dung Haney, JANINA              ferrous sulfate tablet 325 mg       Admin Date  03/14/2021 Action  Given Dose  325 mg Route  Oral Administered By  Dora Terry, RN               Admin Date  03/14/2021 Action  Given Dose  325 mg Route  Oral Administered By  Dora Terry, RN               Admin Date  03/15/2021 Action  Given Dose  325 mg Route  Oral Administered By  Dora Terry RN              folic acid (FOLVITE) tablet 1 mg       Admin Date  03/14/2021 Action  Given Dose  1 mg Route  Oral Administered By  Dora Terry, RN               Admin Date  03/15/2021 Action  Given Dose  1 mg Route  Oral Administered By  Dora Terry, JANINA               Admin Date  03/16/2021 Action  Given Dose  1 mg Route  Oral Administered By  Beto Napoles              furosemide (LASIX) injection 20 mg       Admin Date  03/15/2021 Action  Given Dose  20 mg Route  IntraVENous Administered By  Dora Terry RN              furosemide (LASIX) injection 40 mg       Admin Date  03/13/2021 Action  Given Dose  40 mg Route  IntraVENous Administered By  Matthew Durant, RN               Admin Date  03/13/2021 Action  Given Dose  40 mg Route  IntraVENous Administered By  Dung Haney RN               Admin Date  03/14/2021 Action  Given Dose  40 mg Route  IntraVENous Administered By  Dora Terry, JANINA               Admin Date  03/14/2021 Action  Given Dose  40 mg Route  IntraVENous Administered By  Dung Haney, JANINA               Admin Date  03/15/2021 Action  Given Dose  40 mg Route  IntraVENous Administered By  Leslee Zee RN               Admin Date  03/16/2021 Action  Given Dose  40 mg Route  IntraVENous Administered By  Denise Gramajo              HYDROcodone-acetaminophen [facility]) 5-325 mg per tablet 1 Tab       Admin Date  03/14/2021 Action  Given Dose  1 Tab Route  Oral Administered By  Jemma Fernández RN               Admin Date  03/15/2021 Action  Given Dose  1 Tab Route  Oral Administered By  Clark Chavez RN              insulin lispro (HUMALOG) injection       Admin Date  03/13/2021 Action  Given Dose  2 Units Route  SubCUTAneous Administered By  Clark Chavez RN               Admin Date  03/14/2021 Action  Given Dose  2 Units Route  SubCUTAneous Administered By  Jemma Fernández RN               Admin Date  03/14/2021 Action  Given Dose  2 Units Route  SubCUTAneous Administered By  Jemma Fernández RN               Admin Date  03/14/2021 Action  Given Dose  4 Units Route  SubCUTAneous Administered By  Clark Chavez RN               Admin Date  03/15/2021 Action  Given Dose  2 Units Route  SubCUTAneous Administered By  Jemma Fernández RN               Admin Date  03/15/2021 Action  Given Dose  4 Units Route  SubCUTAneous Administered By  Jemma Fernández RN               Admin Date  03/15/2021 Action  Given Dose  2 Units Route  SubCUTAneous Administered By  Leslee Zee RN               Admin Date  03/16/2021 Action  Given Dose  4 Units Route  SubCUTAneous Administered By  Denise Gramajo              levothyroxine (SYNTHROID) tablet 137 mcg       Admin Date  03/14/2021 Action  Given Dose  137 mcg Route  Oral Administered By  Jemma Fernández RN               Admin Date  03/15/2021 Action  Given Dose  137 mcg Route  Oral Administered By  Jemma Fernández RN               Admin Date  03/16/2021 Action  Given Dose  137 mcg Route  Oral Administered By  Leslee Zee RN              midodrine (PROAMATINE) tablet 10 mg       Admin Date  03/14/2021 Action  Given Dose  10 mg Route  Oral Administered By  Radha Douglass RN               Admin Date  03/14/2021 Action  Given Dose  10 mg Route  Oral Administered By  Radha Douglass RN               Admin Date  03/15/2021 Action  Given Dose  10 mg Route  Oral Administered By  Radha Douglass RN               Admin Date  03/15/2021 Action  Given Dose  10 mg Route  Oral Administered By  Radha Douglass RN               Admin Date  03/15/2021 Action  Given Dose  10 mg Route  Oral Administered By  Radha Douglass RN               Admin Date  03/16/2021 Action  Given Dose  10 mg Route  Oral Administered By  Collette Elks Date  03/16/2021 Action  Given Dose  10 mg Route  Oral Administered By  Collette Elks Date  03/16/2021 Action  Given Dose  10 mg Route  Oral Administered By  Abrahan hua [facility]) injection 2 mg       Admin Date  03/13/2021 Action  Given Dose  2 mg Route  IntraVENous Administered By  Lucio Desouza RN              NOREPINephrine (LEVOPHED) 4 mg in 5% dextrose 250 mL infusion       Admin Date  03/14/2021 Action  New Bag Dose  4 mcg/min Rate  15 mL/hr Route  IntraVENous Administered By  Radha Douglass RN               Admin Date  03/14/2021 Action  Rate Change Dose  6 mcg/min Rate  22.5 mL/hr Route  IntraVENous Administered By  Radha Douglass RN               Admin Date  03/14/2021 Action  Rate Change Dose  4 mcg/min Rate  15 mL/hr Route  IntraVENous Administered By  Radha Douglass RN               Admin Date  03/14/2021 Action  Rate Change Dose  2 mcg/min Rate  7.5 mL/hr Route  IntraVENous Administered By  Radha Douglass RN               Admin Date  03/14/2021 Action  Rate Change Dose  1 mcg/min Rate  3.8 mL/hr Route  IntraVENous Administered By  Radha Douglass RN               Admin Date  03/14/2021 Action  Restarted Dose  2 mcg/min Rate  7.5 mL/hr Route  IntraVENous Administered By  Radha Douglass RN               Admin Date  03/14/2021 Action  Rate Change Dose  4 mcg/min Rate  15 mL/hr Route  IntraVENous Administered By  Lacy Dodson RN               Admin Date  03/15/2021 Action  Rate Change Dose  2 mcg/min Rate  7.5 mL/hr Route  IntraVENous Administered By  Dhruv White RN              ondansetron [facility]) injection 4 mg       Admin Date  03/13/2021 Action  Given Dose  4 mg Route  IntraVENous Administered By  Nessa Bull RN              pantoprazole (PROTONIX) tablet 40 mg       Admin Date  03/14/2021 Action  Given Dose  40 mg Route  Oral Administered By  Lacy Dodson RN               Admin Date  03/15/2021 Action  Given Dose  40 mg Route  Oral Administered By  Lacy Dodson RN               Admin Date  03/16/2021 Action  Given Dose  40 mg Route  Oral Administered By  Andrew Shell              perflutren lipid microspheres (DEFINITY) in NS bolus IV       Admin Date  03/13/2021 Action  Given Dose  1 mL Route  IntraVENous Administered By  DARLEEN Cabrera              potassium chloride (K-DUR, KLOR-CON) SR tablet 40 mEq       Admin Date  03/15/2021 Action  Given Dose  40 mEq Route  Oral Administered By  Lacy Dodson RN               Admin Date  03/16/2021 Action  Given Dose  40 mEq Route  Oral Administered By  Andrew Shell              potassium chloride 40 mEq IVPB       Admin Date  03/15/2021 Action  New Bag Dose  40 mEq Rate  25 mL/hr Route  IntraVENous Administered By  Dhruv White RN              pregabalin (LYRICA) capsule 300 mg       Admin Date  03/14/2021 Action  Given Dose  300 mg Route  Oral Administered By  Lacy Dodson RN               Admin Date  03/14/2021 Action  Given Dose  300 mg Route  Oral Administered By  Lacy Dodson RN               Admin Date  03/15/2021 Action  Given Dose  300 mg Route  Oral Administered By  Lacy Dodson RN               Admin Date  03/15/2021 Action  Given Dose  300 mg Route  Oral Administered By  Arielle Henderson RN               Admin Date  03/16/2021 Action  Given Dose  300 mg Route  Oral Administered By  Homero Rico              sertraline (ZOLOFT) tablet 300 mg       Admin Date  03/14/2021 Action  Given Dose  300 mg Route  Oral Administered By  Coy Gaona RN               Admin Date  03/15/2021 Action  Given Dose  300 mg Route  Oral Administered By  Coy Gaona, JANINA               Admin Date  03/16/2021 Action  Given Dose  300 mg Route  Oral Administered By  Homero Rico              sodium chloride (NS) flush 5-40 mL       Admin Date  03/13/2021 Action  Given Dose  10 mL Route  IntraVENous Administered By  Liane Murry, RN               Admin Date  03/13/2021 Action  Given Dose  30 mL Route  IntraVENous Administered By  Liane Murry, RN               Admin Date  03/13/2021 Action  Given Dose  40 mL Route  IntraVENous Administered By  Karol Trinidad RN               Admin Date  03/14/2021 Action  Given Dose  40 mL Route  IntraVENous Administered By  Karol Trinidad RN               Admin Date  03/14/2021 Action  Given Dose  10 mL Route  IntraVENous Administered By  Coy Gaona, JANINA               Admin Date  03/14/2021 Action  Given Dose  40 mL Route  IntraVENous Administered By  Karol Trinidad, JANINA               Admin Date  03/15/2021 Action  Given Dose  40 mL Route  IntraVENous Administered By  Karol Trinidad RN               Admin Date  03/15/2021 Action  Given Dose  10 mL Route  IntraVENous Administered By  Coy Gaona, JANINA               Admin Date  03/15/2021 Action  Given Dose  10 mL Route  IntraVENous Administered By  Lars Earl, RN               Admin Date  03/16/2021 Action  Given Dose  10 mL Route  IntraVENous Administered By  Homero Rico              sodium chloride 0.9 % bolus infusion 250 mL       Admin Date  03/14/2021 Action  New Bag Dose  250 mL Rate  250 mL/hr Route  IntraVENous Administered By  Cyo Gaona RN              tiZANidine (ZANAFLEX) tablet 4 mg       Admin Date  03/14/2021 Action  Given Dose  4 mg Route  Oral Administered By  Day King RN               Admin Date  03/14/2021 Action  Given Dose  4 mg Route  Oral Administered By  Jonathan Chang RN               Admin Date  03/14/2021 Action  Given Dose  4 mg Route  Oral Administered By  Jonathan Chang RN               Admin Date  03/14/2021 Action  Given Dose  4 mg Route  Oral Administered By  Day King RN               Admin Date  03/15/2021 Action  Given Dose  4 mg Route  Oral Administered By  Jonathan Chang RN               Admin Date  03/15/2021 Action  Given Dose  4 mg Route  Oral Administered By  Jonathan Chang RN               Admin Date  03/15/2021 Action  Given Dose  4 mg Route  Oral Administered By  Best Joseph RN               Admin Date  03/16/2021 Action  Given Dose  4 mg Route  Oral Administered By  Trini Master Date  03/16/2021 Action  Given Dose  4 mg Route  Oral Administered By  Jackie Robison              traZODone (DESYREL) tablet 150 mg       Admin Date  03/14/2021 Action  Given Dose  150 mg Route  Oral Administered By  Day King RN               Admin Date  03/15/2021 Action  Given Dose  150 mg Route  Oral Administered By  Best Joseph RN              tuberculin injection 5 Units       Admin Date  03/15/2021 Action  Give PPD Dose  5 Units Route  IntraDERMal Administered By  Jonathan Chang RN              vancomycin (VANCOCIN) 2500 mg in  mL infusion       Admin Date  03/13/2021 Action  Given Dose  2,500 mg Rate  200 mL/hr Route  IntraVENous Administered By  Sid Wesley RN              zonisamide (ZONEGRAN) capsule 300 mg       Admin Date  03/14/2021 Action  Given Dose  300 mg Route  Oral Administered By  Day King RN               Admin Date  03/15/2021 Action  Given Dose  300 mg Route  Oral Administered By  Best Joseph RN                        Problem List:     Hospital Problems as of 6/13/2021 Date Reviewed: 12/17/2020        Codes Class Noted - Resolved POA    Bacteremia due to Gram-negative bacteria ICD-10-CM: R78.81  ICD-9-CM: 790.7, 041.85  6/7/2021 - Present Unknown        Acute cystitis ICD-10-CM: N30.00  ICD-9-CM: 595.0  6/6/2021 - Present Unknown        * (Principal) Severe sepsis (HCC) ICD-10-CM: A41.9, R65.20  ICD-9-CM: 038.9, 995.92  6/6/2021 - Present Unknown        Urinary tract infection associated with indwelling urethral catheter ([facility] ICD-10-CM: T83.511A, N39.0  ICD-9-CM: 996.64, 599.0  6/6/2021 - Present Unknown        Dementia (HCC) (Chronic) ICD-10-CM: F03.90  ICD-9-CM: 294.20  1/30/2021 - Present Yes        Chronic indwelling Rojas catheter ICD-10-CM: Z97.8  ICD-9-CM: V45.89  11/22/2020 - Present Yes        Moderate protein-calorie malnutrition ([facility] ICD-10-CM: E44.0  ICD-9-CM: 263.0  11/3/2019 - Present Unknown        Urethral tear, initial encounter ICD-10-CM: S37.33XA  ICD-9-CM: 867.0  8/8/2019 - Present Unknown        Chronic respiratory failure with hypoxia (HCC) (Chronic) ICD-10-CM: J96.11  ICD-9-CM: 518.83, 799.02  3/29/2016 - Present Yes    Overview Addendum 1/30/2021  5:25 PM by Sarai Berrios MD     4+L chronically             BPH (benign prostatic hyperplasia) (Chronic) ICD-10-CM: N40.0  ICD-9-CM: 600.00  12/10/2015 - Present Yes        COPD (chronic obstructive pulmonary disease) ([facility] (Chronic) ICD-10-CM: J44.9  ICD-9-CM: 659  12/10/2015 - Present Yes                 Signed By: Latoya Moore MD   [employer] [facility]    June 13, 2021  4:22 PM

## 2021-06-13 NOTE — PROGRESS NOTES
Patient discussed during morning rounds. MD recommending LTACH for transfer. Referral sent. Will await response.      Mj Khan LMSW    [facility]    * [email or web address]

## 2021-06-14 ENCOUNTER — APPOINTMENT (OUTPATIENT)
Dept: NON INVASIVE DIAGNOSTICS | Age: 86
DRG: 698 | End: 2021-06-14
Attending: FAMILY MEDICINE
Payer: MEDICARE

## 2021-06-14 ENCOUNTER — APPOINTMENT (OUTPATIENT)
Dept: GENERAL RADIOLOGY | Age: 86
DRG: 698 | End: 2021-06-14
Attending: FAMILY MEDICINE
Payer: MEDICARE

## 2021-06-14 LAB
ANION GAP SERPL CALC-SCNC: 5 MMOL/L (ref 7–16)
BASOPHILS # BLD: 0.1 K/UL (ref 0–0.2)
BASOPHILS NFR BLD: 1 % (ref 0–2)
BUN SERPL-MCNC: 25 MG/DL (ref 8–23)
CALCIUM SERPL-MCNC: 7.9 MG/DL (ref 8.3–10.4)
CHLORIDE SERPL-SCNC: 103 MMOL/L (ref 98–107)
CO2 SERPL-SCNC: 30 MMOL/L (ref 21–32)
CREAT SERPL-MCNC: 0.55 MG/DL (ref 0.8–1.5)
DIFFERENTIAL METHOD BLD: ABNORMAL
ECHO AO ROOT DIAM: 3.7 CM
ECHO AV AREA PEAK VELOCITY: 1.05 CM2
ECHO AV AREA VTI: 1.14 CM2
ECHO AV AREA/BSA PEAK VELOCITY: 0.6 CM2/M2
ECHO AV AREA/BSA VTI: 0.7 CM2/M2
ECHO AV MEAN GRADIENT: 12 MMHG
ECHO AV PEAK GRADIENT: 25 MMHG
ECHO AV PEAK VELOCITY: 250 CM/S
ECHO AV VTI: 40.1 CM
ECHO LA AREA 4C: 17.8 CM2
ECHO LA MAJOR AXIS: 5.9 CM
ECHO LA MINOR AXIS: 3.53 CM
ECHO LV E' LATERAL VELOCITY: 9.26 CM/S
ECHO LV E' SEPTAL VELOCITY: 6.43 CM/S
ECHO LV E' SEPTAL VELOCITY: 6.43 CM/S
ECHO LV E' SEPTAL VELOCITY: 9.26 CM/S
ECHO LV INTERNAL DIMENSION DIASTOLIC: 5.47 CM (ref 4.2–5.9)
ECHO LV INTERNAL DIMENSION SYSTOLIC: 2.82 CM
ECHO LV IVSD: 0.84 CM (ref 0.6–1)
ECHO LV MASS 2D: 162.2 G (ref 88–224)
ECHO LV MASS INDEX 2D: 97.1 G/M2 (ref 49–115)
ECHO LV POSTERIOR WALL DIASTOLIC: 0.79 CM (ref 0.6–1)
ECHO LVOT DIAM: 2.1 CM
ECHO LVOT PEAK GRADIENT: 2 MMHG
ECHO LVOT VTI: 13.2 CM
ECHO MV A VELOCITY: 100 CM/S
ECHO MV E DECELERATION TIME (DT): 342 MS
ECHO MV E VELOCITY: 54.6 CM/S
ECHO MV E/A RATIO: 0.55
ECHO MV E/E' LATERAL: 5.9
ECHO PV REGURGITANT MAX VELOCITY: 312 CM/S
ECHO PV REGURGITANT MAX VELOCITY: 75.9 CM/S
ECHO RV INTERNAL DIMENSION: 2.87 CM
ECHO RV TAPSE: 2.6 CM (ref 1.5–2)
ECHO TV REGURGITANT PEAK GRADIENT: 39 MMHG
EOSINOPHIL # BLD: 0.5 K/UL (ref 0–0.8)
EOSINOPHIL NFR BLD: 4 % (ref 0.5–7.8)
ERYTHROCYTE [DISTWIDTH] IN BLOOD BY AUTOMATED COUNT: 14.6 % (ref 11.9–14.6)
GLUCOSE SERPL-MCNC: 100 MG/DL (ref 65–100)
HCT VFR BLD AUTO: 34.8 % (ref 41.1–50.3)
HGB BLD-MCNC: 10.4 G/DL (ref 13.6–17.2)
IMM GRANULOCYTES # BLD AUTO: 0.2 K/UL (ref 0–0.5)
IMM GRANULOCYTES NFR BLD AUTO: 1 % (ref 0–5)
LYMPHOCYTES # BLD: 2.4 K/UL (ref 0.5–4.6)
LYMPHOCYTES NFR BLD: 19 % (ref 13–44)
MAGNESIUM SERPL-MCNC: 2.4 MG/DL (ref 1.8–2.4)
MCH RBC QN AUTO: 29.7 PG (ref 26.1–32.9)
MCHC RBC AUTO-ENTMCNC: 29.9 G/DL (ref 31.4–35)
MCV RBC AUTO: 99.4 FL (ref 79.6–97.8)
MONOCYTES # BLD: 1.2 K/UL (ref 0.1–1.3)
MONOCYTES NFR BLD: 10 % (ref 4–12)
NEUTS SEG # BLD: 8.2 K/UL (ref 1.7–8.2)
NEUTS SEG NFR BLD: 66 % (ref 43–78)
NRBC # BLD: 0 K/UL (ref 0–0.2)
PLATELET # BLD AUTO: 278 K/UL (ref 150–450)
PMV BLD AUTO: 10.1 FL (ref 9.4–12.3)
POTASSIUM SERPL-SCNC: 3.7 MMOL/L (ref 3.5–5.1)
RBC # BLD AUTO: 3.5 M/UL (ref 4.23–5.6)
SODIUM SERPL-SCNC: 138 MMOL/L (ref 136–145)
WBC # BLD AUTO: 12.5 K/UL (ref 4.3–11.1)

## 2021-06-14 PROCEDURE — 97530 THERAPEUTIC ACTIVITIES: CPT

## 2021-06-14 PROCEDURE — 74011000250 HC RX REV CODE- 250: Performed by: HOSPITALIST

## 2021-06-14 PROCEDURE — 94760 N-INVAS EAR/PLS OXIMETRY 1: CPT

## 2021-06-14 PROCEDURE — 85025 COMPLETE CBC W/AUTO DIFF WBC: CPT

## 2021-06-14 PROCEDURE — 97535 SELF CARE MNGMENT TRAINING: CPT

## 2021-06-14 PROCEDURE — 80048 BASIC METABOLIC PNL TOTAL CA: CPT

## 2021-06-14 PROCEDURE — 65610000001 HC ROOM ICU GENERAL

## 2021-06-14 PROCEDURE — 83735 ASSAY OF MAGNESIUM: CPT

## 2021-06-14 PROCEDURE — 2709999900 HC NON-CHARGEABLE SUPPLY

## 2021-06-14 PROCEDURE — 36415 COLL VENOUS BLD VENIPUNCTURE: CPT

## 2021-06-14 PROCEDURE — 74011250636 HC RX REV CODE- 250/636: Performed by: FAMILY MEDICINE

## 2021-06-14 PROCEDURE — 77010033711 HC HIGH FLOW OXYGEN

## 2021-06-14 PROCEDURE — 74011250636 HC RX REV CODE- 250/636: Performed by: HOSPITALIST

## 2021-06-14 PROCEDURE — 94640 AIRWAY INHALATION TREATMENT: CPT

## 2021-06-14 PROCEDURE — 74011250637 HC RX REV CODE- 250/637: Performed by: FAMILY MEDICINE

## 2021-06-14 PROCEDURE — 71045 X-RAY EXAM CHEST 1 VIEW: CPT

## 2021-06-14 PROCEDURE — 74011250637 HC RX REV CODE- 250/637: Performed by: HOSPITALIST

## 2021-06-14 RX ORDER — FUROSEMIDE 10 MG/ML
40 INJECTION INTRAMUSCULAR; INTRAVENOUS DAILY
Status: DISCONTINUED | OUTPATIENT
Start: 2021-06-14 | End: 2021-06-15

## 2021-06-14 RX ORDER — MIDODRINE HYDROCHLORIDE 5 MG/1
5 TABLET ORAL
Status: DISCONTINUED | OUTPATIENT
Start: 2021-06-15 | End: 2021-06-15

## 2021-06-14 RX ADMIN — Medication 10 ML: at 22:56

## 2021-06-14 RX ADMIN — SULFAMETHOXAZOLE AND TRIMETHOPRIM 1 TABLET: 800; 160 TABLET ORAL at 09:08

## 2021-06-14 RX ADMIN — SULFAMETHOXAZOLE AND TRIMETHOPRIM 1 TABLET: 800; 160 TABLET ORAL at 20:53

## 2021-06-14 RX ADMIN — DULOXETINE HYDROCHLORIDE 30 MG: 30 CAPSULE, DELAYED RELEASE ORAL at 09:08

## 2021-06-14 RX ADMIN — BUDESONIDE 500 MCG: 0.5 INHALANT RESPIRATORY (INHALATION) at 09:09

## 2021-06-14 RX ADMIN — GUAIFENESIN 600 MG: 600 TABLET, EXTENDED RELEASE ORAL at 09:08

## 2021-06-14 RX ADMIN — FINASTERIDE 5 MG: 5 TABLET, FILM COATED ORAL at 09:08

## 2021-06-14 RX ADMIN — PANTOPRAZOLE SODIUM 40 MG: 40 TABLET, DELAYED RELEASE ORAL at 17:16

## 2021-06-14 RX ADMIN — FUROSEMIDE 40 MG: 10 INJECTION, SOLUTION INTRAMUSCULAR; INTRAVENOUS at 09:08

## 2021-06-14 RX ADMIN — ENOXAPARIN SODIUM 40 MG: 40 INJECTION SUBCUTANEOUS at 17:17

## 2021-06-14 RX ADMIN — MIDODRINE HYDROCHLORIDE 5 MG: 5 TABLET ORAL at 09:08

## 2021-06-14 RX ADMIN — FLUTICASONE PROPIONATE 2 SPRAY: 50 SPRAY, METERED NASAL at 09:12

## 2021-06-14 RX ADMIN — HALOPERIDOL LACTATE 4 MG: 5 INJECTION, SOLUTION INTRAMUSCULAR at 20:53

## 2021-06-14 RX ADMIN — TRAMADOL HYDROCHLORIDE 50 MG: 50 TABLET, FILM COATED ORAL at 20:53

## 2021-06-14 RX ADMIN — GUAIFENESIN 600 MG: 600 TABLET, EXTENDED RELEASE ORAL at 20:53

## 2021-06-14 RX ADMIN — Medication 10 ML: at 06:40

## 2021-06-14 RX ADMIN — DULOXETINE HYDROCHLORIDE 30 MG: 30 CAPSULE, DELAYED RELEASE ORAL at 17:16

## 2021-06-14 RX ADMIN — Medication 10 ML: at 14:17

## 2021-06-14 NOTE — PROGRESS NOTES
Problem: Mobility Impaired (Adult and Pediatric)  Goal: *Acute Goals and Plan of Care (Insert Text)  Outcome: Progressing Towards Goal  Note:   GOALS UPDATED BASED ON CURRENT PROGRESS 6/12/21 :  (1.)Mr. Nesha Finnegan will move from supine to sit and sit to supine  in bed HOB 30 deg with rail) with CONTACT GUARD ASSIST (consistently). (2.)Mr. Nesha Finnegan will transfer from bed to chair and chair to bed using a walker & MINIMAL ASSIST (consistently). (3.)Mr. Nesha Finnegan will ambulate 25-30 ft using a rolling walker & MINIMAL ASSIST (consistently). 4) pt will perform LE & UE AROM on cue consistently as a warm up to functional activity. ________________________________________________________________________________________________      PHYSICAL THERAPY: Daily Note and AM 6/14/2021  INPATIENT: PT Visit Days : 3  [place] / [place] / Product Type: Medicare /       NAME/AGE/GENDER: Denise Carter is a 80 y.o. male   PRIMARY DIAGNOSIS: Severe sepsis ([facility] [A41.9, R65.20]  Acute cystitis [N30.00] Severe sepsis ([facility] Severe sepsis ([facility]       ICD-10: Treatment Diagnosis:    · Difficulty in walking, Not elsewhere classified (R26.2)  · Other abnormalities of gait and mobility (R26.89)   Precaution/Allergies:  Patient has no known allergies. ASSESSMENT:     Mr. Nesha Finnegan eyes open this am but very few verbalizations. He is on airvo. He sat up on side of bed with significant assistance. He sat for several minutes with flexed posture and neck flexion. He stood twice at bedside with RW and significant assistance. He was unable to stand fully upright with knees flexed and was unable to take any steps. He did some LE exercises on side of bed. He returned to supine. Pt. Not really engaged much in therapy session.   At this time, patient is appropriate for Co-treatment with OT due to patient's decreased overall endurance/tolerance levels, as well as need for high level skilled assistance to complete functional transfers/mobility and functional tasks. Ham Hoover is appropriate for a multidisciplinary co-treatment of PT and OT to address goals of both disciplines. This section established at most recent assessment   PROBLEM LIST (Impairments causing functional limitations):  1. Decreased Strength  2. Decreased Transfer Abilities  3. Decreased Ambulation Ability/Technique  4. Decreased Balance  5. Increased Pain  6. Decreased Activity Tolerance  7. Decreased Flexibility/Joint Mobility   INTERVENTIONS PLANNED: (Benefits and precautions of physical therapy have been discussed with the patient.)  1. Bed Mobility  2. Gait Training  3. Therapeutic Activites  4. Therapeutic Exercise/Strengthening  5. Transfer Training     TREATMENT PLAN: Frequency/Duration: daily for duration of hospital stay  Rehabilitation Potential For Stated Goals: Good     REHAB RECOMMENDATIONS (at time of discharge pending progress):    Placement: It is my opinion, based on this patient's performance to date, that Mr. Carlos Jones may benefit from intensive therapy at a [address] [place] [address] after discharge due to the functional deficits listed above that are likely to improve with skilled rehabilitation and concerns that he/she may be unsafe to be unsupervised at home due to being a high fall risk. Equipment:    None at this time              HISTORY:   History of Present Injury/Illness (Reason for Referral):  Pt admitted with above diagnosis.   Past Medical History/Comorbidities:   Mr. Carlos Jones  has a past medical history of Abdominal aortic aneurysm ([address] (12/10/2015), Abdominal aortic aneurysm without rupture ([address] Abnormal finding of lung (10/17/2016), Abnormality of urethral meatus (8/8/2019), Allergic rhinitis (12/10/2015), Asthma, Benign neoplasm, Benign prostatic hyperplasia (12/10/2015), Bigeminy (3/28/2016), BPH without urinary obstruction, Cardiovascular disease (12/10/2015), Chronic obstructive asthma with exacerbation ([address] (12/10/2015), Closed compression fracture of lumbosacral spine ([facility] (12/11/2018), COPD (chronic obstructive pulmonary disease) ([address] (12/10/2015), COPD (chronic obstructive pulmonary disease) with emphysema ([address] (10/17/2016), COPD exacerbation ([address] (5/6/2019), Cough with hemoptysis, Erectile dysfunction (12/10/2015), Essential hypertension, benign (12/10/2015), Fracture (10/2014), History of colon polyps, Low testosterone (12/10/2015), Lumbago, Memory loss, Overflow incontinence, Pleural effusion (6/10/2019), Pneumothorax on right (5/6/2019), Raynaud's syndrome (12/10/2015), Rotator cuff tendinitis, Thrombocytopenia ([address] Urinary frequency, Ventricular tachyarrhythmia ([facility] (5/6/2019), and VT (ventricular tachycardia) ([address] (5/6/2019). Mr. Drea Wan  has a past surgical history that includes hx hernia repair (1980); hx colonoscopy; hx fracture tx (10/2014); hx cataract removal (6/2016-right); and hx orthopaedic (03/2018). Social History/Living Environment:   Home Environment: Private residence  [address] [place] [address] Name: [address] [place] [address]  One/Two Story Residence: One story  Living Alone: No  Support Systems: Skilled nursing facility  Patient Expects to be Discharged to[de-identified] 2 [facility]  Current DME Used/Available at Home: Other (comment)  Tub or Shower Type: Shower  Prior Level of Function/Work/Activity:  Pt states he did not walk prior to admission. Number of Personal Factors/Comorbidities that affect the Plan of Care: 0: LOW COMPLEXITY   EXAMINATION:   Most Recent Physical Functioning:   Gross Assessment: 3-/5 to 2/5 throughout                       Balance:  Sitting: Impaired;High guard; With support  Standing: Pull to stand; Impaired; With support  Standing - Static: Constant support  Standing - Dynamic : Constant support Bed Mobility:  Supine to Sit: Moderate assistance;Assist x2; Additional time;Maximum assistance  Sit to Supine: Additional time;Assist x2; Moderate assistance;Maximum assistance Transfers:  Sit to Stand: Additional time; Moderate assistance;Maximum assistance;Assist x2  Stand to Sit: Moderate assistance;Maximum assistance;Assist x2; Additional time  Duration: 25 Minutes  Gait:            Body Structures Involved:  1. Lungs  2. Bones  3. Joints  4. Muscles  5. Ligaments Body Functions Affected:  1. Respiratory  2. Neuromusculoskeletal  3. Movement Related Activities and Participation Affected:  1. General Tasks and Demands  2. Mobility  3. Self Care   Number of elements that affect the Plan of Care: 4+: HIGH COMPLEXITY   CLINICAL PRESENTATION:   Presentation: Stable and uncomplicated: LOW COMPLEXITY   CLINICAL DECISION MAKING:   [facility] AM-PAC 6 Clicks   Basic Mobility Inpatient Short Form  How much difficulty does the patient currently have. .. Unable A Lot A Little None   1. Turning over in bed (including adjusting bedclothes, sheets and blankets)? [] 1   [] 2   [x] 3   [] 4   2. Sitting down on and standing up from a chair with arms ( e.g., wheelchair, bedside commode, etc.)   [] 1   [] 2   [x] 3   [] 4   3. Moving from lying on back to sitting on the side of the bed? [] 1   [] 2   [x] 3   [] 4   How much help from another person does the patient currently need. .. Total A Lot A Little None   4. Moving to and from a bed to a chair (including a wheelchair)? [] 1   [] 2   [x] 3   [] 4   5. Need to walk in hospital room? [] 1   [x] 2   [] 3   [] 4   6. Climbing 3-5 steps with a railing? [] 1   [x] 2   [] 3   [] 4   © 2007, Trustees of [facility], under license to [employer]. All rights reserved      Score:  Initial: 16 Most Recent: X (Date: -- )    Interpretation of Tool:  Represents activities that are increasingly more difficult (i.e. Bed mobility, Transfers, Gait). Medical Necessity:     · Skilled intervention continues to be required due to decreased independence with functional mobility.   Reason for Services/Other Comments:  · Patient continues to require skilled intervention due to   · Decreased independence with functional mobility. · .   Use of outcome tool(s) and clinical judgement create a POC that gives a: Clear prediction of patient's progress: LOW COMPLEXITY            TREATMENT:   (In addition to Assessment/Re-Assessment sessions the following treatments were rendered)   Pre-treatment Symptoms/Complaints:  Pt less agreeable to transfer to chair today  Pain: Initial: numeric scale     Post Session: 0/10   Today's treatment session addressed Decreased Strength, Decreased Transfer Abilities and Decreased Ambulation Ability/Technique to progress towards achieving goal(s) 2 and 3. During this session, Occupational Therapy addressed Functional Transfers to progress towards their discipline specific goal(s). Co-treatment was necessary to improve patient's cognitive participation, ability to follow higher level commands and ability to return to normal functional activity. Therapeutic Activity: (  25 Minutes ):  Therapeutic activities including repeated sit<>stand with walker with standing balance, sitting balance, LE exercises for knee extension and marching seated x 10 each, transfers to improve mobility, strength, balance, coordination and dynamic movement of arm - bilateral, leg - bilateral and core to improve functional endurance & stability. Braces/Orthotics/Lines/Etc:   · li catheter  · telemetry/monitors  · O2 Device: Hi flow nasal cannula  Treatment/Session Assessment:    · Response to Treatment: needs significant assistance  · Interdisciplinary Collaboration:   o Physical Therapist  o Occupational Therapist  o Registered Nurse  · After treatment position/precautions:   o Supine in bed  o Bed/Chair-wheels locked  o Bed in low position  o Call light within reach  o RN notified   · Compliance with Program/Exercises: Will assess as treatment progresses  · Recommendations/Intent for next treatment session:   \"Next visit will focus on reduction in assistance provided\".   Total Treatment Duration:  PT Patient Time In/Time Out  Time In: 1135  Time Out: [place], PT

## 2021-06-14 NOTE — PROGRESS NOTES
Problem: Falls - Risk of  Goal: *Absence of Falls  Description: Document [place] Fall Risk and appropriate interventions in the flowsheet.   Outcome: Progressing Towards Goal  Note: Fall Risk Interventions:  Mobility Interventions: Bed/chair exit alarm, Communicate number of staff needed for ambulation/transfer, Patient to call before getting OOB, PT Consult for mobility concerns, PT Consult for assist device competence, OT consult for ADLs, Utilize walker, cane, or other assistive device    Mentation Interventions: Bed/chair exit alarm, Adequate sleep, hydration, pain control, Door open when patient unattended, Increase mobility, More frequent rounding, Reorient patient, Update white board    Medication Interventions: Bed/chair exit alarm, Patient to call before getting OOB, Teach patient to arise slowly    Elimination Interventions: Bed/chair exit alarm, Call light in reach, Patient to call for help with toileting needs     Problem: Sepsis: Discharge Outcomes  Goal: *Absence of deep venous thrombosis signs and symptoms(Stroke Metric)  Outcome: Progressing Towards Goal  Goal: *Optimal pain control at patient's stated goal  Outcome: Progressing Towards Goal     Problem: Delirium Treatment  Goal: *Absence of falls  Outcome: Progressing Towards Goal

## 2021-06-14 NOTE — PROGRESS NOTES
Problem: Self Care Deficits Care Plan (Adult)  Goal: *Acute Goals and Plan of Care (Insert Text)  Outcome: Progressing Towards Goal  Note:   1. Patient will complete lower body dressing with minimal assist to increase self care independence. 2. Patient will complete upper body dressing with contact guard assist to increase self care independence. 3. Patient will tolerate 20 minutes of OT treatment with self incorporated rest breaks to increase activity tolerance to enhance participation in hobbies. 4. Patient will complete chair transfer with contact guard assist using adaptive equipment as needed. 5. Patient will complete UE exercises with supervision to increase overall activity tolerance and strength. Timeframe: 7 visits       OCCUPATIONAL THERAPY: Daily Note 6/14/2021  INPATIENT: OT Visit Days: 4  [place] / [place] / Product Type: Medicare /      NAME/AGE/GENDER: Osvaldo Davis is a 80 y.o. male   PRIMARY DIAGNOSIS:  Severe sepsis ([address] [A41.9, R65.20]  Acute cystitis [N30.00] Severe sepsis ([address] Severe sepsis ([address]       ICD-10: Treatment Diagnosis:    · Generalized Muscle Weakness (M62.81)  · Difficulty in walking, Not elsewhere classified (R26.2)   Precautions/Allergies:     Patient has no known allergies. ASSESSMENT:     Mr. Nirmala Mcnally presents from [facility] with symptoms of sepsis. Pt is very [place] but is able to answer direct questions. Pt is poor historian and no family or friends present so PLOF is unclear at this time. Pt on  high flow NC and required cues and additional time to keep O2 sats up during session. Pt fatigues very quickly with activity. 6/14/2021 Decline from previous session, RN aware. Patient able to follow instructions only about half the time and not able to carry a conversation. Stood 2 times with maximal assist. Cotx needed this session. Continue OT.        This section established at most recent assessment   PROBLEM LIST (Impairments causing functional limitations):  1. Decreased Strength  2. Decreased ADL/Functional Activities  3. Decreased Transfer Abilities  4. Decreased Ambulation Ability/Technique  5. Decreased Balance  6. Decreased Activity Tolerance  7. Increased Fatigue  8. Increased Shortness of Breath   INTERVENTIONS PLANNED: (Benefits and precautions of occupational therapy have been discussed with the patient.)  1. Activities of daily living training  2. Adaptive equipment training  3. Balance training  4. Clothing management  5. Cognitive training  6. Neuromuscular re-eduation  7. Therapeutic activity  8. Therapeutic exercise     TREATMENT PLAN: Frequency/Duration: Follow patient 3x/week to address above goals. Rehabilitation Potential For Stated Goals: Good     REHAB RECOMMENDATIONS (at time of discharge pending progress):    Placement: It is my opinion, based on this patient's performance to date, that Mr. Giselle Nagy may benefit from participating in 1-2 additional therapy sessions in order to continue to assess for rehab potential and then make recommendation for disposition at discharge.   Equipment:    None at this time              OCCUPATIONAL PROFILE AND HISTORY:   History of Present Injury/Illness (Reason for Referral):  See H&P  Past Medical History/Comorbidities:   Mr. Giselle Nagy  has a past medical history of Abdominal aortic aneurysm ([address] (12/10/2015), Abdominal aortic aneurysm without rupture ([address] Abnormal finding of lung (10/17/2016), Abnormality of urethral meatus (8/8/2019), Allergic rhinitis (12/10/2015), Asthma, Benign neoplasm, Benign prostatic hyperplasia (12/10/2015), Bigeminy (3/28/2016), BPH without urinary obstruction, Cardiovascular disease (12/10/2015), Chronic obstructive asthma with exacerbation ([address] (12/10/2015), Closed compression fracture of lumbosacral spine ([address] (12/11/2018), COPD (chronic obstructive pulmonary disease) ([address] (12/10/2015), COPD (chronic obstructive pulmonary disease) with emphysema ([facility] (10/17/2016), COPD exacerbation ([address] (5/6/2019), Cough with hemoptysis, Erectile dysfunction (12/10/2015), Essential hypertension, benign (12/10/2015), Fracture (10/2014), History of colon polyps, Low testosterone (12/10/2015), Lumbago, Memory loss, Overflow incontinence, Pleural effusion (6/10/2019), Pneumothorax on right (5/6/2019), Raynaud's syndrome (12/10/2015), Rotator cuff tendinitis, Thrombocytopenia ([address] Urinary frequency, Ventricular tachyarrhythmia ([address] (5/6/2019), and VT (ventricular tachycardia) ([address] (5/6/2019). Mr. Lulú Maloney  has a past surgical history that includes hx hernia repair (1980); hx colonoscopy; hx fracture tx (10/2014); hx cataract removal (6/2016-right); and hx orthopaedic (03/2018). Social History/Living Environment:   Home Environment: Private residence  [address] [place] [address] [place] Name: Heena Levy Kendrick  One/Two Story Residence: One story  Living Alone: No  Support Systems: 2 [facility]  Patient Expects to be Discharged to[de-identified] 2 [facility]  Current DME Used/Available at Home: Other (comment)  Tub or Shower Type: Shower  Prior Level of Function/Work/Activity:  Unsure of PLOF at this time, from [address]     Number of Personal Factors/Comorbidities that affect the Plan of Care: Brief history (0):  LOW COMPLEXITY   ASSESSMENT OF OCCUPATIONAL PERFORMANCE[de-identified]   Activities of Daily Living:   Basic ADLs (From Assessment) Complex ADLs (From Assessment)   Feeding: Supervision  Oral Facial Hygiene/Grooming: Supervision  Bathing: Maximum assistance  Upper Body Dressing: Maximum assistance  Lower Body Dressing: Maximum assistance  Toileting: Maximum assistance     Grooming/Bathing/Dressing Activities of Daily Living                             Bed/Mat Mobility  Supine to Sit: Moderate assistance;Assist x2; Additional time;Maximum assistance  Sit to Supine: Additional time;Assist x2; Moderate assistance;Maximum assistance  Sit to Stand: Additional time; Moderate assistance;Maximum assistance;Assist x2  Stand to Sit: Moderate assistance;Maximum assistance;Assist x2; Additional time     Most Recent Physical Functioning:   Gross Assessment:                  Posture:  Posture (WDL): Within defined limits  Balance:  Sitting: Impaired;High guard; With support  Standing: Pull to stand; Impaired; With support  Standing - Static: Constant support  Standing - Dynamic : Constant support Bed Mobility:  Supine to Sit: Moderate assistance;Assist x2; Additional time;Maximum assistance  Sit to Supine: Additional time;Assist x2; Moderate assistance;Maximum assistance  Wheelchair Mobility:     Transfers:  Sit to Stand: Additional time; Moderate assistance;Maximum assistance;Assist x2  Stand to Sit: Moderate assistance;Maximum assistance;Assist x2; Additional time  Duration: 25 Minutes            Patient Vitals for the past 6 hrs:   BP SpO2 O2 Flow Rate (L/min) Pulse   06/14/21 0738 116/71 99 %  71   06/14/21 0908 112/69 96 % 35 l/min 75   06/14/21 1135 116/71      06/14/21 1214 (!) 148/83 97 %  72       Mental Status  Neurologic State: Alert, Confused  Orientation Level: Oriented to person, Oriented to place, Disoriented to time, Disoriented to situation  Cognition: Follows commands, Memory loss, Decreased attention/concentration  Perception: Appears intact  Perseveration: No perseveration noted  Safety/Judgement: Decreased insight into deficits                          Physical Skills Involved:  1. Balance  2. Strength  3. Activity Tolerance Cognitive Skills Affected (resulting in the inability to perform in a timely and safe manner):  1. Executive Function  2. Short Term Recall  3. Long Term Memory Psychosocial Skills Affected:  1. Habits/Routines  2.  Environmental Adaptation   Number of elements that affect the Plan of Care: 5+:  HIGH COMPLEXITY   CLINICAL DECISION MAKING:   [employer] AM-PAC 6 Clicks   Daily Activity Inpatient Short Form  How much help from another person does the patient currently need. .. Total A Lot A Little None   1. Putting on and taking off regular lower body clothing? [] 1   [x] 2   [] 3   [] 4   2. Bathing (including washing, rinsing, drying)? [] 1   [x] 2   [] 3   [] 4   3. Toileting, which includes using toilet, bedpan or urinal?   [x] 1   [] 2   [] 3   [] 4   4. Putting on and taking off regular upper body clothing? [] 1   [x] 2   [] 3   [] 4   5. Taking care of personal grooming such as brushing teeth? [] 1   [] 2   [x] 3   [] 4   6. Eating meals? [] 1   [] 2   [x] 3   [] 4   © 2007, Trustees of [facility] [employer], under license to [employer]. All rights reserved      Score:  Initial: 13 Most Recent: X (Date: -- )    Interpretation of Tool:  Represents activities that are increasingly more difficult (i.e. Bed mobility, Transfers, Gait). Medical Necessity:     · Skilled intervention continues to be required due to the above deficits. Reason for Services/Other Comments:  · See above deficits. Use of outcome tool(s) and clinical judgement create a POC that gives a: MODERATE COMPLEXITY         TREATMENT:   (In addition to Assessment/Re-Assessment sessions the following treatments were rendered)     Pre-treatment Symptoms/Complaints:    Pain: Initial:   Pain Intensity 1: 0  Post Session:  0     Self Care: (25): Procedure(s) (per grid) utilized to improve and/or restore self-care/home management as related to dressing, toileting and functional transfers. Required moderate verbal and tactile cueing to facilitate activities of daily living skills.     Braces/Orthotics/Lines/Etc:   · li catheter  · O2 Device: Hi flow nasal cannula  Treatment/Session Assessment:    · Response to Treatment:  fair  · Interdisciplinary Collaboration:   o Physical Therapist  o Occupational Therapist  o Registered Nurse  · After treatment position/precautions:   o Up in chair  o Bed alarm/tab alert on  o Bed/Chair-wheels locked  o Call light within reach  o RN notified · Compliance with Program/Exercises: Will assess as treatment progresses. · Recommendations/Intent for next treatment session: \"Next visit will focus on advancements to more challenging activities and reduction in assistance provided\".   Total Treatment Duration:  OT Patient Time In/Time Out  Time In: 3975  Time Out: 173 [facility], OT

## 2021-06-14 NOTE — ROUTINE PROCESS
Bedside and Verbal report given to Molina Barraza RN by self.  Report included SBAR, Kardex, ED summary, procedure summary, recent results and cardiac rhythm SR.

## 2021-06-14 NOTE — ROUTINE PROCESS
Bedside and Verbal report given to self by Rosalba Marie RN.  Report included SBAR, Kardex, ED Summary, Procedure Summary, Intake and Output and Cardiac Rhythm SR.

## 2021-06-14 NOTE — PROGRESS NOTES
Comprehensive Nutrition Assessment    Type and Reason for Visit: Reassess      Nutrition Recommendations/Plan:    Continue Ensure Enlive 4 times per day. Noted hx of preference for chocolate     Malnutrition Assessment:  Malnutrition Status: Moderate malnutrition  Context: Chronic illness  Findings of clinical characteristics of malnutrition:   Energy Intake:   (Prior admission intake consisted primarily of ONS, current intake <25%)  Weight Loss:   (Prior hx of weight loss with more recent weight gain)     Body Fat Loss:  1 - Mild body fat loss (Mild to moderate), Triceps, Orbital, Buccal region, Fat overlying ribs   Muscle Mass Loss:  1 - Mild muscle mass loss (mild to moderate), Clavicles (pectoralis &deltoids), Thigh (quadraceps), Temples (temporalis), Hand (interosseous), Scapula (trapezius) (SCD on calves)  Fluid Accumulation:  Unable to assess,     Strength:    Did not perform      Nutrition Assessment:   Nutrition History: 6/7: Unable to obtain any hx from pt. HX from RD notes from Nov 2019 and Feb 2021. Pt primarily consumes chocolate Boost and 1-25% of meals. He was consistently drinking 3 bottles of Boost plus/d during the February admission so servings were increased to 4 per day. Nutrition Background: H/O: COPD, chronic hypoxic respiratory failure, ILD, dementia, BPH with chronic indwelling li, HTN,malnutrition. Resident of NH. Presented with AMS and cough. Findings of severe sepsis secondary to CAUTI/acute cystitis resulting in worsening of baseline dementia  Daily Update:  Pt resting and unable to provide any meaningful diet 25-50% of breakfast but di not drink Ensure Enlive at  [employer] or lunch today.           Current Nutrition Therapies:  ADULT DIET Easy to Chew  ADULT ORAL NUTRITION SUPPLEMENT Breakfast, Lunch, Dinner, HS Snack; Standard High Calorie/High Protein    Current Intake:   Average Meal Intake: 26-50% for 4 recorded meal in the past week Average Supplement Intake: 50% for 2 recorded intake in past 1 week      Anthropometric Measures:  Height: 5' 8\" (172.7 cm)  Current Body Wt: 58.2 kg (128 lb 4.9 oz) (6/7), Weight source: Bed scale   Last 3 Recorded Weights in this Encounter    06/11/21 0509 06/12/21 0432 06/14/21 1135   Weight: 57.6 kg (126 lb 15.8 oz) 56.9 kg (125 lb 7.1 oz) 56.7 kg (125 lb)   BMI: 19.5, Underweight (BMI less than 22) age over 72  Admission Body Weight: 117 lb 1 oz (Source not specified)  Ideal Body Weight (lbs) (Calculated): 154 lbs (70 kg),    Usual Body Wt: 60.3 kg (133 lb) (RD note bed scale on 9/6/19), Percent weight change: -3.5          Edema: No data recorded   Estimated Daily Nutrient Needs:  Energy (kcal/day): 6540-0296 (Kcal/kg (25-30), Weight Used: Current (58.2 kg bed scale 6/7))  Protein (g/day): 70-87 (1.2-1.5 grams/kg) Weight Used: (Current)  Fluid (ml/day):   (1 ml/kcal)    Nutrition Diagnosis:   · Inadequate oral intake related to cognitive or neurological impairment (dementia) as evidenced by  (intake <25%, prior intake primarily consists of ONS)    · Moderate malnutrition, In context of chronic illness related to cognitive or neurological impairment, increased demand for energy/nutrients (dependence on ONS to meet nutrtional needs) as evidenced by  (as per malnutrition assessment above)    Nutrition Interventions:   Food and/or Nutrient Delivery: Continue current diet, Start oral nutrition supplement     Coordination of Nutrition Care: Continue to monitor while inpatient  Plan of Care discussed with Sara Pena RN    Goals:   Previous Goal Met: Progressing toward goal(s)  Active Goal: Intake >75% of estimated needs by follow up    Nutrition Monitoring and Evaluation:      Food/Nutrient Intake Outcomes: Food and nutrient intake, Supplement intake  Physical Signs/Symptoms Outcomes: Weight    Discharge Planning:    Continue oral nutrition supplement    Magdi Lynn, [address] [place] [address], [facility] [address]

## 2021-06-14 NOTE — PROGRESS NOTES
[address] [facility] Hospitalist        Name:  Arash Gaming  Age:89 y.o. Sex:male   :  1932    MRN:  [id number]   PCP:  Melany Junior MD      Admit Date:  2021  2:03 PM   Chief Complaint: WORSENING CONFUSION    Reason for Admission:   Severe sepsis ([address] [A41.9, R65.20]  Acute cystitis [N30.00]    Assessment & Plan:     SEVERE SEPSIS 2/2 CAUTI/acute cystitis and Ecoli Bacteremia:  : resolved  LA resolved  IV fluids stopped on   Blood culture on + for E. Coli, repeat blood culture on  till date negative  Urine culture positive for Ecoli  Antibiotics: Zosyn -  Antibiotic deescalated to Ceftriaxone on -  Antibiotics switched to oral Bactrim on   Pt is hemodynamically stable  Wean midodrine as tolerated    COPD with acute on chronic hypoxic respiratory failure:  : stable  Off BiPAP   Currently stable on airvo 35 L/40%, wean off of airvo as able  pulmicort bid with duoneb prn  mucinex bid  Incentive spirometry  Completed doxycycline for 5 days  Chest x-ray on  with persistent opacities  Increase IV Lasix to 40 mg daily as blood pressure tolerates  Repeat chest x-ray  LTAC is consulted for evaluation    Acute encephalopathy secondary to sepsis/CAUTI with underlying dementia:  : Improving   Delirium/dementia precautions  Reorient patient  Fall precautions  Avoid benzo, opiates and anticholinergic  depakote sprinkles prn for agitation/restlessness    Depression:  : stable  Continue cymbalta    BPH:  indwelling li cath in  continue proscar  Hx of Hypospadiasis    Severe protein calorie malnutrition with BMP 17:  Nutrition consulted  Supplement with meals  Pt has chronic physical deconditioning with poor oral intake    Congenital hypospadias/urethral trauma:  Patient is evaluated by urology, recommend congenital hypospadias, no intervention required      Disposition/Expected LOS: High oxygen demand,  asked LTAC to evaluate for transfer.   PT/OT following  Diet: DIET ADULT  DIET ADULT ORAL NUTRITION SUPPLEMENT  VTE ppx: lovenox  GI ppx: protonix  Code status: DNR  Surrogate decision-maker: pt's son and wife      Interval Hx/Subjective:     Mirta Crane is a 80 y.o. male with hx of COPD, chronic hypoxic respiratory failure, dementia, BPH with chronic indwelling li, HTN resident of [facility] admitted on 6/6 for severe sepsis secondary to CAUTI/acute cystitis resulting in worsening of baseline dementia. Pt noted to be febrile in ER T 100.9, with LA 2.7.    6/14:  Pt seen at bedside, resting in bed. No active complaint. Nurse at the bedside. He is stable on airvo. LTAC evaluation for possible transfer. Review of Systems:  A 14 point review of systems was taken and pertinent positive as mentioned above. Objective:     Patient Vitals for the past 24 hrs:   Temp Pulse Resp BP SpO2   06/14/21 0908  75  112/69 96 %   06/14/21 0738 97.9 °F (36.6 °C) 71 19 116/71 99 %   06/14/21 0445 98.3 °F (36.8 °C) 70 16 (!) 148/69 100 %   06/14/21 0030 98.2 °F (36.8 °C) 71 21 (!) 142/74 100 %   06/13/21 2305 98.1 °F (36.7 °C) 76 22 135/80 99 %   06/13/21 2135  83 25 115/70 99 %   06/13/21 2030     94 %   06/13/21 1945 98.8 °F (37.1 °C) 74 21 138/77 94 %   06/13/21 1720  86 20 119/71 98 %   06/13/21 1705  82 (!) 36 113/74 95 %   06/13/21 1500 98.5 °F (36.9 °C) 88 23 111/67 95 %   06/13/21 1457 98.5 °F (36.9 °C) 87 (!) 31 111/67 97 %   06/13/21 1454  88 (!) 35 111/67 94 %   06/13/21 1304  85 (!) 34 116/72 93 %   06/13/21 1138  82 24 93/70 (!) 86 %   06/13/21 1108 98.5 °F (36.9 °C) 78 (!) 36 105/62 98 %   06/13/21 1107  77 21 105/62 100 %       Oxygen Therapy  O2 Sat (%): 96 % (06/14/21 0908)  Pulse via Oximetry: 73 beats per minute (06/14/21 0908)  O2 Device: Heated; Hi flow nasal cannula (06/14/21 0908)  Skin Assessment: Clean, dry, & intact (06/13/21 1945)  Skin Protection for O2 Device: No (06/13/21 1945)  O2 Flow Rate (L/min): 35 l/min (06/14/21 0908)  O2 Temperature: 93.2 °F (34 °C) (06/14/21 0908)  FIO2 (%): 45 % (06/14/21 0908)    Body mass index is 19.07 kg/m². Physical Exam:    General:  Elderly, frail, nad, alert/awake, on airvo  HEENT:  Head NCAT, PERRLA+  Neck:   Supple, no lymphadenopathy, no JVD   Lungs:  Coarse breath sounds bilaterally  CV:   Regular rate and rhythm with normal S1 and S2   Abdomen:  Soft, nontender, nondistended, normoactive bowel sounds   Extremities:  No cyanosis clubbing or edema   Neuro:  gcs 15, CN 2-12 intact, following commands and moving all 4 extremities spontaneously  Psych:  AOx2, mood and affect flat      Data Reviewed: I have reviewed all labs, meds, and studies. Recent Results (from the past 24 hour(s))   MAGNESIUM    Collection Time: 06/14/21  3:26 AM   Result Value Ref Range    Magnesium 2.4 1.8 - 2.4 mg/dL   CBC WITH AUTOMATED DIFF    Collection Time: 06/14/21  3:26 AM   Result Value Ref Range    WBC 12.5 (H) 4.3 - 11.1 K/uL    RBC 3.50 (L) 4.23 - 5.6 M/uL    HGB 10.4 (L) 13.6 - 17.2 g/dL    HCT 34.8 (L) 41.1 - 50.3 %    MCV 99.4 (H) 79.6 - 97.8 FL    MCH 29.7 26.1 - 32.9 PG    MCHC 29.9 (L) 31.4 - 35.0 g/dL    RDW 14.6 11.9 - 14.6 %    PLATELET 721 429 - 171 K/uL    MPV 10.1 9.4 - 12.3 FL    ABSOLUTE NRBC 0.00 0.0 - 0.2 K/uL    DF AUTOMATED      NEUTROPHILS 66 43 - 78 %    LYMPHOCYTES 19 13 - 44 %    MONOCYTES 10 4.0 - 12.0 %    EOSINOPHILS 4 0.5 - 7.8 %    BASOPHILS 1 0.0 - 2.0 %    IMMATURE GRANULOCYTES 1 0.0 - 5.0 %    ABS. NEUTROPHILS 8.2 1.7 - 8.2 K/UL    ABS. LYMPHOCYTES 2.4 0.5 - 4.6 K/UL    ABS. MONOCYTES 1.2 0.1 - 1.3 K/UL    ABS. EOSINOPHILS 0.5 0.0 - 0.8 K/UL    ABS. BASOPHILS 0.1 0.0 - 0.2 K/UL    ABS. IMM.  GRANS. 0.2 0.0 - 0.5 K/UL   METABOLIC PANEL, BASIC    Collection Time: 06/14/21  3:26 AM   Result Value Ref Range    Sodium 138 136 - 145 mmol/L    Potassium 3.7 3.5 - 5.1 mmol/L    Chloride 103 98 - 107 mmol/L    CO2 30 21 - 32 mmol/L    Anion gap 5 (L) 7 - 16 mmol/L    Glucose 100 65 - 100 mg/dL    BUN 25 (H) 8 - 23 MG/DL    Creatinine 0.55 (L) 0.8 - 1.5 MG/DL    GFR est AA >60 >60 ml/min/1.73m2    GFR est non-AA >60 >60 ml/min/1.73m2    Calcium 7.9 (L) 8.3 - 10.4 MG/DL       EKG Results     Procedure 720 Value Units Date/Time    EKG, 12 LEAD, INITIAL [[id number]] Collected: 06/06/21 1423    Order Status: Completed Updated: 06/06/21 1704     Ventricular Rate 84 BPM      Atrial Rate 78 BPM      P-R Interval 136 ms      QRS Duration 92 ms      Q-T Interval 378 ms      QTC Calculation (Bezet) 446 ms      Calculated P Axis 63 degrees      Calculated R Axis -3 degrees      Calculated T Axis 72 degrees      Diagnosis --     !! AGE AND GENDER SPECIFIC ECG ANALYSIS !!   Sinus rhythm with Premature supraventricular complexes with frequent   Premature ventricular complexes  Septal infarct (cited on or before 01-FEB-2018)  Abnormal ECG  When compared with ECG of 30-JAN-2021 12:46,  Premature supraventricular complexes are now Present  Nonspecific T wave abnormality no longer evident in Lateral leads  Confirmed by ST SHIRA CAMACHO MD (), GERARDO BURKS ([id number]) on 6/6/2021 5:04:38 PM            All Micro Results     Procedure Component Value Units Date/Time    CULTURE, BLOOD [[id number]] Collected: 06/08/21 0823    Order Status: Completed Specimen: Blood Updated: 06/13/21 0935     Special Requests: --        LEFT  HAND       Culture result: NO GROWTH 5 DAYS       CULTURE, BLOOD [[id number]] Collected: 06/08/21 0827    Order Status: Completed Specimen: Blood Updated: 06/13/21 0935     Special Requests: --        RIGHT  Antecubital       Culture result: NO GROWTH 5 DAYS       CULTURE, URINE [[id number]]  (Abnormal)  (Susceptibility) Collected: 06/06/21 1427    Order Status: Completed Specimen: Urine Updated: 06/11/21 0722     Special Requests: NO SPECIAL REQUESTS        Culture result:       50,000-100,000 COLONIES/mL ESCHERICHIA COLI                  CULTURE IN PROGRESS,FURTHER UPDATES TO FOLLOW CORRECTED ON 06/10 AT 1157: PREVIOUSLY REPORTED AS <10,000 COLONIES/mL MIXED SKIN NAEL ISOLATED                  10,000 to 50,000 COLONIES/mL MIXED SKIN NAEL ISOLATED          BLOOD CULTURE [[id number]] Collected: 06/06/21 1427    Order Status: Completed Specimen: Blood Updated: 06/11/21 0704     Special Requests: --        RIGHT  Antecubital       Culture result: NO GROWTH 5 DAYS       CULTURE, RESPIRATORY/SPUTUM/BRONCH W GRAM STAIN [[id number]]  (Abnormal)  (Susceptibility) Collected: 06/06/21 1821    Order Status: Completed Specimen: Sputum Updated: 06/10/21 1019     Special Requests: NO SPECIAL REQUESTS        GRAM STAIN 2 TO 18 WBC'S/OIF      0 TO 2 EPITHELIAL CELLS SEEN /OIF      FEW GRAM POSITIVE COCCI         FEW GRAM NEGATIVE RODS         FEW YEAST         FEW GRAM POSITIVE RODS         3+ MUCUS PRESENT        Culture result:       MODERATE STAPHYLOCOCCUS AUREUS                  HEAVY NORMAL RESPIRATORY NAEL          BLOOD CULTURE [[id number]]  (Abnormal)  (Susceptibility) Collected: 06/06/21 1345    Order Status: Completed Specimen: Blood Updated: 06/09/21 0758     Special Requests: LEFT FOREARM        GRAM STAIN GRAM NEGATIVE RODS         ANAEROBIC BOTTLE POSITIVE               RESULTS VERIFIED, PHONED TO AND READ BACK BY AMISHA GRIFFITH RN BY NB AT [address] [place] [address] ON [id number]           Culture result: ESCHERICHIA COLI               Refer to Blood Culture ID Panel Accession  [id number]      SARS-COV-2, PCR [[id number]] Collected: 06/07/21 1646    Order Status: Completed Specimen: Nasopharyngeal Updated: 06/08/21 1226     Specimen source Nasopharyngeal        SARS-CoV-2 Not detected        Comment:      The specimen is NEGATIVE for SARS-CoV-2, the novel coronavirus associated with COVID-19. This test has been authorized by the FDA under an Emergency Use Authorization (EUA) for use by authorized laboratories.         Fact sheet for Healthcare Providers: ConventionUpdate.co.nz       Fact sheet for Patients: ConventionUpdate.co.nz Methodology: RT-PCR         BLOOD CULTURE ID PANEL [[id number]]  (Abnormal) Collected: 06/06/21 1345    Order Status: Completed Specimen: Blood Updated: 06/07/21 1322     Acc. no. from Micro Order [id number]     Escherichia coli Detected        Comment: RESULTS VERIFIED, PHONED TO AND READ BACK BY  EREN ACOSTA RN @ 5658 ON 6/7/21 BY AMJIN          KPC (Carbapenem Resistance Gene) NOT DETECTED        Comment: WARNING:  A Not Detected result for the KPC gene does not indicate susceptibility to carbapenems. Gram negative bacteria can be resistant to carbapenems by mechanisms other than carrying the KPC gene. INTERPRETATION       Gram negative dereck. Identified by realtime PCR as E. coli          COVID-19 RAPID TEST [[id number]] Collected: 06/06/21 1659    Order Status: Completed Specimen: Nasopharyngeal Updated: 06/06/21 1728     Specimen source Nasopharyngeal        COVID-19 rapid test Not detected        Comment:      The specimen is NEGATIVE for SARS-CoV-2, the novel coronavirus associated with COVID-19. A negative result does not rule out COVID-19. This test has been authorized by the FDA under an Emergency Use Authorization (EUA) for use by authorized laboratories. Fact sheet for Healthcare Providers: iTendency.uy  Fact sheet for Patients: iTendency.uy       Methodology: Isothermal Nucleic Acid Amplification               Other Studies:  No results found.       Medications:  Medications Administered       acetaminophen (TYLENOL) tablet 650 mg       Admin Date  03/13/2021 Action  Given Dose  650 mg Route  Oral Administered By  Didier Garcia RN              cefTRIAXone (ROCEPHIN) 1 g in 0.9% sodium chloride (MBP/ADV) 50 mL MBP       Admin Date  03/13/2021 Action  New Bag Dose  1 g Rate  100 mL/hr Route  IntraVENous Administered By  Nessa Bull RN              cefTRIAXone (ROCEPHIN) 2 g in 0.9% sodium chloride (MBP/ADV) 50 mL MBP Admin Date  03/13/2021 Action  New Bag Dose  2 g Rate  100 mL/hr Route  IntraVENous Administered By  Devante Lr RN               Admin Date  03/14/2021 Action  New Bag Dose  2 g Rate  100 mL/hr Route  IntraVENous Administered By  Dannielle Vann RN               Admin Date  03/15/2021 Action  New Bag Dose  2 g Rate  100 mL/hr Route  IntraVENous Administered By  Dannielle Vann RN               Admin Date  03/16/2021 Action  New Bag Dose  2 g Rate  100 mL/hr Route  IntraVENous Administered By  Ernesto Severs              diphenhydrAMINE (BENADRYL) injection 25 mg       Admin Date  03/14/2021 Action  Given Dose  25 mg Route  IntraVENous Administered By  Pedro Villalba RN              doxycycline (VIBRAMYCIN) 100 mg in 0.9% sodium chloride (MBP/ADV) 100 mL MBP       Admin Date  03/13/2021 Action  New Bag Dose  100 mg Rate  100 mL/hr Route  IntraVENous Administered By  Devante Lr RN               Admin Date  03/13/2021 Action  New Bag Dose  100 mg Rate  100 mL/hr Route  IntraVENous Administered By  Pedro Villalba RN               Admin Date  03/14/2021 Action  New Bag Dose  100 mg Rate  100 mL/hr Route  IntraVENous Administered By  Dannielle Vann RN               Admin Date  03/14/2021 Action  New Bag Dose  100 mg Rate  100 mL/hr Route  IntraVENous Administered By  Pedro Villalba RN               Admin Date  03/15/2021 Action  New Bag Dose  100 mg Rate  100 mL/hr Route  IntraVENous Administered By  Dannielle Vann RN              enoxaparin (LOVENOX) injection 40 mg       Admin Date  03/13/2021 Action  Given Dose  40 mg Route  SubCUTAneous Administered By  Devante Lr RN               Admin Date  03/13/2021 Action  Given Dose  40 mg Route  SubCUTAneous Administered By  Pedro Villalba RN               Admin Date  03/14/2021 Action  Given Dose  40 mg Route  SubCUTAneous Administered By  Dannielle Vann RN               Admin Date  03/14/2021 Action  Given Dose  40 mg Route  SubCUTAneous Administered By  Pedro Villalba RN               Admin Date  03/15/2021 Action  Given Dose  40 mg Route  SubCUTAneous Administered By  Dannielle Vann RN               Admin Date  03/15/2021 Action  Given Dose  40 mg Route  SubCUTAneous Administered By  Ninfa Brewer RN               Admin Date  03/16/2021 Action  Given Dose  40 mg Route  SubCUTAneous Administered By  Ernesto Severs              fentaNYL citrate (PF) injection 50 mcg       Admin Date  03/13/2021 Action  Given Dose  50 mcg Route  IntraVENous Administered By  Devante Lr RN               Admin Date  03/14/2021 Action  Given Dose  50 mcg Route  IntraVENous Administered By  Pedro Villalba RN              ferrous sulfate tablet 325 mg       Admin Date  03/14/2021 Action  Given Dose  325 mg Route  Oral Administered By  Dannielle Vann RN               Admin Date  03/14/2021 Action  Given Dose  325 mg Route  Oral Administered By  Dannielle Vann RN               Admin Date  03/15/2021 Action  Given Dose  325 mg Route  Oral Administered By  Dannielle Vann RN              folic acid (FOLVITE) tablet 1 mg       Admin Date  03/14/2021 Action  Given Dose  1 mg Route  Oral Administered By  Dannielle Vann RN               Admin Date  03/15/2021 Action  Given Dose  1 mg Route  Oral Administered By  Dannielle Vann RN               Admin Date  03/16/2021 Action  Given Dose  1 mg Route  Oral Administered By  Ernesto Severs              furosemide (LASIX) injection 20 mg       Admin Date  03/15/2021 Action  Given Dose  20 mg Route  IntraVENous Administered By  Dannielle Vann RN              furosemide (LASIX) injection 40 mg       Admin Date  03/13/2021 Action  Given Dose  40 mg Route  IntraVENous Administered By  Cesar Liriano RN               Admin Date  03/13/2021 Action  Given Dose  40 mg Route  IntraVENous Administered By  Pedro Villalba RN               Admin Date  03/14/2021 Action  Given Dose  40 mg Route  IntraVENous Administered By  Dannielle Vann RN Admin Date  03/14/2021 Action  Given Dose  40 mg Route  IntraVENous Administered By  Chandler Ramesh RN               Admin Date  03/15/2021 Action  Given Dose  40 mg Route  IntraVENous Administered By  Desi Richards RN               Admin Date  03/16/2021 Action  Given Dose  40 mg Route  IntraVENous Administered By  Bozena Cowan              HYDROcodone-acetaminophen [facility]) 5-325 mg per tablet 1 Tab       Admin Date  03/14/2021 Action  Given Dose  1 Tab Route  Oral Administered By  Annemarie Killian RN               Admin Date  03/15/2021 Action  Given Dose  1 Tab Route  Oral Administered By  Chandler Ramesh RN              insulin lispro (HUMALOG) injection       Admin Date  03/13/2021 Action  Given Dose  2 Units Route  SubCUTAneous Administered By  Chandler Ramesh RN               Admin Date  03/14/2021 Action  Given Dose  2 Units Route  SubCUTAneous Administered By  Annemarie Killian RN               Admin Date  03/14/2021 Action  Given Dose  2 Units Route  SubCUTAneous Administered By  Annemarie Killian RN               Admin Date  03/14/2021 Action  Given Dose  4 Units Route  SubCUTAneous Administered By  Chandler Ramesh RN               Admin Date  03/15/2021 Action  Given Dose  2 Units Route  SubCUTAneous Administered By  Annemarie Killian RN               Admin Date  03/15/2021 Action  Given Dose  4 Units Route  SubCUTAneous Administered By  Annemarie Killian RN               Admin Date  03/15/2021 Action  Given Dose  2 Units Route  SubCUTAneous Administered By  Desi Richards RN               Admin Date  03/16/2021 Action  Given Dose  4 Units Route  SubCUTAneous Administered By  Bozena Cowan              levothyroxine (SYNTHROID) tablet 137 mcg       Admin Date  03/14/2021 Action  Given Dose  137 mcg Route  Oral Administered By  Annemarie Killian RN               Admin Date  03/15/2021 Action  Given Dose  137 mcg Route  Oral Administered By  Annemarie Killian RN               Admin Date  03/16/2021 Action  Given Dose  137 mcg Route  Oral Administered By  Leslee Zee RN              midodrine (PROAMATINE) tablet 10 mg       Admin Date  03/14/2021 Action  Given Dose  10 mg Route  Oral Administered By  Jemma Fernández RN               Admin Date  03/14/2021 Action  Given Dose  10 mg Route  Oral Administered By  Jemma Fernández RN               Admin Date  03/15/2021 Action  Given Dose  10 mg Route  Oral Administered By  Jemma Fernández RN               Admin Date  03/15/2021 Action  Given Dose  10 mg Route  Oral Administered By  Jemma Fernández RN               Admin Date  03/15/2021 Action  Given Dose  10 mg Route  Oral Administered By  Jemma Fernández RN               Admin Date  03/16/2021 Action  Given Dose  10 mg Route  Oral Administered By  Karl Mckinley Date  03/16/2021 Action  Given Dose  10 mg Route  Oral Administered By  Karl Mckinley Date  03/16/2021 Action  Given Dose  10 mg Route  Oral Administered By  Denise Gramajo              naloxone [facility]) injection 2 mg       Admin Date  03/13/2021 Action  Given Dose  2 mg Route  IntraVENous Administered By  Carlie Cesar RN              NOREPINephrine (LEVOPHED) 4 mg in 5% dextrose 250 mL infusion       Admin Date  03/14/2021 Action  New Bag Dose  4 mcg/min Rate  15 mL/hr Route  IntraVENous Administered By  Jemma Fernández RN               Admin Date  03/14/2021 Action  Rate Change Dose  6 mcg/min Rate  22.5 mL/hr Route  IntraVENous Administered By  Jemma Fernández RN               Admin Date  03/14/2021 Action  Rate Change Dose  4 mcg/min Rate  15 mL/hr Route  IntraVENous Administered By  Jemma Fernández RN               Admin Date  03/14/2021 Action  Rate Change Dose  2 mcg/min Rate  7.5 mL/hr Route  IntraVENous Administered By  Jemma Fernández RN               Admin Date  03/14/2021 Action  Rate Change Dose  1 mcg/min Rate  3.8 mL/hr Route  IntraVENous Administered By  Lynette Jones Emily Vance RN               Admin Date  03/14/2021 Action  Restarted Dose  2 mcg/min Rate  7.5 mL/hr Route  IntraVENous Administered By  Dora Terry RN               Admin Date  03/14/2021 Action  Rate Change Dose  4 mcg/min Rate  15 mL/hr Route  IntraVENous Administered By  Dora Terry RN               Admin Date  03/15/2021 Action  Rate Change Dose  2 mcg/min Rate  7.5 mL/hr Route  IntraVENous Administered By  Dung Haney RN              ondansetron [facility]) injection 4 mg       Admin Date  03/13/2021 Action  Given Dose  4 mg Route  IntraVENous Administered By  Marlo Gray RN              pantoprazole (PROTONIX) tablet 40 mg       Admin Date  03/14/2021 Action  Given Dose  40 mg Route  Oral Administered By  Dora Terry, JANINA               Admin Date  03/15/2021 Action  Given Dose  40 mg Route  Oral Administered By  Dora Terry RN               Admin Date  03/16/2021 Action  Given Dose  40 mg Route  Oral Administered By  Beto Napoles              perflutren lipid microspheres (DEFINITY) in NS bolus IV       Admin Date  03/13/2021 Action  Given Dose  1 mL Route  IntraVENous Administered By  DARLEEN Espinosa              potassium chloride (K-DUR, KLOR-CON) SR tablet 40 mEq       Admin Date  03/15/2021 Action  Given Dose  40 mEq Route  Oral Administered By  Dora Terry RN               Admin Date  03/16/2021 Action  Given Dose  40 mEq Route  Oral Administered By  Beto Napoles              potassium chloride 40 mEq IVPB       Admin Date  03/15/2021 Action  New Bag Dose  40 mEq Rate  25 mL/hr Route  IntraVENous Administered By  Dung Haney RN              pregabalin (LYRICA) capsule 300 mg       Admin Date  03/14/2021 Action  Given Dose  300 mg Route  Oral Administered By  Dora Terry RN               Admin Date  03/14/2021 Action  Given Dose  300 mg Route  Oral Administered By  Dora Terry, JANINA               Admin Date  03/15/2021 Action  Given Dose  300 mg Route  Oral Administered By  Sergo Smalls RN               Admin Date  03/15/2021 Action  Given Dose  300 mg Route  Oral Administered By  Claudio Gavin, RN               Admin Date  03/16/2021 Action  Given Dose  300 mg Route  Oral Administered By  Nina Valdez              sertraline (ZOLOFT) tablet 300 mg       Admin Date  03/14/2021 Action  Given Dose  300 mg Route  Oral Administered By  Sergo Smalls, JANINA               Admin Date  03/15/2021 Action  Given Dose  300 mg Route  Oral Administered By  Sergo Smalls, JANINA               Admin Date  03/16/2021 Action  Given Dose  300 mg Route  Oral Administered By  Nina Valdez              sodium chloride (NS) flush 5-40 mL       Admin Date  03/13/2021 Action  Given Dose  10 mL Route  IntraVENous Administered By  Leslee Barros, JANINA               Admin Date  03/13/2021 Action  Given Dose  30 mL Route  IntraVENous Administered By  Leslee Barros, RN               Admin Date  03/13/2021 Action  Given Dose  40 mL Route  IntraVENous Administered By  Rashida Veliz, JANINA               Admin Date  03/14/2021 Action  Given Dose  40 mL Route  IntraVENous Administered By  Rashida Veliz, JANINA               Admin Date  03/14/2021 Action  Given Dose  10 mL Route  IntraVENous Administered By  Sergo Smalls, JANINA               Admin Date  03/14/2021 Action  Given Dose  40 mL Route  IntraVENous Administered By  Rashida Veliz, JANINA               Admin Date  03/15/2021 Action  Given Dose  40 mL Route  IntraVENous Administered By  Rashida Veliz, JANINA               Admin Date  03/15/2021 Action  Given Dose  10 mL Route  IntraVENous Administered By  Sergo Smalls, JANINA               Admin Date  03/15/2021 Action  Given Dose  10 mL Route  IntraVENous Administered By  Aleksandr Parks RN               Admin Date  03/16/2021 Action  Given Dose  10 mL Route  IntraVENous Administered By  Nina Valdez              sodium chloride 0.9 % bolus infusion 250 mL       Admin Date  03/14/2021 Action  New Bag Dose  250 mL Rate  250 mL/hr Route  IntraVENous Administered By  Sapna Irving RN              tiZANidine (ZANAFLEX) tablet 4 mg       Admin Date  03/14/2021 Action  Given Dose  4 mg Route  Oral Administered By  Reji Vuong, JANINA               Admin Date  03/14/2021 Action  Given Dose  4 mg Route  Oral Administered By  Sapna Irving RN               Admin Date  03/14/2021 Action  Given Dose  4 mg Route  Oral Administered By  Sapna Irving RN               Admin Date  03/14/2021 Action  Given Dose  4 mg Route  Oral Administered By  Reji Vuong, JANINA               Admin Date  03/15/2021 Action  Given Dose  4 mg Route  Oral Administered By  Sapna Irving RN               Admin Date  03/15/2021 Action  Given Dose  4 mg Route  Oral Administered By  Sapna Irving RN               Admin Date  03/15/2021 Action  Given Dose  4 mg Route  Oral Administered By  Asad Hancock RN               Admin Date  03/16/2021 Action  Given Dose  4 mg Route  Oral Administered By  Jaosn Hill Date  03/16/2021 Action  Given Dose  4 mg Route  Oral Administered By  Brand Clack              traZODone (DESYREL) tablet 150 mg       Admin Date  03/14/2021 Action  Given Dose  150 mg Route  Oral Administered By  Reji Vuong, JANINA               Admin Date  03/15/2021 Action  Given Dose  150 mg Route  Oral Administered By  Asad Hancock RN              tuberculin injection 5 Units       Admin Date  03/15/2021 Action  Give PPD Dose  5 Units Route  IntraDERMal Administered By  Sapna Irving RN              vancomycin (VANCOCIN) 2500 mg in  mL infusion       Admin Date  03/13/2021 Action  Given Dose  2,500 mg Rate  200 mL/hr Route  IntraVENous Administered By  Eduardo Razo RN              zonisamide (ZONEGRAN) capsule 300 mg       Admin Date  03/14/2021 Action  Given Dose  300 mg Route  Oral Administered By  Reji Vuong RN               Admin Date  03/15/2021 Action  Given Dose  300 mg Route  Oral Administered By  Yetta Barthel, RN                        Problem List:     Hospital Problems as of 6/14/2021 Date Reviewed: 12/17/2020        Codes Class Noted - Resolved POA    Bacteremia due to Gram-negative bacteria ICD-10-CM: R78.81  ICD-9-CM: 790.7, 041.85  6/7/2021 - Present Unknown        Acute cystitis ICD-10-CM: N30.00  ICD-9-CM: 595.0  6/6/2021 - Present Unknown        * (Principal) Severe sepsis (HCC) ICD-10-CM: A41.9, R65.20  ICD-9-CM: 038.9, 995.92  6/6/2021 - Present Unknown        Urinary tract infection associated with indwelling urethral catheter ([facility] ICD-10-CM: T83.511A, N39.0  ICD-9-CM: 996.64, 599.0  6/6/2021 - Present Unknown        Dementia (HCC) (Chronic) ICD-10-CM: F03.90  ICD-9-CM: 294.20  1/30/2021 - Present Yes        Chronic indwelling Rojas catheter ICD-10-CM: Z97.8  ICD-9-CM: V45.89  11/22/2020 - Present Yes        Moderate protein-calorie malnutrition ([facility] ICD-10-CM: E44.0  ICD-9-CM: 263.0  11/3/2019 - Present Unknown        Urethral tear, initial encounter ICD-10-CM: S37.33XA  ICD-9-CM: 867.0  8/8/2019 - Present Unknown        Chronic respiratory failure with hypoxia (HCC) (Chronic) ICD-10-CM: J96.11  ICD-9-CM: 518.83, 799.02  3/29/2016 - Present Yes    Overview Addendum 1/30/2021  5:25 PM by Candance Rucks, MD     4+L chronically             BPH (benign prostatic hyperplasia) (Chronic) ICD-10-CM: N40.0  ICD-9-CM: 600.00  12/10/2015 - Present Yes        COPD (chronic obstructive pulmonary disease) ([facility] (Chronic) ICD-10-CM: J44.9  ICD-9-CM: 760  12/10/2015 - Present Yes                 Signed By: Roseann Jeffries MD   [facility]    June 14, 2021  4:22 PM

## 2021-06-14 NOTE — PROGRESS NOTES
Care Management Interventions  PCP Verified by CM: Yes (Physician at [place] [id number])  Palliative Care Criteria Met (RRAT>21 & CHF Dx)?: No (Dx Sepsis, Risk 33%)  Mode of Transport at Discharge: [facility] [place] [facility] ambulance to take back to SNF)  Transition of Care Consult (CM Consult): Discharge Planning (Bundle letter- in long term care, so no letter needed. )  Discharge Durable Medical Equipment: No  Physical Therapy Consult: Yes  Occupational Therapy Consult: Yes  Speech Therapy Consult: No  Current Support Network: Nursing Facility  Confirm Follow Up Transport: Other (see comment) (ambulance)  Discharge Location  Discharge Placement: Unable to determine at this time  Referral was faxed to [facility] this weekend to request LTAC. I called Angelina Meyer at [facility] this morning and she will review and come to the hospital to evaluate.

## 2021-06-15 ENCOUNTER — HOSPITAL ENCOUNTER (OUTPATIENT)
Age: 86
Discharge: NURSING, REHAB OR LONG-TERM CARE | End: 2021-07-07
Attending: INTERNAL MEDICINE | Admitting: INTERNAL MEDICINE

## 2021-06-15 ENCOUNTER — APPOINTMENT (OUTPATIENT)
Dept: GENERAL RADIOLOGY | Age: 86
End: 2021-06-15
Attending: INTERNAL MEDICINE

## 2021-06-15 VITALS
DIASTOLIC BLOOD PRESSURE: 62 MMHG | RESPIRATION RATE: 24 BRPM | TEMPERATURE: 98.8 F | HEIGHT: 68 IN | BODY MASS INDEX: 18.94 KG/M2 | SYSTOLIC BLOOD PRESSURE: 91 MMHG | OXYGEN SATURATION: 93 % | HEART RATE: 79 BPM | WEIGHT: 125 LBS

## 2021-06-15 LAB
ANION GAP SERPL CALC-SCNC: 7 MMOL/L (ref 7–16)
BASOPHILS # BLD: 0.1 K/UL (ref 0–0.2)
BASOPHILS NFR BLD: 0 % (ref 0–2)
BNP SERPL-MCNC: 1532 PG/ML
BUN SERPL-MCNC: 23 MG/DL (ref 8–23)
CALCIUM SERPL-MCNC: 8.4 MG/DL (ref 8.3–10.4)
CHLORIDE SERPL-SCNC: 103 MMOL/L (ref 98–107)
CO2 SERPL-SCNC: 28 MMOL/L (ref 21–32)
CREAT SERPL-MCNC: 0.67 MG/DL (ref 0.8–1.5)
DIFFERENTIAL METHOD BLD: ABNORMAL
EOSINOPHIL # BLD: 0.4 K/UL (ref 0–0.8)
EOSINOPHIL NFR BLD: 3 % (ref 0.5–7.8)
ERYTHROCYTE [DISTWIDTH] IN BLOOD BY AUTOMATED COUNT: 15 % (ref 11.9–14.6)
GLUCOSE SERPL-MCNC: 96 MG/DL (ref 65–100)
HCT VFR BLD AUTO: 33.5 % (ref 41.1–50.3)
HGB BLD-MCNC: 10.6 G/DL (ref 13.6–17.2)
IMM GRANULOCYTES # BLD AUTO: 0.1 K/UL (ref 0–0.5)
IMM GRANULOCYTES NFR BLD AUTO: 1 % (ref 0–5)
LYMPHOCYTES # BLD: 2.2 K/UL (ref 0.5–4.6)
LYMPHOCYTES NFR BLD: 18 % (ref 13–44)
MAGNESIUM SERPL-MCNC: 1.9 MG/DL (ref 1.8–2.4)
MCH RBC QN AUTO: 30.3 PG (ref 26.1–32.9)
MCHC RBC AUTO-ENTMCNC: 31.6 G/DL (ref 31.4–35)
MCV RBC AUTO: 95.7 FL (ref 79.6–97.8)
MONOCYTES # BLD: 1.2 K/UL (ref 0.1–1.3)
MONOCYTES NFR BLD: 10 % (ref 4–12)
NEUTS SEG # BLD: 8.5 K/UL (ref 1.7–8.2)
NEUTS SEG NFR BLD: 68 % (ref 43–78)
NRBC # BLD: 0 K/UL (ref 0–0.2)
PLATELET # BLD AUTO: 294 K/UL (ref 150–450)
PMV BLD AUTO: 10.4 FL (ref 9.4–12.3)
POTASSIUM SERPL-SCNC: 3.8 MMOL/L (ref 3.5–5.1)
RBC # BLD AUTO: 3.5 M/UL (ref 4.23–5.6)
SODIUM SERPL-SCNC: 138 MMOL/L (ref 136–145)
WBC # BLD AUTO: 12.4 K/UL (ref 4.3–11.1)

## 2021-06-15 PROCEDURE — 74011250637 HC RX REV CODE- 250/637: Performed by: HOSPITALIST

## 2021-06-15 PROCEDURE — 71045 X-RAY EXAM CHEST 1 VIEW: CPT

## 2021-06-15 PROCEDURE — 85025 COMPLETE CBC W/AUTO DIFF WBC: CPT

## 2021-06-15 PROCEDURE — 80048 BASIC METABOLIC PNL TOTAL CA: CPT

## 2021-06-15 PROCEDURE — 74011250637 HC RX REV CODE- 250/637: Performed by: FAMILY MEDICINE

## 2021-06-15 PROCEDURE — 74011250636 HC RX REV CODE- 250/636: Performed by: FAMILY MEDICINE

## 2021-06-15 PROCEDURE — 83735 ASSAY OF MAGNESIUM: CPT

## 2021-06-15 PROCEDURE — 36415 COLL VENOUS BLD VENIPUNCTURE: CPT

## 2021-06-15 PROCEDURE — 74011250636 HC RX REV CODE- 250/636: Performed by: HOSPITALIST

## 2021-06-15 PROCEDURE — 74011000250 HC RX REV CODE- 250: Performed by: HOSPITALIST

## 2021-06-15 PROCEDURE — 83880 ASSAY OF NATRIURETIC PEPTIDE: CPT

## 2021-06-15 PROCEDURE — 94640 AIRWAY INHALATION TREATMENT: CPT

## 2021-06-15 PROCEDURE — 77010033711 HC HIGH FLOW OXYGEN

## 2021-06-15 RX ORDER — MIDODRINE HYDROCHLORIDE 5 MG/1
5 TABLET ORAL 2 TIMES DAILY WITH MEALS
Status: DISCONTINUED | OUTPATIENT
Start: 2021-06-15 | End: 2021-06-15 | Stop reason: HOSPADM

## 2021-06-15 RX ORDER — FUROSEMIDE 10 MG/ML
20 INJECTION INTRAMUSCULAR; INTRAVENOUS DAILY
Status: DISCONTINUED | OUTPATIENT
Start: 2021-06-15 | End: 2021-06-15 | Stop reason: HOSPADM

## 2021-06-15 RX ORDER — DIVALPROEX SODIUM 125 MG/1
125 CAPSULE, COATED PELLETS ORAL
Qty: 21 CAPSULE | Refills: 0 | Status: SHIPPED
Start: 2021-06-15 | End: 2021-06-22

## 2021-06-15 RX ORDER — FUROSEMIDE 10 MG/ML
20 INJECTION INTRAMUSCULAR; INTRAVENOUS DAILY
Qty: 1 VIAL | Refills: 0 | Status: SHIPPED
Start: 2021-06-16

## 2021-06-15 RX ORDER — MIDODRINE HYDROCHLORIDE 5 MG/1
5 TABLET ORAL 2 TIMES DAILY WITH MEALS
Qty: 60 TABLET | Refills: 0 | Status: SHIPPED
Start: 2021-06-15 | End: 2021-07-15

## 2021-06-15 RX ORDER — SULFAMETHOXAZOLE AND TRIMETHOPRIM 800; 160 MG/1; MG/1
1 TABLET ORAL EVERY 12 HOURS
Qty: 12 TABLET | Refills: 0 | Status: SHIPPED
Start: 2021-06-15 | End: 2021-06-21

## 2021-06-15 RX ADMIN — HALOPERIDOL LACTATE 4 MG: 5 INJECTION, SOLUTION INTRAMUSCULAR at 11:35

## 2021-06-15 RX ADMIN — FINASTERIDE 5 MG: 5 TABLET, FILM COATED ORAL at 09:27

## 2021-06-15 RX ADMIN — MIDODRINE HYDROCHLORIDE 5 MG: 5 TABLET ORAL at 09:27

## 2021-06-15 RX ADMIN — DULOXETINE HYDROCHLORIDE 30 MG: 30 CAPSULE, DELAYED RELEASE ORAL at 09:27

## 2021-06-15 RX ADMIN — FUROSEMIDE 20 MG: 10 INJECTION, SOLUTION INTRAMUSCULAR; INTRAVENOUS at 09:28

## 2021-06-15 RX ADMIN — Medication 10 ML: at 07:19

## 2021-06-15 RX ADMIN — SULFAMETHOXAZOLE AND TRIMETHOPRIM 1 TABLET: 800; 160 TABLET ORAL at 09:28

## 2021-06-15 RX ADMIN — FLUTICASONE PROPIONATE 2 SPRAY: 50 SPRAY, METERED NASAL at 09:29

## 2021-06-15 RX ADMIN — BUDESONIDE 500 MCG: 0.5 INHALANT RESPIRATORY (INHALATION) at 07:47

## 2021-06-15 RX ADMIN — GUAIFENESIN 600 MG: 600 TABLET, EXTENDED RELEASE ORAL at 09:27

## 2021-06-15 NOTE — PROGRESS NOTES
TRANSFER - OUT REPORT:    Verbal report given to Josie Zazueta RN (name) on Deb Keita  being transferred to [facility] (unit) for routine progression of care       Report consisted of patients Situation, Background, Assessment and   Recommendations(SBAR). Information from the following report(s) SBAR, Kardex, ED Summary, Procedure Summary, Intake/Output, MAR, Recent Results and Cardiac Rhythm SR with ocassional PVCs and PACs was reviewed with the receiving nurse. Lines:   Peripheral IV 06/06/21 Left;Posterior Forearm (Active)   Site Assessment Clean, dry, & intact 06/15/21 0717   Phlebitis Assessment 0 06/15/21 0717   Infiltration Assessment 0 06/15/21 0717   Dressing Status Dry; Intact; Old drainage 06/15/21 0717   Dressing Type Tape;Transparent 06/15/21 0717   Hub Color/Line Status Pink 06/15/21 0717   Alcohol Cap Used No 06/13/21 1500       Peripheral IV 06/13/21 Anterior;Proximal;Right Forearm (Active)   Site Assessment Clean, dry, & intact 06/15/21 0717   Phlebitis Assessment 0 06/15/21 0717   Infiltration Assessment 0 06/15/21 0717   Dressing Status Clean, dry, & intact 06/15/21 0717   Dressing Type Tape;Transparent 06/15/21 0717   Hub Color/Line Status Blue;Patent 06/15/21 0717   Alcohol Cap Used No 06/13/21 1500        Opportunity for questions and clarification was provided. Patient transported with:   [facility] transport and staff scheduled for noon.

## 2021-06-15 NOTE — ROUTINE PROCESS
Bedside and Verbal report given to self by Salvador Jefferson RN.  Report included SBAR, Kardex, ED Summary, Procedure Summary, Intake and Output and Cardiac Rhythm SR.

## 2021-06-15 NOTE — PROGRESS NOTES
Problem: Falls - Risk of  Goal: *Absence of Falls  Description: Document Yue Huffman Fall Risk and appropriate interventions in the flowsheet.   Outcome: Progressing Towards Goal  Note: Fall Risk Interventions:  Mobility Interventions: Bed/chair exit alarm, Communicate number of staff needed for ambulation/transfer, Patient to call before getting OOB, PT Consult for mobility concerns, PT Consult for assist device competence, OT consult for ADLs, Utilize walker, cane, or other assistive device    Mentation Interventions: Bed/chair exit alarm, Adequate sleep, hydration, pain control, Door open when patient unattended, Increase mobility, More frequent rounding, Reorient patient, Update white board    Medication Interventions: Bed/chair exit alarm, Patient to call before getting OOB, Teach patient to arise slowly    Elimination Interventions: Bed/chair exit alarm, Call light in reach, Patient to call for help with toileting needs              Problem: Delirium Treatment  Goal: *Level of consciousness restored to baseline  Outcome: Resolved/Met  Goal: *Level of environmental perceptions restored to baseline  Outcome: Resolved/Met  Goal: *Sensory perception restored to baseline  Outcome: Resolved/Met  Goal: *Emotional stability restored to baseline  Outcome: Resolved/Met  Goal: *Functional assessment restored to baseline  Outcome: Resolved/Not Met  Goal: *Absence of falls  Outcome: Resolved/Met  Goal: *Will remain free of delirium, CAM Score negative  Outcome: Resolved/Not Met  Goal: *Cognitive status will be restored to baseline  Outcome: Resolved/Met  Goal: Interventions  Outcome: Resolved/Met

## 2021-06-15 NOTE — DISCHARGE SUMMARY
Hospitalist Discharge Summary     Admit Date:  2021  2:03 PM   DC note date: 6/15/2021  Name:  Brian Bermudez   Age:  80 y.o.  :  1932   MRN:  [id number]   PCP:  Arnold Clement MD  Treatment Team: Attending Provider: Breanna Mckay MD; Utilization Review: Chelsi Mast; Care Manager: John Whalen, RN; Care Manager: Dawood Downey RN; Physical Therapist: Kelle Desouza PT; Occupational Therapist: Noel Jimenez OT    Problem List for this Hospitalization:  Hospital Problems as of 6/15/2021 Date Reviewed: 2020        Codes Class Noted - Resolved POA    Bacteremia due to Gram-negative bacteria ICD-10-CM: R78.81  ICD-9-CM: 790.7, 041.85  2021 - Present Unknown        Acute cystitis ICD-10-CM: N30.00  ICD-9-CM: 595.0  2021 - Present Unknown        * (Principal) Severe sepsis ([facility] ICD-10-CM: A41.9, R65.20  ICD-9-CM: 038.9, 995.92  2021 - Present Unknown        Urinary tract infection associated with indwelling urethral catheter ([facility] ICD-10-CM: T83.511A, N39.0  ICD-9-CM: 996.64, 599.0  2021 - Present Unknown        Dementia ([facility] (Chronic) ICD-10-CM: F03.90  ICD-9-CM: 294.20  2021 - Present Yes        Chronic indwelling Rojas catheter ICD-10-CM: Z97.8  ICD-9-CM: V45.89  2020 - Present Yes        Moderate protein-calorie malnutrition ([facility] ICD-10-CM: E44.0  ICD-9-CM: 263.0  11/3/2019 - Present Unknown        Urethral tear, initial encounter ICD-10-CM: S37.33XA  ICD-9-CM: 867.0  2019 - Present Unknown        Chronic respiratory failure with hypoxia ([facility] (Chronic) ICD-10-CM: J96.11  ICD-9-CM: 518.83, 799.02  3/29/2016 - Present Yes    Overview Addendum 2021  5:25 PM by Rachel Hernandez MD     4+L chronically             BPH (benign prostatic hyperplasia) (Chronic) ICD-10-CM: N40.0  ICD-9-CM: 600.00  12/10/2015 - Present Yes        COPD (chronic obstructive pulmonary disease) ([facility] (Chronic) ICD-10-CM: J44.9  ICD-9-CM: 669  12/10/2015 - Present Yes Admission HPI from 6/6/2021:    \" Catarino Dorsey is a 80 y.o. male with hx of COPD, chronic hypoxic respiratory failure, dementia, BPH with chronic indwelling li, HTN resident of [facility] brought in to ER in view of worsening of baseline mentation for past 3-4 days. Pt's son (POA) present at bedside, provided most of the history. Pt is having poor appetite and oral intake for past 3-5 days, appears to be more fatigued and lethargic per son. Per son, pts mentation is gradually getting worse over past 1 year, and recently getting more confused with intermittent restlessness and agitation. Pt noted to be febrile in ER T 100.9, with LA 2.7. No reported hx of diarrhea, nausea/vomiting, chest/adbominal pain, chills. Pt endorses chronic cough. \"    Hospital Course:  Patient is a resident of [facility] admitted on 6/6 for severe sepsis secondary to CAUTI/acute cystitis resulting in worsening of baseline dementia. Patient noted to be febrile in .9 with lactic acidosis. Cultures obtained and patient started on empiric antibiotic Zosyn. Urine culture positive for E. coli. Blood culture on 6/6+ for E. coli, repeat blood culture on 6/8 negative. Antibiotic deescalated to ceftriaxone on 6/9 and switched to oral Bactrim on 6/14 to complete 1 more week. Patient initially wired midodrine 10 mg 3 times daily for blood pressure support which is weaned gradually. Currently on midodrine 5 mg twice daily with meals. Hospital course complicated with acute on chronic hypoxic respiratory failure, requiring BiPAP initially. Patient at home baseline 4 L nasal cannula. Off BiPAP 6/8 and currently stable on arrival 35 L / 40%. Has completed doxycycline for 5 days for COPD exacerbation. Chest x-ray with bilateral opacities and patient started on Lasix with good urine output. Chest x-ray on 6/15 with decreasing opacities.   Also patient admitted with acute encephalopathy secondary to sepsis/bacteremia with underlying dementia. Mentation has been improved. Intermittently agitated. Patient is on Depakote sprinkles as needed for agitation/restlessness. Also patient with history of urethral trauma. Patient is evaluated by urology who recommend congenital hypospadias and no intervention required. Patient has indwelling Rojas's catheter. Follow-up with urology outpatient. All other chronic comorbidities stable and we continued essential home meds. Electrolytes repleted as needed. Patient is accepted by [facility] for continuation of care due to high oxygen demand. Patient is stable to discharge today to [facility]. Patient to follow-up with urology, pulmonology and PCP after discharge. Disposition: Long Term Care  Activity: Activity as tolerated  Diet: ADULT DIET Easy to Chew  ADULT ORAL NUTRITION SUPPLEMENT Breakfast, Lunch, Dinner, HS Snack; Standard High Calorie/High Protein  Code Status: DNR    Follow Up Orders: Follow-up Appointments   Procedures    FOLLOW UP VISIT Appointment in: 3 - 5 Days PCP Pulmonology in 1-2 weeks     PCP  Pulmonology in 1-2 weeks     Standing Status:   Standing     Number of Occurrences:   1     Order Specific Question:   Appointment in     Answer:   3 - 5 Days       Follow-up Information     Follow up With Specialties Details Why Contact Info    Jonathan Martinez MD Internal Medicine   [address]  [address] [facility]  [phone]            Discharge meds at bottom of this note. Plan was discussed with patient. All questions answered. Patient was stable at time of discharge. Given instructions to call a physician or return if any concerns. Discharge summary and encounter summary was sent to PCP electronically via \"Comm Mgt\" link in [facility], if possible. Diagnostic Imaging/Tests:   XR CHEST SNGL V    Result Date: 6/14/2021  CHEST X-RAY, one view. HISTORY:  Follow-up abnormal chest x-ray; interstitial opacities.  TECHNIQUE:  AP upright semiupright view COMPARISON: 3 days prior, 11 June. FINDINGS: Lungs: Decreased interstitial prominence. No new lobar consolidation. Costophrenic angles: Blunted on the right. Heart size: is normal. Pulmonary vasculature: is unremarkable. Aorta: Arch calcifications. Included portion of the upper abdomen: is unremarkable. Bones: No gross bony lesions. Other: None. Decreased interstitial prominence. XR CHEST SNGL V    Result Date: 6/11/2021  EXAMINATION: CHEST RADIOGRAPH 6/11/2021 10:55 AM INDICATION: Follow-up opacities. Sepsis. COMPARISON: 6/8/2021 TECHNIQUE: A single view of the chest was obtained. FINDINGS: Support Devices: None Osseous : No acute abnormality. Cardiomediastinal Silhouette: Normal in size. Lungs: Unchanged interstitial opacities throughout the lungs from 6/8/2021 Pleura: No pleural fluid or pneumothorax. Upper Abdomen: No abnormality. Unchanged interstitial opacities from 6/8/2021. XR CHEST SNGL V    Result Date: 6/8/2021  CHEST X-RAY, one view. HISTORY:  Worsening shortness breath. TECHNIQUE:  AP upright portable view COMPARISON: Exam 2 days prior. FINDINGS: Lungs: Diffuse reticular pattern throughout both lungs. No definite change from most recent exam and does appear more prominent than in February. Costophrenic angles: are sharp. Heart size: Appears mildly enlarged. Pulmonary vasculature: is unremarkable. Aorta: Arch calcifications. Included portion of the upper abdomen: is unremarkable. Bones: No gross bony lesions. Other: None. Underlying interstitial pattern. Interstitial pattern appears increased on today's exam and superimposed inflammation or edema could have this appearance. XR CHEST PORT    Result Date: 6/6/2021  Chest portable CLINICAL INDICATION: Acute coughing with fever and altered mental status, shortness of breath, meets SIRS criteria. History of emphysema, pulmonary fibrosis.  COMPARISON: Radiograph 2/7/2021 and CT 1/30/2021 TECHNIQUE: single AP portable view chest at 2:15 PM upright FINDINGS: Lungs again demonstrate chronic diffuse interstitial fibrotic changes and emphysema. No evidence of a developing consolidation, pneumothorax, CHF or enlarging effusion. Overall pattern has not definitely changed from prior imaging. Stable mediastinal and hilar contours. 1. No consolidation. 2. Extensive chronic changes similar to prior imaging. ECHO ADULT COMPLETE    Result Date: 6/14/2021  · LV: Estimated LVEF is 55 - 60%. Normal cavity size, wall thickness and systolic function (ejection fraction normal). Left ventricular diastolic dysfunction. · Echo study was technically difficulty. · AV: Aortic valve leaflet calcification present. Moderately reduced non coronary leaflet mobility of the aortic valve. Moderately reduced right leaflet mobility of the aortic valve. Moderately reduced left leaflet mobility of the aortic valve. Aortic valve mean gradient is 15 mmHg. Moderate aortic valve stenosis is present. · LA: Mildly dilated left atrium. · MV: Mild mitral valve regurgitation is present. · TV: Mild tricuspid valve regurgitation is present. · PV: Mild pulmonic valve regurgitation is present. Echocardiogram results:  No results found for this visit on 06/06/21.     Procedures done this admission:  * No surgery found *    All Micro Results     Procedure Component Value Units Date/Time    CULTURE, BLOOD [[id number]] Collected: 06/08/21 0823    Order Status: Completed Specimen: Blood Updated: 06/13/21 0935     Special Requests: --        LEFT  HAND       Culture result: NO GROWTH 5 DAYS       CULTURE, BLOOD [[id number]] Collected: 06/08/21 0827    Order Status: Completed Specimen: Blood Updated: 06/13/21 0935     Special Requests: --        RIGHT  Antecubital       Culture result: NO GROWTH 5 DAYS       CULTURE, URINE [[id number]]  (Abnormal)  (Susceptibility) Collected: 06/06/21 1427    Order Status: Completed Specimen: Urine Updated: 06/11/21 0722     Special Requests: NO SPECIAL REQUESTS Culture result:       50,000-100,000 COLONIES/mL ESCHERICHIA COLI                  CULTURE IN PROGRESS,FURTHER UPDATES TO FOLLOW CORRECTED ON 06/10 AT 1157: PREVIOUSLY REPORTED AS <10,000 COLONIES/mL MIXED SKIN NAEL ISOLATED                  10,000 to 50,000 COLONIES/mL MIXED SKIN NAEL ISOLATED          BLOOD CULTURE [[id number]] Collected: 06/06/21 1427    Order Status: Completed Specimen: Blood Updated: 06/11/21 0704     Special Requests: --        RIGHT  Antecubital       Culture result: NO GROWTH 5 DAYS       CULTURE, RESPIRATORY/SPUTUM/BRONCH W GRAM STAIN [[id number]]  (Abnormal)  (Susceptibility) Collected: 06/06/21 1821    Order Status: Completed Specimen: Sputum Updated: 06/10/21 1019     Special Requests: NO SPECIAL REQUESTS        GRAM STAIN 2 TO 18 WBC'S/OIF      0 TO 2 EPITHELIAL CELLS SEEN /OIF      FEW GRAM POSITIVE COCCI         FEW GRAM NEGATIVE RODS         FEW YEAST         FEW GRAM POSITIVE RODS         3+ MUCUS PRESENT        Culture result:       MODERATE STAPHYLOCOCCUS AUREUS                  HEAVY NORMAL RESPIRATORY NAEL          BLOOD CULTURE [[id number]]  (Abnormal)  (Susceptibility) Collected: 06/06/21 1345    Order Status: Completed Specimen: Blood Updated: 06/09/21 0758     Special Requests: LEFT FOREARM        GRAM STAIN GRAM NEGATIVE RODS         ANAEROBIC BOTTLE POSITIVE               RESULTS VERIFIED, PHONED TO AND READ BACK BY AMISHA GRIFFITH RN BY NB AT [address] [place] [address] ON [id number]           Culture result: ESCHERICHIA COLI               Refer to Blood Culture ID Panel Accession  [id number]      SARS-COV-2, PCR [[id number]] Collected: 06/07/21 1646    Order Status: Completed Specimen: Nasopharyngeal Updated: 06/08/21 1226     Specimen source Nasopharyngeal        SARS-CoV-2 Not detected        Comment:      The specimen is NEGATIVE for SARS-CoV-2, the novel coronavirus associated with COVID-19.        This test has been authorized by the FDA under an Emergency Use Authorization (EUA) for use by authorized laboratories. Fact sheet for Healthcare Providers: Market6.es       Fact sheet for Patients: Ino.lesvia       Methodology: RT-PCR         BLOOD CULTURE ID PANEL [[id number]]  (Abnormal) Collected: 06/06/21 1345    Order Status: Completed Specimen: Blood Updated: 06/07/21 1322     Acc. no. from Micro Order [id number]     Escherichia coli Detected        Comment: RESULTS VERIFIED, PHONED TO AND READ BACK BY  EREN ACOSTA RN @ [facility] ON 6/7/21 BY AMM          KPC (Carbapenem Resistance Gene) NOT DETECTED        Comment: WARNING:  A Not Detected result for the KPC gene does not indicate susceptibility to carbapenems. Gram negative bacteria can be resistant to carbapenems by mechanisms other than carrying the KPC gene. INTERPRETATION       Gram negative dereck. Identified by realtime PCR as E. coli          COVID-19 RAPID TEST [[id number]] Collected: 06/06/21 1659    Order Status: Completed Specimen: Nasopharyngeal Updated: 06/06/21 1728     Specimen source Nasopharyngeal        COVID-19 rapid test Not detected        Comment:      The specimen is NEGATIVE for SARS-CoV-2, the novel coronavirus associated with COVID-19. A negative result does not rule out COVID-19. This test has been authorized by the FDA under an Emergency Use Authorization (EUA) for use by authorized laboratories.         Fact sheet for Healthcare Providers: Market6.es  Fact sheet for Patients: Market6.lesvia       Methodology: Isothermal Nucleic Acid Amplification               SARS-CoV-2 Lab Results  \"Novel Coronavirus\" Test: No results found for: COV2NT   \"Emergent Disease\" Test: No results found for: EDPR  \"SARS-COV-2\" Test: No results found for: XGCOVT  Rapid Test:   Lab Results   Component Value Date/Time    COVR Not detected 06/06/2021 04:59 PM            Labs: Results:       BMP, Mg, Phos Recent Labs 06/15/21  0319 06/14/21  0326 06/13/21  0435    138 141   K 3.8 3.7 3.6    103 105   CO2 28 30 29   AGAP 7 5* 7   BUN 23 25* 25*   CREA 0.67* 0.55* 0.54*   CA 8.4 7.9* 8.0*   GLU 96 100 100   MG 1.9 2.4 2.1      CBC Recent Labs     06/15/21  0319 06/14/21  0326 06/13/21  0435   WBC 12.4* 12.5* 10.5   RBC 3.50* 3.50* 3.41*   HGB 10.6* 10.4* 10.4*   HCT 33.5* 34.8* 33.1*    278 272   GRANS 68 66 61   LYMPH 18 19 22   EOS 3 4 5   MONOS 10 10 11   BASOS 0 1 1   IG 1 1 1   ANEU 8.5* 8.2 6.4   ABL 2.2 2.4 2.3   ILIANA 0.4 0.5 0.5   ABM 1.2 1.2 1.1   ABB 0.1 0.1 0.1   AIG 0.1 0.2 0.1      LFT No results for input(s): ALT, TBIL, AP, TP, ALB, GLOB, AGRAT in the last 72 hours.     No lab exists for component: SGOT, GPT   Cardiac Testing Lab Results   Component Value Date/Time     (H) 06/13/2019 04:50 AM     (H) 06/10/2019 12:01 PM     (H) 05/06/2019 12:53 PM    Troponin-I, Qt. <0.02 (L) 09/01/2019 10:44 AM    Troponin-I, Qt. <0.02 (L) 09/01/2019 05:45 AM    Troponin-I, Qt. 0.05 02/04/2018 01:04 AM      Coagulation Tests Lab Results   Component Value Date/Time    Prothrombin time 13.9 03/26/2020 12:38 PM    Prothrombin time 14.8 (H) 02/02/2018 06:34 AM    Prothrombin time 10.8 04/13/2009 10:59 AM    INR 1.0 03/26/2020 12:38 PM    INR 1.2 02/02/2018 06:34 AM    INR 1.1 04/13/2009 10:59 AM    aPTT 37.5 (H) 02/02/2018 06:34 AM    aPTT 27.8 04/13/2009 10:59 AM      A1c No results found for: HBA1C, [id number]   Lipid Panel No results found for: CHOL, CHOLPOCT, CHOLX, CHLST, CHOLV, [id number], HDL, HDLP, LDL, LDLC, DLDLP, 665571, VLDLC, VLDL, TGLX, TRIGL, TRIGP, TGLPOCT, CHHD, [facility]   Thyroid Panel Lab Results   Component Value Date/Time    TSH 0.780 05/08/2019 04:10 AM    TSH 1.680 12/11/2018 11:40 AM        Most Recent UA Lab Results   Component Value Date/Time    Color KANDY 01/30/2021 07:30 AM    Appearance CLOUDY 01/30/2021 07:30 AM    Specific gravity 1.055 (H) 01/30/2021 07:30 AM    pH (UA) 5.0 01/30/2021 07:30 AM    Protein 30 (A) 01/30/2021 07:30 AM    Glucose Negative 01/30/2021 07:30 AM    Ketone TRACE (A) 01/30/2021 07:30 AM    Bilirubin Negative 01/30/2021 07:30 AM    Blood SMALL (A) 01/30/2021 07:30 AM    Urobilinogen 1.0 01/30/2021 07:30 AM    Nitrites Positive (A) 01/30/2021 07:30 AM    Leukocyte Esterase MODERATE (A) 01/30/2021 07:30 AM    WBC >100 06/06/2021 02:27 PM    RBC 3-5 06/06/2021 02:27 PM    Epithelial cells 0-3 06/06/2021 02:27 PM    Bacteria 1+ (H) 06/06/2021 02:27 PM    Casts 0-3 06/06/2021 02:27 PM    Crystals, urine 0 06/06/2021 02:27 PM    Mucus 0 06/06/2021 02:27 PM    Other observations RESULTS VERIFIED MANUALLY 06/06/2021 02:27 PM        No Known Allergies  Immunization History   Administered Date(s) Administered    Influenza High Dose Vaccine PF 10/12/2016, 10/23/2017, 09/23/2018, 11/01/2019    Influenza Vaccine 09/10/2010    Influenza Vaccine (Tri) Adjuvanted (>65 Yrs FLUAD TRI 12316) 09/23/2018    Pneumococcal Conjugate (PCV-13) 10/01/2015, 12/08/2016    Pneumococcal Vaccine (Unspecified Type) 10/01/1998    TB Skin Test (PPD) Intradermal 02/02/2018, 05/06/2019, 06/10/2019, 09/01/2019, 11/01/2019, 01/30/2021    Zoster Recombinant 05/10/2018, 08/05/2018    Zoster Vaccine, Live 07/13/2011       All Labs from Last 24 Hrs:  Recent Results (from the past 24 hour(s))   MAGNESIUM    Collection Time: 06/15/21  3:19 AM   Result Value Ref Range    Magnesium 1.9 1.8 - 2.4 mg/dL   CBC WITH AUTOMATED DIFF    Collection Time: 06/15/21  3:19 AM   Result Value Ref Range    WBC 12.4 (H) 4.3 - 11.1 K/uL    RBC 3.50 (L) 4.23 - 5.6 M/uL    HGB 10.6 (L) 13.6 - 17.2 g/dL    HCT 33.5 (L) 41.1 - 50.3 %    MCV 95.7 79.6 - 97.8 FL    MCH 30.3 26.1 - 32.9 PG    MCHC 31.6 31.4 - 35.0 g/dL    RDW 15.0 (H) 11.9 - 14.6 %    PLATELET 981 294 - 170 K/uL    MPV 10.4 9.4 - 12.3 FL    ABSOLUTE NRBC 0.00 0.0 - 0.2 K/uL    DF AUTOMATED      NEUTROPHILS 68 43 - 78 %    LYMPHOCYTES 18 13 - 44 %    MONOCYTES 10 4.0 - 12.0 %    EOSINOPHILS 3 0.5 - 7.8 %    BASOPHILS 0 0.0 - 2.0 %    IMMATURE GRANULOCYTES 1 0.0 - 5.0 %    ABS. NEUTROPHILS 8.5 (H) 1.7 - 8.2 K/UL    ABS. LYMPHOCYTES 2.2 0.5 - 4.6 K/UL    ABS. MONOCYTES 1.2 0.1 - 1.3 K/UL    ABS. EOSINOPHILS 0.4 0.0 - 0.8 K/UL    ABS. BASOPHILS 0.1 0.0 - 0.2 K/UL    ABS. IMM.  GRANS. 0.1 0.0 - 0.5 K/UL   METABOLIC PANEL, BASIC    Collection Time: 06/15/21  3:19 AM   Result Value Ref Range    Sodium 138 136 - 145 mmol/L    Potassium 3.8 3.5 - 5.1 mmol/L    Chloride 103 98 - 107 mmol/L    CO2 28 21 - 32 mmol/L    Anion gap 7 7 - 16 mmol/L    Glucose 96 65 - 100 mg/dL    BUN 23 8 - 23 MG/DL    Creatinine 0.67 (L) 0.8 - 1.5 MG/DL    GFR est AA >60 >60 ml/min/1.73m2    GFR est non-AA >60 >60 ml/min/1.73m2    Calcium 8.4 8.3 - 10.4 MG/DL   NT-PRO BNP    Collection Time: 06/15/21  3:19 AM   Result Value Ref Range    NT pro-BNP 1,532 (H) <450 PG/ML       Discharge Exam:  Patient Vitals for the past 24 hrs:   Temp Pulse Resp BP SpO2   06/15/21 0927  69  120/72    06/15/21 0747     99 %   06/15/21 0717 97.6 °F (36.4 °C) 71 22 99/71 100 %   06/15/21 0515  72 30 108/68 (!) 85 %   06/15/21 0445 98.9 °F (37.2 °C) 69 11 107/61 (!) 88 %   06/15/21 0300  68 22 (!) 88/63 94 %   06/15/21 0200  65 23 (!) 80/60 93 %   06/15/21 0115  74 21 101/64 95 %   06/15/21 0040 98.2 °F (36.8 °C) 72 19 109/65 96 %   06/14/21 2320  68 20 (!) 84/57 96 %   06/14/21 2215     96 %   06/14/21 2210 98.4 °F (36.9 °C) 70 24 108/63 90 %   06/14/21 2149  71 27 98/62 90 %   06/14/21 2029  78 (!) 32 96/68 94 %   06/14/21 2000     93 %   06/14/21 1929 98.9 °F (37.2 °C) 81 29 104/69 92 %   06/14/21 1926     95 %   06/14/21 1859  76 (!) 38 124/63 93 %   06/14/21 1734  87 27 103/61 97 %   06/14/21 1634     96 %   06/14/21 1608  69 (!) 36 (!) 83/51 94 %   06/14/21 1542 98.2 °F (36.8 °C) 71 25 (!) 103/57 94 %   06/14/21 1443  67 15 110/66 100 %   06/14/21 1317  66 22 106/69 99 %   06/14/21 1214 97.4 °F (36.3 °C) 72 22 (!) 148/83 97 %   06/14/21 1203  77 22 (!) 148/88 93 %   06/14/21 1135    116/71    06/14/21 1026  71 17 133/83 98 %     Oxygen Therapy  O2 Sat (%): 99 % (06/15/21 0747)  Pulse via Oximetry: 67 beats per minute (06/15/21 0747)  O2 Device: Heated; Hi flow nasal cannula (06/15/21 0747)  Skin Assessment: Clean, dry, & intact (06/15/21 0747)  Skin Protection for O2 Device: No (06/15/21 0747)  O2 Flow Rate (L/min): 35 l/min (06/15/21 0747)  O2 Temperature: 93.2 °F (34 °C) (06/15/21 0747)  FIO2 (%): 50 % (weaned to 40%.) (06/15/21 0747)    Estimated body mass index is 19.01 kg/m² as calculated from the following:    Height as of this encounter: 5' 8\" (1.727 m). Weight as of this encounter: 56.7 kg (125 lb). Intake/Output Summary (Last 24 hours) at 6/15/2021 1019  Last data filed at 6/15/2021 0717  Gross per 24 hour   Intake    Output 1862 ml   Net -1862 ml       *Note that automatically entered I/Os may not be accurate; dependent on patient compliance with collection and accurate  by assistants.     General:         Elderly, frail, nad, alert/awake, on airvo  HEENT:           Head NCAT, PERRLA+  Neck:              Supple, no lymphadenopathy, no JVD   Lungs:            Coarse breath sounds bilaterally  CV:                  Regular rate and rhythm with normal S1 and S2   Abdomen:      Soft, nontender, nondistended, normoactive bowel sounds   Extremities:   No cyanosis clubbing or edema   Neuro:            gcs 15, CN 2-12 intact, following commands and moving all 4 extremities spontaneously  Psych:            AOx2, mood and affect flat    Current Med List in Hospital:   Current Facility-Administered Medications   Medication Dose Route Frequency    midodrine (PROAMATINE) tablet 5 mg  5 mg Oral BID WITH MEALS    furosemide (LASIX) injection 20 mg  20 mg IntraVENous DAILY    trimethoprim-sulfamethoxazole (BACTRIM DS, SEPTRA DS) 160-800 mg per tablet 1 Tablet  1 Tablet Oral Q12H    lip protectant (BLISTEX) ointment 1 Each  1 Each Topical PRN    haloperidol lactate (HALDOL) injection 4 mg  4 mg IntraVENous Q6H PRN    sodium chloride (NS) flush 5-10 mL  5-10 mL IntraVENous PRN    finasteride (PROSCAR) tablet 5 mg  5 mg Oral DAILY    fluticasone propionate (FLONASE) 50 mcg/actuation nasal spray 2 Spray  2 Spray Both Nostrils DAILY    DULoxetine (CYMBALTA) capsule 30 mg  30 mg Oral BID    sodium chloride (NS) flush 5-40 mL  5-40 mL IntraVENous Q8H    sodium chloride (NS) flush 5-40 mL  5-40 mL IntraVENous PRN    acetaminophen (TYLENOL) tablet 650 mg  650 mg Oral Q4H PRN    acetaminophen (TYLENOL) suppository 650 mg  650 mg Rectal Q4H PRN    pantoprazole (PROTONIX) tablet 40 mg  40 mg Oral Q24H    enoxaparin (LOVENOX) injection 40 mg  40 mg SubCUTAneous Q24H    budesonide (PULMICORT) 500 mcg/2 ml nebulizer suspension  500 mcg Nebulization BID RT    guaiFENesin ER (MUCINEX) tablet 600 mg  600 mg Oral Q12H    albuterol-ipratropium (DUO-NEB) 2.5 MG-0.5 MG/3 ML  3 mL Nebulization Q4H PRN    divalproex (DEPAKOTE SPRINKLE) capsule 125 mg  125 mg Oral TID PRN    traMADoL (ULTRAM) tablet 50 mg  50 mg Oral Q6H PRN       Discharge Info:   Current Discharge Medication List      START taking these medications    Details   divalproex (DEPAKOTE SPRINKLE) 125 mg capsule Take 1 Capsule by mouth three (3) times daily as needed for Other (agitation) for up to 7 days. Qty: 21 Capsule, Refills: 0  Start date: 6/15/2021, End date: 6/22/2021      furosemide (LASIX) 10 mg/mL injection 2 mL by IntraVENous route daily. Qty: 1 Vial, Refills: 0  Start date: 6/16/2021      midodrine (PROAMATINE) 5 mg tablet Take 1 Tablet by mouth two (2) times daily (with meals) for 30 days. Qty: 60 Tablet, Refills: 0  Start date: 6/15/2021, End date: 7/15/2021      trimethoprim-sulfamethoxazole (BACTRIM DS, SEPTRA DS) 160-800 mg per tablet Take 1 Tablet by mouth every twelve (12) hours for 6 days.   Qty: 12 Tablet, Refills: 0  Start date: 6/15/2021, End date: 6/21/2021         CONTINUE these medications which have NOT CHANGED    Details   cholecalciferol, vitamin D3, (Vitamin D3) 50 mcg (2,000 unit) tab Take  by mouth daily. albuterol (PROVENTIL HFA, VENTOLIN HFA, PROAIR HFA) 90 mcg/actuation inhaler Take 2 Puffs by inhalation every four (4) hours as needed for Wheezing. famotidine (PEPCID) 10 mg tablet Take 10 mg by mouth two (2) times a day. albuterol-ipratropium (DUO-NEB) 2.5 mg-0.5 mg/3 ml nebu 3 mL by Nebulization route every four (4) hours as needed for Wheezing.      melatonin 3 mg tablet Take  by mouth. fluticasone furoate-vilanterol (BREO ELLIPTA) 100-25 mcg/dose inhaler 1 inhalation daily, rinse mouth after use  Qty: 1 Inhaler, Refills: 11      DULoxetine (CYMBALTA) 30 mg capsule Take 30 mg by mouth two (2) times a day. magnesium oxide 250 mg magnesium tablet Take 250 mg by mouth daily. nystatin (MYCOSTATIN) powder Apply  to affected area four (4) times daily. finasteride (PROSCAR) 5 mg tablet Take 1 Tab by mouth daily. Qty: 90 Tab, Refills: 4      montelukast (SINGULAIR) 10 mg tablet Take 1 Tab by mouth daily. Qty: 90 Tab, Refills: 4    Associated Diagnoses: Mixed simple and mucopurulent chronic bronchitis (HCC)      cyanocobalamin (VITAMIN B12) 1,000 mcg/mL injection Once month  Refills: 12      fluticasone (FLONASE) 50 mcg/actuation nasal spray 2 Sprays by Both Nostrils route daily. Qty: 48 g, Refills: 4    Associated Diagnoses: Allergic rhinitis due to pollen, unspecified seasonality      guaiFENesin ER (MUCINEX) 600 mg ER tablet Take 1,200 mg by mouth daily. Biotin 2,500 mcg cap Take  by mouth. aspirin delayed-release 81 mg tablet Take  by mouth daily. Time spent in patient discharge planning and coordination 37 minutes.     Signed:  Nona Pate MD

## 2021-06-15 NOTE — PROGRESS NOTES
Care Management Interventions  PCP Verified by CM: Yes (Physician at [place] [id number])  Palliative Care Criteria Met (RRAT>21 & CHF Dx)?: No (Dx Sepsis, Risk 33%)  Mode of Transport at Discharge: BLS  Transition of Care Consult (CM Consult): LTAC (Patient accepted at [facility])  Discharge Durable Medical Equipment: No  Physical Therapy Consult: Yes  Occupational Therapy Consult: Yes  Speech Therapy Consult: No  Current Support Network: [address] [place] [address]  Confirm Follow Up Transport: Other (see comment) (ambulance)  Discharge Location  Discharge Placement: [address] ([facility])  Received call from Swati Wyman at [facility] and patient has been accepted for LTAC. Transportation arranged by Swati Wyman for [facility] today. Informed patient, his son, and nursing staff in ICU.

## 2021-06-16 LAB
CORTIS AM PEAK SERPL-MCNC: 14.4 UG/DL (ref 7–25)
FOLATE SERPL-MCNC: 17.7 NG/ML (ref 3.1–17.5)
TSH SERPL DL<=0.005 MIU/L-ACNC: 1.44 UIU/ML (ref 0.36–3.74)
VIT B12 SERPL-MCNC: 986 PG/ML (ref 193–986)

## 2021-06-16 PROCEDURE — 82533 TOTAL CORTISOL: CPT

## 2021-06-16 PROCEDURE — 82607 VITAMIN B-12: CPT

## 2021-06-16 PROCEDURE — 84443 ASSAY THYROID STIM HORMONE: CPT

## 2021-06-16 PROCEDURE — 82746 ASSAY OF FOLIC ACID SERUM: CPT

## 2021-06-16 PROCEDURE — 36415 COLL VENOUS BLD VENIPUNCTURE: CPT

## 2021-06-21 LAB
ALBUMIN SERPL-MCNC: 2.7 G/DL (ref 3.2–4.6)
ALBUMIN/GLOB SERPL: 0.7 {RATIO} (ref 1.2–3.5)
ALP SERPL-CCNC: 106 U/L (ref 50–136)
ALT SERPL-CCNC: 20 U/L (ref 12–65)
ANION GAP SERPL CALC-SCNC: 4 MMOL/L (ref 7–16)
AST SERPL-CCNC: 23 U/L (ref 15–37)
BASOPHILS # BLD: 0.1 K/UL (ref 0–0.2)
BASOPHILS NFR BLD: 1 % (ref 0–2)
BILIRUB SERPL-MCNC: 0.4 MG/DL (ref 0.2–1.1)
BUN SERPL-MCNC: 37 MG/DL (ref 8–23)
CALCIUM SERPL-MCNC: 9.2 MG/DL (ref 8.3–10.4)
CHLORIDE SERPL-SCNC: 105 MMOL/L (ref 98–107)
CO2 SERPL-SCNC: 29 MMOL/L (ref 21–32)
CREAT SERPL-MCNC: 0.64 MG/DL (ref 0.8–1.5)
DIFFERENTIAL METHOD BLD: ABNORMAL
EOSINOPHIL # BLD: 0.4 K/UL (ref 0–0.8)
EOSINOPHIL NFR BLD: 3 % (ref 0.5–7.8)
ERYTHROCYTE [DISTWIDTH] IN BLOOD BY AUTOMATED COUNT: 16 % (ref 11.9–14.6)
GLOBULIN SER CALC-MCNC: 3.8 G/DL (ref 2.3–3.5)
GLUCOSE SERPL-MCNC: 77 MG/DL (ref 65–100)
HCT VFR BLD AUTO: 39.4 % (ref 41.1–50.3)
HGB BLD-MCNC: 11.9 G/DL (ref 13.6–17.2)
IMM GRANULOCYTES # BLD AUTO: 0.1 K/UL (ref 0–0.5)
IMM GRANULOCYTES NFR BLD AUTO: 1 % (ref 0–5)
LYMPHOCYTES # BLD: 2.7 K/UL (ref 0.5–4.6)
LYMPHOCYTES NFR BLD: 24 % (ref 13–44)
MAGNESIUM SERPL-MCNC: 2.5 MG/DL (ref 1.8–2.4)
MCH RBC QN AUTO: 30.5 PG (ref 26.1–32.9)
MCHC RBC AUTO-ENTMCNC: 30.2 G/DL (ref 31.4–35)
MCV RBC AUTO: 101 FL (ref 79.6–97.8)
MONOCYTES # BLD: 1.3 K/UL (ref 0.1–1.3)
MONOCYTES NFR BLD: 12 % (ref 4–12)
NEUTS SEG # BLD: 6.9 K/UL (ref 1.7–8.2)
NEUTS SEG NFR BLD: 60 % (ref 43–78)
NRBC # BLD: 0 K/UL (ref 0–0.2)
PHOSPHATE SERPL-MCNC: 3.1 MG/DL (ref 2.3–3.7)
PLATELET # BLD AUTO: 275 K/UL (ref 150–450)
PMV BLD AUTO: 10.6 FL (ref 9.4–12.3)
POTASSIUM SERPL-SCNC: 4.3 MMOL/L (ref 3.5–5.1)
PROT SERPL-MCNC: 6.5 G/DL (ref 6.3–8.2)
RBC # BLD AUTO: 3.9 M/UL (ref 4.23–5.6)
SODIUM SERPL-SCNC: 138 MMOL/L (ref 138–145)
WBC # BLD AUTO: 11.5 K/UL (ref 4.3–11.1)

## 2021-06-21 PROCEDURE — 84100 ASSAY OF PHOSPHORUS: CPT

## 2021-06-21 PROCEDURE — 85025 COMPLETE CBC W/AUTO DIFF WBC: CPT

## 2021-06-21 PROCEDURE — 83735 ASSAY OF MAGNESIUM: CPT

## 2021-06-21 PROCEDURE — 36415 COLL VENOUS BLD VENIPUNCTURE: CPT

## 2021-06-21 PROCEDURE — 80053 COMPREHEN METABOLIC PANEL: CPT

## 2021-06-28 LAB
ALBUMIN SERPL-MCNC: 3.3 G/DL (ref 3.2–4.6)
ALBUMIN/GLOB SERPL: 0.8 {RATIO} (ref 1.2–3.5)
ALP SERPL-CCNC: 102 U/L (ref 50–136)
ALT SERPL-CCNC: 25 U/L (ref 12–65)
ANION GAP SERPL CALC-SCNC: 8 MMOL/L (ref 7–16)
AST SERPL-CCNC: 30 U/L (ref 15–37)
BASOPHILS # BLD: 0.1 K/UL (ref 0–0.2)
BASOPHILS NFR BLD: 1 % (ref 0–2)
BILIRUB SERPL-MCNC: 0.5 MG/DL (ref 0.2–1.1)
BUN SERPL-MCNC: 34 MG/DL (ref 8–23)
CALCIUM SERPL-MCNC: 9.3 MG/DL (ref 8.3–10.4)
CHLORIDE SERPL-SCNC: 104 MMOL/L (ref 98–107)
CO2 SERPL-SCNC: 25 MMOL/L (ref 21–32)
CREAT SERPL-MCNC: 0.63 MG/DL (ref 0.8–1.5)
DIFFERENTIAL METHOD BLD: ABNORMAL
EOSINOPHIL # BLD: 0.4 K/UL (ref 0–0.8)
EOSINOPHIL NFR BLD: 3 % (ref 0.5–7.8)
ERYTHROCYTE [DISTWIDTH] IN BLOOD BY AUTOMATED COUNT: 16.7 % (ref 11.9–14.6)
GLOBULIN SER CALC-MCNC: 4 G/DL (ref 2.3–3.5)
GLUCOSE SERPL-MCNC: 87 MG/DL (ref 65–100)
HCT VFR BLD AUTO: 44 % (ref 41.1–50.3)
HGB BLD-MCNC: 14 G/DL (ref 13.6–17.2)
IMM GRANULOCYTES # BLD AUTO: 0.1 K/UL (ref 0–0.5)
IMM GRANULOCYTES NFR BLD AUTO: 1 % (ref 0–5)
LYMPHOCYTES # BLD: 4.6 K/UL (ref 0.5–4.6)
LYMPHOCYTES NFR BLD: 34 % (ref 13–44)
MCH RBC QN AUTO: 31.2 PG (ref 26.1–32.9)
MCHC RBC AUTO-ENTMCNC: 31.8 G/DL (ref 31.4–35)
MCV RBC AUTO: 98 FL (ref 79.6–97.8)
MONOCYTES # BLD: 1.5 K/UL (ref 0.1–1.3)
MONOCYTES NFR BLD: 11 % (ref 4–12)
NEUTS SEG # BLD: 7.1 K/UL (ref 1.7–8.2)
NEUTS SEG NFR BLD: 51 % (ref 43–78)
NRBC # BLD: 0 K/UL (ref 0–0.2)
PLATELET # BLD AUTO: 268 K/UL (ref 150–450)
PMV BLD AUTO: 11.4 FL (ref 9.4–12.3)
POTASSIUM SERPL-SCNC: 4.2 MMOL/L (ref 3.5–5.1)
PROT SERPL-MCNC: 7.3 G/DL (ref 6.3–8.2)
RBC # BLD AUTO: 4.49 M/UL (ref 4.23–5.6)
SODIUM SERPL-SCNC: 137 MMOL/L (ref 138–145)
WBC # BLD AUTO: 13.8 K/UL (ref 4.3–11.1)

## 2021-06-28 PROCEDURE — 36415 COLL VENOUS BLD VENIPUNCTURE: CPT

## 2021-06-28 PROCEDURE — 80053 COMPREHEN METABOLIC PANEL: CPT

## 2021-06-28 PROCEDURE — 85025 COMPLETE CBC W/AUTO DIFF WBC: CPT

## 2021-06-29 ENCOUNTER — APPOINTMENT (OUTPATIENT)
Dept: GENERAL RADIOLOGY | Age: 86
End: 2021-06-29
Attending: INTERNAL MEDICINE

## 2021-06-29 LAB
ALBUMIN SERPL-MCNC: 3.1 G/DL (ref 3.2–4.6)
ALBUMIN/GLOB SERPL: 0.8 {RATIO} (ref 1.2–3.5)
ALP SERPL-CCNC: 95 U/L (ref 50–136)
ALT SERPL-CCNC: 23 U/L (ref 12–65)
ANION GAP SERPL CALC-SCNC: 8 MMOL/L (ref 7–16)
AST SERPL-CCNC: 24 U/L (ref 15–37)
BASOPHILS # BLD: 0 K/UL (ref 0–0.2)
BASOPHILS NFR BLD: 0 % (ref 0–2)
BILIRUB SERPL-MCNC: 0.5 MG/DL (ref 0.2–1.1)
BUN SERPL-MCNC: 31 MG/DL (ref 8–23)
CALCIUM SERPL-MCNC: 9 MG/DL (ref 8.3–10.4)
CHLORIDE SERPL-SCNC: 106 MMOL/L (ref 98–107)
CO2 SERPL-SCNC: 23 MMOL/L (ref 21–32)
CREAT SERPL-MCNC: 0.68 MG/DL (ref 0.8–1.5)
DIFFERENTIAL METHOD BLD: ABNORMAL
EOSINOPHIL # BLD: 0.3 K/UL (ref 0–0.8)
EOSINOPHIL NFR BLD: 3 % (ref 0.5–7.8)
ERYTHROCYTE [DISTWIDTH] IN BLOOD BY AUTOMATED COUNT: 16.8 % (ref 11.9–14.6)
GLOBULIN SER CALC-MCNC: 3.8 G/DL (ref 2.3–3.5)
GLUCOSE SERPL-MCNC: 107 MG/DL (ref 65–100)
HCT VFR BLD AUTO: 39 % (ref 41.1–50.3)
HGB BLD-MCNC: 12.4 G/DL (ref 13.6–17.2)
IMM GRANULOCYTES # BLD AUTO: 0.1 K/UL (ref 0–0.5)
IMM GRANULOCYTES NFR BLD AUTO: 1 % (ref 0–5)
LYMPHOCYTES # BLD: 2.5 K/UL (ref 0.5–4.6)
LYMPHOCYTES NFR BLD: 22 % (ref 13–44)
MAGNESIUM SERPL-MCNC: 2.1 MG/DL (ref 1.8–2.4)
MCH RBC QN AUTO: 30.7 PG (ref 26.1–32.9)
MCHC RBC AUTO-ENTMCNC: 31.8 G/DL (ref 31.4–35)
MCV RBC AUTO: 96.5 FL (ref 79.6–97.8)
MONOCYTES # BLD: 1.5 K/UL (ref 0.1–1.3)
MONOCYTES NFR BLD: 13 % (ref 4–12)
NEUTS SEG # BLD: 7 K/UL (ref 1.7–8.2)
NEUTS SEG NFR BLD: 62 % (ref 43–78)
NRBC # BLD: 0 K/UL (ref 0–0.2)
PLATELET # BLD AUTO: 255 K/UL (ref 150–450)
PMV BLD AUTO: 10.6 FL (ref 9.4–12.3)
POTASSIUM SERPL-SCNC: 4 MMOL/L (ref 3.5–5.1)
PROT SERPL-MCNC: 6.9 G/DL (ref 6.3–8.2)
RBC # BLD AUTO: 4.04 M/UL (ref 4.23–5.6)
SODIUM SERPL-SCNC: 137 MMOL/L (ref 138–145)
WBC # BLD AUTO: 11.4 K/UL (ref 4.3–11.1)

## 2021-06-29 PROCEDURE — 71045 X-RAY EXAM CHEST 1 VIEW: CPT

## 2021-06-29 PROCEDURE — 83735 ASSAY OF MAGNESIUM: CPT

## 2021-06-29 PROCEDURE — 80053 COMPREHEN METABOLIC PANEL: CPT

## 2021-06-29 PROCEDURE — 36415 COLL VENOUS BLD VENIPUNCTURE: CPT

## 2021-06-29 PROCEDURE — 85025 COMPLETE CBC W/AUTO DIFF WBC: CPT

## 2021-07-02 LAB
COVID-19 RAPID TEST, COVR: NOT DETECTED
SARS-COV-2, COV2: NORMAL
SOURCE, COVRS: NORMAL

## 2021-07-02 PROCEDURE — 87635 SARS-COV-2 COVID-19 AMP PRB: CPT

## 2021-07-05 LAB
ALBUMIN SERPL-MCNC: 3.2 G/DL (ref 3.2–4.6)
ALBUMIN/GLOB SERPL: 0.9 {RATIO} (ref 1.2–3.5)
ALP SERPL-CCNC: 81 U/L (ref 50–136)
ALT SERPL-CCNC: 23 U/L (ref 12–65)
ANION GAP SERPL CALC-SCNC: 4 MMOL/L (ref 7–16)
AST SERPL-CCNC: 17 U/L (ref 15–37)
BASOPHILS # BLD: 0.1 K/UL (ref 0–0.2)
BASOPHILS NFR BLD: 1 % (ref 0–2)
BILIRUB SERPL-MCNC: 0.5 MG/DL (ref 0.2–1.1)
BUN SERPL-MCNC: 49 MG/DL (ref 8–23)
CALCIUM SERPL-MCNC: 9.2 MG/DL (ref 8.3–10.4)
CHLORIDE SERPL-SCNC: 108 MMOL/L (ref 98–107)
CO2 SERPL-SCNC: 28 MMOL/L (ref 21–32)
CREAT SERPL-MCNC: 0.7 MG/DL (ref 0.8–1.5)
DIFFERENTIAL METHOD BLD: ABNORMAL
EOSINOPHIL # BLD: 0.3 K/UL (ref 0–0.8)
EOSINOPHIL NFR BLD: 2 % (ref 0.5–7.8)
ERYTHROCYTE [DISTWIDTH] IN BLOOD BY AUTOMATED COUNT: 17 % (ref 11.9–14.6)
GLOBULIN SER CALC-MCNC: 3.7 G/DL (ref 2.3–3.5)
GLUCOSE SERPL-MCNC: 81 MG/DL (ref 65–100)
HCT VFR BLD AUTO: 40.7 % (ref 41.1–50.3)
HGB BLD-MCNC: 12.6 G/DL (ref 13.6–17.2)
IMM GRANULOCYTES # BLD AUTO: 0.1 K/UL (ref 0–0.5)
IMM GRANULOCYTES NFR BLD AUTO: 1 % (ref 0–5)
LYMPHOCYTES # BLD: 2.9 K/UL (ref 0.5–4.6)
LYMPHOCYTES NFR BLD: 25 % (ref 13–44)
MCH RBC QN AUTO: 31.3 PG (ref 26.1–32.9)
MCHC RBC AUTO-ENTMCNC: 31 G/DL (ref 31.4–35)
MCV RBC AUTO: 101.2 FL (ref 79.6–97.8)
MONOCYTES # BLD: 1.4 K/UL (ref 0.1–1.3)
MONOCYTES NFR BLD: 12 % (ref 4–12)
NEUTS SEG # BLD: 6.7 K/UL (ref 1.7–8.2)
NEUTS SEG NFR BLD: 59 % (ref 43–78)
NRBC # BLD: 0 K/UL (ref 0–0.2)
PLATELET # BLD AUTO: 224 K/UL (ref 150–450)
PMV BLD AUTO: 10.8 FL (ref 9.4–12.3)
POTASSIUM SERPL-SCNC: 4.2 MMOL/L (ref 3.5–5.1)
PROT SERPL-MCNC: 6.9 G/DL (ref 6.3–8.2)
RBC # BLD AUTO: 4.02 M/UL (ref 4.23–5.6)
SODIUM SERPL-SCNC: 140 MMOL/L (ref 136–145)
WBC # BLD AUTO: 11.5 K/UL (ref 4.3–11.1)

## 2021-07-05 PROCEDURE — 85025 COMPLETE CBC W/AUTO DIFF WBC: CPT

## 2021-07-05 PROCEDURE — 80053 COMPREHEN METABOLIC PANEL: CPT

## 2021-07-05 PROCEDURE — 36415 COLL VENOUS BLD VENIPUNCTURE: CPT

## 2023-04-01 NOTE — PROGRESS NOTES
Spoke with patient and wife Nabor Webster regarding d/c planning. Lives in own home with wife no step entry. Wife and other family members provide assistance and support when needed. Prior to admission he was able to perform all of his own ADL's and drive. PCP-  [facility] and last seen one week ago. DME- cane, oxygen at night and sometimes during the day at 2L/min and it is serviced by [employer]. He has no issues with their services. Denies any problems filling Rx. List of STR given to patient and family to review. Surgery pending. CM will continue to follow for any further needs. Care Management Interventions  PCP Verified by CM:  Yes  Mode of Transport at Discharge: BLS  Transition of Care Consult (CM Consult): Discharge Planning  Physical Therapy Consult: Yes  Current Support Network: Lives with Spouse, Own Home  Confirm Follow Up Transport: Family  Plan discussed with Pt/Family/Caregiver: Yes  Freedom of Choice Offered: Yes  Discharge Location  Discharge Placement: Rehab Unit Subacute PAIN

## 2025-01-07 NOTE — PROGRESS NOTES
CM spoke with patient's spouse. She requested a bed at [facility]. Referral placed in CC. Awaiting response. Vitals capture started with the following parameters, Patient=Adult, Interval=5 min, Initial Pressure=160 mmHg, Deflation Rate=5 mmHg
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
## (undated) DEVICE — CARDINAL HEALTH FLEXIBLE LIGHT HANDLE COVER: Brand: CARDINAL HEALTH

## (undated) DEVICE — TRAY PROC 13FR L10CM RED SIGNAL DIAPH FLX CATH SYR ACT DRN

## (undated) DEVICE — CATHETER THOR 32FR L23IN PVC 5 EYELET STR ATRAUM

## (undated) DEVICE — DRSG GZ PETROLATM OCL 3X18IN -- CURAD

## (undated) DEVICE — CATHETER THOR 32FR L23IN PVC 6 EYELET STR ATRAUM

## (undated) DEVICE — SUTURE PERMAHAND SZ 0 L30IN NONABSORBABLE BLK L30MM PSL 3/8 590H

## (undated) DEVICE — SOL ANTI-FOG 6ML MEDC -- MEDICHOICE - CONVERT TO 358427

## (undated) DEVICE — BUTTON SWITCH PENCIL BLADE ELECTRODE, HOLSTER: Brand: EDGE

## (undated) DEVICE — AMD ANTIMICROBIAL GAUZE SPONGES,12 PLY USP TYPE VII, 0.2% POLYHEXAMETHYLENE BIGUANIDE HCI (PHMB): Brand: CURITY

## (undated) DEVICE — STAPLER INT L34CM 60MM LNG ENDOSCP ARTC PWR + ECHELON FLX

## (undated) DEVICE — PREP CHLORAPREP 10.5 ML ORG --

## (undated) DEVICE — REM POLYHESIVE ADULT PATIENT RETURN ELECTRODE: Brand: VALLEYLAB

## (undated) DEVICE — SLIM BODY SKIN STAPLER: Brand: APPOSE ULC

## (undated) DEVICE — DRAIN CHEST ADL/PED DRY/WET -- PLEUR-EVAC

## (undated) DEVICE — RELOAD STPL L45MM H15 35MM REG TISS BLU 6 ROW B FRM NAT

## (undated) DEVICE — SUTURE VCRL SZ 3-0 L27IN ABSRB VLT L26MM SH 1/2 CIR J316H

## (undated) DEVICE — SOLUTION IRRIG 3000ML 0.9% SOD CHL FLX CONT 0797208] ICU MEDICAL INC]

## (undated) DEVICE — LAP CHOLE: Brand: MEDLINE INDUSTRIES, INC.

## (undated) DEVICE — Device

## (undated) DEVICE — 2000CC GUARDIAN II: Brand: GUARDIAN

## (undated) DEVICE — VASELINE PETROLATUM GAUZE STRIP: Brand: VASELINE

## (undated) DEVICE — SUTURE PDS II SZ 1 L36IN ABSRB VLT L48MM CTX 1/2 CIR Z371T

## (undated) DEVICE — LOGICUT SCISSOR LENGTH 320MM: Brand: LOGI - LAPAROSCOPIC INSTRUMENT SYSTEM

## (undated) DEVICE — ROD RMR L950MM DIA2.5MM W/ EXTN BALL TIP

## (undated) DEVICE — 3M™ TEGADERM™ TRANSPARENT FILM DRESSING FRAME STYLE, 1628, 6 IN X 8 IN (15 CM X 20 CM), 10/CT 8CT/CASE: Brand: 3M™ TEGADERM™

## (undated) DEVICE — X-LARGE COTTON GLOVE: Brand: DEROYAL

## (undated) DEVICE — SUTURE MCRYL SZ 0 L27IN ABSRB VLT CT-1 L36MM 1/2 CIR TAPR Y340H

## (undated) DEVICE — KENDALL SCD EXPRESS SLEEVES, KNEE LENGTH, MEDIUM: Brand: KENDALL SCD

## (undated) DEVICE — BLOWER PWD 3GM FOR STERITALC TALCAIR NOVATECH

## (undated) DEVICE — AGENT SCLEROSING 4GM INTRAPLEURAL AERO SGL DOSE FOR MALIG

## (undated) DEVICE — SUTURE VCRL SZ 2 L54IN ABSRB UD L65MM TP-1 1/2 CIR J880T

## (undated) DEVICE — DRESSING,GAUZE,XEROFORM,CURAD,1"X8",ST: Brand: CURAD

## (undated) DEVICE — 3M™ STERI-DRAPE™ INSTRUMENT POUCH 1018: Brand: STERI-DRAPE™

## (undated) DEVICE — LAPSAC SURGICAL TISSUE POUCH: Brand: LAPSAC

## (undated) DEVICE — SOLUTION IV 1000ML 0.9% SOD CHL

## (undated) DEVICE — RELOAD STPL H2X0.75MM DIA45MM GRY MESENTERY/THIN TISS NAT

## (undated) DEVICE — (D)PREP SKN CHLRAPRP APPL 26ML -- CONVERT TO ITEM 371833

## (undated) DEVICE — SUTURE MCRYL SZ 2-0 L27IN ABSRB VLT CT-1 L36MM 1/2 CIR TAPR Y339H

## (undated) DEVICE — TRAY CATH 16F URIN MTR LTX -- CONVERT TO ITEM 363111

## (undated) DEVICE — UNIVERSAL FIXATION CANNULA: Brand: VERSAONE

## (undated) DEVICE — BLADELESS OPTICAL TROCAR WITH FIXATION CANNULA: Brand: VERSAPORT

## (undated) DEVICE — SURGICAL PROCEDURE PACK BASIC ST FRANCIS

## (undated) DEVICE — 3.2MM GUIDE WIRE 400MM

## (undated) DEVICE — (D)DRAPE ISOL ANTIMC 129X100IN -- DISC BY MFR

## (undated) DEVICE — INTENDED FOR TISSUE SEPARATION, AND OTHER PROCEDURES THAT REQUIRE A SHARP SURGICAL BLADE TO PUNCTURE OR CUT.: Brand: BARD-PARKER ® STAINLESS STEEL BLADES

## (undated) DEVICE — 2, DISPOSABLE SUCTION/IRRIGATOR WITHOUT DISPOSABLE TIP: Brand: STRYKEFLOW

## (undated) DEVICE — RELOAD STPL L60MM H1.5-3.6MM REG TISS BLU GRIPPING SURF B

## (undated) DEVICE — STERILE POLYISOPRENE POWDER-FREE SURGICAL GLOVES: Brand: PROTEXIS

## (undated) DEVICE — 1010 S-DRAPE TOWEL DRAPE 10/BX: Brand: STERI-DRAPE™

## (undated) DEVICE — BLADELESS OPTICAL TROCAR WITH FIXATION CANNULA: Brand: VERSAONE